# Patient Record
Sex: FEMALE | Race: WHITE | Employment: OTHER | URBAN - NONMETROPOLITAN AREA
[De-identification: names, ages, dates, MRNs, and addresses within clinical notes are randomized per-mention and may not be internally consistent; named-entity substitution may affect disease eponyms.]

---

## 2017-01-09 ENCOUNTER — ANTI-COAG VISIT (OUTPATIENT)
Dept: OTHER | Age: 72
End: 2017-01-09

## 2017-01-09 VITALS
BODY MASS INDEX: 30.43 KG/M2 | HEART RATE: 80 BPM | DIASTOLIC BLOOD PRESSURE: 66 MMHG | SYSTOLIC BLOOD PRESSURE: 121 MMHG | WEIGHT: 171.8 LBS

## 2017-01-09 DIAGNOSIS — I48.19 PERSISTENT ATRIAL FIBRILLATION (HCC): ICD-10-CM

## 2017-01-09 LAB — POC INR: 2.3 (ref 0.8–1.2)

## 2017-01-09 PROCEDURE — 85610 PROTHROMBIN TIME: CPT | Performed by: PHARMACIST

## 2017-01-09 PROCEDURE — 99999 PR OFFICE/OUTPT VISIT,PROCEDURE ONLY: CPT | Performed by: PHARMACIST

## 2017-01-09 ASSESSMENT — ENCOUNTER SYMPTOMS
BLOOD IN STOOL: 0
DIARRHEA: 1
SHORTNESS OF BREATH: 1
CONSTIPATION: 1

## 2017-01-23 ENCOUNTER — ANTI-COAG VISIT (OUTPATIENT)
Dept: OTHER | Age: 72
End: 2017-01-23

## 2017-01-23 VITALS
BODY MASS INDEX: 30.61 KG/M2 | DIASTOLIC BLOOD PRESSURE: 90 MMHG | WEIGHT: 172.8 LBS | HEART RATE: 72 BPM | SYSTOLIC BLOOD PRESSURE: 140 MMHG

## 2017-01-23 DIAGNOSIS — I48.19 PERSISTENT ATRIAL FIBRILLATION (HCC): ICD-10-CM

## 2017-01-23 LAB — POC INR: 1.9 (ref 0.8–1.2)

## 2017-01-23 PROCEDURE — G8484 FLU IMMUNIZE NO ADMIN: HCPCS | Performed by: NURSE PRACTITIONER

## 2017-01-23 PROCEDURE — G8400 PT W/DXA NO RESULTS DOC: HCPCS | Performed by: NURSE PRACTITIONER

## 2017-01-23 PROCEDURE — G8427 DOCREV CUR MEDS BY ELIG CLIN: HCPCS | Performed by: NURSE PRACTITIONER

## 2017-01-23 PROCEDURE — 99212 OFFICE O/P EST SF 10 MIN: CPT | Performed by: NURSE PRACTITIONER

## 2017-01-23 PROCEDURE — 85610 PROTHROMBIN TIME: CPT | Performed by: NURSE PRACTITIONER

## 2017-01-23 PROCEDURE — 1123F ACP DISCUSS/DSCN MKR DOCD: CPT | Performed by: NURSE PRACTITIONER

## 2017-01-23 PROCEDURE — 4004F PT TOBACCO SCREEN RCVD TLK: CPT | Performed by: NURSE PRACTITIONER

## 2017-01-23 PROCEDURE — 1090F PRES/ABSN URINE INCON ASSESS: CPT | Performed by: NURSE PRACTITIONER

## 2017-01-23 PROCEDURE — 3014F SCREEN MAMMO DOC REV: CPT | Performed by: NURSE PRACTITIONER

## 2017-01-23 PROCEDURE — G8419 CALC BMI OUT NRM PARAM NOF/U: HCPCS | Performed by: NURSE PRACTITIONER

## 2017-01-23 PROCEDURE — 3017F COLORECTAL CA SCREEN DOC REV: CPT | Performed by: NURSE PRACTITIONER

## 2017-01-23 PROCEDURE — 4040F PNEUMOC VAC/ADMIN/RCVD: CPT | Performed by: NURSE PRACTITIONER

## 2017-01-23 PROCEDURE — G8598 ASA/ANTIPLAT THER USED: HCPCS | Performed by: NURSE PRACTITIONER

## 2017-01-23 ASSESSMENT — ENCOUNTER SYMPTOMS
SHORTNESS OF BREATH: 1
CONSTIPATION: 0
DIARRHEA: 0
BLOOD IN STOOL: 0

## 2017-01-26 ENCOUNTER — TELEPHONE (OUTPATIENT)
Dept: OTHER | Age: 72
End: 2017-01-26

## 2017-02-14 ENCOUNTER — ANTI-COAG VISIT (OUTPATIENT)
Dept: OTHER | Age: 72
End: 2017-02-14

## 2017-02-14 VITALS
SYSTOLIC BLOOD PRESSURE: 142 MMHG | WEIGHT: 172.4 LBS | HEART RATE: 74 BPM | BODY MASS INDEX: 30.54 KG/M2 | DIASTOLIC BLOOD PRESSURE: 67 MMHG

## 2017-02-14 DIAGNOSIS — I48.19 PERSISTENT ATRIAL FIBRILLATION (HCC): ICD-10-CM

## 2017-02-14 LAB — POC INR: 2.7 (ref 0.8–1.2)

## 2017-02-14 PROCEDURE — G8598 ASA/ANTIPLAT THER USED: HCPCS

## 2017-02-14 PROCEDURE — 99999 PR OFFICE/OUTPT VISIT,PROCEDURE ONLY: CPT

## 2017-02-14 PROCEDURE — 4040F PNEUMOC VAC/ADMIN/RCVD: CPT

## 2017-02-14 PROCEDURE — 85610 PROTHROMBIN TIME: CPT

## 2017-02-14 PROCEDURE — G8419 CALC BMI OUT NRM PARAM NOF/U: HCPCS

## 2017-02-14 PROCEDURE — G8427 DOCREV CUR MEDS BY ELIG CLIN: HCPCS

## 2017-02-14 PROCEDURE — 3017F COLORECTAL CA SCREEN DOC REV: CPT

## 2017-02-14 ASSESSMENT — ENCOUNTER SYMPTOMS
CONSTIPATION: 0
BLOOD IN STOOL: 0
DIARRHEA: 0
SHORTNESS OF BREATH: 0

## 2017-03-01 ENCOUNTER — TELEPHONE (OUTPATIENT)
Dept: OTHER | Age: 72
End: 2017-03-01

## 2017-03-07 ENCOUNTER — ANTI-COAG VISIT (OUTPATIENT)
Dept: OTHER | Age: 72
End: 2017-03-07

## 2017-03-07 VITALS
BODY MASS INDEX: 30.29 KG/M2 | DIASTOLIC BLOOD PRESSURE: 64 MMHG | WEIGHT: 171 LBS | SYSTOLIC BLOOD PRESSURE: 128 MMHG | HEART RATE: 68 BPM

## 2017-03-07 DIAGNOSIS — I48.19 PERSISTENT ATRIAL FIBRILLATION (HCC): ICD-10-CM

## 2017-03-07 LAB — POC INR: 1.6 (ref 0.8–1.2)

## 2017-03-07 PROCEDURE — 85610 PROTHROMBIN TIME: CPT | Performed by: PHARMACIST

## 2017-03-07 PROCEDURE — 99999 PR OFFICE/OUTPT VISIT,PROCEDURE ONLY: CPT | Performed by: PHARMACIST

## 2017-03-07 PROCEDURE — G8419 CALC BMI OUT NRM PARAM NOF/U: HCPCS | Performed by: PHARMACIST

## 2017-03-07 PROCEDURE — G8427 DOCREV CUR MEDS BY ELIG CLIN: HCPCS | Performed by: PHARMACIST

## 2017-03-07 PROCEDURE — 4040F PNEUMOC VAC/ADMIN/RCVD: CPT | Performed by: PHARMACIST

## 2017-03-07 PROCEDURE — 3017F COLORECTAL CA SCREEN DOC REV: CPT | Performed by: PHARMACIST

## 2017-03-07 PROCEDURE — G8598 ASA/ANTIPLAT THER USED: HCPCS | Performed by: PHARMACIST

## 2017-03-07 RX ORDER — FEXOFENADINE HCL 180 MG/1
180 TABLET ORAL DAILY
COMMUNITY
End: 2017-06-20

## 2017-03-07 RX ORDER — AMOXICILLIN AND CLAVULANATE POTASSIUM 500; 125 MG/1; MG/1
1 TABLET, FILM COATED ORAL 2 TIMES DAILY
COMMUNITY
End: 2017-03-21

## 2017-03-07 ASSESSMENT — ENCOUNTER SYMPTOMS
SHORTNESS OF BREATH: 0
DIARRHEA: 0
CONSTIPATION: 0
BLOOD IN STOOL: 0

## 2017-03-21 ENCOUNTER — ANTI-COAG VISIT (OUTPATIENT)
Dept: OTHER | Age: 72
End: 2017-03-21

## 2017-03-21 VITALS
HEART RATE: 84 BPM | BODY MASS INDEX: 29.94 KG/M2 | DIASTOLIC BLOOD PRESSURE: 77 MMHG | SYSTOLIC BLOOD PRESSURE: 136 MMHG | WEIGHT: 169 LBS

## 2017-03-21 DIAGNOSIS — I48.19 PERSISTENT ATRIAL FIBRILLATION (HCC): ICD-10-CM

## 2017-03-21 LAB — POC INR: 2.3 (ref 0.8–1.2)

## 2017-03-21 RX ORDER — ONDANSETRON HYDROCHLORIDE 8 MG/1
8 TABLET, FILM COATED ORAL PRN
Refills: 2 | COMMUNITY
Start: 2017-03-13 | End: 2019-05-22

## 2017-03-21 RX ORDER — HYDROCODONE POLISTIREX AND CHLORPHENIRAMINE POLISTIREX 10; 8 MG/5ML; MG/5ML
5 SUSPENSION, EXTENDED RELEASE ORAL PRN
Refills: 0 | COMMUNITY
Start: 2017-02-24 | End: 2019-05-22 | Stop reason: ALTCHOICE

## 2017-03-21 ASSESSMENT — ENCOUNTER SYMPTOMS
BLOOD IN STOOL: 0
SHORTNESS OF BREATH: 0
DIARRHEA: 0
CONSTIPATION: 0

## 2017-04-12 ENCOUNTER — ANTI-COAG VISIT (OUTPATIENT)
Dept: OTHER | Age: 72
End: 2017-04-12

## 2017-04-12 VITALS
WEIGHT: 173 LBS | BODY MASS INDEX: 30.65 KG/M2 | HEART RATE: 73 BPM | DIASTOLIC BLOOD PRESSURE: 74 MMHG | SYSTOLIC BLOOD PRESSURE: 138 MMHG

## 2017-04-12 DIAGNOSIS — I48.19 PERSISTENT ATRIAL FIBRILLATION (HCC): ICD-10-CM

## 2017-04-12 LAB — POC INR: 2.6 (ref 0.8–1.2)

## 2017-04-12 RX ORDER — CLINDAMYCIN HYDROCHLORIDE 300 MG/1
300 CAPSULE ORAL 2 TIMES DAILY
COMMUNITY
End: 2017-06-08 | Stop reason: ALTCHOICE

## 2017-04-12 ASSESSMENT — ENCOUNTER SYMPTOMS
CONSTIPATION: 0
DIARRHEA: 0
SHORTNESS OF BREATH: 0
BLOOD IN STOOL: 0

## 2017-04-24 ENCOUNTER — OFFICE VISIT (OUTPATIENT)
Dept: UROLOGY | Age: 72
End: 2017-04-24

## 2017-04-24 ENCOUNTER — TELEPHONE (OUTPATIENT)
Dept: OTHER | Age: 72
End: 2017-04-24

## 2017-04-24 VITALS
BODY MASS INDEX: 30.83 KG/M2 | WEIGHT: 174 LBS | DIASTOLIC BLOOD PRESSURE: 86 MMHG | SYSTOLIC BLOOD PRESSURE: 128 MMHG | HEIGHT: 63 IN

## 2017-04-24 DIAGNOSIS — R35.0 URINARY FREQUENCY: Primary | ICD-10-CM

## 2017-04-24 LAB
BILIRUBIN URINE: NEGATIVE
BLOOD URINE, POC: NEGATIVE
CHARACTER, URINE: CLEAR
COLOR, URINE: YELLOW
GLUCOSE URINE: NEGATIVE MG/DL
KETONES, URINE: NEGATIVE
LEUKOCYTE CLUMPS, URINE: NORMAL
NITRITE, URINE: NEGATIVE
PH, URINE: 6
POST VOID RESIDUAL (PVR): 58 ML
PROTEIN, URINE: NEGATIVE MG/DL
SPECIFIC GRAVITY, URINE: 1.02 (ref 1–1.03)
UROBILINOGEN, URINE: 0.2 EU/DL

## 2017-04-24 PROCEDURE — 81003 URINALYSIS AUTO W/O SCOPE: CPT | Performed by: UROLOGY

## 2017-04-24 PROCEDURE — 4040F PNEUMOC VAC/ADMIN/RCVD: CPT | Performed by: UROLOGY

## 2017-04-24 PROCEDURE — 99204 OFFICE O/P NEW MOD 45 MIN: CPT | Performed by: UROLOGY

## 2017-04-24 PROCEDURE — G8427 DOCREV CUR MEDS BY ELIG CLIN: HCPCS | Performed by: UROLOGY

## 2017-04-24 PROCEDURE — G8400 PT W/DXA NO RESULTS DOC: HCPCS | Performed by: UROLOGY

## 2017-04-24 PROCEDURE — 1123F ACP DISCUSS/DSCN MKR DOCD: CPT | Performed by: UROLOGY

## 2017-04-24 PROCEDURE — 1090F PRES/ABSN URINE INCON ASSESS: CPT | Performed by: UROLOGY

## 2017-04-24 PROCEDURE — G8598 ASA/ANTIPLAT THER USED: HCPCS | Performed by: UROLOGY

## 2017-04-24 PROCEDURE — 51798 US URINE CAPACITY MEASURE: CPT | Performed by: UROLOGY

## 2017-04-24 PROCEDURE — 3017F COLORECTAL CA SCREEN DOC REV: CPT | Performed by: UROLOGY

## 2017-04-24 PROCEDURE — 3014F SCREEN MAMMO DOC REV: CPT | Performed by: UROLOGY

## 2017-04-24 PROCEDURE — 4004F PT TOBACCO SCREEN RCVD TLK: CPT | Performed by: UROLOGY

## 2017-04-24 PROCEDURE — G8417 CALC BMI ABV UP PARAM F/U: HCPCS | Performed by: UROLOGY

## 2017-04-24 RX ORDER — TIZANIDINE 2 MG/1
2 TABLET ORAL EVERY 6 HOURS PRN
COMMUNITY
End: 2017-06-08

## 2017-04-24 RX ORDER — METRONIDAZOLE 7.5 MG/G
GEL VAGINAL
COMMUNITY
Start: 2017-03-25 | End: 2017-06-08

## 2017-04-24 RX ORDER — SULFAMETHOXAZOLE AND TRIMETHOPRIM 800; 160 MG/1; MG/1
TABLET ORAL
COMMUNITY
Start: 2017-04-20 | End: 2017-05-04

## 2017-04-24 ASSESSMENT — ENCOUNTER SYMPTOMS
SHORTNESS OF BREATH: 1
CHEST TIGHTNESS: 0
ABDOMINAL PAIN: 0
EYE REDNESS: 0
FACIAL SWELLING: 0
CONSTIPATION: 0
COLOR CHANGE: 0
BACK PAIN: 1
EYE PAIN: 0

## 2017-04-27 ENCOUNTER — ANTI-COAG VISIT (OUTPATIENT)
Dept: OTHER | Age: 72
End: 2017-04-27

## 2017-04-27 VITALS
WEIGHT: 169.4 LBS | SYSTOLIC BLOOD PRESSURE: 134 MMHG | HEART RATE: 73 BPM | BODY MASS INDEX: 30.01 KG/M2 | DIASTOLIC BLOOD PRESSURE: 65 MMHG

## 2017-04-27 DIAGNOSIS — I48.19 PERSISTENT ATRIAL FIBRILLATION (HCC): ICD-10-CM

## 2017-04-27 LAB — POC INR: 1.8 (ref 0.8–1.2)

## 2017-04-27 ASSESSMENT — ENCOUNTER SYMPTOMS
SHORTNESS OF BREATH: 0
DIARRHEA: 0
BLOOD IN STOOL: 0
CONSTIPATION: 0

## 2017-05-04 ENCOUNTER — ANTI-COAG VISIT (OUTPATIENT)
Dept: OTHER | Age: 72
End: 2017-05-04

## 2017-05-04 VITALS
HEART RATE: 76 BPM | DIASTOLIC BLOOD PRESSURE: 68 MMHG | BODY MASS INDEX: 30.65 KG/M2 | SYSTOLIC BLOOD PRESSURE: 124 MMHG | WEIGHT: 173 LBS

## 2017-05-04 DIAGNOSIS — I48.19 PERSISTENT ATRIAL FIBRILLATION (HCC): ICD-10-CM

## 2017-05-04 LAB — POC INR: 2.7 (ref 0.8–1.2)

## 2017-05-04 ASSESSMENT — ENCOUNTER SYMPTOMS
CONSTIPATION: 0
DIARRHEA: 0
SHORTNESS OF BREATH: 1
BLOOD IN STOOL: 0

## 2017-05-18 ENCOUNTER — ANTI-COAG VISIT (OUTPATIENT)
Dept: OTHER | Age: 72
End: 2017-05-18

## 2017-05-18 VITALS
HEART RATE: 72 BPM | DIASTOLIC BLOOD PRESSURE: 65 MMHG | SYSTOLIC BLOOD PRESSURE: 120 MMHG | WEIGHT: 172.6 LBS | BODY MASS INDEX: 30.57 KG/M2

## 2017-05-18 DIAGNOSIS — I48.19 PERSISTENT ATRIAL FIBRILLATION (HCC): ICD-10-CM

## 2017-05-18 LAB — POC INR: 2.1 (ref 0.8–1.2)

## 2017-05-18 ASSESSMENT — ENCOUNTER SYMPTOMS
DIARRHEA: 0
SHORTNESS OF BREATH: 0
CONSTIPATION: 0
BLOOD IN STOOL: 0

## 2017-06-08 ENCOUNTER — ANTI-COAG VISIT (OUTPATIENT)
Dept: OTHER | Age: 72
End: 2017-06-08

## 2017-06-08 VITALS
DIASTOLIC BLOOD PRESSURE: 67 MMHG | WEIGHT: 170.8 LBS | SYSTOLIC BLOOD PRESSURE: 142 MMHG | BODY MASS INDEX: 30.26 KG/M2 | HEART RATE: 76 BPM

## 2017-06-08 DIAGNOSIS — I48.19 PERSISTENT ATRIAL FIBRILLATION (HCC): ICD-10-CM

## 2017-06-08 LAB — POC INR: 2.2 (ref 0.8–1.2)

## 2017-06-08 ASSESSMENT — ENCOUNTER SYMPTOMS
CONSTIPATION: 0
DIARRHEA: 0
SHORTNESS OF BREATH: 0
BLOOD IN STOOL: 1

## 2017-06-19 ENCOUNTER — TELEPHONE (OUTPATIENT)
Dept: OTHER | Age: 72
End: 2017-06-19

## 2017-06-20 ENCOUNTER — OFFICE VISIT (OUTPATIENT)
Dept: UROLOGY | Age: 72
End: 2017-06-20

## 2017-06-20 VITALS
SYSTOLIC BLOOD PRESSURE: 142 MMHG | BODY MASS INDEX: 31.17 KG/M2 | DIASTOLIC BLOOD PRESSURE: 78 MMHG | WEIGHT: 169.4 LBS | HEIGHT: 62 IN

## 2017-06-20 DIAGNOSIS — R35.0 URINARY FREQUENCY: Primary | ICD-10-CM

## 2017-06-20 LAB
BILIRUBIN URINE: NEGATIVE
BLOOD URINE, POC: NEGATIVE
CHARACTER, URINE: CLEAR
COLOR, URINE: YELLOW
GLUCOSE URINE: NEGATIVE MG/DL
KETONES, URINE: NEGATIVE
LEUKOCYTE CLUMPS, URINE: NEGATIVE
NITRITE, URINE: NEGATIVE
PH, URINE: 6.5
POST VOID RESIDUAL (PVR): 65 ML
PROTEIN, URINE: NEGATIVE MG/DL
SPECIFIC GRAVITY, URINE: 1.02 (ref 1–1.03)
UROBILINOGEN, URINE: 0.2 EU/DL

## 2017-06-20 PROCEDURE — 4040F PNEUMOC VAC/ADMIN/RCVD: CPT | Performed by: NURSE PRACTITIONER

## 2017-06-20 PROCEDURE — 51798 US URINE CAPACITY MEASURE: CPT | Performed by: NURSE PRACTITIONER

## 2017-06-20 PROCEDURE — 4004F PT TOBACCO SCREEN RCVD TLK: CPT | Performed by: NURSE PRACTITIONER

## 2017-06-20 PROCEDURE — 81003 URINALYSIS AUTO W/O SCOPE: CPT | Performed by: NURSE PRACTITIONER

## 2017-06-20 PROCEDURE — G8598 ASA/ANTIPLAT THER USED: HCPCS | Performed by: NURSE PRACTITIONER

## 2017-06-20 PROCEDURE — G8427 DOCREV CUR MEDS BY ELIG CLIN: HCPCS | Performed by: NURSE PRACTITIONER

## 2017-06-20 PROCEDURE — 99213 OFFICE O/P EST LOW 20 MIN: CPT | Performed by: NURSE PRACTITIONER

## 2017-06-20 PROCEDURE — 3017F COLORECTAL CA SCREEN DOC REV: CPT | Performed by: NURSE PRACTITIONER

## 2017-06-20 PROCEDURE — G8417 CALC BMI ABV UP PARAM F/U: HCPCS | Performed by: NURSE PRACTITIONER

## 2017-06-20 PROCEDURE — 1090F PRES/ABSN URINE INCON ASSESS: CPT | Performed by: NURSE PRACTITIONER

## 2017-06-20 PROCEDURE — G8400 PT W/DXA NO RESULTS DOC: HCPCS | Performed by: NURSE PRACTITIONER

## 2017-06-20 PROCEDURE — 1123F ACP DISCUSS/DSCN MKR DOCD: CPT | Performed by: NURSE PRACTITIONER

## 2017-06-20 PROCEDURE — 3014F SCREEN MAMMO DOC REV: CPT | Performed by: NURSE PRACTITIONER

## 2017-06-20 ASSESSMENT — ENCOUNTER SYMPTOMS
ABDOMINAL PAIN: 0
VOMITING: 0
NAUSEA: 0

## 2017-06-29 ENCOUNTER — ANTI-COAG VISIT (OUTPATIENT)
Dept: OTHER | Age: 72
End: 2017-06-29

## 2017-06-29 VITALS
HEART RATE: 85 BPM | DIASTOLIC BLOOD PRESSURE: 69 MMHG | WEIGHT: 168.8 LBS | BODY MASS INDEX: 30.87 KG/M2 | SYSTOLIC BLOOD PRESSURE: 143 MMHG

## 2017-06-29 DIAGNOSIS — I48.19 PERSISTENT ATRIAL FIBRILLATION (HCC): ICD-10-CM

## 2017-06-29 LAB — POC INR: 2.1 (ref 0.8–1.2)

## 2017-06-29 RX ORDER — LEVOCETIRIZINE DIHYDROCHLORIDE 5 MG/1
5 TABLET, FILM COATED ORAL NIGHTLY
COMMUNITY
End: 2020-01-01

## 2017-06-29 ASSESSMENT — ENCOUNTER SYMPTOMS
CONSTIPATION: 0
DIARRHEA: 0
BLOOD IN STOOL: 0
SHORTNESS OF BREATH: 1

## 2017-07-27 ENCOUNTER — HOSPITAL ENCOUNTER (OUTPATIENT)
Dept: PHARMACY | Age: 72
Setting detail: THERAPIES SERIES
Discharge: HOME | End: 2017-07-27
Payer: MEDICARE

## 2017-07-27 VITALS
HEART RATE: 82 BPM | WEIGHT: 169.8 LBS | DIASTOLIC BLOOD PRESSURE: 66 MMHG | BODY MASS INDEX: 31.06 KG/M2 | SYSTOLIC BLOOD PRESSURE: 124 MMHG

## 2017-07-27 DIAGNOSIS — I48.19 PERSISTENT ATRIAL FIBRILLATION (HCC): ICD-10-CM

## 2017-07-27 LAB — POC INR: 2.4 (ref 0.8–1.2)

## 2017-07-27 PROCEDURE — 36416 COLLJ CAPILLARY BLOOD SPEC: CPT

## 2017-07-27 PROCEDURE — 99211 OFF/OP EST MAY X REQ PHY/QHP: CPT

## 2017-07-27 PROCEDURE — 85610 PROTHROMBIN TIME: CPT

## 2017-07-27 ASSESSMENT — ENCOUNTER SYMPTOMS
DIARRHEA: 0
BLOOD IN STOOL: 0
CHEST TIGHTNESS: 1
CONSTIPATION: 0

## 2017-08-07 ENCOUNTER — HOSPITAL ENCOUNTER (OUTPATIENT)
Age: 72
Discharge: HOME | End: 2017-08-07
Payer: MEDICARE

## 2017-08-07 ENCOUNTER — TELEPHONE (OUTPATIENT)
Dept: UROLOGY | Age: 72
End: 2017-08-07

## 2017-08-07 DIAGNOSIS — R30.0 DYSURIA: Primary | ICD-10-CM

## 2017-08-07 DIAGNOSIS — R30.0 DYSURIA: ICD-10-CM

## 2017-08-07 LAB
BACTERIA: ABNORMAL /HPF
BILIRUBIN URINE: NEGATIVE
BLOOD, URINE: NEGATIVE
CASTS 2: ABNORMAL /LPF
CASTS UA: ABNORMAL /LPF
CHARACTER, URINE: ABNORMAL
COLOR: YELLOW
CRYSTALS, UA: ABNORMAL
EPITHELIAL CELLS, UA: ABNORMAL /HPF
GLUCOSE URINE: NEGATIVE MG/DL
KETONES, URINE: NEGATIVE
LEUKOCYTE ESTERASE, URINE: ABNORMAL
MISCELLANEOUS 2: ABNORMAL
NITRITE, URINE: NEGATIVE
PH UA: 6
PROTEIN UA: NEGATIVE
RBC URINE: ABNORMAL /HPF
RENAL EPITHELIAL, UA: ABNORMAL
SPECIFIC GRAVITY, URINE: 1.02 (ref 1–1.03)
UROBILINOGEN, URINE: 0.2 EU/DL
WBC UA: ABNORMAL /HPF
YEAST: ABNORMAL

## 2017-08-07 PROCEDURE — 87086 URINE CULTURE/COLONY COUNT: CPT

## 2017-08-07 PROCEDURE — 81001 URINALYSIS AUTO W/SCOPE: CPT

## 2017-08-07 PROCEDURE — 87077 CULTURE AEROBIC IDENTIFY: CPT

## 2017-08-07 PROCEDURE — 87186 SC STD MICRODIL/AGAR DIL: CPT

## 2017-08-08 ENCOUNTER — TELEPHONE (OUTPATIENT)
Dept: UROLOGY | Age: 72
End: 2017-08-08

## 2017-08-08 LAB
ORGANISM: ABNORMAL
URINE CULTURE REFLEX: ABNORMAL

## 2017-08-08 RX ORDER — AMOXICILLIN 500 MG/1
500 CAPSULE ORAL 3 TIMES DAILY
Qty: 15 CAPSULE | Refills: 0 | Status: SHIPPED | OUTPATIENT
Start: 2017-08-08 | End: 2017-08-13

## 2017-08-08 RX ORDER — DOXYCYCLINE HYCLATE 100 MG/1
100 CAPSULE ORAL 2 TIMES DAILY
Qty: 10 CAPSULE | Refills: 0 | Status: SHIPPED | OUTPATIENT
Start: 2017-08-08 | End: 2017-08-13

## 2017-08-11 ENCOUNTER — TELEPHONE (OUTPATIENT)
Dept: UROLOGY | Age: 72
End: 2017-08-11

## 2017-08-11 DIAGNOSIS — B37.9 YEAST INFECTION: Primary | ICD-10-CM

## 2017-08-24 ENCOUNTER — HOSPITAL ENCOUNTER (OUTPATIENT)
Dept: PHARMACY | Age: 72
Setting detail: THERAPIES SERIES
Discharge: HOME | End: 2017-08-24
Payer: MEDICARE

## 2017-08-24 VITALS
SYSTOLIC BLOOD PRESSURE: 124 MMHG | HEART RATE: 81 BPM | BODY MASS INDEX: 31.68 KG/M2 | DIASTOLIC BLOOD PRESSURE: 69 MMHG | WEIGHT: 173.2 LBS

## 2017-08-24 DIAGNOSIS — I48.19 PERSISTENT ATRIAL FIBRILLATION (HCC): ICD-10-CM

## 2017-08-24 LAB
INTERNATIONAL NORMALIZATION RATIO, POC: 2.4
POC INR: 2.4 (ref 0.8–1.2)

## 2017-08-24 PROCEDURE — 85610 PROTHROMBIN TIME: CPT

## 2017-08-24 PROCEDURE — 36416 COLLJ CAPILLARY BLOOD SPEC: CPT

## 2017-08-24 PROCEDURE — 99211 OFF/OP EST MAY X REQ PHY/QHP: CPT

## 2017-08-24 ASSESSMENT — ENCOUNTER SYMPTOMS
CONSTIPATION: 0
BLOOD IN STOOL: 0
SHORTNESS OF BREATH: 0
DIARRHEA: 0

## 2017-09-20 ENCOUNTER — HOSPITAL ENCOUNTER (OUTPATIENT)
Dept: PHARMACY | Age: 72
Setting detail: THERAPIES SERIES
Discharge: HOME | End: 2017-09-20
Payer: MEDICARE

## 2017-09-20 VITALS
DIASTOLIC BLOOD PRESSURE: 61 MMHG | HEART RATE: 75 BPM | BODY MASS INDEX: 31.75 KG/M2 | WEIGHT: 173.6 LBS | SYSTOLIC BLOOD PRESSURE: 125 MMHG

## 2017-09-20 DIAGNOSIS — I48.19 PERSISTENT ATRIAL FIBRILLATION (HCC): ICD-10-CM

## 2017-09-20 LAB — POC INR: 1.4 (ref 0.8–1.2)

## 2017-09-20 PROCEDURE — 36416 COLLJ CAPILLARY BLOOD SPEC: CPT

## 2017-09-20 PROCEDURE — 85610 PROTHROMBIN TIME: CPT

## 2017-09-20 PROCEDURE — 99211 OFF/OP EST MAY X REQ PHY/QHP: CPT

## 2017-09-27 ENCOUNTER — HOSPITAL ENCOUNTER (OUTPATIENT)
Dept: PHARMACY | Age: 72
Setting detail: THERAPIES SERIES
Discharge: HOME | End: 2017-09-27
Payer: MEDICARE

## 2017-09-27 VITALS
SYSTOLIC BLOOD PRESSURE: 149 MMHG | DIASTOLIC BLOOD PRESSURE: 80 MMHG | BODY MASS INDEX: 31.46 KG/M2 | HEART RATE: 75 BPM | WEIGHT: 172 LBS

## 2017-09-27 DIAGNOSIS — I48.19 PERSISTENT ATRIAL FIBRILLATION (HCC): ICD-10-CM

## 2017-09-27 LAB — POC INR: 2 (ref 0.8–1.2)

## 2017-09-27 PROCEDURE — 85610 PROTHROMBIN TIME: CPT

## 2017-09-27 PROCEDURE — 99211 OFF/OP EST MAY X REQ PHY/QHP: CPT

## 2017-09-27 PROCEDURE — 36416 COLLJ CAPILLARY BLOOD SPEC: CPT

## 2017-09-27 ASSESSMENT — ENCOUNTER SYMPTOMS
CONSTIPATION: 0
BLOOD IN STOOL: 0
DIARRHEA: 0

## 2017-10-11 ENCOUNTER — HOSPITAL ENCOUNTER (OUTPATIENT)
Dept: PHARMACY | Age: 72
Setting detail: THERAPIES SERIES
Discharge: HOME | End: 2017-10-11
Payer: MEDICARE

## 2017-10-11 VITALS
SYSTOLIC BLOOD PRESSURE: 120 MMHG | HEART RATE: 79 BPM | WEIGHT: 172.6 LBS | DIASTOLIC BLOOD PRESSURE: 72 MMHG | BODY MASS INDEX: 31.57 KG/M2

## 2017-10-11 DIAGNOSIS — I48.19 PERSISTENT ATRIAL FIBRILLATION (HCC): ICD-10-CM

## 2017-10-11 LAB
INR BLD: 2.1
POC INR: 2.1 (ref 0.8–1.2)

## 2017-10-11 PROCEDURE — 99211 OFF/OP EST MAY X REQ PHY/QHP: CPT

## 2017-10-11 PROCEDURE — 36416 COLLJ CAPILLARY BLOOD SPEC: CPT

## 2017-10-11 PROCEDURE — 85610 PROTHROMBIN TIME: CPT

## 2017-10-11 NOTE — PROGRESS NOTES
10/11/17   1543   INR  2.10*                             Plan:    Patient requests names of DOACs from previous encounter. Will include on AVS. Continue Coumadin 1mg MF, 1.5mg STWTHS. Recheck INR 3 weeks. Patient reminded to call the Anticoagulation Clinic with any signs or symptoms of bleeding or with any medication changes. Patient given instructions utilizing the teach back method. Assessment and plan review with [facility]. MARGOTH      Discharged ambulatory in no apparent distress.     Carlos RAMIREZ [facility]  10/11/2017  4:06 PM

## 2017-11-05 DIAGNOSIS — I48.19 PERSISTENT ATRIAL FIBRILLATION (HCC): ICD-10-CM

## 2017-11-06 ENCOUNTER — HOSPITAL ENCOUNTER (OUTPATIENT)
Dept: PHARMACY | Age: 72
Setting detail: THERAPIES SERIES
Discharge: HOME | End: 2017-11-06
Payer: MEDICARE

## 2017-11-06 VITALS
WEIGHT: 175.4 LBS | HEART RATE: 71 BPM | SYSTOLIC BLOOD PRESSURE: 127 MMHG | BODY MASS INDEX: 32.08 KG/M2 | DIASTOLIC BLOOD PRESSURE: 65 MMHG

## 2017-11-06 DIAGNOSIS — I48.19 PERSISTENT ATRIAL FIBRILLATION (HCC): ICD-10-CM

## 2017-11-06 LAB
HCT VFR BLD CALC: 49.1 % (ref 37–47)
HEMOGLOBIN: 16.6 GM/DL (ref 12–16)
POC INR: 1.7 (ref 0.8–1.2)

## 2017-11-06 PROCEDURE — 85610 PROTHROMBIN TIME: CPT

## 2017-11-06 PROCEDURE — 99211 OFF/OP EST MAY X REQ PHY/QHP: CPT

## 2017-11-06 PROCEDURE — 99212 OFFICE O/P EST SF 10 MIN: CPT

## 2017-11-06 PROCEDURE — 36416 COLLJ CAPILLARY BLOOD SPEC: CPT

## 2017-11-06 RX ORDER — WARFARIN SODIUM 1 MG/1
TABLET ORAL
Qty: 140 TABLET | Refills: 3 | Status: SHIPPED | OUTPATIENT
Start: 2017-11-06 | End: 2017-11-06 | Stop reason: SDUPTHER

## 2017-11-06 RX ORDER — WARFARIN SODIUM 1 MG/1
TABLET ORAL
Qty: 140 TABLET | Refills: 3 | Status: SHIPPED | OUTPATIENT
Start: 2017-11-06 | End: 2018-11-29 | Stop reason: SDUPTHER

## 2017-11-06 RX ORDER — WARFARIN SODIUM 1 MG/1
TABLET ORAL
Qty: 140 TABLET | OUTPATIENT
Start: 2017-11-06

## 2017-11-06 NOTE — PROGRESS NOTES
[facility]  Anticoagulation Clinic  [phone] (phone)                    [phone] (fax)    Visit Date: 11/6/2017     Subjective:       Patient ID: Sandie Hussein, 67 y.o., female     Patient presents for 3 week INR check. Patient in for anticoagulation management due to persistent atrial fibrillation with a goal INR of 2.0-3.0. INR ordered and reviewed today. Patient reports the following:    Medication changes: None  Tablet strength per patient: 1mg pink tab  Patient reported dosing regimen over last 1 week: 1mg MF, and 1.5mg on the other days  Missed doses in the last 1-2 weeks: missed dose on Wednesday (1.5mg)  Extra doses in the last 1-2 weeks: denies   Any problems with bleeding/bruising? Yes, pt has bruises on both arms since last appt, but is starting to fade  Any recent falls? No  Any signs or symptoms of DVT/PE or stroke? Yes, SOB, but normal for pt  Alcohol use: had a beer on Sunday  Tobacco use: 6-7 cigarettes/day, which is normal for pt  Diet changes as follows: No changes   Green leafy intake: no   Grapefruit juice: no  Upcoming surgeries or procedures: 11/08/2017 has cat scan  Appointment preference: PM    Review of Systems   Constitutional: Negative for activity change, appetite change and fatigue. HENT: Negative for nosebleeds. Respiratory: Positive for shortness of breath. Cardiovascular: Negative for chest pain and leg swelling. Gastrointestinal: Negative for blood in stool, constipation and diarrhea. Genitourinary: Negative for hematuria. Neurological: Negative for weakness, light-headedness and headaches. Hematological: Does get bruises. Does not bleed easily.      Objective:   Physical Exam   Vitals:    11/06/17 1543   BP: 127/65   Pulse: 71       Physical Exam    Assessment:     Lab Results   Component Value Date    INR 1.70 (H) 11/06/2017    INR 2.10 (H) 10/11/2017    INR 2.10 10/11/2017     INR therapeutic   Recent Labs 11/06/17   1538   INR  1.70*                             Plan:   Instructed patient to increase today's dose to 2mg then continue regimen of Coumadin 1mg MF, 1.5mg STWTHS. Recheck INR 3 weeks. Patient reminded to call the Anticoagulation Clinic with any signs or symptoms of bleeding or with any medication changes. Patient given instructions utilizing the teach back method. Assessment and plan review with Kong Lowe PharmD. Discharged ambulatory in no apparent distress.     Linette Hughes PharmD  PGY-1 Resident  11/6/2017 4:10 PM

## 2017-11-08 ENCOUNTER — HOSPITAL ENCOUNTER (OUTPATIENT)
Dept: CT IMAGING | Age: 72
Discharge: HOME | End: 2017-11-08
Payer: MEDICARE

## 2017-11-08 DIAGNOSIS — R10.32 LEFT LOWER QUADRANT PAIN: ICD-10-CM

## 2017-11-08 PROCEDURE — 6360000004 HC RX CONTRAST MEDICATION: Performed by: INTERNAL MEDICINE

## 2017-11-08 PROCEDURE — 74177 CT ABD & PELVIS W/CONTRAST: CPT

## 2017-11-08 RX ADMIN — IOPAMIDOL 85 ML: 755 INJECTION, SOLUTION INTRAVENOUS at 13:32

## 2017-11-27 ENCOUNTER — HOSPITAL ENCOUNTER (OUTPATIENT)
Dept: PHARMACY | Age: 72
Setting detail: THERAPIES SERIES
Discharge: HOME | End: 2017-11-27
Payer: MEDICARE

## 2017-11-27 VITALS
HEART RATE: 75 BPM | WEIGHT: 179.2 LBS | DIASTOLIC BLOOD PRESSURE: 65 MMHG | BODY MASS INDEX: 32.78 KG/M2 | SYSTOLIC BLOOD PRESSURE: 123 MMHG

## 2017-11-27 DIAGNOSIS — I48.19 PERSISTENT ATRIAL FIBRILLATION (HCC): ICD-10-CM

## 2017-11-27 LAB — POC INR: 2.1 (ref 0.8–1.2)

## 2017-11-27 PROCEDURE — 85610 PROTHROMBIN TIME: CPT | Performed by: PHARMACIST

## 2017-11-27 PROCEDURE — 36416 COLLJ CAPILLARY BLOOD SPEC: CPT | Performed by: PHARMACIST

## 2017-11-27 PROCEDURE — 99211 OFF/OP EST MAY X REQ PHY/QHP: CPT | Performed by: PHARMACIST

## 2017-12-08 ENCOUNTER — HOSPITAL ENCOUNTER (OUTPATIENT)
Dept: CT IMAGING | Age: 72
Discharge: HOME | End: 2017-12-08
Payer: MEDICARE

## 2017-12-08 DIAGNOSIS — R91.1 LUNG NODULE: ICD-10-CM

## 2017-12-08 PROCEDURE — 71250 CT THORAX DX C-: CPT

## 2017-12-20 ENCOUNTER — TELEPHONE (OUTPATIENT)
Dept: PHARMACY | Age: 72
End: 2017-12-20

## 2017-12-26 ENCOUNTER — HOSPITAL ENCOUNTER (OUTPATIENT)
Dept: PHARMACY | Age: 72
Setting detail: THERAPIES SERIES
Discharge: HOME | End: 2017-12-26
Payer: MEDICARE

## 2017-12-26 DIAGNOSIS — I48.19 PERSISTENT ATRIAL FIBRILLATION (HCC): ICD-10-CM

## 2017-12-26 LAB — POC INR: 3 (ref 0.8–1.2)

## 2017-12-26 PROCEDURE — 99211 OFF/OP EST MAY X REQ PHY/QHP: CPT

## 2017-12-26 PROCEDURE — 85610 PROTHROMBIN TIME: CPT

## 2017-12-26 PROCEDURE — 36416 COLLJ CAPILLARY BLOOD SPEC: CPT

## 2017-12-26 RX ORDER — AMOXICILLIN AND CLAVULANATE POTASSIUM 875; 125 MG/1; MG/1
1 TABLET, FILM COATED ORAL 2 TIMES DAILY
COMMUNITY
End: 2018-01-05

## 2017-12-26 RX ORDER — PREDNISONE 20 MG/1
20 TABLET ORAL DAILY
COMMUNITY
End: 2018-01-05

## 2017-12-26 NOTE — PROGRESS NOTES
[facility]  Anticoagulation Clinic  [phone] (phone)   [phone] (fax)    Visit Date: 11/6/2017     Subjective:       Patient ID: Amanda Anderson, 67 y.o., female     Patient in for Warfarin management due to persistent atrial fibrillation, per Dr. Manuela Hackett referral (4 week visit). Correctly states dose/tablet strength. Called last week about being put on Tamiflu - has finished. Brought in bottles of Prednisone and Augmentin - started evening of 12/23 for bronchitis. Prednisone ordered as BID, but gets too shakey, so only taking 1 daily; advised to update doctor. Has been having diarrhea. Already takes Probiotic. Encouraged to eat more yogurt. Has chest pain when out in the cold. SOB worse. Has had more swelling. States Dr. Villarreal Learn told her she could take an extra Lasix if needed, which she has. Less active. Objective:   Alert and oriented. Skin warm/dry. Respirations unlabored. Noted harsh cough      Assessment:     Lab Results   Component Value Date    INR 3.00 (H) 12/26/2017    INR 2.10 (H) 11/27/2017    INR 1.70 (H) 11/06/2017     INR therapeutic  - goal 2-3. Recent Labs      12/26/17   1323   INR  3.00*       Plan:   POCT INR ordered/performed/reviewed. 0.5 mg today, T, then 1 mg for 2 days, then 1.5 mg one day, then 1 mg the 30th and 31st, then 1.5 mg one day, then continue PO Coumadin 1 mg MF, 1.5 mg TWThSS. Recheck INR next week. (Report given - orders entered by Carrol Pickens, PharmD.)  Patient reminded to call the Anticoagulation Clinic with any signs or symptoms of bleeding or with any medication changes. Patient given instructions utilizing the teach back method. Discharged ambulatory in no apparent distress, with oxygen.

## 2017-12-28 ENCOUNTER — TELEPHONE (OUTPATIENT)
Dept: PHARMACY | Age: 72
End: 2017-12-28

## 2017-12-28 NOTE — TELEPHONE ENCOUNTER
Pt called to notify office she is taking 20 mg of Prednisone (not 10 mg) as previously reported. Advised pt to keep warfarin dose as previously recommended and keep 1/5/18 INR apptmt.

## 2018-01-05 ENCOUNTER — HOSPITAL ENCOUNTER (OUTPATIENT)
Dept: PHARMACY | Age: 73
Setting detail: THERAPIES SERIES
Discharge: HOME | End: 2018-01-05
Payer: MEDICARE

## 2018-01-05 DIAGNOSIS — I48.19 PERSISTENT ATRIAL FIBRILLATION (HCC): ICD-10-CM

## 2018-01-05 LAB — POC INR: 2.2 (ref 0.8–1.2)

## 2018-01-05 PROCEDURE — 36416 COLLJ CAPILLARY BLOOD SPEC: CPT

## 2018-01-05 PROCEDURE — 99211 OFF/OP EST MAY X REQ PHY/QHP: CPT

## 2018-01-05 PROCEDURE — 85610 PROTHROMBIN TIME: CPT

## 2018-01-19 ENCOUNTER — HOSPITAL ENCOUNTER (OUTPATIENT)
Dept: PHARMACY | Age: 73
Setting detail: THERAPIES SERIES
Discharge: HOME | End: 2018-01-19
Payer: MEDICARE

## 2018-01-19 DIAGNOSIS — I48.19 PERSISTENT ATRIAL FIBRILLATION (HCC): ICD-10-CM

## 2018-01-19 LAB — POC INR: 2.5 (ref 0.8–1.2)

## 2018-01-19 PROCEDURE — 99211 OFF/OP EST MAY X REQ PHY/QHP: CPT | Performed by: PHARMACIST

## 2018-01-19 PROCEDURE — 36416 COLLJ CAPILLARY BLOOD SPEC: CPT | Performed by: PHARMACIST

## 2018-01-19 PROCEDURE — 85610 PROTHROMBIN TIME: CPT | Performed by: PHARMACIST

## 2018-01-19 RX ORDER — METOPROLOL SUCCINATE 50 MG/1
25 TABLET, EXTENDED RELEASE ORAL DAILY
COMMUNITY
Start: 2017-11-20 | End: 2019-05-01

## 2018-01-19 NOTE — PROGRESS NOTES
The [facility]  Anticoagulation Clinic  [phone] (phone)  [phone] (fax)    Ms. Nia Cerda is a 67 y.o.  female with history of Afib who presents today for anticoagulation monitoring and adjustment. Patient verifies current dosing regimen and tablet strength. No missed or extra doses. Patient denies s/s swelling/SOB/chest pain/. Bruising more often now; some blood on tissue when blows nose- pt states due to her sinuses. Pt having some SOB/chest pain at baseline and also due to illness (pt states possibly pneumonia). No blood in urine or stool. Been eating very little, crackers and cheese. Been sick since 12-18-18. No changes in medication/OTC agents/Herbals. No change in alcohol use or tobacco use. No change in activity level. Patient denies dizziness/lightheadedness/falls. Bad headaches the last couple days, pt states due to sinuses  Pt has been having vomiting and diarrhea since 12-18-18. Pt states she believes she has pneumonia. No Procedures scheduled in the future at this time. Lab Results   Component Value Date    INR 2.50 (H) 01/19/2018    INR 2.20 (H) 01/05/2018    INR 3.00 (H) 12/26/2017           Plan:    Continue Coumadin 1mg MF, 1.5mg TWTHSS. Recheck INR 4 weeks. Patient reminded to call the Anticoagulation Clinic with any signs or symptoms of bleeding or with any medication changes. Patient given instructions utilizing the teach back method. Discharged ambulatory in no apparent distress. After visit summary printed and reviewed with patient.       Medications reviewed and updated on home medication list Yes    Influenza vaccine:     [] given    [] declined   [] received previously   [] plans to receive at a later time   [] refused    [x] documented in EPIC

## 2018-01-23 ENCOUNTER — TELEPHONE (OUTPATIENT)
Dept: PHARMACY | Age: 73
End: 2018-01-23

## 2018-01-29 ENCOUNTER — TELEPHONE (OUTPATIENT)
Dept: PHARMACY | Age: 73
End: 2018-01-29

## 2018-01-29 NOTE — TELEPHONE ENCOUNTER
Patient to resume regular dose of Coumadin 1mg MF, 1.5mg TWTHSS. Keep appt on 2/1 as scheduled. She states she may have missed a dose.

## 2018-02-01 ENCOUNTER — HOSPITAL ENCOUNTER (OUTPATIENT)
Dept: PHARMACY | Age: 73
Setting detail: THERAPIES SERIES
Discharge: HOME | End: 2018-02-01
Payer: MEDICARE

## 2018-02-01 DIAGNOSIS — I48.19 PERSISTENT ATRIAL FIBRILLATION (HCC): ICD-10-CM

## 2018-02-01 LAB — POC INR: 1.6 (ref 0.8–1.2)

## 2018-02-01 PROCEDURE — 85610 PROTHROMBIN TIME: CPT

## 2018-02-01 PROCEDURE — 99211 OFF/OP EST MAY X REQ PHY/QHP: CPT

## 2018-02-01 PROCEDURE — 36416 COLLJ CAPILLARY BLOOD SPEC: CPT

## 2018-02-01 NOTE — PROGRESS NOTES
The [address]  Anticoagulation Clinic  [phone] (phone)  [phone] (fax)  Ms. Mayelin Vick is a 75 y.o.  female with history of Afib who presents today for anticoagulation monitoring and adjustment. Patient verifies current dosing regimen and tablet strength. No missed or extra doses. Realized Wednesday morning that missed Tuesday night's dose. Patient denies s/s bleeding/bruising/swelling/SOB/chest pain Has many bruises on arms  No blood in urine or stool. No dietary changes. No changes in medication/OTC agents/Herbals. Completed Levofloxacin yesterday 1/31/18. States is due for Repatha injection today, but not currently taking because waiting to hear back from insurance. No change in alcohol use or tobacco use. No change in activity level. Patient denies headaches/dizziness/lightheadedness/falls. No vomiting/diarrhea or acute illness. No Procedures scheduled in the future at this time. Having teeth cleaned for peridontal  Disease 2.6. 18. Says dentist knows on Coumadin    Assessment:   Lab Results   Component Value Date    INR 1.60 (H) 02/01/2018    INR 2.50 (H) 01/19/2018    INR 2.20 (H) 01/05/2018     INR subtherapeutic   Recent Labs      02/01/18   1440   INR  1.60*   Patient was taking decreased dose due to Levaquin & prednisone. (Prednisone stopped early due to GI side effects- patient called and informed the clinic.)  Patient missed Tuesday evening's dose. Plan:  Take 2 mg today then continue Coumadin 1 mg MonFri and 1.5 mg SuTuWeThSa. Recheck INR 2 weeks. Patient reminded to call the Anticoagulation Clinic with any signs or symptoms of bleeding or with any medication changes. Patient given instructions utilizing the teach back method. Discharged ambulatory in no apparent distress. After visit summary printed and reviewed with patient.       Medications reviewed and updated on home medication list Yes    Influenza vaccine:     [] given    [] declined   [x] received previously   [] plans to receive at a later time   [] refused    [x] documented in [facility]

## 2018-02-15 ENCOUNTER — HOSPITAL ENCOUNTER (OUTPATIENT)
Dept: PHARMACY | Age: 73
Setting detail: THERAPIES SERIES
Discharge: HOME | End: 2018-02-15
Payer: MEDICARE

## 2018-02-15 DIAGNOSIS — I48.19 PERSISTENT ATRIAL FIBRILLATION (HCC): ICD-10-CM

## 2018-02-15 LAB — POC INR: 2.7 (ref 0.8–1.2)

## 2018-02-15 PROCEDURE — 36416 COLLJ CAPILLARY BLOOD SPEC: CPT

## 2018-02-15 PROCEDURE — 85610 PROTHROMBIN TIME: CPT

## 2018-02-15 PROCEDURE — 99211 OFF/OP EST MAY X REQ PHY/QHP: CPT

## 2018-03-08 ENCOUNTER — HOSPITAL ENCOUNTER (OUTPATIENT)
Dept: PHARMACY | Age: 73
Setting detail: THERAPIES SERIES
Discharge: HOME | End: 2018-03-08
Payer: MEDICARE

## 2018-03-08 DIAGNOSIS — I48.19 PERSISTENT ATRIAL FIBRILLATION (HCC): ICD-10-CM

## 2018-03-08 LAB — POC INR: 2.8 (ref 0.8–1.2)

## 2018-03-08 PROCEDURE — 99211 OFF/OP EST MAY X REQ PHY/QHP: CPT

## 2018-03-08 PROCEDURE — 85610 PROTHROMBIN TIME: CPT

## 2018-03-08 PROCEDURE — 36416 COLLJ CAPILLARY BLOOD SPEC: CPT

## 2018-04-06 ENCOUNTER — HOSPITAL ENCOUNTER (OUTPATIENT)
Dept: PHARMACY | Age: 73
Setting detail: THERAPIES SERIES
Discharge: HOME | End: 2018-04-06
Payer: MEDICARE

## 2018-04-06 DIAGNOSIS — I48.19 PERSISTENT ATRIAL FIBRILLATION (HCC): ICD-10-CM

## 2018-04-06 LAB — POC INR: 2.4 (ref 0.8–1.2)

## 2018-04-06 PROCEDURE — 99211 OFF/OP EST MAY X REQ PHY/QHP: CPT

## 2018-04-06 PROCEDURE — 85610 PROTHROMBIN TIME: CPT

## 2018-04-06 PROCEDURE — 36416 COLLJ CAPILLARY BLOOD SPEC: CPT

## 2018-04-12 ENCOUNTER — TELEPHONE (OUTPATIENT)
Dept: PHARMACY | Age: 73
End: 2018-04-12

## 2018-05-03 ENCOUNTER — HOSPITAL ENCOUNTER (OUTPATIENT)
Dept: PHARMACY | Age: 73
Setting detail: THERAPIES SERIES
Discharge: HOME | End: 2018-05-03
Payer: MEDICARE

## 2018-05-03 DIAGNOSIS — I48.19 PERSISTENT ATRIAL FIBRILLATION (HCC): ICD-10-CM

## 2018-05-03 LAB — POC INR: 2 (ref 0.8–1.2)

## 2018-05-03 PROCEDURE — 85610 PROTHROMBIN TIME: CPT

## 2018-05-03 PROCEDURE — 99211 OFF/OP EST MAY X REQ PHY/QHP: CPT

## 2018-05-03 PROCEDURE — 36416 COLLJ CAPILLARY BLOOD SPEC: CPT

## 2018-05-17 ENCOUNTER — HOSPITAL ENCOUNTER (OUTPATIENT)
Dept: PHARMACY | Age: 73
Setting detail: THERAPIES SERIES
Discharge: HOME | End: 2018-05-17
Payer: MEDICARE

## 2018-05-17 DIAGNOSIS — I48.19 PERSISTENT ATRIAL FIBRILLATION (HCC): ICD-10-CM

## 2018-05-17 LAB — POC INR: 2.2 (ref 0.8–1.2)

## 2018-05-17 PROCEDURE — 85610 PROTHROMBIN TIME: CPT

## 2018-05-17 PROCEDURE — 99211 OFF/OP EST MAY X REQ PHY/QHP: CPT

## 2018-05-17 PROCEDURE — 36416 COLLJ CAPILLARY BLOOD SPEC: CPT

## 2018-05-24 ENCOUNTER — HOSPITAL ENCOUNTER (OUTPATIENT)
Age: 73
Discharge: HOME | End: 2018-05-24
Payer: MEDICARE

## 2018-05-24 DIAGNOSIS — N34.3 DYSURIA-FREQUENCY SYNDROME: ICD-10-CM

## 2018-05-24 DIAGNOSIS — R30.0 BURNING WITH URINATION: Primary | ICD-10-CM

## 2018-05-24 DIAGNOSIS — R30.0 BURNING WITH URINATION: ICD-10-CM

## 2018-05-24 LAB
BACTERIA: ABNORMAL /HPF
BILIRUBIN URINE: NEGATIVE
BLOOD, URINE: NEGATIVE
CASTS 2: ABNORMAL /LPF
CASTS UA: ABNORMAL /LPF
CHARACTER, URINE: ABNORMAL
COLOR: YELLOW
CRYSTALS, UA: ABNORMAL
EPITHELIAL CELLS, UA: ABNORMAL /HPF
GLUCOSE URINE: NEGATIVE MG/DL
KETONES, URINE: NEGATIVE
LEUKOCYTE ESTERASE, URINE: NEGATIVE
MISCELLANEOUS 2: ABNORMAL
NITRITE, URINE: NEGATIVE
PH UA: 6
PROTEIN UA: NEGATIVE
RBC URINE: ABNORMAL /HPF
RENAL EPITHELIAL, UA: ABNORMAL
SPECIFIC GRAVITY, URINE: 1.03 (ref 1–1.03)
UROBILINOGEN, URINE: 0.2 EU/DL
WBC UA: ABNORMAL /HPF
YEAST: ABNORMAL

## 2018-05-24 PROCEDURE — 81001 URINALYSIS AUTO W/SCOPE: CPT

## 2018-05-25 ENCOUNTER — TELEPHONE (OUTPATIENT)
Dept: UROLOGY | Age: 73
End: 2018-05-25

## 2018-06-05 DIAGNOSIS — I48.19 PERSISTENT ATRIAL FIBRILLATION (HCC): Primary | ICD-10-CM

## 2018-06-07 ENCOUNTER — HOSPITAL ENCOUNTER (OUTPATIENT)
Dept: PHARMACY | Age: 73
Setting detail: THERAPIES SERIES
Discharge: HOME | End: 2018-06-07
Payer: MEDICARE

## 2018-06-07 DIAGNOSIS — I48.19 PERSISTENT ATRIAL FIBRILLATION (HCC): ICD-10-CM

## 2018-06-07 LAB — POC INR: 3.7 (ref 0.8–1.2)

## 2018-06-07 PROCEDURE — 36416 COLLJ CAPILLARY BLOOD SPEC: CPT

## 2018-06-07 PROCEDURE — 99211 OFF/OP EST MAY X REQ PHY/QHP: CPT

## 2018-06-07 PROCEDURE — 85610 PROTHROMBIN TIME: CPT

## 2018-06-07 RX ORDER — EPINEPHRINE 0.3 MG/.3ML
INJECTION SUBCUTANEOUS
Refills: 1 | COMMUNITY
Start: 2018-03-19

## 2018-06-07 RX ORDER — TIZANIDINE 4 MG/1
TABLET ORAL
Refills: 1 | Status: ON HOLD | COMMUNITY
Start: 2018-05-18 | End: 2019-10-22

## 2018-06-11 ENCOUNTER — OFFICE VISIT (OUTPATIENT)
Dept: SURGERY | Age: 73
End: 2018-06-11
Payer: MEDICARE

## 2018-06-11 VITALS
TEMPERATURE: 98.4 F | HEART RATE: 88 BPM | BODY MASS INDEX: 31.65 KG/M2 | SYSTOLIC BLOOD PRESSURE: 134 MMHG | HEIGHT: 62 IN | DIASTOLIC BLOOD PRESSURE: 74 MMHG | RESPIRATION RATE: 20 BRPM | OXYGEN SATURATION: 93 % | WEIGHT: 172 LBS

## 2018-06-11 DIAGNOSIS — Z79.01 ANTICOAGULATED ON COUMADIN: ICD-10-CM

## 2018-06-11 DIAGNOSIS — K64.3 GRADE IV HEMORRHOIDS: Primary | ICD-10-CM

## 2018-06-11 DIAGNOSIS — I48.19 PERSISTENT ATRIAL FIBRILLATION (HCC): ICD-10-CM

## 2018-06-11 DIAGNOSIS — I10 HYPERTENSION, UNSPECIFIED TYPE: ICD-10-CM

## 2018-06-11 DIAGNOSIS — K62.89 ANAL OR RECTAL PAIN: ICD-10-CM

## 2018-06-11 PROCEDURE — 4040F PNEUMOC VAC/ADMIN/RCVD: CPT | Performed by: SURGERY

## 2018-06-11 PROCEDURE — G8417 CALC BMI ABV UP PARAM F/U: HCPCS | Performed by: SURGERY

## 2018-06-11 PROCEDURE — 99204 OFFICE O/P NEW MOD 45 MIN: CPT | Performed by: SURGERY

## 2018-06-11 PROCEDURE — G8598 ASA/ANTIPLAT THER USED: HCPCS | Performed by: SURGERY

## 2018-06-11 PROCEDURE — 3017F COLORECTAL CA SCREEN DOC REV: CPT | Performed by: SURGERY

## 2018-06-11 PROCEDURE — 4004F PT TOBACCO SCREEN RCVD TLK: CPT | Performed by: SURGERY

## 2018-06-11 PROCEDURE — G8400 PT W/DXA NO RESULTS DOC: HCPCS | Performed by: SURGERY

## 2018-06-11 PROCEDURE — G8427 DOCREV CUR MEDS BY ELIG CLIN: HCPCS | Performed by: SURGERY

## 2018-06-11 PROCEDURE — 1123F ACP DISCUSS/DSCN MKR DOCD: CPT | Performed by: SURGERY

## 2018-06-11 PROCEDURE — 1090F PRES/ABSN URINE INCON ASSESS: CPT | Performed by: SURGERY

## 2018-06-14 ASSESSMENT — ENCOUNTER SYMPTOMS
EYE ITCHING: 1
COLOR CHANGE: 1
WHEEZING: 0
APNEA: 0
SHORTNESS OF BREATH: 1
DIARRHEA: 1
NAUSEA: 1
RHINORRHEA: 1
ANAL BLEEDING: 1
SINUS PAIN: 1
VOMITING: 1
BACK PAIN: 1
RECTAL PAIN: 1
CHEST TIGHTNESS: 1
CONSTIPATION: 1
ABDOMINAL DISTENTION: 0

## 2018-06-20 ENCOUNTER — HOSPITAL ENCOUNTER (OUTPATIENT)
Dept: PHARMACY | Age: 73
Setting detail: THERAPIES SERIES
Discharge: HOME | End: 2018-06-20
Payer: MEDICARE

## 2018-06-20 DIAGNOSIS — I48.19 PERSISTENT ATRIAL FIBRILLATION (HCC): ICD-10-CM

## 2018-06-20 LAB — POC INR: 2.5 (ref 0.8–1.2)

## 2018-06-20 PROCEDURE — 99211 OFF/OP EST MAY X REQ PHY/QHP: CPT

## 2018-06-20 PROCEDURE — 36416 COLLJ CAPILLARY BLOOD SPEC: CPT

## 2018-06-20 PROCEDURE — 85610 PROTHROMBIN TIME: CPT

## 2018-06-20 RX ORDER — SPIRONOLACTONE 25 MG/1
12.5 TABLET ORAL DAILY
COMMUNITY
End: 2018-07-07

## 2018-06-24 ENCOUNTER — HOSPITAL ENCOUNTER (EMERGENCY)
Age: 73
Discharge: HOME | End: 2018-06-25
Attending: EMERGENCY MEDICINE
Payer: MEDICARE

## 2018-06-24 ENCOUNTER — APPOINTMENT (OUTPATIENT)
Dept: GENERAL RADIOLOGY | Age: 73
End: 2018-06-24
Payer: MEDICARE

## 2018-06-24 VITALS
TEMPERATURE: 99 F | HEIGHT: 62 IN | BODY MASS INDEX: 31.28 KG/M2 | OXYGEN SATURATION: 97 % | WEIGHT: 170 LBS | HEART RATE: 77 BPM | DIASTOLIC BLOOD PRESSURE: 57 MMHG | SYSTOLIC BLOOD PRESSURE: 137 MMHG | RESPIRATION RATE: 16 BRPM

## 2018-06-24 DIAGNOSIS — E86.0 DEHYDRATION: Primary | ICD-10-CM

## 2018-06-24 LAB
ALBUMIN SERPL-MCNC: 3.6 G/DL (ref 3.5–5.1)
ALP BLD-CCNC: 59 U/L (ref 38–126)
ALT SERPL-CCNC: 17 U/L (ref 11–66)
AMPHETAMINE+METHAMPHETAMINE URINE SCREEN: NEGATIVE
ANION GAP SERPL CALCULATED.3IONS-SCNC: 15 MEQ/L (ref 8–16)
ANISOCYTOSIS: ABNORMAL
APTT: 33.7 SECONDS (ref 22–38)
AST SERPL-CCNC: 15 U/L (ref 5–40)
BACTERIA: ABNORMAL /HPF
BARBITURATE QUANTITATIVE URINE: NEGATIVE
BASOPHILS # BLD: 0.2 %
BASOPHILS ABSOLUTE: 0 THOU/MM3 (ref 0–0.1)
BENZODIAZEPINE QUANTITATIVE URINE: NEGATIVE
BILIRUB SERPL-MCNC: 0.2 MG/DL (ref 0.3–1.2)
BILIRUBIN DIRECT: < 0.2 MG/DL (ref 0–0.3)
BILIRUBIN URINE: NEGATIVE
BLOOD, URINE: NEGATIVE
BUN BLDV-MCNC: 23 MG/DL (ref 7–22)
CALCIUM SERPL-MCNC: 9.4 MG/DL (ref 8.5–10.5)
CANNABINOID QUANTITATIVE URINE: NEGATIVE
CASTS 2: ABNORMAL /LPF
CASTS UA: ABNORMAL /LPF
CHARACTER, URINE: ABNORMAL
CHLORIDE BLD-SCNC: 100 MEQ/L (ref 98–111)
CO2: 25 MEQ/L (ref 23–33)
COCAINE METABOLITE QUANTITATIVE URINE: NEGATIVE
COLOR: YELLOW
CREAT SERPL-MCNC: 0.9 MG/DL (ref 0.4–1.2)
CRYSTALS, UA: ABNORMAL
DIGOXIN LEVEL: 0.6 NG/ML (ref 0.5–2)
EKG ATRIAL RATE: 92 BPM
EKG P AXIS: 90 DEGREES
EKG Q-T INTERVAL: 386 MS
EKG QRS DURATION: 154 MS
EKG QTC CALCULATION (BAZETT): 477 MS
EKG R AXIS: -87 DEGREES
EKG T AXIS: 111 DEGREES
EKG VENTRICULAR RATE: 92 BPM
EOSINOPHIL # BLD: 0.5 %
EOSINOPHILS ABSOLUTE: 0 THOU/MM3 (ref 0–0.4)
EPITHELIAL CELLS, UA: ABNORMAL /HPF
ETHYL ALCOHOL, SERUM: < 0.01 %
GFR SERPL CREATININE-BSD FRML MDRD: 61 ML/MIN/1.73M2
GLUCOSE BLD-MCNC: 129 MG/DL (ref 70–108)
GLUCOSE URINE: NEGATIVE MG/DL
HCT VFR BLD CALC: 40.2 % (ref 37–47)
HEMOGLOBIN: 13.6 GM/DL (ref 12–16)
INR BLD: 1.73 (ref 0.85–1.13)
KETONES, URINE: NEGATIVE
LEUKOCYTE ESTERASE, URINE: NEGATIVE
LIPASE: 15.4 U/L (ref 5.6–51.3)
LYMPHOCYTES # BLD: 21.1 %
LYMPHOCYTES ABSOLUTE: 2 THOU/MM3 (ref 1–4.8)
MAGNESIUM: 1.8 MG/DL (ref 1.6–2.4)
MCH RBC QN AUTO: 31.8 PG (ref 27–31)
MCHC RBC AUTO-ENTMCNC: 33.8 GM/DL (ref 33–37)
MCV RBC AUTO: 94 FL (ref 81–99)
MISCELLANEOUS 2: ABNORMAL
MONOCYTES # BLD: 10.6 %
MONOCYTES ABSOLUTE: 1 THOU/MM3 (ref 0.4–1.3)
NITRITE, URINE: NEGATIVE
NUCLEATED RED BLOOD CELLS: 0 /100 WBC
OPIATES, URINE: POSITIVE
OSMOLALITY CALCULATION: 284.8 MOSMOL/KG (ref 275–300)
OXYCODONE: NEGATIVE
PDW BLD-RTO: 14.6 % (ref 11.5–14.5)
PH UA: 5.5
PHENCYCLIDINE QUANTITATIVE URINE: NEGATIVE
PLATELET # BLD: 188 THOU/MM3 (ref 130–400)
PMV BLD AUTO: 9.4 FL (ref 7.4–10.4)
POTASSIUM SERPL-SCNC: 4.1 MEQ/L (ref 3.5–5.2)
PRO-BNP: 546 PG/ML (ref 0–900)
PROTEIN UA: NEGATIVE
RBC # BLD: 4.28 MILL/MM3 (ref 4.2–5.4)
RBC URINE: ABNORMAL /HPF
RENAL EPITHELIAL, UA: ABNORMAL
SEG NEUTROPHILS: 67.6 %
SEGMENTED NEUTROPHILS ABSOLUTE COUNT: 6.6 THOU/MM3 (ref 1.8–7.7)
SODIUM BLD-SCNC: 140 MEQ/L (ref 135–145)
SPECIFIC GRAVITY, URINE: 1.02 (ref 1–1.03)
TOTAL PROTEIN: 6.6 G/DL (ref 6.1–8)
TROPONIN T: < 0.01 NG/ML
UROBILINOGEN, URINE: 0.2 EU/DL
WBC # BLD: 9.7 THOU/MM3 (ref 4.8–10.8)
WBC UA: ABNORMAL /HPF
YEAST: ABNORMAL

## 2018-06-24 PROCEDURE — 85610 PROTHROMBIN TIME: CPT

## 2018-06-24 PROCEDURE — 80307 DRUG TEST PRSMV CHEM ANLYZR: CPT

## 2018-06-24 PROCEDURE — 96360 HYDRATION IV INFUSION INIT: CPT

## 2018-06-24 PROCEDURE — 2580000003 HC RX 258: Performed by: EMERGENCY MEDICINE

## 2018-06-24 PROCEDURE — 84484 ASSAY OF TROPONIN QUANT: CPT

## 2018-06-24 PROCEDURE — 82248 BILIRUBIN DIRECT: CPT

## 2018-06-24 PROCEDURE — 93010 ELECTROCARDIOGRAM REPORT: CPT | Performed by: INTERNAL MEDICINE

## 2018-06-24 PROCEDURE — 81001 URINALYSIS AUTO W/SCOPE: CPT

## 2018-06-24 PROCEDURE — 85025 COMPLETE CBC W/AUTO DIFF WBC: CPT

## 2018-06-24 PROCEDURE — 93005 ELECTROCARDIOGRAM TRACING: CPT | Performed by: EMERGENCY MEDICINE

## 2018-06-24 PROCEDURE — 36415 COLL VENOUS BLD VENIPUNCTURE: CPT

## 2018-06-24 PROCEDURE — 83690 ASSAY OF LIPASE: CPT

## 2018-06-24 PROCEDURE — 83735 ASSAY OF MAGNESIUM: CPT

## 2018-06-24 PROCEDURE — 80053 COMPREHEN METABOLIC PANEL: CPT

## 2018-06-24 PROCEDURE — 83880 ASSAY OF NATRIURETIC PEPTIDE: CPT

## 2018-06-24 PROCEDURE — G0480 DRUG TEST DEF 1-7 CLASSES: HCPCS

## 2018-06-24 PROCEDURE — 85730 THROMBOPLASTIN TIME PARTIAL: CPT

## 2018-06-24 PROCEDURE — 71046 X-RAY EXAM CHEST 2 VIEWS: CPT

## 2018-06-24 PROCEDURE — 99285 EMERGENCY DEPT VISIT HI MDM: CPT

## 2018-06-24 PROCEDURE — 80162 ASSAY OF DIGOXIN TOTAL: CPT

## 2018-06-24 RX ORDER — 0.9 % SODIUM CHLORIDE 0.9 %
1000 INTRAVENOUS SOLUTION INTRAVENOUS ONCE
Status: COMPLETED | OUTPATIENT
Start: 2018-06-24 | End: 2018-06-24

## 2018-06-24 RX ADMIN — SODIUM CHLORIDE 1000 ML: 9 INJECTION, SOLUTION INTRAVENOUS at 22:07

## 2018-06-24 ASSESSMENT — ENCOUNTER SYMPTOMS
BACK PAIN: 0
RHINORRHEA: 0
EYE ITCHING: 0
VOMITING: 0
SORE THROAT: 0
BLOOD IN STOOL: 0
NAUSEA: 0
CHOKING: 0
DIARRHEA: 0
SHORTNESS OF BREATH: 1
PHOTOPHOBIA: 0
EYE PAIN: 0
EYE REDNESS: 0
ABDOMINAL PAIN: 0
WHEEZING: 0
CONSTIPATION: 0
SINUS PRESSURE: 0
ABDOMINAL DISTENTION: 0
COUGH: 1
TROUBLE SWALLOWING: 0
VOICE CHANGE: 0
EYE DISCHARGE: 0
CHEST TIGHTNESS: 0

## 2018-06-24 ASSESSMENT — PAIN SCALES - GENERAL: PAINLEVEL_OUTOF10: 0

## 2018-06-25 ENCOUNTER — TELEPHONE (OUTPATIENT)
Dept: PHARMACY | Age: 73
End: 2018-06-25

## 2018-06-25 ASSESSMENT — ENCOUNTER SYMPTOMS
EYE DISCHARGE: 0
COUGH: 1
BACK PAIN: 0
TROUBLE SWALLOWING: 0
CONSTIPATION: 0
ABDOMINAL PAIN: 0
VOICE CHANGE: 0
ABDOMINAL DISTENTION: 0
SORE THROAT: 0
BLOOD IN STOOL: 0
SINUS PRESSURE: 0
EYE ITCHING: 0
SHORTNESS OF BREATH: 1
DIARRHEA: 0
EYE REDNESS: 0
NAUSEA: 0
CHOKING: 0
PHOTOPHOBIA: 0
CHEST TIGHTNESS: 0
RHINORRHEA: 0
VOMITING: 0
WHEEZING: 0
EYE PAIN: 0

## 2018-06-27 ENCOUNTER — TELEPHONE (OUTPATIENT)
Dept: PHARMACY | Age: 73
End: 2018-06-27

## 2018-06-29 ENCOUNTER — TELEPHONE (OUTPATIENT)
Dept: PHARMACY | Age: 73
End: 2018-06-29

## 2018-07-06 LAB
HCT VFR BLD CALC: 38.6 % (ref 35–44)
HEMOGLOBIN: 13 GM/DL (ref 12–15)

## 2018-07-07 ENCOUNTER — HOSPITAL ENCOUNTER (EMERGENCY)
Age: 73
Discharge: HOME | End: 2018-07-08
Attending: EMERGENCY MEDICINE
Payer: MEDICARE

## 2018-07-07 ENCOUNTER — APPOINTMENT (OUTPATIENT)
Dept: CT IMAGING | Age: 73
End: 2018-07-07
Payer: MEDICARE

## 2018-07-07 DIAGNOSIS — M79.18 MYOFASCIAL PAIN: ICD-10-CM

## 2018-07-07 DIAGNOSIS — J44.1 COPD EXACERBATION (HCC): Primary | ICD-10-CM

## 2018-07-07 DIAGNOSIS — I10 PRIMARY HYPERTENSION: ICD-10-CM

## 2018-07-07 DIAGNOSIS — S39.012A BACK STRAIN, INITIAL ENCOUNTER: ICD-10-CM

## 2018-07-07 DIAGNOSIS — Z72.0 TOBACCO ABUSE: ICD-10-CM

## 2018-07-07 LAB
ALBUMIN SERPL-MCNC: 3.7 G/DL (ref 3.5–5.1)
ALP BLD-CCNC: 54 U/L (ref 38–126)
ALT SERPL-CCNC: 20 U/L (ref 11–66)
ANION GAP SERPL CALCULATED.3IONS-SCNC: 12 MEQ/L (ref 8–16)
APTT: 33.5 SECONDS (ref 22–38)
AST SERPL-CCNC: 21 U/L (ref 5–40)
BASOPHILS # BLD: 0.2 %
BASOPHILS ABSOLUTE: 0 THOU/MM3 (ref 0–0.1)
BILIRUB SERPL-MCNC: 0.2 MG/DL (ref 0.3–1.2)
BUN BLDV-MCNC: 18 MG/DL (ref 7–22)
CALCIUM SERPL-MCNC: 9.5 MG/DL (ref 8.5–10.5)
CHLORIDE BLD-SCNC: 105 MEQ/L (ref 98–111)
CO2: 26 MEQ/L (ref 23–33)
CREAT SERPL-MCNC: 0.7 MG/DL (ref 0.4–1.2)
EKG ATRIAL RATE: 468 BPM
EKG Q-T INTERVAL: 408 MS
EKG QRS DURATION: 156 MS
EKG QTC CALCULATION (BAZETT): 449 MS
EKG R AXIS: -91 DEGREES
EKG T AXIS: 97 DEGREES
EKG VENTRICULAR RATE: 73 BPM
EOSINOPHIL # BLD: 0.6 %
EOSINOPHILS ABSOLUTE: 0 THOU/MM3 (ref 0–0.4)
ERYTHROCYTE [DISTWIDTH] IN BLOOD BY AUTOMATED COUNT: 14 % (ref 11.5–14.5)
ERYTHROCYTE [DISTWIDTH] IN BLOOD BY AUTOMATED COUNT: 50 FL (ref 35–45)
GFR SERPL CREATININE-BSD FRML MDRD: 82 ML/MIN/1.73M2
GLUCOSE BLD-MCNC: 90 MG/DL (ref 70–108)
HCT VFR BLD CALC: 37.5 % (ref 37–47)
HEMOGLOBIN: 12.2 GM/DL (ref 12–16)
IMMATURE GRANS (ABS): 0.03 THOU/MM3 (ref 0–0.07)
IMMATURE GRANULOCYTES: 0.4 %
INR BLD: 1.78 (ref 0.85–1.13)
LIPASE: 27.2 U/L (ref 5.6–51.3)
LYMPHOCYTES # BLD: 23.6 %
LYMPHOCYTES ABSOLUTE: 1.9 THOU/MM3 (ref 1–4.8)
MCH RBC QN AUTO: 31.9 PG (ref 26–33)
MCHC RBC AUTO-ENTMCNC: 32.5 GM/DL (ref 32.2–35.5)
MCV RBC AUTO: 97.9 FL (ref 81–99)
MONOCYTES # BLD: 10.4 %
MONOCYTES ABSOLUTE: 0.9 THOU/MM3 (ref 0.4–1.3)
NUCLEATED RED BLOOD CELLS: 0 /100 WBC
OSMOLALITY CALCULATION: 286.4 MOSMOL/KG (ref 275–300)
PLATELET # BLD: 170 THOU/MM3 (ref 130–400)
PMV BLD AUTO: 11 FL (ref 9.4–12.4)
POTASSIUM SERPL-SCNC: 4.7 MEQ/L (ref 3.5–5.2)
PRO-BNP: 872.1 PG/ML (ref 0–900)
RBC # BLD: 3.83 MILL/MM3 (ref 4.2–5.4)
SEG NEUTROPHILS: 64.8 %
SEGMENTED NEUTROPHILS ABSOLUTE COUNT: 5.3 THOU/MM3 (ref 1.8–7.7)
SODIUM BLD-SCNC: 143 MEQ/L (ref 135–145)
TOTAL PROTEIN: 6.1 G/DL (ref 6.1–8)
TROPONIN T: < 0.01 NG/ML
WBC # BLD: 8.2 THOU/MM3 (ref 4.8–10.8)

## 2018-07-07 PROCEDURE — 83880 ASSAY OF NATRIURETIC PEPTIDE: CPT

## 2018-07-07 PROCEDURE — 84484 ASSAY OF TROPONIN QUANT: CPT

## 2018-07-07 PROCEDURE — 71275 CT ANGIOGRAPHY CHEST: CPT

## 2018-07-07 PROCEDURE — 84145 PROCALCITONIN (PCT): CPT

## 2018-07-07 PROCEDURE — 6360000004 HC RX CONTRAST MEDICATION: Performed by: EMERGENCY MEDICINE

## 2018-07-07 PROCEDURE — 99285 EMERGENCY DEPT VISIT HI MDM: CPT

## 2018-07-07 PROCEDURE — 36415 COLL VENOUS BLD VENIPUNCTURE: CPT

## 2018-07-07 PROCEDURE — 93005 ELECTROCARDIOGRAM TRACING: CPT | Performed by: EMERGENCY MEDICINE

## 2018-07-07 PROCEDURE — 83690 ASSAY OF LIPASE: CPT

## 2018-07-07 PROCEDURE — 74174 CTA ABD&PLVS W/CONTRAST: CPT

## 2018-07-07 PROCEDURE — 82550 ASSAY OF CK (CPK): CPT

## 2018-07-07 PROCEDURE — 80053 COMPREHEN METABOLIC PANEL: CPT

## 2018-07-07 PROCEDURE — 85730 THROMBOPLASTIN TIME PARTIAL: CPT

## 2018-07-07 PROCEDURE — 85025 COMPLETE CBC W/AUTO DIFF WBC: CPT

## 2018-07-07 PROCEDURE — 85610 PROTHROMBIN TIME: CPT

## 2018-07-07 RX ADMIN — IOPAMIDOL 80 ML: 755 INJECTION, SOLUTION INTRAVENOUS at 23:44

## 2018-07-07 ASSESSMENT — PAIN DESCRIPTION - ORIENTATION: ORIENTATION: OTHER (COMMENT)

## 2018-07-07 ASSESSMENT — PAIN DESCRIPTION - DESCRIPTORS: DESCRIPTORS: ACHING;CONSTANT;SHARP

## 2018-07-07 ASSESSMENT — PAIN SCALES - GENERAL: PAINLEVEL_OUTOF10: 8

## 2018-07-07 ASSESSMENT — PAIN DESCRIPTION - PAIN TYPE: TYPE: ACUTE PAIN;CHRONIC PAIN

## 2018-07-07 ASSESSMENT — PAIN DESCRIPTION - LOCATION: LOCATION: BACK

## 2018-07-07 ASSESSMENT — PAIN DESCRIPTION - FREQUENCY: FREQUENCY: CONTINUOUS

## 2018-07-08 VITALS
TEMPERATURE: 98.1 F | DIASTOLIC BLOOD PRESSURE: 47 MMHG | HEART RATE: 70 BPM | BODY MASS INDEX: 30.91 KG/M2 | RESPIRATION RATE: 17 BRPM | HEIGHT: 62 IN | OXYGEN SATURATION: 95 % | SYSTOLIC BLOOD PRESSURE: 147 MMHG | WEIGHT: 168 LBS

## 2018-07-08 LAB
PROCALCITONIN: 0.08 NG/ML (ref 0.01–0.09)
TOTAL CK: 40 U/L (ref 30–135)

## 2018-07-08 PROCEDURE — 96374 THER/PROPH/DIAG INJ IV PUSH: CPT

## 2018-07-08 PROCEDURE — 2700000000 HC OXYGEN THERAPY PER DAY

## 2018-07-08 PROCEDURE — 6360000002 HC RX W HCPCS

## 2018-07-08 PROCEDURE — 6360000002 HC RX W HCPCS: Performed by: EMERGENCY MEDICINE

## 2018-07-08 PROCEDURE — 6370000000 HC RX 637 (ALT 250 FOR IP): Performed by: EMERGENCY MEDICINE

## 2018-07-08 PROCEDURE — 96375 TX/PRO/DX INJ NEW DRUG ADDON: CPT

## 2018-07-08 PROCEDURE — 93010 ELECTROCARDIOGRAM REPORT: CPT | Performed by: INTERNAL MEDICINE

## 2018-07-08 PROCEDURE — 94640 AIRWAY INHALATION TREATMENT: CPT

## 2018-07-08 RX ORDER — DIPHENHYDRAMINE HYDROCHLORIDE 50 MG/ML
25 INJECTION INTRAMUSCULAR; INTRAVENOUS ONCE
Status: COMPLETED | OUTPATIENT
Start: 2018-07-08 | End: 2018-07-08

## 2018-07-08 RX ORDER — IPRATROPIUM BROMIDE AND ALBUTEROL SULFATE 2.5; .5 MG/3ML; MG/3ML
1 SOLUTION RESPIRATORY (INHALATION) ONCE
Status: COMPLETED | OUTPATIENT
Start: 2018-07-08 | End: 2018-07-08

## 2018-07-08 RX ORDER — DIPHENHYDRAMINE HYDROCHLORIDE 50 MG/ML
INJECTION INTRAMUSCULAR; INTRAVENOUS
Status: COMPLETED
Start: 2018-07-08 | End: 2018-07-08

## 2018-07-08 RX ORDER — LIDOCAINE 50 MG/G
1 PATCH TOPICAL DAILY
Qty: 30 PATCH | Refills: 0 | Status: ON HOLD | OUTPATIENT
Start: 2018-07-08 | End: 2019-11-21 | Stop reason: HOSPADM

## 2018-07-08 RX ORDER — METHYLPREDNISOLONE SODIUM SUCCINATE 125 MG/2ML
125 INJECTION, POWDER, LYOPHILIZED, FOR SOLUTION INTRAMUSCULAR; INTRAVENOUS ONCE
Status: COMPLETED | OUTPATIENT
Start: 2018-07-08 | End: 2018-07-08

## 2018-07-08 RX ORDER — PREDNISONE 20 MG/1
40 TABLET ORAL DAILY
Qty: 10 TABLET | Refills: 0 | Status: SHIPPED | OUTPATIENT
Start: 2018-07-08 | End: 2018-07-13

## 2018-07-08 RX ADMIN — DIPHENHYDRAMINE HYDROCHLORIDE 25 MG: 50 INJECTION INTRAMUSCULAR; INTRAVENOUS at 01:42

## 2018-07-08 RX ADMIN — DIPHENHYDRAMINE HYDROCHLORIDE 25 MG: 50 INJECTION, SOLUTION INTRAMUSCULAR; INTRAVENOUS at 01:42

## 2018-07-08 RX ADMIN — IPRATROPIUM BROMIDE AND ALBUTEROL SULFATE 1 AMPULE: .5; 3 SOLUTION RESPIRATORY (INHALATION) at 00:22

## 2018-07-08 RX ADMIN — METHYLPREDNISOLONE SODIUM SUCCINATE 125 MG: 125 INJECTION, POWDER, FOR SOLUTION INTRAMUSCULAR; INTRAVENOUS at 00:10

## 2018-07-08 ASSESSMENT — ENCOUNTER SYMPTOMS
EYE DISCHARGE: 0
COUGH: 1
ABDOMINAL DISTENTION: 0
SHORTNESS OF BREATH: 1
DIARRHEA: 0
ABDOMINAL PAIN: 0
EYE ITCHING: 0
WHEEZING: 0
RHINORRHEA: 0
VOMITING: 0
BACK PAIN: 1
NAUSEA: 0

## 2018-07-08 ASSESSMENT — PAIN SCALES - GENERAL: PAINLEVEL_OUTOF10: 8

## 2018-07-08 NOTE — ED NOTES
Dr. Meme Little in to evaluate patient. Family at bedside.       Samantha Parnell, BERT  07/08/18 0138

## 2018-07-08 NOTE — ED PROVIDER NOTES
(gastroesophageal reflux disease); History of blood transfusion; Hx of blood clots; Hyperlipidemia; Hypertension; LONG TERM ANTICOAGULENT USE; MI, old; Prolonged emergence from general anesthesia; Systolic CHF, acute on chronic ([facility] [address] and Thyroid disease. SURGICAL HISTORY      has a past surgical history that includes Hysterectomy; sinus surgery; Tonsillectomy; ovarian cyst removal; Colonoscopy (10/16/2015); Cardiac defibrillator placement (2008); Upper gastrointestinal endoscopy (2013); Cardiac catheterization (1994); Endoscopy, colon, diagnostic (01/04/2017); and pacemaker placement. CURRENT MEDICATIONS       Discharge Medication List as of 7/8/2018  1:29 AM      CONTINUE these medications which have NOT CHANGED    Details   tiZANidine (ZANAFLEX) 4 MG tablet TK 1 T PO HS PRF PAIN, R-1Historical Med      EPINEPHrine (EPIPEN) 0.3 MG/0.3ML SOAJ injection INJECT AS DIRECTED FOR ANAPHYLAXIS, R-1Historical Med      !! UNABLE TO FIND Apply topically as needed Essential oils - lemon and/or cranberry. Historical Med      sacubitril-valsartan (ENTRESTO) 49-51 MG per tablet Take 1 tablet by mouth 2 times dailyHistorical Med      Cholecalciferol (VITAMIN D3) 5000 units TABS Take 1 tablet by mouth dailyHistorical Med      !! UNABLE TO FIND Take by mouth daily 10% Potassium chloride solution 7.5 ml per Dr. Lavonne Neil Med      metoprolol succinate (TOPROL XL) 50 MG extended release tablet Take 25 mg by mouth daily Historical Med      warfarin (COUMADIN) 1 MG tablet Take as directed by the Coumadin Clinic, 140 tablets for 90 days, Disp-140 tablet, R-3Normal      ipratropium (ATROVENT) 0.02 % nebulizer solution Take 0.5 mg by nebulization 4 times daily Indications: Difficulty BreathingHistorical Med      Estrogens, Conjugated (PREMARIN VA) Place vaginally See Admin Instructions Historical Med      levocetirizine (XYZAL) 5 MG tablet Take 5 mg by mouth nightly Historical Med      Evolocumab (REPATHA SURECLICK SC) Inject Disease      oxyCODONE-acetaminophen (PERCOCET) 5-325 MG per tablet Take 1 tablet by mouth daily as needed for Pain.      triamcinolone (KENALOG) 0.1 % cream Apply topically 2 times daily as needed Anti-fungal, Topical, Historical Med      clotrimazole-betamethasone (LOTRISONE) cream Apply topically 2 times daily as needed Indications: Yeast Infection (inactive) , Topical, Historical Med      polyethylene glycol (MIRALAX) powder Take 17 g by mouth as needed. Indications: Constipation      Alcaftadine (LASTACAFT) 0.25 % SOLN Apply 1 drop to eye daily as needed. Indications: Eye Allergy      aspirin 81 MG EC tablet Take 81 mg by mouth daily. With food  Indications: Anticoagulant Therapy      azelastine (ASTELIN) 137 MCG/SPRAY nasal spray 1 spray by Nasal route 2 times daily as needed Indications: Allergic Rhinitis Use in each nostril as directed      digoxin (LANOXIN) 0.125 MG tablet Take 125 mcg by mouth nightly. Indications: Increased Heart Rate      furosemide (LASIX) 40 MG tablet Take 40 mg by mouth daily. Indications: Treatment with Diuretic Therapy      folic acid (FOLVITE) 1 MG tablet Take 1 mg by mouth daily. Indications: Folic Acid Supplementation      OXYGEN 2 L by Nasal route continuous Indications: Chronic Obstructive Lung Disease       traZODone (DESYREL) 50 MG tablet Take 50 mg by mouth nightly Historical Med      acetaminophen (TYLENOL) 500 MG tablet Take 1,000 mg by mouth every 6 hours as needed. Don't take more than 3,000 mg each day, including what's in Percocet. Indications: Pain      ALPRAZolam (XANAX) 0.25 MG tablet Take 0.25 mg by mouth as needed Indications: Feeling Anxious Historical Med      levalbuterol (XOPENEX HFA) 45 MCG/ACT inhaler Inhale 1-2 puffs into the lungs every 8 hours as needed. Indications: Wheezing      nitroGLYCERIN (NITROSTAT) 0.4 MG SL tablet Place 0.4 mg under the tongue every 5 minutes as needed. If third one does not relieve pain, call 9-1-1.   Indications: Chest abdominal aorta. 3. Mild bilateral perinephric stranding correlation with urinalysis recommended. **This report has been created using voice recognition software. It may contain minor errors which are inherent in voice recognition technology. **      Final report electronically signed by Dr. Nancy Blum on 7/8/2018 12:49 AM      CTA ABDOMEN PELVIS W WO CONTRAST   Final Result   CT angiography of the chest negative for active pathology. 2. Atherosclerotic changes of the aorta. 3. Cardiac enlargement. CT angiography abdomen and pelvis: There is scattered atherosclerosis without aneurysm or dissection. Solitary renal arteries are noted with ostial stenosis with detail of which is limited due to the technique of exam acquisition. The inferior and    superior mesenteric arteries are patent. The aortic bifurcation is patent. Scattered atherosclerosis of the iliac arteries are noted. The liver, gallbladder pancreas and spleen are negative for active pathology. There is no obstructive uropathy. Minimal perinephric stranding may reflect age-related changes however correlation can be made with urinalysis if nephritis is suspected. The    small and large bowel segments are nondistended. There are degenerative changes of the skeleton. IMPRESSION:   1. Negative exam for aortic aneurysm or dissection. 2. Scattered atherosclerotic changes of the abdominal aorta. 3. Mild bilateral perinephric stranding correlation with urinalysis recommended. **This report has been created using voice recognition software. It may contain minor errors which are inherent in voice recognition technology. **      Final report electronically signed by Dr. Nancy Blum on 7/8/2018 12:49 AM        [] Visualized and interpreted by me   [] Radiologist's Wet Read Report Reviewed   [] Discussed with Radiologist.    Nathan Plata:   Results for orders placed or performed during the hospital encounter - 16.0 meq/L   Osmolality   Result Value Ref Range    Osmolality Calc 286.4 275.0 - 300 mOsmol/kg   EKG SOB   Result Value Ref Range    Ventricular Rate 73 BPM    Atrial Rate 468 BPM    QRS Duration 156 ms    Q-T Interval 408 ms    QTc Calculation (Bazett) 449 ms    R Axis -91 degrees    T Axis 97 degrees       EMERGENCY DEPARTMENT COURSE:   Vitals:    Vitals:    07/07/18 2309 07/08/18 0007 07/08/18 0022 07/08/18 0112   BP:  (!) 147/47     Pulse:  75  70   Resp:  18 23 17   Temp: 98.1 °F (36.7 °C)      TempSrc: Oral      SpO2:  98% 97% 95%   Weight: 168 lb (76.2 kg)      Height: 5' 2\" (1.575 m)          11:03 PM    Patient is seen and evaluated in a timely fashion. Medical Decision Making    Her ED workups are reassuring. Normal troponin. Normal BNP. Normal Procalcitonin. CTA chest, abdomen and pelvis show no acute findings. Of note there is no clinical evidence of aortic dissection. She is medicated with DuoNeb, Solu-Medrol, and Benadryl in ED. Her back pain is musculoskeletal.    She is suggested quit smoking. Discharged with Lidoderm and oral prednisone. Followed by primary care physician in 3-5 days. CRITICAL CARE:   None    CONSULTS:  None    PROCEDURES:  None    FINAL IMPRESSION      1. COPD exacerbation (HCC)    2. Back strain, initial encounter    3. Primary hypertension    4. Myofascial pain    5.  Tobacco abuse          DISPOSITION/PLAN   Home    PATIENT REFERRED TO:  Kristin Conde MD  [address]  [phone]    In 1 week        DISCHARGE MEDICATIONS:  Discharge Medication List as of 7/8/2018  1:29 AM      START taking these medications    Details   predniSONE (DELTASONE) 20 MG tablet Take 2 tablets by mouth daily for 5 days, Disp-10 tablet, R-0Print      lidocaine (LIDODERM) 5 % Place 1 patch onto the skin daily 12 hours on, 12 hours off., Disp-30 patch, R-0Print             (Please note that portions of this note were completed with a voice

## 2018-07-08 NOTE — ED TRIAGE NOTES
Pt to ED with c/o \"all over\" back pain with increased sob. Pt reports she was here 6/24 with a sinus infection and had a chest xray that was negative. States she saw her family pcp Tuesday and was put on amoxicillin, but stopped taking it because she thought it was making her worse. Pt reports she can normally take off her oxygen at home to cook, etc but cant any longer.  Dr. Sharpe Divers at bedside

## 2018-07-09 ENCOUNTER — TELEPHONE (OUTPATIENT)
Dept: PHARMACY | Age: 73
End: 2018-07-09

## 2018-07-13 ENCOUNTER — HOSPITAL ENCOUNTER (OUTPATIENT)
Dept: PHARMACY | Age: 73
Setting detail: THERAPIES SERIES
Discharge: HOME | End: 2018-07-13
Payer: MEDICARE

## 2018-07-13 DIAGNOSIS — I48.19 PERSISTENT ATRIAL FIBRILLATION (HCC): ICD-10-CM

## 2018-07-13 LAB — POC INR: 3.7 (ref 0.8–1.2)

## 2018-07-13 PROCEDURE — 99211 OFF/OP EST MAY X REQ PHY/QHP: CPT

## 2018-07-13 PROCEDURE — 36416 COLLJ CAPILLARY BLOOD SPEC: CPT

## 2018-07-13 PROCEDURE — 85610 PROTHROMBIN TIME: CPT

## 2018-07-13 NOTE — PROGRESS NOTES
PharmD.)  Patient reminded to call the Anticoagulation Clinic with signs or symptoms of bleeding or with any medication changes. Patient given instructions utilizing the teach back method. Discharged ambulatory in no apparent distress, with oxygen concentrator. After visit summary printed and reviewed with patient.       Medications reviewed and updated on home medication list.    Influenza vaccine:     [] given    [x] declined   [x] received previously   [] plans to receive at a later time   [] refused    [x] documented in EPIC

## 2018-07-24 ENCOUNTER — HOSPITAL ENCOUNTER (OUTPATIENT)
Dept: PHARMACY | Age: 73
Setting detail: THERAPIES SERIES
Discharge: HOME | End: 2018-07-24
Payer: MEDICARE

## 2018-07-24 DIAGNOSIS — I48.19 PERSISTENT ATRIAL FIBRILLATION (HCC): ICD-10-CM

## 2018-07-24 LAB — POC INR: 2.3 (ref 0.8–1.2)

## 2018-07-24 PROCEDURE — 85610 PROTHROMBIN TIME: CPT

## 2018-07-24 PROCEDURE — 99211 OFF/OP EST MAY X REQ PHY/QHP: CPT

## 2018-07-24 PROCEDURE — 36416 COLLJ CAPILLARY BLOOD SPEC: CPT

## 2018-07-30 ENCOUNTER — OFFICE VISIT (OUTPATIENT)
Dept: SURGERY | Age: 73
End: 2018-07-30
Payer: MEDICARE

## 2018-07-30 VITALS
HEIGHT: 62 IN | TEMPERATURE: 97.1 F | SYSTOLIC BLOOD PRESSURE: 130 MMHG | BODY MASS INDEX: 31.65 KG/M2 | OXYGEN SATURATION: 96 % | RESPIRATION RATE: 20 BRPM | HEART RATE: 84 BPM | DIASTOLIC BLOOD PRESSURE: 74 MMHG | WEIGHT: 172 LBS

## 2018-07-30 DIAGNOSIS — I10 HYPERTENSION, UNSPECIFIED TYPE: ICD-10-CM

## 2018-07-30 DIAGNOSIS — K64.8 OTHER HEMORRHOIDS: ICD-10-CM

## 2018-07-30 DIAGNOSIS — Z79.01 ANTICOAGULATED ON COUMADIN: ICD-10-CM

## 2018-07-30 DIAGNOSIS — K62.89 ANAL OR RECTAL PAIN: Primary | ICD-10-CM

## 2018-07-30 PROCEDURE — 1123F ACP DISCUSS/DSCN MKR DOCD: CPT | Performed by: SURGERY

## 2018-07-30 PROCEDURE — G8400 PT W/DXA NO RESULTS DOC: HCPCS | Performed by: SURGERY

## 2018-07-30 PROCEDURE — 1101F PT FALLS ASSESS-DOCD LE1/YR: CPT | Performed by: SURGERY

## 2018-07-30 PROCEDURE — 4040F PNEUMOC VAC/ADMIN/RCVD: CPT | Performed by: SURGERY

## 2018-07-30 PROCEDURE — 3017F COLORECTAL CA SCREEN DOC REV: CPT | Performed by: SURGERY

## 2018-07-30 PROCEDURE — 4004F PT TOBACCO SCREEN RCVD TLK: CPT | Performed by: SURGERY

## 2018-07-30 PROCEDURE — 99213 OFFICE O/P EST LOW 20 MIN: CPT | Performed by: SURGERY

## 2018-07-30 PROCEDURE — G8417 CALC BMI ABV UP PARAM F/U: HCPCS | Performed by: SURGERY

## 2018-07-30 PROCEDURE — G8598 ASA/ANTIPLAT THER USED: HCPCS | Performed by: SURGERY

## 2018-07-30 PROCEDURE — G8428 CUR MEDS NOT DOCUMENT: HCPCS | Performed by: SURGERY

## 2018-07-30 PROCEDURE — 1090F PRES/ABSN URINE INCON ASSESS: CPT | Performed by: SURGERY

## 2018-07-30 ASSESSMENT — ENCOUNTER SYMPTOMS
APNEA: 0
CHEST TIGHTNESS: 1
EYE ITCHING: 1
RHINORRHEA: 1
ABDOMINAL DISTENTION: 0
CONSTIPATION: 0
NAUSEA: 0
SHORTNESS OF BREATH: 1
SINUS PAIN: 1
ANAL BLEEDING: 0
SINUS PRESSURE: 1
RECTAL PAIN: 0
COLOR CHANGE: 0
BACK PAIN: 1
COUGH: 1
DIARRHEA: 0
TROUBLE SWALLOWING: 0
WHEEZING: 0
VOMITING: 0

## 2018-07-30 NOTE — LETTER
[facility]  [address]  [address] [facility]  Phone: [phone]  Fax: [phone]    Mckayla Bowen MD        July 30, 2018    Jessica Mckoy, [address] [address]    Patient: Maldonado Duff   MR Number: [id number]   YOB: 1945   Date of Visit: 7/30/2018     Dear Jessica Mckoy,    I recently saw your patient, Maldonado Duff for a follow-up visit. Below are the relevant portions of my assessment and plan of care. 1. Anal or rectal pain    2. Anticoagulated on Coumadin    3. Hypertension, unspecified type    4. Other hemorrhoids           1. Patient Improved with conservative therapy. 2.  Continue topical proctozone   3. Patient deemed a low cardiac risk by Dr. Lily Franco for perioperative cardiac complications. 4.  Patient declines any surgical intervention. She was instructed to call if symptoms worsen or she wishes to consider surgical treatment. If you have questions, please do not hesitate to call me.      Sincerely,          Mckayla Bowen MD

## 2018-07-30 NOTE — PROGRESS NOTES
Gayla Boone MD   General Surgery  Follow up Patient Evaluation in Office  Pt Name: Matheus Ramirez  Date of Birth 1945   Today's Date: 7/30/2018  Medical Record Number: [id number]  Referring Provider: No ref. provider found  Primary Care Provider: Cynthia Coronado MD  Chief Complaint:  Chief Complaint   Patient presents with    1 Month Follow-Up     Grade IV hemorrhoids       ASSESSMENT      1. Anal or rectal pain    2. Anticoagulated on Coumadin    3. Hypertension, unspecified type    4. Other hemorrhoids         PLANS      1. Patient Improved with conservative therapy. 2.  Continue topical proctozone   3. Patient deemed a low cardiac risk by Dr. Nancy Rowe for perioperative cardiac complications. 4.  Patient declines any surgical intervention. She was instructed to call if symptoms worsen or she wishes to consider surgical treatment. SUBJECTIVE   HPI:  Ophelia Martinez is a 75y.o. year old female who is presenting today in the office for evaluation of hemorrhoids. Ophelia Martinez has a history of multiple medical issues. She is followed by Dr. Marissa Brown she has a history of lymphocytic colitis, previous hemorrhoid banding. She presents with anal rectal pain, hemorrhoids intermittent mild bleeding. She has coronary artery disease and an AICD. She is on chronic Coumadin anticoagulation. She states she is currently undergoing pulmonary rehab. She really does not want to consider surgical intervention. She is at high risk for complications. On examination she has some external hemorrhoids. She declined internal examination today. I can't see an obvious fissure but she does have some symptoms of possible fissure. She had a prescription for some suppositories which have helped her in the past especially Canasa suppositories but she cannot afford them on her current insurance. She was given a prescription for another topical medication which she has yet to try.   She is understandably reluctant for any children: 3    Years of education: N/A     Occupational History    Not on file. Social History Main Topics    Smoking status: Current Every Day Smoker     Packs/day: 0.50     Years: 25.00     Types: Cigarettes    Smokeless tobacco: Never Used    Alcohol use Yes      Comment: RARELY    Drug use: No    Sexual activity: Not on file     Other Topics Concern    Not on file     Social History Narrative    No narrative on file           Review of Systems  Review of Systems   Constitutional: Positive for fatigue. Negative for activity change, chills and fever. HENT: Positive for congestion, ear discharge, hearing loss, postnasal drip, rhinorrhea, sinus pain, sinus pressure and sneezing. Negative for trouble swallowing. Eyes: Positive for itching. Respiratory: Positive for cough, chest tightness and shortness of breath. Negative for apnea and wheezing. Cardiovascular: Positive for chest pain, palpitations and leg swelling. Gastrointestinal: Negative for abdominal distention, anal bleeding, constipation, diarrhea, nausea, rectal pain and vomiting. Endocrine: Positive for cold intolerance and heat intolerance. Negative for polydipsia and polyuria. Genitourinary: Positive for urgency. Negative for frequency and vaginal pain. Musculoskeletal: Positive for arthralgias, back pain, gait problem and myalgias. Negative for joint swelling. Skin: Positive for rash. Negative for color change and wound. Allergic/Immunologic: Positive for environmental allergies. Neurological: Negative for dizziness, syncope, light-headedness and headaches. Hematological: Negative for adenopathy. Bruises/bleeds easily. Psychiatric/Behavioral: Negative for agitation and confusion. OBJECTIVE     /74 (Site: Right Arm, Position: Sitting, Cuff Size: Medium Adult)   Pulse 84   Temp 97.1 °F (36.2 °C) (Tympanic)   Resp 20   Ht 5' 2\" (1.575 m)   Wt 172 lb (78 kg)   SpO2 96%   Breastfeeding?  No   BMI

## 2018-08-09 ENCOUNTER — HOSPITAL ENCOUNTER (OUTPATIENT)
Dept: PHARMACY | Age: 73
Setting detail: THERAPIES SERIES
Discharge: HOME | End: 2018-08-09
Payer: MEDICARE

## 2018-08-09 DIAGNOSIS — I48.19 PERSISTENT ATRIAL FIBRILLATION (HCC): ICD-10-CM

## 2018-08-09 LAB — POC INR: 1.6 (ref 0.8–1.2)

## 2018-08-09 PROCEDURE — 36416 COLLJ CAPILLARY BLOOD SPEC: CPT

## 2018-08-09 PROCEDURE — 85610 PROTHROMBIN TIME: CPT

## 2018-08-09 PROCEDURE — 99211 OFF/OP EST MAY X REQ PHY/QHP: CPT

## 2018-08-09 RX ORDER — SPIRONOLACTONE 25 MG/1
25 TABLET ORAL WEEKLY
COMMUNITY
End: 2018-12-13 | Stop reason: ALTCHOICE

## 2018-08-22 ENCOUNTER — HOSPITAL ENCOUNTER (OUTPATIENT)
Dept: PHARMACY | Age: 73
Setting detail: THERAPIES SERIES
Discharge: HOME | End: 2018-08-22
Payer: MEDICARE

## 2018-08-22 DIAGNOSIS — I48.19 PERSISTENT ATRIAL FIBRILLATION (HCC): ICD-10-CM

## 2018-08-22 LAB — POC INR: 1.4 (ref 0.8–1.2)

## 2018-08-22 PROCEDURE — 99211 OFF/OP EST MAY X REQ PHY/QHP: CPT

## 2018-08-22 PROCEDURE — 36416 COLLJ CAPILLARY BLOOD SPEC: CPT

## 2018-08-22 PROCEDURE — 85610 PROTHROMBIN TIME: CPT

## 2018-08-22 NOTE — PROGRESS NOTES
Medication Management [facility]  Anticoagulation Clinic  [phone] (phone)  [phone] (fax)      Ms. Joi Bello is a 75 y.o.  female with history of Afib who presents today for anticoagulation monitoring and adjustment. Patient verifies current dosing regimen and tablet strength. States she may have taken 1 mg instead 3 mg on 08/09  Patient denies s/s bleeding/bruising/swelling/SOB/chest pain  No blood in urine or stool. No dietary changes. changes in medication/OTC agents/Herbals. Started getting weekly iron transfusions beginning 08/01  No change in alcohol use or tobacco use. No change in activity level. Patient denies headaches/dizziness/lightheadedness/falls. vomiting/diarrhea or acute illness. States she has thrown up at least two times after taking her evening medications (warfarin) in the past two weeks. No Procedures scheduled in the future at this time. Patient will have a colonoscopy in October, Lauri Closs. Already scheduled, but doesn't know exact date. Assessment:   Lab Results   Component Value Date    INR 1.40 (H) 08/22/2018    INR 1.60 (H) 08/09/2018    INR 2.30 (H) 07/24/2018     INR subtherapeutic   Recent Labs      08/22/18   1519   INR  1.40*         Plan:  2 mg x1, then increase Coumadin 1.5 mg daily. Recheck INR 2 weeks. Patient reminded to call the Anticoagulation Clinic with signs or symptoms of bleeding or with any medication changes. Patient given instructions utilizing the teach back method. Discharged ambulatory in no apparent distress. After visit summary printed and reviewed with patient.       Medications reviewed and updated on home medication list Yes    Influenza vaccine:     [] given    [] declined   [x] received previously   [] plans to receive at a later time   [] refused    [x] documented in EPIC

## 2018-09-04 ENCOUNTER — HOSPITAL ENCOUNTER (OUTPATIENT)
Dept: PHARMACY | Age: 73
Setting detail: THERAPIES SERIES
Discharge: HOME | End: 2018-09-04
Payer: MEDICARE

## 2018-09-04 DIAGNOSIS — I48.19 PERSISTENT ATRIAL FIBRILLATION (HCC): ICD-10-CM

## 2018-09-04 LAB — POC INR: 2.3 (ref 0.8–1.2)

## 2018-09-04 PROCEDURE — 85610 PROTHROMBIN TIME: CPT

## 2018-09-04 PROCEDURE — 99211 OFF/OP EST MAY X REQ PHY/QHP: CPT

## 2018-09-04 PROCEDURE — 36416 COLLJ CAPILLARY BLOOD SPEC: CPT

## 2018-09-18 ENCOUNTER — APPOINTMENT (OUTPATIENT)
Dept: PHARMACY | Age: 73
End: 2018-09-18
Payer: MEDICARE

## 2018-09-18 ENCOUNTER — HOSPITAL ENCOUNTER (OUTPATIENT)
Dept: PHARMACY | Age: 73
Setting detail: THERAPIES SERIES
Discharge: HOME | End: 2018-09-18
Payer: MEDICARE

## 2018-09-18 DIAGNOSIS — I48.19 PERSISTENT ATRIAL FIBRILLATION (HCC): ICD-10-CM

## 2018-09-18 LAB — POC INR: 3.4 (ref 0.8–1.2)

## 2018-09-18 PROCEDURE — 99211 OFF/OP EST MAY X REQ PHY/QHP: CPT

## 2018-09-18 PROCEDURE — 36416 COLLJ CAPILLARY BLOOD SPEC: CPT

## 2018-09-18 PROCEDURE — 85610 PROTHROMBIN TIME: CPT

## 2018-09-18 NOTE — PROGRESS NOTES
The [address] [facility]  Anticoagulation Clinic  [phone] (phone)  [phone] (fax)    Ms. Inocente Graves is a 75 y.o.  female with history of persistent A.fib who presents today for anticoagulation monitoring and adjustment. Patient verifies current dosing regimen and tablet strength. No missed or extra doses. Patient denies s/s bleeding/bruising/swelling/SOB/chest pain  No blood in urine or stool. No dietary changes. No changes in medication/OTC agents/Herbals. Patient had a steroid shot on 9/13 and iron transfusion on 9/17. No change in tobacco use. Patient had two beers last night; patient does not normally drink. No change in activity level. Patient denies headaches/dizziness/lightheadedness/falls. No vomiting/diarrhea or acute illness. Patient will have colonoscopy in Oct.     Assessment:   Lab Results   Component Value Date    INR 3.40 (H) 09/18/2018    INR 2.30 (H) 09/04/2018    INR 1.40 (H) 08/22/2018     INR supratherapeutic   Recent Labs      09/18/18   1516   INR  3.40*         Plan:  Take Coumadin 0.5 mg PO daily on 9/18 and then Continue Coumadin 1.5 mg PO daily. Recheck INR 2 weeks. Patient reminded to call the Anticoagulation Clinic with signs or symptoms of bleeding or with any medication changes. Patient given instructions utilizing the teach back method. Assessment and plan reviewed with Shiraz MITCHELL who agrees with plan. Discharged ambulatory in no apparent distress. After visit summary printed and reviewed with patient.       Medications reviewed and updated on home medication list Yes    Influenza vaccine:     [] given    [x] declined   [x] received previously   [] plans to receive at a later time   [] refused    [x] documented in MICHAEL Oliveira [facility], PharmD  PGY2 Ambulatory Care Resident

## 2018-10-02 ENCOUNTER — TELEPHONE (OUTPATIENT)
Dept: PHARMACY | Age: 73
End: 2018-10-02

## 2018-10-02 ENCOUNTER — HOSPITAL ENCOUNTER (OUTPATIENT)
Dept: PHARMACY | Age: 73
Setting detail: THERAPIES SERIES
Discharge: HOME | End: 2018-10-02
Payer: MEDICARE

## 2018-10-02 DIAGNOSIS — I48.19 PERSISTENT ATRIAL FIBRILLATION (HCC): ICD-10-CM

## 2018-10-02 LAB — POC INR: 2.6 (ref 0.8–1.2)

## 2018-10-02 PROCEDURE — G0463 HOSPITAL OUTPT CLINIC VISIT: HCPCS | Performed by: PHARMACIST

## 2018-10-02 PROCEDURE — 90686 IIV4 VACC NO PRSV 0.5 ML IM: CPT | Performed by: INTERNAL MEDICINE

## 2018-10-02 PROCEDURE — 36416 COLLJ CAPILLARY BLOOD SPEC: CPT | Performed by: PHARMACIST

## 2018-10-02 PROCEDURE — 85610 PROTHROMBIN TIME: CPT | Performed by: PHARMACIST

## 2018-10-02 PROCEDURE — 6360000002 HC RX W HCPCS: Performed by: INTERNAL MEDICINE

## 2018-10-02 PROCEDURE — G0008 ADMIN INFLUENZA VIRUS VAC: HCPCS | Performed by: INTERNAL MEDICINE

## 2018-10-02 RX ADMIN — INFLUENZA A VIRUS A/MICHIGAN/45/2015 X-275 (H1N1) ANTIGEN (FORMALDEHYDE INACTIVATED), INFLUENZA A VIRUS A/SINGAPORE/INFIMH-16-0019/2016 IVR-186 (H3N2) ANTIGEN (FORMALDEHYDE INACTIVATED), INFLUENZA B VIRUS B/PHUKET/3073/2013 ANTIGEN (FORMALDEHYDE INACTIVATED), AND INFLUENZA B VIRUS B/MARYLAND/15/2016 BX-69A ANTIGEN (FORMALDEHYDE INACTIVATED) 0.5 ML: 15; 15; 15; 15 INJECTION, SUSPENSION INTRAMUSCULAR at 15:34

## 2018-10-02 NOTE — PROGRESS NOTES
Medication Management [facility]  Anticoagulation Clinic  [phone] (phone)  [phone] (fax)      Ms. Juan Luis Velazquez is a 75 y.o.  female with history of Afib who presents today for anticoagulation monitoring and adjustment. Patient verifies current dosing regimen and tablet strength. No missed or extra doses. Patient denies s/s bleeding/bruising/swelling/SOB/chest pain - has bruising on arms, normal for her  No blood in urine or stool. No dietary changes. No changes in medication/OTC agents/Herbals. No change in alcohol use or tobacco use. No change in activity level. Patient denies headaches/dizziness/lightheadedness/falls. No vomiting/diarrhea or acute illness. - pt vomits occasionally, this is normal for her, happens when \"things don't set right\". Also \"battles between constipation and diarrhea\". No Procedures scheduled in the future at this time. - colonscopy 10/25/18 with Dr. Baldo Britt, says paperwork doesn't mention holding Coumadin, will contact MD to obtain information regarding Coumadin    Assessment:   Lab Results   Component Value Date    INR 2.60 (H) 10/02/2018    INR 3.40 (H) 09/18/2018    INR 2.30 (H) 09/04/2018     INR subtherapeutic   Recent Labs      10/02/18   1518   INR  2.60*         Plan:  Continue Coumadin 1.5mg daily. Recheck INR in 2 weeks. Pt received flu shot today in the right deltoid, tolerated procedure well. Patient reminded to call the Anticoagulation Clinic with any signs or symptoms of bleeding or with any medication changes. Patient given instructions utilizing the teach back method. Discharged ambulatory in no apparent distress. After visit summary printed and reviewed with patient.       Medications reviewed and updated on home medication list Yes    Influenza vaccine:     [x] given    [] declined   [] received previously   [] plans to receive at a later time   [] refused    [x] documented in EPIC

## 2018-10-05 ENCOUNTER — TELEPHONE (OUTPATIENT)
Dept: PHARMACY | Age: 73
End: 2018-10-05

## 2018-10-08 ENCOUNTER — TELEPHONE (OUTPATIENT)
Dept: PHARMACY | Age: 73
End: 2018-10-08

## 2018-10-08 NOTE — TELEPHONE ENCOUNTER
Genia Cruz called and stated that she forgot to take her Coumadin dosing on Friday. Please call Genia Cruz for about her dosing [phone].

## 2018-10-16 ENCOUNTER — HOSPITAL ENCOUNTER (OUTPATIENT)
Dept: PHARMACY | Age: 73
Setting detail: THERAPIES SERIES
Discharge: HOME | End: 2018-10-16
Payer: MEDICARE

## 2018-10-16 DIAGNOSIS — I48.19 PERSISTENT ATRIAL FIBRILLATION (HCC): ICD-10-CM

## 2018-10-16 LAB — POC INR: 2.3 (ref 0.8–1.2)

## 2018-10-16 PROCEDURE — 85610 PROTHROMBIN TIME: CPT

## 2018-10-16 PROCEDURE — 99211 OFF/OP EST MAY X REQ PHY/QHP: CPT

## 2018-10-16 PROCEDURE — 36416 COLLJ CAPILLARY BLOOD SPEC: CPT

## 2018-10-16 NOTE — PROGRESS NOTES
The [facility]  Anticoagulation Clinic  [phone] (phone)  [phone] (fax)    Ms. Jack Ewing is a 75 y.o.  female with history of A.fib who presents today for anticoagulation monitoring and adjustment. Patient verifies current dosing regimen and tablet strength. No missed or extra doses. Patient denies s/s bleeding/bruising/swelling/SOB/chest pain  No blood in urine or stool. No dietary changes. No changes in medication/OTC agents/Herbals. No change in alcohol use or tobacco use. No change in activity level. Patient denies headaches/dizziness/lightheadedness/falls. No vomiting/diarrhea or acute illness. Patient will have colonoscopy on 10/25 and will be bridged with lovenox. Assessment:   Lab Results   Component Value Date    INR 2.30 (H) 10/16/2018    INR 2.60 (H) 10/02/2018    INR 3.40 (H) 09/18/2018     INR therapeutic   Recent Labs      10/16/18   1332   INR  2.30*         Plan:  Take coumadin 1.5 mg daily. Hold coumadin 10/22-10/24, take Coumadin 3 mg daily 10/25-10/26, then Continue 1.5 mg daily. Recheck INR 2 weeks. Patient reminded to call the Anticoagulation Clinic with signs or symptoms of bleeding or with any medication changes. Patient given instructions utilizing the teach back method. Discharged ambulatory in no apparent distress. After visit summary printed and reviewed with patient.       Medications reviewed and updated on home medication list Yes    Influenza vaccine:     [] given    [x] declined   [] received previously   [x] plans to receive at a later time   [] refused    [] documented in Vinny Saunders, PharmD  PGY2 Ambulatory Care Resident

## 2018-10-18 ENCOUNTER — TELEPHONE (OUTPATIENT)
Dept: PHARMACY | Age: 73
End: 2018-10-18

## 2018-10-18 NOTE — TELEPHONE ENCOUNTER
Informed Sharona Lovenox script sent to pharmacy. She states that she received her instructions at her last pharmacist appointment on how and when to use it. Instructed to call clinic back with any questions.

## 2018-10-18 NOTE — TELEPHONE ENCOUNTER
Vani Harris called requesting a script for Lovenox be sent to [address] [employer]. Please call [phone] when sent to pharmacy.

## 2018-10-25 ENCOUNTER — HOSPITAL ENCOUNTER (OUTPATIENT)
Age: 73
Setting detail: OUTPATIENT SURGERY
Discharge: HOME | End: 2018-10-25
Attending: INTERNAL MEDICINE | Admitting: INTERNAL MEDICINE
Payer: MEDICARE

## 2018-10-25 ENCOUNTER — ANESTHESIA (OUTPATIENT)
Dept: ENDOSCOPY | Age: 73
End: 2018-10-25
Payer: MEDICARE

## 2018-10-25 ENCOUNTER — ANESTHESIA EVENT (OUTPATIENT)
Dept: ENDOSCOPY | Age: 73
End: 2018-10-25
Payer: MEDICARE

## 2018-10-25 VITALS
WEIGHT: 166.4 LBS | TEMPERATURE: 97.7 F | BODY MASS INDEX: 30.62 KG/M2 | DIASTOLIC BLOOD PRESSURE: 53 MMHG | HEIGHT: 62 IN | HEART RATE: 69 BPM | RESPIRATION RATE: 16 BRPM | SYSTOLIC BLOOD PRESSURE: 96 MMHG | OXYGEN SATURATION: 98 %

## 2018-10-25 VITALS
SYSTOLIC BLOOD PRESSURE: 74 MMHG | OXYGEN SATURATION: 98 % | DIASTOLIC BLOOD PRESSURE: 54 MMHG | RESPIRATION RATE: 11 BRPM

## 2018-10-25 PROCEDURE — 3609010600 HC COLONOSCOPY POLYPECTOMY SNARE/COLD BIOPSY: Performed by: INTERNAL MEDICINE

## 2018-10-25 PROCEDURE — 6360000002 HC RX W HCPCS: Performed by: NURSE ANESTHETIST, CERTIFIED REGISTERED

## 2018-10-25 PROCEDURE — C1773 RET DEV, INSERTABLE: HCPCS | Performed by: INTERNAL MEDICINE

## 2018-10-25 PROCEDURE — 2709999900 HC NON-CHARGEABLE SUPPLY: Performed by: INTERNAL MEDICINE

## 2018-10-25 PROCEDURE — 2500000003 HC RX 250 WO HCPCS: Performed by: NURSE ANESTHETIST, CERTIFIED REGISTERED

## 2018-10-25 PROCEDURE — 7100000000 HC PACU RECOVERY - FIRST 15 MIN: Performed by: INTERNAL MEDICINE

## 2018-10-25 PROCEDURE — 88305 TISSUE EXAM BY PATHOLOGIST: CPT

## 2018-10-25 PROCEDURE — 2580000003 HC RX 258: Performed by: INTERNAL MEDICINE

## 2018-10-25 PROCEDURE — 3700000000 HC ANESTHESIA ATTENDED CARE: Performed by: INTERNAL MEDICINE

## 2018-10-25 PROCEDURE — 3700000001 HC ADD 15 MINUTES (ANESTHESIA): Performed by: INTERNAL MEDICINE

## 2018-10-25 PROCEDURE — 7100000001 HC PACU RECOVERY - ADDTL 15 MIN: Performed by: INTERNAL MEDICINE

## 2018-10-25 RX ORDER — SODIUM CHLORIDE 450 MG/100ML
INJECTION, SOLUTION INTRAVENOUS CONTINUOUS
Status: DISCONTINUED | OUTPATIENT
Start: 2018-10-25 | End: 2018-10-25 | Stop reason: HOSPADM

## 2018-10-25 RX ORDER — LIDOCAINE HYDROCHLORIDE 20 MG/ML
INJECTION, SOLUTION INFILTRATION; PERINEURAL PRN
Status: DISCONTINUED | OUTPATIENT
Start: 2018-10-25 | End: 2018-10-25 | Stop reason: SDUPTHER

## 2018-10-25 RX ADMIN — PROPOFOL 50 MG: 10 INJECTION, EMULSION INTRAVENOUS at 09:52

## 2018-10-25 RX ADMIN — PHENYLEPHRINE HYDROCHLORIDE 100 MCG: 10 INJECTION INTRAVENOUS at 10:08

## 2018-10-25 RX ADMIN — PROPOFOL 30 MG: 10 INJECTION, EMULSION INTRAVENOUS at 10:05

## 2018-10-25 RX ADMIN — LIDOCAINE HYDROCHLORIDE 100 MG: 20 INJECTION, SOLUTION INFILTRATION; PERINEURAL at 09:48

## 2018-10-25 RX ADMIN — PROPOFOL 40 MG: 10 INJECTION, EMULSION INTRAVENOUS at 09:56

## 2018-10-25 RX ADMIN — PROPOFOL 30 MG: 10 INJECTION, EMULSION INTRAVENOUS at 10:10

## 2018-10-25 RX ADMIN — SODIUM CHLORIDE: 4.5 INJECTION, SOLUTION INTRAVENOUS at 09:45

## 2018-10-25 RX ADMIN — PHENYLEPHRINE HYDROCHLORIDE 100 MCG: 10 INJECTION INTRAVENOUS at 10:20

## 2018-10-25 RX ADMIN — PHENYLEPHRINE HYDROCHLORIDE 100 MCG: 10 INJECTION INTRAVENOUS at 09:52

## 2018-10-25 RX ADMIN — SODIUM CHLORIDE: 4.5 INJECTION, SOLUTION INTRAVENOUS at 09:13

## 2018-10-25 RX ADMIN — PHENYLEPHRINE HYDROCHLORIDE 100 MCG: 10 INJECTION INTRAVENOUS at 10:00

## 2018-10-25 RX ADMIN — PROPOFOL 30 MG: 10 INJECTION, EMULSION INTRAVENOUS at 10:00

## 2018-10-25 RX ADMIN — PROPOFOL 20 MG: 10 INJECTION, EMULSION INTRAVENOUS at 10:12

## 2018-10-25 ASSESSMENT — LIFESTYLE VARIABLES: SMOKING_STATUS: 1

## 2018-10-25 ASSESSMENT — PAIN SCALES - GENERAL
PAINLEVEL_OUTOF10: 0
PAINLEVEL_OUTOF10: 0

## 2018-10-25 NOTE — ANESTHESIA POSTPROCEDURE EVALUATION
Department of Anesthesiology  Postprocedure Note    Patient: Leatha Farfan  MRN: [id number]  YOB: 1945  Date of evaluation: 10/25/2018  Time:  11:05 AM     Procedure Summary     Date:  10/25/18 Room / Location:  [facility] ENDO 11 / [facility] Endoscopy    Anesthesia Start:  1050 Anesthesia Stop:  6916    Procedure:  COLONOSCOPY POLYPECTOMY SNARE/COLD BIOPSY (N/A Anus) Diagnosis:  (hx colon polyps)    Surgeon:  Teressa Barthel, MD Responsible Provider:  Jose C Rao DO    Anesthesia Type:  MAC ASA Status:  3          Anesthesia Type: No value filed. Prem Phase I: Prem Score: 8    Prem Phase II:      Last vitals: Reviewed and per EMR flowsheets.        Anesthesia Post Evaluation    Patient location during evaluation: bedside  Patient participation: complete - patient participated  Level of consciousness: awake and alert and responsive to verbal stimuli  Pain score: 0  Airway patency: patent  Nausea & Vomiting: no vomiting and no nausea  Complications: no  Cardiovascular status: hemodynamically stable  Respiratory status: acceptable and room air  Hydration status: stable

## 2018-10-25 NOTE — PROCEDURES
[address] [place] [address]                                PROCEDURE NOTE    PATIENT NAME: Keely Pfeiffer                    :         1945  MED REC NO:   [id number]                           ROOM:  ACCOUNT NO:   [de-identified]                           ADMIT DATE:  10/25/2018  PROVIDER:     Maxim Larsen M.D.    Fernie Gong:  10/25/2018    PROCEDURE PERFORMED:  Colonoscopy. HISTORY:  The patient is a 76-year-old white female who presents today  for colonoscopy because of a history of colon polyps. The patient last had a colonoscopy on 10/15/2015. Biopsies at that  time did show lymphocytic colitis and also two neoplastic polyps. The patient was seen on 2018 with complaints of painful  hemorrhoids. She was noted to have grade 4 hemorrhoids. She also has  a history of atrial fibrillation and implanted defibrillator, COPD,  and continues to smoke. Because of the large size of her hemorrhoids,  I recommended surgical evaluation. The patient has seen Dr. Daniel Staley. According to the patient, Dr. Daniel Staley has considered the patient to be a  high-risk surgical candidate, but Dr. Daniel Staley is willing to do surgery if  the patient wishes. The patient has also been seen previously for reflux, esophageal  stricture, family history of colon cancer, and anal fissure. As  noted, she is on home oxygen and continues to smoke. Prior to the  exam today, the patient's biggest complaint was that of her painful  hemorrhoids. Informed consent was obtained for the colonoscopy. The  nature of the exam, indications, risks, benefits were reviewed with  her. This included emphasis on the risks of sedation, perforation,  bleeding as well as a risk of overlooking clinically significant  lesion. Time for questions to discuss was provided, and questions  were answered to satisfaction.     SEDATION:  Please see the sedation record of the anesthesiology  department. DESCRIPTION OF PROCEDURE:  The patient was placed in the left lateral  decubitus position and the Olympus CF-H190AL adjustable video  colonoscope was introduced. The instrument was advanced to the cecum  with mild difficulty. Quality of the preparation overall was good. Scope finder device was used to assist with the passage of the  instrument. The examination of the colon was remarkable for a total  of four polyps. Three of these were removed by cold snare technique. All three of these polyps were then recovered by suction into the  biopsy channel of the colonoscope so that it can be recovered in  suction trap. Polyp #1 was an 8-mm polyp seen in the ascending colon  on a mucosal fold. This flat lesion was capped with a mini snare and  removed in three pieces using cold snare technique. Photograph  following up first the polypectomy shows complete removal of the polyp  tissue. The polyp #2 was a 6-mm round polyp in the transverse colon,  also removed by cold snare. Polyp #3 was a 5-mm polyp in the  descending colon. This was also removed by cold snare technique. Polyp #4 was a 3-mm flat polyp in the sigmoid. This was removed by  cold biopsy forceps. There were no other neoplastic vascular  inflammatory lesion seen. Retroflexion view of the rectum on  withdrawal was within normal limits. Cecal withdrawal time was  measured at 17 minutes and 52 seconds. ASSESSMENT  1. Colonoscopy of the cecum. 2.  Four polyps identified and removed as described above, three are  removed by cold snare and one by cold biopsy. COMMENT AND RECOMMENDATIONS:  If three or more of these polyps are  neoplastic, I would plan a colonoscopy in three years. Otherwise, I  would plan a colonoscopy in five years. The patient will need to hold  her Coumadin in advance of her next exam.  She may resume her Coumadin  tonight.     I have again recommended to the patient that she discuss

## 2018-10-25 NOTE — ANESTHESIA PRE PROCEDURE
Provider, MD   Pramoxine HCl (PROCTOFOAM RE) Place rectally    Historical Provider, MD   PROCTOZONE-HC 2.5 % rectal cream Place rectally See Admin Instructions  6/8/18   Historical Provider, MD   tiZANidine (ZANAFLEX) 4 MG tablet TK 1 T PO HS PRF PAIN 5/18/18   Historical Provider, MD   EPINEPHrine (EPIPEN) 0.3 MG/0.3ML SOAJ injection INJECT AS DIRECTED FOR ANAPHYLAXIS 3/19/18   Historical Provider, MD   UNABLE TO FIND Apply topically as needed Essential oils - lemon and/or cranberry. Historical Provider, MD   Cholecalciferol (VITAMIN D3) 5000 units TABS Take 1 tablet by mouth daily    Historical Provider, MD   UNABLE TO FIND Take by mouth daily 10% Potassium chloride solution 7.5 ml per Dr. José Luis Her Provider, MD   warfarin (COUMADIN) 1 MG tablet Take as directed by the Coumadin Clinic, 140 tablets for 90 days 11/6/17 11/11/18  Xiomara Chen MD   Estrogens, Conjugated (PREMARIN VA) Place vaginally See Admin Instructions     Historical Provider, MD   Evolocumab (REPATHA SURECLICK SC) Inject 385 mg into the skin every 14 days Indications: Blood Cholesterol Abnormal     Historical Provider, MD   hydrocodone-chlorpheniramine (TUSSIONEX) 10-8 MG/5ML SUER Take 5 mLs by mouth as needed .  2/24/17   Historical Provider, MD   ondansetron (ZOFRAN) 8 MG tablet Take 8 mg by mouth as needed 3/13/17   Historical Provider, MD   B Complex-C (RA B-COMPLEX/VITAMIN C CR PO) Take by mouth daily Indications: Treatment to Prevent Vitamin Deficiency     Historical Provider, MD   NITROGLYCERIN RE Place rectally as needed Indications: Hemorrhoids    Historical Provider, MD   nystatin (MYCOSTATIN) 951898 UNIT/ML suspension Take by mouth Indications: Infection caused by Fungus Swish and spit 7/21/15   Historical Provider, MD   Probiotic Product (SUPER PROBIOTIC) CAPS   Take 1 tablet by mouth daily Indications: Digestive Complaint  3/2/15   Historical Provider, MD   ofloxacin (FLOXIN) 0.3 % otic solution   Place 2 drops into both ears daily Indications: Infection Long-term therapy. 12/22/14   Historical Provider, MD   Carboxymethylcellul-Glycerin (REFRESH OPTIVE OP) Apply  to eye as needed. 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 Indications: Constipation    Historical Provider, MD   Alcaftadine (LASTACAFT) 0.25 % SOLN Apply 1 drop to eye daily as needed. Indications: Eye Allergy    Historical Provider, MD   OXYGEN 2 L by Nasal route continuous Indications: Chronic Obstructive Lung Disease     Historical Provider, MD   traZODone (DESYREL) 50 MG tablet Take 50 mg by mouth nightly     Historical Provider, MD   nitroGLYCERIN (NITROSTAT) 0.4 MG SL tablet Place 0.4 mg under the tongue every 5 minutes as needed. If third one does not relieve pain, call 9-1-1. Indications: Chest Pain    Historical Provider, MD       Current medications:    Current Facility-Administered Medications   Medication Dose Route Frequency Provider Last Rate Last Dose    0.45 % sodium chloride infusion   Intravenous Continuous Tia Medina MD 75 mL/hr at 10/25/18 0971         Allergies:     Allergies   Allergen Reactions    Benadryl [Diphenhydramine Hcl] Itching and Swelling    Ciprofloxacin Nausea And Vomiting    Captopril Other (See Comments)    Codeine Itching    Lipitor Other (See Comments)     Muscle pain      Macrobid [Nitrofurantoin Monohydrate Macrocrystals] Other (See Comments)     Chest pain, headache    Neomycin-Bacitracin Zn-Polymyx Swelling    Pravastatin      Muscle weakness and nate horses    Zetia [Ezetimibe]      Muscle weakness and nate horses    Adhesive Tape Rash    Cephalexin Nausea And Vomiting    Morphine Hives, Swelling and Rash    Other Nausea And Vomiting    Propoxyphene Nausea And Vomiting    Sulfa

## 2018-10-25 NOTE — PROGRESS NOTES
Pt arrived to recovery room, spouse at bedside.   Pt is passing flatus, pt does arouse to verbal stimuli

## 2018-10-26 ENCOUNTER — TELEPHONE (OUTPATIENT)
Dept: INTERNAL MEDICINE CLINIC | Age: 73
End: 2018-10-26

## 2018-10-26 NOTE — TELEPHONE ENCOUNTER
[facility] called for refill of Coumadin. I gave them the coumadin clinic phone number and advised them to call their office.

## 2018-10-28 ENCOUNTER — HOSPITAL ENCOUNTER (EMERGENCY)
Age: 73
Discharge: HOME | End: 2018-10-28
Attending: FAMILY MEDICINE
Payer: MEDICARE

## 2018-10-28 VITALS
RESPIRATION RATE: 23 BRPM | DIASTOLIC BLOOD PRESSURE: 60 MMHG | HEART RATE: 70 BPM | BODY MASS INDEX: 30.55 KG/M2 | WEIGHT: 166 LBS | HEIGHT: 62 IN | SYSTOLIC BLOOD PRESSURE: 134 MMHG | OXYGEN SATURATION: 98 % | TEMPERATURE: 98.1 F

## 2018-10-28 DIAGNOSIS — T78.2XXA ANAPHYLAXIS, INITIAL ENCOUNTER: Primary | ICD-10-CM

## 2018-10-28 DIAGNOSIS — I10 ESSENTIAL HYPERTENSION: ICD-10-CM

## 2018-10-28 PROCEDURE — 96372 THER/PROPH/DIAG INJ SC/IM: CPT

## 2018-10-28 PROCEDURE — 6360000002 HC RX W HCPCS: Performed by: FAMILY MEDICINE

## 2018-10-28 PROCEDURE — 99283 EMERGENCY DEPT VISIT LOW MDM: CPT

## 2018-10-28 PROCEDURE — 96374 THER/PROPH/DIAG INJ IV PUSH: CPT

## 2018-10-28 RX ORDER — EPINEPHRINE 1 MG/ML(1)
0.3 AMPUL (ML) INJECTION ONCE
Status: COMPLETED | OUTPATIENT
Start: 2018-10-28 | End: 2018-10-28

## 2018-10-28 RX ORDER — METHYLPREDNISOLONE SODIUM SUCCINATE 125 MG/2ML
125 INJECTION, POWDER, LYOPHILIZED, FOR SOLUTION INTRAMUSCULAR; INTRAVENOUS ONCE
Status: COMPLETED | OUTPATIENT
Start: 2018-10-28 | End: 2018-10-28

## 2018-10-28 RX ADMIN — METHYLPREDNISOLONE SODIUM SUCCINATE 125 MG: 125 INJECTION, POWDER, FOR SOLUTION INTRAMUSCULAR; INTRAVENOUS at 11:39

## 2018-10-28 RX ADMIN — EPINEPHRINE 0.3 MG: 1 INJECTION INTRAMUSCULAR; INTRAVENOUS; SUBCUTANEOUS at 11:38

## 2018-10-28 ASSESSMENT — ENCOUNTER SYMPTOMS
NAUSEA: 0
VOMITING: 0
COUGH: 0
EYE PAIN: 0
WHEEZING: 0
TROUBLE SWALLOWING: 1
DIARRHEA: 0
EYE DISCHARGE: 0
RHINORRHEA: 0
SHORTNESS OF BREATH: 0
BACK PAIN: 0
FACIAL SWELLING: 1
ABDOMINAL PAIN: 0
SORE THROAT: 0

## 2018-10-30 ENCOUNTER — HOSPITAL ENCOUNTER (OUTPATIENT)
Dept: PHARMACY | Age: 73
Setting detail: THERAPIES SERIES
Discharge: HOME | End: 2018-10-30
Payer: MEDICARE

## 2018-10-30 DIAGNOSIS — I48.19 PERSISTENT ATRIAL FIBRILLATION (HCC): ICD-10-CM

## 2018-10-30 LAB — POC INR: 1.9 (ref 0.8–1.2)

## 2018-10-30 PROCEDURE — 36416 COLLJ CAPILLARY BLOOD SPEC: CPT

## 2018-10-30 PROCEDURE — 99211 OFF/OP EST MAY X REQ PHY/QHP: CPT

## 2018-10-30 PROCEDURE — 85610 PROTHROMBIN TIME: CPT

## 2018-11-16 ENCOUNTER — OFFICE VISIT (OUTPATIENT)
Dept: UROLOGY | Age: 73
End: 2018-11-16
Payer: MEDICARE

## 2018-11-16 ENCOUNTER — HOSPITAL ENCOUNTER (OUTPATIENT)
Dept: PHARMACY | Age: 73
Setting detail: THERAPIES SERIES
Discharge: HOME | End: 2018-11-16
Payer: MEDICARE

## 2018-11-16 VITALS
SYSTOLIC BLOOD PRESSURE: 132 MMHG | WEIGHT: 162 LBS | HEIGHT: 62 IN | DIASTOLIC BLOOD PRESSURE: 82 MMHG | BODY MASS INDEX: 29.81 KG/M2

## 2018-11-16 DIAGNOSIS — R35.0 URINARY FREQUENCY: Primary | ICD-10-CM

## 2018-11-16 DIAGNOSIS — I48.19 PERSISTENT ATRIAL FIBRILLATION (HCC): ICD-10-CM

## 2018-11-16 LAB
BILIRUBIN URINE: NEGATIVE
BLOOD URINE, POC: NEGATIVE
CHARACTER, URINE: CLEAR
COLOR, URINE: YELLOW
GLUCOSE URINE: NEGATIVE MG/DL
KETONES, URINE: NEGATIVE
LEUKOCYTE CLUMPS, URINE: NEGATIVE
NITRITE, URINE: NEGATIVE
PH, URINE: 6
POC INR: 1.8 (ref 0.8–1.2)
POST VOID RESIDUAL (PVR): 83 ML
PROTEIN, URINE: NEGATIVE MG/DL
SPECIFIC GRAVITY, URINE: 1.01 (ref 1–1.03)
UROBILINOGEN, URINE: 0.2 EU/DL

## 2018-11-16 PROCEDURE — 81003 URINALYSIS AUTO W/O SCOPE: CPT | Performed by: NURSE PRACTITIONER

## 2018-11-16 PROCEDURE — G8427 DOCREV CUR MEDS BY ELIG CLIN: HCPCS | Performed by: NURSE PRACTITIONER

## 2018-11-16 PROCEDURE — G8482 FLU IMMUNIZE ORDER/ADMIN: HCPCS | Performed by: NURSE PRACTITIONER

## 2018-11-16 PROCEDURE — 99211 OFF/OP EST MAY X REQ PHY/QHP: CPT

## 2018-11-16 PROCEDURE — 4004F PT TOBACCO SCREEN RCVD TLK: CPT | Performed by: NURSE PRACTITIONER

## 2018-11-16 PROCEDURE — 3017F COLORECTAL CA SCREEN DOC REV: CPT | Performed by: NURSE PRACTITIONER

## 2018-11-16 PROCEDURE — 1101F PT FALLS ASSESS-DOCD LE1/YR: CPT | Performed by: NURSE PRACTITIONER

## 2018-11-16 PROCEDURE — 1123F ACP DISCUSS/DSCN MKR DOCD: CPT | Performed by: NURSE PRACTITIONER

## 2018-11-16 PROCEDURE — 51798 US URINE CAPACITY MEASURE: CPT | Performed by: NURSE PRACTITIONER

## 2018-11-16 PROCEDURE — G8598 ASA/ANTIPLAT THER USED: HCPCS | Performed by: NURSE PRACTITIONER

## 2018-11-16 PROCEDURE — G8400 PT W/DXA NO RESULTS DOC: HCPCS | Performed by: NURSE PRACTITIONER

## 2018-11-16 PROCEDURE — G8417 CALC BMI ABV UP PARAM F/U: HCPCS | Performed by: NURSE PRACTITIONER

## 2018-11-16 PROCEDURE — 85610 PROTHROMBIN TIME: CPT

## 2018-11-16 PROCEDURE — 4040F PNEUMOC VAC/ADMIN/RCVD: CPT | Performed by: NURSE PRACTITIONER

## 2018-11-16 PROCEDURE — 1090F PRES/ABSN URINE INCON ASSESS: CPT | Performed by: NURSE PRACTITIONER

## 2018-11-16 PROCEDURE — 36416 COLLJ CAPILLARY BLOOD SPEC: CPT

## 2018-11-16 PROCEDURE — 99213 OFFICE O/P EST LOW 20 MIN: CPT | Performed by: NURSE PRACTITIONER

## 2018-11-16 NOTE — PATIENT INSTRUCTIONS
trying to quit smoking. · Consider signing up for a smoking cessation program, such as the American Lung Association's Freedom from Smoking program.  · Get text messaging support. Go to the website at www.smokefree. gov to sign up for the [facility] program.  · Set a quit date. Pick your date carefully so that it is not right in the middle of a big deadline or stressful time. Once you quit, do not even take a puff. Get rid of all ashtrays and lighters after your last cigarette. Clean your house and your clothes so that they do not smell of smoke. · Learn how to be a nonsmoker. Think about ways you can avoid those things that make you reach for a cigarette. ? Avoid situations that put you at greatest risk for smoking. For some people, it is hard to have a drink with friends without smoking. For others, they might skip a coffee break with coworkers who smoke. ? Change your daily routine. Take a different route to work or eat a meal in a different place. · Cut down on stress. Calm yourself or release tension by doing an activity you enjoy, such as reading a book, taking a hot bath, or gardening. · Talk to your doctor or pharmacist about nicotine replacement therapy, which replaces the nicotine in your body. You still get nicotine but you do not use tobacco. Nicotine replacement products help you slowly reduce the amount of nicotine you need. These products come in several forms, many of them available over-the-counter:  ? Nicotine patches  ? Nicotine gum and lozenges  ? Nicotine inhaler  · Ask your doctor about bupropion (Wellbutrin) or varenicline (Chantix), which are prescription medicines. They do not contain nicotine. They help you by reducing withdrawal symptoms, such as stress and anxiety. · Some people find hypnosis, acupuncture, and massage helpful for ending the smoking habit. · Eat a healthy diet and get regular exercise.  Having healthy habits will help your body move past its craving for

## 2018-11-29 ENCOUNTER — HOSPITAL ENCOUNTER (OUTPATIENT)
Dept: PHARMACY | Age: 73
Setting detail: THERAPIES SERIES
Discharge: HOME | End: 2018-11-29
Payer: MEDICARE

## 2018-11-29 DIAGNOSIS — I48.19 PERSISTENT ATRIAL FIBRILLATION (HCC): ICD-10-CM

## 2018-11-29 LAB — POC INR: 2.5 (ref 0.8–1.2)

## 2018-11-29 PROCEDURE — 99211 OFF/OP EST MAY X REQ PHY/QHP: CPT

## 2018-11-29 PROCEDURE — 36416 COLLJ CAPILLARY BLOOD SPEC: CPT

## 2018-11-29 PROCEDURE — 85610 PROTHROMBIN TIME: CPT

## 2018-11-29 RX ORDER — WARFARIN SODIUM 1 MG/1
TABLET ORAL
Qty: 145 TABLET | Refills: 3 | Status: SHIPPED | OUTPATIENT
Start: 2018-11-29 | End: 2019-12-26 | Stop reason: SDUPTHER

## 2018-11-29 NOTE — PROGRESS NOTES
Medication Management [facility]  Anticoagulation Clinic  [phone] (phone)  [phone] (fax)      Ms. Elina Randhawa is a 75 y.o.  female with history of Afib who presents today for anticoagulation monitoring and adjustment. Patient verifies current dosing regimen and tablet strength. No missed or extra doses. Patient denies s/s bleeding/bruising/swelling/chest pain. Baseline SOB that is unchanged. No blood in urine or stool. No dietary changes. No changes in medication/OTC agents/Herbals. Patient reports having a Joint Juice Cranberry Drink. She states she plans to switch to Blueberry to avoid the cranberry interaction with Coumadin. No change in alcohol use or tobacco use. Unchanged 1/2 PPD smoker. No change in activity level. Patient denies headaches/dizziness/lightheadedness/falls. Patient reports sinus headaches from weather that are not unusual for her. No vomiting/diarrhea or acute illness. No Procedures scheduled in the future at this time. Assessment:   Lab Results   Component Value Date    INR 2.50 (H) 11/29/2018    INR 1.80 (H) 11/16/2018    INR 1.90 (H) 10/30/2018     INR therapeutic   Recent Labs      11/29/18   1525   INR  2.50*     This is the patient's first therapeutic INR since dose change at last visit. Plan:  Continue Coumadin 2 mg MF and 1.5 mg [id number]. Recheck INR in 2 weeks. Sent new prescription of Coumadin 1 mg tablets to mail order pharmacy. Patient reminded to call the Anticoagulation Clinic with any signs or symptoms of bleeding or with any medication changes. Patient given instructions utilizing the teach back method. Discharged ambulatory in no apparent distress. After visit summary printed and reviewed with patient.       Medications reviewed and updated on home medication list Yes    Influenza vaccine:     [] given    [x] declined   [x] received previously   [] plans to receive at a later time   [] refused    [x] documented

## 2018-12-13 ENCOUNTER — HOSPITAL ENCOUNTER (OUTPATIENT)
Dept: PHARMACY | Age: 73
Setting detail: THERAPIES SERIES
Discharge: HOME | End: 2018-12-13
Payer: MEDICARE

## 2018-12-13 DIAGNOSIS — I48.19 PERSISTENT ATRIAL FIBRILLATION (HCC): ICD-10-CM

## 2018-12-13 LAB — POC INR: 2.4 (ref 0.8–1.2)

## 2018-12-13 PROCEDURE — 36416 COLLJ CAPILLARY BLOOD SPEC: CPT | Performed by: PHARMACIST

## 2018-12-13 PROCEDURE — 99211 OFF/OP EST MAY X REQ PHY/QHP: CPT | Performed by: PHARMACIST

## 2018-12-13 PROCEDURE — 85610 PROTHROMBIN TIME: CPT | Performed by: PHARMACIST

## 2018-12-13 RX ORDER — ALBUTEROL SULFATE 90 UG/1
2 AEROSOL, METERED RESPIRATORY (INHALATION) EVERY 6 HOURS PRN
Status: ON HOLD | COMMUNITY
End: 2020-01-01 | Stop reason: ALTCHOICE

## 2018-12-27 ENCOUNTER — HOSPITAL ENCOUNTER (OUTPATIENT)
Age: 73
Discharge: HOME | End: 2018-12-27
Payer: MEDICARE

## 2018-12-27 ENCOUNTER — TELEPHONE (OUTPATIENT)
Dept: UROLOGY | Age: 73
End: 2018-12-27

## 2018-12-27 DIAGNOSIS — R30.0 DYSURIA: ICD-10-CM

## 2018-12-27 DIAGNOSIS — R10.9 FLANK PAIN: ICD-10-CM

## 2018-12-27 DIAGNOSIS — R30.0 DYSURIA: Primary | ICD-10-CM

## 2018-12-27 LAB
BACTERIA: ABNORMAL /HPF
BILIRUBIN URINE: NEGATIVE
BLOOD, URINE: NEGATIVE
CASTS 2: ABNORMAL /LPF
CASTS UA: ABNORMAL /LPF
CHARACTER, URINE: ABNORMAL
COLOR: YELLOW
CRYSTALS, UA: ABNORMAL
EPITHELIAL CELLS, UA: ABNORMAL /HPF
GLUCOSE URINE: NEGATIVE MG/DL
KETONES, URINE: NEGATIVE
LEUKOCYTE ESTERASE, URINE: ABNORMAL
MISCELLANEOUS 2: ABNORMAL
MUCUS: ABNORMAL
NITRITE, URINE: NEGATIVE
PH UA: 6
PROTEIN UA: ABNORMAL
RBC URINE: ABNORMAL /HPF
RENAL EPITHELIAL, UA: ABNORMAL
SPECIFIC GRAVITY, URINE: 1.01 (ref 1–1.03)
UROBILINOGEN, URINE: 0.2 EU/DL
WBC UA: ABNORMAL /HPF
YEAST: ABNORMAL

## 2018-12-27 PROCEDURE — 81001 URINALYSIS AUTO W/SCOPE: CPT

## 2018-12-27 RX ORDER — DOXYCYCLINE HYCLATE 100 MG/1
100 CAPSULE ORAL 2 TIMES DAILY
Qty: 20 CAPSULE | Refills: 0 | Status: SHIPPED | OUTPATIENT
Start: 2018-12-27 | End: 2019-01-06

## 2018-12-27 NOTE — TELEPHONE ENCOUNTER
I attempted to call the patient regarding the message below. I left a message to return the call back to the office.

## 2018-12-27 NOTE — TELEPHONE ENCOUNTER
Doxycycline sent. She has multiple allergies so it was my best choice. If symptoms don't improve by tomorrow go to ED.

## 2018-12-28 NOTE — TELEPHONE ENCOUNTER
I called the main pharmacy at [facility]. They did not believe it should cause any issues with Coumadin. It looks like she will have her INR rechecked within the next week. Advise her to take the Doxy as prescribed. Augmentin does not provide as good as coverage.

## 2018-12-28 NOTE — TELEPHONE ENCOUNTER
The patient called and advised her of the message from Kalli Medrano CNP. Doxycycline sent. She has multiple allergies so it was my best choice.   If symptoms don't improve by tomorrow go to ED. The patient stated she is on Coumadin and the pharmacy told her it wasn't compatible. She stated she did not know if the pharmacy was talking about how it would effect her INR. I advised her to notify the Coumadin Clinic of the new medication being ordered. She voiced understanding.

## 2018-12-31 ENCOUNTER — TELEPHONE (OUTPATIENT)
Dept: UROLOGY | Age: 73
End: 2018-12-31

## 2019-01-02 ENCOUNTER — TELEPHONE (OUTPATIENT)
Dept: PHARMACY | Age: 74
End: 2019-01-02

## 2019-01-07 ENCOUNTER — HOSPITAL ENCOUNTER (OUTPATIENT)
Dept: PHARMACY | Age: 74
Setting detail: THERAPIES SERIES
Discharge: HOME | End: 2019-01-07
Payer: MEDICARE

## 2019-01-07 DIAGNOSIS — I48.19 PERSISTENT ATRIAL FIBRILLATION (HCC): ICD-10-CM

## 2019-01-07 LAB — POC INR: 2.7 (ref 0.8–1.2)

## 2019-01-07 PROCEDURE — 99211 OFF/OP EST MAY X REQ PHY/QHP: CPT

## 2019-01-07 PROCEDURE — 85610 PROTHROMBIN TIME: CPT

## 2019-01-07 PROCEDURE — 36416 COLLJ CAPILLARY BLOOD SPEC: CPT

## 2019-01-07 RX ORDER — METRONIDAZOLE 7.5 MG/G
GEL TOPICAL DAILY
COMMUNITY
End: 2019-01-28 | Stop reason: ALTCHOICE

## 2019-01-07 RX ORDER — LOSARTAN POTASSIUM 50 MG/1
50 TABLET ORAL DAILY
COMMUNITY
End: 2019-01-28 | Stop reason: ALTCHOICE

## 2019-01-07 RX ORDER — HYDROXYZINE HYDROCHLORIDE 25 MG/1
25 TABLET, FILM COATED ORAL 3 TIMES DAILY PRN
COMMUNITY

## 2019-01-07 RX ORDER — BUSPIRONE HYDROCHLORIDE 10 MG/1
5 TABLET ORAL 2 TIMES DAILY
COMMUNITY
End: 2019-07-12

## 2019-01-10 ENCOUNTER — TELEPHONE (OUTPATIENT)
Dept: UROLOGY | Age: 74
End: 2019-01-10

## 2019-01-28 ENCOUNTER — HOSPITAL ENCOUNTER (OUTPATIENT)
Dept: PHARMACY | Age: 74
Setting detail: THERAPIES SERIES
Discharge: HOME | End: 2019-01-28
Payer: MEDICARE

## 2019-01-28 DIAGNOSIS — I48.19 PERSISTENT ATRIAL FIBRILLATION (HCC): ICD-10-CM

## 2019-01-28 LAB — POC INR: 2.9 (ref 0.8–1.2)

## 2019-01-28 PROCEDURE — 36416 COLLJ CAPILLARY BLOOD SPEC: CPT

## 2019-01-28 PROCEDURE — 85610 PROTHROMBIN TIME: CPT

## 2019-01-28 PROCEDURE — 99211 OFF/OP EST MAY X REQ PHY/QHP: CPT

## 2019-02-05 LAB
BASOPHILS ABSOLUTE: NORMAL /ΜL
BASOPHILS RELATIVE PERCENT: NORMAL %
EOSINOPHILS ABSOLUTE: NORMAL /ΜL
EOSINOPHILS RELATIVE PERCENT: NORMAL %
FERRITIN: 190.2 NG/ML (ref 9–150)
HCT VFR BLD CALC: 42.7 % (ref 36–46)
HEMOGLOBIN: 13.5 G/DL (ref 12–16)
INR BLD: 2.1
LYMPHOCYTES ABSOLUTE: NORMAL /ΜL
LYMPHOCYTES RELATIVE PERCENT: NORMAL %
MCH RBC QN AUTO: NORMAL PG
MCHC RBC AUTO-ENTMCNC: NORMAL G/DL
MCV RBC AUTO: NORMAL FL
MONOCYTES ABSOLUTE: NORMAL /ΜL
MONOCYTES RELATIVE PERCENT: NORMAL %
NEUTROPHILS ABSOLUTE: NORMAL /ΜL
NEUTROPHILS RELATIVE PERCENT: NORMAL %
PLATELET # BLD: 174 K/ΜL
PMV BLD AUTO: NORMAL FL
PROTIME: 22.3 SECONDS
RBC # BLD: NORMAL 10^6/ΜL
WBC # BLD: 7.1 10^3/ML

## 2019-02-19 ENCOUNTER — TELEPHONE (OUTPATIENT)
Dept: PHARMACY | Age: 74
End: 2019-02-19

## 2019-02-22 ENCOUNTER — HOSPITAL ENCOUNTER (OUTPATIENT)
Dept: CT IMAGING | Age: 74
Discharge: HOME | End: 2019-02-22
Payer: MEDICARE

## 2019-02-22 DIAGNOSIS — Z12.9 SCREENING FOR CANCER: ICD-10-CM

## 2019-02-22 PROCEDURE — G0297 LDCT FOR LUNG CA SCREEN: HCPCS

## 2019-02-25 ENCOUNTER — HOSPITAL ENCOUNTER (OUTPATIENT)
Dept: PHARMACY | Age: 74
Setting detail: THERAPIES SERIES
Discharge: HOME | End: 2019-02-25
Payer: MEDICARE

## 2019-02-25 DIAGNOSIS — I48.19 PERSISTENT ATRIAL FIBRILLATION (HCC): ICD-10-CM

## 2019-02-25 LAB — POC INR: 2.8 (ref 0.8–1.2)

## 2019-02-25 PROCEDURE — 36416 COLLJ CAPILLARY BLOOD SPEC: CPT

## 2019-02-25 PROCEDURE — 85610 PROTHROMBIN TIME: CPT

## 2019-02-25 PROCEDURE — 99211 OFF/OP EST MAY X REQ PHY/QHP: CPT

## 2019-03-25 ENCOUNTER — APPOINTMENT (OUTPATIENT)
Dept: PHARMACY | Age: 74
End: 2019-03-25
Payer: MEDICARE

## 2019-04-03 ENCOUNTER — APPOINTMENT (OUTPATIENT)
Dept: PHARMACY | Age: 74
End: 2019-04-03
Payer: MEDICARE

## 2019-04-03 ENCOUNTER — HOSPITAL ENCOUNTER (OUTPATIENT)
Dept: PHARMACY | Age: 74
Setting detail: THERAPIES SERIES
Discharge: HOME | End: 2019-04-03
Payer: MEDICARE

## 2019-04-03 DIAGNOSIS — I48.19 PERSISTENT ATRIAL FIBRILLATION (HCC): ICD-10-CM

## 2019-04-03 LAB — POC INR: 2.6 (ref 0.8–1.2)

## 2019-04-03 PROCEDURE — 85610 PROTHROMBIN TIME: CPT

## 2019-04-03 PROCEDURE — 99211 OFF/OP EST MAY X REQ PHY/QHP: CPT

## 2019-04-03 PROCEDURE — 36416 COLLJ CAPILLARY BLOOD SPEC: CPT

## 2019-04-03 NOTE — PROGRESS NOTES
Medication Management [facility]  Anticoagulation Clinic  [phone] (phone)  [phone] (fax)      Ms. Isela Sanchez is a 75 y.o.  female with history of persistent Afib who presents today for anticoagulation monitoring and adjustment. Patient verifies current dosing regimen and tablet strength. No missed or extra doses. Patient denies s/s bleeding/bruising/swelling/chest pain. Usual SOB. Wearing O2 via nasal cannula. No blood in urine or stool. Eating more iceberg lettuce. No changes in medication/OTC agents/Herbals. No change in alcohol use or tobacco use. No change in activity level. Patient denies dizziness/lightheadedness/falls. Has sinus headaches. No vomiting/diarrhea or acute illness. Having back injection on 4-3-19 by . Assessment:   Lab Results   Component Value Date    INR 2.60 (H) 04/03/2019    INR 2.80 (H) 02/25/2019    INR 2.1 02/05/2019    PROTIME 22.3 02/05/2019     INR therapeutic   Recent Labs     04/03/19  1059   INR 2.60*     Plan: POCT INR ordered and result reviewed. Continue Coumadin 2 mg po MF, 1.5 mg po TWTHSS. Recheck INR in 4 weeks. Patient reminded to call the Anticoagulation Clinic with any signs or symptoms of bleeding or with any medication changes. Patient given instructions utilizing the teach back method. Discharged ambulatory in no apparent distress. INR result faxed to Dr. Mayda Marin. After visit summary printed and reviewed with patient.       Medications reviewed and updated on home medication list Yes    Influenza vaccine:     [] given    [] declined   [x] received previously   [] plans to receive at a later time   [] refused    [x] documented in EPIC

## 2019-04-23 ENCOUNTER — TELEPHONE (OUTPATIENT)
Dept: PHARMACY | Age: 74
End: 2019-04-23

## 2019-05-01 ENCOUNTER — HOSPITAL ENCOUNTER (OUTPATIENT)
Dept: PHARMACY | Age: 74
Setting detail: THERAPIES SERIES
Discharge: HOME | End: 2019-05-01
Payer: MEDICARE

## 2019-05-01 DIAGNOSIS — I48.19 PERSISTENT ATRIAL FIBRILLATION (HCC): ICD-10-CM

## 2019-05-01 LAB — POC INR: 2.4 (ref 0.8–1.2)

## 2019-05-01 PROCEDURE — 36416 COLLJ CAPILLARY BLOOD SPEC: CPT

## 2019-05-01 PROCEDURE — 99211 OFF/OP EST MAY X REQ PHY/QHP: CPT

## 2019-05-01 PROCEDURE — 85610 PROTHROMBIN TIME: CPT

## 2019-05-01 RX ORDER — METOPROLOL SUCCINATE 25 MG/1
25 TABLET, EXTENDED RELEASE ORAL DAILY
Status: ON HOLD | COMMUNITY
Start: 2019-04-19 | End: 2020-01-09 | Stop reason: HOSPADM

## 2019-05-01 NOTE — PROGRESS NOTES
Medication Management [facility]  Anticoagulation Clinic  [phone] (phone)  [phone] (fax)      Ms. Isela Sanchez is a 75 y.o.  female with history of persistent Afib who presents today for anticoagulation monitoring and adjustment. Patient verifies current dosing regimen and tablet strength. No missed or extra doses. Patient denies s/s bleeding/bruising/chest pain. Usual SOB. Has chest pain sometimes that is relieved with breathing treatment. Usual lower leg swelling and Charley horse cramps at night. No blood in urine or stool. No dietary changes. No changes in medication/OTC agents/Herbals. No change in alcohol use or tobacco use. No change in activity level. Patient denies headaches/dizziness/lightheadedness/falls. No acute illness. Usual vomiting and diarrhea off and on. Having back injection tomorrow by [facility] Silvestre. Does not need to be off coumadin. Wearing O2 via nasal cannula. Assessment:   Lab Results   Component Value Date    INR 2.40 (H) 05/01/2019    INR 2.60 (H) 04/03/2019    INR 2.80 (H) 02/25/2019    PROTIME 22.3 02/05/2019     INR therapeutic   Recent Labs     05/01/19  1045   INR 2.40*     Plan: POCT INR ordered and result reviewed. Continue Coumadin 2 mg po MF, 1.5 mg po TWTHSS. Recheck INR in 4 weeks. Patient reminded to call the Anticoagulation Clinic with any signs or symptoms of bleeding or with any medication changes. Patient given instructions utilizing the teach back method. INR result faxed to [facility] Silvestre. Discharged ambulatory in no apparent distress. After visit summary printed and reviewed with patient.       Medications reviewed and updated on home medication list Yes    Influenza vaccine:     [] given    [] declined   [x] received previously   [] plans to receive at a later time   [] refused    [x] documented in EPIC

## 2019-05-03 ENCOUNTER — APPOINTMENT (OUTPATIENT)
Dept: PHARMACY | Age: 74
End: 2019-05-03
Payer: MEDICARE

## 2019-05-13 ENCOUNTER — TELEPHONE (OUTPATIENT)
Dept: PHARMACY | Age: 74
End: 2019-05-13

## 2019-05-15 ENCOUNTER — TELEPHONE (OUTPATIENT)
Dept: PHARMACY | Age: 74
End: 2019-05-15

## 2019-05-15 NOTE — TELEPHONE ENCOUNTER
Pt called to let us know that her MD has now put her on prednisone 20mg, along with the amox/clav 875mg and oxyfloxin ear drops.

## 2019-05-16 ENCOUNTER — TELEPHONE (OUTPATIENT)
Dept: PHARMACY | Age: 74
End: 2019-05-16

## 2019-05-16 NOTE — TELEPHONE ENCOUNTER
Called and spoke with patient regarding new start of prednisone. Instructed patient to take 1 mg MF and 1.5 mg all other days and rescheduled her appointment for 5/22/19. Patient voiced understanding and repeated instructions back to me correctly. Patient also verified that the appointment time worked well for her.     Stanislaw Vines PharmD, [facility]  5/16/2019  11:12 AM

## 2019-05-22 ENCOUNTER — HOSPITAL ENCOUNTER (OUTPATIENT)
Dept: PHARMACY | Age: 74
Setting detail: THERAPIES SERIES
Discharge: HOME | End: 2019-05-22
Payer: MEDICARE

## 2019-05-22 DIAGNOSIS — I48.19 PERSISTENT ATRIAL FIBRILLATION (HCC): ICD-10-CM

## 2019-05-22 LAB — POC INR: 2.7 (ref 0.8–1.2)

## 2019-05-22 PROCEDURE — 85610 PROTHROMBIN TIME: CPT | Performed by: PHARMACIST

## 2019-05-22 PROCEDURE — 36416 COLLJ CAPILLARY BLOOD SPEC: CPT | Performed by: PHARMACIST

## 2019-05-22 PROCEDURE — 99211 OFF/OP EST MAY X REQ PHY/QHP: CPT | Performed by: PHARMACIST

## 2019-05-22 RX ORDER — AZITHROMYCIN 250 MG/1
250 TABLET, FILM COATED ORAL DAILY
COMMUNITY
Start: 2019-05-20 | End: 2019-05-24

## 2019-05-22 RX ORDER — ACETYLCYSTEINE 600 MG
600 CAPSULE ORAL 2 TIMES DAILY
Status: ON HOLD | COMMUNITY
Start: 2019-05-20 | End: 2019-11-28 | Stop reason: HOSPADM

## 2019-05-22 RX ORDER — PREDNISONE 20 MG/1
20 TABLET ORAL DAILY
COMMUNITY
Start: 2019-05-20 | End: 2019-05-24

## 2019-05-22 NOTE — PROGRESS NOTES
Medication Management [facility]  Anticoagulation Clinic  [phone] (phone)  [phone] (fax)      Ms. Lizbet Robins is a 75 y.o.  female with history of Afib who presents today for anticoagulation monitoring and adjustment. Patient verifies current dosing regimen and tablet strength. No missed or extra doses. Patient denies s/s bleeding/bruising/swelling/chest pain  +SOB with URI - on azith and prednisone per urgent care  No blood in urine or stool. No dietary changes. No changes in medication/OTC agents/Herbals. No change in alcohol use or tobacco use. +1/2 ppd. No change in activity level. Patient denies headaches/lightheadedness/falls.  +dizziness today  No vomiting/diarrhea or acute illness. + Procedures scheduled in the future at this time. Having back injection with Dr Calixot Dose 6/13. Needs INR done 6/12. Will be going for iron infusions in near future. Assessment:   Lab Results   Component Value Date    INR 2.70 (H) 05/22/2019    INR 2.40 (H) 05/01/2019    INR 2.60 (H) 04/03/2019    PROTIME 22.3 02/05/2019     INR therapeutic   Recent Labs     05/22/19  1410   INR 2.70*         Plan:  Continue Coumadin 1mg mg; 1.5mg TThSS while on prednisone. Recheck INR in 1 weeks. Patient reminded to call the Anticoagulation Clinic with any signs or symptoms of bleeding or with any medication changes. Patient given instructions utilizing the teach back method. Discharged ambulatory in no apparent distress. After visit summary printed and reviewed with patient.       Medications reviewed and updated on home medication list Yes    Influenza vaccine:     [] given    [x] declined   [x] received previously   [] plans to receive at a later time   [] refused    [x] documented in EPIC

## 2019-05-29 ENCOUNTER — APPOINTMENT (OUTPATIENT)
Dept: PHARMACY | Age: 74
End: 2019-05-29
Payer: MEDICARE

## 2019-05-30 ENCOUNTER — HOSPITAL ENCOUNTER (OUTPATIENT)
Dept: PHARMACY | Age: 74
Setting detail: THERAPIES SERIES
Discharge: HOME | End: 2019-05-30
Payer: MEDICARE

## 2019-05-30 DIAGNOSIS — I48.19 PERSISTENT ATRIAL FIBRILLATION (HCC): ICD-10-CM

## 2019-05-30 LAB — POC INR: 1.5 (ref 0.8–1.2)

## 2019-05-30 PROCEDURE — 99211 OFF/OP EST MAY X REQ PHY/QHP: CPT

## 2019-05-30 PROCEDURE — 36416 COLLJ CAPILLARY BLOOD SPEC: CPT

## 2019-05-30 PROCEDURE — 85610 PROTHROMBIN TIME: CPT

## 2019-06-05 ENCOUNTER — HOSPITAL ENCOUNTER (OUTPATIENT)
Dept: PHARMACY | Age: 74
Setting detail: THERAPIES SERIES
Discharge: HOME | End: 2019-06-05
Payer: MEDICARE

## 2019-06-05 DIAGNOSIS — I48.19 PERSISTENT ATRIAL FIBRILLATION (HCC): ICD-10-CM

## 2019-06-05 LAB — POC INR: 2.9 (ref 0.8–1.2)

## 2019-06-05 PROCEDURE — 36416 COLLJ CAPILLARY BLOOD SPEC: CPT

## 2019-06-05 PROCEDURE — 85610 PROTHROMBIN TIME: CPT

## 2019-06-05 PROCEDURE — 99211 OFF/OP EST MAY X REQ PHY/QHP: CPT

## 2019-06-05 NOTE — PROGRESS NOTES
Medication Management [facility]  Anticoagulation Clinic  [phone] (phone)  [phone] (fax)      Ms. Tanya Chavira is a 75 y.o.  female with history of persistent atrial fib. , per Dr. Kimberly Lee referral, who presents today for Warfarin monitoring and adjustment (6 day visit). Patient verifies current dosing regimen and tablet strength. No missed or extra doses, except as ordered last visit. Patient denies bleeding. Hadn't worn compression hose for several days, until today, so having a little more swelling of legs. Has usual SOB/easy bruising (numerous bruises on hands/arms). Has usual fleeting chest pain with overexertion. No blood in urine or stool. No dietary changes. Changes in medication/OTC agents/herbals: had stopped Prednisone a few days before last visit, but did resume 5/30. Today is last dose. No change in alcohol use or tobacco use. Change in activity level: decreased last week, but increasing this week. Patient denies dizziness/lightheadedness/falls. Takes usual Xyzal and Tylenol for usual sinus headaches. No vomiting/diarrhea or acute illness. Procedures scheduled in the future at this time: back injection 6/13 per Dr. Sarah Bal. States doesn't have to be off Coumadin, just INR day before; wasn't told a certain number the INR had to be (has had same injections in past). Assessment:   Lab Results   Component Value Date    INR 2.90 (H) 06/05/2019    INR 1.50 (H) 05/30/2019    INR 2.70 (H) 05/22/2019    PROTIME 22.3 02/05/2019     INR therapeutic - goal 2-3. Recent Labs     06/05/19  1436   INR 2.90*     Plan:  POCT INR ordered/performed/result reviewed. 1 mg today, W, then continue PO Coumadin 2 mg MF, 1.5 mg TWThSS. Recheck INR 6/12. (Report given - orders entered by Viviana Jarquin, PharmD.)  Patient reminded to call the Anticoagulation Clinic with any signs or symptoms of bleeding or with any medication changes.   Patient given instructions utilizing

## 2019-06-12 ENCOUNTER — HOSPITAL ENCOUNTER (OUTPATIENT)
Dept: PHARMACY | Age: 74
Setting detail: THERAPIES SERIES
Discharge: HOME | End: 2019-06-12
Payer: MEDICARE

## 2019-06-12 DIAGNOSIS — I48.19 PERSISTENT ATRIAL FIBRILLATION (HCC): ICD-10-CM

## 2019-06-12 LAB — POC INR: 2.6 (ref 0.8–1.2)

## 2019-06-12 PROCEDURE — 99211 OFF/OP EST MAY X REQ PHY/QHP: CPT

## 2019-06-12 PROCEDURE — 36416 COLLJ CAPILLARY BLOOD SPEC: CPT

## 2019-06-12 PROCEDURE — 85610 PROTHROMBIN TIME: CPT

## 2019-06-12 RX ORDER — PROMETHAZINE HYDROCHLORIDE AND CODEINE PHOSPHATE 6.25; 1 MG/5ML; MG/5ML
SOLUTION ORAL SEE ADMIN INSTRUCTIONS
Refills: 0 | Status: ON HOLD | COMMUNITY
Start: 2019-05-23 | End: 2020-01-01

## 2019-06-12 RX ORDER — LIDOCAINE 50 MG/G
OINTMENT TOPICAL
COMMUNITY
Start: 2019-06-05

## 2019-06-12 NOTE — PROGRESS NOTES
Medication Management [facility]  Anticoagulation Clinic  [phone] (phone)  [phone] (fax)      Ms. Amando Mora is a 75 y.o.  female with history of persistent atrial fib. , per Dr. Yuval Parra referral, who presents today for Warfarin monitoring and adjustment (1 week visit). Patient verifies current dosing regimen and tablet strength. No missed or extra doses. Patient denies bleeding/chest pain. Feels weak from low iron. Has usual SOB/easy bruising (has numerous bruises on hands/arms - bumps into wall). Has more  swelling of legs - not wearing compression hose (but will). Cough better this week. No blood in urine or stool. No dietary changes. No changes in medication/OTC agents/herbals. Has had 2 of 8 doses of IV iron in Dr. Daniela Saavedra office. Took last dose of Prednisone 6/5. No change in alcohol use or tobacco use. No change in activity level. Patient denies headaches/dizziness/lightheadedness/falls. No vomiting/diarrhea or acute illness. Procedures scheduled in the future at this time: back injection tomorrow with Dr. Louise Clay - just needed INR day before. Has used Percocet. Assessment:     Lab Results   Component Value Date    INR 2.60 (H) 06/12/2019    INR 2.90 (H) 06/05/2019    INR 1.50 (H) 05/30/2019    PROTIME 22.3 02/05/2019     INR therapeutic - goal 2-3. Recent Labs     06/12/19  1138   INR 2.60*       Plan:  POCT INR ordered/performed/result reviewed. Continue PO Coumadin 2 mg MF, 1.5 mg TWThSS. Recheck INR in 3 weeks. (Report given - orders entered by Ellen Rush [facility] PharmD.)  Patient reminded to call the Anticoagulation Clinic with any signs or symptoms of bleeding or with any medication changes. Patient given instructions utilizing the teach back method. Discharged via transport chair in no apparent distress, with portable oxygen concentrator and teen-aged granddaughter. INR given to A to fax to Dr. Louise Clay.       After visit summary printed and reviewed with patient.       Medications reviewed and updated on home medication list.    Influenza vaccine:     [] given    [] declined   [] received previously   [] plans to receive at a later time   [] refused    [] documented in EPIC

## 2019-06-26 ENCOUNTER — NURSE ONLY (OUTPATIENT)
Dept: LAB | Age: 74
End: 2019-06-26

## 2019-06-26 ENCOUNTER — TELEPHONE (OUTPATIENT)
Dept: UROLOGY | Age: 74
End: 2019-06-26

## 2019-06-26 DIAGNOSIS — R30.0 DYSURIA: Primary | ICD-10-CM

## 2019-06-26 DIAGNOSIS — R30.0 DYSURIA: ICD-10-CM

## 2019-06-26 LAB
BACTERIA: ABNORMAL /HPF
BILIRUBIN URINE: NEGATIVE
BLOOD, URINE: NEGATIVE
CASTS 2: ABNORMAL /LPF
CASTS UA: ABNORMAL /LPF
CHARACTER, URINE: ABNORMAL
COLOR: YELLOW
CRYSTALS, UA: ABNORMAL
EPITHELIAL CELLS, UA: ABNORMAL /HPF
GLUCOSE URINE: NEGATIVE MG/DL
KETONES, URINE: NEGATIVE
LEUKOCYTE ESTERASE, URINE: ABNORMAL
MISCELLANEOUS 2: ABNORMAL
NITRITE, URINE: POSITIVE
PH UA: 6.5 (ref 5–9)
PROTEIN UA: NEGATIVE
RBC URINE: ABNORMAL /HPF
RENAL EPITHELIAL, UA: ABNORMAL
SPECIFIC GRAVITY, URINE: 1.01 (ref 1–1.03)
UROBILINOGEN, URINE: 0.2 EU/DL (ref 0–1)
WBC UA: ABNORMAL /HPF
YEAST: ABNORMAL

## 2019-06-27 ENCOUNTER — TELEPHONE (OUTPATIENT)
Dept: UROLOGY | Age: 74
End: 2019-06-27

## 2019-06-27 RX ORDER — CEFDINIR 300 MG/1
300 CAPSULE ORAL 2 TIMES DAILY
Qty: 14 CAPSULE | Refills: 0 | Status: SHIPPED | OUTPATIENT
Start: 2019-06-27 | End: 2019-07-04

## 2019-06-28 LAB
ORGANISM: ABNORMAL
URINE CULTURE REFLEX: ABNORMAL

## 2019-06-28 NOTE — TELEPHONE ENCOUNTER
I attempted to call the patient regarding the message. I left a voicemail to return the call to the office.

## 2019-07-01 ENCOUNTER — HOSPITAL ENCOUNTER (OUTPATIENT)
Dept: PHARMACY | Age: 74
Setting detail: THERAPIES SERIES
Discharge: HOME | End: 2019-07-01
Payer: MEDICARE

## 2019-07-01 DIAGNOSIS — I48.19 PERSISTENT ATRIAL FIBRILLATION (HCC): ICD-10-CM

## 2019-07-01 LAB — POC INR: 2.5 (ref 0.8–1.2)

## 2019-07-01 PROCEDURE — 85610 PROTHROMBIN TIME: CPT

## 2019-07-01 PROCEDURE — 99211 OFF/OP EST MAY X REQ PHY/QHP: CPT

## 2019-07-01 PROCEDURE — 36416 COLLJ CAPILLARY BLOOD SPEC: CPT

## 2019-07-03 ENCOUNTER — APPOINTMENT (OUTPATIENT)
Dept: PHARMACY | Age: 74
End: 2019-07-03
Payer: MEDICARE

## 2019-07-04 ENCOUNTER — HOSPITAL ENCOUNTER (EMERGENCY)
Age: 74
Discharge: HOME | End: 2019-07-04
Attending: EMERGENCY MEDICINE
Payer: MEDICARE

## 2019-07-04 ENCOUNTER — APPOINTMENT (OUTPATIENT)
Dept: CT IMAGING | Age: 74
End: 2019-07-04
Payer: MEDICARE

## 2019-07-04 VITALS
SYSTOLIC BLOOD PRESSURE: 130 MMHG | TEMPERATURE: 98.4 F | RESPIRATION RATE: 20 BRPM | BODY MASS INDEX: 29.81 KG/M2 | HEART RATE: 75 BPM | OXYGEN SATURATION: 95 % | HEIGHT: 62 IN | DIASTOLIC BLOOD PRESSURE: 70 MMHG | WEIGHT: 162 LBS

## 2019-07-04 DIAGNOSIS — N39.0 URINARY TRACT INFECTION WITHOUT HEMATURIA, SITE UNSPECIFIED: Primary | ICD-10-CM

## 2019-07-04 LAB
ALBUMIN SERPL-MCNC: 3.5 G/DL (ref 3.5–5.1)
ALP BLD-CCNC: 63 U/L (ref 38–126)
ALT SERPL-CCNC: 15 U/L (ref 11–66)
ANION GAP SERPL CALCULATED.3IONS-SCNC: 12 MEQ/L (ref 8–16)
APTT: 33.9 SECONDS (ref 22–38)
AST SERPL-CCNC: 16 U/L (ref 5–40)
BACTERIA: ABNORMAL /HPF
BASOPHILS # BLD: 0.2 %
BASOPHILS ABSOLUTE: 0 THOU/MM3 (ref 0–0.1)
BILIRUB SERPL-MCNC: < 0.2 MG/DL (ref 0.3–1.2)
BILIRUBIN URINE: NEGATIVE
BLOOD, URINE: NEGATIVE
BUN BLDV-MCNC: 30 MG/DL (ref 7–22)
CALCIUM SERPL-MCNC: 9.2 MG/DL (ref 8.5–10.5)
CASTS 2: ABNORMAL /LPF
CASTS UA: ABNORMAL /LPF
CHARACTER, URINE: ABNORMAL
CHLORIDE BLD-SCNC: 107 MEQ/L (ref 98–111)
CO2: 24 MEQ/L (ref 23–33)
COLOR: YELLOW
CREAT SERPL-MCNC: 0.9 MG/DL (ref 0.4–1.2)
CRYSTALS, UA: ABNORMAL
EOSINOPHIL # BLD: 0.6 %
EOSINOPHILS ABSOLUTE: 0.1 THOU/MM3 (ref 0–0.4)
EPITHELIAL CELLS, UA: ABNORMAL /HPF
ERYTHROCYTE [DISTWIDTH] IN BLOOD BY AUTOMATED COUNT: 14.7 % (ref 11.5–14.5)
ERYTHROCYTE [DISTWIDTH] IN BLOOD BY AUTOMATED COUNT: 52.1 FL (ref 35–45)
GFR SERPL CREATININE-BSD FRML MDRD: 61 ML/MIN/1.73M2
GLUCOSE BLD-MCNC: 143 MG/DL (ref 70–108)
GLUCOSE URINE: NEGATIVE MG/DL
HCT VFR BLD CALC: 36.1 % (ref 37–47)
HEMOGLOBIN: 11.3 GM/DL (ref 12–16)
IMMATURE GRANS (ABS): 0.03 THOU/MM3 (ref 0–0.07)
IMMATURE GRANULOCYTES: 0.3 %
INR BLD: 1.74 (ref 0.85–1.13)
KETONES, URINE: NEGATIVE
LACTIC ACID: 2.1 MMOL/L (ref 0.5–2.2)
LEUKOCYTE ESTERASE, URINE: ABNORMAL
LYMPHOCYTES # BLD: 16.6 %
LYMPHOCYTES ABSOLUTE: 1.5 THOU/MM3 (ref 1–4.8)
MCH RBC QN AUTO: 30.4 PG (ref 26–33)
MCHC RBC AUTO-ENTMCNC: 31.3 GM/DL (ref 32.2–35.5)
MCV RBC AUTO: 97 FL (ref 81–99)
MISCELLANEOUS 2: ABNORMAL
MONOCYTES # BLD: 8.5 %
MONOCYTES ABSOLUTE: 0.8 THOU/MM3 (ref 0.4–1.3)
NITRITE, URINE: NEGATIVE
NUCLEATED RED BLOOD CELLS: 0 /100 WBC
OSMOLALITY CALCULATION: 293.6 MOSMOL/KG (ref 275–300)
PH UA: 5.5 (ref 5–9)
PLATELET # BLD: 164 THOU/MM3 (ref 130–400)
PMV BLD AUTO: 10.8 FL (ref 9.4–12.4)
POTASSIUM REFLEX MAGNESIUM: 4.6 MEQ/L (ref 3.5–5.2)
PROTEIN UA: NEGATIVE
RBC # BLD: 3.72 MILL/MM3 (ref 4.2–5.4)
RBC URINE: ABNORMAL /HPF
RENAL EPITHELIAL, UA: ABNORMAL
SEG NEUTROPHILS: 73.8 %
SEGMENTED NEUTROPHILS ABSOLUTE COUNT: 6.9 THOU/MM3 (ref 1.8–7.7)
SODIUM BLD-SCNC: 143 MEQ/L (ref 135–145)
SPECIFIC GRAVITY, URINE: 1.03 (ref 1–1.03)
TOTAL PROTEIN: 6 G/DL (ref 6.1–8)
UROBILINOGEN, URINE: 0.2 EU/DL (ref 0–1)
WBC # BLD: 9.3 THOU/MM3 (ref 4.8–10.8)
WBC UA: ABNORMAL /HPF
YEAST: ABNORMAL

## 2019-07-04 PROCEDURE — 87086 URINE CULTURE/COLONY COUNT: CPT

## 2019-07-04 PROCEDURE — 80053 COMPREHEN METABOLIC PANEL: CPT

## 2019-07-04 PROCEDURE — 87186 SC STD MICRODIL/AGAR DIL: CPT

## 2019-07-04 PROCEDURE — 87077 CULTURE AEROBIC IDENTIFY: CPT

## 2019-07-04 PROCEDURE — 81001 URINALYSIS AUTO W/SCOPE: CPT

## 2019-07-04 PROCEDURE — 36415 COLL VENOUS BLD VENIPUNCTURE: CPT

## 2019-07-04 PROCEDURE — 2580000003 HC RX 258: Performed by: EMERGENCY MEDICINE

## 2019-07-04 PROCEDURE — 85610 PROTHROMBIN TIME: CPT

## 2019-07-04 PROCEDURE — 85025 COMPLETE CBC W/AUTO DIFF WBC: CPT

## 2019-07-04 PROCEDURE — 74176 CT ABD & PELVIS W/O CONTRAST: CPT

## 2019-07-04 PROCEDURE — 96375 TX/PRO/DX INJ NEW DRUG ADDON: CPT

## 2019-07-04 PROCEDURE — 6360000002 HC RX W HCPCS: Performed by: EMERGENCY MEDICINE

## 2019-07-04 PROCEDURE — 6370000000 HC RX 637 (ALT 250 FOR IP): Performed by: EMERGENCY MEDICINE

## 2019-07-04 PROCEDURE — 96374 THER/PROPH/DIAG INJ IV PUSH: CPT

## 2019-07-04 PROCEDURE — 83605 ASSAY OF LACTIC ACID: CPT

## 2019-07-04 PROCEDURE — 85730 THROMBOPLASTIN TIME PARTIAL: CPT

## 2019-07-04 PROCEDURE — 99284 EMERGENCY DEPT VISIT MOD MDM: CPT

## 2019-07-04 RX ORDER — FENTANYL CITRATE 50 UG/ML
25 INJECTION, SOLUTION INTRAMUSCULAR; INTRAVENOUS ONCE
Status: COMPLETED | OUTPATIENT
Start: 2019-07-04 | End: 2019-07-04

## 2019-07-04 RX ORDER — ONDANSETRON 2 MG/ML
4 INJECTION INTRAMUSCULAR; INTRAVENOUS ONCE
Status: COMPLETED | OUTPATIENT
Start: 2019-07-04 | End: 2019-07-04

## 2019-07-04 RX ORDER — 0.9 % SODIUM CHLORIDE 0.9 %
1000 INTRAVENOUS SOLUTION INTRAVENOUS ONCE
Status: COMPLETED | OUTPATIENT
Start: 2019-07-04 | End: 2019-07-04

## 2019-07-04 RX ORDER — TRIMETHOPRIM 100 MG/1
100 TABLET ORAL ONCE
Status: COMPLETED | OUTPATIENT
Start: 2019-07-04 | End: 2019-07-04

## 2019-07-04 RX ADMIN — ONDANSETRON 4 MG: 2 INJECTION INTRAMUSCULAR; INTRAVENOUS at 19:57

## 2019-07-04 RX ADMIN — FENTANYL CITRATE 25 MCG: 50 INJECTION INTRAMUSCULAR; INTRAVENOUS at 19:57

## 2019-07-04 RX ADMIN — TRIMETHOPRIM 100 MG: 100 TABLET ORAL at 21:06

## 2019-07-04 RX ADMIN — SODIUM CHLORIDE 1000 ML: 9 INJECTION, SOLUTION INTRAVENOUS at 20:01

## 2019-07-04 ASSESSMENT — ENCOUNTER SYMPTOMS
BLOOD IN STOOL: 0
WHEEZING: 0
VOMITING: 0
BACK PAIN: 0
SHORTNESS OF BREATH: 0
SORE THROAT: 0
CONSTIPATION: 0
ABDOMINAL PAIN: 0
COUGH: 0
DIARRHEA: 0
RHINORRHEA: 0
NAUSEA: 1

## 2019-07-04 ASSESSMENT — PAIN SCALES - GENERAL
PAINLEVEL_OUTOF10: 5
PAINLEVEL_OUTOF10: 6

## 2019-07-04 ASSESSMENT — PAIN DESCRIPTION - PAIN TYPE: TYPE: ACUTE PAIN

## 2019-07-04 NOTE — ED PROVIDER NOTES
Breathing    IRON SUCROSE (VENOFER IV)    Infuse intravenously Indications: Iron Deficiency At Dr. Rashard You office. LEVALBUTEROL (XOPENEX) 0.63 MG/3ML NEBULIZATION    Take 1 ampule by nebulization every 8 hours as needed for Wheezing. LEVOCETIRIZINE (XYZAL) 5 MG TABLET    Take 5 mg by mouth nightly     LIDOCAINE (LIDODERM) 5 %    Place 1 patch onto the skin daily 12 hours on, 12 hours off. LIDOCAINE (XYLOCAINE) 5 % OINTMENT    Apply topically Before venipuncture in Dr. Rashard You office. METOPROLOL SUCCINATE (TOPROL XL) 25 MG EXTENDED RELEASE TABLET    Take 25 mg by mouth daily     NITROGLYCERIN (NITROSTAT) 0.4 MG SL TABLET    Place 0.4 mg under the tongue every 5 minutes as needed. If third one does not relieve pain, call 9-1-1. Indications: Chest Pain    NITROGLYCERIN RE    Place rectally as needed Indications: Hemorrhoids    OFLOXACIN (FLOXIN) 0.3 % OTIC SOLUTION      Place 2 drops into both ears daily Indications: Infection Long-term therapy. OMEPRAZOLE (PRILOSEC) 20 MG CAPSULE    Take 20 mg by mouth 2 times daily. Indications: Gastroesophageal Reflux Disease    OXYCODONE-ACETAMINOPHEN (PERCOCET) 5-325 MG PER TABLET    Take 1 tablet by mouth daily as needed for Pain. OXYGEN    2 L by Nasal route continuous Indications: Chronic Obstructive Lung Disease     POLYETHYLENE GLYCOL (MIRALAX) POWDER    Take 17 g by mouth as needed.  Indications: Constipation    PRAMOXINE HCL (PROCTOFOAM RE)    Place rectally    PROBIOTIC PRODUCT (SUPER PROBIOTIC) CAPS      Take 1 tablet by mouth daily Indications: Digestive Complaint     PROCTOZONE-HC 2.5 % RECTAL CREAM    Place rectally See Admin Instructions     PROMETHAZINE-CODEINE (PHENERGAN WITH CODEINE) 6.25-10 MG/5ML SOLN SOLUTION    TK 5 ML PO Q 6 H PRN    SACUBITRIL-VALSARTAN (ENTRESTO) 49-51 MG PER TABLET    Take 1 tablet by mouth 2 times daily    TIZANIDINE (ZANAFLEX) 4 MG TABLET    TK 1 T PO HS PRF PAIN    TRAZODONE (DESYREL) 50 MG TABLET    Take 50 mg by

## 2019-07-05 ENCOUNTER — TELEPHONE (OUTPATIENT)
Dept: UROLOGY | Age: 74
End: 2019-07-05

## 2019-07-07 LAB
ORGANISM: ABNORMAL
ORGANISM: ABNORMAL
URINE CULTURE REFLEX: ABNORMAL

## 2019-07-08 NOTE — TELEPHONE ENCOUNTER
The patient states she still has urgency, frequency, burning, and pain in the left kidney. The pain is constant and goes around to her stomach, rated 4-5 on scale 0-10. She denies chills or fever. She also has nausea and feels like she could vomit. She still has 9 tablets of the trimethoprim left. Please advise. Thank you. She voiced understanding to double voiced and to take the D-Mannose.

## 2019-07-10 ENCOUNTER — OFFICE VISIT (OUTPATIENT)
Dept: UROLOGY | Age: 74
End: 2019-07-10
Payer: MEDICARE

## 2019-07-10 ENCOUNTER — TELEPHONE (OUTPATIENT)
Dept: PHARMACY | Age: 74
End: 2019-07-10

## 2019-07-10 VITALS
DIASTOLIC BLOOD PRESSURE: 70 MMHG | SYSTOLIC BLOOD PRESSURE: 124 MMHG | HEIGHT: 62 IN | WEIGHT: 160 LBS | BODY MASS INDEX: 29.44 KG/M2

## 2019-07-10 DIAGNOSIS — N39.0 RECURRENT UTI: Primary | ICD-10-CM

## 2019-07-10 DIAGNOSIS — N30.00 ACUTE CYSTITIS WITHOUT HEMATURIA: ICD-10-CM

## 2019-07-10 LAB
BILIRUBIN URINE: NEGATIVE
BLOOD URINE, POC: NEGATIVE
CHARACTER, URINE: CLEAR
COLOR, URINE: YELLOW
GLUCOSE URINE: NEGATIVE MG/DL
KETONES, URINE: NEGATIVE
LEUKOCYTE CLUMPS, URINE: ABNORMAL
NITRITE, URINE: NEGATIVE
PH, URINE: 7 (ref 5–9)
POST VOID RESIDUAL (PVR): 17 ML
PROTEIN, URINE: ABNORMAL MG/DL
SPECIFIC GRAVITY, URINE: 1.02 (ref 1–1.03)
UROBILINOGEN, URINE: 0.2 EU/DL (ref 0–1)

## 2019-07-10 PROCEDURE — 4004F PT TOBACCO SCREEN RCVD TLK: CPT | Performed by: NURSE PRACTITIONER

## 2019-07-10 PROCEDURE — G8417 CALC BMI ABV UP PARAM F/U: HCPCS | Performed by: NURSE PRACTITIONER

## 2019-07-10 PROCEDURE — 99214 OFFICE O/P EST MOD 30 MIN: CPT | Performed by: NURSE PRACTITIONER

## 2019-07-10 PROCEDURE — G8427 DOCREV CUR MEDS BY ELIG CLIN: HCPCS | Performed by: NURSE PRACTITIONER

## 2019-07-10 PROCEDURE — G8400 PT W/DXA NO RESULTS DOC: HCPCS | Performed by: NURSE PRACTITIONER

## 2019-07-10 PROCEDURE — 4040F PNEUMOC VAC/ADMIN/RCVD: CPT | Performed by: NURSE PRACTITIONER

## 2019-07-10 PROCEDURE — 1090F PRES/ABSN URINE INCON ASSESS: CPT | Performed by: NURSE PRACTITIONER

## 2019-07-10 PROCEDURE — 51798 US URINE CAPACITY MEASURE: CPT | Performed by: NURSE PRACTITIONER

## 2019-07-10 PROCEDURE — 1123F ACP DISCUSS/DSCN MKR DOCD: CPT | Performed by: NURSE PRACTITIONER

## 2019-07-10 PROCEDURE — 3017F COLORECTAL CA SCREEN DOC REV: CPT | Performed by: NURSE PRACTITIONER

## 2019-07-10 PROCEDURE — G8598 ASA/ANTIPLAT THER USED: HCPCS | Performed by: NURSE PRACTITIONER

## 2019-07-10 RX ORDER — TRIMETHOPRIM 100 MG/1
100 TABLET ORAL 2 TIMES DAILY
COMMUNITY
Start: 2019-07-05 | End: 2019-07-12

## 2019-07-10 RX ORDER — TRAMADOL HYDROCHLORIDE 50 MG/1
TABLET ORAL DAILY
Status: ON HOLD | COMMUNITY
Start: 2019-07-02 | End: 2019-10-24 | Stop reason: HOSPADM

## 2019-07-10 NOTE — TELEPHONE ENCOUNTER
Chiara Moss called to let us know that she came into the ED on July 4th for a bad UTI. The ED physician started her on trimethoprim 100mg and told her it shouldn't interfere with her coumadin. After going to her urologist ,  They said it would make her INR levels low.

## 2019-07-10 NOTE — PATIENT INSTRUCTIONS
You may receive a survey regarding the care you received during your visit. Your input is valuable to us. We encourage you to complete and return your survey. We hope you will choose us in the future for your healthcare needs. Start Premarin cream.    Continue D-Mannose.

## 2019-07-11 ENCOUNTER — TELEPHONE (OUTPATIENT)
Dept: PHARMACY | Age: 74
End: 2019-07-11

## 2019-07-11 NOTE — TELEPHONE ENCOUNTER
Called patient to schedule appt for Friday or Monday to get INR checked after ER visit.    No answer  Left a message for her to call me back to reschedule appt

## 2019-07-12 ENCOUNTER — HOSPITAL ENCOUNTER (OUTPATIENT)
Dept: PHARMACY | Age: 74
Setting detail: THERAPIES SERIES
Discharge: HOME | End: 2019-07-12
Payer: MEDICARE

## 2019-07-12 DIAGNOSIS — I48.19 PERSISTENT ATRIAL FIBRILLATION (HCC): ICD-10-CM

## 2019-07-12 LAB — POC INR: 1.7 (ref 0.8–1.2)

## 2019-07-12 PROCEDURE — 85610 PROTHROMBIN TIME: CPT | Performed by: PHARMACIST

## 2019-07-12 PROCEDURE — 36416 COLLJ CAPILLARY BLOOD SPEC: CPT | Performed by: PHARMACIST

## 2019-07-12 PROCEDURE — 99211 OFF/OP EST MAY X REQ PHY/QHP: CPT | Performed by: PHARMACIST

## 2019-07-12 NOTE — PROGRESS NOTES
Medication Management [facility]  Anticoagulation Clinic  [phone] (phone)  [phone] (fax)      Ms. Rei Sykes is a 75 y.o.  female with history of Afib who presents today for anticoagulation monitoring and adjustment. Patient verifies current dosing regimen and tablet strength. No missed or extra doses. Patient denies s/s bleeding. Patient has a few bruises on legs which she doesn't usually have. She also has swelling, SOB, and chest pain which she says is normal for her. No blood in urine or stool. No dietary changes. No changes in OTC agents/Herbals. Patient is currently taking trimethoprim for a UTI. Today is her last day. No change in alcohol use or tobacco use. No change in activity level. Patient denies headaches/lightheadedness/falls. Patient reports dizziness a couple of times per day. She said it started when she got the UTI on July 4th. No vomiting or acute illness. Patient states she has had more diarrhea lately than normal.    No Procedures scheduled in the future at this time. Assessment:   Lab Results   Component Value Date    INR 1.70 (H) 07/12/2019    INR 1.74 (H) 07/04/2019    INR 2.50 (H) 07/01/2019    PROTIME 22.3 02/05/2019     INR subtherapeutic   Recent Labs     07/12/19  1028   INR 1.70*         Plan:  Coumadin 3mg today, then continue Coumadin 2mg MF and 1.5mg TWThSaS. Recheck INR in 3 weeks. Patient reminded to call the Anticoagulation Clinic with any signs or symptoms of bleeding or with any medication changes. Patient given instructions utilizing the teach back method. Discharged ambulatory in no apparent distress. After visit summary printed and reviewed with patient.       Medications reviewed and updated on home medication list Yes    Influenza vaccine:     [] given    [] declined   [] received previously   [] plans to receive at a later time   [] refused    [x] documented in EPIC

## 2019-07-24 ENCOUNTER — TELEPHONE (OUTPATIENT)
Dept: PHARMACY | Age: 74
End: 2019-07-24

## 2019-07-24 NOTE — TELEPHONE ENCOUNTER
Spoke with Adia Larsen. She states that Dr. Shalini Vanegas office never told her to hold her Coumadin and Dr. Elsi Trevizo said she does not need to stop her Coumadin. She is very concerned about this. I offered to call Dr. Shalini Vanegas office and see if she needs to hold her Coumadin at all for this procedure (in the past she has been bridged with Lovenox when holding Coumadin). Dr. Shalini Vanegas office is closed now, but I left a message for them to call us back and let us know if Adia Larsen needs to hold her Coumadin and if so how many days before her procedure.      Mateus Gallego, PharmD, BCPS  7/24/2019 4:07 PM

## 2019-07-29 ENCOUNTER — APPOINTMENT (OUTPATIENT)
Dept: PHARMACY | Age: 74
End: 2019-07-29
Payer: MEDICARE

## 2019-07-29 ENCOUNTER — TELEPHONE (OUTPATIENT)
Dept: PHARMACY | Age: 74
End: 2019-07-29

## 2019-08-01 ENCOUNTER — APPOINTMENT (OUTPATIENT)
Dept: PHARMACY | Age: 74
End: 2019-08-01
Payer: MEDICARE

## 2019-08-07 ENCOUNTER — HOSPITAL ENCOUNTER (OUTPATIENT)
Dept: PHARMACY | Age: 74
Setting detail: THERAPIES SERIES
Discharge: HOME | End: 2019-08-07
Payer: MEDICARE

## 2019-08-07 ENCOUNTER — TELEPHONE (OUTPATIENT)
Dept: PHARMACY | Age: 74
End: 2019-08-07

## 2019-08-07 DIAGNOSIS — I48.19 PERSISTENT ATRIAL FIBRILLATION (HCC): ICD-10-CM

## 2019-08-07 LAB — POC INR: 3.3 (ref 0.8–1.2)

## 2019-08-07 PROCEDURE — 36416 COLLJ CAPILLARY BLOOD SPEC: CPT

## 2019-08-07 PROCEDURE — 85610 PROTHROMBIN TIME: CPT

## 2019-08-07 PROCEDURE — 99211 OFF/OP EST MAY X REQ PHY/QHP: CPT

## 2019-08-07 RX ORDER — LEVALBUTEROL TARTRATE 45 UG/1
AEROSOL, METERED ORAL
COMMUNITY
Start: 2019-08-02 | End: 2019-09-04

## 2019-08-07 NOTE — PROGRESS NOTES
Medication Management [facility]  Anticoagulation Clinic  [phone] (phone)  [phone] (fax)      Ms. Tristen Padilla is a 75 y.o.  female with history of persistent Afib who presents today for anticoagulation monitoring and adjustment. Patient verifies current dosing regimen and tablet strength. No extra doses. Missed coumadin on 7-24-19. Patient denies s/s bleeding/bruising. Has \"a little chest pain when I get SOB. Has usual leg swelling. Better if wears compression hose. Does not have them on today. States has been eating too much sodium. No blood in urine or stool. No dietary changes. No changes in medication/OTC agents/Herbals. Has a bottle of Echinacea and wants to know if it's ok to take with coumadin. Got it from a lady at [email or web address]. Reviewed with Regina, Pharm. D. Order received to avoid. Patient informed. No change in alcohol use or tobacco use. No change in activity level. Patient denies dizziness/falls. Usual headaches and lightheadedness. No vomiting or acute illness. Usual diarrhea off and on. Scheduled for nerve ablation in back tomorrow. Assessment:   Lab Results   Component Value Date    INR 3.30 (H) 08/07/2019    INR 1.70 (H) 07/12/2019    INR 1.74 (H) 07/04/2019    PROTIME 22.3 02/05/2019     INR supratherapeutic   Recent Labs     08/07/19  1035   INR 3.30*     Plan: POCT INR ordered and result reviewed with Aaliyah PharmSintia D. Order received and verified to hold coumadin today, then continue Coumadin 2 mg po MF, 1.5 mg po TWTHSS. Recheck INR in 2 weeks. Patient reminded to call the Anticoagulation Clinic with any signs or symptoms of bleeding or with any medication changes. Patient given instructions utilizing the teach back method. Discharged ambulatory in no apparent distress. INR result faxed to [place]. After visit summary printed and reviewed with patient.       Medications reviewed and updated on home medication list Yes    Influenza vaccine:     [] given    [] declined   [x] received previously   [] plans to receive at a later time   [] refused    [x] documented in EPIC

## 2019-08-21 ENCOUNTER — HOSPITAL ENCOUNTER (OUTPATIENT)
Dept: PHARMACY | Age: 74
Setting detail: THERAPIES SERIES
Discharge: HOME | End: 2019-08-21
Payer: MEDICARE

## 2019-08-21 ENCOUNTER — APPOINTMENT (OUTPATIENT)
Dept: PHARMACY | Age: 74
End: 2019-08-21
Payer: MEDICARE

## 2019-08-21 DIAGNOSIS — I48.19 PERSISTENT ATRIAL FIBRILLATION (HCC): ICD-10-CM

## 2019-08-21 LAB — POC INR: 1.3 (ref 0.8–1.2)

## 2019-08-21 PROCEDURE — 99211 OFF/OP EST MAY X REQ PHY/QHP: CPT

## 2019-08-21 PROCEDURE — 85610 PROTHROMBIN TIME: CPT

## 2019-08-21 PROCEDURE — 36416 COLLJ CAPILLARY BLOOD SPEC: CPT

## 2019-09-04 ENCOUNTER — HOSPITAL ENCOUNTER (OUTPATIENT)
Dept: PHARMACY | Age: 74
Setting detail: THERAPIES SERIES
Discharge: HOME | End: 2019-09-04
Payer: MEDICARE

## 2019-09-04 DIAGNOSIS — I48.19 PERSISTENT ATRIAL FIBRILLATION (HCC): ICD-10-CM

## 2019-09-04 LAB — POC INR: 2.4 (ref 0.8–1.2)

## 2019-09-04 PROCEDURE — 36416 COLLJ CAPILLARY BLOOD SPEC: CPT

## 2019-09-04 PROCEDURE — 85610 PROTHROMBIN TIME: CPT

## 2019-09-04 PROCEDURE — 99211 OFF/OP EST MAY X REQ PHY/QHP: CPT

## 2019-09-04 NOTE — PROGRESS NOTES
method. Discharged ambulatory in no apparent distress, with oxygen concentrator. After visit summary printed and reviewed with patient.       Medications reviewed and updated on home medication list.    Influenza vaccine:     [] given    [] declined   [] received previously   [] plans to receive at a later time   [] refused    [] documented in EPIC

## 2019-09-17 ENCOUNTER — TELEPHONE (OUTPATIENT)
Dept: UROLOGY | Age: 74
End: 2019-09-17

## 2019-09-17 NOTE — TELEPHONE ENCOUNTER
She just had a CT on 7/4/19 which showed two small stones in the left kidney, 4 mm and 2 mm. I can see her.

## 2019-09-18 ENCOUNTER — OFFICE VISIT (OUTPATIENT)
Dept: UROLOGY | Age: 74
End: 2019-09-18
Payer: MEDICARE

## 2019-09-18 ENCOUNTER — HOSPITAL ENCOUNTER (OUTPATIENT)
Dept: PHARMACY | Age: 74
Setting detail: THERAPIES SERIES
Discharge: HOME | End: 2019-09-18
Payer: MEDICARE

## 2019-09-18 ENCOUNTER — TELEPHONE (OUTPATIENT)
Dept: UROLOGY | Age: 74
End: 2019-09-18

## 2019-09-18 VITALS
BODY MASS INDEX: 30.55 KG/M2 | SYSTOLIC BLOOD PRESSURE: 164 MMHG | DIASTOLIC BLOOD PRESSURE: 82 MMHG | WEIGHT: 166 LBS | HEIGHT: 62 IN

## 2019-09-18 DIAGNOSIS — I48.19 PERSISTENT ATRIAL FIBRILLATION (HCC): ICD-10-CM

## 2019-09-18 DIAGNOSIS — R30.0 DYSURIA: Primary | ICD-10-CM

## 2019-09-18 DIAGNOSIS — R10.9 FLANK PAIN: ICD-10-CM

## 2019-09-18 DIAGNOSIS — R11.0 NAUSEA: ICD-10-CM

## 2019-09-18 LAB
BILIRUBIN URINE: NEGATIVE
BLOOD URINE, POC: NEGATIVE
CHARACTER, URINE: CLEAR
COLOR, URINE: YELLOW
GLUCOSE URINE: NEGATIVE MG/DL
KETONES, URINE: NEGATIVE
LEUKOCYTE CLUMPS, URINE: ABNORMAL
NITRITE, URINE: NEGATIVE
PH, URINE: 5.5 (ref 5–9)
POC INR: 1.1 (ref 0.8–1.2)
PROTEIN, URINE: 30 MG/DL
SPECIFIC GRAVITY, URINE: 1.02 (ref 1–1.03)
UROBILINOGEN, URINE: 0.2 EU/DL (ref 0–1)

## 2019-09-18 PROCEDURE — 4040F PNEUMOC VAC/ADMIN/RCVD: CPT | Performed by: NURSE PRACTITIONER

## 2019-09-18 PROCEDURE — 81003 URINALYSIS AUTO W/O SCOPE: CPT | Performed by: NURSE PRACTITIONER

## 2019-09-18 PROCEDURE — 3017F COLORECTAL CA SCREEN DOC REV: CPT | Performed by: NURSE PRACTITIONER

## 2019-09-18 PROCEDURE — G8598 ASA/ANTIPLAT THER USED: HCPCS | Performed by: NURSE PRACTITIONER

## 2019-09-18 PROCEDURE — G8400 PT W/DXA NO RESULTS DOC: HCPCS | Performed by: NURSE PRACTITIONER

## 2019-09-18 PROCEDURE — 1090F PRES/ABSN URINE INCON ASSESS: CPT | Performed by: NURSE PRACTITIONER

## 2019-09-18 PROCEDURE — G8417 CALC BMI ABV UP PARAM F/U: HCPCS | Performed by: NURSE PRACTITIONER

## 2019-09-18 PROCEDURE — 36416 COLLJ CAPILLARY BLOOD SPEC: CPT

## 2019-09-18 PROCEDURE — G8427 DOCREV CUR MEDS BY ELIG CLIN: HCPCS | Performed by: NURSE PRACTITIONER

## 2019-09-18 PROCEDURE — 99214 OFFICE O/P EST MOD 30 MIN: CPT | Performed by: NURSE PRACTITIONER

## 2019-09-18 PROCEDURE — 4004F PT TOBACCO SCREEN RCVD TLK: CPT | Performed by: NURSE PRACTITIONER

## 2019-09-18 PROCEDURE — 99211 OFF/OP EST MAY X REQ PHY/QHP: CPT

## 2019-09-18 PROCEDURE — 85610 PROTHROMBIN TIME: CPT

## 2019-09-18 PROCEDURE — 1123F ACP DISCUSS/DSCN MKR DOCD: CPT | Performed by: NURSE PRACTITIONER

## 2019-09-18 NOTE — TELEPHONE ENCOUNTER
Patient called yesterday about having back pain and flank pain, she called back today to move her appt to today from next week. She is having bad back and flank pain and very nauseous. She has an appt at coumadin clinic at [phone]. [facility] next opening is 1230. She stated that was fine. This is okay since you had openings right Reba? ?

## 2019-09-18 NOTE — PROGRESS NOTES
Subjective:      Patient ID: John Geller is a 75 y.o. female. LOLITA Cesar is here for follow-up of recurrent UTI, urinary frequency, and urinary retention. She reports a one week history of left flank pain which radiates to the LLQ. She has chronic back pain but reports this is \"different. \" This pain is sharp and more severe. It radiates to the LLQ and she also reports dysuria. She denies any fever or chills. She was recently seen at [facility] ED on 7/4/19. She was diagnosed with UTI. Urine culture grew Enterobacter cloacae, 10-50,000. Urine culture on 6/26/19 grew Klebsiella. CT at that time showed two left renal stones. She was started on D-Mannose daily for UTI prevention and she continues to take this once daily. She reports that she has been doing well. She denies any hematuria or dysuria. Review of Systems   Constitutional: Negative for chills, fatigue and fever. Gastrointestinal: Negative for abdominal pain, nausea and vomiting. Genitourinary: Positive for frequency and urgency. Negative for dysuria, flank pain and hematuria. Objective:   Physical Exam   Constitutional: She is oriented to person, place, and time. She appears well-developed and well-nourished. HENT:   Head: Normocephalic and atraumatic. Right Ear: External ear normal.   Left Ear: External ear normal.   Nose: Nose normal.   Eyes: Conjunctivae are normal.   Pulmonary/Chest: Effort normal.   Abdominal: Soft. Musculoskeletal: Normal range of motion. Neurological: She is alert and oriented to person, place, and time. Skin: Skin is warm and dry. Psychiatric: She has a normal mood and affect. Her behavior is normal. Judgment and thought content normal.     Impression   1. There are 2 small left-sided nonobstructive intrarenal stones are demonstrated.  No evidence of hydronephrosis, hydroureter or ureterolithiasis is seen.               **This report has been created using voice recognition software.  It may

## 2019-09-18 NOTE — PROGRESS NOTES
Medication Management [facility]  Anticoagulation Clinic  [phone] (phone)  [phone] (fax)    Ms. John Geller is a 75 y.o.  female with history of Afib who presents today for anticoagulation monitoring and adjustment. Patient verifies current dosing regimen and tablet strength. No missed or extra doses. Patient denies s/s bleeding/bruising - usual swelling/SOB/chest pain   No blood in urine or stool. No dietary changes. Started Rebecca Quant. No change in alcohol use or tobacco use. A little more active. Patient denies headaches/dizziness/lightheadedness/falls. No vomiting/diarrhea. Patient reports back pain and nausea -- thinks she has a kidney infection. Seeing urology at 12:30 d/t these symptoms. Informed patient to call clinic if she receives any new medications at this visit. Patient to have nerve ablation tomorrow with Dr. Wanda Cardoza. Informed patient to call the clinic if anything changes with tomorrow's procedures due to today's symptoms. Assessment:   Lab Results   Component Value Date    INR 1.10 09/18/2019    INR 2.40 (H) 09/04/2019    INR 1.30 (H) 08/21/2019    PROTIME 22.3 02/05/2019     INR subtherapeutic   Recent Labs     09/18/19  0943   INR 1.10     Patient presents subtherapeutic today after holding x5 days for a procedure tomorrow. Plan:  Hold Coumadin today and restart Coumadin tomorrow at 3 mg x2d (9/19-9/20) and Coumadin 2 mg on 9/21 and then continue Coumadin 2 mg MF and 2.5 mg TWThSSu. Recheck INR in 1 week. Patient reminded to call the Anticoagulation Clinic with any signs or symptoms of bleeding or with any medication changes. Patient given instructions utilizing the teach back method. Discharged ambulatory in no apparent distress. After visit summary printed and reviewed with patient.       Medications reviewed and updated on home medication list Yes    Influenza vaccine:     [] given    [] declined   [] received previously   []

## 2019-09-19 ENCOUNTER — HOSPITAL ENCOUNTER (EMERGENCY)
Age: 74
Discharge: HOME | End: 2019-09-19
Payer: MEDICARE

## 2019-09-19 VITALS
HEART RATE: 70 BPM | HEIGHT: 62 IN | RESPIRATION RATE: 16 BRPM | TEMPERATURE: 98 F | SYSTOLIC BLOOD PRESSURE: 163 MMHG | BODY MASS INDEX: 29.44 KG/M2 | OXYGEN SATURATION: 98 % | WEIGHT: 160 LBS | DIASTOLIC BLOOD PRESSURE: 49 MMHG

## 2019-09-19 DIAGNOSIS — Z95.0 NORMALLY FUNCTIONING CARDIAC PACEMAKER PRESENT: Primary | ICD-10-CM

## 2019-09-19 LAB
EKG ATRIAL RATE: 86 BPM
EKG Q-T INTERVAL: 418 MS
EKG QRS DURATION: 158 MS
EKG QTC CALCULATION (BAZETT): 463 MS
EKG R AXIS: -87 DEGREES
EKG T AXIS: 101 DEGREES
EKG VENTRICULAR RATE: 74 BPM
ORGANISM: ABNORMAL
URINE CULTURE, ROUTINE: ABNORMAL

## 2019-09-19 PROCEDURE — 99284 EMERGENCY DEPT VISIT MOD MDM: CPT

## 2019-09-19 PROCEDURE — 93005 ELECTROCARDIOGRAM TRACING: CPT | Performed by: STUDENT IN AN ORGANIZED HEALTH CARE EDUCATION/TRAINING PROGRAM

## 2019-09-19 RX ORDER — OXYCODONE HYDROCHLORIDE AND ACETAMINOPHEN 5; 325 MG/1; MG/1
1 TABLET ORAL EVERY 6 HOURS PRN
Qty: 12 TABLET | Refills: 0 | Status: SHIPPED | OUTPATIENT
Start: 2019-09-19 | End: 2019-09-22

## 2019-09-21 ASSESSMENT — ENCOUNTER SYMPTOMS: RESPIRATORY NEGATIVE: 1

## 2019-09-21 NOTE — ED PROVIDER NOTES
medications found. Please discuss with provider. ALLERGIES     is allergic to benadryl [diphenhydramine hcl]; ciprofloxacin; captopril; codeine; iv dye [iodides]; lipitor; macrobid [nitrofurantoin monohydrate macrocrystals]; neomycin-bacitracin zn-polymyx; pravastatin; zetia [ezetimibe]; adhesive tape; cephalexin; doxycycline; morphine; other; propoxyphene; and sulfa antibiotics. FAMILY HISTORY     is adopted. family history includes Cancer in her sister; Heart Disease in her father. She was adopted. SOCIAL HISTORY      reports that she has been smoking cigarettes. She has a 12.50 pack-year smoking history. She has never used smokeless tobacco. She reports that she drinks about 1.0 standard drinks of alcohol per week. She reports that she does not use drugs. PHYSICAL EXAM     INITIAL VITALS:  height is 5' 2\" (1.575 m) and weight is 160 lb (72.6 kg). Her oral temperature is 98 °F (36.7 °C). Her blood pressure is 163/49 (abnormal) and her pulse is 70. Her respiration is 16 and oxygen saturation is 98%. Physical Exam   Constitutional: She appears well-developed and well-nourished. No distress. Cardiovascular: Normal rate and regular rhythm. Pulmonary/Chest: Effort normal and breath sounds normal. No respiratory distress. She exhibits no tenderness. Skin: She is not diaphoretic. Nursing note and vitals reviewed.         DIFFERENTIAL DIAGNOSIS:   Pacemaker concern, abnormal EKG, electrical conduction issue    DIAGNOSTIC RESULTS     EKG: All EKG's are interpreted by the Emergency Department Physician who either signs or Co-signs this chart in the absence of a cardiologist.    EKG 12 Lead (Final result)    Component (Lab Inquiry)   Collection Time Result Time Ventricular Rate Atrial Rate QRS Duration Q-T Interval QTc Calculation (Bazett)   09/19/19 16:14:45 09/19/19 16:14:45 74 86 158 418 463       Collection Time Result Time R Axis T Axis   09/19/19 16:14:45 09/19/19 16:14:45 -87 101       Final result                Narrative:    Ventricular-paced rhythm  Abnormal ECG  When compared with ECG of 07-JUL-2018 22:57,  No significant change was found  Confirmed by Hansel Woodard ([id number]) on 9/19/2019 10:35:59 PM                      RADIOLOGY: non-plain film images(s) such as CT, Ultrasound and MRI are readby theradiologist.    No orders to display         LABS:   Labs Reviewed - No data to display    EMERGENCYDEPARTMENTCOURSE:   Vitals:    Vitals:    09/19/19 1620   BP: (!) 163/49   Pulse: 70   Resp: 16   Temp: 98 °F (36.7 °C)   TempSrc: Oral   SpO2: 98%   Weight: 160 lb (72.6 kg)   Height: 5' 2\" (1.575 m)     Patient was seen history physical exam was performed. See disposition below    MDM:  Dr. Alize Kamara office staff called with the results of the pacemaker findings. [employer] had been in contact with this office. They found 3 episodes of NSVT patient was asymptomatic at the time. Tamara from Dr. Alize Kamara office stated that there is no reason for concern at this time and the patient did not need to come to the emergency department for this. Patient is informed of reassuring findings and she is amenable with plan of discharge. Anticipatory guidance given and patient is comfortable with plan of care. They will follow up with PCP for follow up or further evaluation if symptoms continue. They will return to the ED with worsening of symptoms and we discussed reasons for returning. CRITICAL CARE:   None    CONSULTS:  Dr. Alize Kamara office, Tamara NOEL  [employer], Cain Arteaga    PROCEDURES:  None    FINAL IMPRESSION      1.  Normally functioning cardiac pacemaker present          DISPOSITION/PLAN   discharge    PATIENT REFERREDTO:  Maria Eugenia Lashae, [address] [facility]  [address]  [address]       Expect a call from office tomorrow for follow up call and visit      DISCHARGE MEDICATIONS:  Discharge Medication List as of 9/19/2019  5:07 PM          (Please note that portions of this note

## 2019-09-25 ENCOUNTER — TELEPHONE (OUTPATIENT)
Dept: UROLOGY | Age: 74
End: 2019-09-25

## 2019-09-26 ENCOUNTER — HOSPITAL ENCOUNTER (OUTPATIENT)
Dept: PHARMACY | Age: 74
Setting detail: THERAPIES SERIES
Discharge: HOME | End: 2019-09-26
Payer: MEDICARE

## 2019-09-26 ENCOUNTER — HOSPITAL ENCOUNTER (OUTPATIENT)
Dept: CT IMAGING | Age: 74
Discharge: HOME | End: 2019-09-26
Payer: MEDICARE

## 2019-09-26 DIAGNOSIS — R30.0 DYSURIA: ICD-10-CM

## 2019-09-26 DIAGNOSIS — R11.0 NAUSEA: ICD-10-CM

## 2019-09-26 DIAGNOSIS — R10.9 FLANK PAIN: ICD-10-CM

## 2019-09-26 DIAGNOSIS — I48.19 PERSISTENT ATRIAL FIBRILLATION (HCC): ICD-10-CM

## 2019-09-26 LAB — POC INR: 2.3 (ref 0.8–1.2)

## 2019-09-26 PROCEDURE — 85610 PROTHROMBIN TIME: CPT | Performed by: PHARMACIST

## 2019-09-26 PROCEDURE — 74176 CT ABD & PELVIS W/O CONTRAST: CPT

## 2019-09-26 PROCEDURE — 36416 COLLJ CAPILLARY BLOOD SPEC: CPT | Performed by: PHARMACIST

## 2019-09-26 PROCEDURE — 99211 OFF/OP EST MAY X REQ PHY/QHP: CPT | Performed by: PHARMACIST

## 2019-09-27 ENCOUNTER — TELEPHONE (OUTPATIENT)
Dept: UROLOGY | Age: 74
End: 2019-09-27

## 2019-09-27 NOTE — TELEPHONE ENCOUNTER
Please let Lisa Medley know that CT showed two stones in the left kidney but none in the ureter to explain the significant pain. One stone is in the renal pelvis and may be moving around. It measures 3 mm in size so this should pass spontaneously. If pain continues I would suggest she see her PCP as it is not Urology related.  Thanks

## 2019-09-27 NOTE — TELEPHONE ENCOUNTER
I called the patient and advised her of the Ct scan results. She voiced understanding to see her PCP if the pain continues. I routed the results to Dr Violette Marie office. Does she need to keep the scheduled appointment 10/11/2019 since the pain is urology related. Please advise. Thank you.

## 2019-10-15 ENCOUNTER — APPOINTMENT (OUTPATIENT)
Dept: PHARMACY | Age: 74
End: 2019-10-15
Payer: MEDICARE

## 2019-10-16 ENCOUNTER — HOSPITAL ENCOUNTER (OUTPATIENT)
Dept: PHARMACY | Age: 74
Setting detail: THERAPIES SERIES
Discharge: HOME | End: 2019-10-16
Payer: MEDICARE

## 2019-10-16 DIAGNOSIS — I48.19 PERSISTENT ATRIAL FIBRILLATION (HCC): ICD-10-CM

## 2019-10-16 LAB — POC INR: 3.2 (ref 0.8–1.2)

## 2019-10-16 PROCEDURE — 85610 PROTHROMBIN TIME: CPT

## 2019-10-16 PROCEDURE — 36416 COLLJ CAPILLARY BLOOD SPEC: CPT

## 2019-10-16 PROCEDURE — 99211 OFF/OP EST MAY X REQ PHY/QHP: CPT

## 2019-10-22 ENCOUNTER — APPOINTMENT (OUTPATIENT)
Dept: GENERAL RADIOLOGY | Age: 74
DRG: 469 | End: 2019-10-22
Payer: MEDICARE

## 2019-10-22 ENCOUNTER — APPOINTMENT (OUTPATIENT)
Dept: CT IMAGING | Age: 74
DRG: 469 | End: 2019-10-22
Payer: MEDICARE

## 2019-10-22 ENCOUNTER — HOSPITAL ENCOUNTER (INPATIENT)
Age: 74
LOS: 4 days | Discharge: NURSING, REHAB OR LONG-TERM CARE | DRG: 469 | End: 2019-10-26
Attending: FAMILY MEDICINE | Admitting: SURGERY
Payer: MEDICARE

## 2019-10-22 ENCOUNTER — TELEPHONE (OUTPATIENT)
Dept: PHARMACY | Age: 74
End: 2019-10-22

## 2019-10-22 DIAGNOSIS — S72.002A LEFT DISPLACED FEMORAL NECK FRACTURE (HCC): ICD-10-CM
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
## 2019-10-22 DIAGNOSIS — S72.002A HIP FRACTURE, LEFT, CLOSED, INITIAL ENCOUNTER (HCC): ICD-10-CM

## 2019-10-22 PROBLEM — S09.90XA CLOSED HEAD INJURY: Status: ACTIVE | Noted: 2019-10-22

## 2019-10-22 LAB
ABO: NORMAL
ANION GAP SERPL CALCULATED.3IONS-SCNC: 13 MEQ/L (ref 8–16)
ANTIBODY SCREEN: NORMAL
APTT: 33.9 SECONDS (ref 22–38)
BASOPHILS # BLD: 0.2 %
BASOPHILS ABSOLUTE: 0 THOU/MM3 (ref 0–0.1)
BUN BLDV-MCNC: 23 MG/DL (ref 7–22)
CALCIUM SERPL-MCNC: 8.9 MG/DL (ref 8.5–10.5)
CHLORIDE BLD-SCNC: 104 MEQ/L (ref 98–111)
CO2: 23 MEQ/L (ref 23–33)
CREAT SERPL-MCNC: 0.7 MG/DL (ref 0.4–1.2)
EKG ATRIAL RATE: 82 BPM
EKG P-R INTERVAL: 288 MS
EKG Q-T INTERVAL: 406 MS
EKG QRS DURATION: 148 MS
EKG QTC CALCULATION (BAZETT): 474 MS
EKG R AXIS: -91 DEGREES
EKG T AXIS: 95 DEGREES
EKG VENTRICULAR RATE: 82 BPM
EOSINOPHIL # BLD: 0.7 %
EOSINOPHILS ABSOLUTE: 0.1 THOU/MM3 (ref 0–0.4)
ERYTHROCYTE [DISTWIDTH] IN BLOOD BY AUTOMATED COUNT: 14.2 % (ref 11.5–14.5)
ERYTHROCYTE [DISTWIDTH] IN BLOOD BY AUTOMATED COUNT: 52.4 FL (ref 35–45)
GFR SERPL CREATININE-BSD FRML MDRD: 81 ML/MIN/1.73M2
GLUCOSE BLD-MCNC: 111 MG/DL (ref 70–108)
HCT VFR BLD CALC: 44.9 % (ref 37–47)
HEMOGLOBIN: 13.8 GM/DL (ref 12–16)
IMMATURE GRANS (ABS): 0.1 THOU/MM3 (ref 0–0.07)
IMMATURE GRANULOCYTES: 1 %
INR BLD: 2.63 (ref 0.85–1.13)
LYMPHOCYTES # BLD: 15.8 %
LYMPHOCYTES ABSOLUTE: 1.6 THOU/MM3 (ref 1–4.8)
MCH RBC QN AUTO: 30.5 PG (ref 26–33)
MCHC RBC AUTO-ENTMCNC: 30.7 GM/DL (ref 32.2–35.5)
MCV RBC AUTO: 99.3 FL (ref 81–99)
MONOCYTES # BLD: 9.1 %
MONOCYTES ABSOLUTE: 0.9 THOU/MM3 (ref 0.4–1.3)
NUCLEATED RED BLOOD CELLS: 0 /100 WBC
OSMOLALITY CALCULATION: 283.8 MOSMOL/KG (ref 275–300)
PLATELET # BLD: 188 THOU/MM3 (ref 130–400)
PMV BLD AUTO: 10.4 FL (ref 9.4–12.4)
POTASSIUM SERPL-SCNC: 4.6 MEQ/L (ref 3.5–5.2)
RBC # BLD: 4.52 MILL/MM3 (ref 4.2–5.4)
RH FACTOR: NORMAL
SEG NEUTROPHILS: 73.2 %
SEGMENTED NEUTROPHILS ABSOLUTE COUNT: 7.5 THOU/MM3 (ref 1.8–7.7)
SODIUM BLD-SCNC: 140 MEQ/L (ref 135–145)
TROPONIN T: < 0.01 NG/ML
WBC # BLD: 10.2 THOU/MM3 (ref 4.8–10.8)

## 2019-10-22 PROCEDURE — 76376 3D RENDER W/INTRP POSTPROCES: CPT

## 2019-10-22 PROCEDURE — 73590 X-RAY EXAM OF LOWER LEG: CPT

## 2019-10-22 PROCEDURE — 6360000002 HC RX W HCPCS: Performed by: FAMILY MEDICINE

## 2019-10-22 PROCEDURE — 36415 COLL VENOUS BLD VENIPUNCTURE: CPT

## 2019-10-22 PROCEDURE — 93005 ELECTROCARDIOGRAM TRACING: CPT | Performed by: FAMILY MEDICINE

## 2019-10-22 PROCEDURE — 72125 CT NECK SPINE W/O DYE: CPT

## 2019-10-22 PROCEDURE — 86850 RBC ANTIBODY SCREEN: CPT

## 2019-10-22 PROCEDURE — 2580000003 HC RX 258: Performed by: FAMILY MEDICINE

## 2019-10-22 PROCEDURE — 6370000000 HC RX 637 (ALT 250 FOR IP): Performed by: SURGERY

## 2019-10-22 PROCEDURE — APPSS180 APP SPLIT SHARED TIME > 60 MINUTES: Performed by: PHYSICIAN ASSISTANT

## 2019-10-22 PROCEDURE — 2500000003 HC RX 250 WO HCPCS: Performed by: PHYSICIAN ASSISTANT

## 2019-10-22 PROCEDURE — 6360000002 HC RX W HCPCS: Performed by: PHYSICIAN ASSISTANT

## 2019-10-22 PROCEDURE — 6360000002 HC RX W HCPCS: Performed by: SURGERY

## 2019-10-22 PROCEDURE — 1200000003 HC TELEMETRY R&B

## 2019-10-22 PROCEDURE — 85730 THROMBOPLASTIN TIME PARTIAL: CPT

## 2019-10-22 PROCEDURE — 74176 CT ABD & PELVIS W/O CONTRAST: CPT

## 2019-10-22 PROCEDURE — 85610 PROTHROMBIN TIME: CPT

## 2019-10-22 PROCEDURE — 73552 X-RAY EXAM OF FEMUR 2/>: CPT

## 2019-10-22 PROCEDURE — 72170 X-RAY EXAM OF PELVIS: CPT

## 2019-10-22 PROCEDURE — 71250 CT THORAX DX C-: CPT

## 2019-10-22 PROCEDURE — 96374 THER/PROPH/DIAG INJ IV PUSH: CPT

## 2019-10-22 PROCEDURE — 99285 EMERGENCY DEPT VISIT HI MDM: CPT

## 2019-10-22 PROCEDURE — 86901 BLOOD TYPING SEROLOGIC RH(D): CPT

## 2019-10-22 PROCEDURE — 70450 CT HEAD/BRAIN W/O DYE: CPT

## 2019-10-22 PROCEDURE — 84484 ASSAY OF TROPONIN QUANT: CPT

## 2019-10-22 PROCEDURE — 71045 X-RAY EXAM CHEST 1 VIEW: CPT

## 2019-10-22 PROCEDURE — 86900 BLOOD TYPING SEROLOGIC ABO: CPT

## 2019-10-22 PROCEDURE — 99223 1ST HOSP IP/OBS HIGH 75: CPT | Performed by: SURGERY

## 2019-10-22 PROCEDURE — 85025 COMPLETE CBC W/AUTO DIFF WBC: CPT

## 2019-10-22 PROCEDURE — 96376 TX/PRO/DX INJ SAME DRUG ADON: CPT

## 2019-10-22 PROCEDURE — 6820000002 HC L2 INJURY CALL ACTIVATION: Performed by: SURGERY

## 2019-10-22 PROCEDURE — 80048 BASIC METABOLIC PNL TOTAL CA: CPT

## 2019-10-22 RX ORDER — DICYCLOMINE HYDROCHLORIDE 10 MG/1
10 CAPSULE ORAL
Status: DISCONTINUED | OUTPATIENT
Start: 2019-10-23 | End: 2019-10-26 | Stop reason: HOSPADM

## 2019-10-22 RX ORDER — SODIUM CHLORIDE 9 MG/ML
INJECTION, SOLUTION INTRAVENOUS CONTINUOUS
Status: DISCONTINUED | OUTPATIENT
Start: 2019-10-22 | End: 2019-10-26 | Stop reason: HOSPADM

## 2019-10-22 RX ORDER — FUROSEMIDE 40 MG/1
40 TABLET ORAL DAILY
Status: DISCONTINUED | OUTPATIENT
Start: 2019-10-23 | End: 2019-10-26 | Stop reason: HOSPADM

## 2019-10-22 RX ORDER — SODIUM CHLORIDE 0.9 % (FLUSH) 0.9 %
10 SYRINGE (ML) INJECTION PRN
Status: DISCONTINUED | OUTPATIENT
Start: 2019-10-22 | End: 2019-10-26 | Stop reason: HOSPADM

## 2019-10-22 RX ORDER — PANTOPRAZOLE SODIUM 40 MG/1
40 TABLET, DELAYED RELEASE ORAL
Status: DISCONTINUED | OUTPATIENT
Start: 2019-10-23 | End: 2019-10-26 | Stop reason: HOSPADM

## 2019-10-22 RX ORDER — ALPRAZOLAM 0.25 MG/1
0.25 TABLET ORAL 3 TIMES DAILY PRN
COMMUNITY
End: 2019-11-26

## 2019-10-22 RX ORDER — OXYCODONE HYDROCHLORIDE AND ACETAMINOPHEN 5; 325 MG/1; MG/1
2 TABLET ORAL EVERY 4 HOURS PRN
Status: DISCONTINUED | OUTPATIENT
Start: 2019-10-22 | End: 2019-10-24

## 2019-10-22 RX ORDER — BUDESONIDE 0.5 MG/2ML
500 INHALANT ORAL 2 TIMES DAILY
Status: DISCONTINUED | OUTPATIENT
Start: 2019-10-23 | End: 2019-10-26 | Stop reason: HOSPADM

## 2019-10-22 RX ORDER — ASCORBIC ACID 500 MG
500 TABLET ORAL DAILY PRN
COMMUNITY
End: 2020-01-01

## 2019-10-22 RX ORDER — FENTANYL CITRATE 50 UG/ML
50 INJECTION, SOLUTION INTRAMUSCULAR; INTRAVENOUS ONCE
Status: COMPLETED | OUTPATIENT
Start: 2019-10-22 | End: 2019-10-22

## 2019-10-22 RX ORDER — DOCUSATE SODIUM 100 MG/1
100 CAPSULE, LIQUID FILLED ORAL 2 TIMES DAILY
Status: DISCONTINUED | OUTPATIENT
Start: 2019-10-22 | End: 2019-10-26 | Stop reason: HOSPADM

## 2019-10-22 RX ORDER — BIOTIN 10 MG
2 TABLET ORAL DAILY
COMMUNITY

## 2019-10-22 RX ORDER — ACETAMINOPHEN 325 MG/1
650 TABLET ORAL PRN
COMMUNITY

## 2019-10-22 RX ORDER — METOPROLOL SUCCINATE 25 MG/1
25 TABLET, EXTENDED RELEASE ORAL NIGHTLY
Status: DISCONTINUED | OUTPATIENT
Start: 2019-10-23 | End: 2019-10-26 | Stop reason: HOSPADM

## 2019-10-22 RX ORDER — DIGOXIN 125 MCG
125 TABLET ORAL NIGHTLY
Status: DISCONTINUED | OUTPATIENT
Start: 2019-10-23 | End: 2019-10-26 | Stop reason: HOSPADM

## 2019-10-22 RX ORDER — POLYETHYLENE GLYCOL 3350 17 G/17G
17 POWDER, FOR SOLUTION ORAL PRN
Status: DISCONTINUED | OUTPATIENT
Start: 2019-10-22 | End: 2019-10-26 | Stop reason: HOSPADM

## 2019-10-22 RX ORDER — ACETAMINOPHEN 325 MG/1
650 TABLET ORAL EVERY 4 HOURS PRN
Status: DISCONTINUED | OUTPATIENT
Start: 2019-10-22 | End: 2019-10-26 | Stop reason: HOSPADM

## 2019-10-22 RX ORDER — ACETAMINOPHEN 500 MG
1000 TABLET ORAL EVERY 6 HOURS PRN
Status: DISCONTINUED | OUTPATIENT
Start: 2019-10-22 | End: 2019-10-22

## 2019-10-22 RX ORDER — TRAZODONE HYDROCHLORIDE 50 MG/1
50 TABLET ORAL NIGHTLY PRN
Status: DISCONTINUED | OUTPATIENT
Start: 2019-10-23 | End: 2019-10-26 | Stop reason: HOSPADM

## 2019-10-22 RX ORDER — SODIUM CHLORIDE 9 MG/ML
INJECTION, SOLUTION INTRAVENOUS CONTINUOUS
Status: DISCONTINUED | OUTPATIENT
Start: 2019-10-22 | End: 2019-10-23

## 2019-10-22 RX ORDER — FORMOTEROL FUMARATE 20 UG/2ML
20 SOLUTION RESPIRATORY (INHALATION) 2 TIMES DAILY
Status: DISCONTINUED | OUTPATIENT
Start: 2019-10-23 | End: 2019-10-26 | Stop reason: HOSPADM

## 2019-10-22 RX ORDER — LOSARTAN POTASSIUM 50 MG/1
50 TABLET ORAL DAILY
Status: ON HOLD | COMMUNITY
End: 2019-11-28 | Stop reason: HOSPADM

## 2019-10-22 RX ORDER — SODIUM CHLORIDE 0.9 % (FLUSH) 0.9 %
10 SYRINGE (ML) INJECTION EVERY 12 HOURS SCHEDULED
Status: DISCONTINUED | OUTPATIENT
Start: 2019-10-22 | End: 2019-10-26 | Stop reason: HOSPADM

## 2019-10-22 RX ORDER — CETIRIZINE HYDROCHLORIDE 10 MG/1
5 TABLET ORAL DAILY
Status: DISCONTINUED | OUTPATIENT
Start: 2019-10-23 | End: 2019-10-26 | Stop reason: HOSPADM

## 2019-10-22 RX ORDER — ONDANSETRON 2 MG/ML
4 INJECTION INTRAMUSCULAR; INTRAVENOUS EVERY 6 HOURS PRN
Status: DISCONTINUED | OUTPATIENT
Start: 2019-10-22 | End: 2019-10-26 | Stop reason: HOSPADM

## 2019-10-22 RX ORDER — OXYCODONE HYDROCHLORIDE AND ACETAMINOPHEN 5; 325 MG/1; MG/1
1 TABLET ORAL EVERY 4 HOURS PRN
Status: DISCONTINUED | OUTPATIENT
Start: 2019-10-22 | End: 2019-10-24

## 2019-10-22 RX ORDER — MENTHOL AND METHYL SALICYLATE 10; 30 G/100G; G/100G
STICK TOPICAL DAILY
COMMUNITY

## 2019-10-22 RX ADMIN — SODIUM CHLORIDE 100 ML/HR: 9 INJECTION, SOLUTION INTRAVENOUS at 19:05

## 2019-10-22 RX ADMIN — FAMOTIDINE 20 MG: 10 INJECTION, SOLUTION INTRAVENOUS at 22:13

## 2019-10-22 RX ADMIN — HYDROMORPHONE HYDROCHLORIDE 0.5 MG: 1 INJECTION, SOLUTION INTRAMUSCULAR; INTRAVENOUS; SUBCUTANEOUS at 21:35

## 2019-10-22 RX ADMIN — FENTANYL CITRATE 50 MCG: 50 INJECTION INTRAMUSCULAR; INTRAVENOUS at 19:26

## 2019-10-22 RX ADMIN — OXYCODONE HYDROCHLORIDE AND ACETAMINOPHEN 2 TABLET: 5; 325 TABLET ORAL at 22:13

## 2019-10-22 RX ADMIN — FENTANYL CITRATE 50 MCG: 50 INJECTION, SOLUTION INTRAMUSCULAR; INTRAVENOUS at 20:21

## 2019-10-22 RX ADMIN — FENTANYL CITRATE 50 MCG: 50 INJECTION, SOLUTION INTRAMUSCULAR; INTRAVENOUS at 21:01

## 2019-10-22 RX ADMIN — ONDANSETRON 4 MG: 2 INJECTION INTRAMUSCULAR; INTRAVENOUS at 22:16

## 2019-10-22 ASSESSMENT — ENCOUNTER SYMPTOMS
EYE PAIN: 0
PHOTOPHOBIA: 0
WHEEZING: 0
SHORTNESS OF BREATH: 0
VOMITING: 0
FACIAL SWELLING: 0
ROS SKIN COMMENTS: BRUISES EASILY
NAUSEA: 0
STRIDOR: 0
BACK PAIN: 1
ABDOMINAL PAIN: 0

## 2019-10-22 ASSESSMENT — PAIN SCALES - GENERAL
PAINLEVEL_OUTOF10: 10
PAINLEVEL_OUTOF10: 9
PAINLEVEL_OUTOF10: 10

## 2019-10-22 ASSESSMENT — PAIN DESCRIPTION - PAIN TYPE: TYPE: ACUTE PAIN

## 2019-10-22 ASSESSMENT — PAIN DESCRIPTION - ORIENTATION: ORIENTATION: LEFT

## 2019-10-22 ASSESSMENT — PAIN DESCRIPTION - LOCATION: LOCATION: HIP;LEG

## 2019-10-23 ENCOUNTER — APPOINTMENT (OUTPATIENT)
Dept: GENERAL RADIOLOGY | Age: 74
DRG: 469 | End: 2019-10-23
Payer: MEDICARE

## 2019-10-23 ENCOUNTER — APPOINTMENT (OUTPATIENT)
Dept: INTERVENTIONAL RADIOLOGY/VASCULAR | Age: 74
DRG: 469 | End: 2019-10-23
Payer: MEDICARE

## 2019-10-23 LAB
ALBUMIN SERPL-MCNC: 3.1 G/DL (ref 3.5–5.1)
ALLEN TEST: POSITIVE
ALP BLD-CCNC: 51 U/L (ref 38–126)
ALT SERPL-CCNC: 19 U/L (ref 11–66)
ANION GAP SERPL CALCULATED.3IONS-SCNC: 12 MEQ/L (ref 8–16)
AST SERPL-CCNC: 16 U/L (ref 5–40)
BASE EXCESS (CALCULATED): 2.1 MMOL/L (ref -2.5–2.5)
BILIRUB SERPL-MCNC: 0.4 MG/DL (ref 0.3–1.2)
BUN BLDV-MCNC: 23 MG/DL (ref 7–22)
CALCIUM SERPL-MCNC: 8.3 MG/DL (ref 8.5–10.5)
CHLORIDE BLD-SCNC: 107 MEQ/L (ref 98–111)
CO2: 22 MEQ/L (ref 23–33)
COLLECTED BY:: ABNORMAL
CREAT SERPL-MCNC: 0.6 MG/DL (ref 0.4–1.2)
DEVICE: ABNORMAL
ERYTHROCYTE [DISTWIDTH] IN BLOOD BY AUTOMATED COUNT: 14.4 % (ref 11.5–14.5)
ERYTHROCYTE [DISTWIDTH] IN BLOOD BY AUTOMATED COUNT: 53.1 FL (ref 35–45)
GFR SERPL CREATININE-BSD FRML MDRD: > 90 ML/MIN/1.73M2
GLUCOSE BLD-MCNC: 108 MG/DL (ref 70–108)
GLUCOSE BLD-MCNC: 138 MG/DL (ref 70–108)
HCO3: 28 MMOL/L (ref 23–28)
HCT VFR BLD CALC: 40.5 % (ref 37–47)
HEMOGLOBIN: 12.4 GM/DL (ref 12–16)
MCH RBC QN AUTO: 30.8 PG (ref 26–33)
MCHC RBC AUTO-ENTMCNC: 30.6 GM/DL (ref 32.2–35.5)
MCV RBC AUTO: 100.5 FL (ref 81–99)
O2 SATURATION: 91 %
PCO2: 49 MMHG (ref 35–45)
PH BLOOD GAS: 7.37 (ref 7.35–7.45)
PLATELET # BLD: 144 THOU/MM3 (ref 130–400)
PMV BLD AUTO: 10.3 FL (ref 9.4–12.4)
PO2: 65 MMHG (ref 71–104)
POTASSIUM REFLEX MAGNESIUM: 4.5 MEQ/L (ref 3.5–5.2)
PRO-BNP: 1439 PG/ML (ref 0–1800)
RBC # BLD: 4.03 MILL/MM3 (ref 4.2–5.4)
SODIUM BLD-SCNC: 141 MEQ/L (ref 135–145)
SOURCE, BLOOD GAS: ABNORMAL
TOTAL PROTEIN: 5.7 G/DL (ref 6.1–8)
WBC # BLD: 10.8 THOU/MM3 (ref 4.8–10.8)

## 2019-10-23 PROCEDURE — 36600 WITHDRAWAL OF ARTERIAL BLOOD: CPT

## 2019-10-23 PROCEDURE — 82803 BLOOD GASES ANY COMBINATION: CPT

## 2019-10-23 PROCEDURE — 94669 MECHANICAL CHEST WALL OSCILL: CPT

## 2019-10-23 PROCEDURE — 85027 COMPLETE CBC AUTOMATED: CPT

## 2019-10-23 PROCEDURE — 93970 EXTREMITY STUDY: CPT

## 2019-10-23 PROCEDURE — 1200000003 HC TELEMETRY R&B

## 2019-10-23 PROCEDURE — 6360000002 HC RX W HCPCS: Performed by: INTERNAL MEDICINE

## 2019-10-23 PROCEDURE — 6360000002 HC RX W HCPCS: Performed by: PHYSICIAN ASSISTANT

## 2019-10-23 PROCEDURE — 2700000000 HC OXYGEN THERAPY PER DAY

## 2019-10-23 PROCEDURE — 92523 SPEECH SOUND LANG COMPREHEN: CPT

## 2019-10-23 PROCEDURE — 83880 ASSAY OF NATRIURETIC PEPTIDE: CPT

## 2019-10-23 PROCEDURE — 6370000000 HC RX 637 (ALT 250 FOR IP): Performed by: PHYSICIAN ASSISTANT

## 2019-10-23 PROCEDURE — 80053 COMPREHEN METABOLIC PANEL: CPT

## 2019-10-23 PROCEDURE — 94761 N-INVAS EAR/PLS OXIMETRY MLT: CPT

## 2019-10-23 PROCEDURE — 2580000003 HC RX 258: Performed by: PHYSICIAN ASSISTANT

## 2019-10-23 PROCEDURE — 6370000000 HC RX 637 (ALT 250 FOR IP): Performed by: NURSE PRACTITIONER

## 2019-10-23 PROCEDURE — APPSS60 APP SPLIT SHARED TIME 46-60 MINUTES: Performed by: NURSE PRACTITIONER

## 2019-10-23 PROCEDURE — 82948 REAGENT STRIP/BLOOD GLUCOSE: CPT

## 2019-10-23 PROCEDURE — 6370000000 HC RX 637 (ALT 250 FOR IP): Performed by: SURGERY

## 2019-10-23 PROCEDURE — 99222 1ST HOSP IP/OBS MODERATE 55: CPT | Performed by: INTERNAL MEDICINE

## 2019-10-23 PROCEDURE — 94640 AIRWAY INHALATION TREATMENT: CPT

## 2019-10-23 PROCEDURE — 71045 X-RAY EXAM CHEST 1 VIEW: CPT

## 2019-10-23 PROCEDURE — 36415 COLL VENOUS BLD VENIPUNCTURE: CPT

## 2019-10-23 PROCEDURE — 6360000002 HC RX W HCPCS: Performed by: SURGERY

## 2019-10-23 PROCEDURE — 99223 1ST HOSP IP/OBS HIGH 75: CPT | Performed by: INTERNAL MEDICINE

## 2019-10-23 RX ORDER — PHYTONADIONE 5 MG/1
10 TABLET ORAL ONCE
Status: COMPLETED | OUTPATIENT
Start: 2019-10-23 | End: 2019-10-23

## 2019-10-23 RX ORDER — LEVALBUTEROL INHALATION SOLUTION 1.25 MG/3ML
1.25 SOLUTION RESPIRATORY (INHALATION) 4 TIMES DAILY
Status: DISCONTINUED | OUTPATIENT
Start: 2019-10-23 | End: 2019-10-26 | Stop reason: HOSPADM

## 2019-10-23 RX ORDER — CYCLOBENZAPRINE HCL 10 MG
10 TABLET ORAL 3 TIMES DAILY PRN
Status: DISCONTINUED | OUTPATIENT
Start: 2019-10-23 | End: 2019-10-26 | Stop reason: HOSPADM

## 2019-10-23 RX ORDER — ACETYLCYSTEINE 200 MG/ML
600 SOLUTION ORAL; RESPIRATORY (INHALATION) 2 TIMES DAILY
Status: DISCONTINUED | OUTPATIENT
Start: 2019-10-23 | End: 2019-10-26 | Stop reason: HOSPADM

## 2019-10-23 RX ORDER — LEVALBUTEROL INHALATION SOLUTION 1.25 MG/3ML
1.25 SOLUTION RESPIRATORY (INHALATION) EVERY 6 HOURS PRN
Status: DISCONTINUED | OUTPATIENT
Start: 2019-10-23 | End: 2019-10-23

## 2019-10-23 RX ADMIN — HYDROMORPHONE HYDROCHLORIDE 0.5 MG: 1 INJECTION, SOLUTION INTRAMUSCULAR; INTRAVENOUS; SUBCUTANEOUS at 12:11

## 2019-10-23 RX ADMIN — SODIUM CHLORIDE: 9 INJECTION, SOLUTION INTRAVENOUS at 03:46

## 2019-10-23 RX ADMIN — IPRATROPIUM BROMIDE 0.5 MG: 0.5 SOLUTION RESPIRATORY (INHALATION) at 07:58

## 2019-10-23 RX ADMIN — FORMOTEROL FUMARATE DIHYDRATE 20 MCG: 20 SOLUTION RESPIRATORY (INHALATION) at 21:02

## 2019-10-23 RX ADMIN — CYCLOBENZAPRINE 10 MG: 10 TABLET, FILM COATED ORAL at 12:11

## 2019-10-23 RX ADMIN — DICYCLOMINE HYDROCHLORIDE 10 MG: 10 CAPSULE ORAL at 12:34

## 2019-10-23 RX ADMIN — HYDROMORPHONE HYDROCHLORIDE 0.5 MG: 1 INJECTION, SOLUTION INTRAMUSCULAR; INTRAVENOUS; SUBCUTANEOUS at 08:23

## 2019-10-23 RX ADMIN — METOPROLOL SUCCINATE 25 MG: 25 TABLET, FILM COATED, EXTENDED RELEASE ORAL at 00:00

## 2019-10-23 RX ADMIN — SACUBITRIL AND VALSARTAN 1 TABLET: 49; 51 TABLET, FILM COATED ORAL at 00:00

## 2019-10-23 RX ADMIN — HYDROMORPHONE HYDROCHLORIDE 0.5 MG: 1 INJECTION, SOLUTION INTRAMUSCULAR; INTRAVENOUS; SUBCUTANEOUS at 01:04

## 2019-10-23 RX ADMIN — BUDESONIDE 500 MCG: 0.5 INHALANT RESPIRATORY (INHALATION) at 21:05

## 2019-10-23 RX ADMIN — DICYCLOMINE HYDROCHLORIDE 10 MG: 10 CAPSULE ORAL at 21:14

## 2019-10-23 RX ADMIN — DOCUSATE SODIUM 100 MG: 100 CAPSULE, LIQUID FILLED ORAL at 12:11

## 2019-10-23 RX ADMIN — DIGOXIN 125 MCG: 125 TABLET ORAL at 00:00

## 2019-10-23 RX ADMIN — CYCLOBENZAPRINE 10 MG: 10 TABLET, FILM COATED ORAL at 01:04

## 2019-10-23 RX ADMIN — OXYCODONE HYDROCHLORIDE AND ACETAMINOPHEN 1 TABLET: 5; 325 TABLET ORAL at 21:14

## 2019-10-23 RX ADMIN — OXYCODONE HYDROCHLORIDE AND ACETAMINOPHEN 2 TABLET: 5; 325 TABLET ORAL at 12:33

## 2019-10-23 RX ADMIN — FORMOTEROL FUMARATE DIHYDRATE 20 MCG: 20 SOLUTION RESPIRATORY (INHALATION) at 07:58

## 2019-10-23 RX ADMIN — BUDESONIDE 500 MCG: 0.5 INHALANT RESPIRATORY (INHALATION) at 07:58

## 2019-10-23 RX ADMIN — IPRATROPIUM BROMIDE 0.5 MG: 0.5 SOLUTION RESPIRATORY (INHALATION) at 12:16

## 2019-10-23 RX ADMIN — DICYCLOMINE HYDROCHLORIDE 10 MG: 10 CAPSULE ORAL at 00:00

## 2019-10-23 RX ADMIN — DIGOXIN 125 MCG: 125 TABLET ORAL at 21:14

## 2019-10-23 RX ADMIN — IPRATROPIUM BROMIDE 0.5 MG: 0.5 SOLUTION RESPIRATORY (INHALATION) at 20:19

## 2019-10-23 RX ADMIN — LEVALBUTEROL HYDROCHLORIDE 1.25 MG: 1.25 SOLUTION RESPIRATORY (INHALATION) at 02:18

## 2019-10-23 RX ADMIN — DOCUSATE SODIUM 100 MG: 100 CAPSULE, LIQUID FILLED ORAL at 21:14

## 2019-10-23 RX ADMIN — PHYTONADIONE 10 MG: 5 TABLET ORAL at 14:57

## 2019-10-23 RX ADMIN — FUROSEMIDE 40 MG: 40 TABLET ORAL at 12:33

## 2019-10-23 RX ADMIN — LEVALBUTEROL HYDROCHLORIDE 1.25 MG: 1.25 SOLUTION RESPIRATORY (INHALATION) at 20:19

## 2019-10-23 RX ADMIN — CYCLOBENZAPRINE 10 MG: 10 TABLET, FILM COATED ORAL at 21:37

## 2019-10-23 RX ADMIN — METOPROLOL SUCCINATE 25 MG: 25 TABLET, FILM COATED, EXTENDED RELEASE ORAL at 21:14

## 2019-10-23 ASSESSMENT — PAIN SCALES - GENERAL
PAINLEVEL_OUTOF10: 9
PAINLEVEL_OUTOF10: 7
PAINLEVEL_OUTOF10: 7
PAINLEVEL_OUTOF10: 8
PAINLEVEL_OUTOF10: 9
PAINLEVEL_OUTOF10: 7
PAINLEVEL_OUTOF10: 9

## 2019-10-24 ENCOUNTER — ANESTHESIA EVENT (OUTPATIENT)
Dept: OPERATING ROOM | Age: 74
DRG: 469 | End: 2019-10-24
Payer: MEDICARE

## 2019-10-24 ENCOUNTER — ANESTHESIA (OUTPATIENT)
Dept: OPERATING ROOM | Age: 74
DRG: 469 | End: 2019-10-24
Payer: MEDICARE

## 2019-10-24 VITALS
RESPIRATION RATE: 10 BRPM | SYSTOLIC BLOOD PRESSURE: 115 MMHG | OXYGEN SATURATION: 97 % | DIASTOLIC BLOOD PRESSURE: 59 MMHG

## 2019-10-24 LAB
ANION GAP SERPL CALCULATED.3IONS-SCNC: 10 MEQ/L (ref 8–16)
BUN BLDV-MCNC: 21 MG/DL (ref 7–22)
CALCIUM SERPL-MCNC: 8.4 MG/DL (ref 8.5–10.5)
CHLORIDE BLD-SCNC: 104 MEQ/L (ref 98–111)
CO2: 25 MEQ/L (ref 23–33)
CREAT SERPL-MCNC: 0.7 MG/DL (ref 0.4–1.2)
EKG ATRIAL RATE: 86 BPM
EKG Q-T INTERVAL: 430 MS
EKG QRS DURATION: 168 MS
EKG QTC CALCULATION (BAZETT): 470 MS
EKG R AXIS: -135 DEGREES
EKG T AXIS: 95 DEGREES
EKG VENTRICULAR RATE: 72 BPM
ERYTHROCYTE [DISTWIDTH] IN BLOOD BY AUTOMATED COUNT: 14.3 % (ref 11.5–14.5)
ERYTHROCYTE [DISTWIDTH] IN BLOOD BY AUTOMATED COUNT: 52.7 FL (ref 35–45)
GFR SERPL CREATININE-BSD FRML MDRD: 81 ML/MIN/1.73M2
GLUCOSE BLD-MCNC: 121 MG/DL (ref 70–108)
GLUCOSE BLD-MCNC: 175 MG/DL (ref 70–108)
GLUCOSE BLD-MCNC: 248 MG/DL (ref 70–108)
HCT VFR BLD CALC: 37.5 % (ref 37–47)
HEMOGLOBIN: 11.7 GM/DL (ref 12–16)
INR BLD: 1.25 (ref 0.85–1.13)
INR BLD: 1.42 (ref 0.85–1.13)
LV EF: 38 %
LVEF MODALITY: NORMAL
MCH RBC QN AUTO: 31.2 PG (ref 26–33)
MCHC RBC AUTO-ENTMCNC: 31.2 GM/DL (ref 32.2–35.5)
MCV RBC AUTO: 100 FL (ref 81–99)
MRSA SCREEN RT-PCR: NEGATIVE
PLATELET # BLD: 117 THOU/MM3 (ref 130–400)
PMV BLD AUTO: 10.7 FL (ref 9.4–12.4)
POTASSIUM SERPL-SCNC: 4.4 MEQ/L (ref 3.5–5.2)
RBC # BLD: 3.75 MILL/MM3 (ref 4.2–5.4)
SODIUM BLD-SCNC: 139 MEQ/L (ref 135–145)
TROPONIN T: < 0.01 NG/ML
VANCOMYCIN RESISTANT ENTEROCOCCUS: NEGATIVE
WBC # BLD: 9 THOU/MM3 (ref 4.8–10.8)

## 2019-10-24 PROCEDURE — 6370000000 HC RX 637 (ALT 250 FOR IP): Performed by: NURSE PRACTITIONER

## 2019-10-24 PROCEDURE — 99232 SBSQ HOSP IP/OBS MODERATE 35: CPT | Performed by: SURGERY

## 2019-10-24 PROCEDURE — 6360000002 HC RX W HCPCS: Performed by: NURSE PRACTITIONER

## 2019-10-24 PROCEDURE — 3600000005 HC SURGERY LEVEL 5 BASE: Performed by: ORTHOPAEDIC SURGERY

## 2019-10-24 PROCEDURE — APPSS45 APP SPLIT SHARED TIME 31-45 MINUTES: Performed by: PHYSICIAN ASSISTANT

## 2019-10-24 PROCEDURE — 2709999900 HC NON-CHARGEABLE SUPPLY

## 2019-10-24 PROCEDURE — 94640 AIRWAY INHALATION TREATMENT: CPT

## 2019-10-24 PROCEDURE — C1713 ANCHOR/SCREW BN/BN,TIS/BN: HCPCS | Performed by: ORTHOPAEDIC SURGERY

## 2019-10-24 PROCEDURE — 2700000000 HC OXYGEN THERAPY PER DAY

## 2019-10-24 PROCEDURE — 87081 CULTURE SCREEN ONLY: CPT

## 2019-10-24 PROCEDURE — 7100000001 HC PACU RECOVERY - ADDTL 15 MIN: Performed by: ORTHOPAEDIC SURGERY

## 2019-10-24 PROCEDURE — 0SRS0J9 REPLACEMENT OF LEFT HIP JOINT, FEMORAL SURFACE WITH SYNTHETIC SUBSTITUTE, CEMENTED, OPEN APPROACH: ICD-10-PCS | Performed by: ORTHOPAEDIC SURGERY

## 2019-10-24 PROCEDURE — 94669 MECHANICAL CHEST WALL OSCILL: CPT

## 2019-10-24 PROCEDURE — 85027 COMPLETE CBC AUTOMATED: CPT

## 2019-10-24 PROCEDURE — 6360000002 HC RX W HCPCS: Performed by: NURSE ANESTHETIST, CERTIFIED REGISTERED

## 2019-10-24 PROCEDURE — 7100000000 HC PACU RECOVERY - FIRST 15 MIN: Performed by: ORTHOPAEDIC SURGERY

## 2019-10-24 PROCEDURE — 2500000003 HC RX 250 WO HCPCS: Performed by: NURSE ANESTHETIST, CERTIFIED REGISTERED

## 2019-10-24 PROCEDURE — 6360000002 HC RX W HCPCS: Performed by: SURGERY

## 2019-10-24 PROCEDURE — 36415 COLL VENOUS BLD VENIPUNCTURE: CPT

## 2019-10-24 PROCEDURE — 94761 N-INVAS EAR/PLS OXIMETRY MLT: CPT

## 2019-10-24 PROCEDURE — 1200000003 HC TELEMETRY R&B

## 2019-10-24 PROCEDURE — 85610 PROTHROMBIN TIME: CPT

## 2019-10-24 PROCEDURE — 2500000003 HC RX 250 WO HCPCS: Performed by: ORTHOPAEDIC SURGERY

## 2019-10-24 PROCEDURE — 93005 ELECTROCARDIOGRAM TRACING: CPT | Performed by: SURGERY

## 2019-10-24 PROCEDURE — 87641 MR-STAPH DNA AMP PROBE: CPT

## 2019-10-24 PROCEDURE — 93306 TTE W/DOPPLER COMPLETE: CPT

## 2019-10-24 PROCEDURE — C1776 JOINT DEVICE (IMPLANTABLE): HCPCS | Performed by: ORTHOPAEDIC SURGERY

## 2019-10-24 PROCEDURE — 6360000002 HC RX W HCPCS: Performed by: PHYSICIAN ASSISTANT

## 2019-10-24 PROCEDURE — 87086 URINE CULTURE/COLONY COUNT: CPT

## 2019-10-24 PROCEDURE — 2580000003 HC RX 258: Performed by: NURSE PRACTITIONER

## 2019-10-24 PROCEDURE — 2709999900 HC NON-CHARGEABLE SUPPLY: Performed by: ORTHOPAEDIC SURGERY

## 2019-10-24 PROCEDURE — 6360000002 HC RX W HCPCS: Performed by: INTERNAL MEDICINE

## 2019-10-24 PROCEDURE — 82948 REAGENT STRIP/BLOOD GLUCOSE: CPT

## 2019-10-24 PROCEDURE — 80048 BASIC METABOLIC PNL TOTAL CA: CPT

## 2019-10-24 PROCEDURE — 6360000002 HC RX W HCPCS: Performed by: ORTHOPAEDIC SURGERY

## 2019-10-24 PROCEDURE — 6370000000 HC RX 637 (ALT 250 FOR IP): Performed by: ORTHOPAEDIC SURGERY

## 2019-10-24 PROCEDURE — 2720000010 HC SURG SUPPLY STERILE: Performed by: ORTHOPAEDIC SURGERY

## 2019-10-24 PROCEDURE — 3700000001 HC ADD 15 MINUTES (ANESTHESIA): Performed by: ORTHOPAEDIC SURGERY

## 2019-10-24 PROCEDURE — 2580000003 HC RX 258: Performed by: ORTHOPAEDIC SURGERY

## 2019-10-24 PROCEDURE — 6370000000 HC RX 637 (ALT 250 FOR IP): Performed by: PHYSICIAN ASSISTANT

## 2019-10-24 PROCEDURE — 3600000015 HC SURGERY LEVEL 5 ADDTL 15MIN: Performed by: ORTHOPAEDIC SURGERY

## 2019-10-24 PROCEDURE — 87500 VANOMYCIN DNA AMP PROBE: CPT

## 2019-10-24 PROCEDURE — 84484 ASSAY OF TROPONIN QUANT: CPT

## 2019-10-24 PROCEDURE — 3700000000 HC ANESTHESIA ATTENDED CARE: Performed by: ORTHOPAEDIC SURGERY

## 2019-10-24 DEVICE — CONQUEST FX FEMORAL COMPONENT SIZE 11
Type: IMPLANTABLE DEVICE | Site: HIP | Status: FUNCTIONAL
Brand: CONQUEST FX

## 2019-10-24 DEVICE — TANDEM UNIPOLAR 12/14 TAPER SLEEVE                                    + 0
Type: IMPLANTABLE DEVICE | Site: HIP | Status: FUNCTIONAL
Brand: TANDEM

## 2019-10-24 DEVICE — TANDEM UNIPOLAR HEAD 45MM
Type: IMPLANTABLE DEVICE | Site: HIP | Status: FUNCTIONAL
Brand: TANDEM

## 2019-10-24 DEVICE — CEMENT BNE RADIOPAQUE SIMPLEX P SPEEDDET: Type: IMPLANTABLE DEVICE | Site: HIP | Status: FUNCTIONAL

## 2019-10-24 RX ORDER — OXYCODONE HYDROCHLORIDE AND ACETAMINOPHEN 5; 325 MG/1; MG/1
1 TABLET ORAL
Qty: 40 TABLET | Refills: 0 | Status: ON HOLD | OUTPATIENT
Start: 2019-10-24 | End: 2019-11-21 | Stop reason: HOSPADM

## 2019-10-24 RX ORDER — SUCCINYLCHOLINE/SOD CL,ISO/PF 200MG/10ML
SYRINGE (ML) INTRAVENOUS PRN
Status: DISCONTINUED | OUTPATIENT
Start: 2019-10-24 | End: 2019-10-24 | Stop reason: SDUPTHER

## 2019-10-24 RX ORDER — VANCOMYCIN HYDROCHLORIDE 1 G/20ML
INJECTION, POWDER, LYOPHILIZED, FOR SOLUTION INTRAVENOUS PRN
Status: DISCONTINUED | OUTPATIENT
Start: 2019-10-24 | End: 2019-10-24 | Stop reason: HOSPADM

## 2019-10-24 RX ORDER — LIDOCAINE HCL/PF 100 MG/5ML
SYRINGE (ML) INJECTION PRN
Status: DISCONTINUED | OUTPATIENT
Start: 2019-10-24 | End: 2019-10-24 | Stop reason: SDUPTHER

## 2019-10-24 RX ORDER — PROPOFOL 10 MG/ML
INJECTION, EMULSION INTRAVENOUS PRN
Status: DISCONTINUED | OUTPATIENT
Start: 2019-10-24 | End: 2019-10-24 | Stop reason: SDUPTHER

## 2019-10-24 RX ORDER — ROCURONIUM BROMIDE 10 MG/ML
INJECTION, SOLUTION INTRAVENOUS PRN
Status: DISCONTINUED | OUTPATIENT
Start: 2019-10-24 | End: 2019-10-24 | Stop reason: SDUPTHER

## 2019-10-24 RX ORDER — DEXAMETHASONE SODIUM PHOSPHATE 4 MG/ML
INJECTION, SOLUTION INTRA-ARTICULAR; INTRALESIONAL; INTRAMUSCULAR; INTRAVENOUS; SOFT TISSUE PRN
Status: DISCONTINUED | OUTPATIENT
Start: 2019-10-24 | End: 2019-10-24 | Stop reason: SDUPTHER

## 2019-10-24 RX ORDER — WARFARIN SODIUM 3 MG/1
3 TABLET ORAL
Status: COMPLETED | OUTPATIENT
Start: 2019-10-24 | End: 2019-10-24

## 2019-10-24 RX ORDER — NEOSTIGMINE METHYLSULFATE 5 MG/5 ML
SYRINGE (ML) INTRAVENOUS PRN
Status: DISCONTINUED | OUTPATIENT
Start: 2019-10-24 | End: 2019-10-24 | Stop reason: SDUPTHER

## 2019-10-24 RX ORDER — HYDROCODONE BITARTRATE AND ACETAMINOPHEN 5; 325 MG/1; MG/1
2 TABLET ORAL EVERY 4 HOURS PRN
Status: CANCELLED | OUTPATIENT
Start: 2019-10-24

## 2019-10-24 RX ORDER — ONDANSETRON 2 MG/ML
INJECTION INTRAMUSCULAR; INTRAVENOUS PRN
Status: DISCONTINUED | OUTPATIENT
Start: 2019-10-24 | End: 2019-10-24 | Stop reason: SDUPTHER

## 2019-10-24 RX ORDER — TRANEXAMIC ACID 100 MG/ML
INJECTION, SOLUTION INTRAVENOUS PRN
Status: DISCONTINUED | OUTPATIENT
Start: 2019-10-24 | End: 2019-10-24 | Stop reason: HOSPADM

## 2019-10-24 RX ORDER — FENTANYL CITRATE 50 UG/ML
INJECTION, SOLUTION INTRAMUSCULAR; INTRAVENOUS PRN
Status: DISCONTINUED | OUTPATIENT
Start: 2019-10-24 | End: 2019-10-24 | Stop reason: SDUPTHER

## 2019-10-24 RX ORDER — OXYCODONE HYDROCHLORIDE AND ACETAMINOPHEN 5; 325 MG/1; MG/1
1 TABLET ORAL EVERY 4 HOURS PRN
Status: DISCONTINUED | OUTPATIENT
Start: 2019-10-24 | End: 2019-10-26 | Stop reason: HOSPADM

## 2019-10-24 RX ORDER — HYDROCODONE BITARTRATE AND ACETAMINOPHEN 5; 325 MG/1; MG/1
1 TABLET ORAL EVERY 4 HOURS PRN
Status: CANCELLED | OUTPATIENT
Start: 2019-10-24

## 2019-10-24 RX ORDER — OXYCODONE HYDROCHLORIDE AND ACETAMINOPHEN 5; 325 MG/1; MG/1
2 TABLET ORAL EVERY 4 HOURS PRN
Status: DISCONTINUED | OUTPATIENT
Start: 2019-10-24 | End: 2019-10-26 | Stop reason: HOSPADM

## 2019-10-24 RX ORDER — GLYCOPYRROLATE 1 MG/5 ML
SYRINGE (ML) INTRAVENOUS PRN
Status: DISCONTINUED | OUTPATIENT
Start: 2019-10-24 | End: 2019-10-24 | Stop reason: SDUPTHER

## 2019-10-24 RX ADMIN — PHENYLEPHRINE HYDROCHLORIDE 100 MCG: 10 INJECTION INTRAVENOUS at 10:41

## 2019-10-24 RX ADMIN — IPRATROPIUM BROMIDE 0.5 MG: 0.5 SOLUTION RESPIRATORY (INHALATION) at 22:27

## 2019-10-24 RX ADMIN — DIGOXIN 125 MCG: 125 TABLET ORAL at 22:31

## 2019-10-24 RX ADMIN — POLYETHYLENE GLYCOL 3350 17 G: 17 POWDER, FOR SOLUTION ORAL at 19:56

## 2019-10-24 RX ADMIN — Medication 3 MG: at 11:05

## 2019-10-24 RX ADMIN — BUDESONIDE 500 MCG: 0.5 INHALANT RESPIRATORY (INHALATION) at 07:40

## 2019-10-24 RX ADMIN — FORMOTEROL FUMARATE DIHYDRATE 20 MCG: 20 SOLUTION RESPIRATORY (INHALATION) at 07:40

## 2019-10-24 RX ADMIN — FORMOTEROL FUMARATE DIHYDRATE 20 MCG: 20 SOLUTION RESPIRATORY (INHALATION) at 18:19

## 2019-10-24 RX ADMIN — FENTANYL CITRATE 50 MCG: 50 INJECTION INTRAMUSCULAR; INTRAVENOUS at 10:51

## 2019-10-24 RX ADMIN — DOCUSATE SODIUM 100 MG: 100 CAPSULE, LIQUID FILLED ORAL at 19:55

## 2019-10-24 RX ADMIN — WARFARIN SODIUM 3 MG: 3 TABLET ORAL at 17:09

## 2019-10-24 RX ADMIN — PHENYLEPHRINE HYDROCHLORIDE 100 MCG: 10 INJECTION INTRAVENOUS at 10:29

## 2019-10-24 RX ADMIN — ONDANSETRON HYDROCHLORIDE 4 MG: 4 INJECTION, SOLUTION INTRAMUSCULAR; INTRAVENOUS at 10:46

## 2019-10-24 RX ADMIN — ACETAMINOPHEN 650 MG: 325 TABLET ORAL at 01:43

## 2019-10-24 RX ADMIN — DICYCLOMINE HYDROCHLORIDE 10 MG: 10 CAPSULE ORAL at 22:31

## 2019-10-24 RX ADMIN — DEXTROSE MONOHYDRATE 2 G: 50 INJECTION, SOLUTION INTRAVENOUS at 17:09

## 2019-10-24 RX ADMIN — FENTANYL CITRATE 100 MCG: 50 INJECTION INTRAMUSCULAR; INTRAVENOUS at 10:27

## 2019-10-24 RX ADMIN — Medication 0.4 MG: at 11:05

## 2019-10-24 RX ADMIN — ROCURONIUM BROMIDE 20 MG: 10 INJECTION INTRAVENOUS at 10:43

## 2019-10-24 RX ADMIN — Medication 100 MG: at 10:29

## 2019-10-24 RX ADMIN — LEVALBUTEROL HYDROCHLORIDE 1.25 MG: 1.25 SOLUTION RESPIRATORY (INHALATION) at 22:27

## 2019-10-24 RX ADMIN — PROPOFOL 90 MG: 10 INJECTION, EMULSION INTRAVENOUS at 10:29

## 2019-10-24 RX ADMIN — OXYCODONE HYDROCHLORIDE AND ACETAMINOPHEN 1 TABLET: 5; 325 TABLET ORAL at 19:56

## 2019-10-24 RX ADMIN — PHENYLEPHRINE HYDROCHLORIDE 100 MCG: 10 INJECTION INTRAVENOUS at 10:45

## 2019-10-24 RX ADMIN — METOPROLOL SUCCINATE 25 MG: 25 TABLET, FILM COATED, EXTENDED RELEASE ORAL at 22:31

## 2019-10-24 RX ADMIN — LEVALBUTEROL HYDROCHLORIDE 1.25 MG: 1.25 SOLUTION RESPIRATORY (INHALATION) at 18:07

## 2019-10-24 RX ADMIN — CYCLOBENZAPRINE 10 MG: 10 TABLET, FILM COATED ORAL at 22:30

## 2019-10-24 RX ADMIN — HYDROMORPHONE HYDROCHLORIDE 0.12 MG: 1 INJECTION, SOLUTION INTRAMUSCULAR; INTRAVENOUS; SUBCUTANEOUS at 02:22

## 2019-10-24 RX ADMIN — IPRATROPIUM BROMIDE 0.5 MG: 0.5 SOLUTION RESPIRATORY (INHALATION) at 07:40

## 2019-10-24 RX ADMIN — DICYCLOMINE HYDROCHLORIDE 10 MG: 10 CAPSULE ORAL at 17:08

## 2019-10-24 RX ADMIN — DEXTROSE MONOHYDRATE 2 G: 50 INJECTION, SOLUTION INTRAVENOUS at 22:30

## 2019-10-24 RX ADMIN — DEXAMETHASONE SODIUM PHOSPHATE 8 MG: 4 INJECTION, SOLUTION INTRAMUSCULAR; INTRAVENOUS at 10:46

## 2019-10-24 RX ADMIN — IPRATROPIUM BROMIDE 0.5 MG: 0.5 SOLUTION RESPIRATORY (INHALATION) at 18:12

## 2019-10-24 RX ADMIN — BUDESONIDE 500 MCG: 0.5 INHALANT RESPIRATORY (INHALATION) at 18:20

## 2019-10-24 RX ADMIN — PHENYLEPHRINE HYDROCHLORIDE 100 MCG: 10 INJECTION INTRAVENOUS at 10:59

## 2019-10-24 RX ADMIN — PHENYLEPHRINE HYDROCHLORIDE 100 MCG: 10 INJECTION INTRAVENOUS at 10:51

## 2019-10-24 RX ADMIN — LEVALBUTEROL HYDROCHLORIDE 1.25 MG: 1.25 SOLUTION RESPIRATORY (INHALATION) at 07:40

## 2019-10-24 ASSESSMENT — PULMONARY FUNCTION TESTS
PIF_VALUE: 23
PIF_VALUE: 22
PIF_VALUE: 2
PIF_VALUE: 0
PIF_VALUE: 16
PIF_VALUE: 2
PIF_VALUE: 24
PIF_VALUE: 21
PIF_VALUE: 11
PIF_VALUE: 23
PIF_VALUE: 23
PIF_VALUE: 10
PIF_VALUE: 17
PIF_VALUE: 23
PIF_VALUE: 21
PIF_VALUE: 20
PIF_VALUE: 0
PIF_VALUE: 23
PIF_VALUE: 17
PIF_VALUE: 23
PIF_VALUE: 10
PIF_VALUE: 23
PIF_VALUE: 22
PIF_VALUE: 2
PIF_VALUE: 20
PIF_VALUE: 3
PIF_VALUE: 15
PIF_VALUE: 21
PIF_VALUE: 22
PIF_VALUE: 21
PIF_VALUE: 14
PIF_VALUE: 22
PIF_VALUE: 21
PIF_VALUE: 21
PIF_VALUE: 24
PIF_VALUE: 2
PIF_VALUE: 24
PIF_VALUE: 23
PIF_VALUE: 23
PIF_VALUE: 25
PIF_VALUE: 23
PIF_VALUE: 3
PIF_VALUE: 14
PIF_VALUE: 22
PIF_VALUE: 24
PIF_VALUE: 24

## 2019-10-24 ASSESSMENT — PAIN DESCRIPTION - LOCATION: LOCATION: HIP

## 2019-10-24 ASSESSMENT — PAIN SCALES - GENERAL
PAINLEVEL_OUTOF10: 8
PAINLEVEL_OUTOF10: 8
PAINLEVEL_OUTOF10: 0
PAINLEVEL_OUTOF10: 8
PAINLEVEL_OUTOF10: 3

## 2019-10-24 ASSESSMENT — PAIN DESCRIPTION - FREQUENCY: FREQUENCY: CONTINUOUS

## 2019-10-24 ASSESSMENT — PAIN DESCRIPTION - ORIENTATION: ORIENTATION: LEFT

## 2019-10-24 ASSESSMENT — PAIN DESCRIPTION - DESCRIPTORS: DESCRIPTORS: SHARP;CONSTANT;THROBBING

## 2019-10-24 ASSESSMENT — PAIN DESCRIPTION - ONSET: ONSET: ON-GOING

## 2019-10-24 ASSESSMENT — PAIN DESCRIPTION - PROGRESSION: CLINICAL_PROGRESSION: NOT CHANGED

## 2019-10-24 ASSESSMENT — PAIN DESCRIPTION - PAIN TYPE: TYPE: ACUTE PAIN

## 2019-10-24 ASSESSMENT — PAIN - FUNCTIONAL ASSESSMENT: PAIN_FUNCTIONAL_ASSESSMENT: PREVENTS OR INTERFERES SOME ACTIVE ACTIVITIES AND ADLS

## 2019-10-25 LAB
ANION GAP SERPL CALCULATED.3IONS-SCNC: 13 MEQ/L (ref 8–16)
BASOPHILS # BLD: 0.1 %
BASOPHILS ABSOLUTE: 0 THOU/MM3 (ref 0–0.1)
BUN BLDV-MCNC: 32 MG/DL (ref 7–22)
CALCIUM SERPL-MCNC: 8.7 MG/DL (ref 8.5–10.5)
CHLORIDE BLD-SCNC: 106 MEQ/L (ref 98–111)
CO2: 18 MEQ/L (ref 23–33)
CREAT SERPL-MCNC: 0.9 MG/DL (ref 0.4–1.2)
EOSINOPHIL # BLD: 0 %
EOSINOPHILS ABSOLUTE: 0 THOU/MM3 (ref 0–0.4)
ERYTHROCYTE [DISTWIDTH] IN BLOOD BY AUTOMATED COUNT: 14.2 % (ref 11.5–14.5)
ERYTHROCYTE [DISTWIDTH] IN BLOOD BY AUTOMATED COUNT: 52.7 FL (ref 35–45)
GFR SERPL CREATININE-BSD FRML MDRD: 61 ML/MIN/1.73M2
GLUCOSE BLD-MCNC: 132 MG/DL (ref 70–108)
GLUCOSE BLD-MCNC: 133 MG/DL (ref 70–108)
GLUCOSE BLD-MCNC: 159 MG/DL (ref 70–108)
GLUCOSE BLD-MCNC: 189 MG/DL (ref 70–108)
HCT VFR BLD CALC: 35.5 % (ref 37–47)
HEMOGLOBIN: 10.9 GM/DL (ref 12–16)
IMMATURE GRANS (ABS): 0.09 THOU/MM3 (ref 0–0.07)
IMMATURE GRANULOCYTES: 0.8 %
INR BLD: 1.01 (ref 0.85–1.13)
LYMPHOCYTES # BLD: 4.4 %
LYMPHOCYTES ABSOLUTE: 0.5 THOU/MM3 (ref 1–4.8)
MCH RBC QN AUTO: 31.1 PG (ref 26–33)
MCHC RBC AUTO-ENTMCNC: 30.7 GM/DL (ref 32.2–35.5)
MCV RBC AUTO: 101.4 FL (ref 81–99)
MONOCYTES # BLD: 8.5 %
MONOCYTES ABSOLUTE: 1 THOU/MM3 (ref 0.4–1.3)
NUCLEATED RED BLOOD CELLS: 0 /100 WBC
ORGANISM: ABNORMAL
PLATELET # BLD: 112 THOU/MM3 (ref 130–400)
PMV BLD AUTO: 10.7 FL (ref 9.4–12.4)
POTASSIUM SERPL-SCNC: 5.3 MEQ/L (ref 3.5–5.2)
RBC # BLD: 3.5 MILL/MM3 (ref 4.2–5.4)
SEG NEUTROPHILS: 86.2 %
SEGMENTED NEUTROPHILS ABSOLUTE COUNT: 10.3 THOU/MM3 (ref 1.8–7.7)
SODIUM BLD-SCNC: 137 MEQ/L (ref 135–145)
URINE CULTURE, ROUTINE: ABNORMAL
WBC # BLD: 12 THOU/MM3 (ref 4.8–10.8)

## 2019-10-25 PROCEDURE — 2580000003 HC RX 258: Performed by: NURSE PRACTITIONER

## 2019-10-25 PROCEDURE — 36415 COLL VENOUS BLD VENIPUNCTURE: CPT

## 2019-10-25 PROCEDURE — 82948 REAGENT STRIP/BLOOD GLUCOSE: CPT

## 2019-10-25 PROCEDURE — APPSS60 APP SPLIT SHARED TIME 46-60 MINUTES: Performed by: PHYSICIAN ASSISTANT

## 2019-10-25 PROCEDURE — 6370000000 HC RX 637 (ALT 250 FOR IP): Performed by: NURSE PRACTITIONER

## 2019-10-25 PROCEDURE — 80048 BASIC METABOLIC PNL TOTAL CA: CPT

## 2019-10-25 PROCEDURE — 94761 N-INVAS EAR/PLS OXIMETRY MLT: CPT

## 2019-10-25 PROCEDURE — 97166 OT EVAL MOD COMPLEX 45 MIN: CPT

## 2019-10-25 PROCEDURE — 6360000002 HC RX W HCPCS: Performed by: NURSE PRACTITIONER

## 2019-10-25 PROCEDURE — 2700000000 HC OXYGEN THERAPY PER DAY

## 2019-10-25 PROCEDURE — 97530 THERAPEUTIC ACTIVITIES: CPT

## 2019-10-25 PROCEDURE — 99232 SBSQ HOSP IP/OBS MODERATE 35: CPT | Performed by: NURSE PRACTITIONER

## 2019-10-25 PROCEDURE — 97535 SELF CARE MNGMENT TRAINING: CPT

## 2019-10-25 PROCEDURE — 1200000003 HC TELEMETRY R&B

## 2019-10-25 PROCEDURE — 6370000000 HC RX 637 (ALT 250 FOR IP): Performed by: PHYSICIAN ASSISTANT

## 2019-10-25 PROCEDURE — 85610 PROTHROMBIN TIME: CPT

## 2019-10-25 PROCEDURE — 6370000000 HC RX 637 (ALT 250 FOR IP): Performed by: ORTHOPAEDIC SURGERY

## 2019-10-25 PROCEDURE — 94640 AIRWAY INHALATION TREATMENT: CPT

## 2019-10-25 PROCEDURE — 97162 PT EVAL MOD COMPLEX 30 MIN: CPT

## 2019-10-25 PROCEDURE — 85025 COMPLETE CBC W/AUTO DIFF WBC: CPT

## 2019-10-25 PROCEDURE — 99231 SBSQ HOSP IP/OBS SF/LOW 25: CPT | Performed by: SURGERY

## 2019-10-25 RX ORDER — SENNA PLUS 8.6 MG/1
2 TABLET ORAL NIGHTLY
Status: DISCONTINUED | OUTPATIENT
Start: 2019-10-25 | End: 2019-10-26 | Stop reason: HOSPADM

## 2019-10-25 RX ORDER — ALBUTEROL SULFATE 90 UG/1
2 AEROSOL, METERED RESPIRATORY (INHALATION) EVERY 6 HOURS PRN
Status: DISCONTINUED | OUTPATIENT
Start: 2019-10-25 | End: 2019-10-26 | Stop reason: HOSPADM

## 2019-10-25 RX ORDER — WARFARIN SODIUM 4 MG/1
4 TABLET ORAL ONCE
Status: COMPLETED | OUTPATIENT
Start: 2019-10-25 | End: 2019-10-25

## 2019-10-25 RX ADMIN — IPRATROPIUM BROMIDE 0.5 MG: 0.5 SOLUTION RESPIRATORY (INHALATION) at 21:20

## 2019-10-25 RX ADMIN — FUROSEMIDE 40 MG: 40 TABLET ORAL at 09:14

## 2019-10-25 RX ADMIN — Medication 2 PUFF: at 12:43

## 2019-10-25 RX ADMIN — LEVALBUTEROL HYDROCHLORIDE 1.25 MG: 1.25 SOLUTION RESPIRATORY (INHALATION) at 07:10

## 2019-10-25 RX ADMIN — PANTOPRAZOLE SODIUM 40 MG: 40 TABLET, DELAYED RELEASE ORAL at 04:47

## 2019-10-25 RX ADMIN — DOCUSATE SODIUM 100 MG: 100 CAPSULE, LIQUID FILLED ORAL at 22:26

## 2019-10-25 RX ADMIN — Medication 10 ML: at 20:24

## 2019-10-25 RX ADMIN — LEVALBUTEROL HYDROCHLORIDE 1.25 MG: 1.25 SOLUTION RESPIRATORY (INHALATION) at 11:32

## 2019-10-25 RX ADMIN — LEVALBUTEROL HYDROCHLORIDE 1.25 MG: 1.25 SOLUTION RESPIRATORY (INHALATION) at 15:18

## 2019-10-25 RX ADMIN — WARFARIN SODIUM 4 MG: 4 TABLET ORAL at 22:46

## 2019-10-25 RX ADMIN — DICYCLOMINE HYDROCHLORIDE 10 MG: 10 CAPSULE ORAL at 20:23

## 2019-10-25 RX ADMIN — FORMOTEROL FUMARATE DIHYDRATE 20 MCG: 20 SOLUTION RESPIRATORY (INHALATION) at 07:10

## 2019-10-25 RX ADMIN — BUDESONIDE 500 MCG: 0.5 INHALANT RESPIRATORY (INHALATION) at 21:20

## 2019-10-25 RX ADMIN — DOCUSATE SODIUM 100 MG: 100 CAPSULE, LIQUID FILLED ORAL at 09:14

## 2019-10-25 RX ADMIN — OXYCODONE HYDROCHLORIDE AND ACETAMINOPHEN 2 TABLET: 5; 325 TABLET ORAL at 13:00

## 2019-10-25 RX ADMIN — LEVALBUTEROL HYDROCHLORIDE 1.25 MG: 1.25 SOLUTION RESPIRATORY (INHALATION) at 21:20

## 2019-10-25 RX ADMIN — OXYCODONE HYDROCHLORIDE AND ACETAMINOPHEN 2 TABLET: 5; 325 TABLET ORAL at 00:26

## 2019-10-25 RX ADMIN — SODIUM CHLORIDE: 9 INJECTION, SOLUTION INTRAVENOUS at 00:28

## 2019-10-25 RX ADMIN — CETIRIZINE HYDROCHLORIDE 5 MG: 10 TABLET, FILM COATED ORAL at 09:14

## 2019-10-25 RX ADMIN — IPRATROPIUM BROMIDE 0.5 MG: 0.5 SOLUTION RESPIRATORY (INHALATION) at 15:18

## 2019-10-25 RX ADMIN — TRAZODONE HYDROCHLORIDE 50 MG: 50 TABLET ORAL at 22:25

## 2019-10-25 RX ADMIN — MAGNESIUM HYDROXIDE 15 ML: 400 SUSPENSION ORAL at 22:25

## 2019-10-25 RX ADMIN — IPRATROPIUM BROMIDE 0.5 MG: 0.5 SOLUTION RESPIRATORY (INHALATION) at 11:32

## 2019-10-25 RX ADMIN — IPRATROPIUM BROMIDE 0.5 MG: 0.5 SOLUTION RESPIRATORY (INHALATION) at 07:10

## 2019-10-25 RX ADMIN — BUDESONIDE 500 MCG: 0.5 INHALANT RESPIRATORY (INHALATION) at 07:10

## 2019-10-25 RX ADMIN — SENNOSIDES 17.2 MG: 8.6 TABLET, FILM COATED ORAL at 22:25

## 2019-10-25 RX ADMIN — DIGOXIN 125 MCG: 125 TABLET ORAL at 20:23

## 2019-10-25 RX ADMIN — POLYETHYLENE GLYCOL 3350 17 G: 17 POWDER, FOR SOLUTION ORAL at 04:46

## 2019-10-25 RX ADMIN — FORMOTEROL FUMARATE DIHYDRATE 20 MCG: 20 SOLUTION RESPIRATORY (INHALATION) at 21:20

## 2019-10-25 RX ADMIN — OXYCODONE HYDROCHLORIDE AND ACETAMINOPHEN 2 TABLET: 5; 325 TABLET ORAL at 04:46

## 2019-10-25 RX ADMIN — METOPROLOL SUCCINATE 25 MG: 25 TABLET, FILM COATED, EXTENDED RELEASE ORAL at 20:23

## 2019-10-25 ASSESSMENT — PAIN DESCRIPTION - PAIN TYPE
TYPE: SURGICAL PAIN
TYPE: ACUTE PAIN
TYPE: ACUTE PAIN

## 2019-10-25 ASSESSMENT — PAIN SCALES - GENERAL
PAINLEVEL_OUTOF10: 0
PAINLEVEL_OUTOF10: 7
PAINLEVEL_OUTOF10: 7
PAINLEVEL_OUTOF10: 0
PAINLEVEL_OUTOF10: 8
PAINLEVEL_OUTOF10: 7
PAINLEVEL_OUTOF10: 8

## 2019-10-25 ASSESSMENT — PAIN DESCRIPTION - LOCATION
LOCATION: HIP

## 2019-10-25 ASSESSMENT — PAIN DESCRIPTION - ORIENTATION
ORIENTATION: LEFT

## 2019-10-26 ENCOUNTER — HOSPITAL ENCOUNTER (INPATIENT)
Age: 74
LOS: 26 days | Discharge: HOME | DRG: 560 | End: 2019-11-21
Attending: FAMILY MEDICINE | Admitting: FAMILY MEDICINE
Payer: MEDICARE

## 2019-10-26 VITALS
HEART RATE: 72 BPM | DIASTOLIC BLOOD PRESSURE: 73 MMHG | WEIGHT: 166 LBS | BODY MASS INDEX: 30.55 KG/M2 | HEIGHT: 62 IN | OXYGEN SATURATION: 94 % | SYSTOLIC BLOOD PRESSURE: 118 MMHG | TEMPERATURE: 98 F | RESPIRATION RATE: 22 BRPM

## 2019-10-26 DIAGNOSIS — T14.8XXA HEMATOMA: ICD-10-CM

## 2019-10-26 DIAGNOSIS — S72.002A LEFT DISPLACED FEMORAL NECK FRACTURE (HCC): Primary | ICD-10-CM

## 2019-10-26 DIAGNOSIS — L02.91 ABSCESS: ICD-10-CM

## 2019-10-26 DIAGNOSIS — Z87.81 S/P LEFT HIP FRACTURE: ICD-10-CM

## 2019-10-26 DIAGNOSIS — S09.90XS CLOSED HEAD INJURY, SEQUELA: ICD-10-CM
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
## 2019-10-26 LAB
ANION GAP SERPL CALCULATED.3IONS-SCNC: 15 MEQ/L (ref 8–16)
BASOPHILS # BLD: 0.1 %
BASOPHILS ABSOLUTE: 0 THOU/MM3 (ref 0–0.1)
BUN BLDV-MCNC: 35 MG/DL (ref 7–22)
CALCIUM SERPL-MCNC: 9.5 MG/DL (ref 8.5–10.5)
CHLORIDE BLD-SCNC: 103 MEQ/L (ref 98–111)
CO2: 21 MEQ/L (ref 23–33)
CREAT SERPL-MCNC: 0.9 MG/DL (ref 0.4–1.2)
EOSINOPHIL # BLD: 1 %
EOSINOPHILS ABSOLUTE: 0.1 THOU/MM3 (ref 0–0.4)
ERYTHROCYTE [DISTWIDTH] IN BLOOD BY AUTOMATED COUNT: 14.3 % (ref 11.5–14.5)
ERYTHROCYTE [DISTWIDTH] IN BLOOD BY AUTOMATED COUNT: 53.6 FL (ref 35–45)
GFR SERPL CREATININE-BSD FRML MDRD: 61 ML/MIN/1.73M2
GLUCOSE BLD-MCNC: 117 MG/DL (ref 70–108)
GLUCOSE BLD-MCNC: 131 MG/DL (ref 70–108)
GLUCOSE BLD-MCNC: 133 MG/DL (ref 70–108)
HCT VFR BLD CALC: 34.1 % (ref 37–47)
HEMOGLOBIN: 10.5 GM/DL (ref 12–16)
IMMATURE GRANS (ABS): 0.07 THOU/MM3 (ref 0–0.07)
IMMATURE GRANULOCYTES: 0.7 %
INR BLD: 0.87 (ref 0.85–1.13)
LYMPHOCYTES # BLD: 8.1 %
LYMPHOCYTES ABSOLUTE: 0.8 THOU/MM3 (ref 1–4.8)
MCH RBC QN AUTO: 31.6 PG (ref 26–33)
MCHC RBC AUTO-ENTMCNC: 30.8 GM/DL (ref 32.2–35.5)
MCV RBC AUTO: 102.7 FL (ref 81–99)
MONOCYTES # BLD: 9.5 %
MONOCYTES ABSOLUTE: 1 THOU/MM3 (ref 0.4–1.3)
MRSA SCREEN: NORMAL
NUCLEATED RED BLOOD CELLS: 0 /100 WBC
PLATELET # BLD: 117 THOU/MM3 (ref 130–400)
PMV BLD AUTO: 11.1 FL (ref 9.4–12.4)
POTASSIUM SERPL-SCNC: 5.2 MEQ/L (ref 3.5–5.2)
RBC # BLD: 3.32 MILL/MM3 (ref 4.2–5.4)
SEG NEUTROPHILS: 80.6 %
SEGMENTED NEUTROPHILS ABSOLUTE COUNT: 8.1 THOU/MM3 (ref 1.8–7.7)
SODIUM BLD-SCNC: 139 MEQ/L (ref 135–145)
WBC # BLD: 10 THOU/MM3 (ref 4.8–10.8)

## 2019-10-26 PROCEDURE — 97530 THERAPEUTIC ACTIVITIES: CPT

## 2019-10-26 PROCEDURE — 80048 BASIC METABOLIC PNL TOTAL CA: CPT

## 2019-10-26 PROCEDURE — APPSS45 APP SPLIT SHARED TIME 31-45 MINUTES: Performed by: PHYSICIAN ASSISTANT

## 2019-10-26 PROCEDURE — 2700000000 HC OXYGEN THERAPY PER DAY

## 2019-10-26 PROCEDURE — 82948 REAGENT STRIP/BLOOD GLUCOSE: CPT

## 2019-10-26 PROCEDURE — 85025 COMPLETE CBC W/AUTO DIFF WBC: CPT

## 2019-10-26 PROCEDURE — 97535 SELF CARE MNGMENT TRAINING: CPT

## 2019-10-26 PROCEDURE — 99231 SBSQ HOSP IP/OBS SF/LOW 25: CPT | Performed by: SURGERY

## 2019-10-26 PROCEDURE — 6370000000 HC RX 637 (ALT 250 FOR IP): Performed by: NURSE PRACTITIONER

## 2019-10-26 PROCEDURE — 94640 AIRWAY INHALATION TREATMENT: CPT

## 2019-10-26 PROCEDURE — 85610 PROTHROMBIN TIME: CPT

## 2019-10-26 PROCEDURE — 6360000002 HC RX W HCPCS: Performed by: NURSE PRACTITIONER

## 2019-10-26 PROCEDURE — 97110 THERAPEUTIC EXERCISES: CPT

## 2019-10-26 PROCEDURE — 94760 N-INVAS EAR/PLS OXIMETRY 1: CPT

## 2019-10-26 PROCEDURE — 6370000000 HC RX 637 (ALT 250 FOR IP): Performed by: SURGERY

## 2019-10-26 PROCEDURE — 36415 COLL VENOUS BLD VENIPUNCTURE: CPT

## 2019-10-26 PROCEDURE — 6370000000 HC RX 637 (ALT 250 FOR IP): Performed by: PHYSICIAN ASSISTANT

## 2019-10-26 PROCEDURE — APPNB30 APP NON BILLABLE TIME 0-30 MINS: Performed by: PHYSICIAN ASSISTANT

## 2019-10-26 PROCEDURE — 6360000002 HC RX W HCPCS: Performed by: SURGERY

## 2019-10-26 PROCEDURE — 0220000000 HC SKILLED NURSING FACILITY

## 2019-10-26 PROCEDURE — 1290000000 HC SEMI PRIVATE OTHER R&B

## 2019-10-26 PROCEDURE — 94761 N-INVAS EAR/PLS OXIMETRY MLT: CPT

## 2019-10-26 PROCEDURE — 97116 GAIT TRAINING THERAPY: CPT

## 2019-10-26 RX ORDER — WARFARIN SODIUM 4 MG/1
4 TABLET ORAL ONCE
Status: DISCONTINUED | OUTPATIENT
Start: 2019-10-26 | End: 2019-10-26 | Stop reason: HOSPADM

## 2019-10-26 RX ORDER — OXYCODONE HYDROCHLORIDE AND ACETAMINOPHEN 5; 325 MG/1; MG/1
2 TABLET ORAL EVERY 4 HOURS PRN
Status: DISCONTINUED | OUTPATIENT
Start: 2019-10-26 | End: 2019-10-26

## 2019-10-26 RX ORDER — SENNA PLUS 8.6 MG/1
2 TABLET ORAL NIGHTLY
Status: CANCELLED | OUTPATIENT
Start: 2019-10-26

## 2019-10-26 RX ORDER — DICYCLOMINE HYDROCHLORIDE 10 MG/1
10 CAPSULE ORAL
Status: DISCONTINUED | OUTPATIENT
Start: 2019-10-27 | End: 2019-11-21 | Stop reason: HOSPADM

## 2019-10-26 RX ORDER — TRAMADOL HYDROCHLORIDE 50 MG/1
25 TABLET ORAL EVERY 6 HOURS PRN
Status: DISCONTINUED | OUTPATIENT
Start: 2019-10-26 | End: 2019-10-26 | Stop reason: SDUPTHER

## 2019-10-26 RX ORDER — ALBUTEROL SULFATE 90 UG/1
2 AEROSOL, METERED RESPIRATORY (INHALATION) EVERY 6 HOURS PRN
Status: CANCELLED | OUTPATIENT
Start: 2019-10-26

## 2019-10-26 RX ORDER — PANTOPRAZOLE SODIUM 40 MG/1
40 TABLET, DELAYED RELEASE ORAL
Status: CANCELLED | OUTPATIENT
Start: 2019-10-27

## 2019-10-26 RX ORDER — BUDESONIDE 0.5 MG/2ML
500 INHALANT ORAL 2 TIMES DAILY
Status: DISCONTINUED | OUTPATIENT
Start: 2019-10-26 | End: 2019-11-21 | Stop reason: HOSPADM

## 2019-10-26 RX ORDER — TRAZODONE HYDROCHLORIDE 50 MG/1
50 TABLET ORAL NIGHTLY PRN
Status: CANCELLED | OUTPATIENT
Start: 2019-10-26

## 2019-10-26 RX ORDER — CETIRIZINE HYDROCHLORIDE 10 MG/1
5 TABLET ORAL DAILY
Status: DISCONTINUED | OUTPATIENT
Start: 2019-10-27 | End: 2019-11-21 | Stop reason: HOSPADM

## 2019-10-26 RX ORDER — FORMOTEROL FUMARATE 20 UG/2ML
20 SOLUTION RESPIRATORY (INHALATION) 2 TIMES DAILY
Status: CANCELLED | OUTPATIENT
Start: 2019-10-26

## 2019-10-26 RX ORDER — CYCLOBENZAPRINE HCL 10 MG
10 TABLET ORAL 3 TIMES DAILY PRN
Status: CANCELLED | OUTPATIENT
Start: 2019-10-26

## 2019-10-26 RX ORDER — ACETYLCYSTEINE 200 MG/ML
600 SOLUTION ORAL; RESPIRATORY (INHALATION) 2 TIMES DAILY
Status: CANCELLED | OUTPATIENT
Start: 2019-10-26

## 2019-10-26 RX ORDER — POLYETHYLENE GLYCOL 3350 17 G/17G
17 POWDER, FOR SOLUTION ORAL PRN
Status: CANCELLED | OUTPATIENT
Start: 2019-10-26

## 2019-10-26 RX ORDER — POLYETHYLENE GLYCOL 3350 17 G/17G
17 POWDER, FOR SOLUTION ORAL PRN
Status: DISCONTINUED | OUTPATIENT
Start: 2019-10-26 | End: 2019-11-21 | Stop reason: HOSPADM

## 2019-10-26 RX ORDER — FUROSEMIDE 40 MG/1
40 TABLET ORAL DAILY
Status: DISCONTINUED | OUTPATIENT
Start: 2019-10-27 | End: 2019-11-21 | Stop reason: HOSPADM

## 2019-10-26 RX ORDER — SENNA PLUS 8.6 MG/1
2 TABLET ORAL NIGHTLY
Status: DISCONTINUED | OUTPATIENT
Start: 2019-10-26 | End: 2019-10-26 | Stop reason: SDUPTHER

## 2019-10-26 RX ORDER — FUROSEMIDE 40 MG/1
40 TABLET ORAL DAILY
Status: CANCELLED | OUTPATIENT
Start: 2019-10-27

## 2019-10-26 RX ORDER — WARFARIN SODIUM 4 MG/1
4 TABLET ORAL ONCE
Status: COMPLETED | OUTPATIENT
Start: 2019-10-26 | End: 2019-10-26

## 2019-10-26 RX ORDER — DOCUSATE SODIUM 100 MG/1
100 CAPSULE, LIQUID FILLED ORAL 2 TIMES DAILY
Status: DISCONTINUED | OUTPATIENT
Start: 2019-10-26 | End: 2019-11-21 | Stop reason: HOSPADM

## 2019-10-26 RX ORDER — OXYCODONE HYDROCHLORIDE AND ACETAMINOPHEN 5; 325 MG/1; MG/1
1 TABLET ORAL EVERY 4 HOURS PRN
Status: CANCELLED | OUTPATIENT
Start: 2019-10-26

## 2019-10-26 RX ORDER — DIGOXIN 125 MCG
125 TABLET ORAL NIGHTLY
Status: DISCONTINUED | OUTPATIENT
Start: 2019-10-26 | End: 2019-11-21 | Stop reason: HOSPADM

## 2019-10-26 RX ORDER — TRAMADOL HYDROCHLORIDE 50 MG/1
50 TABLET ORAL EVERY 6 HOURS PRN
Status: DISCONTINUED | OUTPATIENT
Start: 2019-10-26 | End: 2019-10-26 | Stop reason: SDUPTHER

## 2019-10-26 RX ORDER — TRAMADOL HYDROCHLORIDE 50 MG/1
50 TABLET ORAL EVERY 6 HOURS PRN
Status: DISCONTINUED | OUTPATIENT
Start: 2019-10-26 | End: 2019-10-26

## 2019-10-26 RX ORDER — OXYCODONE HYDROCHLORIDE AND ACETAMINOPHEN 5; 325 MG/1; MG/1
2 TABLET ORAL EVERY 4 HOURS PRN
Status: CANCELLED | OUTPATIENT
Start: 2019-10-26

## 2019-10-26 RX ORDER — OXYCODONE HYDROCHLORIDE AND ACETAMINOPHEN 5; 325 MG/1; MG/1
1 TABLET ORAL EVERY 4 HOURS PRN
Status: DISCONTINUED | OUTPATIENT
Start: 2019-10-26 | End: 2019-10-26

## 2019-10-26 RX ORDER — ACETAMINOPHEN 325 MG/1
650 TABLET ORAL EVERY 4 HOURS PRN
Status: CANCELLED | OUTPATIENT
Start: 2019-10-26

## 2019-10-26 RX ORDER — FORMOTEROL FUMARATE 20 UG/2ML
20 SOLUTION RESPIRATORY (INHALATION) 2 TIMES DAILY
Status: DISCONTINUED | OUTPATIENT
Start: 2019-10-26 | End: 2019-11-21 | Stop reason: HOSPADM

## 2019-10-26 RX ORDER — METOPROLOL SUCCINATE 25 MG/1
25 TABLET, EXTENDED RELEASE ORAL NIGHTLY
Status: CANCELLED | OUTPATIENT
Start: 2019-10-26

## 2019-10-26 RX ORDER — BUDESONIDE 0.5 MG/2ML
500 INHALANT ORAL 2 TIMES DAILY
Status: CANCELLED | OUTPATIENT
Start: 2019-10-26

## 2019-10-26 RX ORDER — DICYCLOMINE HYDROCHLORIDE 10 MG/1
10 CAPSULE ORAL
Status: CANCELLED | OUTPATIENT
Start: 2019-10-26

## 2019-10-26 RX ORDER — ACETYLCYSTEINE 200 MG/ML
600 SOLUTION ORAL; RESPIRATORY (INHALATION) 2 TIMES DAILY
Status: DISCONTINUED | OUTPATIENT
Start: 2019-10-26 | End: 2019-11-21 | Stop reason: HOSPADM

## 2019-10-26 RX ORDER — ALBUTEROL SULFATE 90 UG/1
2 AEROSOL, METERED RESPIRATORY (INHALATION) EVERY 6 HOURS PRN
Status: DISCONTINUED | OUTPATIENT
Start: 2019-10-26 | End: 2019-10-27

## 2019-10-26 RX ORDER — SENNA PLUS 8.6 MG/1
2 TABLET ORAL NIGHTLY
Status: DISCONTINUED | OUTPATIENT
Start: 2019-10-26 | End: 2019-11-21 | Stop reason: HOSPADM

## 2019-10-26 RX ORDER — WARFARIN SODIUM 4 MG/1
4 TABLET ORAL ONCE
Status: CANCELLED | OUTPATIENT
Start: 2019-10-26

## 2019-10-26 RX ORDER — TRAMADOL HYDROCHLORIDE 50 MG/1
100 TABLET ORAL EVERY 6 HOURS PRN
Status: DISCONTINUED | OUTPATIENT
Start: 2019-10-26 | End: 2019-11-02

## 2019-10-26 RX ORDER — ONDANSETRON 2 MG/ML
4 INJECTION INTRAMUSCULAR; INTRAVENOUS EVERY 6 HOURS PRN
Status: CANCELLED | OUTPATIENT
Start: 2019-10-26

## 2019-10-26 RX ORDER — TRAMADOL HYDROCHLORIDE 50 MG/1
100 TABLET ORAL EVERY 6 HOURS PRN
Status: DISCONTINUED | OUTPATIENT
Start: 2019-10-26 | End: 2019-10-26 | Stop reason: SDUPTHER

## 2019-10-26 RX ORDER — ONDANSETRON 2 MG/ML
4 INJECTION INTRAMUSCULAR; INTRAVENOUS EVERY 6 HOURS PRN
Status: DISCONTINUED | OUTPATIENT
Start: 2019-10-26 | End: 2019-10-29

## 2019-10-26 RX ORDER — TRAMADOL HYDROCHLORIDE 50 MG/1
50 TABLET ORAL EVERY 6 HOURS PRN
Status: DISCONTINUED | OUTPATIENT
Start: 2019-10-26 | End: 2019-11-02

## 2019-10-26 RX ORDER — ACETAMINOPHEN 325 MG/1
650 TABLET ORAL EVERY 4 HOURS PRN
Status: DISCONTINUED | OUTPATIENT
Start: 2019-10-26 | End: 2019-11-21 | Stop reason: HOSPADM

## 2019-10-26 RX ORDER — DOCUSATE SODIUM 100 MG/1
100 CAPSULE, LIQUID FILLED ORAL 2 TIMES DAILY
Status: CANCELLED | OUTPATIENT
Start: 2019-10-26

## 2019-10-26 RX ORDER — DIGOXIN 125 MCG
125 TABLET ORAL NIGHTLY
Status: CANCELLED | OUTPATIENT
Start: 2019-10-26

## 2019-10-26 RX ORDER — METOPROLOL SUCCINATE 25 MG/1
25 TABLET, EXTENDED RELEASE ORAL NIGHTLY
Status: DISCONTINUED | OUTPATIENT
Start: 2019-10-26 | End: 2019-11-21 | Stop reason: HOSPADM

## 2019-10-26 RX ORDER — PANTOPRAZOLE SODIUM 40 MG/1
40 TABLET, DELAYED RELEASE ORAL
Status: DISCONTINUED | OUTPATIENT
Start: 2019-10-27 | End: 2019-10-31

## 2019-10-26 RX ORDER — TRAZODONE HYDROCHLORIDE 50 MG/1
50 TABLET ORAL NIGHTLY PRN
Status: DISCONTINUED | OUTPATIENT
Start: 2019-10-26 | End: 2019-11-21 | Stop reason: HOSPADM

## 2019-10-26 RX ORDER — ALBUTEROL SULFATE 2.5 MG/3ML
2.5 SOLUTION RESPIRATORY (INHALATION) 4 TIMES DAILY
Status: CANCELLED | OUTPATIENT
Start: 2019-10-26

## 2019-10-26 RX ORDER — ALBUTEROL SULFATE 2.5 MG/3ML
2.5 SOLUTION RESPIRATORY (INHALATION) 4 TIMES DAILY
Status: DISCONTINUED | OUTPATIENT
Start: 2019-10-27 | End: 2019-10-27

## 2019-10-26 RX ORDER — CETIRIZINE HYDROCHLORIDE 10 MG/1
5 TABLET ORAL DAILY
Status: CANCELLED | OUTPATIENT
Start: 2019-10-27

## 2019-10-26 RX ORDER — CYCLOBENZAPRINE HCL 10 MG
10 TABLET ORAL 3 TIMES DAILY PRN
Status: DISCONTINUED | OUTPATIENT
Start: 2019-10-26 | End: 2019-10-29

## 2019-10-26 RX ADMIN — NALOXEGOL OXALATE 12.5 MG: 12.5 TABLET, FILM COATED ORAL at 09:16

## 2019-10-26 RX ADMIN — DOCUSATE SODIUM 100 MG: 100 CAPSULE, LIQUID FILLED ORAL at 21:45

## 2019-10-26 RX ADMIN — TRAZODONE HYDROCHLORIDE 50 MG: 50 TABLET ORAL at 21:43

## 2019-10-26 RX ADMIN — PANTOPRAZOLE SODIUM 40 MG: 40 TABLET, DELAYED RELEASE ORAL at 06:08

## 2019-10-26 RX ADMIN — DICYCLOMINE HYDROCHLORIDE 10 MG: 10 CAPSULE ORAL at 12:03

## 2019-10-26 RX ADMIN — IPRATROPIUM BROMIDE 0.5 MG: 0.5 SOLUTION RESPIRATORY (INHALATION) at 12:48

## 2019-10-26 RX ADMIN — OXYCODONE HYDROCHLORIDE AND ACETAMINOPHEN 1 TABLET: 5; 325 TABLET ORAL at 09:14

## 2019-10-26 RX ADMIN — CYCLOBENZAPRINE 10 MG: 10 TABLET, FILM COATED ORAL at 21:44

## 2019-10-26 RX ADMIN — DOCUSATE SODIUM 100 MG: 100 CAPSULE, LIQUID FILLED ORAL at 09:14

## 2019-10-26 RX ADMIN — POLYETHYLENE GLYCOL 3350 17 G: 17 POWDER, FOR SOLUTION ORAL at 16:21

## 2019-10-26 RX ADMIN — BUDESONIDE 500 MCG: 0.5 INHALANT RESPIRATORY (INHALATION) at 09:34

## 2019-10-26 RX ADMIN — LEVALBUTEROL HYDROCHLORIDE 1.25 MG: 1.25 SOLUTION RESPIRATORY (INHALATION) at 12:48

## 2019-10-26 RX ADMIN — FORMOTEROL FUMARATE DIHYDRATE 20 MCG: 20 SOLUTION RESPIRATORY (INHALATION) at 20:03

## 2019-10-26 RX ADMIN — CETIRIZINE HYDROCHLORIDE 5 MG: 10 TABLET, FILM COATED ORAL at 09:16

## 2019-10-26 RX ADMIN — IPRATROPIUM BROMIDE 0.5 MG: 0.5 SOLUTION RESPIRATORY (INHALATION) at 20:03

## 2019-10-26 RX ADMIN — METOPROLOL SUCCINATE 25 MG: 25 TABLET, FILM COATED, EXTENDED RELEASE ORAL at 21:45

## 2019-10-26 RX ADMIN — IPRATROPIUM BROMIDE 0.5 MG: 0.5 SOLUTION RESPIRATORY (INHALATION) at 09:43

## 2019-10-26 RX ADMIN — BUDESONIDE 500 MCG: 0.5 INHALANT RESPIRATORY (INHALATION) at 20:03

## 2019-10-26 RX ADMIN — OXYCODONE HYDROCHLORIDE AND ACETAMINOPHEN 1 TABLET: 5; 325 TABLET ORAL at 04:54

## 2019-10-26 RX ADMIN — SENNOSIDES 17.2 MG: 8.6 TABLET, FILM COATED ORAL at 21:44

## 2019-10-26 RX ADMIN — FUROSEMIDE 40 MG: 40 TABLET ORAL at 09:20

## 2019-10-26 RX ADMIN — IPRATROPIUM BROMIDE 0.5 MG: 0.5 SOLUTION RESPIRATORY (INHALATION) at 16:26

## 2019-10-26 RX ADMIN — DICYCLOMINE HYDROCHLORIDE 10 MG: 10 CAPSULE ORAL at 06:07

## 2019-10-26 RX ADMIN — LEVALBUTEROL HYDROCHLORIDE 1.25 MG: 1.25 SOLUTION RESPIRATORY (INHALATION) at 16:26

## 2019-10-26 RX ADMIN — ACETYLCYSTEINE 600 MG: 200 SOLUTION ORAL; RESPIRATORY (INHALATION) at 21:43

## 2019-10-26 RX ADMIN — LEVALBUTEROL HYDROCHLORIDE 1.25 MG: 1.25 SOLUTION RESPIRATORY (INHALATION) at 09:46

## 2019-10-26 RX ADMIN — FORMOTEROL FUMARATE DIHYDRATE 20 MCG: 20 SOLUTION RESPIRATORY (INHALATION) at 09:33

## 2019-10-26 RX ADMIN — WARFARIN SODIUM 4 MG: 4 TABLET ORAL at 21:49

## 2019-10-26 RX ADMIN — DIGOXIN 125 MCG: 125 TABLET ORAL at 21:44

## 2019-10-26 RX ADMIN — OXYCODONE HYDROCHLORIDE AND ACETAMINOPHEN 1 TABLET: 5; 325 TABLET ORAL at 16:21

## 2019-10-26 ASSESSMENT — PAIN DESCRIPTION - INTENSITY
RATING_3: 3
RATING_3: 3
RATING_2: 3
RATING_2: 3
RATING_3: 3
RATING_2: 3

## 2019-10-26 ASSESSMENT — PAIN DESCRIPTION - DESCRIPTORS
DESCRIPTORS: CONSTANT;SHARP
DESCRIPTORS_2: DISCOMFORT
DESCRIPTORS_3: DISCOMFORT
DESCRIPTORS_3: DISCOMFORT
DESCRIPTORS_2: DISCOMFORT
DESCRIPTORS: CONSTANT;DISCOMFORT;SHARP
DESCRIPTORS_3: DISCOMFORT

## 2019-10-26 ASSESSMENT — PAIN DESCRIPTION - ONSET
ONSET_2: ON-GOING
ONSET: ON-GOING
ONSET_3: ON-GOING
ONSET_3: ON-GOING
ONSET_2: ON-GOING
ONSET_3: ON-GOING
ONSET: ON-GOING

## 2019-10-26 ASSESSMENT — PAIN SCALES - GENERAL
PAINLEVEL_OUTOF10: 6
PAINLEVEL_OUTOF10: 0
PAINLEVEL_OUTOF10: 10
PAINLEVEL_OUTOF10: 0
PAINLEVEL_OUTOF10: 8
PAINLEVEL_OUTOF10: 0
PAINLEVEL_OUTOF10: 4

## 2019-10-26 ASSESSMENT — PAIN DESCRIPTION - FREQUENCY
FREQUENCY: CONTINUOUS
FREQUENCY: CONTINUOUS

## 2019-10-26 ASSESSMENT — PAIN DESCRIPTION - ORIENTATION
ORIENTATION: LEFT
ORIENTATION_2: LOWER;MID
ORIENTATION_3: MID
ORIENTATION: LEFT
ORIENTATION_3: MID
ORIENTATION_2: LOWER;MID

## 2019-10-26 ASSESSMENT — PAIN - FUNCTIONAL ASSESSMENT
PAIN_FUNCTIONAL_ASSESSMENT: PREVENTS OR INTERFERES SOME ACTIVE ACTIVITIES AND ADLS
PAIN_FUNCTIONAL_ASSESSMENT: PREVENTS OR INTERFERES SOME ACTIVE ACTIVITIES AND ADLS

## 2019-10-26 ASSESSMENT — PAIN DESCRIPTION - LOCATION
LOCATION_3: ABDOMEN
LOCATION_3: ABDOMEN
LOCATION: HIP
LOCATION_2: LEG
LOCATION_3: ABDOMEN
LOCATION_2: LEG
LOCATION: HIP

## 2019-10-26 ASSESSMENT — PAIN DESCRIPTION - PAIN TYPE
TYPE: SURGICAL PAIN
TYPE_3: ACUTE PAIN
TYPE_2: ACUTE PAIN
TYPE_2: ACUTE PAIN
TYPE: SURGICAL PAIN
TYPE_3: ACUTE PAIN

## 2019-10-26 ASSESSMENT — PAIN DESCRIPTION - DURATION
DURATION_2: CONTINUOUS
DURATION_3: CONTINUOUS
DURATION_3: CONTINUOUS
DURATION_2: CONTINUOUS

## 2019-10-26 ASSESSMENT — PAIN DESCRIPTION - PROGRESSION
CLINICAL_PROGRESSION: GRADUALLY WORSENING
CLINICAL_PROGRESSION: OTHER (COMMENT)

## 2019-10-27 PROBLEM — Z87.81 S/P LEFT HIP FRACTURE: Status: ACTIVE | Noted: 2019-10-27

## 2019-10-27 PROBLEM — E66.09 CLASS 1 OBESITY DUE TO EXCESS CALORIES WITH SERIOUS COMORBIDITY AND BODY MASS INDEX (BMI) OF 32.0 TO 32.9 IN ADULT: Status: ACTIVE | Noted: 2019-10-27

## 2019-10-27 PROBLEM — G47.00 INSOMNIA: Status: ACTIVE | Noted: 2019-10-27

## 2019-10-27 PROBLEM — D62 ACUTE BLOOD LOSS AS CAUSE OF POSTOPERATIVE ANEMIA: Status: ACTIVE | Noted: 2019-10-27

## 2019-10-27 PROBLEM — J98.11 ATELECTASIS OF LEFT LUNG: Status: ACTIVE | Noted: 2019-10-27

## 2019-10-27 PROBLEM — Z86.718 H/O DEEP VENOUS THROMBOSIS: Status: ACTIVE | Noted: 2019-10-27

## 2019-10-27 PROBLEM — N18.2 CKD (CHRONIC KIDNEY DISEASE) STAGE 2, GFR 60-89 ML/MIN: Status: ACTIVE | Noted: 2019-10-27

## 2019-10-27 PROBLEM — I50.22 CHRONIC SYSTOLIC CHF (CONGESTIVE HEART FAILURE) (HCC): Status: ACTIVE | Noted: 2019-10-27

## 2019-10-27 PROBLEM — K58.9 IBS (IRRITABLE BOWEL SYNDROME): Status: ACTIVE | Noted: 2019-10-27

## 2019-10-27 LAB — INR BLD: 1.91 (ref 0.85–1.13)

## 2019-10-27 PROCEDURE — 97166 OT EVAL MOD COMPLEX 45 MIN: CPT

## 2019-10-27 PROCEDURE — 97530 THERAPEUTIC ACTIVITIES: CPT

## 2019-10-27 PROCEDURE — 97535 SELF CARE MNGMENT TRAINING: CPT

## 2019-10-27 PROCEDURE — 1290000000 HC SEMI PRIVATE OTHER R&B

## 2019-10-27 PROCEDURE — 6370000000 HC RX 637 (ALT 250 FOR IP): Performed by: FAMILY MEDICINE

## 2019-10-27 PROCEDURE — 94761 N-INVAS EAR/PLS OXIMETRY MLT: CPT

## 2019-10-27 PROCEDURE — 85610 PROTHROMBIN TIME: CPT

## 2019-10-27 PROCEDURE — 94640 AIRWAY INHALATION TREATMENT: CPT

## 2019-10-27 PROCEDURE — 6370000000 HC RX 637 (ALT 250 FOR IP): Performed by: SURGERY

## 2019-10-27 PROCEDURE — 97110 THERAPEUTIC EXERCISES: CPT

## 2019-10-27 PROCEDURE — 6360000002 HC RX W HCPCS: Performed by: FAMILY MEDICINE

## 2019-10-27 PROCEDURE — 6360000002 HC RX W HCPCS: Performed by: SURGERY

## 2019-10-27 PROCEDURE — 97162 PT EVAL MOD COMPLEX 30 MIN: CPT

## 2019-10-27 PROCEDURE — 2500000003 HC RX 250 WO HCPCS: Performed by: FAMILY MEDICINE

## 2019-10-27 PROCEDURE — 36415 COLL VENOUS BLD VENIPUNCTURE: CPT

## 2019-10-27 RX ORDER — LEVALBUTEROL INHALATION SOLUTION 1.25 MG/3ML
1.25 SOLUTION RESPIRATORY (INHALATION)
Status: DISCONTINUED | OUTPATIENT
Start: 2019-10-28 | End: 2019-11-21 | Stop reason: HOSPADM

## 2019-10-27 RX ORDER — LEVALBUTEROL INHALATION SOLUTION 1.25 MG/3ML
1.25 SOLUTION RESPIRATORY (INHALATION)
Status: COMPLETED | OUTPATIENT
Start: 2019-10-27 | End: 2019-10-27

## 2019-10-27 RX ORDER — LEVALBUTEROL TARTRATE 45 UG/1
2 AEROSOL, METERED ORAL EVERY 4 HOURS PRN
Status: DISCONTINUED | OUTPATIENT
Start: 2019-10-27 | End: 2019-10-27

## 2019-10-27 RX ADMIN — ACETYLCYSTEINE 600 MG: 200 SOLUTION ORAL; RESPIRATORY (INHALATION) at 10:37

## 2019-10-27 RX ADMIN — CETIRIZINE HYDROCHLORIDE 5 MG: 10 TABLET, FILM COATED ORAL at 10:37

## 2019-10-27 RX ADMIN — DIGOXIN 125 MCG: 125 TABLET ORAL at 23:46

## 2019-10-27 RX ADMIN — METOPROLOL SUCCINATE 25 MG: 25 TABLET, FILM COATED, EXTENDED RELEASE ORAL at 23:46

## 2019-10-27 RX ADMIN — DICYCLOMINE HYDROCHLORIDE 10 MG: 10 CAPSULE ORAL at 16:59

## 2019-10-27 RX ADMIN — IPRATROPIUM BROMIDE 0.5 MG: 0.5 SOLUTION RESPIRATORY (INHALATION) at 16:23

## 2019-10-27 RX ADMIN — DOCUSATE SODIUM 100 MG: 100 CAPSULE, LIQUID FILLED ORAL at 10:36

## 2019-10-27 RX ADMIN — DICYCLOMINE HYDROCHLORIDE 10 MG: 10 CAPSULE ORAL at 23:45

## 2019-10-27 RX ADMIN — ACETYLCYSTEINE 600 MG: 200 SOLUTION ORAL; RESPIRATORY (INHALATION) at 23:45

## 2019-10-27 RX ADMIN — TRAMADOL HYDROCHLORIDE 50 MG: 50 TABLET ORAL at 13:48

## 2019-10-27 RX ADMIN — ACETAMINOPHEN 650 MG: 325 TABLET ORAL at 16:59

## 2019-10-27 RX ADMIN — BUDESONIDE 500 MCG: 0.5 INHALANT RESPIRATORY (INHALATION) at 05:07

## 2019-10-27 RX ADMIN — PANTOPRAZOLE SODIUM 40 MG: 40 TABLET, DELAYED RELEASE ORAL at 05:46

## 2019-10-27 RX ADMIN — SENNOSIDES 17.2 MG: 8.6 TABLET, FILM COATED ORAL at 23:46

## 2019-10-27 RX ADMIN — ALBUTEROL SULFATE 2.5 MG: 2.5 SOLUTION RESPIRATORY (INHALATION) at 16:23

## 2019-10-27 RX ADMIN — DICYCLOMINE HYDROCHLORIDE 10 MG: 10 CAPSULE ORAL at 10:37

## 2019-10-27 RX ADMIN — ALBUTEROL SULFATE 2.5 MG: 2.5 SOLUTION RESPIRATORY (INHALATION) at 13:12

## 2019-10-27 RX ADMIN — ACETAMINOPHEN 650 MG: 325 TABLET ORAL at 10:36

## 2019-10-27 RX ADMIN — ALBUTEROL SULFATE 2.5 MG: 2.5 SOLUTION RESPIRATORY (INHALATION) at 08:32

## 2019-10-27 RX ADMIN — BUDESONIDE 500 MCG: 0.5 INHALANT RESPIRATORY (INHALATION) at 16:23

## 2019-10-27 RX ADMIN — POLYETHYLENE GLYCOL 3350 17 G: 17 POWDER, FOR SOLUTION ORAL at 10:36

## 2019-10-27 RX ADMIN — ACETAMINOPHEN 650 MG: 325 TABLET ORAL at 23:46

## 2019-10-27 RX ADMIN — FORMOTEROL FUMARATE DIHYDRATE 20 MCG: 20 SOLUTION RESPIRATORY (INHALATION) at 16:23

## 2019-10-27 RX ADMIN — IPRATROPIUM BROMIDE 0.5 MG: 0.5 SOLUTION RESPIRATORY (INHALATION) at 13:17

## 2019-10-27 RX ADMIN — ACETAMINOPHEN 650 MG: 325 TABLET ORAL at 00:30

## 2019-10-27 RX ADMIN — IPRATROPIUM BROMIDE 0.5 MG: 0.5 SOLUTION RESPIRATORY (INHALATION) at 08:33

## 2019-10-27 RX ADMIN — IPRATROPIUM BROMIDE 0.5 MG: 0.5 SOLUTION RESPIRATORY (INHALATION) at 05:06

## 2019-10-27 RX ADMIN — ALBUTEROL SULFATE 2.5 MG: 2.5 SOLUTION RESPIRATORY (INHALATION) at 05:05

## 2019-10-27 RX ADMIN — MAGNESIUM HYDROXIDE 15 ML: 400 SUSPENSION ORAL at 01:50

## 2019-10-27 RX ADMIN — TRAMADOL HYDROCHLORIDE 50 MG: 50 TABLET ORAL at 05:47

## 2019-10-27 RX ADMIN — DICYCLOMINE HYDROCHLORIDE 10 MG: 10 CAPSULE ORAL at 05:46

## 2019-10-27 RX ADMIN — NALOXEGOL OXALATE 12.5 MG: 12.5 TABLET, FILM COATED ORAL at 05:46

## 2019-10-27 RX ADMIN — LEVALBUTEROL HYDROCHLORIDE 1.25 MG: 1.25 SOLUTION RESPIRATORY (INHALATION) at 22:29

## 2019-10-27 RX ADMIN — DOCUSATE SODIUM 100 MG: 100 CAPSULE, LIQUID FILLED ORAL at 23:46

## 2019-10-27 RX ADMIN — FUROSEMIDE 40 MG: 40 TABLET ORAL at 10:36

## 2019-10-27 RX ADMIN — FORMOTEROL FUMARATE DIHYDRATE 20 MCG: 20 SOLUTION RESPIRATORY (INHALATION) at 05:06

## 2019-10-27 ASSESSMENT — PAIN DESCRIPTION - FREQUENCY
FREQUENCY: CONTINUOUS
FREQUENCY: INTERMITTENT
FREQUENCY: CONTINUOUS

## 2019-10-27 ASSESSMENT — PAIN DESCRIPTION - LOCATION
LOCATION: HIP
LOCATION: FOOT
LOCATION: TOE (COMMENT WHICH ONE);HIP

## 2019-10-27 ASSESSMENT — PAIN SCALES - GENERAL
PAINLEVEL_OUTOF10: 6
PAINLEVEL_OUTOF10: 3
PAINLEVEL_OUTOF10: 10
PAINLEVEL_OUTOF10: 4
PAINLEVEL_OUTOF10: 10

## 2019-10-27 ASSESSMENT — PAIN DESCRIPTION - DESCRIPTORS: DESCRIPTORS: ACHING

## 2019-10-27 ASSESSMENT — PAIN DESCRIPTION - PROGRESSION: CLINICAL_PROGRESSION: GRADUALLY IMPROVING

## 2019-10-27 ASSESSMENT — PAIN DESCRIPTION - ORIENTATION
ORIENTATION: RIGHT;LEFT
ORIENTATION: LEFT

## 2019-10-27 ASSESSMENT — PAIN - FUNCTIONAL ASSESSMENT: PAIN_FUNCTIONAL_ASSESSMENT: ACTIVITIES ARE NOT PREVENTED

## 2019-10-27 ASSESSMENT — PAIN DESCRIPTION - PAIN TYPE: TYPE: SURGICAL PAIN

## 2019-10-27 ASSESSMENT — PAIN DESCRIPTION - ONSET: ONSET: ON-GOING

## 2019-10-28 LAB
BILIRUBIN URINE: NEGATIVE
BLOOD, URINE: NEGATIVE
CHARACTER, URINE: CLEAR
COLOR: YELLOW
GLUCOSE URINE: NEGATIVE MG/DL
INR BLD: 2.33 (ref 0.85–1.13)
KETONES, URINE: NEGATIVE
LEUKOCYTE ESTERASE, URINE: NEGATIVE
NITRITE, URINE: NEGATIVE
PH UA: 5.5 (ref 5–9)
PROTEIN UA: NEGATIVE
SPECIFIC GRAVITY, URINE: 1.01 (ref 1–1.03)
UROBILINOGEN, URINE: 0.2 EU/DL (ref 0–1)

## 2019-10-28 PROCEDURE — 81003 URINALYSIS AUTO W/O SCOPE: CPT

## 2019-10-28 PROCEDURE — 97530 THERAPEUTIC ACTIVITIES: CPT

## 2019-10-28 PROCEDURE — 6370000000 HC RX 637 (ALT 250 FOR IP)

## 2019-10-28 PROCEDURE — 94640 AIRWAY INHALATION TREATMENT: CPT

## 2019-10-28 PROCEDURE — 2500000003 HC RX 250 WO HCPCS: Performed by: FAMILY MEDICINE

## 2019-10-28 PROCEDURE — 85610 PROTHROMBIN TIME: CPT

## 2019-10-28 PROCEDURE — 97116 GAIT TRAINING THERAPY: CPT

## 2019-10-28 PROCEDURE — 1290000000 HC SEMI PRIVATE OTHER R&B

## 2019-10-28 PROCEDURE — 6360000002 HC RX W HCPCS: Performed by: SURGERY

## 2019-10-28 PROCEDURE — 97110 THERAPEUTIC EXERCISES: CPT

## 2019-10-28 PROCEDURE — 94760 N-INVAS EAR/PLS OXIMETRY 1: CPT

## 2019-10-28 PROCEDURE — 6360000002 HC RX W HCPCS: Performed by: FAMILY MEDICINE

## 2019-10-28 PROCEDURE — 6370000000 HC RX 637 (ALT 250 FOR IP): Performed by: INTERNAL MEDICINE

## 2019-10-28 PROCEDURE — 36415 COLL VENOUS BLD VENIPUNCTURE: CPT

## 2019-10-28 PROCEDURE — 6370000000 HC RX 637 (ALT 250 FOR IP): Performed by: SURGERY

## 2019-10-28 PROCEDURE — 97535 SELF CARE MNGMENT TRAINING: CPT

## 2019-10-28 PROCEDURE — 2700000000 HC OXYGEN THERAPY PER DAY

## 2019-10-28 RX ORDER — UBIDECARENONE 100 MG
200 CAPSULE ORAL DAILY
Status: DISCONTINUED | OUTPATIENT
Start: 2019-10-28 | End: 2019-10-28

## 2019-10-28 RX ORDER — UBIDECARENONE 200 MG
200 CAPSULE ORAL DAILY
Status: DISCONTINUED | OUTPATIENT
Start: 2019-10-29 | End: 2019-11-21 | Stop reason: HOSPADM

## 2019-10-28 RX ORDER — WARFARIN SODIUM 1 MG/1
1 TABLET ORAL
Status: COMPLETED | OUTPATIENT
Start: 2019-10-28 | End: 2019-10-28

## 2019-10-28 RX ADMIN — CYCLOBENZAPRINE 10 MG: 10 TABLET, FILM COATED ORAL at 21:53

## 2019-10-28 RX ADMIN — ACETAMINOPHEN 650 MG: 325 TABLET ORAL at 04:52

## 2019-10-28 RX ADMIN — DICYCLOMINE HYDROCHLORIDE 10 MG: 10 CAPSULE ORAL at 04:59

## 2019-10-28 RX ADMIN — LEVALBUTEROL HYDROCHLORIDE 1.25 MG: 1.25 SOLUTION RESPIRATORY (INHALATION) at 06:05

## 2019-10-28 RX ADMIN — DOCUSATE SODIUM 100 MG: 100 CAPSULE, LIQUID FILLED ORAL at 08:45

## 2019-10-28 RX ADMIN — Medication 5 UNITS: at 09:20

## 2019-10-28 RX ADMIN — WARFARIN SODIUM 1 MG: 1 TABLET ORAL at 17:32

## 2019-10-28 RX ADMIN — FORMOTEROL FUMARATE DIHYDRATE 20 MCG: 20 SOLUTION RESPIRATORY (INHALATION) at 06:06

## 2019-10-28 RX ADMIN — IPRATROPIUM BROMIDE 0.5 MG: 0.5 SOLUTION RESPIRATORY (INHALATION) at 21:46

## 2019-10-28 RX ADMIN — ACETAMINOPHEN 650 MG: 325 TABLET ORAL at 14:36

## 2019-10-28 RX ADMIN — SACUBITRIL AND VALSARTAN 1 TABLET: 49; 51 TABLET, FILM COATED ORAL at 21:54

## 2019-10-28 RX ADMIN — SENNOSIDES 17.2 MG: 8.6 TABLET, FILM COATED ORAL at 21:54

## 2019-10-28 RX ADMIN — ACETAMINOPHEN 650 MG: 325 TABLET ORAL at 09:21

## 2019-10-28 RX ADMIN — IPRATROPIUM BROMIDE 0.5 MG: 0.5 SOLUTION RESPIRATORY (INHALATION) at 06:05

## 2019-10-28 RX ADMIN — METOPROLOL SUCCINATE 25 MG: 25 TABLET, FILM COATED, EXTENDED RELEASE ORAL at 21:55

## 2019-10-28 RX ADMIN — IPRATROPIUM BROMIDE 0.5 MG: 0.5 SOLUTION RESPIRATORY (INHALATION) at 09:30

## 2019-10-28 RX ADMIN — PANTOPRAZOLE SODIUM 40 MG: 40 TABLET, DELAYED RELEASE ORAL at 04:59

## 2019-10-28 RX ADMIN — ACETYLCYSTEINE 600 MG: 200 SOLUTION ORAL; RESPIRATORY (INHALATION) at 09:20

## 2019-10-28 RX ADMIN — DICYCLOMINE HYDROCHLORIDE 10 MG: 10 CAPSULE ORAL at 17:32

## 2019-10-28 RX ADMIN — DICYCLOMINE HYDROCHLORIDE 10 MG: 10 CAPSULE ORAL at 21:55

## 2019-10-28 RX ADMIN — DIGOXIN 125 MCG: 125 TABLET ORAL at 21:55

## 2019-10-28 RX ADMIN — IPRATROPIUM BROMIDE 0.5 MG: 0.5 SOLUTION RESPIRATORY (INHALATION) at 14:14

## 2019-10-28 RX ADMIN — LEVALBUTEROL HYDROCHLORIDE 1.25 MG: 1.25 SOLUTION RESPIRATORY (INHALATION) at 09:30

## 2019-10-28 RX ADMIN — NALOXEGOL OXALATE 12.5 MG: 12.5 TABLET, FILM COATED ORAL at 04:59

## 2019-10-28 RX ADMIN — CETIRIZINE HYDROCHLORIDE 5 MG: 10 TABLET, FILM COATED ORAL at 08:45

## 2019-10-28 RX ADMIN — DOCUSATE SODIUM 100 MG: 100 CAPSULE, LIQUID FILLED ORAL at 21:55

## 2019-10-28 RX ADMIN — BUDESONIDE 500 MCG: 0.5 INHALANT RESPIRATORY (INHALATION) at 06:06

## 2019-10-28 RX ADMIN — DICYCLOMINE HYDROCHLORIDE 10 MG: 10 CAPSULE ORAL at 12:17

## 2019-10-28 RX ADMIN — LEVALBUTEROL HYDROCHLORIDE 1.25 MG: 1.25 SOLUTION RESPIRATORY (INHALATION) at 21:47

## 2019-10-28 RX ADMIN — FUROSEMIDE 40 MG: 40 TABLET ORAL at 08:45

## 2019-10-28 RX ADMIN — LEVALBUTEROL HYDROCHLORIDE 1.25 MG: 1.25 SOLUTION RESPIRATORY (INHALATION) at 14:14

## 2019-10-28 RX ADMIN — ACETAMINOPHEN 650 MG: 325 TABLET ORAL at 21:54

## 2019-10-28 ASSESSMENT — PAIN SCALES - GENERAL
PAINLEVEL_OUTOF10: 2
PAINLEVEL_OUTOF10: 5
PAINLEVEL_OUTOF10: 2
PAINLEVEL_OUTOF10: 8

## 2019-10-29 LAB — INR BLD: 1.91 (ref 0.85–1.13)

## 2019-10-29 PROCEDURE — 6360000002 HC RX W HCPCS: Performed by: SURGERY

## 2019-10-29 PROCEDURE — 97530 THERAPEUTIC ACTIVITIES: CPT

## 2019-10-29 PROCEDURE — 2700000000 HC OXYGEN THERAPY PER DAY

## 2019-10-29 PROCEDURE — 6360000002 HC RX W HCPCS: Performed by: FAMILY MEDICINE

## 2019-10-29 PROCEDURE — 97110 THERAPEUTIC EXERCISES: CPT

## 2019-10-29 PROCEDURE — 6370000000 HC RX 637 (ALT 250 FOR IP): Performed by: FAMILY MEDICINE

## 2019-10-29 PROCEDURE — 6370000000 HC RX 637 (ALT 250 FOR IP): Performed by: INTERNAL MEDICINE

## 2019-10-29 PROCEDURE — 94760 N-INVAS EAR/PLS OXIMETRY 1: CPT

## 2019-10-29 PROCEDURE — 94640 AIRWAY INHALATION TREATMENT: CPT

## 2019-10-29 PROCEDURE — 85610 PROTHROMBIN TIME: CPT

## 2019-10-29 PROCEDURE — 1290000000 HC SEMI PRIVATE OTHER R&B

## 2019-10-29 PROCEDURE — 36415 COLL VENOUS BLD VENIPUNCTURE: CPT

## 2019-10-29 PROCEDURE — 6370000000 HC RX 637 (ALT 250 FOR IP): Performed by: SURGERY

## 2019-10-29 PROCEDURE — 51798 US URINE CAPACITY MEASURE: CPT

## 2019-10-29 PROCEDURE — 6370000000 HC RX 637 (ALT 250 FOR IP)

## 2019-10-29 PROCEDURE — 97116 GAIT TRAINING THERAPY: CPT

## 2019-10-29 RX ORDER — WARFARIN SODIUM 2 MG/1
2 TABLET ORAL
Status: COMPLETED | OUTPATIENT
Start: 2019-10-29 | End: 2019-10-29

## 2019-10-29 RX ORDER — ONDANSETRON 4 MG/1
4 TABLET, ORALLY DISINTEGRATING ORAL EVERY 8 HOURS PRN
Status: DISCONTINUED | OUTPATIENT
Start: 2019-10-29 | End: 2019-11-21 | Stop reason: HOSPADM

## 2019-10-29 RX ADMIN — SACUBITRIL AND VALSARTAN 1 TABLET: 49; 51 TABLET, FILM COATED ORAL at 09:18

## 2019-10-29 RX ADMIN — IPRATROPIUM BROMIDE 0.5 MG: 0.5 SOLUTION RESPIRATORY (INHALATION) at 14:47

## 2019-10-29 RX ADMIN — ACETAMINOPHEN 650 MG: 325 TABLET ORAL at 22:20

## 2019-10-29 RX ADMIN — NALOXEGOL OXALATE 12.5 MG: 12.5 TABLET, FILM COATED ORAL at 05:43

## 2019-10-29 RX ADMIN — BUDESONIDE 500 MCG: 0.5 INHALANT RESPIRATORY (INHALATION) at 07:30

## 2019-10-29 RX ADMIN — ACETAMINOPHEN 650 MG: 325 TABLET ORAL at 17:29

## 2019-10-29 RX ADMIN — FORMOTEROL FUMARATE DIHYDRATE 20 MCG: 20 SOLUTION RESPIRATORY (INHALATION) at 17:54

## 2019-10-29 RX ADMIN — DICYCLOMINE HYDROCHLORIDE 10 MG: 10 CAPSULE ORAL at 22:25

## 2019-10-29 RX ADMIN — IPRATROPIUM BROMIDE 0.5 MG: 0.5 SOLUTION RESPIRATORY (INHALATION) at 10:58

## 2019-10-29 RX ADMIN — IPRATROPIUM BROMIDE 0.5 MG: 0.5 SOLUTION RESPIRATORY (INHALATION) at 17:54

## 2019-10-29 RX ADMIN — LEVALBUTEROL HYDROCHLORIDE 1.25 MG: 1.25 SOLUTION RESPIRATORY (INHALATION) at 10:58

## 2019-10-29 RX ADMIN — TRAMADOL HYDROCHLORIDE 50 MG: 50 TABLET ORAL at 01:10

## 2019-10-29 RX ADMIN — TRAZODONE HYDROCHLORIDE 50 MG: 50 TABLET ORAL at 22:20

## 2019-10-29 RX ADMIN — LEVALBUTEROL HYDROCHLORIDE 1.25 MG: 1.25 SOLUTION RESPIRATORY (INHALATION) at 07:30

## 2019-10-29 RX ADMIN — METOPROLOL SUCCINATE 25 MG: 25 TABLET, FILM COATED, EXTENDED RELEASE ORAL at 22:18

## 2019-10-29 RX ADMIN — CYCLOBENZAPRINE 10 MG: 10 TABLET, FILM COATED ORAL at 05:50

## 2019-10-29 RX ADMIN — ONDANSETRON 4 MG: 4 TABLET, ORALLY DISINTEGRATING ORAL at 17:40

## 2019-10-29 RX ADMIN — CETIRIZINE HYDROCHLORIDE 5 MG: 10 TABLET, FILM COATED ORAL at 09:17

## 2019-10-29 RX ADMIN — BUDESONIDE 500 MCG: 0.5 INHALANT RESPIRATORY (INHALATION) at 17:54

## 2019-10-29 RX ADMIN — ACETAMINOPHEN 650 MG: 325 TABLET ORAL at 05:49

## 2019-10-29 RX ADMIN — FUROSEMIDE 40 MG: 40 TABLET ORAL at 09:17

## 2019-10-29 RX ADMIN — LEVALBUTEROL HYDROCHLORIDE 1.25 MG: 1.25 SOLUTION RESPIRATORY (INHALATION) at 14:47

## 2019-10-29 RX ADMIN — TRAMADOL HYDROCHLORIDE 100 MG: 50 TABLET ORAL at 17:29

## 2019-10-29 RX ADMIN — PANTOPRAZOLE SODIUM 40 MG: 40 TABLET, DELAYED RELEASE ORAL at 05:43

## 2019-10-29 RX ADMIN — FORMOTEROL FUMARATE DIHYDRATE 20 MCG: 20 SOLUTION RESPIRATORY (INHALATION) at 07:30

## 2019-10-29 RX ADMIN — DOCUSATE SODIUM 100 MG: 100 CAPSULE, LIQUID FILLED ORAL at 09:18

## 2019-10-29 RX ADMIN — ACETYLCYSTEINE 600 MG: 200 SOLUTION ORAL; RESPIRATORY (INHALATION) at 09:16

## 2019-10-29 RX ADMIN — DICYCLOMINE HYDROCHLORIDE 10 MG: 10 CAPSULE ORAL at 17:29

## 2019-10-29 RX ADMIN — ACETAMINOPHEN 650 MG: 325 TABLET ORAL at 11:18

## 2019-10-29 RX ADMIN — WARFARIN SODIUM 2 MG: 2 TABLET ORAL at 17:29

## 2019-10-29 RX ADMIN — ACETYLCYSTEINE 600 MG: 200 SOLUTION ORAL; RESPIRATORY (INHALATION) at 01:11

## 2019-10-29 RX ADMIN — Medication 200 MG: at 09:36

## 2019-10-29 RX ADMIN — ACETYLCYSTEINE 600 MG: 200 SOLUTION ORAL; RESPIRATORY (INHALATION) at 23:30

## 2019-10-29 RX ADMIN — DICYCLOMINE HYDROCHLORIDE 10 MG: 10 CAPSULE ORAL at 11:19

## 2019-10-29 RX ADMIN — TRAMADOL HYDROCHLORIDE 100 MG: 50 TABLET ORAL at 22:21

## 2019-10-29 RX ADMIN — DICYCLOMINE HYDROCHLORIDE 10 MG: 10 CAPSULE ORAL at 05:43

## 2019-10-29 RX ADMIN — IPRATROPIUM BROMIDE 0.5 MG: 0.5 SOLUTION RESPIRATORY (INHALATION) at 07:30

## 2019-10-29 RX ADMIN — DIGOXIN 125 MCG: 125 TABLET ORAL at 22:20

## 2019-10-29 RX ADMIN — SACUBITRIL AND VALSARTAN 1 TABLET: 49; 51 TABLET, FILM COATED ORAL at 22:18

## 2019-10-29 RX ADMIN — LEVALBUTEROL HYDROCHLORIDE 1.25 MG: 1.25 SOLUTION RESPIRATORY (INHALATION) at 17:54

## 2019-10-29 ASSESSMENT — PAIN SCALES - GENERAL
PAINLEVEL_OUTOF10: 7
PAINLEVEL_OUTOF10: 8
PAINLEVEL_OUTOF10: 10
PAINLEVEL_OUTOF10: 8
PAINLEVEL_OUTOF10: 8
PAINLEVEL_OUTOF10: 6
PAINLEVEL_OUTOF10: 8
PAINLEVEL_OUTOF10: 6

## 2019-10-29 ASSESSMENT — PAIN DESCRIPTION - LOCATION
LOCATION: HIP

## 2019-10-29 ASSESSMENT — PAIN DESCRIPTION - ORIENTATION
ORIENTATION: LEFT

## 2019-10-29 ASSESSMENT — PAIN DESCRIPTION - FREQUENCY
FREQUENCY: CONTINUOUS

## 2019-10-29 ASSESSMENT — PAIN DESCRIPTION - PAIN TYPE
TYPE: CHRONIC PAIN

## 2019-10-29 ASSESSMENT — PAIN DESCRIPTION - DESCRIPTORS
DESCRIPTORS: SHARP

## 2019-10-30 ENCOUNTER — APPOINTMENT (OUTPATIENT)
Dept: INTERVENTIONAL RADIOLOGY/VASCULAR | Age: 74
DRG: 560 | End: 2019-10-30
Attending: FAMILY MEDICINE
Payer: MEDICARE

## 2019-10-30 ENCOUNTER — APPOINTMENT (OUTPATIENT)
Dept: GENERAL RADIOLOGY | Age: 74
DRG: 560 | End: 2019-10-30
Attending: FAMILY MEDICINE
Payer: MEDICARE

## 2019-10-30 ENCOUNTER — APPOINTMENT (OUTPATIENT)
Dept: PHARMACY | Age: 74
End: 2019-10-30
Payer: MEDICARE

## 2019-10-30 LAB — INR BLD: 1.85 (ref 0.85–1.13)

## 2019-10-30 PROCEDURE — 97110 THERAPEUTIC EXERCISES: CPT

## 2019-10-30 PROCEDURE — 73590 X-RAY EXAM OF LOWER LEG: CPT

## 2019-10-30 PROCEDURE — 6370000000 HC RX 637 (ALT 250 FOR IP)

## 2019-10-30 PROCEDURE — 6360000002 HC RX W HCPCS: Performed by: SURGERY

## 2019-10-30 PROCEDURE — 51798 US URINE CAPACITY MEASURE: CPT

## 2019-10-30 PROCEDURE — 73552 X-RAY EXAM OF FEMUR 2/>: CPT

## 2019-10-30 PROCEDURE — 97530 THERAPEUTIC ACTIVITIES: CPT

## 2019-10-30 PROCEDURE — 6370000000 HC RX 637 (ALT 250 FOR IP): Performed by: FAMILY MEDICINE

## 2019-10-30 PROCEDURE — 6370000000 HC RX 637 (ALT 250 FOR IP): Performed by: SURGERY

## 2019-10-30 PROCEDURE — 94761 N-INVAS EAR/PLS OXIMETRY MLT: CPT

## 2019-10-30 PROCEDURE — 6360000002 HC RX W HCPCS: Performed by: FAMILY MEDICINE

## 2019-10-30 PROCEDURE — 6370000000 HC RX 637 (ALT 250 FOR IP): Performed by: INTERNAL MEDICINE

## 2019-10-30 PROCEDURE — 97116 GAIT TRAINING THERAPY: CPT

## 2019-10-30 PROCEDURE — 36415 COLL VENOUS BLD VENIPUNCTURE: CPT

## 2019-10-30 PROCEDURE — 2700000000 HC OXYGEN THERAPY PER DAY

## 2019-10-30 PROCEDURE — 94640 AIRWAY INHALATION TREATMENT: CPT

## 2019-10-30 PROCEDURE — 85610 PROTHROMBIN TIME: CPT

## 2019-10-30 PROCEDURE — 97535 SELF CARE MNGMENT TRAINING: CPT

## 2019-10-30 PROCEDURE — 1290000000 HC SEMI PRIVATE OTHER R&B

## 2019-10-30 PROCEDURE — 93971 EXTREMITY STUDY: CPT

## 2019-10-30 PROCEDURE — 72170 X-RAY EXAM OF PELVIS: CPT

## 2019-10-30 PROCEDURE — 92523 SPEECH SOUND LANG COMPREHEN: CPT

## 2019-10-30 RX ORDER — GABAPENTIN 100 MG/1
100 CAPSULE ORAL 3 TIMES DAILY
Status: DISCONTINUED | OUTPATIENT
Start: 2019-10-30 | End: 2019-11-02

## 2019-10-30 RX ORDER — WARFARIN SODIUM 2.5 MG/1
2.5 TABLET ORAL
Status: COMPLETED | OUTPATIENT
Start: 2019-10-30 | End: 2019-10-30

## 2019-10-30 RX ADMIN — LEVALBUTEROL HYDROCHLORIDE 1.25 MG: 1.25 SOLUTION RESPIRATORY (INHALATION) at 16:10

## 2019-10-30 RX ADMIN — FUROSEMIDE 40 MG: 40 TABLET ORAL at 08:37

## 2019-10-30 RX ADMIN — IPRATROPIUM BROMIDE 0.5 MG: 0.5 SOLUTION RESPIRATORY (INHALATION) at 09:40

## 2019-10-30 RX ADMIN — BUDESONIDE 500 MCG: 0.5 INHALANT RESPIRATORY (INHALATION) at 16:10

## 2019-10-30 RX ADMIN — SACUBITRIL AND VALSARTAN 1 TABLET: 49; 51 TABLET, FILM COATED ORAL at 21:46

## 2019-10-30 RX ADMIN — BUDESONIDE 500 MCG: 0.5 INHALANT RESPIRATORY (INHALATION) at 05:52

## 2019-10-30 RX ADMIN — DICYCLOMINE HYDROCHLORIDE 10 MG: 10 CAPSULE ORAL at 11:19

## 2019-10-30 RX ADMIN — ONDANSETRON 4 MG: 4 TABLET, ORALLY DISINTEGRATING ORAL at 11:26

## 2019-10-30 RX ADMIN — Medication 200 MG: at 08:42

## 2019-10-30 RX ADMIN — DICYCLOMINE HYDROCHLORIDE 10 MG: 10 CAPSULE ORAL at 06:43

## 2019-10-30 RX ADMIN — LEVALBUTEROL HYDROCHLORIDE 1.25 MG: 1.25 SOLUTION RESPIRATORY (INHALATION) at 09:40

## 2019-10-30 RX ADMIN — ACETAMINOPHEN 650 MG: 325 TABLET ORAL at 11:19

## 2019-10-30 RX ADMIN — IPRATROPIUM BROMIDE 0.5 MG: 0.5 SOLUTION RESPIRATORY (INHALATION) at 16:10

## 2019-10-30 RX ADMIN — IPRATROPIUM BROMIDE 0.5 MG: 0.5 SOLUTION RESPIRATORY (INHALATION) at 05:52

## 2019-10-30 RX ADMIN — FORMOTEROL FUMARATE DIHYDRATE 20 MCG: 20 SOLUTION RESPIRATORY (INHALATION) at 16:10

## 2019-10-30 RX ADMIN — NALOXEGOL OXALATE 12.5 MG: 12.5 TABLET, FILM COATED ORAL at 06:43

## 2019-10-30 RX ADMIN — TRAMADOL HYDROCHLORIDE 50 MG: 50 TABLET ORAL at 10:16

## 2019-10-30 RX ADMIN — SACUBITRIL AND VALSARTAN 1 TABLET: 49; 51 TABLET, FILM COATED ORAL at 08:37

## 2019-10-30 RX ADMIN — ACETAMINOPHEN 650 MG: 325 TABLET ORAL at 06:44

## 2019-10-30 RX ADMIN — WARFARIN SODIUM 2.5 MG: 2.5 TABLET ORAL at 18:20

## 2019-10-30 RX ADMIN — LEVALBUTEROL HYDROCHLORIDE 1.25 MG: 1.25 SOLUTION RESPIRATORY (INHALATION) at 05:52

## 2019-10-30 RX ADMIN — METOPROLOL SUCCINATE 25 MG: 25 TABLET, FILM COATED, EXTENDED RELEASE ORAL at 21:46

## 2019-10-30 RX ADMIN — GABAPENTIN 100 MG: 100 CAPSULE ORAL at 21:46

## 2019-10-30 RX ADMIN — ACETYLCYSTEINE 600 MG: 200 SOLUTION ORAL; RESPIRATORY (INHALATION) at 22:37

## 2019-10-30 RX ADMIN — ACETYLCYSTEINE 600 MG: 200 SOLUTION ORAL; RESPIRATORY (INHALATION) at 08:43

## 2019-10-30 RX ADMIN — FORMOTEROL FUMARATE DIHYDRATE 20 MCG: 20 SOLUTION RESPIRATORY (INHALATION) at 05:52

## 2019-10-30 RX ADMIN — DICYCLOMINE HYDROCHLORIDE 10 MG: 10 CAPSULE ORAL at 17:07

## 2019-10-30 RX ADMIN — ACETAMINOPHEN 650 MG: 325 TABLET ORAL at 17:09

## 2019-10-30 RX ADMIN — PANTOPRAZOLE SODIUM 40 MG: 40 TABLET, DELAYED RELEASE ORAL at 06:44

## 2019-10-30 RX ADMIN — CETIRIZINE HYDROCHLORIDE 5 MG: 10 TABLET, FILM COATED ORAL at 08:37

## 2019-10-30 RX ADMIN — DIGOXIN 125 MCG: 125 TABLET ORAL at 21:46

## 2019-10-30 RX ADMIN — DICYCLOMINE HYDROCHLORIDE 10 MG: 10 CAPSULE ORAL at 21:46

## 2019-10-30 RX ADMIN — ACETAMINOPHEN 650 MG: 325 TABLET ORAL at 21:46

## 2019-10-30 RX ADMIN — TRAZODONE HYDROCHLORIDE 50 MG: 50 TABLET ORAL at 21:46

## 2019-10-30 ASSESSMENT — PAIN SCALES - GENERAL
PAINLEVEL_OUTOF10: 8
PAINLEVEL_OUTOF10: 6
PAINLEVEL_OUTOF10: 7
PAINLEVEL_OUTOF10: 8
PAINLEVEL_OUTOF10: 8
PAINLEVEL_OUTOF10: 7

## 2019-10-31 ENCOUNTER — APPOINTMENT (OUTPATIENT)
Dept: GENERAL RADIOLOGY | Age: 74
DRG: 560 | End: 2019-10-31
Attending: FAMILY MEDICINE
Payer: MEDICARE

## 2019-10-31 LAB
ALLEN TEST: POSITIVE
BASE EXCESS (CALCULATED): 6.2 MMOL/L (ref -2.5–2.5)
COLLECTED BY:: ABNORMAL
DEVICE: ABNORMAL
GLUCOSE, WHOLE BLOOD: 187 MG/DL (ref 70–108)
HCO3: 31 MMOL/L (ref 23–28)
INR BLD: 2.36 (ref 0.85–1.13)
O2 SATURATION: 100 %
PCO2: 46 MMHG (ref 35–45)
PH BLOOD GAS: 7.44 (ref 7.35–7.45)
PO2: 341 MMHG (ref 71–104)
POC LACTIC ACID: 1.6 MMOL/L (ref 0.5–1.9)
POTASSIUM, WHOLE BLOOD: 4.2 MEQ/L (ref 3.5–4.9)
SOURCE, BLOOD GAS: ABNORMAL
TROPONIN T: < 0.01 NG/ML

## 2019-10-31 PROCEDURE — 97110 THERAPEUTIC EXERCISES: CPT

## 2019-10-31 PROCEDURE — 36415 COLL VENOUS BLD VENIPUNCTURE: CPT

## 2019-10-31 PROCEDURE — 6360000002 HC RX W HCPCS: Performed by: SURGERY

## 2019-10-31 PROCEDURE — 2700000000 HC OXYGEN THERAPY PER DAY

## 2019-10-31 PROCEDURE — 94760 N-INVAS EAR/PLS OXIMETRY 1: CPT

## 2019-10-31 PROCEDURE — 85610 PROTHROMBIN TIME: CPT

## 2019-10-31 PROCEDURE — 82947 ASSAY GLUCOSE BLOOD QUANT: CPT

## 2019-10-31 PROCEDURE — 6370000000 HC RX 637 (ALT 250 FOR IP)

## 2019-10-31 PROCEDURE — 36600 WITHDRAWAL OF ARTERIAL BLOOD: CPT

## 2019-10-31 PROCEDURE — 84484 ASSAY OF TROPONIN QUANT: CPT

## 2019-10-31 PROCEDURE — 97127 HC SP THER IVNTJ W/FOCUS COG FUNCJ: CPT

## 2019-10-31 PROCEDURE — 6370000000 HC RX 637 (ALT 250 FOR IP): Performed by: INTERNAL MEDICINE

## 2019-10-31 PROCEDURE — 6370000000 HC RX 637 (ALT 250 FOR IP): Performed by: SURGERY

## 2019-10-31 PROCEDURE — 51798 US URINE CAPACITY MEASURE: CPT

## 2019-10-31 PROCEDURE — 6370000000 HC RX 637 (ALT 250 FOR IP): Performed by: NURSE PRACTITIONER

## 2019-10-31 PROCEDURE — 97530 THERAPEUTIC ACTIVITIES: CPT

## 2019-10-31 PROCEDURE — 82803 BLOOD GASES ANY COMBINATION: CPT

## 2019-10-31 PROCEDURE — 83605 ASSAY OF LACTIC ACID: CPT

## 2019-10-31 PROCEDURE — 97535 SELF CARE MNGMENT TRAINING: CPT

## 2019-10-31 PROCEDURE — 1290000000 HC SEMI PRIVATE OTHER R&B

## 2019-10-31 PROCEDURE — 94640 AIRWAY INHALATION TREATMENT: CPT

## 2019-10-31 PROCEDURE — 71045 X-RAY EXAM CHEST 1 VIEW: CPT

## 2019-10-31 PROCEDURE — 97116 GAIT TRAINING THERAPY: CPT

## 2019-10-31 PROCEDURE — 6370000000 HC RX 637 (ALT 250 FOR IP): Performed by: FAMILY MEDICINE

## 2019-10-31 PROCEDURE — 84132 ASSAY OF SERUM POTASSIUM: CPT

## 2019-10-31 PROCEDURE — 6360000002 HC RX W HCPCS: Performed by: FAMILY MEDICINE

## 2019-10-31 PROCEDURE — 99291 CRITICAL CARE FIRST HOUR: CPT | Performed by: NURSE PRACTITIONER

## 2019-10-31 RX ORDER — PANTOPRAZOLE SODIUM 40 MG/1
40 TABLET, DELAYED RELEASE ORAL
Status: DISCONTINUED | OUTPATIENT
Start: 2019-11-01 | End: 2019-11-14

## 2019-10-31 RX ADMIN — LIDOCAINE HYDROCHLORIDE: 20 SOLUTION ORAL; TOPICAL at 21:14

## 2019-10-31 RX ADMIN — IPRATROPIUM BROMIDE 0.5 MG: 0.5 SOLUTION RESPIRATORY (INHALATION) at 09:43

## 2019-10-31 RX ADMIN — LEVALBUTEROL HYDROCHLORIDE 1.25 MG: 1.25 SOLUTION RESPIRATORY (INHALATION) at 13:48

## 2019-10-31 RX ADMIN — TRAMADOL HYDROCHLORIDE 100 MG: 50 TABLET ORAL at 08:55

## 2019-10-31 RX ADMIN — ACETAMINOPHEN 650 MG: 325 TABLET ORAL at 06:23

## 2019-10-31 RX ADMIN — LEVALBUTEROL HYDROCHLORIDE 1.25 MG: 1.25 SOLUTION RESPIRATORY (INHALATION) at 09:43

## 2019-10-31 RX ADMIN — NALOXEGOL OXALATE 12.5 MG: 12.5 TABLET, FILM COATED ORAL at 06:23

## 2019-10-31 RX ADMIN — METOPROLOL SUCCINATE 25 MG: 25 TABLET, FILM COATED, EXTENDED RELEASE ORAL at 23:18

## 2019-10-31 RX ADMIN — FUROSEMIDE 40 MG: 40 TABLET ORAL at 08:57

## 2019-10-31 RX ADMIN — FORMOTEROL FUMARATE DIHYDRATE 20 MCG: 20 SOLUTION RESPIRATORY (INHALATION) at 17:43

## 2019-10-31 RX ADMIN — BUDESONIDE 500 MCG: 0.5 INHALANT RESPIRATORY (INHALATION) at 17:43

## 2019-10-31 RX ADMIN — DOCUSATE SODIUM 100 MG: 100 CAPSULE, LIQUID FILLED ORAL at 23:17

## 2019-10-31 RX ADMIN — PANTOPRAZOLE SODIUM 40 MG: 40 TABLET, DELAYED RELEASE ORAL at 06:23

## 2019-10-31 RX ADMIN — DICYCLOMINE HYDROCHLORIDE 10 MG: 10 CAPSULE ORAL at 23:18

## 2019-10-31 RX ADMIN — LEVALBUTEROL HYDROCHLORIDE 1.25 MG: 1.25 SOLUTION RESPIRATORY (INHALATION) at 05:01

## 2019-10-31 RX ADMIN — SACUBITRIL AND VALSARTAN 1 TABLET: 49; 51 TABLET, FILM COATED ORAL at 08:56

## 2019-10-31 RX ADMIN — BUDESONIDE 500 MCG: 0.5 INHALANT RESPIRATORY (INHALATION) at 05:01

## 2019-10-31 RX ADMIN — IPRATROPIUM BROMIDE 0.5 MG: 0.5 SOLUTION RESPIRATORY (INHALATION) at 17:41

## 2019-10-31 RX ADMIN — LEVALBUTEROL HYDROCHLORIDE 1.25 MG: 1.25 SOLUTION RESPIRATORY (INHALATION) at 17:41

## 2019-10-31 RX ADMIN — GABAPENTIN 100 MG: 100 CAPSULE ORAL at 12:37

## 2019-10-31 RX ADMIN — ACETAMINOPHEN 650 MG: 325 TABLET ORAL at 16:17

## 2019-10-31 RX ADMIN — CETIRIZINE HYDROCHLORIDE 5 MG: 10 TABLET, FILM COATED ORAL at 08:56

## 2019-10-31 RX ADMIN — Medication 200 MG: at 09:07

## 2019-10-31 RX ADMIN — SACUBITRIL AND VALSARTAN 1 TABLET: 49; 51 TABLET, FILM COATED ORAL at 23:18

## 2019-10-31 RX ADMIN — ACETAMINOPHEN 650 MG: 325 TABLET ORAL at 10:45

## 2019-10-31 RX ADMIN — DIGOXIN 125 MCG: 125 TABLET ORAL at 23:18

## 2019-10-31 RX ADMIN — ACETYLCYSTEINE 600 MG: 200 SOLUTION ORAL; RESPIRATORY (INHALATION) at 23:20

## 2019-10-31 RX ADMIN — DICYCLOMINE HYDROCHLORIDE 10 MG: 10 CAPSULE ORAL at 06:23

## 2019-10-31 RX ADMIN — ACETYLCYSTEINE 600 MG: 200 SOLUTION ORAL; RESPIRATORY (INHALATION) at 08:59

## 2019-10-31 RX ADMIN — FORMOTEROL FUMARATE DIHYDRATE 20 MCG: 20 SOLUTION RESPIRATORY (INHALATION) at 05:01

## 2019-10-31 RX ADMIN — SENNOSIDES 17.2 MG: 8.6 TABLET, FILM COATED ORAL at 23:18

## 2019-10-31 RX ADMIN — Medication 1.5 MG: at 16:01

## 2019-10-31 RX ADMIN — POLYETHYLENE GLYCOL 3350 17 G: 17 POWDER, FOR SOLUTION ORAL at 08:59

## 2019-10-31 RX ADMIN — DICYCLOMINE HYDROCHLORIDE 10 MG: 10 CAPSULE ORAL at 16:01

## 2019-10-31 RX ADMIN — GABAPENTIN 100 MG: 100 CAPSULE ORAL at 08:57

## 2019-10-31 RX ADMIN — IPRATROPIUM BROMIDE 0.5 MG: 0.5 SOLUTION RESPIRATORY (INHALATION) at 05:01

## 2019-10-31 RX ADMIN — GABAPENTIN 100 MG: 100 CAPSULE ORAL at 23:18

## 2019-10-31 RX ADMIN — DICYCLOMINE HYDROCHLORIDE 10 MG: 10 CAPSULE ORAL at 12:37

## 2019-10-31 RX ADMIN — DOCUSATE SODIUM 100 MG: 100 CAPSULE, LIQUID FILLED ORAL at 08:58

## 2019-10-31 RX ADMIN — IPRATROPIUM BROMIDE 0.5 MG: 0.5 SOLUTION RESPIRATORY (INHALATION) at 13:49

## 2019-10-31 ASSESSMENT — PAIN DESCRIPTION - DESCRIPTORS: DESCRIPTORS: SHARP;JABBING

## 2019-10-31 ASSESSMENT — PAIN - FUNCTIONAL ASSESSMENT: PAIN_FUNCTIONAL_ASSESSMENT: ACTIVITIES ARE NOT PREVENTED

## 2019-10-31 ASSESSMENT — PAIN DESCRIPTION - ORIENTATION
ORIENTATION: LEFT

## 2019-10-31 ASSESSMENT — PAIN SCALES - GENERAL
PAINLEVEL_OUTOF10: 10
PAINLEVEL_OUTOF10: 8
PAINLEVEL_OUTOF10: 3
PAINLEVEL_OUTOF10: 0
PAINLEVEL_OUTOF10: 3
PAINLEVEL_OUTOF10: 1
PAINLEVEL_OUTOF10: 1
PAINLEVEL_OUTOF10: 3
PAINLEVEL_OUTOF10: 8
PAINLEVEL_OUTOF10: 3
PAINLEVEL_OUTOF10: 3

## 2019-10-31 ASSESSMENT — PAIN DESCRIPTION - ONSET: ONSET: ON-GOING

## 2019-10-31 ASSESSMENT — PAIN DESCRIPTION - FREQUENCY: FREQUENCY: CONTINUOUS

## 2019-10-31 ASSESSMENT — PAIN DESCRIPTION - LOCATION
LOCATION: LEG
LOCATION: HIP
LOCATION: LEG

## 2019-10-31 ASSESSMENT — PAIN DESCRIPTION - PAIN TYPE: TYPE: CHRONIC PAIN

## 2019-10-31 ASSESSMENT — PAIN DESCRIPTION - PROGRESSION: CLINICAL_PROGRESSION: GRADUALLY IMPROVING

## 2019-11-01 PROBLEM — R07.9 CHEST PAIN: Status: ACTIVE | Noted: 2019-11-01

## 2019-11-01 LAB — INR BLD: 3.39 (ref 0.85–1.13)

## 2019-11-01 PROCEDURE — 97110 THERAPEUTIC EXERCISES: CPT

## 2019-11-01 PROCEDURE — 97535 SELF CARE MNGMENT TRAINING: CPT

## 2019-11-01 PROCEDURE — 6370000000 HC RX 637 (ALT 250 FOR IP): Performed by: SURGERY

## 2019-11-01 PROCEDURE — 94640 AIRWAY INHALATION TREATMENT: CPT

## 2019-11-01 PROCEDURE — 36415 COLL VENOUS BLD VENIPUNCTURE: CPT

## 2019-11-01 PROCEDURE — 6360000002 HC RX W HCPCS: Performed by: SURGERY

## 2019-11-01 PROCEDURE — 6370000000 HC RX 637 (ALT 250 FOR IP): Performed by: INTERNAL MEDICINE

## 2019-11-01 PROCEDURE — 94761 N-INVAS EAR/PLS OXIMETRY MLT: CPT

## 2019-11-01 PROCEDURE — 97530 THERAPEUTIC ACTIVITIES: CPT

## 2019-11-01 PROCEDURE — 85610 PROTHROMBIN TIME: CPT

## 2019-11-01 PROCEDURE — 6370000000 HC RX 637 (ALT 250 FOR IP): Performed by: FAMILY MEDICINE

## 2019-11-01 PROCEDURE — 6360000002 HC RX W HCPCS: Performed by: FAMILY MEDICINE

## 2019-11-01 PROCEDURE — 97116 GAIT TRAINING THERAPY: CPT

## 2019-11-01 PROCEDURE — 1290000000 HC SEMI PRIVATE OTHER R&B

## 2019-11-01 PROCEDURE — 2700000000 HC OXYGEN THERAPY PER DAY

## 2019-11-01 RX ADMIN — METOPROLOL SUCCINATE 25 MG: 25 TABLET, FILM COATED, EXTENDED RELEASE ORAL at 22:45

## 2019-11-01 RX ADMIN — DOCUSATE SODIUM 100 MG: 100 CAPSULE, LIQUID FILLED ORAL at 08:45

## 2019-11-01 RX ADMIN — DOCUSATE SODIUM 100 MG: 100 CAPSULE, LIQUID FILLED ORAL at 22:46

## 2019-11-01 RX ADMIN — NALOXEGOL OXALATE 12.5 MG: 12.5 TABLET, FILM COATED ORAL at 06:58

## 2019-11-01 RX ADMIN — GABAPENTIN 100 MG: 100 CAPSULE ORAL at 15:12

## 2019-11-01 RX ADMIN — ACETYLCYSTEINE 600 MG: 200 SOLUTION ORAL; RESPIRATORY (INHALATION) at 22:47

## 2019-11-01 RX ADMIN — BUDESONIDE 500 MCG: 0.5 INHALANT RESPIRATORY (INHALATION) at 17:53

## 2019-11-01 RX ADMIN — LEVALBUTEROL HYDROCHLORIDE 1.25 MG: 1.25 SOLUTION RESPIRATORY (INHALATION) at 17:53

## 2019-11-01 RX ADMIN — DICYCLOMINE HYDROCHLORIDE 10 MG: 10 CAPSULE ORAL at 12:08

## 2019-11-01 RX ADMIN — ACETYLCYSTEINE 600 MG: 200 SOLUTION ORAL; RESPIRATORY (INHALATION) at 08:46

## 2019-11-01 RX ADMIN — CETIRIZINE HYDROCHLORIDE 5 MG: 10 TABLET, FILM COATED ORAL at 08:45

## 2019-11-01 RX ADMIN — LEVALBUTEROL HYDROCHLORIDE 1.25 MG: 1.25 SOLUTION RESPIRATORY (INHALATION) at 06:04

## 2019-11-01 RX ADMIN — FORMOTEROL FUMARATE DIHYDRATE 20 MCG: 20 SOLUTION RESPIRATORY (INHALATION) at 06:04

## 2019-11-01 RX ADMIN — DICYCLOMINE HYDROCHLORIDE 10 MG: 10 CAPSULE ORAL at 17:43

## 2019-11-01 RX ADMIN — IPRATROPIUM BROMIDE 0.5 MG: 0.5 SOLUTION RESPIRATORY (INHALATION) at 17:53

## 2019-11-01 RX ADMIN — SACUBITRIL AND VALSARTAN 1 TABLET: 49; 51 TABLET, FILM COATED ORAL at 22:45

## 2019-11-01 RX ADMIN — PANTOPRAZOLE SODIUM 40 MG: 40 TABLET, DELAYED RELEASE ORAL at 06:57

## 2019-11-01 RX ADMIN — FUROSEMIDE 40 MG: 40 TABLET ORAL at 08:44

## 2019-11-01 RX ADMIN — DICYCLOMINE HYDROCHLORIDE 10 MG: 10 CAPSULE ORAL at 06:58

## 2019-11-01 RX ADMIN — LEVALBUTEROL HYDROCHLORIDE 1.25 MG: 1.25 SOLUTION RESPIRATORY (INHALATION) at 09:38

## 2019-11-01 RX ADMIN — DICYCLOMINE HYDROCHLORIDE 10 MG: 10 CAPSULE ORAL at 22:44

## 2019-11-01 RX ADMIN — PANTOPRAZOLE SODIUM 40 MG: 40 TABLET, DELAYED RELEASE ORAL at 17:43

## 2019-11-01 RX ADMIN — IPRATROPIUM BROMIDE 0.5 MG: 0.5 SOLUTION RESPIRATORY (INHALATION) at 06:04

## 2019-11-01 RX ADMIN — ACETAMINOPHEN 650 MG: 325 TABLET ORAL at 22:46

## 2019-11-01 RX ADMIN — IPRATROPIUM BROMIDE 0.5 MG: 0.5 SOLUTION RESPIRATORY (INHALATION) at 09:38

## 2019-11-01 RX ADMIN — GABAPENTIN 100 MG: 100 CAPSULE ORAL at 08:45

## 2019-11-01 RX ADMIN — ACETAMINOPHEN 650 MG: 325 TABLET ORAL at 06:57

## 2019-11-01 RX ADMIN — TRAMADOL HYDROCHLORIDE 100 MG: 50 TABLET ORAL at 09:54

## 2019-11-01 RX ADMIN — GABAPENTIN 100 MG: 100 CAPSULE ORAL at 22:45

## 2019-11-01 RX ADMIN — IPRATROPIUM BROMIDE 0.5 MG: 0.5 SOLUTION RESPIRATORY (INHALATION) at 13:51

## 2019-11-01 RX ADMIN — FORMOTEROL FUMARATE DIHYDRATE 20 MCG: 20 SOLUTION RESPIRATORY (INHALATION) at 17:53

## 2019-11-01 RX ADMIN — LEVALBUTEROL HYDROCHLORIDE 1.25 MG: 1.25 SOLUTION RESPIRATORY (INHALATION) at 13:51

## 2019-11-01 RX ADMIN — SENNOSIDES 17.2 MG: 8.6 TABLET, FILM COATED ORAL at 22:45

## 2019-11-01 RX ADMIN — SACUBITRIL AND VALSARTAN 1 TABLET: 49; 51 TABLET, FILM COATED ORAL at 08:45

## 2019-11-01 RX ADMIN — BUDESONIDE 500 MCG: 0.5 INHALANT RESPIRATORY (INHALATION) at 06:04

## 2019-11-01 RX ADMIN — DIGOXIN 125 MCG: 125 TABLET ORAL at 22:45

## 2019-11-01 ASSESSMENT — PAIN SCALES - GENERAL
PAINLEVEL_OUTOF10: 7
PAINLEVEL_OUTOF10: 3
PAINLEVEL_OUTOF10: 4
PAINLEVEL_OUTOF10: 8

## 2019-11-02 ENCOUNTER — APPOINTMENT (OUTPATIENT)
Dept: ULTRASOUND IMAGING | Age: 74
DRG: 560 | End: 2019-11-02
Attending: FAMILY MEDICINE
Payer: MEDICARE

## 2019-11-02 LAB — INR BLD: 2.32 (ref 0.85–1.13)

## 2019-11-02 PROCEDURE — 85610 PROTHROMBIN TIME: CPT

## 2019-11-02 PROCEDURE — 6370000000 HC RX 637 (ALT 250 FOR IP): Performed by: FAMILY MEDICINE

## 2019-11-02 PROCEDURE — 1290000000 HC SEMI PRIVATE OTHER R&B

## 2019-11-02 PROCEDURE — 6370000000 HC RX 637 (ALT 250 FOR IP): Performed by: PHARMACIST

## 2019-11-02 PROCEDURE — 6360000002 HC RX W HCPCS: Performed by: FAMILY MEDICINE

## 2019-11-02 PROCEDURE — 6370000000 HC RX 637 (ALT 250 FOR IP): Performed by: INTERNAL MEDICINE

## 2019-11-02 PROCEDURE — 0220000000 HC SKILLED NURSING FACILITY

## 2019-11-02 PROCEDURE — 94640 AIRWAY INHALATION TREATMENT: CPT

## 2019-11-02 PROCEDURE — 94760 N-INVAS EAR/PLS OXIMETRY 1: CPT

## 2019-11-02 PROCEDURE — 6360000002 HC RX W HCPCS: Performed by: SURGERY

## 2019-11-02 PROCEDURE — 94761 N-INVAS EAR/PLS OXIMETRY MLT: CPT

## 2019-11-02 PROCEDURE — 2700000000 HC OXYGEN THERAPY PER DAY

## 2019-11-02 PROCEDURE — 36415 COLL VENOUS BLD VENIPUNCTURE: CPT

## 2019-11-02 PROCEDURE — 76882 US LMTD JT/FCL EVL NVASC XTR: CPT

## 2019-11-02 PROCEDURE — 6370000000 HC RX 637 (ALT 250 FOR IP): Performed by: SURGERY

## 2019-11-02 RX ORDER — OXYCODONE HYDROCHLORIDE AND ACETAMINOPHEN 5; 325 MG/1; MG/1
1 TABLET ORAL EVERY 4 HOURS PRN
Status: DISCONTINUED | OUTPATIENT
Start: 2019-11-02 | End: 2019-11-21 | Stop reason: HOSPADM

## 2019-11-02 RX ORDER — OXYCODONE HYDROCHLORIDE AND ACETAMINOPHEN 5; 325 MG/1; MG/1
0.5 TABLET ORAL EVERY 4 HOURS PRN
Status: DISCONTINUED | OUTPATIENT
Start: 2019-11-02 | End: 2019-11-21 | Stop reason: HOSPADM

## 2019-11-02 RX ADMIN — Medication 200 MG: at 12:30

## 2019-11-02 RX ADMIN — LEVALBUTEROL HYDROCHLORIDE 1.25 MG: 1.25 SOLUTION RESPIRATORY (INHALATION) at 17:49

## 2019-11-02 RX ADMIN — LEVALBUTEROL HYDROCHLORIDE 1.25 MG: 1.25 SOLUTION RESPIRATORY (INHALATION) at 13:31

## 2019-11-02 RX ADMIN — LEVALBUTEROL HYDROCHLORIDE 1.25 MG: 1.25 SOLUTION RESPIRATORY (INHALATION) at 05:44

## 2019-11-02 RX ADMIN — CETIRIZINE HYDROCHLORIDE 5 MG: 10 TABLET, FILM COATED ORAL at 09:54

## 2019-11-02 RX ADMIN — SACUBITRIL AND VALSARTAN 1 TABLET: 49; 51 TABLET, FILM COATED ORAL at 09:54

## 2019-11-02 RX ADMIN — ACETAMINOPHEN 650 MG: 325 TABLET ORAL at 06:14

## 2019-11-02 RX ADMIN — POLYETHYLENE GLYCOL 3350 17 G: 17 POWDER, FOR SOLUTION ORAL at 09:55

## 2019-11-02 RX ADMIN — MAGNESIUM HYDROXIDE 15 ML: 400 SUSPENSION ORAL at 06:14

## 2019-11-02 RX ADMIN — DICYCLOMINE HYDROCHLORIDE 10 MG: 10 CAPSULE ORAL at 09:55

## 2019-11-02 RX ADMIN — TRAZODONE HYDROCHLORIDE 50 MG: 50 TABLET ORAL at 20:40

## 2019-11-02 RX ADMIN — IPRATROPIUM BROMIDE 0.5 MG: 0.5 SOLUTION RESPIRATORY (INHALATION) at 13:28

## 2019-11-02 RX ADMIN — IPRATROPIUM BROMIDE 0.5 MG: 0.5 SOLUTION RESPIRATORY (INHALATION) at 08:45

## 2019-11-02 RX ADMIN — SACUBITRIL AND VALSARTAN 1 TABLET: 49; 51 TABLET, FILM COATED ORAL at 20:31

## 2019-11-02 RX ADMIN — BUDESONIDE 500 MCG: 0.5 INHALANT RESPIRATORY (INHALATION) at 17:49

## 2019-11-02 RX ADMIN — FORMOTEROL FUMARATE DIHYDRATE 20 MCG: 20 SOLUTION RESPIRATORY (INHALATION) at 17:49

## 2019-11-02 RX ADMIN — ACETYLCYSTEINE 600 MG: 200 SOLUTION ORAL; RESPIRATORY (INHALATION) at 20:31

## 2019-11-02 RX ADMIN — DIGOXIN 125 MCG: 125 TABLET ORAL at 20:31

## 2019-11-02 RX ADMIN — BUDESONIDE 500 MCG: 0.5 INHALANT RESPIRATORY (INHALATION) at 05:44

## 2019-11-02 RX ADMIN — SENNOSIDES 17.2 MG: 8.6 TABLET, FILM COATED ORAL at 20:31

## 2019-11-02 RX ADMIN — DOCUSATE SODIUM 100 MG: 100 CAPSULE, LIQUID FILLED ORAL at 20:31

## 2019-11-02 RX ADMIN — IPRATROPIUM BROMIDE 0.5 MG: 0.5 SOLUTION RESPIRATORY (INHALATION) at 17:49

## 2019-11-02 RX ADMIN — DICYCLOMINE HYDROCHLORIDE 10 MG: 10 CAPSULE ORAL at 06:14

## 2019-11-02 RX ADMIN — PANTOPRAZOLE SODIUM 40 MG: 40 TABLET, DELAYED RELEASE ORAL at 18:06

## 2019-11-02 RX ADMIN — DOCUSATE SODIUM 100 MG: 100 CAPSULE, LIQUID FILLED ORAL at 09:54

## 2019-11-02 RX ADMIN — FUROSEMIDE 40 MG: 40 TABLET ORAL at 09:54

## 2019-11-02 RX ADMIN — NALOXEGOL OXALATE 12.5 MG: 12.5 TABLET, FILM COATED ORAL at 06:14

## 2019-11-02 RX ADMIN — OXYCODONE HYDROCHLORIDE AND ACETAMINOPHEN 1 TABLET: 5; 325 TABLET ORAL at 23:50

## 2019-11-02 RX ADMIN — GABAPENTIN 100 MG: 100 CAPSULE ORAL at 09:55

## 2019-11-02 RX ADMIN — IPRATROPIUM BROMIDE 0.5 MG: 0.5 SOLUTION RESPIRATORY (INHALATION) at 05:44

## 2019-11-02 RX ADMIN — PANTOPRAZOLE SODIUM 40 MG: 40 TABLET, DELAYED RELEASE ORAL at 09:54

## 2019-11-02 RX ADMIN — LEVALBUTEROL HYDROCHLORIDE 1.25 MG: 1.25 SOLUTION RESPIRATORY (INHALATION) at 08:51

## 2019-11-02 RX ADMIN — FORMOTEROL FUMARATE DIHYDRATE 20 MCG: 20 SOLUTION RESPIRATORY (INHALATION) at 05:44

## 2019-11-02 RX ADMIN — METOPROLOL SUCCINATE 25 MG: 25 TABLET, FILM COATED, EXTENDED RELEASE ORAL at 20:31

## 2019-11-02 RX ADMIN — DICYCLOMINE HYDROCHLORIDE 10 MG: 10 CAPSULE ORAL at 20:31

## 2019-11-02 RX ADMIN — Medication 1.5 MG: at 18:06

## 2019-11-02 RX ADMIN — ACETAMINOPHEN 650 MG: 325 TABLET ORAL at 14:59

## 2019-11-02 RX ADMIN — DICYCLOMINE HYDROCHLORIDE 10 MG: 10 CAPSULE ORAL at 18:06

## 2019-11-02 RX ADMIN — ACETYLCYSTEINE 600 MG: 200 SOLUTION ORAL; RESPIRATORY (INHALATION) at 09:55

## 2019-11-02 RX ADMIN — OXYCODONE HYDROCHLORIDE AND ACETAMINOPHEN 1 TABLET: 5; 325 TABLET ORAL at 18:06

## 2019-11-02 ASSESSMENT — PAIN SCALES - GENERAL
PAINLEVEL_OUTOF10: 5
PAINLEVEL_OUTOF10: 9
PAINLEVEL_OUTOF10: 8
PAINLEVEL_OUTOF10: 8
PAINLEVEL_OUTOF10: 0
PAINLEVEL_OUTOF10: 8

## 2019-11-02 ASSESSMENT — PAIN DESCRIPTION - DESCRIPTORS: DESCRIPTORS: BURNING;SHARP

## 2019-11-02 ASSESSMENT — PAIN DESCRIPTION - PAIN TYPE: TYPE: ACUTE PAIN;SURGICAL PAIN

## 2019-11-02 ASSESSMENT — PAIN DESCRIPTION - ONSET: ONSET: ON-GOING

## 2019-11-02 ASSESSMENT — PAIN - FUNCTIONAL ASSESSMENT: PAIN_FUNCTIONAL_ASSESSMENT: PREVENTS OR INTERFERES SOME ACTIVE ACTIVITIES AND ADLS

## 2019-11-02 ASSESSMENT — PAIN DESCRIPTION - LOCATION: LOCATION: HIP;LEG

## 2019-11-02 ASSESSMENT — PAIN DESCRIPTION - FREQUENCY: FREQUENCY: CONTINUOUS

## 2019-11-02 ASSESSMENT — PAIN DESCRIPTION - PROGRESSION: CLINICAL_PROGRESSION: GRADUALLY WORSENING

## 2019-11-02 ASSESSMENT — PAIN DESCRIPTION - ORIENTATION: ORIENTATION: LEFT

## 2019-11-03 ENCOUNTER — APPOINTMENT (OUTPATIENT)
Dept: INTERVENTIONAL RADIOLOGY/VASCULAR | Age: 74
DRG: 560 | End: 2019-11-03
Attending: FAMILY MEDICINE
Payer: MEDICARE

## 2019-11-03 LAB
ANION GAP SERPL CALCULATED.3IONS-SCNC: 9 MEQ/L (ref 8–16)
BASOPHILS # BLD: 0.3 %
BASOPHILS ABSOLUTE: 0 THOU/MM3 (ref 0–0.1)
BUN BLDV-MCNC: 21 MG/DL (ref 7–22)
CALCIUM SERPL-MCNC: 8.7 MG/DL (ref 8.5–10.5)
CHLORIDE BLD-SCNC: 103 MEQ/L (ref 98–111)
CO2: 32 MEQ/L (ref 23–33)
CREAT SERPL-MCNC: 0.6 MG/DL (ref 0.4–1.2)
EOSINOPHIL # BLD: 1.2 %
EOSINOPHILS ABSOLUTE: 0.1 THOU/MM3 (ref 0–0.4)
ERYTHROCYTE [DISTWIDTH] IN BLOOD BY AUTOMATED COUNT: 15.4 % (ref 11.5–14.5)
ERYTHROCYTE [DISTWIDTH] IN BLOOD BY AUTOMATED COUNT: 55.8 FL (ref 35–45)
GFR SERPL CREATININE-BSD FRML MDRD: > 90 ML/MIN/1.73M2
GLUCOSE BLD-MCNC: 110 MG/DL (ref 70–108)
HCT VFR BLD CALC: 34.2 % (ref 37–47)
HEMOGLOBIN: 10.4 GM/DL (ref 12–16)
IMMATURE GRANS (ABS): 0.06 THOU/MM3 (ref 0–0.07)
IMMATURE GRANULOCYTES: 0.8 %
INR BLD: 1.5 (ref 0.85–1.13)
LYMPHOCYTES # BLD: 18.1 %
LYMPHOCYTES ABSOLUTE: 1.3 THOU/MM3 (ref 1–4.8)
MCH RBC QN AUTO: 31 PG (ref 26–33)
MCHC RBC AUTO-ENTMCNC: 30.4 GM/DL (ref 32.2–35.5)
MCV RBC AUTO: 102.1 FL (ref 81–99)
MONOCYTES # BLD: 9.7 %
MONOCYTES ABSOLUTE: 0.7 THOU/MM3 (ref 0.4–1.3)
NUCLEATED RED BLOOD CELLS: 0 /100 WBC
PLATELET # BLD: 187 THOU/MM3 (ref 130–400)
PMV BLD AUTO: 10.4 FL (ref 9.4–12.4)
POTASSIUM SERPL-SCNC: 4.8 MEQ/L (ref 3.5–5.2)
RBC # BLD: 3.35 MILL/MM3 (ref 4.2–5.4)
SEG NEUTROPHILS: 69.9 %
SEGMENTED NEUTROPHILS ABSOLUTE COUNT: 5 THOU/MM3 (ref 1.8–7.7)
SODIUM BLD-SCNC: 144 MEQ/L (ref 135–145)
WBC # BLD: 7.2 THOU/MM3 (ref 4.8–10.8)

## 2019-11-03 PROCEDURE — 80048 BASIC METABOLIC PNL TOTAL CA: CPT

## 2019-11-03 PROCEDURE — 97116 GAIT TRAINING THERAPY: CPT

## 2019-11-03 PROCEDURE — 93971 EXTREMITY STUDY: CPT

## 2019-11-03 PROCEDURE — 6370000000 HC RX 637 (ALT 250 FOR IP): Performed by: INTERNAL MEDICINE

## 2019-11-03 PROCEDURE — 85025 COMPLETE CBC W/AUTO DIFF WBC: CPT

## 2019-11-03 PROCEDURE — 94760 N-INVAS EAR/PLS OXIMETRY 1: CPT

## 2019-11-03 PROCEDURE — 97535 SELF CARE MNGMENT TRAINING: CPT

## 2019-11-03 PROCEDURE — 6360000002 HC RX W HCPCS: Performed by: FAMILY MEDICINE

## 2019-11-03 PROCEDURE — 85610 PROTHROMBIN TIME: CPT

## 2019-11-03 PROCEDURE — 2700000000 HC OXYGEN THERAPY PER DAY

## 2019-11-03 PROCEDURE — 6360000002 HC RX W HCPCS: Performed by: SURGERY

## 2019-11-03 PROCEDURE — 6370000000 HC RX 637 (ALT 250 FOR IP): Performed by: SURGERY

## 2019-11-03 PROCEDURE — 94640 AIRWAY INHALATION TREATMENT: CPT

## 2019-11-03 PROCEDURE — 36415 COLL VENOUS BLD VENIPUNCTURE: CPT

## 2019-11-03 PROCEDURE — 6370000000 HC RX 637 (ALT 250 FOR IP): Performed by: FAMILY MEDICINE

## 2019-11-03 PROCEDURE — 97110 THERAPEUTIC EXERCISES: CPT

## 2019-11-03 PROCEDURE — 1290000000 HC SEMI PRIVATE OTHER R&B

## 2019-11-03 PROCEDURE — 6370000000 HC RX 637 (ALT 250 FOR IP): Performed by: PHARMACIST

## 2019-11-03 RX ORDER — WARFARIN SODIUM 2.5 MG/1
2.5 TABLET ORAL ONCE
Status: COMPLETED | OUTPATIENT
Start: 2019-11-03 | End: 2019-11-03

## 2019-11-03 RX ADMIN — WARFARIN SODIUM 2.5 MG: 2.5 TABLET ORAL at 16:55

## 2019-11-03 RX ADMIN — DOCUSATE SODIUM 100 MG: 100 CAPSULE, LIQUID FILLED ORAL at 09:01

## 2019-11-03 RX ADMIN — DICYCLOMINE HYDROCHLORIDE 10 MG: 10 CAPSULE ORAL at 06:07

## 2019-11-03 RX ADMIN — IPRATROPIUM BROMIDE 0.5 MG: 0.5 SOLUTION RESPIRATORY (INHALATION) at 14:01

## 2019-11-03 RX ADMIN — SACUBITRIL AND VALSARTAN 1 TABLET: 49; 51 TABLET, FILM COATED ORAL at 09:01

## 2019-11-03 RX ADMIN — FORMOTEROL FUMARATE DIHYDRATE 20 MCG: 20 SOLUTION RESPIRATORY (INHALATION) at 18:39

## 2019-11-03 RX ADMIN — OXYCODONE HYDROCHLORIDE AND ACETAMINOPHEN 1 TABLET: 5; 325 TABLET ORAL at 22:45

## 2019-11-03 RX ADMIN — DIGOXIN 125 MCG: 125 TABLET ORAL at 20:36

## 2019-11-03 RX ADMIN — DICYCLOMINE HYDROCHLORIDE 10 MG: 10 CAPSULE ORAL at 16:54

## 2019-11-03 RX ADMIN — DOCUSATE SODIUM 100 MG: 100 CAPSULE, LIQUID FILLED ORAL at 20:36

## 2019-11-03 RX ADMIN — OXYCODONE HYDROCHLORIDE AND ACETAMINOPHEN 1 TABLET: 5; 325 TABLET ORAL at 04:26

## 2019-11-03 RX ADMIN — LEVALBUTEROL HYDROCHLORIDE 1.25 MG: 1.25 SOLUTION RESPIRATORY (INHALATION) at 14:01

## 2019-11-03 RX ADMIN — DICYCLOMINE HYDROCHLORIDE 10 MG: 10 CAPSULE ORAL at 20:36

## 2019-11-03 RX ADMIN — BUDESONIDE 500 MCG: 0.5 INHALANT RESPIRATORY (INHALATION) at 05:04

## 2019-11-03 RX ADMIN — SENNOSIDES 17.2 MG: 8.6 TABLET, FILM COATED ORAL at 20:36

## 2019-11-03 RX ADMIN — IPRATROPIUM BROMIDE 0.5 MG: 0.5 SOLUTION RESPIRATORY (INHALATION) at 05:04

## 2019-11-03 RX ADMIN — OXYCODONE HYDROCHLORIDE AND ACETAMINOPHEN 1 TABLET: 5; 325 TABLET ORAL at 14:26

## 2019-11-03 RX ADMIN — LEVALBUTEROL HYDROCHLORIDE 1.25 MG: 1.25 SOLUTION RESPIRATORY (INHALATION) at 05:04

## 2019-11-03 RX ADMIN — ACETYLCYSTEINE 600 MG: 200 SOLUTION ORAL; RESPIRATORY (INHALATION) at 20:36

## 2019-11-03 RX ADMIN — OXYCODONE HYDROCHLORIDE AND ACETAMINOPHEN 1 TABLET: 5; 325 TABLET ORAL at 18:36

## 2019-11-03 RX ADMIN — ACETYLCYSTEINE 600 MG: 200 SOLUTION ORAL; RESPIRATORY (INHALATION) at 09:09

## 2019-11-03 RX ADMIN — CETIRIZINE HYDROCHLORIDE 5 MG: 10 TABLET, FILM COATED ORAL at 09:01

## 2019-11-03 RX ADMIN — IPRATROPIUM BROMIDE 0.5 MG: 0.5 SOLUTION RESPIRATORY (INHALATION) at 18:39

## 2019-11-03 RX ADMIN — TRAZODONE HYDROCHLORIDE 50 MG: 50 TABLET ORAL at 22:44

## 2019-11-03 RX ADMIN — OXYCODONE HYDROCHLORIDE AND ACETAMINOPHEN 1 TABLET: 5; 325 TABLET ORAL at 09:09

## 2019-11-03 RX ADMIN — NALOXEGOL OXALATE 12.5 MG: 12.5 TABLET, FILM COATED ORAL at 06:07

## 2019-11-03 RX ADMIN — IPRATROPIUM BROMIDE 0.5 MG: 0.5 SOLUTION RESPIRATORY (INHALATION) at 10:04

## 2019-11-03 RX ADMIN — PANTOPRAZOLE SODIUM 40 MG: 40 TABLET, DELAYED RELEASE ORAL at 06:12

## 2019-11-03 RX ADMIN — METOPROLOL SUCCINATE 25 MG: 25 TABLET, FILM COATED, EXTENDED RELEASE ORAL at 20:36

## 2019-11-03 RX ADMIN — FUROSEMIDE 40 MG: 40 TABLET ORAL at 09:01

## 2019-11-03 RX ADMIN — BUDESONIDE 500 MCG: 0.5 INHALANT RESPIRATORY (INHALATION) at 18:39

## 2019-11-03 RX ADMIN — DICYCLOMINE HYDROCHLORIDE 10 MG: 10 CAPSULE ORAL at 12:10

## 2019-11-03 RX ADMIN — FORMOTEROL FUMARATE DIHYDRATE 20 MCG: 20 SOLUTION RESPIRATORY (INHALATION) at 05:04

## 2019-11-03 RX ADMIN — SACUBITRIL AND VALSARTAN 1 TABLET: 49; 51 TABLET, FILM COATED ORAL at 20:36

## 2019-11-03 RX ADMIN — Medication 200 MG: at 09:03

## 2019-11-03 RX ADMIN — PANTOPRAZOLE SODIUM 40 MG: 40 TABLET, DELAYED RELEASE ORAL at 16:54

## 2019-11-03 RX ADMIN — LEVALBUTEROL HYDROCHLORIDE 1.25 MG: 1.25 SOLUTION RESPIRATORY (INHALATION) at 18:39

## 2019-11-03 RX ADMIN — LEVALBUTEROL HYDROCHLORIDE 1.25 MG: 1.25 SOLUTION RESPIRATORY (INHALATION) at 10:04

## 2019-11-03 ASSESSMENT — PAIN DESCRIPTION - ORIENTATION
ORIENTATION: LEFT;LOWER

## 2019-11-03 ASSESSMENT — PAIN DESCRIPTION - PAIN TYPE
TYPE: ACUTE PAIN

## 2019-11-03 ASSESSMENT — PAIN DESCRIPTION - ONSET
ONSET: ON-GOING
ONSET: PROGRESSIVE

## 2019-11-03 ASSESSMENT — PAIN SCALES - GENERAL
PAINLEVEL_OUTOF10: 8
PAINLEVEL_OUTOF10: 9
PAINLEVEL_OUTOF10: 8
PAINLEVEL_OUTOF10: 9
PAINLEVEL_OUTOF10: 9
PAINLEVEL_OUTOF10: 10
PAINLEVEL_OUTOF10: 9
PAINLEVEL_OUTOF10: 0

## 2019-11-03 ASSESSMENT — PAIN DESCRIPTION - PROGRESSION
CLINICAL_PROGRESSION: GRADUALLY WORSENING

## 2019-11-03 ASSESSMENT — PAIN DESCRIPTION - DESCRIPTORS
DESCRIPTORS: BURNING;SHARP;SHOOTING
DESCRIPTORS: BURNING;SHARP

## 2019-11-03 ASSESSMENT — PAIN DESCRIPTION - LOCATION
LOCATION: LEG

## 2019-11-03 ASSESSMENT — PAIN - FUNCTIONAL ASSESSMENT
PAIN_FUNCTIONAL_ASSESSMENT: PREVENTS OR INTERFERES SOME ACTIVE ACTIVITIES AND ADLS

## 2019-11-03 ASSESSMENT — PAIN DESCRIPTION - FREQUENCY
FREQUENCY: INTERMITTENT
FREQUENCY: CONTINUOUS

## 2019-11-04 LAB — INR BLD: 1.61 (ref 0.85–1.13)

## 2019-11-04 PROCEDURE — 97127 HC SP THER IVNTJ W/FOCUS COG FUNCJ: CPT

## 2019-11-04 PROCEDURE — 6370000000 HC RX 637 (ALT 250 FOR IP): Performed by: SURGERY

## 2019-11-04 PROCEDURE — 97530 THERAPEUTIC ACTIVITIES: CPT

## 2019-11-04 PROCEDURE — 97116 GAIT TRAINING THERAPY: CPT

## 2019-11-04 PROCEDURE — 6360000002 HC RX W HCPCS: Performed by: SURGERY

## 2019-11-04 PROCEDURE — 2500000003 HC RX 250 WO HCPCS: Performed by: FAMILY MEDICINE

## 2019-11-04 PROCEDURE — 6370000000 HC RX 637 (ALT 250 FOR IP): Performed by: INTERNAL MEDICINE

## 2019-11-04 PROCEDURE — 6370000000 HC RX 637 (ALT 250 FOR IP): Performed by: FAMILY MEDICINE

## 2019-11-04 PROCEDURE — 36415 COLL VENOUS BLD VENIPUNCTURE: CPT

## 2019-11-04 PROCEDURE — 94761 N-INVAS EAR/PLS OXIMETRY MLT: CPT

## 2019-11-04 PROCEDURE — 1290000000 HC SEMI PRIVATE OTHER R&B

## 2019-11-04 PROCEDURE — 6360000002 HC RX W HCPCS: Performed by: FAMILY MEDICINE

## 2019-11-04 PROCEDURE — 97110 THERAPEUTIC EXERCISES: CPT

## 2019-11-04 PROCEDURE — 2700000000 HC OXYGEN THERAPY PER DAY

## 2019-11-04 PROCEDURE — 94640 AIRWAY INHALATION TREATMENT: CPT

## 2019-11-04 PROCEDURE — 97535 SELF CARE MNGMENT TRAINING: CPT

## 2019-11-04 PROCEDURE — 85610 PROTHROMBIN TIME: CPT

## 2019-11-04 RX ADMIN — LEVALBUTEROL HYDROCHLORIDE 1.25 MG: 1.25 SOLUTION RESPIRATORY (INHALATION) at 14:25

## 2019-11-04 RX ADMIN — LEVALBUTEROL HYDROCHLORIDE 1.25 MG: 1.25 SOLUTION RESPIRATORY (INHALATION) at 05:31

## 2019-11-04 RX ADMIN — PANTOPRAZOLE SODIUM 40 MG: 40 TABLET, DELAYED RELEASE ORAL at 18:14

## 2019-11-04 RX ADMIN — IPRATROPIUM BROMIDE 0.5 MG: 0.5 SOLUTION RESPIRATORY (INHALATION) at 17:40

## 2019-11-04 RX ADMIN — ACETYLCYSTEINE 600 MG: 200 SOLUTION ORAL; RESPIRATORY (INHALATION) at 23:48

## 2019-11-04 RX ADMIN — PANTOPRAZOLE SODIUM 40 MG: 40 TABLET, DELAYED RELEASE ORAL at 05:25

## 2019-11-04 RX ADMIN — OXYCODONE HYDROCHLORIDE AND ACETAMINOPHEN 1 TABLET: 5; 325 TABLET ORAL at 15:06

## 2019-11-04 RX ADMIN — ACETYLCYSTEINE 600 MG: 200 SOLUTION ORAL; RESPIRATORY (INHALATION) at 09:34

## 2019-11-04 RX ADMIN — OXYCODONE HYDROCHLORIDE AND ACETAMINOPHEN 1 TABLET: 5; 325 TABLET ORAL at 05:25

## 2019-11-04 RX ADMIN — Medication 200 MG: at 09:45

## 2019-11-04 RX ADMIN — METOPROLOL SUCCINATE 25 MG: 25 TABLET, FILM COATED, EXTENDED RELEASE ORAL at 23:41

## 2019-11-04 RX ADMIN — DICYCLOMINE HYDROCHLORIDE 10 MG: 10 CAPSULE ORAL at 23:41

## 2019-11-04 RX ADMIN — DIGOXIN 125 MCG: 125 TABLET ORAL at 23:41

## 2019-11-04 RX ADMIN — TRAZODONE HYDROCHLORIDE 50 MG: 50 TABLET ORAL at 23:41

## 2019-11-04 RX ADMIN — SACUBITRIL AND VALSARTAN 1 TABLET: 49; 51 TABLET, FILM COATED ORAL at 23:41

## 2019-11-04 RX ADMIN — Medication 5 UNITS: at 09:00

## 2019-11-04 RX ADMIN — DICYCLOMINE HYDROCHLORIDE 10 MG: 10 CAPSULE ORAL at 05:25

## 2019-11-04 RX ADMIN — IPRATROPIUM BROMIDE 0.5 MG: 0.5 SOLUTION RESPIRATORY (INHALATION) at 09:51

## 2019-11-04 RX ADMIN — LEVALBUTEROL HYDROCHLORIDE 1.25 MG: 1.25 SOLUTION RESPIRATORY (INHALATION) at 09:51

## 2019-11-04 RX ADMIN — DOCUSATE SODIUM 100 MG: 100 CAPSULE, LIQUID FILLED ORAL at 23:42

## 2019-11-04 RX ADMIN — OXYCODONE HYDROCHLORIDE AND ACETAMINOPHEN 1 TABLET: 5; 325 TABLET ORAL at 10:49

## 2019-11-04 RX ADMIN — ONDANSETRON 4 MG: 4 TABLET, ORALLY DISINTEGRATING ORAL at 20:14

## 2019-11-04 RX ADMIN — FORMOTEROL FUMARATE DIHYDRATE 20 MCG: 20 SOLUTION RESPIRATORY (INHALATION) at 05:32

## 2019-11-04 RX ADMIN — IPRATROPIUM BROMIDE 0.5 MG: 0.5 SOLUTION RESPIRATORY (INHALATION) at 05:32

## 2019-11-04 RX ADMIN — FUROSEMIDE 40 MG: 40 TABLET ORAL at 09:35

## 2019-11-04 RX ADMIN — BUDESONIDE 500 MCG: 0.5 INHALANT RESPIRATORY (INHALATION) at 05:32

## 2019-11-04 RX ADMIN — BUDESONIDE 500 MCG: 0.5 INHALANT RESPIRATORY (INHALATION) at 17:43

## 2019-11-04 RX ADMIN — Medication 1.5 MG: at 18:14

## 2019-11-04 RX ADMIN — FORMOTEROL FUMARATE DIHYDRATE 20 MCG: 20 SOLUTION RESPIRATORY (INHALATION) at 17:43

## 2019-11-04 RX ADMIN — DOCUSATE SODIUM 100 MG: 100 CAPSULE, LIQUID FILLED ORAL at 09:35

## 2019-11-04 RX ADMIN — CETIRIZINE HYDROCHLORIDE 5 MG: 10 TABLET, FILM COATED ORAL at 09:35

## 2019-11-04 RX ADMIN — DICYCLOMINE HYDROCHLORIDE 10 MG: 10 CAPSULE ORAL at 10:49

## 2019-11-04 RX ADMIN — OXYCODONE HYDROCHLORIDE AND ACETAMINOPHEN 1 TABLET: 5; 325 TABLET ORAL at 23:42

## 2019-11-04 RX ADMIN — DICYCLOMINE HYDROCHLORIDE 10 MG: 10 CAPSULE ORAL at 18:14

## 2019-11-04 RX ADMIN — SACUBITRIL AND VALSARTAN 1 TABLET: 49; 51 TABLET, FILM COATED ORAL at 09:35

## 2019-11-04 RX ADMIN — NALOXEGOL OXALATE 12.5 MG: 12.5 TABLET, FILM COATED ORAL at 05:25

## 2019-11-04 RX ADMIN — LEVALBUTEROL HYDROCHLORIDE 1.25 MG: 1.25 SOLUTION RESPIRATORY (INHALATION) at 17:40

## 2019-11-04 RX ADMIN — OXYCODONE HYDROCHLORIDE AND ACETAMINOPHEN 1 TABLET: 5; 325 TABLET ORAL at 18:53

## 2019-11-04 RX ADMIN — IPRATROPIUM BROMIDE 0.5 MG: 0.5 SOLUTION RESPIRATORY (INHALATION) at 14:25

## 2019-11-04 RX ADMIN — SENNOSIDES 17.2 MG: 8.6 TABLET, FILM COATED ORAL at 23:42

## 2019-11-04 ASSESSMENT — PAIN DESCRIPTION - PAIN TYPE
TYPE: ACUTE PAIN
TYPE: DEEP SOMATIC PAIN
TYPE: ACUTE PAIN

## 2019-11-04 ASSESSMENT — PAIN DESCRIPTION - ORIENTATION
ORIENTATION: LEFT
ORIENTATION: LEFT
ORIENTATION: LEFT;LOWER
ORIENTATION: LEFT;LOWER
ORIENTATION: LEFT

## 2019-11-04 ASSESSMENT — PAIN - FUNCTIONAL ASSESSMENT
PAIN_FUNCTIONAL_ASSESSMENT: PREVENTS OR INTERFERES SOME ACTIVE ACTIVITIES AND ADLS
PAIN_FUNCTIONAL_ASSESSMENT: PREVENTS OR INTERFERES SOME ACTIVE ACTIVITIES AND ADLS
PAIN_FUNCTIONAL_ASSESSMENT: ACTIVITIES ARE NOT PREVENTED

## 2019-11-04 ASSESSMENT — PAIN DESCRIPTION - PROGRESSION
CLINICAL_PROGRESSION: GRADUALLY WORSENING
CLINICAL_PROGRESSION: NOT CHANGED
CLINICAL_PROGRESSION: GRADUALLY WORSENING

## 2019-11-04 ASSESSMENT — PAIN SCALES - GENERAL
PAINLEVEL_OUTOF10: 9
PAINLEVEL_OUTOF10: 8
PAINLEVEL_OUTOF10: 9
PAINLEVEL_OUTOF10: 9
PAINLEVEL_OUTOF10: 8
PAINLEVEL_OUTOF10: 10
PAINLEVEL_OUTOF10: 8
PAINLEVEL_OUTOF10: 9
PAINLEVEL_OUTOF10: 10

## 2019-11-04 ASSESSMENT — PAIN DESCRIPTION - LOCATION
LOCATION: LEG

## 2019-11-04 ASSESSMENT — PAIN DESCRIPTION - ONSET
ONSET: ON-GOING
ONSET: ON-GOING
ONSET: AWAKENED FROM SLEEP

## 2019-11-04 ASSESSMENT — PAIN DESCRIPTION - FREQUENCY
FREQUENCY: CONTINUOUS

## 2019-11-04 ASSESSMENT — PAIN DESCRIPTION - DESCRIPTORS
DESCRIPTORS: BURNING;STABBING
DESCRIPTORS: BURNING;SHARP
DESCRIPTORS: BURNING;SHARP

## 2019-11-04 ASSESSMENT — PAIN DESCRIPTION - DIRECTION: RADIATING_TOWARDS: NOT RADIATING

## 2019-11-05 LAB — INR BLD: 1.59 (ref 0.85–1.13)

## 2019-11-05 PROCEDURE — 97530 THERAPEUTIC ACTIVITIES: CPT

## 2019-11-05 PROCEDURE — 36415 COLL VENOUS BLD VENIPUNCTURE: CPT

## 2019-11-05 PROCEDURE — 94640 AIRWAY INHALATION TREATMENT: CPT

## 2019-11-05 PROCEDURE — 6360000002 HC RX W HCPCS: Performed by: FAMILY MEDICINE

## 2019-11-05 PROCEDURE — 97110 THERAPEUTIC EXERCISES: CPT

## 2019-11-05 PROCEDURE — 2700000000 HC OXYGEN THERAPY PER DAY

## 2019-11-05 PROCEDURE — 6370000000 HC RX 637 (ALT 250 FOR IP): Performed by: SURGERY

## 2019-11-05 PROCEDURE — 94761 N-INVAS EAR/PLS OXIMETRY MLT: CPT

## 2019-11-05 PROCEDURE — 97127 HC SP THER IVNTJ W/FOCUS COG FUNCJ: CPT

## 2019-11-05 PROCEDURE — 85610 PROTHROMBIN TIME: CPT

## 2019-11-05 PROCEDURE — 6360000002 HC RX W HCPCS: Performed by: SURGERY

## 2019-11-05 PROCEDURE — 6370000000 HC RX 637 (ALT 250 FOR IP): Performed by: INTERNAL MEDICINE

## 2019-11-05 PROCEDURE — 97535 SELF CARE MNGMENT TRAINING: CPT

## 2019-11-05 PROCEDURE — 1290000000 HC SEMI PRIVATE OTHER R&B

## 2019-11-05 PROCEDURE — 97116 GAIT TRAINING THERAPY: CPT

## 2019-11-05 PROCEDURE — 6370000000 HC RX 637 (ALT 250 FOR IP): Performed by: FAMILY MEDICINE

## 2019-11-05 RX ORDER — MAGNESIUM HYDROXIDE/ALUMINUM HYDROXICE/SIMETHICONE 120; 1200; 1200 MG/30ML; MG/30ML; MG/30ML
30 SUSPENSION ORAL 4 TIMES DAILY
Status: DISCONTINUED | OUTPATIENT
Start: 2019-11-05 | End: 2019-11-21 | Stop reason: HOSPADM

## 2019-11-05 RX ORDER — OXYCODONE HYDROCHLORIDE AND ACETAMINOPHEN 5; 325 MG/1; MG/1
2 TABLET ORAL ONCE
Status: DISCONTINUED | OUTPATIENT
Start: 2019-11-05 | End: 2019-11-05

## 2019-11-05 RX ORDER — WARFARIN SODIUM 2.5 MG/1
2.5 TABLET ORAL
Status: COMPLETED | OUTPATIENT
Start: 2019-11-05 | End: 2019-11-05

## 2019-11-05 RX ORDER — OXYCODONE HYDROCHLORIDE AND ACETAMINOPHEN 5; 325 MG/1; MG/1
2 TABLET ORAL ONCE
Status: COMPLETED | OUTPATIENT
Start: 2019-11-06 | End: 2019-11-06

## 2019-11-05 RX ADMIN — SACUBITRIL AND VALSARTAN 1 TABLET: 49; 51 TABLET, FILM COATED ORAL at 08:56

## 2019-11-05 RX ADMIN — LEVALBUTEROL HYDROCHLORIDE 1.25 MG: 1.25 SOLUTION RESPIRATORY (INHALATION) at 05:51

## 2019-11-05 RX ADMIN — ALUMINUM HYDROXIDE, MAGNESIUM HYDROXIDE, AND SIMETHICONE 30 ML: 200; 200; 20 SUSPENSION ORAL at 18:27

## 2019-11-05 RX ADMIN — SENNOSIDES 17.2 MG: 8.6 TABLET, FILM COATED ORAL at 22:27

## 2019-11-05 RX ADMIN — Medication 200 MG: at 08:56

## 2019-11-05 RX ADMIN — LEVALBUTEROL HYDROCHLORIDE 1.25 MG: 1.25 SOLUTION RESPIRATORY (INHALATION) at 10:39

## 2019-11-05 RX ADMIN — DICYCLOMINE HYDROCHLORIDE 10 MG: 10 CAPSULE ORAL at 12:14

## 2019-11-05 RX ADMIN — DICYCLOMINE HYDROCHLORIDE 10 MG: 10 CAPSULE ORAL at 22:27

## 2019-11-05 RX ADMIN — IPRATROPIUM BROMIDE 0.5 MG: 0.5 SOLUTION RESPIRATORY (INHALATION) at 10:39

## 2019-11-05 RX ADMIN — WARFARIN SODIUM 2.5 MG: 2.5 TABLET ORAL at 18:27

## 2019-11-05 RX ADMIN — FUROSEMIDE 40 MG: 40 TABLET ORAL at 08:56

## 2019-11-05 RX ADMIN — DIGOXIN 125 MCG: 125 TABLET ORAL at 22:27

## 2019-11-05 RX ADMIN — BUDESONIDE 500 MCG: 0.5 INHALANT RESPIRATORY (INHALATION) at 16:18

## 2019-11-05 RX ADMIN — DICYCLOMINE HYDROCHLORIDE 10 MG: 10 CAPSULE ORAL at 18:26

## 2019-11-05 RX ADMIN — NALOXEGOL OXALATE 12.5 MG: 12.5 TABLET, FILM COATED ORAL at 06:22

## 2019-11-05 RX ADMIN — IPRATROPIUM BROMIDE 0.5 MG: 0.5 SOLUTION RESPIRATORY (INHALATION) at 05:51

## 2019-11-05 RX ADMIN — PANTOPRAZOLE SODIUM 40 MG: 40 TABLET, DELAYED RELEASE ORAL at 06:22

## 2019-11-05 RX ADMIN — FORMOTEROL FUMARATE DIHYDRATE 20 MCG: 20 SOLUTION RESPIRATORY (INHALATION) at 05:51

## 2019-11-05 RX ADMIN — ACETAMINOPHEN 650 MG: 325 TABLET ORAL at 06:22

## 2019-11-05 RX ADMIN — FORMOTEROL FUMARATE DIHYDRATE 20 MCG: 20 SOLUTION RESPIRATORY (INHALATION) at 16:13

## 2019-11-05 RX ADMIN — METOPROLOL SUCCINATE 25 MG: 25 TABLET, FILM COATED, EXTENDED RELEASE ORAL at 22:27

## 2019-11-05 RX ADMIN — IPRATROPIUM BROMIDE 0.5 MG: 0.5 SOLUTION RESPIRATORY (INHALATION) at 15:58

## 2019-11-05 RX ADMIN — PANTOPRAZOLE SODIUM 40 MG: 40 TABLET, DELAYED RELEASE ORAL at 15:39

## 2019-11-05 RX ADMIN — DOCUSATE SODIUM 100 MG: 100 CAPSULE, LIQUID FILLED ORAL at 22:28

## 2019-11-05 RX ADMIN — LEVALBUTEROL HYDROCHLORIDE 1.25 MG: 1.25 SOLUTION RESPIRATORY (INHALATION) at 16:11

## 2019-11-05 RX ADMIN — CETIRIZINE HYDROCHLORIDE 5 MG: 10 TABLET, FILM COATED ORAL at 08:57

## 2019-11-05 RX ADMIN — OXYCODONE HYDROCHLORIDE AND ACETAMINOPHEN 1 TABLET: 5; 325 TABLET ORAL at 22:28

## 2019-11-05 RX ADMIN — OXYCODONE HYDROCHLORIDE AND ACETAMINOPHEN 1 TABLET: 5; 325 TABLET ORAL at 15:39

## 2019-11-05 RX ADMIN — TRAZODONE HYDROCHLORIDE 50 MG: 50 TABLET ORAL at 22:28

## 2019-11-05 RX ADMIN — ACETYLCYSTEINE 600 MG: 200 SOLUTION ORAL; RESPIRATORY (INHALATION) at 22:28

## 2019-11-05 RX ADMIN — SACUBITRIL AND VALSARTAN 1 TABLET: 49; 51 TABLET, FILM COATED ORAL at 22:27

## 2019-11-05 RX ADMIN — DOCUSATE SODIUM 100 MG: 100 CAPSULE, LIQUID FILLED ORAL at 08:56

## 2019-11-05 RX ADMIN — DICYCLOMINE HYDROCHLORIDE 10 MG: 10 CAPSULE ORAL at 06:22

## 2019-11-05 RX ADMIN — ALUMINUM HYDROXIDE, MAGNESIUM HYDROXIDE, AND SIMETHICONE 30 ML: 200; 200; 20 SUSPENSION ORAL at 14:20

## 2019-11-05 RX ADMIN — OXYCODONE HYDROCHLORIDE AND ACETAMINOPHEN 1 TABLET: 5; 325 TABLET ORAL at 09:47

## 2019-11-05 RX ADMIN — BUDESONIDE 500 MCG: 0.5 INHALANT RESPIRATORY (INHALATION) at 05:51

## 2019-11-05 ASSESSMENT — PAIN DESCRIPTION - ORIENTATION
ORIENTATION: LEFT

## 2019-11-05 ASSESSMENT — PAIN DESCRIPTION - FREQUENCY
FREQUENCY: INTERMITTENT

## 2019-11-05 ASSESSMENT — PAIN DESCRIPTION - ONSET
ONSET: ON-GOING
ONSET: ON-GOING
ONSET: AWAKENED FROM SLEEP
ONSET: ON-GOING

## 2019-11-05 ASSESSMENT — PAIN SCALES - GENERAL
PAINLEVEL_OUTOF10: 7
PAINLEVEL_OUTOF10: 8
PAINLEVEL_OUTOF10: 9
PAINLEVEL_OUTOF10: 8
PAINLEVEL_OUTOF10: 8
PAINLEVEL_OUTOF10: 10
PAINLEVEL_OUTOF10: 9

## 2019-11-05 ASSESSMENT — PAIN DESCRIPTION - LOCATION
LOCATION: LEG

## 2019-11-05 ASSESSMENT — PAIN DESCRIPTION - DESCRIPTORS
DESCRIPTORS: JABBING;STABBING
DESCRIPTORS: SHARP

## 2019-11-05 ASSESSMENT — PAIN DESCRIPTION - PAIN TYPE
TYPE: ACUTE PAIN
TYPE: DEEP SOMATIC PAIN

## 2019-11-05 ASSESSMENT — PAIN DESCRIPTION - PROGRESSION
CLINICAL_PROGRESSION: GRADUALLY WORSENING

## 2019-11-05 ASSESSMENT — PAIN DESCRIPTION - DIRECTION: RADIATING_TOWARDS: NOT RADIATING

## 2019-11-06 ENCOUNTER — APPOINTMENT (OUTPATIENT)
Dept: ULTRASOUND IMAGING | Age: 74
DRG: 560 | End: 2019-11-06
Attending: FAMILY MEDICINE
Payer: MEDICARE

## 2019-11-06 LAB — INR BLD: 1.77 (ref 0.85–1.13)

## 2019-11-06 PROCEDURE — C1894 INTRO/SHEATH, NON-LASER: HCPCS

## 2019-11-06 PROCEDURE — 6360000002 HC RX W HCPCS: Performed by: FAMILY MEDICINE

## 2019-11-06 PROCEDURE — 6370000000 HC RX 637 (ALT 250 FOR IP): Performed by: INTERNAL MEDICINE

## 2019-11-06 PROCEDURE — 6360000002 HC RX W HCPCS: Performed by: SURGERY

## 2019-11-06 PROCEDURE — 97110 THERAPEUTIC EXERCISES: CPT

## 2019-11-06 PROCEDURE — 97116 GAIT TRAINING THERAPY: CPT

## 2019-11-06 PROCEDURE — 85610 PROTHROMBIN TIME: CPT

## 2019-11-06 PROCEDURE — 94761 N-INVAS EAR/PLS OXIMETRY MLT: CPT

## 2019-11-06 PROCEDURE — 0Y9J3ZZ DRAINAGE OF LEFT LOWER LEG, PERCUTANEOUS APPROACH: ICD-10-PCS | Performed by: RADIOLOGY

## 2019-11-06 PROCEDURE — 2700000000 HC OXYGEN THERAPY PER DAY

## 2019-11-06 PROCEDURE — 94640 AIRWAY INHALATION TREATMENT: CPT

## 2019-11-06 PROCEDURE — 87205 SMEAR GRAM STAIN: CPT

## 2019-11-06 PROCEDURE — 2500000003 HC RX 250 WO HCPCS

## 2019-11-06 PROCEDURE — 87075 CULTR BACTERIA EXCEPT BLOOD: CPT

## 2019-11-06 PROCEDURE — 36415 COLL VENOUS BLD VENIPUNCTURE: CPT

## 2019-11-06 PROCEDURE — 87070 CULTURE OTHR SPECIMN AEROBIC: CPT

## 2019-11-06 PROCEDURE — 97530 THERAPEUTIC ACTIVITIES: CPT

## 2019-11-06 PROCEDURE — 2709999900 HC NON-CHARGEABLE SUPPLY

## 2019-11-06 PROCEDURE — 6370000000 HC RX 637 (ALT 250 FOR IP): Performed by: SURGERY

## 2019-11-06 PROCEDURE — 6370000000 HC RX 637 (ALT 250 FOR IP): Performed by: FAMILY MEDICINE

## 2019-11-06 PROCEDURE — 1290000000 HC SEMI PRIVATE OTHER R&B

## 2019-11-06 PROCEDURE — 76942 ECHO GUIDE FOR BIOPSY: CPT

## 2019-11-06 RX ORDER — WARFARIN SODIUM 2 MG/1
2 TABLET ORAL
Status: COMPLETED | OUTPATIENT
Start: 2019-11-06 | End: 2019-11-06

## 2019-11-06 RX ADMIN — DIGOXIN 125 MCG: 125 TABLET ORAL at 22:38

## 2019-11-06 RX ADMIN — DICYCLOMINE HYDROCHLORIDE 10 MG: 10 CAPSULE ORAL at 16:59

## 2019-11-06 RX ADMIN — TRAZODONE HYDROCHLORIDE 50 MG: 50 TABLET ORAL at 22:40

## 2019-11-06 RX ADMIN — SACUBITRIL AND VALSARTAN 1 TABLET: 49; 51 TABLET, FILM COATED ORAL at 22:39

## 2019-11-06 RX ADMIN — DOCUSATE SODIUM 100 MG: 100 CAPSULE, LIQUID FILLED ORAL at 10:09

## 2019-11-06 RX ADMIN — IPRATROPIUM BROMIDE 0.5 MG: 0.5 SOLUTION RESPIRATORY (INHALATION) at 06:42

## 2019-11-06 RX ADMIN — OXYCODONE HYDROCHLORIDE AND ACETAMINOPHEN 1 TABLET: 5; 325 TABLET ORAL at 22:40

## 2019-11-06 RX ADMIN — OXYCODONE HYDROCHLORIDE AND ACETAMINOPHEN 2 TABLET: 5; 325 TABLET ORAL at 08:29

## 2019-11-06 RX ADMIN — METOPROLOL SUCCINATE 25 MG: 25 TABLET, FILM COATED, EXTENDED RELEASE ORAL at 22:38

## 2019-11-06 RX ADMIN — FORMOTEROL FUMARATE DIHYDRATE 20 MCG: 20 SOLUTION RESPIRATORY (INHALATION) at 06:42

## 2019-11-06 RX ADMIN — NALOXEGOL OXALATE 12.5 MG: 12.5 TABLET, FILM COATED ORAL at 06:24

## 2019-11-06 RX ADMIN — IPRATROPIUM BROMIDE 0.5 MG: 0.5 SOLUTION RESPIRATORY (INHALATION) at 14:17

## 2019-11-06 RX ADMIN — IPRATROPIUM BROMIDE 0.5 MG: 0.5 SOLUTION RESPIRATORY (INHALATION) at 10:27

## 2019-11-06 RX ADMIN — IPRATROPIUM BROMIDE 0.5 MG: 0.5 SOLUTION RESPIRATORY (INHALATION) at 18:10

## 2019-11-06 RX ADMIN — SACUBITRIL AND VALSARTAN 1 TABLET: 49; 51 TABLET, FILM COATED ORAL at 10:08

## 2019-11-06 RX ADMIN — LEVALBUTEROL HYDROCHLORIDE 1.25 MG: 1.25 SOLUTION RESPIRATORY (INHALATION) at 06:42

## 2019-11-06 RX ADMIN — CETIRIZINE HYDROCHLORIDE 5 MG: 10 TABLET, FILM COATED ORAL at 10:08

## 2019-11-06 RX ADMIN — LEVALBUTEROL HYDROCHLORIDE 1.25 MG: 1.25 SOLUTION RESPIRATORY (INHALATION) at 10:27

## 2019-11-06 RX ADMIN — BUDESONIDE 500 MCG: 0.5 INHALANT RESPIRATORY (INHALATION) at 18:10

## 2019-11-06 RX ADMIN — FORMOTEROL FUMARATE DIHYDRATE 20 MCG: 20 SOLUTION RESPIRATORY (INHALATION) at 18:10

## 2019-11-06 RX ADMIN — WARFARIN SODIUM 2 MG: 2 TABLET ORAL at 17:03

## 2019-11-06 RX ADMIN — FUROSEMIDE 40 MG: 40 TABLET ORAL at 10:08

## 2019-11-06 RX ADMIN — ALUMINUM HYDROXIDE, MAGNESIUM HYDROXIDE, AND SIMETHICONE 30 ML: 200; 200; 20 SUSPENSION ORAL at 10:09

## 2019-11-06 RX ADMIN — OXYCODONE HYDROCHLORIDE AND ACETAMINOPHEN 1 TABLET: 5; 325 TABLET ORAL at 16:59

## 2019-11-06 RX ADMIN — ALUMINUM HYDROXIDE, MAGNESIUM HYDROXIDE, AND SIMETHICONE 30 ML: 200; 200; 20 SUSPENSION ORAL at 22:49

## 2019-11-06 RX ADMIN — PANTOPRAZOLE SODIUM 40 MG: 40 TABLET, DELAYED RELEASE ORAL at 16:59

## 2019-11-06 RX ADMIN — ACETYLCYSTEINE 600 MG: 200 SOLUTION ORAL; RESPIRATORY (INHALATION) at 22:41

## 2019-11-06 RX ADMIN — ACETYLCYSTEINE 600 MG: 200 SOLUTION ORAL; RESPIRATORY (INHALATION) at 10:09

## 2019-11-06 RX ADMIN — BUDESONIDE 500 MCG: 0.5 INHALANT RESPIRATORY (INHALATION) at 06:42

## 2019-11-06 RX ADMIN — LEVALBUTEROL HYDROCHLORIDE 1.25 MG: 1.25 SOLUTION RESPIRATORY (INHALATION) at 14:17

## 2019-11-06 RX ADMIN — DICYCLOMINE HYDROCHLORIDE 10 MG: 10 CAPSULE ORAL at 10:14

## 2019-11-06 RX ADMIN — PANTOPRAZOLE SODIUM 40 MG: 40 TABLET, DELAYED RELEASE ORAL at 06:24

## 2019-11-06 RX ADMIN — Medication 200 MG: at 10:13

## 2019-11-06 RX ADMIN — DICYCLOMINE HYDROCHLORIDE 10 MG: 10 CAPSULE ORAL at 22:38

## 2019-11-06 RX ADMIN — DOCUSATE SODIUM 100 MG: 100 CAPSULE, LIQUID FILLED ORAL at 22:39

## 2019-11-06 RX ADMIN — DICYCLOMINE HYDROCHLORIDE 10 MG: 10 CAPSULE ORAL at 06:24

## 2019-11-06 RX ADMIN — ALUMINUM HYDROXIDE, MAGNESIUM HYDROXIDE, AND SIMETHICONE 30 ML: 200; 200; 20 SUSPENSION ORAL at 16:59

## 2019-11-06 RX ADMIN — LEVALBUTEROL HYDROCHLORIDE 1.25 MG: 1.25 SOLUTION RESPIRATORY (INHALATION) at 18:10

## 2019-11-06 ASSESSMENT — PAIN DESCRIPTION - LOCATION
LOCATION: LEG
LOCATION: LEG

## 2019-11-06 ASSESSMENT — PAIN DESCRIPTION - FREQUENCY: FREQUENCY: INTERMITTENT

## 2019-11-06 ASSESSMENT — PAIN DESCRIPTION - ORIENTATION
ORIENTATION: LEFT
ORIENTATION: LOWER;LEFT

## 2019-11-06 ASSESSMENT — PAIN - FUNCTIONAL ASSESSMENT: PAIN_FUNCTIONAL_ASSESSMENT: PREVENTS OR INTERFERES SOME ACTIVE ACTIVITIES AND ADLS

## 2019-11-06 ASSESSMENT — PAIN DESCRIPTION - DESCRIPTORS: DESCRIPTORS: STABBING;JABBING

## 2019-11-06 ASSESSMENT — PAIN SCALES - GENERAL
PAINLEVEL_OUTOF10: 6
PAINLEVEL_OUTOF10: 5
PAINLEVEL_OUTOF10: 7
PAINLEVEL_OUTOF10: 8

## 2019-11-06 ASSESSMENT — PAIN DESCRIPTION - PROGRESSION: CLINICAL_PROGRESSION: NOT CHANGED

## 2019-11-06 ASSESSMENT — PAIN DESCRIPTION - DIRECTION: RADIATING_TOWARDS: NOT RADIATING

## 2019-11-06 ASSESSMENT — PAIN DESCRIPTION - ONSET: ONSET: AWAKENED FROM SLEEP

## 2019-11-06 ASSESSMENT — PAIN DESCRIPTION - PAIN TYPE: TYPE: DEEP SOMATIC PAIN

## 2019-11-07 LAB — INR BLD: 2.23 (ref 0.85–1.13)

## 2019-11-07 PROCEDURE — 2700000000 HC OXYGEN THERAPY PER DAY

## 2019-11-07 PROCEDURE — 94640 AIRWAY INHALATION TREATMENT: CPT

## 2019-11-07 PROCEDURE — 1290000000 HC SEMI PRIVATE OTHER R&B

## 2019-11-07 PROCEDURE — 94761 N-INVAS EAR/PLS OXIMETRY MLT: CPT

## 2019-11-07 PROCEDURE — 97535 SELF CARE MNGMENT TRAINING: CPT

## 2019-11-07 PROCEDURE — 6360000002 HC RX W HCPCS: Performed by: SURGERY

## 2019-11-07 PROCEDURE — 6370000000 HC RX 637 (ALT 250 FOR IP): Performed by: FAMILY MEDICINE

## 2019-11-07 PROCEDURE — 6370000000 HC RX 637 (ALT 250 FOR IP): Performed by: SURGERY

## 2019-11-07 PROCEDURE — 97116 GAIT TRAINING THERAPY: CPT

## 2019-11-07 PROCEDURE — 85610 PROTHROMBIN TIME: CPT

## 2019-11-07 PROCEDURE — 97530 THERAPEUTIC ACTIVITIES: CPT

## 2019-11-07 PROCEDURE — 97110 THERAPEUTIC EXERCISES: CPT

## 2019-11-07 PROCEDURE — 6360000002 HC RX W HCPCS: Performed by: FAMILY MEDICINE

## 2019-11-07 PROCEDURE — 36415 COLL VENOUS BLD VENIPUNCTURE: CPT

## 2019-11-07 PROCEDURE — 6370000000 HC RX 637 (ALT 250 FOR IP): Performed by: INTERNAL MEDICINE

## 2019-11-07 PROCEDURE — 97127 HC SP THER IVNTJ W/FOCUS COG FUNCJ: CPT

## 2019-11-07 RX ADMIN — LEVALBUTEROL HYDROCHLORIDE 1.25 MG: 1.25 SOLUTION RESPIRATORY (INHALATION) at 13:10

## 2019-11-07 RX ADMIN — OXYCODONE HYDROCHLORIDE AND ACETAMINOPHEN 0.5 TABLET: 5; 325 TABLET ORAL at 18:32

## 2019-11-07 RX ADMIN — ALUMINUM HYDROXIDE, MAGNESIUM HYDROXIDE, AND SIMETHICONE 30 ML: 200; 200; 20 SUSPENSION ORAL at 15:12

## 2019-11-07 RX ADMIN — ACETYLCYSTEINE 600 MG: 200 SOLUTION ORAL; RESPIRATORY (INHALATION) at 23:52

## 2019-11-07 RX ADMIN — IPRATROPIUM BROMIDE 0.5 MG: 0.5 SOLUTION RESPIRATORY (INHALATION) at 16:37

## 2019-11-07 RX ADMIN — DICYCLOMINE HYDROCHLORIDE 10 MG: 10 CAPSULE ORAL at 18:28

## 2019-11-07 RX ADMIN — DICYCLOMINE HYDROCHLORIDE 10 MG: 10 CAPSULE ORAL at 23:52

## 2019-11-07 RX ADMIN — FUROSEMIDE 40 MG: 40 TABLET ORAL at 08:57

## 2019-11-07 RX ADMIN — LEVALBUTEROL HYDROCHLORIDE 1.25 MG: 1.25 SOLUTION RESPIRATORY (INHALATION) at 05:24

## 2019-11-07 RX ADMIN — ALUMINUM HYDROXIDE, MAGNESIUM HYDROXIDE, AND SIMETHICONE 30 ML: 200; 200; 20 SUSPENSION ORAL at 18:28

## 2019-11-07 RX ADMIN — DIGOXIN 125 MCG: 125 TABLET ORAL at 22:07

## 2019-11-07 RX ADMIN — SACUBITRIL AND VALSARTAN 1 TABLET: 49; 51 TABLET, FILM COATED ORAL at 22:07

## 2019-11-07 RX ADMIN — LEVALBUTEROL HYDROCHLORIDE 1.25 MG: 1.25 SOLUTION RESPIRATORY (INHALATION) at 16:37

## 2019-11-07 RX ADMIN — FORMOTEROL FUMARATE DIHYDRATE 20 MCG: 20 SOLUTION RESPIRATORY (INHALATION) at 05:24

## 2019-11-07 RX ADMIN — DOCUSATE SODIUM 100 MG: 100 CAPSULE, LIQUID FILLED ORAL at 22:06

## 2019-11-07 RX ADMIN — SENNOSIDES 17.2 MG: 8.6 TABLET, FILM COATED ORAL at 23:52

## 2019-11-07 RX ADMIN — OXYCODONE HYDROCHLORIDE AND ACETAMINOPHEN 0.5 TABLET: 5; 325 TABLET ORAL at 03:27

## 2019-11-07 RX ADMIN — DICYCLOMINE HYDROCHLORIDE 10 MG: 10 CAPSULE ORAL at 06:33

## 2019-11-07 RX ADMIN — BUDESONIDE 500 MCG: 0.5 INHALANT RESPIRATORY (INHALATION) at 17:00

## 2019-11-07 RX ADMIN — OXYCODONE HYDROCHLORIDE AND ACETAMINOPHEN 0.5 TABLET: 5; 325 TABLET ORAL at 15:13

## 2019-11-07 RX ADMIN — OXYCODONE HYDROCHLORIDE AND ACETAMINOPHEN 0.5 TABLET: 5; 325 TABLET ORAL at 08:57

## 2019-11-07 RX ADMIN — SACUBITRIL AND VALSARTAN 1 TABLET: 49; 51 TABLET, FILM COATED ORAL at 08:57

## 2019-11-07 RX ADMIN — DICYCLOMINE HYDROCHLORIDE 10 MG: 10 CAPSULE ORAL at 15:12

## 2019-11-07 RX ADMIN — FORMOTEROL FUMARATE DIHYDRATE 20 MCG: 20 SOLUTION RESPIRATORY (INHALATION) at 16:54

## 2019-11-07 RX ADMIN — ACETAMINOPHEN 650 MG: 325 TABLET ORAL at 22:05

## 2019-11-07 RX ADMIN — ALUMINUM HYDROXIDE, MAGNESIUM HYDROXIDE, AND SIMETHICONE 30 ML: 200; 200; 20 SUSPENSION ORAL at 08:56

## 2019-11-07 RX ADMIN — Medication 200 MG: at 23:53

## 2019-11-07 RX ADMIN — METOPROLOL SUCCINATE 25 MG: 25 TABLET, FILM COATED, EXTENDED RELEASE ORAL at 22:07

## 2019-11-07 RX ADMIN — BUDESONIDE 500 MCG: 0.5 INHALANT RESPIRATORY (INHALATION) at 05:24

## 2019-11-07 RX ADMIN — NALOXEGOL OXALATE 12.5 MG: 12.5 TABLET, FILM COATED ORAL at 06:33

## 2019-11-07 RX ADMIN — IPRATROPIUM BROMIDE 0.5 MG: 0.5 SOLUTION RESPIRATORY (INHALATION) at 05:24

## 2019-11-07 RX ADMIN — ACETYLCYSTEINE 600 MG: 200 SOLUTION ORAL; RESPIRATORY (INHALATION) at 15:12

## 2019-11-07 RX ADMIN — CETIRIZINE HYDROCHLORIDE 5 MG: 10 TABLET, FILM COATED ORAL at 08:57

## 2019-11-07 RX ADMIN — PANTOPRAZOLE SODIUM 40 MG: 40 TABLET, DELAYED RELEASE ORAL at 06:33

## 2019-11-07 RX ADMIN — PANTOPRAZOLE SODIUM 40 MG: 40 TABLET, DELAYED RELEASE ORAL at 18:28

## 2019-11-07 RX ADMIN — IPRATROPIUM BROMIDE 0.5 MG: 0.5 SOLUTION RESPIRATORY (INHALATION) at 13:10

## 2019-11-07 RX ADMIN — ALUMINUM HYDROXIDE, MAGNESIUM HYDROXIDE, AND SIMETHICONE 30 ML: 200; 200; 20 SUSPENSION ORAL at 22:07

## 2019-11-07 ASSESSMENT — PAIN SCALES - GENERAL
PAINLEVEL_OUTOF10: 9
PAINLEVEL_OUTOF10: 8
PAINLEVEL_OUTOF10: 9
PAINLEVEL_OUTOF10: 9
PAINLEVEL_OUTOF10: 6

## 2019-11-08 LAB — INR BLD: 1.8 (ref 0.85–1.13)

## 2019-11-08 PROCEDURE — 97110 THERAPEUTIC EXERCISES: CPT

## 2019-11-08 PROCEDURE — 97530 THERAPEUTIC ACTIVITIES: CPT

## 2019-11-08 PROCEDURE — 6360000002 HC RX W HCPCS: Performed by: SURGERY

## 2019-11-08 PROCEDURE — 6370000000 HC RX 637 (ALT 250 FOR IP): Performed by: INTERNAL MEDICINE

## 2019-11-08 PROCEDURE — 94761 N-INVAS EAR/PLS OXIMETRY MLT: CPT

## 2019-11-08 PROCEDURE — 6360000002 HC RX W HCPCS: Performed by: FAMILY MEDICINE

## 2019-11-08 PROCEDURE — 6370000000 HC RX 637 (ALT 250 FOR IP): Performed by: FAMILY MEDICINE

## 2019-11-08 PROCEDURE — 85610 PROTHROMBIN TIME: CPT

## 2019-11-08 PROCEDURE — 2700000000 HC OXYGEN THERAPY PER DAY

## 2019-11-08 PROCEDURE — 1290000000 HC SEMI PRIVATE OTHER R&B

## 2019-11-08 PROCEDURE — 97127 HC SP THER IVNTJ W/FOCUS COG FUNCJ: CPT

## 2019-11-08 PROCEDURE — 97535 SELF CARE MNGMENT TRAINING: CPT

## 2019-11-08 PROCEDURE — 36415 COLL VENOUS BLD VENIPUNCTURE: CPT

## 2019-11-08 PROCEDURE — 6370000000 HC RX 637 (ALT 250 FOR IP)

## 2019-11-08 PROCEDURE — 6370000000 HC RX 637 (ALT 250 FOR IP): Performed by: SURGERY

## 2019-11-08 PROCEDURE — 94640 AIRWAY INHALATION TREATMENT: CPT

## 2019-11-08 RX ORDER — WARFARIN SODIUM 3 MG/1
3 TABLET ORAL
Status: COMPLETED | OUTPATIENT
Start: 2019-11-08 | End: 2019-11-08

## 2019-11-08 RX ADMIN — LEVALBUTEROL HYDROCHLORIDE 1.25 MG: 1.25 SOLUTION RESPIRATORY (INHALATION) at 05:50

## 2019-11-08 RX ADMIN — OXYCODONE HYDROCHLORIDE AND ACETAMINOPHEN 0.5 TABLET: 5; 325 TABLET ORAL at 12:32

## 2019-11-08 RX ADMIN — OXYCODONE HYDROCHLORIDE AND ACETAMINOPHEN 1 TABLET: 5; 325 TABLET ORAL at 20:59

## 2019-11-08 RX ADMIN — ACETYLCYSTEINE 600 MG: 200 SOLUTION ORAL; RESPIRATORY (INHALATION) at 20:58

## 2019-11-08 RX ADMIN — DOCUSATE SODIUM 100 MG: 100 CAPSULE, LIQUID FILLED ORAL at 20:59

## 2019-11-08 RX ADMIN — CETIRIZINE HYDROCHLORIDE 5 MG: 10 TABLET, FILM COATED ORAL at 08:54

## 2019-11-08 RX ADMIN — ALUMINUM HYDROXIDE, MAGNESIUM HYDROXIDE, AND SIMETHICONE 30 ML: 200; 200; 20 SUSPENSION ORAL at 08:54

## 2019-11-08 RX ADMIN — DOCUSATE SODIUM 100 MG: 100 CAPSULE, LIQUID FILLED ORAL at 08:54

## 2019-11-08 RX ADMIN — METOPROLOL SUCCINATE 25 MG: 25 TABLET, FILM COATED, EXTENDED RELEASE ORAL at 20:59

## 2019-11-08 RX ADMIN — ACETYLCYSTEINE 600 MG: 200 SOLUTION ORAL; RESPIRATORY (INHALATION) at 08:54

## 2019-11-08 RX ADMIN — Medication 200 MG: at 08:55

## 2019-11-08 RX ADMIN — ALUMINUM HYDROXIDE, MAGNESIUM HYDROXIDE, AND SIMETHICONE 30 ML: 200; 200; 20 SUSPENSION ORAL at 21:00

## 2019-11-08 RX ADMIN — DICYCLOMINE HYDROCHLORIDE 10 MG: 10 CAPSULE ORAL at 17:28

## 2019-11-08 RX ADMIN — DICYCLOMINE HYDROCHLORIDE 10 MG: 10 CAPSULE ORAL at 20:59

## 2019-11-08 RX ADMIN — DICYCLOMINE HYDROCHLORIDE 10 MG: 10 CAPSULE ORAL at 12:32

## 2019-11-08 RX ADMIN — OXYCODONE HYDROCHLORIDE AND ACETAMINOPHEN 1 TABLET: 5; 325 TABLET ORAL at 17:28

## 2019-11-08 RX ADMIN — PANTOPRAZOLE SODIUM 40 MG: 40 TABLET, DELAYED RELEASE ORAL at 17:28

## 2019-11-08 RX ADMIN — SACUBITRIL AND VALSARTAN 1 TABLET: 49; 51 TABLET, FILM COATED ORAL at 08:54

## 2019-11-08 RX ADMIN — DIGOXIN 125 MCG: 125 TABLET ORAL at 20:59

## 2019-11-08 RX ADMIN — ACETAMINOPHEN 650 MG: 325 TABLET ORAL at 06:42

## 2019-11-08 RX ADMIN — SENNOSIDES 17.2 MG: 8.6 TABLET, FILM COATED ORAL at 20:59

## 2019-11-08 RX ADMIN — ALUMINUM HYDROXIDE, MAGNESIUM HYDROXIDE, AND SIMETHICONE 30 ML: 200; 200; 20 SUSPENSION ORAL at 12:32

## 2019-11-08 RX ADMIN — LEVALBUTEROL HYDROCHLORIDE 1.25 MG: 1.25 SOLUTION RESPIRATORY (INHALATION) at 10:43

## 2019-11-08 RX ADMIN — IPRATROPIUM BROMIDE 0.5 MG: 0.5 SOLUTION RESPIRATORY (INHALATION) at 16:50

## 2019-11-08 RX ADMIN — BUDESONIDE 500 MCG: 0.5 INHALANT RESPIRATORY (INHALATION) at 05:50

## 2019-11-08 RX ADMIN — DICYCLOMINE HYDROCHLORIDE 10 MG: 10 CAPSULE ORAL at 05:35

## 2019-11-08 RX ADMIN — WARFARIN SODIUM 3 MG: 3 TABLET ORAL at 17:28

## 2019-11-08 RX ADMIN — SACUBITRIL AND VALSARTAN 1 TABLET: 49; 51 TABLET, FILM COATED ORAL at 20:58

## 2019-11-08 RX ADMIN — LEVALBUTEROL HYDROCHLORIDE 1.25 MG: 1.25 SOLUTION RESPIRATORY (INHALATION) at 16:50

## 2019-11-08 RX ADMIN — FORMOTEROL FUMARATE DIHYDRATE 20 MCG: 20 SOLUTION RESPIRATORY (INHALATION) at 05:50

## 2019-11-08 RX ADMIN — IPRATROPIUM BROMIDE 0.5 MG: 0.5 SOLUTION RESPIRATORY (INHALATION) at 10:43

## 2019-11-08 RX ADMIN — OXYCODONE HYDROCHLORIDE AND ACETAMINOPHEN 0.5 TABLET: 5; 325 TABLET ORAL at 00:00

## 2019-11-08 RX ADMIN — FORMOTEROL FUMARATE DIHYDRATE 20 MCG: 20 SOLUTION RESPIRATORY (INHALATION) at 16:50

## 2019-11-08 RX ADMIN — TRAZODONE HYDROCHLORIDE 50 MG: 50 TABLET ORAL at 20:59

## 2019-11-08 RX ADMIN — PANTOPRAZOLE SODIUM 40 MG: 40 TABLET, DELAYED RELEASE ORAL at 05:35

## 2019-11-08 RX ADMIN — IPRATROPIUM BROMIDE 0.5 MG: 0.5 SOLUTION RESPIRATORY (INHALATION) at 05:50

## 2019-11-08 RX ADMIN — FUROSEMIDE 40 MG: 40 TABLET ORAL at 08:54

## 2019-11-08 RX ADMIN — NALOXEGOL OXALATE 12.5 MG: 12.5 TABLET, FILM COATED ORAL at 05:35

## 2019-11-08 RX ADMIN — BUDESONIDE 500 MCG: 0.5 INHALANT RESPIRATORY (INHALATION) at 16:50

## 2019-11-08 ASSESSMENT — PAIN SCALES - GENERAL
PAINLEVEL_OUTOF10: 8
PAINLEVEL_OUTOF10: 5
PAINLEVEL_OUTOF10: 6
PAINLEVEL_OUTOF10: 3
PAINLEVEL_OUTOF10: 10

## 2019-11-08 ASSESSMENT — PAIN DESCRIPTION - LOCATION: LOCATION: LEG

## 2019-11-08 ASSESSMENT — PAIN - FUNCTIONAL ASSESSMENT: PAIN_FUNCTIONAL_ASSESSMENT: PREVENTS OR INTERFERES SOME ACTIVE ACTIVITIES AND ADLS

## 2019-11-08 ASSESSMENT — PAIN DESCRIPTION - PAIN TYPE: TYPE: DEEP SOMATIC PAIN

## 2019-11-08 ASSESSMENT — PAIN DESCRIPTION - ORIENTATION: ORIENTATION: LOWER;LEFT

## 2019-11-08 ASSESSMENT — PAIN DESCRIPTION - INTENSITY: RATING_2: 8

## 2019-11-08 ASSESSMENT — PAIN DESCRIPTION - FREQUENCY: FREQUENCY: INTERMITTENT

## 2019-11-08 ASSESSMENT — PAIN DESCRIPTION - PROGRESSION: CLINICAL_PROGRESSION: NOT CHANGED

## 2019-11-08 ASSESSMENT — PAIN DESCRIPTION - ONSET: ONSET: ON-GOING

## 2019-11-08 ASSESSMENT — PAIN DESCRIPTION - DESCRIPTORS: DESCRIPTORS: STABBING

## 2019-11-09 LAB — INR BLD: 1.63 (ref 0.85–1.13)

## 2019-11-09 PROCEDURE — 85610 PROTHROMBIN TIME: CPT

## 2019-11-09 PROCEDURE — 6370000000 HC RX 637 (ALT 250 FOR IP): Performed by: INTERNAL MEDICINE

## 2019-11-09 PROCEDURE — 6360000002 HC RX W HCPCS: Performed by: SURGERY

## 2019-11-09 PROCEDURE — 6370000000 HC RX 637 (ALT 250 FOR IP): Performed by: SURGERY

## 2019-11-09 PROCEDURE — 94640 AIRWAY INHALATION TREATMENT: CPT

## 2019-11-09 PROCEDURE — 6370000000 HC RX 637 (ALT 250 FOR IP)

## 2019-11-09 PROCEDURE — 2700000000 HC OXYGEN THERAPY PER DAY

## 2019-11-09 PROCEDURE — 36415 COLL VENOUS BLD VENIPUNCTURE: CPT

## 2019-11-09 PROCEDURE — 6360000002 HC RX W HCPCS: Performed by: FAMILY MEDICINE

## 2019-11-09 PROCEDURE — 94760 N-INVAS EAR/PLS OXIMETRY 1: CPT

## 2019-11-09 PROCEDURE — 6370000000 HC RX 637 (ALT 250 FOR IP): Performed by: FAMILY MEDICINE

## 2019-11-09 PROCEDURE — 1290000000 HC SEMI PRIVATE OTHER R&B

## 2019-11-09 RX ORDER — WARFARIN SODIUM 3 MG/1
3 TABLET ORAL ONCE
Status: COMPLETED | OUTPATIENT
Start: 2019-11-09 | End: 2019-11-09

## 2019-11-09 RX ADMIN — ALUMINUM HYDROXIDE, MAGNESIUM HYDROXIDE, AND SIMETHICONE 30 ML: 200; 200; 20 SUSPENSION ORAL at 16:54

## 2019-11-09 RX ADMIN — PANTOPRAZOLE SODIUM 40 MG: 40 TABLET, DELAYED RELEASE ORAL at 16:04

## 2019-11-09 RX ADMIN — ACETAMINOPHEN 650 MG: 325 TABLET ORAL at 09:59

## 2019-11-09 RX ADMIN — LEVALBUTEROL HYDROCHLORIDE 1.25 MG: 1.25 SOLUTION RESPIRATORY (INHALATION) at 17:38

## 2019-11-09 RX ADMIN — LEVALBUTEROL HYDROCHLORIDE 1.25 MG: 1.25 SOLUTION RESPIRATORY (INHALATION) at 09:00

## 2019-11-09 RX ADMIN — SACUBITRIL AND VALSARTAN 1 TABLET: 49; 51 TABLET, FILM COATED ORAL at 09:57

## 2019-11-09 RX ADMIN — ALUMINUM HYDROXIDE, MAGNESIUM HYDROXIDE, AND SIMETHICONE 30 ML: 200; 200; 20 SUSPENSION ORAL at 21:21

## 2019-11-09 RX ADMIN — LEVALBUTEROL HYDROCHLORIDE 1.25 MG: 1.25 SOLUTION RESPIRATORY (INHALATION) at 14:35

## 2019-11-09 RX ADMIN — DIGOXIN 125 MCG: 125 TABLET ORAL at 20:50

## 2019-11-09 RX ADMIN — OXYCODONE HYDROCHLORIDE AND ACETAMINOPHEN 1 TABLET: 5; 325 TABLET ORAL at 20:50

## 2019-11-09 RX ADMIN — IPRATROPIUM BROMIDE 0.5 MG: 0.5 SOLUTION RESPIRATORY (INHALATION) at 09:00

## 2019-11-09 RX ADMIN — FORMOTEROL FUMARATE DIHYDRATE 20 MCG: 20 SOLUTION RESPIRATORY (INHALATION) at 05:09

## 2019-11-09 RX ADMIN — DICYCLOMINE HYDROCHLORIDE 10 MG: 10 CAPSULE ORAL at 11:43

## 2019-11-09 RX ADMIN — DICYCLOMINE HYDROCHLORIDE 10 MG: 10 CAPSULE ORAL at 16:04

## 2019-11-09 RX ADMIN — PANTOPRAZOLE SODIUM 40 MG: 40 TABLET, DELAYED RELEASE ORAL at 05:49

## 2019-11-09 RX ADMIN — DICYCLOMINE HYDROCHLORIDE 10 MG: 10 CAPSULE ORAL at 20:50

## 2019-11-09 RX ADMIN — FORMOTEROL FUMARATE DIHYDRATE 20 MCG: 20 SOLUTION RESPIRATORY (INHALATION) at 17:38

## 2019-11-09 RX ADMIN — BUDESONIDE 500 MCG: 0.5 INHALANT RESPIRATORY (INHALATION) at 17:38

## 2019-11-09 RX ADMIN — ACETAMINOPHEN 650 MG: 325 TABLET ORAL at 05:49

## 2019-11-09 RX ADMIN — IPRATROPIUM BROMIDE 0.5 MG: 0.5 SOLUTION RESPIRATORY (INHALATION) at 17:38

## 2019-11-09 RX ADMIN — FUROSEMIDE 40 MG: 40 TABLET ORAL at 09:57

## 2019-11-09 RX ADMIN — NALOXEGOL OXALATE 12.5 MG: 12.5 TABLET, FILM COATED ORAL at 05:48

## 2019-11-09 RX ADMIN — LEVALBUTEROL HYDROCHLORIDE 1.25 MG: 1.25 SOLUTION RESPIRATORY (INHALATION) at 05:09

## 2019-11-09 RX ADMIN — ALUMINUM HYDROXIDE, MAGNESIUM HYDROXIDE, AND SIMETHICONE 30 ML: 200; 200; 20 SUSPENSION ORAL at 13:44

## 2019-11-09 RX ADMIN — SACUBITRIL AND VALSARTAN 1 TABLET: 49; 51 TABLET, FILM COATED ORAL at 20:50

## 2019-11-09 RX ADMIN — BUDESONIDE 500 MCG: 0.5 INHALANT RESPIRATORY (INHALATION) at 05:09

## 2019-11-09 RX ADMIN — WARFARIN SODIUM 3 MG: 3 TABLET ORAL at 18:13

## 2019-11-09 RX ADMIN — OXYCODONE HYDROCHLORIDE AND ACETAMINOPHEN 1 TABLET: 5; 325 TABLET ORAL at 14:48

## 2019-11-09 RX ADMIN — IPRATROPIUM BROMIDE 0.5 MG: 0.5 SOLUTION RESPIRATORY (INHALATION) at 14:35

## 2019-11-09 RX ADMIN — TRAZODONE HYDROCHLORIDE 50 MG: 50 TABLET ORAL at 20:51

## 2019-11-09 RX ADMIN — DOCUSATE SODIUM 100 MG: 100 CAPSULE, LIQUID FILLED ORAL at 10:02

## 2019-11-09 RX ADMIN — DICYCLOMINE HYDROCHLORIDE 10 MG: 10 CAPSULE ORAL at 05:49

## 2019-11-09 RX ADMIN — ACETYLCYSTEINE 600 MG: 200 SOLUTION ORAL; RESPIRATORY (INHALATION) at 21:28

## 2019-11-09 RX ADMIN — OXYCODONE HYDROCHLORIDE AND ACETAMINOPHEN 1 TABLET: 5; 325 TABLET ORAL at 08:39

## 2019-11-09 RX ADMIN — SENNOSIDES 17.2 MG: 8.6 TABLET, FILM COATED ORAL at 20:50

## 2019-11-09 RX ADMIN — ACETYLCYSTEINE 600 MG: 200 SOLUTION ORAL; RESPIRATORY (INHALATION) at 10:00

## 2019-11-09 RX ADMIN — Medication 200 MG: at 10:03

## 2019-11-09 RX ADMIN — CETIRIZINE HYDROCHLORIDE 5 MG: 10 TABLET, FILM COATED ORAL at 09:57

## 2019-11-09 RX ADMIN — IPRATROPIUM BROMIDE 0.5 MG: 0.5 SOLUTION RESPIRATORY (INHALATION) at 05:09

## 2019-11-09 RX ADMIN — ALUMINUM HYDROXIDE, MAGNESIUM HYDROXIDE, AND SIMETHICONE 30 ML: 200; 200; 20 SUSPENSION ORAL at 09:58

## 2019-11-09 RX ADMIN — METOPROLOL SUCCINATE 25 MG: 25 TABLET, FILM COATED, EXTENDED RELEASE ORAL at 20:50

## 2019-11-09 RX ADMIN — DOCUSATE SODIUM 100 MG: 100 CAPSULE, LIQUID FILLED ORAL at 20:50

## 2019-11-09 ASSESSMENT — PAIN SCALES - GENERAL
PAINLEVEL_OUTOF10: 8
PAINLEVEL_OUTOF10: 8
PAINLEVEL_OUTOF10: 7
PAINLEVEL_OUTOF10: 9
PAINLEVEL_OUTOF10: 7
PAINLEVEL_OUTOF10: 6
PAINLEVEL_OUTOF10: 7

## 2019-11-10 LAB
ANION GAP SERPL CALCULATED.3IONS-SCNC: 12 MEQ/L (ref 8–16)
BASOPHILS # BLD: 0.3 %
BASOPHILS ABSOLUTE: 0 THOU/MM3 (ref 0–0.1)
BUN BLDV-MCNC: 15 MG/DL (ref 7–22)
CALCIUM SERPL-MCNC: 8.9 MG/DL (ref 8.5–10.5)
CHLORIDE BLD-SCNC: 102 MEQ/L (ref 98–111)
CO2: 27 MEQ/L (ref 23–33)
CREAT SERPL-MCNC: 0.6 MG/DL (ref 0.4–1.2)
EOSINOPHIL # BLD: 2.1 %
EOSINOPHILS ABSOLUTE: 0.1 THOU/MM3 (ref 0–0.4)
ERYTHROCYTE [DISTWIDTH] IN BLOOD BY AUTOMATED COUNT: 15.7 % (ref 11.5–14.5)
ERYTHROCYTE [DISTWIDTH] IN BLOOD BY AUTOMATED COUNT: 57.5 FL (ref 35–45)
GFR SERPL CREATININE-BSD FRML MDRD: > 90 ML/MIN/1.73M2
GLUCOSE BLD-MCNC: 140 MG/DL (ref 70–108)
HCT VFR BLD CALC: 35.1 % (ref 37–47)
HEMOGLOBIN: 10.7 GM/DL (ref 12–16)
IMMATURE GRANS (ABS): 0.02 THOU/MM3 (ref 0–0.07)
IMMATURE GRANULOCYTES: 0.3 %
INR BLD: 1.91 (ref 0.85–1.13)
LYMPHOCYTES # BLD: 19.5 %
LYMPHOCYTES ABSOLUTE: 1.2 THOU/MM3 (ref 1–4.8)
MCH RBC QN AUTO: 30.6 PG (ref 26–33)
MCHC RBC AUTO-ENTMCNC: 30.5 GM/DL (ref 32.2–35.5)
MCV RBC AUTO: 100.3 FL (ref 81–99)
MONOCYTES # BLD: 11.1 %
MONOCYTES ABSOLUTE: 0.7 THOU/MM3 (ref 0.4–1.3)
NUCLEATED RED BLOOD CELLS: 0 /100 WBC
PLATELET # BLD: 175 THOU/MM3 (ref 130–400)
PMV BLD AUTO: 10 FL (ref 9.4–12.4)
POTASSIUM SERPL-SCNC: 4.2 MEQ/L (ref 3.5–5.2)
RBC # BLD: 3.5 MILL/MM3 (ref 4.2–5.4)
SEG NEUTROPHILS: 66.7 %
SEGMENTED NEUTROPHILS ABSOLUTE COUNT: 4.1 THOU/MM3 (ref 1.8–7.7)
SODIUM BLD-SCNC: 141 MEQ/L (ref 135–145)
WBC # BLD: 6.1 THOU/MM3 (ref 4.8–10.8)

## 2019-11-10 PROCEDURE — 6370000000 HC RX 637 (ALT 250 FOR IP): Performed by: FAMILY MEDICINE

## 2019-11-10 PROCEDURE — 80048 BASIC METABOLIC PNL TOTAL CA: CPT

## 2019-11-10 PROCEDURE — 2700000000 HC OXYGEN THERAPY PER DAY

## 2019-11-10 PROCEDURE — 85025 COMPLETE CBC W/AUTO DIFF WBC: CPT

## 2019-11-10 PROCEDURE — 94760 N-INVAS EAR/PLS OXIMETRY 1: CPT

## 2019-11-10 PROCEDURE — 6370000000 HC RX 637 (ALT 250 FOR IP)

## 2019-11-10 PROCEDURE — 36415 COLL VENOUS BLD VENIPUNCTURE: CPT

## 2019-11-10 PROCEDURE — 94640 AIRWAY INHALATION TREATMENT: CPT

## 2019-11-10 PROCEDURE — 85610 PROTHROMBIN TIME: CPT

## 2019-11-10 PROCEDURE — 97535 SELF CARE MNGMENT TRAINING: CPT

## 2019-11-10 PROCEDURE — 6370000000 HC RX 637 (ALT 250 FOR IP): Performed by: SURGERY

## 2019-11-10 PROCEDURE — 6370000000 HC RX 637 (ALT 250 FOR IP): Performed by: INTERNAL MEDICINE

## 2019-11-10 PROCEDURE — 6360000002 HC RX W HCPCS: Performed by: SURGERY

## 2019-11-10 PROCEDURE — 97116 GAIT TRAINING THERAPY: CPT

## 2019-11-10 PROCEDURE — 6360000002 HC RX W HCPCS: Performed by: FAMILY MEDICINE

## 2019-11-10 PROCEDURE — 97110 THERAPEUTIC EXERCISES: CPT

## 2019-11-10 PROCEDURE — 1290000000 HC SEMI PRIVATE OTHER R&B

## 2019-11-10 RX ORDER — WARFARIN SODIUM 2 MG/1
2 TABLET ORAL ONCE
Status: COMPLETED | OUTPATIENT
Start: 2019-11-10 | End: 2019-11-10

## 2019-11-10 RX ADMIN — TRAZODONE HYDROCHLORIDE 50 MG: 50 TABLET ORAL at 21:42

## 2019-11-10 RX ADMIN — LEVALBUTEROL HYDROCHLORIDE 1.25 MG: 1.25 SOLUTION RESPIRATORY (INHALATION) at 16:26

## 2019-11-10 RX ADMIN — DICYCLOMINE HYDROCHLORIDE 10 MG: 10 CAPSULE ORAL at 12:00

## 2019-11-10 RX ADMIN — SACUBITRIL AND VALSARTAN 1 TABLET: 49; 51 TABLET, FILM COATED ORAL at 21:41

## 2019-11-10 RX ADMIN — DICYCLOMINE HYDROCHLORIDE 10 MG: 10 CAPSULE ORAL at 21:41

## 2019-11-10 RX ADMIN — DICYCLOMINE HYDROCHLORIDE 10 MG: 10 CAPSULE ORAL at 17:35

## 2019-11-10 RX ADMIN — ACETYLCYSTEINE 600 MG: 200 SOLUTION ORAL; RESPIRATORY (INHALATION) at 21:43

## 2019-11-10 RX ADMIN — LEVALBUTEROL HYDROCHLORIDE 1.25 MG: 1.25 SOLUTION RESPIRATORY (INHALATION) at 12:49

## 2019-11-10 RX ADMIN — ACETYLCYSTEINE 600 MG: 200 SOLUTION ORAL; RESPIRATORY (INHALATION) at 08:34

## 2019-11-10 RX ADMIN — OXYCODONE HYDROCHLORIDE AND ACETAMINOPHEN 1 TABLET: 5; 325 TABLET ORAL at 17:35

## 2019-11-10 RX ADMIN — ALUMINUM HYDROXIDE, MAGNESIUM HYDROXIDE, AND SIMETHICONE 30 ML: 200; 200; 20 SUSPENSION ORAL at 17:52

## 2019-11-10 RX ADMIN — NALOXEGOL OXALATE 12.5 MG: 12.5 TABLET, FILM COATED ORAL at 05:58

## 2019-11-10 RX ADMIN — PANTOPRAZOLE SODIUM 40 MG: 40 TABLET, DELAYED RELEASE ORAL at 05:58

## 2019-11-10 RX ADMIN — PANTOPRAZOLE SODIUM 40 MG: 40 TABLET, DELAYED RELEASE ORAL at 16:00

## 2019-11-10 RX ADMIN — DOCUSATE SODIUM 100 MG: 100 CAPSULE, LIQUID FILLED ORAL at 21:42

## 2019-11-10 RX ADMIN — ALUMINUM HYDROXIDE, MAGNESIUM HYDROXIDE, AND SIMETHICONE 30 ML: 200; 200; 20 SUSPENSION ORAL at 08:34

## 2019-11-10 RX ADMIN — ALUMINUM HYDROXIDE, MAGNESIUM HYDROXIDE, AND SIMETHICONE 30 ML: 200; 200; 20 SUSPENSION ORAL at 21:42

## 2019-11-10 RX ADMIN — MAGNESIUM HYDROXIDE 15 ML: 400 SUSPENSION ORAL at 17:34

## 2019-11-10 RX ADMIN — LEVALBUTEROL HYDROCHLORIDE 1.25 MG: 1.25 SOLUTION RESPIRATORY (INHALATION) at 05:24

## 2019-11-10 RX ADMIN — FORMOTEROL FUMARATE DIHYDRATE 20 MCG: 20 SOLUTION RESPIRATORY (INHALATION) at 16:26

## 2019-11-10 RX ADMIN — SACUBITRIL AND VALSARTAN 1 TABLET: 49; 51 TABLET, FILM COATED ORAL at 08:34

## 2019-11-10 RX ADMIN — FUROSEMIDE 40 MG: 40 TABLET ORAL at 08:34

## 2019-11-10 RX ADMIN — DIGOXIN 125 MCG: 125 TABLET ORAL at 21:41

## 2019-11-10 RX ADMIN — BUDESONIDE 500 MCG: 0.5 INHALANT RESPIRATORY (INHALATION) at 05:24

## 2019-11-10 RX ADMIN — DICYCLOMINE HYDROCHLORIDE 10 MG: 10 CAPSULE ORAL at 05:58

## 2019-11-10 RX ADMIN — IPRATROPIUM BROMIDE 0.5 MG: 0.5 SOLUTION RESPIRATORY (INHALATION) at 05:24

## 2019-11-10 RX ADMIN — Medication 200 MG: at 08:48

## 2019-11-10 RX ADMIN — SENNOSIDES 17.2 MG: 8.6 TABLET, FILM COATED ORAL at 21:41

## 2019-11-10 RX ADMIN — WARFARIN SODIUM 2 MG: 2 TABLET ORAL at 17:35

## 2019-11-10 RX ADMIN — OXYCODONE HYDROCHLORIDE AND ACETAMINOPHEN 1 TABLET: 5; 325 TABLET ORAL at 02:34

## 2019-11-10 RX ADMIN — IPRATROPIUM BROMIDE 0.5 MG: 0.5 SOLUTION RESPIRATORY (INHALATION) at 12:49

## 2019-11-10 RX ADMIN — OXYCODONE HYDROCHLORIDE AND ACETAMINOPHEN 1 TABLET: 5; 325 TABLET ORAL at 08:44

## 2019-11-10 RX ADMIN — BUDESONIDE 500 MCG: 0.5 INHALANT RESPIRATORY (INHALATION) at 16:26

## 2019-11-10 RX ADMIN — OXYCODONE HYDROCHLORIDE AND ACETAMINOPHEN 1 TABLET: 5; 325 TABLET ORAL at 21:42

## 2019-11-10 RX ADMIN — METOPROLOL SUCCINATE 25 MG: 25 TABLET, FILM COATED, EXTENDED RELEASE ORAL at 21:41

## 2019-11-10 RX ADMIN — IPRATROPIUM BROMIDE 0.5 MG: 0.5 SOLUTION RESPIRATORY (INHALATION) at 16:26

## 2019-11-10 RX ADMIN — DOCUSATE SODIUM 100 MG: 100 CAPSULE, LIQUID FILLED ORAL at 08:34

## 2019-11-10 RX ADMIN — FORMOTEROL FUMARATE DIHYDRATE 20 MCG: 20 SOLUTION RESPIRATORY (INHALATION) at 05:24

## 2019-11-10 RX ADMIN — CETIRIZINE HYDROCHLORIDE 5 MG: 10 TABLET, FILM COATED ORAL at 08:34

## 2019-11-10 ASSESSMENT — PAIN SCALES - GENERAL
PAINLEVEL_OUTOF10: 7
PAINLEVEL_OUTOF10: 9
PAINLEVEL_OUTOF10: 7
PAINLEVEL_OUTOF10: 9
PAINLEVEL_OUTOF10: 8
PAINLEVEL_OUTOF10: 9
PAINLEVEL_OUTOF10: 8

## 2019-11-11 ENCOUNTER — APPOINTMENT (OUTPATIENT)
Dept: ULTRASOUND IMAGING | Age: 74
DRG: 560 | End: 2019-11-11
Attending: FAMILY MEDICINE
Payer: MEDICARE

## 2019-11-11 LAB
AEROBIC CULTURE: NORMAL
ANAEROBIC CULTURE: NORMAL
GRAM STAIN RESULT: NORMAL
INR BLD: 2.62 (ref 0.85–1.13)

## 2019-11-11 PROCEDURE — 6360000002 HC RX W HCPCS: Performed by: SURGERY

## 2019-11-11 PROCEDURE — 1290000000 HC SEMI PRIVATE OTHER R&B

## 2019-11-11 PROCEDURE — 97127 HC SP THER IVNTJ W/FOCUS COG FUNCJ: CPT

## 2019-11-11 PROCEDURE — 94640 AIRWAY INHALATION TREATMENT: CPT

## 2019-11-11 PROCEDURE — 97116 GAIT TRAINING THERAPY: CPT

## 2019-11-11 PROCEDURE — 2700000000 HC OXYGEN THERAPY PER DAY

## 2019-11-11 PROCEDURE — 10140 I&D HMTMA SEROMA/FLUID COLLJ: CPT

## 2019-11-11 PROCEDURE — 6370000000 HC RX 637 (ALT 250 FOR IP): Performed by: FAMILY MEDICINE

## 2019-11-11 PROCEDURE — 87070 CULTURE OTHR SPECIMN AEROBIC: CPT

## 2019-11-11 PROCEDURE — 87075 CULTR BACTERIA EXCEPT BLOOD: CPT

## 2019-11-11 PROCEDURE — 36415 COLL VENOUS BLD VENIPUNCTURE: CPT

## 2019-11-11 PROCEDURE — 97535 SELF CARE MNGMENT TRAINING: CPT

## 2019-11-11 PROCEDURE — 97530 THERAPEUTIC ACTIVITIES: CPT

## 2019-11-11 PROCEDURE — 85610 PROTHROMBIN TIME: CPT

## 2019-11-11 PROCEDURE — 97110 THERAPEUTIC EXERCISES: CPT

## 2019-11-11 PROCEDURE — 6360000002 HC RX W HCPCS: Performed by: FAMILY MEDICINE

## 2019-11-11 PROCEDURE — 6370000000 HC RX 637 (ALT 250 FOR IP)

## 2019-11-11 PROCEDURE — 6370000000 HC RX 637 (ALT 250 FOR IP): Performed by: INTERNAL MEDICINE

## 2019-11-11 PROCEDURE — 6370000000 HC RX 637 (ALT 250 FOR IP): Performed by: SURGERY

## 2019-11-11 PROCEDURE — 94760 N-INVAS EAR/PLS OXIMETRY 1: CPT

## 2019-11-11 RX ADMIN — IPRATROPIUM BROMIDE 0.5 MG: 0.5 SOLUTION RESPIRATORY (INHALATION) at 06:29

## 2019-11-11 RX ADMIN — ALUMINUM HYDROXIDE, MAGNESIUM HYDROXIDE, AND SIMETHICONE 30 ML: 200; 200; 20 SUSPENSION ORAL at 13:38

## 2019-11-11 RX ADMIN — ALUMINUM HYDROXIDE, MAGNESIUM HYDROXIDE, AND SIMETHICONE 30 ML: 200; 200; 20 SUSPENSION ORAL at 21:20

## 2019-11-11 RX ADMIN — LEVALBUTEROL HYDROCHLORIDE 1.25 MG: 1.25 SOLUTION RESPIRATORY (INHALATION) at 09:33

## 2019-11-11 RX ADMIN — DOCUSATE SODIUM 100 MG: 100 CAPSULE, LIQUID FILLED ORAL at 09:30

## 2019-11-11 RX ADMIN — DICYCLOMINE HYDROCHLORIDE 10 MG: 10 CAPSULE ORAL at 11:08

## 2019-11-11 RX ADMIN — ACETYLCYSTEINE 600 MG: 200 SOLUTION ORAL; RESPIRATORY (INHALATION) at 21:16

## 2019-11-11 RX ADMIN — DOCUSATE SODIUM 100 MG: 100 CAPSULE, LIQUID FILLED ORAL at 21:16

## 2019-11-11 RX ADMIN — LEVALBUTEROL HYDROCHLORIDE 1.25 MG: 1.25 SOLUTION RESPIRATORY (INHALATION) at 06:29

## 2019-11-11 RX ADMIN — ACETAMINOPHEN 650 MG: 325 TABLET ORAL at 16:17

## 2019-11-11 RX ADMIN — PANTOPRAZOLE SODIUM 40 MG: 40 TABLET, DELAYED RELEASE ORAL at 16:17

## 2019-11-11 RX ADMIN — IPRATROPIUM BROMIDE 0.5 MG: 0.5 SOLUTION RESPIRATORY (INHALATION) at 13:43

## 2019-11-11 RX ADMIN — FUROSEMIDE 40 MG: 40 TABLET ORAL at 09:30

## 2019-11-11 RX ADMIN — ACETYLCYSTEINE 600 MG: 200 SOLUTION ORAL; RESPIRATORY (INHALATION) at 09:30

## 2019-11-11 RX ADMIN — BUDESONIDE 500 MCG: 0.5 INHALANT RESPIRATORY (INHALATION) at 06:29

## 2019-11-11 RX ADMIN — SENNOSIDES 17.2 MG: 8.6 TABLET, FILM COATED ORAL at 21:16

## 2019-11-11 RX ADMIN — METOPROLOL SUCCINATE 25 MG: 25 TABLET, FILM COATED, EXTENDED RELEASE ORAL at 21:16

## 2019-11-11 RX ADMIN — PANTOPRAZOLE SODIUM 40 MG: 40 TABLET, DELAYED RELEASE ORAL at 05:47

## 2019-11-11 RX ADMIN — DICYCLOMINE HYDROCHLORIDE 10 MG: 10 CAPSULE ORAL at 21:16

## 2019-11-11 RX ADMIN — BUDESONIDE 500 MCG: 0.5 INHALANT RESPIRATORY (INHALATION) at 17:40

## 2019-11-11 RX ADMIN — LEVALBUTEROL HYDROCHLORIDE 1.25 MG: 1.25 SOLUTION RESPIRATORY (INHALATION) at 13:51

## 2019-11-11 RX ADMIN — LEVALBUTEROL HYDROCHLORIDE 1.25 MG: 1.25 SOLUTION RESPIRATORY (INHALATION) at 17:40

## 2019-11-11 RX ADMIN — OXYCODONE HYDROCHLORIDE AND ACETAMINOPHEN 1 TABLET: 5; 325 TABLET ORAL at 05:47

## 2019-11-11 RX ADMIN — OXYCODONE HYDROCHLORIDE AND ACETAMINOPHEN 1 TABLET: 5; 325 TABLET ORAL at 11:29

## 2019-11-11 RX ADMIN — IPRATROPIUM BROMIDE 0.5 MG: 0.5 SOLUTION RESPIRATORY (INHALATION) at 09:33

## 2019-11-11 RX ADMIN — DICYCLOMINE HYDROCHLORIDE 10 MG: 10 CAPSULE ORAL at 05:47

## 2019-11-11 RX ADMIN — ALUMINUM HYDROXIDE, MAGNESIUM HYDROXIDE, AND SIMETHICONE 30 ML: 200; 200; 20 SUSPENSION ORAL at 16:17

## 2019-11-11 RX ADMIN — IPRATROPIUM BROMIDE 0.5 MG: 0.5 SOLUTION RESPIRATORY (INHALATION) at 17:40

## 2019-11-11 RX ADMIN — FORMOTEROL FUMARATE DIHYDRATE 20 MCG: 20 SOLUTION RESPIRATORY (INHALATION) at 17:40

## 2019-11-11 RX ADMIN — DICYCLOMINE HYDROCHLORIDE 10 MG: 10 CAPSULE ORAL at 16:27

## 2019-11-11 RX ADMIN — TRAZODONE HYDROCHLORIDE 50 MG: 50 TABLET ORAL at 21:16

## 2019-11-11 RX ADMIN — NALOXEGOL OXALATE 12.5 MG: 12.5 TABLET, FILM COATED ORAL at 05:47

## 2019-11-11 RX ADMIN — ACETAMINOPHEN 650 MG: 325 TABLET ORAL at 21:16

## 2019-11-11 RX ADMIN — DIGOXIN 125 MCG: 125 TABLET ORAL at 21:16

## 2019-11-11 RX ADMIN — SACUBITRIL AND VALSARTAN 1 TABLET: 49; 51 TABLET, FILM COATED ORAL at 09:30

## 2019-11-11 RX ADMIN — ACETAMINOPHEN 650 MG: 325 TABLET ORAL at 09:30

## 2019-11-11 RX ADMIN — FORMOTEROL FUMARATE DIHYDRATE 20 MCG: 20 SOLUTION RESPIRATORY (INHALATION) at 06:29

## 2019-11-11 RX ADMIN — Medication 0.5 MG: at 16:17

## 2019-11-11 RX ADMIN — SACUBITRIL AND VALSARTAN 1 TABLET: 49; 51 TABLET, FILM COATED ORAL at 21:16

## 2019-11-11 RX ADMIN — CETIRIZINE HYDROCHLORIDE 5 MG: 10 TABLET, FILM COATED ORAL at 09:30

## 2019-11-11 ASSESSMENT — PAIN DESCRIPTION - DESCRIPTORS: DESCRIPTORS: ACHING

## 2019-11-11 ASSESSMENT — PAIN SCALES - GENERAL
PAINLEVEL_OUTOF10: 7
PAINLEVEL_OUTOF10: 6
PAINLEVEL_OUTOF10: 5
PAINLEVEL_OUTOF10: 5
PAINLEVEL_OUTOF10: 4
PAINLEVEL_OUTOF10: 4
PAINLEVEL_OUTOF10: 7
PAINLEVEL_OUTOF10: 3
PAINLEVEL_OUTOF10: 7

## 2019-11-11 ASSESSMENT — PAIN DESCRIPTION - PAIN TYPE: TYPE: ACUTE PAIN

## 2019-11-11 ASSESSMENT — PAIN DESCRIPTION - LOCATION: LOCATION: LEG;HIP

## 2019-11-11 ASSESSMENT — PAIN DESCRIPTION - ORIENTATION: ORIENTATION: LEFT

## 2019-11-11 ASSESSMENT — PAIN DESCRIPTION - ONSET: ONSET: ON-GOING

## 2019-11-11 ASSESSMENT — PAIN - FUNCTIONAL ASSESSMENT: PAIN_FUNCTIONAL_ASSESSMENT: ACTIVITIES ARE NOT PREVENTED

## 2019-11-11 ASSESSMENT — PAIN DESCRIPTION - PROGRESSION: CLINICAL_PROGRESSION: NOT CHANGED

## 2019-11-11 ASSESSMENT — PAIN DESCRIPTION - FREQUENCY: FREQUENCY: CONTINUOUS

## 2019-11-11 ASSESSMENT — PAIN DESCRIPTION - DIRECTION: RADIATING_TOWARDS: CALF

## 2019-11-12 LAB — INR BLD: 2.45 (ref 0.85–1.13)

## 2019-11-12 PROCEDURE — 97127 HC SP THER IVNTJ W/FOCUS COG FUNCJ: CPT

## 2019-11-12 PROCEDURE — 97116 GAIT TRAINING THERAPY: CPT

## 2019-11-12 PROCEDURE — 2700000000 HC OXYGEN THERAPY PER DAY

## 2019-11-12 PROCEDURE — 6370000000 HC RX 637 (ALT 250 FOR IP): Performed by: FAMILY MEDICINE

## 2019-11-12 PROCEDURE — 97530 THERAPEUTIC ACTIVITIES: CPT

## 2019-11-12 PROCEDURE — 6360000002 HC RX W HCPCS: Performed by: SURGERY

## 2019-11-12 PROCEDURE — 85610 PROTHROMBIN TIME: CPT

## 2019-11-12 PROCEDURE — 94640 AIRWAY INHALATION TREATMENT: CPT

## 2019-11-12 PROCEDURE — 36415 COLL VENOUS BLD VENIPUNCTURE: CPT

## 2019-11-12 PROCEDURE — 6370000000 HC RX 637 (ALT 250 FOR IP): Performed by: SURGERY

## 2019-11-12 PROCEDURE — 6360000002 HC RX W HCPCS: Performed by: FAMILY MEDICINE

## 2019-11-12 PROCEDURE — 97535 SELF CARE MNGMENT TRAINING: CPT

## 2019-11-12 PROCEDURE — 87205 SMEAR GRAM STAIN: CPT

## 2019-11-12 PROCEDURE — 1290000000 HC SEMI PRIVATE OTHER R&B

## 2019-11-12 PROCEDURE — 6370000000 HC RX 637 (ALT 250 FOR IP): Performed by: INTERNAL MEDICINE

## 2019-11-12 PROCEDURE — 94760 N-INVAS EAR/PLS OXIMETRY 1: CPT

## 2019-11-12 PROCEDURE — 97110 THERAPEUTIC EXERCISES: CPT

## 2019-11-12 RX ADMIN — FORMOTEROL FUMARATE DIHYDRATE 20 MCG: 20 SOLUTION RESPIRATORY (INHALATION) at 05:02

## 2019-11-12 RX ADMIN — SENNOSIDES 17.2 MG: 8.6 TABLET, FILM COATED ORAL at 23:06

## 2019-11-12 RX ADMIN — TRAZODONE HYDROCHLORIDE 50 MG: 50 TABLET ORAL at 23:06

## 2019-11-12 RX ADMIN — Medication 200 MG: at 08:29

## 2019-11-12 RX ADMIN — SACUBITRIL AND VALSARTAN 1 TABLET: 49; 51 TABLET, FILM COATED ORAL at 23:05

## 2019-11-12 RX ADMIN — FORMOTEROL FUMARATE DIHYDRATE 20 MCG: 20 SOLUTION RESPIRATORY (INHALATION) at 16:24

## 2019-11-12 RX ADMIN — CETIRIZINE HYDROCHLORIDE 5 MG: 10 TABLET, FILM COATED ORAL at 08:12

## 2019-11-12 RX ADMIN — DICYCLOMINE HYDROCHLORIDE 10 MG: 10 CAPSULE ORAL at 17:12

## 2019-11-12 RX ADMIN — BUDESONIDE 500 MCG: 0.5 INHALANT RESPIRATORY (INHALATION) at 16:24

## 2019-11-12 RX ADMIN — PANTOPRAZOLE SODIUM 40 MG: 40 TABLET, DELAYED RELEASE ORAL at 05:02

## 2019-11-12 RX ADMIN — DOCUSATE SODIUM 100 MG: 100 CAPSULE, LIQUID FILLED ORAL at 08:12

## 2019-11-12 RX ADMIN — IPRATROPIUM BROMIDE 0.5 MG: 0.5 SOLUTION RESPIRATORY (INHALATION) at 16:25

## 2019-11-12 RX ADMIN — DICYCLOMINE HYDROCHLORIDE 10 MG: 10 CAPSULE ORAL at 23:05

## 2019-11-12 RX ADMIN — IPRATROPIUM BROMIDE 0.5 MG: 0.5 SOLUTION RESPIRATORY (INHALATION) at 05:02

## 2019-11-12 RX ADMIN — LEVALBUTEROL HYDROCHLORIDE 1.25 MG: 1.25 SOLUTION RESPIRATORY (INHALATION) at 16:25

## 2019-11-12 RX ADMIN — ALUMINUM HYDROXIDE, MAGNESIUM HYDROXIDE, AND SIMETHICONE 30 ML: 200; 200; 20 SUSPENSION ORAL at 08:13

## 2019-11-12 RX ADMIN — PANTOPRAZOLE SODIUM 40 MG: 40 TABLET, DELAYED RELEASE ORAL at 17:17

## 2019-11-12 RX ADMIN — LEVALBUTEROL HYDROCHLORIDE 1.25 MG: 1.25 SOLUTION RESPIRATORY (INHALATION) at 05:02

## 2019-11-12 RX ADMIN — DOCUSATE SODIUM 100 MG: 100 CAPSULE, LIQUID FILLED ORAL at 23:06

## 2019-11-12 RX ADMIN — DICYCLOMINE HYDROCHLORIDE 10 MG: 10 CAPSULE ORAL at 11:27

## 2019-11-12 RX ADMIN — NALOXEGOL OXALATE 12.5 MG: 12.5 TABLET, FILM COATED ORAL at 05:02

## 2019-11-12 RX ADMIN — ACETYLCYSTEINE 600 MG: 200 SOLUTION ORAL; RESPIRATORY (INHALATION) at 08:17

## 2019-11-12 RX ADMIN — Medication 1.5 MG: at 17:13

## 2019-11-12 RX ADMIN — OXYCODONE HYDROCHLORIDE AND ACETAMINOPHEN 1 TABLET: 5; 325 TABLET ORAL at 11:30

## 2019-11-12 RX ADMIN — OXYCODONE HYDROCHLORIDE AND ACETAMINOPHEN 1 TABLET: 5; 325 TABLET ORAL at 04:57

## 2019-11-12 RX ADMIN — IPRATROPIUM BROMIDE 0.5 MG: 0.5 SOLUTION RESPIRATORY (INHALATION) at 09:01

## 2019-11-12 RX ADMIN — DIGOXIN 125 MCG: 125 TABLET ORAL at 23:05

## 2019-11-12 RX ADMIN — DICYCLOMINE HYDROCHLORIDE 10 MG: 10 CAPSULE ORAL at 05:02

## 2019-11-12 RX ADMIN — OXYCODONE HYDROCHLORIDE AND ACETAMINOPHEN 1 TABLET: 5; 325 TABLET ORAL at 23:40

## 2019-11-12 RX ADMIN — BUDESONIDE 500 MCG: 0.5 INHALANT RESPIRATORY (INHALATION) at 05:02

## 2019-11-12 RX ADMIN — OXYCODONE HYDROCHLORIDE AND ACETAMINOPHEN 1 TABLET: 5; 325 TABLET ORAL at 17:13

## 2019-11-12 RX ADMIN — LEVALBUTEROL HYDROCHLORIDE 1.25 MG: 1.25 SOLUTION RESPIRATORY (INHALATION) at 09:01

## 2019-11-12 RX ADMIN — ALUMINUM HYDROXIDE, MAGNESIUM HYDROXIDE, AND SIMETHICONE 30 ML: 200; 200; 20 SUSPENSION ORAL at 17:12

## 2019-11-12 RX ADMIN — FUROSEMIDE 40 MG: 40 TABLET ORAL at 08:12

## 2019-11-12 RX ADMIN — SACUBITRIL AND VALSARTAN 1 TABLET: 49; 51 TABLET, FILM COATED ORAL at 08:12

## 2019-11-12 RX ADMIN — METOPROLOL SUCCINATE 25 MG: 25 TABLET, FILM COATED, EXTENDED RELEASE ORAL at 23:06

## 2019-11-12 ASSESSMENT — PAIN SCALES - GENERAL
PAINLEVEL_OUTOF10: 8
PAINLEVEL_OUTOF10: 9
PAINLEVEL_OUTOF10: 7
PAINLEVEL_OUTOF10: 5
PAINLEVEL_OUTOF10: 8
PAINLEVEL_OUTOF10: 7

## 2019-11-13 LAB — INR BLD: 2.1 (ref 0.85–1.13)

## 2019-11-13 PROCEDURE — 6360000002 HC RX W HCPCS: Performed by: FAMILY MEDICINE

## 2019-11-13 PROCEDURE — 6370000000 HC RX 637 (ALT 250 FOR IP): Performed by: FAMILY MEDICINE

## 2019-11-13 PROCEDURE — 2700000000 HC OXYGEN THERAPY PER DAY

## 2019-11-13 PROCEDURE — 97530 THERAPEUTIC ACTIVITIES: CPT

## 2019-11-13 PROCEDURE — 97110 THERAPEUTIC EXERCISES: CPT

## 2019-11-13 PROCEDURE — 97116 GAIT TRAINING THERAPY: CPT

## 2019-11-13 PROCEDURE — 94640 AIRWAY INHALATION TREATMENT: CPT

## 2019-11-13 PROCEDURE — 94761 N-INVAS EAR/PLS OXIMETRY MLT: CPT

## 2019-11-13 PROCEDURE — 97535 SELF CARE MNGMENT TRAINING: CPT

## 2019-11-13 PROCEDURE — 85610 PROTHROMBIN TIME: CPT

## 2019-11-13 PROCEDURE — 6360000002 HC RX W HCPCS: Performed by: SURGERY

## 2019-11-13 PROCEDURE — 97127 HC SP THER IVNTJ W/FOCUS COG FUNCJ: CPT

## 2019-11-13 PROCEDURE — 36415 COLL VENOUS BLD VENIPUNCTURE: CPT

## 2019-11-13 PROCEDURE — 1290000000 HC SEMI PRIVATE OTHER R&B

## 2019-11-13 PROCEDURE — 6370000000 HC RX 637 (ALT 250 FOR IP): Performed by: SURGERY

## 2019-11-13 PROCEDURE — 6370000000 HC RX 637 (ALT 250 FOR IP): Performed by: INTERNAL MEDICINE

## 2019-11-13 RX ORDER — WARFARIN SODIUM 2 MG/1
2 TABLET ORAL
Status: COMPLETED | OUTPATIENT
Start: 2019-11-13 | End: 2019-11-13

## 2019-11-13 RX ADMIN — METOPROLOL SUCCINATE 25 MG: 25 TABLET, FILM COATED, EXTENDED RELEASE ORAL at 19:59

## 2019-11-13 RX ADMIN — LEVALBUTEROL HYDROCHLORIDE 1.25 MG: 1.25 SOLUTION RESPIRATORY (INHALATION) at 09:39

## 2019-11-13 RX ADMIN — DICYCLOMINE HYDROCHLORIDE 10 MG: 10 CAPSULE ORAL at 11:56

## 2019-11-13 RX ADMIN — ALUMINUM HYDROXIDE, MAGNESIUM HYDROXIDE, AND SIMETHICONE 30 ML: 200; 200; 20 SUSPENSION ORAL at 23:45

## 2019-11-13 RX ADMIN — DOCUSATE SODIUM 100 MG: 100 CAPSULE, LIQUID FILLED ORAL at 19:59

## 2019-11-13 RX ADMIN — IPRATROPIUM BROMIDE 0.5 MG: 0.5 SOLUTION RESPIRATORY (INHALATION) at 16:37

## 2019-11-13 RX ADMIN — CETIRIZINE HYDROCHLORIDE 5 MG: 10 TABLET, FILM COATED ORAL at 09:20

## 2019-11-13 RX ADMIN — SENNOSIDES 17.2 MG: 8.6 TABLET, FILM COATED ORAL at 19:59

## 2019-11-13 RX ADMIN — BUDESONIDE 500 MCG: 0.5 INHALANT RESPIRATORY (INHALATION) at 04:53

## 2019-11-13 RX ADMIN — DOCUSATE SODIUM 100 MG: 100 CAPSULE, LIQUID FILLED ORAL at 09:20

## 2019-11-13 RX ADMIN — Medication 200 MG: at 09:20

## 2019-11-13 RX ADMIN — POLYETHYLENE GLYCOL 3350 17 G: 17 POWDER, FOR SOLUTION ORAL at 20:00

## 2019-11-13 RX ADMIN — OXYCODONE HYDROCHLORIDE AND ACETAMINOPHEN 0.5 TABLET: 5; 325 TABLET ORAL at 06:19

## 2019-11-13 RX ADMIN — FUROSEMIDE 40 MG: 40 TABLET ORAL at 09:20

## 2019-11-13 RX ADMIN — WARFARIN SODIUM 2 MG: 2 TABLET ORAL at 17:36

## 2019-11-13 RX ADMIN — OXYCODONE HYDROCHLORIDE AND ACETAMINOPHEN 1 TABLET: 5; 325 TABLET ORAL at 12:00

## 2019-11-13 RX ADMIN — SACUBITRIL AND VALSARTAN 1 TABLET: 49; 51 TABLET, FILM COATED ORAL at 09:20

## 2019-11-13 RX ADMIN — LEVALBUTEROL HYDROCHLORIDE 1.25 MG: 1.25 SOLUTION RESPIRATORY (INHALATION) at 12:30

## 2019-11-13 RX ADMIN — FORMOTEROL FUMARATE DIHYDRATE 20 MCG: 20 SOLUTION RESPIRATORY (INHALATION) at 04:53

## 2019-11-13 RX ADMIN — FORMOTEROL FUMARATE DIHYDRATE 20 MCG: 20 SOLUTION RESPIRATORY (INHALATION) at 16:37

## 2019-11-13 RX ADMIN — DICYCLOMINE HYDROCHLORIDE 10 MG: 10 CAPSULE ORAL at 06:18

## 2019-11-13 RX ADMIN — PANTOPRAZOLE SODIUM 40 MG: 40 TABLET, DELAYED RELEASE ORAL at 06:18

## 2019-11-13 RX ADMIN — TRAZODONE HYDROCHLORIDE 50 MG: 50 TABLET ORAL at 20:01

## 2019-11-13 RX ADMIN — POLYETHYLENE GLYCOL 3350 17 G: 17 POWDER, FOR SOLUTION ORAL at 14:23

## 2019-11-13 RX ADMIN — DIGOXIN 125 MCG: 125 TABLET ORAL at 19:59

## 2019-11-13 RX ADMIN — NALOXEGOL OXALATE 12.5 MG: 12.5 TABLET, FILM COATED ORAL at 06:18

## 2019-11-13 RX ADMIN — DICYCLOMINE HYDROCHLORIDE 10 MG: 10 CAPSULE ORAL at 17:36

## 2019-11-13 RX ADMIN — SACUBITRIL AND VALSARTAN 1 TABLET: 49; 51 TABLET, FILM COATED ORAL at 19:59

## 2019-11-13 RX ADMIN — LEVALBUTEROL HYDROCHLORIDE 1.25 MG: 1.25 SOLUTION RESPIRATORY (INHALATION) at 04:53

## 2019-11-13 RX ADMIN — LEVALBUTEROL HYDROCHLORIDE 1.25 MG: 1.25 SOLUTION RESPIRATORY (INHALATION) at 16:37

## 2019-11-13 RX ADMIN — IPRATROPIUM BROMIDE 0.5 MG: 0.5 SOLUTION RESPIRATORY (INHALATION) at 04:53

## 2019-11-13 RX ADMIN — IPRATROPIUM BROMIDE 0.5 MG: 0.5 SOLUTION RESPIRATORY (INHALATION) at 12:30

## 2019-11-13 RX ADMIN — ALUMINUM HYDROXIDE, MAGNESIUM HYDROXIDE, AND SIMETHICONE 30 ML: 200; 200; 20 SUSPENSION ORAL at 09:20

## 2019-11-13 RX ADMIN — OXYCODONE HYDROCHLORIDE AND ACETAMINOPHEN 1 TABLET: 5; 325 TABLET ORAL at 17:40

## 2019-11-13 RX ADMIN — ALUMINUM HYDROXIDE, MAGNESIUM HYDROXIDE, AND SIMETHICONE 30 ML: 200; 200; 20 SUSPENSION ORAL at 17:38

## 2019-11-13 RX ADMIN — ACETYLCYSTEINE 600 MG: 200 SOLUTION ORAL; RESPIRATORY (INHALATION) at 20:01

## 2019-11-13 RX ADMIN — IPRATROPIUM BROMIDE 0.5 MG: 0.5 SOLUTION RESPIRATORY (INHALATION) at 09:39

## 2019-11-13 RX ADMIN — DICYCLOMINE HYDROCHLORIDE 10 MG: 10 CAPSULE ORAL at 19:59

## 2019-11-13 RX ADMIN — PANTOPRAZOLE SODIUM 40 MG: 40 TABLET, DELAYED RELEASE ORAL at 17:36

## 2019-11-13 RX ADMIN — BUDESONIDE 500 MCG: 0.5 INHALANT RESPIRATORY (INHALATION) at 16:37

## 2019-11-13 ASSESSMENT — PAIN SCALES - GENERAL
PAINLEVEL_OUTOF10: 4
PAINLEVEL_OUTOF10: 6
PAINLEVEL_OUTOF10: 5
PAINLEVEL_OUTOF10: 10
PAINLEVEL_OUTOF10: 7
PAINLEVEL_OUTOF10: 5
PAINLEVEL_OUTOF10: 4

## 2019-11-13 ASSESSMENT — PAIN DESCRIPTION - LOCATION: LOCATION: LEG

## 2019-11-13 ASSESSMENT — PAIN DESCRIPTION - PAIN TYPE: TYPE: ACUTE PAIN

## 2019-11-13 ASSESSMENT — PAIN DESCRIPTION - ORIENTATION: ORIENTATION: LEFT;LOWER

## 2019-11-14 LAB — INR BLD: 1.91 (ref 0.85–1.13)

## 2019-11-14 PROCEDURE — 85610 PROTHROMBIN TIME: CPT

## 2019-11-14 PROCEDURE — 1290000000 HC SEMI PRIVATE OTHER R&B

## 2019-11-14 PROCEDURE — 6370000000 HC RX 637 (ALT 250 FOR IP): Performed by: INTERNAL MEDICINE

## 2019-11-14 PROCEDURE — 97535 SELF CARE MNGMENT TRAINING: CPT

## 2019-11-14 PROCEDURE — 97110 THERAPEUTIC EXERCISES: CPT

## 2019-11-14 PROCEDURE — 6370000000 HC RX 637 (ALT 250 FOR IP): Performed by: FAMILY MEDICINE

## 2019-11-14 PROCEDURE — 2700000000 HC OXYGEN THERAPY PER DAY

## 2019-11-14 PROCEDURE — 6360000002 HC RX W HCPCS: Performed by: SURGERY

## 2019-11-14 PROCEDURE — 94761 N-INVAS EAR/PLS OXIMETRY MLT: CPT

## 2019-11-14 PROCEDURE — 36415 COLL VENOUS BLD VENIPUNCTURE: CPT

## 2019-11-14 PROCEDURE — 2709999900 HC NON-CHARGEABLE SUPPLY

## 2019-11-14 PROCEDURE — 97530 THERAPEUTIC ACTIVITIES: CPT

## 2019-11-14 PROCEDURE — 6370000000 HC RX 637 (ALT 250 FOR IP)

## 2019-11-14 PROCEDURE — 6360000002 HC RX W HCPCS: Performed by: FAMILY MEDICINE

## 2019-11-14 PROCEDURE — 97127 HC SP THER IVNTJ W/FOCUS COG FUNCJ: CPT

## 2019-11-14 PROCEDURE — 94640 AIRWAY INHALATION TREATMENT: CPT

## 2019-11-14 PROCEDURE — 6370000000 HC RX 637 (ALT 250 FOR IP): Performed by: SURGERY

## 2019-11-14 PROCEDURE — 97116 GAIT TRAINING THERAPY: CPT

## 2019-11-14 RX ORDER — WARFARIN SODIUM 2 MG/1
2 TABLET ORAL
Status: COMPLETED | OUTPATIENT
Start: 2019-11-14 | End: 2019-11-14

## 2019-11-14 RX ORDER — FAMOTIDINE 20 MG/1
20 TABLET, FILM COATED ORAL 2 TIMES DAILY
Status: DISCONTINUED | OUTPATIENT
Start: 2019-11-14 | End: 2019-11-21 | Stop reason: HOSPADM

## 2019-11-14 RX ADMIN — OXYCODONE HYDROCHLORIDE AND ACETAMINOPHEN 1 TABLET: 5; 325 TABLET ORAL at 00:13

## 2019-11-14 RX ADMIN — OXYCODONE HYDROCHLORIDE AND ACETAMINOPHEN 1 TABLET: 5; 325 TABLET ORAL at 23:27

## 2019-11-14 RX ADMIN — ACETYLCYSTEINE 600 MG: 200 SOLUTION ORAL; RESPIRATORY (INHALATION) at 08:54

## 2019-11-14 RX ADMIN — BUDESONIDE 500 MCG: 0.5 INHALANT RESPIRATORY (INHALATION) at 04:51

## 2019-11-14 RX ADMIN — ONDANSETRON 4 MG: 4 TABLET, ORALLY DISINTEGRATING ORAL at 21:30

## 2019-11-14 RX ADMIN — DOCUSATE SODIUM 100 MG: 100 CAPSULE, LIQUID FILLED ORAL at 08:54

## 2019-11-14 RX ADMIN — SENNOSIDES 17.2 MG: 8.6 TABLET, FILM COATED ORAL at 23:01

## 2019-11-14 RX ADMIN — PANTOPRAZOLE SODIUM 40 MG: 40 TABLET, DELAYED RELEASE ORAL at 17:11

## 2019-11-14 RX ADMIN — ACETAMINOPHEN 650 MG: 325 TABLET ORAL at 05:25

## 2019-11-14 RX ADMIN — MAGNESIUM HYDROXIDE 15 ML: 400 SUSPENSION ORAL at 23:01

## 2019-11-14 RX ADMIN — FUROSEMIDE 40 MG: 40 TABLET ORAL at 08:54

## 2019-11-14 RX ADMIN — WARFARIN SODIUM 2 MG: 2 TABLET ORAL at 18:10

## 2019-11-14 RX ADMIN — PANTOPRAZOLE SODIUM 40 MG: 40 TABLET, DELAYED RELEASE ORAL at 05:25

## 2019-11-14 RX ADMIN — ACETYLCYSTEINE 600 MG: 200 SOLUTION ORAL; RESPIRATORY (INHALATION) at 23:01

## 2019-11-14 RX ADMIN — LEVALBUTEROL HYDROCHLORIDE 1.25 MG: 1.25 SOLUTION RESPIRATORY (INHALATION) at 09:22

## 2019-11-14 RX ADMIN — ACETAMINOPHEN 650 MG: 325 TABLET ORAL at 10:01

## 2019-11-14 RX ADMIN — DIGOXIN 125 MCG: 125 TABLET ORAL at 21:40

## 2019-11-14 RX ADMIN — FORMOTEROL FUMARATE DIHYDRATE 20 MCG: 20 SOLUTION RESPIRATORY (INHALATION) at 17:52

## 2019-11-14 RX ADMIN — DICYCLOMINE HYDROCHLORIDE 10 MG: 10 CAPSULE ORAL at 17:11

## 2019-11-14 RX ADMIN — IPRATROPIUM BROMIDE 0.5 MG: 0.5 SOLUTION RESPIRATORY (INHALATION) at 17:52

## 2019-11-14 RX ADMIN — BUDESONIDE 500 MCG: 0.5 INHALANT RESPIRATORY (INHALATION) at 17:52

## 2019-11-14 RX ADMIN — ACETAMINOPHEN 650 MG: 325 TABLET ORAL at 21:30

## 2019-11-14 RX ADMIN — IPRATROPIUM BROMIDE 0.5 MG: 0.5 SOLUTION RESPIRATORY (INHALATION) at 14:02

## 2019-11-14 RX ADMIN — DICYCLOMINE HYDROCHLORIDE 10 MG: 10 CAPSULE ORAL at 21:40

## 2019-11-14 RX ADMIN — DICYCLOMINE HYDROCHLORIDE 10 MG: 10 CAPSULE ORAL at 12:10

## 2019-11-14 RX ADMIN — FAMOTIDINE 20 MG: 20 TABLET, FILM COATED ORAL at 23:01

## 2019-11-14 RX ADMIN — LEVALBUTEROL HYDROCHLORIDE 1.25 MG: 1.25 SOLUTION RESPIRATORY (INHALATION) at 04:51

## 2019-11-14 RX ADMIN — LEVALBUTEROL HYDROCHLORIDE 1.25 MG: 1.25 SOLUTION RESPIRATORY (INHALATION) at 17:50

## 2019-11-14 RX ADMIN — IPRATROPIUM BROMIDE 0.5 MG: 0.5 SOLUTION RESPIRATORY (INHALATION) at 09:22

## 2019-11-14 RX ADMIN — METOPROLOL SUCCINATE 25 MG: 25 TABLET, FILM COATED, EXTENDED RELEASE ORAL at 21:39

## 2019-11-14 RX ADMIN — Medication 200 MG: at 08:57

## 2019-11-14 RX ADMIN — DOCUSATE SODIUM 100 MG: 100 CAPSULE, LIQUID FILLED ORAL at 23:01

## 2019-11-14 RX ADMIN — SACUBITRIL AND VALSARTAN 1 TABLET: 49; 51 TABLET, FILM COATED ORAL at 21:39

## 2019-11-14 RX ADMIN — FORMOTEROL FUMARATE DIHYDRATE 20 MCG: 20 SOLUTION RESPIRATORY (INHALATION) at 04:51

## 2019-11-14 RX ADMIN — ALUMINUM HYDROXIDE, MAGNESIUM HYDROXIDE, AND SIMETHICONE 30 ML: 200; 200; 20 SUSPENSION ORAL at 13:27

## 2019-11-14 RX ADMIN — SACUBITRIL AND VALSARTAN 1 TABLET: 49; 51 TABLET, FILM COATED ORAL at 08:54

## 2019-11-14 RX ADMIN — ALUMINUM HYDROXIDE, MAGNESIUM HYDROXIDE, AND SIMETHICONE 30 ML: 200; 200; 20 SUSPENSION ORAL at 23:27

## 2019-11-14 RX ADMIN — DICYCLOMINE HYDROCHLORIDE 10 MG: 10 CAPSULE ORAL at 05:25

## 2019-11-14 RX ADMIN — CETIRIZINE HYDROCHLORIDE 5 MG: 10 TABLET, FILM COATED ORAL at 08:54

## 2019-11-14 RX ADMIN — ALUMINUM HYDROXIDE, MAGNESIUM HYDROXIDE, AND SIMETHICONE 30 ML: 200; 200; 20 SUSPENSION ORAL at 17:11

## 2019-11-14 RX ADMIN — LEVALBUTEROL HYDROCHLORIDE 1.25 MG: 1.25 SOLUTION RESPIRATORY (INHALATION) at 14:02

## 2019-11-14 RX ADMIN — IPRATROPIUM BROMIDE 0.5 MG: 0.5 SOLUTION RESPIRATORY (INHALATION) at 04:51

## 2019-11-14 RX ADMIN — ALUMINUM HYDROXIDE, MAGNESIUM HYDROXIDE, AND SIMETHICONE 30 ML: 200; 200; 20 SUSPENSION ORAL at 08:55

## 2019-11-14 RX ADMIN — NALOXEGOL OXALATE 12.5 MG: 12.5 TABLET, FILM COATED ORAL at 05:25

## 2019-11-14 ASSESSMENT — PAIN DESCRIPTION - FREQUENCY
FREQUENCY: INTERMITTENT

## 2019-11-14 ASSESSMENT — PAIN DESCRIPTION - DESCRIPTORS
DESCRIPTORS_2: ACHING
DESCRIPTORS_2: ACHING
DESCRIPTORS: ACHING
DESCRIPTORS_2: ACHING
DESCRIPTORS: ACHING
DESCRIPTORS: ACHING
DESCRIPTORS_2: ACHING

## 2019-11-14 ASSESSMENT — PAIN DESCRIPTION - PAIN TYPE
TYPE: ACUTE PAIN
TYPE_2: ACUTE PAIN
TYPE: ACUTE PAIN
TYPE_2: ACUTE PAIN
TYPE: ACUTE PAIN
TYPE: ACUTE PAIN
TYPE_2: ACUTE PAIN
TYPE: ACUTE PAIN
TYPE: ACUTE PAIN
TYPE_2: ACUTE PAIN
TYPE_2: ACUTE PAIN

## 2019-11-14 ASSESSMENT — PAIN DESCRIPTION - ONSET
ONSET_2: ON-GOING
ONSET: ON-GOING
ONSET_2: ON-GOING
ONSET: ON-GOING
ONSET_2: ON-GOING
ONSET_2: ON-GOING
ONSET: ON-GOING
ONSET: ON-GOING
ONSET_2: ON-GOING
ONSET_2: ON-GOING
ONSET: ON-GOING
ONSET_2: ON-GOING
ONSET: ON-GOING

## 2019-11-14 ASSESSMENT — PAIN DESCRIPTION - LOCATION
LOCATION_2: LEG
LOCATION: HIP
LOCATION_2: LEG
LOCATION: HIP
LOCATION_2: LEG
LOCATION: HIP
LOCATION: HIP
LOCATION_2: LEG
LOCATION_2: LEG

## 2019-11-14 ASSESSMENT — PAIN DESCRIPTION - PROGRESSION
CLINICAL_PROGRESSION_2: NOT CHANGED
CLINICAL_PROGRESSION: NOT CHANGED
CLINICAL_PROGRESSION_2: NOT CHANGED
CLINICAL_PROGRESSION: NOT CHANGED
CLINICAL_PROGRESSION_2: NOT CHANGED
CLINICAL_PROGRESSION: NOT CHANGED
CLINICAL_PROGRESSION_2: NOT CHANGED
CLINICAL_PROGRESSION_2: NOT CHANGED

## 2019-11-14 ASSESSMENT — PAIN DESCRIPTION - DURATION
DURATION_2: INTERMITTENT

## 2019-11-14 ASSESSMENT — PAIN SCALES - GENERAL
PAINLEVEL_OUTOF10: 6
PAINLEVEL_OUTOF10: 5
PAINLEVEL_OUTOF10: 6
PAINLEVEL_OUTOF10: 7
PAINLEVEL_OUTOF10: 5
PAINLEVEL_OUTOF10: 6
PAINLEVEL_OUTOF10: 7
PAINLEVEL_OUTOF10: 5
PAINLEVEL_OUTOF10: 3
PAINLEVEL_OUTOF10: 0
PAINLEVEL_OUTOF10: 6

## 2019-11-14 ASSESSMENT — PAIN DESCRIPTION - INTENSITY
RATING_2: 7
RATING_2: 6
RATING_2: 6
RATING_2: 5
RATING_2: 7
RATING_2: 7
RATING_2: 6

## 2019-11-14 ASSESSMENT — PAIN DESCRIPTION - ORIENTATION
ORIENTATION_2: LEFT
ORIENTATION: LEFT
ORIENTATION_2: LEFT
ORIENTATION_2: LEFT;LOWER
ORIENTATION_2: LEFT;LOWER
ORIENTATION_2: LEFT
ORIENTATION: LEFT
ORIENTATION_2: LEFT
ORIENTATION: LEFT
ORIENTATION_2: LEFT
ORIENTATION: LEFT

## 2019-11-15 LAB — INR BLD: 2.2 (ref 0.85–1.13)

## 2019-11-15 PROCEDURE — 6360000002 HC RX W HCPCS: Performed by: FAMILY MEDICINE

## 2019-11-15 PROCEDURE — 6360000002 HC RX W HCPCS: Performed by: SURGERY

## 2019-11-15 PROCEDURE — 97116 GAIT TRAINING THERAPY: CPT

## 2019-11-15 PROCEDURE — 36415 COLL VENOUS BLD VENIPUNCTURE: CPT

## 2019-11-15 PROCEDURE — 97530 THERAPEUTIC ACTIVITIES: CPT

## 2019-11-15 PROCEDURE — 6370000000 HC RX 637 (ALT 250 FOR IP): Performed by: INTERNAL MEDICINE

## 2019-11-15 PROCEDURE — 97110 THERAPEUTIC EXERCISES: CPT

## 2019-11-15 PROCEDURE — 85610 PROTHROMBIN TIME: CPT

## 2019-11-15 PROCEDURE — 1290000000 HC SEMI PRIVATE OTHER R&B

## 2019-11-15 PROCEDURE — 97535 SELF CARE MNGMENT TRAINING: CPT

## 2019-11-15 PROCEDURE — 94760 N-INVAS EAR/PLS OXIMETRY 1: CPT

## 2019-11-15 PROCEDURE — 2700000000 HC OXYGEN THERAPY PER DAY

## 2019-11-15 PROCEDURE — 97127 HC SP THER IVNTJ W/FOCUS COG FUNCJ: CPT

## 2019-11-15 PROCEDURE — 6370000000 HC RX 637 (ALT 250 FOR IP): Performed by: SURGERY

## 2019-11-15 PROCEDURE — 94761 N-INVAS EAR/PLS OXIMETRY MLT: CPT

## 2019-11-15 PROCEDURE — 94640 AIRWAY INHALATION TREATMENT: CPT

## 2019-11-15 PROCEDURE — 6370000000 HC RX 637 (ALT 250 FOR IP): Performed by: FAMILY MEDICINE

## 2019-11-15 RX ADMIN — METOPROLOL SUCCINATE 25 MG: 25 TABLET, FILM COATED, EXTENDED RELEASE ORAL at 23:04

## 2019-11-15 RX ADMIN — IPRATROPIUM BROMIDE 0.5 MG: 0.5 SOLUTION RESPIRATORY (INHALATION) at 16:50

## 2019-11-15 RX ADMIN — ALUMINUM HYDROXIDE, MAGNESIUM HYDROXIDE, AND SIMETHICONE 30 ML: 200; 200; 20 SUSPENSION ORAL at 23:03

## 2019-11-15 RX ADMIN — LEVALBUTEROL HYDROCHLORIDE 1.25 MG: 1.25 SOLUTION RESPIRATORY (INHALATION) at 16:49

## 2019-11-15 RX ADMIN — OXYCODONE HYDROCHLORIDE AND ACETAMINOPHEN 1 TABLET: 5; 325 TABLET ORAL at 09:46

## 2019-11-15 RX ADMIN — CETIRIZINE HYDROCHLORIDE 5 MG: 10 TABLET, FILM COATED ORAL at 09:46

## 2019-11-15 RX ADMIN — BUDESONIDE 500 MCG: 0.5 INHALANT RESPIRATORY (INHALATION) at 16:39

## 2019-11-15 RX ADMIN — FAMOTIDINE 20 MG: 20 TABLET, FILM COATED ORAL at 09:46

## 2019-11-15 RX ADMIN — DOCUSATE SODIUM 100 MG: 100 CAPSULE, LIQUID FILLED ORAL at 09:44

## 2019-11-15 RX ADMIN — DOCUSATE SODIUM 100 MG: 100 CAPSULE, LIQUID FILLED ORAL at 23:04

## 2019-11-15 RX ADMIN — FUROSEMIDE 40 MG: 40 TABLET ORAL at 09:46

## 2019-11-15 RX ADMIN — Medication 1.5 MG: at 18:11

## 2019-11-15 RX ADMIN — FORMOTEROL FUMARATE DIHYDRATE 20 MCG: 20 SOLUTION RESPIRATORY (INHALATION) at 05:52

## 2019-11-15 RX ADMIN — SENNOSIDES 17.2 MG: 8.6 TABLET, FILM COATED ORAL at 23:03

## 2019-11-15 RX ADMIN — ALUMINUM HYDROXIDE, MAGNESIUM HYDROXIDE, AND SIMETHICONE 30 ML: 200; 200; 20 SUSPENSION ORAL at 14:08

## 2019-11-15 RX ADMIN — BUDESONIDE 500 MCG: 0.5 INHALANT RESPIRATORY (INHALATION) at 05:53

## 2019-11-15 RX ADMIN — NALOXEGOL OXALATE 12.5 MG: 12.5 TABLET, FILM COATED ORAL at 06:44

## 2019-11-15 RX ADMIN — IPRATROPIUM BROMIDE 0.5 MG: 0.5 SOLUTION RESPIRATORY (INHALATION) at 13:13

## 2019-11-15 RX ADMIN — ALUMINUM HYDROXIDE, MAGNESIUM HYDROXIDE, AND SIMETHICONE 30 ML: 200; 200; 20 SUSPENSION ORAL at 10:36

## 2019-11-15 RX ADMIN — SACUBITRIL AND VALSARTAN 1 TABLET: 49; 51 TABLET, FILM COATED ORAL at 09:46

## 2019-11-15 RX ADMIN — FORMOTEROL FUMARATE DIHYDRATE 20 MCG: 20 SOLUTION RESPIRATORY (INHALATION) at 16:35

## 2019-11-15 RX ADMIN — ACETYLCYSTEINE 600 MG: 200 SOLUTION ORAL; RESPIRATORY (INHALATION) at 12:43

## 2019-11-15 RX ADMIN — ACETYLCYSTEINE 600 MG: 200 SOLUTION ORAL; RESPIRATORY (INHALATION) at 23:04

## 2019-11-15 RX ADMIN — DICYCLOMINE HYDROCHLORIDE 10 MG: 10 CAPSULE ORAL at 06:44

## 2019-11-15 RX ADMIN — LEVALBUTEROL HYDROCHLORIDE 1.25 MG: 1.25 SOLUTION RESPIRATORY (INHALATION) at 13:13

## 2019-11-15 RX ADMIN — IPRATROPIUM BROMIDE 0.5 MG: 0.5 SOLUTION RESPIRATORY (INHALATION) at 09:05

## 2019-11-15 RX ADMIN — Medication 200 MG: at 14:08

## 2019-11-15 RX ADMIN — ALUMINUM HYDROXIDE, MAGNESIUM HYDROXIDE, AND SIMETHICONE 30 ML: 200; 200; 20 SUSPENSION ORAL at 18:12

## 2019-11-15 RX ADMIN — DICYCLOMINE HYDROCHLORIDE 10 MG: 10 CAPSULE ORAL at 23:04

## 2019-11-15 RX ADMIN — DICYCLOMINE HYDROCHLORIDE 10 MG: 10 CAPSULE ORAL at 18:12

## 2019-11-15 RX ADMIN — DIGOXIN 125 MCG: 125 TABLET ORAL at 23:04

## 2019-11-15 RX ADMIN — CETIRIZINE HYDROCHLORIDE 5 MG: 10 TABLET, FILM COATED ORAL at 09:44

## 2019-11-15 RX ADMIN — LEVALBUTEROL HYDROCHLORIDE 1.25 MG: 1.25 SOLUTION RESPIRATORY (INHALATION) at 05:52

## 2019-11-15 RX ADMIN — DICYCLOMINE HYDROCHLORIDE 10 MG: 10 CAPSULE ORAL at 09:48

## 2019-11-15 RX ADMIN — SACUBITRIL AND VALSARTAN 1 TABLET: 49; 51 TABLET, FILM COATED ORAL at 23:04

## 2019-11-15 RX ADMIN — ACETAMINOPHEN 650 MG: 325 TABLET ORAL at 23:03

## 2019-11-15 RX ADMIN — LEVALBUTEROL HYDROCHLORIDE 1.25 MG: 1.25 SOLUTION RESPIRATORY (INHALATION) at 09:05

## 2019-11-15 RX ADMIN — FAMOTIDINE 20 MG: 20 TABLET, FILM COATED ORAL at 23:03

## 2019-11-15 RX ADMIN — OXYCODONE HYDROCHLORIDE AND ACETAMINOPHEN 1 TABLET: 5; 325 TABLET ORAL at 15:59

## 2019-11-15 RX ADMIN — IPRATROPIUM BROMIDE 0.5 MG: 0.5 SOLUTION RESPIRATORY (INHALATION) at 05:52

## 2019-11-15 ASSESSMENT — PAIN SCALES - GENERAL
PAINLEVEL_OUTOF10: 7
PAINLEVEL_OUTOF10: 9
PAINLEVEL_OUTOF10: 10
PAINLEVEL_OUTOF10: 7
PAINLEVEL_OUTOF10: 6

## 2019-11-15 ASSESSMENT — PAIN DESCRIPTION - PAIN TYPE: TYPE: ACUTE PAIN

## 2019-11-15 ASSESSMENT — PAIN DESCRIPTION - PROGRESSION: CLINICAL_PROGRESSION: NOT CHANGED

## 2019-11-15 ASSESSMENT — PAIN DESCRIPTION - FREQUENCY: FREQUENCY: CONTINUOUS

## 2019-11-15 ASSESSMENT — PAIN DESCRIPTION - DESCRIPTORS: DESCRIPTORS: ACHING

## 2019-11-15 ASSESSMENT — PAIN DESCRIPTION - LOCATION: LOCATION: LEG

## 2019-11-15 ASSESSMENT — PAIN DESCRIPTION - ORIENTATION: ORIENTATION: LEFT;LOWER

## 2019-11-15 ASSESSMENT — PAIN DESCRIPTION - ONSET: ONSET: ON-GOING

## 2019-11-16 LAB — INR BLD: 2.45 (ref 0.85–1.13)

## 2019-11-16 PROCEDURE — 6360000002 HC RX W HCPCS: Performed by: SURGERY

## 2019-11-16 PROCEDURE — 6370000000 HC RX 637 (ALT 250 FOR IP): Performed by: SURGERY

## 2019-11-16 PROCEDURE — 94760 N-INVAS EAR/PLS OXIMETRY 1: CPT

## 2019-11-16 PROCEDURE — 85610 PROTHROMBIN TIME: CPT

## 2019-11-16 PROCEDURE — 6370000000 HC RX 637 (ALT 250 FOR IP): Performed by: INTERNAL MEDICINE

## 2019-11-16 PROCEDURE — 2700000000 HC OXYGEN THERAPY PER DAY

## 2019-11-16 PROCEDURE — 36415 COLL VENOUS BLD VENIPUNCTURE: CPT

## 2019-11-16 PROCEDURE — 6370000000 HC RX 637 (ALT 250 FOR IP): Performed by: PHARMACIST

## 2019-11-16 PROCEDURE — 94640 AIRWAY INHALATION TREATMENT: CPT

## 2019-11-16 PROCEDURE — 1290000000 HC SEMI PRIVATE OTHER R&B

## 2019-11-16 PROCEDURE — 6370000000 HC RX 637 (ALT 250 FOR IP): Performed by: FAMILY MEDICINE

## 2019-11-16 PROCEDURE — 6360000002 HC RX W HCPCS: Performed by: FAMILY MEDICINE

## 2019-11-16 RX ADMIN — TRAZODONE HYDROCHLORIDE 50 MG: 50 TABLET ORAL at 20:54

## 2019-11-16 RX ADMIN — Medication 200 MG: at 10:29

## 2019-11-16 RX ADMIN — LEVALBUTEROL HYDROCHLORIDE 1.25 MG: 1.25 SOLUTION RESPIRATORY (INHALATION) at 17:05

## 2019-11-16 RX ADMIN — DICYCLOMINE HYDROCHLORIDE 10 MG: 10 CAPSULE ORAL at 20:54

## 2019-11-16 RX ADMIN — FAMOTIDINE 20 MG: 20 TABLET, FILM COATED ORAL at 10:21

## 2019-11-16 RX ADMIN — FORMOTEROL FUMARATE DIHYDRATE 20 MCG: 20 SOLUTION RESPIRATORY (INHALATION) at 06:22

## 2019-11-16 RX ADMIN — ACETYLCYSTEINE 600 MG: 200 SOLUTION ORAL; RESPIRATORY (INHALATION) at 10:06

## 2019-11-16 RX ADMIN — ACETAMINOPHEN 650 MG: 325 TABLET ORAL at 18:01

## 2019-11-16 RX ADMIN — DICYCLOMINE HYDROCHLORIDE 10 MG: 10 CAPSULE ORAL at 06:09

## 2019-11-16 RX ADMIN — ALUMINUM HYDROXIDE, MAGNESIUM HYDROXIDE, AND SIMETHICONE 30 ML: 200; 200; 20 SUSPENSION ORAL at 14:01

## 2019-11-16 RX ADMIN — NALOXEGOL OXALATE 12.5 MG: 12.5 TABLET, FILM COATED ORAL at 06:09

## 2019-11-16 RX ADMIN — LEVALBUTEROL HYDROCHLORIDE 1.25 MG: 1.25 SOLUTION RESPIRATORY (INHALATION) at 09:52

## 2019-11-16 RX ADMIN — OXYCODONE HYDROCHLORIDE AND ACETAMINOPHEN 1 TABLET: 5; 325 TABLET ORAL at 10:40

## 2019-11-16 RX ADMIN — LEVALBUTEROL HYDROCHLORIDE 1.25 MG: 1.25 SOLUTION RESPIRATORY (INHALATION) at 06:22

## 2019-11-16 RX ADMIN — ALUMINUM HYDROXIDE, MAGNESIUM HYDROXIDE, AND SIMETHICONE 30 ML: 200; 200; 20 SUSPENSION ORAL at 10:13

## 2019-11-16 RX ADMIN — SACUBITRIL AND VALSARTAN 1 TABLET: 49; 51 TABLET, FILM COATED ORAL at 20:53

## 2019-11-16 RX ADMIN — BUDESONIDE 500 MCG: 0.5 INHALANT RESPIRATORY (INHALATION) at 06:22

## 2019-11-16 RX ADMIN — IPRATROPIUM BROMIDE 0.5 MG: 0.5 SOLUTION RESPIRATORY (INHALATION) at 13:38

## 2019-11-16 RX ADMIN — ACETAMINOPHEN 650 MG: 325 TABLET ORAL at 06:09

## 2019-11-16 RX ADMIN — METOPROLOL SUCCINATE 25 MG: 25 TABLET, FILM COATED, EXTENDED RELEASE ORAL at 20:53

## 2019-11-16 RX ADMIN — DICYCLOMINE HYDROCHLORIDE 10 MG: 10 CAPSULE ORAL at 12:21

## 2019-11-16 RX ADMIN — SENNOSIDES 17.2 MG: 8.6 TABLET, FILM COATED ORAL at 20:53

## 2019-11-16 RX ADMIN — DICYCLOMINE HYDROCHLORIDE 10 MG: 10 CAPSULE ORAL at 18:02

## 2019-11-16 RX ADMIN — Medication 1.5 MG: at 18:02

## 2019-11-16 RX ADMIN — DOCUSATE SODIUM 100 MG: 100 CAPSULE, LIQUID FILLED ORAL at 20:54

## 2019-11-16 RX ADMIN — FORMOTEROL FUMARATE DIHYDRATE 20 MCG: 20 SOLUTION RESPIRATORY (INHALATION) at 17:05

## 2019-11-16 RX ADMIN — LEVALBUTEROL HYDROCHLORIDE 1.25 MG: 1.25 SOLUTION RESPIRATORY (INHALATION) at 13:38

## 2019-11-16 RX ADMIN — IPRATROPIUM BROMIDE 0.5 MG: 0.5 SOLUTION RESPIRATORY (INHALATION) at 09:52

## 2019-11-16 RX ADMIN — SACUBITRIL AND VALSARTAN 1 TABLET: 49; 51 TABLET, FILM COATED ORAL at 10:22

## 2019-11-16 RX ADMIN — BUDESONIDE 500 MCG: 0.5 INHALANT RESPIRATORY (INHALATION) at 17:05

## 2019-11-16 RX ADMIN — IPRATROPIUM BROMIDE 0.5 MG: 0.5 SOLUTION RESPIRATORY (INHALATION) at 06:22

## 2019-11-16 RX ADMIN — ONDANSETRON 4 MG: 4 TABLET, ORALLY DISINTEGRATING ORAL at 20:54

## 2019-11-16 RX ADMIN — IPRATROPIUM BROMIDE 0.5 MG: 0.5 SOLUTION RESPIRATORY (INHALATION) at 17:05

## 2019-11-16 RX ADMIN — OXYCODONE HYDROCHLORIDE AND ACETAMINOPHEN 1 TABLET: 5; 325 TABLET ORAL at 20:54

## 2019-11-16 RX ADMIN — DIGOXIN 125 MCG: 125 TABLET ORAL at 20:54

## 2019-11-16 RX ADMIN — FAMOTIDINE 20 MG: 20 TABLET, FILM COATED ORAL at 20:54

## 2019-11-16 RX ADMIN — FUROSEMIDE 40 MG: 40 TABLET ORAL at 10:19

## 2019-11-16 RX ADMIN — CETIRIZINE HYDROCHLORIDE 5 MG: 10 TABLET, FILM COATED ORAL at 10:16

## 2019-11-16 ASSESSMENT — PAIN DESCRIPTION - DURATION
DURATION_2: INTERMITTENT

## 2019-11-16 ASSESSMENT — PAIN DESCRIPTION - INTENSITY
RATING_2: 5
RATING_2: 6
RATING_2: 5
RATING_2: 6
RATING_2: 5
RATING_2: 5
RATING_2: 0

## 2019-11-16 ASSESSMENT — PAIN DESCRIPTION - PROGRESSION
CLINICAL_PROGRESSION: NOT CHANGED
CLINICAL_PROGRESSION_2: NOT CHANGED
CLINICAL_PROGRESSION: NOT CHANGED
CLINICAL_PROGRESSION_2: NOT CHANGED
CLINICAL_PROGRESSION: NOT CHANGED
CLINICAL_PROGRESSION: NOT CHANGED
CLINICAL_PROGRESSION_2: NOT CHANGED
CLINICAL_PROGRESSION: NOT CHANGED
CLINICAL_PROGRESSION_2: NOT CHANGED
CLINICAL_PROGRESSION: NOT CHANGED

## 2019-11-16 ASSESSMENT — PAIN DESCRIPTION - DESCRIPTORS
DESCRIPTORS: BURNING
DESCRIPTORS: ACHING

## 2019-11-16 ASSESSMENT — PAIN DESCRIPTION - ONSET
ONSET: ON-GOING
ONSET_2: ON-GOING
ONSET: ON-GOING
ONSET: ON-GOING
ONSET_2: ON-GOING
ONSET: ON-GOING
ONSET_2: ON-GOING
ONSET_2: ON-GOING

## 2019-11-16 ASSESSMENT — PAIN DESCRIPTION - PAIN TYPE
TYPE: ACUTE PAIN
TYPE_2: ACUTE PAIN
TYPE: ACUTE PAIN
TYPE_2: ACUTE PAIN
TYPE: ACUTE PAIN
TYPE_2: ACUTE PAIN
TYPE: ACUTE PAIN
TYPE: ACUTE PAIN
TYPE_2: ACUTE PAIN
TYPE: ACUTE PAIN
TYPE: ACUTE PAIN

## 2019-11-16 ASSESSMENT — PAIN SCALES - GENERAL
PAINLEVEL_OUTOF10: 5
PAINLEVEL_OUTOF10: 5
PAINLEVEL_OUTOF10: 8
PAINLEVEL_OUTOF10: 7
PAINLEVEL_OUTOF10: 5
PAINLEVEL_OUTOF10: 6
PAINLEVEL_OUTOF10: 5
PAINLEVEL_OUTOF10: 8
PAINLEVEL_OUTOF10: 8
PAINLEVEL_OUTOF10: 5
PAINLEVEL_OUTOF10: 7
PAINLEVEL_OUTOF10: 0
PAINLEVEL_OUTOF10: 8
PAINLEVEL_OUTOF10: 0

## 2019-11-16 ASSESSMENT — PAIN DESCRIPTION - LOCATION
LOCATION_2: LEG
LOCATION: HIP
LOCATION_2: LEG
LOCATION: FOOT
LOCATION: LEG;HIP
LOCATION_2: LEG
LOCATION: HIP
LOCATION: HIP
LOCATION_2: LEG
LOCATION: HIP

## 2019-11-16 ASSESSMENT — PAIN DESCRIPTION - ORIENTATION
ORIENTATION_2: LEFT
ORIENTATION_2: LEFT
ORIENTATION: LEFT
ORIENTATION_2: LEFT
ORIENTATION_2: LEFT
ORIENTATION: LEFT
ORIENTATION_2: LEFT
ORIENTATION: LEFT
ORIENTATION_2: LEFT

## 2019-11-16 ASSESSMENT — PAIN - FUNCTIONAL ASSESSMENT
PAIN_FUNCTIONAL_ASSESSMENT: PREVENTS OR INTERFERES SOME ACTIVE ACTIVITIES AND ADLS

## 2019-11-16 ASSESSMENT — PAIN DESCRIPTION - FREQUENCY
FREQUENCY: INTERMITTENT
FREQUENCY: CONTINUOUS
FREQUENCY: INTERMITTENT
FREQUENCY: INTERMITTENT

## 2019-11-16 ASSESSMENT — PAIN DESCRIPTION - DIRECTION
RADIATING_TOWARDS: CALF
RADIATING_TOWARDS: CALF

## 2019-11-17 LAB
AEROBIC CULTURE: NORMAL
ANAEROBIC CULTURE: NORMAL
GRAM STAIN RESULT: NORMAL
HCT VFR BLD CALC: 34.5 % (ref 37–47)
HEMOGLOBIN: 10.7 GM/DL (ref 12–16)
INR BLD: 2.37 (ref 0.85–1.13)

## 2019-11-17 PROCEDURE — 6370000000 HC RX 637 (ALT 250 FOR IP): Performed by: SURGERY

## 2019-11-17 PROCEDURE — 6370000000 HC RX 637 (ALT 250 FOR IP): Performed by: INTERNAL MEDICINE

## 2019-11-17 PROCEDURE — 97116 GAIT TRAINING THERAPY: CPT

## 2019-11-17 PROCEDURE — 6360000002 HC RX W HCPCS: Performed by: FAMILY MEDICINE

## 2019-11-17 PROCEDURE — 97110 THERAPEUTIC EXERCISES: CPT

## 2019-11-17 PROCEDURE — 6360000002 HC RX W HCPCS: Performed by: SURGERY

## 2019-11-17 PROCEDURE — 94640 AIRWAY INHALATION TREATMENT: CPT

## 2019-11-17 PROCEDURE — 6370000000 HC RX 637 (ALT 250 FOR IP): Performed by: FAMILY MEDICINE

## 2019-11-17 PROCEDURE — 1290000000 HC SEMI PRIVATE OTHER R&B

## 2019-11-17 PROCEDURE — 94761 N-INVAS EAR/PLS OXIMETRY MLT: CPT

## 2019-11-17 PROCEDURE — 36415 COLL VENOUS BLD VENIPUNCTURE: CPT

## 2019-11-17 PROCEDURE — 85610 PROTHROMBIN TIME: CPT

## 2019-11-17 PROCEDURE — 85014 HEMATOCRIT: CPT

## 2019-11-17 PROCEDURE — 97535 SELF CARE MNGMENT TRAINING: CPT

## 2019-11-17 PROCEDURE — 85018 HEMOGLOBIN: CPT

## 2019-11-17 RX ADMIN — FUROSEMIDE 40 MG: 40 TABLET ORAL at 08:18

## 2019-11-17 RX ADMIN — ALUMINUM HYDROXIDE, MAGNESIUM HYDROXIDE, AND SIMETHICONE 30 ML: 200; 200; 20 SUSPENSION ORAL at 16:30

## 2019-11-17 RX ADMIN — IPRATROPIUM BROMIDE 0.5 MG: 0.5 SOLUTION RESPIRATORY (INHALATION) at 05:14

## 2019-11-17 RX ADMIN — DIGOXIN 125 MCG: 125 TABLET ORAL at 22:14

## 2019-11-17 RX ADMIN — DICYCLOMINE HYDROCHLORIDE 10 MG: 10 CAPSULE ORAL at 16:26

## 2019-11-17 RX ADMIN — IPRATROPIUM BROMIDE 0.5 MG: 0.5 SOLUTION RESPIRATORY (INHALATION) at 17:48

## 2019-11-17 RX ADMIN — OXYCODONE HYDROCHLORIDE AND ACETAMINOPHEN 1 TABLET: 5; 325 TABLET ORAL at 12:34

## 2019-11-17 RX ADMIN — BUDESONIDE 500 MCG: 0.5 INHALANT RESPIRATORY (INHALATION) at 05:14

## 2019-11-17 RX ADMIN — DOCUSATE SODIUM 100 MG: 100 CAPSULE, LIQUID FILLED ORAL at 08:18

## 2019-11-17 RX ADMIN — FORMOTEROL FUMARATE DIHYDRATE 20 MCG: 20 SOLUTION RESPIRATORY (INHALATION) at 05:14

## 2019-11-17 RX ADMIN — ACETYLCYSTEINE 600 MG: 200 SOLUTION ORAL; RESPIRATORY (INHALATION) at 08:41

## 2019-11-17 RX ADMIN — DOCUSATE SODIUM 100 MG: 100 CAPSULE, LIQUID FILLED ORAL at 22:14

## 2019-11-17 RX ADMIN — ALUMINUM HYDROXIDE, MAGNESIUM HYDROXIDE, AND SIMETHICONE 30 ML: 200; 200; 20 SUSPENSION ORAL at 08:18

## 2019-11-17 RX ADMIN — CETIRIZINE HYDROCHLORIDE 5 MG: 10 TABLET, FILM COATED ORAL at 08:18

## 2019-11-17 RX ADMIN — DICYCLOMINE HYDROCHLORIDE 10 MG: 10 CAPSULE ORAL at 12:28

## 2019-11-17 RX ADMIN — IPRATROPIUM BROMIDE 0.5 MG: 0.5 SOLUTION RESPIRATORY (INHALATION) at 14:08

## 2019-11-17 RX ADMIN — METOPROLOL SUCCINATE 25 MG: 25 TABLET, FILM COATED, EXTENDED RELEASE ORAL at 22:14

## 2019-11-17 RX ADMIN — BUDESONIDE 500 MCG: 0.5 INHALANT RESPIRATORY (INHALATION) at 17:48

## 2019-11-17 RX ADMIN — Medication 200 MG: at 08:15

## 2019-11-17 RX ADMIN — SACUBITRIL AND VALSARTAN 1 TABLET: 49; 51 TABLET, FILM COATED ORAL at 22:13

## 2019-11-17 RX ADMIN — LEVALBUTEROL HYDROCHLORIDE 1.25 MG: 1.25 SOLUTION RESPIRATORY (INHALATION) at 10:07

## 2019-11-17 RX ADMIN — FORMOTEROL FUMARATE DIHYDRATE 20 MCG: 20 SOLUTION RESPIRATORY (INHALATION) at 17:48

## 2019-11-17 RX ADMIN — LEVALBUTEROL HYDROCHLORIDE 1.25 MG: 1.25 SOLUTION RESPIRATORY (INHALATION) at 14:08

## 2019-11-17 RX ADMIN — Medication 1.5 MG: at 16:26

## 2019-11-17 RX ADMIN — LEVALBUTEROL HYDROCHLORIDE 1.25 MG: 1.25 SOLUTION RESPIRATORY (INHALATION) at 17:48

## 2019-11-17 RX ADMIN — OXYCODONE HYDROCHLORIDE AND ACETAMINOPHEN 1 TABLET: 5; 325 TABLET ORAL at 08:45

## 2019-11-17 RX ADMIN — FAMOTIDINE 20 MG: 20 TABLET, FILM COATED ORAL at 08:20

## 2019-11-17 RX ADMIN — ACETYLCYSTEINE 600 MG: 200 SOLUTION ORAL; RESPIRATORY (INHALATION) at 22:14

## 2019-11-17 RX ADMIN — DICYCLOMINE HYDROCHLORIDE 10 MG: 10 CAPSULE ORAL at 06:04

## 2019-11-17 RX ADMIN — LEVALBUTEROL HYDROCHLORIDE 1.25 MG: 1.25 SOLUTION RESPIRATORY (INHALATION) at 05:14

## 2019-11-17 RX ADMIN — ALUMINUM HYDROXIDE, MAGNESIUM HYDROXIDE, AND SIMETHICONE 30 ML: 200; 200; 20 SUSPENSION ORAL at 22:13

## 2019-11-17 RX ADMIN — IPRATROPIUM BROMIDE 0.5 MG: 0.5 SOLUTION RESPIRATORY (INHALATION) at 10:07

## 2019-11-17 RX ADMIN — SACUBITRIL AND VALSARTAN 1 TABLET: 49; 51 TABLET, FILM COATED ORAL at 08:18

## 2019-11-17 RX ADMIN — OXYCODONE HYDROCHLORIDE AND ACETAMINOPHEN 1 TABLET: 5; 325 TABLET ORAL at 01:09

## 2019-11-17 RX ADMIN — SENNOSIDES 17.2 MG: 8.6 TABLET, FILM COATED ORAL at 22:15

## 2019-11-17 RX ADMIN — FAMOTIDINE 20 MG: 20 TABLET, FILM COATED ORAL at 22:14

## 2019-11-17 RX ADMIN — DICYCLOMINE HYDROCHLORIDE 10 MG: 10 CAPSULE ORAL at 22:14

## 2019-11-17 RX ADMIN — OXYCODONE HYDROCHLORIDE AND ACETAMINOPHEN 1 TABLET: 5; 325 TABLET ORAL at 17:17

## 2019-11-17 RX ADMIN — NALOXEGOL OXALATE 12.5 MG: 12.5 TABLET, FILM COATED ORAL at 06:04

## 2019-11-17 ASSESSMENT — PAIN SCALES - GENERAL
PAINLEVEL_OUTOF10: 7
PAINLEVEL_OUTOF10: 4
PAINLEVEL_OUTOF10: 6
PAINLEVEL_OUTOF10: 8
PAINLEVEL_OUTOF10: 0
PAINLEVEL_OUTOF10: 8
PAINLEVEL_OUTOF10: 10

## 2019-11-18 LAB — INR BLD: 2.9 (ref 0.85–1.13)

## 2019-11-18 PROCEDURE — 97110 THERAPEUTIC EXERCISES: CPT

## 2019-11-18 PROCEDURE — 94640 AIRWAY INHALATION TREATMENT: CPT

## 2019-11-18 PROCEDURE — 97530 THERAPEUTIC ACTIVITIES: CPT

## 2019-11-18 PROCEDURE — 94761 N-INVAS EAR/PLS OXIMETRY MLT: CPT

## 2019-11-18 PROCEDURE — 6370000000 HC RX 637 (ALT 250 FOR IP): Performed by: FAMILY MEDICINE

## 2019-11-18 PROCEDURE — 6360000002 HC RX W HCPCS: Performed by: SURGERY

## 2019-11-18 PROCEDURE — 6370000000 HC RX 637 (ALT 250 FOR IP): Performed by: SURGERY

## 2019-11-18 PROCEDURE — 97535 SELF CARE MNGMENT TRAINING: CPT

## 2019-11-18 PROCEDURE — 97127 HC SP THER IVNTJ W/FOCUS COG FUNCJ: CPT

## 2019-11-18 PROCEDURE — 6370000000 HC RX 637 (ALT 250 FOR IP): Performed by: INTERNAL MEDICINE

## 2019-11-18 PROCEDURE — 97116 GAIT TRAINING THERAPY: CPT

## 2019-11-18 PROCEDURE — 1290000000 HC SEMI PRIVATE OTHER R&B

## 2019-11-18 PROCEDURE — 36415 COLL VENOUS BLD VENIPUNCTURE: CPT

## 2019-11-18 PROCEDURE — 85610 PROTHROMBIN TIME: CPT

## 2019-11-18 PROCEDURE — 2700000000 HC OXYGEN THERAPY PER DAY

## 2019-11-18 PROCEDURE — 6360000002 HC RX W HCPCS: Performed by: FAMILY MEDICINE

## 2019-11-18 RX ADMIN — Medication 1.5 MG: at 18:14

## 2019-11-18 RX ADMIN — LEVALBUTEROL HYDROCHLORIDE 1.25 MG: 1.25 SOLUTION RESPIRATORY (INHALATION) at 13:23

## 2019-11-18 RX ADMIN — BUDESONIDE 500 MCG: 0.5 INHALANT RESPIRATORY (INHALATION) at 18:29

## 2019-11-18 RX ADMIN — ONDANSETRON 4 MG: 4 TABLET, ORALLY DISINTEGRATING ORAL at 18:40

## 2019-11-18 RX ADMIN — ACETAMINOPHEN 650 MG: 325 TABLET ORAL at 22:38

## 2019-11-18 RX ADMIN — FORMOTEROL FUMARATE DIHYDRATE 20 MCG: 20 SOLUTION RESPIRATORY (INHALATION) at 05:11

## 2019-11-18 RX ADMIN — IPRATROPIUM BROMIDE 0.5 MG: 0.5 SOLUTION RESPIRATORY (INHALATION) at 18:23

## 2019-11-18 RX ADMIN — DICYCLOMINE HYDROCHLORIDE 10 MG: 10 CAPSULE ORAL at 17:15

## 2019-11-18 RX ADMIN — ACETYLCYSTEINE 600 MG: 200 SOLUTION ORAL; RESPIRATORY (INHALATION) at 08:38

## 2019-11-18 RX ADMIN — FUROSEMIDE 40 MG: 40 TABLET ORAL at 08:38

## 2019-11-18 RX ADMIN — NALOXEGOL OXALATE 12.5 MG: 12.5 TABLET, FILM COATED ORAL at 06:08

## 2019-11-18 RX ADMIN — ALUMINUM HYDROXIDE, MAGNESIUM HYDROXIDE, AND SIMETHICONE 30 ML: 200; 200; 20 SUSPENSION ORAL at 17:14

## 2019-11-18 RX ADMIN — DOCUSATE SODIUM 100 MG: 100 CAPSULE, LIQUID FILLED ORAL at 08:38

## 2019-11-18 RX ADMIN — CETIRIZINE HYDROCHLORIDE 5 MG: 10 TABLET, FILM COATED ORAL at 08:38

## 2019-11-18 RX ADMIN — ALUMINUM HYDROXIDE, MAGNESIUM HYDROXIDE, AND SIMETHICONE 30 ML: 200; 200; 20 SUSPENSION ORAL at 08:49

## 2019-11-18 RX ADMIN — IPRATROPIUM BROMIDE 0.5 MG: 0.5 SOLUTION RESPIRATORY (INHALATION) at 13:23

## 2019-11-18 RX ADMIN — LEVALBUTEROL HYDROCHLORIDE 1.25 MG: 1.25 SOLUTION RESPIRATORY (INHALATION) at 18:15

## 2019-11-18 RX ADMIN — SACUBITRIL AND VALSARTAN 1 TABLET: 49; 51 TABLET, FILM COATED ORAL at 22:33

## 2019-11-18 RX ADMIN — Medication 200 MG: at 08:49

## 2019-11-18 RX ADMIN — FORMOTEROL FUMARATE DIHYDRATE 20 MCG: 20 SOLUTION RESPIRATORY (INHALATION) at 18:25

## 2019-11-18 RX ADMIN — DIGOXIN 125 MCG: 125 TABLET ORAL at 22:33

## 2019-11-18 RX ADMIN — IPRATROPIUM BROMIDE 0.5 MG: 0.5 SOLUTION RESPIRATORY (INHALATION) at 09:50

## 2019-11-18 RX ADMIN — DICYCLOMINE HYDROCHLORIDE 10 MG: 10 CAPSULE ORAL at 11:31

## 2019-11-18 RX ADMIN — METOPROLOL SUCCINATE 25 MG: 25 TABLET, FILM COATED, EXTENDED RELEASE ORAL at 22:33

## 2019-11-18 RX ADMIN — DICYCLOMINE HYDROCHLORIDE 10 MG: 10 CAPSULE ORAL at 22:33

## 2019-11-18 RX ADMIN — FAMOTIDINE 20 MG: 20 TABLET, FILM COATED ORAL at 08:38

## 2019-11-18 RX ADMIN — FAMOTIDINE 20 MG: 20 TABLET, FILM COATED ORAL at 22:38

## 2019-11-18 RX ADMIN — ACETAMINOPHEN 650 MG: 325 TABLET ORAL at 13:08

## 2019-11-18 RX ADMIN — SENNOSIDES 17.2 MG: 8.6 TABLET, FILM COATED ORAL at 22:32

## 2019-11-18 RX ADMIN — DOCUSATE SODIUM 100 MG: 100 CAPSULE, LIQUID FILLED ORAL at 22:44

## 2019-11-18 RX ADMIN — ACETAMINOPHEN 650 MG: 325 TABLET ORAL at 17:14

## 2019-11-18 RX ADMIN — IPRATROPIUM BROMIDE 0.5 MG: 0.5 SOLUTION RESPIRATORY (INHALATION) at 05:11

## 2019-11-18 RX ADMIN — LEVALBUTEROL HYDROCHLORIDE 1.25 MG: 1.25 SOLUTION RESPIRATORY (INHALATION) at 09:50

## 2019-11-18 RX ADMIN — ACETAMINOPHEN 650 MG: 325 TABLET ORAL at 08:33

## 2019-11-18 RX ADMIN — BUDESONIDE 500 MCG: 0.5 INHALANT RESPIRATORY (INHALATION) at 05:11

## 2019-11-18 RX ADMIN — SACUBITRIL AND VALSARTAN 1 TABLET: 49; 51 TABLET, FILM COATED ORAL at 08:38

## 2019-11-18 RX ADMIN — DICYCLOMINE HYDROCHLORIDE 10 MG: 10 CAPSULE ORAL at 06:08

## 2019-11-18 RX ADMIN — LEVALBUTEROL HYDROCHLORIDE 1.25 MG: 1.25 SOLUTION RESPIRATORY (INHALATION) at 05:11

## 2019-11-18 RX ADMIN — ALUMINUM HYDROXIDE, MAGNESIUM HYDROXIDE, AND SIMETHICONE 30 ML: 200; 200; 20 SUSPENSION ORAL at 13:08

## 2019-11-18 RX ADMIN — TRAZODONE HYDROCHLORIDE 50 MG: 50 TABLET ORAL at 22:37

## 2019-11-18 ASSESSMENT — PAIN DESCRIPTION - PAIN TYPE: TYPE: ACUTE PAIN

## 2019-11-18 ASSESSMENT — PAIN SCALES - GENERAL
PAINLEVEL_OUTOF10: 8
PAINLEVEL_OUTOF10: 8
PAINLEVEL_OUTOF10: 7

## 2019-11-18 ASSESSMENT — PAIN DESCRIPTION - FREQUENCY: FREQUENCY: CONTINUOUS

## 2019-11-18 ASSESSMENT — PAIN DESCRIPTION - DESCRIPTORS: DESCRIPTORS: ACHING

## 2019-11-18 ASSESSMENT — PAIN DESCRIPTION - ORIENTATION: ORIENTATION: LEFT;LOWER

## 2019-11-18 ASSESSMENT — PAIN DESCRIPTION - LOCATION: LOCATION: LEG

## 2019-11-19 LAB — INR BLD: 2.83 (ref 0.85–1.13)

## 2019-11-19 PROCEDURE — 97110 THERAPEUTIC EXERCISES: CPT

## 2019-11-19 PROCEDURE — 97530 THERAPEUTIC ACTIVITIES: CPT

## 2019-11-19 PROCEDURE — 6360000002 HC RX W HCPCS: Performed by: SURGERY

## 2019-11-19 PROCEDURE — 1290000000 HC SEMI PRIVATE OTHER R&B

## 2019-11-19 PROCEDURE — 94640 AIRWAY INHALATION TREATMENT: CPT

## 2019-11-19 PROCEDURE — 97116 GAIT TRAINING THERAPY: CPT

## 2019-11-19 PROCEDURE — 6370000000 HC RX 637 (ALT 250 FOR IP): Performed by: INTERNAL MEDICINE

## 2019-11-19 PROCEDURE — 85610 PROTHROMBIN TIME: CPT

## 2019-11-19 PROCEDURE — 97127 HC SP THER IVNTJ W/FOCUS COG FUNCJ: CPT

## 2019-11-19 PROCEDURE — 6370000000 HC RX 637 (ALT 250 FOR IP): Performed by: FAMILY MEDICINE

## 2019-11-19 PROCEDURE — 97535 SELF CARE MNGMENT TRAINING: CPT

## 2019-11-19 PROCEDURE — 36415 COLL VENOUS BLD VENIPUNCTURE: CPT

## 2019-11-19 PROCEDURE — 6360000002 HC RX W HCPCS: Performed by: FAMILY MEDICINE

## 2019-11-19 PROCEDURE — 94760 N-INVAS EAR/PLS OXIMETRY 1: CPT

## 2019-11-19 PROCEDURE — 2700000000 HC OXYGEN THERAPY PER DAY

## 2019-11-19 PROCEDURE — 6370000000 HC RX 637 (ALT 250 FOR IP): Performed by: SURGERY

## 2019-11-19 RX ADMIN — LEVALBUTEROL HYDROCHLORIDE 1.25 MG: 1.25 SOLUTION RESPIRATORY (INHALATION) at 05:41

## 2019-11-19 RX ADMIN — FAMOTIDINE 20 MG: 20 TABLET, FILM COATED ORAL at 08:34

## 2019-11-19 RX ADMIN — IPRATROPIUM BROMIDE 0.5 MG: 0.5 SOLUTION RESPIRATORY (INHALATION) at 10:02

## 2019-11-19 RX ADMIN — SACUBITRIL AND VALSARTAN 1 TABLET: 49; 51 TABLET, FILM COATED ORAL at 20:13

## 2019-11-19 RX ADMIN — ACETYLCYSTEINE 600 MG: 200 SOLUTION ORAL; RESPIRATORY (INHALATION) at 08:34

## 2019-11-19 RX ADMIN — ALUMINUM HYDROXIDE, MAGNESIUM HYDROXIDE, AND SIMETHICONE 30 ML: 200; 200; 20 SUSPENSION ORAL at 08:34

## 2019-11-19 RX ADMIN — SENNOSIDES 17.2 MG: 8.6 TABLET, FILM COATED ORAL at 20:13

## 2019-11-19 RX ADMIN — ACETAMINOPHEN 650 MG: 325 TABLET ORAL at 06:21

## 2019-11-19 RX ADMIN — LEVALBUTEROL HYDROCHLORIDE 1.25 MG: 1.25 SOLUTION RESPIRATORY (INHALATION) at 10:02

## 2019-11-19 RX ADMIN — Medication 200 MG: at 08:34

## 2019-11-19 RX ADMIN — BUDESONIDE 500 MCG: 0.5 INHALANT RESPIRATORY (INHALATION) at 16:53

## 2019-11-19 RX ADMIN — Medication 1.5 MG: at 18:04

## 2019-11-19 RX ADMIN — ALUMINUM HYDROXIDE, MAGNESIUM HYDROXIDE, AND SIMETHICONE 30 ML: 200; 200; 20 SUSPENSION ORAL at 18:04

## 2019-11-19 RX ADMIN — ALUMINUM HYDROXIDE, MAGNESIUM HYDROXIDE, AND SIMETHICONE 30 ML: 200; 200; 20 SUSPENSION ORAL at 20:13

## 2019-11-19 RX ADMIN — DICYCLOMINE HYDROCHLORIDE 10 MG: 10 CAPSULE ORAL at 06:20

## 2019-11-19 RX ADMIN — DOCUSATE SODIUM 100 MG: 100 CAPSULE, LIQUID FILLED ORAL at 20:13

## 2019-11-19 RX ADMIN — LEVALBUTEROL HYDROCHLORIDE 1.25 MG: 1.25 SOLUTION RESPIRATORY (INHALATION) at 13:02

## 2019-11-19 RX ADMIN — LEVALBUTEROL HYDROCHLORIDE 1.25 MG: 1.25 SOLUTION RESPIRATORY (INHALATION) at 16:26

## 2019-11-19 RX ADMIN — ACETAMINOPHEN 650 MG: 325 TABLET ORAL at 12:10

## 2019-11-19 RX ADMIN — DIGOXIN 125 MCG: 125 TABLET ORAL at 20:13

## 2019-11-19 RX ADMIN — FAMOTIDINE 20 MG: 20 TABLET, FILM COATED ORAL at 20:13

## 2019-11-19 RX ADMIN — IPRATROPIUM BROMIDE 0.5 MG: 0.5 SOLUTION RESPIRATORY (INHALATION) at 13:02

## 2019-11-19 RX ADMIN — FUROSEMIDE 40 MG: 40 TABLET ORAL at 08:34

## 2019-11-19 RX ADMIN — NALOXEGOL OXALATE 12.5 MG: 12.5 TABLET, FILM COATED ORAL at 06:20

## 2019-11-19 RX ADMIN — IPRATROPIUM BROMIDE 0.5 MG: 0.5 SOLUTION RESPIRATORY (INHALATION) at 16:26

## 2019-11-19 RX ADMIN — BUDESONIDE 500 MCG: 0.5 INHALANT RESPIRATORY (INHALATION) at 05:41

## 2019-11-19 RX ADMIN — DICYCLOMINE HYDROCHLORIDE 10 MG: 10 CAPSULE ORAL at 20:13

## 2019-11-19 RX ADMIN — FORMOTEROL FUMARATE DIHYDRATE 20 MCG: 20 SOLUTION RESPIRATORY (INHALATION) at 05:41

## 2019-11-19 RX ADMIN — IPRATROPIUM BROMIDE 0.5 MG: 0.5 SOLUTION RESPIRATORY (INHALATION) at 05:41

## 2019-11-19 RX ADMIN — ALUMINUM HYDROXIDE, MAGNESIUM HYDROXIDE, AND SIMETHICONE 30 ML: 200; 200; 20 SUSPENSION ORAL at 12:10

## 2019-11-19 RX ADMIN — TRAZODONE HYDROCHLORIDE 50 MG: 50 TABLET ORAL at 20:13

## 2019-11-19 RX ADMIN — CETIRIZINE HYDROCHLORIDE 5 MG: 10 TABLET, FILM COATED ORAL at 08:34

## 2019-11-19 RX ADMIN — FORMOTEROL FUMARATE DIHYDRATE 20 MCG: 20 SOLUTION RESPIRATORY (INHALATION) at 16:46

## 2019-11-19 RX ADMIN — ONDANSETRON 4 MG: 4 TABLET, ORALLY DISINTEGRATING ORAL at 20:13

## 2019-11-19 RX ADMIN — METOPROLOL SUCCINATE 25 MG: 25 TABLET, FILM COATED, EXTENDED RELEASE ORAL at 20:13

## 2019-11-19 RX ADMIN — SACUBITRIL AND VALSARTAN 1 TABLET: 49; 51 TABLET, FILM COATED ORAL at 08:34

## 2019-11-19 RX ADMIN — DICYCLOMINE HYDROCHLORIDE 10 MG: 10 CAPSULE ORAL at 12:10

## 2019-11-19 RX ADMIN — DICYCLOMINE HYDROCHLORIDE 10 MG: 10 CAPSULE ORAL at 16:34

## 2019-11-19 RX ADMIN — DOCUSATE SODIUM 100 MG: 100 CAPSULE, LIQUID FILLED ORAL at 08:34

## 2019-11-19 RX ADMIN — ACETYLCYSTEINE 600 MG: 200 SOLUTION ORAL; RESPIRATORY (INHALATION) at 20:13

## 2019-11-19 RX ADMIN — ACETAMINOPHEN 650 MG: 325 TABLET ORAL at 20:13

## 2019-11-19 RX ADMIN — ACETAMINOPHEN 650 MG: 325 TABLET ORAL at 16:34

## 2019-11-19 ASSESSMENT — PAIN SCALES - GENERAL
PAINLEVEL_OUTOF10: 8
PAINLEVEL_OUTOF10: 5
PAINLEVEL_OUTOF10: 10
PAINLEVEL_OUTOF10: 6
PAINLEVEL_OUTOF10: 8
PAINLEVEL_OUTOF10: 7

## 2019-11-19 ASSESSMENT — PAIN DESCRIPTION - LOCATION
LOCATION: LEG
LOCATION: LEG

## 2019-11-19 ASSESSMENT — PAIN DESCRIPTION - ONSET: ONSET: GRADUAL

## 2019-11-19 ASSESSMENT — PAIN DESCRIPTION - PROGRESSION: CLINICAL_PROGRESSION: NOT CHANGED

## 2019-11-19 ASSESSMENT — PAIN DESCRIPTION - PAIN TYPE
TYPE: ACUTE PAIN
TYPE: ACUTE PAIN

## 2019-11-19 ASSESSMENT — PAIN - FUNCTIONAL ASSESSMENT: PAIN_FUNCTIONAL_ASSESSMENT: PREVENTS OR INTERFERES SOME ACTIVE ACTIVITIES AND ADLS

## 2019-11-19 ASSESSMENT — PAIN DESCRIPTION - FREQUENCY: FREQUENCY: INTERMITTENT

## 2019-11-19 ASSESSMENT — PAIN DESCRIPTION - ORIENTATION: ORIENTATION: LEFT;LOWER

## 2019-11-20 LAB — INR BLD: 2.64 (ref 0.85–1.13)

## 2019-11-20 PROCEDURE — 6360000002 HC RX W HCPCS: Performed by: SURGERY

## 2019-11-20 PROCEDURE — 1290000000 HC SEMI PRIVATE OTHER R&B

## 2019-11-20 PROCEDURE — 6370000000 HC RX 637 (ALT 250 FOR IP): Performed by: FAMILY MEDICINE

## 2019-11-20 PROCEDURE — 97127 HC SP THER IVNTJ W/FOCUS COG FUNCJ: CPT

## 2019-11-20 PROCEDURE — 97530 THERAPEUTIC ACTIVITIES: CPT

## 2019-11-20 PROCEDURE — 94640 AIRWAY INHALATION TREATMENT: CPT

## 2019-11-20 PROCEDURE — 6370000000 HC RX 637 (ALT 250 FOR IP): Performed by: INTERNAL MEDICINE

## 2019-11-20 PROCEDURE — 97116 GAIT TRAINING THERAPY: CPT

## 2019-11-20 PROCEDURE — 94761 N-INVAS EAR/PLS OXIMETRY MLT: CPT

## 2019-11-20 PROCEDURE — 85610 PROTHROMBIN TIME: CPT

## 2019-11-20 PROCEDURE — 6360000002 HC RX W HCPCS: Performed by: FAMILY MEDICINE

## 2019-11-20 PROCEDURE — 36415 COLL VENOUS BLD VENIPUNCTURE: CPT

## 2019-11-20 PROCEDURE — 2700000000 HC OXYGEN THERAPY PER DAY

## 2019-11-20 PROCEDURE — 6370000000 HC RX 637 (ALT 250 FOR IP): Performed by: SURGERY

## 2019-11-20 PROCEDURE — 97535 SELF CARE MNGMENT TRAINING: CPT

## 2019-11-20 RX ORDER — WARFARIN SODIUM 2 MG/1
2 TABLET ORAL
Status: COMPLETED | OUTPATIENT
Start: 2019-11-20 | End: 2019-11-20

## 2019-11-20 RX ADMIN — Medication 200 MG: at 09:04

## 2019-11-20 RX ADMIN — BUDESONIDE 500 MCG: 0.5 INHALANT RESPIRATORY (INHALATION) at 05:27

## 2019-11-20 RX ADMIN — ACETAMINOPHEN 650 MG: 325 TABLET ORAL at 06:28

## 2019-11-20 RX ADMIN — FAMOTIDINE 20 MG: 20 TABLET, FILM COATED ORAL at 08:45

## 2019-11-20 RX ADMIN — BUDESONIDE 500 MCG: 0.5 INHALANT RESPIRATORY (INHALATION) at 16:42

## 2019-11-20 RX ADMIN — ACETYLCYSTEINE 600 MG: 200 SOLUTION ORAL; RESPIRATORY (INHALATION) at 08:43

## 2019-11-20 RX ADMIN — ALUMINUM HYDROXIDE, MAGNESIUM HYDROXIDE, AND SIMETHICONE 30 ML: 200; 200; 20 SUSPENSION ORAL at 18:00

## 2019-11-20 RX ADMIN — CETIRIZINE HYDROCHLORIDE 5 MG: 10 TABLET, FILM COATED ORAL at 08:44

## 2019-11-20 RX ADMIN — FORMOTEROL FUMARATE DIHYDRATE 20 MCG: 20 SOLUTION RESPIRATORY (INHALATION) at 16:42

## 2019-11-20 RX ADMIN — NALOXEGOL OXALATE 12.5 MG: 12.5 TABLET, FILM COATED ORAL at 06:03

## 2019-11-20 RX ADMIN — DIGOXIN 125 MCG: 125 TABLET ORAL at 21:58

## 2019-11-20 RX ADMIN — IPRATROPIUM BROMIDE 0.5 MG: 0.5 SOLUTION RESPIRATORY (INHALATION) at 09:22

## 2019-11-20 RX ADMIN — ALUMINUM HYDROXIDE, MAGNESIUM HYDROXIDE, AND SIMETHICONE 30 ML: 200; 200; 20 SUSPENSION ORAL at 08:43

## 2019-11-20 RX ADMIN — LEVALBUTEROL HYDROCHLORIDE 1.25 MG: 1.25 SOLUTION RESPIRATORY (INHALATION) at 16:41

## 2019-11-20 RX ADMIN — DICYCLOMINE HYDROCHLORIDE 10 MG: 10 CAPSULE ORAL at 18:00

## 2019-11-20 RX ADMIN — FORMOTEROL FUMARATE DIHYDRATE 20 MCG: 20 SOLUTION RESPIRATORY (INHALATION) at 05:27

## 2019-11-20 RX ADMIN — SACUBITRIL AND VALSARTAN 1 TABLET: 49; 51 TABLET, FILM COATED ORAL at 08:44

## 2019-11-20 RX ADMIN — OXYCODONE HYDROCHLORIDE AND ACETAMINOPHEN 1 TABLET: 5; 325 TABLET ORAL at 18:18

## 2019-11-20 RX ADMIN — IPRATROPIUM BROMIDE 0.5 MG: 0.5 SOLUTION RESPIRATORY (INHALATION) at 16:41

## 2019-11-20 RX ADMIN — DICYCLOMINE HYDROCHLORIDE 10 MG: 10 CAPSULE ORAL at 10:46

## 2019-11-20 RX ADMIN — SACUBITRIL AND VALSARTAN 1 TABLET: 49; 51 TABLET, FILM COATED ORAL at 21:58

## 2019-11-20 RX ADMIN — IPRATROPIUM BROMIDE 0.5 MG: 0.5 SOLUTION RESPIRATORY (INHALATION) at 05:27

## 2019-11-20 RX ADMIN — DICYCLOMINE HYDROCHLORIDE 10 MG: 10 CAPSULE ORAL at 06:03

## 2019-11-20 RX ADMIN — FAMOTIDINE 20 MG: 20 TABLET, FILM COATED ORAL at 21:58

## 2019-11-20 RX ADMIN — DICYCLOMINE HYDROCHLORIDE 10 MG: 10 CAPSULE ORAL at 21:58

## 2019-11-20 RX ADMIN — ALUMINUM HYDROXIDE, MAGNESIUM HYDROXIDE, AND SIMETHICONE 30 ML: 200; 200; 20 SUSPENSION ORAL at 13:12

## 2019-11-20 RX ADMIN — ACETAMINOPHEN 650 MG: 325 TABLET ORAL at 10:48

## 2019-11-20 RX ADMIN — WARFARIN SODIUM 2 MG: 2 TABLET ORAL at 18:20

## 2019-11-20 RX ADMIN — TRAZODONE HYDROCHLORIDE 50 MG: 50 TABLET ORAL at 21:59

## 2019-11-20 RX ADMIN — LEVALBUTEROL HYDROCHLORIDE 1.25 MG: 1.25 SOLUTION RESPIRATORY (INHALATION) at 05:27

## 2019-11-20 RX ADMIN — METOPROLOL SUCCINATE 25 MG: 25 TABLET, FILM COATED, EXTENDED RELEASE ORAL at 21:59

## 2019-11-20 RX ADMIN — LEVALBUTEROL HYDROCHLORIDE 1.25 MG: 1.25 SOLUTION RESPIRATORY (INHALATION) at 09:22

## 2019-11-20 RX ADMIN — FUROSEMIDE 40 MG: 40 TABLET ORAL at 08:45

## 2019-11-20 RX ADMIN — ACETYLCYSTEINE 600 MG: 200 SOLUTION ORAL; RESPIRATORY (INHALATION) at 22:08

## 2019-11-20 ASSESSMENT — PAIN SCALES - GENERAL
PAINLEVEL_OUTOF10: 8
PAINLEVEL_OUTOF10: 5
PAINLEVEL_OUTOF10: 7
PAINLEVEL_OUTOF10: 9
PAINLEVEL_OUTOF10: 8

## 2019-11-21 VITALS
HEIGHT: 62 IN | RESPIRATION RATE: 17 BRPM | DIASTOLIC BLOOD PRESSURE: 60 MMHG | BODY MASS INDEX: 28.8 KG/M2 | SYSTOLIC BLOOD PRESSURE: 115 MMHG | WEIGHT: 156.5 LBS | OXYGEN SATURATION: 94 % | HEART RATE: 76 BPM | TEMPERATURE: 97.8 F

## 2019-11-21 PROBLEM — Z86.718 H/O DEEP VENOUS THROMBOSIS: Status: RESOLVED | Noted: 2019-10-27 | Resolved: 2019-11-21
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
## 2019-11-21 PROBLEM — E66.09 CLASS 1 OBESITY DUE TO EXCESS CALORIES WITH SERIOUS COMORBIDITY AND BODY MASS INDEX (BMI) OF 32.0 TO 32.9 IN ADULT: Status: RESOLVED | Noted: 2019-10-27 | Resolved: 2019-11-21

## 2019-11-21 LAB — INR BLD: 2.42 (ref 0.85–1.13)

## 2019-11-21 PROCEDURE — 6360000002 HC RX W HCPCS: Performed by: SURGERY

## 2019-11-21 PROCEDURE — 36415 COLL VENOUS BLD VENIPUNCTURE: CPT

## 2019-11-21 PROCEDURE — 6360000002 HC RX W HCPCS: Performed by: FAMILY MEDICINE

## 2019-11-21 PROCEDURE — 85610 PROTHROMBIN TIME: CPT

## 2019-11-21 PROCEDURE — 2700000000 HC OXYGEN THERAPY PER DAY

## 2019-11-21 PROCEDURE — 94640 AIRWAY INHALATION TREATMENT: CPT

## 2019-11-21 PROCEDURE — 6370000000 HC RX 637 (ALT 250 FOR IP): Performed by: FAMILY MEDICINE

## 2019-11-21 PROCEDURE — 6370000000 HC RX 637 (ALT 250 FOR IP): Performed by: INTERNAL MEDICINE

## 2019-11-21 PROCEDURE — 6370000000 HC RX 637 (ALT 250 FOR IP): Performed by: SURGERY

## 2019-11-21 PROCEDURE — 94761 N-INVAS EAR/PLS OXIMETRY MLT: CPT

## 2019-11-21 RX ADMIN — LEVALBUTEROL HYDROCHLORIDE 1.25 MG: 1.25 SOLUTION RESPIRATORY (INHALATION) at 09:14

## 2019-11-21 RX ADMIN — Medication 200 MG: at 09:02

## 2019-11-21 RX ADMIN — CETIRIZINE HYDROCHLORIDE 5 MG: 10 TABLET, FILM COATED ORAL at 09:03

## 2019-11-21 RX ADMIN — ACETAMINOPHEN 650 MG: 325 TABLET ORAL at 06:54

## 2019-11-21 RX ADMIN — DOCUSATE SODIUM 100 MG: 100 CAPSULE, LIQUID FILLED ORAL at 09:02

## 2019-11-21 RX ADMIN — BUDESONIDE 500 MCG: 0.5 INHALANT RESPIRATORY (INHALATION) at 05:53

## 2019-11-21 RX ADMIN — NALOXEGOL OXALATE 12.5 MG: 12.5 TABLET, FILM COATED ORAL at 06:53

## 2019-11-21 RX ADMIN — FAMOTIDINE 20 MG: 20 TABLET, FILM COATED ORAL at 09:02

## 2019-11-21 RX ADMIN — ACETAMINOPHEN 650 MG: 325 TABLET ORAL at 11:19

## 2019-11-21 RX ADMIN — DICYCLOMINE HYDROCHLORIDE 10 MG: 10 CAPSULE ORAL at 11:19

## 2019-11-21 RX ADMIN — FUROSEMIDE 40 MG: 40 TABLET ORAL at 09:02

## 2019-11-21 RX ADMIN — FORMOTEROL FUMARATE DIHYDRATE 20 MCG: 20 SOLUTION RESPIRATORY (INHALATION) at 05:53

## 2019-11-21 RX ADMIN — ALUMINUM HYDROXIDE, MAGNESIUM HYDROXIDE, AND SIMETHICONE 30 ML: 200; 200; 20 SUSPENSION ORAL at 09:02

## 2019-11-21 RX ADMIN — DICYCLOMINE HYDROCHLORIDE 10 MG: 10 CAPSULE ORAL at 06:53

## 2019-11-21 RX ADMIN — SACUBITRIL AND VALSARTAN 1 TABLET: 49; 51 TABLET, FILM COATED ORAL at 09:02

## 2019-11-21 RX ADMIN — LEVALBUTEROL HYDROCHLORIDE 1.25 MG: 1.25 SOLUTION RESPIRATORY (INHALATION) at 05:53

## 2019-11-21 RX ADMIN — IPRATROPIUM BROMIDE 0.5 MG: 0.5 SOLUTION RESPIRATORY (INHALATION) at 09:14

## 2019-11-21 RX ADMIN — IPRATROPIUM BROMIDE 0.5 MG: 0.5 SOLUTION RESPIRATORY (INHALATION) at 05:53

## 2019-11-21 RX ADMIN — ACETYLCYSTEINE 600 MG: 200 SOLUTION ORAL; RESPIRATORY (INHALATION) at 09:00

## 2019-11-21 ASSESSMENT — PAIN SCALES - GENERAL
PAINLEVEL_OUTOF10: 7
PAINLEVEL_OUTOF10: 8
PAINLEVEL_OUTOF10: 6

## 2019-11-22 ENCOUNTER — TELEPHONE (OUTPATIENT)
Dept: PHARMACY | Age: 74
End: 2019-11-22

## 2019-11-26 ENCOUNTER — APPOINTMENT (OUTPATIENT)
Dept: PHARMACY | Age: 74
End: 2019-11-26
Payer: MEDICARE

## 2019-11-26 ENCOUNTER — HOSPITAL ENCOUNTER (INPATIENT)
Age: 74
LOS: 1 days | Discharge: HOME | DRG: 378 | End: 2019-11-28
Attending: INTERNAL MEDICINE | Admitting: INTERNAL MEDICINE
Payer: MEDICARE

## 2019-11-26 ENCOUNTER — HOSPITAL ENCOUNTER (OUTPATIENT)
Dept: PHARMACY | Age: 74
Setting detail: THERAPIES SERIES
Discharge: HOME | End: 2019-11-26
Payer: MEDICARE

## 2019-11-26 DIAGNOSIS — I48.19 PERSISTENT ATRIAL FIBRILLATION (HCC): ICD-10-CM

## 2019-11-26 DIAGNOSIS — K62.5 RECTAL BLEEDING: Primary | ICD-10-CM

## 2019-11-26 LAB
ABO: NORMAL
ANION GAP SERPL CALCULATED.3IONS-SCNC: 15 MEQ/L (ref 8–16)
ANTIBODY SCREEN: NORMAL
APTT: 41.5 SECONDS (ref 22–38)
BACTERIA: ABNORMAL /HPF
BASOPHILS # BLD: 0.3 %
BASOPHILS ABSOLUTE: 0 THOU/MM3 (ref 0–0.1)
BILIRUBIN URINE: ABNORMAL
BLOOD, URINE: NEGATIVE
BUN BLDV-MCNC: 25 MG/DL (ref 7–22)
CALCIUM SERPL-MCNC: 9.5 MG/DL (ref 8.5–10.5)
CASTS 2: ABNORMAL /LPF
CASTS UA: ABNORMAL /LPF
CHARACTER, URINE: ABNORMAL
CHLAMYDIA TRACHOMATIS BY RT-PCR: NOT DETECTED
CHLORIDE BLD-SCNC: 104 MEQ/L (ref 98–111)
CO2: 23 MEQ/L (ref 23–33)
COLOR: ABNORMAL
CREAT SERPL-MCNC: 1.1 MG/DL (ref 0.4–1.2)
CRYSTALS, UA: ABNORMAL
CT/NG SOURCE: NORMAL
DIGOXIN LEVEL: 1.1 NG/ML (ref 0.5–2)
EKG ATRIAL RATE: 75 BPM
EKG Q-T INTERVAL: 412 MS
EKG QRS DURATION: 162 MS
EKG QTC CALCULATION (BAZETT): 453 MS
EKG R AXIS: -96 DEGREES
EKG T AXIS: 84 DEGREES
EKG VENTRICULAR RATE: 73 BPM
EOSINOPHIL # BLD: 0.7 %
EOSINOPHILS ABSOLUTE: 0.1 THOU/MM3 (ref 0–0.4)
EPITHELIAL CELLS, UA: ABNORMAL /HPF
ERYTHROCYTE [DISTWIDTH] IN BLOOD BY AUTOMATED COUNT: 14.9 % (ref 11.5–14.5)
ERYTHROCYTE [DISTWIDTH] IN BLOOD BY AUTOMATED COUNT: 55 FL (ref 35–45)
GFR SERPL CREATININE-BSD FRML MDRD: 48 ML/MIN/1.73M2
GLUCOSE BLD-MCNC: 150 MG/DL (ref 70–108)
GLUCOSE URINE: NEGATIVE MG/DL
HCT VFR BLD CALC: 33.9 % (ref 37–47)
HEMOGLOBIN: 10.5 GM/DL (ref 12–16)
ICTOTEST: NEGATIVE
IMMATURE GRANS (ABS): 0.07 THOU/MM3 (ref 0–0.07)
IMMATURE GRANULOCYTES: 0.6 %
INR BLD: 3.91 (ref 0.85–1.13)
KETONES, URINE: ABNORMAL
KOH PREP: NORMAL
LACTIC ACID: 1.4 MMOL/L (ref 0.5–2.2)
LEUKOCYTE ESTERASE, URINE: ABNORMAL
LYMPHOCYTES # BLD: 14.7 %
LYMPHOCYTES ABSOLUTE: 1.7 THOU/MM3 (ref 1–4.8)
MCH RBC QN AUTO: 31.2 PG (ref 26–33)
MCHC RBC AUTO-ENTMCNC: 31 GM/DL (ref 32.2–35.5)
MCV RBC AUTO: 100.6 FL (ref 81–99)
MISCELLANEOUS 2: ABNORMAL
MONOCYTES # BLD: 7.8 %
MONOCYTES ABSOLUTE: 0.9 THOU/MM3 (ref 0.4–1.3)
NEISSERIA GONORRHOEAE BY RT-PCR: NOT DETECTED
NITRITE, URINE: NEGATIVE
NUCLEATED RED BLOOD CELLS: 0 /100 WBC
OSMOLALITY CALCULATION: 290.4 MOSMOL/KG (ref 275–300)
PH UA: 5 (ref 5–9)
PLATELET # BLD: 263 THOU/MM3 (ref 130–400)
PMV BLD AUTO: 10.2 FL (ref 9.4–12.4)
POC INR: 4.3 (ref 0.8–1.2)
POTASSIUM REFLEX MAGNESIUM: 4.9 MEQ/L (ref 3.5–5.2)
PRO-BNP: 1503 PG/ML (ref 0–1800)
PROTEIN UA: 30
RBC # BLD: 3.37 MILL/MM3 (ref 4.2–5.4)
RBC URINE: ABNORMAL /HPF
RENAL EPITHELIAL, UA: ABNORMAL
RH FACTOR: NORMAL
SEG NEUTROPHILS: 75.9 %
SEGMENTED NEUTROPHILS ABSOLUTE COUNT: 9 THOU/MM3 (ref 1.8–7.7)
SODIUM BLD-SCNC: 142 MEQ/L (ref 135–145)
SPECIFIC GRAVITY, URINE: 1.03 (ref 1–1.03)
TRICHOMONAS PREP: NORMAL
TROPONIN T: 0.03 NG/ML
UROBILINOGEN, URINE: 1 EU/DL (ref 0–1)
WBC # BLD: 11.9 THOU/MM3 (ref 4.8–10.8)
WBC UA: ABNORMAL /HPF
YEAST: ABNORMAL

## 2019-11-26 PROCEDURE — 83880 ASSAY OF NATRIURETIC PEPTIDE: CPT

## 2019-11-26 PROCEDURE — 6370000000 HC RX 637 (ALT 250 FOR IP): Performed by: INTERNAL MEDICINE

## 2019-11-26 PROCEDURE — 83605 ASSAY OF LACTIC ACID: CPT

## 2019-11-26 PROCEDURE — 99211 OFF/OP EST MAY X REQ PHY/QHP: CPT | Performed by: PHARMACIST

## 2019-11-26 PROCEDURE — 86901 BLOOD TYPING SEROLOGIC RH(D): CPT

## 2019-11-26 PROCEDURE — 99285 EMERGENCY DEPT VISIT HI MDM: CPT

## 2019-11-26 PROCEDURE — 6360000002 HC RX W HCPCS: Performed by: INTERNAL MEDICINE

## 2019-11-26 PROCEDURE — C9113 INJ PANTOPRAZOLE SODIUM, VIA: HCPCS | Performed by: INTERNAL MEDICINE

## 2019-11-26 PROCEDURE — 86900 BLOOD TYPING SEROLOGIC ABO: CPT

## 2019-11-26 PROCEDURE — 85025 COMPLETE CBC W/AUTO DIFF WBC: CPT

## 2019-11-26 PROCEDURE — 87491 CHLMYD TRACH DNA AMP PROBE: CPT

## 2019-11-26 PROCEDURE — 84484 ASSAY OF TROPONIN QUANT: CPT

## 2019-11-26 PROCEDURE — 85610 PROTHROMBIN TIME: CPT | Performed by: PHARMACIST

## 2019-11-26 PROCEDURE — G0378 HOSPITAL OBSERVATION PER HR: HCPCS

## 2019-11-26 PROCEDURE — 87205 SMEAR GRAM STAIN: CPT

## 2019-11-26 PROCEDURE — 87040 BLOOD CULTURE FOR BACTERIA: CPT

## 2019-11-26 PROCEDURE — 80162 ASSAY OF DIGOXIN TOTAL: CPT

## 2019-11-26 PROCEDURE — 36430 TRANSFUSION BLD/BLD COMPNT: CPT

## 2019-11-26 PROCEDURE — 2709999900 HC NON-CHARGEABLE SUPPLY

## 2019-11-26 PROCEDURE — 36416 COLLJ CAPILLARY BLOOD SPEC: CPT | Performed by: PHARMACIST

## 2019-11-26 PROCEDURE — 2580000003 HC RX 258: Performed by: NURSE PRACTITIONER

## 2019-11-26 PROCEDURE — 80048 BASIC METABOLIC PNL TOTAL CA: CPT

## 2019-11-26 PROCEDURE — 36415 COLL VENOUS BLD VENIPUNCTURE: CPT

## 2019-11-26 PROCEDURE — 87070 CULTURE OTHR SPECIMN AEROBIC: CPT

## 2019-11-26 PROCEDURE — 93005 ELECTROCARDIOGRAM TRACING: CPT | Performed by: NURSE PRACTITIONER

## 2019-11-26 PROCEDURE — 86850 RBC ANTIBODY SCREEN: CPT

## 2019-11-26 PROCEDURE — 81001 URINALYSIS AUTO W/SCOPE: CPT

## 2019-11-26 PROCEDURE — 94640 AIRWAY INHALATION TREATMENT: CPT

## 2019-11-26 PROCEDURE — 2580000003 HC RX 258: Performed by: INTERNAL MEDICINE

## 2019-11-26 PROCEDURE — 85730 THROMBOPLASTIN TIME PARTIAL: CPT

## 2019-11-26 PROCEDURE — 96374 THER/PROPH/DIAG INJ IV PUSH: CPT

## 2019-11-26 PROCEDURE — 87220 TISSUE EXAM FOR FUNGI: CPT

## 2019-11-26 PROCEDURE — 87210 SMEAR WET MOUNT SALINE/INK: CPT

## 2019-11-26 PROCEDURE — 87591 N.GONORRHOEAE DNA AMP PROB: CPT

## 2019-11-26 PROCEDURE — 85610 PROTHROMBIN TIME: CPT

## 2019-11-26 RX ORDER — AZELASTINE 1 MG/ML
1 SPRAY, METERED NASAL 2 TIMES DAILY PRN
Status: DISCONTINUED | OUTPATIENT
Start: 2019-11-26 | End: 2019-11-28 | Stop reason: HOSPADM

## 2019-11-26 RX ORDER — DIGOXIN 125 MCG
125 TABLET ORAL NIGHTLY
Status: DISCONTINUED | OUTPATIENT
Start: 2019-11-26 | End: 2019-11-28 | Stop reason: HOSPADM

## 2019-11-26 RX ORDER — FORMOTEROL FUMARATE 20 UG/2ML
20 SOLUTION RESPIRATORY (INHALATION) 2 TIMES DAILY
Status: DISCONTINUED | OUTPATIENT
Start: 2019-11-26 | End: 2019-11-28 | Stop reason: HOSPADM

## 2019-11-26 RX ORDER — FOLIC ACID 1 MG/1
1 TABLET ORAL DAILY
Status: DISCONTINUED | OUTPATIENT
Start: 2019-11-27 | End: 2019-11-28 | Stop reason: HOSPADM

## 2019-11-26 RX ORDER — LEVALBUTEROL INHALATION SOLUTION 1.25 MG/3ML
0.63 SOLUTION RESPIRATORY (INHALATION) EVERY 8 HOURS PRN
Status: DISCONTINUED | OUTPATIENT
Start: 2019-11-26 | End: 2019-11-28 | Stop reason: HOSPADM

## 2019-11-26 RX ORDER — LACTOBACILLUS RHAMNOSUS GG 10B CELL
1 CAPSULE ORAL DAILY
Status: DISCONTINUED | OUTPATIENT
Start: 2019-11-27 | End: 2019-11-28 | Stop reason: HOSPADM

## 2019-11-26 RX ORDER — HYDROXYZINE HYDROCHLORIDE 25 MG/1
25 TABLET, FILM COATED ORAL 3 TIMES DAILY
Status: DISCONTINUED | OUTPATIENT
Start: 2019-11-26 | End: 2019-11-28 | Stop reason: HOSPADM

## 2019-11-26 RX ORDER — DICYCLOMINE HYDROCHLORIDE 10 MG/1
10 CAPSULE ORAL
Status: DISCONTINUED | OUTPATIENT
Start: 2019-11-26 | End: 2019-11-28 | Stop reason: HOSPADM

## 2019-11-26 RX ORDER — SODIUM CHLORIDE 0.9 % (FLUSH) 0.9 %
10 SYRINGE (ML) INJECTION EVERY 12 HOURS SCHEDULED
Status: DISCONTINUED | OUTPATIENT
Start: 2019-11-26 | End: 2019-11-28 | Stop reason: HOSPADM

## 2019-11-26 RX ORDER — ALBUTEROL SULFATE 90 UG/1
2 AEROSOL, METERED RESPIRATORY (INHALATION) EVERY 6 HOURS PRN
Status: DISCONTINUED | OUTPATIENT
Start: 2019-11-26 | End: 2019-11-28 | Stop reason: HOSPADM

## 2019-11-26 RX ORDER — PANTOPRAZOLE SODIUM 40 MG/10ML
40 INJECTION, POWDER, LYOPHILIZED, FOR SOLUTION INTRAVENOUS 2 TIMES DAILY
Status: DISCONTINUED | OUTPATIENT
Start: 2019-11-26 | End: 2019-11-28 | Stop reason: HOSPADM

## 2019-11-26 RX ORDER — NITROGLYCERIN 0.4 MG/1
0.4 TABLET SUBLINGUAL EVERY 5 MIN PRN
Status: DISCONTINUED | OUTPATIENT
Start: 2019-11-26 | End: 2019-11-28 | Stop reason: HOSPADM

## 2019-11-26 RX ORDER — METOPROLOL SUCCINATE 25 MG/1
25 TABLET, EXTENDED RELEASE ORAL DAILY
Status: DISCONTINUED | OUTPATIENT
Start: 2019-11-27 | End: 2019-11-28 | Stop reason: HOSPADM

## 2019-11-26 RX ORDER — 0.9 % SODIUM CHLORIDE 0.9 %
1000 INTRAVENOUS SOLUTION INTRAVENOUS ONCE
Status: COMPLETED | OUTPATIENT
Start: 2019-11-26 | End: 2019-11-26

## 2019-11-26 RX ORDER — CETIRIZINE HYDROCHLORIDE 10 MG/1
10 TABLET ORAL DAILY
Status: DISCONTINUED | OUTPATIENT
Start: 2019-11-27 | End: 2019-11-28 | Stop reason: HOSPADM

## 2019-11-26 RX ORDER — ONDANSETRON 2 MG/ML
4 INJECTION INTRAMUSCULAR; INTRAVENOUS EVERY 6 HOURS PRN
Status: DISCONTINUED | OUTPATIENT
Start: 2019-11-26 | End: 2019-11-28 | Stop reason: HOSPADM

## 2019-11-26 RX ORDER — SODIUM CHLORIDE 9 MG/ML
INJECTION, SOLUTION INTRAVENOUS CONTINUOUS
Status: DISCONTINUED | OUTPATIENT
Start: 2019-11-26 | End: 2019-11-28 | Stop reason: HOSPADM

## 2019-11-26 RX ORDER — SODIUM CHLORIDE 0.9 % (FLUSH) 0.9 %
10 SYRINGE (ML) INJECTION PRN
Status: DISCONTINUED | OUTPATIENT
Start: 2019-11-26 | End: 2019-11-28 | Stop reason: HOSPADM

## 2019-11-26 RX ORDER — LEVALBUTEROL INHALATION SOLUTION 0.63 MG/3ML
1 SOLUTION RESPIRATORY (INHALATION) EVERY 8 HOURS PRN
Status: DISCONTINUED | OUTPATIENT
Start: 2019-11-26 | End: 2019-11-26 | Stop reason: RX

## 2019-11-26 RX ORDER — FUROSEMIDE 40 MG/1
40 TABLET ORAL DAILY
Status: DISCONTINUED | OUTPATIENT
Start: 2019-11-27 | End: 2019-11-28 | Stop reason: HOSPADM

## 2019-11-26 RX ORDER — BUDESONIDE 0.5 MG/2ML
500 INHALANT ORAL 2 TIMES DAILY
Status: DISCONTINUED | OUTPATIENT
Start: 2019-11-26 | End: 2019-11-28 | Stop reason: HOSPADM

## 2019-11-26 RX ORDER — METRONIDAZOLE 500 MG/1
500 TABLET ORAL EVERY 12 HOURS SCHEDULED
Status: DISCONTINUED | OUTPATIENT
Start: 2019-11-26 | End: 2019-11-28 | Stop reason: HOSPADM

## 2019-11-26 RX ORDER — TRAZODONE HYDROCHLORIDE 50 MG/1
50 TABLET ORAL NIGHTLY
Status: DISCONTINUED | OUTPATIENT
Start: 2019-11-26 | End: 2019-11-28 | Stop reason: HOSPADM

## 2019-11-26 RX ORDER — HYDROCODONE POLISTIREX AND CHLORPHENIRAMINE POLISTIREX 10; 8 MG/5ML; MG/5ML
5 SUSPENSION, EXTENDED RELEASE ORAL EVERY 12 HOURS PRN
Status: DISCONTINUED | OUTPATIENT
Start: 2019-11-26 | End: 2019-11-28 | Stop reason: HOSPADM

## 2019-11-26 RX ORDER — OXYCODONE HYDROCHLORIDE AND ACETAMINOPHEN 5; 325 MG/1; MG/1
0.5 TABLET ORAL EVERY 4 HOURS PRN
Status: ON HOLD | COMMUNITY
End: 2020-01-01

## 2019-11-26 RX ORDER — PYRIDOXINE HCL (VITAMIN B6) 50 MG
1 TABLET ORAL DAILY
Status: DISCONTINUED | OUTPATIENT
Start: 2019-11-27 | End: 2019-11-26 | Stop reason: RX

## 2019-11-26 RX ORDER — ACETAMINOPHEN 325 MG/1
650 TABLET ORAL 3 TIMES DAILY
Status: DISCONTINUED | OUTPATIENT
Start: 2019-11-26 | End: 2019-11-28 | Stop reason: HOSPADM

## 2019-11-26 RX ORDER — POLYVINYL ALCOHOL 14 MG/ML
1 SOLUTION/ DROPS OPHTHALMIC PRN
Status: DISCONTINUED | OUTPATIENT
Start: 2019-11-26 | End: 2019-11-28 | Stop reason: HOSPADM

## 2019-11-26 RX ADMIN — SODIUM CHLORIDE, PRESERVATIVE FREE 10 ML: 5 INJECTION INTRAVENOUS at 23:39

## 2019-11-26 RX ADMIN — LEVALBUTEROL HYDROCHLORIDE 0.63 MG: 1.25 SOLUTION RESPIRATORY (INHALATION) at 23:24

## 2019-11-26 RX ADMIN — DICYCLOMINE HYDROCHLORIDE 10 MG: 10 CAPSULE ORAL at 23:38

## 2019-11-26 RX ADMIN — TRAZODONE HYDROCHLORIDE 50 MG: 50 TABLET ORAL at 23:43

## 2019-11-26 RX ADMIN — SODIUM CHLORIDE 1000 ML: 9 INJECTION, SOLUTION INTRAVENOUS at 16:14

## 2019-11-26 RX ADMIN — SACUBITRIL AND VALSARTAN 1 TABLET: 49; 51 TABLET, FILM COATED ORAL at 23:42

## 2019-11-26 RX ADMIN — DIGOXIN 125 MCG: 125 TABLET ORAL at 23:41

## 2019-11-26 RX ADMIN — PANTOPRAZOLE SODIUM 40 MG: 40 INJECTION, POWDER, FOR SOLUTION INTRAVENOUS at 23:37

## 2019-11-26 RX ADMIN — METRONIDAZOLE 500 MG: 500 TABLET ORAL at 23:39

## 2019-11-26 RX ADMIN — SODIUM CHLORIDE: 9 INJECTION, SOLUTION INTRAVENOUS at 23:39

## 2019-11-26 RX ADMIN — ACETAMINOPHEN 650 MG: 325 TABLET ORAL at 23:42

## 2019-11-26 ASSESSMENT — ENCOUNTER SYMPTOMS
VOMITING: 0
BLOOD IN STOOL: 1
SHORTNESS OF BREATH: 0
CONSTIPATION: 0
RHINORRHEA: 0
ABDOMINAL PAIN: 1
EYE PAIN: 0
SORE THROAT: 0
NAUSEA: 1
BACK PAIN: 0
DIARRHEA: 0
COUGH: 1
WHEEZING: 0
EYE DISCHARGE: 0

## 2019-11-26 ASSESSMENT — PAIN DESCRIPTION - FREQUENCY: FREQUENCY: CONTINUOUS

## 2019-11-26 ASSESSMENT — PAIN DESCRIPTION - PROGRESSION: CLINICAL_PROGRESSION: NOT CHANGED

## 2019-11-26 ASSESSMENT — PAIN SCALES - GENERAL
PAINLEVEL_OUTOF10: 5
PAINLEVEL_OUTOF10: 5

## 2019-11-26 ASSESSMENT — PAIN DESCRIPTION - DESCRIPTORS: DESCRIPTORS: ACHING

## 2019-11-26 ASSESSMENT — PAIN DESCRIPTION - ONSET: ONSET: ON-GOING

## 2019-11-26 ASSESSMENT — PAIN DESCRIPTION - PAIN TYPE: TYPE: ACUTE PAIN

## 2019-11-26 ASSESSMENT — PAIN DESCRIPTION - ORIENTATION: ORIENTATION: LEFT

## 2019-11-26 ASSESSMENT — PAIN DESCRIPTION - LOCATION: LOCATION: ANKLE

## 2019-11-26 ASSESSMENT — PAIN - FUNCTIONAL ASSESSMENT: PAIN_FUNCTIONAL_ASSESSMENT: ACTIVITIES ARE NOT PREVENTED

## 2019-11-27 ENCOUNTER — HOSPITAL ENCOUNTER (OUTPATIENT)
Dept: PHYSICAL THERAPY | Age: 74
Setting detail: THERAPIES SERIES
Discharge: HOME | End: 2019-11-27
Payer: MEDICARE

## 2019-11-27 PROBLEM — D64.9 ANEMIA: Status: ACTIVE | Noted: 2019-11-27

## 2019-11-27 PROBLEM — T46.0X1A DIGITALIS TOXICITY: Status: ACTIVE | Noted: 2019-11-27

## 2019-11-27 PROBLEM — N76.0 VAGINITIS: Status: ACTIVE | Noted: 2019-11-27

## 2019-11-27 PROBLEM — R77.8 ELEVATED TROPONIN: Status: ACTIVE | Noted: 2019-11-27

## 2019-11-27 PROBLEM — R79.1 SUPRATHERAPEUTIC INR: Status: ACTIVE | Noted: 2019-11-27

## 2019-11-27 LAB
ABSOLUTE RETIC #: 119 THOU/MM3 (ref 20–115)
ANION GAP SERPL CALCULATED.3IONS-SCNC: 13 MEQ/L (ref 8–16)
BASOPHILS # BLD: 0.3 %
BASOPHILS ABSOLUTE: 0 THOU/MM3 (ref 0–0.1)
BUN BLDV-MCNC: 22 MG/DL (ref 7–22)
CALCIUM SERPL-MCNC: 8.5 MG/DL (ref 8.5–10.5)
CHLORIDE BLD-SCNC: 106 MEQ/L (ref 98–111)
CO2: 22 MEQ/L (ref 23–33)
CREAT SERPL-MCNC: 0.9 MG/DL (ref 0.4–1.2)
DIGOXIN LEVEL: 3 NG/ML (ref 0.5–2)
EOSINOPHIL # BLD: 1.2 %
EOSINOPHILS ABSOLUTE: 0.1 THOU/MM3 (ref 0–0.4)
ERYTHROCYTE [DISTWIDTH] IN BLOOD BY AUTOMATED COUNT: 14.6 % (ref 11.5–14.5)
ERYTHROCYTE [DISTWIDTH] IN BLOOD BY AUTOMATED COUNT: 54.9 FL (ref 35–45)
FERRITIN: 170 NG/ML (ref 10–291)
FOLATE: > 20 NG/ML (ref 4.8–24.2)
GFR SERPL CREATININE-BSD FRML MDRD: 61 ML/MIN/1.73M2
GLUCOSE BLD-MCNC: 91 MG/DL (ref 70–108)
HCT VFR BLD CALC: 28.8 % (ref 37–47)
HCT VFR BLD CALC: 30.7 % (ref 37–47)
HCT VFR BLD CALC: 31.5 % (ref 37–47)
HCT VFR BLD CALC: 33.2 % (ref 37–47)
HEMOGLOBIN: 10 GM/DL (ref 12–16)
HEMOGLOBIN: 8.6 GM/DL (ref 12–16)
HEMOGLOBIN: 9.3 GM/DL (ref 12–16)
HEMOGLOBIN: 9.4 GM/DL (ref 12–16)
IMMATURE GRANS (ABS): 0.03 THOU/MM3 (ref 0–0.07)
IMMATURE GRANULOCYTES: 0.5 %
IMMATURE RETIC FRACT: 22.4 % (ref 3–15.9)
INR BLD: 2.44 (ref 0.85–1.13)
INR BLD: 3.67 (ref 0.85–1.13)
IRON: 48 UG/DL (ref 50–170)
LYMPHOCYTES # BLD: 26.3 %
LYMPHOCYTES ABSOLUTE: 1.5 THOU/MM3 (ref 1–4.8)
MCH RBC QN AUTO: 30.7 PG (ref 26–33)
MCHC RBC AUTO-ENTMCNC: 29.9 GM/DL (ref 32.2–35.5)
MCV RBC AUTO: 102.9 FL (ref 81–99)
MONOCYTES # BLD: 9 %
MONOCYTES ABSOLUTE: 0.5 THOU/MM3 (ref 0.4–1.3)
NUCLEATED RED BLOOD CELLS: 0 /100 WBC
PLATELET # BLD: 167 THOU/MM3 (ref 130–400)
PMV BLD AUTO: 10.3 FL (ref 9.4–12.4)
POTASSIUM REFLEX MAGNESIUM: 4.3 MEQ/L (ref 3.5–5.2)
RBC # BLD: 2.8 MILL/MM3 (ref 4.2–5.4)
RETIC HEMOGLOBIN: 35.1 PG (ref 28.2–35.7)
RETICULOCYTE ABSOLUTE COUNT: 3.6 % (ref 0.5–2)
SEG NEUTROPHILS: 62.7 %
SEGMENTED NEUTROPHILS ABSOLUTE COUNT: 3.6 THOU/MM3 (ref 1.8–7.7)
SODIUM BLD-SCNC: 141 MEQ/L (ref 135–145)
TOTAL IRON BINDING CAPACITY: 234 UG/DL (ref 171–450)
TROPONIN T: 0.01 NG/ML
TROPONIN T: 0.02 NG/ML
TROPONIN T: < 0.01 NG/ML
VITAMIN B-12: 535 PG/ML (ref 211–911)
WBC # BLD: 5.8 THOU/MM3 (ref 4.8–10.8)

## 2019-11-27 PROCEDURE — 2709999900 HC NON-CHARGEABLE SUPPLY

## 2019-11-27 PROCEDURE — 85025 COMPLETE CBC W/AUTO DIFF WBC: CPT

## 2019-11-27 PROCEDURE — 96376 TX/PRO/DX INJ SAME DRUG ADON: CPT

## 2019-11-27 PROCEDURE — 94640 AIRWAY INHALATION TREATMENT: CPT

## 2019-11-27 PROCEDURE — 85046 RETICYTE/HGB CONCENTRATE: CPT

## 2019-11-27 PROCEDURE — P9017 PLASMA 1 DONOR FRZ W/IN 8 HR: HCPCS

## 2019-11-27 PROCEDURE — 83540 ASSAY OF IRON: CPT

## 2019-11-27 PROCEDURE — 94760 N-INVAS EAR/PLS OXIMETRY 1: CPT

## 2019-11-27 PROCEDURE — 6360000002 HC RX W HCPCS: Performed by: INTERNAL MEDICINE

## 2019-11-27 PROCEDURE — 85018 HEMOGLOBIN: CPT

## 2019-11-27 PROCEDURE — 82607 VITAMIN B-12: CPT

## 2019-11-27 PROCEDURE — 6370000000 HC RX 637 (ALT 250 FOR IP): Performed by: INTERNAL MEDICINE

## 2019-11-27 PROCEDURE — 84484 ASSAY OF TROPONIN QUANT: CPT

## 2019-11-27 PROCEDURE — 6370000000 HC RX 637 (ALT 250 FOR IP): Performed by: NURSE PRACTITIONER

## 2019-11-27 PROCEDURE — 2700000000 HC OXYGEN THERAPY PER DAY

## 2019-11-27 PROCEDURE — 2580000003 HC RX 258: Performed by: INTERNAL MEDICINE

## 2019-11-27 PROCEDURE — 82728 ASSAY OF FERRITIN: CPT

## 2019-11-27 PROCEDURE — 80162 ASSAY OF DIGOXIN TOTAL: CPT

## 2019-11-27 PROCEDURE — 80048 BASIC METABOLIC PNL TOTAL CA: CPT

## 2019-11-27 PROCEDURE — 82746 ASSAY OF FOLIC ACID SERUM: CPT

## 2019-11-27 PROCEDURE — 36415 COLL VENOUS BLD VENIPUNCTURE: CPT

## 2019-11-27 PROCEDURE — 83550 IRON BINDING TEST: CPT

## 2019-11-27 PROCEDURE — 85610 PROTHROMBIN TIME: CPT

## 2019-11-27 PROCEDURE — 1200000003 HC TELEMETRY R&B

## 2019-11-27 PROCEDURE — 85014 HEMATOCRIT: CPT

## 2019-11-27 PROCEDURE — C9113 INJ PANTOPRAZOLE SODIUM, VIA: HCPCS | Performed by: INTERNAL MEDICINE

## 2019-11-27 RX ORDER — 0.9 % SODIUM CHLORIDE 0.9 %
250 INTRAVENOUS SOLUTION INTRAVENOUS ONCE
Status: DISCONTINUED | OUTPATIENT
Start: 2019-11-27 | End: 2019-11-28 | Stop reason: HOSPADM

## 2019-11-27 RX ORDER — HYDROCORTISONE ACETATE 25 MG/1
25 SUPPOSITORY RECTAL 2 TIMES DAILY
Status: DISCONTINUED | OUTPATIENT
Start: 2019-11-27 | End: 2019-11-28 | Stop reason: HOSPADM

## 2019-11-27 RX ORDER — 0.9 % SODIUM CHLORIDE 0.9 %
250 INTRAVENOUS SOLUTION INTRAVENOUS ONCE
Status: COMPLETED | OUTPATIENT
Start: 2019-11-27 | End: 2019-11-27

## 2019-11-27 RX ADMIN — TRAZODONE HYDROCHLORIDE 50 MG: 50 TABLET ORAL at 22:35

## 2019-11-27 RX ADMIN — BUDESONIDE 500 MCG: 0.5 INHALANT RESPIRATORY (INHALATION) at 21:52

## 2019-11-27 RX ADMIN — IPRATROPIUM BROMIDE 0.5 MG: 0.5 SOLUTION RESPIRATORY (INHALATION) at 07:26

## 2019-11-27 RX ADMIN — BUDESONIDE 500 MCG: 0.5 INHALANT RESPIRATORY (INHALATION) at 07:46

## 2019-11-27 RX ADMIN — ACETAMINOPHEN 650 MG: 325 TABLET ORAL at 22:18

## 2019-11-27 RX ADMIN — METRONIDAZOLE 500 MG: 500 TABLET ORAL at 09:35

## 2019-11-27 RX ADMIN — DICYCLOMINE HYDROCHLORIDE 10 MG: 10 CAPSULE ORAL at 09:36

## 2019-11-27 RX ADMIN — SACUBITRIL AND VALSARTAN 1 TABLET: 49; 51 TABLET, FILM COATED ORAL at 09:35

## 2019-11-27 RX ADMIN — PANTOPRAZOLE SODIUM 40 MG: 40 INJECTION, POWDER, FOR SOLUTION INTRAVENOUS at 06:25

## 2019-11-27 RX ADMIN — HYDROCORTISONE ACETATE 25 MG: 25 SUPPOSITORY RECTAL at 22:24

## 2019-11-27 RX ADMIN — SACUBITRIL AND VALSARTAN 1 TABLET: 49; 51 TABLET, FILM COATED ORAL at 22:34

## 2019-11-27 RX ADMIN — METRONIDAZOLE 500 MG: 500 TABLET ORAL at 22:25

## 2019-11-27 RX ADMIN — DICYCLOMINE HYDROCHLORIDE 10 MG: 10 CAPSULE ORAL at 16:43

## 2019-11-27 RX ADMIN — FORMOTEROL FUMARATE DIHYDRATE 20 MCG: 20 SOLUTION RESPIRATORY (INHALATION) at 21:52

## 2019-11-27 RX ADMIN — FUROSEMIDE 40 MG: 40 TABLET ORAL at 09:35

## 2019-11-27 RX ADMIN — DICYCLOMINE HYDROCHLORIDE 10 MG: 10 CAPSULE ORAL at 22:19

## 2019-11-27 RX ADMIN — FOLIC ACID 1 MG: 1 TABLET ORAL at 09:35

## 2019-11-27 RX ADMIN — PANTOPRAZOLE SODIUM 40 MG: 40 INJECTION, POWDER, FOR SOLUTION INTRAVENOUS at 16:44

## 2019-11-27 RX ADMIN — SODIUM CHLORIDE 250 ML: 9 INJECTION, SOLUTION INTRAVENOUS at 14:39

## 2019-11-27 RX ADMIN — FORMOTEROL FUMARATE DIHYDRATE 20 MCG: 20 SOLUTION RESPIRATORY (INHALATION) at 07:37

## 2019-11-27 RX ADMIN — HYDROXYZINE HYDROCHLORIDE 25 MG: 25 TABLET, FILM COATED ORAL at 22:22

## 2019-11-27 RX ADMIN — ACETAMINOPHEN 650 MG: 325 TABLET ORAL at 09:35

## 2019-11-27 RX ADMIN — HYDROCORTISONE ACETATE 25 MG: 25 SUPPOSITORY RECTAL at 16:44

## 2019-11-27 RX ADMIN — DICYCLOMINE HYDROCHLORIDE 10 MG: 10 CAPSULE ORAL at 06:25

## 2019-11-27 RX ADMIN — SODIUM CHLORIDE, PRESERVATIVE FREE 10 ML: 5 INJECTION INTRAVENOUS at 09:34

## 2019-11-27 ASSESSMENT — PAIN DESCRIPTION - PROGRESSION
CLINICAL_PROGRESSION: NOT CHANGED
CLINICAL_PROGRESSION: NOT CHANGED

## 2019-11-27 ASSESSMENT — PAIN DESCRIPTION - ORIENTATION: ORIENTATION: LEFT

## 2019-11-27 ASSESSMENT — PAIN SCALES - GENERAL
PAINLEVEL_OUTOF10: 4
PAINLEVEL_OUTOF10: 4
PAINLEVEL_OUTOF10: 0

## 2019-11-27 ASSESSMENT — PAIN DESCRIPTION - FREQUENCY: FREQUENCY: CONTINUOUS

## 2019-11-27 ASSESSMENT — PAIN DESCRIPTION - ONSET: ONSET: ON-GOING

## 2019-11-27 ASSESSMENT — PAIN DESCRIPTION - DESCRIPTORS: DESCRIPTORS: ACHING

## 2019-11-27 ASSESSMENT — PAIN DESCRIPTION - PAIN TYPE: TYPE: ACUTE PAIN

## 2019-11-27 ASSESSMENT — PAIN DESCRIPTION - LOCATION: LOCATION: ANKLE

## 2019-11-27 ASSESSMENT — PAIN - FUNCTIONAL ASSESSMENT: PAIN_FUNCTIONAL_ASSESSMENT: ACTIVITIES ARE NOT PREVENTED

## 2019-11-27 ASSESSMENT — PAIN DESCRIPTION - DIRECTION: RADIATING_TOWARDS: CALF

## 2019-11-28 VITALS
RESPIRATION RATE: 18 BRPM | TEMPERATURE: 97.5 F | HEIGHT: 62 IN | SYSTOLIC BLOOD PRESSURE: 107 MMHG | DIASTOLIC BLOOD PRESSURE: 51 MMHG | HEART RATE: 71 BPM | WEIGHT: 152.8 LBS | BODY MASS INDEX: 28.12 KG/M2 | OXYGEN SATURATION: 93 %

## 2019-11-28 LAB
BASOPHILS # BLD: 0.3 %
BASOPHILS ABSOLUTE: 0 THOU/MM3 (ref 0–0.1)
DIGOXIN LEVEL: 1.9 NG/ML (ref 0.5–2)
EOSINOPHIL # BLD: 1 %
EOSINOPHILS ABSOLUTE: 0.1 THOU/MM3 (ref 0–0.4)
ERYTHROCYTE [DISTWIDTH] IN BLOOD BY AUTOMATED COUNT: 14.6 % (ref 11.5–14.5)
ERYTHROCYTE [DISTWIDTH] IN BLOOD BY AUTOMATED COUNT: 54.2 FL (ref 35–45)
HCT VFR BLD CALC: 31.1 % (ref 37–47)
HEMOGLOBIN: 9.6 GM/DL (ref 12–16)
IMMATURE GRANS (ABS): 0.03 THOU/MM3 (ref 0–0.07)
IMMATURE GRANULOCYTES: 0.5 %
INR BLD: 2.29 (ref 0.85–1.13)
LYMPHOCYTES # BLD: 14.6 %
LYMPHOCYTES ABSOLUTE: 0.9 THOU/MM3 (ref 1–4.8)
MCH RBC QN AUTO: 31.7 PG (ref 26–33)
MCHC RBC AUTO-ENTMCNC: 30.9 GM/DL (ref 32.2–35.5)
MCV RBC AUTO: 102.6 FL (ref 81–99)
MONOCYTES # BLD: 7 %
MONOCYTES ABSOLUTE: 0.4 THOU/MM3 (ref 0.4–1.3)
NUCLEATED RED BLOOD CELLS: 0 /100 WBC
PLATELET # BLD: 197 THOU/MM3 (ref 130–400)
PMV BLD AUTO: 10.1 FL (ref 9.4–12.4)
RBC # BLD: 3.03 MILL/MM3 (ref 4.2–5.4)
SEG NEUTROPHILS: 76.6 %
SEGMENTED NEUTROPHILS ABSOLUTE COUNT: 4.5 THOU/MM3 (ref 1.8–7.7)
WBC # BLD: 5.9 THOU/MM3 (ref 4.8–10.8)

## 2019-11-28 PROCEDURE — 6360000002 HC RX W HCPCS: Performed by: INTERNAL MEDICINE

## 2019-11-28 PROCEDURE — 80162 ASSAY OF DIGOXIN TOTAL: CPT

## 2019-11-28 PROCEDURE — 85610 PROTHROMBIN TIME: CPT

## 2019-11-28 PROCEDURE — 94640 AIRWAY INHALATION TREATMENT: CPT

## 2019-11-28 PROCEDURE — 2700000000 HC OXYGEN THERAPY PER DAY

## 2019-11-28 PROCEDURE — 6370000000 HC RX 637 (ALT 250 FOR IP): Performed by: NURSE PRACTITIONER

## 2019-11-28 PROCEDURE — 6370000000 HC RX 637 (ALT 250 FOR IP): Performed by: INTERNAL MEDICINE

## 2019-11-28 PROCEDURE — 36415 COLL VENOUS BLD VENIPUNCTURE: CPT

## 2019-11-28 PROCEDURE — 85025 COMPLETE CBC W/AUTO DIFF WBC: CPT

## 2019-11-28 PROCEDURE — C9113 INJ PANTOPRAZOLE SODIUM, VIA: HCPCS | Performed by: INTERNAL MEDICINE

## 2019-11-28 PROCEDURE — 94760 N-INVAS EAR/PLS OXIMETRY 1: CPT

## 2019-11-28 RX ORDER — DIGOXIN 125 MCG
125 TABLET ORAL EVERY OTHER DAY
Qty: 30 TABLET | Refills: 3 | Status: SHIPPED | OUTPATIENT
Start: 2019-11-28

## 2019-11-28 RX ADMIN — METRONIDAZOLE 500 MG: 500 TABLET ORAL at 11:14

## 2019-11-28 RX ADMIN — BUDESONIDE 500 MCG: 0.5 INHALANT RESPIRATORY (INHALATION) at 07:34

## 2019-11-28 RX ADMIN — PANTOPRAZOLE SODIUM 40 MG: 40 INJECTION, POWDER, FOR SOLUTION INTRAVENOUS at 06:41

## 2019-11-28 RX ADMIN — DICYCLOMINE HYDROCHLORIDE 10 MG: 10 CAPSULE ORAL at 11:21

## 2019-11-28 RX ADMIN — ACETAMINOPHEN 650 MG: 325 TABLET ORAL at 11:22

## 2019-11-28 RX ADMIN — FOLIC ACID 1 MG: 1 TABLET ORAL at 11:21

## 2019-11-28 RX ADMIN — SACUBITRIL AND VALSARTAN 1 TABLET: 49; 51 TABLET, FILM COATED ORAL at 11:14

## 2019-11-28 RX ADMIN — FUROSEMIDE 40 MG: 40 TABLET ORAL at 11:14

## 2019-11-28 RX ADMIN — FORMOTEROL FUMARATE DIHYDRATE 20 MCG: 20 SOLUTION RESPIRATORY (INHALATION) at 07:29

## 2019-11-28 RX ADMIN — Medication 1 CAPSULE: at 11:14

## 2019-11-28 RX ADMIN — HYDROCORTISONE ACETATE 25 MG: 25 SUPPOSITORY RECTAL at 11:14

## 2019-11-28 ASSESSMENT — PAIN DESCRIPTION - PROGRESSION: CLINICAL_PROGRESSION: NOT CHANGED

## 2019-11-28 ASSESSMENT — PAIN SCALES - GENERAL: PAINLEVEL_OUTOF10: 0

## 2019-11-29 LAB
GENITAL CULTURE, ROUTINE: NORMAL
GRAM STAIN RESULT: NORMAL

## 2019-12-02 ENCOUNTER — HOSPITAL ENCOUNTER (OUTPATIENT)
Dept: PHARMACY | Age: 74
Setting detail: THERAPIES SERIES
Discharge: HOME | End: 2019-12-02
Payer: MEDICARE

## 2019-12-02 ENCOUNTER — HOSPITAL ENCOUNTER (OUTPATIENT)
Age: 74
Discharge: HOME | End: 2019-12-02
Payer: MEDICARE

## 2019-12-02 DIAGNOSIS — I48.19 PERSISTENT ATRIAL FIBRILLATION (HCC): ICD-10-CM

## 2019-12-02 DIAGNOSIS — K62.5 RECTAL BLEEDING: ICD-10-CM

## 2019-12-02 LAB
ALBUMIN SERPL-MCNC: 3.8 G/DL (ref 3.5–5.1)
ALP BLD-CCNC: 59 U/L (ref 38–126)
ALT SERPL-CCNC: 8 U/L (ref 11–66)
ANION GAP SERPL CALCULATED.3IONS-SCNC: 15 MEQ/L (ref 8–16)
AST SERPL-CCNC: 13 U/L (ref 5–40)
BILIRUB SERPL-MCNC: 0.5 MG/DL (ref 0.3–1.2)
BILIRUBIN DIRECT: < 0.2 MG/DL (ref 0–0.3)
BLOOD CULTURE, ROUTINE: NORMAL
BLOOD CULTURE, ROUTINE: NORMAL
BUN BLDV-MCNC: 29 MG/DL (ref 7–22)
CALCIUM SERPL-MCNC: 9.8 MG/DL (ref 8.5–10.5)
CHLORIDE BLD-SCNC: 105 MEQ/L (ref 98–111)
CHOLESTEROL, TOTAL: 122 MG/DL (ref 100–199)
CO2: 25 MEQ/L (ref 23–33)
CREAT SERPL-MCNC: 0.9 MG/DL (ref 0.4–1.2)
ERYTHROCYTE [DISTWIDTH] IN BLOOD BY AUTOMATED COUNT: 15 % (ref 11.5–14.5)
ERYTHROCYTE [DISTWIDTH] IN BLOOD BY AUTOMATED COUNT: 55.4 FL (ref 35–45)
GFR SERPL CREATININE-BSD FRML MDRD: 61 ML/MIN/1.73M2
GLUCOSE BLD-MCNC: 105 MG/DL (ref 70–108)
HCT VFR BLD CALC: 34.1 % (ref 37–47)
HDLC SERPL-MCNC: 45 MG/DL
HEMOGLOBIN: 10.3 GM/DL (ref 12–16)
LDL CHOLESTEROL CALCULATED: 30 MG/DL
MCH RBC QN AUTO: 30.7 PG (ref 26–33)
MCHC RBC AUTO-ENTMCNC: 30.2 GM/DL (ref 32.2–35.5)
MCV RBC AUTO: 101.8 FL (ref 81–99)
PLATELET # BLD: 226 THOU/MM3 (ref 130–400)
PMV BLD AUTO: 10.6 FL (ref 9.4–12.4)
POC INR: 1.4 (ref 0.8–1.2)
POTASSIUM SERPL-SCNC: 4.9 MEQ/L (ref 3.5–5.2)
RBC # BLD: 3.35 MILL/MM3 (ref 4.2–5.4)
SODIUM BLD-SCNC: 145 MEQ/L (ref 135–145)
T4 FREE: 1.41 NG/DL (ref 0.93–1.76)
TOTAL PROTEIN: 7 G/DL (ref 6.1–8)
TRIGL SERPL-MCNC: 236 MG/DL (ref 0–199)
TSH SERPL DL<=0.05 MIU/L-ACNC: 0.74 UIU/ML (ref 0.4–4.2)
WBC # BLD: 11.1 THOU/MM3 (ref 4.8–10.8)

## 2019-12-02 PROCEDURE — 85027 COMPLETE CBC AUTOMATED: CPT

## 2019-12-02 PROCEDURE — 99211 OFF/OP EST MAY X REQ PHY/QHP: CPT

## 2019-12-02 PROCEDURE — 36416 COLLJ CAPILLARY BLOOD SPEC: CPT

## 2019-12-02 PROCEDURE — 80061 LIPID PANEL: CPT

## 2019-12-02 PROCEDURE — 84439 ASSAY OF FREE THYROXINE: CPT

## 2019-12-02 PROCEDURE — 82248 BILIRUBIN DIRECT: CPT

## 2019-12-02 PROCEDURE — 80053 COMPREHEN METABOLIC PANEL: CPT

## 2019-12-02 PROCEDURE — 84443 ASSAY THYROID STIM HORMONE: CPT

## 2019-12-02 PROCEDURE — 85610 PROTHROMBIN TIME: CPT

## 2019-12-02 PROCEDURE — 36415 COLL VENOUS BLD VENIPUNCTURE: CPT

## 2019-12-06 ENCOUNTER — TELEPHONE (OUTPATIENT)
Dept: PHARMACY | Age: 74
End: 2019-12-06

## 2019-12-09 ENCOUNTER — TELEPHONE (OUTPATIENT)
Dept: PHARMACY | Age: 74
End: 2019-12-09

## 2019-12-09 ENCOUNTER — HOSPITAL ENCOUNTER (OUTPATIENT)
Dept: PHARMACY | Age: 74
Setting detail: THERAPIES SERIES
Discharge: HOME | End: 2019-12-09
Payer: MEDICARE

## 2019-12-09 DIAGNOSIS — I48.19 PERSISTENT ATRIAL FIBRILLATION (HCC): ICD-10-CM

## 2019-12-09 LAB — POC INR: 2.6 (ref 0.8–1.2)

## 2019-12-09 PROCEDURE — 85610 PROTHROMBIN TIME: CPT

## 2019-12-09 PROCEDURE — 99211 OFF/OP EST MAY X REQ PHY/QHP: CPT

## 2019-12-09 PROCEDURE — 36416 COLLJ CAPILLARY BLOOD SPEC: CPT

## 2019-12-09 RX ORDER — AMOXICILLIN AND CLAVULANATE POTASSIUM 875; 125 MG/1; MG/1
1 TABLET, FILM COATED ORAL 2 TIMES DAILY
COMMUNITY
End: 2019-12-19 | Stop reason: ALTCHOICE

## 2019-12-13 ENCOUNTER — TELEPHONE (OUTPATIENT)
Dept: PHARMACY | Age: 74
End: 2019-12-13

## 2019-12-19 ENCOUNTER — HOSPITAL ENCOUNTER (OUTPATIENT)
Dept: PHARMACY | Age: 74
Setting detail: THERAPIES SERIES
Discharge: HOME | End: 2019-12-19
Payer: MEDICARE

## 2019-12-19 DIAGNOSIS — I48.19 PERSISTENT ATRIAL FIBRILLATION (HCC): ICD-10-CM

## 2019-12-19 LAB — POC INR: 3.8 (ref 0.8–1.2)

## 2019-12-19 PROCEDURE — 36416 COLLJ CAPILLARY BLOOD SPEC: CPT

## 2019-12-19 PROCEDURE — 99211 OFF/OP EST MAY X REQ PHY/QHP: CPT

## 2019-12-19 PROCEDURE — 85610 PROTHROMBIN TIME: CPT

## 2019-12-23 ENCOUNTER — TELEPHONE (OUTPATIENT)
Dept: PHARMACY | Age: 74
End: 2019-12-23

## 2019-12-26 ENCOUNTER — HOSPITAL ENCOUNTER (OUTPATIENT)
Dept: PHARMACY | Age: 74
Setting detail: THERAPIES SERIES
Discharge: HOME | End: 2019-12-26
Payer: MEDICARE

## 2019-12-26 DIAGNOSIS — I48.19 PERSISTENT ATRIAL FIBRILLATION (HCC): ICD-10-CM

## 2019-12-26 LAB — POC INR: 3.2 (ref 0.8–1.2)

## 2019-12-26 PROCEDURE — 36416 COLLJ CAPILLARY BLOOD SPEC: CPT

## 2019-12-26 PROCEDURE — 85610 PROTHROMBIN TIME: CPT

## 2019-12-26 PROCEDURE — 99212 OFFICE O/P EST SF 10 MIN: CPT

## 2019-12-26 RX ORDER — WARFARIN SODIUM 1 MG/1
TABLET ORAL
Qty: 145 TABLET | Refills: 3 | Status: SHIPPED | OUTPATIENT
Start: 2019-12-26 | End: 2020-01-01

## 2019-12-27 PROBLEM — R77.8 ELEVATED TROPONIN: Status: RESOLVED | Noted: 2019-11-27 | Resolved: 2019-12-27

## 2020-01-01 ENCOUNTER — TELEPHONE (OUTPATIENT)
Dept: PHARMACY | Age: 75
End: 2020-01-01

## 2020-01-01 ENCOUNTER — HOSPITAL ENCOUNTER (OUTPATIENT)
Dept: PHARMACY | Age: 75
Setting detail: THERAPIES SERIES
Discharge: HOME | End: 2020-08-05
Payer: MEDICARE

## 2020-01-01 ENCOUNTER — HOSPITAL ENCOUNTER (OUTPATIENT)
Dept: PHARMACY | Age: 75
Setting detail: THERAPIES SERIES
Discharge: HOME | End: 2020-07-29
Payer: MEDICARE

## 2020-01-01 ENCOUNTER — HOSPITAL ENCOUNTER (OUTPATIENT)
Dept: PHARMACY | Age: 75
Setting detail: THERAPIES SERIES
Discharge: HOME | End: 2020-12-07
Payer: MEDICARE

## 2020-01-01 ENCOUNTER — HOSPITAL ENCOUNTER (OUTPATIENT)
Dept: PHARMACY | Age: 75
Setting detail: THERAPIES SERIES
Discharge: HOME | End: 2020-02-13
Payer: MEDICARE

## 2020-01-01 ENCOUNTER — HOSPITAL ENCOUNTER (OUTPATIENT)
Dept: PHARMACY | Age: 75
Setting detail: THERAPIES SERIES
Discharge: HOME | End: 2020-09-28
Payer: MEDICARE

## 2020-01-01 ENCOUNTER — APPOINTMENT (OUTPATIENT)
Dept: GENERAL RADIOLOGY | Age: 75
DRG: 683 | End: 2020-01-01
Payer: MEDICARE

## 2020-01-01 ENCOUNTER — HOSPITAL ENCOUNTER (OUTPATIENT)
Dept: PHARMACY | Age: 75
Setting detail: THERAPIES SERIES
Discharge: HOME | End: 2020-12-28
Payer: MEDICARE

## 2020-01-01 ENCOUNTER — HOSPITAL ENCOUNTER (INPATIENT)
Age: 75
LOS: 4 days | Discharge: HOME HEALTH | DRG: 683 | End: 2020-12-23
Attending: FAMILY MEDICINE | Admitting: INTERNAL MEDICINE
Payer: MEDICARE

## 2020-01-01 ENCOUNTER — HOSPITAL ENCOUNTER (OUTPATIENT)
Dept: PHARMACY | Age: 75
Setting detail: THERAPIES SERIES
Discharge: HOME | End: 2020-03-24
Payer: MEDICARE

## 2020-01-01 ENCOUNTER — APPOINTMENT (OUTPATIENT)
Dept: PHARMACY | Age: 75
End: 2020-01-01
Payer: MEDICARE

## 2020-01-01 ENCOUNTER — APPOINTMENT (OUTPATIENT)
Dept: GENERAL RADIOLOGY | Age: 75
DRG: 871 | End: 2020-01-01
Payer: MEDICARE

## 2020-01-01 ENCOUNTER — APPOINTMENT (OUTPATIENT)
Dept: GENERAL RADIOLOGY | Age: 75
DRG: 602 | End: 2020-01-01
Attending: PHYSICAL MEDICINE & REHABILITATION
Payer: MEDICARE

## 2020-01-01 ENCOUNTER — NURSE ONLY (OUTPATIENT)
Dept: LAB | Age: 75
End: 2020-01-01

## 2020-01-01 ENCOUNTER — HOSPITAL ENCOUNTER (OUTPATIENT)
Dept: PHARMACY | Age: 75
Setting detail: THERAPIES SERIES
Discharge: HOME | End: 2020-06-02
Payer: MEDICARE

## 2020-01-01 ENCOUNTER — HOSPITAL ENCOUNTER (OUTPATIENT)
Dept: PHARMACY | Age: 75
Setting detail: THERAPIES SERIES
Discharge: HOME | End: 2020-07-22
Payer: MEDICARE

## 2020-01-01 ENCOUNTER — CARE COORDINATION (OUTPATIENT)
Dept: CASE MANAGEMENT | Age: 75
End: 2020-01-01

## 2020-01-01 ENCOUNTER — APPOINTMENT (OUTPATIENT)
Dept: INTERVENTIONAL RADIOLOGY/VASCULAR | Age: 75
DRG: 683 | End: 2020-01-01
Payer: MEDICARE

## 2020-01-01 ENCOUNTER — HOSPITAL ENCOUNTER (INPATIENT)
Age: 75
LOS: 18 days | Discharge: HOME | DRG: 871 | End: 2020-12-01
Attending: FAMILY MEDICINE | Admitting: INTERNAL MEDICINE
Payer: MEDICARE

## 2020-01-01 ENCOUNTER — APPOINTMENT (OUTPATIENT)
Dept: GENERAL RADIOLOGY | Age: 75
DRG: 602 | End: 2020-01-01
Attending: FAMILY MEDICINE
Payer: MEDICARE

## 2020-01-01 ENCOUNTER — HOSPITAL ENCOUNTER (INPATIENT)
Age: 75
LOS: 9 days | Discharge: NURSING, REHAB OR LONG-TERM CARE | DRG: 871 | End: 2020-11-03
Attending: EMERGENCY MEDICINE | Admitting: INTERNAL MEDICINE
Payer: MEDICARE

## 2020-01-01 ENCOUNTER — HOSPITAL ENCOUNTER (OUTPATIENT)
Dept: PHARMACY | Age: 75
Setting detail: THERAPIES SERIES
Discharge: HOME | End: 2020-03-02
Payer: MEDICARE

## 2020-01-01 ENCOUNTER — APPOINTMENT (OUTPATIENT)
Dept: GENERAL RADIOLOGY | Age: 75
DRG: 871 | End: 2020-01-01
Attending: FAMILY MEDICINE
Payer: MEDICARE

## 2020-01-01 ENCOUNTER — HOSPITAL ENCOUNTER (OUTPATIENT)
Dept: PHARMACY | Age: 75
Setting detail: THERAPIES SERIES
Discharge: HOME | End: 2020-10-12
Payer: MEDICARE

## 2020-01-01 ENCOUNTER — HOSPITAL ENCOUNTER (OUTPATIENT)
Dept: PHARMACY | Age: 75
Setting detail: THERAPIES SERIES
Discharge: HOME | End: 2020-07-15
Payer: MEDICARE

## 2020-01-01 ENCOUNTER — HOSPITAL ENCOUNTER (OUTPATIENT)
Dept: INPATIENT UNIT | Age: 75
Discharge: HOME | End: 2020-08-27
Attending: INTERNAL MEDICINE | Admitting: INTERNAL MEDICINE
Payer: MEDICARE

## 2020-01-01 ENCOUNTER — HOSPITAL ENCOUNTER (OUTPATIENT)
Dept: PHARMACY | Age: 75
Setting detail: THERAPIES SERIES
Discharge: HOME | End: 2020-06-16
Payer: MEDICARE

## 2020-01-01 ENCOUNTER — HOSPITAL ENCOUNTER (OUTPATIENT)
Dept: PHARMACY | Age: 75
Setting detail: THERAPIES SERIES
Discharge: HOME | End: 2020-05-12
Payer: MEDICARE

## 2020-01-01 ENCOUNTER — APPOINTMENT (OUTPATIENT)
Dept: ULTRASOUND IMAGING | Age: 75
DRG: 602 | End: 2020-01-01
Attending: PHYSICAL MEDICINE & REHABILITATION
Payer: MEDICARE

## 2020-01-01 ENCOUNTER — HOSPITAL ENCOUNTER (OUTPATIENT)
Dept: PHARMACY | Age: 75
Setting detail: THERAPIES SERIES
Discharge: HOME | End: 2020-06-30
Payer: MEDICARE

## 2020-01-01 ENCOUNTER — APPOINTMENT (OUTPATIENT)
Dept: CT IMAGING | Age: 75
DRG: 683 | End: 2020-01-01
Payer: MEDICARE

## 2020-01-01 ENCOUNTER — HOSPITAL ENCOUNTER (OUTPATIENT)
Dept: PHARMACY | Age: 75
Setting detail: THERAPIES SERIES
Discharge: HOME | End: 2020-04-14
Payer: MEDICARE

## 2020-01-01 ENCOUNTER — APPOINTMENT (OUTPATIENT)
Dept: CT IMAGING | Age: 75
DRG: 871 | End: 2020-01-01
Payer: MEDICARE

## 2020-01-01 ENCOUNTER — HOSPITAL ENCOUNTER (OUTPATIENT)
Dept: PHARMACY | Age: 75
Setting detail: THERAPIES SERIES
Discharge: HOME | End: 2020-01-20
Payer: MEDICARE

## 2020-01-01 ENCOUNTER — HOSPITAL ENCOUNTER (OUTPATIENT)
Dept: PHARMACY | Age: 75
Setting detail: THERAPIES SERIES
Discharge: HOME | End: 2020-09-03
Payer: MEDICARE

## 2020-01-01 ENCOUNTER — HOSPITAL ENCOUNTER (OUTPATIENT)
Dept: PHARMACY | Age: 75
Setting detail: THERAPIES SERIES
Discharge: HOME | End: 2020-08-19
Payer: MEDICARE

## 2020-01-01 ENCOUNTER — HOSPITAL ENCOUNTER (OUTPATIENT)
Dept: PHARMACY | Age: 75
Setting detail: THERAPIES SERIES
Discharge: HOME | End: 2020-12-14
Payer: MEDICARE

## 2020-01-01 ENCOUNTER — HOSPITAL ENCOUNTER (OUTPATIENT)
Dept: PHARMACY | Age: 75
Setting detail: THERAPIES SERIES
Discharge: HOME | End: 2020-01-30
Payer: MEDICARE

## 2020-01-01 ENCOUNTER — APPOINTMENT (OUTPATIENT)
Dept: INTERVENTIONAL RADIOLOGY/VASCULAR | Age: 75
DRG: 871 | End: 2020-01-01
Payer: MEDICARE

## 2020-01-01 ENCOUNTER — HOSPITAL ENCOUNTER (OUTPATIENT)
Dept: PHARMACY | Age: 75
Setting detail: THERAPIES SERIES
Discharge: HOME | End: 2020-09-15
Payer: MEDICARE

## 2020-01-01 ENCOUNTER — HOSPITAL ENCOUNTER (INPATIENT)
Age: 75
LOS: 10 days | Discharge: STILL ADMITTED OR ADMINISTRATIVE | DRG: 602 | End: 2020-11-13
Attending: PHYSICAL MEDICINE & REHABILITATION | Admitting: PHYSICAL MEDICINE & REHABILITATION
Payer: MEDICARE

## 2020-01-01 ENCOUNTER — HOSPITAL ENCOUNTER (OUTPATIENT)
Dept: PHARMACY | Age: 75
Setting detail: THERAPIES SERIES
Discharge: HOME | End: 2020-03-11
Payer: MEDICARE

## 2020-01-01 ENCOUNTER — HOSPITAL ENCOUNTER (INPATIENT)
Age: 75
LOS: 4 days | Discharge: HOME | DRG: 871 | End: 2020-02-26
Attending: EMERGENCY MEDICINE | Admitting: FAMILY MEDICINE
Payer: MEDICARE

## 2020-01-01 ENCOUNTER — APPOINTMENT (OUTPATIENT)
Dept: ULTRASOUND IMAGING | Age: 75
DRG: 602 | End: 2020-01-01
Attending: FAMILY MEDICINE
Payer: MEDICARE

## 2020-01-01 ENCOUNTER — HOSPITAL ENCOUNTER (OUTPATIENT)
Age: 75
Discharge: HOME | End: 2020-09-28
Payer: MEDICARE

## 2020-01-01 VITALS
WEIGHT: 142 LBS | HEIGHT: 61 IN | SYSTOLIC BLOOD PRESSURE: 147 MMHG | BODY MASS INDEX: 26.81 KG/M2 | TEMPERATURE: 98.6 F | RESPIRATION RATE: 18 BRPM | HEART RATE: 74 BPM | OXYGEN SATURATION: 95 % | DIASTOLIC BLOOD PRESSURE: 63 MMHG

## 2020-01-01 VITALS — TEMPERATURE: 98.2 F

## 2020-01-01 VITALS
HEIGHT: 62 IN | SYSTOLIC BLOOD PRESSURE: 112 MMHG | BODY MASS INDEX: 30.79 KG/M2 | DIASTOLIC BLOOD PRESSURE: 56 MMHG | TEMPERATURE: 97.5 F | WEIGHT: 167.3 LBS | OXYGEN SATURATION: 94 % | RESPIRATION RATE: 18 BRPM | HEART RATE: 76 BPM

## 2020-01-01 VITALS
DIASTOLIC BLOOD PRESSURE: 76 MMHG | RESPIRATION RATE: 22 BRPM | BODY MASS INDEX: 25.76 KG/M2 | HEART RATE: 70 BPM | HEIGHT: 62 IN | SYSTOLIC BLOOD PRESSURE: 147 MMHG | TEMPERATURE: 98.3 F | OXYGEN SATURATION: 98 % | WEIGHT: 140 LBS

## 2020-01-01 VITALS
HEIGHT: 62 IN | OXYGEN SATURATION: 95 % | WEIGHT: 144.8 LBS | DIASTOLIC BLOOD PRESSURE: 61 MMHG | SYSTOLIC BLOOD PRESSURE: 137 MMHG | HEART RATE: 71 BPM | TEMPERATURE: 97.6 F | RESPIRATION RATE: 18 BRPM | BODY MASS INDEX: 26.65 KG/M2

## 2020-01-01 VITALS
TEMPERATURE: 97.1 F | BODY MASS INDEX: 25.51 KG/M2 | DIASTOLIC BLOOD PRESSURE: 53 MMHG | OXYGEN SATURATION: 90 % | WEIGHT: 138.6 LBS | HEIGHT: 62 IN | SYSTOLIC BLOOD PRESSURE: 121 MMHG | RESPIRATION RATE: 20 BRPM | HEART RATE: 74 BPM

## 2020-01-01 VITALS
BODY MASS INDEX: 29.26 KG/M2 | WEIGHT: 159 LBS | SYSTOLIC BLOOD PRESSURE: 124 MMHG | TEMPERATURE: 97.5 F | RESPIRATION RATE: 16 BRPM | DIASTOLIC BLOOD PRESSURE: 55 MMHG | HEIGHT: 62 IN | OXYGEN SATURATION: 94 % | HEART RATE: 71 BPM

## 2020-01-01 VITALS — TEMPERATURE: 98 F

## 2020-01-01 VITALS — TEMPERATURE: 97.2 F

## 2020-01-01 VITALS — TEMPERATURE: 97.5 F

## 2020-01-01 VITALS — TEMPERATURE: 97.9 F

## 2020-01-01 VITALS — TEMPERATURE: 97.6 F

## 2020-01-01 VITALS — TEMPERATURE: 97.4 F

## 2020-01-01 VITALS — TEMPERATURE: 97.1 F

## 2020-01-01 LAB
ABO: NORMAL
ABO: NORMAL
ALBUMIN SERPL-MCNC: 2.7 G/DL (ref 3.5–5.1)
ALBUMIN SERPL-MCNC: 3 G/DL (ref 3.5–5.1)
ALBUMIN SERPL-MCNC: 3 G/DL (ref 3.5–5.1)
ALBUMIN SERPL-MCNC: 3.2 G/DL (ref 3.5–5.1)
ALBUMIN SERPL-MCNC: 3.2 G/DL (ref 3.5–5.1)
ALBUMIN SERPL-MCNC: 3.3 G/DL (ref 3.5–5.1)
ALLEN TEST: POSITIVE
ALP BLD-CCNC: 55 U/L (ref 38–126)
ALP BLD-CCNC: 60 U/L (ref 38–126)
ALP BLD-CCNC: 63 U/L (ref 38–126)
ALP BLD-CCNC: 65 U/L (ref 38–126)
ALP BLD-CCNC: 66 U/L (ref 38–126)
ALP BLD-CCNC: 67 U/L (ref 38–126)
ALT SERPL-CCNC: 13 U/L (ref 11–66)
ALT SERPL-CCNC: 17 U/L (ref 11–66)
ALT SERPL-CCNC: 18 U/L (ref 11–66)
ALT SERPL-CCNC: 7 U/L (ref 11–66)
ALT SERPL-CCNC: 7 U/L (ref 11–66)
ALT SERPL-CCNC: 8 U/L (ref 11–66)
AMMONIA: 32 UMOL/L (ref 11–60)
AMYLASE FLUID: 38 U/L
ANION GAP SERPL CALCULATED.3IONS-SCNC: 10 MEQ/L (ref 8–16)
ANION GAP SERPL CALCULATED.3IONS-SCNC: 11 MEQ/L (ref 8–16)
ANION GAP SERPL CALCULATED.3IONS-SCNC: 12 MEQ/L (ref 8–16)
ANION GAP SERPL CALCULATED.3IONS-SCNC: 13 MEQ/L (ref 8–16)
ANION GAP SERPL CALCULATED.3IONS-SCNC: 14 MEQ/L (ref 8–16)
ANION GAP SERPL CALCULATED.3IONS-SCNC: 16 MEQ/L (ref 8–16)
ANION GAP SERPL CALCULATED.3IONS-SCNC: 6 MEQ/L (ref 8–16)
ANION GAP SERPL CALCULATED.3IONS-SCNC: 7 MEQ/L (ref 8–16)
ANION GAP SERPL CALCULATED.3IONS-SCNC: 7 MEQ/L (ref 8–16)
ANION GAP SERPL CALCULATED.3IONS-SCNC: 8 MEQ/L (ref 8–16)
ANION GAP SERPL CALCULATED.3IONS-SCNC: 9 MEQ/L (ref 8–16)
ANISOCYTOSIS: PRESENT
ANISOCYTOSIS: PRESENT
ANTIBODY SCREEN: NORMAL
ANTIBODY SCREEN: NORMAL
APTT: 105.6 SECONDS (ref 22–38)
APTT: 108.6 SECONDS (ref 22–38)
APTT: 21.3 SECONDS (ref 22–38)
APTT: 29.8 SECONDS (ref 22–38)
APTT: 30.2 SECONDS (ref 22–38)
APTT: 53.5 SECONDS (ref 22–38)
APTT: 56.3 SECONDS (ref 22–38)
APTT: 65.5 SECONDS (ref 22–38)
APTT: 67.4 SECONDS (ref 22–38)
APTT: 69.4 SECONDS (ref 22–38)
APTT: 73.9 SECONDS (ref 22–38)
APTT: 74.3 SECONDS (ref 22–38)
APTT: 76.5 SECONDS (ref 22–38)
APTT: 79.3 SECONDS (ref 22–38)
APTT: 84.6 SECONDS (ref 22–38)
APTT: 85.3 SECONDS (ref 22–38)
APTT: 90.2 SECONDS (ref 22–38)
APTT: 91.6 SECONDS (ref 22–38)
APTT: 95.8 SECONDS (ref 22–38)
APTT: > 200 SECONDS (ref 22–38)
AST SERPL-CCNC: 10 U/L (ref 5–40)
AST SERPL-CCNC: 12 U/L (ref 5–40)
AST SERPL-CCNC: 12 U/L (ref 5–40)
AST SERPL-CCNC: 13 U/L (ref 5–40)
AST SERPL-CCNC: 35 U/L (ref 5–40)
AST SERPL-CCNC: 9 U/L (ref 5–40)
BACTERIA: ABNORMAL
BACTERIA: ABNORMAL /HPF
BACTERIA: ABNORMAL /HPF
BASE EXCESS (CALCULATED): 3.2 MMOL/L (ref -2.5–2.5)
BASOPHILIA: ABNORMAL
BASOPHILS # BLD: 0 %
BASOPHILS # BLD: 0.1 %
BASOPHILS # BLD: 0.2 %
BASOPHILS # BLD: 0.3 %
BASOPHILS # BLD: 0.3 %
BASOPHILS # BLD: 0.4 %
BASOPHILS # BLD: 0.4 %
BASOPHILS ABSOLUTE: 0 THOU/MM3 (ref 0–0.1)
BILIRUB SERPL-MCNC: 0.2 MG/DL (ref 0.3–1.2)
BILIRUB SERPL-MCNC: 0.3 MG/DL (ref 0.3–1.2)
BILIRUB SERPL-MCNC: 0.3 MG/DL (ref 0.3–1.2)
BILIRUB SERPL-MCNC: 0.4 MG/DL (ref 0.3–1.2)
BILIRUB SERPL-MCNC: 0.4 MG/DL (ref 0.3–1.2)
BILIRUB SERPL-MCNC: 0.6 MG/DL (ref 0.3–1.2)
BILIRUBIN DIRECT: < 0.2 MG/DL (ref 0–0.3)
BILIRUBIN URINE: NEGATIVE
BLOOD CULTURE, ROUTINE: NORMAL
BLOOD, URINE: ABNORMAL
BLOOD, URINE: ABNORMAL
BLOOD, URINE: NEGATIVE
BODY FLUID RBC: 4000 /CUMM
BUN BLDV-MCNC: 12 MG/DL (ref 7–22)
BUN BLDV-MCNC: 14 MG/DL (ref 7–22)
BUN BLDV-MCNC: 16 MG/DL (ref 7–22)
BUN BLDV-MCNC: 18 MG/DL (ref 7–22)
BUN BLDV-MCNC: 19 MG/DL (ref 7–22)
BUN BLDV-MCNC: 19 MG/DL (ref 7–22)
BUN BLDV-MCNC: 20 MG/DL (ref 7–22)
BUN BLDV-MCNC: 21 MG/DL (ref 7–22)
BUN BLDV-MCNC: 22 MG/DL (ref 7–22)
BUN BLDV-MCNC: 23 MG/DL (ref 7–22)
BUN BLDV-MCNC: 25 MG/DL (ref 7–22)
BUN BLDV-MCNC: 25 MG/DL (ref 7–22)
BUN BLDV-MCNC: 26 MG/DL (ref 7–22)
BUN BLDV-MCNC: 27 MG/DL (ref 7–22)
BUN BLDV-MCNC: 28 MG/DL (ref 7–22)
BUN BLDV-MCNC: 29 MG/DL (ref 7–22)
BUN BLDV-MCNC: 29 MG/DL (ref 7–22)
BUN BLDV-MCNC: 32 MG/DL (ref 7–22)
BUN BLDV-MCNC: 35 MG/DL (ref 7–22)
BUN BLDV-MCNC: 36 MG/DL (ref 7–22)
BUN BLDV-MCNC: 37 MG/DL (ref 7–22)
BUN BLDV-MCNC: 38 MG/DL (ref 7–22)
BUN BLDV-MCNC: 38 MG/DL (ref 7–22)
BUN BLDV-MCNC: 39 MG/DL (ref 7–22)
BUN BLDV-MCNC: 40 MG/DL (ref 7–22)
BUN BLDV-MCNC: 58 MG/DL (ref 7–22)
BUN BLDV-MCNC: 59 MG/DL (ref 7–22)
C-REACTIVE PROTEIN: 21.27 MG/DL (ref 0–1)
C-REACTIVE PROTEIN: 31.61 MG/DL (ref 0–1)
CALCIUM SERPL-MCNC: 10 MG/DL (ref 8.5–10.5)
CALCIUM SERPL-MCNC: 7.5 MG/DL (ref 8.5–10.5)
CALCIUM SERPL-MCNC: 8 MG/DL (ref 8.5–10.5)
CALCIUM SERPL-MCNC: 8.3 MG/DL (ref 8.5–10.5)
CALCIUM SERPL-MCNC: 8.3 MG/DL (ref 8.5–10.5)
CALCIUM SERPL-MCNC: 8.5 MG/DL (ref 8.5–10.5)
CALCIUM SERPL-MCNC: 8.6 MG/DL (ref 8.5–10.5)
CALCIUM SERPL-MCNC: 8.6 MG/DL (ref 8.5–10.5)
CALCIUM SERPL-MCNC: 8.7 MG/DL (ref 8.5–10.5)
CALCIUM SERPL-MCNC: 8.8 MG/DL (ref 8.5–10.5)
CALCIUM SERPL-MCNC: 8.9 MG/DL (ref 8.5–10.5)
CALCIUM SERPL-MCNC: 9 MG/DL (ref 8.5–10.5)
CALCIUM SERPL-MCNC: 9.1 MG/DL (ref 8.5–10.5)
CALCIUM SERPL-MCNC: 9.2 MG/DL (ref 8.5–10.5)
CALCIUM SERPL-MCNC: 9.3 MG/DL (ref 8.5–10.5)
CALCIUM SERPL-MCNC: 9.4 MG/DL (ref 8.5–10.5)
CALCIUM SERPL-MCNC: 9.4 MG/DL (ref 8.5–10.5)
CALCIUM SERPL-MCNC: 9.5 MG/DL (ref 8.5–10.5)
CASTS 2: ABNORMAL /LPF
CASTS 2: ABNORMAL /LPF
CASTS UA: ABNORMAL /LPF
CASTS UA: ABNORMAL /LPF
CASTS: ABNORMAL /LPF
CASTS: ABNORMAL /LPF
CHARACTER, BODY FLUID: NORMAL
CHARACTER, URINE: ABNORMAL
CHARACTER, URINE: ABNORMAL
CHARACTER, URINE: CLEAR
CHLORIDE BLD-SCNC: 100 MEQ/L (ref 98–111)
CHLORIDE BLD-SCNC: 101 MEQ/L (ref 98–111)
CHLORIDE BLD-SCNC: 101 MEQ/L (ref 98–111)
CHLORIDE BLD-SCNC: 102 MEQ/L (ref 98–111)
CHLORIDE BLD-SCNC: 103 MEQ/L (ref 98–111)
CHLORIDE BLD-SCNC: 103 MEQ/L (ref 98–111)
CHLORIDE BLD-SCNC: 104 MEQ/L (ref 98–111)
CHLORIDE BLD-SCNC: 105 MEQ/L (ref 98–111)
CHLORIDE BLD-SCNC: 106 MEQ/L (ref 98–111)
CHLORIDE BLD-SCNC: 106 MEQ/L (ref 98–111)
CHLORIDE BLD-SCNC: 110 MEQ/L (ref 98–111)
CHLORIDE BLD-SCNC: 111 MEQ/L (ref 98–111)
CHLORIDE BLD-SCNC: 111 MEQ/L (ref 98–111)
CHLORIDE BLD-SCNC: 112 MEQ/L (ref 98–111)
CHLORIDE BLD-SCNC: 93 MEQ/L (ref 98–111)
CHLORIDE BLD-SCNC: 94 MEQ/L (ref 98–111)
CHLORIDE BLD-SCNC: 95 MEQ/L (ref 98–111)
CHLORIDE BLD-SCNC: 96 MEQ/L (ref 98–111)
CHLORIDE BLD-SCNC: 96 MEQ/L (ref 98–111)
CHLORIDE BLD-SCNC: 97 MEQ/L (ref 98–111)
CHLORIDE BLD-SCNC: 98 MEQ/L (ref 98–111)
CHLORIDE BLD-SCNC: 99 MEQ/L (ref 98–111)
CO2: 20 MEQ/L (ref 23–33)
CO2: 21 MEQ/L (ref 23–33)
CO2: 22 MEQ/L (ref 23–33)
CO2: 23 MEQ/L (ref 23–33)
CO2: 24 MEQ/L (ref 23–33)
CO2: 25 MEQ/L (ref 23–33)
CO2: 26 MEQ/L (ref 23–33)
CO2: 26 MEQ/L (ref 23–33)
CO2: 27 MEQ/L (ref 23–33)
CO2: 27 MEQ/L (ref 23–33)
CO2: 28 MEQ/L (ref 23–33)
CO2: 29 MEQ/L (ref 23–33)
CO2: 30 MEQ/L (ref 23–33)
CO2: 31 MEQ/L (ref 23–33)
CO2: 31 MEQ/L (ref 23–33)
CO2: 32 MEQ/L (ref 23–33)
CO2: 32 MEQ/L (ref 23–33)
CO2: 33 MEQ/L (ref 23–33)
CO2: 33 MEQ/L (ref 23–33)
CO2: 34 MEQ/L (ref 23–33)
CO2: 35 MEQ/L (ref 23–33)
CO2: 35 MEQ/L (ref 23–33)
CO2: 36 MEQ/L (ref 23–33)
CO2: 36 MEQ/L (ref 23–33)
CO2: 37 MEQ/L (ref 23–33)
CO2: 38 MEQ/L (ref 23–33)
COLLECTED BY:: ABNORMAL
COLOR: YELLOW
COMPLEMENT TOTAL (CH50): 57.5 U/ML (ref 38.7–89.9)
CORTISOL COLLECTION INFO: NORMAL
CORTISOL: 10.51 UG/DL
CREAT SERPL-MCNC: 0.7 MG/DL (ref 0.4–1.2)
CREAT SERPL-MCNC: 0.8 MG/DL (ref 0.4–1.2)
CREAT SERPL-MCNC: 0.9 MG/DL (ref 0.4–1.2)
CREAT SERPL-MCNC: 1 MG/DL (ref 0.4–1.2)
CREAT SERPL-MCNC: 1.1 MG/DL (ref 0.4–1.2)
CREAT SERPL-MCNC: 1.2 MG/DL (ref 0.4–1.2)
CREAT SERPL-MCNC: 1.2 MG/DL (ref 0.4–1.2)
CREAT SERPL-MCNC: 1.3 MG/DL (ref 0.4–1.2)
CREAT SERPL-MCNC: 2.4 MG/DL (ref 0.4–1.2)
CREAT SERPL-MCNC: 3.5 MG/DL (ref 0.4–1.2)
CRYSTALS, UA: ABNORMAL
CRYSTALS, UA: ABNORMAL
CRYSTALS: ABNORMAL
DEVICE: ABNORMAL
DIGOXIN LEVEL: 1.7 NG/ML (ref 0.5–2)
EKG ATRIAL RATE: 241 BPM
EKG ATRIAL RATE: 250 BPM
EKG ATRIAL RATE: 288 BPM
EKG ATRIAL RATE: 357 BPM
EKG ATRIAL RATE: 70 BPM
EKG ATRIAL RATE: 75 BPM
EKG ATRIAL RATE: 89 BPM
EKG Q-T INTERVAL: 386 MS
EKG Q-T INTERVAL: 390 MS
EKG Q-T INTERVAL: 408 MS
EKG Q-T INTERVAL: 430 MS
EKG Q-T INTERVAL: 430 MS
EKG Q-T INTERVAL: 434 MS
EKG Q-T INTERVAL: 448 MS
EKG QRS DURATION: 150 MS
EKG QRS DURATION: 152 MS
EKG QRS DURATION: 156 MS
EKG QRS DURATION: 156 MS
EKG QRS DURATION: 158 MS
EKG QRS DURATION: 162 MS
EKG QRS DURATION: 168 MS
EKG QTC CALCULATION (BAZETT): 427 MS
EKG QTC CALCULATION (BAZETT): 464 MS
EKG QTC CALCULATION (BAZETT): 464 MS
EKG QTC CALCULATION (BAZETT): 470 MS
EKG QTC CALCULATION (BAZETT): 471 MS
EKG QTC CALCULATION (BAZETT): 482 MS
EKG QTC CALCULATION (BAZETT): 486 MS
EKG R AXIS: -106 DEGREES
EKG R AXIS: -163 DEGREES
EKG R AXIS: -44 DEGREES
EKG R AXIS: -68 DEGREES
EKG R AXIS: -87 DEGREES
EKG R AXIS: -91 DEGREES
EKG R AXIS: -92 DEGREES
EKG T AXIS: 102 DEGREES
EKG T AXIS: 109 DEGREES
EKG T AXIS: 111 DEGREES
EKG T AXIS: 113 DEGREES
EKG T AXIS: 121 DEGREES
EKG T AXIS: 146 DEGREES
EKG T AXIS: 79 DEGREES
EKG VENTRICULAR RATE: 70 BPM
EKG VENTRICULAR RATE: 70 BPM
EKG VENTRICULAR RATE: 71 BPM
EKG VENTRICULAR RATE: 71 BPM
EKG VENTRICULAR RATE: 72 BPM
EKG VENTRICULAR RATE: 80 BPM
EKG VENTRICULAR RATE: 94 BPM
EOSINOPHIL # BLD: 0 %
EOSINOPHIL # BLD: 0.1 %
EOSINOPHIL # BLD: 0.2 %
EOSINOPHIL # BLD: 0.3 %
EOSINOPHIL # BLD: 0.4 %
EOSINOPHIL # BLD: 0.9 %
EOSINOPHIL # BLD: 1 %
EOSINOPHIL # BLD: 1.2 %
EOSINOPHIL # BLD: 2.4 %
EOSINOPHIL # BLD: 2.5 %
EOSINOPHIL # BLD: 2.9 %
EOSINOPHILS ABSOLUTE: 0 THOU/MM3 (ref 0–0.4)
EOSINOPHILS ABSOLUTE: 0.1 THOU/MM3 (ref 0–0.4)
EOSINOPHILS ABSOLUTE: 0.2 THOU/MM3 (ref 0–0.4)
EPITHELIAL CELLS, UA: ABNORMAL /HPF
ERYTHROCYTE [DISTWIDTH] IN BLOOD BY AUTOMATED COUNT: 13.8 % (ref 11.5–14.5)
ERYTHROCYTE [DISTWIDTH] IN BLOOD BY AUTOMATED COUNT: 13.9 % (ref 11.5–14.5)
ERYTHROCYTE [DISTWIDTH] IN BLOOD BY AUTOMATED COUNT: 14.5 % (ref 11.5–14.5)
ERYTHROCYTE [DISTWIDTH] IN BLOOD BY AUTOMATED COUNT: 14.6 % (ref 11.5–14.5)
ERYTHROCYTE [DISTWIDTH] IN BLOOD BY AUTOMATED COUNT: 14.7 % (ref 11.5–14.5)
ERYTHROCYTE [DISTWIDTH] IN BLOOD BY AUTOMATED COUNT: 14.7 % (ref 11.5–14.5)
ERYTHROCYTE [DISTWIDTH] IN BLOOD BY AUTOMATED COUNT: 14.8 % (ref 11.5–14.5)
ERYTHROCYTE [DISTWIDTH] IN BLOOD BY AUTOMATED COUNT: 14.9 % (ref 11.5–14.5)
ERYTHROCYTE [DISTWIDTH] IN BLOOD BY AUTOMATED COUNT: 15 % (ref 11.5–14.5)
ERYTHROCYTE [DISTWIDTH] IN BLOOD BY AUTOMATED COUNT: 15.1 % (ref 11.5–14.5)
ERYTHROCYTE [DISTWIDTH] IN BLOOD BY AUTOMATED COUNT: 15.2 % (ref 11.5–14.5)
ERYTHROCYTE [DISTWIDTH] IN BLOOD BY AUTOMATED COUNT: 15.4 % (ref 11.5–14.5)
ERYTHROCYTE [DISTWIDTH] IN BLOOD BY AUTOMATED COUNT: 15.5 % (ref 11.5–14.5)
ERYTHROCYTE [DISTWIDTH] IN BLOOD BY AUTOMATED COUNT: 15.5 % (ref 11.5–14.5)
ERYTHROCYTE [DISTWIDTH] IN BLOOD BY AUTOMATED COUNT: 15.9 % (ref 11.5–14.5)
ERYTHROCYTE [DISTWIDTH] IN BLOOD BY AUTOMATED COUNT: 16.1 % (ref 11.5–14.5)
ERYTHROCYTE [DISTWIDTH] IN BLOOD BY AUTOMATED COUNT: 16.7 % (ref 11.5–14.5)
ERYTHROCYTE [DISTWIDTH] IN BLOOD BY AUTOMATED COUNT: 16.7 % (ref 11.5–14.5)
ERYTHROCYTE [DISTWIDTH] IN BLOOD BY AUTOMATED COUNT: 16.8 % (ref 11.5–14.5)
ERYTHROCYTE [DISTWIDTH] IN BLOOD BY AUTOMATED COUNT: 16.9 % (ref 11.5–14.5)
ERYTHROCYTE [DISTWIDTH] IN BLOOD BY AUTOMATED COUNT: 17.1 % (ref 11.5–14.5)
ERYTHROCYTE [DISTWIDTH] IN BLOOD BY AUTOMATED COUNT: 17.2 % (ref 11.5–14.5)
ERYTHROCYTE [DISTWIDTH] IN BLOOD BY AUTOMATED COUNT: 17.2 % (ref 11.5–14.5)
ERYTHROCYTE [DISTWIDTH] IN BLOOD BY AUTOMATED COUNT: 17.6 % (ref 11.5–14.5)
ERYTHROCYTE [DISTWIDTH] IN BLOOD BY AUTOMATED COUNT: 17.7 % (ref 11.5–14.5)
ERYTHROCYTE [DISTWIDTH] IN BLOOD BY AUTOMATED COUNT: 17.7 % (ref 11.5–14.5)
ERYTHROCYTE [DISTWIDTH] IN BLOOD BY AUTOMATED COUNT: 17.8 % (ref 11.5–14.5)
ERYTHROCYTE [DISTWIDTH] IN BLOOD BY AUTOMATED COUNT: 18.1 % (ref 11.5–14.5)
ERYTHROCYTE [DISTWIDTH] IN BLOOD BY AUTOMATED COUNT: 18.3 % (ref 11.5–14.5)
ERYTHROCYTE [DISTWIDTH] IN BLOOD BY AUTOMATED COUNT: 18.5 % (ref 11.5–14.5)
ERYTHROCYTE [DISTWIDTH] IN BLOOD BY AUTOMATED COUNT: 18.6 % (ref 11.5–14.5)
ERYTHROCYTE [DISTWIDTH] IN BLOOD BY AUTOMATED COUNT: 18.6 % (ref 11.5–14.5)
ERYTHROCYTE [DISTWIDTH] IN BLOOD BY AUTOMATED COUNT: 19 % (ref 11.5–14.5)
ERYTHROCYTE [DISTWIDTH] IN BLOOD BY AUTOMATED COUNT: 49.3 FL (ref 35–45)
ERYTHROCYTE [DISTWIDTH] IN BLOOD BY AUTOMATED COUNT: 50.2 FL (ref 35–45)
ERYTHROCYTE [DISTWIDTH] IN BLOOD BY AUTOMATED COUNT: 53 FL (ref 35–45)
ERYTHROCYTE [DISTWIDTH] IN BLOOD BY AUTOMATED COUNT: 53.1 FL (ref 35–45)
ERYTHROCYTE [DISTWIDTH] IN BLOOD BY AUTOMATED COUNT: 53.2 FL (ref 35–45)
ERYTHROCYTE [DISTWIDTH] IN BLOOD BY AUTOMATED COUNT: 53.7 FL (ref 35–45)
ERYTHROCYTE [DISTWIDTH] IN BLOOD BY AUTOMATED COUNT: 53.8 FL (ref 35–45)
ERYTHROCYTE [DISTWIDTH] IN BLOOD BY AUTOMATED COUNT: 54 FL (ref 35–45)
ERYTHROCYTE [DISTWIDTH] IN BLOOD BY AUTOMATED COUNT: 54.1 FL (ref 35–45)
ERYTHROCYTE [DISTWIDTH] IN BLOOD BY AUTOMATED COUNT: 54.3 FL (ref 35–45)
ERYTHROCYTE [DISTWIDTH] IN BLOOD BY AUTOMATED COUNT: 54.4 FL (ref 35–45)
ERYTHROCYTE [DISTWIDTH] IN BLOOD BY AUTOMATED COUNT: 54.7 FL (ref 35–45)
ERYTHROCYTE [DISTWIDTH] IN BLOOD BY AUTOMATED COUNT: 54.9 FL (ref 35–45)
ERYTHROCYTE [DISTWIDTH] IN BLOOD BY AUTOMATED COUNT: 55 FL (ref 35–45)
ERYTHROCYTE [DISTWIDTH] IN BLOOD BY AUTOMATED COUNT: 55.1 FL (ref 35–45)
ERYTHROCYTE [DISTWIDTH] IN BLOOD BY AUTOMATED COUNT: 55.4 FL (ref 35–45)
ERYTHROCYTE [DISTWIDTH] IN BLOOD BY AUTOMATED COUNT: 55.4 FL (ref 35–45)
ERYTHROCYTE [DISTWIDTH] IN BLOOD BY AUTOMATED COUNT: 57.3 FL (ref 35–45)
ERYTHROCYTE [DISTWIDTH] IN BLOOD BY AUTOMATED COUNT: 57.9 FL (ref 35–45)
ERYTHROCYTE [DISTWIDTH] IN BLOOD BY AUTOMATED COUNT: 61.4 FL (ref 35–45)
ERYTHROCYTE [DISTWIDTH] IN BLOOD BY AUTOMATED COUNT: 61.7 FL (ref 35–45)
ERYTHROCYTE [DISTWIDTH] IN BLOOD BY AUTOMATED COUNT: 63.1 FL (ref 35–45)
ERYTHROCYTE [DISTWIDTH] IN BLOOD BY AUTOMATED COUNT: 63.5 FL (ref 35–45)
ERYTHROCYTE [DISTWIDTH] IN BLOOD BY AUTOMATED COUNT: 63.7 FL (ref 35–45)
ERYTHROCYTE [DISTWIDTH] IN BLOOD BY AUTOMATED COUNT: 64.4 FL (ref 35–45)
ERYTHROCYTE [DISTWIDTH] IN BLOOD BY AUTOMATED COUNT: 64.8 FL (ref 35–45)
ERYTHROCYTE [DISTWIDTH] IN BLOOD BY AUTOMATED COUNT: 65.3 FL (ref 35–45)
ERYTHROCYTE [DISTWIDTH] IN BLOOD BY AUTOMATED COUNT: 67.4 FL (ref 35–45)
ERYTHROCYTE [DISTWIDTH] IN BLOOD BY AUTOMATED COUNT: 67.8 FL (ref 35–45)
ERYTHROCYTE [DISTWIDTH] IN BLOOD BY AUTOMATED COUNT: 67.9 FL (ref 35–45)
ERYTHROCYTE [DISTWIDTH] IN BLOOD BY AUTOMATED COUNT: 68.9 FL (ref 35–45)
ERYTHROCYTE [DISTWIDTH] IN BLOOD BY AUTOMATED COUNT: 69 FL (ref 35–45)
ERYTHROCYTE [DISTWIDTH] IN BLOOD BY AUTOMATED COUNT: 70.3 FL (ref 35–45)
ERYTHROCYTE [DISTWIDTH] IN BLOOD BY AUTOMATED COUNT: 70.4 FL (ref 35–45)
ERYTHROCYTE [DISTWIDTH] IN BLOOD BY AUTOMATED COUNT: 72.3 FL (ref 35–45)
ERYTHROCYTE [DISTWIDTH] IN BLOOD BY AUTOMATED COUNT: 72.6 FL (ref 35–45)
FERRITIN: 121 NG/ML (ref 10–291)
FERRITIN: 305 NG/ML (ref 10–291)
FIBRIN SPLIT PRODUCTS: ABNORMAL MCG/ML
FIBRINOGEN: 348 MG/100ML (ref 155–475)
FILM ARRAY ADENOVIRUS: NOT DETECTED
FILM ARRAY BORDETELLA PERTUSSIS: NOT DETECTED
FILM ARRAY CHLAMYDOPHILIA PNEUMONIAE: NOT DETECTED
FILM ARRAY CORONAVIRUS 229E: NOT DETECTED
FILM ARRAY CORONAVIRUS HKU1: NOT DETECTED
FILM ARRAY CORONAVIRUS NL63: NOT DETECTED
FILM ARRAY CORONAVIRUS OC43: NOT DETECTED
FILM ARRAY INFLUENZA A VIRUS 09H1: DETECTED
FILM ARRAY INFLUENZA A VIRUS H1: ABNORMAL
FILM ARRAY INFLUENZA A VIRUS H3: ABNORMAL
FILM ARRAY INFLUENZA A VIRUS: DETECTED
FILM ARRAY INFLUENZA B: NOT DETECTED
FILM ARRAY METAPNEUMOVIRUS: NOT DETECTED
FILM ARRAY MYCOPLASMA PNEUMONIAE: NOT DETECTED
FILM ARRAY PARAINFLUENZA VIRUS 1: NOT DETECTED
FILM ARRAY PARAINFLUENZA VIRUS 2: NOT DETECTED
FILM ARRAY PARAINFLUENZA VIRUS 3: NOT DETECTED
FILM ARRAY PARAINFLUENZA VIRUS 4: NOT DETECTED
FILM ARRAY RESPIRATORY SYNCITIAL VIRUS: NOT DETECTED
FILM ARRAY RHINOVIRUS/ENTEROVIRUS: NOT DETECTED
FOLATE: 14 NG/ML (ref 4.8–24.2)
GFR SERPL CREATININE-BSD FRML MDRD: 13 ML/MIN/1.73M2
GFR SERPL CREATININE-BSD FRML MDRD: 20 ML/MIN/1.73M2
GFR SERPL CREATININE-BSD FRML MDRD: 40 ML/MIN/1.73M2
GFR SERPL CREATININE-BSD FRML MDRD: 44 ML/MIN/1.73M2
GFR SERPL CREATININE-BSD FRML MDRD: 44 ML/MIN/1.73M2
GFR SERPL CREATININE-BSD FRML MDRD: 48 ML/MIN/1.73M2
GFR SERPL CREATININE-BSD FRML MDRD: 54 ML/MIN/1.73M2
GFR SERPL CREATININE-BSD FRML MDRD: 61 ML/MIN/1.73M2
GFR SERPL CREATININE-BSD FRML MDRD: 70 ML/MIN/1.73M2
GFR SERPL CREATININE-BSD FRML MDRD: 81 ML/MIN/1.73M2
GLUCOSE BLD-MCNC: 100 MG/DL (ref 70–108)
GLUCOSE BLD-MCNC: 100 MG/DL (ref 70–108)
GLUCOSE BLD-MCNC: 101 MG/DL (ref 70–108)
GLUCOSE BLD-MCNC: 103 MG/DL (ref 70–108)
GLUCOSE BLD-MCNC: 103 MG/DL (ref 70–108)
GLUCOSE BLD-MCNC: 104 MG/DL (ref 70–108)
GLUCOSE BLD-MCNC: 104 MG/DL (ref 70–108)
GLUCOSE BLD-MCNC: 107 MG/DL (ref 70–108)
GLUCOSE BLD-MCNC: 108 MG/DL (ref 70–108)
GLUCOSE BLD-MCNC: 115 MG/DL (ref 70–108)
GLUCOSE BLD-MCNC: 115 MG/DL (ref 70–108)
GLUCOSE BLD-MCNC: 116 MG/DL (ref 70–108)
GLUCOSE BLD-MCNC: 118 MG/DL (ref 70–108)
GLUCOSE BLD-MCNC: 127 MG/DL (ref 70–108)
GLUCOSE BLD-MCNC: 128 MG/DL (ref 70–108)
GLUCOSE BLD-MCNC: 128 MG/DL (ref 70–108)
GLUCOSE BLD-MCNC: 129 MG/DL (ref 70–108)
GLUCOSE BLD-MCNC: 130 MG/DL (ref 70–108)
GLUCOSE BLD-MCNC: 136 MG/DL (ref 70–108)
GLUCOSE BLD-MCNC: 138 MG/DL (ref 70–108)
GLUCOSE BLD-MCNC: 138 MG/DL (ref 70–108)
GLUCOSE BLD-MCNC: 142 MG/DL (ref 70–108)
GLUCOSE BLD-MCNC: 149 MG/DL (ref 70–108)
GLUCOSE BLD-MCNC: 150 MG/DL (ref 70–108)
GLUCOSE BLD-MCNC: 154 MG/DL (ref 70–108)
GLUCOSE BLD-MCNC: 195 MG/DL (ref 70–108)
GLUCOSE BLD-MCNC: 207 MG/DL (ref 70–108)
GLUCOSE BLD-MCNC: 207 MG/DL (ref 70–108)
GLUCOSE BLD-MCNC: 236 MG/DL (ref 70–108)
GLUCOSE BLD-MCNC: 79 MG/DL (ref 70–108)
GLUCOSE BLD-MCNC: 82 MG/DL (ref 70–108)
GLUCOSE BLD-MCNC: 88 MG/DL (ref 70–108)
GLUCOSE BLD-MCNC: 89 MG/DL (ref 70–108)
GLUCOSE BLD-MCNC: 89 MG/DL (ref 70–108)
GLUCOSE BLD-MCNC: 90 MG/DL (ref 70–108)
GLUCOSE BLD-MCNC: 91 MG/DL (ref 70–108)
GLUCOSE BLD-MCNC: 93 MG/DL (ref 70–108)
GLUCOSE BLD-MCNC: 96 MG/DL (ref 70–108)
GLUCOSE BLD-MCNC: 98 MG/DL (ref 70–108)
GLUCOSE BLD-MCNC: 99 MG/DL (ref 70–108)
GLUCOSE BLD-MCNC: 99 MG/DL (ref 70–108)
GLUCOSE URINE: NEGATIVE MG/DL
GLUCOSE URINE: NEGATIVE MG/DL
GLUCOSE, FLUID: 113 MG/DL
GLUCOSE, URINE: NEGATIVE MG/DL
HCO3: 28 MMOL/L (ref 23–28)
HCT VFR BLD CALC: 23.6 % (ref 37–47)
HCT VFR BLD CALC: 24.3 % (ref 37–47)
HCT VFR BLD CALC: 24.8 % (ref 37–47)
HCT VFR BLD CALC: 25 % (ref 37–47)
HCT VFR BLD CALC: 25.2 % (ref 37–47)
HCT VFR BLD CALC: 25.8 % (ref 37–47)
HCT VFR BLD CALC: 26.1 % (ref 37–47)
HCT VFR BLD CALC: 26.1 % (ref 37–47)
HCT VFR BLD CALC: 27 % (ref 37–47)
HCT VFR BLD CALC: 27 % (ref 37–47)
HCT VFR BLD CALC: 27.4 % (ref 37–47)
HCT VFR BLD CALC: 27.4 % (ref 37–47)
HCT VFR BLD CALC: 27.5 % (ref 37–47)
HCT VFR BLD CALC: 27.8 % (ref 37–47)
HCT VFR BLD CALC: 27.9 % (ref 37–47)
HCT VFR BLD CALC: 28.5 % (ref 37–47)
HCT VFR BLD CALC: 28.6 % (ref 37–47)
HCT VFR BLD CALC: 28.7 % (ref 37–47)
HCT VFR BLD CALC: 28.9 % (ref 37–47)
HCT VFR BLD CALC: 29 % (ref 37–47)
HCT VFR BLD CALC: 29.4 % (ref 37–47)
HCT VFR BLD CALC: 29.7 % (ref 37–47)
HCT VFR BLD CALC: 30 % (ref 37–47)
HCT VFR BLD CALC: 30.4 % (ref 37–47)
HCT VFR BLD CALC: 30.4 % (ref 37–47)
HCT VFR BLD CALC: 30.5 % (ref 37–47)
HCT VFR BLD CALC: 30.9 % (ref 37–47)
HCT VFR BLD CALC: 31.1 % (ref 37–47)
HCT VFR BLD CALC: 31.2 % (ref 37–47)
HCT VFR BLD CALC: 31.4 % (ref 37–47)
HCT VFR BLD CALC: 31.9 % (ref 37–47)
HCT VFR BLD CALC: 32.8 % (ref 37–47)
HCT VFR BLD CALC: 33.8 % (ref 37–47)
HCT VFR BLD CALC: 34.3 % (ref 37–47)
HCT VFR BLD CALC: 34.3 % (ref 37–47)
HCT VFR BLD CALC: 34.6 % (ref 37–47)
HCT VFR BLD CALC: 34.9 % (ref 37–47)
HCT VFR BLD CALC: 36.3 % (ref 37–47)
HCT VFR BLD CALC: 39 % (ref 37–47)
HCT VFR BLD CALC: 42 % (ref 37–47)
HCT VFR BLD CALC: 45.3 % (ref 37–47)
HEMOGLOBIN: 10 GM/DL (ref 12–16)
HEMOGLOBIN: 10.2 GM/DL (ref 12–16)
HEMOGLOBIN: 10.4 GM/DL (ref 12–16)
HEMOGLOBIN: 10.5 GM/DL (ref 12–16)
HEMOGLOBIN: 10.9 GM/DL (ref 12–16)
HEMOGLOBIN: 11 GM/DL (ref 12–16)
HEMOGLOBIN: 12.3 GM/DL (ref 12–16)
HEMOGLOBIN: 13.1 GM/DL (ref 12–16)
HEMOGLOBIN: 14.4 GM/DL (ref 12–16)
HEMOGLOBIN: 7 GM/DL (ref 12–16)
HEMOGLOBIN: 7 GM/DL (ref 12–16)
HEMOGLOBIN: 7.2 GM/DL (ref 12–16)
HEMOGLOBIN: 7.4 GM/DL (ref 12–16)
HEMOGLOBIN: 7.4 GM/DL (ref 12–16)
HEMOGLOBIN: 7.6 GM/DL (ref 12–16)
HEMOGLOBIN: 7.7 GM/DL (ref 12–16)
HEMOGLOBIN: 7.8 GM/DL (ref 12–16)
HEMOGLOBIN: 7.8 GM/DL (ref 12–16)
HEMOGLOBIN: 7.9 GM/DL (ref 12–16)
HEMOGLOBIN: 8 GM/DL (ref 12–16)
HEMOGLOBIN: 8 GM/DL (ref 12–16)
HEMOGLOBIN: 8.1 GM/DL (ref 12–16)
HEMOGLOBIN: 8.2 GM/DL (ref 12–16)
HEMOGLOBIN: 8.3 GM/DL (ref 12–16)
HEMOGLOBIN: 8.4 GM/DL (ref 12–16)
HEMOGLOBIN: 8.4 GM/DL (ref 12–16)
HEMOGLOBIN: 8.5 GM/DL (ref 12–16)
HEMOGLOBIN: 8.6 GM/DL (ref 12–16)
HEMOGLOBIN: 8.6 GM/DL (ref 12–16)
HEMOGLOBIN: 8.8 GM/DL (ref 12–16)
HEMOGLOBIN: 8.9 GM/DL (ref 12–16)
HEMOGLOBIN: 9.1 GM/DL (ref 12–16)
HEMOGLOBIN: 9.2 GM/DL (ref 12–16)
HEMOGLOBIN: 9.3 GM/DL (ref 12–16)
HEMOGLOBIN: 9.3 GM/DL (ref 12–16)
HEMOGLOBIN: 9.4 GM/DL (ref 12–16)
HEMOGLOBIN: 9.4 GM/DL (ref 12–16)
HEMOGLOBIN: 9.5 GM/DL (ref 12–16)
HEMOGLOBIN: 9.5 GM/DL (ref 12–16)
HEMOGLOBIN: 9.6 GM/DL (ref 12–16)
HEMOGLOBIN: 9.7 GM/DL (ref 12–16)
HYPOCHROMIA: PRESENT
IFIO2: 6
IGA: 131 MG/DL (ref 70–400)
IGG SUBCLASSES: NORMAL
IGG: 967 MG/DL (ref 700–1600)
IGM: 95 MG/DL (ref 40–230)
IMMATURE GRANS (ABS): 0.01 THOU/MM3 (ref 0–0.07)
IMMATURE GRANS (ABS): 0.01 THOU/MM3 (ref 0–0.07)
IMMATURE GRANS (ABS): 0.02 THOU/MM3 (ref 0–0.07)
IMMATURE GRANS (ABS): 0.02 THOU/MM3 (ref 0–0.07)
IMMATURE GRANS (ABS): 0.03 THOU/MM3 (ref 0–0.07)
IMMATURE GRANS (ABS): 0.05 THOU/MM3 (ref 0–0.07)
IMMATURE GRANS (ABS): 0.06 THOU/MM3 (ref 0–0.07)
IMMATURE GRANS (ABS): 0.08 THOU/MM3 (ref 0–0.07)
IMMATURE GRANS (ABS): 0.09 THOU/MM3 (ref 0–0.07)
IMMATURE GRANS (ABS): 0.4 THOU/MM3 (ref 0–0.07)
IMMATURE GRANULOCYTES: 0.2 %
IMMATURE GRANULOCYTES: 0.2 %
IMMATURE GRANULOCYTES: 0.3 %
IMMATURE GRANULOCYTES: 0.3 %
IMMATURE GRANULOCYTES: 0.4 %
IMMATURE GRANULOCYTES: 0.5 %
IMMATURE GRANULOCYTES: 0.8 %
IMMATURE GRANULOCYTES: 0.9 %
IMMATURE GRANULOCYTES: 1.6 %
IMMATURE GRANULOCYTES: 3.8 %
INR BLD: 1.09 (ref 0.85–1.13)
INR BLD: 1.11 (ref 0.85–1.13)
INR BLD: 1.18 (ref 0.85–1.13)
INR BLD: 1.32 (ref 0.85–1.13)
INR BLD: 1.37 (ref 0.85–1.13)
INR BLD: 1.43 (ref 0.85–1.13)
INR BLD: 1.48 (ref 0.85–1.13)
INR BLD: 1.49 (ref 0.85–1.13)
INR BLD: 1.5 (ref 0.85–1.13)
INR BLD: 1.53 (ref 0.85–1.13)
INR BLD: 1.59 (ref 0.85–1.13)
INR BLD: 1.62 (ref 0.85–1.13)
INR BLD: 1.68 (ref 0.85–1.13)
INR BLD: 1.7 (ref 0.85–1.13)
INR BLD: 1.72 (ref 0.85–1.13)
INR BLD: 1.74 (ref 0.85–1.13)
INR BLD: 1.77 (ref 0.85–1.13)
INR BLD: 1.8 (ref 0.85–1.13)
INR BLD: 1.89 (ref 0.85–1.13)
INR BLD: 1.9 (ref 0.85–1.13)
INR BLD: 1.99 (ref 0.85–1.13)
INR BLD: 2 (ref 0.85–1.13)
INR BLD: 2.04 (ref 0.85–1.13)
INR BLD: 2.05 (ref 0.85–1.13)
INR BLD: 2.05 (ref 0.85–1.13)
INR BLD: 2.11 (ref 0.85–1.13)
INR BLD: 2.12 (ref 0.85–1.13)
INR BLD: 2.22 (ref 0.85–1.13)
INR BLD: 2.26 (ref 0.85–1.13)
INR BLD: 2.28 (ref 0.85–1.13)
INR BLD: 2.3 (ref 0.85–1.13)
INR BLD: 2.31 (ref 0.85–1.13)
INR BLD: 2.32 (ref 0.85–1.13)
INR BLD: 2.36 (ref 0.85–1.13)
INR BLD: 2.37 (ref 0.85–1.13)
INR BLD: 2.4 (ref 0.85–1.13)
INR BLD: 2.41 (ref 0.85–1.13)
INR BLD: 2.46 (ref 0.85–1.13)
INR BLD: 2.54 (ref 0.85–1.13)
INR BLD: 2.55 (ref 0.85–1.13)
INR BLD: 2.61 (ref 0.85–1.13)
INR BLD: 2.66 (ref 0.85–1.13)
INR BLD: 2.72 (ref 0.85–1.13)
INR BLD: 2.72 (ref 0.85–1.13)
INR BLD: 2.74 (ref 0.85–1.13)
INR BLD: 2.77 (ref 0.85–1.13)
INR BLD: 2.78 (ref 0.85–1.13)
INR BLD: 2.82 (ref 0.85–1.13)
INR BLD: 2.83 (ref 0.85–1.13)
INR BLD: 2.88 (ref 0.85–1.13)
INR BLD: 2.89 (ref 0.85–1.13)
INR BLD: 2.96 (ref 0.85–1.13)
INR BLD: 3.08 (ref 0.85–1.13)
INR BLD: 3.09 (ref 0.85–1.13)
INR BLD: 3.13
INR BLD: 3.54 (ref 0.85–1.13)
IRON: 173 UG/DL (ref 50–170)
KETONES, URINE: NEGATIVE
LACTIC ACID, SEPSIS: 1.6 MMOL/L (ref 0.5–1.9)
LACTIC ACID: 0.7 MMOL/L (ref 0.5–2.2)
LACTIC ACID: 1.2 MMOL/L (ref 0.5–2.2)
LACTIC ACID: 1.3 MMOL/L (ref 0.5–2.2)
LACTIC ACID: 2.2 MMOL/L (ref 0.5–2.2)
LD, FLUID: 84 U/L
LD: 377 U/L (ref 100–190)
LEUKOCYTE EST, POC: ABNORMAL
LEUKOCYTE ESTERASE, URINE: ABNORMAL
LEUKOCYTE ESTERASE, URINE: ABNORMAL
LYMPHOCYTES # BLD: 11.2 %
LYMPHOCYTES # BLD: 14.9 %
LYMPHOCYTES # BLD: 16.3 %
LYMPHOCYTES # BLD: 17.5 %
LYMPHOCYTES # BLD: 17.9 %
LYMPHOCYTES # BLD: 19.7 %
LYMPHOCYTES # BLD: 21 %
LYMPHOCYTES # BLD: 22.8 %
LYMPHOCYTES # BLD: 5 %
LYMPHOCYTES # BLD: 5.9 %
LYMPHOCYTES # BLD: 7.8 %
LYMPHOCYTES # BLD: 9.3 %
LYMPHOCYTES # BLD: 9.5 %
LYMPHOCYTES ABSOLUTE: 0.3 THOU/MM3 (ref 1–4.8)
LYMPHOCYTES ABSOLUTE: 0.6 THOU/MM3 (ref 1–4.8)
LYMPHOCYTES ABSOLUTE: 0.7 THOU/MM3 (ref 1–4.8)
LYMPHOCYTES ABSOLUTE: 0.7 THOU/MM3 (ref 1–4.8)
LYMPHOCYTES ABSOLUTE: 0.8 THOU/MM3 (ref 1–4.8)
LYMPHOCYTES ABSOLUTE: 0.8 THOU/MM3 (ref 1–4.8)
LYMPHOCYTES ABSOLUTE: 1 THOU/MM3 (ref 1–4.8)
LYMPHOCYTES ABSOLUTE: 1.1 THOU/MM3 (ref 1–4.8)
LYMPHOCYTES ABSOLUTE: 1.2 THOU/MM3 (ref 1–4.8)
LYMPHOCYTES ABSOLUTE: 1.6 THOU/MM3 (ref 1–4.8)
MAGNESIUM: 1.7 MG/DL (ref 1.6–2.4)
MAGNESIUM: 1.8 MG/DL (ref 1.6–2.4)
MAGNESIUM: 1.9 MG/DL (ref 1.6–2.4)
MAGNESIUM: 2 MG/DL (ref 1.6–2.4)
MAGNESIUM: 2 MG/DL (ref 1.6–2.4)
MAGNESIUM: 2.1 MG/DL (ref 1.6–2.4)
MAGNESIUM: 2.2 MG/DL (ref 1.6–2.4)
MAGNESIUM: 2.3 MG/DL (ref 1.6–2.4)
MAGNESIUM: 2.3 MG/DL (ref 1.6–2.4)
MAGNESIUM: 2.4 MG/DL (ref 1.6–2.4)
MCH RBC QN AUTO: 28.8 PG (ref 26–33)
MCH RBC QN AUTO: 29.1 PG (ref 26–33)
MCH RBC QN AUTO: 29.4 PG (ref 26–33)
MCH RBC QN AUTO: 29.8 PG (ref 26–33)
MCH RBC QN AUTO: 29.9 PG (ref 26–33)
MCH RBC QN AUTO: 30.1 PG (ref 26–33)
MCH RBC QN AUTO: 30.1 PG (ref 26–33)
MCH RBC QN AUTO: 30.2 PG (ref 26–33)
MCH RBC QN AUTO: 30.3 PG (ref 26–33)
MCH RBC QN AUTO: 30.5 PG (ref 26–33)
MCH RBC QN AUTO: 30.5 PG (ref 26–33)
MCH RBC QN AUTO: 30.6 PG (ref 26–33)
MCH RBC QN AUTO: 30.7 PG (ref 26–33)
MCH RBC QN AUTO: 30.8 PG (ref 26–33)
MCH RBC QN AUTO: 30.8 PG (ref 26–33)
MCH RBC QN AUTO: 30.9 PG (ref 26–33)
MCH RBC QN AUTO: 31 PG (ref 26–33)
MCH RBC QN AUTO: 31 PG (ref 26–33)
MCH RBC QN AUTO: 31.1 PG (ref 26–33)
MCH RBC QN AUTO: 31.1 PG (ref 26–33)
MCH RBC QN AUTO: 31.2 PG (ref 26–33)
MCH RBC QN AUTO: 31.3 PG (ref 26–33)
MCH RBC QN AUTO: 31.4 PG (ref 26–33)
MCH RBC QN AUTO: 31.5 PG (ref 26–33)
MCH RBC QN AUTO: 31.6 PG (ref 26–33)
MCHC RBC AUTO-ENTMCNC: 28.7 GM/DL (ref 32.2–35.5)
MCHC RBC AUTO-ENTMCNC: 28.8 GM/DL (ref 32.2–35.5)
MCHC RBC AUTO-ENTMCNC: 28.8 GM/DL (ref 32.2–35.5)
MCHC RBC AUTO-ENTMCNC: 28.9 GM/DL (ref 32.2–35.5)
MCHC RBC AUTO-ENTMCNC: 28.9 GM/DL (ref 32.2–35.5)
MCHC RBC AUTO-ENTMCNC: 29.2 GM/DL (ref 32.2–35.5)
MCHC RBC AUTO-ENTMCNC: 29.2 GM/DL (ref 32.2–35.5)
MCHC RBC AUTO-ENTMCNC: 29.3 GM/DL (ref 32.2–35.5)
MCHC RBC AUTO-ENTMCNC: 29.4 GM/DL (ref 32.2–35.5)
MCHC RBC AUTO-ENTMCNC: 29.5 GM/DL (ref 32.2–35.5)
MCHC RBC AUTO-ENTMCNC: 29.6 GM/DL (ref 32.2–35.5)
MCHC RBC AUTO-ENTMCNC: 29.6 GM/DL (ref 32.2–35.5)
MCHC RBC AUTO-ENTMCNC: 29.7 GM/DL (ref 32.2–35.5)
MCHC RBC AUTO-ENTMCNC: 29.8 GM/DL (ref 32.2–35.5)
MCHC RBC AUTO-ENTMCNC: 29.9 GM/DL (ref 32.2–35.5)
MCHC RBC AUTO-ENTMCNC: 30 GM/DL (ref 32.2–35.5)
MCHC RBC AUTO-ENTMCNC: 30.2 GM/DL (ref 32.2–35.5)
MCHC RBC AUTO-ENTMCNC: 30.2 GM/DL (ref 32.2–35.5)
MCHC RBC AUTO-ENTMCNC: 30.3 GM/DL (ref 32.2–35.5)
MCHC RBC AUTO-ENTMCNC: 30.3 GM/DL (ref 32.2–35.5)
MCHC RBC AUTO-ENTMCNC: 30.4 GM/DL (ref 32.2–35.5)
MCHC RBC AUTO-ENTMCNC: 30.5 GM/DL (ref 32.2–35.5)
MCHC RBC AUTO-ENTMCNC: 30.6 GM/DL (ref 32.2–35.5)
MCHC RBC AUTO-ENTMCNC: 30.8 GM/DL (ref 32.2–35.5)
MCHC RBC AUTO-ENTMCNC: 30.9 GM/DL (ref 32.2–35.5)
MCHC RBC AUTO-ENTMCNC: 31 GM/DL (ref 32.2–35.5)
MCHC RBC AUTO-ENTMCNC: 31.2 GM/DL (ref 32.2–35.5)
MCHC RBC AUTO-ENTMCNC: 31.2 GM/DL (ref 32.2–35.5)
MCHC RBC AUTO-ENTMCNC: 31.3 GM/DL (ref 32.2–35.5)
MCHC RBC AUTO-ENTMCNC: 31.4 GM/DL (ref 32.2–35.5)
MCHC RBC AUTO-ENTMCNC: 31.5 GM/DL (ref 32.2–35.5)
MCHC RBC AUTO-ENTMCNC: 31.8 GM/DL (ref 32.2–35.5)
MCV RBC AUTO: 100 FL (ref 81–99)
MCV RBC AUTO: 100 FL (ref 81–99)
MCV RBC AUTO: 100.3 FL (ref 81–99)
MCV RBC AUTO: 100.3 FL (ref 81–99)
MCV RBC AUTO: 100.6 FL (ref 81–99)
MCV RBC AUTO: 101 FL (ref 81–99)
MCV RBC AUTO: 101 FL (ref 81–99)
MCV RBC AUTO: 101.6 FL (ref 81–99)
MCV RBC AUTO: 101.8 FL (ref 81–99)
MCV RBC AUTO: 102 FL (ref 81–99)
MCV RBC AUTO: 102.2 FL (ref 81–99)
MCV RBC AUTO: 102.6 FL (ref 81–99)
MCV RBC AUTO: 103 FL (ref 81–99)
MCV RBC AUTO: 103.2 FL (ref 81–99)
MCV RBC AUTO: 104.1 FL (ref 81–99)
MCV RBC AUTO: 104.5 FL (ref 81–99)
MCV RBC AUTO: 104.6 FL (ref 81–99)
MCV RBC AUTO: 105.2 FL (ref 81–99)
MCV RBC AUTO: 105.8 FL (ref 81–99)
MCV RBC AUTO: 105.8 FL (ref 81–99)
MCV RBC AUTO: 106.6 FL (ref 81–99)
MCV RBC AUTO: 107 FL (ref 81–99)
MCV RBC AUTO: 107 FL (ref 81–99)
MCV RBC AUTO: 107.6 FL (ref 81–99)
MCV RBC AUTO: 108.2 FL (ref 81–99)
MCV RBC AUTO: 95.5 FL (ref 81–99)
MCV RBC AUTO: 96 FL (ref 81–99)
MCV RBC AUTO: 96.9 FL (ref 81–99)
MCV RBC AUTO: 97.4 FL (ref 81–99)
MCV RBC AUTO: 97.7 FL (ref 81–99)
MCV RBC AUTO: 98.1 FL (ref 81–99)
MCV RBC AUTO: 99.4 FL (ref 81–99)
MISC. #1 REFERENCE GROUP TEST: NORMAL
MISCELLANEOUS 2: ABNORMAL
MISCELLANEOUS 2: ABNORMAL
MISCELLANEOUS LAB TEST RESULT: ABNORMAL
MONOCYTES # BLD: 1 %
MONOCYTES # BLD: 10.4 %
MONOCYTES # BLD: 11.1 %
MONOCYTES # BLD: 11.7 %
MONOCYTES # BLD: 15.7 %
MONOCYTES # BLD: 5.7 %
MONOCYTES # BLD: 6.4 %
MONOCYTES # BLD: 7.6 %
MONOCYTES # BLD: 7.7 %
MONOCYTES # BLD: 8.3 %
MONOCYTES # BLD: 8.4 %
MONOCYTES # BLD: 9 %
MONOCYTES # BLD: 9.2 %
MONOCYTES ABSOLUTE: 0 THOU/MM3 (ref 0.4–1.3)
MONOCYTES ABSOLUTE: 0.4 THOU/MM3 (ref 0.4–1.3)
MONOCYTES ABSOLUTE: 0.5 THOU/MM3 (ref 0.4–1.3)
MONOCYTES ABSOLUTE: 0.5 THOU/MM3 (ref 0.4–1.3)
MONOCYTES ABSOLUTE: 0.6 THOU/MM3 (ref 0.4–1.3)
MONOCYTES ABSOLUTE: 0.6 THOU/MM3 (ref 0.4–1.3)
MONOCYTES ABSOLUTE: 0.7 THOU/MM3 (ref 0.4–1.3)
MONOCYTES ABSOLUTE: 0.7 THOU/MM3 (ref 0.4–1.3)
MONOCYTES ABSOLUTE: 1 THOU/MM3 (ref 0.4–1.3)
MONOCYTES ABSOLUTE: 1 THOU/MM3 (ref 0.4–1.3)
MONOCYTES ABSOLUTE: 1.2 THOU/MM3 (ref 0.4–1.3)
MONONUCLEAR CELLS BODY FLUID: 80.8 %
MRSA SCREEN RT-PCR: NEGATIVE
MRSA SCREEN: NORMAL
NITRITE, URINE: NEGATIVE
NUCLEATED RED BLOOD CELLS: 0 /100 WBC
NUCLEATED RED BLOOD CELLS: 1 /100 WBC
NUCLEATED RED BLOOD CELLS: 2 /100 WBC
O2 SATURATION: 83 %
ORGANISM: ABNORMAL
OSMOLALITY CALCULATION: 274.4 MOSMOL/KG (ref 275–300)
OSMOLALITY CALCULATION: 282.9 MOSMOL/KG (ref 275–300)
OSMOLALITY CALCULATION: 289.8 MOSMOL/KG (ref 275–300)
PATHOLOGIST REVIEW: ABNORMAL
PATHOLOGIST REVIEW: NORMAL
PCO2: 45 MMHG (ref 35–45)
PH BLOOD GAS: 7.41 (ref 7.35–7.45)
PH FLUID: 7.71
PH UA: 5 (ref 5–9)
PLATELET # BLD: 113 THOU/MM3 (ref 130–400)
PLATELET # BLD: 115 THOU/MM3 (ref 130–400)
PLATELET # BLD: 117 THOU/MM3 (ref 130–400)
PLATELET # BLD: 123 THOU/MM3 (ref 130–400)
PLATELET # BLD: 127 THOU/MM3 (ref 130–400)
PLATELET # BLD: 132 THOU/MM3 (ref 130–400)
PLATELET # BLD: 135 THOU/MM3 (ref 130–400)
PLATELET # BLD: 135 THOU/MM3 (ref 130–400)
PLATELET # BLD: 137 THOU/MM3 (ref 130–400)
PLATELET # BLD: 139 THOU/MM3 (ref 130–400)
PLATELET # BLD: 149 THOU/MM3 (ref 130–400)
PLATELET # BLD: 149 THOU/MM3 (ref 130–400)
PLATELET # BLD: 151 THOU/MM3 (ref 130–400)
PLATELET # BLD: 153 THOU/MM3 (ref 130–400)
PLATELET # BLD: 155 THOU/MM3 (ref 130–400)
PLATELET # BLD: 156 THOU/MM3 (ref 130–400)
PLATELET # BLD: 158 THOU/MM3 (ref 130–400)
PLATELET # BLD: 160 THOU/MM3 (ref 130–400)
PLATELET # BLD: 160 THOU/MM3 (ref 130–400)
PLATELET # BLD: 163 THOU/MM3 (ref 130–400)
PLATELET # BLD: 173 THOU/MM3 (ref 130–400)
PLATELET # BLD: 180 THOU/MM3 (ref 130–400)
PLATELET # BLD: 183 THOU/MM3 (ref 130–400)
PLATELET # BLD: 190 THOU/MM3 (ref 130–400)
PLATELET # BLD: 192 THOU/MM3 (ref 130–400)
PLATELET # BLD: 203 THOU/MM3 (ref 130–400)
PLATELET # BLD: 212 THOU/MM3 (ref 130–400)
PLATELET # BLD: 217 THOU/MM3 (ref 130–400)
PLATELET # BLD: 219 THOU/MM3 (ref 130–400)
PLATELET # BLD: 232 THOU/MM3 (ref 130–400)
PLATELET # BLD: 234 THOU/MM3 (ref 130–400)
PLATELET # BLD: 262 THOU/MM3 (ref 130–400)
PLATELET # BLD: 85 THOU/MM3 (ref 130–400)
PLATELET # BLD: 96 THOU/MM3 (ref 130–400)
PMV BLD AUTO: 10.2 FL (ref 9.4–12.4)
PMV BLD AUTO: 10.2 FL (ref 9.4–12.4)
PMV BLD AUTO: 10.3 FL (ref 9.4–12.4)
PMV BLD AUTO: 10.4 FL (ref 9.4–12.4)
PMV BLD AUTO: 10.5 FL (ref 9.4–12.4)
PMV BLD AUTO: 10.6 FL (ref 9.4–12.4)
PMV BLD AUTO: 10.7 FL (ref 9.4–12.4)
PMV BLD AUTO: 10.8 FL (ref 9.4–12.4)
PMV BLD AUTO: 10.9 FL (ref 9.4–12.4)
PMV BLD AUTO: 11 FL (ref 9.4–12.4)
PMV BLD AUTO: 11.1 FL (ref 9.4–12.4)
PMV BLD AUTO: 11.2 FL (ref 9.4–12.4)
PMV BLD AUTO: 11.2 FL (ref 9.4–12.4)
PMV BLD AUTO: 11.3 FL (ref 9.4–12.4)
PMV BLD AUTO: 11.4 FL (ref 9.4–12.4)
PMV BLD AUTO: 11.7 FL (ref 9.4–12.4)
PMV BLD AUTO: 9.9 FL (ref 9.4–12.4)
PO2: 47 MMHG (ref 71–104)
POC INR: 1.3 (ref 0.8–1.2)
POC INR: 1.7 (ref 0.8–1.2)
POC INR: 1.8 (ref 0.8–1.2)
POC INR: 1.9 (ref 0.8–1.2)
POC INR: 2 (ref 0.8–1.2)
POC INR: 2 (ref 0.8–1.2)
POC INR: 2.1 (ref 0.8–1.2)
POC INR: 2.1 (ref 0.8–1.2)
POC INR: 2.2 (ref 0.8–1.2)
POC INR: 2.3 (ref 0.8–1.2)
POC INR: 2.3 (ref 0.8–1.2)
POC INR: 2.4 (ref 0.8–1.2)
POC INR: 3.1 (ref 0.8–1.2)
POC INR: 4 (ref 0.8–1.2)
POIKILOCYTES: ABNORMAL
POLYMORPHONUCLEAR CELLS BODY FLUID: 19.2 %
POTASSIUM REFLEX MAGNESIUM: 4.4 MEQ/L (ref 3.5–5.2)
POTASSIUM REFLEX MAGNESIUM: 4.6 MEQ/L (ref 3.5–5.2)
POTASSIUM REFLEX MAGNESIUM: 4.8 MEQ/L (ref 3.5–5.2)
POTASSIUM REFLEX MAGNESIUM: 5 MEQ/L (ref 3.5–5.2)
POTASSIUM REFLEX MAGNESIUM: 5.7 MEQ/L (ref 3.5–5.2)
POTASSIUM SERPL-SCNC: 3.4 MEQ/L (ref 3.5–5.2)
POTASSIUM SERPL-SCNC: 3.5 MEQ/L (ref 3.5–5.2)
POTASSIUM SERPL-SCNC: 3.6 MEQ/L (ref 3.5–5.2)
POTASSIUM SERPL-SCNC: 3.6 MEQ/L (ref 3.5–5.2)
POTASSIUM SERPL-SCNC: 3.7 MEQ/L (ref 3.5–5.2)
POTASSIUM SERPL-SCNC: 3.8 MEQ/L (ref 3.5–5.2)
POTASSIUM SERPL-SCNC: 3.9 MEQ/L (ref 3.5–5.2)
POTASSIUM SERPL-SCNC: 4 MEQ/L (ref 3.5–5.2)
POTASSIUM SERPL-SCNC: 4.1 MEQ/L (ref 3.5–5.2)
POTASSIUM SERPL-SCNC: 4.2 MEQ/L (ref 3.5–5.2)
POTASSIUM SERPL-SCNC: 4.2 MEQ/L (ref 3.5–5.2)
POTASSIUM SERPL-SCNC: 4.3 MEQ/L (ref 3.5–5.2)
POTASSIUM SERPL-SCNC: 4.5 MEQ/L (ref 3.5–5.2)
POTASSIUM SERPL-SCNC: 4.5 MEQ/L (ref 3.5–5.2)
POTASSIUM SERPL-SCNC: 4.7 MEQ/L (ref 3.5–5.2)
POTASSIUM SERPL-SCNC: 4.8 MEQ/L (ref 3.5–5.2)
POTASSIUM SERPL-SCNC: 4.8 MEQ/L (ref 3.5–5.2)
POTASSIUM SERPL-SCNC: 4.9 MEQ/L (ref 3.5–5.2)
PRO-BNP: 4159 PG/ML (ref 0–1800)
PRO-BNP: 5432 PG/ML (ref 0–1800)
PRO-BNP: 5608 PG/ML (ref 0–1800)
PRO-BNP: 5862 PG/ML (ref 0–1800)
PRO-BNP: 5895 PG/ML (ref 0–1800)
PRO-BNP: 6830 PG/ML (ref 0–1800)
PRO-BNP: 9460 PG/ML (ref 0–1800)
PRO-BNP: 9817 PG/ML (ref 0–1800)
PRO-BNP: ABNORMAL PG/ML (ref 0–1800)
PROCALCITONIN: 0.1 NG/ML (ref 0.01–0.09)
PROCALCITONIN: 0.1 NG/ML (ref 0.01–0.09)
PROCALCITONIN: 0.24 NG/ML (ref 0.01–0.09)
PROCALCITONIN: 0.29 NG/ML (ref 0.01–0.09)
PROCALCITONIN: 2.66 NG/ML (ref 0.01–0.09)
PROTEIN FLUID: 1 GM/DL
PROTEIN UA: NEGATIVE
PROTEIN UA: NEGATIVE
PROTEIN UA: NEGATIVE MG/DL
RBC # BLD: 2.23 MILL/MM3 (ref 4.2–5.4)
RBC # BLD: 2.28 MILL/MM3 (ref 4.2–5.4)
RBC # BLD: 2.31 MILL/MM3 (ref 4.2–5.4)
RBC # BLD: 2.41 MILL/MM3 (ref 4.2–5.4)
RBC # BLD: 2.44 MILL/MM3 (ref 4.2–5.4)
RBC # BLD: 2.48 MILL/MM3 (ref 4.2–5.4)
RBC # BLD: 2.51 MILL/MM3 (ref 4.2–5.4)
RBC # BLD: 2.57 MILL/MM3 (ref 4.2–5.4)
RBC # BLD: 2.59 MILL/MM3 (ref 4.2–5.4)
RBC # BLD: 2.62 MILL/MM3 (ref 4.2–5.4)
RBC # BLD: 2.68 MILL/MM3 (ref 4.2–5.4)
RBC # BLD: 2.72 MILL/MM3 (ref 4.2–5.4)
RBC # BLD: 2.77 MILL/MM3 (ref 4.2–5.4)
RBC # BLD: 2.82 MILL/MM3 (ref 4.2–5.4)
RBC # BLD: 2.84 MILL/MM3 (ref 4.2–5.4)
RBC # BLD: 2.88 MILL/MM3 (ref 4.2–5.4)
RBC # BLD: 2.91 MILL/MM3 (ref 4.2–5.4)
RBC # BLD: 3.01 MILL/MM3 (ref 4.2–5.4)
RBC # BLD: 3.02 MILL/MM3 (ref 4.2–5.4)
RBC # BLD: 3.04 MILL/MM3 (ref 4.2–5.4)
RBC # BLD: 3.04 MILL/MM3 (ref 4.2–5.4)
RBC # BLD: 3.05 MILL/MM3 (ref 4.2–5.4)
RBC # BLD: 3.06 MILL/MM3 (ref 4.2–5.4)
RBC # BLD: 3.06 MILL/MM3 (ref 4.2–5.4)
RBC # BLD: 3.08 MILL/MM3 (ref 4.2–5.4)
RBC # BLD: 3.09 MILL/MM3 (ref 4.2–5.4)
RBC # BLD: 3.11 MILL/MM3 (ref 4.2–5.4)
RBC # BLD: 3.19 MILL/MM3 (ref 4.2–5.4)
RBC # BLD: 3.36 MILL/MM3 (ref 4.2–5.4)
RBC # BLD: 3.48 MILL/MM3 (ref 4.2–5.4)
RBC # BLD: 3.54 MILL/MM3 (ref 4.2–5.4)
RBC # BLD: 3.54 MILL/MM3 (ref 4.2–5.4)
RBC # BLD: 3.78 MILL/MM3 (ref 4.2–5.4)
RBC # BLD: 3.99 MILL/MM3 (ref 4.2–5.4)
RBC # BLD: 4.2 MILL/MM3 (ref 4.2–5.4)
RBC # BLD: 4.62 MILL/MM3 (ref 4.2–5.4)
RBC URINE: ABNORMAL /HPF
RENAL EPITHELIAL, UA: ABNORMAL
REVIEWED BY: NORMAL
RH FACTOR: NORMAL
RH FACTOR: NORMAL
SARS-COV-2 ANTIBODY, TOTAL: NEGATIVE
SARS-COV-2, NAAT: NOT DETECTED
SCAN OF BLOOD SMEAR: NORMAL
SEG NEUTROPHILS: 65.6 %
SEG NEUTROPHILS: 66.6 %
SEG NEUTROPHILS: 66.6 %
SEG NEUTROPHILS: 68.6 %
SEG NEUTROPHILS: 71 %
SEG NEUTROPHILS: 71.2 %
SEG NEUTROPHILS: 73 %
SEG NEUTROPHILS: 74.4 %
SEG NEUTROPHILS: 80.2 %
SEG NEUTROPHILS: 84 %
SEG NEUTROPHILS: 84.5 %
SEG NEUTROPHILS: 87.8 %
SEG NEUTROPHILS: 89.6 %
SEGMENTED NEUTROPHILS ABSOLUTE COUNT: 14.3 THOU/MM3 (ref 1.8–7.7)
SEGMENTED NEUTROPHILS ABSOLUTE COUNT: 3 THOU/MM3 (ref 1.8–7.7)
SEGMENTED NEUTROPHILS ABSOLUTE COUNT: 3.4 THOU/MM3 (ref 1.8–7.7)
SEGMENTED NEUTROPHILS ABSOLUTE COUNT: 3.5 THOU/MM3 (ref 1.8–7.7)
SEGMENTED NEUTROPHILS ABSOLUTE COUNT: 3.9 THOU/MM3 (ref 1.8–7.7)
SEGMENTED NEUTROPHILS ABSOLUTE COUNT: 4 THOU/MM3 (ref 1.8–7.7)
SEGMENTED NEUTROPHILS ABSOLUTE COUNT: 4.6 THOU/MM3 (ref 1.8–7.7)
SEGMENTED NEUTROPHILS ABSOLUTE COUNT: 4.7 THOU/MM3 (ref 1.8–7.7)
SEGMENTED NEUTROPHILS ABSOLUTE COUNT: 4.7 THOU/MM3 (ref 1.8–7.7)
SEGMENTED NEUTROPHILS ABSOLUTE COUNT: 4.8 THOU/MM3 (ref 1.8–7.7)
SEGMENTED NEUTROPHILS ABSOLUTE COUNT: 6.5 THOU/MM3 (ref 1.8–7.7)
SEGMENTED NEUTROPHILS ABSOLUTE COUNT: 7.7 THOU/MM3 (ref 1.8–7.7)
SEGMENTED NEUTROPHILS ABSOLUTE COUNT: 9.6 THOU/MM3 (ref 1.8–7.7)
SMEAR REVIEW: NORMAL
SODIUM BLD-SCNC: 132 MEQ/L (ref 135–145)
SODIUM BLD-SCNC: 135 MEQ/L (ref 135–145)
SODIUM BLD-SCNC: 136 MEQ/L (ref 135–145)
SODIUM BLD-SCNC: 137 MEQ/L (ref 135–145)
SODIUM BLD-SCNC: 138 MEQ/L (ref 135–145)
SODIUM BLD-SCNC: 139 MEQ/L (ref 135–145)
SODIUM BLD-SCNC: 140 MEQ/L (ref 135–145)
SODIUM BLD-SCNC: 141 MEQ/L (ref 135–145)
SODIUM BLD-SCNC: 142 MEQ/L (ref 135–145)
SODIUM BLD-SCNC: 143 MEQ/L (ref 135–145)
SODIUM BLD-SCNC: 143 MEQ/L (ref 135–145)
SODIUM BLD-SCNC: 144 MEQ/L (ref 135–145)
SODIUM BLD-SCNC: 146 MEQ/L (ref 135–145)
SODIUM BLD-SCNC: 146 MEQ/L (ref 135–145)
SOURCE, BLOOD GAS: ABNORMAL
SPECIFIC GRAVITY UA: 1.01 (ref 1–1.03)
SPECIFIC GRAVITY, URINE: 1.01 (ref 1–1.03)
SPECIFIC GRAVITY, URINE: 1.01 (ref 1–1.03)
SPECIMEN: NORMAL
STOMATOCYTES: ABNORMAL
TOTAL CK: 34 U/L (ref 30–135)
TOTAL IRON BINDING CAPACITY: 239 UG/DL (ref 171–450)
TOTAL NUCLEATED CELLS BODY FLUID: 409 /CUMM (ref 0–500)
TOTAL PROTEIN: 5.1 G/DL (ref 6.1–8)
TOTAL PROTEIN: 5.3 G/DL (ref 6.1–8)
TOTAL PROTEIN: 5.7 G/DL (ref 6.1–8)
TOTAL PROTEIN: 6.2 G/DL (ref 6.1–8)
TOTAL PROTEIN: 6.3 G/DL (ref 6.1–8)
TOTAL VOLUME RECEIVED BODY FLUID: 73 ML
TROPONIN T: 0.02 NG/ML
TROPONIN T: 0.07 NG/ML
TROPONIN T: 0.09 NG/ML
TROPONIN T: < 0.01 NG/ML
TROPONIN T: < 0.01 NG/ML
URIC ACID: 6.4 MG/DL (ref 2.4–5.7)
URINE CULTURE REFLEX: ABNORMAL
URINE CULTURE, ROUTINE: ABNORMAL
UROBILINOGEN, URINE: 0.2 EU/DL (ref 0–1)
VANCOMYCIN RESISTANT ENTEROCOCCUS: NEGATIVE
VANCOMYCIN RESISTANT ENTEROCOCCUS: POSITIVE
VITAMIN B-12: 451 PG/ML (ref 211–911)
VITAMIN D 25-HYDROXY: 44 NG/ML (ref 30–100)
WBC # BLD: 10.4 THOU/MM3 (ref 4.8–10.8)
WBC # BLD: 10.4 THOU/MM3 (ref 4.8–10.8)
WBC # BLD: 11.4 THOU/MM3 (ref 4.8–10.8)
WBC # BLD: 11.6 THOU/MM3 (ref 4.8–10.8)
WBC # BLD: 15.1 THOU/MM3 (ref 4.8–10.8)
WBC # BLD: 16.3 THOU/MM3 (ref 4.8–10.8)
WBC # BLD: 3.9 THOU/MM3 (ref 4.8–10.8)
WBC # BLD: 4.5 THOU/MM3 (ref 4.8–10.8)
WBC # BLD: 4.6 THOU/MM3 (ref 4.8–10.8)
WBC # BLD: 4.7 THOU/MM3 (ref 4.8–10.8)
WBC # BLD: 4.7 THOU/MM3 (ref 4.8–10.8)
WBC # BLD: 5.1 THOU/MM3 (ref 4.8–10.8)
WBC # BLD: 5.2 THOU/MM3 (ref 4.8–10.8)
WBC # BLD: 5.3 THOU/MM3 (ref 4.8–10.8)
WBC # BLD: 5.4 THOU/MM3 (ref 4.8–10.8)
WBC # BLD: 5.6 THOU/MM3 (ref 4.8–10.8)
WBC # BLD: 5.7 THOU/MM3 (ref 4.8–10.8)
WBC # BLD: 5.8 THOU/MM3 (ref 4.8–10.8)
WBC # BLD: 5.8 THOU/MM3 (ref 4.8–10.8)
WBC # BLD: 5.9 THOU/MM3 (ref 4.8–10.8)
WBC # BLD: 6.2 THOU/MM3 (ref 4.8–10.8)
WBC # BLD: 6.2 THOU/MM3 (ref 4.8–10.8)
WBC # BLD: 6.3 THOU/MM3 (ref 4.8–10.8)
WBC # BLD: 6.5 THOU/MM3 (ref 4.8–10.8)
WBC # BLD: 6.6 THOU/MM3 (ref 4.8–10.8)
WBC # BLD: 6.9 THOU/MM3 (ref 4.8–10.8)
WBC # BLD: 7 THOU/MM3 (ref 4.8–10.8)
WBC # BLD: 7.1 THOU/MM3 (ref 4.8–10.8)
WBC # BLD: 7.1 THOU/MM3 (ref 4.8–10.8)
WBC # BLD: 7.2 THOU/MM3 (ref 4.8–10.8)
WBC # BLD: 7.4 THOU/MM3 (ref 4.8–10.8)
WBC # BLD: 7.7 THOU/MM3 (ref 4.8–10.8)
WBC # BLD: 7.7 THOU/MM3 (ref 4.8–10.8)
WBC # BLD: 7.8 THOU/MM3 (ref 4.8–10.8)
WBC UA: ABNORMAL /HPF
YEAST: ABNORMAL

## 2020-01-01 PROCEDURE — 71045 X-RAY EXAM CHEST 1 VIEW: CPT

## 2020-01-01 PROCEDURE — 36415 COLL VENOUS BLD VENIPUNCTURE: CPT

## 2020-01-01 PROCEDURE — 97110 THERAPEUTIC EXERCISES: CPT

## 2020-01-01 PROCEDURE — 6370000000 HC RX 637 (ALT 250 FOR IP): Performed by: PHYSICIAN ASSISTANT

## 2020-01-01 PROCEDURE — 6360000002 HC RX W HCPCS: Performed by: INTERNAL MEDICINE

## 2020-01-01 PROCEDURE — 86140 C-REACTIVE PROTEIN: CPT

## 2020-01-01 PROCEDURE — 94640 AIRWAY INHALATION TREATMENT: CPT

## 2020-01-01 PROCEDURE — 80048 BASIC METABOLIC PNL TOTAL CA: CPT

## 2020-01-01 PROCEDURE — 6370000000 HC RX 637 (ALT 250 FOR IP): Performed by: PHYSICAL MEDICINE & REHABILITATION

## 2020-01-01 PROCEDURE — 2700000000 HC OXYGEN THERAPY PER DAY

## 2020-01-01 PROCEDURE — 80053 COMPREHEN METABOLIC PANEL: CPT

## 2020-01-01 PROCEDURE — 82948 REAGENT STRIP/BLOOD GLUCOSE: CPT

## 2020-01-01 PROCEDURE — 85027 COMPLETE CBC AUTOMATED: CPT

## 2020-01-01 PROCEDURE — 2709999900 HC NON-CHARGEABLE SUPPLY

## 2020-01-01 PROCEDURE — 1180000000 HC REHAB R&B

## 2020-01-01 PROCEDURE — 87147 CULTURE TYPE IMMUNOLOGIC: CPT

## 2020-01-01 PROCEDURE — 6360000002 HC RX W HCPCS: Performed by: PHYSICAL MEDICINE & REHABILITATION

## 2020-01-01 PROCEDURE — 36556 INSERT NON-TUNNEL CV CATH: CPT

## 2020-01-01 PROCEDURE — 83605 ASSAY OF LACTIC ACID: CPT

## 2020-01-01 PROCEDURE — 97535 SELF CARE MNGMENT TRAINING: CPT

## 2020-01-01 PROCEDURE — 85610 PROTHROMBIN TIME: CPT

## 2020-01-01 PROCEDURE — 85730 THROMBOPLASTIN TIME PARTIAL: CPT

## 2020-01-01 PROCEDURE — 87081 CULTURE SCREEN ONLY: CPT

## 2020-01-01 PROCEDURE — 36416 COLLJ CAPILLARY BLOOD SPEC: CPT

## 2020-01-01 PROCEDURE — 97166 OT EVAL MOD COMPLEX 45 MIN: CPT

## 2020-01-01 PROCEDURE — 71046 X-RAY EXAM CHEST 2 VIEWS: CPT

## 2020-01-01 PROCEDURE — 94761 N-INVAS EAR/PLS OXIMETRY MLT: CPT

## 2020-01-01 PROCEDURE — 6370000000 HC RX 637 (ALT 250 FOR IP): Performed by: PHARMACIST

## 2020-01-01 PROCEDURE — 2580000003 HC RX 258: Performed by: STUDENT IN AN ORGANIZED HEALTH CARE EDUCATION/TRAINING PROGRAM

## 2020-01-01 PROCEDURE — 2060000000 HC ICU INTERMEDIATE R&B

## 2020-01-01 PROCEDURE — 6370000000 HC RX 637 (ALT 250 FOR IP): Performed by: INTERNAL MEDICINE

## 2020-01-01 PROCEDURE — 99222 1ST HOSP IP/OBS MODERATE 55: CPT | Performed by: PHYSICIAN ASSISTANT

## 2020-01-01 PROCEDURE — 85018 HEMOGLOBIN: CPT

## 2020-01-01 PROCEDURE — 6370000000 HC RX 637 (ALT 250 FOR IP)

## 2020-01-01 PROCEDURE — 97116 GAIT TRAINING THERAPY: CPT

## 2020-01-01 PROCEDURE — 6360000002 HC RX W HCPCS: Performed by: NURSE PRACTITIONER

## 2020-01-01 PROCEDURE — 82945 GLUCOSE OTHER FLUID: CPT

## 2020-01-01 PROCEDURE — 81001 URINALYSIS AUTO W/SCOPE: CPT

## 2020-01-01 PROCEDURE — 2500000003 HC RX 250 WO HCPCS: Performed by: NURSE PRACTITIONER

## 2020-01-01 PROCEDURE — 99233 SBSQ HOSP IP/OBS HIGH 50: CPT | Performed by: INTERNAL MEDICINE

## 2020-01-01 PROCEDURE — 6370000000 HC RX 637 (ALT 250 FOR IP): Performed by: NURSE PRACTITIONER

## 2020-01-01 PROCEDURE — 2580000003 HC RX 258: Performed by: PHYSICIAN ASSISTANT

## 2020-01-01 PROCEDURE — 99211 OFF/OP EST MAY X REQ PHY/QHP: CPT

## 2020-01-01 PROCEDURE — 51798 US URINE CAPACITY MEASURE: CPT

## 2020-01-01 PROCEDURE — 82607 VITAMIN B-12: CPT

## 2020-01-01 PROCEDURE — 2000000000 HC ICU R&B

## 2020-01-01 PROCEDURE — 87040 BLOOD CULTURE FOR BACTERIA: CPT

## 2020-01-01 PROCEDURE — 2580000003 HC RX 258: Performed by: NURSE PRACTITIONER

## 2020-01-01 PROCEDURE — 83735 ASSAY OF MAGNESIUM: CPT

## 2020-01-01 PROCEDURE — 6360000002 HC RX W HCPCS: Performed by: STUDENT IN AN ORGANIZED HEALTH CARE EDUCATION/TRAINING PROGRAM

## 2020-01-01 PROCEDURE — 6360000002 HC RX W HCPCS: Performed by: PHYSICIAN ASSISTANT

## 2020-01-01 PROCEDURE — 6370000000 HC RX 637 (ALT 250 FOR IP): Performed by: STUDENT IN AN ORGANIZED HEALTH CARE EDUCATION/TRAINING PROGRAM

## 2020-01-01 PROCEDURE — 96365 THER/PROPH/DIAG IV INF INIT: CPT

## 2020-01-01 PROCEDURE — 87500 VANOMYCIN DNA AMP PROBE: CPT

## 2020-01-01 PROCEDURE — 99223 1ST HOSP IP/OBS HIGH 75: CPT | Performed by: NUCLEAR MEDICINE

## 2020-01-01 PROCEDURE — 94760 N-INVAS EAR/PLS OXIMETRY 1: CPT

## 2020-01-01 PROCEDURE — 99223 1ST HOSP IP/OBS HIGH 75: CPT | Performed by: FAMILY MEDICINE

## 2020-01-01 PROCEDURE — 86162 COMPLEMENT TOTAL (CH50): CPT

## 2020-01-01 PROCEDURE — 83615 LACTATE (LD) (LDH) ENZYME: CPT

## 2020-01-01 PROCEDURE — 99232 SBSQ HOSP IP/OBS MODERATE 35: CPT | Performed by: INTERNAL MEDICINE

## 2020-01-01 PROCEDURE — 2500000003 HC RX 250 WO HCPCS: Performed by: EMERGENCY MEDICINE

## 2020-01-01 PROCEDURE — 2500000003 HC RX 250 WO HCPCS: Performed by: INTERNAL MEDICINE

## 2020-01-01 PROCEDURE — 97530 THERAPEUTIC ACTIVITIES: CPT

## 2020-01-01 PROCEDURE — 6360000002 HC RX W HCPCS: Performed by: FAMILY MEDICINE

## 2020-01-01 PROCEDURE — 80162 ASSAY OF DIGOXIN TOTAL: CPT

## 2020-01-01 PROCEDURE — 99233 SBSQ HOSP IP/OBS HIGH 50: CPT | Performed by: FAMILY MEDICINE

## 2020-01-01 PROCEDURE — 96361 HYDRATE IV INFUSION ADD-ON: CPT

## 2020-01-01 PROCEDURE — 99233 SBSQ HOSP IP/OBS HIGH 50: CPT | Performed by: HOSPITALIST

## 2020-01-01 PROCEDURE — 94667 MNPJ CHEST WALL 1ST: CPT

## 2020-01-01 PROCEDURE — 84145 PROCALCITONIN (PCT): CPT

## 2020-01-01 PROCEDURE — 87641 MR-STAPH DNA AMP PROBE: CPT

## 2020-01-01 PROCEDURE — 93971 EXTREMITY STUDY: CPT

## 2020-01-01 PROCEDURE — 86901 BLOOD TYPING SEROLOGIC RH(D): CPT

## 2020-01-01 PROCEDURE — 93005 ELECTROCARDIOGRAM TRACING: CPT | Performed by: INTERNAL MEDICINE

## 2020-01-01 PROCEDURE — 84484 ASSAY OF TROPONIN QUANT: CPT

## 2020-01-01 PROCEDURE — 85385 FIBRINOGEN ANTIGEN: CPT

## 2020-01-01 PROCEDURE — 94660 CPAP INITIATION&MGMT: CPT

## 2020-01-01 PROCEDURE — 73610 X-RAY EXAM OF ANKLE: CPT

## 2020-01-01 PROCEDURE — 86900 BLOOD TYPING SEROLOGIC ABO: CPT

## 2020-01-01 PROCEDURE — 85025 COMPLETE CBC W/AUTO DIFF WBC: CPT

## 2020-01-01 PROCEDURE — 1200000003 HC TELEMETRY R&B

## 2020-01-01 PROCEDURE — 84155 ASSAY OF PROTEIN SERUM: CPT

## 2020-01-01 PROCEDURE — APPSS30 APP SPLIT SHARED TIME 16-30 MINUTES: Performed by: NURSE PRACTITIONER

## 2020-01-01 PROCEDURE — 96360 HYDRATION IV INFUSION INIT: CPT

## 2020-01-01 PROCEDURE — 97162 PT EVAL MOD COMPLEX 30 MIN: CPT

## 2020-01-01 PROCEDURE — 85610 PROTHROMBIN TIME: CPT | Performed by: PHARMACIST

## 2020-01-01 PROCEDURE — 96368 THER/DIAG CONCURRENT INF: CPT

## 2020-01-01 PROCEDURE — 83880 ASSAY OF NATRIURETIC PEPTIDE: CPT

## 2020-01-01 PROCEDURE — 93005 ELECTROCARDIOGRAM TRACING: CPT | Performed by: EMERGENCY MEDICINE

## 2020-01-01 PROCEDURE — 99233 SBSQ HOSP IP/OBS HIGH 50: CPT | Performed by: PHYSICIAN ASSISTANT

## 2020-01-01 PROCEDURE — 36592 COLLECT BLOOD FROM PICC: CPT

## 2020-01-01 PROCEDURE — 93010 ELECTROCARDIOGRAM REPORT: CPT | Performed by: INTERNAL MEDICINE

## 2020-01-01 PROCEDURE — 99291 CRITICAL CARE FIRST HOUR: CPT | Performed by: NURSE PRACTITIONER

## 2020-01-01 PROCEDURE — C9113 INJ PANTOPRAZOLE SODIUM, VIA: HCPCS | Performed by: INTERNAL MEDICINE

## 2020-01-01 PROCEDURE — 82803 BLOOD GASES ANY COMBINATION: CPT

## 2020-01-01 PROCEDURE — 82746 ASSAY OF FOLIC ACID SERUM: CPT

## 2020-01-01 PROCEDURE — 94669 MECHANICAL CHEST WALL OSCILL: CPT

## 2020-01-01 PROCEDURE — 2580000003 HC RX 258: Performed by: INTERNAL MEDICINE

## 2020-01-01 PROCEDURE — 82306 VITAMIN D 25 HYDROXY: CPT

## 2020-01-01 PROCEDURE — 85014 HEMATOCRIT: CPT

## 2020-01-01 PROCEDURE — APPNB180 APP NON BILLABLE TIME > 60 MINS: Performed by: NURSE PRACTITIONER

## 2020-01-01 PROCEDURE — 93010 ELECTROCARDIOGRAM REPORT: CPT | Performed by: NUCLEAR MEDICINE

## 2020-01-01 PROCEDURE — 93005 ELECTROCARDIOGRAM TRACING: CPT | Performed by: FAMILY MEDICINE

## 2020-01-01 PROCEDURE — 99211 OFF/OP EST MAY X REQ PHY/QHP: CPT | Performed by: PHARMACIST

## 2020-01-01 PROCEDURE — 84157 ASSAY OF PROTEIN OTHER: CPT

## 2020-01-01 PROCEDURE — 97163 PT EVAL HIGH COMPLEX 45 MIN: CPT

## 2020-01-01 PROCEDURE — 99222 1ST HOSP IP/OBS MODERATE 55: CPT | Performed by: INTERNAL MEDICINE

## 2020-01-01 PROCEDURE — 82150 ASSAY OF AMYLASE: CPT

## 2020-01-01 PROCEDURE — 82784 ASSAY IGA/IGD/IGG/IGM EACH: CPT

## 2020-01-01 PROCEDURE — 02HV33Z INSERTION OF INFUSION DEVICE INTO SUPERIOR VENA CAVA, PERCUTANEOUS APPROACH: ICD-10-PCS | Performed by: HOSPITALIST

## 2020-01-01 PROCEDURE — 99284 EMERGENCY DEPT VISIT MOD MDM: CPT

## 2020-01-01 PROCEDURE — U0002 COVID-19 LAB TEST NON-CDC: HCPCS

## 2020-01-01 PROCEDURE — 89050 BODY FLUID CELL COUNT: CPT

## 2020-01-01 PROCEDURE — 94668 MNPJ CHEST WALL SBSQ: CPT

## 2020-01-01 PROCEDURE — 82787 IGG 1 2 3 OR 4 EACH: CPT

## 2020-01-01 PROCEDURE — 83540 ASSAY OF IRON: CPT

## 2020-01-01 PROCEDURE — 36600 WITHDRAWAL OF ARTERIAL BLOOD: CPT

## 2020-01-01 PROCEDURE — C1751 CATH, INF, PER/CENT/MIDLINE: HCPCS

## 2020-01-01 PROCEDURE — 97161 PT EVAL LOW COMPLEX 20 MIN: CPT

## 2020-01-01 PROCEDURE — 76604 US EXAM CHEST: CPT

## 2020-01-01 PROCEDURE — 32555 ASPIRATE PLEURA W/ IMAGING: CPT

## 2020-01-01 PROCEDURE — 99239 HOSP IP/OBS DSCHRG MGMT >30: CPT | Performed by: PHYSICIAN ASSISTANT

## 2020-01-01 PROCEDURE — 87086 URINE CULTURE/COLONY COUNT: CPT

## 2020-01-01 PROCEDURE — 71250 CT THORAX DX C-: CPT

## 2020-01-01 PROCEDURE — 99223 1ST HOSP IP/OBS HIGH 75: CPT | Performed by: INTERNAL MEDICINE

## 2020-01-01 PROCEDURE — 84550 ASSAY OF BLOOD/URIC ACID: CPT

## 2020-01-01 PROCEDURE — P9041 ALBUMIN (HUMAN),5%, 50ML: HCPCS | Performed by: PHYSICIAN ASSISTANT

## 2020-01-01 PROCEDURE — 96375 TX/PRO/DX INJ NEW DRUG ADDON: CPT

## 2020-01-01 PROCEDURE — 86923 COMPATIBILITY TEST ELECTRIC: CPT

## 2020-01-01 PROCEDURE — 80076 HEPATIC FUNCTION PANEL: CPT

## 2020-01-01 PROCEDURE — 6360000002 HC RX W HCPCS

## 2020-01-01 PROCEDURE — 36000 PLACE NEEDLE IN VEIN: CPT | Performed by: NURSE PRACTITIONER

## 2020-01-01 PROCEDURE — 99291 CRITICAL CARE FIRST HOUR: CPT | Performed by: INTERNAL MEDICINE

## 2020-01-01 PROCEDURE — 85362 FIBRIN DEGRADATION PRODUCTS: CPT

## 2020-01-01 PROCEDURE — 2580000003 HC RX 258: Performed by: FAMILY MEDICINE

## 2020-01-01 PROCEDURE — 83550 IRON BINDING TEST: CPT

## 2020-01-01 PROCEDURE — 99231 SBSQ HOSP IP/OBS SF/LOW 25: CPT | Performed by: FAMILY MEDICINE

## 2020-01-01 PROCEDURE — 87106 FUNGI IDENTIFICATION YEAST: CPT

## 2020-01-01 PROCEDURE — 86850 RBC ANTIBODY SCREEN: CPT

## 2020-01-01 PROCEDURE — 86317 IMMUNOASSAY INFECTIOUS AGENT: CPT

## 2020-01-01 PROCEDURE — 82728 ASSAY OF FERRITIN: CPT

## 2020-01-01 PROCEDURE — 82140 ASSAY OF AMMONIA: CPT

## 2020-01-01 PROCEDURE — 51701 INSERT BLADDER CATHETER: CPT

## 2020-01-01 PROCEDURE — 6370000000 HC RX 637 (ALT 250 FOR IP): Performed by: FAMILY MEDICINE

## 2020-01-01 PROCEDURE — 36416 COLLJ CAPILLARY BLOOD SPEC: CPT | Performed by: PHARMACIST

## 2020-01-01 PROCEDURE — 73700 CT LOWER EXTREMITY W/O DYE: CPT

## 2020-01-01 PROCEDURE — 99285 EMERGENCY DEPT VISIT HI MDM: CPT

## 2020-01-01 PROCEDURE — 2580000003 HC RX 258: Performed by: EMERGENCY MEDICINE

## 2020-01-01 PROCEDURE — 93922 UPR/L XTREMITY ART 2 LEVELS: CPT

## 2020-01-01 PROCEDURE — 88112 CYTOPATH CELL ENHANCE TECH: CPT

## 2020-01-01 PROCEDURE — 33264 RMVL & RPLCMT DFB GEN MLT LD: CPT | Performed by: INTERNAL MEDICINE

## 2020-01-01 PROCEDURE — P9017 PLASMA 1 DONOR FRZ W/IN 8 HR: HCPCS

## 2020-01-01 PROCEDURE — 82550 ASSAY OF CK (CPK): CPT

## 2020-01-01 PROCEDURE — 36430 TRANSFUSION BLD/BLD COMPNT: CPT

## 2020-01-01 PROCEDURE — 82533 TOTAL CORTISOL: CPT

## 2020-01-01 PROCEDURE — C1882 AICD, OTHER THAN SING/DUAL: HCPCS

## 2020-01-01 PROCEDURE — 83986 ASSAY PH BODY FLUID NOS: CPT

## 2020-01-01 PROCEDURE — 88305 TISSUE EXAM BY PATHOLOGIST: CPT

## 2020-01-01 PROCEDURE — 99239 HOSP IP/OBS DSCHRG MGMT >30: CPT | Performed by: INTERNAL MEDICINE

## 2020-01-01 PROCEDURE — 0W993ZZ DRAINAGE OF RIGHT PLEURAL CAVITY, PERCUTANEOUS APPROACH: ICD-10-PCS | Performed by: RADIOLOGY

## 2020-01-01 PROCEDURE — 70450 CT HEAD/BRAIN W/O DYE: CPT

## 2020-01-01 PROCEDURE — 96366 THER/PROPH/DIAG IV INF ADDON: CPT

## 2020-01-01 PROCEDURE — 2500000003 HC RX 250 WO HCPCS

## 2020-01-01 PROCEDURE — 6360000002 HC RX W HCPCS: Performed by: EMERGENCY MEDICINE

## 2020-01-01 PROCEDURE — 73630 X-RAY EXAM OF FOOT: CPT

## 2020-01-01 PROCEDURE — 93307 TTE W/O DOPPLER COMPLETE: CPT

## 2020-01-01 PROCEDURE — 86769 SARS-COV-2 COVID-19 ANTIBODY: CPT

## 2020-01-01 PROCEDURE — 0098U HC RESPIRPTHGN MULT REV TRANS & AMP PRB TECH 14 TRGT: CPT

## 2020-01-01 PROCEDURE — 99232 SBSQ HOSP IP/OBS MODERATE 35: CPT | Performed by: PHYSICIAN ASSISTANT

## 2020-01-01 PROCEDURE — 93005 ELECTROCARDIOGRAM TRACING: CPT | Performed by: PHYSICIAN ASSISTANT

## 2020-01-01 RX ORDER — POLYETHYLENE GLYCOL 3350 17 G/17G
17 POWDER, FOR SOLUTION ORAL DAILY PRN
Status: DISCONTINUED | OUTPATIENT
Start: 2020-01-01 | End: 2020-01-01 | Stop reason: HOSPADM

## 2020-01-01 RX ORDER — WARFARIN SODIUM 1 MG/1
1 TABLET ORAL
Status: COMPLETED | OUTPATIENT
Start: 2020-01-01 | End: 2020-01-01

## 2020-01-01 RX ORDER — WARFARIN SODIUM 2 MG/1
2 TABLET ORAL
Status: COMPLETED | OUTPATIENT
Start: 2020-01-01 | End: 2020-01-01

## 2020-01-01 RX ORDER — WARFARIN SODIUM 1 MG/1
1 TABLET ORAL ONCE
Status: COMPLETED | OUTPATIENT
Start: 2020-01-01 | End: 2020-01-01

## 2020-01-01 RX ORDER — 0.9 % SODIUM CHLORIDE 0.9 %
1000 INTRAVENOUS SOLUTION INTRAVENOUS ONCE
Status: COMPLETED | OUTPATIENT
Start: 2020-01-01 | End: 2020-01-01

## 2020-01-01 RX ORDER — SODIUM CHLORIDE 0.9 % (FLUSH) 0.9 %
10 SYRINGE (ML) INJECTION PRN
Status: DISCONTINUED | OUTPATIENT
Start: 2020-01-01 | End: 2020-01-01 | Stop reason: SDUPTHER

## 2020-01-01 RX ORDER — HYDROCODONE BITARTRATE AND ACETAMINOPHEN 5; 325 MG/1; MG/1
1 TABLET ORAL EVERY 4 HOURS PRN
Status: DISCONTINUED | OUTPATIENT
Start: 2020-01-01 | End: 2020-01-01 | Stop reason: HOSPADM

## 2020-01-01 RX ORDER — BUMETANIDE 0.25 MG/ML
0.5 INJECTION, SOLUTION INTRAMUSCULAR; INTRAVENOUS EVERY 12 HOURS
Status: DISCONTINUED | OUTPATIENT
Start: 2020-01-01 | End: 2020-01-01 | Stop reason: HOSPADM

## 2020-01-01 RX ORDER — POTASSIUM CHLORIDE 20 MEQ/1
20 TABLET, EXTENDED RELEASE ORAL 2 TIMES DAILY WITH MEALS
Status: DISCONTINUED | OUTPATIENT
Start: 2020-01-01 | End: 2020-01-01

## 2020-01-01 RX ORDER — TRAZODONE HYDROCHLORIDE 50 MG/1
50 TABLET ORAL NIGHTLY
Status: CANCELLED | OUTPATIENT
Start: 2020-01-01

## 2020-01-01 RX ORDER — LINEZOLID 2 MG/ML
600 INJECTION, SOLUTION INTRAVENOUS EVERY 12 HOURS
Status: DISCONTINUED | OUTPATIENT
Start: 2020-01-01 | End: 2020-01-01

## 2020-01-01 RX ORDER — WARFARIN SODIUM 2 MG/1
2 TABLET ORAL ONCE
Status: COMPLETED | OUTPATIENT
Start: 2020-01-01 | End: 2020-01-01

## 2020-01-01 RX ORDER — BUMETANIDE 1 MG/1
2 TABLET ORAL 2 TIMES DAILY
Status: DISCONTINUED | OUTPATIENT
Start: 2020-01-01 | End: 2020-01-01

## 2020-01-01 RX ORDER — LIDOCAINE 50 MG/G
OINTMENT TOPICAL PRN
Status: DISCONTINUED | OUTPATIENT
Start: 2020-01-01 | End: 2020-01-01 | Stop reason: HOSPADM

## 2020-01-01 RX ORDER — SODIUM CHLORIDE 0.9 % (FLUSH) 0.9 %
10 SYRINGE (ML) INJECTION EVERY 12 HOURS SCHEDULED
Status: DISCONTINUED | OUTPATIENT
Start: 2020-01-01 | End: 2020-01-01 | Stop reason: HOSPADM

## 2020-01-01 RX ORDER — BUMETANIDE 0.25 MG/ML
INJECTION, SOLUTION INTRAMUSCULAR; INTRAVENOUS
Status: DISPENSED
Start: 2020-01-01 | End: 2020-01-01

## 2020-01-01 RX ORDER — CYCLOBENZAPRINE HCL 10 MG
10 TABLET ORAL 3 TIMES DAILY PRN
Status: CANCELLED | OUTPATIENT
Start: 2020-01-01

## 2020-01-01 RX ORDER — GABAPENTIN 100 MG/1
100 CAPSULE ORAL 3 TIMES DAILY
Qty: 90 CAPSULE | Refills: 0 | Status: SHIPPED | OUTPATIENT
Start: 2020-01-01 | End: 2020-01-01

## 2020-01-01 RX ORDER — BUDESONIDE 0.5 MG/2ML
500 INHALANT ORAL 2 TIMES DAILY
Status: CANCELLED | OUTPATIENT
Start: 2020-01-01

## 2020-01-01 RX ORDER — METOPROLOL SUCCINATE 25 MG/1
25 TABLET, EXTENDED RELEASE ORAL DAILY
Status: CANCELLED | OUTPATIENT
Start: 2020-01-01

## 2020-01-01 RX ORDER — FENTANYL CITRATE 50 UG/ML
INJECTION, SOLUTION INTRAMUSCULAR; INTRAVENOUS
Status: DISCONTINUED
Start: 2020-01-01 | End: 2020-01-01

## 2020-01-01 RX ORDER — BISACODYL 10 MG
10 SUPPOSITORY, RECTAL RECTAL ONCE
Status: COMPLETED | OUTPATIENT
Start: 2020-01-01 | End: 2020-01-01

## 2020-01-01 RX ORDER — BUMETANIDE 0.25 MG/ML
2 INJECTION, SOLUTION INTRAMUSCULAR; INTRAVENOUS ONCE
Status: COMPLETED | OUTPATIENT
Start: 2020-01-01 | End: 2020-01-01

## 2020-01-01 RX ORDER — ALBUTEROL SULFATE 2.5 MG/3ML
2.5 SOLUTION RESPIRATORY (INHALATION) EVERY 4 HOURS PRN
Status: CANCELLED | OUTPATIENT
Start: 2020-01-01

## 2020-01-01 RX ORDER — BENZONATATE 100 MG/1
100 CAPSULE ORAL 3 TIMES DAILY PRN
Qty: 21 CAPSULE | Refills: 0 | Status: SHIPPED | OUTPATIENT
Start: 2020-01-01 | End: 2020-01-01

## 2020-01-01 RX ORDER — LEVALBUTEROL INHALATION SOLUTION 0.63 MG/3ML
1 SOLUTION RESPIRATORY (INHALATION) EVERY 4 HOURS PRN
Status: DISCONTINUED | OUTPATIENT
Start: 2020-01-01 | End: 2020-01-01

## 2020-01-01 RX ORDER — WARFARIN SODIUM 1 MG/1
1 TABLET ORAL DAILY
Status: DISCONTINUED | OUTPATIENT
Start: 2020-01-01 | End: 2020-01-01 | Stop reason: SDUPTHER

## 2020-01-01 RX ORDER — HEPARIN SODIUM 1000 [USP'U]/ML
40 INJECTION, SOLUTION INTRAVENOUS; SUBCUTANEOUS PRN
Status: DISCONTINUED | OUTPATIENT
Start: 2020-01-01 | End: 2020-01-01 | Stop reason: DRUGHIGH

## 2020-01-01 RX ORDER — DOBUTAMINE HYDROCHLORIDE 200 MG/100ML
5 INJECTION INTRAVENOUS CONTINUOUS
Status: DISCONTINUED | OUTPATIENT
Start: 2020-01-01 | End: 2020-01-01

## 2020-01-01 RX ORDER — ASPIRIN 81 MG/1
81 TABLET ORAL DAILY
Status: DISCONTINUED | OUTPATIENT
Start: 2020-01-01 | End: 2020-01-01 | Stop reason: HOSPADM

## 2020-01-01 RX ORDER — PANTOPRAZOLE SODIUM 40 MG/1
40 TABLET, DELAYED RELEASE ORAL
Status: CANCELLED | OUTPATIENT
Start: 2020-01-01

## 2020-01-01 RX ORDER — HEPARIN SODIUM 1000 [USP'U]/ML
80 INJECTION, SOLUTION INTRAVENOUS; SUBCUTANEOUS PRN
Status: DISCONTINUED | OUTPATIENT
Start: 2020-01-01 | End: 2020-01-01

## 2020-01-01 RX ORDER — SENNA PLUS 8.6 MG/1
1 TABLET ORAL NIGHTLY
Status: DISCONTINUED | OUTPATIENT
Start: 2020-01-01 | End: 2020-01-01

## 2020-01-01 RX ORDER — LEVOCETIRIZINE DIHYDROCHLORIDE 5 MG/1
5 TABLET, FILM COATED ORAL NIGHTLY
Status: DISCONTINUED | OUTPATIENT
Start: 2020-01-01 | End: 2020-01-01 | Stop reason: HOSPADM

## 2020-01-01 RX ORDER — FUROSEMIDE 40 MG/1
40 TABLET ORAL 2 TIMES DAILY
Status: DISCONTINUED | OUTPATIENT
Start: 2020-01-01 | End: 2020-01-01

## 2020-01-01 RX ORDER — DIGOXIN 125 MCG
125 TABLET ORAL EVERY OTHER DAY
Status: CANCELLED | OUTPATIENT
Start: 2020-01-01

## 2020-01-01 RX ORDER — HYDROCORTISONE ACETATE 25 MG/1
25 SUPPOSITORY RECTAL 2 TIMES DAILY
Status: DISCONTINUED | OUTPATIENT
Start: 2020-01-01 | End: 2020-01-01 | Stop reason: HOSPADM

## 2020-01-01 RX ORDER — FUROSEMIDE 10 MG/ML
40 INJECTION INTRAMUSCULAR; INTRAVENOUS 2 TIMES DAILY
Status: DISCONTINUED | OUTPATIENT
Start: 2020-01-01 | End: 2020-01-01

## 2020-01-01 RX ORDER — SODIUM CHLORIDE 0.9 % (FLUSH) 0.9 %
10 SYRINGE (ML) INJECTION PRN
Status: DISCONTINUED | OUTPATIENT
Start: 2020-01-01 | End: 2020-01-01 | Stop reason: HOSPADM

## 2020-01-01 RX ORDER — HYDROCODONE BITARTRATE AND ACETAMINOPHEN 5; 325 MG/1; MG/1
2 TABLET ORAL EVERY 4 HOURS PRN
Status: CANCELLED | OUTPATIENT
Start: 2020-01-01

## 2020-01-01 RX ORDER — HYDROXYZINE HYDROCHLORIDE 25 MG/1
25 TABLET, FILM COATED ORAL 3 TIMES DAILY PRN
Status: DISCONTINUED | OUTPATIENT
Start: 2020-01-01 | End: 2020-01-01 | Stop reason: HOSPADM

## 2020-01-01 RX ORDER — DIGOXIN 125 MCG
125 TABLET ORAL EVERY OTHER DAY
Status: DISCONTINUED | OUTPATIENT
Start: 2020-01-01 | End: 2020-01-01 | Stop reason: HOSPADM

## 2020-01-01 RX ORDER — BUDESONIDE 0.5 MG/2ML
500 INHALANT ORAL 2 TIMES DAILY
Status: DISCONTINUED | OUTPATIENT
Start: 2020-01-01 | End: 2020-01-01 | Stop reason: HOSPADM

## 2020-01-01 RX ORDER — HYDROCORTISONE ACETATE 25 MG/1
25 SUPPOSITORY RECTAL 2 TIMES DAILY
Status: CANCELLED | OUTPATIENT
Start: 2020-01-01

## 2020-01-01 RX ORDER — SENNA PLUS 8.6 MG/1
1 TABLET ORAL 2 TIMES DAILY
Qty: 60 TABLET | Refills: 0 | Status: SHIPPED | OUTPATIENT
Start: 2020-01-01 | End: 2020-01-01

## 2020-01-01 RX ORDER — TRAZODONE HYDROCHLORIDE 50 MG/1
50 TABLET ORAL NIGHTLY
Status: DISCONTINUED | OUTPATIENT
Start: 2020-01-01 | End: 2020-01-01 | Stop reason: HOSPADM

## 2020-01-01 RX ORDER — LEVALBUTEROL INHALATION SOLUTION 0.63 MG/3ML
1 SOLUTION RESPIRATORY (INHALATION) EVERY 8 HOURS PRN
Status: DISCONTINUED | OUTPATIENT
Start: 2020-01-01 | End: 2020-01-01 | Stop reason: HOSPADM

## 2020-01-01 RX ORDER — BUMETANIDE 2 MG/1
2 TABLET ORAL 2 TIMES DAILY
Qty: 60 TABLET | Refills: 0 | Status: ON HOLD | OUTPATIENT
Start: 2020-01-01 | End: 2020-01-01 | Stop reason: HOSPADM

## 2020-01-01 RX ORDER — METOPROLOL SUCCINATE 25 MG/1
25 TABLET, EXTENDED RELEASE ORAL DAILY
Status: DISCONTINUED | OUTPATIENT
Start: 2020-01-01 | End: 2020-01-01 | Stop reason: HOSPADM

## 2020-01-01 RX ORDER — PANTOPRAZOLE SODIUM 40 MG/1
40 TABLET, DELAYED RELEASE ORAL
Status: DISCONTINUED | OUTPATIENT
Start: 2020-01-01 | End: 2020-01-01

## 2020-01-01 RX ORDER — POTASSIUM CHLORIDE 20 MEQ/1
20 TABLET, EXTENDED RELEASE ORAL 2 TIMES DAILY WITH MEALS
Status: DISCONTINUED | OUTPATIENT
Start: 2020-01-01 | End: 2020-01-01 | Stop reason: HOSPADM

## 2020-01-01 RX ORDER — HEPARIN SODIUM 1000 [USP'U]/ML
80 INJECTION, SOLUTION INTRAVENOUS; SUBCUTANEOUS PRN
Status: DISCONTINUED | OUTPATIENT
Start: 2020-01-01 | End: 2020-01-01 | Stop reason: DRUGHIGH

## 2020-01-01 RX ORDER — ONDANSETRON 4 MG/1
4 TABLET, ORALLY DISINTEGRATING ORAL EVERY 8 HOURS PRN
Qty: 15 TABLET | Refills: 0 | Status: SHIPPED | OUTPATIENT
Start: 2020-01-01 | End: 2020-01-01

## 2020-01-01 RX ORDER — HYDROCORTISONE ACETATE 25 MG/1
25 SUPPOSITORY RECTAL 2 TIMES DAILY PRN
Qty: 28 SUPPOSITORY | Refills: 1 | Status: SHIPPED | OUTPATIENT
Start: 2020-01-01

## 2020-01-01 RX ORDER — VIT E ACET/GLY/DIMETH/WATER
LOTION (ML) TOPICAL
Qty: 12 OZ | Refills: 1 | Status: SHIPPED | OUTPATIENT
Start: 2020-01-01

## 2020-01-01 RX ORDER — ACETAMINOPHEN 650 MG/1
650 SUPPOSITORY RECTAL EVERY 6 HOURS PRN
Status: DISCONTINUED | OUTPATIENT
Start: 2020-01-01 | End: 2020-01-01 | Stop reason: SDUPTHER

## 2020-01-01 RX ORDER — ACETAMINOPHEN 325 MG/1
650 TABLET ORAL EVERY 6 HOURS PRN
Status: DISCONTINUED | OUTPATIENT
Start: 2020-01-01 | End: 2020-01-01 | Stop reason: HOSPADM

## 2020-01-01 RX ORDER — LIDOCAINE HYDROCHLORIDE 10 MG/ML
5 INJECTION, SOLUTION EPIDURAL; INFILTRATION; INTRACAUDAL; PERINEURAL ONCE
Status: DISCONTINUED | OUTPATIENT
Start: 2020-01-01 | End: 2020-01-01 | Stop reason: HOSPADM

## 2020-01-01 RX ORDER — ONDANSETRON 2 MG/ML
4 INJECTION INTRAMUSCULAR; INTRAVENOUS EVERY 6 HOURS PRN
Status: DISCONTINUED | OUTPATIENT
Start: 2020-01-01 | End: 2020-01-01 | Stop reason: HOSPADM

## 2020-01-01 RX ORDER — LEVALBUTEROL INHALATION SOLUTION 1.25 MG/3ML
0.63 SOLUTION RESPIRATORY (INHALATION) EVERY 8 HOURS PRN
Status: CANCELLED | OUTPATIENT
Start: 2020-01-01

## 2020-01-01 RX ORDER — BUMETANIDE 1 MG/1
2 TABLET ORAL 2 TIMES DAILY
Status: DISCONTINUED | OUTPATIENT
Start: 2020-01-01 | End: 2020-01-01 | Stop reason: HOSPADM

## 2020-01-01 RX ORDER — ACETAMINOPHEN 650 MG/1
650 SUPPOSITORY RECTAL EVERY 6 HOURS PRN
Status: DISCONTINUED | OUTPATIENT
Start: 2020-01-01 | End: 2020-01-01 | Stop reason: HOSPADM

## 2020-01-01 RX ORDER — SENNA PLUS 8.6 MG/1
1 TABLET ORAL NIGHTLY
Status: DISCONTINUED | OUTPATIENT
Start: 2020-01-01 | End: 2020-01-01 | Stop reason: HOSPADM

## 2020-01-01 RX ORDER — 0.9 % SODIUM CHLORIDE 0.9 %
500 INTRAVENOUS SOLUTION INTRAVENOUS ONCE
Status: COMPLETED | OUTPATIENT
Start: 2020-01-01 | End: 2020-01-01

## 2020-01-01 RX ORDER — HEPARIN SODIUM 10000 [USP'U]/100ML
18 INJECTION, SOLUTION INTRAVENOUS CONTINUOUS
Status: DISCONTINUED | OUTPATIENT
Start: 2020-01-01 | End: 2020-01-01

## 2020-01-01 RX ORDER — POTASSIUM CHLORIDE 7.45 MG/ML
10 INJECTION INTRAVENOUS ONCE
Status: COMPLETED | OUTPATIENT
Start: 2020-01-01 | End: 2020-01-01

## 2020-01-01 RX ORDER — DOCUSATE SODIUM 100 MG/1
100 CAPSULE, LIQUID FILLED ORAL DAILY
Status: CANCELLED | OUTPATIENT
Start: 2020-01-01

## 2020-01-01 RX ORDER — WARFARIN SODIUM 3 MG/1
3 TABLET ORAL
Status: COMPLETED | OUTPATIENT
Start: 2020-01-01 | End: 2020-01-01

## 2020-01-01 RX ORDER — CETIRIZINE HYDROCHLORIDE 10 MG/1
10 TABLET ORAL DAILY
Status: DISCONTINUED | OUTPATIENT
Start: 2020-01-01 | End: 2020-01-01 | Stop reason: DRUGHIGH

## 2020-01-01 RX ORDER — HEPARIN SODIUM 10000 [USP'U]/100ML
18 INJECTION, SOLUTION INTRAVENOUS CONTINUOUS
Status: CANCELLED | OUTPATIENT
Start: 2020-01-01

## 2020-01-01 RX ORDER — ONDANSETRON HYDROCHLORIDE 8 MG/1
TABLET, FILM COATED ORAL DAILY PRN
COMMUNITY
Start: 2020-01-01

## 2020-01-01 RX ORDER — FUROSEMIDE 10 MG/ML
40 INJECTION INTRAMUSCULAR; INTRAVENOUS 2 TIMES DAILY
Status: CANCELLED | OUTPATIENT
Start: 2020-01-01

## 2020-01-01 RX ORDER — SACUBITRIL AND VALSARTAN 24; 26 MG/1; MG/1
2 TABLET, FILM COATED ORAL 2 TIMES DAILY
COMMUNITY
End: 2020-01-01 | Stop reason: DRUGHIGH

## 2020-01-01 RX ORDER — PANTOPRAZOLE SODIUM 40 MG/1
40 TABLET, DELAYED RELEASE ORAL
Status: DISCONTINUED | OUTPATIENT
Start: 2020-01-01 | End: 2020-01-01 | Stop reason: HOSPADM

## 2020-01-01 RX ORDER — BISACODYL 10 MG
10 SUPPOSITORY, RECTAL RECTAL DAILY PRN
Status: CANCELLED | OUTPATIENT
Start: 2020-01-01

## 2020-01-01 RX ORDER — ACETAMINOPHEN 325 MG/1
650 TABLET ORAL EVERY 4 HOURS PRN
Status: CANCELLED | OUTPATIENT
Start: 2020-01-01

## 2020-01-01 RX ORDER — CYCLOBENZAPRINE HCL 10 MG
10 TABLET ORAL 3 TIMES DAILY PRN
Status: DISCONTINUED | OUTPATIENT
Start: 2020-01-01 | End: 2020-01-01 | Stop reason: HOSPADM

## 2020-01-01 RX ORDER — HYDROCODONE BITARTRATE AND ACETAMINOPHEN 5; 325 MG/1; MG/1
1 TABLET ORAL EVERY 4 HOURS PRN
Status: CANCELLED | OUTPATIENT
Start: 2020-01-01

## 2020-01-01 RX ORDER — AMOXICILLIN AND CLAVULANATE POTASSIUM 875; 125 MG/1; MG/1
1 TABLET, FILM COATED ORAL 2 TIMES DAILY
Status: ON HOLD | COMMUNITY
End: 2020-01-01 | Stop reason: SDUPTHER

## 2020-01-01 RX ORDER — BUMETANIDE 0.25 MG/ML
1 INJECTION, SOLUTION INTRAMUSCULAR; INTRAVENOUS 2 TIMES DAILY
Status: DISCONTINUED | OUTPATIENT
Start: 2020-01-01 | End: 2020-01-01

## 2020-01-01 RX ORDER — DOCUSATE SODIUM 100 MG/1
100 CAPSULE, LIQUID FILLED ORAL 2 TIMES DAILY
Status: DISCONTINUED | OUTPATIENT
Start: 2020-01-01 | End: 2020-01-01 | Stop reason: HOSPADM

## 2020-01-01 RX ORDER — DOCUSATE SODIUM 100 MG/1
100 CAPSULE, LIQUID FILLED ORAL DAILY
Status: DISCONTINUED | OUTPATIENT
Start: 2020-01-01 | End: 2020-01-01 | Stop reason: HOSPADM

## 2020-01-01 RX ORDER — SODIUM CHLORIDE 0.9 % (FLUSH) 0.9 %
10 SYRINGE (ML) INJECTION EVERY 12 HOURS SCHEDULED
Status: DISCONTINUED | OUTPATIENT
Start: 2020-01-01 | End: 2020-01-01 | Stop reason: SDUPTHER

## 2020-01-01 RX ORDER — LEVALBUTEROL INHALATION SOLUTION 1.25 MG/3ML
1.25 SOLUTION RESPIRATORY (INHALATION) EVERY 8 HOURS PRN
Status: DISCONTINUED | OUTPATIENT
Start: 2020-01-01 | End: 2020-01-01 | Stop reason: HOSPADM

## 2020-01-01 RX ORDER — NITROGLYCERIN 20 MG/100ML
INJECTION INTRAVENOUS
Status: COMPLETED
Start: 2020-01-01 | End: 2020-01-01

## 2020-01-01 RX ORDER — IPRATROPIUM BROMIDE AND ALBUTEROL SULFATE 2.5; .5 MG/3ML; MG/3ML
1 SOLUTION RESPIRATORY (INHALATION) EVERY 4 HOURS
Status: DISCONTINUED | OUTPATIENT
Start: 2020-01-01 | End: 2020-01-01 | Stop reason: SDUPTHER

## 2020-01-01 RX ORDER — LINEZOLID 600 MG/1
600 TABLET, FILM COATED ORAL EVERY 12 HOURS SCHEDULED
Status: DISCONTINUED | OUTPATIENT
Start: 2020-01-01 | End: 2020-01-01 | Stop reason: HOSPADM

## 2020-01-01 RX ORDER — AMOXICILLIN AND CLAVULANATE POTASSIUM 875; 125 MG/1; MG/1
1 TABLET, FILM COATED ORAL 2 TIMES DAILY
COMMUNITY
Start: 2020-01-01 | End: 2020-01-01

## 2020-01-01 RX ORDER — 0.9 % SODIUM CHLORIDE 0.9 %
20 INTRAVENOUS SOLUTION INTRAVENOUS ONCE
Status: DISCONTINUED | OUTPATIENT
Start: 2020-01-01 | End: 2020-01-01 | Stop reason: HOSPADM

## 2020-01-01 RX ORDER — SENNA PLUS 8.6 MG/1
1 TABLET ORAL NIGHTLY
Status: CANCELLED | OUTPATIENT
Start: 2020-01-01

## 2020-01-01 RX ORDER — LEVALBUTEROL INHALATION SOLUTION 1.25 MG/3ML
0.63 SOLUTION RESPIRATORY (INHALATION) EVERY 8 HOURS PRN
Status: DISCONTINUED | OUTPATIENT
Start: 2020-01-01 | End: 2020-01-01 | Stop reason: HOSPADM

## 2020-01-01 RX ORDER — LIDOCAINE HYDROCHLORIDE 10 MG/ML
5 INJECTION, SOLUTION EPIDURAL; INFILTRATION; INTRACAUDAL; PERINEURAL ONCE
Status: DISCONTINUED | OUTPATIENT
Start: 2020-01-01 | End: 2020-01-01

## 2020-01-01 RX ORDER — HEPARIN SODIUM 5000 [USP'U]/ML
5000 INJECTION, SOLUTION INTRAVENOUS; SUBCUTANEOUS EVERY 8 HOURS SCHEDULED
Status: DISCONTINUED | OUTPATIENT
Start: 2020-01-01 | End: 2020-01-01 | Stop reason: ALTCHOICE

## 2020-01-01 RX ORDER — HEPARIN SODIUM 1000 [USP'U]/ML
80 INJECTION, SOLUTION INTRAVENOUS; SUBCUTANEOUS ONCE
Status: DISCONTINUED | OUTPATIENT
Start: 2020-01-01 | End: 2020-01-01

## 2020-01-01 RX ORDER — ALBUTEROL SULFATE 2.5 MG/3ML
2.5 SOLUTION RESPIRATORY (INHALATION)
Status: DISCONTINUED | OUTPATIENT
Start: 2020-01-01 | End: 2020-01-01 | Stop reason: HOSPADM

## 2020-01-01 RX ORDER — POLYETHYLENE GLYCOL 3350 17 G/17G
17 POWDER, FOR SOLUTION ORAL DAILY PRN
Status: CANCELLED | OUTPATIENT
Start: 2020-01-01

## 2020-01-01 RX ORDER — PROMETHAZINE HYDROCHLORIDE 25 MG/1
12.5 TABLET ORAL EVERY 6 HOURS PRN
Status: DISCONTINUED | OUTPATIENT
Start: 2020-01-01 | End: 2020-01-01 | Stop reason: HOSPADM

## 2020-01-01 RX ORDER — ALBUTEROL SULFATE 2.5 MG/3ML
2.5 SOLUTION RESPIRATORY (INHALATION) EVERY 4 HOURS PRN
Status: DISCONTINUED | OUTPATIENT
Start: 2020-01-01 | End: 2020-01-01

## 2020-01-01 RX ORDER — WARFARIN SODIUM 2.5 MG/1
2.5 TABLET ORAL DAILY
Status: DISCONTINUED | OUTPATIENT
Start: 2020-01-01 | End: 2020-01-01 | Stop reason: SDUPTHER

## 2020-01-01 RX ORDER — ARFORMOTEROL TARTRATE 15 UG/2ML
15 SOLUTION RESPIRATORY (INHALATION) 2 TIMES DAILY
Status: DISCONTINUED | OUTPATIENT
Start: 2020-01-01 | End: 2020-01-01 | Stop reason: HOSPADM

## 2020-01-01 RX ORDER — OMEPRAZOLE 20 MG/1
20 CAPSULE, DELAYED RELEASE ORAL 2 TIMES DAILY
Status: DISCONTINUED | OUTPATIENT
Start: 2020-01-01 | End: 2020-01-01

## 2020-01-01 RX ORDER — OSELTAMIVIR PHOSPHATE 30 MG/1
30 CAPSULE ORAL 2 TIMES DAILY
Status: COMPLETED | OUTPATIENT
Start: 2020-01-01 | End: 2020-01-01

## 2020-01-01 RX ORDER — FENTANYL CITRATE 50 UG/ML
25 INJECTION, SOLUTION INTRAMUSCULAR; INTRAVENOUS ONCE
Status: COMPLETED | OUTPATIENT
Start: 2020-01-01 | End: 2020-01-01

## 2020-01-01 RX ORDER — HEPARIN SODIUM 10000 [USP'U]/100ML
18 INJECTION, SOLUTION INTRAVENOUS CONTINUOUS
Status: DISCONTINUED | OUTPATIENT
Start: 2020-01-01 | End: 2020-01-01 | Stop reason: HOSPADM

## 2020-01-01 RX ORDER — BISACODYL 10 MG
10 SUPPOSITORY, RECTAL RECTAL 3 TIMES DAILY PRN
Status: DISCONTINUED | OUTPATIENT
Start: 2020-01-01 | End: 2020-01-01

## 2020-01-01 RX ORDER — WARFARIN SODIUM 3 MG/1
3 TABLET ORAL
Status: DISCONTINUED | OUTPATIENT
Start: 2020-01-01 | End: 2020-01-01

## 2020-01-01 RX ORDER — METHYLPREDNISOLONE SODIUM SUCCINATE 125 MG/2ML
60 INJECTION, POWDER, LYOPHILIZED, FOR SOLUTION INTRAMUSCULAR; INTRAVENOUS EVERY 6 HOURS
Status: COMPLETED | OUTPATIENT
Start: 2020-01-01 | End: 2020-01-01

## 2020-01-01 RX ORDER — HEPARIN SODIUM 1000 [USP'U]/ML
40 INJECTION, SOLUTION INTRAVENOUS; SUBCUTANEOUS PRN
Status: DISCONTINUED | OUTPATIENT
Start: 2020-01-01 | End: 2020-01-01 | Stop reason: ALTCHOICE

## 2020-01-01 RX ORDER — FENTANYL CITRATE 50 UG/ML
50 INJECTION, SOLUTION INTRAMUSCULAR; INTRAVENOUS ONCE
Status: COMPLETED | OUTPATIENT
Start: 2020-01-01 | End: 2020-01-01

## 2020-01-01 RX ORDER — PROMETHAZINE HYDROCHLORIDE 25 MG/1
12.5 TABLET ORAL EVERY 6 HOURS PRN
Status: DISCONTINUED | OUTPATIENT
Start: 2020-01-01 | End: 2020-01-01 | Stop reason: SDUPTHER

## 2020-01-01 RX ORDER — ACETAMINOPHEN 325 MG/1
650 TABLET ORAL EVERY 4 HOURS PRN
Status: DISCONTINUED | OUTPATIENT
Start: 2020-01-01 | End: 2020-01-01 | Stop reason: HOSPADM

## 2020-01-01 RX ORDER — GABAPENTIN 100 MG/1
100 CAPSULE ORAL 3 TIMES DAILY
Status: DISCONTINUED | OUTPATIENT
Start: 2020-01-01 | End: 2020-01-01 | Stop reason: HOSPADM

## 2020-01-01 RX ORDER — ALBUTEROL SULFATE 2.5 MG/3ML
2.5 SOLUTION RESPIRATORY (INHALATION) EVERY 4 HOURS
Status: COMPLETED | OUTPATIENT
Start: 2020-01-01 | End: 2020-01-01

## 2020-01-01 RX ORDER — SACUBITRIL AND VALSARTAN 49; 51 MG/1; MG/1
1 TABLET, FILM COATED ORAL 2 TIMES DAILY
Status: ON HOLD | COMMUNITY
End: 2020-01-01 | Stop reason: HOSPADM

## 2020-01-01 RX ORDER — ACETAMINOPHEN 325 MG/1
650 TABLET ORAL EVERY 6 HOURS PRN
Status: DISCONTINUED | OUTPATIENT
Start: 2020-01-01 | End: 2020-01-01 | Stop reason: SDUPTHER

## 2020-01-01 RX ORDER — SODIUM CHLORIDE 9 MG/ML
INJECTION, SOLUTION INTRAVENOUS CONTINUOUS
Status: DISCONTINUED | OUTPATIENT
Start: 2020-01-01 | End: 2020-01-01

## 2020-01-01 RX ORDER — LOPERAMIDE HYDROCHLORIDE 2 MG/1
2 CAPSULE ORAL 4 TIMES DAILY PRN
COMMUNITY

## 2020-01-01 RX ORDER — BISACODYL 10 MG
10 SUPPOSITORY, RECTAL RECTAL DAILY PRN
Status: DISCONTINUED | OUTPATIENT
Start: 2020-01-01 | End: 2020-01-01 | Stop reason: HOSPADM

## 2020-01-01 RX ORDER — ONDANSETRON 2 MG/ML
4 INJECTION INTRAMUSCULAR; INTRAVENOUS EVERY 6 HOURS PRN
Status: DISCONTINUED | OUTPATIENT
Start: 2020-01-01 | End: 2020-01-01 | Stop reason: SDUPTHER

## 2020-01-01 RX ORDER — LINEZOLID 600 MG/1
600 TABLET, FILM COATED ORAL EVERY 12 HOURS SCHEDULED
Status: DISPENSED | OUTPATIENT
Start: 2020-01-01 | End: 2020-01-01

## 2020-01-01 RX ORDER — FUROSEMIDE 40 MG/1
40 TABLET ORAL DAILY
Status: DISCONTINUED | OUTPATIENT
Start: 2020-01-01 | End: 2020-01-01

## 2020-01-01 RX ORDER — IPRATROPIUM BROMIDE AND ALBUTEROL SULFATE 2.5; .5 MG/3ML; MG/3ML
1 SOLUTION RESPIRATORY (INHALATION) ONCE
Status: COMPLETED | OUTPATIENT
Start: 2020-01-01 | End: 2020-01-01

## 2020-01-01 RX ORDER — BUMETANIDE 0.25 MG/ML
0.5 INJECTION, SOLUTION INTRAMUSCULAR; INTRAVENOUS ONCE
Status: COMPLETED | OUTPATIENT
Start: 2020-01-01 | End: 2020-01-01

## 2020-01-01 RX ORDER — POTASSIUM CHLORIDE 20 MEQ/1
20 TABLET, EXTENDED RELEASE ORAL
Status: DISCONTINUED | OUTPATIENT
Start: 2020-01-01 | End: 2020-01-01

## 2020-01-01 RX ORDER — MIDODRINE HYDROCHLORIDE 10 MG/1
10 TABLET ORAL 3 TIMES DAILY PRN
Status: DISCONTINUED | OUTPATIENT
Start: 2020-01-01 | End: 2020-01-01 | Stop reason: HOSPADM

## 2020-01-01 RX ORDER — BUMETANIDE 1 MG/1
1 TABLET ORAL 2 TIMES DAILY
Status: DISCONTINUED | OUTPATIENT
Start: 2020-01-01 | End: 2020-01-01 | Stop reason: HOSPADM

## 2020-01-01 RX ORDER — LACTOBACILLUS RHAMNOSUS GG 10B CELL
1 CAPSULE ORAL 2 TIMES DAILY WITH MEALS
Status: COMPLETED | OUTPATIENT
Start: 2020-01-01 | End: 2020-01-01

## 2020-01-01 RX ORDER — TRAMADOL HYDROCHLORIDE 50 MG/1
50 TABLET ORAL EVERY 6 HOURS PRN
COMMUNITY

## 2020-01-01 RX ORDER — GABAPENTIN 100 MG/1
100 CAPSULE ORAL 3 TIMES DAILY
Status: CANCELLED | OUTPATIENT
Start: 2020-01-01

## 2020-01-01 RX ORDER — LINEZOLID 600 MG/1
600 TABLET, FILM COATED ORAL EVERY 12 HOURS SCHEDULED
Status: CANCELLED | OUTPATIENT
Start: 2020-01-01 | End: 2020-01-01

## 2020-01-01 RX ORDER — BUMETANIDE 1 MG/1
1 TABLET ORAL 2 TIMES DAILY
Qty: 30 TABLET | Refills: 3 | Status: SHIPPED | OUTPATIENT
Start: 2020-01-01

## 2020-01-01 RX ORDER — ALBUMIN (HUMAN) 12.5 G/50ML
25 SOLUTION INTRAVENOUS ONCE
Status: DISCONTINUED | OUTPATIENT
Start: 2020-01-01 | End: 2020-01-01

## 2020-01-01 RX ORDER — 0.9 % SODIUM CHLORIDE 0.9 %
30 INTRAVENOUS SOLUTION INTRAVENOUS ONCE
Status: COMPLETED | OUTPATIENT
Start: 2020-01-01 | End: 2020-01-01

## 2020-01-01 RX ORDER — 0.9 % SODIUM CHLORIDE 0.9 %
500 INTRAVENOUS SOLUTION INTRAVENOUS ONCE
Status: DISCONTINUED | OUTPATIENT
Start: 2020-01-01 | End: 2020-01-01 | Stop reason: HOSPADM

## 2020-01-01 RX ORDER — HEPARIN SODIUM 10000 [USP'U]/100ML
19 INJECTION, SOLUTION INTRAVENOUS CONTINUOUS
Status: DISCONTINUED | OUTPATIENT
Start: 2020-01-01 | End: 2020-01-01

## 2020-01-01 RX ORDER — ARFORMOTEROL TARTRATE 15 UG/2ML
15 SOLUTION RESPIRATORY (INHALATION) 2 TIMES DAILY
Status: CANCELLED | OUTPATIENT
Start: 2020-01-01

## 2020-01-01 RX ORDER — PREDNISONE 20 MG/1
40 TABLET ORAL DAILY
Status: CANCELLED | OUTPATIENT
Start: 2020-01-01 | End: 2020-01-01

## 2020-01-01 RX ORDER — DOCUSATE SODIUM 100 MG/1
100 CAPSULE, LIQUID FILLED ORAL DAILY
Status: DISCONTINUED | OUTPATIENT
Start: 2020-01-01 | End: 2020-01-01

## 2020-01-01 RX ORDER — RIBOFLAVIN (VITAMIN B2) 100 MG
100 TABLET ORAL DAILY
COMMUNITY

## 2020-01-01 RX ORDER — LEVALBUTEROL INHALATION SOLUTION 0.63 MG/3ML
1 SOLUTION RESPIRATORY (INHALATION) EVERY 8 HOURS PRN
Status: CANCELLED | OUTPATIENT
Start: 2020-01-01

## 2020-01-01 RX ORDER — POTASSIUM CHLORIDE 20 MEQ/1
20 TABLET, EXTENDED RELEASE ORAL DAILY
Qty: 30 TABLET | Refills: 0 | Status: SHIPPED | OUTPATIENT
Start: 2020-01-01

## 2020-01-01 RX ORDER — FUROSEMIDE 40 MG/1
80 TABLET ORAL DAILY
Status: DISCONTINUED | OUTPATIENT
Start: 2020-01-01 | End: 2020-01-01

## 2020-01-01 RX ORDER — SENNOSIDES 8.8 MG/5ML
5 LIQUID ORAL 2 TIMES DAILY PRN
Status: DISCONTINUED | OUTPATIENT
Start: 2020-01-01 | End: 2020-01-01 | Stop reason: HOSPADM

## 2020-01-01 RX ORDER — CARVEDILOL 6.25 MG/1
6.25 TABLET ORAL 2 TIMES DAILY
COMMUNITY
End: 2020-01-01

## 2020-01-01 RX ORDER — CLINDAMYCIN PHOSPHATE 600 MG/50ML
600 INJECTION INTRAVENOUS ONCE
Status: COMPLETED | OUTPATIENT
Start: 2020-01-01 | End: 2020-01-01

## 2020-01-01 RX ORDER — 0.9 % SODIUM CHLORIDE 0.9 %
20 INTRAVENOUS SOLUTION INTRAVENOUS ONCE
Status: COMPLETED | OUTPATIENT
Start: 2020-01-01 | End: 2020-01-01

## 2020-01-01 RX ORDER — NYSTATIN 100000 U/G
CREAM TOPICAL PRN
COMMUNITY
Start: 2020-01-01

## 2020-01-01 RX ORDER — METOPROLOL SUCCINATE 25 MG/1
25 TABLET, EXTENDED RELEASE ORAL DAILY
Status: ON HOLD | COMMUNITY
End: 2021-01-01 | Stop reason: ALTCHOICE

## 2020-01-01 RX ORDER — CETIRIZINE HYDROCHLORIDE 5 MG/1
5 TABLET ORAL DAILY
Status: DISCONTINUED | OUTPATIENT
Start: 2020-01-01 | End: 2020-01-01 | Stop reason: HOSPADM

## 2020-01-01 RX ORDER — HYDROCODONE BITARTRATE AND ACETAMINOPHEN 5; 325 MG/1; MG/1
2 TABLET ORAL EVERY 4 HOURS PRN
Status: DISCONTINUED | OUTPATIENT
Start: 2020-01-01 | End: 2020-01-01 | Stop reason: HOSPADM

## 2020-01-01 RX ORDER — NITROGLYCERIN 20 MG/100ML
5 INJECTION INTRAVENOUS CONTINUOUS
Status: DISCONTINUED | OUTPATIENT
Start: 2020-01-01 | End: 2020-01-01

## 2020-01-01 RX ORDER — FAMOTIDINE 20 MG/1
20 TABLET, FILM COATED ORAL DAILY
Status: DISCONTINUED | OUTPATIENT
Start: 2020-01-01 | End: 2020-01-01 | Stop reason: HOSPADM

## 2020-01-01 RX ORDER — POTASSIUM CHLORIDE 7.45 MG/ML
10 INJECTION INTRAVENOUS ONCE
Status: DISCONTINUED | OUTPATIENT
Start: 2020-01-01 | End: 2020-01-01

## 2020-01-01 RX ORDER — HEPARIN SODIUM 1000 [USP'U]/ML
40 INJECTION, SOLUTION INTRAVENOUS; SUBCUTANEOUS PRN
Status: DISCONTINUED | OUTPATIENT
Start: 2020-01-01 | End: 2020-01-01

## 2020-01-01 RX ORDER — PANTOPRAZOLE SODIUM 40 MG/1
40 TABLET, DELAYED RELEASE ORAL 2 TIMES DAILY
Status: ON HOLD | COMMUNITY
End: 2020-01-01

## 2020-01-01 RX ORDER — AMOXICILLIN AND CLAVULANATE POTASSIUM 875; 125 MG/1; MG/1
1 TABLET, FILM COATED ORAL 2 TIMES DAILY
Qty: 14 TABLET | Refills: 0 | Status: SHIPPED | OUTPATIENT
Start: 2020-01-01 | End: 2021-01-01 | Stop reason: ALTCHOICE

## 2020-01-01 RX ORDER — ALBUMIN, HUMAN INJ 5% 5 %
25 SOLUTION INTRAVENOUS ONCE
Status: COMPLETED | OUTPATIENT
Start: 2020-01-01 | End: 2020-01-01

## 2020-01-01 RX ORDER — NITROGLYCERIN 0.4 MG/1
0.4 TABLET SUBLINGUAL EVERY 5 MIN PRN
Status: DISCONTINUED | OUTPATIENT
Start: 2020-01-01 | End: 2020-01-01 | Stop reason: HOSPADM

## 2020-01-01 RX ORDER — SODIUM CHLORIDE 9 MG/ML
INJECTION, SOLUTION INTRAVENOUS CONTINUOUS
Status: DISCONTINUED | OUTPATIENT
Start: 2020-01-01 | End: 2020-01-01 | Stop reason: HOSPADM

## 2020-01-01 RX ORDER — HEPARIN SODIUM 1000 [USP'U]/ML
80 INJECTION, SOLUTION INTRAVENOUS; SUBCUTANEOUS PRN
Status: DISCONTINUED | OUTPATIENT
Start: 2020-01-01 | End: 2020-01-01 | Stop reason: ALTCHOICE

## 2020-01-01 RX ORDER — PANTOPRAZOLE SODIUM 40 MG/1
40 TABLET, DELAYED RELEASE ORAL 2 TIMES DAILY
Status: DISCONTINUED | OUTPATIENT
Start: 2020-01-01 | End: 2020-01-01 | Stop reason: HOSPADM

## 2020-01-01 RX ORDER — WARFARIN SODIUM 2.5 MG/1
2.5 TABLET ORAL
Status: COMPLETED | OUTPATIENT
Start: 2020-01-01 | End: 2020-01-01

## 2020-01-01 RX ORDER — SODIUM CHLORIDE 0.9 % (FLUSH) 0.9 %
10 SYRINGE (ML) INJECTION PRN
Status: DISCONTINUED | OUTPATIENT
Start: 2020-01-01 | End: 2020-01-01

## 2020-01-01 RX ORDER — HEPARIN SODIUM (PORCINE) LOCK FLUSH IV SOLN 100 UNIT/ML 100 UNIT/ML
500 SOLUTION INTRAVENOUS ONCE
Status: DISCONTINUED | OUTPATIENT
Start: 2020-01-01 | End: 2020-01-01 | Stop reason: HOSPADM

## 2020-01-01 RX ORDER — 0.9 % SODIUM CHLORIDE 0.9 %
500 INTRAVENOUS SOLUTION INTRAVENOUS ONCE
Status: DISCONTINUED | OUTPATIENT
Start: 2020-01-01 | End: 2020-01-01

## 2020-01-01 RX ORDER — HEPARIN SODIUM 1000 [USP'U]/ML
80 INJECTION, SOLUTION INTRAVENOUS; SUBCUTANEOUS ONCE
Status: COMPLETED | OUTPATIENT
Start: 2020-01-01 | End: 2020-01-01

## 2020-01-01 RX ORDER — MIDODRINE HYDROCHLORIDE 10 MG/1
10 TABLET ORAL ONCE
Status: COMPLETED | OUTPATIENT
Start: 2020-01-01 | End: 2020-01-01

## 2020-01-01 RX ORDER — ALBUTEROL SULFATE 2.5 MG/3ML
2.5 SOLUTION RESPIRATORY (INHALATION)
Status: DISCONTINUED | OUTPATIENT
Start: 2020-01-01 | End: 2020-01-01

## 2020-01-01 RX ORDER — FORMOTEROL FUMARATE 20 UG/2ML
20 SOLUTION RESPIRATORY (INHALATION) 2 TIMES DAILY
Status: DISCONTINUED | OUTPATIENT
Start: 2020-01-01 | End: 2020-01-01 | Stop reason: HOSPADM

## 2020-01-01 RX ORDER — POTASSIUM CHLORIDE 7.45 MG/ML
10 INJECTION INTRAVENOUS PRN
Status: DISCONTINUED | OUTPATIENT
Start: 2020-01-01 | End: 2020-01-01 | Stop reason: HOSPADM

## 2020-01-01 RX ORDER — PSEUDOEPHEDRINE HCL 30 MG
100 TABLET ORAL 2 TIMES DAILY
Qty: 30 CAPSULE | Refills: 0 | Status: SHIPPED | OUTPATIENT
Start: 2020-01-01

## 2020-01-01 RX ORDER — ALBUTEROL SULFATE 90 UG/1
2 AEROSOL, METERED RESPIRATORY (INHALATION) EVERY 6 HOURS PRN
Status: DISCONTINUED | OUTPATIENT
Start: 2020-01-01 | End: 2020-01-01 | Stop reason: HOSPADM

## 2020-01-01 RX ORDER — FUROSEMIDE 10 MG/ML
20 INJECTION INTRAMUSCULAR; INTRAVENOUS ONCE
Status: DISCONTINUED | OUTPATIENT
Start: 2020-01-01 | End: 2020-01-01

## 2020-01-01 RX ORDER — FUROSEMIDE 10 MG/ML
INJECTION INTRAMUSCULAR; INTRAVENOUS
Status: COMPLETED
Start: 2020-01-01 | End: 2020-01-01

## 2020-01-01 RX ORDER — LEVOCETIRIZINE DIHYDROCHLORIDE 5 MG/1
5 TABLET, FILM COATED ORAL NIGHTLY
COMMUNITY

## 2020-01-01 RX ORDER — PREDNISONE 20 MG/1
40 TABLET ORAL DAILY
Status: DISPENSED | OUTPATIENT
Start: 2020-01-01 | End: 2020-01-01

## 2020-01-01 RX ORDER — DICYCLOMINE HYDROCHLORIDE 10 MG/1
10 CAPSULE ORAL
Status: DISCONTINUED | OUTPATIENT
Start: 2020-01-01 | End: 2020-01-01 | Stop reason: HOSPADM

## 2020-01-01 RX ORDER — LINACLOTIDE 290 UG/1
290 CAPSULE, GELATIN COATED ORAL DAILY
COMMUNITY
Start: 2020-01-01

## 2020-01-01 RX ORDER — FUROSEMIDE 10 MG/ML
40 INJECTION INTRAMUSCULAR; INTRAVENOUS 2 TIMES DAILY
Status: DISCONTINUED | OUTPATIENT
Start: 2020-01-01 | End: 2020-01-01 | Stop reason: HOSPADM

## 2020-01-01 RX ORDER — FUROSEMIDE 40 MG/1
40 TABLET ORAL DAILY
Status: CANCELLED | OUTPATIENT
Start: 2020-01-01

## 2020-01-01 RX ORDER — POTASSIUM CHLORIDE 20 MEQ/1
40 TABLET, EXTENDED RELEASE ORAL PRN
Status: DISCONTINUED | OUTPATIENT
Start: 2020-01-01 | End: 2020-01-01 | Stop reason: HOSPADM

## 2020-01-01 RX ORDER — 0.9 % SODIUM CHLORIDE 0.9 %
20 INTRAVENOUS SOLUTION INTRAVENOUS ONCE
Status: DISCONTINUED | OUTPATIENT
Start: 2020-01-01 | End: 2020-01-01

## 2020-01-01 RX ORDER — FUROSEMIDE 10 MG/ML
40 INJECTION INTRAMUSCULAR; INTRAVENOUS ONCE
Status: DISCONTINUED | OUTPATIENT
Start: 2020-01-01 | End: 2020-01-01

## 2020-01-01 RX ORDER — ASPIRIN 81 MG/1
81 TABLET ORAL DAILY
Status: CANCELLED | OUTPATIENT
Start: 2020-01-01

## 2020-01-01 RX ORDER — SENNA PLUS 8.6 MG/1
1 TABLET ORAL 2 TIMES DAILY
Status: DISCONTINUED | OUTPATIENT
Start: 2020-01-01 | End: 2020-01-01 | Stop reason: HOSPADM

## 2020-01-01 RX ORDER — PANTOPRAZOLE SODIUM 40 MG/1
40 TABLET, DELAYED RELEASE ORAL 2 TIMES DAILY
COMMUNITY

## 2020-01-01 RX ORDER — LIDOCAINE 4 G/G
1 PATCH TOPICAL DAILY
Status: DISCONTINUED | OUTPATIENT
Start: 2020-01-01 | End: 2020-01-01 | Stop reason: HOSPADM

## 2020-01-01 RX ORDER — FORMOTEROL FUMARATE 20 UG/2ML
15 SOLUTION RESPIRATORY (INHALATION) 2 TIMES DAILY
Status: DISCONTINUED | OUTPATIENT
Start: 2020-01-01 | End: 2020-01-01

## 2020-01-01 RX ORDER — FUROSEMIDE 40 MG/1
40 TABLET ORAL DAILY
Status: DISCONTINUED | OUTPATIENT
Start: 2020-01-01 | End: 2020-01-01 | Stop reason: HOSPADM

## 2020-01-01 RX ORDER — CYCLOBENZAPRINE HCL 10 MG
10 TABLET ORAL 3 TIMES DAILY PRN
Qty: 21 TABLET | Refills: 0 | Status: SHIPPED | OUTPATIENT
Start: 2020-01-01 | End: 2020-01-01

## 2020-01-01 RX ORDER — POLYETHYLENE GLYCOL 3350 17 G/17G
17 POWDER, FOR SOLUTION ORAL DAILY PRN
Status: DISCONTINUED | OUTPATIENT
Start: 2020-01-01 | End: 2020-01-01 | Stop reason: SDUPTHER

## 2020-01-01 RX ORDER — LEVALBUTEROL INHALATION SOLUTION 0.63 MG/3ML
1 SOLUTION RESPIRATORY (INHALATION) EVERY 8 HOURS PRN
Status: DISCONTINUED | OUTPATIENT
Start: 2020-01-01 | End: 2020-01-01

## 2020-01-01 RX ORDER — HYDROCORTISONE ACETATE 25 MG/1
25 SUPPOSITORY RECTAL 2 TIMES DAILY PRN
Status: DISCONTINUED | OUTPATIENT
Start: 2020-01-01 | End: 2020-01-01 | Stop reason: HOSPADM

## 2020-01-01 RX ORDER — 0.9 % SODIUM CHLORIDE 0.9 %
1000 INTRAVENOUS SOLUTION INTRAVENOUS ONCE
Status: DISCONTINUED | OUTPATIENT
Start: 2020-01-01 | End: 2020-01-01

## 2020-01-01 RX ORDER — LINEZOLID 2 MG/ML
600 INJECTION, SOLUTION INTRAVENOUS EVERY 12 HOURS
Status: DISCONTINUED | OUTPATIENT
Start: 2020-01-01 | End: 2020-01-01 | Stop reason: ALTCHOICE

## 2020-01-01 RX ORDER — PSEUDOEPHEDRINE HCL 30 MG
100 TABLET ORAL 2 TIMES DAILY
Qty: 60 CAPSULE | Refills: 2 | Status: SHIPPED | OUTPATIENT
Start: 2020-01-01 | End: 2020-01-01

## 2020-01-01 RX ORDER — SODIUM CHLORIDE 0.9 % (FLUSH) 0.9 %
10 SYRINGE (ML) INJECTION EVERY 12 HOURS SCHEDULED
Status: DISCONTINUED | OUTPATIENT
Start: 2020-01-01 | End: 2020-01-01

## 2020-01-01 RX ORDER — FUROSEMIDE 40 MG/1
40 TABLET ORAL ONCE
Status: DISCONTINUED | OUTPATIENT
Start: 2020-01-01 | End: 2020-01-01

## 2020-01-01 RX ADMIN — PIPERACILLIN AND TAZOBACTAM 3.38 G: 3; .375 INJECTION, POWDER, LYOPHILIZED, FOR SOLUTION INTRAVENOUS at 12:55

## 2020-01-01 RX ADMIN — Medication 10 ML: at 10:27

## 2020-01-01 RX ADMIN — LIDOCAINE: 50 OINTMENT TOPICAL at 09:55

## 2020-01-01 RX ADMIN — TRAZODONE HYDROCHLORIDE 50 MG: 50 TABLET ORAL at 20:45

## 2020-01-01 RX ADMIN — ALBUTEROL SULFATE 2.5 MG: 2.5 SOLUTION RESPIRATORY (INHALATION) at 16:23

## 2020-01-01 RX ADMIN — HYDROCORTISONE ACETATE 25 MG: 25 SUPPOSITORY RECTAL at 08:29

## 2020-01-01 RX ADMIN — ACETAMINOPHEN 650 MG: 325 TABLET ORAL at 10:28

## 2020-01-01 RX ADMIN — HYDROCODONE BITARTRATE AND ACETAMINOPHEN 1 TABLET: 5; 325 TABLET ORAL at 13:52

## 2020-01-01 RX ADMIN — METOPROLOL SUCCINATE 25 MG: 25 TABLET, FILM COATED, EXTENDED RELEASE ORAL at 08:46

## 2020-01-01 RX ADMIN — BUDESONIDE 500 MCG: 0.5 INHALANT RESPIRATORY (INHALATION) at 19:44

## 2020-01-01 RX ADMIN — PANTOPRAZOLE SODIUM 40 MG: 40 TABLET, DELAYED RELEASE ORAL at 17:29

## 2020-01-01 RX ADMIN — PANTOPRAZOLE SODIUM 40 MG: 40 TABLET, DELAYED RELEASE ORAL at 16:12

## 2020-01-01 RX ADMIN — ALBUTEROL SULFATE 2.5 MG: 2.5 SOLUTION RESPIRATORY (INHALATION) at 16:08

## 2020-01-01 RX ADMIN — GABAPENTIN 100 MG: 100 CAPSULE ORAL at 14:18

## 2020-01-01 RX ADMIN — ACETAMINOPHEN 650 MG: 325 TABLET ORAL at 12:03

## 2020-01-01 RX ADMIN — ALBUTEROL SULFATE 2.5 MG: 2.5 SOLUTION RESPIRATORY (INHALATION) at 14:13

## 2020-01-01 RX ADMIN — SACUBITRIL AND VALSARTAN 1 TABLET: 24; 26 TABLET, FILM COATED ORAL at 09:30

## 2020-01-01 RX ADMIN — BUDESONIDE 500 MCG: 0.5 INHALANT RESPIRATORY (INHALATION) at 21:44

## 2020-01-01 RX ADMIN — Medication 1.5 MG: at 17:55

## 2020-01-01 RX ADMIN — PANTOPRAZOLE SODIUM 40 MG: 40 TABLET, DELAYED RELEASE ORAL at 08:38

## 2020-01-01 RX ADMIN — ALBUTEROL SULFATE 2 PUFF: 90 AEROSOL, METERED RESPIRATORY (INHALATION) at 08:40

## 2020-01-01 RX ADMIN — BUMETANIDE 0.5 MG: 0.25 INJECTION INTRAMUSCULAR; INTRAVENOUS at 16:49

## 2020-01-01 RX ADMIN — TIOTROPIUM BROMIDE INHALATION SPRAY 2 PUFF: 3.12 SPRAY, METERED RESPIRATORY (INHALATION) at 09:27

## 2020-01-01 RX ADMIN — BUDESONIDE 500 MCG: 0.5 INHALANT RESPIRATORY (INHALATION) at 20:58

## 2020-01-01 RX ADMIN — PANTOPRAZOLE SODIUM 40 MG: 40 TABLET, DELAYED RELEASE ORAL at 06:15

## 2020-01-01 RX ADMIN — TRAZODONE HYDROCHLORIDE 50 MG: 50 TABLET ORAL at 20:53

## 2020-01-01 RX ADMIN — BUDESONIDE 500 MCG: 0.5 INHALANT RESPIRATORY (INHALATION) at 08:24

## 2020-01-01 RX ADMIN — ALBUTEROL SULFATE 2.5 MG: 2.5 SOLUTION RESPIRATORY (INHALATION) at 07:28

## 2020-01-01 RX ADMIN — LEVALBUTEROL HYDROCHLORIDE 0.63 MG: 0.63 SOLUTION RESPIRATORY (INHALATION) at 21:19

## 2020-01-01 RX ADMIN — PANTOPRAZOLE SODIUM 40 MG: 40 TABLET, DELAYED RELEASE ORAL at 18:00

## 2020-01-01 RX ADMIN — DOCUSATE SODIUM 100 MG: 100 CAPSULE, LIQUID FILLED ORAL at 09:43

## 2020-01-01 RX ADMIN — SACUBITRIL AND VALSARTAN 1 TABLET: 24; 26 TABLET, FILM COATED ORAL at 09:52

## 2020-01-01 RX ADMIN — PIPERACILLIN AND TAZOBACTAM 3.38 G: 3; .375 INJECTION, POWDER, LYOPHILIZED, FOR SOLUTION INTRAVENOUS at 23:30

## 2020-01-01 RX ADMIN — STANDARDIZED SENNA CONCENTRATE 8.6 MG: 8.6 TABLET ORAL at 08:29

## 2020-01-01 RX ADMIN — DICYCLOMINE HYDROCHLORIDE 10 MG: 10 CAPSULE ORAL at 10:26

## 2020-01-01 RX ADMIN — PIPERACILLIN AND TAZOBACTAM 3.38 G: 3; .375 INJECTION, POWDER, LYOPHILIZED, FOR SOLUTION INTRAVENOUS at 13:11

## 2020-01-01 RX ADMIN — ARFORMOTEROL TARTRATE 15 MCG: 15 SOLUTION RESPIRATORY (INHALATION) at 22:30

## 2020-01-01 RX ADMIN — METOPROLOL SUCCINATE 25 MG: 25 TABLET, FILM COATED, EXTENDED RELEASE ORAL at 09:43

## 2020-01-01 RX ADMIN — WARFARIN SODIUM 1 MG: 1 TABLET ORAL at 16:36

## 2020-01-01 RX ADMIN — LINEZOLID 600 MG: 600 INJECTION, SOLUTION INTRAVENOUS at 01:00

## 2020-01-01 RX ADMIN — HEPARIN SODIUM 19 UNITS/KG/HR: 10000 INJECTION, SOLUTION INTRAVENOUS at 12:39

## 2020-01-01 RX ADMIN — Medication 10 ML: at 04:19

## 2020-01-01 RX ADMIN — PANTOPRAZOLE SODIUM 40 MG: 40 TABLET, DELAYED RELEASE ORAL at 05:34

## 2020-01-01 RX ADMIN — ARFORMOTEROL TARTRATE 15 MCG: 15 SOLUTION RESPIRATORY (INHALATION) at 21:02

## 2020-01-01 RX ADMIN — HEPARIN SODIUM 18 UNITS/KG/HR: 10000 INJECTION, SOLUTION INTRAVENOUS at 17:56

## 2020-01-01 RX ADMIN — ARFORMOTEROL TARTRATE 15 MCG: 15 SOLUTION RESPIRATORY (INHALATION) at 09:50

## 2020-01-01 RX ADMIN — BUDESONIDE 500 MCG: 0.5 INHALANT RESPIRATORY (INHALATION) at 21:32

## 2020-01-01 RX ADMIN — ARFORMOTEROL TARTRATE 15 MCG: 15 SOLUTION RESPIRATORY (INHALATION) at 09:08

## 2020-01-01 RX ADMIN — FENTANYL CITRATE 50 MCG: 50 INJECTION, SOLUTION INTRAMUSCULAR; INTRAVENOUS at 11:58

## 2020-01-01 RX ADMIN — SODIUM CHLORIDE, PRESERVATIVE FREE 10 ML: 5 INJECTION INTRAVENOUS at 21:17

## 2020-01-01 RX ADMIN — HYDROCODONE BITARTRATE AND ACETAMINOPHEN 1 TABLET: 5; 325 TABLET ORAL at 22:23

## 2020-01-01 RX ADMIN — STANDARDIZED SENNA CONCENTRATE 8.6 MG: 8.6 TABLET ORAL at 20:31

## 2020-01-01 RX ADMIN — METOPROLOL SUCCINATE 25 MG: 25 TABLET, FILM COATED, EXTENDED RELEASE ORAL at 11:35

## 2020-01-01 RX ADMIN — PIPERACILLIN AND TAZOBACTAM 3.38 G: 3; .375 INJECTION, POWDER, LYOPHILIZED, FOR SOLUTION INTRAVENOUS at 20:19

## 2020-01-01 RX ADMIN — NYSTATIN 500000 UNITS: 100000 SUSPENSION ORAL at 17:06

## 2020-01-01 RX ADMIN — SACUBITRIL AND VALSARTAN 1 TABLET: 24; 26 TABLET, FILM COATED ORAL at 08:54

## 2020-01-01 RX ADMIN — SACUBITRIL AND VALSARTAN 1 TABLET: 49; 51 TABLET, FILM COATED ORAL at 08:29

## 2020-01-01 RX ADMIN — PANTOPRAZOLE SODIUM 40 MG: 40 TABLET, DELAYED RELEASE ORAL at 05:27

## 2020-01-01 RX ADMIN — TRAZODONE HYDROCHLORIDE 50 MG: 50 TABLET ORAL at 21:55

## 2020-01-01 RX ADMIN — GABAPENTIN 100 MG: 100 CAPSULE ORAL at 22:43

## 2020-01-01 RX ADMIN — BUDESONIDE 500 MCG: 0.5 INHALANT RESPIRATORY (INHALATION) at 08:18

## 2020-01-01 RX ADMIN — TIOTROPIUM BROMIDE INHALATION SPRAY 2 PUFF: 3.12 SPRAY, METERED RESPIRATORY (INHALATION) at 06:23

## 2020-01-01 RX ADMIN — OSELTAMIVIR PHOSPHATE 30 MG: 30 CAPSULE ORAL at 20:26

## 2020-01-01 RX ADMIN — NYSTATIN 500000 UNITS: 500000 SUSPENSION ORAL at 09:44

## 2020-01-01 RX ADMIN — CEFAZOLIN 2 G: 330 INJECTION, POWDER, FOR SOLUTION INTRAMUSCULAR; INTRAVENOUS at 13:52

## 2020-01-01 RX ADMIN — Medication 10 ML: at 09:17

## 2020-01-01 RX ADMIN — Medication 10 ML: at 22:12

## 2020-01-01 RX ADMIN — HYDROCODONE BITARTRATE AND ACETAMINOPHEN 1 TABLET: 5; 325 TABLET ORAL at 06:22

## 2020-01-01 RX ADMIN — SENNOSIDES 8.6 MG: 8.6 TABLET, FILM COATED ORAL at 20:07

## 2020-01-01 RX ADMIN — ARFORMOTEROL TARTRATE 15 MCG: 15 SOLUTION RESPIRATORY (INHALATION) at 08:59

## 2020-01-01 RX ADMIN — ACETAMINOPHEN 650 MG: 325 TABLET ORAL at 00:36

## 2020-01-01 RX ADMIN — ALBUTEROL SULFATE 2.5 MG: 2.5 SOLUTION RESPIRATORY (INHALATION) at 21:19

## 2020-01-01 RX ADMIN — GABAPENTIN 100 MG: 100 CAPSULE ORAL at 14:05

## 2020-01-01 RX ADMIN — ALBUMIN (HUMAN) 25 G: 12.5 INJECTION, SOLUTION INTRAVENOUS at 23:04

## 2020-01-01 RX ADMIN — PANTOPRAZOLE SODIUM 40 MG: 40 TABLET, DELAYED RELEASE ORAL at 17:33

## 2020-01-01 RX ADMIN — DOBUTAMINE HYDROCHLORIDE 5 MCG/KG/MIN: 200 INJECTION INTRAVENOUS at 03:37

## 2020-01-01 RX ADMIN — ARFORMOTEROL TARTRATE 15 MCG: 15 SOLUTION RESPIRATORY (INHALATION) at 10:13

## 2020-01-01 RX ADMIN — ASPIRIN 81 MG: 81 TABLET ORAL at 09:49

## 2020-01-01 RX ADMIN — PANTOPRAZOLE SODIUM 40 MG: 40 TABLET, DELAYED RELEASE ORAL at 16:33

## 2020-01-01 RX ADMIN — DOCUSATE SODIUM 100 MG: 100 CAPSULE, LIQUID FILLED ORAL at 08:45

## 2020-01-01 RX ADMIN — HYDROCODONE BITARTRATE AND ACETAMINOPHEN 1 TABLET: 5; 325 TABLET ORAL at 05:39

## 2020-01-01 RX ADMIN — NYSTATIN 500000 UNITS: 500000 SUSPENSION ORAL at 20:42

## 2020-01-01 RX ADMIN — SACUBITRIL AND VALSARTAN 1 TABLET: 49; 51 TABLET, FILM COATED ORAL at 08:28

## 2020-01-01 RX ADMIN — Medication 1.5 MG: at 17:40

## 2020-01-01 RX ADMIN — ALBUTEROL SULFATE 2.5 MG: 2.5 SOLUTION RESPIRATORY (INHALATION) at 20:01

## 2020-01-01 RX ADMIN — HYDROCORTISONE ACETATE 25 MG: 25 SUPPOSITORY RECTAL at 09:52

## 2020-01-01 RX ADMIN — HYDROCORTISONE ACETATE 25 MG: 25 SUPPOSITORY RECTAL at 09:39

## 2020-01-01 RX ADMIN — LINEZOLID 600 MG: 600 INJECTION, SOLUTION INTRAVENOUS at 16:54

## 2020-01-01 RX ADMIN — WARFARIN SODIUM 2.5 MG: 2.5 TABLET ORAL at 17:08

## 2020-01-01 RX ADMIN — ALBUTEROL SULFATE 2.5 MG: 2.5 SOLUTION RESPIRATORY (INHALATION) at 20:26

## 2020-01-01 RX ADMIN — PANTOPRAZOLE SODIUM 40 MG: 40 TABLET, DELAYED RELEASE ORAL at 18:31

## 2020-01-01 RX ADMIN — LINEZOLID 600 MG: 600 TABLET, FILM COATED ORAL at 07:45

## 2020-01-01 RX ADMIN — MIDODRINE HYDROCHLORIDE 10 MG: 10 TABLET ORAL at 12:42

## 2020-01-01 RX ADMIN — ASPIRIN 81 MG: 81 TABLET, COATED ORAL at 08:46

## 2020-01-01 RX ADMIN — FUROSEMIDE 40 MG: 10 INJECTION, SOLUTION INTRAMUSCULAR; INTRAVENOUS at 08:29

## 2020-01-01 RX ADMIN — HEPARIN SODIUM 18 UNITS/KG/HR: 10000 INJECTION, SOLUTION INTRAVENOUS at 03:41

## 2020-01-01 RX ADMIN — Medication 10 ML: at 08:30

## 2020-01-01 RX ADMIN — PIPERACILLIN AND TAZOBACTAM 3.38 G: 3; .375 INJECTION, POWDER, LYOPHILIZED, FOR SOLUTION INTRAVENOUS at 05:37

## 2020-01-01 RX ADMIN — WARFARIN SODIUM 2 MG: 2 TABLET ORAL at 16:14

## 2020-01-01 RX ADMIN — Medication 0.5 MG: at 20:29

## 2020-01-01 RX ADMIN — Medication 1 CAPSULE: at 17:05

## 2020-01-01 RX ADMIN — NYSTATIN 500000 UNITS: 500000 SUSPENSION ORAL at 07:46

## 2020-01-01 RX ADMIN — SACUBITRIL AND VALSARTAN 1 TABLET: 24; 26 TABLET, FILM COATED ORAL at 09:48

## 2020-01-01 RX ADMIN — ARFORMOTEROL TARTRATE 15 MCG: 15 SOLUTION RESPIRATORY (INHALATION) at 07:22

## 2020-01-01 RX ADMIN — GABAPENTIN 100 MG: 100 CAPSULE ORAL at 08:59

## 2020-01-01 RX ADMIN — ARFORMOTEROL TARTRATE 15 MCG: 15 SOLUTION RESPIRATORY (INHALATION) at 07:30

## 2020-01-01 RX ADMIN — DICYCLOMINE HYDROCHLORIDE 10 MG: 10 CAPSULE ORAL at 18:34

## 2020-01-01 RX ADMIN — TIOTROPIUM BROMIDE INHALATION SPRAY 2 PUFF: 3.12 SPRAY, METERED RESPIRATORY (INHALATION) at 09:23

## 2020-01-01 RX ADMIN — HEPARIN SODIUM 18 UNITS/KG/HR: 10000 INJECTION, SOLUTION INTRAVENOUS at 11:50

## 2020-01-01 RX ADMIN — ARFORMOTEROL TARTRATE 15 MCG: 15 SOLUTION RESPIRATORY (INHALATION) at 09:51

## 2020-01-01 RX ADMIN — HEPARIN SODIUM 21 UNITS/KG/HR: 10000 INJECTION, SOLUTION INTRAVENOUS at 22:57

## 2020-01-01 RX ADMIN — METHYLPREDNISOLONE SODIUM SUCCINATE 60 MG: 125 INJECTION, POWDER, FOR SOLUTION INTRAMUSCULAR; INTRAVENOUS at 06:11

## 2020-01-01 RX ADMIN — ALBUTEROL SULFATE 2.5 MG: 2.5 SOLUTION RESPIRATORY (INHALATION) at 09:40

## 2020-01-01 RX ADMIN — FAMOTIDINE 20 MG: 20 TABLET, FILM COATED ORAL at 08:13

## 2020-01-01 RX ADMIN — METOPROLOL SUCCINATE 25 MG: 25 TABLET, FILM COATED, EXTENDED RELEASE ORAL at 08:29

## 2020-01-01 RX ADMIN — ALBUTEROL SULFATE 2.5 MG: 2.5 SOLUTION RESPIRATORY (INHALATION) at 08:22

## 2020-01-01 RX ADMIN — WARFARIN SODIUM 1 MG: 1 TABLET ORAL at 17:23

## 2020-01-01 RX ADMIN — PIPERACILLIN AND TAZOBACTAM 3.38 G: 3; .375 INJECTION, POWDER, LYOPHILIZED, FOR SOLUTION INTRAVENOUS at 20:00

## 2020-01-01 RX ADMIN — HYDROCORTISONE ACETATE 25 MG: 25 SUPPOSITORY RECTAL at 17:23

## 2020-01-01 RX ADMIN — Medication 10 ML: at 10:55

## 2020-01-01 RX ADMIN — CEFTRIAXONE SODIUM 1 G: 1 INJECTION, POWDER, FOR SOLUTION INTRAMUSCULAR; INTRAVENOUS at 13:52

## 2020-01-01 RX ADMIN — GABAPENTIN 100 MG: 100 CAPSULE ORAL at 21:21

## 2020-01-01 RX ADMIN — FAMOTIDINE 20 MG: 10 INJECTION INTRAVENOUS at 20:51

## 2020-01-01 RX ADMIN — ARFORMOTEROL TARTRATE 15 MCG: 15 SOLUTION RESPIRATORY (INHALATION) at 17:20

## 2020-01-01 RX ADMIN — SACUBITRIL AND VALSARTAN 1 TABLET: 24; 26 TABLET, FILM COATED ORAL at 08:46

## 2020-01-01 RX ADMIN — FUROSEMIDE 40 MG: 40 TABLET ORAL at 08:55

## 2020-01-01 RX ADMIN — LEVALBUTEROL HYDROCHLORIDE 0.63 MG: 0.63 SOLUTION RESPIRATORY (INHALATION) at 05:59

## 2020-01-01 RX ADMIN — HYDROCORTISONE ACETATE 25 MG: 25 SUPPOSITORY RECTAL at 20:05

## 2020-01-01 RX ADMIN — DOCUSATE SODIUM 100 MG: 100 CAPSULE, LIQUID FILLED ORAL at 08:20

## 2020-01-01 RX ADMIN — HYDROCODONE BITARTRATE AND ACETAMINOPHEN 1 TABLET: 5; 325 TABLET ORAL at 05:58

## 2020-01-01 RX ADMIN — HYDROCORTISONE ACETATE 25 MG: 25 SUPPOSITORY RECTAL at 17:24

## 2020-01-01 RX ADMIN — DOCUSATE SODIUM 100 MG: 100 CAPSULE, LIQUID FILLED ORAL at 10:58

## 2020-01-01 RX ADMIN — FUROSEMIDE 40 MG: 40 TABLET ORAL at 09:27

## 2020-01-01 RX ADMIN — GABAPENTIN 100 MG: 100 CAPSULE ORAL at 08:14

## 2020-01-01 RX ADMIN — TIOTROPIUM BROMIDE INHALATION SPRAY 2 PUFF: 3.12 SPRAY, METERED RESPIRATORY (INHALATION) at 07:32

## 2020-01-01 RX ADMIN — ALBUTEROL SULFATE 2.5 MG: 2.5 SOLUTION RESPIRATORY (INHALATION) at 21:27

## 2020-01-01 RX ADMIN — FUROSEMIDE 40 MG: 10 INJECTION, SOLUTION INTRAMUSCULAR; INTRAVENOUS at 20:29

## 2020-01-01 RX ADMIN — FAMOTIDINE 20 MG: 10 INJECTION INTRAVENOUS at 08:50

## 2020-01-01 RX ADMIN — GABAPENTIN 100 MG: 100 CAPSULE ORAL at 20:00

## 2020-01-01 RX ADMIN — METOPROLOL SUCCINATE 25 MG: 25 TABLET, FILM COATED, EXTENDED RELEASE ORAL at 10:12

## 2020-01-01 RX ADMIN — TRAZODONE HYDROCHLORIDE 50 MG: 50 TABLET ORAL at 20:04

## 2020-01-01 RX ADMIN — BUMETANIDE 2 MG: 1 TABLET ORAL at 11:25

## 2020-01-01 RX ADMIN — PIPERACILLIN AND TAZOBACTAM 3.38 G: 3; .375 INJECTION, POWDER, LYOPHILIZED, FOR SOLUTION INTRAVENOUS at 05:46

## 2020-01-01 RX ADMIN — Medication 10 ML: at 17:51

## 2020-01-01 RX ADMIN — GABAPENTIN 100 MG: 100 CAPSULE ORAL at 08:57

## 2020-01-01 RX ADMIN — LEVOCETIRIZINE DIHYDROCHLORIDE 5 MG: 5 TABLET, FILM COATED ORAL at 20:13

## 2020-01-01 RX ADMIN — METOPROLOL SUCCINATE 25 MG: 25 TABLET, FILM COATED, EXTENDED RELEASE ORAL at 10:58

## 2020-01-01 RX ADMIN — Medication 10 ML: at 21:21

## 2020-01-01 RX ADMIN — BUMETANIDE 2 MG: 1 TABLET ORAL at 10:06

## 2020-01-01 RX ADMIN — GABAPENTIN 100 MG: 100 CAPSULE ORAL at 11:36

## 2020-01-01 RX ADMIN — GABAPENTIN 100 MG: 100 CAPSULE ORAL at 14:22

## 2020-01-01 RX ADMIN — ASPIRIN 81 MG: 81 TABLET, COATED ORAL at 07:46

## 2020-01-01 RX ADMIN — FUROSEMIDE 40 MG: 40 TABLET ORAL at 09:41

## 2020-01-01 RX ADMIN — TRAZODONE HYDROCHLORIDE 50 MG: 50 TABLET ORAL at 20:31

## 2020-01-01 RX ADMIN — BUDESONIDE 500 MCG: 0.5 INHALANT RESPIRATORY (INHALATION) at 09:35

## 2020-01-01 RX ADMIN — PANTOPRAZOLE SODIUM 40 MG: 40 TABLET, DELAYED RELEASE ORAL at 17:16

## 2020-01-01 RX ADMIN — PANTOPRAZOLE SODIUM 40 MG: 40 TABLET, DELAYED RELEASE ORAL at 18:34

## 2020-01-01 RX ADMIN — SODIUM CHLORIDE 1000 ML: 9 INJECTION, SOLUTION INTRAVENOUS at 11:31

## 2020-01-01 RX ADMIN — BUDESONIDE 500 MCG: 0.5 INHALANT RESPIRATORY (INHALATION) at 21:19

## 2020-01-01 RX ADMIN — PROMETHAZINE HYDROCHLORIDE 12.5 MG: 25 TABLET ORAL at 20:07

## 2020-01-01 RX ADMIN — METHYLPREDNISOLONE SODIUM SUCCINATE 60 MG: 125 INJECTION, POWDER, FOR SOLUTION INTRAMUSCULAR; INTRAVENOUS at 12:43

## 2020-01-01 RX ADMIN — ARFORMOTEROL TARTRATE 15 MCG: 15 SOLUTION RESPIRATORY (INHALATION) at 07:25

## 2020-01-01 RX ADMIN — HYDROCODONE BITARTRATE AND ACETAMINOPHEN 2 TABLET: 5; 325 TABLET ORAL at 22:42

## 2020-01-01 RX ADMIN — BUDESONIDE 500 MCG: 0.5 INHALANT RESPIRATORY (INHALATION) at 18:34

## 2020-01-01 RX ADMIN — ARFORMOTEROL TARTRATE 15 MCG: 15 SOLUTION RESPIRATORY (INHALATION) at 17:10

## 2020-01-01 RX ADMIN — BUMETANIDE 2 MG: 1 TABLET ORAL at 09:39

## 2020-01-01 RX ADMIN — LEVALBUTEROL HYDROCHLORIDE 0.63 MG: 0.63 SOLUTION RESPIRATORY (INHALATION) at 22:01

## 2020-01-01 RX ADMIN — ARFORMOTEROL TARTRATE 15 MCG: 15 SOLUTION RESPIRATORY (INHALATION) at 21:01

## 2020-01-01 RX ADMIN — BUDESONIDE 500 MCG: 0.5 INHALANT RESPIRATORY (INHALATION) at 09:15

## 2020-01-01 RX ADMIN — HYDROCODONE BITARTRATE AND ACETAMINOPHEN 2 TABLET: 5; 325 TABLET ORAL at 09:48

## 2020-01-01 RX ADMIN — LINEZOLID 600 MG: 600 TABLET, FILM COATED ORAL at 21:50

## 2020-01-01 RX ADMIN — BUDESONIDE 500 MCG: 0.5 INHALANT RESPIRATORY (INHALATION) at 09:37

## 2020-01-01 RX ADMIN — TRAZODONE HYDROCHLORIDE 50 MG: 50 TABLET ORAL at 21:21

## 2020-01-01 RX ADMIN — GABAPENTIN 100 MG: 100 CAPSULE ORAL at 10:59

## 2020-01-01 RX ADMIN — GABAPENTIN 100 MG: 100 CAPSULE ORAL at 16:25

## 2020-01-01 RX ADMIN — HYDROCORTISONE ACETATE 25 MG: 25 SUPPOSITORY RECTAL at 21:03

## 2020-01-01 RX ADMIN — SACUBITRIL AND VALSARTAN 1 TABLET: 24; 26 TABLET, FILM COATED ORAL at 12:53

## 2020-01-01 RX ADMIN — Medication 1.5 MG: at 18:00

## 2020-01-01 RX ADMIN — ARFORMOTEROL TARTRATE 15 MCG: 15 SOLUTION RESPIRATORY (INHALATION) at 06:08

## 2020-01-01 RX ADMIN — HYALURONIDASE (HUMAN RECOMBINANT) 150 UNITS: 150 INJECTION, SOLUTION SUBCUTANEOUS at 03:25

## 2020-01-01 RX ADMIN — GABAPENTIN 100 MG: 100 CAPSULE ORAL at 21:02

## 2020-01-01 RX ADMIN — BUDESONIDE 500 MCG: 0.5 INHALANT RESPIRATORY (INHALATION) at 21:28

## 2020-01-01 RX ADMIN — WARFARIN SODIUM 1 MG: 1 TABLET ORAL at 18:25

## 2020-01-01 RX ADMIN — LINEZOLID 600 MG: 600 INJECTION, SOLUTION INTRAVENOUS at 13:13

## 2020-01-01 RX ADMIN — DOCUSATE SODIUM 100 MG: 100 CAPSULE, LIQUID FILLED ORAL at 08:14

## 2020-01-01 RX ADMIN — ARFORMOTEROL TARTRATE 15 MCG: 15 SOLUTION RESPIRATORY (INHALATION) at 18:33

## 2020-01-01 RX ADMIN — GABAPENTIN 100 MG: 100 CAPSULE ORAL at 13:52

## 2020-01-01 RX ADMIN — HYDROCODONE BITARTRATE AND ACETAMINOPHEN 2 TABLET: 5; 325 TABLET ORAL at 21:01

## 2020-01-01 RX ADMIN — LEVALBUTEROL HYDROCHLORIDE 1.25 MG: 1.25 SOLUTION RESPIRATORY (INHALATION) at 22:35

## 2020-01-01 RX ADMIN — SENNOSIDES 8.6 MG: 8.6 TABLET, FILM COATED ORAL at 22:22

## 2020-01-01 RX ADMIN — LEVALBUTEROL HYDROCHLORIDE 0.63 MG: 1.25 SOLUTION RESPIRATORY (INHALATION) at 03:37

## 2020-01-01 RX ADMIN — HYDROCORTISONE ACETATE 25 MG: 25 SUPPOSITORY RECTAL at 21:48

## 2020-01-01 RX ADMIN — BUDESONIDE 500 MCG: 0.5 INHALANT RESPIRATORY (INHALATION) at 08:20

## 2020-01-01 RX ADMIN — SACUBITRIL AND VALSARTAN 1 TABLET: 49; 51 TABLET, FILM COATED ORAL at 20:07

## 2020-01-01 RX ADMIN — GABAPENTIN 100 MG: 100 CAPSULE ORAL at 16:35

## 2020-01-01 RX ADMIN — FUROSEMIDE 40 MG: 40 TABLET ORAL at 16:40

## 2020-01-01 RX ADMIN — ASPIRIN 81 MG: 81 TABLET, COATED ORAL at 08:38

## 2020-01-01 RX ADMIN — GABAPENTIN 100 MG: 100 CAPSULE ORAL at 20:53

## 2020-01-01 RX ADMIN — DIGOXIN 125 MCG: 125 TABLET ORAL at 08:28

## 2020-01-01 RX ADMIN — PANTOPRAZOLE SODIUM 40 MG: 40 TABLET, DELAYED RELEASE ORAL at 15:05

## 2020-01-01 RX ADMIN — GABAPENTIN 100 MG: 100 CAPSULE ORAL at 08:29

## 2020-01-01 RX ADMIN — CYCLOBENZAPRINE 10 MG: 10 TABLET, FILM COATED ORAL at 09:16

## 2020-01-01 RX ADMIN — BUDESONIDE 500 MCG: 0.5 INHALANT RESPIRATORY (INHALATION) at 20:18

## 2020-01-01 RX ADMIN — ARFORMOTEROL TARTRATE 15 MCG: 15 SOLUTION RESPIRATORY (INHALATION) at 21:44

## 2020-01-01 RX ADMIN — ACETAMINOPHEN 650 MG: 325 TABLET ORAL at 13:46

## 2020-01-01 RX ADMIN — PIPERACILLIN AND TAZOBACTAM 3.38 G: 3; .375 INJECTION, POWDER, LYOPHILIZED, FOR SOLUTION INTRAVENOUS at 21:16

## 2020-01-01 RX ADMIN — BUDESONIDE 500 MCG: 0.5 INHALANT RESPIRATORY (INHALATION) at 09:05

## 2020-01-01 RX ADMIN — HYDROCORTISONE ACETATE 25 MG: 25 SUPPOSITORY RECTAL at 08:46

## 2020-01-01 RX ADMIN — HYDROCODONE BITARTRATE AND ACETAMINOPHEN 1 TABLET: 5; 325 TABLET ORAL at 13:22

## 2020-01-01 RX ADMIN — PIPERACILLIN AND TAZOBACTAM 3.38 G: 3; .375 INJECTION, POWDER, LYOPHILIZED, FOR SOLUTION INTRAVENOUS at 13:13

## 2020-01-01 RX ADMIN — ARFORMOTEROL TARTRATE 15 MCG: 15 SOLUTION RESPIRATORY (INHALATION) at 08:18

## 2020-01-01 RX ADMIN — FUROSEMIDE 40 MG: 10 INJECTION, SOLUTION INTRAMUSCULAR; INTRAVENOUS at 20:42

## 2020-01-01 RX ADMIN — DOCUSATE SODIUM 100 MG: 100 CAPSULE, LIQUID FILLED ORAL at 11:19

## 2020-01-01 RX ADMIN — PANTOPRAZOLE SODIUM 40 MG: 40 TABLET, DELAYED RELEASE ORAL at 16:34

## 2020-01-01 RX ADMIN — HYDROCODONE BITARTRATE AND ACETAMINOPHEN 1 TABLET: 5; 325 TABLET ORAL at 00:20

## 2020-01-01 RX ADMIN — ARFORMOTEROL TARTRATE 15 MCG: 15 SOLUTION RESPIRATORY (INHALATION) at 09:05

## 2020-01-01 RX ADMIN — GABAPENTIN 100 MG: 100 CAPSULE ORAL at 15:17

## 2020-01-01 RX ADMIN — SACUBITRIL AND VALSARTAN 1 TABLET: 24; 26 TABLET, FILM COATED ORAL at 21:47

## 2020-01-01 RX ADMIN — ALBUTEROL SULFATE 2.5 MG: 2.5 SOLUTION RESPIRATORY (INHALATION) at 09:22

## 2020-01-01 RX ADMIN — Medication 1 CAPSULE: at 20:43

## 2020-01-01 RX ADMIN — GABAPENTIN 100 MG: 100 CAPSULE ORAL at 14:40

## 2020-01-01 RX ADMIN — TRAZODONE HYDROCHLORIDE 50 MG: 50 TABLET ORAL at 22:33

## 2020-01-01 RX ADMIN — PIPERACILLIN AND TAZOBACTAM 3.38 G: 3; .375 INJECTION, POWDER, LYOPHILIZED, FOR SOLUTION INTRAVENOUS at 04:29

## 2020-01-01 RX ADMIN — METOPROLOL SUCCINATE 25 MG: 25 TABLET, FILM COATED, EXTENDED RELEASE ORAL at 08:20

## 2020-01-01 RX ADMIN — PREDNISONE 40 MG: 20 TABLET ORAL at 09:27

## 2020-01-01 RX ADMIN — BUDESONIDE 500 MCG: 0.5 INHALANT RESPIRATORY (INHALATION) at 08:43

## 2020-01-01 RX ADMIN — WARFARIN SODIUM 1 MG: 1 TABLET ORAL at 17:06

## 2020-01-01 RX ADMIN — TRAZODONE HYDROCHLORIDE 50 MG: 50 TABLET ORAL at 20:20

## 2020-01-01 RX ADMIN — SACUBITRIL AND VALSARTAN 1 TABLET: 24; 26 TABLET, FILM COATED ORAL at 10:12

## 2020-01-01 RX ADMIN — LINEZOLID 600 MG: 600 INJECTION, SOLUTION INTRAVENOUS at 00:48

## 2020-01-01 RX ADMIN — GABAPENTIN 100 MG: 100 CAPSULE ORAL at 20:43

## 2020-01-01 RX ADMIN — PANTOPRAZOLE SODIUM 40 MG: 40 TABLET, DELAYED RELEASE ORAL at 04:47

## 2020-01-01 RX ADMIN — LINEZOLID 600 MG: 600 INJECTION, SOLUTION INTRAVENOUS at 13:51

## 2020-01-01 RX ADMIN — CETIRIZINE HYDROCHLORIDE 5 MG: 5 TABLET ORAL at 09:17

## 2020-01-01 RX ADMIN — GABAPENTIN 100 MG: 100 CAPSULE ORAL at 12:52

## 2020-01-01 RX ADMIN — ARFORMOTEROL TARTRATE 15 MCG: 15 SOLUTION RESPIRATORY (INHALATION) at 20:38

## 2020-01-01 RX ADMIN — FUROSEMIDE 40 MG: 40 TABLET ORAL at 16:14

## 2020-01-01 RX ADMIN — ACETAMINOPHEN 650 MG: 325 TABLET ORAL at 16:31

## 2020-01-01 RX ADMIN — ALBUTEROL SULFATE 2.5 MG: 2.5 SOLUTION RESPIRATORY (INHALATION) at 17:20

## 2020-01-01 RX ADMIN — PANTOPRAZOLE SODIUM 40 MG: 40 TABLET, DELAYED RELEASE ORAL at 06:23

## 2020-01-01 RX ADMIN — TIOTROPIUM BROMIDE INHALATION SPRAY 2 PUFF: 3.12 SPRAY, METERED RESPIRATORY (INHALATION) at 09:50

## 2020-01-01 RX ADMIN — ASPIRIN 81 MG: 81 TABLET, COATED ORAL at 09:16

## 2020-01-01 RX ADMIN — NYSTATIN 500000 UNITS: 500000 SUSPENSION ORAL at 08:46

## 2020-01-01 RX ADMIN — DICYCLOMINE HYDROCHLORIDE 10 MG: 10 CAPSULE ORAL at 05:12

## 2020-01-01 RX ADMIN — GABAPENTIN 100 MG: 100 CAPSULE ORAL at 09:32

## 2020-01-01 RX ADMIN — TIOTROPIUM BROMIDE INHALATION SPRAY 2 PUFF: 3.12 SPRAY, METERED RESPIRATORY (INHALATION) at 08:40

## 2020-01-01 RX ADMIN — Medication 1.5 MG: at 18:37

## 2020-01-01 RX ADMIN — ARFORMOTEROL TARTRATE 15 MCG: 15 SOLUTION RESPIRATORY (INHALATION) at 20:32

## 2020-01-01 RX ADMIN — ACETAMINOPHEN 650 MG: 325 TABLET ORAL at 08:02

## 2020-01-01 RX ADMIN — ASPIRIN 81 MG: 81 TABLET, COATED ORAL at 09:49

## 2020-01-01 RX ADMIN — TRAZODONE HYDROCHLORIDE 50 MG: 50 TABLET ORAL at 20:10

## 2020-01-01 RX ADMIN — TRAZODONE HYDROCHLORIDE 50 MG: 50 TABLET ORAL at 20:16

## 2020-01-01 RX ADMIN — DICYCLOMINE HYDROCHLORIDE 10 MG: 10 CAPSULE ORAL at 16:05

## 2020-01-01 RX ADMIN — PIPERACILLIN AND TAZOBACTAM 3.38 G: 3; .375 INJECTION, POWDER, LYOPHILIZED, FOR SOLUTION INTRAVENOUS at 04:14

## 2020-01-01 RX ADMIN — DICYCLOMINE HYDROCHLORIDE 10 MG: 10 CAPSULE ORAL at 12:25

## 2020-01-01 RX ADMIN — SACUBITRIL AND VALSARTAN 1 TABLET: 24; 26 TABLET, FILM COATED ORAL at 08:04

## 2020-01-01 RX ADMIN — TIOTROPIUM BROMIDE INHALATION SPRAY 2 PUFF: 3.12 SPRAY, METERED RESPIRATORY (INHALATION) at 10:54

## 2020-01-01 RX ADMIN — SODIUM CHLORIDE: 9 INJECTION, SOLUTION INTRAVENOUS at 17:50

## 2020-01-01 RX ADMIN — TIOTROPIUM BROMIDE INHALATION SPRAY 2 PUFF: 3.12 SPRAY, METERED RESPIRATORY (INHALATION) at 08:10

## 2020-01-01 RX ADMIN — ARFORMOTEROL TARTRATE 15 MCG: 15 SOLUTION RESPIRATORY (INHALATION) at 19:39

## 2020-01-01 RX ADMIN — Medication 10 ML: at 22:01

## 2020-01-01 RX ADMIN — SODIUM CHLORIDE: 9 INJECTION, SOLUTION INTRAVENOUS at 06:49

## 2020-01-01 RX ADMIN — CEFTRIAXONE SODIUM 1 G: 1 INJECTION, POWDER, FOR SOLUTION INTRAMUSCULAR; INTRAVENOUS at 16:49

## 2020-01-01 RX ADMIN — METHYLPREDNISOLONE SODIUM SUCCINATE 60 MG: 125 INJECTION, POWDER, FOR SOLUTION INTRAMUSCULAR; INTRAVENOUS at 07:34

## 2020-01-01 RX ADMIN — ASPIRIN 81 MG: 81 TABLET, COATED ORAL at 09:43

## 2020-01-01 RX ADMIN — HYDROCORTISONE ACETATE 25 MG: 25 SUPPOSITORY RECTAL at 09:17

## 2020-01-01 RX ADMIN — ALBUTEROL SULFATE 2.5 MG: 2.5 SOLUTION RESPIRATORY (INHALATION) at 09:23

## 2020-01-01 RX ADMIN — CEFTRIAXONE SODIUM 1 G: 1 INJECTION, POWDER, FOR SOLUTION INTRAMUSCULAR; INTRAVENOUS at 16:03

## 2020-01-01 RX ADMIN — GABAPENTIN 100 MG: 100 CAPSULE ORAL at 20:51

## 2020-01-01 RX ADMIN — BUDESONIDE 500 MCG: 0.5 INHALANT RESPIRATORY (INHALATION) at 09:55

## 2020-01-01 RX ADMIN — BUMETANIDE 2 MG: 1 TABLET ORAL at 20:12

## 2020-01-01 RX ADMIN — DICYCLOMINE HYDROCHLORIDE 10 MG: 10 CAPSULE ORAL at 21:57

## 2020-01-01 RX ADMIN — HYDROCODONE BITARTRATE AND ACETAMINOPHEN 2 TABLET: 5; 325 TABLET ORAL at 00:19

## 2020-01-01 RX ADMIN — GABAPENTIN 100 MG: 100 CAPSULE ORAL at 15:33

## 2020-01-01 RX ADMIN — BUDESONIDE 500 MCG: 0.5 INHALANT RESPIRATORY (INHALATION) at 21:21

## 2020-01-01 RX ADMIN — CYCLOBENZAPRINE 10 MG: 10 TABLET, FILM COATED ORAL at 22:44

## 2020-01-01 RX ADMIN — FORMOTEROL FUMARATE DIHYDRATE 20 MCG: 20 SOLUTION RESPIRATORY (INHALATION) at 18:35

## 2020-01-01 RX ADMIN — TIOTROPIUM BROMIDE INHALATION SPRAY 2 PUFF: 3.12 SPRAY, METERED RESPIRATORY (INHALATION) at 08:00

## 2020-01-01 RX ADMIN — LIDOCAINE: 50 OINTMENT TOPICAL at 08:30

## 2020-01-01 RX ADMIN — PANTOPRAZOLE SODIUM 40 MG: 40 TABLET, DELAYED RELEASE ORAL at 16:43

## 2020-01-01 RX ADMIN — PANTOPRAZOLE SODIUM 40 MG: 40 TABLET, DELAYED RELEASE ORAL at 05:09

## 2020-01-01 RX ADMIN — PANTOPRAZOLE SODIUM 40 MG: 40 TABLET, DELAYED RELEASE ORAL at 09:16

## 2020-01-01 RX ADMIN — PANTOPRAZOLE SODIUM 40 MG: 40 TABLET, DELAYED RELEASE ORAL at 06:43

## 2020-01-01 RX ADMIN — HYDROCODONE BITARTRATE AND ACETAMINOPHEN 1 TABLET: 5; 325 TABLET ORAL at 10:25

## 2020-01-01 RX ADMIN — PANTOPRAZOLE SODIUM 40 MG: 40 TABLET, DELAYED RELEASE ORAL at 06:12

## 2020-01-01 RX ADMIN — ASPIRIN 81 MG: 81 TABLET ORAL at 08:02

## 2020-01-01 RX ADMIN — BUDESONIDE 500 MCG: 0.5 INHALANT RESPIRATORY (INHALATION) at 20:32

## 2020-01-01 RX ADMIN — LEVOCETIRIZINE DIHYDROCHLORIDE 5 MG: 5 TABLET, FILM COATED ORAL at 22:27

## 2020-01-01 RX ADMIN — DICYCLOMINE HYDROCHLORIDE 10 MG: 10 CAPSULE ORAL at 20:15

## 2020-01-01 RX ADMIN — NYSTATIN 500000 UNITS: 100000 SUSPENSION ORAL at 13:00

## 2020-01-01 RX ADMIN — ARFORMOTEROL TARTRATE 15 MCG: 15 SOLUTION RESPIRATORY (INHALATION) at 20:01

## 2020-01-01 RX ADMIN — TRAZODONE HYDROCHLORIDE 50 MG: 50 TABLET ORAL at 21:18

## 2020-01-01 RX ADMIN — DIGOXIN 125 MCG: 125 TABLET ORAL at 09:16

## 2020-01-01 RX ADMIN — Medication 1 CAPSULE: at 08:27

## 2020-01-01 RX ADMIN — GABAPENTIN 100 MG: 100 CAPSULE ORAL at 16:53

## 2020-01-01 RX ADMIN — GABAPENTIN 100 MG: 100 CAPSULE ORAL at 14:07

## 2020-01-01 RX ADMIN — ALBUTEROL SULFATE 2.5 MG: 2.5 SOLUTION RESPIRATORY (INHALATION) at 07:37

## 2020-01-01 RX ADMIN — ARFORMOTEROL TARTRATE 15 MCG: 15 SOLUTION RESPIRATORY (INHALATION) at 20:52

## 2020-01-01 RX ADMIN — STANDARDIZED SENNA CONCENTRATE 8.6 MG: 8.6 TABLET ORAL at 20:43

## 2020-01-01 RX ADMIN — FUROSEMIDE 40 MG: 10 INJECTION, SOLUTION INTRAMUSCULAR; INTRAVENOUS at 16:33

## 2020-01-01 RX ADMIN — POTASSIUM CHLORIDE 20 MEQ: 1500 TABLET, EXTENDED RELEASE ORAL at 09:39

## 2020-01-01 RX ADMIN — GABAPENTIN 100 MG: 100 CAPSULE ORAL at 15:00

## 2020-01-01 RX ADMIN — BUDESONIDE 500 MCG: 0.5 INHALANT RESPIRATORY (INHALATION) at 18:09

## 2020-01-01 RX ADMIN — SODIUM CHLORIDE 20 ML: 9 INJECTION, SOLUTION INTRAVENOUS at 13:33

## 2020-01-01 RX ADMIN — HYDROCODONE BITARTRATE AND ACETAMINOPHEN 1 TABLET: 5; 325 TABLET ORAL at 16:49

## 2020-01-01 RX ADMIN — SENNOSIDES 8.6 MG: 8.6 TABLET, FILM COATED ORAL at 20:55

## 2020-01-01 RX ADMIN — LINEZOLID 600 MG: 600 INJECTION, SOLUTION INTRAVENOUS at 04:36

## 2020-01-01 RX ADMIN — Medication 1 CAPSULE: at 17:31

## 2020-01-01 RX ADMIN — LEVOCETIRIZINE DIHYDROCHLORIDE 5 MG: 5 TABLET, FILM COATED ORAL at 21:03

## 2020-01-01 RX ADMIN — ARFORMOTEROL TARTRATE 15 MCG: 15 SOLUTION RESPIRATORY (INHALATION) at 22:17

## 2020-01-01 RX ADMIN — LEVOCETIRIZINE DIHYDROCHLORIDE 5 MG: 5 TABLET, FILM COATED ORAL at 21:01

## 2020-01-01 RX ADMIN — HYDROCODONE BITARTRATE AND ACETAMINOPHEN 2 TABLET: 5; 325 TABLET ORAL at 05:09

## 2020-01-01 RX ADMIN — DIGOXIN 125 MCG: 125 TABLET ORAL at 08:06

## 2020-01-01 RX ADMIN — DICYCLOMINE HYDROCHLORIDE 10 MG: 10 CAPSULE ORAL at 12:24

## 2020-01-01 RX ADMIN — GABAPENTIN 100 MG: 100 CAPSULE ORAL at 14:39

## 2020-01-01 RX ADMIN — NYSTATIN 500000 UNITS: 500000 SUSPENSION ORAL at 21:18

## 2020-01-01 RX ADMIN — BUMETANIDE 1 MG/HR: 0.25 INJECTION INTRAMUSCULAR; INTRAVENOUS at 21:19

## 2020-01-01 RX ADMIN — ARFORMOTEROL TARTRATE 15 MCG: 15 SOLUTION RESPIRATORY (INHALATION) at 18:17

## 2020-01-01 RX ADMIN — ARFORMOTEROL TARTRATE 15 MCG: 15 SOLUTION RESPIRATORY (INHALATION) at 17:45

## 2020-01-01 RX ADMIN — GABAPENTIN 100 MG: 100 CAPSULE ORAL at 20:35

## 2020-01-01 RX ADMIN — Medication 3.3 MG: at 05:57

## 2020-01-01 RX ADMIN — LIDOCAINE: 50 OINTMENT TOPICAL at 22:42

## 2020-01-01 RX ADMIN — TRAZODONE HYDROCHLORIDE 50 MG: 50 TABLET ORAL at 19:57

## 2020-01-01 RX ADMIN — ASPIRIN 81 MG: 81 TABLET ORAL at 09:30

## 2020-01-01 RX ADMIN — HYDROCODONE BITARTRATE AND ACETAMINOPHEN 2 TABLET: 5; 325 TABLET ORAL at 12:20

## 2020-01-01 RX ADMIN — BUDESONIDE 500 MCG: 0.5 INHALANT RESPIRATORY (INHALATION) at 09:09

## 2020-01-01 RX ADMIN — HYDROCODONE BITARTRATE AND ACETAMINOPHEN 2 TABLET: 5; 325 TABLET ORAL at 17:59

## 2020-01-01 RX ADMIN — POTASSIUM CHLORIDE 10 MEQ: 7.46 INJECTION, SOLUTION INTRAVENOUS at 14:04

## 2020-01-01 RX ADMIN — POTASSIUM CHLORIDE 20 MEQ: 1500 TABLET, EXTENDED RELEASE ORAL at 17:48

## 2020-01-01 RX ADMIN — HYDROCODONE BITARTRATE AND ACETAMINOPHEN 1 TABLET: 5; 325 TABLET ORAL at 20:33

## 2020-01-01 RX ADMIN — PIPERACILLIN AND TAZOBACTAM 3.38 G: 3; .375 INJECTION, POWDER, LYOPHILIZED, FOR SOLUTION INTRAVENOUS at 13:29

## 2020-01-01 RX ADMIN — METHYLPREDNISOLONE SODIUM SUCCINATE 60 MG: 125 INJECTION, POWDER, FOR SOLUTION INTRAMUSCULAR; INTRAVENOUS at 00:21

## 2020-01-01 RX ADMIN — ARFORMOTEROL TARTRATE 15 MCG: 15 SOLUTION RESPIRATORY (INHALATION) at 17:25

## 2020-01-01 RX ADMIN — Medication 1.5 MG: at 18:31

## 2020-01-01 RX ADMIN — GABAPENTIN 100 MG: 100 CAPSULE ORAL at 13:46

## 2020-01-01 RX ADMIN — GABAPENTIN 100 MG: 100 CAPSULE ORAL at 20:45

## 2020-01-01 RX ADMIN — HYDROCODONE BITARTRATE AND ACETAMINOPHEN 2 TABLET: 5; 325 TABLET ORAL at 20:09

## 2020-01-01 RX ADMIN — TRAZODONE HYDROCHLORIDE 50 MG: 50 TABLET ORAL at 21:47

## 2020-01-01 RX ADMIN — GABAPENTIN 100 MG: 100 CAPSULE ORAL at 16:37

## 2020-01-01 RX ADMIN — DICYCLOMINE HYDROCHLORIDE 10 MG: 10 CAPSULE ORAL at 07:29

## 2020-01-01 RX ADMIN — GABAPENTIN 100 MG: 100 CAPSULE ORAL at 09:13

## 2020-01-01 RX ADMIN — METOPROLOL SUCCINATE 25 MG: 25 TABLET, FILM COATED, EXTENDED RELEASE ORAL at 08:27

## 2020-01-01 RX ADMIN — HYDROCORTISONE ACETATE 25 MG: 25 SUPPOSITORY RECTAL at 08:59

## 2020-01-01 RX ADMIN — METOPROLOL SUCCINATE 25 MG: 25 TABLET, FILM COATED, EXTENDED RELEASE ORAL at 09:00

## 2020-01-01 RX ADMIN — POTASSIUM CHLORIDE 40 MEQ: 1500 TABLET, EXTENDED RELEASE ORAL at 09:56

## 2020-01-01 RX ADMIN — PANTOPRAZOLE SODIUM 40 MG: 40 TABLET, DELAYED RELEASE ORAL at 05:47

## 2020-01-01 RX ADMIN — GABAPENTIN 100 MG: 100 CAPSULE ORAL at 09:39

## 2020-01-01 RX ADMIN — ACETAMINOPHEN 650 MG: 325 TABLET ORAL at 06:26

## 2020-01-01 RX ADMIN — PIPERACILLIN AND TAZOBACTAM 3.38 G: 3; .375 INJECTION, POWDER, LYOPHILIZED, FOR SOLUTION INTRAVENOUS at 05:18

## 2020-01-01 RX ADMIN — ACETAMINOPHEN 650 MG: 325 TABLET ORAL at 23:00

## 2020-01-01 RX ADMIN — SACUBITRIL AND VALSARTAN 1 TABLET: 24; 26 TABLET, FILM COATED ORAL at 20:43

## 2020-01-01 RX ADMIN — GABAPENTIN 100 MG: 100 CAPSULE ORAL at 14:33

## 2020-01-01 RX ADMIN — FAMOTIDINE 20 MG: 20 TABLET, FILM COATED ORAL at 10:05

## 2020-01-01 RX ADMIN — PANTOPRAZOLE SODIUM 40 MG: 40 TABLET, DELAYED RELEASE ORAL at 05:12

## 2020-01-01 RX ADMIN — LEVALBUTEROL HYDROCHLORIDE 0.63 MG: 0.63 SOLUTION RESPIRATORY (INHALATION) at 07:31

## 2020-01-01 RX ADMIN — HYDROCODONE BITARTRATE AND ACETAMINOPHEN 1 TABLET: 5; 325 TABLET ORAL at 14:55

## 2020-01-01 RX ADMIN — ASPIRIN 81 MG: 81 TABLET, COATED ORAL at 08:55

## 2020-01-01 RX ADMIN — ALBUTEROL SULFATE 2.5 MG: 2.5 SOLUTION RESPIRATORY (INHALATION) at 09:50

## 2020-01-01 RX ADMIN — PREDNISONE 40 MG: 20 TABLET ORAL at 07:46

## 2020-01-01 RX ADMIN — ASPIRIN 81 MG: 81 TABLET, COATED ORAL at 10:11

## 2020-01-01 RX ADMIN — BUDESONIDE 500 MCG: 0.5 INHALANT RESPIRATORY (INHALATION) at 09:08

## 2020-01-01 RX ADMIN — ASPIRIN 81 MG: 81 TABLET, COATED ORAL at 10:36

## 2020-01-01 RX ADMIN — SACUBITRIL AND VALSARTAN 1 TABLET: 24; 26 TABLET, FILM COATED ORAL at 21:18

## 2020-01-01 RX ADMIN — PANTOPRAZOLE SODIUM 40 MG: 40 TABLET, DELAYED RELEASE ORAL at 12:25

## 2020-01-01 RX ADMIN — BUMETANIDE 0.5 MG: 0.25 INJECTION INTRAMUSCULAR; INTRAVENOUS at 04:27

## 2020-01-01 RX ADMIN — STANDARDIZED SENNA CONCENTRATE 8.6 MG: 8.6 TABLET ORAL at 21:02

## 2020-01-01 RX ADMIN — HYDROCORTISONE ACETATE 25 MG: 25 SUPPOSITORY RECTAL at 08:20

## 2020-01-01 RX ADMIN — ACETAMINOPHEN 650 MG: 325 TABLET ORAL at 06:03

## 2020-01-01 RX ADMIN — HYDROCORTISONE ACETATE 25 MG: 25 SUPPOSITORY RECTAL at 16:25

## 2020-01-01 RX ADMIN — BUDESONIDE 500 MCG: 0.5 INHALANT RESPIRATORY (INHALATION) at 09:03

## 2020-01-01 RX ADMIN — HYDROCODONE BITARTRATE AND ACETAMINOPHEN 2 TABLET: 5; 325 TABLET ORAL at 12:52

## 2020-01-01 RX ADMIN — GABAPENTIN 100 MG: 100 CAPSULE ORAL at 21:47

## 2020-01-01 RX ADMIN — METOPROLOL SUCCINATE 25 MG: 25 TABLET, FILM COATED, EXTENDED RELEASE ORAL at 09:27

## 2020-01-01 RX ADMIN — LINEZOLID 600 MG: 600 TABLET, FILM COATED ORAL at 09:49

## 2020-01-01 RX ADMIN — FUROSEMIDE 40 MG: 10 INJECTION, SOLUTION INTRAMUSCULAR; INTRAVENOUS at 08:03

## 2020-01-01 RX ADMIN — HYDROCODONE BITARTRATE AND ACETAMINOPHEN 2 TABLET: 5; 325 TABLET ORAL at 10:05

## 2020-01-01 RX ADMIN — FUROSEMIDE 40 MG: 40 TABLET ORAL at 08:27

## 2020-01-01 RX ADMIN — ASPIRIN 81 MG: 81 TABLET ORAL at 12:26

## 2020-01-01 RX ADMIN — PANTOPRAZOLE SODIUM 40 MG: 40 TABLET, DELAYED RELEASE ORAL at 08:30

## 2020-01-01 RX ADMIN — ALBUTEROL SULFATE 2.5 MG: 2.5 SOLUTION RESPIRATORY (INHALATION) at 21:09

## 2020-01-01 RX ADMIN — DIGOXIN 125 MCG: 125 TABLET ORAL at 08:55

## 2020-01-01 RX ADMIN — GABAPENTIN 100 MG: 100 CAPSULE ORAL at 13:26

## 2020-01-01 RX ADMIN — Medication 10 ML: at 09:40

## 2020-01-01 RX ADMIN — BUMETANIDE 0.5 MG: 0.25 INJECTION INTRAMUSCULAR; INTRAVENOUS at 17:17

## 2020-01-01 RX ADMIN — ACETAMINOPHEN 650 MG: 325 TABLET ORAL at 20:20

## 2020-01-01 RX ADMIN — BUMETANIDE 1 MG/HR: 0.25 INJECTION INTRAMUSCULAR; INTRAVENOUS at 13:50

## 2020-01-01 RX ADMIN — HYDROCODONE BITARTRATE AND ACETAMINOPHEN 1 TABLET: 5; 325 TABLET ORAL at 05:45

## 2020-01-01 RX ADMIN — ARFORMOTEROL TARTRATE 15 MCG: 15 SOLUTION RESPIRATORY (INHALATION) at 09:49

## 2020-01-01 RX ADMIN — SODIUM CHLORIDE 8 MG/HR: 9 INJECTION, SOLUTION INTRAVENOUS at 05:28

## 2020-01-01 RX ADMIN — ACETAMINOPHEN 650 MG: 325 TABLET ORAL at 14:34

## 2020-01-01 RX ADMIN — ARFORMOTEROL TARTRATE 15 MCG: 15 SOLUTION RESPIRATORY (INHALATION) at 18:09

## 2020-01-01 RX ADMIN — BUDESONIDE 500 MCG: 0.5 INHALANT RESPIRATORY (INHALATION) at 06:39

## 2020-01-01 RX ADMIN — ALBUTEROL SULFATE 2.5 MG: 2.5 SOLUTION RESPIRATORY (INHALATION) at 16:55

## 2020-01-01 RX ADMIN — DOCUSATE SODIUM 100 MG: 100 CAPSULE, LIQUID FILLED ORAL at 09:16

## 2020-01-01 RX ADMIN — SACUBITRIL AND VALSARTAN 1 TABLET: 49; 51 TABLET, FILM COATED ORAL at 08:45

## 2020-01-01 RX ADMIN — DOCUSATE SODIUM 100 MG: 100 CAPSULE, LIQUID FILLED ORAL at 09:17

## 2020-01-01 RX ADMIN — HYDROCODONE BITARTRATE AND ACETAMINOPHEN 2 TABLET: 5; 325 TABLET ORAL at 19:04

## 2020-01-01 RX ADMIN — GABAPENTIN 100 MG: 100 CAPSULE ORAL at 09:49

## 2020-01-01 RX ADMIN — DICYCLOMINE HYDROCHLORIDE 10 MG: 10 CAPSULE ORAL at 17:25

## 2020-01-01 RX ADMIN — PANTOPRAZOLE SODIUM 40 MG: 40 TABLET, DELAYED RELEASE ORAL at 05:43

## 2020-01-01 RX ADMIN — ARFORMOTEROL TARTRATE 15 MCG: 15 SOLUTION RESPIRATORY (INHALATION) at 08:15

## 2020-01-01 RX ADMIN — PANTOPRAZOLE SODIUM 40 MG: 40 TABLET, DELAYED RELEASE ORAL at 15:09

## 2020-01-01 RX ADMIN — FAMOTIDINE 20 MG: 20 TABLET, FILM COATED ORAL at 11:35

## 2020-01-01 RX ADMIN — GABAPENTIN 100 MG: 100 CAPSULE ORAL at 22:39

## 2020-01-01 RX ADMIN — BUDESONIDE 500 MCG: 0.5 INHALANT RESPIRATORY (INHALATION) at 20:53

## 2020-01-01 RX ADMIN — BUDESONIDE 500 MCG: 0.5 INHALANT RESPIRATORY (INHALATION) at 06:09

## 2020-01-01 RX ADMIN — SODIUM CHLORIDE, PRESERVATIVE FREE 10 ML: 5 INJECTION INTRAVENOUS at 20:55

## 2020-01-01 RX ADMIN — SACUBITRIL AND VALSARTAN 1 TABLET: 24; 26 TABLET, FILM COATED ORAL at 20:05

## 2020-01-01 RX ADMIN — HYDROCODONE BITARTRATE AND ACETAMINOPHEN 1 TABLET: 5; 325 TABLET ORAL at 06:01

## 2020-01-01 RX ADMIN — ALBUTEROL SULFATE 2.5 MG: 2.5 SOLUTION RESPIRATORY (INHALATION) at 08:07

## 2020-01-01 RX ADMIN — TIOTROPIUM BROMIDE INHALATION SPRAY 2 PUFF: 3.12 SPRAY, METERED RESPIRATORY (INHALATION) at 08:21

## 2020-01-01 RX ADMIN — ARFORMOTEROL TARTRATE 15 MCG: 15 SOLUTION RESPIRATORY (INHALATION) at 17:57

## 2020-01-01 RX ADMIN — GABAPENTIN 100 MG: 100 CAPSULE ORAL at 15:42

## 2020-01-01 RX ADMIN — PANTOPRAZOLE SODIUM 40 MG: 40 TABLET, DELAYED RELEASE ORAL at 04:56

## 2020-01-01 RX ADMIN — BUDESONIDE 500 MCG: 0.5 INHALANT RESPIRATORY (INHALATION) at 07:31

## 2020-01-01 RX ADMIN — METOPROLOL SUCCINATE 25 MG: 25 TABLET, FILM COATED, EXTENDED RELEASE ORAL at 09:17

## 2020-01-01 RX ADMIN — FAMOTIDINE 20 MG: 10 INJECTION INTRAVENOUS at 08:29

## 2020-01-01 RX ADMIN — ASPIRIN 81 MG: 81 TABLET, COATED ORAL at 10:58

## 2020-01-01 RX ADMIN — HEPARIN SODIUM 21 UNITS/KG/HR: 10000 INJECTION, SOLUTION INTRAVENOUS at 07:27

## 2020-01-01 RX ADMIN — CETIRIZINE HYDROCHLORIDE 5 MG: 5 TABLET ORAL at 09:49

## 2020-01-01 RX ADMIN — Medication 1.5 MG: at 18:26

## 2020-01-01 RX ADMIN — BUDESONIDE 500 MCG: 0.5 INHALANT RESPIRATORY (INHALATION) at 07:30

## 2020-01-01 RX ADMIN — HYDROCORTISONE ACETATE 25 MG: 25 SUPPOSITORY RECTAL at 09:43

## 2020-01-01 RX ADMIN — BUDESONIDE 500 MCG: 0.5 INHALANT RESPIRATORY (INHALATION) at 08:00

## 2020-01-01 RX ADMIN — ARFORMOTEROL TARTRATE 15 MCG: 15 SOLUTION RESPIRATORY (INHALATION) at 10:29

## 2020-01-01 RX ADMIN — CYCLOBENZAPRINE 10 MG: 10 TABLET, FILM COATED ORAL at 14:55

## 2020-01-01 RX ADMIN — Medication 3.3 MG: at 20:53

## 2020-01-01 RX ADMIN — Medication 10 ML: at 11:42

## 2020-01-01 RX ADMIN — BUDESONIDE 500 MCG: 0.5 INHALANT RESPIRATORY (INHALATION) at 17:17

## 2020-01-01 RX ADMIN — ALBUTEROL SULFATE 2.5 MG: 2.5 SOLUTION RESPIRATORY (INHALATION) at 21:44

## 2020-01-01 RX ADMIN — ARFORMOTEROL TARTRATE 15 MCG: 15 SOLUTION RESPIRATORY (INHALATION) at 21:32

## 2020-01-01 RX ADMIN — SACUBITRIL AND VALSARTAN 1 TABLET: 24; 26 TABLET, FILM COATED ORAL at 08:02

## 2020-01-01 RX ADMIN — PANTOPRAZOLE SODIUM 40 MG: 40 TABLET, DELAYED RELEASE ORAL at 16:08

## 2020-01-01 RX ADMIN — HYDROCODONE BITARTRATE AND ACETAMINOPHEN 2 TABLET: 5; 325 TABLET ORAL at 04:19

## 2020-01-01 RX ADMIN — GABAPENTIN 100 MG: 100 CAPSULE ORAL at 13:16

## 2020-01-01 RX ADMIN — PIPERACILLIN AND TAZOBACTAM 3.38 G: 3; .375 INJECTION, POWDER, LYOPHILIZED, FOR SOLUTION INTRAVENOUS at 22:21

## 2020-01-01 RX ADMIN — LINEZOLID 600 MG: 600 INJECTION, SOLUTION INTRAVENOUS at 04:23

## 2020-01-01 RX ADMIN — ALBUTEROL SULFATE 2.5 MG: 2.5 SOLUTION RESPIRATORY (INHALATION) at 17:46

## 2020-01-01 RX ADMIN — HYDROCODONE BITARTRATE AND ACETAMINOPHEN 2 TABLET: 5; 325 TABLET ORAL at 20:57

## 2020-01-01 RX ADMIN — PIPERACILLIN SODIUM AND TAZOBACTAM SODIUM 4.5 G: 4; .5 INJECTION, POWDER, LYOPHILIZED, FOR SOLUTION INTRAVENOUS at 14:22

## 2020-01-01 RX ADMIN — HYDROCODONE BITARTRATE AND ACETAMINOPHEN 1 TABLET: 5; 325 TABLET ORAL at 22:29

## 2020-01-01 RX ADMIN — METOPROLOL SUCCINATE 25 MG: 25 TABLET, FILM COATED, EXTENDED RELEASE ORAL at 08:59

## 2020-01-01 RX ADMIN — BUDESONIDE 500 MCG: 0.5 INHALANT RESPIRATORY (INHALATION) at 20:26

## 2020-01-01 RX ADMIN — DOCUSATE SODIUM 100 MG: 100 CAPSULE, LIQUID FILLED ORAL at 08:46

## 2020-01-01 RX ADMIN — Medication 0.5 MG: at 17:48

## 2020-01-01 RX ADMIN — BUDESONIDE 500 MCG: 0.5 INHALANT RESPIRATORY (INHALATION) at 09:01

## 2020-01-01 RX ADMIN — DOCUSATE SODIUM 100 MG: 100 CAPSULE, LIQUID FILLED ORAL at 10:05

## 2020-01-01 RX ADMIN — METOPROLOL SUCCINATE 25 MG: 25 TABLET, FILM COATED, EXTENDED RELEASE ORAL at 10:05

## 2020-01-01 RX ADMIN — STANDARDIZED SENNA CONCENTRATE 8.6 MG: 8.6 TABLET ORAL at 21:21

## 2020-01-01 RX ADMIN — OSELTAMIVIR PHOSPHATE 30 MG: 30 CAPSULE ORAL at 09:30

## 2020-01-01 RX ADMIN — DIGOXIN 125 MCG: 125 TABLET ORAL at 08:15

## 2020-01-01 RX ADMIN — LINEZOLID 600 MG: 600 INJECTION, SOLUTION INTRAVENOUS at 00:26

## 2020-01-01 RX ADMIN — ARFORMOTEROL TARTRATE 15 MCG: 15 SOLUTION RESPIRATORY (INHALATION) at 08:00

## 2020-01-01 RX ADMIN — HYDROCODONE BITARTRATE AND ACETAMINOPHEN 2 TABLET: 5; 325 TABLET ORAL at 20:32

## 2020-01-01 RX ADMIN — DIGOXIN 125 MCG: 125 TABLET ORAL at 09:40

## 2020-01-01 RX ADMIN — Medication 1.5 MG: at 17:30

## 2020-01-01 RX ADMIN — SACUBITRIL AND VALSARTAN 1 TABLET: 24; 26 TABLET, FILM COATED ORAL at 19:57

## 2020-01-01 RX ADMIN — TIOTROPIUM BROMIDE INHALATION SPRAY 2 PUFF: 3.12 SPRAY, METERED RESPIRATORY (INHALATION) at 07:31

## 2020-01-01 RX ADMIN — WARFARIN SODIUM 2 MG: 2 TABLET ORAL at 16:13

## 2020-01-01 RX ADMIN — ARFORMOTEROL TARTRATE 15 MCG: 15 SOLUTION RESPIRATORY (INHALATION) at 21:16

## 2020-01-01 RX ADMIN — PANTOPRAZOLE SODIUM 40 MG: 40 TABLET, DELAYED RELEASE ORAL at 17:08

## 2020-01-01 RX ADMIN — HYDROCODONE BITARTRATE AND ACETAMINOPHEN 1 TABLET: 5; 325 TABLET ORAL at 21:21

## 2020-01-01 RX ADMIN — HYDROCODONE BITARTRATE AND ACETAMINOPHEN 2 TABLET: 5; 325 TABLET ORAL at 10:43

## 2020-01-01 RX ADMIN — SACUBITRIL AND VALSARTAN 1 TABLET: 24; 26 TABLET, FILM COATED ORAL at 08:27

## 2020-01-01 RX ADMIN — FUROSEMIDE 40 MG: 40 TABLET ORAL at 10:01

## 2020-01-01 RX ADMIN — DOCUSATE SODIUM 100 MG: 100 CAPSULE, LIQUID FILLED ORAL at 08:01

## 2020-01-01 RX ADMIN — HYDROCODONE BITARTRATE AND ACETAMINOPHEN 2 TABLET: 5; 325 TABLET ORAL at 14:04

## 2020-01-01 RX ADMIN — Medication 0.5 MG: at 17:53

## 2020-01-01 RX ADMIN — HYDROCORTISONE ACETATE 25 MG: 25 SUPPOSITORY RECTAL at 08:57

## 2020-01-01 RX ADMIN — FUROSEMIDE 40 MG: 10 INJECTION, SOLUTION INTRAMUSCULAR; INTRAVENOUS at 09:43

## 2020-01-01 RX ADMIN — FAMOTIDINE 20 MG: 20 TABLET, FILM COATED ORAL at 08:59

## 2020-01-01 RX ADMIN — GABAPENTIN 100 MG: 100 CAPSULE ORAL at 22:12

## 2020-01-01 RX ADMIN — TRAZODONE HYDROCHLORIDE 50 MG: 50 TABLET ORAL at 20:46

## 2020-01-01 RX ADMIN — TRAZODONE HYDROCHLORIDE 50 MG: 50 TABLET ORAL at 21:02

## 2020-01-01 RX ADMIN — NYSTATIN 500000 UNITS: 100000 SUSPENSION ORAL at 21:47

## 2020-01-01 RX ADMIN — BUDESONIDE 500 MCG: 0.5 INHALANT RESPIRATORY (INHALATION) at 18:31

## 2020-01-01 RX ADMIN — PREDNISONE 40 MG: 20 TABLET ORAL at 08:46

## 2020-01-01 RX ADMIN — DICYCLOMINE HYDROCHLORIDE 10 MG: 10 CAPSULE ORAL at 06:36

## 2020-01-01 RX ADMIN — ALBUTEROL SULFATE 2.5 MG: 2.5 SOLUTION RESPIRATORY (INHALATION) at 08:20

## 2020-01-01 RX ADMIN — DICYCLOMINE HYDROCHLORIDE 10 MG: 10 CAPSULE ORAL at 17:50

## 2020-01-01 RX ADMIN — GABAPENTIN 100 MG: 100 CAPSULE ORAL at 08:10

## 2020-01-01 RX ADMIN — WARFARIN SODIUM 2 MG: 2 TABLET ORAL at 17:08

## 2020-01-01 RX ADMIN — SACUBITRIL AND VALSARTAN 1 TABLET: 24; 26 TABLET, FILM COATED ORAL at 21:49

## 2020-01-01 RX ADMIN — BUDESONIDE 500 MCG: 0.5 INHALANT RESPIRATORY (INHALATION) at 18:22

## 2020-01-01 RX ADMIN — HEPARIN SODIUM 21 UNITS/KG/HR: 10000 INJECTION, SOLUTION INTRAVENOUS at 16:12

## 2020-01-01 RX ADMIN — PANTOPRAZOLE SODIUM 40 MG: 40 TABLET, DELAYED RELEASE ORAL at 06:19

## 2020-01-01 RX ADMIN — HYDROCODONE BITARTRATE AND ACETAMINOPHEN 2 TABLET: 5; 325 TABLET ORAL at 08:19

## 2020-01-01 RX ADMIN — Medication 3.3 MG: at 08:15

## 2020-01-01 RX ADMIN — PIPERACILLIN AND TAZOBACTAM 3.38 G: 3; .375 INJECTION, POWDER, LYOPHILIZED, FOR SOLUTION INTRAVENOUS at 04:19

## 2020-01-01 RX ADMIN — Medication 10 ML: at 20:33

## 2020-01-01 RX ADMIN — BUDESONIDE 500 MCG: 0.5 INHALANT RESPIRATORY (INHALATION) at 08:09

## 2020-01-01 RX ADMIN — TIOTROPIUM BROMIDE INHALATION SPRAY 2 PUFF: 3.12 SPRAY, METERED RESPIRATORY (INHALATION) at 07:37

## 2020-01-01 RX ADMIN — LEVALBUTEROL HYDROCHLORIDE 0.63 MG: 1.25 SOLUTION RESPIRATORY (INHALATION) at 20:19

## 2020-01-01 RX ADMIN — ASPIRIN 81 MG: 81 TABLET, COATED ORAL at 08:01

## 2020-01-01 RX ADMIN — ARFORMOTEROL TARTRATE 15 MCG: 15 SOLUTION RESPIRATORY (INHALATION) at 08:09

## 2020-01-01 RX ADMIN — HYDROMORPHONE HYDROCHLORIDE 0.5 MG: 1 INJECTION, SOLUTION INTRAMUSCULAR; INTRAVENOUS; SUBCUTANEOUS at 17:49

## 2020-01-01 RX ADMIN — Medication 1 CAPSULE: at 09:26

## 2020-01-01 RX ADMIN — LEVOCETIRIZINE DIHYDROCHLORIDE 5 MG: 5 TABLET, FILM COATED ORAL at 20:16

## 2020-01-01 RX ADMIN — TIOTROPIUM BROMIDE INHALATION SPRAY 2 PUFF: 3.12 SPRAY, METERED RESPIRATORY (INHALATION) at 15:45

## 2020-01-01 RX ADMIN — FORMOTEROL FUMARATE DIHYDRATE 20 MCG: 20 SOLUTION RESPIRATORY (INHALATION) at 05:21

## 2020-01-01 RX ADMIN — ALBUTEROL SULFATE 2.5 MG: 2.5 SOLUTION RESPIRATORY (INHALATION) at 04:20

## 2020-01-01 RX ADMIN — HEPARIN SODIUM 18 UNITS/KG/HR: 10000 INJECTION, SOLUTION INTRAVENOUS at 05:51

## 2020-01-01 RX ADMIN — FORMOTEROL FUMARATE DIHYDRATE 20 MCG: 20 SOLUTION RESPIRATORY (INHALATION) at 18:23

## 2020-01-01 RX ADMIN — CLINDAMYCIN PHOSPHATE 600 MG: 600 INJECTION, SOLUTION INTRAVENOUS at 12:00

## 2020-01-01 RX ADMIN — Medication 1 CAPSULE: at 10:13

## 2020-01-01 RX ADMIN — PANTOPRAZOLE SODIUM 40 MG: 40 TABLET, DELAYED RELEASE ORAL at 06:20

## 2020-01-01 RX ADMIN — GABAPENTIN 100 MG: 100 CAPSULE ORAL at 10:37

## 2020-01-01 RX ADMIN — LEVOCETIRIZINE DIHYDROCHLORIDE 5 MG: 5 TABLET, FILM COATED ORAL at 20:32

## 2020-01-01 RX ADMIN — Medication 10 ML: at 09:49

## 2020-01-01 RX ADMIN — CYCLOBENZAPRINE 10 MG: 10 TABLET, FILM COATED ORAL at 23:47

## 2020-01-01 RX ADMIN — FUROSEMIDE 40 MG: 40 TABLET ORAL at 12:30

## 2020-01-01 RX ADMIN — ASPIRIN 81 MG: 81 TABLET ORAL at 08:46

## 2020-01-01 RX ADMIN — BUDESONIDE 500 MCG: 0.5 INHALANT RESPIRATORY (INHALATION) at 17:20

## 2020-01-01 RX ADMIN — METHYLPREDNISOLONE SODIUM SUCCINATE 60 MG: 125 INJECTION, POWDER, FOR SOLUTION INTRAMUSCULAR; INTRAVENOUS at 20:07

## 2020-01-01 RX ADMIN — ASPIRIN 81 MG: 81 TABLET ORAL at 09:13

## 2020-01-01 RX ADMIN — TRAZODONE HYDROCHLORIDE 50 MG: 50 TABLET ORAL at 20:15

## 2020-01-01 RX ADMIN — ALBUTEROL SULFATE 2.5 MG: 2.5 SOLUTION RESPIRATORY (INHALATION) at 12:53

## 2020-01-01 RX ADMIN — TIOTROPIUM BROMIDE INHALATION SPRAY 2 PUFF: 3.12 SPRAY, METERED RESPIRATORY (INHALATION) at 04:47

## 2020-01-01 RX ADMIN — HYDROCODONE BITARTRATE AND ACETAMINOPHEN 2 TABLET: 5; 325 TABLET ORAL at 19:36

## 2020-01-01 RX ADMIN — TIOTROPIUM BROMIDE INHALATION SPRAY 2 PUFF: 3.12 SPRAY, METERED RESPIRATORY (INHALATION) at 10:08

## 2020-01-01 RX ADMIN — HYDROCODONE BITARTRATE AND ACETAMINOPHEN 2 TABLET: 5; 325 TABLET ORAL at 07:59

## 2020-01-01 RX ADMIN — GABAPENTIN 100 MG: 100 CAPSULE ORAL at 08:55

## 2020-01-01 RX ADMIN — SODIUM CHLORIDE 1000 ML: 9 INJECTION, SOLUTION INTRAVENOUS at 18:49

## 2020-01-01 RX ADMIN — FUROSEMIDE 40 MG: 40 TABLET ORAL at 07:46

## 2020-01-01 RX ADMIN — SACUBITRIL AND VALSARTAN 1 TABLET: 24; 26 TABLET, FILM COATED ORAL at 09:27

## 2020-01-01 RX ADMIN — STANDARDIZED SENNA CONCENTRATE 8.6 MG: 8.6 TABLET ORAL at 14:22

## 2020-01-01 RX ADMIN — NYSTATIN 500000 UNITS: 100000 SUSPENSION ORAL at 12:52

## 2020-01-01 RX ADMIN — ALBUTEROL SULFATE 2.5 MG: 2.5 SOLUTION RESPIRATORY (INHALATION) at 12:32

## 2020-01-01 RX ADMIN — DOCUSATE SODIUM 100 MG: 100 CAPSULE, LIQUID FILLED ORAL at 12:26

## 2020-01-01 RX ADMIN — TIOTROPIUM BROMIDE INHALATION SPRAY 2 PUFF: 3.12 SPRAY, METERED RESPIRATORY (INHALATION) at 09:04

## 2020-01-01 RX ADMIN — ALBUTEROL SULFATE 2.5 MG: 2.5 SOLUTION RESPIRATORY (INHALATION) at 17:24

## 2020-01-01 RX ADMIN — HYDROCORTISONE ACETATE 25 MG: 25 SUPPOSITORY RECTAL at 16:33

## 2020-01-01 RX ADMIN — SACUBITRIL AND VALSARTAN 1 TABLET: 24; 26 TABLET, FILM COATED ORAL at 20:41

## 2020-01-01 RX ADMIN — ALBUTEROL SULFATE 2.5 MG: 2.5 SOLUTION RESPIRATORY (INHALATION) at 16:37

## 2020-01-01 RX ADMIN — GABAPENTIN 100 MG: 100 CAPSULE ORAL at 11:19

## 2020-01-01 RX ADMIN — FAMOTIDINE 20 MG: 20 TABLET, FILM COATED ORAL at 08:29

## 2020-01-01 RX ADMIN — ACETAMINOPHEN 650 MG: 325 TABLET ORAL at 16:12

## 2020-01-01 RX ADMIN — HYDROCODONE BITARTRATE AND ACETAMINOPHEN 1 TABLET: 5; 325 TABLET ORAL at 18:34

## 2020-01-01 RX ADMIN — NITROGLYCERIN 20 MCG/MIN: 20 INJECTION INTRAVENOUS at 20:40

## 2020-01-01 RX ADMIN — DOCUSATE SODIUM 100 MG: 100 CAPSULE, LIQUID FILLED ORAL at 09:27

## 2020-01-01 RX ADMIN — CYCLOBENZAPRINE 10 MG: 10 TABLET, FILM COATED ORAL at 00:52

## 2020-01-01 RX ADMIN — DIGOXIN 125 MCG: 125 TABLET ORAL at 12:26

## 2020-01-01 RX ADMIN — Medication 1.5 MG: at 17:00

## 2020-01-01 RX ADMIN — BUDESONIDE 500 MCG: 0.5 INHALANT RESPIRATORY (INHALATION) at 21:05

## 2020-01-01 RX ADMIN — FUROSEMIDE 40 MG: 10 INJECTION, SOLUTION INTRAMUSCULAR; INTRAVENOUS at 08:56

## 2020-01-01 RX ADMIN — ASPIRIN 81 MG: 81 TABLET, COATED ORAL at 08:27

## 2020-01-01 RX ADMIN — STANDARDIZED SENNA CONCENTRATE 8.6 MG: 8.6 TABLET ORAL at 22:13

## 2020-01-01 RX ADMIN — GABAPENTIN 100 MG: 100 CAPSULE ORAL at 17:20

## 2020-01-01 RX ADMIN — PANTOPRAZOLE SODIUM 40 MG: 40 TABLET, DELAYED RELEASE ORAL at 05:26

## 2020-01-01 RX ADMIN — BUDESONIDE 500 MCG: 0.5 INHALANT RESPIRATORY (INHALATION) at 04:47

## 2020-01-01 RX ADMIN — ARFORMOTEROL TARTRATE 15 MCG: 15 SOLUTION RESPIRATORY (INHALATION) at 06:23

## 2020-01-01 RX ADMIN — ARFORMOTEROL TARTRATE 15 MCG: 15 SOLUTION RESPIRATORY (INHALATION) at 21:19

## 2020-01-01 RX ADMIN — PANTOPRAZOLE SODIUM 40 MG: 40 TABLET, DELAYED RELEASE ORAL at 05:45

## 2020-01-01 RX ADMIN — Medication 1.5 MG: at 17:45

## 2020-01-01 RX ADMIN — ALBUTEROL SULFATE 2.5 MG: 2.5 SOLUTION RESPIRATORY (INHALATION) at 21:05

## 2020-01-01 RX ADMIN — SACUBITRIL AND VALSARTAN 1 TABLET: 49; 51 TABLET, FILM COATED ORAL at 20:55

## 2020-01-01 RX ADMIN — ARFORMOTEROL TARTRATE 15 MCG: 15 SOLUTION RESPIRATORY (INHALATION) at 08:38

## 2020-01-01 RX ADMIN — DIGOXIN 125 MCG: 125 TABLET ORAL at 09:06

## 2020-01-01 RX ADMIN — BUDESONIDE 500 MCG: 0.5 INHALANT RESPIRATORY (INHALATION) at 06:23

## 2020-01-01 RX ADMIN — BUDESONIDE 500 MCG: 0.5 INHALANT RESPIRATORY (INHALATION) at 08:14

## 2020-01-01 RX ADMIN — BUDESONIDE 500 MCG: 0.5 INHALANT RESPIRATORY (INHALATION) at 22:28

## 2020-01-01 RX ADMIN — HYDROCODONE BITARTRATE AND ACETAMINOPHEN 1 TABLET: 5; 325 TABLET ORAL at 22:05

## 2020-01-01 RX ADMIN — FUROSEMIDE 40 MG: 10 INJECTION, SOLUTION INTRAMUSCULAR; INTRAVENOUS at 09:54

## 2020-01-01 RX ADMIN — FUROSEMIDE 40 MG: 40 TABLET ORAL at 10:12

## 2020-01-01 RX ADMIN — ARFORMOTEROL TARTRATE 15 MCG: 15 SOLUTION RESPIRATORY (INHALATION) at 19:46

## 2020-01-01 RX ADMIN — SODIUM CHLORIDE 500 ML: 9 INJECTION, SOLUTION INTRAVENOUS at 00:30

## 2020-01-01 RX ADMIN — ARFORMOTEROL TARTRATE 15 MCG: 15 SOLUTION RESPIRATORY (INHALATION) at 20:21

## 2020-01-01 RX ADMIN — BUDESONIDE 500 MCG: 0.5 INHALANT RESPIRATORY (INHALATION) at 07:22

## 2020-01-01 RX ADMIN — HYDROCODONE BITARTRATE AND ACETAMINOPHEN 1 TABLET: 5; 325 TABLET ORAL at 17:21

## 2020-01-01 RX ADMIN — DOCUSATE SODIUM 100 MG: 100 CAPSULE, LIQUID FILLED ORAL at 22:11

## 2020-01-01 RX ADMIN — STANDARDIZED SENNA CONCENTRATE 8.6 MG: 8.6 TABLET ORAL at 20:53

## 2020-01-01 RX ADMIN — HYDROCODONE BITARTRATE AND ACETAMINOPHEN 1 TABLET: 5; 325 TABLET ORAL at 12:35

## 2020-01-01 RX ADMIN — ARFORMOTEROL TARTRATE 15 MCG: 15 SOLUTION RESPIRATORY (INHALATION) at 04:47

## 2020-01-01 RX ADMIN — ARFORMOTEROL TARTRATE 15 MCG: 15 SOLUTION RESPIRATORY (INHALATION) at 18:31

## 2020-01-01 RX ADMIN — NYSTATIN 500000 UNITS: 500000 SUSPENSION ORAL at 17:04

## 2020-01-01 RX ADMIN — HYDROCODONE BITARTRATE AND ACETAMINOPHEN 1 TABLET: 5; 325 TABLET ORAL at 09:42

## 2020-01-01 RX ADMIN — FUROSEMIDE 40 MG: 10 INJECTION, SOLUTION INTRAMUSCULAR; INTRAVENOUS at 08:46

## 2020-01-01 RX ADMIN — FORMOTEROL FUMARATE DIHYDRATE 20 MCG: 20 SOLUTION RESPIRATORY (INHALATION) at 08:59

## 2020-01-01 RX ADMIN — TIOTROPIUM BROMIDE INHALATION SPRAY 2 PUFF: 3.12 SPRAY, METERED RESPIRATORY (INHALATION) at 09:15

## 2020-01-01 RX ADMIN — VANCOMYCIN HYDROCHLORIDE 1500 MG: 1 INJECTION, POWDER, LYOPHILIZED, FOR SOLUTION INTRAVENOUS at 14:59

## 2020-01-01 RX ADMIN — POLYETHYLENE GLYCOL 3350 17 G: 17 POWDER, FOR SOLUTION ORAL at 17:24

## 2020-01-01 RX ADMIN — HYDROCODONE BITARTRATE AND ACETAMINOPHEN 1 TABLET: 5; 325 TABLET ORAL at 03:48

## 2020-01-01 RX ADMIN — TRAZODONE HYDROCHLORIDE 50 MG: 50 TABLET ORAL at 20:00

## 2020-01-01 RX ADMIN — CYCLOBENZAPRINE 10 MG: 10 TABLET, FILM COATED ORAL at 13:05

## 2020-01-01 RX ADMIN — FUROSEMIDE 40 MG: 10 INJECTION, SOLUTION INTRAMUSCULAR; INTRAVENOUS at 15:05

## 2020-01-01 RX ADMIN — ACETAMINOPHEN 650 MG: 325 TABLET ORAL at 05:29

## 2020-01-01 RX ADMIN — HYDROCODONE BITARTRATE AND ACETAMINOPHEN 1 TABLET: 5; 325 TABLET ORAL at 17:40

## 2020-01-01 RX ADMIN — GABAPENTIN 100 MG: 100 CAPSULE ORAL at 09:17

## 2020-01-01 RX ADMIN — ALBUTEROL SULFATE 2.5 MG: 2.5 SOLUTION RESPIRATORY (INHALATION) at 09:07

## 2020-01-01 RX ADMIN — Medication 10 ML: at 10:11

## 2020-01-01 RX ADMIN — TIOTROPIUM BROMIDE INHALATION SPRAY 2 PUFF: 3.12 SPRAY, METERED RESPIRATORY (INHALATION) at 08:15

## 2020-01-01 RX ADMIN — FORMOTEROL FUMARATE DIHYDRATE 20 MCG: 20 SOLUTION RESPIRATORY (INHALATION) at 17:39

## 2020-01-01 RX ADMIN — POLYETHYLENE GLYCOL 3350 17 G: 17 POWDER, FOR SOLUTION ORAL at 10:15

## 2020-01-01 RX ADMIN — DICYCLOMINE HYDROCHLORIDE 10 MG: 10 CAPSULE ORAL at 10:36

## 2020-01-01 RX ADMIN — STANDARDIZED SENNA CONCENTRATE 8.6 MG: 8.6 TABLET ORAL at 21:47

## 2020-01-01 RX ADMIN — STANDARDIZED SENNA CONCENTRATE 8.6 MG: 8.6 TABLET ORAL at 20:05

## 2020-01-01 RX ADMIN — HYDROCODONE BITARTRATE AND ACETAMINOPHEN 1 TABLET: 5; 325 TABLET ORAL at 17:10

## 2020-01-01 RX ADMIN — FAMOTIDINE 20 MG: 10 INJECTION INTRAVENOUS at 08:58

## 2020-01-01 RX ADMIN — DOCUSATE SODIUM 100 MG: 100 CAPSULE, LIQUID FILLED ORAL at 08:59

## 2020-01-01 RX ADMIN — HYDROCODONE BITARTRATE AND ACETAMINOPHEN 1 TABLET: 5; 325 TABLET ORAL at 18:10

## 2020-01-01 RX ADMIN — BUDESONIDE 500 MCG: 0.5 INHALANT RESPIRATORY (INHALATION) at 20:37

## 2020-01-01 RX ADMIN — BUDESONIDE 500 MCG: 0.5 INHALANT RESPIRATORY (INHALATION) at 05:21

## 2020-01-01 RX ADMIN — POTASSIUM CHLORIDE 20 MEQ: 1500 TABLET, EXTENDED RELEASE ORAL at 10:05

## 2020-01-01 RX ADMIN — HYDROCODONE BITARTRATE AND ACETAMINOPHEN 2 TABLET: 5; 325 TABLET ORAL at 14:07

## 2020-01-01 RX ADMIN — ARFORMOTEROL TARTRATE 15 MCG: 15 SOLUTION RESPIRATORY (INHALATION) at 09:29

## 2020-01-01 RX ADMIN — BUMETANIDE 1 MG/HR: 0.25 INJECTION INTRAMUSCULAR; INTRAVENOUS at 03:39

## 2020-01-01 RX ADMIN — PANTOPRAZOLE SODIUM 40 MG: 40 TABLET, DELAYED RELEASE ORAL at 05:14

## 2020-01-01 RX ADMIN — NYSTATIN 500000 UNITS: 500000 SUSPENSION ORAL at 13:01

## 2020-01-01 RX ADMIN — HYDROCORTISONE ACETATE 25 MG: 25 SUPPOSITORY RECTAL at 08:27

## 2020-01-01 RX ADMIN — PANTOPRAZOLE SODIUM 40 MG: 40 TABLET, DELAYED RELEASE ORAL at 05:42

## 2020-01-01 RX ADMIN — LINEZOLID 600 MG: 600 INJECTION, SOLUTION INTRAVENOUS at 10:55

## 2020-01-01 RX ADMIN — HYDROCODONE BITARTRATE AND ACETAMINOPHEN 1 TABLET: 5; 325 TABLET ORAL at 20:16

## 2020-01-01 RX ADMIN — Medication 10 ML: at 09:48

## 2020-01-01 RX ADMIN — TRAZODONE HYDROCHLORIDE 50 MG: 50 TABLET ORAL at 20:33

## 2020-01-01 RX ADMIN — PANTOPRAZOLE SODIUM 40 MG: 40 TABLET, DELAYED RELEASE ORAL at 16:53

## 2020-01-01 RX ADMIN — GABAPENTIN 100 MG: 100 CAPSULE ORAL at 08:06

## 2020-01-01 RX ADMIN — TRAZODONE HYDROCHLORIDE 50 MG: 50 TABLET ORAL at 20:12

## 2020-01-01 RX ADMIN — PANTOPRAZOLE SODIUM 40 MG: 40 TABLET, DELAYED RELEASE ORAL at 17:06

## 2020-01-01 RX ADMIN — POTASSIUM CHLORIDE 20 MEQ: 1500 TABLET, EXTENDED RELEASE ORAL at 18:53

## 2020-01-01 RX ADMIN — Medication 1 CAPSULE: at 18:26

## 2020-01-01 RX ADMIN — GABAPENTIN 100 MG: 100 CAPSULE ORAL at 08:38

## 2020-01-01 RX ADMIN — PANTOPRAZOLE SODIUM 40 MG: 40 TABLET, DELAYED RELEASE ORAL at 06:10

## 2020-01-01 RX ADMIN — WARFARIN SODIUM 2 MG: 2 TABLET ORAL at 17:40

## 2020-01-01 RX ADMIN — TRAZODONE HYDROCHLORIDE 50 MG: 50 TABLET ORAL at 21:16

## 2020-01-01 RX ADMIN — DICYCLOMINE HYDROCHLORIDE 10 MG: 10 CAPSULE ORAL at 09:49

## 2020-01-01 RX ADMIN — GABAPENTIN 100 MG: 100 CAPSULE ORAL at 15:57

## 2020-01-01 RX ADMIN — DICYCLOMINE HYDROCHLORIDE 10 MG: 10 CAPSULE ORAL at 05:36

## 2020-01-01 RX ADMIN — BUMETANIDE 0.5 MG: 0.25 INJECTION INTRAMUSCULAR; INTRAVENOUS at 16:03

## 2020-01-01 RX ADMIN — Medication 10 ML: at 13:13

## 2020-01-01 RX ADMIN — Medication 1.5 MG: at 21:57

## 2020-01-01 RX ADMIN — TIOTROPIUM BROMIDE INHALATION SPRAY 2 PUFF: 3.12 SPRAY, METERED RESPIRATORY (INHALATION) at 08:58

## 2020-01-01 RX ADMIN — Medication 1 CAPSULE: at 07:46

## 2020-01-01 RX ADMIN — GABAPENTIN 100 MG: 100 CAPSULE ORAL at 13:09

## 2020-01-01 RX ADMIN — FUROSEMIDE 40 MG: 40 TABLET ORAL at 08:59

## 2020-01-01 RX ADMIN — SACUBITRIL AND VALSARTAN 1 TABLET: 24; 26 TABLET, FILM COATED ORAL at 09:40

## 2020-01-01 RX ADMIN — DICYCLOMINE HYDROCHLORIDE 10 MG: 10 CAPSULE ORAL at 05:58

## 2020-01-01 RX ADMIN — GABAPENTIN 100 MG: 100 CAPSULE ORAL at 07:46

## 2020-01-01 RX ADMIN — HYDROCODONE BITARTRATE AND ACETAMINOPHEN 1 TABLET: 5; 325 TABLET ORAL at 20:21

## 2020-01-01 RX ADMIN — ARFORMOTEROL TARTRATE 15 MCG: 15 SOLUTION RESPIRATORY (INHALATION) at 09:23

## 2020-01-01 RX ADMIN — HEPARIN SODIUM 5470 UNITS: 1000 INJECTION INTRAVENOUS; SUBCUTANEOUS at 11:50

## 2020-01-01 RX ADMIN — FUROSEMIDE 40 MG: 40 TABLET ORAL at 16:25

## 2020-01-01 RX ADMIN — ALBUTEROL SULFATE 2.5 MG: 2.5 SOLUTION RESPIRATORY (INHALATION) at 13:40

## 2020-01-01 RX ADMIN — FUROSEMIDE 40 MG: 40 TABLET ORAL at 08:06

## 2020-01-01 RX ADMIN — BUDESONIDE 500 MCG: 0.5 INHALANT RESPIRATORY (INHALATION) at 10:13

## 2020-01-01 RX ADMIN — DICYCLOMINE HYDROCHLORIDE 10 MG: 10 CAPSULE ORAL at 06:30

## 2020-01-01 RX ADMIN — ARFORMOTEROL TARTRATE 15 MCG: 15 SOLUTION RESPIRATORY (INHALATION) at 18:14

## 2020-01-01 RX ADMIN — LINEZOLID 600 MG: 600 TABLET, FILM COATED ORAL at 12:53

## 2020-01-01 RX ADMIN — FUROSEMIDE 40 MG: 40 TABLET ORAL at 09:32

## 2020-01-01 RX ADMIN — BUDESONIDE 500 MCG: 0.5 INHALANT RESPIRATORY (INHALATION) at 19:46

## 2020-01-01 RX ADMIN — ALBUTEROL SULFATE 2.5 MG: 2.5 SOLUTION RESPIRATORY (INHALATION) at 16:20

## 2020-01-01 RX ADMIN — TRAZODONE HYDROCHLORIDE 50 MG: 50 TABLET ORAL at 21:58

## 2020-01-01 RX ADMIN — TIOTROPIUM BROMIDE INHALATION SPRAY 2 PUFF: 3.12 SPRAY, METERED RESPIRATORY (INHALATION) at 09:30

## 2020-01-01 RX ADMIN — WARFARIN SODIUM 2 MG: 2 TABLET ORAL at 17:29

## 2020-01-01 RX ADMIN — SACUBITRIL AND VALSARTAN 1 TABLET: 49; 51 TABLET, FILM COATED ORAL at 12:26

## 2020-01-01 RX ADMIN — SODIUM CHLORIDE: 9 INJECTION, SOLUTION INTRAVENOUS at 09:45

## 2020-01-01 RX ADMIN — TIOTROPIUM BROMIDE INHALATION SPRAY 2 PUFF: 3.12 SPRAY, METERED RESPIRATORY (INHALATION) at 08:46

## 2020-01-01 RX ADMIN — ACETAMINOPHEN 650 MG: 325 TABLET ORAL at 21:05

## 2020-01-01 RX ADMIN — DICYCLOMINE HYDROCHLORIDE 10 MG: 10 CAPSULE ORAL at 11:48

## 2020-01-01 RX ADMIN — METHYLPREDNISOLONE SODIUM SUCCINATE 60 MG: 125 INJECTION, POWDER, FOR SOLUTION INTRAMUSCULAR; INTRAVENOUS at 23:28

## 2020-01-01 RX ADMIN — BUDESONIDE 500 MCG: 0.5 INHALANT RESPIRATORY (INHALATION) at 20:25

## 2020-01-01 RX ADMIN — ACETAMINOPHEN 650 MG: 325 TABLET ORAL at 21:20

## 2020-01-01 RX ADMIN — ALBUTEROL SULFATE 2.5 MG: 2.5 SOLUTION RESPIRATORY (INHALATION) at 13:09

## 2020-01-01 RX ADMIN — ARFORMOTEROL TARTRATE 15 MCG: 15 SOLUTION RESPIRATORY (INHALATION) at 08:48

## 2020-01-01 RX ADMIN — HYDROCORTISONE ACETATE 25 MG: 25 SUPPOSITORY RECTAL at 11:00

## 2020-01-01 RX ADMIN — TIOTROPIUM BROMIDE INHALATION SPRAY 2 PUFF: 3.12 SPRAY, METERED RESPIRATORY (INHALATION) at 09:14

## 2020-01-01 RX ADMIN — ONDANSETRON 4 MG: 2 INJECTION INTRAMUSCULAR; INTRAVENOUS at 14:40

## 2020-01-01 RX ADMIN — FUROSEMIDE 40 MG: 40 TABLET ORAL at 16:34

## 2020-01-01 RX ADMIN — FUROSEMIDE 40 MG: 10 INJECTION, SOLUTION INTRAMUSCULAR; INTRAVENOUS at 11:43

## 2020-01-01 RX ADMIN — CYCLOBENZAPRINE 10 MG: 10 TABLET, FILM COATED ORAL at 21:05

## 2020-01-01 RX ADMIN — ALBUTEROL SULFATE 2.5 MG: 2.5 SOLUTION RESPIRATORY (INHALATION) at 20:45

## 2020-01-01 RX ADMIN — HYDROCODONE BITARTRATE AND ACETAMINOPHEN 2 TABLET: 5; 325 TABLET ORAL at 08:13

## 2020-01-01 RX ADMIN — HYDROCODONE BITARTRATE AND ACETAMINOPHEN 1 TABLET: 5; 325 TABLET ORAL at 22:32

## 2020-01-01 RX ADMIN — HYDROCODONE BITARTRATE AND ACETAMINOPHEN 1 TABLET: 5; 325 TABLET ORAL at 04:01

## 2020-01-01 RX ADMIN — FORMOTEROL FUMARATE DIHYDRATE 15 MCG: 20 SOLUTION RESPIRATORY (INHALATION) at 20:43

## 2020-01-01 RX ADMIN — LINEZOLID 600 MG: 600 TABLET, FILM COATED ORAL at 21:48

## 2020-01-01 RX ADMIN — DIGOXIN 125 MCG: 125 TABLET ORAL at 08:29

## 2020-01-01 RX ADMIN — Medication 10 ML: at 17:38

## 2020-01-01 RX ADMIN — PIPERACILLIN AND TAZOBACTAM 3.38 G: 3; .375 INJECTION, POWDER, LYOPHILIZED, FOR SOLUTION INTRAVENOUS at 13:42

## 2020-01-01 RX ADMIN — GABAPENTIN 100 MG: 100 CAPSULE ORAL at 20:33

## 2020-01-01 RX ADMIN — HYDROCORTISONE ACETATE 25 MG: 25 SUPPOSITORY RECTAL at 17:53

## 2020-01-01 RX ADMIN — HYDROCODONE BITARTRATE AND ACETAMINOPHEN 2 TABLET: 5; 325 TABLET ORAL at 18:11

## 2020-01-01 RX ADMIN — NYSTATIN 500000 UNITS: 500000 SUSPENSION ORAL at 21:49

## 2020-01-01 RX ADMIN — METHYLPREDNISOLONE SODIUM SUCCINATE 60 MG: 125 INJECTION, POWDER, FOR SOLUTION INTRAMUSCULAR; INTRAVENOUS at 12:52

## 2020-01-01 RX ADMIN — BUDESONIDE 500 MCG: 0.5 INHALANT RESPIRATORY (INHALATION) at 17:13

## 2020-01-01 RX ADMIN — DOCUSATE SODIUM 100 MG: 100 CAPSULE, LIQUID FILLED ORAL at 08:29

## 2020-01-01 RX ADMIN — ARFORMOTEROL TARTRATE 15 MCG: 15 SOLUTION RESPIRATORY (INHALATION) at 09:32

## 2020-01-01 RX ADMIN — DOCUSATE SODIUM 100 MG: 100 CAPSULE, LIQUID FILLED ORAL at 09:52

## 2020-01-01 RX ADMIN — SODIUM CHLORIDE: 9 INJECTION, SOLUTION INTRAVENOUS at 15:54

## 2020-01-01 RX ADMIN — LINEZOLID 600 MG: 600 TABLET, FILM COATED ORAL at 08:46

## 2020-01-01 RX ADMIN — ALBUTEROL SULFATE 2.5 MG: 2.5 SOLUTION RESPIRATORY (INHALATION) at 15:20

## 2020-01-01 RX ADMIN — TRAZODONE HYDROCHLORIDE 50 MG: 50 TABLET ORAL at 20:35

## 2020-01-01 RX ADMIN — PIPERACILLIN AND TAZOBACTAM 3.38 G: 3; .375 INJECTION, POWDER, LYOPHILIZED, FOR SOLUTION INTRAVENOUS at 20:36

## 2020-01-01 RX ADMIN — ALBUTEROL SULFATE 2.5 MG: 2.5 SOLUTION RESPIRATORY (INHALATION) at 12:25

## 2020-01-01 RX ADMIN — GABAPENTIN 100 MG: 100 CAPSULE ORAL at 09:43

## 2020-01-01 RX ADMIN — BUDESONIDE 500 MCG: 0.5 INHALANT RESPIRATORY (INHALATION) at 17:58

## 2020-01-01 RX ADMIN — TRAZODONE HYDROCHLORIDE 50 MG: 50 TABLET ORAL at 21:20

## 2020-01-01 RX ADMIN — BUDESONIDE 500 MCG: 0.5 INHALANT RESPIRATORY (INHALATION) at 09:50

## 2020-01-01 RX ADMIN — ACETAMINOPHEN 650 MG: 325 TABLET ORAL at 19:57

## 2020-01-01 RX ADMIN — GABAPENTIN 100 MG: 100 CAPSULE ORAL at 08:28

## 2020-01-01 RX ADMIN — HYDROCORTISONE ACETATE 25 MG: 25 SUPPOSITORY RECTAL at 19:57

## 2020-01-01 RX ADMIN — Medication 1 CAPSULE: at 09:43

## 2020-01-01 RX ADMIN — ACETAMINOPHEN 650 MG: 325 TABLET ORAL at 12:41

## 2020-01-01 RX ADMIN — BUDESONIDE 500 MCG: 0.5 INHALANT RESPIRATORY (INHALATION) at 09:23

## 2020-01-01 RX ADMIN — BUDESONIDE 500 MCG: 0.5 INHALANT RESPIRATORY (INHALATION) at 20:01

## 2020-01-01 RX ADMIN — ALBUTEROL SULFATE 2.5 MG: 2.5 SOLUTION RESPIRATORY (INHALATION) at 00:51

## 2020-01-01 RX ADMIN — ALBUTEROL SULFATE 2.5 MG: 2.5 SOLUTION RESPIRATORY (INHALATION) at 14:02

## 2020-01-01 RX ADMIN — PANTOPRAZOLE SODIUM 40 MG: 40 TABLET, DELAYED RELEASE ORAL at 21:16

## 2020-01-01 RX ADMIN — BUDESONIDE 500 MCG: 0.5 INHALANT RESPIRATORY (INHALATION) at 07:37

## 2020-01-01 RX ADMIN — BUMETANIDE 1 MG/HR: 0.25 INJECTION INTRAMUSCULAR; INTRAVENOUS at 13:34

## 2020-01-01 RX ADMIN — DOCUSATE SODIUM 100 MG: 100 CAPSULE, LIQUID FILLED ORAL at 08:50

## 2020-01-01 RX ADMIN — HYDROCORTISONE ACETATE 25 MG: 25 SUPPOSITORY RECTAL at 17:31

## 2020-01-01 RX ADMIN — GABAPENTIN 100 MG: 100 CAPSULE ORAL at 21:50

## 2020-01-01 RX ADMIN — BUDESONIDE 500 MCG: 0.5 INHALANT RESPIRATORY (INHALATION) at 19:39

## 2020-01-01 RX ADMIN — DICYCLOMINE HYDROCHLORIDE 10 MG: 10 CAPSULE ORAL at 17:08

## 2020-01-01 RX ADMIN — Medication 1 CAPSULE: at 08:46

## 2020-01-01 RX ADMIN — GABAPENTIN 100 MG: 100 CAPSULE ORAL at 21:57

## 2020-01-01 RX ADMIN — LINEZOLID 600 MG: 600 INJECTION, SOLUTION INTRAVENOUS at 01:30

## 2020-01-01 RX ADMIN — METOPROLOL SUCCINATE 25 MG: 25 TABLET, FILM COATED, EXTENDED RELEASE ORAL at 08:06

## 2020-01-01 RX ADMIN — HYDROCORTISONE ACETATE 25 MG: 25 SUPPOSITORY RECTAL at 17:55

## 2020-01-01 RX ADMIN — GABAPENTIN 100 MG: 100 CAPSULE ORAL at 09:40

## 2020-01-01 RX ADMIN — PANTOPRAZOLE SODIUM 40 MG: 40 TABLET, DELAYED RELEASE ORAL at 15:34

## 2020-01-01 RX ADMIN — LINEZOLID 600 MG: 600 INJECTION, SOLUTION INTRAVENOUS at 02:01

## 2020-01-01 RX ADMIN — STANDARDIZED SENNA CONCENTRATE 8.6 MG: 8.6 TABLET ORAL at 22:45

## 2020-01-01 RX ADMIN — GABAPENTIN 100 MG: 100 CAPSULE ORAL at 20:15

## 2020-01-01 RX ADMIN — METHYLPREDNISOLONE SODIUM SUCCINATE 60 MG: 125 INJECTION, POWDER, FOR SOLUTION INTRAMUSCULAR; INTRAVENOUS at 13:00

## 2020-01-01 RX ADMIN — HYDROCODONE BITARTRATE AND ACETAMINOPHEN 1 TABLET: 5; 325 TABLET ORAL at 18:20

## 2020-01-01 RX ADMIN — Medication 1.5 MG: at 18:07

## 2020-01-01 RX ADMIN — ACETAMINOPHEN 650 MG: 325 TABLET ORAL at 08:45

## 2020-01-01 RX ADMIN — PANTOPRAZOLE SODIUM 40 MG: 40 TABLET, DELAYED RELEASE ORAL at 07:25

## 2020-01-01 RX ADMIN — LINEZOLID 600 MG: 600 INJECTION, SOLUTION INTRAVENOUS at 13:11

## 2020-01-01 RX ADMIN — SODIUM CHLORIDE 8 MG/HR: 9 INJECTION, SOLUTION INTRAVENOUS at 20:26

## 2020-01-01 RX ADMIN — TRAZODONE HYDROCHLORIDE 50 MG: 50 TABLET ORAL at 20:51

## 2020-01-01 RX ADMIN — FUROSEMIDE 40 MG: 10 INJECTION, SOLUTION INTRAMUSCULAR; INTRAVENOUS at 17:08

## 2020-01-01 RX ADMIN — BUDESONIDE 500 MCG: 0.5 INHALANT RESPIRATORY (INHALATION) at 21:16

## 2020-01-01 RX ADMIN — BUMETANIDE 2 MG: 1 TABLET ORAL at 22:11

## 2020-01-01 RX ADMIN — DOCUSATE SODIUM 100 MG: 100 CAPSULE, LIQUID FILLED ORAL at 08:27

## 2020-01-01 RX ADMIN — METOPROLOL SUCCINATE 25 MG: 25 TABLET, FILM COATED, EXTENDED RELEASE ORAL at 08:28

## 2020-01-01 RX ADMIN — PANTOPRAZOLE SODIUM 40 MG: 40 TABLET, DELAYED RELEASE ORAL at 15:44

## 2020-01-01 RX ADMIN — ALBUTEROL SULFATE 2.5 MG: 2.5 SOLUTION RESPIRATORY (INHALATION) at 05:12

## 2020-01-01 RX ADMIN — DOCUSATE SODIUM 100 MG: 100 CAPSULE, LIQUID FILLED ORAL at 09:39

## 2020-01-01 RX ADMIN — FUROSEMIDE 40 MG: 10 INJECTION, SOLUTION INTRAMUSCULAR; INTRAVENOUS at 17:30

## 2020-01-01 RX ADMIN — PANTOPRAZOLE SODIUM 40 MG: 40 TABLET, DELAYED RELEASE ORAL at 08:46

## 2020-01-01 RX ADMIN — GABAPENTIN 100 MG: 100 CAPSULE ORAL at 15:03

## 2020-01-01 RX ADMIN — BUMETANIDE 0.5 MG: 0.25 INJECTION INTRAMUSCULAR; INTRAVENOUS at 05:28

## 2020-01-01 RX ADMIN — PANTOPRAZOLE SODIUM 40 MG: 40 TABLET, DELAYED RELEASE ORAL at 15:29

## 2020-01-01 RX ADMIN — ALBUTEROL SULFATE 2.5 MG: 2.5 SOLUTION RESPIRATORY (INHALATION) at 00:13

## 2020-01-01 RX ADMIN — ARFORMOTEROL TARTRATE 15 MCG: 15 SOLUTION RESPIRATORY (INHALATION) at 07:31

## 2020-01-01 RX ADMIN — HYDROCODONE BITARTRATE AND ACETAMINOPHEN 2 TABLET: 5; 325 TABLET ORAL at 16:19

## 2020-01-01 RX ADMIN — WARFARIN SODIUM 2 MG: 2 TABLET ORAL at 22:26

## 2020-01-01 RX ADMIN — PANTOPRAZOLE SODIUM 40 MG: 40 TABLET, DELAYED RELEASE ORAL at 20:15

## 2020-01-01 RX ADMIN — DIGOXIN 125 MCG: 125 TABLET ORAL at 09:13

## 2020-01-01 RX ADMIN — ARFORMOTEROL TARTRATE 15 MCG: 15 SOLUTION RESPIRATORY (INHALATION) at 18:12

## 2020-01-01 RX ADMIN — ASPIRIN 81 MG: 81 TABLET ORAL at 09:32

## 2020-01-01 RX ADMIN — BUMETANIDE 0.5 MG: 0.25 INJECTION INTRAMUSCULAR; INTRAVENOUS at 05:13

## 2020-01-01 RX ADMIN — PANTOPRAZOLE SODIUM 40 MG: 40 TABLET, DELAYED RELEASE ORAL at 18:08

## 2020-01-01 RX ADMIN — OSELTAMIVIR PHOSPHATE 30 MG: 30 CAPSULE ORAL at 08:58

## 2020-01-01 RX ADMIN — LEVOCETIRIZINE DIHYDROCHLORIDE 5 MG: 5 TABLET, FILM COATED ORAL at 20:34

## 2020-01-01 RX ADMIN — FUROSEMIDE 40 MG: 40 TABLET ORAL at 09:43

## 2020-01-01 RX ADMIN — ALBUTEROL SULFATE 2.5 MG: 2.5 SOLUTION RESPIRATORY (INHALATION) at 17:09

## 2020-01-01 RX ADMIN — ARFORMOTEROL TARTRATE 15 MCG: 15 SOLUTION RESPIRATORY (INHALATION) at 08:40

## 2020-01-01 RX ADMIN — CYCLOBENZAPRINE 10 MG: 10 TABLET, FILM COATED ORAL at 16:14

## 2020-01-01 RX ADMIN — PANTOPRAZOLE SODIUM 40 MG: 40 TABLET, DELAYED RELEASE ORAL at 16:01

## 2020-01-01 RX ADMIN — GABAPENTIN 100 MG: 100 CAPSULE ORAL at 13:01

## 2020-01-01 RX ADMIN — ALBUTEROL SULFATE 2.5 MG: 2.5 SOLUTION RESPIRATORY (INHALATION) at 06:23

## 2020-01-01 RX ADMIN — ACETAMINOPHEN 650 MG: 325 TABLET ORAL at 23:46

## 2020-01-01 RX ADMIN — SACUBITRIL AND VALSARTAN 1 TABLET: 24; 26 TABLET, FILM COATED ORAL at 09:43

## 2020-01-01 RX ADMIN — FAMOTIDINE 20 MG: 10 INJECTION INTRAVENOUS at 17:29

## 2020-01-01 RX ADMIN — Medication 10 ML: at 10:38

## 2020-01-01 RX ADMIN — TIOTROPIUM BROMIDE INHALATION SPRAY 2 PUFF: 3.12 SPRAY, METERED RESPIRATORY (INHALATION) at 08:09

## 2020-01-01 RX ADMIN — PANTOPRAZOLE SODIUM 40 MG: 40 TABLET, DELAYED RELEASE ORAL at 17:09

## 2020-01-01 RX ADMIN — BUDESONIDE 500 MCG: 0.5 INHALANT RESPIRATORY (INHALATION) at 20:49

## 2020-01-01 RX ADMIN — GABAPENTIN 100 MG: 100 CAPSULE ORAL at 16:13

## 2020-01-01 RX ADMIN — DOCUSATE SODIUM 100 MG: 100 CAPSULE, LIQUID FILLED ORAL at 08:10

## 2020-01-01 RX ADMIN — GABAPENTIN 100 MG: 100 CAPSULE ORAL at 08:27

## 2020-01-01 RX ADMIN — PIPERACILLIN AND TAZOBACTAM 3.38 G: 3; .375 INJECTION, POWDER, LYOPHILIZED, FOR SOLUTION INTRAVENOUS at 12:00

## 2020-01-01 RX ADMIN — HYDROCODONE BITARTRATE AND ACETAMINOPHEN 1 TABLET: 5; 325 TABLET ORAL at 16:34

## 2020-01-01 RX ADMIN — FUROSEMIDE 40 MG: 10 INJECTION, SOLUTION INTRAMUSCULAR; INTRAVENOUS at 17:29

## 2020-01-01 RX ADMIN — PROMETHAZINE HYDROCHLORIDE 12.5 MG: 25 TABLET ORAL at 10:31

## 2020-01-01 RX ADMIN — DOCUSATE SODIUM 100 MG: 100 CAPSULE, LIQUID FILLED ORAL at 07:59

## 2020-01-01 RX ADMIN — ALBUTEROL SULFATE 2.5 MG: 2.5 SOLUTION RESPIRATORY (INHALATION) at 15:45

## 2020-01-01 RX ADMIN — OSELTAMIVIR PHOSPHATE 30 MG: 30 CAPSULE ORAL at 21:20

## 2020-01-01 RX ADMIN — HYDROCORTISONE ACETATE 25 MG: 25 SUPPOSITORY RECTAL at 09:09

## 2020-01-01 RX ADMIN — TRAZODONE HYDROCHLORIDE 50 MG: 50 TABLET ORAL at 22:26

## 2020-01-01 RX ADMIN — FORMOTEROL FUMARATE DIHYDRATE 15 MCG: 20 SOLUTION RESPIRATORY (INHALATION) at 08:55

## 2020-01-01 RX ADMIN — SODIUM CHLORIDE, PRESERVATIVE FREE 10 ML: 5 INJECTION INTRAVENOUS at 13:51

## 2020-01-01 RX ADMIN — LIDOCAINE: 50 OINTMENT TOPICAL at 05:55

## 2020-01-01 RX ADMIN — ALBUTEROL SULFATE 2.5 MG: 2.5 SOLUTION RESPIRATORY (INHALATION) at 09:04

## 2020-01-01 RX ADMIN — ALBUTEROL SULFATE 2.5 MG: 2.5 SOLUTION RESPIRATORY (INHALATION) at 12:52

## 2020-01-01 RX ADMIN — STANDARDIZED SENNA CONCENTRATE 8.6 MG: 8.6 TABLET ORAL at 20:33

## 2020-01-01 RX ADMIN — BUMETANIDE 1 MG/HR: 0.25 INJECTION INTRAMUSCULAR; INTRAVENOUS at 07:04

## 2020-01-01 RX ADMIN — TRAZODONE HYDROCHLORIDE 50 MG: 50 TABLET ORAL at 20:41

## 2020-01-01 RX ADMIN — NYSTATIN 500000 UNITS: 500000 SUSPENSION ORAL at 20:43

## 2020-01-01 RX ADMIN — METOPROLOL SUCCINATE 25 MG: 25 TABLET, FILM COATED, EXTENDED RELEASE ORAL at 19:57

## 2020-01-01 RX ADMIN — GABAPENTIN 100 MG: 100 CAPSULE ORAL at 13:00

## 2020-01-01 RX ADMIN — TRAZODONE HYDROCHLORIDE 50 MG: 50 TABLET ORAL at 22:39

## 2020-01-01 RX ADMIN — BUDESONIDE 500 MCG: 0.5 INHALANT RESPIRATORY (INHALATION) at 21:02

## 2020-01-01 RX ADMIN — BUMETANIDE 0.5 MG: 0.25 INJECTION INTRAMUSCULAR; INTRAVENOUS at 16:44

## 2020-01-01 RX ADMIN — NYSTATIN 500000 UNITS: 500000 SUSPENSION ORAL at 17:05

## 2020-01-01 RX ADMIN — BUDESONIDE 500 MCG: 0.5 INHALANT RESPIRATORY (INHALATION) at 21:01

## 2020-01-01 RX ADMIN — BUDESONIDE 500 MCG: 0.5 INHALANT RESPIRATORY (INHALATION) at 18:17

## 2020-01-01 RX ADMIN — LEVALBUTEROL HYDROCHLORIDE 0.63 MG: 0.63 SOLUTION RESPIRATORY (INHALATION) at 04:47

## 2020-01-01 RX ADMIN — SACUBITRIL AND VALSARTAN 1 TABLET: 49; 51 TABLET, FILM COATED ORAL at 07:58

## 2020-01-01 RX ADMIN — ACETAMINOPHEN 650 MG: 325 TABLET ORAL at 04:47

## 2020-01-01 RX ADMIN — PANTOPRAZOLE SODIUM 40 MG: 40 TABLET, DELAYED RELEASE ORAL at 17:31

## 2020-01-01 RX ADMIN — DOCUSATE SODIUM 100 MG: 100 CAPSULE, LIQUID FILLED ORAL at 10:13

## 2020-01-01 RX ADMIN — ARFORMOTEROL TARTRATE 15 MCG: 15 SOLUTION RESPIRATORY (INHALATION) at 21:37

## 2020-01-01 RX ADMIN — CEFTRIAXONE SODIUM 1 G: 1 INJECTION, POWDER, FOR SOLUTION INTRAMUSCULAR; INTRAVENOUS at 17:05

## 2020-01-01 RX ADMIN — DIGOXIN 125 MCG: 125 TABLET ORAL at 08:58

## 2020-01-01 RX ADMIN — SACUBITRIL AND VALSARTAN 1 TABLET: 49; 51 TABLET, FILM COATED ORAL at 19:55

## 2020-01-01 RX ADMIN — HYDROCORTISONE ACETATE 25 MG: 25 SUPPOSITORY RECTAL at 18:53

## 2020-01-01 RX ADMIN — NYSTATIN 500000 UNITS: 500000 SUSPENSION ORAL at 17:20

## 2020-01-01 RX ADMIN — Medication 10 ML: at 20:20

## 2020-01-01 RX ADMIN — HYDROCODONE BITARTRATE AND ACETAMINOPHEN 2 TABLET: 5; 325 TABLET ORAL at 06:21

## 2020-01-01 RX ADMIN — POLYETHYLENE GLYCOL 3350 17 G: 17 POWDER, FOR SOLUTION ORAL at 10:31

## 2020-01-01 RX ADMIN — HYDROCODONE BITARTRATE AND ACETAMINOPHEN 2 TABLET: 5; 325 TABLET ORAL at 00:33

## 2020-01-01 RX ADMIN — LEVOCETIRIZINE DIHYDROCHLORIDE 5 MG: 5 TABLET, FILM COATED ORAL at 22:26

## 2020-01-01 RX ADMIN — ARFORMOTEROL TARTRATE 15 MCG: 15 SOLUTION RESPIRATORY (INHALATION) at 07:29

## 2020-01-01 RX ADMIN — POTASSIUM CHLORIDE 20 MEQ: 1500 TABLET, EXTENDED RELEASE ORAL at 08:29

## 2020-01-01 RX ADMIN — HYDROCORTISONE ACETATE 25 MG: 25 SUPPOSITORY RECTAL at 17:48

## 2020-01-01 RX ADMIN — GABAPENTIN 100 MG: 100 CAPSULE ORAL at 10:12

## 2020-01-01 RX ADMIN — FAMOTIDINE 20 MG: 20 TABLET, FILM COATED ORAL at 09:16

## 2020-01-01 RX ADMIN — PANTOPRAZOLE SODIUM 40 MG: 40 TABLET, DELAYED RELEASE ORAL at 05:58

## 2020-01-01 RX ADMIN — FUROSEMIDE 40 MG: 10 INJECTION, SOLUTION INTRAMUSCULAR; INTRAVENOUS at 15:41

## 2020-01-01 RX ADMIN — TRAZODONE HYDROCHLORIDE 50 MG: 50 TABLET ORAL at 22:11

## 2020-01-01 RX ADMIN — DICYCLOMINE HYDROCHLORIDE 10 MG: 10 CAPSULE ORAL at 16:50

## 2020-01-01 RX ADMIN — GABAPENTIN 100 MG: 100 CAPSULE ORAL at 19:55

## 2020-01-01 RX ADMIN — ASPIRIN 81 MG: 81 TABLET ORAL at 09:38

## 2020-01-01 RX ADMIN — LEVALBUTEROL HYDROCHLORIDE 0.63 MG: 0.63 SOLUTION RESPIRATORY (INHALATION) at 20:02

## 2020-01-01 RX ADMIN — SODIUM CHLORIDE 1000 ML: 9 INJECTION, SOLUTION INTRAVENOUS at 13:09

## 2020-01-01 RX ADMIN — DIGOXIN 125 MCG: 125 TABLET ORAL at 08:20

## 2020-01-01 RX ADMIN — DOCUSATE SODIUM 100 MG: 100 CAPSULE, LIQUID FILLED ORAL at 08:30

## 2020-01-01 RX ADMIN — LINEZOLID 600 MG: 600 TABLET, FILM COATED ORAL at 20:43

## 2020-01-01 RX ADMIN — BUDESONIDE 500 MCG: 0.5 INHALANT RESPIRATORY (INHALATION) at 17:38

## 2020-01-01 RX ADMIN — HYDROCORTISONE ACETATE 25 MG: 25 SUPPOSITORY RECTAL at 09:16

## 2020-01-01 RX ADMIN — HYDROCODONE BITARTRATE AND ACETAMINOPHEN 2 TABLET: 5; 325 TABLET ORAL at 14:49

## 2020-01-01 RX ADMIN — TRAZODONE HYDROCHLORIDE 50 MG: 50 TABLET ORAL at 21:46

## 2020-01-01 RX ADMIN — CEFTRIAXONE SODIUM 1 G: 1 INJECTION, POWDER, FOR SOLUTION INTRAMUSCULAR; INTRAVENOUS at 16:43

## 2020-01-01 RX ADMIN — CETIRIZINE HYDROCHLORIDE 5 MG: 5 TABLET ORAL at 10:36

## 2020-01-01 RX ADMIN — HYDROCODONE BITARTRATE AND ACETAMINOPHEN 2 TABLET: 5; 325 TABLET ORAL at 22:55

## 2020-01-01 RX ADMIN — BUDESONIDE 500 MCG: 0.5 INHALANT RESPIRATORY (INHALATION) at 08:40

## 2020-01-01 RX ADMIN — GABAPENTIN 100 MG: 100 CAPSULE ORAL at 20:05

## 2020-01-01 RX ADMIN — DOCUSATE SODIUM 100 MG: 100 CAPSULE, LIQUID FILLED ORAL at 07:46

## 2020-01-01 RX ADMIN — WARFARIN SODIUM 1 MG: 1 TABLET ORAL at 16:25

## 2020-01-01 RX ADMIN — DOCUSATE SODIUM 100 MG: 100 CAPSULE, LIQUID FILLED ORAL at 09:32

## 2020-01-01 RX ADMIN — ALBUTEROL SULFATE 2.5 MG: 2.5 SOLUTION RESPIRATORY (INHALATION) at 13:32

## 2020-01-01 RX ADMIN — TIOTROPIUM BROMIDE INHALATION SPRAY 2 PUFF: 3.12 SPRAY, METERED RESPIRATORY (INHALATION) at 10:13

## 2020-01-01 RX ADMIN — VANCOMYCIN HYDROCHLORIDE 1000 MG: 1 INJECTION, POWDER, LYOPHILIZED, FOR SOLUTION INTRAVENOUS at 05:18

## 2020-01-01 RX ADMIN — HYDROCORTISONE ACETATE 25 MG: 25 SUPPOSITORY RECTAL at 20:33

## 2020-01-01 RX ADMIN — HYDROCODONE BITARTRATE AND ACETAMINOPHEN 1 TABLET: 5; 325 TABLET ORAL at 09:39

## 2020-01-01 RX ADMIN — SACUBITRIL AND VALSARTAN 1 TABLET: 24; 26 TABLET, FILM COATED ORAL at 09:32

## 2020-01-01 RX ADMIN — HYDROCODONE BITARTRATE AND ACETAMINOPHEN 2 TABLET: 5; 325 TABLET ORAL at 08:47

## 2020-01-01 RX ADMIN — BUDESONIDE 500 MCG: 0.5 INHALANT RESPIRATORY (INHALATION) at 10:08

## 2020-01-01 RX ADMIN — TRAZODONE HYDROCHLORIDE 50 MG: 50 TABLET ORAL at 22:19

## 2020-01-01 RX ADMIN — DIGOXIN 125 MCG: 125 TABLET ORAL at 09:52

## 2020-01-01 RX ADMIN — DICYCLOMINE HYDROCHLORIDE 10 MG: 10 CAPSULE ORAL at 18:08

## 2020-01-01 RX ADMIN — HYDROCORTISONE ACETATE 25 MG: 25 SUPPOSITORY RECTAL at 09:26

## 2020-01-01 RX ADMIN — BUDESONIDE 500 MCG: 0.5 INHALANT RESPIRATORY (INHALATION) at 21:11

## 2020-01-01 RX ADMIN — LINEZOLID 600 MG: 600 TABLET, FILM COATED ORAL at 09:31

## 2020-01-01 RX ADMIN — TRAZODONE HYDROCHLORIDE 50 MG: 50 TABLET ORAL at 22:44

## 2020-01-01 RX ADMIN — NYSTATIN 500000 UNITS: 500000 SUSPENSION ORAL at 13:09

## 2020-01-01 RX ADMIN — PROMETHAZINE HYDROCHLORIDE 12.5 MG: 25 TABLET ORAL at 09:52

## 2020-01-01 RX ADMIN — ARFORMOTEROL TARTRATE 15 MCG: 15 SOLUTION RESPIRATORY (INHALATION) at 06:38

## 2020-01-01 RX ADMIN — GABAPENTIN 100 MG: 100 CAPSULE ORAL at 14:20

## 2020-01-01 RX ADMIN — DOCUSATE SODIUM 100 MG: 100 CAPSULE, LIQUID FILLED ORAL at 22:19

## 2020-01-01 RX ADMIN — GABAPENTIN 100 MG: 100 CAPSULE ORAL at 20:41

## 2020-01-01 RX ADMIN — SODIUM CHLORIDE, PRESERVATIVE FREE 10 ML: 5 INJECTION INTRAVENOUS at 09:00

## 2020-01-01 RX ADMIN — PANTOPRAZOLE SODIUM 40 MG: 40 TABLET, DELAYED RELEASE ORAL at 07:02

## 2020-01-01 RX ADMIN — FORMOTEROL FUMARATE DIHYDRATE 20 MCG: 20 SOLUTION RESPIRATORY (INHALATION) at 07:46

## 2020-01-01 RX ADMIN — PIPERACILLIN AND TAZOBACTAM 3.38 G: 3; .375 INJECTION, POWDER, LYOPHILIZED, FOR SOLUTION INTRAVENOUS at 04:30

## 2020-01-01 RX ADMIN — GABAPENTIN 100 MG: 100 CAPSULE ORAL at 09:52

## 2020-01-01 RX ADMIN — ALBUTEROL SULFATE 2.5 MG: 2.5 SOLUTION RESPIRATORY (INHALATION) at 12:00

## 2020-01-01 RX ADMIN — CEFTRIAXONE SODIUM 1 G: 1 INJECTION, POWDER, FOR SOLUTION INTRAMUSCULAR; INTRAVENOUS at 15:53

## 2020-01-01 RX ADMIN — LINEZOLID 600 MG: 600 INJECTION, SOLUTION INTRAVENOUS at 12:51

## 2020-01-01 RX ADMIN — DOBUTAMINE HYDROCHLORIDE 5 MCG/KG/MIN: 200 INJECTION INTRAVENOUS at 12:30

## 2020-01-01 RX ADMIN — STANDARDIZED SENNA CONCENTRATE 8.6 MG: 8.6 TABLET ORAL at 20:09

## 2020-01-01 RX ADMIN — GABAPENTIN 100 MG: 100 CAPSULE ORAL at 09:16

## 2020-01-01 RX ADMIN — ARFORMOTEROL TARTRATE 15 MCG: 15 SOLUTION RESPIRATORY (INHALATION) at 08:14

## 2020-01-01 RX ADMIN — HEPARIN SODIUM 5550 UNITS: 1000 INJECTION INTRAVENOUS; SUBCUTANEOUS at 17:54

## 2020-01-01 RX ADMIN — FAMOTIDINE 20 MG: 20 TABLET, FILM COATED ORAL at 08:10

## 2020-01-01 RX ADMIN — DOCUSATE SODIUM 100 MG: 100 CAPSULE, LIQUID FILLED ORAL at 12:20

## 2020-01-01 RX ADMIN — PIPERACILLIN AND TAZOBACTAM 3.38 G: 3; .375 INJECTION, POWDER, LYOPHILIZED, FOR SOLUTION INTRAVENOUS at 04:38

## 2020-01-01 RX ADMIN — ALBUTEROL SULFATE 2.5 MG: 2.5 SOLUTION RESPIRATORY (INHALATION) at 16:44

## 2020-01-01 RX ADMIN — GABAPENTIN 100 MG: 100 CAPSULE ORAL at 13:13

## 2020-01-01 RX ADMIN — TRAZODONE HYDROCHLORIDE 50 MG: 50 TABLET ORAL at 20:57

## 2020-01-01 RX ADMIN — PANTOPRAZOLE SODIUM 40 MG: 40 TABLET, DELAYED RELEASE ORAL at 16:25

## 2020-01-01 RX ADMIN — ALBUTEROL SULFATE 2.5 MG: 2.5 SOLUTION RESPIRATORY (INHALATION) at 17:23

## 2020-01-01 RX ADMIN — BUMETANIDE 2 MG: 0.25 INJECTION INTRAMUSCULAR; INTRAVENOUS at 09:00

## 2020-01-01 RX ADMIN — HYDROCORTISONE ACETATE 25 MG: 25 SUPPOSITORY RECTAL at 23:32

## 2020-01-01 RX ADMIN — HYDROCORTISONE ACETATE 25 MG: 25 SUPPOSITORY RECTAL at 08:51

## 2020-01-01 RX ADMIN — LINEZOLID 600 MG: 600 INJECTION, SOLUTION INTRAVENOUS at 18:15

## 2020-01-01 RX ADMIN — ASPIRIN 81 MG: 81 TABLET ORAL at 09:06

## 2020-01-01 RX ADMIN — DOBUTAMINE HYDROCHLORIDE 5 MCG/KG/MIN: 200 INJECTION INTRAVENOUS at 21:21

## 2020-01-01 RX ADMIN — DIGOXIN 125 MCG: 125 TABLET ORAL at 08:38

## 2020-01-01 RX ADMIN — ALBUTEROL SULFATE 2.5 MG: 2.5 SOLUTION RESPIRATORY (INHALATION) at 21:16

## 2020-01-01 RX ADMIN — Medication 10 ML: at 00:22

## 2020-01-01 RX ADMIN — HYDROCODONE BITARTRATE AND ACETAMINOPHEN 1 TABLET: 5; 325 TABLET ORAL at 20:51

## 2020-01-01 RX ADMIN — BUDESONIDE 500 MCG: 0.5 INHALANT RESPIRATORY (INHALATION) at 18:18

## 2020-01-01 RX ADMIN — GABAPENTIN 100 MG: 100 CAPSULE ORAL at 21:18

## 2020-01-01 RX ADMIN — Medication 10 ML: at 08:20

## 2020-01-01 RX ADMIN — METOPROLOL SUCCINATE 25 MG: 25 TABLET, FILM COATED, EXTENDED RELEASE ORAL at 09:13

## 2020-01-01 RX ADMIN — TIOTROPIUM BROMIDE INHALATION SPRAY 2 PUFF: 3.12 SPRAY, METERED RESPIRATORY (INHALATION) at 08:48

## 2020-01-01 RX ADMIN — NYSTATIN 500000 UNITS: 500000 SUSPENSION ORAL at 17:31

## 2020-01-01 RX ADMIN — ALBUTEROL SULFATE 2.5 MG: 2.5 SOLUTION RESPIRATORY (INHALATION) at 20:25

## 2020-01-01 RX ADMIN — WARFARIN SODIUM 1 MG: 1 TABLET ORAL at 17:25

## 2020-01-01 RX ADMIN — DOCUSATE SODIUM 100 MG: 100 CAPSULE, LIQUID FILLED ORAL at 08:58

## 2020-01-01 RX ADMIN — TRAZODONE HYDROCHLORIDE 50 MG: 50 TABLET ORAL at 20:26

## 2020-01-01 RX ADMIN — DIGOXIN 125 MCG: 125 TABLET ORAL at 10:59

## 2020-01-01 RX ADMIN — BUDESONIDE 500 MCG: 0.5 INHALANT RESPIRATORY (INHALATION) at 08:48

## 2020-01-01 RX ADMIN — METOPROLOL SUCCINATE 25 MG: 25 TABLET, FILM COATED, EXTENDED RELEASE ORAL at 09:32

## 2020-01-01 RX ADMIN — GABAPENTIN 100 MG: 100 CAPSULE ORAL at 08:51

## 2020-01-01 RX ADMIN — TIOTROPIUM BROMIDE INHALATION SPRAY 2 PUFF: 3.12 SPRAY, METERED RESPIRATORY (INHALATION) at 10:29

## 2020-01-01 RX ADMIN — BISACODYL 10 MG: 10 SUPPOSITORY RECTAL at 08:58

## 2020-01-01 RX ADMIN — HEPARIN SODIUM 21 UNITS/KG/HR: 10000 INJECTION, SOLUTION INTRAVENOUS at 05:46

## 2020-01-01 RX ADMIN — TIOTROPIUM BROMIDE INHALATION SPRAY 2 PUFF: 3.12 SPRAY, METERED RESPIRATORY (INHALATION) at 08:18

## 2020-01-01 RX ADMIN — DICYCLOMINE HYDROCHLORIDE 10 MG: 10 CAPSULE ORAL at 21:26

## 2020-01-01 RX ADMIN — LINEZOLID 600 MG: 600 INJECTION, SOLUTION INTRAVENOUS at 13:20

## 2020-01-01 RX ADMIN — ACETAMINOPHEN 650 MG: 325 TABLET ORAL at 03:17

## 2020-01-01 RX ADMIN — HYDROCORTISONE ACETATE 25 MG: 25 SUPPOSITORY RECTAL at 10:05

## 2020-01-01 RX ADMIN — LEVALBUTEROL HYDROCHLORIDE 0.63 MG: 1.25 SOLUTION RESPIRATORY (INHALATION) at 14:50

## 2020-01-01 RX ADMIN — ACETAMINOPHEN 650 MG: 325 TABLET ORAL at 20:26

## 2020-01-01 RX ADMIN — GABAPENTIN 100 MG: 100 CAPSULE ORAL at 20:38

## 2020-01-01 RX ADMIN — FUROSEMIDE 40 MG: 10 INJECTION, SOLUTION INTRAMUSCULAR; INTRAVENOUS at 16:53

## 2020-01-01 RX ADMIN — LEVALBUTEROL HYDROCHLORIDE 0.63 MG: 0.63 SOLUTION RESPIRATORY (INHALATION) at 08:14

## 2020-01-01 RX ADMIN — OSELTAMIVIR PHOSPHATE 30 MG: 30 CAPSULE ORAL at 20:53

## 2020-01-01 RX ADMIN — TRAZODONE HYDROCHLORIDE 50 MG: 50 TABLET ORAL at 22:05

## 2020-01-01 RX ADMIN — ASPIRIN 81 MG: 81 TABLET, COATED ORAL at 09:27

## 2020-01-01 RX ADMIN — LEVALBUTEROL HYDROCHLORIDE 0.63 MG: 0.63 SOLUTION RESPIRATORY (INHALATION) at 17:00

## 2020-01-01 RX ADMIN — GABAPENTIN 100 MG: 100 CAPSULE ORAL at 20:55

## 2020-01-01 RX ADMIN — GABAPENTIN 100 MG: 100 CAPSULE ORAL at 21:46

## 2020-01-01 RX ADMIN — GABAPENTIN 100 MG: 100 CAPSULE ORAL at 08:01

## 2020-01-01 RX ADMIN — FAMOTIDINE 20 MG: 10 INJECTION INTRAVENOUS at 21:21

## 2020-01-01 RX ADMIN — POTASSIUM CHLORIDE 20 MEQ: 1500 TABLET, EXTENDED RELEASE ORAL at 08:14

## 2020-01-01 RX ADMIN — GABAPENTIN 100 MG: 100 CAPSULE ORAL at 08:20

## 2020-01-01 RX ADMIN — STANDARDIZED SENNA CONCENTRATE 8.6 MG: 8.6 TABLET ORAL at 21:18

## 2020-01-01 RX ADMIN — GABAPENTIN 100 MG: 100 CAPSULE ORAL at 08:45

## 2020-01-01 RX ADMIN — TRAZODONE HYDROCHLORIDE 50 MG: 50 TABLET ORAL at 21:38

## 2020-01-01 RX ADMIN — GABAPENTIN 100 MG: 100 CAPSULE ORAL at 20:12

## 2020-01-01 RX ADMIN — DICYCLOMINE HYDROCHLORIDE 10 MG: 10 CAPSULE ORAL at 05:27

## 2020-01-01 RX ADMIN — BUDESONIDE 500 MCG: 0.5 INHALANT RESPIRATORY (INHALATION) at 20:48

## 2020-01-01 RX ADMIN — HYDROCORTISONE ACETATE 25 MG: 25 SUPPOSITORY RECTAL at 08:14

## 2020-01-01 RX ADMIN — ARFORMOTEROL TARTRATE 15 MCG: 15 SOLUTION RESPIRATORY (INHALATION) at 08:20

## 2020-01-01 RX ADMIN — ACETAMINOPHEN 650 MG: 325 TABLET ORAL at 12:25

## 2020-01-01 RX ADMIN — FUROSEMIDE 40 MG: 40 TABLET ORAL at 08:28

## 2020-01-01 RX ADMIN — HYDROCORTISONE ACETATE 25 MG: 25 SUPPOSITORY RECTAL at 08:30

## 2020-01-01 RX ADMIN — Medication 10 ML: at 20:27

## 2020-01-01 RX ADMIN — FUROSEMIDE 40 MG: 10 INJECTION, SOLUTION INTRAMUSCULAR; INTRAVENOUS at 09:17

## 2020-01-01 RX ADMIN — STANDARDIZED SENNA CONCENTRATE 8.6 MG: 8.6 TABLET ORAL at 22:39

## 2020-01-01 RX ADMIN — PANTOPRAZOLE SODIUM 40 MG: 40 TABLET, DELAYED RELEASE ORAL at 15:20

## 2020-01-01 RX ADMIN — HYDROCODONE BITARTRATE AND ACETAMINOPHEN 2 TABLET: 5; 325 TABLET ORAL at 21:24

## 2020-01-01 RX ADMIN — HYDROCORTISONE ACETATE 25 MG: 25 SUPPOSITORY RECTAL at 09:41

## 2020-01-01 RX ADMIN — NYSTATIN 500000 UNITS: 500000 SUSPENSION ORAL at 14:49

## 2020-01-01 RX ADMIN — FENTANYL CITRATE 25 MCG: 50 INJECTION, SOLUTION INTRAMUSCULAR; INTRAVENOUS at 20:41

## 2020-01-01 RX ADMIN — FAMOTIDINE 20 MG: 10 INJECTION INTRAVENOUS at 09:43

## 2020-01-01 RX ADMIN — DOCUSATE SODIUM 100 MG: 100 CAPSULE, LIQUID FILLED ORAL at 15:09

## 2020-01-01 RX ADMIN — ALBUTEROL SULFATE 2.5 MG: 2.5 SOLUTION RESPIRATORY (INHALATION) at 13:45

## 2020-01-01 RX ADMIN — HYDROCODONE BITARTRATE AND ACETAMINOPHEN 1 TABLET: 5; 325 TABLET ORAL at 22:43

## 2020-01-01 RX ADMIN — HYDROCORTISONE ACETATE 25 MG: 25 SUPPOSITORY RECTAL at 08:06

## 2020-01-01 RX ADMIN — ONDANSETRON 4 MG: 2 INJECTION INTRAMUSCULAR; INTRAVENOUS at 14:11

## 2020-01-01 RX ADMIN — Medication 10 ML: at 20:15

## 2020-01-01 RX ADMIN — Medication 10 MCG/MIN: at 13:26

## 2020-01-01 RX ADMIN — TIOTROPIUM BROMIDE INHALATION SPRAY 2 PUFF: 3.12 SPRAY, METERED RESPIRATORY (INHALATION) at 07:30

## 2020-01-01 RX ADMIN — BUDESONIDE 500 MCG: 0.5 INHALANT RESPIRATORY (INHALATION) at 08:29

## 2020-01-01 RX ADMIN — GABAPENTIN 100 MG: 100 CAPSULE ORAL at 20:31

## 2020-01-01 RX ADMIN — SACUBITRIL AND VALSARTAN 1 TABLET: 49; 51 TABLET, FILM COATED ORAL at 21:16

## 2020-01-01 RX ADMIN — STANDARDIZED SENNA CONCENTRATE 8.6 MG: 8.6 TABLET ORAL at 21:49

## 2020-01-01 RX ADMIN — LEVALBUTEROL HYDROCHLORIDE 0.63 MG: 1.25 SOLUTION RESPIRATORY (INHALATION) at 16:19

## 2020-01-01 RX ADMIN — PANTOPRAZOLE SODIUM 40 MG: 40 TABLET, DELAYED RELEASE ORAL at 16:14

## 2020-01-01 RX ADMIN — BUDESONIDE 500 MCG: 0.5 INHALANT RESPIRATORY (INHALATION) at 18:28

## 2020-01-01 RX ADMIN — SODIUM CHLORIDE, PRESERVATIVE FREE 10 ML: 5 INJECTION INTRAVENOUS at 09:13

## 2020-01-01 RX ADMIN — LINEZOLID 600 MG: 600 INJECTION, SOLUTION INTRAVENOUS at 13:30

## 2020-01-01 RX ADMIN — DICYCLOMINE HYDROCHLORIDE 10 MG: 10 CAPSULE ORAL at 20:20

## 2020-01-01 RX ADMIN — ASPIRIN 81 MG: 81 TABLET, COATED ORAL at 08:10

## 2020-01-01 RX ADMIN — FENTANYL CITRATE 50 MCG: 50 INJECTION, SOLUTION INTRAMUSCULAR; INTRAVENOUS at 15:43

## 2020-01-01 RX ADMIN — PANTOPRAZOLE SODIUM 40 MG: 40 TABLET, DELAYED RELEASE ORAL at 17:04

## 2020-01-01 RX ADMIN — SACUBITRIL AND VALSARTAN 1 TABLET: 24; 26 TABLET, FILM COATED ORAL at 22:42

## 2020-01-01 RX ADMIN — ARFORMOTEROL TARTRATE 15 MCG: 15 SOLUTION RESPIRATORY (INHALATION) at 20:49

## 2020-01-01 RX ADMIN — BISACODYL 10 MG: 10 SUPPOSITORY RECTAL at 09:10

## 2020-01-01 RX ADMIN — ACETAMINOPHEN 650 MG: 325 TABLET ORAL at 04:57

## 2020-01-01 RX ADMIN — DOCUSATE SODIUM 100 MG: 100 CAPSULE, LIQUID FILLED ORAL at 08:55

## 2020-01-01 RX ADMIN — PANTOPRAZOLE SODIUM 40 MG: 40 TABLET, DELAYED RELEASE ORAL at 15:54

## 2020-01-01 RX ADMIN — POLYETHYLENE GLYCOL 3350 17 G: 17 POWDER, FOR SOLUTION ORAL at 14:06

## 2020-01-01 RX ADMIN — ALBUTEROL SULFATE 2.5 MG: 2.5 SOLUTION RESPIRATORY (INHALATION) at 09:16

## 2020-01-01 RX ADMIN — GABAPENTIN 100 MG: 100 CAPSULE ORAL at 13:50

## 2020-01-01 RX ADMIN — ASPIRIN 81 MG: 81 TABLET ORAL at 08:30

## 2020-01-01 RX ADMIN — Medication 1 CAPSULE: at 17:20

## 2020-01-01 RX ADMIN — ALBUTEROL SULFATE 2.5 MG: 2.5 SOLUTION RESPIRATORY (INHALATION) at 19:46

## 2020-01-01 RX ADMIN — CYCLOBENZAPRINE 10 MG: 10 TABLET, FILM COATED ORAL at 22:39

## 2020-01-01 RX ADMIN — TRAZODONE HYDROCHLORIDE 50 MG: 50 TABLET ORAL at 20:38

## 2020-01-01 RX ADMIN — BUDESONIDE 500 MCG: 0.5 INHALANT RESPIRATORY (INHALATION) at 08:55

## 2020-01-01 RX ADMIN — ALBUTEROL SULFATE 2.5 MG: 2.5 SOLUTION RESPIRATORY (INHALATION) at 12:22

## 2020-01-01 RX ADMIN — Medication 10 ML: at 16:44

## 2020-01-01 RX ADMIN — STANDARDIZED SENNA CONCENTRATE 8.6 MG: 8.6 TABLET ORAL at 21:46

## 2020-01-01 RX ADMIN — POLYETHYLENE GLYCOL 3350 17 G: 17 POWDER, FOR SOLUTION ORAL at 10:59

## 2020-01-01 RX ADMIN — FAMOTIDINE 20 MG: 10 INJECTION INTRAVENOUS at 20:43

## 2020-01-01 RX ADMIN — ALBUTEROL SULFATE 2.5 MG: 2.5 SOLUTION RESPIRATORY (INHALATION) at 08:09

## 2020-01-01 RX ADMIN — ONDANSETRON 4 MG: 2 INJECTION INTRAMUSCULAR; INTRAVENOUS at 16:31

## 2020-01-01 RX ADMIN — BUMETANIDE 0.5 MG: 0.25 INJECTION INTRAMUSCULAR; INTRAVENOUS at 04:33

## 2020-01-01 RX ADMIN — ALBUTEROL SULFATE 2 PUFF: 90 AEROSOL, METERED RESPIRATORY (INHALATION) at 22:54

## 2020-01-01 RX ADMIN — BUDESONIDE 500 MCG: 0.5 INHALANT RESPIRATORY (INHALATION) at 18:12

## 2020-01-01 RX ADMIN — HYDROCODONE BITARTRATE AND ACETAMINOPHEN 2 TABLET: 5; 325 TABLET ORAL at 09:16

## 2020-01-01 RX ADMIN — ARFORMOTEROL TARTRATE 15 MCG: 15 SOLUTION RESPIRATORY (INHALATION) at 21:28

## 2020-01-01 RX ADMIN — IPRATROPIUM BROMIDE AND ALBUTEROL SULFATE 1 AMPULE: .5; 3 SOLUTION RESPIRATORY (INHALATION) at 09:54

## 2020-01-01 RX ADMIN — Medication 10 ML: at 21:03

## 2020-01-01 RX ADMIN — SACUBITRIL AND VALSARTAN 1 TABLET: 24; 26 TABLET, FILM COATED ORAL at 09:38

## 2020-01-01 RX ADMIN — ARFORMOTEROL TARTRATE 15 MCG: 15 SOLUTION RESPIRATORY (INHALATION) at 21:05

## 2020-01-01 RX ADMIN — BUMETANIDE 1 MG: 1 TABLET ORAL at 09:49

## 2020-01-01 RX ADMIN — DICYCLOMINE HYDROCHLORIDE 10 MG: 10 CAPSULE ORAL at 20:26

## 2020-01-01 RX ADMIN — ALBUTEROL SULFATE 2.5 MG: 2.5 SOLUTION RESPIRATORY (INHALATION) at 07:25

## 2020-01-01 RX ADMIN — BUDESONIDE 500 MCG: 0.5 INHALANT RESPIRATORY (INHALATION) at 10:30

## 2020-01-01 RX ADMIN — FUROSEMIDE 40 MG: 40 TABLET ORAL at 08:46

## 2020-01-01 RX ADMIN — BISACODYL 10 MG: 10 SUPPOSITORY RECTAL at 10:48

## 2020-01-01 RX ADMIN — ASPIRIN 81 MG: 81 TABLET ORAL at 07:59

## 2020-01-01 RX ADMIN — LEVOCETIRIZINE DIHYDROCHLORIDE 5 MG: 5 TABLET, FILM COATED ORAL at 20:35

## 2020-01-01 RX ADMIN — PANTOPRAZOLE SODIUM 40 MG: 40 TABLET, DELAYED RELEASE ORAL at 06:01

## 2020-01-01 RX ADMIN — PANTOPRAZOLE SODIUM 40 MG: 40 TABLET, DELAYED RELEASE ORAL at 05:38

## 2020-01-01 RX ADMIN — BUDESONIDE 500 MCG: 0.5 INHALANT RESPIRATORY (INHALATION) at 18:35

## 2020-01-01 RX ADMIN — STANDARDIZED SENNA CONCENTRATE 8.6 MG: 8.6 TABLET ORAL at 19:56

## 2020-01-01 RX ADMIN — METOPROLOL SUCCINATE 25 MG: 25 TABLET, FILM COATED, EXTENDED RELEASE ORAL at 08:45

## 2020-01-01 RX ADMIN — Medication 1 CAPSULE: at 17:16

## 2020-01-01 RX ADMIN — ALBUTEROL SULFATE 2.5 MG: 2.5 SOLUTION RESPIRATORY (INHALATION) at 14:03

## 2020-01-01 RX ADMIN — HYDROCODONE BITARTRATE AND ACETAMINOPHEN 1 TABLET: 5; 325 TABLET ORAL at 00:51

## 2020-01-01 RX ADMIN — TIOTROPIUM BROMIDE INHALATION SPRAY 2 PUFF: 3.12 SPRAY, METERED RESPIRATORY (INHALATION) at 07:25

## 2020-01-01 RX ADMIN — STANDARDIZED SENNA CONCENTRATE 8.6 MG: 8.6 TABLET ORAL at 20:16

## 2020-01-01 RX ADMIN — Medication 10 ML: at 20:45

## 2020-01-01 RX ADMIN — ALBUTEROL SULFATE 2.5 MG: 2.5 SOLUTION RESPIRATORY (INHALATION) at 21:28

## 2020-01-01 RX ADMIN — ACETAMINOPHEN 650 MG: 325 TABLET ORAL at 08:55

## 2020-01-01 RX ADMIN — HYDROCODONE BITARTRATE AND ACETAMINOPHEN 2 TABLET: 5; 325 TABLET ORAL at 17:08

## 2020-01-01 RX ADMIN — WARFARIN SODIUM 1 MG: 1 TABLET ORAL at 17:04

## 2020-01-01 RX ADMIN — METOPROLOL SUCCINATE 25 MG: 25 TABLET, FILM COATED, EXTENDED RELEASE ORAL at 08:57

## 2020-01-01 RX ADMIN — POTASSIUM CHLORIDE 20 MEQ: 1500 TABLET, EXTENDED RELEASE ORAL at 17:24

## 2020-01-01 RX ADMIN — FUROSEMIDE 40 MG: 10 INJECTION, SOLUTION INTRAMUSCULAR; INTRAVENOUS at 09:39

## 2020-01-01 RX ADMIN — OSELTAMIVIR PHOSPHATE 30 MG: 30 CAPSULE ORAL at 20:20

## 2020-01-01 RX ADMIN — HYDROCODONE BITARTRATE AND ACETAMINOPHEN 2 TABLET: 5; 325 TABLET ORAL at 06:14

## 2020-01-01 RX ADMIN — SODIUM CHLORIDE 1503 ML: 9 INJECTION, SOLUTION INTRAVENOUS at 01:09

## 2020-01-01 RX ADMIN — GABAPENTIN 100 MG: 100 CAPSULE ORAL at 09:27

## 2020-01-01 RX ADMIN — PANTOPRAZOLE SODIUM 40 MG: 40 TABLET, DELAYED RELEASE ORAL at 16:35

## 2020-01-01 RX ADMIN — BUDESONIDE 500 MCG: 0.5 INHALANT RESPIRATORY (INHALATION) at 17:45

## 2020-01-01 RX ADMIN — TRAZODONE HYDROCHLORIDE 50 MG: 50 TABLET ORAL at 20:43

## 2020-01-01 RX ADMIN — STANDARDIZED SENNA CONCENTRATE 8.6 MG: 8.6 TABLET ORAL at 20:12

## 2020-01-01 RX ADMIN — Medication 10 ML: at 13:12

## 2020-01-01 RX ADMIN — HYDROCODONE BITARTRATE AND ACETAMINOPHEN 2 TABLET: 5; 325 TABLET ORAL at 11:38

## 2020-01-01 RX ADMIN — PIPERACILLIN AND TAZOBACTAM 3.38 G: 3; .375 INJECTION, POWDER, LYOPHILIZED, FOR SOLUTION INTRAVENOUS at 20:26

## 2020-01-01 RX ADMIN — DICYCLOMINE HYDROCHLORIDE 10 MG: 10 CAPSULE ORAL at 20:45

## 2020-01-01 RX ADMIN — BUDESONIDE 500 MCG: 0.5 INHALANT RESPIRATORY (INHALATION) at 21:46

## 2020-01-01 RX ADMIN — TIOTROPIUM BROMIDE INHALATION SPRAY 2 PUFF: 3.12 SPRAY, METERED RESPIRATORY (INHALATION) at 07:22

## 2020-01-01 RX ADMIN — SACUBITRIL AND VALSARTAN 1 TABLET: 24; 26 TABLET, FILM COATED ORAL at 21:17

## 2020-01-01 RX ADMIN — ACETAMINOPHEN 650 MG: 325 TABLET ORAL at 14:18

## 2020-01-01 RX ADMIN — DIGOXIN 125 MCG: 125 TABLET ORAL at 08:47

## 2020-01-01 RX ADMIN — CETIRIZINE HYDROCHLORIDE 5 MG: 5 TABLET ORAL at 08:38

## 2020-01-01 RX ADMIN — SODIUM CHLORIDE, PRESERVATIVE FREE 10 ML: 5 INJECTION INTRAVENOUS at 20:09

## 2020-01-01 RX ADMIN — LEVOCETIRIZINE DIHYDROCHLORIDE 5 MG: 5 TABLET, FILM COATED ORAL at 22:12

## 2020-01-01 RX ADMIN — TIOTROPIUM BROMIDE INHALATION SPRAY 2 PUFF: 3.12 SPRAY, METERED RESPIRATORY (INHALATION) at 09:47

## 2020-01-01 RX ADMIN — ARFORMOTEROL TARTRATE 15 MCG: 15 SOLUTION RESPIRATORY (INHALATION) at 20:18

## 2020-01-01 RX ADMIN — ARFORMOTEROL TARTRATE 15 MCG: 15 SOLUTION RESPIRATORY (INHALATION) at 09:16

## 2020-01-01 RX ADMIN — GABAPENTIN 100 MG: 100 CAPSULE ORAL at 13:15

## 2020-01-01 RX ADMIN — PANTOPRAZOLE SODIUM 40 MG: 40 TABLET, DELAYED RELEASE ORAL at 16:00

## 2020-01-01 RX ADMIN — GABAPENTIN 100 MG: 100 CAPSULE ORAL at 15:43

## 2020-01-01 RX ADMIN — HYDROCORTISONE ACETATE 25 MG: 25 SUPPOSITORY RECTAL at 10:13

## 2020-01-01 RX ADMIN — ARFORMOTEROL TARTRATE 15 MCG: 15 SOLUTION RESPIRATORY (INHALATION) at 07:37

## 2020-01-01 RX ADMIN — ASPIRIN 81 MG: 81 TABLET ORAL at 10:09

## 2020-01-01 RX ADMIN — SACUBITRIL AND VALSARTAN 1 TABLET: 24; 26 TABLET, FILM COATED ORAL at 21:46

## 2020-01-01 RX ADMIN — LINEZOLID 600 MG: 600 TABLET, FILM COATED ORAL at 20:45

## 2020-01-01 RX ADMIN — BUMETANIDE 2 MG: 1 TABLET ORAL at 22:18

## 2020-01-01 RX ADMIN — DICYCLOMINE HYDROCHLORIDE 10 MG: 10 CAPSULE ORAL at 06:21

## 2020-01-01 RX ADMIN — DIGOXIN 125 MCG: 125 TABLET ORAL at 09:27

## 2020-01-01 RX ADMIN — GABAPENTIN 100 MG: 100 CAPSULE ORAL at 21:17

## 2020-01-01 RX ADMIN — Medication 10 ML: at 20:10

## 2020-01-01 RX ADMIN — METOPROLOL SUCCINATE 25 MG: 25 TABLET, FILM COATED, EXTENDED RELEASE ORAL at 08:13

## 2020-01-01 RX ADMIN — BUDESONIDE 500 MCG: 0.5 INHALANT RESPIRATORY (INHALATION) at 17:25

## 2020-01-01 RX ADMIN — Medication 0.5 MG: at 17:03

## 2020-01-01 RX ADMIN — BUDESONIDE 500 MCG: 0.5 INHALANT RESPIRATORY (INHALATION) at 07:25

## 2020-01-01 RX ADMIN — Medication 10 ML: at 08:03

## 2020-01-01 RX ADMIN — ARFORMOTEROL TARTRATE 15 MCG: 15 SOLUTION RESPIRATORY (INHALATION) at 08:55

## 2020-01-01 RX ADMIN — ARFORMOTEROL TARTRATE 15 MCG: 15 SOLUTION RESPIRATORY (INHALATION) at 17:12

## 2020-01-01 RX ADMIN — ALBUTEROL SULFATE 2.5 MG: 2.5 SOLUTION RESPIRATORY (INHALATION) at 12:42

## 2020-01-01 RX ADMIN — ACETAMINOPHEN 650 MG: 325 TABLET ORAL at 15:41

## 2020-01-01 RX ADMIN — ALBUTEROL SULFATE 2.5 MG: 2.5 SOLUTION RESPIRATORY (INHALATION) at 17:12

## 2020-01-01 RX ADMIN — HYDROCODONE BITARTRATE AND ACETAMINOPHEN 1 TABLET: 5; 325 TABLET ORAL at 11:03

## 2020-01-01 RX ADMIN — PANTOPRAZOLE SODIUM 40 MG: 40 TABLET, DELAYED RELEASE ORAL at 09:49

## 2020-01-01 RX ADMIN — PANTOPRAZOLE SODIUM 40 MG: 40 TABLET, DELAYED RELEASE ORAL at 05:00

## 2020-01-01 RX ADMIN — GABAPENTIN 100 MG: 100 CAPSULE ORAL at 22:18

## 2020-01-01 RX ADMIN — NYSTATIN 500000 UNITS: 500000 SUSPENSION ORAL at 17:16

## 2020-01-01 RX ADMIN — SODIUM CHLORIDE, PRESERVATIVE FREE 10 ML: 5 INJECTION INTRAVENOUS at 19:54

## 2020-01-01 RX ADMIN — ARFORMOTEROL TARTRATE 15 MCG: 15 SOLUTION RESPIRATORY (INHALATION) at 18:29

## 2020-01-01 RX ADMIN — SODIUM CHLORIDE, PRESERVATIVE FREE 10 ML: 5 INJECTION INTRAVENOUS at 08:39

## 2020-01-01 RX ADMIN — ARFORMOTEROL TARTRATE 15 MCG: 15 SOLUTION RESPIRATORY (INHALATION) at 21:12

## 2020-01-01 RX ADMIN — DOCUSATE SODIUM 100 MG: 100 CAPSULE, LIQUID FILLED ORAL at 08:06

## 2020-01-01 RX ADMIN — PANTOPRAZOLE SODIUM 40 MG: 40 TABLET, DELAYED RELEASE ORAL at 06:02

## 2020-01-01 RX ADMIN — ALBUTEROL SULFATE 2.5 MG: 2.5 SOLUTION RESPIRATORY (INHALATION) at 10:29

## 2020-01-01 RX ADMIN — Medication 10 ML: at 20:53

## 2020-01-01 RX ADMIN — ARFORMOTEROL TARTRATE 15 MCG: 15 SOLUTION RESPIRATORY (INHALATION) at 20:25

## 2020-01-01 RX ADMIN — PIPERACILLIN AND TAZOBACTAM 3.38 G: 3; .375 INJECTION, POWDER, LYOPHILIZED, FOR SOLUTION INTRAVENOUS at 12:07

## 2020-01-01 RX ADMIN — WARFARIN SODIUM 2 MG: 2 TABLET ORAL at 17:09

## 2020-01-01 RX ADMIN — PANTOPRAZOLE SODIUM 40 MG: 40 TABLET, DELAYED RELEASE ORAL at 10:36

## 2020-01-01 RX ADMIN — LIDOCAINE: 50 OINTMENT TOPICAL at 14:40

## 2020-01-01 RX ADMIN — ALBUTEROL SULFATE 2.5 MG: 2.5 SOLUTION RESPIRATORY (INHALATION) at 21:03

## 2020-01-01 RX ADMIN — BUDESONIDE 500 MCG: 0.5 INHALANT RESPIRATORY (INHALATION) at 22:18

## 2020-01-01 RX ADMIN — Medication 1.5 MG: at 18:53

## 2020-01-01 RX ADMIN — LEVOCETIRIZINE DIHYDROCHLORIDE 5 MG: 5 TABLET, FILM COATED ORAL at 20:10

## 2020-01-01 RX ADMIN — GABAPENTIN 100 MG: 100 CAPSULE ORAL at 08:46

## 2020-01-01 RX ADMIN — ARFORMOTEROL TARTRATE 15 MCG: 15 SOLUTION RESPIRATORY (INHALATION) at 10:08

## 2020-01-01 RX ADMIN — GABAPENTIN 100 MG: 100 CAPSULE ORAL at 13:05

## 2020-01-01 RX ADMIN — GABAPENTIN 100 MG: 100 CAPSULE ORAL at 20:09

## 2020-01-01 RX ADMIN — STANDARDIZED SENNA CONCENTRATE 8.6 MG: 8.6 TABLET ORAL at 22:26

## 2020-01-01 RX ADMIN — DOCUSATE SODIUM 100 MG: 100 CAPSULE, LIQUID FILLED ORAL at 11:35

## 2020-01-01 RX ADMIN — DOBUTAMINE HYDROCHLORIDE 5 MCG/KG/MIN: 200 INJECTION INTRAVENOUS at 07:56

## 2020-01-01 RX ADMIN — FUROSEMIDE 40 MG: 40 TABLET ORAL at 09:13

## 2020-01-01 RX ADMIN — CYCLOBENZAPRINE 10 MG: 10 TABLET, FILM COATED ORAL at 18:26

## 2020-01-01 RX ADMIN — FUROSEMIDE 40 MG: 10 INJECTION, SOLUTION INTRAMUSCULAR; INTRAVENOUS at 08:50

## 2020-01-01 RX ADMIN — ASPIRIN 81 MG: 81 TABLET ORAL at 10:54

## 2020-01-01 RX ADMIN — METOPROLOL SUCCINATE 25 MG: 25 TABLET, FILM COATED, EXTENDED RELEASE ORAL at 09:40

## 2020-01-01 RX ADMIN — STANDARDIZED SENNA CONCENTRATE 8.6 MG: 8.6 TABLET ORAL at 20:51

## 2020-01-01 RX ADMIN — STANDARDIZED SENNA CONCENTRATE 8.6 MG: 8.6 TABLET ORAL at 21:17

## 2020-01-01 RX ADMIN — OSELTAMIVIR PHOSPHATE 30 MG: 30 CAPSULE ORAL at 10:11

## 2020-01-01 RX ADMIN — LEVALBUTEROL HYDROCHLORIDE 0.63 MG: 0.63 SOLUTION RESPIRATORY (INHALATION) at 06:08

## 2020-01-01 RX ADMIN — FENTANYL CITRATE 25 MCG: 50 INJECTION, SOLUTION INTRAMUSCULAR; INTRAVENOUS at 21:01

## 2020-01-01 RX ADMIN — ALBUTEROL SULFATE 2.5 MG: 2.5 SOLUTION RESPIRATORY (INHALATION) at 10:13

## 2020-01-01 RX ADMIN — HYDROCODONE BITARTRATE AND ACETAMINOPHEN 2 TABLET: 5; 325 TABLET ORAL at 15:20

## 2020-01-01 RX ADMIN — GABAPENTIN 100 MG: 100 CAPSULE ORAL at 14:49

## 2020-01-01 RX ADMIN — DICYCLOMINE HYDROCHLORIDE 10 MG: 10 CAPSULE ORAL at 20:53

## 2020-01-01 RX ADMIN — PROMETHAZINE HYDROCHLORIDE 12.5 MG: 25 TABLET ORAL at 20:35

## 2020-01-01 RX ADMIN — BUDESONIDE 500 MCG: 0.5 INHALANT RESPIRATORY (INHALATION) at 07:29

## 2020-01-01 RX ADMIN — GABAPENTIN 100 MG: 100 CAPSULE ORAL at 09:06

## 2020-01-01 RX ADMIN — BUMETANIDE 1 MG: 1 TABLET ORAL at 20:45

## 2020-01-01 RX ADMIN — GABAPENTIN 100 MG: 100 CAPSULE ORAL at 20:10

## 2020-01-01 RX ADMIN — STANDARDIZED SENNA CONCENTRATE 8.6 MG: 8.6 TABLET ORAL at 20:35

## 2020-01-01 RX ADMIN — PIPERACILLIN AND TAZOBACTAM 3.38 G: 3; .375 INJECTION, POWDER, LYOPHILIZED, FOR SOLUTION INTRAVENOUS at 06:11

## 2020-01-01 RX ADMIN — SACUBITRIL AND VALSARTAN 1 TABLET: 49; 51 TABLET, FILM COATED ORAL at 20:00

## 2020-01-01 RX ADMIN — GABAPENTIN 100 MG: 100 CAPSULE ORAL at 10:05

## 2020-01-01 RX ADMIN — STANDARDIZED SENNA CONCENTRATE 8.6 MG: 8.6 TABLET ORAL at 20:41

## 2020-01-01 RX ADMIN — CEFTRIAXONE SODIUM 1 G: 1 INJECTION, POWDER, FOR SOLUTION INTRAMUSCULAR; INTRAVENOUS at 17:18

## 2020-01-01 RX ADMIN — TRAZODONE HYDROCHLORIDE 50 MG: 50 TABLET ORAL at 21:03

## 2020-01-01 RX ADMIN — DIGOXIN 125 MCG: 125 TABLET ORAL at 08:46

## 2020-01-01 RX ADMIN — FAMOTIDINE 20 MG: 20 TABLET, FILM COATED ORAL at 12:20

## 2020-01-01 RX ADMIN — PIPERACILLIN AND TAZOBACTAM 3.38 G: 3; .375 INJECTION, POWDER, LYOPHILIZED, FOR SOLUTION INTRAVENOUS at 12:14

## 2020-01-01 RX ADMIN — BUMETANIDE 2 MG: 1 TABLET ORAL at 08:29

## 2020-01-01 RX ADMIN — ARFORMOTEROL TARTRATE 15 MCG: 15 SOLUTION RESPIRATORY (INHALATION) at 21:21

## 2020-01-01 RX ADMIN — PANTOPRAZOLE SODIUM 40 MG: 40 TABLET, DELAYED RELEASE ORAL at 05:57

## 2020-01-01 RX ADMIN — GABAPENTIN 100 MG: 100 CAPSULE ORAL at 19:56

## 2020-01-01 RX ADMIN — PREDNISONE 40 MG: 20 TABLET ORAL at 08:27

## 2020-01-01 RX ADMIN — TIOTROPIUM BROMIDE INHALATION SPRAY 2 PUFF: 3.12 SPRAY, METERED RESPIRATORY (INHALATION) at 06:12

## 2020-01-01 RX ADMIN — METOPROLOL SUCCINATE 25 MG: 25 TABLET, FILM COATED, EXTENDED RELEASE ORAL at 09:49

## 2020-01-01 RX ADMIN — HYDROCORTISONE ACETATE 25 MG: 25 SUPPOSITORY RECTAL at 08:10

## 2020-01-01 RX ADMIN — DICYCLOMINE HYDROCHLORIDE 10 MG: 10 CAPSULE ORAL at 10:10

## 2020-01-01 RX ADMIN — NYSTATIN 500000 UNITS: 500000 SUSPENSION ORAL at 09:26

## 2020-01-01 RX ADMIN — ALBUTEROL SULFATE 2.5 MG: 2.5 SOLUTION RESPIRATORY (INHALATION) at 11:30

## 2020-01-01 RX ADMIN — SACUBITRIL AND VALSARTAN 1 TABLET: 24; 26 TABLET, FILM COATED ORAL at 10:58

## 2020-01-01 RX ADMIN — METOPROLOL SUCCINATE 25 MG: 25 TABLET, FILM COATED, EXTENDED RELEASE ORAL at 09:16

## 2020-01-01 RX ADMIN — PANTOPRAZOLE SODIUM 40 MG: 40 TABLET, DELAYED RELEASE ORAL at 15:00

## 2020-01-01 RX ADMIN — PANTOPRAZOLE SODIUM 40 MG: 40 TABLET, DELAYED RELEASE ORAL at 05:28

## 2020-01-01 RX ADMIN — Medication 10 ML: at 09:31

## 2020-01-01 RX ADMIN — BUMETANIDE 0.5 MG: 0.25 INJECTION INTRAMUSCULAR; INTRAVENOUS at 05:53

## 2020-01-01 RX ADMIN — PANTOPRAZOLE SODIUM 40 MG: 40 TABLET, DELAYED RELEASE ORAL at 06:36

## 2020-01-01 RX ADMIN — HYDROCODONE BITARTRATE AND ACETAMINOPHEN 1 TABLET: 5; 325 TABLET ORAL at 07:54

## 2020-01-01 RX ADMIN — FAMOTIDINE 20 MG: 20 TABLET, FILM COATED ORAL at 09:39

## 2020-01-01 RX ADMIN — WARFARIN SODIUM 1 MG: 1 TABLET ORAL at 17:16

## 2020-01-01 RX ADMIN — BUDESONIDE 500 MCG: 0.5 INHALANT RESPIRATORY (INHALATION) at 07:46

## 2020-01-01 RX ADMIN — GABAPENTIN 100 MG: 100 CAPSULE ORAL at 15:05

## 2020-01-01 RX ADMIN — DIGOXIN 125 MCG: 125 TABLET ORAL at 09:43

## 2020-01-01 RX ADMIN — FUROSEMIDE 40 MG: 10 INJECTION, SOLUTION INTRAMUSCULAR; INTRAVENOUS at 15:54

## 2020-01-01 RX ADMIN — HYDROCODONE BITARTRATE AND ACETAMINOPHEN 1 TABLET: 5; 325 TABLET ORAL at 08:36

## 2020-01-01 RX ADMIN — ARFORMOTEROL TARTRATE 15 MCG: 15 SOLUTION RESPIRATORY (INHALATION) at 20:53

## 2020-01-01 RX ADMIN — HYDROCORTISONE ACETATE 25 MG: 25 SUPPOSITORY RECTAL at 21:21

## 2020-01-01 RX ADMIN — HYDROCODONE BITARTRATE AND ACETAMINOPHEN 2 TABLET: 5; 325 TABLET ORAL at 04:37

## 2020-01-01 RX ADMIN — PANTOPRAZOLE SODIUM 40 MG: 40 TABLET, DELAYED RELEASE ORAL at 16:40

## 2020-01-01 RX ADMIN — ALBUTEROL SULFATE 2.5 MG: 2.5 SOLUTION RESPIRATORY (INHALATION) at 21:01

## 2020-01-01 RX ADMIN — WARFARIN SODIUM 3 MG: 3 TABLET ORAL at 18:12

## 2020-01-01 RX ADMIN — CYCLOBENZAPRINE 10 MG: 10 TABLET, FILM COATED ORAL at 08:01

## 2020-01-01 RX ADMIN — BUDESONIDE 500 MCG: 0.5 INHALANT RESPIRATORY (INHALATION) at 20:02

## 2020-01-01 RX ADMIN — SODIUM CHLORIDE 8 MG/HR: 9 INJECTION, SOLUTION INTRAVENOUS at 12:27

## 2020-01-01 RX ADMIN — GABAPENTIN 100 MG: 100 CAPSULE ORAL at 22:26

## 2020-01-01 RX ADMIN — SACUBITRIL AND VALSARTAN 1 TABLET: 24; 26 TABLET, FILM COATED ORAL at 07:46

## 2020-01-01 RX ADMIN — Medication 1.5 MG: at 17:20

## 2020-01-01 RX ADMIN — PANTOPRAZOLE SODIUM 40 MG: 40 TABLET, DELAYED RELEASE ORAL at 06:21

## 2020-01-01 RX ADMIN — PIPERACILLIN AND TAZOBACTAM 3.38 G: 3; .375 INJECTION, POWDER, LYOPHILIZED, FOR SOLUTION INTRAVENOUS at 04:48

## 2020-01-01 RX ADMIN — PIPERACILLIN AND TAZOBACTAM 3.38 G: 3; .375 INJECTION, POWDER, LYOPHILIZED, FOR SOLUTION INTRAVENOUS at 20:30

## 2020-01-01 RX ADMIN — ARFORMOTEROL TARTRATE 15 MCG: 15 SOLUTION RESPIRATORY (INHALATION) at 19:44

## 2020-01-01 RX ADMIN — STANDARDIZED SENNA CONCENTRATE 8.6 MG: 8.6 TABLET ORAL at 22:18

## 2020-01-01 RX ADMIN — STANDARDIZED SENNA CONCENTRATE 8.6 MG: 8.6 TABLET ORAL at 09:39

## 2020-01-01 RX ADMIN — NYSTATIN 500000 UNITS: 100000 SUSPENSION ORAL at 09:32

## 2020-01-01 RX ADMIN — NYSTATIN 500000 UNITS: 500000 SUSPENSION ORAL at 08:27

## 2020-01-01 RX ADMIN — TIOTROPIUM BROMIDE INHALATION SPRAY 2 PUFF: 3.12 SPRAY, METERED RESPIRATORY (INHALATION) at 08:14

## 2020-01-01 RX ADMIN — PANTOPRAZOLE SODIUM 40 MG: 40 TABLET, DELAYED RELEASE ORAL at 05:13

## 2020-01-01 RX ADMIN — METOPROLOL SUCCINATE 25 MG: 25 TABLET, FILM COATED, EXTENDED RELEASE ORAL at 07:46

## 2020-01-01 RX ADMIN — OSELTAMIVIR PHOSPHATE 30 MG: 30 CAPSULE ORAL at 09:48

## 2020-01-01 RX ADMIN — HYDROCODONE BITARTRATE AND ACETAMINOPHEN 1 TABLET: 5; 325 TABLET ORAL at 08:57

## 2020-01-01 RX ADMIN — HYDROCORTISONE ACETATE 25 MG: 25 SUPPOSITORY RECTAL at 20:43

## 2020-01-01 RX ADMIN — FAMOTIDINE 20 MG: 20 TABLET, FILM COATED ORAL at 08:19

## 2020-01-01 ASSESSMENT — ENCOUNTER SYMPTOMS
BLOOD IN STOOL: 0
DIARRHEA: 0
VOMITING: 0
ALLERGIC/IMMUNOLOGIC NEGATIVE: 1
ANAL BLEEDING: 0
TROUBLE SWALLOWING: 0
CONSTIPATION: 0
TROUBLE SWALLOWING: 0
EYES NEGATIVE: 1
COUGH: 0
CONSTIPATION: 0
EYES NEGATIVE: 1
DIARRHEA: 0
RHINORRHEA: 0
BACK PAIN: 0
CONSTIPATION: 0
ANAL BLEEDING: 0
VOICE CHANGE: 0
TROUBLE SWALLOWING: 0
DIARRHEA: 0
VOICE CHANGE: 0
DIARRHEA: 0
EYE REDNESS: 0
COUGH: 0
COUGH: 0
VOICE CHANGE: 0
SORE THROAT: 0
BACK PAIN: 0
SHORTNESS OF BREATH: 0
BLOOD IN STOOL: 0
CONSTIPATION: 0
SHORTNESS OF BREATH: 1
ANAL BLEEDING: 0
VOICE CHANGE: 0
SORE THROAT: 0
ABDOMINAL PAIN: 0
RESPIRATORY NEGATIVE: 1
GASTROINTESTINAL NEGATIVE: 1
ALLERGIC/IMMUNOLOGIC NEGATIVE: 1
ABDOMINAL DISTENTION: 0
DIARRHEA: 0
BLOOD IN STOOL: 0
BACK PAIN: 0
COUGH: 1
BACK PAIN: 0
SHORTNESS OF BREATH: 1
ANAL BLEEDING: 0
NAUSEA: 0
PHOTOPHOBIA: 0
BLOOD IN STOOL: 0
SHORTNESS OF BREATH: 1
COUGH: 0
DIARRHEA: 0
ANAL BLEEDING: 1
SHORTNESS OF BREATH: 1
STRIDOR: 0
TROUBLE SWALLOWING: 0
EYE DISCHARGE: 0
TROUBLE SWALLOWING: 0
SHORTNESS OF BREATH: 1
NAUSEA: 0
TROUBLE SWALLOWING: 0
VOICE CHANGE: 0
ALLERGIC/IMMUNOLOGIC NEGATIVE: 1
BLOOD IN STOOL: 0
BLOOD IN STOOL: 0
CONSTIPATION: 0
ALLERGIC/IMMUNOLOGIC NEGATIVE: 1
DIARRHEA: 0
NAUSEA: 0
VOICE CHANGE: 0
EYE ITCHING: 0
DIARRHEA: 0
CONSTIPATION: 0
COUGH: 0
BACK PAIN: 0
COLOR CHANGE: 1
CONSTIPATION: 0
VOMITING: 0
BLOOD IN STOOL: 0
VOICE CHANGE: 0
ALLERGIC/IMMUNOLOGIC NEGATIVE: 1
EYE PAIN: 0
ALLERGIC/IMMUNOLOGIC NEGATIVE: 1
COUGH: 0
CHEST TIGHTNESS: 0
ALLERGIC/IMMUNOLOGIC NEGATIVE: 1
NAUSEA: 0
CHEST TIGHTNESS: 0
CONSTIPATION: 0
CONSTIPATION: 0
SHORTNESS OF BREATH: 1
WHEEZING: 1
ALLERGIC/IMMUNOLOGIC NEGATIVE: 1
WHEEZING: 0
BACK PAIN: 0
TROUBLE SWALLOWING: 0
ABDOMINAL PAIN: 0
ABDOMINAL DISTENTION: 0
ANAL BLEEDING: 0
COUGH: 0
BACK PAIN: 0
DIARRHEA: 0
ANAL BLEEDING: 0
COUGH: 0

## 2020-01-01 ASSESSMENT — PAIN DESCRIPTION - PAIN TYPE
TYPE: ACUTE PAIN
TYPE: CHRONIC PAIN
TYPE: ACUTE PAIN
TYPE: ACUTE PAIN
TYPE: CHRONIC PAIN
TYPE: ACUTE PAIN
TYPE: CHRONIC PAIN
TYPE: ACUTE PAIN
TYPE: CHRONIC PAIN
TYPE: ACUTE PAIN
TYPE: CHRONIC PAIN
TYPE: ACUTE PAIN
TYPE: CHRONIC PAIN
TYPE: ACUTE PAIN
TYPE: CHRONIC PAIN
TYPE: ACUTE PAIN
TYPE: CHRONIC PAIN
TYPE: ACUTE PAIN

## 2020-01-01 ASSESSMENT — PAIN SCALES - GENERAL
PAINLEVEL_OUTOF10: 6
PAINLEVEL_OUTOF10: 3
PAINLEVEL_OUTOF10: 10
PAINLEVEL_OUTOF10: 4
PAINLEVEL_OUTOF10: 0
PAINLEVEL_OUTOF10: 4
PAINLEVEL_OUTOF10: 0
PAINLEVEL_OUTOF10: 8
PAINLEVEL_OUTOF10: 7
PAINLEVEL_OUTOF10: 7
PAINLEVEL_OUTOF10: 4
PAINLEVEL_OUTOF10: 0
PAINLEVEL_OUTOF10: 2
PAINLEVEL_OUTOF10: 0
PAINLEVEL_OUTOF10: 9
PAINLEVEL_OUTOF10: 7
PAINLEVEL_OUTOF10: 8
PAINLEVEL_OUTOF10: 0
PAINLEVEL_OUTOF10: 5
PAINLEVEL_OUTOF10: 0
PAINLEVEL_OUTOF10: 6
PAINLEVEL_OUTOF10: 3
PAINLEVEL_OUTOF10: 3
PAINLEVEL_OUTOF10: 0
PAINLEVEL_OUTOF10: 6
PAINLEVEL_OUTOF10: 6
PAINLEVEL_OUTOF10: 0
PAINLEVEL_OUTOF10: 0
PAINLEVEL_OUTOF10: 3
PAINLEVEL_OUTOF10: 9
PAINLEVEL_OUTOF10: 6
PAINLEVEL_OUTOF10: 7
PAINLEVEL_OUTOF10: 4
PAINLEVEL_OUTOF10: 9
PAINLEVEL_OUTOF10: 4
PAINLEVEL_OUTOF10: 8
PAINLEVEL_OUTOF10: 4
PAINLEVEL_OUTOF10: 5
PAINLEVEL_OUTOF10: 6
PAINLEVEL_OUTOF10: 4
PAINLEVEL_OUTOF10: 3
PAINLEVEL_OUTOF10: 7
PAINLEVEL_OUTOF10: 0
PAINLEVEL_OUTOF10: 8
PAINLEVEL_OUTOF10: 4
PAINLEVEL_OUTOF10: 3
PAINLEVEL_OUTOF10: 0
PAINLEVEL_OUTOF10: 5
PAINLEVEL_OUTOF10: 4
PAINLEVEL_OUTOF10: 5
PAINLEVEL_OUTOF10: 10
PAINLEVEL_OUTOF10: 9
PAINLEVEL_OUTOF10: 0
PAINLEVEL_OUTOF10: 5
PAINLEVEL_OUTOF10: 7
PAINLEVEL_OUTOF10: 0
PAINLEVEL_OUTOF10: 6
PAINLEVEL_OUTOF10: 2
PAINLEVEL_OUTOF10: 6
PAINLEVEL_OUTOF10: 0
PAINLEVEL_OUTOF10: 8
PAINLEVEL_OUTOF10: 3
PAINLEVEL_OUTOF10: 5
PAINLEVEL_OUTOF10: 0
PAINLEVEL_OUTOF10: 5
PAINLEVEL_OUTOF10: 4
PAINLEVEL_OUTOF10: 5
PAINLEVEL_OUTOF10: 0
PAINLEVEL_OUTOF10: 0
PAINLEVEL_OUTOF10: 8
PAINLEVEL_OUTOF10: 3
PAINLEVEL_OUTOF10: 5
PAINLEVEL_OUTOF10: 10
PAINLEVEL_OUTOF10: 6
PAINLEVEL_OUTOF10: 8
PAINLEVEL_OUTOF10: 5
PAINLEVEL_OUTOF10: 0
PAINLEVEL_OUTOF10: 3
PAINLEVEL_OUTOF10: 4
PAINLEVEL_OUTOF10: 0
PAINLEVEL_OUTOF10: 5
PAINLEVEL_OUTOF10: 4
PAINLEVEL_OUTOF10: 7
PAINLEVEL_OUTOF10: 0
PAINLEVEL_OUTOF10: 6
PAINLEVEL_OUTOF10: 2
PAINLEVEL_OUTOF10: 0
PAINLEVEL_OUTOF10: 4
PAINLEVEL_OUTOF10: 8
PAINLEVEL_OUTOF10: 5
PAINLEVEL_OUTOF10: 8
PAINLEVEL_OUTOF10: 6
PAINLEVEL_OUTOF10: 6
PAINLEVEL_OUTOF10: 7
PAINLEVEL_OUTOF10: 5
PAINLEVEL_OUTOF10: 5
PAINLEVEL_OUTOF10: 2
PAINLEVEL_OUTOF10: 0
PAINLEVEL_OUTOF10: 0
PAINLEVEL_OUTOF10: 3
PAINLEVEL_OUTOF10: 4
PAINLEVEL_OUTOF10: 5
PAINLEVEL_OUTOF10: 6
PAINLEVEL_OUTOF10: 9
PAINLEVEL_OUTOF10: 2
PAINLEVEL_OUTOF10: 4
PAINLEVEL_OUTOF10: 3
PAINLEVEL_OUTOF10: 8
PAINLEVEL_OUTOF10: 6
PAINLEVEL_OUTOF10: 4
PAINLEVEL_OUTOF10: 0
PAINLEVEL_OUTOF10: 7
PAINLEVEL_OUTOF10: 10
PAINLEVEL_OUTOF10: 0
PAINLEVEL_OUTOF10: 5
PAINLEVEL_OUTOF10: 4
PAINLEVEL_OUTOF10: 5
PAINLEVEL_OUTOF10: 9
PAINLEVEL_OUTOF10: 9
PAINLEVEL_OUTOF10: 7
PAINLEVEL_OUTOF10: 0
PAINLEVEL_OUTOF10: 6
PAINLEVEL_OUTOF10: 0
PAINLEVEL_OUTOF10: 6
PAINLEVEL_OUTOF10: 0
PAINLEVEL_OUTOF10: 8
PAINLEVEL_OUTOF10: 0
PAINLEVEL_OUTOF10: 8
PAINLEVEL_OUTOF10: 4
PAINLEVEL_OUTOF10: 0
PAINLEVEL_OUTOF10: 4
PAINLEVEL_OUTOF10: 2
PAINLEVEL_OUTOF10: 3
PAINLEVEL_OUTOF10: 6
PAINLEVEL_OUTOF10: 9
PAINLEVEL_OUTOF10: 0
PAINLEVEL_OUTOF10: 2
PAINLEVEL_OUTOF10: 10
PAINLEVEL_OUTOF10: 0
PAINLEVEL_OUTOF10: 7
PAINLEVEL_OUTOF10: 0
PAINLEVEL_OUTOF10: 7
PAINLEVEL_OUTOF10: 4
PAINLEVEL_OUTOF10: 6
PAINLEVEL_OUTOF10: 6
PAINLEVEL_OUTOF10: 3
PAINLEVEL_OUTOF10: 4
PAINLEVEL_OUTOF10: 5
PAINLEVEL_OUTOF10: 0
PAINLEVEL_OUTOF10: 0
PAINLEVEL_OUTOF10: 5
PAINLEVEL_OUTOF10: 6
PAINLEVEL_OUTOF10: 7
PAINLEVEL_OUTOF10: 0
PAINLEVEL_OUTOF10: 7
PAINLEVEL_OUTOF10: 8
PAINLEVEL_OUTOF10: 2
PAINLEVEL_OUTOF10: 4
PAINLEVEL_OUTOF10: 0
PAINLEVEL_OUTOF10: 5
PAINLEVEL_OUTOF10: 2
PAINLEVEL_OUTOF10: 5
PAINLEVEL_OUTOF10: 4
PAINLEVEL_OUTOF10: 8
PAINLEVEL_OUTOF10: 0
PAINLEVEL_OUTOF10: 7
PAINLEVEL_OUTOF10: 8
PAINLEVEL_OUTOF10: 0
PAINLEVEL_OUTOF10: 3
PAINLEVEL_OUTOF10: 5
PAINLEVEL_OUTOF10: 5
PAINLEVEL_OUTOF10: 0
PAINLEVEL_OUTOF10: 3
PAINLEVEL_OUTOF10: 5
PAINLEVEL_OUTOF10: 10
PAINLEVEL_OUTOF10: 0
PAINLEVEL_OUTOF10: 7
PAINLEVEL_OUTOF10: 0
PAINLEVEL_OUTOF10: 5
PAINLEVEL_OUTOF10: 8
PAINLEVEL_OUTOF10: 0
PAINLEVEL_OUTOF10: 6
PAINLEVEL_OUTOF10: 3
PAINLEVEL_OUTOF10: 7
PAINLEVEL_OUTOF10: 8
PAINLEVEL_OUTOF10: 3
PAINLEVEL_OUTOF10: 7
PAINLEVEL_OUTOF10: 7
PAINLEVEL_OUTOF10: 0
PAINLEVEL_OUTOF10: 5
PAINLEVEL_OUTOF10: 8
PAINLEVEL_OUTOF10: 0
PAINLEVEL_OUTOF10: 7
PAINLEVEL_OUTOF10: 4
PAINLEVEL_OUTOF10: 4
PAINLEVEL_OUTOF10: 0
PAINLEVEL_OUTOF10: 8
PAINLEVEL_OUTOF10: 7
PAINLEVEL_OUTOF10: 7
PAINLEVEL_OUTOF10: 8
PAINLEVEL_OUTOF10: 3
PAINLEVEL_OUTOF10: 2
PAINLEVEL_OUTOF10: 0
PAINLEVEL_OUTOF10: 0
PAINLEVEL_OUTOF10: 8
PAINLEVEL_OUTOF10: 3
PAINLEVEL_OUTOF10: 5
PAINLEVEL_OUTOF10: 5
PAINLEVEL_OUTOF10: 7
PAINLEVEL_OUTOF10: 0
PAINLEVEL_OUTOF10: 6
PAINLEVEL_OUTOF10: 8
PAINLEVEL_OUTOF10: 10
PAINLEVEL_OUTOF10: 4
PAINLEVEL_OUTOF10: 0
PAINLEVEL_OUTOF10: 4
PAINLEVEL_OUTOF10: 5

## 2020-01-01 ASSESSMENT — PAIN DESCRIPTION - DESCRIPTORS
DESCRIPTORS: ACHING;SHARP;SHOOTING
DESCRIPTORS: ACHING
DESCRIPTORS: SHOOTING
DESCRIPTORS: ACHING;CONSTANT
DESCRIPTORS: ACHING;DISCOMFORT
DESCRIPTORS: ACHING
DESCRIPTORS: CONSTANT
DESCRIPTORS: CONSTANT
DESCRIPTORS: SHOOTING;SHARP
DESCRIPTORS: ACHING
DESCRIPTORS: ACHING
DESCRIPTORS: SORE
DESCRIPTORS: CONSTANT
DESCRIPTORS: CONSTANT
DESCRIPTORS: ACHING
DESCRIPTORS: DISCOMFORT
DESCRIPTORS: ACHING;CONSTANT
DESCRIPTORS: ACHING;CONSTANT
DESCRIPTORS: SORE
DESCRIPTORS: ACHING
DESCRIPTORS: SORE
DESCRIPTORS: ACHING;CONSTANT;BURNING
DESCRIPTORS: SORE;ACHING
DESCRIPTORS: ACHING;CONSTANT
DESCRIPTORS: CONSTANT;SORE
DESCRIPTORS: SHOOTING;SHARP
DESCRIPTORS: CONSTANT;ACHING
DESCRIPTORS: ACHING;DISCOMFORT
DESCRIPTORS: ACHING;CONSTANT
DESCRIPTORS: ACHING
DESCRIPTORS: CONSTANT;SORE
DESCRIPTORS: CONSTANT
DESCRIPTORS: CONSTANT;ACHING
DESCRIPTORS: SORE
DESCRIPTORS: ACHING;CONSTANT
DESCRIPTORS: SORE
DESCRIPTORS: SORE
DESCRIPTORS: ACHING
DESCRIPTORS: SHOOTING;SHARP
DESCRIPTORS: ACHING;CONSTANT
DESCRIPTORS: SORE
DESCRIPTORS: ACHING
DESCRIPTORS: SHOOTING;SHARP
DESCRIPTORS: SORE
DESCRIPTORS: ACHING;CONSTANT
DESCRIPTORS: ACHING;BURNING
DESCRIPTORS: ACHING
DESCRIPTORS: ACHING
DESCRIPTORS: ACHING;CONSTANT
DESCRIPTORS: SHARP;SPASM

## 2020-01-01 ASSESSMENT — PAIN DESCRIPTION - ONSET
ONSET: ON-GOING
ONSET: GRADUAL
ONSET: ON-GOING

## 2020-01-01 ASSESSMENT — PAIN DESCRIPTION - LOCATION
LOCATION: LEG
LOCATION: FOOT
LOCATION: FOOT
LOCATION: LEG
LOCATION: FOOT
LOCATION: LEG
LOCATION: ANKLE;LEG
LOCATION: HEAD
LOCATION: ANKLE
LOCATION: FOOT
LOCATION: GENERALIZED
LOCATION: FOOT
LOCATION: GENERALIZED;LEG
LOCATION: ANKLE;LEG
LOCATION: FOOT
LOCATION: GENERALIZED;LEG
LOCATION: FOOT
LOCATION: FOOT
LOCATION: BACK;FOOT
LOCATION: LEG
LOCATION: LEG
LOCATION: FOOT
LOCATION: ANKLE
LOCATION: KNEE
LOCATION: LEG
LOCATION: FOOT
LOCATION: LEG
LOCATION: BACK
LOCATION: GENERALIZED;FOOT
LOCATION: LEG
LOCATION: LEG
LOCATION: FOOT
LOCATION: LEG
LOCATION: FOOT
LOCATION: LEG
LOCATION: LEG;GENERALIZED
LOCATION: FOOT
LOCATION: LEG
LOCATION: FOOT
LOCATION: LEG
LOCATION: FOOT
LOCATION: ARM
LOCATION: LEG
LOCATION: ARM
LOCATION: LEG
LOCATION: LEG
LOCATION: FOOT
LOCATION: GENERALIZED;FOOT
LOCATION: LEG
LOCATION: FOOT
LOCATION: FOOT;LEG
LOCATION: LEG;GENERALIZED
LOCATION: LEG
LOCATION: LEG
LOCATION: FOOT
LOCATION: FOOT
LOCATION: GENERALIZED;FOOT
LOCATION: LEG
LOCATION: LEG
LOCATION: FOOT
LOCATION: LEG
LOCATION: FOOT
LOCATION: FOOT

## 2020-01-01 ASSESSMENT — PAIN DESCRIPTION - PROGRESSION
CLINICAL_PROGRESSION: NOT CHANGED
CLINICAL_PROGRESSION: GRADUALLY WORSENING
CLINICAL_PROGRESSION: NOT CHANGED
CLINICAL_PROGRESSION: GRADUALLY WORSENING
CLINICAL_PROGRESSION: GRADUALLY WORSENING
CLINICAL_PROGRESSION: GRADUALLY IMPROVING
CLINICAL_PROGRESSION: NOT CHANGED
CLINICAL_PROGRESSION: GRADUALLY WORSENING
CLINICAL_PROGRESSION: NOT CHANGED
CLINICAL_PROGRESSION: GRADUALLY WORSENING
CLINICAL_PROGRESSION: NOT CHANGED
CLINICAL_PROGRESSION: GRADUALLY WORSENING
CLINICAL_PROGRESSION: NOT CHANGED
CLINICAL_PROGRESSION: GRADUALLY WORSENING
CLINICAL_PROGRESSION: NOT CHANGED
CLINICAL_PROGRESSION: GRADUALLY IMPROVING
CLINICAL_PROGRESSION: NOT CHANGED
CLINICAL_PROGRESSION: GRADUALLY WORSENING
CLINICAL_PROGRESSION: GRADUALLY WORSENING
CLINICAL_PROGRESSION: NOT CHANGED
CLINICAL_PROGRESSION: GRADUALLY WORSENING
CLINICAL_PROGRESSION: NOT CHANGED
CLINICAL_PROGRESSION: GRADUALLY WORSENING
CLINICAL_PROGRESSION: GRADUALLY IMPROVING
CLINICAL_PROGRESSION: NOT CHANGED
CLINICAL_PROGRESSION: GRADUALLY WORSENING
CLINICAL_PROGRESSION: NOT CHANGED
CLINICAL_PROGRESSION: GRADUALLY WORSENING
CLINICAL_PROGRESSION: NOT CHANGED
CLINICAL_PROGRESSION: GRADUALLY WORSENING
CLINICAL_PROGRESSION: NOT CHANGED
CLINICAL_PROGRESSION: RAPIDLY IMPROVING
CLINICAL_PROGRESSION: GRADUALLY WORSENING
CLINICAL_PROGRESSION: NOT CHANGED
CLINICAL_PROGRESSION: GRADUALLY WORSENING
CLINICAL_PROGRESSION: NOT CHANGED
CLINICAL_PROGRESSION: GRADUALLY WORSENING
CLINICAL_PROGRESSION: NOT CHANGED
CLINICAL_PROGRESSION: NOT CHANGED

## 2020-01-01 ASSESSMENT — PAIN DESCRIPTION - DIRECTION
RADIATING_TOWARDS: DOWN LEG
RADIATING_TOWARDS: ALL OVER
RADIATING_TOWARDS: DOWN LEG

## 2020-01-01 ASSESSMENT — PAIN DESCRIPTION - FREQUENCY
FREQUENCY: CONTINUOUS
FREQUENCY: INTERMITTENT
FREQUENCY: CONTINUOUS
FREQUENCY: INTERMITTENT
FREQUENCY: CONTINUOUS
FREQUENCY: INTERMITTENT
FREQUENCY: CONTINUOUS
FREQUENCY: INTERMITTENT
FREQUENCY: CONTINUOUS
FREQUENCY: CONTINUOUS
FREQUENCY: INTERMITTENT
FREQUENCY: CONTINUOUS
FREQUENCY: INTERMITTENT
FREQUENCY: CONTINUOUS
FREQUENCY: INTERMITTENT

## 2020-01-01 ASSESSMENT — PAIN DESCRIPTION - ORIENTATION
ORIENTATION: RIGHT;LEFT
ORIENTATION: LEFT
ORIENTATION: RIGHT;LEFT
ORIENTATION: RIGHT;LEFT
ORIENTATION: LEFT
ORIENTATION: LOWER
ORIENTATION: LEFT;LOWER
ORIENTATION: RIGHT;LEFT
ORIENTATION: RIGHT;LEFT
ORIENTATION: LEFT
ORIENTATION: LEFT;LOWER
ORIENTATION: LEFT
ORIENTATION: RIGHT;LEFT
ORIENTATION: LEFT
ORIENTATION: LEFT;LOWER
ORIENTATION: RIGHT
ORIENTATION: LEFT
ORIENTATION: LEFT
ORIENTATION: RIGHT
ORIENTATION: LEFT
ORIENTATION: RIGHT
ORIENTATION: LEFT
ORIENTATION: RIGHT;LEFT
ORIENTATION: LOWER
ORIENTATION: LEFT;LOWER
ORIENTATION: LEFT
ORIENTATION: LEFT;RIGHT
ORIENTATION: LEFT
ORIENTATION: LEFT
ORIENTATION: LEFT;DISTAL;LOWER
ORIENTATION: RIGHT;LEFT
ORIENTATION: LEFT
ORIENTATION: RIGHT;LEFT
ORIENTATION: LEFT
ORIENTATION: LOWER
ORIENTATION: LEFT
ORIENTATION: LOWER
ORIENTATION: LEFT

## 2020-01-01 ASSESSMENT — PAIN - FUNCTIONAL ASSESSMENT

## 2020-01-01 ASSESSMENT — PULMONARY FUNCTION TESTS: PEFR_L/MIN: 18

## 2020-01-02 ENCOUNTER — HOSPITAL ENCOUNTER (INPATIENT)
Age: 75
LOS: 7 days | Discharge: HOME HEALTH | DRG: 393 | End: 2020-01-09
Attending: EMERGENCY MEDICINE | Admitting: INTERNAL MEDICINE
Payer: MEDICARE

## 2020-01-02 ENCOUNTER — APPOINTMENT (OUTPATIENT)
Dept: GENERAL RADIOLOGY | Age: 75
DRG: 393 | End: 2020-01-02
Payer: MEDICARE

## 2020-01-02 PROBLEM — K92.2 GI BLEED: Status: ACTIVE | Noted: 2020-01-02

## 2020-01-02 LAB
ABO: NORMAL
ANION GAP SERPL CALCULATED.3IONS-SCNC: 15 MEQ/L (ref 8–16)
ANTIBODY SCREEN: NORMAL
APTT: 46.9 SECONDS (ref 22–38)
BACTERIA: ABNORMAL /HPF
BASOPHILS # BLD: 0.2 %
BASOPHILS ABSOLUTE: 0 THOU/MM3 (ref 0–0.1)
BILIRUBIN URINE: NEGATIVE
BLOOD, URINE: ABNORMAL
BUN BLDV-MCNC: 31 MG/DL (ref 7–22)
CALCIUM SERPL-MCNC: 9.3 MG/DL (ref 8.5–10.5)
CASTS 2: ABNORMAL /LPF
CASTS UA: ABNORMAL /LPF
CHARACTER, URINE: ABNORMAL
CHLORIDE BLD-SCNC: 106 MEQ/L (ref 98–111)
CO2: 24 MEQ/L (ref 23–33)
COLOR: YELLOW
CREAT SERPL-MCNC: 0.8 MG/DL (ref 0.4–1.2)
CRYSTALS, UA: ABNORMAL
EOSINOPHIL # BLD: 0.6 %
EOSINOPHILS ABSOLUTE: 0.1 THOU/MM3 (ref 0–0.4)
EPITHELIAL CELLS, UA: ABNORMAL /HPF
ERYTHROCYTE [DISTWIDTH] IN BLOOD BY AUTOMATED COUNT: 13.8 % (ref 11.5–14.5)
ERYTHROCYTE [DISTWIDTH] IN BLOOD BY AUTOMATED COUNT: 51 FL (ref 35–45)
GFR SERPL CREATININE-BSD FRML MDRD: 70 ML/MIN/1.73M2
GLUCOSE BLD-MCNC: 114 MG/DL (ref 70–108)
GLUCOSE URINE: NEGATIVE MG/DL
HCT VFR BLD CALC: 24.3 % (ref 37–47)
HCT VFR BLD CALC: 25.5 % (ref 37–47)
HEMOCCULT STL QL: POSITIVE
HEMOGLOBIN: 7.1 GM/DL (ref 12–16)
HEMOGLOBIN: 7.5 GM/DL (ref 12–16)
IMMATURE GRANS (ABS): 0.02 THOU/MM3 (ref 0–0.07)
IMMATURE GRANULOCYTES: 0.2 %
INR BLD: 5.66 (ref 0.85–1.13)
KETONES, URINE: NEGATIVE
LEUKOCYTE ESTERASE, URINE: ABNORMAL
LYMPHOCYTES # BLD: 16.4 %
LYMPHOCYTES ABSOLUTE: 1.4 THOU/MM3 (ref 1–4.8)
MCH RBC QN AUTO: 30 PG (ref 26–33)
MCHC RBC AUTO-ENTMCNC: 29.4 GM/DL (ref 32.2–35.5)
MCV RBC AUTO: 102 FL (ref 81–99)
MISCELLANEOUS 2: ABNORMAL
MONOCYTES # BLD: 6.7 %
MONOCYTES ABSOLUTE: 0.6 THOU/MM3 (ref 0.4–1.3)
NITRITE, URINE: NEGATIVE
NUCLEATED RED BLOOD CELLS: 0 /100 WBC
OSMOLALITY CALCULATION: 296.1 MOSMOL/KG (ref 275–300)
PH UA: 6.5 (ref 5–9)
PLATELET # BLD: 199 THOU/MM3 (ref 130–400)
PMV BLD AUTO: 10.7 FL (ref 9.4–12.4)
POTASSIUM REFLEX MAGNESIUM: 4.7 MEQ/L (ref 3.5–5.2)
PROTEIN UA: NEGATIVE
RBC # BLD: 2.5 MILL/MM3 (ref 4.2–5.4)
RBC URINE: ABNORMAL /HPF
RENAL EPITHELIAL, UA: ABNORMAL
RH FACTOR: NORMAL
SEG NEUTROPHILS: 75.9 %
SEGMENTED NEUTROPHILS ABSOLUTE COUNT: 6.5 THOU/MM3 (ref 1.8–7.7)
SODIUM BLD-SCNC: 145 MEQ/L (ref 135–145)
SPECIFIC GRAVITY, URINE: 1.02 (ref 1–1.03)
TROPONIN T: 0.02 NG/ML
UROBILINOGEN, URINE: 0.2 EU/DL (ref 0–1)
WBC # BLD: 8.5 THOU/MM3 (ref 4.8–10.8)
WBC UA: > 200 /HPF
YEAST: ABNORMAL

## 2020-01-02 PROCEDURE — 87086 URINE CULTURE/COLONY COUNT: CPT

## 2020-01-02 PROCEDURE — 86923 COMPATIBILITY TEST ELECTRIC: CPT

## 2020-01-02 PROCEDURE — 86850 RBC ANTIBODY SCREEN: CPT

## 2020-01-02 PROCEDURE — 99223 1ST HOSP IP/OBS HIGH 75: CPT | Performed by: INTERNAL MEDICINE

## 2020-01-02 PROCEDURE — 81001 URINALYSIS AUTO W/SCOPE: CPT

## 2020-01-02 PROCEDURE — 6370000000 HC RX 637 (ALT 250 FOR IP): Performed by: EMERGENCY MEDICINE

## 2020-01-02 PROCEDURE — 80048 BASIC METABOLIC PNL TOTAL CA: CPT

## 2020-01-02 PROCEDURE — 82272 OCCULT BLD FECES 1-3 TESTS: CPT

## 2020-01-02 PROCEDURE — 1200000003 HC TELEMETRY R&B

## 2020-01-02 PROCEDURE — 84484 ASSAY OF TROPONIN QUANT: CPT

## 2020-01-02 PROCEDURE — 87186 SC STD MICRODIL/AGAR DIL: CPT

## 2020-01-02 PROCEDURE — 85730 THROMBOPLASTIN TIME PARTIAL: CPT

## 2020-01-02 PROCEDURE — 85025 COMPLETE CBC W/AUTO DIFF WBC: CPT

## 2020-01-02 PROCEDURE — 74022 RADEX COMPL AQT ABD SERIES: CPT

## 2020-01-02 PROCEDURE — 86901 BLOOD TYPING SEROLOGIC RH(D): CPT

## 2020-01-02 PROCEDURE — P9016 RBC LEUKOCYTES REDUCED: HCPCS

## 2020-01-02 PROCEDURE — 36415 COLL VENOUS BLD VENIPUNCTURE: CPT

## 2020-01-02 PROCEDURE — 94760 N-INVAS EAR/PLS OXIMETRY 1: CPT

## 2020-01-02 PROCEDURE — 2580000003 HC RX 258: Performed by: INTERNAL MEDICINE

## 2020-01-02 PROCEDURE — 2700000000 HC OXYGEN THERAPY PER DAY

## 2020-01-02 PROCEDURE — 93005 ELECTROCARDIOGRAM TRACING: CPT | Performed by: INTERNAL MEDICINE

## 2020-01-02 PROCEDURE — 85014 HEMATOCRIT: CPT

## 2020-01-02 PROCEDURE — 85018 HEMOGLOBIN: CPT

## 2020-01-02 PROCEDURE — 86900 BLOOD TYPING SEROLOGIC ABO: CPT

## 2020-01-02 PROCEDURE — 93005 ELECTROCARDIOGRAM TRACING: CPT | Performed by: EMERGENCY MEDICINE

## 2020-01-02 PROCEDURE — 85610 PROTHROMBIN TIME: CPT

## 2020-01-02 PROCEDURE — 2580000003 HC RX 258: Performed by: EMERGENCY MEDICINE

## 2020-01-02 PROCEDURE — 87077 CULTURE AEROBIC IDENTIFY: CPT

## 2020-01-02 PROCEDURE — 94640 AIRWAY INHALATION TREATMENT: CPT

## 2020-01-02 PROCEDURE — 99285 EMERGENCY DEPT VISIT HI MDM: CPT

## 2020-01-02 PROCEDURE — 6360000002 HC RX W HCPCS: Performed by: INTERNAL MEDICINE

## 2020-01-02 RX ORDER — FUROSEMIDE 40 MG/1
40 TABLET ORAL DAILY
Status: DISCONTINUED | OUTPATIENT
Start: 2020-01-03 | End: 2020-01-05

## 2020-01-02 RX ORDER — SODIUM CHLORIDE 9 MG/ML
1000 INJECTION, SOLUTION INTRAVENOUS CONTINUOUS
Status: DISCONTINUED | OUTPATIENT
Start: 2020-01-02 | End: 2020-01-05

## 2020-01-02 RX ORDER — ALBUTEROL SULFATE 2.5 MG/3ML
1.25 SOLUTION RESPIRATORY (INHALATION) 4 TIMES DAILY PRN
Status: DISCONTINUED | OUTPATIENT
Start: 2020-01-02 | End: 2020-01-09 | Stop reason: HOSPADM

## 2020-01-02 RX ORDER — ACETAMINOPHEN 325 MG/1
650 TABLET ORAL 3 TIMES DAILY
Status: DISCONTINUED | OUTPATIENT
Start: 2020-01-02 | End: 2020-01-03

## 2020-01-02 RX ORDER — TRIAMCINOLONE ACETONIDE 1 MG/G
CREAM TOPICAL 2 TIMES DAILY PRN
Status: DISCONTINUED | OUTPATIENT
Start: 2020-01-02 | End: 2020-01-09 | Stop reason: HOSPADM

## 2020-01-02 RX ORDER — SODIUM CHLORIDE 0.9 % (FLUSH) 0.9 %
10 SYRINGE (ML) INJECTION EVERY 12 HOURS SCHEDULED
Status: DISCONTINUED | OUTPATIENT
Start: 2020-01-02 | End: 2020-01-09 | Stop reason: HOSPADM

## 2020-01-02 RX ORDER — METOPROLOL SUCCINATE 25 MG/1
25 TABLET, EXTENDED RELEASE ORAL DAILY
Status: DISCONTINUED | OUTPATIENT
Start: 2020-01-03 | End: 2020-01-09 | Stop reason: HOSPADM

## 2020-01-02 RX ORDER — FOLIC ACID 1 MG/1
1 TABLET ORAL DAILY
Status: DISCONTINUED | OUTPATIENT
Start: 2020-01-03 | End: 2020-01-09 | Stop reason: HOSPADM

## 2020-01-02 RX ORDER — DICYCLOMINE HYDROCHLORIDE 10 MG/1
10 CAPSULE ORAL
Status: DISCONTINUED | OUTPATIENT
Start: 2020-01-03 | End: 2020-01-09 | Stop reason: HOSPADM

## 2020-01-02 RX ORDER — FORMOTEROL FUMARATE 20 UG/2ML
20 SOLUTION RESPIRATORY (INHALATION) 2 TIMES DAILY
Status: DISCONTINUED | OUTPATIENT
Start: 2020-01-02 | End: 2020-01-09 | Stop reason: HOSPADM

## 2020-01-02 RX ORDER — 0.9 % SODIUM CHLORIDE 0.9 %
250 INTRAVENOUS SOLUTION INTRAVENOUS ONCE
Status: COMPLETED | OUTPATIENT
Start: 2020-01-02 | End: 2020-01-03

## 2020-01-02 RX ORDER — HYDROXYZINE HYDROCHLORIDE 25 MG/1
25 TABLET, FILM COATED ORAL 3 TIMES DAILY
Status: DISCONTINUED | OUTPATIENT
Start: 2020-01-03 | End: 2020-01-08

## 2020-01-02 RX ORDER — PHYTONADIONE 5 MG/1
5 TABLET ORAL ONCE
Status: COMPLETED | OUTPATIENT
Start: 2020-01-02 | End: 2020-01-02

## 2020-01-02 RX ORDER — HYDROCODONE POLISTIREX AND CHLORPHENIRAMINE POLISTIREX 10; 8 MG/5ML; MG/5ML
5 SUSPENSION, EXTENDED RELEASE ORAL EVERY 12 HOURS PRN
Status: DISCONTINUED | OUTPATIENT
Start: 2020-01-02 | End: 2020-01-09 | Stop reason: HOSPADM

## 2020-01-02 RX ORDER — LACTOBACILLUS RHAMNOSUS GG 10B CELL
1 CAPSULE ORAL DAILY
Status: DISCONTINUED | OUTPATIENT
Start: 2020-01-03 | End: 2020-01-09 | Stop reason: HOSPADM

## 2020-01-02 RX ORDER — ASCORBIC ACID 500 MG
500 TABLET ORAL DAILY
Status: DISCONTINUED | OUTPATIENT
Start: 2020-01-03 | End: 2020-01-09 | Stop reason: HOSPADM

## 2020-01-02 RX ORDER — PANTOPRAZOLE SODIUM 40 MG/10ML
40 INJECTION, POWDER, LYOPHILIZED, FOR SOLUTION INTRAVENOUS 2 TIMES DAILY
Status: DISCONTINUED | OUTPATIENT
Start: 2020-01-02 | End: 2020-01-03

## 2020-01-02 RX ORDER — OFLOXACIN 3 MG/ML
2 SOLUTION/ DROPS OPHTHALMIC DAILY PRN
Status: DISCONTINUED | OUTPATIENT
Start: 2020-01-02 | End: 2020-01-09 | Stop reason: HOSPADM

## 2020-01-02 RX ORDER — TRAZODONE HYDROCHLORIDE 50 MG/1
50 TABLET ORAL NIGHTLY
Status: DISCONTINUED | OUTPATIENT
Start: 2020-01-03 | End: 2020-01-09 | Stop reason: HOSPADM

## 2020-01-02 RX ORDER — EPINEPHRINE 0.3 MG/.3ML
0.3 INJECTION SUBCUTANEOUS DAILY PRN
Status: DISCONTINUED | OUTPATIENT
Start: 2020-01-02 | End: 2020-01-03 | Stop reason: RX

## 2020-01-02 RX ORDER — ALBUTEROL SULFATE 90 UG/1
2 AEROSOL, METERED RESPIRATORY (INHALATION) EVERY 6 HOURS PRN
Status: DISCONTINUED | OUTPATIENT
Start: 2020-01-02 | End: 2020-01-09 | Stop reason: HOSPADM

## 2020-01-02 RX ORDER — CETIRIZINE HYDROCHLORIDE 10 MG/1
5 TABLET ORAL DAILY
Status: DISCONTINUED | OUTPATIENT
Start: 2020-01-03 | End: 2020-01-09 | Stop reason: HOSPADM

## 2020-01-02 RX ORDER — ONDANSETRON 2 MG/ML
4 INJECTION INTRAMUSCULAR; INTRAVENOUS EVERY 6 HOURS PRN
Status: DISCONTINUED | OUTPATIENT
Start: 2020-01-02 | End: 2020-01-09 | Stop reason: HOSPADM

## 2020-01-02 RX ORDER — MENTHOL AND METHYL SALICYLATE 10; 30 G/100G; G/100G
1 STICK TOPICAL DAILY
Status: DISCONTINUED | OUTPATIENT
Start: 2020-01-03 | End: 2020-01-03 | Stop reason: RX

## 2020-01-02 RX ORDER — PEDIATRIC MULTIVITAMIN NO.17
2 TABLET,CHEWABLE ORAL DAILY
Status: DISCONTINUED | OUTPATIENT
Start: 2020-01-03 | End: 2020-01-09 | Stop reason: HOSPADM

## 2020-01-02 RX ORDER — ACETAMINOPHEN 325 MG/1
650 TABLET ORAL EVERY 4 HOURS PRN
Status: DISCONTINUED | OUTPATIENT
Start: 2020-01-02 | End: 2020-01-09 | Stop reason: HOSPADM

## 2020-01-02 RX ORDER — DIGOXIN 125 MCG
125 TABLET ORAL EVERY OTHER DAY
Status: DISCONTINUED | OUTPATIENT
Start: 2020-01-03 | End: 2020-01-09 | Stop reason: HOSPADM

## 2020-01-02 RX ORDER — PYRIDOXINE HCL (VITAMIN B6) 50 MG
1 TABLET ORAL DAILY
Status: DISCONTINUED | OUTPATIENT
Start: 2020-01-03 | End: 2020-01-03 | Stop reason: CLARIF

## 2020-01-02 RX ORDER — CLOTRIMAZOLE AND BETAMETHASONE DIPROPIONATE 10; .64 MG/G; MG/G
CREAM TOPICAL 2 TIMES DAILY PRN
Status: DISCONTINUED | OUTPATIENT
Start: 2020-01-02 | End: 2020-01-09 | Stop reason: HOSPADM

## 2020-01-02 RX ORDER — BUDESONIDE 0.5 MG/2ML
500 INHALANT ORAL 2 TIMES DAILY
Status: DISCONTINUED | OUTPATIENT
Start: 2020-01-02 | End: 2020-01-09 | Stop reason: HOSPADM

## 2020-01-02 RX ORDER — SODIUM CHLORIDE 0.9 % (FLUSH) 0.9 %
10 SYRINGE (ML) INJECTION PRN
Status: DISCONTINUED | OUTPATIENT
Start: 2020-01-02 | End: 2020-01-04 | Stop reason: SDUPTHER

## 2020-01-02 RX ORDER — AZELASTINE 1 MG/ML
1 SPRAY, METERED NASAL 2 TIMES DAILY
Status: DISCONTINUED | OUTPATIENT
Start: 2020-01-02 | End: 2020-01-03

## 2020-01-02 RX ORDER — OXYCODONE HYDROCHLORIDE AND ACETAMINOPHEN 5; 325 MG/1; MG/1
0.5 TABLET ORAL EVERY 4 HOURS PRN
Status: DISCONTINUED | OUTPATIENT
Start: 2020-01-02 | End: 2020-01-09 | Stop reason: HOSPADM

## 2020-01-02 RX ORDER — POLYETHYLENE GLYCOL 3350 17 G/17G
17 POWDER, FOR SOLUTION ORAL DAILY PRN
Status: DISCONTINUED | OUTPATIENT
Start: 2020-01-02 | End: 2020-01-09 | Stop reason: HOSPADM

## 2020-01-02 RX ORDER — NITROGLYCERIN 0.4 MG/1
0.4 TABLET SUBLINGUAL EVERY 5 MIN PRN
Status: DISCONTINUED | OUTPATIENT
Start: 2020-01-02 | End: 2020-01-09 | Stop reason: HOSPADM

## 2020-01-02 RX ADMIN — BUDESONIDE 500 MCG: 0.5 INHALANT RESPIRATORY (INHALATION) at 22:28

## 2020-01-02 RX ADMIN — SODIUM CHLORIDE 250 ML: 9 INJECTION, SOLUTION INTRAVENOUS at 22:15

## 2020-01-02 RX ADMIN — FORMOTEROL FUMARATE DIHYDRATE 20 MCG: 20 SOLUTION RESPIRATORY (INHALATION) at 22:19

## 2020-01-02 RX ADMIN — SODIUM CHLORIDE 1000 ML: 9 INJECTION, SOLUTION INTRAVENOUS at 18:20

## 2020-01-02 RX ADMIN — IPRATROPIUM BROMIDE 0.5 MG: 0.5 SOLUTION RESPIRATORY (INHALATION) at 22:19

## 2020-01-02 RX ADMIN — PHYTONADIONE 5 MG: 5 TABLET ORAL at 19:50

## 2020-01-02 ASSESSMENT — ENCOUNTER SYMPTOMS
BLOOD IN STOOL: 1
SHORTNESS OF BREATH: 0
BACK PAIN: 1
NAUSEA: 0
ABDOMINAL PAIN: 0
VOMITING: 0

## 2020-01-02 NOTE — ED NOTES
ED nurse-to-nurse bedside report    Chief Complaint   Patient presents with    Dizziness      LOC: AO x4  Vital signs   Vitals:    01/02/20 1655   BP: (!) 108/50   Pulse: 70   Resp: 16   Temp: 98.2 °F (36.8 °C)   TempSrc: Oral   SpO2: 97%      Pain:  none  Pain Interventions: n/a  Pain Goal: none  Oxygen: yes, 2L NC    Current needs required Pt is dizzy, pt always wears 2L NC  Telemetry: Yes  LDAs:  Pt needs IV with blood work  Continuous Infusions: None  Mobility: Pt is dizzy, needs wheelchair  Aranda Fall Risk Score: No flowsheet data found.   Fall Interventions: call light, lights in room turned on, pt told to hit call light for assistance  Report given to: Presley Michelle RN  01/02/20 1771

## 2020-01-02 NOTE — ED PROVIDER NOTES
[facility] COMPLAINT       Chief Complaint   Patient presents with    Dizziness       Nurses Notes reviewed and I agree except as noted in the HPI. HISTORY OF PRESENT ILLNESS    Peg Livingston is a 75 y.o. female who presents from Dr. Xiomara Prince office for low HGB of 8.6 Patient's last HGB was 10.6 on 12/23/2019. Patient received IV iron in the office today. Patient reports melena and blood in stool for 1 week. She reports dizziness and low back pain. She denies abdominal pain, chest pain, shortness of breath, or syncope. Patient states she has had a blood transfusion in the past. Patient takes Coumadin and 81mg ASA. She states her last INR level was elevated. The patient has a past medical history of anemia, anxiety, asthma, A-sib, CAD, COPD, GERD, HLD, HTN, CHF, MI, and pacemaker defibrillator. No further complaints at the time of the initial encounter. REVIEW OF SYSTEMS     Review of Systems   Constitutional: Negative for fever. HENT: Negative for congestion, rhinorrhea and sore throat. Eyes: Negative for photophobia. Respiratory: Negative for cough, shortness of breath and wheezing. Cardiovascular: Negative for chest pain and leg swelling. Gastrointestinal: Positive for blood in stool. Negative for abdominal pain, constipation, diarrhea, nausea and vomiting. Reports melena. Endocrine: Negative for polyuria. Genitourinary: Negative for dysuria. Musculoskeletal: Positive for back pain (low). Negative for arthralgias. Skin: Negative for rash. Neurological: Positive for dizziness, weakness and light-headedness. Negative for tremors, seizures, syncope, speech difficulty and headaches. Hematological: Negative for adenopathy. All other systems reviewed and are negative.        PAST MEDICAL HISTORY    has a past medical history of Allergic rhinitis, Anemia, Anxiety, Arthritis, Asthma, Atrial fibrillation ([address] CAD (coronary artery disease), COPD (chronic obstructive pulmonary disease) (HCC), GERD (gastroesophageal reflux disease), History of blood transfusion, Hx of blood clots, Hyperlipidemia, Hypertension, LONG TERM ANTICOAGULENT USE, MI, old, Prolonged emergence from general anesthesia, and Systolic CHF, acute on chronic ([facility] [address] SURGICAL HISTORY      has a past surgical history that includes Hysterectomy; sinus surgery; Tonsillectomy; ovarian cyst removal; Colonoscopy (10/16/2015); Cardiac defibrillator placement (); Upper gastrointestinal endoscopy (); Cardiac catheterization (); Endoscopy, colon, diagnostic (2017); pacemaker placement; Colonoscopy (N/A, 10/25/2018); Total hip arthroplasty (Left, 10/24/2019); and joint replacement. CURRENT MEDICATIONS       Current Discharge Medication List      CONTINUE these medications which have NOT CHANGED    Details   warfarin (COUMADIN) 1 MG tablet Take as directed by the [facility], 145 tablets for 90 days  Qty: 145 tablet, Refills: 3    Associated Diagnoses: Persistent atrial fibrillation      digoxin (LANOXIN) 125 MCG tablet Take 1 tablet by mouth every other day Indications: Increased Heart Rate Until Office Visit with Dr. Beyer Bethel: 30 tablet, Refills: 3      Multiple Vitamins-Minerals (MULTIVITAMIN ADULT) CHEW Take 2 tablets by mouth daily      vitamin C (ASCORBIC ACID) 500 MG tablet Take 500 mg by mouth daily as needed      Alum Hydroxide-Mag Carbonate (GAVISCON PO) Take by mouth See Admin Instructions Indications: Acid Indigestion      !! Revefenacin (YUPELRI IN) Inhale 1 vial into the lungs daily      Iron Sucrose (VENOFER IV) Infuse intravenously Indications: Iron Deficiency At Dr. Padmaja Richards office.       metoprolol succinate (TOPROL XL) 25 MG extended release tablet Take 25 mg by mouth daily       hydrOXYzine (ATARAX) 25 MG tablet Take 25 mg by mouth 3 times daily Indications: Feeling Anxious       albuterol sulfate  (90 Base) MCG/ACT hours as needed for Pain. !! Revefenacin 175 MCG/3ML SOLN Inhale 175 mcg into the lungs daily  Qty: 3 mL, Refills: 0      Hydrocod Polst-Chlorphen Polst (HYDROCODONE-CHLORPHENIRAMINE ER 10-8 MG/5 ML ORAL SOLN CMPD) Take 5 mLs by mouth every 12 hours as needed (for cold symptoms). hydrocortisone 2.5 % cream Apply topically 3 times daily Apply topically 2 times daily. acetaminophen (TYLENOL) 325 MG tablet Take 650 mg by mouth 3 times daily      Menthol-Methyl Salicylate (ICY HOT) 57-85 % STCK Apply topically daily      conjugated estrogens (PREMARIN) 0.625 MG/GM vaginal cream Apply pea sized amount to urethra 3 nights weekly. Qty: 1 Tube, Refills: 3      lidocaine (XYLOCAINE) 5 % ointment Apply topically Before venipuncture in Dr. [facility] office. promethazine-codeine (PHENERGAN WITH CODEINE) 6.25-10 MG/5ML SOLN solution TK 5 ML PO Q 6 H PRN  Refills: 0      Pramoxine HCl (PROCTOFOAM RE) Place rectally daily as needed       EPINEPHrine (EPIPEN) 0.3 MG/0.3ML SOAJ injection INJECT AS DIRECTED FOR ANAPHYLAXIS  Refills: 1      D-MANNOSE PO Take 1 tablet by mouth 3 times daily Indications: Urinary Tract Infection Symptom       NITROGLYCERIN RE Place 0.3 mg rectally as needed (chest pain,) Indications: Hemorrhoids       dicyclomine (BENTYL) 10 MG capsule Take 10 mg by mouth 4 times daily (before meals and nightly) Indications: Pain in the Abdominal Region      Coenzyme Q10 (COQ-10) 200 MG CAPS Take by mouth daily       Carboxymethylcellul-Glycerin (REFRESH OPTIVE OP) Apply  to eye as needed. Indications: Dry Eyes caused by Deficiency of Tears      levalbuterol (XOPENEX) 0.63 MG/3ML nebulization Take 1 ampule by nebulization every 8 hours as needed for Wheezing.       triamcinolone (KENALOG) 0.1 % cream Apply topically 2 times daily as needed Anti-fungal      clotrimazole-betamethasone (LOTRISONE) cream Apply topically 2 times daily as needed Indications: Yeast Infection (inactive)       polyethylene sclera]  ENT: [External ear canal clear without evidence of cerumen impaction or foreign body, TM's clear without erythema or bulging. Nares patent without drainage, septum appears midline. Moist mucus membranes, oropharynx clear without exudate, erythema, or mass. Uvula midline]  NECK: [Nontender and supple. No meningismus, no appreciated lymphadenopathy. Intact full range of motion. C-spine midline without vertebral tenderness. Trachea midline.]  CHEST: [Inspection normal, no lesions, equal rise. No crepitus or tenderness upon palpation.]  CARDIOVASCULAR: [Regular rate, rhythm, normal S1 and S2. No appreciated murmurs, rubs, or gallops. No pulse deficits appreciated. Intact distal perfusion. JVD not appreciated.]  PULMONARY: [Respiratory distress absent. Respiratory effort normal. Breath sounds clear to auscultation without rhonchi, rales, or wheezing. No accessory muscle use. No stridor]  ABDOMEN: [Inspection normal, without surgical scars. Soft, non-tender, non-distended, with normoactive bowel sounds. No palpable masses, rebound, or guarding]  GY: [External dry blood. No stool in vault. External hemorrhoid noted that was non-thrombosed.]  BACK: [Intact ROM. No midline vertebral tenderness, step off, or crepitus. No CVA tenderness.]  MUSCULOSKELETAL: [Extremities nontender to palpation. No gross deformity or evidence of external trauma. Intact range of motion. Sensation intact. No clubbing, cyanosis, or edema.]  SKIN: [Warm, dry. No jaundice, rash, urticaria, or petechiae]  NEUROLOGIC: [Alert and oriented x 3, GCS 15, normal mentation for age. Moves all four extremities. No gross sensory deficit.  Cerebellar function grossly normal.]  PSYCHIATRIC: [Normal mood and affect, thought process is clear and linear]     DIFFERENTIAL DIAGNOSIS:   Gi bleeding due to hemorrhoids, mass, AVM, ulcer, or other cause    DIAGNOSTIC RESULTS         RADIOLOGY: non-plain film images(s) such as CT,Ultrasound and MRI are read by the radiologist.    XR Acute Abd Series Chest 1 VW   Final Result   Non-obstructive bowel gas pattern. No pneumoperitoneum. No acute cardiopulmonary disease. Mild cardiomegaly. **This report has been created using voice recognition software. It may contain minor errors which are inherent in voice recognition technology. **      Final report electronically signed by Dr. Lynette Reza on 1/2/2020 7:25 PM        [x] Visualized and interpreted by me   [x] Radiologist's Wet Read Report Reviewed   [] Discussed withRadiologist.    LABS:   Labs Reviewed   CBC WITH AUTO DIFFERENTIAL - Abnormal; Notable for the following components:       Result Value    RBC 2.50 (*)     Hemoglobin 7.5 (*)     Hematocrit 25.5 (*)     .0 (*)     MCHC 29.4 (*)     RDW-SD 51.0 (*)     All other components within normal limits   BASIC METABOLIC PANEL W/ REFLEX TO MG FOR LOW K - Abnormal; Notable for the following components:    Glucose 114 (*)     BUN 31 (*)     All other components within normal limits   PROTIME-INR - Abnormal; Notable for the following components:    INR 5.66 (*)     All other components within normal limits   APTT - Abnormal; Notable for the following components:    aPTT 46.9 (*)     All other components within normal limits   TROPONIN - Abnormal; Notable for the following components:    Troponin T 0.024 (*)     All other components within normal limits   GLOMERULAR FILTRATION RATE, ESTIMATED - Abnormal; Notable for the following components:    Est, Glom Filt Rate 70 (*)     All other components within normal limits   HEMOGLOBIN AND HEMATOCRIT, BLOOD - Abnormal; Notable for the following components:    Hemoglobin 7.1 (*)     Hematocrit 24.3 (*)     All other components within normal limits   URINE WITH REFLEXED MICRO - Abnormal; Notable for the following components:    Blood, Urine LARGE (*)     Leukocyte Esterase, Urine LARGE (*)     Character, Urine TURBID (*)     All other components within normal limits

## 2020-01-02 NOTE — ED NOTES
Patient presenting over from Dr Kasi Larry office for suspected GI Bleed. Patient noticed blood in her stool and her hemoglobin last week was 10.6 compared to 8.3 today. Patient received IV iron in the office today. Patient is being brought over by her  in private vehicle.      Preet Gregorio, RN  01/02/20 809 [facility], RN  01/02/20 2118

## 2020-01-03 LAB
ALBUMIN SERPL-MCNC: 2.9 G/DL (ref 3.5–5.1)
ALP BLD-CCNC: 46 U/L (ref 38–126)
ALT SERPL-CCNC: 7 U/L (ref 11–66)
ANION GAP SERPL CALCULATED.3IONS-SCNC: 10 MEQ/L (ref 8–16)
AST SERPL-CCNC: 12 U/L (ref 5–40)
BILIRUB SERPL-MCNC: 0.2 MG/DL (ref 0.3–1.2)
BILIRUBIN DIRECT: < 0.2 MG/DL (ref 0–0.3)
BUN BLDV-MCNC: 26 MG/DL (ref 7–22)
CALCIUM SERPL-MCNC: 8.5 MG/DL (ref 8.5–10.5)
CHLORIDE BLD-SCNC: 108 MEQ/L (ref 98–111)
CO2: 24 MEQ/L (ref 23–33)
CREAT SERPL-MCNC: 0.7 MG/DL (ref 0.4–1.2)
EKG ATRIAL RATE: 144 BPM
EKG ATRIAL RATE: 68 BPM
EKG ATRIAL RATE: 74 BPM
EKG Q-T INTERVAL: 412 MS
EKG Q-T INTERVAL: 416 MS
EKG Q-T INTERVAL: 428 MS
EKG QRS DURATION: 154 MS
EKG QRS DURATION: 158 MS
EKG QRS DURATION: 158 MS
EKG QTC CALCULATION (BAZETT): 447 MS
EKG QTC CALCULATION (BAZETT): 452 MS
EKG QTC CALCULATION (BAZETT): 465 MS
EKG R AXIS: -73 DEGREES
EKG R AXIS: -91 DEGREES
EKG R AXIS: -92 DEGREES
EKG T AXIS: 108 DEGREES
EKG T AXIS: 89 DEGREES
EKG T AXIS: 95 DEGREES
EKG VENTRICULAR RATE: 71 BPM
ERYTHROCYTE [DISTWIDTH] IN BLOOD BY AUTOMATED COUNT: 14.2 % (ref 11.5–14.5)
ERYTHROCYTE [DISTWIDTH] IN BLOOD BY AUTOMATED COUNT: 50.4 FL (ref 35–45)
FERRITIN: 159 NG/ML (ref 10–291)
FOLATE: > 20 NG/ML (ref 4.8–24.2)
GFR SERPL CREATININE-BSD FRML MDRD: 81 ML/MIN/1.73M2
GLUCOSE BLD-MCNC: 100 MG/DL (ref 70–108)
HCT VFR BLD CALC: 24.7 % (ref 37–47)
HCT VFR BLD CALC: 26.2 % (ref 37–47)
HCT VFR BLD CALC: 27.6 % (ref 37–47)
HEMOGLOBIN: 7.5 GM/DL (ref 12–16)
HEMOGLOBIN: 8 GM/DL (ref 12–16)
HEMOGLOBIN: 8.4 GM/DL (ref 12–16)
INR BLD: 3.49 (ref 0.85–1.13)
IRON: 151 UG/DL (ref 50–170)
MCH RBC QN AUTO: 29.9 PG (ref 26–33)
MCHC RBC AUTO-ENTMCNC: 30.5 GM/DL (ref 32.2–35.5)
MCV RBC AUTO: 97.8 FL (ref 81–99)
OSMOLALITY CALCULATION: 288 MOSMOL/KG (ref 275–300)
PLATELET # BLD: 175 THOU/MM3 (ref 130–400)
PMV BLD AUTO: 10.3 FL (ref 9.4–12.4)
POTASSIUM SERPL-SCNC: 4.5 MEQ/L (ref 3.5–5.2)
RBC # BLD: 2.68 MILL/MM3 (ref 4.2–5.4)
SODIUM BLD-SCNC: 142 MEQ/L (ref 135–145)
TOTAL IRON BINDING CAPACITY: 272 UG/DL (ref 171–450)
TOTAL PROTEIN: 5.3 G/DL (ref 6.1–8)
TROPONIN T: 0.02 NG/ML
VITAMIN B-12: 586 PG/ML (ref 211–911)
WBC # BLD: 6 THOU/MM3 (ref 4.8–10.8)

## 2020-01-03 PROCEDURE — P9016 RBC LEUKOCYTES REDUCED: HCPCS

## 2020-01-03 PROCEDURE — 6360000002 HC RX W HCPCS: Performed by: INTERNAL MEDICINE

## 2020-01-03 PROCEDURE — 93010 ELECTROCARDIOGRAM REPORT: CPT | Performed by: INTERNAL MEDICINE

## 2020-01-03 PROCEDURE — 94760 N-INVAS EAR/PLS OXIMETRY 1: CPT

## 2020-01-03 PROCEDURE — 85610 PROTHROMBIN TIME: CPT

## 2020-01-03 PROCEDURE — 82248 BILIRUBIN DIRECT: CPT

## 2020-01-03 PROCEDURE — 2580000003 HC RX 258: Performed by: EMERGENCY MEDICINE

## 2020-01-03 PROCEDURE — 6360000002 HC RX W HCPCS: Performed by: NURSE PRACTITIONER

## 2020-01-03 PROCEDURE — 85027 COMPLETE CBC AUTOMATED: CPT

## 2020-01-03 PROCEDURE — 85014 HEMATOCRIT: CPT

## 2020-01-03 PROCEDURE — 84484 ASSAY OF TROPONIN QUANT: CPT

## 2020-01-03 PROCEDURE — 85018 HEMOGLOBIN: CPT

## 2020-01-03 PROCEDURE — 80053 COMPREHEN METABOLIC PANEL: CPT

## 2020-01-03 PROCEDURE — 2700000000 HC OXYGEN THERAPY PER DAY

## 2020-01-03 PROCEDURE — 36415 COLL VENOUS BLD VENIPUNCTURE: CPT

## 2020-01-03 PROCEDURE — 1200000003 HC TELEMETRY R&B

## 2020-01-03 PROCEDURE — 93005 ELECTROCARDIOGRAM TRACING: CPT | Performed by: FAMILY MEDICINE

## 2020-01-03 PROCEDURE — C9113 INJ PANTOPRAZOLE SODIUM, VIA: HCPCS | Performed by: INTERNAL MEDICINE

## 2020-01-03 PROCEDURE — 36430 TRANSFUSION BLD/BLD COMPNT: CPT

## 2020-01-03 PROCEDURE — 82607 VITAMIN B-12: CPT

## 2020-01-03 PROCEDURE — 83540 ASSAY OF IRON: CPT

## 2020-01-03 PROCEDURE — 94761 N-INVAS EAR/PLS OXIMETRY MLT: CPT

## 2020-01-03 PROCEDURE — 83550 IRON BINDING TEST: CPT

## 2020-01-03 PROCEDURE — 2580000003 HC RX 258: Performed by: NURSE PRACTITIONER

## 2020-01-03 PROCEDURE — 99232 SBSQ HOSP IP/OBS MODERATE 35: CPT | Performed by: FAMILY MEDICINE

## 2020-01-03 PROCEDURE — 6370000000 HC RX 637 (ALT 250 FOR IP): Performed by: INTERNAL MEDICINE

## 2020-01-03 PROCEDURE — C9113 INJ PANTOPRAZOLE SODIUM, VIA: HCPCS | Performed by: NURSE PRACTITIONER

## 2020-01-03 PROCEDURE — 82746 ASSAY OF FOLIC ACID SERUM: CPT

## 2020-01-03 PROCEDURE — 82728 ASSAY OF FERRITIN: CPT

## 2020-01-03 PROCEDURE — 94640 AIRWAY INHALATION TREATMENT: CPT

## 2020-01-03 PROCEDURE — 2580000003 HC RX 258: Performed by: INTERNAL MEDICINE

## 2020-01-03 PROCEDURE — 6370000000 HC RX 637 (ALT 250 FOR IP): Performed by: NURSE PRACTITIONER

## 2020-01-03 RX ORDER — HYDROCORTISONE ACETATE 25 MG/1
25 SUPPOSITORY RECTAL 2 TIMES DAILY
Status: DISCONTINUED | OUTPATIENT
Start: 2020-01-03 | End: 2020-01-09 | Stop reason: HOSPADM

## 2020-01-03 RX ORDER — PHYTONADIONE 5 MG/1
5 TABLET ORAL ONCE
Status: COMPLETED | OUTPATIENT
Start: 2020-01-03 | End: 2020-01-04

## 2020-01-03 RX ORDER — FOLIC ACID/VIT B COMPLEX AND C 5 MG
1 TABLET ORAL DAILY
Status: DISCONTINUED | OUTPATIENT
Start: 2020-01-03 | End: 2020-01-09 | Stop reason: HOSPADM

## 2020-01-03 RX ORDER — VITAMIN B COMPLEX
5000 TABLET ORAL
Status: DISCONTINUED | OUTPATIENT
Start: 2020-01-03 | End: 2020-01-09 | Stop reason: HOSPADM

## 2020-01-03 RX ORDER — POLYETHYLENE GLYCOL 3350 17 G/17G
17 POWDER, FOR SOLUTION ORAL DAILY
Status: DISCONTINUED | OUTPATIENT
Start: 2020-01-03 | End: 2020-01-09 | Stop reason: HOSPADM

## 2020-01-03 RX ORDER — ACETAMINOPHEN 325 MG/1
650 TABLET ORAL EVERY 6 HOURS PRN
Status: DISCONTINUED | OUTPATIENT
Start: 2020-01-03 | End: 2020-01-03 | Stop reason: SDUPTHER

## 2020-01-03 RX ORDER — AZELASTINE 1 MG/ML
1 SPRAY, METERED NASAL 2 TIMES DAILY PRN
Status: DISCONTINUED | OUTPATIENT
Start: 2020-01-03 | End: 2020-01-09 | Stop reason: HOSPADM

## 2020-01-03 RX ORDER — POLYVINYL ALCOHOL 14 MG/ML
1 SOLUTION/ DROPS OPHTHALMIC PRN
Status: DISCONTINUED | OUTPATIENT
Start: 2020-01-03 | End: 2020-01-09 | Stop reason: HOSPADM

## 2020-01-03 RX ADMIN — DICYCLOMINE HYDROCHLORIDE 10 MG: 10 CAPSULE ORAL at 15:41

## 2020-01-03 RX ADMIN — PANTOPRAZOLE SODIUM 40 MG: 40 INJECTION, POWDER, FOR SOLUTION INTRAVENOUS at 09:01

## 2020-01-03 RX ADMIN — DIGOXIN 125 MCG: 125 TABLET ORAL at 09:00

## 2020-01-03 RX ADMIN — OXYCODONE HYDROCHLORIDE AND ACETAMINOPHEN 0.5 TABLET: 5; 325 TABLET ORAL at 22:09

## 2020-01-03 RX ADMIN — IPRATROPIUM BROMIDE 0.5 MG: 0.5 SOLUTION RESPIRATORY (INHALATION) at 14:30

## 2020-01-03 RX ADMIN — OXYCODONE HYDROCHLORIDE AND ACETAMINOPHEN 0.5 TABLET: 5; 325 TABLET ORAL at 09:12

## 2020-01-03 RX ADMIN — OXYCODONE HYDROCHLORIDE AND ACETAMINOPHEN 0.5 TABLET: 5; 325 TABLET ORAL at 15:38

## 2020-01-03 RX ADMIN — BUDESONIDE 500 MCG: 0.5 INHALANT RESPIRATORY (INHALATION) at 10:35

## 2020-01-03 RX ADMIN — CEFTRIAXONE SODIUM 1 G: 1 INJECTION, POWDER, FOR SOLUTION INTRAMUSCULAR; INTRAVENOUS at 03:37

## 2020-01-03 RX ADMIN — Medication 2 TABLET: at 09:01

## 2020-01-03 RX ADMIN — BUDESONIDE 500 MCG: 0.5 INHALANT RESPIRATORY (INHALATION) at 21:20

## 2020-01-03 RX ADMIN — HYDROXYZINE HYDROCHLORIDE 25 MG: 25 TABLET ORAL at 15:41

## 2020-01-03 RX ADMIN — SODIUM CHLORIDE 8 MG/HR: 9 INJECTION, SOLUTION INTRAVENOUS at 15:54

## 2020-01-03 RX ADMIN — FUROSEMIDE 40 MG: 40 TABLET ORAL at 09:00

## 2020-01-03 RX ADMIN — Medication 10 ML: at 09:02

## 2020-01-03 RX ADMIN — VITAMIN D, TAB 1000IU (100/BT) 5000 UNITS: 25 TAB at 08:59

## 2020-01-03 RX ADMIN — CETIRIZINE HYDROCHLORIDE 5 MG: 10 TABLET, FILM COATED ORAL at 08:58

## 2020-01-03 RX ADMIN — FORMOTEROL FUMARATE DIHYDRATE 20 MCG: 20 SOLUTION RESPIRATORY (INHALATION) at 10:35

## 2020-01-03 RX ADMIN — IPRATROPIUM BROMIDE 0.5 MG: 0.5 SOLUTION RESPIRATORY (INHALATION) at 21:07

## 2020-01-03 RX ADMIN — FORMOTEROL FUMARATE DIHYDRATE 20 MCG: 20 SOLUTION RESPIRATORY (INHALATION) at 21:14

## 2020-01-03 RX ADMIN — IPRATROPIUM BROMIDE 0.5 MG: 0.5 SOLUTION RESPIRATORY (INHALATION) at 17:29

## 2020-01-03 RX ADMIN — Medication 1 CAPSULE: at 08:58

## 2020-01-03 RX ADMIN — TRAZODONE HYDROCHLORIDE 50 MG: 50 TABLET ORAL at 22:11

## 2020-01-03 RX ADMIN — HYDROCORTISONE ACETATE 25 MG: 25 SUPPOSITORY RECTAL at 15:56

## 2020-01-03 RX ADMIN — SACUBITRIL AND VALSARTAN 1 TABLET: 49; 51 TABLET, FILM COATED ORAL at 22:10

## 2020-01-03 RX ADMIN — Medication 1 TABLET: at 08:58

## 2020-01-03 RX ADMIN — HYDROXYZINE HYDROCHLORIDE 25 MG: 25 TABLET ORAL at 08:58

## 2020-01-03 RX ADMIN — DICYCLOMINE HYDROCHLORIDE 10 MG: 10 CAPSULE ORAL at 05:34

## 2020-01-03 RX ADMIN — HYDROXYZINE HYDROCHLORIDE 25 MG: 25 TABLET ORAL at 22:09

## 2020-01-03 RX ADMIN — FOLIC ACID 1 MG: 1 TABLET ORAL at 09:00

## 2020-01-03 RX ADMIN — IPRATROPIUM BROMIDE 0.5 MG: 0.5 SOLUTION RESPIRATORY (INHALATION) at 10:35

## 2020-01-03 RX ADMIN — SODIUM CHLORIDE 1000 ML: 9 INJECTION, SOLUTION INTRAVENOUS at 03:38

## 2020-01-03 RX ADMIN — SACUBITRIL AND VALSARTAN 1 TABLET: 49; 51 TABLET, FILM COATED ORAL at 08:58

## 2020-01-03 RX ADMIN — Medication 500 MG: at 08:58

## 2020-01-03 ASSESSMENT — PAIN SCALES - GENERAL
PAINLEVEL_OUTOF10: 8
PAINLEVEL_OUTOF10: 5

## 2020-01-03 ASSESSMENT — ENCOUNTER SYMPTOMS
DIARRHEA: 0
WHEEZING: 0
COUGH: 0
PHOTOPHOBIA: 0
CONSTIPATION: 0
SORE THROAT: 0
RHINORRHEA: 0

## 2020-01-03 NOTE — ED NOTES
ED to inpatient nurses report    Chief Complaint   Patient presents with    Dizziness      Present to ED from home  LOC: alert and orientated to name, place, date  Vital signs   Vitals:    01/02/20 1655 01/02/20 1813 01/02/20 1933   BP: (!) 108/50 (!) 104/46 (!) 102/46   Pulse: 70 71 70   Resp: 16 18 18   Temp: 98.2 °F (36.8 °C)     TempSrc: Oral     SpO2: 97% 100% 100%   Weight:  151 lb (68.5 kg)    Height:  5' 2\" (1.575 m)       Oxygen Baseline 2 L    Current needs required 2 L  LDAs:   Peripheral IV 01/02/20 Right Antecubital (Active)   Site Assessment Clean;Dry; Intact 1/2/2020  7:31 PM   Line Status Infusing 1/2/2020  7:31 PM   Dressing Status Clean;Dry; Intact 1/2/2020  7:31 PM     Mobility: Independent  Pending ED orders: complete  Present condition: Respirations even and unlabored.      Electronically signed by Haydee Jay RN on 1/2/2020 at 7:53 PM       Haydee Jay RN  01/02/20 7503

## 2020-01-03 NOTE — PLAN OF CARE
Problem: Impaired respiratory status  Goal: Clear lung sounds  Outcome: Ongoing     Pt did not meet goal. Diminished breath sounds were noted. Strong productive cough. Continue pt on treatments.

## 2020-01-03 NOTE — H&P
(VITAMIN D3) 5000 units TABS Take 1 tablet by mouth     Historical Provider, MD   ipratropium (ATROVENT) 0.02 % nebulizer solution Take 0.5 mg by nebulization 4 times daily Indications: Difficulty Breathing    Historical Provider, MD   levocetirizine (XYZAL) 5 MG tablet Take 5 mg by mouth nightly     Historical Provider, MD   Evolocumab (REPATHA SURECLICK SC) Inject 371 mg into the skin every 14 days Indications: Blood Cholesterol Abnormal     Historical Provider, MD   D-MANNOSE PO Take 1 tablet by mouth 3 times daily Indications: Urinary Tract Infection Symptom     Historical Provider, MD   B Complex-C (RA B-COMPLEX/VITAMIN C CR PO) Take by mouth daily Indications: Treatment to Prevent Vitamin Deficiency     Historical Provider, MD   NITROGLYCERIN RE Place 0.3 mg rectally as needed (chest pain,) Indications: Hemorrhoids     Historical Provider, MD   dicyclomine (BENTYL) 10 MG capsule Take 10 mg by mouth 4 times daily (before meals and nightly) Indications: Pain in the Abdominal Region    Historical Provider, MD   Coenzyme Q10 (COQ-10) 200 MG CAPS Take by mouth daily     Historical Provider, MD   Probiotic Product (SUPER PROBIOTIC) CAPS   Take 1 tablet by mouth daily Indications: Digestive Complaint  3/2/15   Historical Provider, MD   budesonide (PULMICORT) 0.5 MG/2ML nebulizer suspension   Take 1 ampule by nebulization 2 times daily Indications: Shortness of Breath (Inactive)     Historical Provider, MD   Arformoterol Tartrate (BROVANA) 15 MCG/2ML NEBU   Take 1 ampule by nebulization 2 times daily Indications: Shortness of Breath (Inactive)     Historical Provider, MD   ofloxacin (FLOXIN) 0.3 % otic solution Place 2 drops into both ears daily as needed Indications: Infection Long-term therapy. 12/22/14   Historical Provider, MD   Carboxymethylcellul-Glycerin (REFRESH OPTIVE OP) Apply  to eye as needed.  Indications: Dry Eyes caused by Deficiency of Tears    Historical Provider, MD   levalbuterol Caldwell Alstrom) 0.63 MG/3ML nebulization Take 1 ampule by nebulization every 8 hours as needed for Wheezing. Historical Provider, MD   omeprazole (PRILOSEC) 20 MG capsule Take 20 mg by mouth 2 times daily. Indications: Gastroesophageal Reflux Disease    Historical Provider, MD   triamcinolone (KENALOG) 0.1 % cream Apply topically 2 times daily as needed Anti-fungal    Historical Provider, MD   clotrimazole-betamethasone (LOTRISONE) cream Apply topically 2 times daily as needed Indications: Yeast Infection (inactive)     Historical Provider, MD   polyethylene glycol (MIRALAX) powder Take 17 g by mouth as needed. Indications: Constipation    Historical Provider, MD   Alcaftadine (LASTACAFT) 0.25 % SOLN Apply 1 drop to eye daily Indications: Eye Allergy Both eyes    Historical Provider, MD   aspirin 81 MG EC tablet Take 81 mg by mouth daily. With food  Indications: Anticoagulant Therapy    Historical Provider, MD   azelastine (ASTELIN) 137 MCG/SPRAY nasal spray 1 spray by Nasal route 2 times daily as needed Indications: Allergic Rhinitis Use in each nostril as directed    Historical Provider, MD   furosemide (LASIX) 40 MG tablet Take 40 mg by mouth daily. Indications: Treatment with Diuretic Therapy    Historical Provider, MD   folic acid (FOLVITE) 1 MG tablet Take 1 mg by mouth daily. Indications: Folic Acid Supplementation    Historical Provider, MD   OXYGEN 2 L by Nasal route continuous Indications: Chronic Obstructive Lung Disease     Historical Provider, MD   traZODone (DESYREL) 50 MG tablet Take 50 mg by mouth nightly Indications: Irritable Bowel Syndrome     Historical Provider, MD   nitroGLYCERIN (NITROSTAT) 0.4 MG SL tablet Place 0.4 mg under the tongue every 5 minutes as needed. If third one does not relieve pain, call 9-1-1. Indications: Chest Pain    Historical Provider, MD       Allergies:  Benadryl [diphenhydramine hcl]; Ciprofloxacin; Atorvastatin; Captopril; Codeine; Iv dye [iodides];  Lipitor; Macrobid [nitrofurantoin monohydrate macrocrystals]; Neomycin-bacitracin zn-polymyx; Pravastatin; Zetia [ezetimibe]; Adhesive tape; Cephalexin; Doxycycline; Morphine; Other; Propoxyphene; and Sulfa antibiotics    Social History:      The patient currently lives at home    TOBACCO:   reports that she has been smoking cigarettes. She has a 12.50 pack-year smoking history. She has never used smokeless tobacco.  ETOH:   reports current alcohol use of about 1.0 standard drinks of alcohol per week. Family History:      Reviewed in detail and negative for DM and CVA. Positive as follows: Adopted: Yes   Problem Relation Age of Onset    Heart Disease Father          AGE 35    Cancer Sister         breast       Diet:  DIET CLEAR LIQUID;    REVIEW OF SYSTEMS:   Pertinent positives as noted in the HPI. All other systems reviewed and negative. PHYSICAL EXAM:    BP 91/66   Pulse 70   Temp 98.2 °F (36.8 °C) (Oral)   Resp 18   Ht 5' 2\" (1.575 m)   Wt 151 lb (68.5 kg)   SpO2 100%   BMI 27.62 kg/m²     General appearance:  Noted apparent distress, appears stated age and cooperative. HEENT:  Normal cephalic, atraumatic without obvious deformity. Pupils equal, round, and reactive to light. Extra ocular muscles intact. Conjunctivae/corneas clear. Neck: Supple, with full range of motion. No jugular venous distention. Trachea midline. Respiratory:  Normal respiratory effort. Clear to auscultation, bilaterally without Rales/Wheezes/Rhonchi. Cardiovascular:  Regular rate and rhythm with normal S1/S2 without murmurs, rubs or gallops. Abdomen: Soft, non-tender, non-distended with normal bowel sounds. Musculoskeletal:  No clubbing, cyanosis or edema bilaterally. Full range of motion without deformity. Skin: Skin color, texture, turgor normal.  No rashes or lesions. Neurologic:  Neurovascularly intact without any focal sensory/motor deficits.  Cranial nerves: II-XII intact, grossly non-focal.  Psychiatric:  Alert and

## 2020-01-03 NOTE — PROGRESS NOTES
with GI in outpatient. Patient reports a week after discharge, she developed another rectal bleeding and noted to have supratherapeutic INR, and per patient, her Coumadin was held for 1 day prior to Coumadin clinic and INR was followed-up. Patient received 1 dose of vitamin K oral and 1 unit PRBC on this admission. GI was consulted. Subjective:     Patient seen and examined. Patient reports her dizziness improved. Still having she said that she is still having rectal bleeding. Last rectal bleeding was few minutes ago, accompanied by black stools. She denies chest pain, shortness of breath, palpitations. Although, she states that last night she developed right-sided chest pain accompanied by burping, improved now. She also reports dysuria, intermittent for the past few months. She also reports urinary frequency for the past few months. She reports that she has recurrent UTI for the past 1 year, had 5-6 UTI this past year per patient.       Medications:  Reviewed    Infusion Medications    pantoprozole (PROTONIX) infusion      sodium chloride 1,000 mL (01/03/20 0338)     Scheduled Medications    folbee plus  1 tablet Oral Daily    Vitamin D  5,000 Units Oral Once per day on Mon Wed Fri    sodium chloride flush  10 mL Intravenous 2 times per day    formoterol  20 mcg Nebulization BID    budesonide  500 mcg Nebulization BID    dicyclomine  10 mg Oral 4x Daily AC & HS    digoxin  125 mcg Oral Every Other Day    folic acid  1 mg Oral Daily    furosemide  40 mg Oral Daily    hydrocortisone   Topical TID    hydrOXYzine  25 mg Oral TID    ipratropium  0.5 mg Nebulization 4x Daily    cetirizine  5 mg Oral Daily    metoprolol succinate  25 mg Oral Daily    multivitamin  2 tablet Oral Daily    lactobacillus  1 capsule Oral Daily    vitamin C  500 mg Oral Daily    traZODone  50 mg Oral Nightly    sacubitril-valsartan  1 tablet Oral BID    cefTRIAXone (ROCEPHIN) IV  1 g Intravenous Q24H on arrival, slightly trending down. Patient also had elevated troponin in the past  -EKG showed ventricular paced  -Given CAD risk factors, will consult cardiology for further recommendation    Enteric gram (-) bacilli UTI/recurrent UTI    -Patient reports recurrent UTI. She states that she was recently treated for UTI in outpatient. -UA showed large leukocyte esterase, negative nitrite, pyuria, WBC more than 200, positive large blood  -Urine culture showed enteric gram-negative bacilli, >100K  -Patient was started on Rocephin on admission.   Continue Rocephin, pending final report of urine culture  -Urology referral in OP   -pt also reports that she sees OBGYN in OP       Azotemia, likely due to dehydration, improving    -encourage PO intake  -BMP in am     History of atrial fibrillation, currently paced rhythm    -Continue Toprol-XL  -Coumadin on hold due to coagulopathy  -Continue telemetry     Chronic respiratory failure for COPD, stable    -cont home O2     COPD, compensated    -Continue Pulmicort, and as needed albuterol inhaler    Chronic HFrEF, EF 35-40%, s/p pacemaker, compensated    -echo  In 10/2019: EF 35-40%  -Continue Lasix, digoxin, Toprol-XL, and Entresto  -Daily weights, I/Os, fluid and salt restrictions      Chronic B/L LE edema, stable     -Continue Lasix  -Low-salt diet    Essential hypertension    -BP low normal  -Placed holding parameters to antihypertensive meds  -Vital signs per protocol    Hyperlipidemia    -Continue statin    Hypoalbuminemia    -dietician consult     Anticipated Discharge in : pending         Diet: DIET CLEAR LIQUID;    DVT prophylaxis: [] Lovenox                                 [x] SCDs                                 [] SQ Heparin                                 [] Encourage ambulation           [] Already on Anticoagulation     Disposition:    [] Home       [] TCU       [] Rehab       [] Psych       [] SNF       [] [place]       [x] Other-Plan as above

## 2020-01-03 NOTE — CARE COORDINATION
1/3/20, 7:46 AM  DISCHARGE PLANNING EVALUATION:    Eligio Kern       Admitted from: ED 1/2/2020/ [facility] day: 1   Location: -07/007-A Reason for admit: GI bleed [K92.2] Status: IP  Admit order signed?: yes  PMH:  has a past medical history of Allergic rhinitis, Anemia, Anxiety, Arthritis, Asthma, Atrial fibrillation ([facility] [address] CAD (coronary artery disease), COPD (chronic obstructive pulmonary disease) ([facility] [address] GERD (gastroesophageal reflux disease), History of blood transfusion, Hx of blood clots, Hyperlipidemia, Hypertension, LONG TERM ANTICOAGULENT USE, MI, old, Prolonged emergence from general anesthesia, and Systolic CHF, acute on chronic ([facility] [address] Procedure: na    Pertinent abnormal Imaging:na    Medications:  Scheduled Meds:   folbee plus  1 tablet Oral Daily    Vitamin D  5,000 Units Oral Once per day on Mon Wed Fri    sodium chloride flush  10 mL Intravenous 2 times per day    pantoprazole  40 mg Intravenous BID    formoterol  20 mcg Nebulization BID    budesonide  500 mcg Nebulization BID    dicyclomine  10 mg Oral 4x Daily AC & HS    digoxin  125 mcg Oral Every Other Day    folic acid  1 mg Oral Daily    furosemide  40 mg Oral Daily    hydrocortisone   Topical TID    hydrOXYzine  25 mg Oral TID    ipratropium  0.5 mg Nebulization 4x Daily    cetirizine  5 mg Oral Daily    metoprolol succinate  25 mg Oral Daily    multivitamin  2 tablet Oral Daily    lactobacillus  1 capsule Oral Daily    vitamin C  500 mg Oral Daily    traZODone  50 mg Oral Nightly    sacubitril-valsartan  1 tablet Oral BID    cefTRIAXone (ROCEPHIN) IV  1 g Intravenous Q24H     Continuous Infusions:   sodium chloride 1,000 mL (01/03/20 0338)      Pertinent Info/Orders/Treatment Plan:  IVF, Rocephin IV, Hgb 7.1, Hct 24.3, transfuse one unit PRBC, GI consulted, H and H every 6 hours, troponin 0.023, daily INR 3.49, hold anticoags, pain and nausea control, monitor all stools, I/O, telemetry.       Diet: DIET CLEAR

## 2020-01-03 NOTE — CONSULTS
Consult History & Physical      Patient:  João Houston  YOB: 1945  MRN: [id number]     Acct: [de-identified]    Chief Complaint:    Chief Complaint   Patient presents with    Dizziness       Date of Service: Pt seen/examined in consultation on 1/3/2020    History Of Present Illness:      75 y.o. female who we are asked to see/evaluate by Kali Munguia MD for medical management of blood in the stool. Initial Hgb was noted to be 7.5, she received 1 unit PRBC and most recent Hgb 8.0. Abdomen x-ray demonstrated non-obstructive bowel gas pattern and mild cardiomegaly. She saw Dr. Alberto Youngblood in the office yesterday and was noted to have a low Hgb of 8.6. INR noted to be 5.66. She received IV iron in the office and was instructed to go to the ED. She has had blood intermittently in her stool since before Thanksgiving. She states she was seen at the Coumadin clinic and her INR was noted to be elevated and that is when her rectal bleeding was worse. She says the blood is bright red, but the clots are black. Her last colonoscopy was 10/2018, polyps were noted as well as hemorrhoids. It was recommended she see general surgery for a hemorrhoidectomy, but she never did. She has used Anusol suppositories at home. She denies abdominal pain, but complains of severe lower back pain. She alternates between diarrhea and constipation at home. She is on Miralax at home. Her last EGD was 01/2017 and showed mild gastritis and her esophagus was dilated.      Past Medical History:    Past Medical History:   Diagnosis Date    Allergic rhinitis     Anemia     Anxiety     Arthritis     Asthma     Atrial fibrillation ([address]     CAD (coronary artery disease)     COPD (chronic obstructive pulmonary disease) (HCC)     GERD (gastroesophageal reflux disease)     History of blood transfusion     X8    Hx of blood clots     left arm    Hyperlipidemia     Hypertension     LONG TERM ANTICOAGULENT USE 7/6/2012    MI, old     Prolonged emergence from general anesthesia     Systolic CHF, acute on chronic (HCC) 10/3/2013       Surgical History:  Past Surgical History:   Procedure Laterality Date    CARDIAC CATHETERIZATION      CARDIAC DEFIBRILLATOR PLACEMENT  2008    OUS [place]    COLONOSCOPY  10/16/2015    Dr. Gladys Pfeiffer COLONOSCOPY N/A 10/25/2018    COLONOSCOPY POLYPECTOMY SNARE/COLD BIOPSY performed by Flora Dempsey MD at [facility]    ENDOSCOPY, COLON, DIAGNOSTIC  2017    Dr. Wilhelm Meckel      left hip    OVARIAN CYST REMOVAL      PACEMAKER PLACEMENT      SINUS SURGERY      several    TONSILLECTOMY      TOTAL HIP ARTHROPLASTY Left 10/24/2019    DANIEL LEFT HIP ARTHROPLASTY performed by Ashley Valdes MD at [address]  2013    X2       Family History:  Family History   Adopted: Yes   Problem Relation Age of Onset    Heart Disease Father          AGE 35    Cancer Sister         breast       Past GI History:  Esophageal stricture with dilation, hemorrhoids, colonoscopy, EGD, GERD, constipation & diarrhea  Dr. Rhiannon Sandra patient    Allergies:  Benadryl [diphenhydramine hcl]; Ciprofloxacin; Atorvastatin; Captopril; Codeine; Iv dye [iodides]; Lipitor; Macrobid [nitrofurantoin monohydrate macrocrystals]; Neomycin-bacitracin zn-polymyx; Pravastatin; Zetia [ezetimibe]; Adhesive tape; Cephalexin; Doxycycline; Morphine; Other; Propoxyphene; and Sulfa antibiotics    Social History:   TOBACCO:   reports that she has been smoking cigarettes. She has a 12.50 pack-year smoking history. She has never used smokeless tobacco.  ETOH:   reports current alcohol use of about 1.0 standard drinks of alcohol per week. Review Of Systems  GENERAL: No fever, chills or weight loss. EYES:  No  blurred vision, double vision   CARDIOVASCULAR: No chest pain or palpitations. RESPIRATORY:  No dyspnea or cough.     GI:  See HPI  MUSCULOSKELETAL: No new painful or swollen joints or myalgias. :   No dysuria or hematuria. SKIN:  No rashes or jaundice. NEUROLOGIC:  No headaches or seizures, numbness or tingling of arms, or legs. PSYCH:  No anxiety or depression. ENDOCRINE:  No polyuria or polydipsia. BLOOD:  +anemia +blood transfusion    PHYSICAL EXAM:  /62   Pulse 72   Temp 97.8 °F (36.6 °C) (Oral)   Resp 18   Ht 5' 2\" (1.575 m)   Wt 151 lb (68.5 kg)   SpO2 98%   BMI 27.62 kg/m²     General appearance: No apparent distress, appears stated age and cooperative. HEENT: Normal cephalic, atraumatic without obvious deformity. Pupils equal, round, and reactive to light. Neck: Supple, with full range of motion. No jugular venous distention. Trachea midline. Respiratory:  Normal respiratory effort. Clear to auscultation, bilaterally without Rales/Wheezes/Rhonchi. Cardiovascular: Regular rate and rhythm without murmurs, rubs or gallops. Abdomen: Soft, non-tender, non-distended with active bowel sounds. Musculoskeletal: No clubbing, cyanosis or edema bilaterally. Skin: Pink, warm, dry. No rashes or lesions. Psychiatric: Alert and oriented, thought content appropriate, normal insight    Labs:   Recent Labs     01/03/20  0549   WBC 6.0   HGB 8.0*   HCT 26.2*        Recent Labs     01/03/20  0549      K 4.5      CO2 24   BUN 26*   CREATININE 0.7   CALCIUM 8.5     Recent Labs     01/03/20  0549   AST 12   ALT 7*   BILIDIR <0.2   BILITOT 0.2*   ALKPHOS 46     Recent Labs     01/02/20  1733   INR 5.66*       Radiology:   Abd x-ray 01/02/20      Impression   Non-obstructive bowel gas pattern. No pneumoperitoneum. No acute cardiopulmonary disease. Mild cardiomegaly. Code Status: Full Code    ASSESSMENT:  1. Hematochezia  2. Acute on chronic anemia  3. Supratherapeutic INR  4. CHF  5. CAD  6. Afib  7. COPD  8. HTN  9. HLD  10. UTI  11. GERD    PLAN:    1. Monitor H & H, transfuse prn  2. Protonix gtt  3.  Clear liquid diet  4. Coumadin reversal per hospitalist  5. Anusol suppository BID  6. Miralax daily  7. Timing of any endoscopic procedures will depend on clinical course & INR, patient was on Coumadin  8. Cardiology on board  9. Supportive care per primary team  Will follow       Case reviewed and impression/plan reviewed in collaboration with Dr. Murphy Fatima  Electronically signed by PETR Farr CNP on 1/3/2020 at 12:47 PM    [facility]  Thank you for the consultation.

## 2020-01-04 LAB
ANION GAP SERPL CALCULATED.3IONS-SCNC: 10 MEQ/L (ref 8–16)
BASOPHILS # BLD: 0.3 %
BASOPHILS ABSOLUTE: 0 THOU/MM3 (ref 0–0.1)
BUN BLDV-MCNC: 17 MG/DL (ref 7–22)
CALCIUM SERPL-MCNC: 8.3 MG/DL (ref 8.5–10.5)
CHLORIDE BLD-SCNC: 109 MEQ/L (ref 98–111)
CO2: 23 MEQ/L (ref 23–33)
CREAT SERPL-MCNC: 0.6 MG/DL (ref 0.4–1.2)
EOSINOPHIL # BLD: 2.1 %
EOSINOPHILS ABSOLUTE: 0.1 THOU/MM3 (ref 0–0.4)
ERYTHROCYTE [DISTWIDTH] IN BLOOD BY AUTOMATED COUNT: 14.5 % (ref 11.5–14.5)
ERYTHROCYTE [DISTWIDTH] IN BLOOD BY AUTOMATED COUNT: 53.5 FL (ref 35–45)
GFR SERPL CREATININE-BSD FRML MDRD: > 90 ML/MIN/1.73M2
GLUCOSE BLD-MCNC: 88 MG/DL (ref 70–108)
HCT VFR BLD CALC: 24.9 % (ref 37–47)
HCT VFR BLD CALC: 25.8 % (ref 37–47)
HCT VFR BLD CALC: 26.6 % (ref 37–47)
HCT VFR BLD CALC: 27.6 % (ref 37–47)
HEMOGLOBIN: 7.4 GM/DL (ref 12–16)
HEMOGLOBIN: 7.7 GM/DL (ref 12–16)
HEMOGLOBIN: 8 GM/DL (ref 12–16)
HEMOGLOBIN: 8.4 GM/DL (ref 12–16)
HYPOCHROMIA: PRESENT
IMMATURE GRANS (ABS): 0.02 THOU/MM3 (ref 0–0.07)
IMMATURE GRANULOCYTES: 0.3 %
INR BLD: 2.68 (ref 0.85–1.13)
LYMPHOCYTES # BLD: 18.9 %
LYMPHOCYTES ABSOLUTE: 1.2 THOU/MM3 (ref 1–4.8)
MCH RBC QN AUTO: 30.4 PG (ref 26–33)
MCHC RBC AUTO-ENTMCNC: 29.8 GM/DL (ref 32.2–35.5)
MCV RBC AUTO: 102 FL (ref 81–99)
MONOCYTES # BLD: 9.5 %
MONOCYTES ABSOLUTE: 0.6 THOU/MM3 (ref 0.4–1.3)
NUCLEATED RED BLOOD CELLS: 0 /100 WBC
ORGANISM: ABNORMAL
PLATELET # BLD: 154 THOU/MM3 (ref 130–400)
PMV BLD AUTO: 10.6 FL (ref 9.4–12.4)
POTASSIUM SERPL-SCNC: 3.8 MEQ/L (ref 3.5–5.2)
RBC # BLD: 2.53 MILL/MM3 (ref 4.2–5.4)
REASON FOR REJECTION: NORMAL
REJECTED TEST: NORMAL
SEG NEUTROPHILS: 68.9 %
SEGMENTED NEUTROPHILS ABSOLUTE COUNT: 4.3 THOU/MM3 (ref 1.8–7.7)
SODIUM BLD-SCNC: 142 MEQ/L (ref 135–145)
URINE CULTURE REFLEX: ABNORMAL
WBC # BLD: 6.3 THOU/MM3 (ref 4.8–10.8)

## 2020-01-04 PROCEDURE — 36415 COLL VENOUS BLD VENIPUNCTURE: CPT

## 2020-01-04 PROCEDURE — 2580000003 HC RX 258: Performed by: NURSE PRACTITIONER

## 2020-01-04 PROCEDURE — 6370000000 HC RX 637 (ALT 250 FOR IP): Performed by: INTERNAL MEDICINE

## 2020-01-04 PROCEDURE — 6360000002 HC RX W HCPCS: Performed by: INTERNAL MEDICINE

## 2020-01-04 PROCEDURE — 99232 SBSQ HOSP IP/OBS MODERATE 35: CPT | Performed by: FAMILY MEDICINE

## 2020-01-04 PROCEDURE — 85610 PROTHROMBIN TIME: CPT

## 2020-01-04 PROCEDURE — C9113 INJ PANTOPRAZOLE SODIUM, VIA: HCPCS | Performed by: NURSE PRACTITIONER

## 2020-01-04 PROCEDURE — 1200000003 HC TELEMETRY R&B

## 2020-01-04 PROCEDURE — 94640 AIRWAY INHALATION TREATMENT: CPT

## 2020-01-04 PROCEDURE — 85018 HEMOGLOBIN: CPT

## 2020-01-04 PROCEDURE — 2700000000 HC OXYGEN THERAPY PER DAY

## 2020-01-04 PROCEDURE — 6360000002 HC RX W HCPCS: Performed by: NURSE PRACTITIONER

## 2020-01-04 PROCEDURE — 6370000000 HC RX 637 (ALT 250 FOR IP): Performed by: NURSE PRACTITIONER

## 2020-01-04 PROCEDURE — 97161 PT EVAL LOW COMPLEX 20 MIN: CPT

## 2020-01-04 PROCEDURE — 6370000000 HC RX 637 (ALT 250 FOR IP): Performed by: FAMILY MEDICINE

## 2020-01-04 PROCEDURE — 85014 HEMATOCRIT: CPT

## 2020-01-04 PROCEDURE — 97110 THERAPEUTIC EXERCISES: CPT

## 2020-01-04 PROCEDURE — 85025 COMPLETE CBC W/AUTO DIFF WBC: CPT

## 2020-01-04 PROCEDURE — 80048 BASIC METABOLIC PNL TOTAL CA: CPT

## 2020-01-04 PROCEDURE — 94761 N-INVAS EAR/PLS OXIMETRY MLT: CPT

## 2020-01-04 PROCEDURE — 2580000003 HC RX 258: Performed by: INTERNAL MEDICINE

## 2020-01-04 RX ORDER — PHYTONADIONE 5 MG/1
5 TABLET ORAL ONCE
Status: COMPLETED | OUTPATIENT
Start: 2020-01-04 | End: 2020-01-04

## 2020-01-04 RX ORDER — SODIUM CHLORIDE 0.9 % (FLUSH) 0.9 %
10 SYRINGE (ML) INJECTION EVERY 12 HOURS SCHEDULED
Status: DISCONTINUED | OUTPATIENT
Start: 2020-01-04 | End: 2020-01-04 | Stop reason: SDUPTHER

## 2020-01-04 RX ORDER — SODIUM CHLORIDE 0.9 % (FLUSH) 0.9 %
10 SYRINGE (ML) INJECTION PRN
Status: DISCONTINUED | OUTPATIENT
Start: 2020-01-04 | End: 2020-01-09 | Stop reason: HOSPADM

## 2020-01-04 RX ADMIN — CEFTRIAXONE SODIUM 1 G: 1 INJECTION, POWDER, FOR SOLUTION INTRAMUSCULAR; INTRAVENOUS at 01:57

## 2020-01-04 RX ADMIN — DICYCLOMINE HYDROCHLORIDE 10 MG: 10 CAPSULE ORAL at 16:59

## 2020-01-04 RX ADMIN — HYDROCORTISONE ACETATE 25 MG: 25 SUPPOSITORY RECTAL at 10:06

## 2020-01-04 RX ADMIN — Medication 1 CAPSULE: at 10:05

## 2020-01-04 RX ADMIN — FOLIC ACID 1 MG: 1 TABLET ORAL at 10:06

## 2020-01-04 RX ADMIN — IPRATROPIUM BROMIDE 0.5 MG: 0.5 SOLUTION RESPIRATORY (INHALATION) at 13:11

## 2020-01-04 RX ADMIN — PHYTONADIONE 5 MG: 5 TABLET ORAL at 16:58

## 2020-01-04 RX ADMIN — SACUBITRIL AND VALSARTAN 1 TABLET: 49; 51 TABLET, FILM COATED ORAL at 10:05

## 2020-01-04 RX ADMIN — OXYCODONE HYDROCHLORIDE AND ACETAMINOPHEN 0.5 TABLET: 5; 325 TABLET ORAL at 21:18

## 2020-01-04 RX ADMIN — FORMOTEROL FUMARATE DIHYDRATE 20 MCG: 20 SOLUTION RESPIRATORY (INHALATION) at 17:50

## 2020-01-04 RX ADMIN — IPRATROPIUM BROMIDE 0.5 MG: 0.5 SOLUTION RESPIRATORY (INHALATION) at 21:17

## 2020-01-04 RX ADMIN — OXYCODONE HYDROCHLORIDE AND ACETAMINOPHEN 0.5 TABLET: 5; 325 TABLET ORAL at 10:11

## 2020-01-04 RX ADMIN — Medication 2 TABLET: at 10:05

## 2020-01-04 RX ADMIN — BUDESONIDE 500 MCG: 0.5 INHALANT RESPIRATORY (INHALATION) at 09:13

## 2020-01-04 RX ADMIN — SODIUM CHLORIDE 8 MG/HR: 9 INJECTION, SOLUTION INTRAVENOUS at 11:46

## 2020-01-04 RX ADMIN — IPRATROPIUM BROMIDE 0.5 MG: 0.5 SOLUTION RESPIRATORY (INHALATION) at 09:12

## 2020-01-04 RX ADMIN — DICYCLOMINE HYDROCHLORIDE 10 MG: 10 CAPSULE ORAL at 21:19

## 2020-01-04 RX ADMIN — DICYCLOMINE HYDROCHLORIDE 10 MG: 10 CAPSULE ORAL at 13:44

## 2020-01-04 RX ADMIN — Medication 1 TABLET: at 10:06

## 2020-01-04 RX ADMIN — CETIRIZINE HYDROCHLORIDE 5 MG: 10 TABLET, FILM COATED ORAL at 21:21

## 2020-01-04 RX ADMIN — SODIUM CHLORIDE 8 MG/HR: 9 INJECTION, SOLUTION INTRAVENOUS at 00:59

## 2020-01-04 RX ADMIN — BUDESONIDE 500 MCG: 0.5 INHALANT RESPIRATORY (INHALATION) at 17:49

## 2020-01-04 RX ADMIN — Medication 500 MG: at 10:06

## 2020-01-04 RX ADMIN — SODIUM CHLORIDE 8 MG/HR: 9 INJECTION, SOLUTION INTRAVENOUS at 21:01

## 2020-01-04 RX ADMIN — HYDROXYZINE HYDROCHLORIDE 25 MG: 25 TABLET ORAL at 21:19

## 2020-01-04 RX ADMIN — TRAZODONE HYDROCHLORIDE 50 MG: 50 TABLET ORAL at 21:19

## 2020-01-04 RX ADMIN — FORMOTEROL FUMARATE DIHYDRATE 20 MCG: 20 SOLUTION RESPIRATORY (INHALATION) at 09:12

## 2020-01-04 RX ADMIN — IPRATROPIUM BROMIDE 0.5 MG: 0.5 SOLUTION RESPIRATORY (INHALATION) at 17:42

## 2020-01-04 RX ADMIN — PHYTONADIONE 5 MG: 5 TABLET ORAL at 01:00

## 2020-01-04 ASSESSMENT — PAIN SCALES - GENERAL
PAINLEVEL_OUTOF10: 2
PAINLEVEL_OUTOF10: 9
PAINLEVEL_OUTOF10: 2
PAINLEVEL_OUTOF10: 6
PAINLEVEL_OUTOF10: 9
PAINLEVEL_OUTOF10: 2

## 2020-01-04 ASSESSMENT — PAIN DESCRIPTION - DESCRIPTORS: DESCRIPTORS: ACHING

## 2020-01-04 ASSESSMENT — PAIN DESCRIPTION - PAIN TYPE
TYPE: ACUTE PAIN

## 2020-01-04 ASSESSMENT — PAIN DESCRIPTION - ORIENTATION
ORIENTATION: LEFT;RIGHT
ORIENTATION: LOWER

## 2020-01-04 ASSESSMENT — PAIN DESCRIPTION - ONSET: ONSET: ON-GOING

## 2020-01-04 ASSESSMENT — PAIN DESCRIPTION - FREQUENCY: FREQUENCY: INTERMITTENT

## 2020-01-04 ASSESSMENT — PAIN DESCRIPTION - LOCATION
LOCATION: FOOT
LOCATION: BACK
LOCATION: BACK

## 2020-01-04 NOTE — CONSULTS
Nutrition Assessment    Type and Reason for Visit: Initial, Consult(Low Albumin)    Nutrition Recommendations:   *Advance diet as tolerated. *Continue Multivitamin w/minerals daily. Recommend discontinuing Folbee Plus daily. *Pt declines all ONS during LOS. *Recommend getting a recent admit weight. Nutrition Assessment: Pt. nutritionally compromised AEB pt report of decreased oral intake since admit. At risk for further nutrition compromise r/t underlying medical condition (admit w/ GI bleed, hx CHF, COPD & HTN). Nutrition recommendations/interventions as per above. Malnutrition Assessment:  · Malnutrition Status: At risk for malnutrition    Nutrition Risk Level: Moderate    Nutrient Needs:  · Estimated Daily Total Kcal: 2199-1956 kcal/day (20-25 kcal/kg - 68.5 kg on 1/2- stated weight)  · Estimated Daily Protein (g): 65-75 g/day (1.3-1.5 g/kg - IBW 49.9 kg)    Nutrition Diagnosis:   · Problem: Inadequate oral intake  · Etiology: related to Insufficient energy/nutrient consumption     Signs and symptoms:  as evidenced by Diet history of poor intake    Objective Information:  · Nutrition-Focused Physical Findings: pt seen; reports decreased intake since admission; pt reports she isn't a big eater consuming toast for breakfast and a meal for supper; pt denies N/V/D/C or chewing/swallowing difficulties with food. Last BM x3 on 1/3. Rx includes: Folic Acid, Folbee Plus, Multivitamin, Lasix, Culturelle, Vitamin C & Vitamin D  · Wound Type: None  · Current Nutrition Therapies:  · Oral Diet Orders: Full Liquid, 2gm Sodium   · Oral Diet intake: (1000 mL per I/O over the past 24 hours.  pt's diet just advanced today)  · Oral Nutrition Supplement (ONS) Orders: None(Pt declines all ONS during LOS)  · ONS intake: (Initiated today)  · Anthropometric Measures:  · Ht: 5' 2\" (157.5 cm)   · Current Body Wt: 151 lb (68.5 kg)(1/2; stated weight; +3 pitting BLE)  · Admission Body Wt: 151 lb (68.5 kg)(1/2; stated weight; +3 pitting BLE)  · Usual Body Wt: 160 lb (72.6 kg)(per pt report. Per EMR: 156 lb 8 ozon 10/26/19; )  · % Weight Change: awaiting recent admit weight yet; pt reports 3- lbs of fluid weight loss over the past few months  · Ideal Body Wt: 110 lb (49.9 kg)   · BMI Classification: BMI 25.0 - 29.9 Overweight(27.7)    Nutrition Interventions:   Continue current diet, Vitamin Supplement(Consider a Multivitamin w/minerals daily.  Pt declines all ONS during LOS)  Continued Inpatient Monitoring, Education Initiated(Encouraged po intake of meals and protein sources with meals at best effort)    Nutrition Evaluation:   · Evaluation: Goals set   · Goals: Pt will consume 75% or more of meals during LOS    · Monitoring: Nutrition Progression, Meal Intake, Diet Tolerance, Weight, Pertinent Labs, Monitor Bowel Function    Electronically signed by Mortimer Scot, RD, LD on 1/4/20 at 1:11 PM    Contact Number: [phone]

## 2020-01-04 NOTE — PLAN OF CARE
Problem: Nutrition  Goal: Optimal nutrition therapy  Outcome: Ongoing   Nutrition Problem: Inadequate oral intake  Intervention: Food and/or Nutrient Delivery: Continue current diet, Start ONS, Vitamin Supplement  Nutritional Goals: Pt will consume 75% or more of meals during LOS

## 2020-01-04 NOTE — PLAN OF CARE
Problem: Impaired respiratory status  Goal: Clear lung sounds  Outcome: Ongoing     Continue tx as ordered until lung aeration is met. Diminished breath sounds were noted for this patient.

## 2020-01-04 NOTE — PROGRESS NOTES
with GI in outpatient. Patient reports a week after discharge, she developed another rectal bleeding and noted to have supratherapeutic INR, and per patient, her Coumadin was held for 1 day prior to Coumadin clinic and INR was followed-up.     Patient received 1 dose of vitamin K oral and 1 unit PRBC on this admission. Patient received another dose of vitamin K on 1/3/2020. GI was consulted. Subjective:     Patient seen and examined. Patient reports that her last rectal bleeding was around 4 AM today. She denies black stool. She reports that her dysuria is improving. She denies abdominal pain, flank pain, nausea, vomiting, epistaxis, chest pain, shortness of breath, palpitations, dizziness.       Medications:  Reviewed    Infusion Medications    pantoprozole (PROTONIX) infusion 8 mg/hr (01/04/20 1146)    sodium chloride Stopped (01/03/20 1138)     Scheduled Medications    phytonadione  5 mg Oral Once    folbee plus  1 tablet Oral Daily    Vitamin D  5,000 Units Oral Once per day on Mon Wed Fri    polyethylene glycol  17 g Oral Daily    hydrocortisone  25 mg Rectal BID    sodium chloride flush  10 mL Intravenous 2 times per day    formoterol  20 mcg Nebulization BID    budesonide  500 mcg Nebulization BID    dicyclomine  10 mg Oral 4x Daily AC & HS    digoxin  125 mcg Oral Every Other Day    folic acid  1 mg Oral Daily    furosemide  40 mg Oral Daily    hydrocortisone   Topical TID    hydrOXYzine  25 mg Oral TID    ipratropium  0.5 mg Nebulization 4x Daily    cetirizine  5 mg Oral Daily    metoprolol succinate  25 mg Oral Daily    multivitamin  2 tablet Oral Daily    lactobacillus  1 capsule Oral Daily    vitamin C  500 mg Oral Daily    traZODone  50 mg Oral Nightly    sacubitril-valsartan  1 tablet Oral BID    cefTRIAXone (ROCEPHIN) IV  1 g Intravenous Q24H     PRN Meds: sodium chloride flush, polyvinyl alcohol, azelastine, magnesium hydroxide, ondansetron, acetaminophen, results for input(s): Jakob Pina in the last 72 hours. Urinalysis:      Lab Results   Component Value Date    NITRU NEGATIVE 01/02/2020    WBCUA > 200 01/02/2020    BACTERIA MANY 01/02/2020    RBCUA 0-2 01/02/2020    BLOODU LARGE 01/02/2020    GLUCOSEU NEGATIVE 01/02/2020       Radiology:  XR Acute Abd Series Chest 1 VW   Final Result   Non-obstructive bowel gas pattern. No pneumoperitoneum. No acute cardiopulmonary disease. Mild cardiomegaly. **This report has been created using voice recognition software. It may contain minor errors which are inherent in voice recognition technology. **      Final report electronically signed by Dr. Shaista Rubin on 1/2/2020 7:25 PM            Assessment/Plan:      GI bleed, etiology unclear yet, possibly lower GI bleed secondary to internal hemorrhoids vs UGIB vs OAC-related      -Patient presented with rectal bleeding,patient was just recently discharge on 11/28/2019 due to hematochezia, which was determined to be from internal hemorrhoids. She was discharged home and advised to follow-up with GI in outpatient.  -On arrival, INR was 5.66, patient received vitamin K on arrival.  another dose of Vit K 5 mg PO x 1 given on 1/3/20. INR down to 2.68 today. pls give another dose of vit K 5 mg PO today. May consider FFP if INR still elevated tomorrow.   -Coumadin on hold. Continue to hold Coumadin.  -Hemoglobin on arrival was 7.5, baseline hemoglobin around 10. Patient received 1 unit PRBC on admission. Hemoglobin improved to 8.0 today. -FOBT positive  -Continue to monitor H&H every 6 hours  -Continue Protonix drip per GI  -GI on board. GI plan for EGD tomorrow. Appreciate GI input.     Acute on chronic symptomatic anemia, secondary to GI bleed, improving     -Acute component due to rectal bleeding  -Patient has history of chronic anemia, receiving IV iron infusion and follows hematologist in outpatient.   -MCV normal, iron panel now, folate and B12 all

## 2020-01-04 NOTE — FLOWSHEET NOTE
01/03/20 2020   Encounter Summary   Services provided to: Patient and family together   Referral/Consult From: [facility] Children;Family members   Place of [address] Completed   Continue Visiting Yes  (1/3/16888 continue support)   Complexity of Encounter Moderate   Length of Encounter 15 minutes   Spiritual Assessment Completed Yes   Routine   Type Initial   Assessment Anxious; Hopeful   Intervention Active listening;Nurtured hope;Prayer;Discussed illness/injury and it's impact   Outcome Expressed gratitude;Engaged in conversation   Spiritual/Hinduism   Type Spiritual support     Pt is a 75year old female who is in her room with her daughter. Patient immediately shared with me that she is admitted for GI issues. Patient also told me she has gone through many medical issues in the past but God brought her through every crisis. She told me she believe that God will get her through this one as well. Patient is  and together they have several grown children. Patient is a member of [employer].  offered prayer and emotional support.  will follow up with continue support.

## 2020-01-04 NOTE — PROGRESS NOTES
rails  Entrance Stairs - Number of Steps: 3   Entrance Stairs - Rails: Left  Home Equipment: Rolling walker          Receives Help From: Family  ADL Assistance: Independent  Homemaking Assistance: Needs assistance  Ambulation Assistance: Independent  Transfer Assistance: Independent    Active : No          OBJECTIVE:  Range of Motion:  Right Lower Extremity: WFL  Left Lower Extremity: WFL    Strength:  Right Lower Extremity: Impaired - Grossly 3+/5  Left Lower Extremity: Impaired - Grossly 3+/5    Balance:  Static Sitting Balance:  Supervision  Static Standing Balance: Contact Guard Assistance    Bed Mobility:  Supine to Sit: Stand By Assistance    Transfers:  Sit to Stand: Contact Guard Assistance  Stand to [place] [id number], to/from chair with arms  BSC t/f with CGA x 1. Pt able to clean self with CGA x 1 for balance. : Contact Guard Assistance    Ambulation:  Contact Guard Assistance  Distance: 15' x 1  Surface: Level Tile  Device:Rolling Walker  Gait Deviations: Forward Flexed Posture, Slow Cassie, Decreased Step Length Bilaterally and Decreased Gait Speed    Exercise:  Patient was guided in 1 set(s) 10 reps of exercise to both lower extremities. Ankle pumps, Glut sets, Heelslides, Long arc quads, Hip abduction/adduction and Seated hip flexion. Exercises were completed for increased independence with functional mobility. Functional Outcome Measures: Completed  AM-PAC Inpatient Mobility without Stair Climbing Raw Score : 17  AM-PAC Inpatient without Stair Climbing T-Scale Score : 48.47    ASSESSMENT:  Activity Tolerance:  Patient tolerance of  treatment: good. Pt mildly impulsive at start of session but more cooperative upon waking. Pt to go for colonoscopy later today. Pt with hip surgery in October of this past year with Dr. Yue Kilgore. Treatment Initiated: Treatment and education initiated within context of evaluation.   Evaluation time included review of current medical

## 2020-01-04 NOTE — PROGRESS NOTES
01/04/2020    K 3.8 01/04/2020    K 4.7 01/02/2020     01/04/2020    CO2 23 01/04/2020    BUN 17 01/04/2020    LABALBU 2.9 01/03/2020    CREATININE 0.6 01/04/2020    CALCIUM 8.3 01/04/2020    LABGLOM >90 01/04/2020    GLUCOSE 88 01/04/2020    GLUCOSE 115 07/06/2018     Hepatic Function Panel:    Lab Results   Component Value Date    ALKPHOS 46 01/03/2020    ALT 7 01/03/2020    AST 12 01/03/2020    PROT 5.3 01/03/2020    BILITOT 0.2 01/03/2020    BILIDIR <0.2 01/03/2020    LABALBU 2.9 01/03/2020     Calcium:    Lab Results   Component Value Date    CALCIUM 8.3 01/04/2020     PT/INR:    Lab Results   Component Value Date    PROTIME 22.3 02/05/2019    INR 3.49 01/03/2020     PTT:    Lab Results   Component Value Date    APTT 46.9 01/02/2020   [APTT     Significant Diagnostic Studies:     Current Meds:  Scheduled Meds:   folbee plus  1 tablet Oral Daily    Vitamin D  5,000 Units Oral Once per day on Mon Wed Fri    polyethylene glycol  17 g Oral Daily    hydrocortisone  25 mg Rectal BID    sodium chloride flush  10 mL Intravenous 2 times per day    formoterol  20 mcg Nebulization BID    budesonide  500 mcg Nebulization BID    dicyclomine  10 mg Oral 4x Daily AC & HS    digoxin  125 mcg Oral Every Other Day    folic acid  1 mg Oral Daily    furosemide  40 mg Oral Daily    hydrocortisone   Topical TID    hydrOXYzine  25 mg Oral TID    ipratropium  0.5 mg Nebulization 4x Daily    cetirizine  5 mg Oral Daily    metoprolol succinate  25 mg Oral Daily    multivitamin  2 tablet Oral Daily    lactobacillus  1 capsule Oral Daily    vitamin C  500 mg Oral Daily    traZODone  50 mg Oral Nightly    sacubitril-valsartan  1 tablet Oral BID    cefTRIAXone (ROCEPHIN) IV  1 g Intravenous Q24H     Continuous Infusions:   pantoprozole (PROTONIX) infusion 8 mg/hr (01/04/20 0059)    sodium chloride Stopped (01/03/20 1138)     PRN Meds:.polyvinyl alcohol, azelastine, sodium chloride flush, magnesium

## 2020-01-05 ENCOUNTER — APPOINTMENT (OUTPATIENT)
Dept: GENERAL RADIOLOGY | Age: 75
DRG: 393 | End: 2020-01-05
Payer: MEDICARE

## 2020-01-05 LAB
BASE EXCESS (CALCULATED): 0.8 MMOL/L (ref -2.5–2.5)
CALCIUM IONIZED: 0.97 MMOL/L (ref 1.12–1.32)
COLLECTED BY:: ABNORMAL
HCO3: 25 MMOL/L (ref 23–28)
HCT VFR BLD CALC: 23.3 % (ref 37–47)
HCT VFR BLD CALC: 28.5 % (ref 37–47)
HEMOGLOBIN: 7 GM/DL (ref 12–16)
HEMOGLOBIN: 8.8 GM/DL (ref 12–16)
IFIO2: 3
INR BLD: 2.26 (ref 0.85–1.13)
O2 SATURATION: 91 %
PCO2: 39 MMHG (ref 35–45)
PH BLOOD GAS: 7.42 (ref 7.35–7.45)
PO2: 60 MMHG (ref 71–104)
PTH INTACT: 41.4 PG/ML (ref 15–65)
SOURCE, BLOOD GAS: ABNORMAL
VITAMIN D 25-HYDROXY: 36 NG/ML (ref 30–100)

## 2020-01-05 PROCEDURE — 6360000002 HC RX W HCPCS: Performed by: FAMILY MEDICINE

## 2020-01-05 PROCEDURE — 2580000003 HC RX 258: Performed by: INTERNAL MEDICINE

## 2020-01-05 PROCEDURE — 36430 TRANSFUSION BLD/BLD COMPNT: CPT

## 2020-01-05 PROCEDURE — 6360000002 HC RX W HCPCS: Performed by: INTERNAL MEDICINE

## 2020-01-05 PROCEDURE — 2700000000 HC OXYGEN THERAPY PER DAY

## 2020-01-05 PROCEDURE — 99152 MOD SED SAME PHYS/QHP 5/>YRS: CPT | Performed by: INTERNAL MEDICINE

## 2020-01-05 PROCEDURE — 85014 HEMATOCRIT: CPT

## 2020-01-05 PROCEDURE — 6370000000 HC RX 637 (ALT 250 FOR IP): Performed by: NURSE PRACTITIONER

## 2020-01-05 PROCEDURE — 6370000000 HC RX 637 (ALT 250 FOR IP): Performed by: FAMILY MEDICINE

## 2020-01-05 PROCEDURE — 2709999900 HC NON-CHARGEABLE SUPPLY: Performed by: INTERNAL MEDICINE

## 2020-01-05 PROCEDURE — 82803 BLOOD GASES ANY COMBINATION: CPT

## 2020-01-05 PROCEDURE — 99223 1ST HOSP IP/OBS HIGH 75: CPT | Performed by: INTERNAL MEDICINE

## 2020-01-05 PROCEDURE — P9016 RBC LEUKOCYTES REDUCED: HCPCS

## 2020-01-05 PROCEDURE — 71045 X-RAY EXAM CHEST 1 VIEW: CPT

## 2020-01-05 PROCEDURE — 6370000000 HC RX 637 (ALT 250 FOR IP): Performed by: INTERNAL MEDICINE

## 2020-01-05 PROCEDURE — 2580000003 HC RX 258: Performed by: FAMILY MEDICINE

## 2020-01-05 PROCEDURE — 82330 ASSAY OF CALCIUM: CPT

## 2020-01-05 PROCEDURE — 82306 VITAMIN D 25 HYDROXY: CPT

## 2020-01-05 PROCEDURE — 85610 PROTHROMBIN TIME: CPT

## 2020-01-05 PROCEDURE — 1200000003 HC TELEMETRY R&B

## 2020-01-05 PROCEDURE — 85018 HEMOGLOBIN: CPT

## 2020-01-05 PROCEDURE — 83970 ASSAY OF PARATHORMONE: CPT

## 2020-01-05 PROCEDURE — 0DJ08ZZ INSPECTION OF UPPER INTESTINAL TRACT, VIA NATURAL OR ARTIFICIAL OPENING ENDOSCOPIC: ICD-10-PCS | Performed by: INTERNAL MEDICINE

## 2020-01-05 PROCEDURE — 99232 SBSQ HOSP IP/OBS MODERATE 35: CPT | Performed by: FAMILY MEDICINE

## 2020-01-05 PROCEDURE — 36415 COLL VENOUS BLD VENIPUNCTURE: CPT

## 2020-01-05 PROCEDURE — 94640 AIRWAY INHALATION TREATMENT: CPT

## 2020-01-05 PROCEDURE — 3609017100 HC EGD: Performed by: INTERNAL MEDICINE

## 2020-01-05 PROCEDURE — 36600 WITHDRAWAL OF ARTERIAL BLOOD: CPT

## 2020-01-05 RX ORDER — FUROSEMIDE 10 MG/ML
20 INJECTION INTRAMUSCULAR; INTRAVENOUS ONCE
Status: COMPLETED | OUTPATIENT
Start: 2020-01-05 | End: 2020-01-05

## 2020-01-05 RX ORDER — PANTOPRAZOLE SODIUM 40 MG/1
40 TABLET, DELAYED RELEASE ORAL
Status: DISCONTINUED | OUTPATIENT
Start: 2020-01-06 | End: 2020-01-09 | Stop reason: HOSPADM

## 2020-01-05 RX ORDER — FUROSEMIDE 20 MG/1
20 TABLET ORAL DAILY
Status: DISCONTINUED | OUTPATIENT
Start: 2020-01-06 | End: 2020-01-05

## 2020-01-05 RX ORDER — MIDAZOLAM HYDROCHLORIDE 1 MG/ML
INJECTION INTRAMUSCULAR; INTRAVENOUS PRN
Status: DISCONTINUED | OUTPATIENT
Start: 2020-01-05 | End: 2020-01-05 | Stop reason: ALTCHOICE

## 2020-01-05 RX ORDER — FENTANYL CITRATE 50 UG/ML
INJECTION, SOLUTION INTRAMUSCULAR; INTRAVENOUS PRN
Status: DISCONTINUED | OUTPATIENT
Start: 2020-01-05 | End: 2020-01-05 | Stop reason: ALTCHOICE

## 2020-01-05 RX ORDER — 0.9 % SODIUM CHLORIDE 0.9 %
250 INTRAVENOUS SOLUTION INTRAVENOUS ONCE
Status: COMPLETED | OUTPATIENT
Start: 2020-01-05 | End: 2020-01-05

## 2020-01-05 RX ORDER — AMOXICILLIN AND CLAVULANATE POTASSIUM 875; 125 MG/1; MG/1
1 TABLET, FILM COATED ORAL EVERY 12 HOURS SCHEDULED
Status: DISCONTINUED | OUTPATIENT
Start: 2020-01-06 | End: 2020-01-09 | Stop reason: HOSPADM

## 2020-01-05 RX ORDER — FUROSEMIDE 40 MG/1
40 TABLET ORAL DAILY
Status: DISCONTINUED | OUTPATIENT
Start: 2020-01-05 | End: 2020-01-09 | Stop reason: HOSPADM

## 2020-01-05 RX ADMIN — FOLIC ACID 1 MG: 1 TABLET ORAL at 10:30

## 2020-01-05 RX ADMIN — CALCIUM GLUCONATE 1 G: 98 INJECTION, SOLUTION INTRAVENOUS at 14:09

## 2020-01-05 RX ADMIN — FUROSEMIDE 20 MG: 40 INJECTION, SOLUTION INTRAMUSCULAR; INTRAVENOUS at 14:09

## 2020-01-05 RX ADMIN — Medication 1.5 MG: at 18:16

## 2020-01-05 RX ADMIN — CEFTRIAXONE SODIUM 1 G: 1 INJECTION, POWDER, FOR SOLUTION INTRAMUSCULAR; INTRAVENOUS at 08:02

## 2020-01-05 RX ADMIN — ACETAMINOPHEN 650 MG: 325 TABLET ORAL at 13:06

## 2020-01-05 RX ADMIN — BUDESONIDE 500 MCG: 0.5 INHALANT RESPIRATORY (INHALATION) at 16:46

## 2020-01-05 RX ADMIN — DICYCLOMINE HYDROCHLORIDE 10 MG: 10 CAPSULE ORAL at 20:24

## 2020-01-05 RX ADMIN — IPRATROPIUM BROMIDE 0.5 MG: 0.5 SOLUTION RESPIRATORY (INHALATION) at 13:21

## 2020-01-05 RX ADMIN — IPRATROPIUM BROMIDE 0.5 MG: 0.5 SOLUTION RESPIRATORY (INHALATION) at 16:46

## 2020-01-05 RX ADMIN — DIGOXIN 125 MCG: 125 TABLET ORAL at 10:30

## 2020-01-05 RX ADMIN — MICONAZOLE NITRATE 1 APPLICATOR: 20 CREAM VAGINAL at 20:03

## 2020-01-05 RX ADMIN — DICYCLOMINE HYDROCHLORIDE 10 MG: 10 CAPSULE ORAL at 18:16

## 2020-01-05 RX ADMIN — IPRATROPIUM BROMIDE 0.5 MG: 0.5 SOLUTION RESPIRATORY (INHALATION) at 10:08

## 2020-01-05 RX ADMIN — Medication 10 ML: at 20:21

## 2020-01-05 RX ADMIN — IPRATROPIUM BROMIDE 0.5 MG: 0.5 SOLUTION RESPIRATORY (INHALATION) at 05:26

## 2020-01-05 RX ADMIN — FORMOTEROL FUMARATE DIHYDRATE 20 MCG: 20 SOLUTION RESPIRATORY (INHALATION) at 05:27

## 2020-01-05 RX ADMIN — SODIUM CHLORIDE 250 ML: 9 INJECTION, SOLUTION INTRAVENOUS at 01:34

## 2020-01-05 RX ADMIN — Medication 2 TABLET: at 10:30

## 2020-01-05 RX ADMIN — Medication 1 TABLET: at 10:30

## 2020-01-05 RX ADMIN — Medication 1 CAPSULE: at 10:30

## 2020-01-05 RX ADMIN — Medication 500 MG: at 10:30

## 2020-01-05 RX ADMIN — DICYCLOMINE HYDROCHLORIDE 10 MG: 10 CAPSULE ORAL at 13:06

## 2020-01-05 RX ADMIN — FORMOTEROL FUMARATE DIHYDRATE 20 MCG: 20 SOLUTION RESPIRATORY (INHALATION) at 16:47

## 2020-01-05 RX ADMIN — HYDROCORTISONE ACETATE 25 MG: 25 SUPPOSITORY RECTAL at 20:23

## 2020-01-05 RX ADMIN — CETIRIZINE HYDROCHLORIDE 5 MG: 10 TABLET, FILM COATED ORAL at 20:23

## 2020-01-05 RX ADMIN — BUDESONIDE 500 MCG: 0.5 INHALANT RESPIRATORY (INHALATION) at 05:27

## 2020-01-05 ASSESSMENT — PAIN SCALES - GENERAL
PAINLEVEL_OUTOF10: 0
PAINLEVEL_OUTOF10: 7
PAINLEVEL_OUTOF10: 0
PAINLEVEL_OUTOF10: 3
PAINLEVEL_OUTOF10: 7

## 2020-01-05 ASSESSMENT — PAIN DESCRIPTION - ORIENTATION: ORIENTATION: LEFT;RIGHT

## 2020-01-05 ASSESSMENT — PAIN - FUNCTIONAL ASSESSMENT
PAIN_FUNCTIONAL_ASSESSMENT: 0-10
PAIN_FUNCTIONAL_ASSESSMENT: ACTIVITIES ARE NOT PREVENTED

## 2020-01-05 ASSESSMENT — PAIN DESCRIPTION - LOCATION: LOCATION: HEAD

## 2020-01-05 ASSESSMENT — PAIN DESCRIPTION - DESCRIPTORS: DESCRIPTORS: HEADACHE

## 2020-01-05 ASSESSMENT — PAIN DESCRIPTION - ONSET: ONSET: ON-GOING

## 2020-01-05 ASSESSMENT — PAIN DESCRIPTION - FREQUENCY: FREQUENCY: CONTINUOUS

## 2020-01-05 ASSESSMENT — PAIN DESCRIPTION - PROGRESSION: CLINICAL_PROGRESSION: GRADUALLY WORSENING

## 2020-01-05 ASSESSMENT — PAIN DESCRIPTION - PAIN TYPE: TYPE: ACUTE PAIN

## 2020-01-05 NOTE — PROGRESS NOTES
4807: Received to PACU post-EGD. Patient drowsy but arouses to verbal stimuli. Denies pain, nausea. Family at bedside. 0654: Dr Kristina Vann at bedside to speak with patient and family regarding procedure findings and plan of care. 8403: Patient arouses more quickly. Continues to deny pain, nausea. Report called to unit.

## 2020-01-05 NOTE — CONSULTS
 Morphine Hives, Swelling and Rash    Other Nausea And Vomiting    Propoxyphene Nausea And Vomiting    Sulfa Antibiotics Nausea And Vomiting        Family History   Adopted: Yes   Problem Relation Age of Onset    Heart Disease Father          AGE 35    Cancer Sister         breast        Social History     Socioeconomic History    Marital status:      Spouse name: Esequiel Robins Number of children: 3    Years of education: Not on file    Highest education level: Not on file   Occupational History    Not on file   Social Needs    Financial resource strain: Not on file    Food insecurity:     Worry: Not on file     Inability: Not on file    Transportation needs:     Medical: Not on file     Non-medical: Not on file   Tobacco Use    Smoking status: Current Every Day Smoker     Packs/day: 0.50     Years: 25.00     Pack years: 12.50     Types: Cigarettes    Smokeless tobacco: Never Used   Substance and Sexual Activity    Alcohol use:  Yes     Alcohol/week: 1.0 standard drinks     Types: 1 Cans of beer per week     Comment: ocassionaly    Drug use: No    Sexual activity: Never   Lifestyle    Physical activity:     Days per week: Not on file     Minutes per session: Not on file    Stress: Not on file   Relationships    Social connections:     Talks on phone: Not on file     Gets together: Not on file     Attends Rastafari service: Not on file     Active member of club or organization: Not on file     Attends meetings of clubs or organizations: Not on file     Relationship status: Not on file    Intimate partner violence:     Fear of current or ex partner: Not on file     Emotionally abused: Not on file     Physically abused: Not on file     Forced sexual activity: Not on file   Other Topics Concern    Not on file   Social History Narrative    Not on file       Current Facility-Administered Medications   Medication Dose Route Frequency Provider Last Rate Last Dose    [START ON 2020] pantoprazole (PROTONIX) tablet 40 mg  40 mg Oral QAM AC Lillian Riley, APRN - CNP        [START ON 1/6/2020] furosemide (LASIX) tablet 20 mg  20 mg Oral Daily Dennis Francisco MD        calcium replacement protocol   Other RX Placeholder Gayla Irene MD        [START ON 1/6/2020] amoxicillin-clavulanate (AUGMENTIN) 875-125 MG per tablet 1 tablet  1 tablet Oral 2 times per day Gayla Irene MD        calcium gluconate 1 g in dextrose 5 % 100 mL IVPB  1 g Intravenous Once Gayla Irene MD        warfarin (COUMADIN) daily dosing (placeholder)   Other RX Placeholder Gayla Irene MD        warfarin (COUMADIN) split-tablet 1.5 mg  1.5 mg Oral Once Gayla Irene MD        sodium chloride flush 0.9 % injection 10 mL  10 mL Intravenous PRN Lillian Riley, APRN - CNP        folbee plus tablet 1 tablet  1 tablet Oral Daily Rajinder Magdaleno, DO   1 tablet at 01/05/20 1030    polyvinyl alcohol (LIQUIFILM TEARS) 1.4 % ophthalmic solution 1 drop  1 drop Ophthalmic PRN Rajinder Magdaleno, DO        azelastine (ASTELIN) 0.1 % nasal spray 1 spray  1 spray Each Nostril BID PRN Tere Disla PA-C        Vitamin D (CHOLECALCIFEROL) tablet 5,000 Units  5,000 Units Oral Once per day on Mon Wed Fri Rajinder Magdaleno, DO   5,000 Units at 01/03/20 2853    polyethylene glycol (GLYCOLAX) packet 17 g  17 g Oral Daily PETR Daugherty - CNP        hydrocortisone (ANUSOL-HC) suppository 25 mg  25 mg Rectal BID PETR Daugherty - CNP   25 mg at 01/04/20 1006    sodium chloride flush 0.9 % injection 10 mL  10 mL Intravenous 2 times per day Rajinder Magdaleno, DO   10 mL at 01/03/20 0902    magnesium hydroxide (MILK OF MAGNESIA) 400 MG/5ML suspension 30 mL  30 mL Oral Daily PRN Rajinder Magdaleno, DO        on[facility]etron [facility]) injection 4 mg  4 mg Intravenous Q6H PRN Rajinder Magdaleno, DO        acetaminophen (TYLENOL) tablet 650 mg  650 mg Oral Q4H PRN Rajinder Magdaleno, DO   650 mg at 01/05/20 1306    albuterol sulfate  (90 Base) MCG/ACT inhaler 2 puff  2 puff Inhalation Q6H PRN Kaushal Skowhegan, DO        formoterol (PERFOROMIST) nebulizer solution 20 mcg  20 mcg Nebulization BID Kaushal Skowhegan, DO   20 mcg at 01/05/20 7410    budesonide (PULMICORT) nebulizer suspension 500 mcg  500 mcg Nebulization BID Kaushal Gia, DO   500 mcg at 01/05/20 6969    clotrimazole-betamethasone (LOTRISONE) cream   Topical BID PRN Kaushal Gia, DO        dicyclomine (BENTYL) capsule 10 mg  10 mg Oral 4x Daily AC & HS Kaushal Skowhegan, DO   10 mg at 01/05/20 1306    digoxin (LANOXIN) tablet 125 mcg  125 mcg Oral Every Other Day Kaushal Gia, DO   125 mcg at 19/91/57 8674    folic acid (FOLVITE) tablet 1 mg  1 mg Oral Daily Kaushal Gia, DO   1 mg at 01/05/20 1030    hydrocodone-chlorpheniramine (TUSSIONEX) 10-8 MG/5ML oral suspension 5 mL  5 mL Oral Q12H PRN Kaushal Skowhegan, DO        hydrocortisone 2.5 % cream   Topical TID Kaushal Skowhegan, DO        hydrOXYzine (ATARAX) tablet 25 mg  25 mg Oral TID Kaushal Gia, DO   25 mg at 01/04/20 2119    ipratropium (ATROVENT) 0.02 % nebulizer solution 0.5 mg  0.5 mg Nebulization 4x Daily Kaushal Gia, DO   0.5 mg at 01/05/20 1321    albuterol (PROVENTIL) nebulizer solution 1.25 mg  1.25 mg Nebulization 4x Daily PRN Kaushal Skowhegan, DO        cetirizine (ZYRTEC) tablet 5 mg  5 mg Oral Daily Kaushal Gia, DO   5 mg at 01/04/20 2121    metoprolol succinate (TOPROL XL) extended release tablet 25 mg  25 mg Oral Daily Dennis Steinberg MD        miconazole (MICOTIN) 2 % vaginal cream 1 applicator  1 applicator Vaginal Nightly PRN Kaushal Skowhegan, DO        multivitamin (ANIMAL SHAPES) with C & FA chewable tablet 2 tablet  2 tablet Oral Daily Kaushal Skowhegan, DO   2 tablet at 01/05/20 1030    nitroGLYCERIN (NITROSTAT) SL tablet 0.4 mg  0.4 mg Sublingual Q5 Min PRN Kaushal Nielsen DO        ofloxacin (OCUFLOX) 0.3 % solution 2 drop  2 drop Both Ears Daily PRN Juju Lindseyr, DO        oxyCODONE-acetaminophen (PERCOCET) 5-325 MG per tablet 0.5 tablet  0.5 tablet Oral Q4H PRN Juju Rosaliar, DO   0.5 tablet at 01/04/20 2118    polyethylene glycol (GLYCOLAX) packet 17 g  17 g Oral Daily PRN Juju Rosaliar, DO        pramoxine 1 % foam   Rectal Daily PRN Juju Rosaliar, DO        lactobacillus (CULTURELLE) capsule 1 capsule  1 capsule Oral Daily Juju Rosaliar, DO   1 capsule at 01/05/20 1030    vitamin C (ASCORBIC ACID) tablet 500 mg  500 mg Oral Daily Juju Littler, DO   500 mg at 01/05/20 1030    traZODone (DESYREL) tablet 50 mg  50 mg Oral Nightly Juju Littler, DO   50 mg at 01/04/20 2119    sacubitril-valsartan (ENTRESTO) 49-51 MG per tablet 1 tablet  1 tablet Oral BID Dennis Bernal MD   1 tablet at 01/04/20 1005    triamcinolone (KENALOG) 0.1 % cream   Topical BID PRN Juju Lindseyr, DO           Review of Systems -     General ROS: negative  Psychological ROS: negative  Hematological and Lymphatic ROS: No history of blood clots or bleeding disorder. Respiratory ROS: no cough,  or wheezing, the rest see HPI  Cardiovascular ROS: See HPI  Gastrointestinal ROS: negative  Genito-Urinary ROS: no dysuria, trouble voiding, or hematuria  Musculoskeletal ROS: negative  Neurological ROS: no TIA or stroke symptoms  Dermatological ROS: negative      Blood pressure (!) 129/49, pulse 71, temperature 97.7 °F (36.5 °C), temperature source Oral, resp. rate 16, height 5' 2\" (1.575 m), weight 151 lb (68.5 kg), SpO2 96 %, not currently breastfeeding.         Physical Examination:    General appearance - alert, well appearing, and in no distress  HEENT- Pink conjunctiva  , Non-icteri sclera,PERRLA  Mental status - alert, oriented to person, place, and time  Neck - supple, no significant adenopathy, no JVD, or carotid bruits  Chest - clear to auscultation, no wheezes, rales or rhonchi, symmetric air entry  Heart - normal rate, regular rhythm, normal S1, S2, no murmurs, rubs, clicks or gallops  Abdomen - soft, nontender, nondistended, no masses or organomegaly  SOFIYA- no CVA or flank tenderness, no suprapubic tenderness  Neurological - alert, oriented, normal speech, no focal findings or movement disorder noted  Musculoskeletal/limbs - no joint tenderness, deformity or swelling   - peripheral pulses normal, no pedal edema, no clubbing or cyanosis  Skin - normal coloration and turgor, no rashes, no suspicious skin lesions noted  Psych- appropriate mood and affect    Lab  No results for input(s): CKTOTAL, CKMB, CKMBINDEX, TROPONINI in the last 72 hours.   CBC:   Lab Results   Component Value Date    WBC 6.3 01/04/2020    RBC 2.53 01/04/2020    HGB 8.8 01/05/2020    HCT 28.5 01/05/2020    .0 01/04/2020    MCH 30.4 01/04/2020    MCHC 29.8 01/04/2020    RDW 13.9 07/06/2018     01/04/2020    MPV 10.6 01/04/2020     BMP:    Lab Results   Component Value Date     01/04/2020    K 3.8 01/04/2020    K 4.7 01/02/2020     01/04/2020    CO2 23 01/04/2020    BUN 17 01/04/2020    LABALBU 2.9 01/03/2020    CREATININE 0.6 01/04/2020    CALCIUM 8.3 01/04/2020    LABGLOM >90 01/04/2020    GLUCOSE 88 01/04/2020    GLUCOSE 115 07/06/2018     Hepatic Function Panel:    Lab Results   Component Value Date    ALKPHOS 46 01/03/2020    ALT 7 01/03/2020    AST 12 01/03/2020    PROT 5.3 01/03/2020    BILITOT 0.2 01/03/2020    BILIDIR <0.2 01/03/2020    LABALBU 2.9 01/03/2020     Magnesium:    Lab Results   Component Value Date    MG 1.8 06/24/2018     Warfarin PT/INR:  No components found for: PTPATWAR, PTINRWAR  HgBA1c:    Lab Results   Component Value Date    LABA1C 5.9 11/24/2015     FLP:    Lab Results   Component Value Date    TRIG 236 12/02/2019    HDL 45 12/02/2019    LDLCALC 30 12/02/2019    LDLDIRECT 54 07/06/2018     TSH:    Lab Results   Component Value Date    TSH 0.739 12/02/2019     EKG vent paced  Rhythm    Troponin elevated to 0.024      Assessment  GI

## 2020-01-05 NOTE — PROGRESS NOTES
ENDOSCOPY POSTPROCEDURE REPORT     PT NAME: Teddy Mathews  MEDICAL RECORD NUMBER: [id number]  YOB: 1945    PROCEDURE PERFORMED BY : Fabrizio Cruz    PROCEDURE TYPE:   EGD __x____   COLONOSCOPY _______   ERCP ________  MEDICATIONS USED :  VERSED __3___   FENTANYL__50___   PROPOFOL ______  Patient tolerated procedure well. No apparent complications. Estimated blood loss:  Nil  Specimens removed:  Yes_____  No__x____  Disposition of Specimens:  To Lab      BRIEF  FINDINGS:  1. Small HH  2. Trivial prepyloric patchy erythema  3. O/w normal      PRELIMINARY PLAN:  1. Resume meds and diet as tolerated  2. Would anticipate any further GI eval as outpatient      For complete findings and plan, see dictated report.      Fabrizio Cruz MD  1/5/2020  6:41 AM

## 2020-01-05 NOTE — PROCEDURES
EKG was handed to Nata Morse.
back, duodenal bulb normal.  Brought back, across pylorus and  antrum. There was some trivial prepyloric patchy erythema, otherwise  normal.  The scope was retroflexed. Cardia, fundus, EG junction  examined. Small hiatal hernia, otherwise normal.  The scope was  straightened. The gastric body visualized in forward view, this was  normal.  Subsequently air was withdrawn and the scope was removed. The  patient tolerated the procedure well. No apparent complications. PHOTOGRAPHS:  Photograph #1 shows long view of the antrum; #2 is  prepyloric area, very mild patchy erythema; #3 is duodenal bulb; #4 is  descending duodenum; #5 is antrum, now more open; #6, #7 in  retroflexion; #8 is angularis; #9 is gastric body; #10 is distal  esophagus, EG junction, and hiatal hernia. IMPRESSION:  1. Very mild prepyloric patchy erythema. 2.  Small hiatal hernia. 3.  Otherwise normal exam.    ASSESSMENT:  As described above, upper endoscopy was quite unremarkable. No significant abnormalities. No blood, bleeding or lesions. The  patient has a history of chronic anemia. She has been followed by   _____ for a number of years. She has received parenteral iron infusions  and has been evaluated previously by Dr. Cristel Ely. She was admitted. Her  INR was elevated, on Coumadin. Now, her INR is back to 2.2. Again no  significant abnormalities on today's exam.  No cause for upper GI  bleeding on today's exam.    PLAN:  1. Resume usual medications and diet as tolerated. 2.  Further evaluation regarding anemia could likely be undertaken on an  outpatient basis pending clinical course.         Ancelmo Santiago M.D.    D: 01/05/2020 6:48:02       T: 01/05/2020 9:40:42     [email or web address]  Job#: [id number]     Doc#: [id number]    CC:  Sabrina Black M.D.

## 2020-01-06 ENCOUNTER — APPOINTMENT (OUTPATIENT)
Dept: GENERAL RADIOLOGY | Age: 75
DRG: 393 | End: 2020-01-06
Payer: MEDICARE

## 2020-01-06 LAB
ANION GAP SERPL CALCULATED.3IONS-SCNC: 12 MEQ/L (ref 8–16)
BUN BLDV-MCNC: 12 MG/DL (ref 7–22)
CALCIUM IONIZED: 1.17 MMOL/L (ref 1.12–1.32)
CALCIUM SERPL-MCNC: 8.7 MG/DL (ref 8.5–10.5)
CHLORIDE BLD-SCNC: 106 MEQ/L (ref 98–111)
CO2: 24 MEQ/L (ref 23–33)
CREAT SERPL-MCNC: 0.6 MG/DL (ref 0.4–1.2)
GFR SERPL CREATININE-BSD FRML MDRD: > 90 ML/MIN/1.73M2
GLUCOSE BLD-MCNC: 111 MG/DL (ref 70–108)
HCT VFR BLD CALC: 28.1 % (ref 37–47)
HEMOGLOBIN: 8.7 GM/DL (ref 12–16)
INR BLD: 1.37 (ref 0.85–1.13)
POTASSIUM SERPL-SCNC: 3.6 MEQ/L (ref 3.5–5.2)
PRO-BNP: 6928 PG/ML (ref 0–1800)
SODIUM BLD-SCNC: 142 MEQ/L (ref 135–145)

## 2020-01-06 PROCEDURE — 36415 COLL VENOUS BLD VENIPUNCTURE: CPT

## 2020-01-06 PROCEDURE — 97530 THERAPEUTIC ACTIVITIES: CPT

## 2020-01-06 PROCEDURE — 82330 ASSAY OF CALCIUM: CPT

## 2020-01-06 PROCEDURE — 85014 HEMATOCRIT: CPT

## 2020-01-06 PROCEDURE — 97110 THERAPEUTIC EXERCISES: CPT

## 2020-01-06 PROCEDURE — 97116 GAIT TRAINING THERAPY: CPT

## 2020-01-06 PROCEDURE — 97166 OT EVAL MOD COMPLEX 45 MIN: CPT

## 2020-01-06 PROCEDURE — 85018 HEMOGLOBIN: CPT

## 2020-01-06 PROCEDURE — 6370000000 HC RX 637 (ALT 250 FOR IP): Performed by: NURSE PRACTITIONER

## 2020-01-06 PROCEDURE — 6370000000 HC RX 637 (ALT 250 FOR IP): Performed by: PHARMACIST

## 2020-01-06 PROCEDURE — 6360000002 HC RX W HCPCS: Performed by: INTERNAL MEDICINE

## 2020-01-06 PROCEDURE — 6370000000 HC RX 637 (ALT 250 FOR IP): Performed by: INTERNAL MEDICINE

## 2020-01-06 PROCEDURE — 6370000000 HC RX 637 (ALT 250 FOR IP): Performed by: FAMILY MEDICINE

## 2020-01-06 PROCEDURE — 85610 PROTHROMBIN TIME: CPT

## 2020-01-06 PROCEDURE — 2580000003 HC RX 258: Performed by: INTERNAL MEDICINE

## 2020-01-06 PROCEDURE — 99233 SBSQ HOSP IP/OBS HIGH 50: CPT | Performed by: FAMILY MEDICINE

## 2020-01-06 PROCEDURE — 94760 N-INVAS EAR/PLS OXIMETRY 1: CPT

## 2020-01-06 PROCEDURE — 1200000003 HC TELEMETRY R&B

## 2020-01-06 PROCEDURE — 2700000000 HC OXYGEN THERAPY PER DAY

## 2020-01-06 PROCEDURE — 80048 BASIC METABOLIC PNL TOTAL CA: CPT

## 2020-01-06 PROCEDURE — 94640 AIRWAY INHALATION TREATMENT: CPT

## 2020-01-06 PROCEDURE — 83880 ASSAY OF NATRIURETIC PEPTIDE: CPT

## 2020-01-06 PROCEDURE — 71045 X-RAY EXAM CHEST 1 VIEW: CPT

## 2020-01-06 RX ORDER — WARFARIN SODIUM 2.5 MG/1
2.5 TABLET ORAL
Status: COMPLETED | OUTPATIENT
Start: 2020-01-06 | End: 2020-01-06

## 2020-01-06 RX ORDER — MIDODRINE HYDROCHLORIDE 5 MG/1
5 TABLET ORAL
Status: DISCONTINUED | OUTPATIENT
Start: 2020-01-06 | End: 2020-01-09 | Stop reason: HOSPADM

## 2020-01-06 RX ADMIN — AMOXICILLIN AND CLAVULANATE POTASSIUM 1 TABLET: 875; 125 TABLET, FILM COATED ORAL at 11:23

## 2020-01-06 RX ADMIN — Medication 10 ML: at 21:13

## 2020-01-06 RX ADMIN — DICYCLOMINE HYDROCHLORIDE 10 MG: 10 CAPSULE ORAL at 11:23

## 2020-01-06 RX ADMIN — HYDROXYZINE HYDROCHLORIDE 25 MG: 25 TABLET ORAL at 21:08

## 2020-01-06 RX ADMIN — TRIAMCINOLONE ACETONIDE: 1 CREAM TOPICAL at 19:14

## 2020-01-06 RX ADMIN — DICYCLOMINE HYDROCHLORIDE 10 MG: 10 CAPSULE ORAL at 21:08

## 2020-01-06 RX ADMIN — AMOXICILLIN AND CLAVULANATE POTASSIUM 1 TABLET: 875; 125 TABLET, FILM COATED ORAL at 21:07

## 2020-01-06 RX ADMIN — HYDROCORTISONE ACETATE 25 MG: 25 SUPPOSITORY RECTAL at 11:23

## 2020-01-06 RX ADMIN — CETIRIZINE HYDROCHLORIDE 5 MG: 10 TABLET, FILM COATED ORAL at 21:08

## 2020-01-06 RX ADMIN — Medication 1 CAPSULE: at 11:23

## 2020-01-06 RX ADMIN — BUDESONIDE 500 MCG: 0.5 INHALANT RESPIRATORY (INHALATION) at 04:10

## 2020-01-06 RX ADMIN — Medication 500 MG: at 11:22

## 2020-01-06 RX ADMIN — FORMOTEROL FUMARATE DIHYDRATE 20 MCG: 20 SOLUTION RESPIRATORY (INHALATION) at 17:18

## 2020-01-06 RX ADMIN — FORMOTEROL FUMARATE DIHYDRATE 20 MCG: 20 SOLUTION RESPIRATORY (INHALATION) at 04:10

## 2020-01-06 RX ADMIN — DICYCLOMINE HYDROCHLORIDE 10 MG: 10 CAPSULE ORAL at 16:43

## 2020-01-06 RX ADMIN — IPRATROPIUM BROMIDE 0.5 MG: 0.5 SOLUTION RESPIRATORY (INHALATION) at 04:10

## 2020-01-06 RX ADMIN — IPRATROPIUM BROMIDE 0.5 MG: 0.5 SOLUTION RESPIRATORY (INHALATION) at 12:42

## 2020-01-06 RX ADMIN — VITAMIN D, TAB 1000IU (100/BT) 5000 UNITS: 25 TAB at 11:22

## 2020-01-06 RX ADMIN — WARFARIN SODIUM 2.5 MG: 2.5 TABLET ORAL at 19:05

## 2020-01-06 RX ADMIN — Medication 2 TABLET: at 11:22

## 2020-01-06 RX ADMIN — IPRATROPIUM BROMIDE 0.5 MG: 0.5 SOLUTION RESPIRATORY (INHALATION) at 09:44

## 2020-01-06 RX ADMIN — MICONAZOLE NITRATE 1 APPLICATOR: 20 CREAM VAGINAL at 19:03

## 2020-01-06 RX ADMIN — MIDODRINE HYDROCHLORIDE 5 MG: 5 TABLET ORAL at 11:01

## 2020-01-06 RX ADMIN — MIDODRINE HYDROCHLORIDE 5 MG: 5 TABLET ORAL at 16:43

## 2020-01-06 RX ADMIN — OXYCODONE HYDROCHLORIDE AND ACETAMINOPHEN 0.5 TABLET: 5; 325 TABLET ORAL at 11:34

## 2020-01-06 RX ADMIN — OXYCODONE HYDROCHLORIDE AND ACETAMINOPHEN 0.5 TABLET: 5; 325 TABLET ORAL at 21:07

## 2020-01-06 RX ADMIN — IPRATROPIUM BROMIDE 0.5 MG: 0.5 SOLUTION RESPIRATORY (INHALATION) at 17:18

## 2020-01-06 RX ADMIN — FOLIC ACID 1 MG: 1 TABLET ORAL at 11:23

## 2020-01-06 RX ADMIN — Medication 10 ML: at 11:22

## 2020-01-06 RX ADMIN — DICYCLOMINE HYDROCHLORIDE 10 MG: 10 CAPSULE ORAL at 06:37

## 2020-01-06 RX ADMIN — Medication 1 TABLET: at 11:22

## 2020-01-06 RX ADMIN — PANTOPRAZOLE SODIUM 40 MG: 40 TABLET, DELAYED RELEASE ORAL at 06:37

## 2020-01-06 RX ADMIN — BUDESONIDE 500 MCG: 0.5 INHALANT RESPIRATORY (INHALATION) at 17:18

## 2020-01-06 RX ADMIN — POLYETHYLENE GLYCOL 3350 17 G: 17 POWDER, FOR SOLUTION ORAL at 11:09

## 2020-01-06 ASSESSMENT — PAIN DESCRIPTION - FREQUENCY
FREQUENCY: INTERMITTENT
FREQUENCY: INTERMITTENT

## 2020-01-06 ASSESSMENT — PAIN DESCRIPTION - ORIENTATION
ORIENTATION: RIGHT
ORIENTATION: RIGHT

## 2020-01-06 ASSESSMENT — PAIN - FUNCTIONAL ASSESSMENT
PAIN_FUNCTIONAL_ASSESSMENT: PREVENTS OR INTERFERES SOME ACTIVE ACTIVITIES AND ADLS
PAIN_FUNCTIONAL_ASSESSMENT: ACTIVITIES ARE NOT PREVENTED

## 2020-01-06 ASSESSMENT — PAIN SCALES - GENERAL
PAINLEVEL_OUTOF10: 4
PAINLEVEL_OUTOF10: 5
PAINLEVEL_OUTOF10: 0
PAINLEVEL_OUTOF10: 6
PAINLEVEL_OUTOF10: 5
PAINLEVEL_OUTOF10: 3
PAINLEVEL_OUTOF10: 3

## 2020-01-06 ASSESSMENT — PAIN DESCRIPTION - DIRECTION: RADIATING_TOWARDS: HEAD

## 2020-01-06 ASSESSMENT — PAIN DESCRIPTION - ONSET
ONSET: ON-GOING
ONSET: ON-GOING

## 2020-01-06 ASSESSMENT — PAIN DESCRIPTION - PAIN TYPE
TYPE: ACUTE PAIN

## 2020-01-06 ASSESSMENT — PAIN DESCRIPTION - LOCATION
LOCATION: HEAD;NECK
LOCATION: NECK
LOCATION: HEAD;NECK

## 2020-01-06 ASSESSMENT — PAIN DESCRIPTION - DESCRIPTORS
DESCRIPTORS: SORE
DESCRIPTORS: ACHING

## 2020-01-06 ASSESSMENT — PAIN DESCRIPTION - PROGRESSION
CLINICAL_PROGRESSION: NOT CHANGED
CLINICAL_PROGRESSION: NOT CHANGED

## 2020-01-06 NOTE — FLOWSHEET NOTE
01/05/20 2128   Encounter Summary   Services provided to: Patient   Continue Visiting Yes  (1/5/2020 N/R contnue support)     Patient is none responsive/asleep at the time of this visit.   will follow up for continue support as needed

## 2020-01-06 NOTE — PROGRESS NOTES
Clinical Pharmacy Note    Warfarin consult follow-up    Recent Labs     01/06/20  0656   INR 1.37*     Recent Labs     01/04/20  0737  01/05/20  0017 01/05/20  1137 01/06/20  0656   HGB 7.7*   < > 7.0* 8.8* 8.7*   HCT 25.8*   < > 23.3* 28.5* 28.1*     --   --   --   --     < > = values in this interval not displayed. Significant Drug-Drug Interactions:  New warfarin drug-drug interactions: none  Discontinued drug-drug interactions: none  Current warfarin drug-drug interactions: trazodone  Patient received 5 mg oral vitamin K on 1/2, 1/3, 1/4     Date INR Warfarin Dose   1/5/2020 2.26 1.5 mg    1/6/20 1.37 2.5 mg                                                 Notes:                     Daily PT/INR until stable within therapeutic range.

## 2020-01-06 NOTE — PROGRESS NOTES
seeing physician for IV iron infusion today.  Patient denies chest pain or shortness of breath.  Denies nausea, vomiting, diarrhea or constipation.  Denies abdominal pain.  Denies urinary complaints.  Patient has history of GERD, blood clots and history of transfusion in the past.  Patient is currently on warfarin for atrial fibrillation. Patient hemodynamically stable on emergency department.  Patient isolated episode of hypotension with a systolic pressure of 91.  Patient afebrile temperature 98.2 °F.  Labs and imaging were obtained.  CBC with a hemoglobin 7.5.  BMP with a BUN 31 and serum creatinine 0.8.  Leukos 114.  Troponin 0.024.  X-ray of the abdomen nonobstructive bowel gas pattern, no pneumoperitoneum, nonacute with mild cardiomegaly.  Patient admitted this time for GI bleed. \"     Of note, patient was just recently discharge on 11/28/2019 due to hematochezia, which was determined to be from internal hemorrhoids.  Per DC summary note, she was started on medications for symptomatic relief and discharged home to follow-up with GI in outpatient.  Patient reports a week after discharge, she developed another rectal bleeding and noted to have supratherapeutic INR, and per patient, her Coumadin was held for 1 day prior to Coumadin clinic and INR was followed-up.     Patient received 1 dose of vitamin K oral and 1 unit PRBC on this admission.    Patient received another dose of vitamin K on 1/3/2020. GI was consulted. Pt received another unit of PRBC on 1/4/20 night due to Hgb of 7. S/p EGD 1/5/2020, which showed \" small HH, trivial prepyloric patchy erythema, O/w normal\". 1/6 CXR showed persistent pulmonary vascular congestion with slightly worsened opacity within the right lower chest and stable complete opacification of the left lower chest. There are suspected bilateral pleural effusions. In the right clinical setting these findings can be associated with slightly worsened congestive heart failure. 340.48 ml       Diet:  DIET GENERAL; No Added Salt (3-4 GM)    Exam:  BP (!) 116/58   Pulse 74   Temp 97.6 °F (36.4 °C) (Oral)   Resp 20   Ht 5' 2\" (1.575 m)   Wt 151 lb 11.2 oz (68.8 kg)   SpO2 93%   BMI 27.75 kg/m²     General appearance: No apparent distress, appears stated age and cooperative. HEENT: Pupils equal, round, and reactive to light. Conjunctivae/corneas clear. Neck: Supple, with full range of motion. No jugular venous distention. Trachea midline. Respiratory:  Normal respiratory effort. Clear to auscultation, bilaterally without Rales/Wheezes/Rhonchi. Cardiovascular: Regular rate and rhythm, + systolic murmur  Abdomen: Soft, non-tender, non-distended with normal bowel sounds. Musculoskeletal: passive and active ROM x 4 extremities. Bipedal pitting edema  Skin: Skin color, texture, turgor normal.  No rashes or lesions. Neurologic:  Neurovascularly intact without any focal sensory/motor deficits. Cranial nerves: II-XII intact, grossly non-focal.  Psychiatric: Alert and oriented, thought content appropriate, normal insight  Capillary Refill: Brisk,< 3 seconds   Peripheral Pulses: +2 palpable, equal bilaterally       Labs:   Recent Labs     01/04/20  0737  01/05/20  0017 01/05/20  1137 01/06/20  0656   WBC 6.3  --   --   --   --    HGB 7.7*   < > 7.0* 8.8* 8.7*   HCT 25.8*   < > 23.3* 28.5* 28.1*     --   --   --   --     < > = values in this interval not displayed. Recent Labs     01/04/20  0737 01/06/20  0656    142   K 3.8 3.6    106   CO2 23 24   BUN 17 12   CREATININE 0.6 0.6   CALCIUM 8.3* 8.7     No results for input(s): AST, ALT, BILIDIR, BILITOT, ALKPHOS in the last 72 hours. Recent Labs     01/04/20  1033 01/05/20  0017 01/06/20  0656   INR 2.68* 2.26* 1.37*     No results for input(s): CKTOTAL, TROPONINI in the last 72 hours.     Urinalysis:      Lab Results   Component Value Date    NITRU NEGATIVE 01/02/2020    WBCUA > 200 01/02/2020    BACTERIA MANY 01/02/2020    RBCUA 0-2 01/02/2020    BLOODU LARGE 01/02/2020    GLUCOSEU NEGATIVE 01/02/2020       Radiology:  XR CHEST PORTABLE   Final Result   1. There is persistent pulmonary vascular congestion with slightly worsened opacity within the right lower chest and stable complete opacification of the left lower chest. There are suspected bilateral pleural effusions. In the right clinical setting    these findings can be associated with slightly worsened congestive heart failure. Follow-up chest radiographs are recommended to confirm complete resolution. **This report has been created using voice recognition software. It may contain minor errors which are inherent in voice recognition technology. **      Final report electronically signed by Dr. Smith Montero on 1/6/2020 7:05 AM      XR CHEST PORTABLE   Final Result   Worsening bilaterally which could relate to CHF. **This report has been created using voice recognition software. It may contain minor errors which are inherent in voice recognition technology. **      Final report electronically signed by Dr. Virgen Fermin on 1/5/2020 2:04 PM      XR Acute Abd Series Chest 1 VW   Final Result   Non-obstructive bowel gas pattern. No pneumoperitoneum. No acute cardiopulmonary disease. Mild cardiomegaly. **This report has been created using voice recognition software. It may contain minor errors which are inherent in voice recognition technology. **      Final report electronically signed by Dr. Tee Porras on 1/2/2020 7:25 PM          Diet: DIET GENERAL; No Added Salt (3-4 GM)    DVT prophylaxis: [] Lovenox                                 [] SCDs                                 [] SQ Heparin                                 [] Encourage ambulation           [] Already on Anticoagulation     Disposition:    [] Home       [] TCU       [] Rehab       [] Psych       [] SNF       [] [place]       [] Other-    Code Status: Full

## 2020-01-06 NOTE — PLAN OF CARE
Problem: Impaired respiratory status  Goal: Clear lung sounds  1/6/2020 0945 by Jazmin Silva RCP  Outcome: Ongoing  Note:   Cont aerosol to improve aeration  1/6/2020 0419 by Altagracia Melissa RCP  Outcome: Ongoing

## 2020-01-06 NOTE — PROGRESS NOTES
[address] [id number]  INPATIENT PHYSICAL THERAPY  DAILY NOTE  [facility] ORTHOPEDICS 7K - 7K-07/007-A  Time In: 0900  Time Out: 0923  Timed Code Treatment Minutes: 23 Minutes  Minutes: 23          Date: 2020  Patient Name: Shamar Woods,  Gender:  female        MRN: [id number]  : 1945  (75 y.o.)     Referring Practitioner: Dr. Jayla Bergman  Diagnosis: GI Bleed   Additional Pertinent Hx: 75 y.o. female who presents from Dr. Hank Cano office for low HGB of 8.6 Patient's last HGB was 10.6 on 2019. Patient received IV iron in the office today. Patient reports melena and blood in stool for 1 week. She reports dizziness and low back pain. She denies abdominal pain, chest pain, shortness of breath, or syncope. Patient states she has had a blood transfusion in the past. Patient takes Coumadin and 81mg ASA. She states her last INR level was elevated. The patient has a past medical history of anemia, anxiety, asthma, A-sib, CAD, COPD, GERD, HLD, HTN, CHF, MI, and pacemaker defibrillator. No further complaints at the time of the initial encounter. Prior Level of Function:  Lives With: Significant other  Type of Home: House  Home Layout: One level  Home Access: Stairs to enter with rails  Entrance Stairs - Number of Steps: 3   Entrance Stairs - Rails: Left  Home Equipment: Rolling walker        Receives Help From: Family  ADL Assistance: Independent  Homemaking Assistance: Needs assistance  Ambulation Assistance: Independent  Transfer Assistance: Independent  Active : No    Restrictions/Precautions:  Restrictions/Precautions: General Precautions, Fall Risk  Position Activity Restriction  Other position/activity restrictions: Pt refused chair alarm, mildly impulsive at times     SUBJECTIVE: Pt is pleasant and cooperative. Pt up in chair reporting chair VERY uncomfortable and desire to return to bed, RN OKd session. Pt very SOB after session, nurse informed and planning to call respiratory therapy.  Pt Training, Stair training    Patient Education  Patient Education: Precautions/Restrictions    Goals:  Patient goals : To return home   Short term goals  Time Frame for Short term goals: At time of discharge   Short term goal 1: Mod I with bed mobility so pt can get in and out of bed  Short term goal 2: Mod I with t/fs so pt can get up to go to the bathroom  Short term goal 3: Mod I to amb with RW 75'x 1 for household amb   Short term goal 4: Pt to negotiate 3 steps with 1 HR to enter home   Long term goals  Time Frame for Long term goals : No LTGs secondary to ELOS     Following session, patient left in safe position with all fall risk precautions in place.

## 2020-01-06 NOTE — PROGRESS NOTES
Assistance: Independent  Homemaking Assistance: (spouse completes. )  Ambulation Assistance: Independent  Transfer Assistance: Independent    Active : No  Occupation: Retired     Cognition/Orientation:   Overall Cognitive Status: Exceptions  Cognition Comment: Slow processing, decreased insight, and decreased safety awareness. ADL's:  Feeding: Setup  LE Dressing: Dependent/Total(to don Socks. )       Functional Mobility:  Bed mobility  Supine to Sit: Minimal assistance(HOB elevated and bedrail use. )  Scooting: Contact guard assistance(To scoot hips to EOB. )    Functional Mobility  Functional - Mobility Device: Rolling Walker  Activity: Other  Assist Level: Contact guard assistance  Functional Mobility Comments: Pt ambulated around bed to recliner, slow pace, No LOB, increased SOB noted on 3 liters O2. Apparatus Needs: O2     Balance:  Balance  Sitting Balance: Supervision  Standing Balance: Contact guard assistance  Standing Balance  Time: less than 45 seconds  Activity: prep to ambulate     Transfers:  Sit to stand: Contact guard assistance(From EOB with min vcs for hand placement. )  Stand to sit: Contact guard assistance(Onto recliner. )       Upper Extremity Assessment:   LUE AROM : WFL  RUE AROM : WFL    LUE Strength  LUE Strength Comment: Deconditioning Noted-Not formally tested. RUE Strength  RUE Strength Comment: Deconditioning Noted-Not formally tested. Sensation  Overall Sensation Status: WFL  RUE Tone: Normotonic  LUE Tone: Normotonic       Activity Tolerance: Patient limited by fatigue       Assessment:  Assessment: This 75year old female is presents with GI bleed. Pt requires skilled OT intervention to increase indep and safety with all self cares, transfers, and mobility to decrease fall risk and caregiver burden.    Performance deficits / Impairments: Decreased functional mobility , Decreased ADL status, Decreased strength, Decreased endurance, Decreased safe awareness, Decreased cognition, Decreased balance  Prognosis: Good  REQUIRES OT FOLLOW UP: Yes  Safety Devices in place: Yes    Treatment Initiated: Treatment and education initiated within context of evaluation. Evaluation time included review of current medical information, gathering information related to past medical, social and functional history, completion of standardized testing, formal and informal observation of tasks, assessment of data and development of plan of care and goals. Treatment time included skilled education and facilitation of tasks to increase safety and independence with ADL's for improved functional independence and quality of life. Discharge Recommendations:  Continue to assess pending progress, 24 hour supervision or assist, Home with Home health OT    Patient Education:  OT Education: OT Role, Plan of Care, Transfer Training    Equipment Recommendations:  Equipment Needed: No    Plan:  Times per week: 5x  Current Treatment Recommendations: Strengthening, Endurance Training, Patient/Caregiver Education & Training, Equipment Evaluation, Education, & procurement, Self-Care / ADL, Functional Mobility Training, Safety Education & Training, Balance Training    Goals:  Patient goals : Go Home   Short term goals  Time Frame for Short term goals: 2 weeks   Short term goal 1: Pt will complete BUE strengthening exercises with min vcs for technique to increase indep and safety with all self cares and transfers. Short term goal 2: Pt will complete standing tolerance x 4 minutes with 2 UE release and SBA to increase indep and safety with grooming tasks. Short term goal 3: Pt will complete functional mobility to/from BR and HH distances with SBA and min vcs for safety to increase indep and safety with all self cares. Short term goal 4: Pt will complete LB self cares with LHAE PRN and min A to increase indep and safety with BADL routine.    Long term goals  Time Frame for Long term goals : No LTGs d/t short

## 2020-01-06 NOTE — PLAN OF CARE
Problem: Falls - Risk of:  Goal: Will remain free from falls  Description  Will remain free from falls  Outcome: Ongoing  Note:   Pt up with one assist with walker. Patient compliant with using call light to call for assistance with ambulation. Problem: Falls - Risk of:  Goal: Absence of physical injury  Description  Absence of physical injury  Outcome: Ongoing  Note:   Pt free of physical injury. Problem: Pain:  Goal: Pain level will decrease  Description  Pain level will decrease  Outcome: Ongoing  Note:   Patient rates her pain as a 7 out of 10. Pain goal 4. Patient has tylenol and percocet as needed for pain. Problem: Pain:  Goal: Control of acute pain  Description  Control of acute pain  Outcome: Ongoing  Note:   Patient voiced tylenol helped reduce her headache today. Problem: Pain:  Goal: Control of chronic pain  Description  Control of chronic pain  Outcome: Ongoing  Note:   Patient voices she has chronic back pain. Problem: Musculor/Skeletal Functional Status  Goal: Highest potential functional level  Outcome: Ongoing  Note:   Pt up with one assist with walker. Patient working with PT and OT. Problem: Musculor/Skeletal Functional Status  Goal: Absence of falls  Outcome: Ongoing  Note:   Patient able to use call light to call for assistance with ambulation. Problem: Nutrition  Goal: Optimal nutrition therapy  Outcome: Ongoing  Note:   Patient tolerated her regular supper order. Problem: Bleeding:  Goal: Will show no signs and symptoms of excessive bleeding  Description  Will show no signs and symptoms of excessive bleeding  Outcome: Ongoing  Note:   Patient had a small bm that was dark brown today. Care plan reviewed with patient. Patient  verbalize understanding of the plan of care and contribute to goal setting.

## 2020-01-06 NOTE — CARE COORDINATION
1/6/20, 12:38 PM    [facility] [address] [facility]  Location: -07/007-A Reason for admit: GI bleed [K92.2]   Procedure: na  01/05/2020  CXR      Moderate retrocardiac opacity is worsened which could relate to pulmonary edema or pneumonia. There is mild right infrahilar parenchymal opacity as well which is worsened.             01/06/2020  CXR    1. There is persistent pulmonary vascular congestion with slightly worsened opacity within the right lower chest and stable complete opacification of the left lower chest. There are suspected bilateral pleural effusions. In the right clinical setting    these findings can be associated with slightly worsened congestive heart failure. Follow-up chest radiographs are recommended to confirm complete resolution.           Treatment Plan of Care: ABGs on 01/05, PRBC given on 01/05 hgb 7.0, follow hgb /hct every 6 hours, today hgb 8.7, hct 28.1 stable, IS, aerosol therapy, strict I/O, pharmacy dosing Coumadin, daily INR  1.37, Augmentin IV, Lasix held with low b/p 85/45, daily weights, supp. Oxygen at 3L/min, follow all stools. Barriers to Discharge: not medically stable/CHF  PCP: Anita Cruz MD  Readmission Risk Score: 46%      Patient Goals/Plan/Treatment Preferences: from home with spouse; had declines in-home services or HH.

## 2020-01-07 ENCOUNTER — APPOINTMENT (OUTPATIENT)
Dept: PHARMACY | Age: 75
End: 2020-01-07
Payer: MEDICARE

## 2020-01-07 LAB
ANION GAP SERPL CALCULATED.3IONS-SCNC: 13 MEQ/L (ref 8–16)
BUN BLDV-MCNC: 13 MG/DL (ref 7–22)
CALCIUM SERPL-MCNC: 8.8 MG/DL (ref 8.5–10.5)
CHLORIDE BLD-SCNC: 105 MEQ/L (ref 98–111)
CO2: 23 MEQ/L (ref 23–33)
CREAT SERPL-MCNC: 0.6 MG/DL (ref 0.4–1.2)
EKG ATRIAL RATE: 277 BPM
EKG Q-T INTERVAL: 440 MS
EKG QRS DURATION: 162 MS
EKG QTC CALCULATION (BAZETT): 478 MS
EKG R AXIS: 120 DEGREES
EKG T AXIS: 90 DEGREES
EKG VENTRICULAR RATE: 71 BPM
ERYTHROCYTE [DISTWIDTH] IN BLOOD BY AUTOMATED COUNT: 15.4 % (ref 11.5–14.5)
ERYTHROCYTE [DISTWIDTH] IN BLOOD BY AUTOMATED COUNT: 54.9 FL (ref 35–45)
GFR SERPL CREATININE-BSD FRML MDRD: > 90 ML/MIN/1.73M2
GLUCOSE BLD-MCNC: 142 MG/DL (ref 70–108)
HCT VFR BLD CALC: 29.6 % (ref 37–47)
HEMOGLOBIN: 8.8 GM/DL (ref 12–16)
INR BLD: 1.58 (ref 0.85–1.13)
MCH RBC QN AUTO: 29.8 PG (ref 26–33)
MCHC RBC AUTO-ENTMCNC: 29.7 GM/DL (ref 32.2–35.5)
MCV RBC AUTO: 100.3 FL (ref 81–99)
PLATELET # BLD: 156 THOU/MM3 (ref 130–400)
PMV BLD AUTO: 10.5 FL (ref 9.4–12.4)
POTASSIUM SERPL-SCNC: 4 MEQ/L (ref 3.5–5.2)
PRO-BNP: 6254 PG/ML (ref 0–1800)
RBC # BLD: 2.95 MILL/MM3 (ref 4.2–5.4)
SODIUM BLD-SCNC: 141 MEQ/L (ref 135–145)
WBC # BLD: 8.2 THOU/MM3 (ref 4.8–10.8)

## 2020-01-07 PROCEDURE — 6370000000 HC RX 637 (ALT 250 FOR IP): Performed by: FAMILY MEDICINE

## 2020-01-07 PROCEDURE — 94640 AIRWAY INHALATION TREATMENT: CPT

## 2020-01-07 PROCEDURE — 6360000002 HC RX W HCPCS: Performed by: INTERNAL MEDICINE

## 2020-01-07 PROCEDURE — 97110 THERAPEUTIC EXERCISES: CPT

## 2020-01-07 PROCEDURE — 93005 ELECTROCARDIOGRAM TRACING: CPT | Performed by: FAMILY MEDICINE

## 2020-01-07 PROCEDURE — 6370000000 HC RX 637 (ALT 250 FOR IP): Performed by: NURSE PRACTITIONER

## 2020-01-07 PROCEDURE — 1200000003 HC TELEMETRY R&B

## 2020-01-07 PROCEDURE — 97530 THERAPEUTIC ACTIVITIES: CPT

## 2020-01-07 PROCEDURE — 85610 PROTHROMBIN TIME: CPT

## 2020-01-07 PROCEDURE — 85027 COMPLETE CBC AUTOMATED: CPT

## 2020-01-07 PROCEDURE — 6370000000 HC RX 637 (ALT 250 FOR IP): Performed by: INTERNAL MEDICINE

## 2020-01-07 PROCEDURE — 99232 SBSQ HOSP IP/OBS MODERATE 35: CPT | Performed by: FAMILY MEDICINE

## 2020-01-07 PROCEDURE — 97116 GAIT TRAINING THERAPY: CPT

## 2020-01-07 PROCEDURE — 36415 COLL VENOUS BLD VENIPUNCTURE: CPT

## 2020-01-07 PROCEDURE — 93010 ELECTROCARDIOGRAM REPORT: CPT | Performed by: INTERNAL MEDICINE

## 2020-01-07 PROCEDURE — 80048 BASIC METABOLIC PNL TOTAL CA: CPT

## 2020-01-07 PROCEDURE — 2580000003 HC RX 258: Performed by: INTERNAL MEDICINE

## 2020-01-07 PROCEDURE — 83880 ASSAY OF NATRIURETIC PEPTIDE: CPT

## 2020-01-07 PROCEDURE — 97535 SELF CARE MNGMENT TRAINING: CPT

## 2020-01-07 PROCEDURE — 94761 N-INVAS EAR/PLS OXIMETRY MLT: CPT

## 2020-01-07 RX ORDER — WARFARIN SODIUM 2.5 MG/1
2.5 TABLET ORAL
Status: COMPLETED | OUTPATIENT
Start: 2020-01-07 | End: 2020-01-07

## 2020-01-07 RX ADMIN — HYDROCORTISONE: 25 CREAM TOPICAL at 13:45

## 2020-01-07 RX ADMIN — DIGOXIN 125 MCG: 125 TABLET ORAL at 08:57

## 2020-01-07 RX ADMIN — DICYCLOMINE HYDROCHLORIDE 10 MG: 10 CAPSULE ORAL at 05:48

## 2020-01-07 RX ADMIN — DICYCLOMINE HYDROCHLORIDE 10 MG: 10 CAPSULE ORAL at 20:22

## 2020-01-07 RX ADMIN — IPRATROPIUM BROMIDE 0.5 MG: 0.5 SOLUTION RESPIRATORY (INHALATION) at 16:59

## 2020-01-07 RX ADMIN — Medication 10 ML: at 20:22

## 2020-01-07 RX ADMIN — MIDODRINE HYDROCHLORIDE 5 MG: 5 TABLET ORAL at 17:40

## 2020-01-07 RX ADMIN — Medication 1 TABLET: at 08:57

## 2020-01-07 RX ADMIN — CLOTRIMAZOLE AND BETAMETHASONE DIPROPIONATE: 10; .5 CREAM TOPICAL at 06:34

## 2020-01-07 RX ADMIN — FORMOTEROL FUMARATE DIHYDRATE 20 MCG: 20 SOLUTION RESPIRATORY (INHALATION) at 17:07

## 2020-01-07 RX ADMIN — MIDODRINE HYDROCHLORIDE 5 MG: 5 TABLET ORAL at 09:00

## 2020-01-07 RX ADMIN — IPRATROPIUM BROMIDE 0.5 MG: 0.5 SOLUTION RESPIRATORY (INHALATION) at 09:37

## 2020-01-07 RX ADMIN — DICYCLOMINE HYDROCHLORIDE 10 MG: 10 CAPSULE ORAL at 11:52

## 2020-01-07 RX ADMIN — MICONAZOLE NITRATE 1 APPLICATOR: 20 CREAM VAGINAL at 20:24

## 2020-01-07 RX ADMIN — Medication 500 MG: at 09:00

## 2020-01-07 RX ADMIN — FOLIC ACID 1 MG: 1 TABLET ORAL at 08:57

## 2020-01-07 RX ADMIN — Medication 2 TABLET: at 09:00

## 2020-01-07 RX ADMIN — CLOTRIMAZOLE AND BETAMETHASONE DIPROPIONATE: 10; .5 CREAM TOPICAL at 13:45

## 2020-01-07 RX ADMIN — AMOXICILLIN AND CLAVULANATE POTASSIUM 1 TABLET: 875; 125 TABLET, FILM COATED ORAL at 08:57

## 2020-01-07 RX ADMIN — OXYCODONE HYDROCHLORIDE AND ACETAMINOPHEN 0.5 TABLET: 5; 325 TABLET ORAL at 21:57

## 2020-01-07 RX ADMIN — AMOXICILLIN AND CLAVULANATE POTASSIUM 1 TABLET: 875; 125 TABLET, FILM COATED ORAL at 20:22

## 2020-01-07 RX ADMIN — HYDROCORTISONE: 25 CREAM TOPICAL at 20:23

## 2020-01-07 RX ADMIN — POLYETHYLENE GLYCOL 3350 17 G: 17 POWDER, FOR SOLUTION ORAL at 09:05

## 2020-01-07 RX ADMIN — DICYCLOMINE HYDROCHLORIDE 10 MG: 10 CAPSULE ORAL at 17:44

## 2020-01-07 RX ADMIN — HYDROCORTISONE ACETATE 25 MG: 25 SUPPOSITORY RECTAL at 20:22

## 2020-01-07 RX ADMIN — Medication 1 CAPSULE: at 08:57

## 2020-01-07 RX ADMIN — BUDESONIDE 500 MCG: 0.5 INHALANT RESPIRATORY (INHALATION) at 17:12

## 2020-01-07 RX ADMIN — HYDROXYZINE HYDROCHLORIDE 25 MG: 25 TABLET ORAL at 20:22

## 2020-01-07 RX ADMIN — WARFARIN SODIUM 2.5 MG: 2.5 TABLET ORAL at 17:40

## 2020-01-07 RX ADMIN — ALBUTEROL SULFATE 1.25 MG: 2.5 SOLUTION RESPIRATORY (INHALATION) at 00:21

## 2020-01-07 RX ADMIN — HYDROCORTISONE ACETATE 25 MG: 25 SUPPOSITORY RECTAL at 11:59

## 2020-01-07 RX ADMIN — CLOTRIMAZOLE AND BETAMETHASONE DIPROPIONATE: 10; .5 CREAM TOPICAL at 20:22

## 2020-01-07 RX ADMIN — FORMOTEROL FUMARATE DIHYDRATE 20 MCG: 20 SOLUTION RESPIRATORY (INHALATION) at 06:01

## 2020-01-07 RX ADMIN — Medication 10 ML: at 09:01

## 2020-01-07 RX ADMIN — ALBUTEROL SULFATE 1.25 MG: 2.5 SOLUTION RESPIRATORY (INHALATION) at 21:16

## 2020-01-07 RX ADMIN — PANTOPRAZOLE SODIUM 40 MG: 40 TABLET, DELAYED RELEASE ORAL at 05:48

## 2020-01-07 RX ADMIN — TRAZODONE HYDROCHLORIDE 50 MG: 50 TABLET ORAL at 20:27

## 2020-01-07 RX ADMIN — FUROSEMIDE 40 MG: 40 TABLET ORAL at 11:52

## 2020-01-07 RX ADMIN — HYDROCORTISONE: 25 CREAM TOPICAL at 11:56

## 2020-01-07 RX ADMIN — IPRATROPIUM BROMIDE 0.5 MG: 0.5 SOLUTION RESPIRATORY (INHALATION) at 06:01

## 2020-01-07 RX ADMIN — IPRATROPIUM BROMIDE 0.5 MG: 0.5 SOLUTION RESPIRATORY (INHALATION) at 12:45

## 2020-01-07 RX ADMIN — BUDESONIDE 500 MCG: 0.5 INHALANT RESPIRATORY (INHALATION) at 06:01

## 2020-01-07 RX ADMIN — MIDODRINE HYDROCHLORIDE 5 MG: 5 TABLET ORAL at 11:52

## 2020-01-07 RX ADMIN — CETIRIZINE HYDROCHLORIDE 5 MG: 10 TABLET, FILM COATED ORAL at 20:22

## 2020-01-07 RX ADMIN — TRIAMCINOLONE ACETONIDE: 1 CREAM TOPICAL at 20:22

## 2020-01-07 RX ADMIN — HYDROXYZINE HYDROCHLORIDE 25 MG: 25 TABLET ORAL at 13:45

## 2020-01-07 RX ADMIN — HYDROXYZINE HYDROCHLORIDE 25 MG: 25 TABLET ORAL at 08:57

## 2020-01-07 RX ADMIN — OXYCODONE HYDROCHLORIDE AND ACETAMINOPHEN 0.5 TABLET: 5; 325 TABLET ORAL at 09:05

## 2020-01-07 ASSESSMENT — PAIN DESCRIPTION - PROGRESSION
CLINICAL_PROGRESSION: GRADUALLY WORSENING
CLINICAL_PROGRESSION: RAPIDLY WORSENING

## 2020-01-07 ASSESSMENT — PAIN SCALES - GENERAL
PAINLEVEL_OUTOF10: 8
PAINLEVEL_OUTOF10: 0
PAINLEVEL_OUTOF10: 9
PAINLEVEL_OUTOF10: 0
PAINLEVEL_OUTOF10: 7
PAINLEVEL_OUTOF10: 1

## 2020-01-07 ASSESSMENT — PAIN DESCRIPTION - LOCATION
LOCATION: FOOT
LOCATION: HEAD

## 2020-01-07 ASSESSMENT — PAIN DESCRIPTION - PAIN TYPE
TYPE: CHRONIC PAIN
TYPE: CHRONIC PAIN

## 2020-01-07 ASSESSMENT — PAIN DESCRIPTION - DIRECTION: RADIATING_TOWARDS: DOWN NECK

## 2020-01-07 ASSESSMENT — PAIN DESCRIPTION - ORIENTATION
ORIENTATION: RIGHT
ORIENTATION: POSTERIOR

## 2020-01-07 ASSESSMENT — PAIN DESCRIPTION - DESCRIPTORS
DESCRIPTORS: HEADACHE
DESCRIPTORS: SORE

## 2020-01-07 ASSESSMENT — PAIN DESCRIPTION - FREQUENCY
FREQUENCY: INTERMITTENT
FREQUENCY: CONTINUOUS

## 2020-01-07 ASSESSMENT — PAIN - FUNCTIONAL ASSESSMENT
PAIN_FUNCTIONAL_ASSESSMENT: ACTIVITIES ARE NOT PREVENTED
PAIN_FUNCTIONAL_ASSESSMENT: ACTIVITIES ARE NOT PREVENTED

## 2020-01-07 ASSESSMENT — PAIN DESCRIPTION - ONSET
ONSET: GRADUAL
ONSET: SUDDEN

## 2020-01-07 NOTE — PLAN OF CARE
Problem: Falls - Risk of:  Goal: Will remain free from falls  Description  Will remain free from falls  Note:   Ambulated in halls, free from fall. Problem: Pain:  Goal: Pain level will decrease  Description  Pain level will decrease  Note:   Meeting pain goal with oral pain meds. Problem: Musculor/Skeletal Functional Status  Goal: Highest potential functional level  Note:   PT/OT     Problem: Nutrition  Goal: Optimal nutrition therapy  Note:   Tolerating diet, no nausea. Problem: Bleeding:  Goal: Will show no signs and symptoms of excessive bleeding  Description  Will show no signs and symptoms of excessive bleeding  Note:   No active bleeding noted, hgb stable today      Problem: Skin Integrity:  Goal: Skin integrity will stabilize  Description  Skin integrity will stabilize  Note:   Buttocks red, blanches. Patient turns in bed.       Problem: Discharge Planning:  Goal: Discharged to appropriate level of care  Description  Discharged to appropriate level of care  Note:   Plans home with family

## 2020-01-07 NOTE — PLAN OF CARE
Called and spoke with patient. Told patient that Neeta did document all his concerns (hiatel hernia, allergy, and urinary issues)    Patient is compliant and has no questions or concerns.   Problem: Falls - Risk of:  Goal: Will remain free from falls  Description  Will remain free from falls  1/7/2020 1043 by Cheri Jaramillo RN  Outcome: Ongoing  Note:   No falls noted this shift, patient uses call light appropriately and waits for staff prior to transferring self. Patient able to voice needs appropriately. Gait is somewhat unsteady with walker / gait belt and staff assistance. Patient is limited / extensive assistance with most ADLs. 1/6/2020 2329 by Hector Soto RN  Note:   Ambulated in halls, free from fall. Goal: Absence of physical injury  Description  Absence of physical injury  Outcome: Ongoing  Note:   No new physical injury noted this shift so far. Problem: Pain:  Goal: Pain level will decrease  Description  Pain level will decrease  1/7/2020 1043 by Cheri Jaramillo RN  Outcome: Ongoing  Note:   Patient verbalizes pain is able to be controlled with prn pain medications, pain goal of 3 / 10 is achieved. Receiving PO PRN Percocet. Rest / massage / repositioning / ice / heat are also effective for pain control. 1/6/2020 2329 by Hector Soto RN  Note:   Meeting pain goal with oral pain meds. Problem: Musculor/Skeletal Functional Status  Goal: Highest potential functional level  1/7/2020 1043 by Cheri Jaramillo RN  Outcome: Ongoing  Note:   No falls noted this shift, patient uses call light appropriately and waits for staff prior to transferring self. Patient able to voice needs appropriately. Gait is somewhat unsteady with walker / gait belt and staff assistance. Patient is limited / extensive assistance with most ADLs. 1/6/2020 2329 by Hector Soto RN  Note:   PT/OT  Goal: Absence of falls  Outcome: Ongoing  Note:   No falls noted this shift, patient uses call light appropriately and waits for staff prior to transferring self. Patient able to voice needs appropriately. Gait is somewhat unsteady with walker / gait belt and staff assistance.   Patient is limited / extensive assistance with most ADLs. Problem: Nutrition  Goal: Optimal nutrition therapy  1/7/2020 1043 by Rey Ravi RN  Outcome: Ongoing  Note:   Patient verbalizes appropriate appetite, denies n/v. Able to consume food and drink with no issues noted. Patient is on a NSA (3-4GM) diet. 1/6/2020 2329 by Karyle Nevins, RN  Note:   Tolerating diet, no nausea. Problem: Bleeding:  Goal: Will show no signs and symptoms of excessive bleeding  Description  Will show no signs and symptoms of excessive bleeding  1/7/2020 1043 by Rey Ravi RN  Outcome: Ongoing  Note:   Patient is hypotensive, no tachycardia noted. States is dizzy when standing at times. 1/6/2020 2329 by Karyle Nevins, RN  Note:   No active bleeding noted, hgb stable today      Problem: Skin Integrity:  Goal: Skin integrity will stabilize  Description  Skin integrity will stabilize  1/7/2020 1043 by Rey Ravi RN  Outcome: Ongoing  Note:   Patient's skin is appropriate for ethnicity, warm, and dry, with no new skin issues noted this shift. Mucous membranes are pink, moist, and intact. 1/6/2020 2329 by Karyle Nevins, RN  Note:   Buttocks red, blanches. Patient turns in bed. Problem: Discharge Planning:  Goal: Discharged to appropriate level of care  Description  Discharged to appropriate level of care  1/7/2020 1043 by Rey Ravi RN  Outcome: Ongoing  Note:   Patient's discharge planning continues, patient plans of being discharged to home. Denies needs. 1/6/2020 2329 by Karyle Nevins, RN  Note:   Plans home with family     Problem: Respiratory  Goal: No pulmonary complications  Outcome: Ongoing  Note:   Patient is breathing regular and unlabored - dyspnea with exertion, lung sounds are diminished throughout, denies SOB, SpO2 remains above 90% on 3L O2 NC. Care plan reviewed with patient. Patient does verbalize understanding of the plan of care and contribute to goal setting.

## 2020-01-07 NOTE — PROGRESS NOTES
Hospitalist Progress Note    Patient:  Claudine Rocha      Unit/Bed:7K-07/007-A    YOB: 1945    MRN: [id number]       Acct: [de-identified]     PCP: Timothy Gordon MD    Date of Admission: 1/2/2020    Assessment/Plan:    Anticipated Discharge in :    [facility] Problems    Diagnosis Date Noted    Coagulopathy [facility]) [D68.9]     History of atrial fibrillation [Z86.79]     Bilateral leg edema [R60.0]     Azotemia [R79.89]     Hypoalbuminemia [E88.09]     Urinary tract infection with hematuria [N39.0, R31.9]     GI bleed [K92.2] 01/02/2020    Acute on chronic anemia [D64.9] 11/27/2019    Elevated troponin [R79.89] 11/27/2019    Chronic systolic CHF (congestive heart failure) ([facility] [address] [I50.22] 10/27/2019    IBS (irritable bowel syndrome) [K58.9] 10/27/2019    CKD (chronic kidney disease) stage 2, GFR 60-89 ml/min [N18.2] 10/27/2019    Arteriosclerosis of coronary artery [I25.10] 06/22/2016    Anticoagulated on Coumadin [Z79.01] 03/31/2016    Persistent atrial fibrillation [I48.19] 04/23/2013    Chronic obstructive pulmonary disease ([facility] [address] [J44.9] 07/06/2012    Hypertension [I10] 07/06/2012    Hyperlipidemia [E78.5] 07/06/2012     GI bleed, possibly lower GI bleed secondary to internal hemorrhoids vs UGIB; OAC-related      -Patient presented with rectal bleeding,patient was just recently discharge on 11/28/2019 due to hematochezia, which was determined to be from internal hemorrhoids.  She was discharged home and advised to follow-up with GI in outpatient.  -On arrival, INR was 5.66, patient received vitamin K on arrival.  another dose of Vit K 5 mg PO x 1 given on 1/3/20 and 1/4/20.  -Coumadin resumed 1/5, ok per GI  -Patient received 1 unit PRBC on admission. Pepe Chen improved after PRBC transfusion,then dropped to 7.0 on 1/4/20 night, she received another unit of PRBC 1/4, Hgb improved   -FOBT positive  -Protonix drip switched to PO  -GI on board.  s/p EGD Very mild prepyloric patchy erythema. Small hiatal hernia.     Acute on chronic symptomatic anemia, secondary to GI bleed, improving     -Acute component due to rectal bleeding  -Patient has history of chronic anemia, receiving IV iron infusion and follows hematologist in outpatient. -MCV normal, iron panel now, folate and B12 all nl.   -Hemoglobin on arrival was 7.5, baseline hemoglobin around 10.  Patient received total 2 units PRBC so far.  Hemoglobin improved to 8.4 today.   -H/H in am     Hypotension, improving     -Lasix has not been given in the past 2-3 days, now short of breath with pulm congestion  -holding parameters to anti-HTN meds in placed   -Hold entresto, started midodrine on 1/6  - Cardio following  -VS per protocol      Supratherapeutic INR, improved     -On arrival, INR was 5.66, patient received 3 doses of vitamin K so far.    - Coumadin resumed 1/5,pharmacy to dose  - INR 1.58  today  -Repeat INR in a.m.  -f/u w coumadin clinic on DC      Elevated troponin, etiology unclear, possibly related to anemia, rule out ACS     -Troponin 0 0.024 on arrival, slightly trending down.  Patient also had elevated troponin in the past  -EKG showed ventricular paced  -Given CAD risk factors, recent abnormal Cardiolite stress test, Cardio consulted  -may need further ischemic work up    Acute on Chronic respiratory failure due to Pulmonary edema     - currently on 3lpm (baseline of 2lpm), dyspneic on ambulation  - CXR shows persistent pulmonary vascular congestion with slightly worsened opacity within the right lower chest and stable complete opacification of the left lower chest.  -pt has chronic cough, check sputum cx   - started on midodrine for hypotension to facilitate diuresis with lasix    Acute on Chronic HFrEF, EF 35-40%, s/p pacemaker     -echo in 10/2019: EF 35-40%  -Continue Lasix, digoxin, Toprol-Xl  - Hold entresto due to hypotension  -Daily weights, I/Os, fluid and salt restrictions    Chronic B/L LE edema, improving    -continue lasix  -Low-salt diet     Klebsiella pneumoniae UTI/recurrent UTI     -Patient reports recurrent UTI.  She states that she was recently treated for UTI in outpatient. -UA showed large leukocyte esterase, negative nitrite, pyuria, WBC more than 200, positive large blood  -Urine culture showed Klebsiella pneumoniae, >100K  -Patient was started on Rocephin on admission. So far, had 3 doses of rocephin, switched to Augmentin 1/6  -Urology referral in OP   -pt also reports that she sees OBGYN in OP      Azotemia, likely due to dehydration, improved     -encourage PO intake  -BMP in am      History of atrial fibrillation, currently paced rhythm     -CHADsVASC score 5   -Continue Toprol-XL  -Coumadin on hold due to coagulopathy, now improved. Discussed benefits versus risks of oral anticoagulation. GivenCHADsVASC score, discussed with patient risks of having stroke and with coumadin, risk of bleeding, patient verbalized understanding of the risks. Coumadin resumed 1/5, okay with GI.   -Continue telemetry     Essential hypertension     -BP low normal  -Placed holding parameters to antihypertensive meds  -Vital signs per protocol     COPD, compensated     -Continue Pulmicort, and as needed albuterol inhaler     Hyperlipidemia     -Continue statin     Hypoalbuminemia     -dietician consulted     Hypocalcemia, mild     -ical low, vit D in process and PTH nl  -calcium replacement protocol          Chief Complaint:  dizziness    Hospital Course:   Please see H/P for details. In summary, this is a 66 y. o. female, with past medical history of atrial fibrillation, on Coumadin, COPD, DVT, CAD, hyperlipidemia, essential hypertension, chronic systolic heart failure, EF 35 to 40% per echo 10/2019, status post pacemaker in 2008, severe pulmonary hypertension, chronic anemia, who presented to [facility] on 1/2/2020 due to dizziness.  Per H&P note,\" Patient reports seeing bright red blood in her stool.  Patient was sent in from Dr. Jamal Rodriguez suspected concerns for GI bleed. Hemoglobin was reported to be 8.3 today, down from 10.6 last week.  Patient was seeing physician for IV iron infusion today.  Patient denies chest pain or shortness of breath.  Denies nausea, vomiting, diarrhea or constipation.  Denies abdominal pain.  Denies urinary complaints.  Patient has history of GERD, blood clots and history of transfusion in the past.  Patient is currently on warfarin for atrial fibrillation. Patient hemodynamically stable on emergency department.  Patient isolated episode of hypotension with a systolic pressure of 91.  Patient afebrile temperature 98.2 °F.  Labs and imaging were obtained.  CBC with a hemoglobin 7.5.  BMP with a BUN 31 and serum creatinine 0.8.  Leukos 114.  Troponin 0.024.  X-ray of the abdomen nonobstructive bowel gas pattern, no pneumoperitoneum, nonacute with mild cardiomegaly.  Patient admitted this time for GI bleed. \"     Of note, patient was just recently discharge on 11/28/2019 due to hematochezia, which was determined to be from internal hemorrhoids.  Per DC summary note, she was started on medications for symptomatic relief and discharged home to follow-up with GI in outpatient.  Patient reports a week after discharge, she developed another rectal bleeding and noted to have supratherapeutic INR, and per patient, her Coumadin was held for 1 day prior to Coumadin clinic and INR was followed-up.     Patient received 1 dose of vitamin K oral and 1 unit PRBC on this admission.    Patient received another dose of vitamin K on 1/3/2020. GI was consulted. Pt received another unit of PRBC on 1/4/20 night due to Hgb of 7. S/p EGD 1/5/2020, which showed \" small HH, trivial prepyloric patchy erythema, O/w normal\".      1/6 CXR showed persistent pulmonary vascular congestion with slightly worsened opacity within the right lower chest and stable complete opacification of the left lower chest. There are suspected bilateral pleural effusions. In the right clinical setting these findings can be associated with slightly worsened congestive heart failure. Started on Midodrine    Subjective:   Patient seen and examined. She is still complaining of shortness of breath, aggravated by activity. She was noted to be dyspneic with physical therapy. Furosemide not given in the past 3 days due to hypotension, SBP 80-90's. BP now improving with midodrine.        Medications:  Reviewed    Infusion Medications   Scheduled Medications    warfarin  2.5 mg Oral Once    midodrine  5 mg Oral TID WC    pantoprazole  40 mg Oral QAM AC    calcium replacement protocol   Other RX Placeholder    amoxicillin-clavulanate  1 tablet Oral 2 times per day    warfarin (COUMADIN) daily dosing (placeholder)   Other RX Placeholder    furosemide  40 mg Oral Daily    folbee plus  1 tablet Oral Daily    Vitamin D  5,000 Units Oral Once per day on Mon Wed Fri    polyethylene glycol  17 g Oral Daily    hydrocortisone  25 mg Rectal BID    sodium chloride flush  10 mL Intravenous 2 times per day    formoterol  20 mcg Nebulization BID    budesonide  500 mcg Nebulization BID    dicyclomine  10 mg Oral 4x Daily AC & HS    digoxin  125 mcg Oral Every Other Day    folic acid  1 mg Oral Daily    hydrocortisone   Topical TID    hydrOXYzine  25 mg Oral TID    ipratropium  0.5 mg Nebulization 4x Daily    cetirizine  5 mg Oral Daily    [Held by provider] metoprolol succinate  25 mg Oral Daily    multivitamin  2 tablet Oral Daily    lactobacillus  1 capsule Oral Daily    vitamin C  500 mg Oral Daily    traZODone  50 mg Oral Nightly    [Held by provider] sacubitril-valsartan  1 tablet Oral BID     PRN Meds: sodium chloride flush, polyvinyl alcohol, azelastine, magnesium hydroxide, ondansetron, acetaminophen, albuterol sulfate HFA, clotrimazole-betamethasone, hydrocodone-chlorpheniramine, albuterol, miconazole, nitroGLYCERIN, ofloxacin, oxyCODONE-acetaminophen, polyethylene glycol, pramoxine, triamcinolone      Intake/Output Summary (Last 24 hours) at 1/7/2020 1727  Last data filed at 1/7/2020 1452  Gross per 24 hour   Intake 660 ml   Output 550 ml   Net 110 ml       Diet:  DIET GENERAL; No Added Salt (3-4 GM)    Exam:  BP (!) 120/50   Pulse 72   Temp 98.1 °F (36.7 °C) (Oral)   Resp 19   Ht 5' 2\" (1.575 m)   Wt 155 lb 9.6 oz (70.6 kg)   SpO2 93%   BMI 28.46 kg/m²     General appearance: No apparent distress, appears stated age and cooperative. HEENT: Pupils equal, round, and reactive to light. Conjunctivae/corneas clear. Neck: Supple, with full range of motion. No jugular venous distention. Trachea midline. Respiratory:  Normal respiratory effort. Clear to auscultation, bilaterally without Rales/Wheezes/Rhonchi. Cardiovascular: Regular rate and rhythm, + systolic murmur  Abdomen: Soft, non-tender, non-distended with normal bowel sounds. Musculoskeletal: passive and active ROM x 4 extremities. Bipedal pitting edema  Skin: Skin color, texture, turgor normal.  No rashes or lesions. Neurologic:  Neurovascularly intact without any focal sensory/motor deficits. Cranial nerves: II-XII intact, grossly non-focal.  Psychiatric: Alert and oriented, thought content appropriate, normal insight  Capillary Refill: Brisk,< 3 seconds   Peripheral Pulses: +2 palpable, equal bilaterally       Labs:   Recent Labs     01/05/20  1137 01/06/20  0656 01/07/20  0700   WBC  --   --  8.2   HGB 8.8* 8.7* 8.8*   HCT 28.5* 28.1* 29.6*   PLT  --   --  156     Recent Labs     01/06/20  0656 01/07/20  0700    141   K 3.6 4.0    105   CO2 24 23   BUN 12 13   CREATININE 0.6 0.6   CALCIUM 8.7 8.8     No results for input(s): AST, ALT, BILIDIR, BILITOT, ALKPHOS in the last 72 hours.   Recent Labs     01/05/20  0017 01/06/20  0656 01/07/20  0700   INR 2.26* 1.37* 1.58*     No results for input(s): Kevin Lerner in the last 72 [] Home       [] TCU       [] Rehab       [] Psych       [] SNF       [] [place]       [] Other-    Code Status: Full Code    PT/OT Eval Status:         Electronically signed by Aida Boatneg MD on 1/7/2020 at 5:27 PM

## 2020-01-07 NOTE — CARE COORDINATION
1/7/20, 11:03 AM    DISCHARGE ONGOING EVALUATION:     Lorna Man day: 5  Location: [facility]07/007-A Reason for admit: GI bleed [K92.2]   Treatment Plan of Care: Hospitalist following. Cardio consult. Protonix transitioned to PO. Amoxicillin. Nebs. Inhaler. Elevated BNP. 92% on 3L NC, base line is 2L. Started on midodrine 1/6. PT/OT. Barriers to Discharge: Await medical clearance. Continues to be short of breath.

## 2020-01-07 NOTE — CONSULTS
[address] [id number]                                  CONSULTATION    PATIENT NAME: Kenton Fontenot                    :        1945  MED REC NO:   [id number]                           ROOM:       0007  ACCOUNT NO:   [de-identified]                           ADMIT DATE: 2020  PROVIDER:     Yahaira Rockwell. BANDAR [place]:  2020    REASON FOR EVALUATION:  Dizziness, hypotension. HISTORY OF PRESENT ILLNESS:  This is a patient who is a 79-year-old lady  well known to me from prior encounter. She does have a history of  coronary artery disease and history of anterior wall myocardial  infarction and severe cardiomyopathy, history of an AICD. The patient  had a history of chronic atrial fibrillation. She has been on the  Coumadin. Apparently, the patient has been taking extensive amount of  Percocet and the Tylenol and her INR was elevated up to 5.5 and she was  having dark, tarry stool. She came to the emergency room and she was  anemic after she was admitted from  _____ office. The patient seen  by the GI service and an EGD was performed, showed no active bleeding. Her hemoglobin has been stable. The patient is known to have a history  of chronic anemia. She had seen Dr. Lesli Marmolejo and she has been receiving  iron supplements in the past.  She denied chest pain. She denied PND  and orthopnea. Her BNP was elevated and probably related to her left  ventricular systolic congestive heart failure and her anemia. The patient has chronic elevation of her troponin. She did have a  stress Cardiolite test not before long, which was abnormal, but the  patient opt to continue with the medical treatment. She denied PND and  orthopnea. She has no significant change in her weight. REVIEW OF SYSTEMS:  This patient has no history of CVA. The patient had  a history of dyslipidemia.   She had a history of chronic atrial  fibrillation, history of gastroesophageal reflux, history of arthritis. The patient had a recent hip surgery and the patient has no change in  her weight. She denied PND and orthopnea. The patient has recent left  hip surgery. The patient denied fevers, chills or rigors. The patient  has history of chronic anemia. SOCIAL HISTORY:  Unfortunate, the patient is still smoking. She does  utilize alcohol too. ALLERGIES:  She is allergic to BENADRYL. CODE STATUS:  She is a full code. PHYSICAL EXAMINATION:  VITAL SIGNS:  Showed her blood pressure is 100/59. She was in a pacer  rhythm, atrial fibrillation. NEUROLOGIC:  She has no focal neurological deficits. LUNGS:  She has scattered respiratory rhonchi. No chest wall  tenderness. Few basal crackles. ABDOMEN:  Soft. GENITAL AND RECTAL:  Deferred. EXTREMITIES:  Lower limbs, there is no edema. DIAGNOSTIC DATA:  Her chest x-ray showed pulmonary edema. The patient  had an echocardiogram back in 09/2019, which showed systolic dysfunction  of the left ventricular ejection fraction is about 35% to 40%. She has  a history of pulmonary hypertension. IMPRESSION:  1. The patient with acute GI blood loss, probably exacerbated by the  marked elevation of her INR secondary to Coumadin and secondary to  utilization of the Tylenol. 2.  History of coronary artery disease, anterior wall MI with a systolic  dysfunction. The left ventricular ejection fraction about 35% to 40%. 3.  Chronic atrial fibrillation. 4.  Chronic anticoagulation with Coumadin secondary to atrial  fibrillation. 5.  COPD. 6.  Tobacco use. 7.  Recent hip surgery. 8.  Abnormal nuclear stress test.  9.  Peripheral vascular disease. 10.  Tobacco use. 11.  Anemia due to the blood loss. 12.  Low albumin probably nutrition. RECOMMENDATIONS:  1. At this point, I recommend for the patient to continue with her  diuretics, to avoid exacerbation of pulmonary edema.   2. Recommend to decrease the Entresto to 24/26 mg rather than the  current dose. 3.  Agree with ProAmatine at this point, the iron supplement and close  monitoring of her H and H. The patient could be considered for a  Watchman device down the line. The patient might need further cardiac  workup as she had a stress test recently, which was abnormal.  We might  consider a heart cath once the patient is stable, not at this admission. We thank you very much for allowing us to share in the management of  this patient. We will follow up with you. Kye Vargas M.D.    D: 01/07/2020 12:12:15       T: 01/07/2020 13:30:11     [email or web address]  Job#: [id number]     Doc#: [id number]    CC:  Mary Beth Friedman M.D.

## 2020-01-07 NOTE — PROGRESS NOTES
[address] [facility]  Occupational Therapy  Daily Note  Time:   Time In: 1420  Time Out: 1443  Timed Code Treatment Minutes: 23 Minutes  Minutes: 23          Date: 2020  Patient Name: Yaneli Day,   Gender: female      Room: -007-A  MRN: [id number]  : 1945  (75 y.o.)  Referring Practitioner: Ainsley Oneill MD  Diagnosis: GI Bleed   Additional Pertinent Hx: 75 y.o. female who presents from Dr. Morse Cast office for low HGB of 8.6 Patient's last HGB was 10.6 on 2019. Patient received IV iron in the office today. Patient reports melena and blood in stool for 1 week. She reports dizziness and low back pain. She denies abdominal pain, chest pain, shortness of breath, or syncope. Patient states she has had a blood transfusion in the past. Patient takes Coumadin and 81mg ASA. She states her last INR level was elevated. The patient has a past medical history of anemia, anxiety, asthma, A-sib, CAD, COPD, GERD, HLD, HTN, CHF, MI, and pacemaker defibrillator. No further complaints at the time of the initial encounter. Restrictions/Precautions:  Restrictions/Precautions: General Precautions, Fall Risk  Position Activity Restriction  Other position/activity restrictions: Mildly impulsive at times     SUBJECTIVE: Pt seated in bedside chair upon arrival, agreeable to OT session. RN okayed therapy session. PAIN: No c/o. COGNITION: Slow Processing, Decreased Problem Solving and Decreased Safety Awareness    ADL:   Lower Extremity Dressing: Stand By Assistance. Pt able to kick off shoes seated EOB. Toileting: Contact Guard Assistance. CGA for clothing management and hygeine. Toilet Transfer: Contact Guard Assistance. STS. BALANCE:  Sitting Balance:  Stand By Assistance  Standing Balance: Contact Guard Assistance  x1 minute in prep for mobility, within ADLs.     BED MOBILITY:  Sit to Supine: Stand By Assistance   Scooting: Stand By Assistance EOB    TRANSFERS:  Sit to Stand:  Stand By Assistance. Stand to Sit: Stand By Assistance. FUNCTIONAL MOBILITY:  Assistive Device: Rolling Walker  Assist Level:  Contact Guard Assistance. Distance: To and from bathroom  Completed functional mobility to/from BR and within pt room at fair pace, no LOB noted. Pt requires min cues for walker safety to maneuver around obstacles- seated rest break after trial of mobility, mod fatigue noted. ASSESSMENT:     Activity Tolerance:  Patient tolerance of  treatment: good. Discharge Recommendations: Continue to assess pending progress, 24 hour supervision or assist, Home with Home health OT  Equipment Recommendations: Equipment Needed: No  Plan: Times per week: 5x  Current Treatment Recommendations: Strengthening, Endurance Training, Patient/Caregiver Education & Training, Equipment Evaluation, Education, & procurement, Self-Care / ADL, Functional Mobility Training, Safety Education & Training, Balance Training    Patient Education  Patient Education: ADL's, Importance of Increasing Activity, Assistive Device Safety and Safety with transfers and mobility. Goals  Short term goals  Time Frame for Short term goals: 2 weeks   Short term goal 1: Pt will complete BUE strengthening exercises with min vcs for technique to increase indep and safety with all self cares and transfers. Short term goal 2: Pt will complete standing tolerance x 4 minutes with 2 UE release and SBA to increase indep and safety with grooming tasks. Short term goal 3: Pt will complete functional mobility to/from BR and HH distances with SBA and min vcs for safety to increase indep and safety with all self cares. Short term goal 4: Pt will complete LB self cares with LHAE PRN and min A to increase indep and safety with BADL routine. Long term goals  Time Frame for Long term goals : No LTGs d/t short estimated length of stay.      Following session, patient left in safe position with all fall risk precautions in place.

## 2020-01-07 NOTE — PROGRESS NOTES
[address] [address]  INPATIENT PHYSICAL THERAPY  DAILY NOTE  [facility] ORTHOPEDICS 7K - 7K-07/007-A    Time In: 0945  Time Out: 1023  Timed Code Treatment Minutes: 45 Minutes  Minutes: 38          Date: 2020  Patient Name: Yaneli Day,  Gender:  female        MRN: [id number]  : 1945  (75 y.o.)     Referring Practitioner: Dr. Hattie Rasmussen  Diagnosis: GI Bleed   Additional Pertinent Hx: 75 y.o. female who presents from Dr. Morse Cast office for low HGB of 8.6 Patient's last HGB was 10.6 on 2019. Patient received IV iron in the office today. Patient reports melena and blood in stool for 1 week. She reports dizziness and low back pain. She denies abdominal pain, chest pain, shortness of breath, or syncope. Patient states she has had a blood transfusion in the past. Patient takes Coumadin and 81mg ASA. She states her last INR level was elevated. The patient has a past medical history of anemia, anxiety, asthma, A-sib, CAD, COPD, GERD, HLD, HTN, CHF, MI, and pacemaker defibrillator. No further complaints at the time of the initial encounter. Prior Level of Function:  Lives With: Spouse  Type of Home: House  Home Layout: One level  Home Access: Stairs to enter with rails  Entrance Stairs - Number of Steps: 3   Entrance Stairs - Rails: Left  Home Equipment: Rolling walker, Wheelchair-electric   Bathroom Shower/Tub: Walk-in shower  Bathroom Toilet: Standard  Bathroom Equipment: Commode, Shower chair  Bathroom Accessibility: Accessible    Receives Help From: Family  ADL Assistance: Independent  Homemaking Assistance: (spouse completes. )  Ambulation Assistance: Independent  Transfer Assistance: Independent  Active : No    Restrictions/Precautions:  Restrictions/Precautions: General Precautions, Fall Risk  Position Activity Restriction  Other position/activity restrictions: Mildly impulsive at times     SUBJECTIVE: RN approved session. Pt in bed upon arrival and agrees to therapy.  Needing min encouragement to amb. Pt had just gotten breathing tx prior to session, pt reporting she had a rough morning, diffiulty breathing. Monitored SpO2 throughout session, ranged from 84-95%, majority of session around 88-92%  BP seated prior to mobility 135/50, after mobility 130/65    PAIN: no co pain    OBJECTIVE:  Bed Mobility:  Rolling to Right: Minimal Assistance   Supine to Sit: Contact Guard Assistance  Sit to Supine: Stand By Assistance   Scooting: Stand By Assistance    Transfers:  Sit to Stand: Contact Guard Assistance  Stand to [employer] Assistance    Ambulation:  Stand By Assistance, Contact Guard Assistance  Distance: 40ft in room on 3L O2  Surface: Level Tile  Device:Rolling Walker  Gait Deviations: Forward Flexed Posture, Slow Cassie, Decreased Step Length Bilaterally, Decreased Gait Speed, Decreased Heel Strike Bilaterally and Unsteady Gait    Balance:  Static Sitting Balance:  Supervision  Pt EOB ~10 mins for vitals, rest breaks before and after gait. Pt needing cues for breathing techniques    Exercise:  Patient was guided in 1 set(s) 10 reps of exercise to both lower extremities. Ankle pumps, Glut sets, Quad sets, Heelslides, Hip abduction/adduction and Straight leg raises. Exercises were completed for increased independence with functional mobility. Functional Outcome Measures: Completed  AM-PAC Inpatient Mobility without Stair Climbing Raw Score : 15  AM-PAC Inpatient without Stair Climbing T-Scale Score : 43.03    ASSESSMENT:  Assessment: Patient progressing toward established goals. Activity Tolerance:  Patient tolerance of  treatment: good. Pt tolerated session well. Pt demos decreased strength, endurance, balance and tolerance to mobility.      Equipment Recommendations:Equipment Needed: No  Discharge Recommendations:  Continue to assess pending progress    Plan: Times per week: 5x GM   Times per day: Daily  Specific instructions for Next Treatment: ther ex, functional mobility, strengthening and endurance training   Current Treatment Recommendations: Balance Training, Strengthening, Functional Mobility Training, Transfer Training, Gait Training, Home Exercise Program, Endurance Training, Stair training    Patient Education  Patient Education: Plan of Care, [employer] Mobility, Transfers, Gait, Verbal Exercise Instruction    Goals:  Patient goals : To return home   Short term goals  Time Frame for Short term goals: At time of discharge   Short term goal 1: Mod I with bed mobility so pt can get in and out of bed  Short term goal 2: Mod I with t/fs so pt can get up to go to the bathroom  Short term goal 3: Mod I to amb with RW 75'x 1 for household amb   Short term goal 4: Pt to negotiate 3 steps with 1 HR to enter home   Long term goals  Time Frame for Long term goals : No LTGs secondary to ELOS     Following session, patient left in safe position with all fall risk precautions in place.

## 2020-01-08 ENCOUNTER — APPOINTMENT (OUTPATIENT)
Dept: GENERAL RADIOLOGY | Age: 75
DRG: 393 | End: 2020-01-08
Payer: MEDICARE

## 2020-01-08 LAB
ERYTHROCYTE [DISTWIDTH] IN BLOOD BY AUTOMATED COUNT: 14.9 % (ref 11.5–14.5)
ERYTHROCYTE [DISTWIDTH] IN BLOOD BY AUTOMATED COUNT: 50.8 FL (ref 35–45)
HCT VFR BLD CALC: 29.8 % (ref 37–47)
HEMOGLOBIN: 9.4 GM/DL (ref 12–16)
INR BLD: 2.38 (ref 0.85–1.13)
MCH RBC QN AUTO: 30.2 PG (ref 26–33)
MCHC RBC AUTO-ENTMCNC: 31.5 GM/DL (ref 32.2–35.5)
MCV RBC AUTO: 95.8 FL (ref 81–99)
PLATELET # BLD: 184 THOU/MM3 (ref 130–400)
PMV BLD AUTO: 10.8 FL (ref 9.4–12.4)
RBC # BLD: 3.11 MILL/MM3 (ref 4.2–5.4)
TROPONIN T: < 0.01 NG/ML
WBC # BLD: 5.8 THOU/MM3 (ref 4.8–10.8)

## 2020-01-08 PROCEDURE — 94761 N-INVAS EAR/PLS OXIMETRY MLT: CPT

## 2020-01-08 PROCEDURE — 6370000000 HC RX 637 (ALT 250 FOR IP): Performed by: FAMILY MEDICINE

## 2020-01-08 PROCEDURE — 6370000000 HC RX 637 (ALT 250 FOR IP): Performed by: NURSE PRACTITIONER

## 2020-01-08 PROCEDURE — 97116 GAIT TRAINING THERAPY: CPT

## 2020-01-08 PROCEDURE — 94640 AIRWAY INHALATION TREATMENT: CPT

## 2020-01-08 PROCEDURE — 6370000000 HC RX 637 (ALT 250 FOR IP)

## 2020-01-08 PROCEDURE — 85027 COMPLETE CBC AUTOMATED: CPT

## 2020-01-08 PROCEDURE — 97110 THERAPEUTIC EXERCISES: CPT

## 2020-01-08 PROCEDURE — 85610 PROTHROMBIN TIME: CPT

## 2020-01-08 PROCEDURE — 1200000003 HC TELEMETRY R&B

## 2020-01-08 PROCEDURE — 36415 COLL VENOUS BLD VENIPUNCTURE: CPT

## 2020-01-08 PROCEDURE — 6360000002 HC RX W HCPCS: Performed by: INTERNAL MEDICINE

## 2020-01-08 PROCEDURE — 6370000000 HC RX 637 (ALT 250 FOR IP): Performed by: INTERNAL MEDICINE

## 2020-01-08 PROCEDURE — 97530 THERAPEUTIC ACTIVITIES: CPT

## 2020-01-08 PROCEDURE — 84484 ASSAY OF TROPONIN QUANT: CPT

## 2020-01-08 PROCEDURE — 2700000000 HC OXYGEN THERAPY PER DAY

## 2020-01-08 PROCEDURE — 99232 SBSQ HOSP IP/OBS MODERATE 35: CPT | Performed by: PHYSICIAN ASSISTANT

## 2020-01-08 PROCEDURE — 71045 X-RAY EXAM CHEST 1 VIEW: CPT

## 2020-01-08 PROCEDURE — 6370000000 HC RX 637 (ALT 250 FOR IP): Performed by: PHYSICIAN ASSISTANT

## 2020-01-08 PROCEDURE — 2580000003 HC RX 258: Performed by: INTERNAL MEDICINE

## 2020-01-08 RX ORDER — HYDROXYZINE HYDROCHLORIDE 25 MG/1
25 TABLET, FILM COATED ORAL 3 TIMES DAILY PRN
Status: DISCONTINUED | OUTPATIENT
Start: 2020-01-08 | End: 2020-01-09 | Stop reason: HOSPADM

## 2020-01-08 RX ADMIN — IPRATROPIUM BROMIDE 0.5 MG: 0.5 SOLUTION RESPIRATORY (INHALATION) at 09:08

## 2020-01-08 RX ADMIN — DICYCLOMINE HYDROCHLORIDE 10 MG: 10 CAPSULE ORAL at 20:45

## 2020-01-08 RX ADMIN — HYDROCORTISONE: 25 CREAM TOPICAL at 20:47

## 2020-01-08 RX ADMIN — PANTOPRAZOLE SODIUM 40 MG: 40 TABLET, DELAYED RELEASE ORAL at 05:01

## 2020-01-08 RX ADMIN — OXYCODONE HYDROCHLORIDE AND ACETAMINOPHEN 0.5 TABLET: 5; 325 TABLET ORAL at 20:52

## 2020-01-08 RX ADMIN — DICYCLOMINE HYDROCHLORIDE 10 MG: 10 CAPSULE ORAL at 10:26

## 2020-01-08 RX ADMIN — FORMOTEROL FUMARATE DIHYDRATE 20 MCG: 20 SOLUTION RESPIRATORY (INHALATION) at 16:53

## 2020-01-08 RX ADMIN — Medication 1 CAPSULE: at 08:30

## 2020-01-08 RX ADMIN — FOLIC ACID 1 MG: 1 TABLET ORAL at 08:29

## 2020-01-08 RX ADMIN — HYDROCORTISONE: 25 CREAM TOPICAL at 16:01

## 2020-01-08 RX ADMIN — HYDROCORTISONE: 25 CREAM TOPICAL at 10:26

## 2020-01-08 RX ADMIN — Medication 0.5 MG: at 16:01

## 2020-01-08 RX ADMIN — BUDESONIDE 500 MCG: 0.5 INHALANT RESPIRATORY (INHALATION) at 16:54

## 2020-01-08 RX ADMIN — POLYETHYLENE GLYCOL 3350 17 G: 17 POWDER, FOR SOLUTION ORAL at 10:25

## 2020-01-08 RX ADMIN — TRAZODONE HYDROCHLORIDE 50 MG: 50 TABLET ORAL at 20:45

## 2020-01-08 RX ADMIN — AMOXICILLIN AND CLAVULANATE POTASSIUM 1 TABLET: 875; 125 TABLET, FILM COATED ORAL at 20:45

## 2020-01-08 RX ADMIN — VITAMIN D, TAB 1000IU (100/BT) 5000 UNITS: 25 TAB at 08:29

## 2020-01-08 RX ADMIN — MIDODRINE HYDROCHLORIDE 5 MG: 5 TABLET ORAL at 16:00

## 2020-01-08 RX ADMIN — FORMOTEROL FUMARATE DIHYDRATE 20 MCG: 20 SOLUTION RESPIRATORY (INHALATION) at 04:50

## 2020-01-08 RX ADMIN — Medication 10 ML: at 10:25

## 2020-01-08 RX ADMIN — DICYCLOMINE HYDROCHLORIDE 10 MG: 10 CAPSULE ORAL at 05:01

## 2020-01-08 RX ADMIN — SACUBITRIL AND VALSARTAN 1 TABLET: 24; 26 TABLET, FILM COATED ORAL at 20:45

## 2020-01-08 RX ADMIN — DICYCLOMINE HYDROCHLORIDE 10 MG: 10 CAPSULE ORAL at 16:00

## 2020-01-08 RX ADMIN — SACUBITRIL AND VALSARTAN 1 TABLET: 24; 26 TABLET, FILM COATED ORAL at 10:25

## 2020-01-08 RX ADMIN — CETIRIZINE HYDROCHLORIDE 5 MG: 10 TABLET, FILM COATED ORAL at 20:45

## 2020-01-08 RX ADMIN — Medication 1 TABLET: at 08:29

## 2020-01-08 RX ADMIN — FUROSEMIDE 40 MG: 40 TABLET ORAL at 08:30

## 2020-01-08 RX ADMIN — IPRATROPIUM BROMIDE 0.5 MG: 0.5 SOLUTION RESPIRATORY (INHALATION) at 12:32

## 2020-01-08 RX ADMIN — IPRATROPIUM BROMIDE 0.5 MG: 0.5 SOLUTION RESPIRATORY (INHALATION) at 16:51

## 2020-01-08 RX ADMIN — Medication 500 MG: at 08:29

## 2020-01-08 RX ADMIN — BUDESONIDE 500 MCG: 0.5 INHALANT RESPIRATORY (INHALATION) at 04:50

## 2020-01-08 RX ADMIN — ALBUTEROL SULFATE 1.25 MG: 2.5 SOLUTION RESPIRATORY (INHALATION) at 22:17

## 2020-01-08 RX ADMIN — HYDROCORTISONE ACETATE 25 MG: 25 SUPPOSITORY RECTAL at 10:25

## 2020-01-08 RX ADMIN — IPRATROPIUM BROMIDE 0.5 MG: 0.5 SOLUTION RESPIRATORY (INHALATION) at 04:50

## 2020-01-08 RX ADMIN — AMOXICILLIN AND CLAVULANATE POTASSIUM 1 TABLET: 875; 125 TABLET, FILM COATED ORAL at 08:29

## 2020-01-08 ASSESSMENT — PAIN SCALES - GENERAL
PAINLEVEL_OUTOF10: 0
PAINLEVEL_OUTOF10: 8
PAINLEVEL_OUTOF10: 0

## 2020-01-08 NOTE — PROGRESS NOTES
Date    WBC 5.8 01/08/2020    RBC 3.11 01/08/2020    HGB 9.4 01/08/2020    HCT 29.8 01/08/2020    MCV 95.8 01/08/2020    MCH 30.2 01/08/2020    MCHC 31.5 01/08/2020    RDW 13.9 07/06/2018     01/08/2020    MPV 10.8 01/08/2020     BMP  Lab Results   Component Value Date     01/07/2020    K 4.0 01/07/2020    K 4.7 01/02/2020     01/07/2020    CO2 23 01/07/2020    BUN 13 01/07/2020    CREATININE 0.6 01/07/2020    CALCIUM 8.8 01/07/2020      COAG PROFILE:  Lab Results   Component Value Date    APTT 46.9 01/02/2020    INR 2.38 01/08/2020       Assessment:     Principal Problem:    GI bleed  Active Problems:    Chronic obstructive pulmonary disease (HCC)    Hyperlipidemia    Hypertension    Persistent atrial fibrillation    Anticoagulated on Coumadin    Arteriosclerosis of coronary artery    CKD (chronic kidney disease) stage 2, GFR 60-89 ml/min    Chronic systolic CHF (congestive heart failure) ([facility])    IBS (irritable bowel syndrome)    Acute on chronic anemia    Elevated troponin    Coagulopathy (HCC)    History of atrial fibrillation    Bilateral leg edema    Azotemia    Hypoalbuminemia    Urinary tract infection with hematuria  Resolved Problems:    * No resolved hospital problems.  *      Plan:   H/o cad and prior mi and systloic dysfunction of left ventricle    continue supportive rx    will need diuretics    chronic atrial fib    naemia due to blood loss    copd    dm    dyslipidaemia    stable cardiac wise    patient had chronic low bp    will see at the office

## 2020-01-08 NOTE — PLAN OF CARE
Problem: Impaired respiratory status  Goal: Clear lung sounds  Outcome: Ongoing   Continue aerosols as ordered to improve breath sounds

## 2020-01-08 NOTE — PROGRESS NOTES
[address] [facility]  Occupational Therapy  Daily Note  Time:   Time In: 8972  Time Out: 1105  Timed Code Treatment Minutes: 24 Minutes  Minutes: 24          Date: 2020  Patient Name: Kerri Montague,   Gender: female      Room: [facility]007-A  MRN: [id number]  : 1945  (75 y.o.)  Referring Practitioner: Tahmina Dunaway MD  Diagnosis: GI Bleed   Additional Pertinent Hx: 75 y.o. female who presents from Dr. Eileen Merritt office for low HGB of 8.6 Patient's last HGB was 10.6 on 2019. Patient received IV iron in the office today. Patient reports melena and blood in stool for 1 week. She reports dizziness and low back pain. She denies abdominal pain, chest pain, shortness of breath, or syncope. Patient states she has had a blood transfusion in the past. Patient takes Coumadin and 81mg ASA. She states her last INR level was elevated. The patient has a past medical history of anemia, anxiety, asthma, A-sib, CAD, COPD, GERD, HLD, HTN, CHF, MI, and pacemaker defibrillator. No further complaints at the time of the initial encounter. Restrictions/Precautions:  Restrictions/Precautions: General Precautions, Fall Risk  Position Activity Restriction  Other position/activity restrictions: Mildly impulsive at times     SUBJECTIVE: Pt in BR with nursing upon arrival, agreeable to OT session. RN okayed therapy session and requests functional mobility in mane in order to assess O2 tolerance in prep for possible discharge today. PAIN: no c/o. COGNITION: Slow Processing    ADL:   Toileting: Stand By Assistance. For hygeine and clothing management. BALANCE:  Sitting Balance:  Modified Independent  Standing Balance: Stand By Assistance, Contact Guard Assistance  x1 minute static stand for rest break x3 episodes. TRANSFERS:  Sit to Stand:  Stand By Assistance. Stand to Sit: Stand By Assistance.      FUNCTIONAL MOBILITY:  Assistive Device: Rolling Walker and 2L O2 (increased to 3L goal 3: Pt will complete functional mobility to/from BR and HH distances with SBA and min vcs for safety to increase indep and safety with all self cares. Short term goal 4: Pt will complete LB self cares with LHAE PRN and min A to increase indep and safety with BADL routine. Long term goals  Time Frame for Long term goals : No LTGs d/t short estimated length of stay. Following session, patient left in safe position with all fall risk precautions in place.

## 2020-01-08 NOTE — PROGRESS NOTES
Hospitalist Progress Note      Patient:  Cayden Howard    Unit/Bed:7K-07/007-A  YOB: 1945  MRN: [id number]   Acct: [de-identified]   PCP: Curtis Freeman MD  Date of Admission: 1/2/2020    Assessment/Plan:    GI bleed, possibly lower GI bleed secondary to internal hemorrhoids vs UGIB; OAC-related   S/P EGD. Unremarkable. GI signed off. Restarted Coumadin. PO Protonix. Pharmacy to dose Coumadin. Acute on chronic symptomatic anemia, secondary to GI bleed, improving   s/p 2 units of pRBC. Hgb is stable at 9.4. Patient has history of chronic anemia, receiving IV iron infusion and follows hematologist in outpatient. Pt originally came to [facility] from [place] [address] office.      Hypotension, improving  Dr. Keira Arias following and saw patient on 1/7. Decreased dose of Entresto. Ordered to give Lasix. Supratherapeutic INR, improved  Pharmacy to dose Coumadin.      Elevated troponin, etiology unclear, possibly related to anemia, rule out ACS   Troponin 0 0.024 on arrival, slightly trending down.  Patient also had elevated troponin in the past. Pt had an abnormal stress test. Dr. Keira Arias thinks that the patient might need a C sometime in the future. EKG showed ventricular paced.      Acute on Chronic respiratory failure due to Pulmonary edema  Currently on 3L (baseline of 2L), IVERSON. Pt states that she was very SOB on her walking. Pt was given her Lasix this AM.      Acute on Chronic HFrEF, EF 35-40%, s/p pacemaker  Last Echo: EF 35-40%. Continue Lasix, digoxin, Toprol-Xl. Continue low dose Entresto.   -Daily weights, I/Os, fluid and salt restrictions     Klebsiella pneumoniae UTI/recurrent UTI   Patient reports recurrent UTI.  She states that she was recently treated for UTI in outpatient. UA showed large leukocyte esterase, negative nitrite, pyuria, WBC more than 200, positive large blood. Urine culture showed Klebsiella pneumoniae, >100K.  Ceftriaxone stopped admits to SOB and IVERSON.  Pt denies CP, Abd pain and muscle aches     Medications:  Reviewed    Infusion Medications   Scheduled Medications    sacubitril-valsartan  1 tablet Oral BID    warfarin  0.5 mg Oral Once    midodrine  5 mg Oral TID WC    pantoprazole  40 mg Oral QAM AC    calcium replacement protocol   Other RX Placeholder    amoxicillin-clavulanate  1 tablet Oral 2 times per day    warfarin (COUMADIN) daily dosing (placeholder)   Other RX Placeholder    furosemide  40 mg Oral Daily    folbee plus  1 tablet Oral Daily    Vitamin D  5,000 Units Oral Once per day on Mon Wed Fri    polyethylene glycol  17 g Oral Daily    hydrocortisone  25 mg Rectal BID    sodium chloride flush  10 mL Intravenous 2 times per day    formoterol  20 mcg Nebulization BID    budesonide  500 mcg Nebulization BID    dicyclomine  10 mg Oral 4x Daily AC & HS    digoxin  125 mcg Oral Every Other Day    folic acid  1 mg Oral Daily    hydrocortisone   Topical TID    ipratropium  0.5 mg Nebulization 4x Daily    cetirizine  5 mg Oral Daily    [Held by provider] metoprolol succinate  25 mg Oral Daily    multivitamin  2 tablet Oral Daily    lactobacillus  1 capsule Oral Daily    vitamin C  500 mg Oral Daily    traZODone  50 mg Oral Nightly     PRN Meds: hydrOXYzine, sodium chloride flush, polyvinyl alcohol, azelastine, magnesium hydroxide, ondansetron, acetaminophen, albuterol sulfate HFA, clotrimazole-betamethasone, hydrocodone-chlorpheniramine, albuterol, miconazole, nitroGLYCERIN, ofloxacin, oxyCODONE-acetaminophen, polyethylene glycol, pramoxine, triamcinolone      Intake/Output Summary (Last 24 hours) at 1/8/2020 1515  Last data filed at 1/8/2020 1435  Gross per 24 hour   Intake 1000 ml   Output 300 ml   Net 700 ml       Diet:  DIET GENERAL; No Added Salt (3-4 GM)    Exam:  BP (!) 142/55   Pulse 71   Temp 97.9 °F (36.6 °C) (Oral)   Resp 18   Ht 5' 2\" (1.575 m)   Wt 155 lb 9.6 oz (70.6 kg)   SpO2 94%   BMI

## 2020-01-09 VITALS
BODY MASS INDEX: 28.63 KG/M2 | HEART RATE: 80 BPM | WEIGHT: 155.6 LBS | RESPIRATION RATE: 16 BRPM | OXYGEN SATURATION: 96 % | DIASTOLIC BLOOD PRESSURE: 66 MMHG | SYSTOLIC BLOOD PRESSURE: 112 MMHG | HEIGHT: 62 IN | TEMPERATURE: 97.2 F

## 2020-01-09 LAB
ANION GAP SERPL CALCULATED.3IONS-SCNC: 13 MEQ/L (ref 8–16)
BUN BLDV-MCNC: 13 MG/DL (ref 7–22)
CALCIUM SERPL-MCNC: 8.6 MG/DL (ref 8.5–10.5)
CHLORIDE BLD-SCNC: 107 MEQ/L (ref 98–111)
CO2: 23 MEQ/L (ref 23–33)
CREAT SERPL-MCNC: 0.7 MG/DL (ref 0.4–1.2)
ERYTHROCYTE [DISTWIDTH] IN BLOOD BY AUTOMATED COUNT: 14.8 % (ref 11.5–14.5)
ERYTHROCYTE [DISTWIDTH] IN BLOOD BY AUTOMATED COUNT: 52.8 FL (ref 35–45)
GFR SERPL CREATININE-BSD FRML MDRD: 81 ML/MIN/1.73M2
GLUCOSE BLD-MCNC: 112 MG/DL (ref 70–108)
HCT VFR BLD CALC: 29.4 % (ref 37–47)
HEMOGLOBIN: 9 GM/DL (ref 12–16)
INR BLD: 2.71 (ref 0.85–1.13)
MCH RBC QN AUTO: 29.9 PG (ref 26–33)
MCHC RBC AUTO-ENTMCNC: 30.6 GM/DL (ref 32.2–35.5)
MCV RBC AUTO: 97.7 FL (ref 81–99)
PLATELET # BLD: 166 THOU/MM3 (ref 130–400)
PMV BLD AUTO: 10.8 FL (ref 9.4–12.4)
POTASSIUM SERPL-SCNC: 4 MEQ/L (ref 3.5–5.2)
PRO-BNP: 3300 PG/ML (ref 0–1800)
RBC # BLD: 3.01 MILL/MM3 (ref 4.2–5.4)
SODIUM BLD-SCNC: 143 MEQ/L (ref 135–145)
WBC # BLD: 5.1 THOU/MM3 (ref 4.8–10.8)

## 2020-01-09 PROCEDURE — 6370000000 HC RX 637 (ALT 250 FOR IP): Performed by: INTERNAL MEDICINE

## 2020-01-09 PROCEDURE — 94761 N-INVAS EAR/PLS OXIMETRY MLT: CPT

## 2020-01-09 PROCEDURE — 36415 COLL VENOUS BLD VENIPUNCTURE: CPT

## 2020-01-09 PROCEDURE — 2700000000 HC OXYGEN THERAPY PER DAY

## 2020-01-09 PROCEDURE — 83880 ASSAY OF NATRIURETIC PEPTIDE: CPT

## 2020-01-09 PROCEDURE — 97110 THERAPEUTIC EXERCISES: CPT

## 2020-01-09 PROCEDURE — 99239 HOSP IP/OBS DSCHRG MGMT >30: CPT | Performed by: PHYSICIAN ASSISTANT

## 2020-01-09 PROCEDURE — 6370000000 HC RX 637 (ALT 250 FOR IP): Performed by: PHYSICIAN ASSISTANT

## 2020-01-09 PROCEDURE — 97116 GAIT TRAINING THERAPY: CPT

## 2020-01-09 PROCEDURE — 6360000002 HC RX W HCPCS: Performed by: INTERNAL MEDICINE

## 2020-01-09 PROCEDURE — 94640 AIRWAY INHALATION TREATMENT: CPT

## 2020-01-09 PROCEDURE — 6370000000 HC RX 637 (ALT 250 FOR IP): Performed by: NURSE PRACTITIONER

## 2020-01-09 PROCEDURE — 80048 BASIC METABOLIC PNL TOTAL CA: CPT

## 2020-01-09 PROCEDURE — 85027 COMPLETE CBC AUTOMATED: CPT

## 2020-01-09 PROCEDURE — 85610 PROTHROMBIN TIME: CPT

## 2020-01-09 PROCEDURE — 97535 SELF CARE MNGMENT TRAINING: CPT

## 2020-01-09 PROCEDURE — 6370000000 HC RX 637 (ALT 250 FOR IP): Performed by: FAMILY MEDICINE

## 2020-01-09 RX ORDER — METOPROLOL SUCCINATE 25 MG/1
12.5 TABLET, EXTENDED RELEASE ORAL DAILY
Qty: 30 TABLET | Refills: 3 | Status: ON HOLD | OUTPATIENT
Start: 2020-01-09 | End: 2020-01-01 | Stop reason: HOSPADM

## 2020-01-09 RX ORDER — AMOXICILLIN AND CLAVULANATE POTASSIUM 875; 125 MG/1; MG/1
1 TABLET, FILM COATED ORAL EVERY 12 HOURS SCHEDULED
Qty: 20 TABLET | Refills: 0 | Status: SHIPPED | OUTPATIENT
Start: 2020-01-09 | End: 2020-01-01

## 2020-01-09 RX ADMIN — FOLIC ACID 1 MG: 1 TABLET ORAL at 10:08

## 2020-01-09 RX ADMIN — IPRATROPIUM BROMIDE 0.5 MG: 0.5 SOLUTION RESPIRATORY (INHALATION) at 12:26

## 2020-01-09 RX ADMIN — IPRATROPIUM BROMIDE 0.5 MG: 0.5 SOLUTION RESPIRATORY (INHALATION) at 05:40

## 2020-01-09 RX ADMIN — DICYCLOMINE HYDROCHLORIDE 10 MG: 10 CAPSULE ORAL at 10:08

## 2020-01-09 RX ADMIN — Medication 1 CAPSULE: at 10:08

## 2020-01-09 RX ADMIN — DICYCLOMINE HYDROCHLORIDE 10 MG: 10 CAPSULE ORAL at 06:06

## 2020-01-09 RX ADMIN — FORMOTEROL FUMARATE DIHYDRATE 20 MCG: 20 SOLUTION RESPIRATORY (INHALATION) at 05:40

## 2020-01-09 RX ADMIN — AMOXICILLIN AND CLAVULANATE POTASSIUM 1 TABLET: 875; 125 TABLET, FILM COATED ORAL at 10:08

## 2020-01-09 RX ADMIN — Medication 1 TABLET: at 10:10

## 2020-01-09 RX ADMIN — BUDESONIDE 500 MCG: 0.5 INHALANT RESPIRATORY (INHALATION) at 05:40

## 2020-01-09 RX ADMIN — Medication 500 MG: at 10:10

## 2020-01-09 RX ADMIN — MIDODRINE HYDROCHLORIDE 5 MG: 5 TABLET ORAL at 10:08

## 2020-01-09 RX ADMIN — SACUBITRIL AND VALSARTAN 1 TABLET: 24; 26 TABLET, FILM COATED ORAL at 10:08

## 2020-01-09 RX ADMIN — FUROSEMIDE 40 MG: 40 TABLET ORAL at 10:08

## 2020-01-09 RX ADMIN — POLYETHYLENE GLYCOL 3350 17 G: 17 POWDER, FOR SOLUTION ORAL at 10:30

## 2020-01-09 RX ADMIN — PANTOPRAZOLE SODIUM 40 MG: 40 TABLET, DELAYED RELEASE ORAL at 06:06

## 2020-01-09 RX ADMIN — IPRATROPIUM BROMIDE 0.5 MG: 0.5 SOLUTION RESPIRATORY (INHALATION) at 09:05

## 2020-01-09 RX ADMIN — DIGOXIN 125 MCG: 125 TABLET ORAL at 10:08

## 2020-01-09 NOTE — PROGRESS NOTES
Discharge teaching and instructions for diagnosis/procedure of anemia, gi bleed, elevated INR, and COPD completed with patient using teachback method. AVS reviewed. Prescriptions available to patient at their local pharmacy. Recommendations for coumadin dosing thru Sunday given to patient and instructed to get INR drawn Monday. Patient voiced understanding regarding prescriptions, follow up appointments, and care of self at home. Discharged in a wheelchair to home with support per family and home khushboo.

## 2020-01-09 NOTE — PLAN OF CARE
Problem: Impaired respiratory status  Goal: Clear lung sounds  Outcome: Ongoing  Note:   Patient receiving nebulizer treatments to improve aeration.

## 2020-01-09 NOTE — PROGRESS NOTES
Patient education provided regarding patient's medications. Patient frequently asking about the Midodrine and her dose change regarding Entresto. Patient re-educated throughout the day repetitively. Patient verbalizes she understands; however, asks the same question later in the day. Patient asks \"How am I supposed to get my medications right at home? \" - Carloz Drop begins suggesting home health as a way to help with medication management. Patient verbalizes her concerns regarding home health are as follows:     1. Her house is \"not the cleanest\"  2. She has 2 large dogs that are \"not very nice\"  3. She doesn't want people coming into her house making her dogs bark  4. She wants to be able to leave her house when she wants    Will report to follow nurse to have  aware that patient is considering home health if she can get more information regarding the benefits and limitations of home health. Writer encouraged patent to take the opportunity for further follow-up and education from health care professionals as she has an extensive medical history. [place] [facility] ([facility])  Chief Complaint: Rectal bleeding      ICU Admit: 2/13/2018      Summary: Mr. Bliss is a 46 year old male with ETOH cirrhosis who presents with complaints of bright red blood per rectum X 1 day. He reports a regular bowel movement with bright red blood in the toilet bowl. He went to lie down in his bed and had pool of bright red blood, so he decided to come to ER for evaluation. In the ED his Hb was at baseline, got basic workup done and was about to get discharged home when he had one large episode of bright red blood per rectum so was admitted to SICU for further monitoring      24h: Continues to have rectal bleeding - though it is decreasing. H/H stable.     Impression:   -- Rectal bleeding from internal hemorrhoids  -- ETOH cirrhosis complicated by Portal HTN, Ascites, EV  -- Acute kidney injury - improving  -- Hyperbilirubinemia      Assessment/Plan:   Neuro: Alert and oriented. Non-focal exam. HE grade 1. No asterixis or clonus. Denies pain. Ammonia level 29.  -- Folate, thiamine  -- Lactulose, rifaxamin, zinc    Respiratory: Saturating well on RA.      CV: Normotensive. NSR.  -- Midodrine 5mg TID    Renal: Acute kidney injury, suspect prerenal. Cr down trending to 1.22 today. UO recorded as 200ml + 1 void yesterday. Per nursing has voided this morning - 360ml recorded. Mildly hyponatremic to 133 today.   -- I/O    GI: Abdomen soft. Continues to have rectal bleeding, though notes that it is decreasing. No nausea/vomiting. ETOH cirrhosis complicated by portal HTN, ascites, EV. MELD Na 33. Hyperbilirubinemia with T bili 36.8. Recent admission with ETOH hepatitis - did not receive prednisolone secondary to GI bleed. Not an OLT candidate at this time due to psychosocial reasons. EGD done 2/14 showed medium varices without evidence of recent bleeding; flex sigmoidoscopy done 2/14 showed active bleeding from large internal hemorrhoids.    -- Consult GI: recommends anusol  suppository and banding if bleeding continues; consider future EGD for variceal banding  -- Consult Hepatology: urine ETOH ordered  -- resumed PTA bumex 1mg and spironolactone 100mg daily; propanolol 10mg BID    Endo: Essentially euglycemic, intrinsically controlled. No hx of DM.     Heme: Acute blood loss anemia. S/p 2 units PRBC, FFP x 2, and Plt x 1 3/14. H&H this AM 8.1/24.4, Plt 71. Repeat H&H 7.8/23.0. Stable.  -- Follow TEG and correct if bleeding (2/14 TEG angle 52.6 (L), TEG G 4.0 (L), TEG MA 44.4 (L))    ID: Afebrile. No leukocytosis. No concern for infection at this time.   -- Ciprofloxacin 500mg daily for SBP prophylaxis     Disposition: Stable to transfer. Spoke with Dr. Stack about transfer to floors. Okay with plan.       PERTINENT DIAGNOSTICS/PROCEDURES:  2/14/18 flex sigmoidoscopy: actively bleeding large internal hemorrhoids  2/14/18 EGD: 2-3 columns of medium varices w/ no high risk stigmata of recent bleeding, not banded given elevated INR; diffuse portal hypertensive gastropathy with normal appearing duodenal mucosa      BEST PRACTICES:  - VTE prophylaxis: SCDs  - SUP: PPI  - LDA: PIV   - Nutrition: Renal, Low sodium  - Therapy/mobilization: As tolerated  - Goals of care note documented: 2/14/2018  ================================================================  Subjective: Patient reports blood around stool with last bowel movement around 3am. Per nursing, blood is around stool, not mixed into it. Denies headache, lightheadedness, dizziness, chest pain, dyspnea, abdominal pain, nausea/vomiting.    I/O last 3 completed shifts:  In: 4307 [P.O.:1320; I.V.:1688; Blood:799; IV Piggyback:500]  Out: 200 [Urine:200]  No intake/output data recorded.    Vital Last Value 24 Hour Range   Temperature 97.8 °F (36.6 °C) (02/15/18 0400) Temp  Min: 97.2 °F (36.2 °C)  Max: 98 °F (36.7 °C)   Pulse 69 (02/15/18 0500) Pulse  Min: 59  Max: 86   Respiratory 17 (02/14/18 1900) Resp  Min: 10  Max: 23    Non-Invasive  Blood Pressure 117/57 (02/15/18 0500) BP  Min: 88/46  Max: 139/65   Pulse Oximetry 100 % (02/15/18 0500) SpO2  Min: 96 %  Max: 100 %   Arterial   Blood Pressure   No Data Recorded     Physical Exam:   General: Jaundiced male. Lying in bed. NAD.    Neuro: Awake, alert and oriented x3. PORTER. Strength 5/5 and symmetric throughout extremities. PERRL. No asterixis or clonus.   Head: Normocephalic, without obvious abnormality, atraumatic   ENT: Trachea midline. Mucous membranes moist.   Lungs: Respirations non-labored. Breath sounds clear to auscultation bilaterally. No adventitious sounds.    Heart: Regular rate and rhythm, S1 S2 normal, systolic murmur appreciated  Abdomen: Soft, non-tender, bowel sounds present, abdominal obesity  Extremities: Warm. No cyanosis; 1+ edema right LE and trace left LE. Pulses: 2+ and symmetric bilateral radial and dorsalis pedis    Skin: Warm and dry. Jaundice. No rash or lesion.    Pertinent Reviewed: Allergies, Medications, Labs, Imaging and Physician and Nursing Notes    Lana Gallegos, PGY1  Family Medicine    [facility]CS Attestation       Mr. Bliss is critically ill as documented above. I evaluated the patient with Dr. Gallegos and reviewed imaging and laboratory data. Together, we formulated the diagnostic and therapeutic strategy documented above.  Critical care services I provided [id number] ([facility] hospital care, level III).  Time required for my critical care services which I have documented is not included in charges for critical care services rendered by other clinicians.    Ron Donohue MD FACS  Transplant, Hepatobiliary,  and Critical Care Surgery

## 2020-01-09 NOTE — PROGRESS NOTES
[address] [facility]  Occupational Therapy  Daily Note  Time:   Time In: 4763  Time Out: 1013  Timed Code Treatment Minutes: 29 Minutes  Minutes: 29          Date: 2020  Patient Name: Cathy Huang,   Gender: female      Room: [facility]007-A  MRN: [id number]  : 1945  (75 y.o.)  Referring Practitioner: Ligia Hanna MD  Diagnosis: GI Bleed   Additional Pertinent Hx: 75 y.o. female who presents from Dr. Kristina Chang office for low HGB of 8.6 Patient's last HGB was 10.6 on 2019. Patient received IV iron in the office today. Patient reports melena and blood in stool for 1 week. She reports dizziness and low back pain. She denies abdominal pain, chest pain, shortness of breath, or syncope. Patient states she has had a blood transfusion in the past. Patient takes Coumadin and 81mg ASA. She states her last INR level was elevated. The patient has a past medical history of anemia, anxiety, asthma, A-sib, CAD, COPD, GERD, HLD, HTN, CHF, MI, and pacemaker defibrillator. No further complaints at the time of the initial encounter. Restrictions/Precautions:  Restrictions/Precautions: General Precautions, Fall Risk  Position Activity Restriction  Other position/activity restrictions: Mildly impulsive at times. per Pt 2L O2 at rest, 4L with activity    SUBJECTIVE: Nurse Nikolai Sanchez ok'd session,  Sitting at EOB upon arrival, pleasant and cooperative   present at end of session  PAIN: 0/10:     COGNITION: Decreased Insight, Impaired Memory and Decreased Problem Solving    ADL:   No ADL's completed this session. Stacy Garza BALANCE:  Sitting Balance:  Modified Independent  at EOB greater than 20 minutes      TRANSFERS:  Sit to Stand:  Contact Guard Assistance. EOB  Stand to Sit: Contact Guard Assistance. bedside chair    FUNCTIONAL MOBILITY:  Assistive Device: Rolling Walker  Assist Level:  Contact Guard Assistance.    Distance: EOB walking around bed to bedside chair  No LOB     ADDITIONAL ACTIVITIES:  Pt completed BUE strengthening exercises x1 reps x1 set this date with a resistive band in all joints and all planes in order to increase strength and improve activity tolerance required for functional toilet & shower transfers and ADL routine. Pt tolerated well. ASSESSMENT:     Activity Tolerance:  Patient tolerance of  treatment: good. Discharge Recommendations: Continue to assess pending progress, 24 hour supervision or assist, Home with Home health OT  Equipment Recommendations: Equipment Needed: No  Plan: Times per week: 5x  Current Treatment Recommendations: Strengthening, Endurance Training, Patient/Caregiver Education & Training, Equipment Evaluation, Education, & procurement, Self-Care / ADL, Functional Mobility Training, Safety Education & Training, Balance Training    Patient Education  Patient Education: Home Exercise Program    Goals  Short term goals  Time Frame for Short term goals: 2 weeks   Short term goal 1: Pt will complete BUE strengthening exercises with min vcs for technique to increase indep and safety with all self cares and transfers. Short term goal 2: Pt will complete standing tolerance x 4 minutes with 2 UE release and SBA to increase indep and safety with grooming tasks. Short term goal 3: Pt will complete functional mobility to/from BR and HH distances with SBA and min vcs for safety to increase indep and safety with all self cares. Short term goal 4: Pt will complete LB self cares with LHAE PRN and min A to increase indep and safety with BADL routine. Long term goals  Time Frame for Long term goals : No LTGs d/t short estimated length of stay. Following session, patient left in safe position with all fall risk precautions in place.

## 2020-01-09 NOTE — DISCHARGE SUMMARY
large blood. Urine culture showed Klebsiella pneumoniae, >100K. Ceftriaxone stopped and switched to Augmentin. Urology referral in OP. Augmentin prescribed OP.       History of atrial fibrillation, currently paced rhythm   CHADsVASC score 5. Continue Toprol-XL  Coumadin on hold due to coagulopathy, now improved.  Discussed benefits versus risks of oral anticoagulation.  GivenCHADsVASC score, discussed with patient risks of having stroke and with coumadin, risk of bleeding, patient verbalized understanding of the risks. Coumadin resumed 1/5, okay with GI.      Essential hypertension  Continue home medications with parameters.      COPD, compensated   Continue Pulmicort, and as needed albuterol inhaler     Hyperlipidemia  Statin    Initial H and P and Hospital course:-  In summary, this is a 66 y. o. female, with past medical history of atrial fibrillation, on Coumadin, COPD, DVT, CAD, hyperlipidemia, essential hypertension, chronic systolic heart failure, EF 35 to 40% per echo 10/2019, status post pacemaker in 2008, severe pulmonary hypertension, chronic anemia, who presented to [facility] on 1/2/2020 due to dizziness.  Per H&P note,\" Patient reports seeing bright red blood in her stool.  Patient was sent in from Dr. Caty Gonzales suspected concerns for GI bleed. Hemoglobin was reported to be 8.3 today, down from 10.6 last week.  Patient was seeing physician for IV iron infusion today.  Patient denies chest pain or shortness of breath.  Denies nausea, vomiting, diarrhea or constipation.  Denies abdominal pain.  Denies urinary complaints.  Patient has history of GERD, blood clots and history of transfusion in the past.  Patient is currently on warfarin for atrial fibrillation.   Patient hemodynamically stable on emergency department.  Patient isolated episode of hypotension with a systolic pressure of 91.  Patient afebrile temperature 98.2 °F.  Labs and imaging were obtained. Via José Griffith with josette hemoglobin 7.5.  BMP with a BUN 31 and serum creatinine 0.8.  Leukos 114.  Troponin 0.024.  X-ray of the abdomen nonobstructive bowel gas pattern, no pneumoperitoneum, nonacute with mild cardiomegaly.  Patient admitted this time for GI bleed. \"     Of note, patient was just recently discharge on 11/28/2019 due to hematochezia, which was determined to be from internal hemorrhoids.  Per [place] summary note, she was started on medications for symptomatic relief and discharged home to follow-up with GI in outpatient.  Patient reports a week after discharge, she developed another rectal bleeding and noted to have supratherapeutic INR, and per patient, her Coumadin was held for 1 day prior to Coumadin clinic and INR was followed-up.     Patient received 1 dose of vitamin K oral and 1 unit PRBC on this admission.    Patient received another dose of vitamin K on 1/3/2020. Sophie Boyer was consulted. Pt received another unit of PRBC on 1/4/20 night due to Hgb of 7. S/p EGD 1/5/2020, which showed \" small HH, trivial prepyloric patchy erythema, O/w normal\".       1/6 CXR showed persistent pulmonary vascular congestion with slightly worsened opacity within the right lower chest and stable complete opacification of the left lower chest. There are suspected bilateral pleural effusions. In the right clinical setting these findings can be associated with slightly worsened congestive heart failure. Started on Midodrine     1/7 Patient seen and examined. She is still complaining of shortness of breath, aggravated by activity. She was noted to be dyspneic with physical therapy. Furosemide not given in the past 3 days due to hypotension, SBP 80-90's. BP now improving with midodrine.     1/8- Pt is up in the chair. Pt states that she went on a walk with PT and she was very SOB. Pt states that she is ready to go home but understands that we need to observe her with the BP. Pt states that she slept well. 1/9 - Pt was agreeable to [facility] at [place].  Orders XYLOCAINE     MIRALAX powder  Generic drug:  polyethylene glycol     MULTIVITAMIN ADULT Chew     nitroGLYCERIN 0.4 MG SL tablet  Commonly known as:  NITROSTAT     NITROGLYCERIN RE     ofloxacin 0.3 % otic solution  Commonly known as:  FLOXIN     omeprazole 20 MG delayed release capsule  Commonly known as:  PRILOSEC     oxyCODONE-acetaminophen 5-325 MG per tablet  Commonly known as:  PERCOCET     OXYGEN     PROCTOFOAM RE     promethazine-codeine 6.25-10 MG/5ML Soln solution  Commonly known as:  PHENERGAN with CODEINE     RA B-COMPLEX/VITAMIN C CR PO     REFRESH OPTIVE OP     REPATHA SURECLICK SC     SUPER PROBIOTIC Caps     traZODone 50 MG tablet  Commonly known as:  DESYREL     triamcinolone 0.1 % cream  Commonly known as:  KENALOG     VENOFER IV     vitamin C 500 MG tablet  Commonly known as:  ASCORBIC ACID     Vitamin D3 125 MCG (5000 UT) Tabs     warfarin 1 MG tablet  Commonly known as:  COUMADIN  Take as directed. If you are unsure how to take this medication, talk to your nurse or doctor. Original instructions: Take as directed by the Coumadin Clinic, 145 tablets for 90 days  Notes to patient:  Recommendations for discharge:   Date Warfarin Dose  1/9/2020 0.5 mg  1/10/2020 0.5 mg  1/11/2020 1.5 mg  1/12/2020 1.5 mg  1/13/2020 INR      Provider dosing warfarin: [facility]     Recheck INR:  Monday, 1/13/2020 at 9:20 AM      XOPENEX 0.63 MG/3ML nebulization  Generic drug:  levalbuterol     * Revefenacin 175 MCG/3ML Soln  Inhale 175 mcg into the lungs daily     * YUPELRI IN         * This list has 2 medication(s) that are the same as other medications prescribed for you. Read the directions carefully, and ask your doctor or other care provider to review them with you.             STOP taking these medications    ENTRESTO 49-51 MG per tablet  Generic drug:  sacubitril-valsartan  Replaced by:  sacubitril-valsartan 24-26 MG per tablet           Where to Get Your Medications      These medications were 01/08/20 10:09 AM   Result Value Ref Range    INR 2.38 (H) 0.85 - 1.13   CBC    Collection Time: 01/08/20 10:09 AM   Result Value Ref Range    WBC 5.8 4.8 - 10.8 thou/mm3    RBC 3.11 (L) 4.20 - 5.40 mill/mm3    Hemoglobin 9.4 (L) 12.0 - 16.0 gm/dl    Hematocrit 29.8 (L) 37.0 - 47.0 %    MCV 95.8 81.0 - 99.0 fL    MCH 30.2 26.0 - 33.0 pg    MCHC 31.5 (L) 32.2 - 35.5 gm/dl    RDW-CV 14.9 (H) 11.5 - 14.5 %    RDW-SD 50.8 (H) 35.0 - 45.0 fL    Platelets 105 353 - 109 thou/mm3    MPV 10.8 9.4 - 12.4 fL   Troponin    Collection Time: 01/08/20 10:09 AM   Result Value Ref Range    Troponin T < 0.010 ng/ml   Protime-INR    Collection Time: 01/09/20  6:35 AM   Result Value Ref Range    INR 2.71 (H) 0.85 - 1.13   Brain Natriuretic Peptide    Collection Time: 01/09/20  6:35 AM   Result Value Ref Range    Pro-BNP 3300.0 (H) 0.0 - 1800.0 pg/mL   CBC    Collection Time: 01/09/20  6:35 AM   Result Value Ref Range    WBC 5.1 4.8 - 10.8 thou/mm3    RBC 3.01 (L) 4.20 - 5.40 mill/mm3    Hemoglobin 9.0 (L) 12.0 - 16.0 gm/dl    Hematocrit 29.4 (L) 37.0 - 47.0 %    MCV 97.7 81.0 - 99.0 fL    MCH 29.9 26.0 - 33.0 pg    MCHC 30.6 (L) 32.2 - 35.5 gm/dl    RDW-CV 14.8 (H) 11.5 - 14.5 %    RDW-SD 52.8 (H) 35.0 - 45.0 fL    Platelets 055 446 - 897 thou/mm3    MPV 10.8 9.4 - 12.4 fL   Basic Metabolic Panel    Collection Time: 01/09/20  6:35 AM   Result Value Ref Range    Sodium 143 135 - 145 meq/L    Potassium 4.0 3.5 - 5.2 meq/L    Chloride 107 98 - 111 meq/L    CO2 23 23 - 33 meq/L    Glucose 112 (H) 70 - 108 mg/dL    BUN 13 7 - 22 mg/dL    CREATININE 0.7 0.4 - 1.2 mg/dL    Calcium 8.6 8.5 - 10.5 mg/dL   Anion Gap    Collection Time: 01/09/20  6:35 AM   Result Value Ref Range    Anion Gap 13.0 8.0 - 16.0 meq/L   Glomerular Filtration Rate, Estimated    Collection Time: 01/09/20  6:35 AM   Result Value Ref Range    Est, Glom Filt Rate 81 (A) ml/min/1.73m2        Microbiology:      Lab Results   Component Value Date    ORG Klebsiella pneumoniae 01/02/2020      Urinalysis:      Lab Results   Component Value Date    NITRU NEGATIVE 01/02/2020    WBCUA > 200 01/02/2020    BACTERIA MANY 01/02/2020    RBCUA 0-2 01/02/2020    BLOODU LARGE 01/02/2020    GLUCOSEU NEGATIVE 01/02/2020       Radiology:-  Xr Acute Abd Series Chest 1 Vw    Result Date: 1/2/2020  PROCEDURE: XR ACUTE ABD SERIES CHEST 1 VW CLINICAL INFORMATION: gi bleeding . COMPARISON: No prior study. TECHNIQUE: A PA upright view of the chest was obtained. AP upright and supine views of the abdomen were obtained. FINDINGS: Abdomen: Bowel gas pattern: Nonobstructive bowel gas pattern, with gas within nondistended small and large bowel. Free intraperitoneal air: None Miscellaneous: There are left upper quadrant calcifications consistent with splenic calcified granulomas. Chest: Lungs: No infiltrate or mass. Lungs are hyperinflated consistent with COPD. Pleura: No pleural effusion. No pneumothorax Heart: There is stable mild cardiomegaly. Shelia and mediastinum: Unremarkable Regional skeleton: Patient is status post left total hip arthroplasty. There is severe L4-5 degenerative disc disease. Lines/tubes/devices: There is a stable left subclavian dual lead AICD device, leads in the regions of the coronary sinus and right ventricle. Non-obstructive bowel gas pattern. No pneumoperitoneum. No acute cardiopulmonary disease. Mild cardiomegaly. **This report has been created using voice recognition software. It may contain minor errors which are inherent in voice recognition technology. ** Final report electronically signed by Dr. Mando Turner on 1/2/2020 7:25 PM       Follow-up scheduled after discharge :-     in the next few days with Addy Drake MD  in the next few days with Dr. Harjeet Grimaldo     Consultations during this hospital stay:-  [] NONE [x] Cardiology  [] Nephrology  [] Hemo onco   [x] GI   [] ID  [] Endocrine  [] Pulm    [] Neuro    [] Psych   [] Urology  [] ENT   [] G SURGERY   []Ortho    []CV surg

## 2020-01-09 NOTE — PROGRESS NOTES
[facility]  INPATIENT PHYSICAL THERAPY  DAILY NOTE  [facility] 7K - 7K-07/007-A    Time In: 0804  Time Out: 08  Timed Code Treatment Minutes: 27 Minutes  Minutes: 27          Date: 2020  Patient Name: Terry Sandra,  Gender:  female        MRN: [id number]  : 1945  (75 y.o.)     Referring Practitioner: Dr. Valeriy Gale  Diagnosis: GI Bleed   Additional Pertinent Hx: 75 y.o. female who presents from Dr. Mendel Keisha office for low HGB of 8.6 Patient's last HGB was 10.6 on 2019. Patient received IV iron in the office today. Patient reports melena and blood in stool for 1 week. She reports dizziness and low back pain. She denies abdominal pain, chest pain, shortness of breath, or syncope. Patient states she has had a blood transfusion in the past. Patient takes Coumadin and 81mg ASA. She states her last INR level was elevated. The patient has a past medical history of anemia, anxiety, asthma, A-sib, CAD, COPD, GERD, HLD, HTN, CHF, MI, and pacemaker defibrillator. No further complaints at the time of the initial encounter. Prior Level of Function:  Lives With: Spouse  Type of Home: House  Home Layout: One level  Home Access: Stairs to enter with rails  Entrance Stairs - Number of Steps: 3   Entrance Stairs - Rails: Left  Home Equipment: Rolling walker, Wheelchair-electric   Bathroom Shower/Tub: Walk-in shower  Bathroom Toilet: Standard  Bathroom Equipment: Commode, Shower chair  Bathroom Accessibility: Accessible    Receives Help From: Family  ADL Assistance: Independent  Homemaking Assistance: (spouse completes. )  Ambulation Assistance: Independent  Transfer Assistance: Independent  Active : No    Restrictions/Precautions:  Restrictions/Precautions: General Precautions, Fall Risk  Position Activity Restriction  Other position/activity restrictions: Mildly impulsive at times     SUBJECTIVE: RN approved session. Pt in bed upon arrival and agrees to therapy.  Pt requesting to mobility, strengthening and endurance training   Current Treatment Recommendations: Balance Training, Strengthening, Functional Mobility Training, Transfer Training, Gait Training, Home Exercise Program, Endurance Training, Stair training    Patient Education  Patient Education: Plan of Care, [employer] Mobility, Transfers, Gait, Verbal Exercise Instruction, breathing tchnqiues    Goals:  Patient goals : To return home   Short term goals  Time Frame for Short term goals: At time of discharge   Short term goal 1: Mod I with bed mobility so pt can get in and out of bed  Short term goal 2: Mod I with t/fs so pt can get up to go to the bathroom  Short term goal 3: Mod I to amb with RW 75'x 1 for household amb   Short term goal 4: Pt to negotiate 3 steps with 1 HR to enter home   Long term goals  Time Frame for Long term goals : No LTGs secondary to ELOS     Following session, patient left in safe position with all fall risk precautions in place.

## 2020-01-09 NOTE — PLAN OF CARE
Problem: Impaired respiratory status  Goal: Clear lung sounds  1/9/2020 0912 by Mateo Cruz RCP  Outcome: Ongoing   Improve breath sounds, increase aeration and decrease WOB.

## 2020-01-13 ENCOUNTER — HOSPITAL ENCOUNTER (OUTPATIENT)
Dept: PHARMACY | Age: 75
Setting detail: THERAPIES SERIES
Discharge: HOME | End: 2020-01-13
Payer: MEDICARE

## 2020-01-13 LAB — POC INR: 1.9 (ref 0.8–1.2)

## 2020-01-13 PROCEDURE — 85610 PROTHROMBIN TIME: CPT

## 2020-01-13 PROCEDURE — 36416 COLLJ CAPILLARY BLOOD SPEC: CPT

## 2020-01-13 PROCEDURE — 99211 OFF/OP EST MAY X REQ PHY/QHP: CPT

## 2020-01-13 NOTE — PROGRESS NOTES
Medication Management [facility]  Anticoagulation Clinic  [phone] (phone)  [phone] (fax)      Ms. Tahir Spencer is a 75 y.o.  female with history of Atrial fibrillation who presents today for anticoagulation monitoring and adjustment. Patient verifies current dosing regimen and tablet strength. Patient was off Coumadin 1/2/20-/1/4/20 for supratherapeutic INR. Patient then was resumed on Coumadin 1/5/20 and track board is now updated. Patient denies s/s bleeding/bruising/SOB/chest pain. Patient has baseline SOB and feet swelling. No blood in urine or stool. No dietary changes. No changes in OTC agents/Herbals. Patient discharged on a 10 day course of Augmentin 875 mg BID and Entresto decreased to 24-26 mg BID. No change in alcohol use or tobacco use. Patient has been more active since discharge, but layed in bed while in hospital.  Patient denies headaches/dizziness/lightheadedness/falls. No vomiting/diarrhea or acute illness. No Procedures scheduled in the future at this time. Assessment:   Lab Results   Component Value Date    INR 1.90 (H) 01/13/2020    INR 2.71 (H) 01/09/2020    INR 2.38 (H) 01/08/2020    PROTIME 22.3 02/05/2019     INR subtherapeutic   Recent Labs     01/13/20  0933   INR 1.90*     Patient is slightly subtherapeutic today. However, patient has been supratherapeutic the last three INRs leading up to hospitalization while on current regimen. Plan:  Coumadin 1.5 mg x 1 dose today 1/13/20 then decrease Coumadin from 1.5 mg daily to 1 mg MWF and 1.5 mg TuThSaSu (14.3% decrease). Recheck INR in 1 weeks. Patient reminded to call the Anticoagulation Clinic with signs or symptoms of bleeding or with any medication changes. Patient given instructions utilizing the teach back method. Discharged via wheelchair in no apparent distress. After visit summary printed and reviewed with patient.       Medications reviewed and updated on home medication list Yes    Influenza vaccine:     [] given    [x] declined   [x] received previously   [] plans to receive at a later time   [] refused    [x] documented in Izzy [facility], PharmD, BCPS  1/13/2020  10:29 AM

## 2020-01-20 NOTE — PROGRESS NOTES
Medication Management [facility]  Anticoagulation Clinic  [phone] (phone)  [phone] (fax)      Ms. Caitie Moulton is a 75 y.o.  female with history of persistent atrial fib. , per Dr. Theresa Kumar referral, who presents today for Warfarin  monitoring and adjustment (1 week visit after decreasing dose by 14.3%). Patient verifies tablet strength. Followed printed instructions for dose. No missed or extra doses, except as ordered last visit. Patient denies bruising/swelling. Had chest pain coming in from car (very cold today); gone now. Has usual SOB. Wearing compression socks now. No blood in urine. Night of 1/13, saw bright red and dark red blood in stool, along with clots. No dietary changes. No changes in medication/OTC agents/herbals. States is still taking full tablet of Metoprolol as prescribed by Dr. Mateus Hernández; apparently was decreased to 1/2 tablet in hospital earlier this month. She is waiting to hear back from Dr. Bassam Tapia office. No change in alcohol use or tobacco use. No change in activity level. Patient denies headaches/dizziness/lightheadedness/falls. Has usual \"wobbliness. \"  No vomiting/diarrhea or acute illness. No procedures scheduled in the future at this time. Has IV iron tomorrow at Dr. Dale Faustin office. She will let them know of blood in stool last week. Assessment:   Lab Results   Component Value Date    INR 2.00 (H) 01/20/2020    INR 1.90 (H) 01/13/2020    INR 2.71 (H) 01/09/2020    PROTIME 22.3 02/05/2019     INR therapeutic - goal 2-3. Recent Labs     01/20/20  1507   INR 2.00*       Plan:  POCT INR ordered/performed/result reviewed. Continue PO Coumadin 1 mg MWF, 1.5 mg TThSS. Recheck INR in 1.5 weeks. (Report given - orders entered by TOÑO Green, PharmD.)  Patient reminded to call the Anticoagulation Clinic with any signs or symptoms of bleeding or with any medication changes.   Patient given instructions utilizing the teach back

## 2020-01-30 PROBLEM — R79.1 SUPRATHERAPEUTIC INR: Status: RESOLVED | Noted: 2019-11-27 | Resolved: 2020-01-01

## 2020-01-30 NOTE — PROGRESS NOTES
Medication Management [facility]  Anticoagulation Clinic  [phone] (phone)  [phone] (fax)      Ms. Lonnie Heck is a 67 y.o.  female with history of persistent atrial fib. , per Dr. Ronnell Banuelos referral, who presents today for Warfarin monitoring and adjustment (10 day visit). Patient verifies tablet strength. Followed printed instructions for dose to put in pill box. No missed or extra doses. Patient denies bleeding/bruising. States her pacemaker was adjusted to help the SOB. Will get chest pain if rushes - didn't use Nitro. Having minor swelling of legs - wears compression socks. No blood in urine or stool. No dietary changes. Still doesn't have much appetite - fills up quickly. Changes in medication/OTC agents/herbals: Metoprolol was increased by Dr. Ludy Lorenzo. No change in alcohol use or tobacco use. Change in activity level: increased (doing PT and exercises at home). Patient denies dizziness/lightheadedness/falls. Takes Tylenol for the occasional sinus headache toward evening. Also takes Tylenol for hip pain. No vomiting/diarrhea or acute illness. No procedures scheduled in the future at this time. Assessment:   Lab Results   Component Value Date    INR 1.70 (H) 01/30/2020    INR 2.00 (H) 01/20/2020    INR 1.90 (H) 01/13/2020    PROTIME 22.3 02/05/2019     INR subtherapeutic - goal 2-3. Recent Labs     01/30/20  1514   INR 1.70*       Plan:  POCT INR ordered/performed/result reviewed. 2 mg today, Th, then continue PO Coumadin 1 mg MWF, 1.5 mg TThSS. Recheck INR in 2 weeks. (Report given - orders entered by VICTOR MANUEL Stock, PharmD.) Patient reminded to call the Anticoagulation Clinic with any signs or symptoms of bleeding or with any medication changes. Patient given instructions utilizing the teach back method. Discharged via transport chair in no apparent distress, with oxygen concentrator and .         After visit summary printed and reviewed with patient.       Medications reviewed and updated on home medication list.    Influenza vaccine:     [] given    [x] declined   [x] received previously   [] plans to receive at a later time   [] refused    [x] documented in EPIC

## 2020-02-03 PROBLEM — R77.8 ELEVATED TROPONIN: Status: RESOLVED | Noted: 2019-11-27 | Resolved: 2020-01-01

## 2020-02-06 NOTE — TELEPHONE ENCOUNTER
Leopoldo Mayo called and states that she was started on Augmentin 875-125 mg for 10 days yesterday. States that her furosemide was increased from 40 mg to 80 mg daily. She states he weight is almost back down to normal today- although her legs are still swollen. Informed Leopoldo Mayo to keep her appointment for next week. No warfarin dose change at this time.

## 2020-02-22 PROBLEM — R65.21 SEPTIC SHOCK (HCC): Status: ACTIVE | Noted: 2020-01-01

## 2020-02-22 PROBLEM — A41.9 SEPTIC SHOCK (HCC): Status: ACTIVE | Noted: 2020-01-01

## 2020-02-22 PROBLEM — J10.1 INFLUENZA A: Status: ACTIVE | Noted: 2020-01-01

## 2020-02-22 NOTE — PROGRESS NOTES
Clinical Pharmacy Note    Christian Dandy is a 67 y.o. female for whom pharmacy has been asked to manage warfarin therapy. Reason for Admission: influenza A    Referring physician: Angeline June CNP for Dr Juan Antunez  Warfarin dose PTA: 1 mg MoWeFr and 1.5 mg SuTuThSa  Indication: afib  Goal INR: 2-3  [id number]: 6  Warfarin followed by: Brisa Jensen    Past Medical History:   Diagnosis Date    Allergic rhinitis     Anemia     Anxiety     Arthritis     Asthma     Atrial fibrillation ([facility]     CAD (coronary artery disease)     COPD (chronic obstructive pulmonary disease) [facility]     Frequent UTI     GERD (gastroesophageal reflux disease)     History of blood transfusion     X8    Hx of blood clots     left arm    Hyperlipidemia     Hypertension     Influenza A 02/22/2020    Kidney stone     LONG TERM ANTICOAGULENT USE 7/6/2012    MI, old 1994    Prolonged emergence from general anesthesia     Systolic CHF, acute on chronic ([facility] 10/3/2013                Recent Labs     02/22/20  0432   INR 1.18*     Recent Labs     02/22/20  0002   HGB 11.0*   HCT 36.3*   *       Current warfarin drug-drug interactions: aspirin      Date INR Warfarin Dose   2/22/2020 1.18 3 mg                                   Daily PT/INR until stable within therapeutic range. Thank you for the consult.      Mariusz Bautista, PharmD, BCPS  2/22/2020  9:53 AM

## 2020-02-22 NOTE — PROGRESS NOTES
Pharmacy Renal Adjustment Per P&T Protocol    Cristina Hernandez is a 67 y.o. female. Pharmacy has renally adjusted Tamiflu per P&T Protocol. Recent Labs     02/22/20  0015   BUN 39*       Recent Labs     02/22/20  0015   CREATININE 1.1       Estimated Creatinine Clearance: 39 mL/min (based on SCr of 1.1 mg/dL). Height:   Ht Readings from Last 1 Encounters:   02/21/20 5' 2\" (1.575 m)     Weight:  Wt Readings from Last 1 Encounters:   02/22/20 145 lb 4.5 oz (65.9 kg)       Plan: Adjust the following medications based on renal function:           Tamiflu to be 30mg BID x 4 days    Emperatriz Martin RP  2/22/2020  4:30 AM

## 2020-02-22 NOTE — H&P
Final report electronically signed by Dr. Mando Turner on 2/22/2020 12:56 AM      XR CHEST PORTABLE   Final Result      Questionable lateral left basilar infiltrate or atelectasis versus prominent epicardial fat pad. Remainder of the lungs are clear. Mild cardiomegaly. **This report has been created using voice recognition software. It may contain minor errors which are inherent in voice recognition technology. **      Final report electronically signed by Dr. Mando Turner on 2/21/2020 11:50 PM        Xr Chest Portable    Result Date: 2/22/2020  PROCEDURE: XR CHEST PORTABLE CLINICAL INFORMATION: CVC placement. COMPARISON: Chest x-ray dated 9/21/2020 TECHNIQUE: AP Portable chest xray FINDINGS: Lines/tubes/devices: Interval placement of right subclavian central venous catheter, tip in the mid SVC region. No change in the satisfactory position of left subclavian dual-lead AICD device. Lungs/pleura:  No change in the lateral left basilar opacity which may represent pneumonia, atelectasis, or an epicardial fat pad. Interstitial markings remain prominent, unchanged dating back to 10/31/2019. This may represent pulmonary fibrosis versus recurrent mild interstitial pulmonary edema or atypical pneumonia. No pleural effusion. No pneumothorax. Heart: There is stable mild cardiomegaly. Mediastinum/petros: No obvious mass or adenopathy. Skeleton: There is mild bilateral AC joint DJD. Interval placement of a right subclavian central line, tip in the mid SVC, no pneumothorax. Remaining findings unchanged, see above. **This report has been created using voice recognition software. It may contain minor errors which are inherent in voice recognition technology. ** Final report electronically signed by Dr. Mando Turner on 2/22/2020 12:56 AM    Xr Chest Portable    Result Date: 2/21/2020  PROCEDURE: XR CHEST PORTABLE CLINICAL INFORMATION: weakness.  COMPARISON: Chest x-ray dated 1/8/2020 TECHNIQUE: AP Portable chest xray

## 2020-02-22 NOTE — ED NOTES
Pt Hafsa at bedside to discuss CVC and to perform CVC line placement. Pt respirations regular and pt alert at this time.       Renetta Schulte RN  02/22/20 [id number]

## 2020-02-22 NOTE — ED PROVIDER NOTES
Acid Indigestion    ARFORMOTEROL TARTRATE (BROVANA) 15 MCG/2ML NEBU      Take 1 ampule by nebulization 2 times daily Indications: Shortness of Breath (Inactive)     ASPIRIN 81 MG EC TABLET    Take 81 mg by mouth daily. With food  Indications: Anticoagulant Therapy    AZELASTINE (ASTELIN) 137 MCG/SPRAY NASAL SPRAY    1 spray by Nasal route 2 times daily as needed Indications: Allergic Rhinitis Use in each nostril as directed    B COMPLEX-C (RA B-COMPLEX/VITAMIN C CR PO)    Take by mouth daily Indications: Treatment to Prevent Vitamin Deficiency     BUDESONIDE (PULMICORT) 0.5 MG/2ML NEBULIZER SUSPENSION      Take 1 ampule by nebulization 2 times daily Indications: Shortness of Breath (Inactive)     CARBOXYMETHYLCELLUL-GLYCERIN (REFRESH OPTIVE OP)    Apply  to eye as needed. Indications: Dry Eyes caused by Deficiency of Tears    CHOLECALCIFEROL (VITAMIN D3) 5000 UNITS TABS    Take 1 tablet by mouth     CLOTRIMAZOLE-BETAMETHASONE (LOTRISONE) CREAM    Apply topically 2 times daily as needed Indications: Yeast Infection (inactive)     COENZYME Q10 (COQ-10) 200 MG CAPS    Take by mouth daily     CONJUGATED ESTROGENS (PREMARIN) 0.625 MG/GM VAGINAL CREAM    Apply pea sized amount to urethra 3 nights weekly. D-MANNOSE PO    Take 1 tablet by mouth 3 times daily Indications: Urinary Tract Infection Symptom     DICYCLOMINE (BENTYL) 10 MG CAPSULE    Take 10 mg by mouth 4 times daily (before meals and nightly) Indications: Pain in the Abdominal Region    DIGOXIN (LANOXIN) 125 MCG TABLET    Take 1 tablet by mouth every other day Indications: Increased Heart Rate Until Office Visit with Dr. Claudio Leventhal (EPIPEN) 0.3 MG/0.3ML SOAJ INJECTION    INJECT AS DIRECTED FOR ANAPHYLAXIS    EVOLOCUMAB (REPATHA SURECLICK SC)    Inject 342 mg into the skin every 14 days Indications: Blood Cholesterol Abnormal     FOLIC ACID (FOLVITE) 1 MG TABLET    Take 1 mg by mouth daily.     Indications: Folic Acid Supplementation    FUROSEMIDE Indications: Chronic Obstructive Lung Disease     POLYETHYLENE GLYCOL (MIRALAX) POWDER    Take 17 g by mouth as needed. Indications: Constipation    PRAMOXINE HCL (PROCTOFOAM RE)    Place rectally daily as needed     PROBIOTIC PRODUCT (SUPER PROBIOTIC) CAPS      Take 1 tablet by mouth daily Indications: Digestive Complaint     PROMETHAZINE-CODEINE ([facility]) 6.25-10 MG/5ML SOLN SOLUTION    Take by mouth See Admin Instructions. Indications: Cough     REVEFENACIN (YUPELRI IN)    Inhale 1 vial into the lungs daily    REVEFENACIN 175 MCG/3ML SOLN    Inhale 175 mcg into the lungs daily    SACUBITRIL-VALSARTAN (ENTRESTO) 24-26 MG PER TABLET    Take 1 tablet by mouth 2 times daily    TRAZODONE (DESYREL) 50 MG TABLET    Take 50 mg by mouth nightly Indications: Irritable Bowel Syndrome     TRIAMCINOLONE (KENALOG) 0.1 % CREAM    Apply topically 2 times daily as needed Anti-fungal    VITAMIN C (ASCORBIC ACID) 500 MG TABLET    Take 500 mg by mouth daily as needed    WARFARIN (COUMADIN) 1 MG TABLET    Take as directed by the [facility], 145 tablets for 90 days       ALLERGIES     is allergic to benadryl [diphenhydramine hcl]; ciprofloxacin; atorvastatin; captopril; codeine; iv dye [iodides]; lipitor; macrobid [nitrofurantoin monohydrate macrocrystals]; neomycin-bacitracin zn-polymyx; pravastatin; zetia [ezetimibe]; adhesive tape; cephalexin; doxycycline; morphine; other; propoxyphene; and sulfa antibiotics. FAMILY HISTORY     is adopted. family history includes Cancer in her sister; Heart Disease in her father. She was adopted. SOCIAL HISTORY      reports that she has been smoking cigarettes. She has a 12.50 pack-year smoking history. She has never used smokeless tobacco. She reports current alcohol use of about 1.0 standard drinks of alcohol per week. She reports that she does not use drugs. PHYSICAL EXAM     INITIAL VITALS:  height is 5' 2\" (1.575 m) and weight is 155 lb (70.3 kg).  Her oral (70.3 kg)      Height: 5' 2\" (1.575 m)          23:00    Patient is seen and evaluated in a timely fashion. Action:     Large-bore IV, normal saline bolus at 30 ml/hour, EKG, chest x-ray, sepsis workup labs, consider admission. MedicalDecision Making    Reassessment:     Patient feels better with following ED medications. Medications   0.9 % sodium chloride bolus (1,503 mLs Intravenous New Bag 2/22/20 0109)       She looks dry and has poor peripheral IV access. Multiple nurses failed multiple attempts putting IV. Right subclavian CVC was placed by me. Patient received 30 ml/kg bolus. Lab works are reviewed and lab result suggested dehydration. Troponin slightly elevated 0.021, this could be demanding ischemia versus chronic (patient had history of chronic elevation of troponin). Lab works do not support patient's hypotension has sepsis etiology. Therefore I did not start antibiotics. Discussed and admitted to hospitalist service. CRITICAL CARE:   None    CONSULTS:  Consult hospitalist    PROCEDURES:  Central Line Placement Procedure Note    Indication: vascular access    Consent: The patient provided verbal consent for this procedure. Procedure: The patient was positioned appropriately and the skin over the right subclavian vein was prepped with Chloraprep. Local anesthesia was obtained by infiltration using 1% Lidocaine without epinephrine. A large bore needle was used to identify the vein. A guide wire was then inserted into the vein through the needle. A triple lumen catheter was then inserted into the vessel over the guide wire using the Seldinger technique. All ports showed good, free flowing blood return and were flushed with saline solution. The catheter was then securely fastened to the skin with sutures and covered with a sterile dressing. A post procedure X-ray was ordered and showed good line position. The patient tolerated the procedure well.     Complications:

## 2020-02-22 NOTE — ED NOTES
Pt resting in bed with eyes closed and respirations regular. VS reassessed.  Will continue to monitor     Cherelle Richey RN  02/22/20 1664

## 2020-02-23 NOTE — PLAN OF CARE
Problem: RESPIRATORY  Goal: Clear lung sounds  Description  Clear lung sounds     2/22/2020 2046 by Hunter Reynolds RCP  Outcome: Ongoing   Breath sounds clear and diminished, continuing treatments as ordered.

## 2020-02-23 NOTE — CONSULTS
[address] [place] [address]                                  CONSULTATION    PATIENT NAME: Betty Marquez                    :        1945  MED REC NO:   [id number]                           ROOM:       0008  ACCOUNT NO:   [de-identified]                           ADMIT DATE: 2020  PROVIDER:     Florance Buerger. Artur Roberts M.D.    Malathi Lefort:  2020    REASON FOR EVALUATION:  Low blood pressure. HISTORY OF PRESENT ILLNESS:  The patient is a 65-year-old lady who is  known to me from prior encounters. She is known to have a history of  coronary artery disease, and she has a history of massive anterior wall  myocardial infarction many years ago and she has since then severe  ischemic cardiomyopathy and an ejection fraction about 20% to 25%. She  had a history of chronic atrial fibrillation and history of AICD  pacemaker implantation. The patient apparently was getting more short  of breath and she also had a low blood pressure and she was dizzy. She  was admitted to the ICU. She was treated with IV fluids and she was  found to have an influenza B antigen positive. The patient denied chest pain per se. She has chronic atrial fib and  she has been chronically anticoagulated. She has a low blood pressure  usually. This patient could not tolerate a lot of medications because of her  chronic low blood pressure. She denied syncopal episodes. REVIEW OF SYSTEMS:  The patient has no history of CVA or TIA. She is  very hard of hearing. The patient unfortunately is still smoking. The  patient is not aware of thyroid disease. She had a history of  gastroesophageal reflux and gastritis. She has been anticoagulated for  her chronic atrial fibrillation. She had a history of renal  insufficiency. The patient had a history of blood transfusion in the  past.  She had a frequent UTI. She had a history of asthma and anemia.    She had a history of peripheral vascular disease and history of heart  catheterization and endoscopy. She had a history of fall with a total  hip replacement. She had a history of left hip replacement. SOCIAL HISTORY:  Still smoking. She drinks alcohol sociably. ALLERGIES:  She has intolerance to BENADRYL. CODE STATUS:  She is a full code. PHYSICAL EXAMINATION:  VITAL SIGNS:  When seen her blood pressure has improved to 100/54. She  is in atrial fib and pacer rhythm. She was afebrile. Respiratory rate  was 18. NEUROLOGIC:  The patient has no focal neurologic deficits. HEART:  Apical pulsation laterally displaced. LUNGS:  She had a few basal crackles. ABDOMEN:  Soft. EXTREMITIES:  Lower limbs, there is no edema. DIAGNOSTIC DATA:  She had a chest x-ray, some concern about possible  pneumonia. LABORATORY DATA:  Showed the troponins are elevated, but they are  chronically elevated. Her BNP was elevated at 4159. Her hemoglobin was  11 and hematocrit 36. BUN was 32, creatinine is 1. The EKG showed a pacer rhythm, atrial fib. IMPRESSION:  1. The patient with shortness of breath and influenza B antigen  positive. She was resuscitated in the ICU, received IV fluids. 2.  History of coronary artery disease and ischemic cardiomyopathy with  a history of chronic total occlusion of the LAD and history of an AICD  implantation. 3.  Chronic atrial fib. She has been anticoagulated. 4.  History of low blood pressure. 5.  Hard of hearing. 6.  History of hip surgery recently. 7.  Chronic elevation of the troponin. 8.  Chronic mild renal insufficiency. 9.  Mild obesity. 10.  Excessive tobacco use. 11.  COPD. RECOMMENDATION:  1. From the cardiac standpoint, the patient seemed to be stable. Now  she is on small dose of Entresto with her blood pressure in 100, where  usually it _____ could the patient has some vasodilatation from her  fever and the infection.   She responds well to the IV

## 2020-02-23 NOTE — PROGRESS NOTES
Hospitalist progress note      Patient:  Mena Nick    Unit/Bed:4K-08/008-A  YOB: 1945  MRN: [id number]   Acct: [de-identified]   PCP: Evelyne Buckner MD  Date of Admission: 2/21/2020  Chief Complaint:- hypotension, fever    Assessment and Plan:    1. Acute on chronic respiratory failure: Home oxygen use continuous 2L/NC. Was on high flow nasal cannula in the ICU currently on 2 L of oxygen via nasal cannula-wean oxygen as able  2. Acute systolic congestive heart failure exacerbation-likely secondary to influenza A viral bronchitis cardiologist consulted-on Bumex 0.5 mg every 12 hours. Monitor daily weights, strict  Intake and Out put, monitor K, Ical , Mag, BMP daily, replace lytes accordingly-awaiting 2D echo report  3. Septic Shock: -Present on admission-resolved secondary to above and below  4. Influenza A: Tamiflu continued. 5. Cystitis with hematuria: Noted history of Klebsiella pneumonia 1/2020 Sensitive to Rocephin, will continue at this this  6. NICOLASA-mild improving with diuretics -watch BMP and lites being on IV diuretics    7. hyponatremia, likely volume overload, repeat labs pending and monitor   8. Atrial Fibrillation, chronic, history of AICD/PPM, Pharmacy to dose Coumadin. 9. Anemia, normocytic: trend and monitor. 10. Thrombocytopenia: likely secondary to infection, Fibrinogen/ FSP pending. 11. Hypoalbuminemia: history, monitor. INITIAL H AND P AND ICU COURSE:  Adelina Johnson is a 70-year-old female admitted to the ICU from [facility] ER with complaint of fever chills hypotension in need of Levophed drip support.     Patient has a significant history of CAD, Atrial Fibrillation s/p Coumadin, AICD/PPM, HFrEF-ECHO 2019 LVEF 35-40%, Right ventricular systolic pressure of 65 mm consistent with severe pulmonary hypertension, Chronic Respiratory Failure, hypoxia, continuous O2 2L/NC-established patient of Dr. Fauzia Putnam, COPD, DVT, Dyslipidemia, Anemia normocytic  Daughter present at bedside contributing to history, patient is [place]. Recent history of Sinus infection approximately 2 weeks ago was given Augmentin for 10 days, patient has had complaints of diarrhea with last BM approximately 2-3 days ago. Dortha Hodgkin was seen in cardiology clinic, Dr. Hali Webb approximately 1 week ago with noted hypotension Entresto and Metroprolol were decreased in dosages. Today onset of fever, productive cough, poor appetite and profound fatigue. Tmax 101.6. Patient was diagnosed with influenza A at outpatient clinic. Patient did take 2 doses of Tamiflu prior to admission. While at outpatient clinic noted blood pressure was 80/52 daughter who is a MA at PCP office went to check on them earlier this evening noted blood pressure was 78/50 and patient had complaints of dizziness and fatigue. While in the ER patient did receive 0.9NS fluid bolus of 30 ml/Kg X1 and Levophed gtt was started for blood pressure support.      Patient was in the ICU for brief period of time before getting transferred to the stepdown unit on  2/22      Subjective:- (Last 24 hours)  Fever and chills better  Diarrhea slowly resolving    Scheduled Meds:   formoterol  20 mcg Nebulization BID    sodium chloride flush  10 mL Intravenous 2 times per day    Alcaftadine  1 drop Ophthalmic Daily    aspirin  81 mg Oral Daily    budesonide  500 mcg Nebulization BID    dicyclomine  10 mg Oral 4x Daily AC & HS    sacubitril-valsartan  1 tablet Oral Daily    traZODone  50 mg Oral Nightly    oseltamivir  30 mg Oral BID    potassium (CARDIAC) replacement protocol   Other RX Placeholder    magnesium replacement protocol   Other RX Placeholder    bumetanide  0.5 mg Intravenous Q12H    cefTRIAXone (ROCEPHIN) IV  1 g Intravenous Q24H     Continuous Infusions:   pantoprozole (PROTONIX) infusion 8 mg/hr (02/23/20 1227)     PRN Meds:.sodium chloride flush, acetaminophen, acetaminophen, polyethylene glycol, promethazine, ondansetron, hydrOXYzine, levalbuterol, nitroGLYCERIN, senna     PHYSICAL EXAMINATION:  Patient Vitals for the past 8 hrs:   BP Temp Temp src Pulse Resp SpO2   02/23/20 1119 (!) 107/53 97.6 °F (36.4 °C) Oral 83 16 92 %   02/23/20 0900 (!) 106/54 97.5 °F (36.4 °C) Oral 69 16 --   02/23/20 0824 -- -- -- -- -- 94 %     I/O last 3 completed shifts: In: 370 [P.O.:340; I.V.:30]  Out: 300 [Urine:300]   Wt Readings from Last 3 Encounters:   02/23/20 142 lb 8 oz (64.6 kg)   01/07/20 155 lb 9.6 oz (70.6 kg)   11/26/19 152 lb 12.8 oz (69.3 kg)      Body mass index is 26.06 kg/m². CAM-ICU:   negative  General:   Pale, tachypneic, acutely ill in appearance  HEENT:  normocephalic and atraumatic. No scleral icterus. PERR  Neck: supple. No Thyromegaly. Lungs: clear to auscultation with crackles audible in bases. No retractions  Cardiac: S1S2. No JVD. Abdomen: soft. Nontender, bowel sounds present X4  Extremities:  No clubbing, cyanosis, Positive for anasarca    Vasculature: capillary refill < 3 seconds. Palpable dorsalis pedis pulses. Skin:  warm and dry. Psych:  Alert and oriented x3. Affect appropriate  Lymph:  No supraclavicular adenopathy. Neurologic:  No focal deficit. No seizures. Patient is [place]    Data: (All radiographs, tracings, PFTs, and imaging are personally viewed and interpreted unless otherwise noted). LABS/RADIOLOGY:-  Recent Labs     02/22/20  0002 02/23/20  0517   WBC 4.5* 4.6*   HGB 11.0* 10.2*   HCT 36.3* 33.8*   * 115*     Recent Labs     02/22/20  0015 02/22/20  0900 02/23/20  0517   * 139 141   K 4.3 4.0 4.0   CL 94* 102 104   CO2 27 28 25   BUN 39* 32* 27*   CREATININE 1.1 1.0 0.9   CALCIUM 8.8 8.0* 8.3*     Recent Labs     02/22/20  0015   AST 35   ALT 17   BILITOT 0.3   ALKPHOS 55     Recent Labs     02/22/20  0002 02/22/20  0432 02/23/20  0517   INR 1.11 1.18* 1.37*     No results for input(s): Jolie Nicholas in the last 72 hours.   Recent Labs     02/22/20  0015   PROCAL 0.29* Recent Labs     02/22/20  0002   LACTA 1.3      Microbiology:    Blood culture #1:   Lab Results   Component Value Date    BC No growth-preliminary  02/21/2020     Blood culture #2:No results found for: Silvio Avelar  Organism:  Lab Results   Component Value Date    ORG Mixed Growth     02/21/2020         Lab Results   Component Value Date    LABGRAM  11/26/2019     No segmented neutrophils observed. Few epithelial cells observed. Many large gram positive bacilli. Few small gram positive bacilli. Few gram negative bacilli. Prepubescent and postmenopausal female samples (<15and >47ears of age) are not typically suitable for bacterialvaginosis screening. MRSA culture only:No results found for: [facility]  Urine culture:   Lab Results   Component Value Date    LABURIN  10/24/2019     Growth of Contaminants. The mixture of organisms present are not a common cause of urinary tract infections and probably represent distal urethral juventino. Respiratory culture: No results found for: CULTRESP  Aerobic and Anaerobic :  Lab Results   Component Value Date    LABAERO No growth-preliminary No growth   11/11/2019     Lab Results   Component Value Date    LABANAE No growth-preliminary No growth   11/11/2019       Urinalysis:      Lab Results   Component Value Date    NITRU NEGATIVE 02/22/2020    WBCUA 15-25 02/22/2020    BACTERIA MANY 02/22/2020    RBCUA 0-2 02/22/2020    BLOODU TRACE 02/22/2020    GLUCOSEU NEGATIVE 02/22/2020       Radiology:(All radiographs, tracings, PFTs, and imaging are personally viewed and interpreted unless otherwise noted). XR CHEST PORTABLE   Final Result      Interval placement of a right subclavian central line, tip in the mid SVC, no pneumothorax. Remaining findings unchanged, see above. **This report has been created using voice recognition software. It may contain minor errors which are inherent in voice recognition technology. **      Final report electronically signed by Dr. Antonino Garcia subclavian dual lead AICD device, leads in the regions of the right ventricle and coronary sinus. Lungs/pleura: There is a questionable infiltrate or atelectasis at the lateral aspect of the left lung base adjacent to the cardiac apex versus representing a prominent epicardial fat pad. No pleural effusion. No pneumothorax. Heart: There is stable mild cardiomegaly. Mediastinum/petros: No obvious mass or adenopathy. Skeleton: No significant bone or joint abnormality. Questionable lateral left basilar infiltrate or atelectasis versus prominent epicardial fat pad. Remainder of the lungs are clear. Mild cardiomegaly. **This report has been created using voice recognition software. It may contain minor errors which are inherent in voice recognition technology. ** Final report electronically signed by Dr. Nicky Kurtz on 2/21/2020 11:50 PM      Electronically signed by Maria Marrufo MD on 2/23/2020 at 3:54 PM

## 2020-02-23 NOTE — CONSULTS
[id number] CONSULT            Patient: Ysabel Hilliard  : 1945  Acct#: [de-identified]  Consult seen for:   Ysabel Hilliard is a 67 y.o. , female with suspected GI bleed    ASSESSMENT:     1. BRBPR today with ongoing anemia  2. Internal hermorrhoids with brown stool on rectal exam, but no masses. 3. Admission of acute on chronic resp. Failure. Currently on home O2 of 2 liters/ min  4. Influenza A- on Tamiflu. 5. Recent transfer from ICU  6. Atrial fibrillation- chronic coumadin, INR 1.37  7. Chronic anemia- 10.2/33.8  8. Hx lymphocytic colitis  9. CAD- not amenable to stenting or bypass  10. NICOLASA likely from cardiorenal syndrome. PLAN:   1. Advance diet to Low Na  2. Continue present medications  3. Discussed surgical removal of hemorrhoids with pt. This would have to be approved by Dr. Samson Bustos. According to pt, Dr. Samson Bustos had previously advised against it. Now that her INR is low, if Dr. Samson uBstos approves, it would be a good time to do it. 4. H&H now and  tomorrow if hgb drops significantly, then do bleeding scan  5. Continue to follow    HISTORY OF PRESENT ILLNESS    Pt started with a fever yesterday, productive cough, and  fatigue. Temp as high as 101.6. Diagnosed with influenza A at outpatient clinic. Took 2 doses of Tamiflu prior to admission. Blood pressure was 80/52 at outpatient clinic. Daughter went to check on the pt and discovered BP was 78/50 and patient had complaints of dizziness and fatigue. Pt was admitted to ICU from ER and started on Levophed. Today, nursing staff noted reddish brown stools. Later on in the morning, there was bright red blood. She had been instructed by Dr. Zoie Mejia in the past to have her hemorrhoids removed surgically given her Coumadin use. EGD: 20 - 1. Very mild prepyloric patchy erythema. 2.  Small hiatal hernia. 3.  Otherwise normal exam.      Colonoscopy:  10/25/18-  1. Colonoscopy of the cecum.  2.  Four polyps CAPS   Take 1 tablet by mouth daily Indications: Digestive Complaint  3/2/15  Yes Historical Provider, MD   budesonide (PULMICORT) 0.5 MG/2ML nebulizer suspension   Take 1 ampule by nebulization 2 times daily Indications: Shortness of Breath (Inactive)    Yes Historical Provider, MD   Arformoterol Tartrate (BROVANA) 15 MCG/2ML NEBU   Take 1 ampule by nebulization 2 times daily Indications: Shortness of Breath (Inactive)    Yes Historical Provider, MD   ofloxacin (FLOXIN) 0.3 % otic solution Place 2 drops into both ears daily as needed Indications: Infection Long-term therapy. 12/22/14  Yes Historical Provider, MD   Carboxymethylcellul-Glycerin ([address] [facility]) Apply  to eye as needed. Indications: Dry Eyes caused by Deficiency of Tears   Yes Historical Provider, MD   levalbuterol (XOPENEX) 0.63 MG/3ML nebulization Take 1 ampule by nebulization every 8 hours as needed for Wheezing. Yes Historical Provider, MD   omeprazole (PRILOSEC) 20 MG capsule Take 20 mg by mouth 2 times daily. Indications: Gastroesophageal Reflux Disease   Yes Historical Provider, MD   polyethylene glycol (MIRALAX) powder Take 17 g by mouth as needed. Indications: Constipation   Yes Historical Provider, MD   Alcaftadine (LASTACAFT) 0.25 % SOLN Apply 1 drop to eye daily Indications: Eye Allergy Both eyes   Yes Historical Provider, MD   furosemide (LASIX) 40 MG tablet Take 40 mg by mouth daily. Indications: Treatment with Diuretic Therapy   Yes Historical Provider, MD   folic acid (FOLVITE) 1 MG tablet Take 1 mg by mouth daily.     Indications: Folic Acid Supplementation   Yes Historical Provider, MD   OXYGEN 2 L by Nasal route continuous Indications: Chronic Obstructive Lung Disease    Yes Historical Provider, MD   traZODone (DESYREL) 50 MG tablet Take 50 mg by mouth nightly Indications: Irritable Bowel Syndrome    Yes Historical Provider, MD   metoprolol succinate (TOPROL XL) 25 MG extended release tablet Take 0.5 tablets by mouth

## 2020-02-23 NOTE — PLAN OF CARE
Problem: RESPIRATORY  Goal: Clear lung sounds  Description  Clear lung sounds     2/23/2020 1831 by Vladimir Kim RCP  Outcome: Ongoing

## 2020-02-24 NOTE — PROGRESS NOTES
Gastroenterology Progress Note:     Patient Name:  Kaci Gann   MRN: [id number]  [id number]  YOB: 1945  Admit Date: 2/21/2020 10:41 PM  Primary Care Physician: Zenia Coker MD   [id number]     Patient seen and examined. 24 hours events and chart reviewed. Subjective: Patient sleeping in bed, arouses easily. Denies abdominal pain, nausea, and vomiting. Per RN, her stools have been brown. Hgb 10.0    Objective:  BP (!) 98/51   Pulse 70   Temp 98 °F (36.7 °C) (Oral)   Resp 16   Ht 5' 2\" (1.575 m)   Wt 141 lb 3.2 oz (64 kg)   SpO2 98%   BMI 25.83 kg/m²     Physical Exam:    General:  Acutely ill appearing female  HEENT: Atraumatic, normocephalic. Moist oral mucous membranes. Neck: Supple without adenopathy, JVD, thyromegaly or masses. Trachea midline. CV: Heart RRR, no murmurs, rubs, gallops. Resp: Even, easy without cough or accessory use. Scattered rhonchi noted throughout. Abd: Round, soft, nontender. No hepatosplenomegaly or mass present. Active bowel sounds heard. No distention noted. Ext:  Without cyanosis, clubbing, edema. Skin: Pale, warm, dry  Neuro:  Alert, oriented x 3 with no obvious deficits.          Labs:   CBC:   Lab Results   Component Value Date    WBC 4.6 02/23/2020    HGB 10.0 02/24/2020    HCT 32.8 02/24/2020    MCV 95.5 02/23/2020     02/23/2020     BMP:   Lab Results   Component Value Date     02/23/2020    K 4.0 02/23/2020    K 4.7 01/02/2020     02/23/2020    CO2 25 02/23/2020    BUN 27 02/23/2020    CREATININE 0.9 02/23/2020    CALCIUM 8.3 02/23/2020     PT/INR:   Lab Results   Component Value Date    PROTIME 22.3 02/05/2019    INR 2.04 02/24/2020     Lipids:   Lab Results   Component Value Date    ALKPHOS 55 02/22/2020    ALT 17 02/22/2020    AST 35 02/22/2020    BILITOT 0.3 02/22/2020    BILIDIR <0.2 01/03/2020    LABALBU 3.2 02/22/2020    AMYLASE 74 10/04/2013    LIPASE 27.2 07/07/2018     Current Meds:  Scheduled Meds:   formoterol 20 mcg Nebulization BID    sodium chloride flush  10 mL Intravenous 2 times per day    Alcaftadine  1 drop Ophthalmic Daily    aspirin  81 mg Oral Daily    budesonide  500 mcg Nebulization BID    dicyclomine  10 mg Oral 4x Daily AC & HS    sacubitril-valsartan  1 tablet Oral Daily    traZODone  50 mg Oral Nightly    oseltamivir  30 mg Oral BID    potassium (CARDIAC) replacement protocol   Other RX Placeholder    magnesium replacement protocol   Other RX Placeholder    bumetanide  0.5 mg Intravenous Q12H    cefTRIAXone (ROCEPHIN) IV  1 g Intravenous Q24H     Continuous Infusions:   pantoprozole (PROTONIX) infusion 8 mg/hr (02/24/20 0528)       Assessment:  66 yo F admitted 02/22/20 for fever, chills, and hypotension. She was diagnosed with influenza A outpatient. She was started on a Levophed gtt for hypotension. She was noted to have rectal bleeding therefore GI consulted. Upon rectal exam, hemorrhoids were noted with brown stool. Dr. Belinda Bateman had instructed her to have her hemorrhoids removed surgically in the past due to her Coumadin use. 1. BRBPR- hemorrhoidal  2. Internal hemorrhoids  3. Influenza A  4. Acute  on chronic respiratory failure  5. Acute on chronic anemia  6. Afib- on Coumadin  7. CAD  8. NICOLASA  9. Acute on chronic anemia       Plan:    1. Monitor H & H, transfuse prn  2. PPI daily  3. Recommend general surgery consult for hemorrhoidectomy if okay with cardiology  4. Anusol suppositories BID prn  5. Colace BID  6. Cardiology on board  7.  Supportive care per primary team  Will follow    Case reviewed and impression/plan reviewed in collaboration with Dr. Kiko Kitchen  Electronically signed by PETR Lindsay CNP on 2/24/2020 at 12:09 PM    [facility]

## 2020-02-24 NOTE — PROGRESS NOTES
2L/NC-established patient of Dr. Boni Newman, COPD, DVT, Dyslipidemia, Anemia normocytic  Daughter present at bedside contributing to history, patient is [place]. Recent history of Sinus infection approximately 2 weeks ago was given Augmentin for 10 days, patient has had complaints of diarrhea with last BM approximately 2-3 days ago. Sage Posada was seen in cardiology clinic, Dr. Ludy Lorenzo approximately 1 week ago with noted hypotension Entresto and Metroprolol were decreased in dosages. Today onset of fever, productive cough, poor appetite and profound fatigue. Tmax 101.6. Patient was diagnosed with influenza A at outpatient clinic. Patient did take 2 doses of Tamiflu prior to admission. While at outpatient clinic noted blood pressure was 80/52 daughter who is a MA at PCP office went to check on them earlier this evening noted blood pressure was 78/50 and patient had complaints of dizziness and fatigue. While in the ER patient did receive 0.9NS fluid bolus of 30 ml/Kg X1 and Levophed gtt was started for blood pressure support.      Patient was in the ICU for brief period of time before getting transferred to the stepdown unit on  2/22      Subjective:- (Last 24 hours)  Fever and chills better  Fatigue  Says her legs are sore    Scheduled Meds:   pantoprazole  40 mg Oral QAM AC    docusate sodium  100 mg Oral BID    formoterol  20 mcg Nebulization BID    sodium chloride flush  10 mL Intravenous 2 times per day    Alcaftadine  1 drop Ophthalmic Daily    aspirin  81 mg Oral Daily    budesonide  500 mcg Nebulization BID    dicyclomine  10 mg Oral 4x Daily AC & HS    sacubitril-valsartan  1 tablet Oral Daily    traZODone  50 mg Oral Nightly    oseltamivir  30 mg Oral BID    potassium (CARDIAC) replacement protocol   Other RX Placeholder    magnesium replacement protocol   Other RX Placeholder    bumetanide  0.5 mg Intravenous Q12H    cefTRIAXone (ROCEPHIN) IV  1 g Intravenous Q24H     Continuous Infusions:    PRN Meds:.cyclobenzaprine, hydrocortisone, sodium chloride flush, acetaminophen, acetaminophen, polyethylene glycol, promethazine, ondansetron, hydrOXYzine, levalbuterol, nitroGLYCERIN, senna     PHYSICAL EXAMINATION:  Patient Vitals for the past 8 hrs:   BP Temp Temp src Pulse Resp SpO2   02/24/20 1459 (!) 138/52 97.5 °F (36.4 °C) Oral 70 18 94 %   02/24/20 1223 (!) 136/59 97.8 °F (36.6 °C) Oral 71 16 93 %     I/O last 3 completed shifts: In: 628 [P.O.:360; I.V.:268]  Out: 200 [Urine:200]   Wt Readings from Last 3 Encounters:   02/24/20 141 lb 3.2 oz (64 kg)   01/07/20 155 lb 9.6 oz (70.6 kg)   11/26/19 152 lb 12.8 oz (69.3 kg)      Body mass index is 25.83 kg/m². CAM-ICU:   negative  General:   Pale, tachypneic, acutely ill in appearance  HEENT:  normocephalic and atraumatic. No scleral icterus. PERR  Neck: supple. No Thyromegaly. Lungs: clear to auscultation with crackles audible in bases. No retractions  Cardiac: S1S2. No JVD. Abdomen: soft. Nontender, bowel sounds present X4  Extremities:  No clubbing, cyanosis, Positive for anasarca    Vasculature: capillary refill < 3 seconds. Palpable dorsalis pedis pulses. Skin:  warm and dry. Psych:  Alert and oriented x3. Affect appropriate  Lymph:  No supraclavicular adenopathy. Neurologic:  No focal deficit. No seizures. Patient is [facility]    Data: (All radiographs, tracings, PFTs, and imaging are personally viewed and interpreted unless otherwise noted).      LABS/RADIOLOGY:-  Recent Labs     02/22/20  0002 02/23/20  0517 02/23/20  1800 02/24/20  0530   WBC 4.5* 4.6*  --   --    HGB 11.0* 10.2* 10.5* 10.0*   HCT 36.3* 33.8* 34.3* 32.8*   * 115*  --   --      Recent Labs     02/22/20  0015 02/22/20  0900 02/23/20  0517   * 139 141   K 4.3 4.0 4.0   CL 94* 102 104   CO2 27 28 25   BUN 39* 32* 27*   CREATININE 1.1 1.0 0.9   CALCIUM 8.8 8.0* 8.3*     Recent Labs     02/22/20  0015   AST 35   ALT 17   BILITOT 0.3   ALKPHOS 55     Recent Labs     02/22/20  0432 02/23/20  0517 02/24/20  0530   INR 1.18* 1.37* 2.04*     No results for input(s): Dalton Flatter in the last 72 hours. Recent Labs     02/22/20  0015   PROCAL 0.29*     Recent Labs     02/22/20  0002   LACTA 1.3      Microbiology:    Blood culture #1:   Lab Results   Component Value Date    BC No growth-preliminary  02/21/2020     Blood culture #2:No results found for: Casandra Hughes  Organism:  Lab Results   Component Value Date    ORG Mixed Growth     02/21/2020         Lab Results   Component Value Date    LABGRAM  11/26/2019     No segmented neutrophils observed. Few epithelial cells observed. Many large gram positive bacilli. Few small gram positive bacilli. Few gram negative bacilli. Prepubescent and postmenopausal female samples (<15and >47ears of age) are not typically suitable for bacterialvaginosis screening. MRSA culture only:No results found for: [facility]  Urine culture:   Lab Results   Component Value Date    LABURIN  10/24/2019     Growth of Contaminants. The mixture of organisms present are not a common cause of urinary tract infections and probably represent distal urethral juventino. Respiratory culture: No results found for: CULTRESP  Aerobic and Anaerobic :  Lab Results   Component Value Date    LABAERO No growth-preliminary No growth   11/11/2019     Lab Results   Component Value Date    LABANAE No growth-preliminary No growth   11/11/2019       Urinalysis:      Lab Results   Component Value Date    NITRU NEGATIVE 02/22/2020    WBCUA 15-25 02/22/2020    BACTERIA MANY 02/22/2020    RBCUA 0-2 02/22/2020    BLOODU TRACE 02/22/2020    GLUCOSEU NEGATIVE 02/22/2020       Radiology:(All radiographs, tracings, PFTs, and imaging are personally viewed and interpreted unless otherwise noted). XR CHEST PORTABLE   Final Result      Interval placement of a right subclavian central line, tip in the mid SVC, no pneumothorax. Remaining findings unchanged, see above.       **This report has been created using voice recognition software. It may contain minor errors which are inherent in voice recognition technology. **      Final report electronically signed by Dr. Shaista Rubin on 2/22/2020 12:56 AM      XR CHEST PORTABLE   Final Result      Questionable lateral left basilar infiltrate or atelectasis versus prominent epicardial fat pad. Remainder of the lungs are clear. Mild cardiomegaly. **This report has been created using voice recognition software. It may contain minor errors which are inherent in voice recognition technology. **      Final report electronically signed by Dr. Shaista Rubin on 2/21/2020 11:50 PM        Xr Chest Portable    Result Date: 2/22/2020  PROCEDURE: XR CHEST PORTABLE CLINICAL INFORMATION: CVC placement. COMPARISON: Chest x-ray dated 9/21/2020 TECHNIQUE: AP Portable chest xray FINDINGS: Lines/tubes/devices: Interval placement of right subclavian central venous catheter, tip in the mid SVC region. No change in the satisfactory position of left subclavian dual-lead AICD device. Lungs/pleura:  No change in the lateral left basilar opacity which may represent pneumonia, atelectasis, or an epicardial fat pad. Interstitial markings remain prominent, unchanged dating back to 10/31/2019. This may represent pulmonary fibrosis versus recurrent mild interstitial pulmonary edema or atypical pneumonia. No pleural effusion. No pneumothorax. Heart: There is stable mild cardiomegaly. Mediastinum/petros: No obvious mass or adenopathy. Skeleton: There is mild bilateral AC joint DJD. Interval placement of a right subclavian central line, tip in the mid SVC, no pneumothorax. Remaining findings unchanged, see above. **This report has been created using voice recognition software. It may contain minor errors which are inherent in voice recognition technology. ** Final report electronically signed by Dr. Shaista Rubin on 2/22/2020 12:56 AM    Xr Chest Portable    Result Date: 2/21/2020  PROCEDURE: XR CHEST PORTABLE CLINICAL INFORMATION: weakness. COMPARISON: Chest x-ray dated 1/8/2020 TECHNIQUE: AP Portable chest xray FINDINGS: Lines/tubes/devices: There is a left subclavian dual lead AICD device, leads in the regions of the right ventricle and coronary sinus. Lungs/pleura: There is a questionable infiltrate or atelectasis at the lateral aspect of the left lung base adjacent to the cardiac apex versus representing a prominent epicardial fat pad. No pleural effusion. No pneumothorax. Heart: There is stable mild cardiomegaly. Mediastinum/petros: No obvious mass or adenopathy. Skeleton: No significant bone or joint abnormality. Questionable lateral left basilar infiltrate or atelectasis versus prominent epicardial fat pad. Remainder of the lungs are clear. Mild cardiomegaly. **This report has been created using voice recognition software. It may contain minor errors which are inherent in voice recognition technology. ** Final report electronically signed by Dr. David Wong on 2/21/2020 11:50 PM      Electronically signed by Martín Wellington MD on 2/24/2020 at 6:31 PM

## 2020-02-24 NOTE — CARE COORDINATION
2/24/20, 10:04 AM  DISCHARGE PLANNING EVALUATION:    Mariah Crane       Admitted from: ED 2/21/2020/ [address] day: 2   Location: -08/008-A Reason for admit: Influenza A [J10.1]  Septic shock ([facility] [address] [A41.9, R65.21] Status: IP  Admit order signed?: yes  PMH:  has a past medical history of Allergic rhinitis, Anemia, Anxiety, Arthritis, Asthma, Atrial fibrillation ([facility] [address] CAD (coronary artery disease), COPD (chronic obstructive pulmonary disease) ([facility] [address] Frequent UTI, GERD (gastroesophageal reflux disease), History of blood transfusion, Hx of blood clots, Hyperlipidemia, Hypertension, Influenza A, Kidney stone, LONG TERM ANTICOAGULENT USE, MI, old, Prolonged emergence from general anesthesia, and Systolic CHF, acute on chronic ([facility] [address] Procedure:   2/21 CVC   2/22 ECHO EF 30-35%, moderate MI anteroseptal LV  Medications:  Scheduled Meds:   formoterol  20 mcg Nebulization BID    sodium chloride flush  10 mL Intravenous 2 times per day    Alcaftadine  1 drop Ophthalmic Daily    aspirin  81 mg Oral Daily    budesonide  500 mcg Nebulization BID    dicyclomine  10 mg Oral 4x Daily AC & HS    sacubitril-valsartan  1 tablet Oral Daily    traZODone  50 mg Oral Nightly    oseltamivir  30 mg Oral BID    potassium (CARDIAC) replacement protocol   Other RX Placeholder    magnesium replacement protocol   Other RX Placeholder    bumetanide  0.5 mg Intravenous Q12H    cefTRIAXone (ROCEPHIN) IV  1 g Intravenous Q24H     Continuous Infusions:   pantoprozole (PROTONIX) infusion 8 mg/hr (02/24/20 0528)      Pertinent Info/Orders/Treatment Plan:  Client admitted with Influenza A/Hypotension to ICU (pressors there now off) now on 4K, treated with IV Diuretics, IV AB. Elevated BNP; monitor. GI following. Oxygen 2L continued, PPI gtt continued  Diet: DIET LOW SODIUM 2 GM;   Smoking status:  reports that she has been smoking cigarettes. She has a 6.25 pack-year smoking history.  She has never used smokeless tobacco.   PCP: Earl Rebolledo MD  Readmission 30 days or less: none  Readmission Risk Score: 55%    Discharge Planning Evaluation  Current Residence:  Private Residence  Living Arrangements:  Spouse/Significant Other   Support Systems:  Spouse/Significant Other, Children  Current Services PTA:     Potential Assistance Needed:  Outpatient PT/OT, Home Care  Potential Assistance Purchasing Medications:  No  Does patient want to participate in local refill/ meds to beds program?  Yes  Type of Home Care Services:  Nursing Services  Patient expects to be discharged to:  home with   Expected Discharge date:  02/26/20  Follow Up Appointment: Best Day/ Time: Tuesday AM    Patient Goals/Plan/Treatment Preferences: plans home with spouse, has RW, nebulizer, WC, [employer] home oxygen 2L at rest, 4L with activity, current with OP IV Iron at Dr. Darline Villela office, current with OP PT/OT (s/p 10/26 hip surgery, current with Coumadin Clinic; Sydni  in past, therapies following; await therapy input  Transportation/Food Security/Housekeeping Addressed:  No issues identified.     Evaluation: no

## 2020-02-24 NOTE — PLAN OF CARE
Problem: Falls - Risk of:  Goal: Will remain free from falls  Description  Will remain free from falls  2/24/2020 1426 by Gopal Christian RN  Outcome: Met This Shift  Note:   Up with one assist  No falls this shift  2/24/2020 0158 by Suzie Gonzalez RN  Outcome: Ongoing  Note:   Patient free from falls this shift with call light within reach, slipper socks on, and bed alarm turned on. Problem: Pain:  Goal: Pain level will decrease  Description  Pain level will decrease  2/24/2020 1426 by Gopal Christian RN  Outcome: Met This Shift  Note:   Complains of pain in left foot from prior surgery in October  Pain Assessment: 0-10  Pain Level: 3   Patient's Stated Pain Goal: No pain   Is pain goal met at this time? No     Non-Pharmaceutical Pain Intervention(s): Massage, pain medication prn    2/24/2020 0158 by Suzie Gonzalez RN  Outcome: Ongoing  Note:   Patient chronic pain being controlled by pain relief medication and muscle relaxer this shift. Problem: Falls - Risk of:  Goal: Absence of physical injury  Description  Absence of physical injury  2/24/2020 0158 by Suzie Gonzalez RN  Outcome: Ongoing  Note:   Patient free from physical injury and from falls this shift with call light within reach, slipper socks on, and bed alarm turned on. Problem: RESPIRATORY  Goal: Clear lung sounds  Description  Clear lung sounds     2/24/2020 0902 by Jesús Cummins RCP  Outcome: Ongoing     Problem: Pain:  Goal: Control of acute pain  Description  Control of acute pain  2/24/2020 0158 by Suzie Gonzalez RN  Outcome: Ongoing  Note:   Patient chronic pain being controlled by pain relief medication and muscle relaxer this shift. Goal: Control of chronic pain  Description  Control of chronic pain  2/24/2020 0158 by Suzie Gonzalez RN  Outcome: Ongoing  Note:   Patient chronic pain being controlled by pain relief medication and muscle relaxer this shift. Problem:  Activity:  Goal: Ability to return to normal activity level will improve  Description  Ability to return to normal activity level will improve  Outcome: Ongoing  Note:   Patient states she is fatigued this shift     Problem: Physical Regulation:  Goal: Complications related to the disease process, condition or treatment will be avoided or minimized  Description  Complications related to the disease process, condition or treatment will be avoided or minimized  Outcome: Ongoing  Note:   Remains in contact isolation for flu  Currently taking Tamiflu     Problem: Respiratory:  Goal: Respiratory status will improve  Description  Respiratory status will improve  Outcome: Ongoing  Note:   Has strong productive cough     Problem: Discharge Planning  Intervention: Interaction with patient/family and care team  Note:   Discharge pending physician approval  From home with , who also has influenza at this time     Care plan reviewed with patient. Patient verbalizes understanding of the plan of care and contributes to goal setting.

## 2020-02-24 NOTE — CARE COORDINATION
DISCHARGE/PLANNING EVALUATION  2/24/20, 3:16 PM    Reason for Referral:  \"discharge planning\"  Mental Status:  Alert and oriented   Decision Making:  Makes her own decisions  Family/Social/Home Environment:  SW spoke to the patient about her needs. She and her spouse and a son live in a one story home without a basement. They both were housekeeping. She is able to drive as well as her spouse but did not directly answer if she was back to driving from her hip surgery. She has a walk-in shower with seat but no grab bar. She has a walker with wheels and wheelchair for long distances. She has home o2 from [facility]. She states her spouse is sick with the flu as well. They have 2 children who both only live a few houses away and another son lives with them but he works night shift. Current Services including food security, transportation and housekeeping:   She states she was going to [facility] for therapy, the last time was a week ago. Her spouse drove her. She also states the nurse from [facility] was coming to see her. THIEN did confirm this with Stacia with HH. She states their plan of care started in Jan but only for nursing and the last visit was Feb 17. Current Equipment: see above  Payment Source:  Medicare and [employer] [place]  Concerns or Barriers to Discharge:  None voiced at this time, she would like to return home  Post acute provider list with quality measures, geographic area and applicable managed care information provided. Questions regarding selection process answered:  N/A    Teach Back Method used with patient regarding care plan and needs  Patient verbalize understanding of the plan of care and contribute to goal setting.        Patient goals, treatment preferences and discharge plan:  Plan home with [facility]    Electronically signed by BLANCA Hamilton on 2/24/2020 at 3:16 PM

## 2020-02-24 NOTE — PLAN OF CARE
Problem: RESPIRATORY  Goal: Clear lung sounds  Description  Clear lung sounds     Outcome: Ongoing   Continue therapy to help improve aeration throughout all lung fields.

## 2020-02-25 NOTE — PLAN OF CARE
Problem: Respiratory:  Goal: Respiratory status will improve  Description  Respiratory status will improve  2/25/2020 0754 by Kelley Amaya RCP  Outcome: Ongoing

## 2020-02-25 NOTE — PROGRESS NOTES
Transdermal Daily    pantoprazole  40 mg Oral QAM AC    docusate sodium  100 mg Oral BID    formoterol  20 mcg Nebulization BID    sodium chloride flush  10 mL Intravenous 2 times per day    Alcaftadine  1 drop Ophthalmic Daily    aspirin  81 mg Oral Daily    budesonide  500 mcg Nebulization BID    dicyclomine  10 mg Oral 4x Daily AC & HS    sacubitril-valsartan  1 tablet Oral Daily    traZODone  50 mg Oral Nightly    oseltamivir  30 mg Oral BID    potassium (CARDIAC) replacement protocol   Other RX Placeholder    magnesium replacement protocol   Other RX Placeholder    bumetanide  0.5 mg Intravenous Q12H    cefTRIAXone (ROCEPHIN) IV  1 g Intravenous Q24H     Continuous Infusions:    PRN Meds:.menthol, cyclobenzaprine, hydrocortisone, sodium chloride flush, acetaminophen, acetaminophen, polyethylene glycol, promethazine, ondansetron, hydrOXYzine, levalbuterol, nitroGLYCERIN, senna     PHYSICAL EXAMINATION:  Patient Vitals for the past 8 hrs:   BP Temp Temp src Pulse Resp SpO2   02/25/20 1208 122/60 98.4 °F (36.9 °C) Oral 70 18 94 %   02/25/20 0942 132/72 97.5 °F (36.4 °C) Oral 70 16 95 %     I/O last 3 completed shifts: In: 548.9 [P.O.:400; I.V.:148.9]  Out: 200 [Urine:200]   Wt Readings from Last 3 Encounters:   02/24/20 141 lb 3.2 oz (64 kg)   01/07/20 155 lb 9.6 oz (70.6 kg)   11/26/19 152 lb 12.8 oz (69.3 kg)      Body mass index is 25.83 kg/m². CAM-ICU:   negative  General:   Pale, tachypneic, acutely ill in appearance  HEENT:  normocephalic and atraumatic. No scleral icterus. PERR  Neck: supple. No Thyromegaly. Lungs: clear to auscultation with crackles audible in bases. No retractions  Cardiac: S1S2. No JVD. Abdomen: soft. Nontender, bowel sounds present X4  Extremities:  No clubbing, cyanosis, Positive for anasarca    Vasculature: capillary refill < 3 seconds. Palpable dorsalis pedis pulses. Skin:  warm and dry. Psych:  Alert and oriented x3.   Affect appropriate  Lymph:  No supraclavicular adenopathy. Neurologic:  No focal deficit. No seizures. Patient is [facility]    Data: (All radiographs, tracings, PFTs, and imaging are personally viewed and interpreted unless otherwise noted). LABS/RADIOLOGY:-  Recent Labs     02/23/20  0517 02/23/20  1800 02/24/20  0530 02/25/20  0440   WBC 4.6*  --   --   --    HGB 10.2* 10.5* 10.0* 9.4*   HCT 33.8* 34.3* 32.8* 31.2*   *  --   --   --      Recent Labs     02/23/20  0517 02/25/20  0950    140   K 4.0 3.8    102   CO2 25 27   BUN 27* 14   CREATININE 0.9 0.9   CALCIUM 8.3* 8.7     No results for input(s): AST, ALT, BILIDIR, BILITOT, ALKPHOS in the last 72 hours. Recent Labs     02/23/20  0517 02/24/20  0530 02/25/20  0440   INR 1.37* 2.04* 2.11*     No results for input(s): CKTOTAL, TROPONINI in the last 72 hours. No results for input(s): PROCAL in the last 72 hours. No results for input(s): LACTA in the last 72 hours. Microbiology:    Blood culture #1:   Lab Results   Component Value Date    BC No growth-preliminary  02/21/2020     Blood culture #2:No results found for: Tato Trevonraz  Organism:  Lab Results   Component Value Date    ORG Mixed Growth     02/21/2020         Lab Results   Component Value Date    LABGRAM  11/26/2019     No segmented neutrophils observed. Few epithelial cells observed. Many large gram positive bacilli. Few small gram positive bacilli. Few gram negative bacilli. Prepubescent and postmenopausal female samples (<15and >47ears of age) are not typically suitable for bacterialvaginosis screening. MRSA culture only:No results found for: [facility]  Urine culture:   Lab Results   Component Value Date    LABURIN  10/24/2019     Growth of Contaminants. The mixture of organisms present are not a common cause of urinary tract infections and probably represent distal urethral juventino.       Respiratory culture: No results found for: CULTRESP  Aerobic and Anaerobic :  Lab Results   Component Value Date    LABAERO No growth-preliminary No growth   11/11/2019     Lab Results   Component Value Date    LABANAE No growth-preliminary No growth   11/11/2019       Urinalysis:      Lab Results   Component Value Date    NITRU NEGATIVE 02/22/2020    WBCUA 15-25 02/22/2020    BACTERIA MANY 02/22/2020    RBCUA 0-2 02/22/2020    BLOODU TRACE 02/22/2020    GLUCOSEU NEGATIVE 02/22/2020       Radiology:(All radiographs, tracings, PFTs, and imaging are personally viewed and interpreted unless otherwise noted). VL OMAYRA BILATERAL LIMITED 1-2 LEVELS   Final Result   Findings of mild peripheral vascular disease bilaterally. **This report has been created using voice recognition software. It may contain minor errors which are inherent in voice recognition technology. **      Final report electronically signed by Dr. Redd Burr on 2/25/2020 2:23 PM      XR CHEST PORTABLE   Final Result      Interval placement of a right subclavian central line, tip in the mid SVC, no pneumothorax. Remaining findings unchanged, see above. **This report has been created using voice recognition software. It may contain minor errors which are inherent in voice recognition technology. **      Final report electronically signed by Dr. Javier Puckett on 2/22/2020 12:56 AM      XR CHEST PORTABLE   Final Result      Questionable lateral left basilar infiltrate or atelectasis versus prominent epicardial fat pad. Remainder of the lungs are clear. Mild cardiomegaly. **This report has been created using voice recognition software. It may contain minor errors which are inherent in voice recognition technology. **      Final report electronically signed by Dr. Javier Puckett on 2/21/2020 11:50 PM        Xr Chest Portable    Result Date: 2/22/2020  PROCEDURE: XR CHEST PORTABLE CLINICAL INFORMATION: CVC placement.  COMPARISON: Chest x-ray dated 9/21/2020 TECHNIQUE: AP Portable chest xray FINDINGS: Lines/tubes/devices: Interval placement of right subclavian central venous catheter, tip in the mid SVC region. No change in the satisfactory position of left subclavian dual-lead AICD device. Lungs/pleura:  No change in the lateral left basilar opacity which may represent pneumonia, atelectasis, or an epicardial fat pad. Interstitial markings remain prominent, unchanged dating back to 10/31/2019. This may represent pulmonary fibrosis versus recurrent mild interstitial pulmonary edema or atypical pneumonia. No pleural effusion. No pneumothorax. Heart: There is stable mild cardiomegaly. Mediastinum/petros: No obvious mass or adenopathy. Skeleton: There is mild bilateral AC joint DJD. Interval placement of a right subclavian central line, tip in the mid SVC, no pneumothorax. Remaining findings unchanged, see above. **This report has been created using voice recognition software. It may contain minor errors which are inherent in voice recognition technology. ** Final report electronically signed by Dr. Dre Colby on 2/22/2020 12:56 AM    Xr Chest Portable    Result Date: 2/21/2020  PROCEDURE: XR CHEST PORTABLE CLINICAL INFORMATION: weakness. COMPARISON: Chest x-ray dated 1/8/2020 TECHNIQUE: AP Portable chest xray FINDINGS: Lines/tubes/devices: There is a left subclavian dual lead AICD device, leads in the regions of the right ventricle and coronary sinus. Lungs/pleura: There is a questionable infiltrate or atelectasis at the lateral aspect of the left lung base adjacent to the cardiac apex versus representing a prominent epicardial fat pad. No pleural effusion. No pneumothorax. Heart: There is stable mild cardiomegaly. Mediastinum/petros: No obvious mass or adenopathy. Skeleton: No significant bone or joint abnormality. Questionable lateral left basilar infiltrate or atelectasis versus prominent epicardial fat pad. Remainder of the lungs are clear. Mild cardiomegaly. **This report has been created using voice recognition software.  It may contain minor errors which are inherent in voice recognition technology. ** Final report electronically signed by Dr. Luz Marina Singer on 2/21/2020 11:50 PM      Electronically signed by Monroe León MD on 2/25/2020 at 2:34 PM

## 2020-02-25 NOTE — PROGRESS NOTES
2018     Current Meds:  Scheduled Meds:   lidocaine  1 patch Transdermal Daily    pantoprazole  40 mg Oral QAM AC    docusate sodium  100 mg Oral BID    formoterol  20 mcg Nebulization BID    sodium chloride flush  10 mL Intravenous 2 times per day    Alcaftadine  1 drop Ophthalmic Daily    aspirin  81 mg Oral Daily    budesonide  500 mcg Nebulization BID    dicyclomine  10 mg Oral 4x Daily AC & HS    sacubitril-valsartan  1 tablet Oral Daily    traZODone  50 mg Oral Nightly    oseltamivir  30 mg Oral BID    potassium (CARDIAC) replacement protocol   Other RX Placeholder    magnesium replacement protocol   Other RX Placeholder    bumetanide  0.5 mg Intravenous Q12H    cefTRIAXone (ROCEPHIN) IV  1 g Intravenous Q24H     Assessment:  66 yo F admitted 19 for fever, chills, and hypotension. She was diagnosed with influenza A outpatient. She was started on a Levophed gtt for hypotension. She was noted to have rectal bleeding therefore GI consulted. Upon rectal exam, hemorrhoids were noted with brown stool. Dr. Samantha Araujo had instructed her to have her hemorrhoids removed surgically in the past due to her Coumadin use. 1. BRBPR- hemorrhoidal  2. Internal hemorrhoids  3. Influenza A  4. Acute  on chronic respiratory failure  5. Acute on chronic anemia  6. Afib- on Coumadin  7. CAD  8. NICOLASA  9. Acute on chronic anemia     Plan:    1. Monitor H & H, transfuse prn  2. PPI daily  3. Recommend general surgery consult for hemorrhoidectomy if okay with cardiology  4. Anusol suppositories BID prn, script sent  5. Colace BID, script sent  6. Cardiology on board  7. Supportive care per primary team  8.  Will need 2-3 week follow-up with Rossy BAUTISTA  GI signing off      Case reviewed and impression/plan reviewed in collaboration with Dr. Sandhya Phan  Electronically signed by PETR Frias CNP on 2020 at 11:53 AM    [facility]

## 2020-02-25 NOTE — CARE COORDINATION
2/25/20, 10:30 AM    DISCHARGE PLANNING EVALUATION      SW spoke to patient about her plan, explained that she could not have [facility] and do out patient therapy. She does not want to be home bound, she states that she kept telling staff that came to talk to her (from a previous hospital admission) about this but she states they were insistent that she have [facility]. So this was set up for her, she chose [facility]. She likes her nurse but really wants to do her therapy thru [facility]. She goes 2 times a week and her spouse takes her. Presently he is sick, THIEN gave information on [facility] for transport. 2/25/20, 2:25 PM    DISCHARGE PLANNING EVALUATION      THIEN informed Cindy Section with [facility] that the patient prefers to get her therapy thru [facility], which does not make her home bound so she can not get the [facility] nurse.

## 2020-02-25 NOTE — FLOWSHEET NOTE
Pt is a 71y.o. female, laying in bed watching television in 1K[employer]. She has turned a corner with respect to getting over the flu, and hopes to come out of droplet isolation within a day or so. Delightful visit, as she recounted her wai journey as well as reminiscing about growing up in the geographic area similar to [place]. She shared that she really 'hates this place' tongue firmly in cheek, due to being here on and off since last October for various reasons. Shared her journey with rehabilitation from broken hip and experience with medications for the pain.  discussed current day job working with addiction and pt related family member dealing with it. She is thanking God for healing her and how He's watched over her during her stays here; but still getting eager to go home. She knows she is dealing with a couple other things and not sure how much longer she'll be here, but is taking it in stride and doing what's asked.  prayed with her, offered encouragement. 02/24/20 1900   Encounter Summary   Services provided to: Patient   Referral/Consult From: [facility] Family members   Continue Visiting Yes  (2/24)   Complexity of Encounter Moderate   Length of Encounter 30 minutes   Spiritual/Voodoo   Type Spiritual support   Assessment Calm; Approachable;Coping; Hopeful;Peaceful   Intervention Active listening;Explored feelings, thoughts, concerns;Prayer;Nurtured hope   Outcome Connection/belonging;Comfort;Expressed gratitude;Engaged in conversation; Shared reminiscences; Hopeful

## 2020-02-25 NOTE — PROGRESS NOTES
[facility] 59  INPATIENT OCCUPATIONAL THERAPY  [facility] ICU STEPDOWN TELEMETRY 4K  EVALUATION    Time:   Time In: 0940  Time Out: 1010  Timed Code Treatment Minutes: 15 Minutes  Minutes: 30          Date: 2020  Patient Name: Betsey Russ,   Gender: female      MRN: [id number]  : 1945  (67 y.o.)  Referring Practitioner: Tamar Bocanegra MD  Diagnosis: influenza A  Additional Pertinent Hx: Per H&P: Anais Hogue is a 77-year-old female admitted to the ICU from [facility] ER with complaint of fever chills hypotension in need of Levophed drip support. Today onset of fever, productive cough, poor appetite and profound fatigue. Tmax 101.6. Patient was diagnosed with influenza A at outpatient clinic. Patient did take 2 doses of Tamiflu prior to admission. While at outpatient clinic noted blood pressure was 80/52 daughter who is a MA at PCP office went to check on them earlier this evening noted blood pressure was 78/50 and patient had complaints of dizziness and fatigue. Restrictions/Precautions:  Restrictions/Precautions: General Precautions, Fall Risk, Isolation(Droplet for influenza)    Subjective  Chart Reviewed: Yes, Orders, Progress Notes, History and Physical  Patient assessed for rehabilitation services?: Yes  Family / Caregiver Present: No    Subjective: Pt supine in bed upon arrival, agreeable to OT session with minimal encouragement.      Pain:  Pain Assessment  Patient Currently in Pain: Yes  Pain Assessment: 0-10  Pain Level: 5  Pain Type: Chronic pain  Pain Location: Ankle;Leg  Pain Descriptors: Aching  Pain Frequency: Continuous  Clinical Progression: Not changed    Social/Functional History:  Lives With: Spouse(currently has flu as well)  Type of Home: House  Home Layout: One level  Home Equipment: Rolling walker, Wheelchair-manual, Oxygen   Bathroom Shower/Tub: Walk-in shower  Bathroom Toilet: Standard  Bathroom Equipment: Shower chair       ADL Assistance: Independent  Homemaking Assistance: Independent  Ambulation Assistance: Independent  Transfer Assistance: Independent          Additional Comments: Per SW notes, pt was using RW for short distances and w/c for long distances. Pt requires 2L O2 at rest, 4L with activity. Pt reported was participating in outpatient therapy at [facility] 2x/week. VISION:Corrected    HEARING:  Corrected-hearing aide    COGNITION: WFL    RANGE OF MOTION:  Bilateral Upper Extremity:  WFL    STRENGTH:  Bilateral Upper Extremity:  WFL    ADL:   Grooming: Supervision. standing at sink to brush teeth, wash face, comb hair; pt tolerated standing ~5 minutes  Toileting: Independent. Toilet Transfer: Modified Independent. [place] [employer] BALANCE:  Sitting Balance:  Independent. Standing Balance: Supervision. bilateral UE release from RW without LOB    BED MOBILITY:  Supine to Sit: Modified Independent    Sit to Supine: Modified Independent    Scooting: Modified Independent      TRANSFERS:  Sit to Stand:  Supervision. from EOB  Stand to Sit: Supervision. to EOB    FUNCTIONAL MOBILITY:  Assistive Device: Rolling Walker  Assist Level:  Supervision and Stand By Assistance. Distance: To and from bathroom and and 1 additional lap in room  Assistance for mgmt of O2 tubing throughout, otherwise steady without LOB. Sp02 did decrease to 88% during mobility although quickly recovered to 91-92% with brief seated rest break. Activity Tolerance:  Patient tolerance of  treatment: good. Assessment:  Assessment: Pt presents requiring increased assistance for ADLs, transfers, and functional mobility compared to PLOF. Pt will continue to benefit from OT services to improve independence with these tasks, in addition to overall strength/endurance to facilitate return to PLOF.      Performance deficits / Impairments: Decreased functional mobility , Decreased ADL status, Decreased strength, Decreased endurance, Decreased high-level IADLs  Prognosis: Good  REQUIRES OT FOLLOW UP: Yes  Decision Making: Medium Complexity    Treatment Initiated: Treatment and education initiated within context of evaluation. Evaluation time included review of current medical information, gathering information related to past medical, social and functional history, completion of standardized testing, formal and informal observation of tasks, assessment of data and development of plan of care and goals. Treatment time included skilled education and facilitation of tasks to increase safety and independence with ADL's for improved functional independence and quality of life. Pt expressed concerns with being able to get to/from outpatient therapy as  normally brings her and assists with getting out her RW and O2, although  now is sick. Discussed asking family members, friends, neighbors to assist with transportation, otherwise did educate re: community transportation. Reinforced SW could assist with providing further information re: local transportation agencies if needed although pt initially declining and said \"I'll check in with my  later and see how he's feeling. \" Discussed all with SW.     Discharge Recommendations:  Home with assist PRN, Continue to assess pending progress    Patient Education:  OT Education: OT Role, Plan of Care    Equipment Recommendations:  Equipment Needed: No    Plan:  Times per week: 3-5x  Current Treatment Recommendations: Strengthening, Functional Mobility Training, Endurance Training, Patient/Caregiver Education & Training, Self-Care / ADL, Home Management Training. See long-term goal time frame for expected duration of plan of care. If no long-term goals established, a short length of stay is anticipated.     Goals:     Short term goals  Time Frame for Short term goals: by discharge  Short term goal 1: Pt will safely navigate to/from bathroom and household distances with MI and Sp02 >=90% to increase activity tolerance needed for ADL/IADL tasks. Short term goal 2: Pt will complete BADL/IADL tasks with Mi to increase independence with self care/homemaking tasks. Short term goal 3: Pt will tolerate dynamic standing X10 minutes with MI in prep for IADL tasks. Long term goals  Time Frame for Long term goals : not set due to ELOS         Following session, patient left in safe position with all fall risk precautions in place.

## 2020-02-25 NOTE — PROGRESS NOTES
Assistance: Independent  Transfer Assistance: Independent          Additional Comments: Per SW notes, pt was using RW for short distances and w/c for long distances. Pt requires 2L O2 at rest, 4L with activity. Pt reported was participating in outpatient therapy at [facility] 2x/week. OBJECTIVE:  Range of Motion:  Bilateral Lower Extremity: WFL    Strength:  Bilateral Lower Extremity: WFL, LLE weaker than RLE due to surgery 10/2019    Balance:  Static Sitting Balance:  Modified Independent  Static Standing Balance: Stand By Assistance with walker    Bed Mobility:  Rolling to Left: Modified Independent   Supine to Sit: Stand By Assistance  Scooting: Stand By Assistance    Transfers:  Sit to Stand: Stand By Assistance  Stand to Sit:Stand By Assistance    Ambulation:  Stand By Assistance  Distance: 2'x1 30'x1  Surface: Level Tile  Device:Rolling Walker  Gait Deviations:  Slow Cassie and Decreased Weight Shift Left    Exercise:  Patient was guided in 1 set(s) 10 reps of exercise to both lower extremities. Ankle pumps, Glut sets, Quad sets, Heelslides and Short arc quads. Exercises were completed for increased independence with functional mobility. Functional Outcome Measures: Completed  AM-PAC Inpatient Mobility without Stair Climbing Raw Score : 16  AM-PAC Inpatient without Stair Climbing T-Scale Score : 45.54    ASSESSMENT:  Activity Tolerance:  Patient tolerance of  treatment: good. Treatment Initiated: Treatment and education initiated within context of evaluation. Evaluation time included review of current medical information, gathering information related to past medical, social and functional history, completion of standardized testing, formal and informal observation of tasks, assessment of data and development of plan of care and goals.   Treatment time included skilled education and facilitation of tasks to increase safety and independence with functional mobility for improved independence and quality of life. Assessment: Body structures, Functions, Activity limitations: Decreased functional mobility , Decreased strength, Decreased balance, Increased pain  Assessment: pt tolerated well, pt in droplet isolation for +flu, generalized weakness, pain in LLE, use of walker and inc assist for safe mobility, recommend cont PT to inc pt I with functional mobility  Prognosis: Excellent    REQUIRES PT FOLLOW UP: Yes    Discharge Recommendations:  Discharge Recommendations: Continue to assess pending progress, Outpatient PT, Patient would benefit from continued therapy after discharge    Patient Education:  PT Education: Goals, PT Role, Plan of Care, Home Exercise Program, Functional Mobility Training    Equipment Recommendations:  Equipment Needed: No    Plan:  Times per week: 5X GM  Times per day: Daily  Specific instructions for Next Treatment: therex and mobility    Goals:  Patient goals : return home  Short term goals  Time Frame for Short term goals: by discharge  Short term goal 1: bed mobility with MOD I  Short term goal 2: transfer with MOD I  Short term goal 3: amb >75'x1 with AD and MOD I to walk safely in home  Short term goal 4: negotiate 3 steps with HR and S to enter home safely  Long term goals  Time Frame for Long term goals : no LTGs set secondary to short ELOS    Following session, patient left in safe position with all fall risk precautions in place.

## 2020-02-25 NOTE — PLAN OF CARE
Problem: Falls - Risk of:  Goal: Will remain free from falls  Description  Will remain free from falls  2/25/2020 0129 by Vernon Gray  Outcome: Ongoing  Note:   Fall precautions in place. Call light and bedside table within reach. Patient demonstrates proper use of call light. Bed locked and in lowest position. Bed alarm on. Non skid socks while ambulating. Hourly rounding. Problem: Pain:  Goal: Pain level will decrease  Description  Pain level will decrease  2/25/2020 0129 by AKHIL COLLIER  Outcome: Ongoing  Note:   Pain Assessment: 0-10  Pain Level: 4   Pain goal:  0   Is pain goal met at this time? No     Additional interventions to be implemented: medications Tylenol and Flexiril , position change, and rest       Problem: Activity:  Goal: Ability to return to normal activity level will improve  Description  Ability to return to normal activity level will improve  2/25/2020 0129 by Vernon Gray  Outcome: Ongoing  Note:   Patient up with stand by assist with walker. Patient ambulates well. Problem: Physical Regulation:  Goal: Complications related to the disease process, condition or treatment will be avoided or minimized  Description  Complications related to the disease process, condition or treatment will be avoided or minimized  2/25/2020 0129 by Vernon Gray  Outcome: Ongoing  Note:   Droplet isolation for Flu A. Patient currently taking Tamiflu     Problem: Respiratory:  Goal: Respiratory status will improve  Description  Respiratory status will improve  2/25/2020 0129 by Vernon Gray  Outcome: Ongoing  Note:   Patient on 2L NC (home dose). C/o cough, lozenges ordered. Care plan reviewed with patient. Patient verbalized understanding of the plan of care and contribute to goal setting.

## 2020-02-26 NOTE — DISCHARGE INSTR - MEDS
Warfarin recommendations for discharge:   Date Warfarin Dose   Wednesday, 2/26/2020 1.5 mg   Thursday, 2/27/2020 1.5 mg   Friday, 2/28/2020 1.5 mg   Saturday, 2/29/2020 1.5 mg   Sunday, 3/01/2020 1.5 mg   Monday, 3/2/2020 INR        Provider dosing warfarin: [facility]     Recheck INR:  Monday.  3/2/2020 at 8:40 AM

## 2020-02-26 NOTE — CARE COORDINATION
2/26/20, 9:40 AM  Client plans home with spouse and OP therapy as PTA when medically cleared (IV Diuretics/AB yet, Attending wanted 1 more day of Tamiflu and likely discharge today per report yesterday-discussed at rounds), on baseline oxygen ([employer] 2L oxygen PTA); will need CVC removed prior to discharge; client moved to [address] [place] [address]; updated Saritha Esquivel CM  Patient goals/plan/ treatment preferences discussed by  and . Patient goals/plan/ treatment preferences reviewed with patient/ family. Patient/ family verbalize understanding of discharge plan and are in agreement with goal/plan/treatment preferences. Understanding was demonstrated using the teach back method. AVS provided by RN at time of discharge, which includes all necessary medical information pertaining to the patients current course of illness, treatment, post-discharge goals of care, and treatment preferences.

## 2020-02-26 NOTE — PROGRESS NOTES
CLINICAL PHARMACY: DISCHARGE MED RECONCILIATION/REVIEW    [facility] ([facility]) Select Patient?: No  Total # of Interventions Recommended: 7 -   - Updated Order #: 7   -   Total # Interventions Accepted: 7  Intervention Severity:   - Level 1 Intervention Present?: No   - Level 2 #: 2   - Level 3 #: 5   Time Spent (min): [address] PharmD 2/26/2020 1:22 PM

## 2020-02-26 NOTE — CARE COORDINATION
2/26/20, 8:33 AM    DISCHARGE ON GOING 1701 Distra day: 4  Location: [facility]04/004-A Reason for admit: Influenza A [J10.1]  Septic shock ([address] [A41.9, R65.21]   Procedure:  02/22   PICC line insertion    Treatment Plan of Care: resume Coumadin, INR 1.72, GI signed off, Annusol suppositories, Colace. Barriers to Discharge: none    PCP: Ally Patrick MD  Readmission Risk Score: 50%        Patient Goals/Plan/Treatment Preferences: planning home with OP-PT and OT at [facility]. Patient plans to follow with [facility]Coumadin clinic; she requested th ephone number to call and cancel the appt. for this afternoon. Has home oxygen and she is at her baseline; has nebulizer, WC, [employer] home oxygen 2L at rest, 4L with activity, current with OP IV Iron at Dr. Jonah Watson office      2/26/20, 10:59 AM    Patient goals/plan/ treatment preferences discussed by  and . Patient goals/plan/ treatment preferences reviewed with patient/ family. Patient/ family verbalize understanding of discharge plan and are in agreement with goal/plan/treatment preferences. Understanding was demonstrated using the teach back method. AVS provided by RN at time of discharge, which includes all necessary medical information pertaining to the patients current course of illness, treatment, post-discharge goals of care, and treatment preferences.         IMM Letter  IMM Letter given to Patient/Family/Significant other/Guardian/POA/by[de-identified] care manager  IMM Letter date given[de-identified] 02/26/20  IMM Letter time given[de-identified] 8537

## 2020-02-26 NOTE — FLOWSHEET NOTE
Prayer and encouragement     02/26/20 5448   Encounter Summary   Services provided to: Patient and family together   Referral/Consult From: Rounding   Support System Spouse   Continue Visiting No  (2/26 )   Complexity of Encounter Low   Length of Encounter 15 minutes   Routine   Type Follow up   Assessment Approachable;Calm   Intervention [place];Prayer;Nurtured hope   Outcome Expressed gratitude; Acceptance;Encouraged; Hopeful

## 2020-02-26 NOTE — CARE COORDINATION
Update: client moved to [address] [place] [address]; updated Katerin Michaels 20  Electronically signed by Valentina Ely RN on 2/26/2020 at 7:20 AM

## 2020-02-26 NOTE — PROGRESS NOTES
[address]  Occupational Therapy  Daily Note  Time:   Time In: 4962  Time Out: 4033  Timed Code Treatment Minutes: 28 Minutes  Minutes: 28          Date: 2020  Patient Name: Elvia Bird,   Gender: female      Room: [facility]  MRN: [id number]  : 1945  (67 y.o.)  Referring Practitioner: Jamal Marina MD  Diagnosis: influenza A  Additional Pertinent Hx: Per H&P: Pa Thompson is a 67-year-old female admitted to the ICU from [facility] ER with complaint of fever chills hypotension in need of Levophed drip support. Today onset of fever, productive cough, poor appetite and profound fatigue. Tmax 101.6. Patient was diagnosed with influenza A at outpatient clinic. Patient did take 2 doses of Tamiflu prior to admission. While at outpatient clinic noted blood pressure was 80/52 daughter who is a MA at PCP office went to check on them earlier this evening noted blood pressure was 78/50 and patient had complaints of dizziness and fatigue. Restrictions/Precautions:  Restrictions/Precautions: General Precautions, Fall Risk, Isolation(Droplet for influenza)    SUBJECTIVE: Pt. Sitting in chair required much encouragement to participate and was educated on the importance of increasing activity level to improve strength and endurance needed to complete ADL tasks. PAIN: pt. Did not state/10:     COGNITION: WFL    ADL:   Grooming: Contact Guard Assistance. standing at sink to wash face and brush teeth. Pt. leaned on sink with both arms to complete  Lower Extremity Dressing: Moderate Assistance to thread brief and pull up to knees, pt then able to pull up rest of way  Toiletin [address]. Pt. Able to complete hygiene   Toilet Transfer: Contact Guard Assistance. Olga Banerjee BALANCE:  Sitting Balance:  Supervision. Standing Balance: Contact Guard Assistance.  Pt. stood with AD or leaning on sink and did not stand without support     BED

## 2020-02-27 NOTE — DISCHARGE SUMMARY
Hospitalist Discharge Summary    Patient: Teddy Mathews  YOB: 1945  MRN: [id number]   Acct: [de-identified]    Primary Care Physician: Kalyani Guevara MD    Admit date  2/21/2020    Discharge date:  2/26/2020  Disposition: Home       Discharge Assessment and Plan:    1. Acute on chronic respiratory failure: Was on high flow nasal cannula in the ICU. Home oxygen use continuous 2L/NC. Resolved, pt at baseline. 2. Acute on chronic systolic congestive heart failure exacerbation: likely secondary to influenza A viral bronchitis. Cardiology consult apprec. Pt compensated at discharge, cont Lasix 40mg daily. Follow up with cardiology as scheduled. 3. GI bleed secondary to internal hemorrhoids-patient has had multiple banding's previously by GI however this has failed. GI recommends hemorrhoidectomy however INR need to be reversed and cardiology need this to be approved. Per cardiology, plan is to do watchman's device in a few weeks once over the flu. 4-6 wks after watchman's, pt can stop coumadin for hemorrhoidectomy. Pt stable for discharge at this time from GI standpoint, Hgb stable. Follow up as scheduled. 4. Septic Shock: -Present on admission-resolved, secondary to above and below    5. Influenza A: Tamiflu therapy completed. 6. Cystitis with hematuria: Noted history of Klebsiella pneumonia 1/2020 Sensitive to Rocephin. S/p 4 day therapy IV rocephin, sx improved. 7. NICOLASA- likely cardiorenal, resolved with diuretics. BMP as OP    8. Hyponatremia, likely volume overload, repeat labs pending and monitor  -resolved. BMP prior to follow up. 9. Atrial Fibrillation, chronic, history of AICD/PPM, on  Coumadin. 10. Anemia, normocytic: 2/2 #3, stable. CBC prior to pcp follow up. 11. Thrombocytopenia: likely secondary to infection, CBC as OP prior to pcp follow up. 12. Hypoalbuminemia: history, stable.        Chief Complaint on presentation: SOB    Initial H&P / Hospital Course: Angel Jimenes is a 76-year-old female admitted to the ICU from [address] [place] [address] ER with complaint of fever chills hypotension in need of Levophed drip support.     Patient has a significant history of CAD, Atrial Fibrillation s/p Coumadin, AICD/PPM, HFrEF-ECHO 2019 LVEF 35-40%, Right ventricular systolic pressure of 65 mm consistent with severe pulmonary hypertension, Chronic Respiratory Failure, hypoxia, continuous O2 2L/NC-established patient of Dr. Varinder Bryant, COPD, DVT, Dyslipidemia, Anemia normocytic  Daughter present at bedside contributing to history, patient is Elim IRA.     Recent history of Sinus infection approximately 2 weeks ago was given Augmentin for 10 days, patient has had complaints of diarrhea with last BM approximately 2-3 days ago. Dary Holloway was seen in cardiology clinic, Dr. Mary Siegel approximately 1 week ago with noted hypotension Entresto and Metroprolol were decreased in dosages.      Today onset of fever, productive cough, poor appetite and profound fatigue. Tmax 101.6. Patient was diagnosed with influenza A at outpatient clinic. Patient did take 2 doses of Tamiflu prior to admission. While at outpatient clinic noted blood pressure was 80/52 daughter who is a MA at PCP office went to check on them earlier this evening noted blood pressure was 78/50 and patient had complaints of dizziness and fatigue.     While in the ER patient did receive 0.9NS fluid bolus of 30 ml/Kg X1 and Levophed gtt was started for blood pressure support. Patient was in the ICU for brief period of time before getting transferred to the stepdown unit on  2/22   She was transferred off stepdown unit 2/24. Subjective (day of discharge): Note, I took over care day of planned discharge. Pt denies fever/chills, SOB, CP. Reports continued fatigue, mild cough. Pt states her breathing is at baseline. No peripheral edema. Pt agrees she feels safe for discharge today.        Physical Exam:-  Vitals:   Patient Vitals for the past 24 hrs:   BP CREATININE 0.9 0.4 - 1.2 mg/dL    Calcium 8.7 8.5 - 10.5 mg/dL   Magnesium    Collection Time: 02/25/20  9:50 AM   Result Value Ref Range    Magnesium 1.7 1.6 - 2.4 mg/dL   Anion Gap    Collection Time: 02/25/20  9:50 AM   Result Value Ref Range    Anion Gap 11.0 8.0 - 16.0 meq/L   Glomerular Filtration Rate, Estimated    Collection Time: 02/25/20  9:50 AM   Result Value Ref Range    Est, Glom Filt Rate 61 (A) ml/min/1.73m2   Protime-INR    Collection Time: 02/26/20  5:33 AM   Result Value Ref Range    INR 1.72 (H) 0.85 - 8.69   Basic Metabolic Panel    Collection Time: 02/26/20  5:33 AM   Result Value Ref Range    Sodium 144 135 - 145 meq/L    Potassium 4.0 3.5 - 5.2 meq/L    Chloride 104 98 - 111 meq/L    CO2 29 23 - 33 meq/L    Glucose 98 70 - 108 mg/dL    BUN 12 7 - 22 mg/dL    CREATININE 0.7 0.4 - 1.2 mg/dL    Calcium 8.5 8.5 - 10.5 mg/dL   Magnesium    Collection Time: 02/26/20  5:33 AM   Result Value Ref Range    Magnesium 1.8 1.6 - 2.4 mg/dL   Anion Gap    Collection Time: 02/26/20  5:33 AM   Result Value Ref Range    Anion Gap 11.0 8.0 - 16.0 meq/L   Glomerular Filtration Rate, Estimated    Collection Time: 02/26/20  5:33 AM   Result Value Ref Range    Est, Glom Filt Rate 81 (A) ml/min/1.73m2        Microbiology:    Blood culture #1:   Lab Results   Component Value Date    BC No growth-preliminary  02/21/2020     Blood culture #2:No results found for: BLOODCULT2  Organism:    Lab Results   Component Value Date    LABGRAM  11/26/2019     No segmented neutrophils observed. Few epithelial cells observed. Many large gram positive bacilli. Few small gram positive bacilli. Few gram negative bacilli. Prepubescent and postmenopausal female samples (<15and >47ears of age) are not typically suitable for bacterialvaginosis screening. MRSA culture only:No results found for: [facility]  Urine culture:   Lab Results   Component Value Date    LABURIN  10/24/2019     Growth of Contaminants.   The mixture of organisms present are not a common cause of urinary tract infections and probably represent distal urethral juventino. Lab Results   Component Value Date    ORG Mixed Growth     02/21/2020      Respiratory culture: No results found for: CULTRESP  Aerobic and Anaerobic :  Lab Results   Component Value Date    LABAERO No growth-preliminary No growth   11/11/2019     Lab Results   Component Value Date    LABANAE No growth-preliminary No growth   11/11/2019       Urinalysis:     Lab Results   Component Value Date    NITRU NEGATIVE 02/22/2020    WBCUA 15-25 02/22/2020    BACTERIA MANY 02/22/2020    RBCUA 0-2 02/22/2020    BLOODU TRACE 02/22/2020    GLUCOSEU NEGATIVE 02/22/2020       Radiology:  Xr Chest Portable    Result Date: 2/22/2020  PROCEDURE: XR CHEST PORTABLE CLINICAL INFORMATION: CVC placement. COMPARISON: Chest x-ray dated 9/21/2020 TECHNIQUE: AP Portable chest xray FINDINGS: Lines/tubes/devices: Interval placement of right subclavian central venous catheter, tip in the mid SVC region. No change in the satisfactory position of left subclavian dual-lead AICD device. Lungs/pleura:  No change in the lateral left basilar opacity which may represent pneumonia, atelectasis, or an epicardial fat pad. Interstitial markings remain prominent, unchanged dating back to 10/31/2019. This may represent pulmonary fibrosis versus recurrent mild interstitial pulmonary edema or atypical pneumonia. No pleural effusion. No pneumothorax. Heart: There is stable mild cardiomegaly. Mediastinum/petros: No obvious mass or adenopathy. Skeleton: There is mild bilateral AC joint DJD. Interval placement of a right subclavian central line, tip in the mid SVC, no pneumothorax. Remaining findings unchanged, see above. **This report has been created using voice recognition software. It may contain minor errors which are inherent in voice recognition technology. ** Final report electronically signed by Dr. Noni Coleman on 2/22/2020 12:56 AM    Xr Chest Portable    Result Date: 2/21/2020  PROCEDURE: XR CHEST PORTABLE CLINICAL INFORMATION: weakness. COMPARISON: Chest x-ray dated 1/8/2020 TECHNIQUE: AP Portable chest xray FINDINGS: Lines/tubes/devices: There is a left subclavian dual lead AICD device, leads in the regions of the right ventricle and coronary sinus. Lungs/pleura: There is a questionable infiltrate or atelectasis at the lateral aspect of the left lung base adjacent to the cardiac apex versus representing a prominent epicardial fat pad. No pleural effusion. No pneumothorax. Heart: There is stable mild cardiomegaly. Mediastinum/petros: No obvious mass or adenopathy. Skeleton: No significant bone or joint abnormality. Questionable lateral left basilar infiltrate or atelectasis versus prominent epicardial fat pad. Remainder of the lungs are clear. Mild cardiomegaly. **This report has been created using voice recognition software. It may contain minor errors which are inherent in voice recognition technology. ** Final report electronically signed by Dr. Adia Beckford on 2/21/2020 11:50 PM       Consults:   IP CONSULT TO CARDIOLOGY  IP CONSULT TO GI  IP CONSULT TO SOCIAL WORK  PHARMACY TO DOSE WARFARIN  [facility]    Discharge Medications:      Medication List      START taking these medications    benzonatate 100 MG capsule  Commonly known as:  TESSALON  Take 1 capsule by mouth 3 times daily as needed for Cough     cyclobenzaprine 10 MG tablet  Commonly known as:  FLEXERIL  Take 1 tablet by mouth 3 times daily as needed for Muscle spasms     docusate 100 MG Caps  Commonly known as:  COLACE, DULCOLAX  Take 100 mg by mouth 2 times daily     hydrocortisone 25 MG suppository  Commonly known as:  ANUSOL-HC  Place 1 suppository rectally 2 times daily as needed for Hemorrhoids     ondansetron 4 MG disintegrating tablet  Commonly known as:  Zofran ODT  Take 1 tablet by mouth every 8 hours as needed for Nausea or Vomiting        CHANGE how

## 2020-03-02 NOTE — PROGRESS NOTES
Medication Management [address]   [phone] (phone)  [phone] (fax)      Ms. João Houston is a 67 y.o.  female with history of persistent Afib who presents today for anticoagulation monitoring and adjustment. Patient verifies current dosing regimen and tablet strength. No missed or extra doses. Patient denies s/s bleeding/bruising. Has fleeting chest pain if gets in a hurry, relieved with O2. Fany Dull IVERSON worse than before admitted and same since discharged. Has a little lower leg swelling. States ate TACO salad last night. Recommended she avoid salty foods. No blood in urine or stool. Has not been eating much since admitted to hospital.  No changes in OTC agents/Herbals. States is on an antibiotic. No change in alcohol use or tobacco use. Decrease in activity level. Patient denies dizziness/lightheadedness/falls. Has had some headaches. No vomiting or acute illness. Has diarrhea at times. No procedures scheduled in the future at this time. Wearing O2 via nasal cannula. Assessment:   Lab Results   Component Value Date    INR 2.10 (H) 03/02/2020    INR 1.72 (H) 02/26/2020    INR 2.11 (H) 02/25/2020    PROTIME 22.3 02/05/2019     INR therapeutic   Recent Labs     03/02/20  1448   INR 2.10*     Plan: POCT INR ordered and result reviewed with Marshall, Pharm. D. Order received and verified to continue Coumadin 1 mg po MWF, 1.5 mg po TTHSS. Recheck INR in 1 week. Patient reminded to call the Anticoagulation Clinic with any signs or symptoms of bleeding or with any medication changes. Patient given instructions utilizing the teach back method. Discharged via wheelchair in no apparent distress with . After visit summary printed and reviewed with patient.       Medications reviewed and updated on home medication list Yes    Influenza vaccine:     [] given    [] declined   [x] received previously   [] plans to receive at a later time   [] refused    [x] documented in EPIC

## 2020-03-11 NOTE — PROGRESS NOTES
Medication Management [address]  [phone] (phone)  [phone] (fax)      Ms. Ysabel Hilliard is a 67 y.o.  female with history of persistent Afib who presents today for anticoagulation monitoring and adjustment. Patient verifies current dosing regimen and tablet strength. No missed or extra doses. Patient denies s/s bleeding/swelling/SOB. Has a small bruise on left thigh from trying to lift a case of water. Usual fleeting chest pain. No blood in urine. Has a little blood in stool yesterday. No dietary changes. No changes in medication/OTC agents/Herbals. No change in alcohol use or tobacco use. No change in activity level. Patient denies headaches/dizziness/lightheadedness/falls. No vomiting or acute illness. Has had diarrhea for 2-3 days. Thinks it might be from ice cream.  No procedures scheduled in the future at this time. Assessment:   Lab Results   Component Value Date    INR 1.90 (H) 03/11/2020    INR 2.10 (H) 03/02/2020    INR 1.72 (H) 02/26/2020    PROTIME 22.3 02/05/2019     INR subtherapeutic   Recent Labs     03/11/20  1548   INR 1.90*     Plan: POCT INR ordered and result reviewed with Renetta, Pharm. D. Order received and verified to take coumadin 2 mg po today, then continue Coumadin 1 mg po MWF, 1.5 mg po TTHSS. Recheck INR in 2 weeks. Patient reminded to call the Anticoagulation Clinic with any signs or symptoms of bleeding or with any medication changes. Patient given instructions utilizing the teach back method. Discharged via transport chair in no apparent distress with granddaughter. After visit summary printed and reviewed with patient.       Medications reviewed and updated on home medication list Yes    Influenza vaccine:     [] given    [] declined   [x] received previously   [] plans to receive at a later time   [] refused    [x] documented in EPIC

## 2020-03-16 NOTE — TELEPHONE ENCOUNTER
Patient  Called stating she thinks she messed up her Coumadin,  Also she is having a nose bleed.   Please call the patient back

## 2020-03-24 NOTE — PROGRESS NOTES
Medication Management [address]  [phone] (phone)  [phone] (fax)      Ms. João Houston is a 67 y.o.  female with history of Afib who presents today for anticoagulation monitoring and adjustment. Patient verifies current dosing regimen and tablet strength. Missed her dose on 3/11  Patient denies s/s bleeding/bruising/swelling/SOB/chest pain. No blood in urine or stool. No dietary changes. No changes in medication/OTC agents/Herbals. No change in alcohol use or tobacco use. Change in activity level. States she is less active than she has been in the past.  Patient denies headaches/dizziness/lightheadedness/falls. No vomiting/diarrhea or acute illness. No Procedures scheduled in the future at this time. Patient interview performed by Jelly Ruth PharmD Candidate [id number]. Assessment:   Lab Results   Component Value Date    INR 1.90 (H) 03/24/2020    INR 1.90 (H) 03/11/2020    INR 2.10 (H) 03/02/2020    PROTIME 22.3 02/05/2019     INR subtherapeutic   Recent Labs     03/24/20  1432   INR 1.90*     Plan:  Coumadin 2mg today, then continue Coumadin 1mg MWF and 1.5mg TThSaS. Recheck INR in 3 weeks. Pt educated on new telephone process. Pt gives approval to leave a detailed VM. Patient reminded to call the Anticoagulation Clinic with any signs or symptoms of bleeding or with any medication changes. Patient given instructions utilizing the teach back method. Discharged in [facility] in no apparent distress. After visit summary printed and reviewed with patient.       Medications reviewed and updated on home medication list Yes    Influenza vaccine:     [] given     [x] declined   [x] received previously   [] plans to receive at a later time   [] refused    [x] documented in [facility]: MED RECONCILIATION/REVIEW [address]: No  Total # of Interventions Recommended: 1  - Increased Dose #: 1  - Maintenance Safety Lab Monitoring #: 1  Total Interventions Accepted: 1  Time Spent (min): 20    Andres YepezD

## 2020-03-27 PROBLEM — J10.1 INFLUENZA A: Status: RESOLVED | Noted: 2020-01-01 | Resolved: 2020-01-01

## 2020-05-12 NOTE — PROGRESS NOTES
Medication Management [address]  [phone] (phone)  [phone] (fax)      Anticoagulation encounter completed by telephone. Patient had lab result completed at outside lab. Ms. Hoa Hernandez is a 67 y.o.  female with history of Afib. Patient verifies current dosing regimen and tablet strength. No missed or extra doses. Patient denies s/s bleeding/bruising/swelling/SOB/chest pain. +edema in legs today. States she didn't take water pills yesterday. +SOB - at baseline, chest pain sometimes with exertion. No blood in urine or stool. No dietary changes. No changes in medication/OTC agents/Herbals. No change in alcohol use or tobacco use. +tob - 0.5ppd. No change in activity level. States activity level has increased some  Patient denies headaches/dizziness/lightheadedness/falls. No vomiting/diarrhea or acute illness. +D due to antibiotic  No Procedures scheduled in the future at this time. Assessment:   Lab Results   Component Value Date    INR 1.59 (H) 05/12/2020    INR 2.05 (H) 04/14/2020    INR 1.90 (H) 03/24/2020    PROTIME 22.3 02/05/2019     INR subtherapeutic   Recent Labs     05/12/20  1128   INR 1.59*         Plan:  2mg x1,1.5mg x1 then continue Coumadin 1mg MWF, 1.5mg TThSS. Recheck INR in 3 weeks. Patient reminded to call the Anticoagulation Clinic with any signs or symptoms of bleeding or with any medication changes. Patient given instructions utilizing the teach back method. Discharged ambulatory in no apparent distress. The following statement was review with patient regarding this virtual visit:  We want to confirm that, for purposes of billing, this is a virtual visit with your provider for which we will submit a claim for reimbursement with your insurance company. You may be responsible for any copays, coinsurance amounts or other amounts not covered by your insurance company.  If you do not accept this, unfortunately we will not be able to schedule a virtual visit with the provider. Do you accept? Yes    Verbal Consent for Consult Agreement participation during COVID-19 Pandemic  Verbiage for CPA Consent:  Discussed with patient the Pharmacist Collaborative Practice Agreement. Patient provided verbal and/or electronic (Zachery Matute) consent to be managed by a pharmacist per collaborative practice agreement between the pharmacist and referring physician. This is in lieu of paper consent due to COVID-19 precautions and the use of remote/virtual visits.      CLINICAL PHARMACY CONSULT: MED RECONCILIATION/REVIEW ADDENDUM    For Pharmacy Admin Tracking Only    PHSO: No  Total # of Interventions Recommended: 1  - Decreased Dose #: 1  - Maintenance Safety Lab Monitoring #: 1  Total Interventions Accepted: 1  Time Spent (min): 130 [facility], [address] [place] [address]

## 2020-06-02 NOTE — PROGRESS NOTES
reimbursement with your insurance company. You may be responsible for any copays, coinsurance amounts or other amounts not covered by your insurance company. If you do not accept this, unfortunately we will not be able to schedule a virtual visit with the provider. Do you accept?   Yes

## 2020-06-16 NOTE — PROGRESS NOTES
insurance company. You may be responsible for any copays, coinsurance amounts or other amounts not covered by your insurance company. If you do not accept this, unfortunately we will not be able to schedule a virtual visit with the provider. Do you accept?   Yes    CLINICAL PHARMACY CONSULT: MED RECONCILIATION/REVIEW ADDENDUM    For Pharmacy Admin Tracking Only    PHSO: No  Total # of Interventions Recommended: 2  - Increased Dose #: 1  - Maintenance Safety Lab Monitoring #: 1  Total Interventions Accepted: 2  Time Spent (min): [address], PharmD, BCPS  6/16/2020  4:24 PM

## 2020-06-30 NOTE — PROGRESS NOTES
Medication Management [address]  [phone] (phone)  [phone] (fax)      Ms. Yash Hatfield is a 67 y.o.  female with history of persistent atrial fib. , per Dr. Juanito Marin referral, who presents today for Warfarin monitoring and adjustment (2 week visit after increasing dose by 1 mg/week - second increase in a row). Patient verifies current dosing regimen and tablet strength. No missed or extra doses, except as ordered last time. Patient denies bleeding/chest pain/SOB. Swelling in legs better. Has usual easy bruising. Had some breastbone soreness relieved by Gaviscon. No blood in urine or stool. No dietary changes. No changes in medication/OTC agents/herbals. No change in alcohol use or tobacco use. Change in activity level: decreased. Legs feel weak. Feels off-balance. Patient denies dizziness/lightheadedness/falls. Taking  Tylenol for what she thinks are sinus headaches. No vomiting/diarrhea or acute illness. No procedures scheduled in the future at this time. To have back injection, but was told by that provider no need to stop Coumadin. Saw Dr. Ronnell Santiago yesterday - gave OK for procedure. Using Tylenol for back pain, rarely Ultram.  States temp. was 99.8 two evenings recently. Assessment:   Lab Results   Component Value Date    INR 2.30 (H) 06/30/2020    INR 1.53 (H) 06/16/2020    INR 1.77 (H) 06/02/2020    PROTIME 22.3 02/05/2019     INR therapeutic - goal 2-3. Recent Labs     06/30/20  1319   INR 2.30*     Plan:  POCT INR ordered/performed/result reviewed. Continue PO Coumadin 2 mg W, 1.5 mg MTThFSS. Recheck INR in 2 weeks. (Report given - orders entered by VICTOR MANUEL Farrell, PharmD.) Patient reminded to call the Anticoagulation Clinic with any signs or symptoms of bleeding or with any medication changes. Patient given instructions utilizing the teach back method.     Discharged via wheelchair, with oxygen and , in no apparent

## 2020-07-15 NOTE — PROGRESS NOTES
Medication Management [address]  [phone] (phone)  [phone] (fax)      Ms. Ernestine Brooke is a 67 y.o.  female with history of Afib who presents today for anticoagulation monitoring and adjustment. Patient verifies current dosing regimen and tablet strength. No missed or extra doses. Patient denies s/s bleeding/bruising/swelling/SOB/chest pain  No blood in urine or stool. No dietary changes. No changes in OTC agents/Herbals. Patient reports: steroid joint injection ~1 week ago, partial course of Augmentin last week, iron transfusion Monday  No change in alcohol use or tobacco use. No change in activity level. Patient denies headaches/lightheadedness/falls. Endorses some dizziness. No vomiting/diarrhea or acute illness. Endorses some diarrhea last week  Back injection next week - will need INR check on 7/22    Assessment:   Lab Results   Component Value Date    INR 4.00 (H) 07/15/2020    INR 2.30 (H) 06/30/2020    INR 1.53 (H) 06/16/2020    PROTIME 22.3 02/05/2019     INR supratherapeutic   Recent Labs     07/15/20  1349   INR 4.00*     Patient with supratherapeutic INR today. Unable to identify the specific cause. Will need to be on the lower end of therapeutic prior to back injection next week. Plan:  Hold today's dose and give reduced dose of 0.5mg tomorrow, 7/15. Then, continue Coumadin 1.5mg daily, except for 2mg on Wednesdays. Recheck INR in 1 week prior to back injection. Patient reminded to call the Anticoagulation Clinic with any signs or symptoms of bleeding or with any medication changes. Patient given instructions utilizing the teach back method. Discharged in wheelchair in no apparent distress.     CLINICAL PHARMACY CONSULT: MED RECONCILIATION/REVIEW ADDENDUM    For Pharmacy Admin Tracking Only    PHSO: No  Total # of Interventions Recommended: 1  - Decreased Dose #: 1  - Maintenance Safety Lab Monitoring #: 1  Total Interventions Accepted: 1  Time Spent (min): 1005 [facility], PharmD  [address]

## 2020-07-22 NOTE — PROGRESS NOTES
Medication Management [address]  [phone] (phone)  [phone] (fax)      Ms. Patricia Daigle is a 67 y.o.  female with history of Afib who presents today for anticoagulation monitoring and adjustment. Patient verifies current dosing regimen and tablet strength. No missed or extra doses. Patient denies s/s bleeding/bruising. Baseline swelling and SOB that is unchanged per patient. Patient gets occasional chest pain if she overexerts herself from being too out of breath. No blood in urine or stool. No dietary changes. No changes in OTC agents/Herbals. Patient is now taking Protonix 40 mg BID. No change in alcohol use or tobacco use. Slowly increasing activity with therapy. Patient denies headaches/lightheadedness/falls. Patient states she gets dizzy occasionally since starting Protonix, but this has been improving. No vomiting/diarrhea or acute illness. Patient is having a nerve procedure with Dr. Ren Bravo 7/23/20 and 8/6/20 and needs INRs the day prior. Assessment:   Lab Results   Component Value Date    INR 2.40 (H) 07/22/2020    INR 4.00 (H) 07/15/2020    INR 2.30 (H) 06/30/2020    PROTIME 22.3 02/05/2019     INR therapeutic   Recent Labs     07/22/20  0919   INR 2.40*     Patient has extremely labile INRs and has received 8 mg over the past seven days. Faxed result to Dr. Ren Bravo. Plan:  Decrease Coumadin from 2 mg W and 1.5 mg MTuThFSaSu to Coumadin 1 mg W and 1.5 mg MTuThFSaSu (9.1% decrease). Recheck INR in 1 weeks. Patient reminded to call the Anticoagulation Clinic with signs or symptoms of bleeding or with any medication changes. Patient given instructions utilizing the teach back method. Discharged via wheelchair in no apparent distress. After visit summary printed and reviewed with patient.       Medications reviewed and updated on home medication list Yes    Influenza vaccine:     [] given    [x] declined   [x] received

## 2020-07-29 NOTE — PROGRESS NOTES
Medication Management [address]  [phone] (phone)  [phone] (fax)      Ms. Mary Liu is a 67 y.o.  female with history of Afib who presents today for anticoagulation monitoring and adjustment. Patient verifies current dosing regimen and tablet strength. No missed or extra doses. Patient denies s/s bleeding/bruising. Baseline swelling and SOB that is unchanged per patient. Patient gets occasional chest pain if she overexerts herself from being too out of breath  No blood in urine or stool. No dietary changes. No changes in OTC agents/Herbals. Patient started on Linzess. No change in alcohol use or tobacco use. Patient has been increasing activity with therapy. Patient denies dizziness/lightheadedness/falls. Patient gets headaches often, but nothing unusual per patient. No vomiting/diarrhea or acute illness. Patient having another procedure with Dr. Frank Wayne on 8/6/20 and requires INR 8/5/20. Assessment:   Lab Results   Component Value Date    INR 1.90 (H) 07/29/2020    INR 2.40 (H) 07/22/2020    INR 4.00 (H) 07/15/2020    PROTIME 22.3 02/05/2019     INR subtherapeutic   Recent Labs     07/29/20  1317   INR 1.90*     Patient has a history of labile INRs, but is slightly subtherapeutic following a recent dose adjustment over the past week. Plan:  Increase Coumadin from 1 mg W and 1.5 mg MTuThFSaSu to Coumadin 1.5 mg daily (5% increase). Recheck INR in 1 weeks for procedure. Patient reminded to call the Anticoagulation Clinic with signs or symptoms of bleeding or with any medication changes. Patient given instructions utilizing the teach back method. Discharged via wheelchair in no apparent distress. After visit summary printed and reviewed with patient.       Medications reviewed and updated on home medication list Yes    Influenza vaccine:     [] given    [x] declined   [x] received previously   [] plans to receive at a later time   []

## 2020-08-05 NOTE — PROGRESS NOTES
Medication Management [address]  [phone] (phone)  [phone] (fax)      Ms. Andreas Valdez is a 67 y.o.  female with history of persistent atrial fib. , per Dr. Tamie Mccormack referral, who presents today for Warfarin monitoring and adjustment (1 week visit after increasing dose by 5% - second change in a row). Patient verifies current dosing regimen and tablet strength. No missed or extra doses. Patient denies bleeding/bruising. Hasn't taken Nitro. for usual chest pain. SOB increased with wearing mask. No blood in urine or stool. No dietary changes. Changes in medication/OTC agents/herbals: finished IV iron this week. Had a 6# weight loss at that office this week, but 9# gain last week. Had eaten tomato with salt. No change in alcohol use or tobacco use. Change in activity level: increased - resumed PT last week. Patient denies falls. Takes Tylenol for usual headaches; has usual dizziness. No vomiting or acute illness. Had diarrhea 8/1 and 8/2 - blames on Linzess that she started 7/22 (doesn't take it every day - brought bottle to visit). Procedures scheduled in the future at this time: left back \"nerve burning\" per Dr. Carmen Liz tomorrow - just needed INR day before. Assessment:   Lab Results   Component Value Date    INR 2.30 (H) 08/05/2020    INR 1.90 (H) 07/29/2020    INR 2.40 (H) 07/22/2020    PROTIME 22.3 02/05/2019     INR therapeutic - goal 2-3. Recent Labs     08/05/20  1011   INR 2.30*     Plan:  POCT INR ordered/performed/result reviewed. Continue PO Coumadin 1.5 mg daily. Recheck INR in 2 weeks. (Report given - orders entered by VICTOR MANUEL Rodriguez, PharmD.) Patient reminded to call the Anticoagulation Clinic with any signs or symptoms of bleeding or with any medication changes. Patient given instructions utilizing the teach back method. While scheduling next visit, noted 8/27 ICD generator replacement on schedule.   States she's to get instructions in mail; advised to call this clinic regarding Coumadin instructions. Discharged via wheelchair in no apparent distress, wearing mask and oxygen, with . INR routed to Dr. Adrien Cool. After visit summary printed and reviewed with patient.       Medications reviewed and updated on home medication list.    Influenza vaccine:     [] given    [] declined   [] received previously   [] plans to receive at a later time   [] refused    [] documented in EPIC

## 2020-08-19 NOTE — PROGRESS NOTES
Geremias Cass: No  Total # of Interventions Recommended: 1  - Increased Dose #: 1  - Maintenance Safety Lab Monitoring #: 1  Total Interventions Accepted: 1  Time Spent (min): 1005 [facility] Jem, PharmD  [address]

## 2020-08-20 NOTE — TELEPHONE ENCOUNTER
----- Message from [email or web address] sent at 8/20/2020  1:14 PM EDT -----  Contact: [phone]  Mely Mitchell called to let us know that on 8/27/2020, she is scheduled for a procedure to get a pacemaker and she is to be off of the coumadin 2 days prior to the procedure. She would like to have her INR checked the day before her procedure(8-26) instead of waiting until 9/3/2020. I told her I would have the [facility] review this and give her a call.

## 2020-08-21 NOTE — TELEPHONE ENCOUNTER
Discussed with patient that INR check is typically done by the operating provider before the procedure. Patient comfortable with keeping originally scheduled follow-up appt on 9/3.

## 2020-08-27 PROBLEM — Z45.010 PACEMAKER GENERATOR END OF LIFE: Status: ACTIVE | Noted: 2020-01-01

## 2020-08-27 NOTE — FLOWSHEET NOTE
Pt admitted to  2E14 per w/c for gen change. Pt NPO. Patient accompanied by . Vital signs obtained. Assessment and data collection initiated. Oriented to room. Policies and procedures for 2E explained   All questions answered with no further questions at this time. Fall prevention and safety precautions discussed with patient.

## 2020-08-27 NOTE — PROCEDURES
implanted  11/21/2008. This lead demonstrated a capture threshold of 1.75 volts at  1 millisecond with pacing impedance of 437 ohms. There was no right  atrial lead due to the presence of atrial fibrillation. The removed  pulse generator was a Medtronic CRT-D Viva XT model [id number], serial  [id number]. This device was originally implanted on 01/06/2014. The  replacement implanted device was a Medtronic Amplia CRT-D model [id number],  serial [id number]. The new generator was placed on the field and  connected to each of the respective lead pins. A pin plug was placed in  the atrial PowerPort due to lack of an atrial lead. Each pin was placed  within its respective port of the pulse generator header and secured  with a header set screw. A gentle tug test confirmed that each pin was  well secured within the header. Lead slack was then placed behind the  pulse generator and back into the left pectoral pocket. The pocket was  irrigated with normal saline and closed with sequential layers of Vicryl  2-0, Vicryl 2-0, and Vicryl 4-0 suture. A final layer of Steri-Strips  was applied using Mastisol adhesive. Estimated blood loss was 10 mL. There are no apparent complications. CONCLUSIONS:  1. Successful biventricular pacemaker/ICD generator change out. 2.  Stable chronic right ventricle and left ventricle lead function. 3.  Normal biventricular pacing function postoperatively. RECOMMENDATIONS:  1. Routine postoperative wound care. 2.  Continue warfarin anticoagulation. 3.  Follow up with Dr. Kolby Cancino on a routine basis.         Sabina Smallwood M.D.    D: 08/27/2020 14:14:10       T: 08/27/2020 14:23:41     [email or web address]  Job#: [id number]     Doc#: [id number]    CC:

## 2020-08-27 NOTE — H&P
for defibrillation/cardioversion, and death. Alternatives to and omission of the suggested procedure were discussed. The patient had no further questions and wished to proceed; the consent form was signed. MEDICAL HISTORY  [x]ASHD/ANGINA/MI/CHF   []Hypertension  []Diabetes  []Hyperlipidemia  [x]Smoking  [x]Family Hx of ASHD  [x]Additional information:       has a past medical history of Allergic rhinitis, Anemia, Anxiety, Arthritis, Asthma, Atrial fibrillation ([address] CAD (coronary artery disease), COPD (chronic obstructive pulmonary disease) ([address] Frequent UTI, GERD (gastroesophageal reflux disease), History of blood transfusion, Hx of blood clots, Hyperlipidemia, Hypertension, Influenza A, Kidney stone, LONG TERM ANTICOAGULENT USE, MI, old, Prolonged emergence from general anesthesia, and Systolic CHF, acute on chronic ([address] SURGICAL HISTORY   has a past surgical history that includes Hysterectomy; sinus surgery; Tonsillectomy; ovarian cyst removal; Colonoscopy (10/16/2015); Cardiac defibrillator placement (2008); Upper gastrointestinal endoscopy (2013); Cardiac catheterization (1994); Endoscopy, colon, diagnostic (01/04/2017); pacemaker placement; Colonoscopy (N/A, 10/25/2018); Total hip arthroplasty (Left, 10/24/2019); joint replacement; and Upper gastrointestinal endoscopy (N/A, 1/5/2020).   Additional information:       ALLERGIES   Allergies as of 08/27/2020 - Review Complete 08/27/2020   Allergen Reaction Noted    Benadryl [diphenhydramine hcl] Itching and Swelling 10/19/2012    Ciprofloxacin Nausea And Vomiting 01/07/2014    Clarithromycin Nausea And Vomiting 08/05/2020    Vitamin k Other (See Comments) 08/05/2020    Atorvastatin Other (See Comments) 07/06/2012    Captopril Other (See Comments) 05/25/2016    Codeine Itching 07/06/2012    Iv dye [iodides] Dermatitis 07/04/2019    Lipitor Other (See Comments) 07/06/2012    Macrobid [nitrofurantoin monohydrate macrocrystals] Other (See tablet Take 650 mg by mouth as needed for Pain or Fever Indications: Pain Don't take more then 3,000 mg each day, including what's in Percocet. Yes Historical Provider, MD   Multiple Vitamins-Minerals (MULTIVITAMIN ADULT) CHEW Take 2 tablets by mouth daily   Yes Historical Provider, MD   vitamin C (ASCORBIC ACID) 500 MG tablet Take 500 mg by mouth daily as needed   Yes Historical Provider, MD   Menthol-Methyl Salicylate (ICY HOT) 07-15 % STCK Apply topically daily   Yes Historical Provider, MD   Alum Hydroxide-Mag Carbonate (GAVISCON PO) Take by mouth See Admin Instructions Indications: Acid Indigestion   Yes Historical Provider, MD   lidocaine (XYLOCAINE) 5 % ointment Apply topically Before venipuncture in Dr. Martha Lion office.  6/5/19  Yes Historical Provider, MD   hydrOXYzine (ATARAX) 25 MG tablet Take 25 mg by mouth 3 times daily as needed Indications: Feeling Anxious    Yes Historical Provider, MD   Cholecalciferol (VITAMIN D3) 5000 units TABS Take 1 tablet by mouth three times a week Indications: Treatment with Vitamin D Monday, Wednesday, Friday   Yes Historical Provider, MD   levocetirizine (XYZAL) 5 MG tablet Take 5 mg by mouth nightly    Yes Historical Provider, MD   B Complex-C (RA B-COMPLEX/VITAMIN C CR PO) Take by mouth daily Indications: Treatment to Prevent Vitamin Deficiency    Yes Historical Provider, MD   dicyclomine (BENTYL) 10 MG capsule Take 10 mg by mouth 4 times daily (before meals and nightly) Indications: Pain in the Abdominal Region   Yes Historical Provider, MD   Coenzyme Q10 (COQ-10) 200 MG CAPS Take by mouth daily    Yes Historical Provider, MD   Probiotic Product (SUPER PROBIOTIC) CAPS   Take 1 tablet by mouth daily Indications: Digestive Complaint  3/2/15  Yes Historical Provider, MD   budesonide (PULMICORT) 0.5 MG/2ML nebulizer suspension   Take 1 ampule by nebulization 2 times daily Indications: Shortness of Breath (Inactive)    Yes Historical Provider, MD   Arformoterol Tartrate as needed for Hemorrhoids 2/25/20   PETR Trejo - CNP   warfarin (COUMADIN) 1 MG tablet Take as directed by the Coumadin Clinic, 145 tablets for 90 days 12/26/19 12/30/20  Sabine Iraheta MD   Revefenacin 175 MCG/3ML SOLN Inhale 175 mcg into the lungs daily 10/26/19   Nunu Topete MD   hydrocortisone 2.5 % cream Apply topically 3 times daily Apply topically 2 times daily. Historical Provider, MD   albuterol sulfate  (90 Base) MCG/ACT inhaler Inhale 2 puffs into the lungs every 6 hours as needed for Wheezing    Historical Provider, MD   Pramoxine HCl (PROCTOFOAM RE) Place rectally daily as needed     Historical Provider, MD   EPINEPHrine (EPIPEN) 0.3 MG/0.3ML SOAJ injection INJECT AS DIRECTED FOR ANAPHYLAXIS 3/19/18   Historical Provider, MD   Evolocumab (REPATHA SURECLICK SC) Inject 593 mg into the skin every 14 days Indications: Blood Cholesterol Abnormal     Historical Provider, MD   NITROGLYCERIN RE Place 0.3 mg rectally as needed Indications: Hemorrhoids     Historical Provider, MD   triamcinolone (KENALOG) 0.1 % cream Apply topically 2 times daily as needed Anti-fungal    Historical Provider, MD   clotrimazole-betamethasone (LOTRISONE) cream Apply topically 2 times daily as needed Indications: Yeast Infection (inactive)     Historical Provider, MD   nitroGLYCERIN (NITROSTAT) 0.4 MG SL tablet Place 0.4 mg under the tongue every 5 minutes as needed. If third one does not relieve pain, call 9-1-1.   Indications: Chest Pain    Historical Provider, MD     Additional information:       VITAL SIGNS   Vitals:    08/27/20 0928   BP: (!) 154/102   Pulse: 74   Resp: 22   Temp: 98.3 °F (36.8 °C)   SpO2: 97%       PHYSICAL:   General: No acute distress  HEENT:  Unremarkable for age  Neck: without increased JVD, carotid pulses 2+ bilaterally without bruits  Heart: RRR, S1 & S2 WNL, S4 gallop, without murmurs or rubs; left pectoral icd site well healed but header superficial.    NYHA: III   Lungs:

## 2020-08-27 NOTE — PROGRESS NOTES
Pt care assumed from Kathie Farr RN  Left upper chest drsg D/I   @ bedside  IV 0.9NS cont'd  Pt denies any pain - ice pack applied to left upper chest area

## 2020-08-27 NOTE — PROGRESS NOTES
IV site/fluids discont'd  Telemetry discont'd  Discharge instructions reviewed with pt and her  - they voice understanding of f/u appointment with pacer clinic, how to care for procedure site and activity restrictions  Pt states that she had received instructions from coumadin clinic prior to adm   Pacer booklet/temporary ID card given to pt

## 2020-08-27 NOTE — FLOWSHEET NOTE
Returned to room from cath lab with dressing to left chest that is dry and intact w/o s/sof bleeding or hematoma. Good circulation/sensation to left chest, shoulder, arm,hand, and fingers. Iv infusing w/o s/s of infiltration.

## 2020-08-27 NOTE — PROGRESS NOTES
Clinical Pharmacy Note                                               Warfarin Discharge Recommendations      Pt discharged from [facility] today after admission for pacemaker    INR today:  Recent Labs     08/27/20  0928   INR 1.09       Coumadin 1 mg tabs    Interacting medications at discharge: none    INR goal during admission:n/a    Recommendations for discharge:   Date Warfarin Dose   08/27/20 3 mg   08/28/20 3 mg   08/29/20 1.5 mg   08/30/20 1.5 mg   08/31/20 1.5 mg   09/01/20 1.5 mg   09/02/20 1.5 mg       Recheck INR:  09/03/20 @ 1:20 PM with results to [address] [place] [address], PharmD, BCPS  8/27/2020  3:14 PM

## 2020-08-27 NOTE — POST SEDATION
Sedation Post Procedure Note    Patient Name: Polo Malone   YOB: 1945  Room/Bed: [facility]14/014-A  Medical Record Number: [id number]  Date: 8/27/2020   Time: 1:53 PM         Physicians/Assistants: Lela Del Valle MD    Procedure Performed:  BIV ICD generator changeout    Post-Sedation Vital Signs:  Vitals:    08/27/20 0930   BP: (!) 176/78   Pulse:    Resp:    Temp:    SpO2:       Vital signs were reviewed and were stable after the procedure (see flow sheet for vitals)            Post-Sedation Exam: Lungs: clear and Cardiovascular: regular rate and rhythm           Complications: none    Electronically signed by Lela Del Valle MD on 8/27/2020 at 1:53 PM

## 2020-08-27 NOTE — PLAN OF CARE
Problem: Falls - Risk of:  Goal: Will remain free from falls  Description: Will remain free from falls  Outcome: Met This Shift  Goal: Absence of physical injury  Description: Absence of physical injury  Outcome: Met This Shift   Pt free from fall/injury

## 2020-09-03 NOTE — PROGRESS NOTES
Medication Management [address]  [phone] (phone)  [phone] (fax)      Ms. Patrick Venegas is a 67 y.o.  female with history of Afib who presents today for anticoagulation monitoring and adjustment. Patient verifies current dosing regimen and tablet strength. No missed or extra doses. Patient denies s/s bleeding/bruising/swelling/SOB/chest pain  No blood in urine or stool. No dietary changes. No changes in OTC agents/Herbals. Increased Entresto recently  No change in alcohol use or tobacco use. No change in activity level. Patient denies dizziness/lightheadedness/falls. Reports having a headache today (has sinus headaches periodically); denies s/sx of limb weakness, vision changes, slurred speech  No vomiting/diarrhea or acute illness. No Procedures scheduled in the future at this time. Assessment:   Lab Results   Component Value Date    INR 1.80 (H) 09/03/2020    INR 1.09 08/27/2020    INR 1.30 (H) 08/19/2020    PROTIME 22.3 02/05/2019     INR subtherapeutic   Recent Labs     09/03/20  1526   INR 1.80*     Patient presents with sl subtherapeutic INR 6 days after warfarin hold. Plan:  Take 2mg x1, then continue Coumadin 1.5mg daily. Recheck INR in 2 week(s). Patient reminded to call the Anticoagulation Clinic with any signs or symptoms of bleeding or with any medication changes. Patient given instructions utilizing the teach back method. Discharged ambulatory in no apparent distress. After visit summary printed and reviewed with patient.       Medications reviewed and updated on home medication list Yes    CLINICAL PHARMACY CONSULT: MED RECONCILIATION/REVIEW [address] Only    PHSO: No  Total # of Interventions Recommended: 1  - Increased Dose #: 1  - Maintenance Safety Lab Monitoring #: 1  Total Interventions Accepted: 1  Time Spent (min): [facility], [address] [facility] [address]

## 2020-09-28 NOTE — PROGRESS NOTES
a later time   [] refused    [] documented in [address]:  [address] [facility] [address] Tracking Only    PHSO: No  Total # of Interventions Recommended: 0  - Updated Order #: 1 Updated Order Reason(s):  Other updated home medication list  - Maintenance Safety Lab Monitoring #: 1  Total Interventions Accepted: 0  Time Spent (min): 7702  [facility], [place], BCPS  9/28/2020  2:49 PM

## 2020-10-12 NOTE — PROGRESS NOTES
Medication Management [address]  [phone] (phone)  [phone] (fax)      Ms. Zahra De La Torre is a 67 y.o.  female with history of Afib who presents today for anticoagulation monitoring and adjustment. Patient verifies current dosing regimen and tablet strength. No missed or extra doses. Patient denies s/s bleeding/bruising/swelling/SOB/chest pain. Baseline swelling and SOB that is unchanged per patient. No blood in urine or stool. No dietary changes. No changes in medication/Herbals. Patient only takes Vitamin C 100 mg daily. No change in alcohol use or tobacco use. More busy recently. Patient denies headaches/dizziness/lightheadedness/falls. No vomiting/diarrhea or acute illness. No Procedures scheduled in the future at this time. Assessment:   Lab Results   Component Value Date    INR 2.20 (H) 10/12/2020    INR 2.10 (H) 09/28/2020    INR 3.10 (H) 09/15/2020    PROTIME 22.3 02/05/2019     INR therapeutic   Recent Labs     10/12/20  1456   INR 2.20*     Patient presents with second consecutive therapeutic INR while on current regimen. Plan:  Continue Coumadin 1.5 mg daily. Recheck INR in 3 week(s). Patient reminded to call the Anticoagulation Clinic with any signs or symptoms of bleeding or with any medication changes. Patient given instructions utilizing the teach back method. Discharged via wheelchair in no apparent distress. After visit summary printed and reviewed with patient.       Medications reviewed and updated on home medication list Yes    Influenza vaccine:     [] given    [x] declined   [x] received previously   [] plans to receive at a later time   [] refused    [x] documented in [address]:  [facility] [address]: No  Total # of Interventions Recommended: 0  - Maintenance Safety Lab Monitoring #: 1  Total Interventions Accepted: 0  Time Spent (min): 1978  [facility], [place], BCPS  10/12/2020  4:37 PM

## 2020-10-25 PROBLEM — M72.6 NECROTIZING FASCIITIS (HCC): Status: ACTIVE | Noted: 2020-01-01

## 2020-10-25 PROBLEM — M79.605 LEFT LEG PAIN: Status: ACTIVE | Noted: 2020-01-01

## 2020-10-25 NOTE — ED NOTES
Notified Remy Du NP of patients BP of 79/50. Fluids bolus started.       Amadeo John RN  10/25/20 0042

## 2020-10-25 NOTE — PROGRESS NOTES
1742-Called [facility] and notified no ID physician on call at this time . 1800-Patient arrived to unit from ER via strecher. Patient transferred to ICU bed and placed on continuous ICU bedside monitor. Patient admitted for Necrotizing fasciitis ([address] [M72.6]  Left leg pain [M79.605]. Vitals obtained. See flowsheets. Patient's IV access includes, see flowsheet. Current infusions and rates of infusion include, see MAR. Assessment completed by Ab Arora RN. Two nurse skin assessment completed by Ab Arora RN and Nena Monteiro. See flowsheets for assessment details. Policies and procedures of ICU able to be explained to patient at this time. Family member(s)/representative(s) present at time of admission include none. Patient rights explained to family member(s)/representatives and patient, as able. Patient/patient's family member(s)/representative(s) Declined to have physician notified of their admission. All questions posed by patient's family member(s)/representative(s) and patient answered at this time.

## 2020-10-25 NOTE — H&P
Prior to Admission medications    Medication Sig Start Date End Date Taking? Authorizing Provider   Ascorbic Acid (VITAMIN C) 100 MG tablet Take 100 mg by mouth daily    Historical Provider, MD   metoprolol succinate (TOPROL XL) 25 MG extended release tablet Take 25 mg by mouth daily    Historical Provider, MD   Cholecalciferol (VITAMIN D3) 50 MCG (2000 UT) CAPS Take 2,000 Units by mouth daily    Historical Provider, MD   sacubitril-valsartan (ENTRESTO) 49-51 MG per tablet Take 1 tablet by mouth 2 times daily    Historical Provider, MD   pantoprazole (PROTONIX) 40 MG tablet Take 40 mg by mouth 2 times daily 30 minutes before two heaviest meals on an empty stomach    Historical Provider, MD   LINZESS 290 MCG CAPS capsule Take 290 mcg by mouth daily  7/23/20   Historical Provider, MD   loperamide (IMODIUM) 2 MG capsule Take 2 mg by mouth 4 times daily as needed for Diarrhea    Historical Provider, MD   traMADol (ULTRAM) 50 MG tablet Take 50 mg by mouth every 6 hours as needed for Pain. Only takes 1/2 tablet at a time.     Historical Provider, MD   nystatin (MYCOSTATIN) 064655 UNIT/GM cream KEELY EXT AA BID FOR 10 DAYS 4/13/20   Historical Provider, MD   ondansetron (ZOFRAN) 8 MG tablet daily as needed for Nausea or Vomiting  4/29/20   Historical Provider, MD   hydrocortisone (ANUSOL-HC) 25 MG suppository Place 1 suppository rectally 2 times daily as needed for Hemorrhoids 2/25/20   PETR Edward - CNP   warfarin (COUMADIN) 1 MG tablet Take as directed by the Coumadin Clinic, 145 tablets for 90 days 12/26/19 12/30/20  Scot Coelho MD   digoxin (LANOXIN) 125 MCG tablet Take 1 tablet by mouth every other day Indications: Increased Heart Rate Until Office Visit with Dr. Amanda Kohler 11/28/19   Ross Dunlap MD   Revefenacin 175 MCG/3ML SOLN Inhale 175 mcg into the lungs daily 10/26/19   Homero Harvey MD   hydrocortisone 2.5 % cream Apply topically 2 times daily as needed     Historical Provider, MD acetaminophen (TYLENOL) 325 MG tablet Take 650 mg by mouth as needed for Pain or Fever Indications: Pain Don't take more then 3,000 mg each day, including what's in Percocet. Historical Provider, MD   Multiple Vitamins-Minerals (MULTIVITAMIN ADULT) CHEW Take 2 tablets by mouth daily    Historical Provider, MD   Menthol-Methyl Salicylate (ICY HOT) 09-54 % STCK Apply topically daily    Historical Provider, MD   Alum Hydroxide-Mag Carbonate (GAVISCON PO) Take by mouth See Admin Instructions Indications: Acid Indigestion    Historical Provider, MD   lidocaine (XYLOCAINE) 5 % ointment Apply topically Before venipuncture in Dr. Eliberto Schilder office.  6/5/19   Historical Provider, MD   hydrOXYzine (ATARAX) 25 MG tablet Take 25 mg by mouth 3 times daily as needed Indications: Feeling Anxious     Historical Provider, MD   albuterol sulfate  (90 Base) MCG/ACT inhaler Inhale 2 puffs into the lungs every 6 hours as needed for Wheezing    Historical Provider, MD   Pramoxine HCl (PROCTOFOAM RE) Place rectally daily as needed     Historical Provider, MD   EPINEPHrine (EPIPEN) 0.3 MG/0.3ML SOAJ injection INJECT AS DIRECTED FOR ANAPHYLAXIS 3/19/18   Historical Provider, MD   Evolocumab (REPATHA SURECLICK SC) Inject 020 mg into the skin every 14 days Indications: Blood Cholesterol Abnormal     Historical Provider, MD   B Complex-C (RA B-COMPLEX/VITAMIN C CR PO) Take by mouth daily Indications: Treatment to Prevent Vitamin Deficiency     Historical Provider, MD   NITROGLYCERIN RE Place 0.3 mg rectally as needed Indications: Hemorrhoids     Historical Provider, MD   dicyclomine (BENTYL) 10 MG capsule Take 10 mg by mouth 4 times daily (before meals and nightly) Indications: Pain in the Abdominal Region    Historical Provider, MD   Coenzyme Q10 (COQ-10) 200 MG CAPS Take by mouth daily     Historical Provider, MD   Probiotic Product (SUPER PROBIOTIC) CAPS   Take 1 tablet by mouth daily Indications: Digestive Complaint  3/2/15 Historical Provider, MD   budesonide (PULMICORT) 0.5 MG/2ML nebulizer suspension   Take 1 ampule by nebulization 2 times daily Indications: Shortness of Breath (Inactive)     Historical Provider, MD   Arformoterol Tartrate (BROVANA) 15 MCG/2ML NEBU   Take 1 ampule by nebulization 2 times daily Indications: Shortness of Breath (Inactive)     Historical Provider, MD   ofloxacin (FLOXIN) 0.3 % otic solution Place 2 drops into both ears daily as needed Indications: Infection Long-term therapy. 12/22/14   Historical Provider, MD   Carboxymethylcellul-Glycerin (REFRESH OPTIVE OP) Apply  to eye as needed. Indications: Dry Eyes caused by Deficiency of Tears    Historical Provider, MD   levalbuterol (XOPENEX) 0.63 MG/3ML nebulization Take 1 ampule by nebulization every 8 hours as needed for Wheezing. Historical Provider, MD   triamcinolone (KENALOG) 0.1 % cream Apply topically 2 times daily as needed Anti-fungal    Historical Provider, MD   clotrimazole-betamethasone (LOTRISONE) cream Apply topically 2 times daily as needed Indications: Yeast Infection (inactive)     Historical Provider, MD   polyethylene glycol (MIRALAX) powder Take 17 g by mouth as needed. Indications: Constipation    Historical Provider, MD   Alcaftadine (LASTACAFT) 0.25 % SOLN Apply 1 drop to eye daily Indications: Eye Allergy Both eyes    Historical Provider, MD   aspirin 81 MG EC tablet Take 81 mg by mouth daily. With food  Indications: Anticoagulant Therapy    Historical Provider, MD   azelastine (ASTELIN) 137 MCG/SPRAY nasal spray 1 spray by Nasal route 2 times daily as needed Indications: Allergic Rhinitis Use in each nostril as directed    Historical Provider, MD   furosemide (LASIX) 40 MG tablet Take 40 mg by mouth daily. Indications: Treatment with Diuretic Therapy    Historical Provider, MD   folic acid (FOLVITE) 1 MG tablet Take 1 mg by mouth daily.     Indications: Folic Acid Supplementation    Historical Provider, MD   OXYGEN 2 L by Nasal route continuous Indications: Chronic Obstructive Lung Disease     Historical Provider, MD   traZODone (DESYREL) 50 MG tablet Take 50 mg by mouth nightly Indications: Irritable Bowel Syndrome     Historical Provider, MD   nitroGLYCERIN (NITROSTAT) 0.4 MG SL tablet Place 0.4 mg under the tongue every 5 minutes as needed. If third one does not relieve pain, call 9-1-1. Indications: Chest Pain    Historical Provider, MD    Scheduled Meds:   vancomycin  25 mg/kg Intravenous Once    lidocaine PF  5 mL Intradermal Once     Continuous Infusions:   norepinephrine 7 mcg/min (10/25/20 1450)     PRN Meds:. Allergies:  Benadryl [diphenhydramine hcl]; Ciprofloxacin; Clarithromycin; Vitamin k; Atorvastatin; Captopril; Codeine; Iv dye [iodides]; Lipitor; Macrobid [nitrofurantoin monohydrate macrocrystals]; Neomycin-bacitracin zn-polymyx; Pravastatin; Zetia [ezetimibe]; Adhesive tape; Cephalexin; Doxycycline; Morphine; Other; Propoxyphene; and Sulfa antibiotics    Social History:   Social History     Socioeconomic History    Marital status:      Spouse name: Aleksandar Valdez Number of children: 3    Years of education: None    Highest education level: None   Occupational History    None   Social Needs    Financial resource strain: None    Food insecurity     Worry: None     Inability: None    Transportation needs     Medical: None     Non-medical: None   Tobacco Use    Smoking status: Current Every Day Smoker     Packs/day: 0.25     Years: 25.00     Pack years: 6.25     Types: Cigarettes    Smokeless tobacco: Never Used   Substance and Sexual Activity    Alcohol use:  Yes     Alcohol/week: 1.0 standard drinks     Types: 1 Cans of beer per week     Comment: ocassionaly    Drug use: No    Sexual activity: Never   Lifestyle    Physical activity     Days per week: None     Minutes per session: None    Stress: None   Relationships    Social connections     Talks on phone: None     Gets together: None 10/25/20 1110 -- 97.9 °F (36.6 °C) Oral 80 20 97 % -- --     Gen: alert and oriented  Head: normorcephalic, atraumatic  Neck: supple  Heart: RRR  Lungs: No audible wheezes  Abdomen: soft  Pelvis: stable  Extremity left lower leg with redness and swelling full ROM of toes and ankle. Full ROM knee. circumferential redness     DATA:  CBC:   Lab Results   Component Value Date    WBC 16.3 10/25/2020    HGB 13.1 10/25/2020     10/25/2020     BMP:    Lab Results   Component Value Date     10/25/2020    K 4.9 10/25/2020    K 4.8 08/27/2020     10/25/2020    CO2 26 10/25/2020    BUN 28 10/25/2020    CREATININE 0.8 10/25/2020    CALCIUM 9.1 10/25/2020    GLUCOSE 96 10/25/2020    GLUCOSE 115 07/06/2018     PT/INR:    Lab Results   Component Value Date    PROTIME 22.3 02/05/2019    INR 2.74 10/25/2020     Troponin:    Lab Results   Component Value Date    TROPONINI <0.006 10/04/2013       Radiology: CT swelling no gas seen     ASSESSMENT: left leg pain and swelling      PLAN:  1)  As discussed with Dr Adela Segura will admit to ortho services. Consult hospitalist and cardiologist secondary to hypotension and history of afib. Will start on IV abx, repeat eval in the morning to see if redness has improved if no improvement will discuss surgical options.          Moriah Nunez

## 2020-10-25 NOTE — ED NOTES
Pt resting in bed at this time, daughter at the bedside. PT pain decreased to a 7 at this time. Will continue to monitor.       [place]y [place], [place]  83/29/96 8050

## 2020-10-25 NOTE — ED NOTES
ED to inpatient nurses report    Chief Complaint   Patient presents with    Leg Pain     left      Present to ED from home  LOC: alert and orientated to name, place, date  Vital signs   Vitals:    10/25/20 1441 10/25/20 1445 10/25/20 1450 10/25/20 1456   BP: (!) 80/42 (!) 94/38 (!) 85/45 (!) 126/47   Pulse: 73      Resp: 24      Temp:       TempSrc:       SpO2: 99%      Weight:       Height:          Oxygen Baseline 2L NC    Current needs required 2L NC Bipap/Cpap No  LDAs:   Peripheral IV 10/25/20 Left Antecubital (Active)   Site Assessment Clean;Dry; Intact 10/25/20 1141   Line Status Blood return noted 10/25/20 1141   Dressing Status Clean;Dry; Intact 10/25/20 1141   Dressing Intervention New 10/25/20 1141       Peripheral IV 10/25/20 Right Forearm (Active)   Site Assessment Clean;Dry; Intact 10/25/20 1312   Line Status Blood return noted;Brisk blood return;Flushed;Normal saline locked 10/25/20 1312   Dressing Status Clean;Dry; Intact 10/25/20 1312   Dressing Intervention New 10/25/20 1312     Mobility: Requires assistance * 2  Pending ED orders: None  Present condition: Stable    Electronically signed by Neo Rodríguez RN on 10/25/2020 at 3:12 PM       Neo Rodríguez, [address] [place] [address]  10/25/20 [id number]

## 2020-10-25 NOTE — ED NOTES
Patient to ED for left leg pain. Patient reports pain started two days ago.  Leg is swollen red and warm to the touch     Symone Liriano RN  10/25/20 4112

## 2020-10-25 NOTE — PROGRESS NOTES
Patient not observed in milieu and did not attend programing this day.   Pharmacy Note  Vancomycin Consult    Sammy Jorgensen is a 67 y.o. female started on Vancomycin for cellulitis; consult received from Dr. Bautista Anaya (Fraser Placido PA) to manage therapy. Also receiving the following antibiotics: received zosyn and clinda in ED. Patient Active Problem List   Diagnosis    MI (myocardial infarction) ([facility] [address]    Chronic obstructive pulmonary disease (HCC)    Hyperlipidemia    Hypertension    Seasonal allergic rhinitis    Heartburn    Persistent atrial fibrillation ([facility])    Systolic CHF, acute on chronic ([facility])    Biventricular implantable cardioverter-defibrillator in situ    Anticoagulated on Coumadin    Arteriosclerosis of coronary artery    Left displaced femoral neck fracture ([facility])    Closed head injury    CKD (chronic kidney disease) stage 2, GFR 60-89 ml/min    S/p left hip fracture    Chronic systolic CHF (congestive heart failure) ([facility])    IBS (irritable bowel syndrome)    Acute blood loss as cause of postoperative anemia    Atelectasis of left lung    Insomnia    Chest pain    Rectal bleeding    Acute on chronic anemia    Vaginitis    Digitalis toxicity    GI bleed    Coagulopathy ([facility])    Bilateral leg edema    Azotemia    Hypoalbuminemia    Urinary tract infection with hematuria    Septic shock ([facility])    Pacemaker generator end of life    Necrotizing fasciitis ([facility]    Left leg pain       Allergies:  Benadryl [diphenhydramine hcl]; Ciprofloxacin; Clarithromycin; Vitamin k; Atorvastatin; Captopril; Codeine; Iv dye [iodides]; Lipitor; Macrobid [nitrofurantoin monohydrate macrocrystals]; Neomycin-bacitracin zn-polymyx; Pravastatin; Zetia [ezetimibe]; Adhesive tape; Cephalexin; Doxycycline; Morphine;  Other; Propoxyphene; and Sulfa antibiotics     Temp max: 97.6    Recent Labs     10/25/20  1153   BUN 28*       Recent Labs     10/25/20  1153   CREATININE 0.8       Recent Labs     10/25/20  1153   WBC 16.3*       No intake or output data in the 24 hours ending 10/25/20 9905    Culture Date Source Results   10/25 BCx pending                 Ht Readings from Last 1 Encounters:   10/25/20 5' 2\" (1.575 m)        Wt Readings from Last 1 Encounters:   10/25/20 140 lb (63.5 kg)         Body mass index is 25.61 kg/m². CrCl: 52 mL/min      Assessment/Plan:  Will initiate vancomycin 1000 mg IV every 24 hours. Timing of trough level will be determined based on culture results, renal function, and clinical response. Thank you for the consult. Will continue to follow.

## 2020-10-25 NOTE — PROGRESS NOTES
0 - patient's daughter, Dorian Addison, updated over phone at this time. All questions and concerns addressed. Dorian Addison states that when Festus Davison had her hip surgery she received dilaudid, percocet and flexeril and became confused with hallucinations. Will pass on questions related to possible surgery to ortho in the morning. Visitation policies reviewed, she will be up in the morning to visit.

## 2020-10-25 NOTE — ED PROVIDER NOTES
[id number] Replaced by [facility]  EMERGENCY MEDICINE ATTENDING ATTESTATION      Evaluation of Marly Avilez. Case discussed and care plan developed with nurse practitioner. I agree with the nurse practitioner documentation and plan as documented by him, except if my documentation differs. Patient seen, interviewed and examined by me. I reviewed the medical, surgical, family and social history, medications and allergies. I have reviewed the nursing documentation. Brief H&P   Patient c/o worsening pain, swelling, erythema of the left lower extremity. Per patient and her daughter erythema started in the ankle earlier in the morning and by the time they came to the hospital her entire calf was swelling, red, with progressively worsening pain. Patient was recently seen and started on antibiotics, orthopedics team was consulted. Patient's blood pressure downtrending during treatment, at which point I became directable with the case team's request.  Ordered additional antibiotics, start vasopressors and placed central line. Physical exam is notable for well appearing, uncomfortable from pain, anxious. There is significant erythema on the left lower extremity below the knee. At the moment of initial evaluation by nurse practitioner image was obtained, see media section for details. At the moment of my evaluation there is blistering of the area with erythema that was not present before. There is no crepitation but area is exquisitely tender. Medical Decision Making   MDM: Rapidly progressing cellulitis with concern for potential fasciitis. Plan: Continue treatment as ordered by nurse practitioner, placed central line, ordered additional antibiotics and IV fluid bolus. Will start on vasopressors and recommend ICU admission with stat orthopedics consult.       Central Line    Date/Time: 10/25/2020 2:26 PM  Performed by: Airam Corbett MD  Authorized by: Samantha Miles DO Consent:     Consent obtained:  Emergent situation    Consent given by:  Patient (And daughter)    Risks discussed:  Arterial puncture, incorrect placement, nerve damage, bleeding and infection    Alternatives discussed:  Alternative treatment  Pre-procedure details:     Hand hygiene: Hand hygiene performed prior to insertion      Sterile barrier technique: All elements of maximal sterile technique followed      Skin preparation:  2% chlorhexidine    Skin preparation agent: Skin preparation agent completely dried prior to procedure    Sedation:     Sedation type: Anxiolysis (Fentanyl)  Anesthesia (see MAR for exact dosages): Anesthesia method:  Local infiltration    Local anesthetic:  Lidocaine 1% w/o epi  Procedure details:     Location:  R femoral    Site selection rationale:  Previous catheterization of right upper veins, patient has pacemaker in place on the left side, patient partially cooperative due to anxiety    Patient position:  Flat    Procedural supplies:  Triple lumen    Catheter size:  7 Fr    Landmarks identified: yes      Ultrasound guidance: yes      Sterile ultrasound techniques: Sterile gel and sterile probe covers were used      Number of attempts:  1    Successful placement: yes    Post-procedure details:     Post-procedure:  Dressing applied and line sutured    Assessment:  Blood return through all ports and free fluid flow    Patient tolerance of procedure: Tolerated well, no immediate complications        Please see the nurse practitioner completed note for final disposition except as documented on this attestation. I have reviewed and interpreted all available lab, radiology and ekg results available at the moment. Diagnosis, treatment and disposition plans were discussed and agreed upon by patient. This transcription was electronically signed. It was dictated by use of voice recognition software and electronically transcribed.  The transcription may contain errors not detected in proofreading. CRITICAL CARE ADDENDUM:  This patient was identified as critically ill on my evaluation due to septic shock from progressing skin infection in the left lower extremity suspicious for necrotizing fasciitis, requiring multiple antibiotic infusion, central line placement, vasopressors and IV fluid infusion. There is a high probability of imminent or life-threatening deterioration in the patients condition. A total time of 30 minutes has been spent by me providing critical care to this patient, excluding separately billable procedures. I personally supervised all procedures and actions performed on this patient. I agree with the nurse practitioner's assessment and plan of care except as modified by me or on my addendum.       Electronically signed by Sarah Coon MD on 10/25/20 at 2:25 PM EDT        Sarah Coon MD  10/25/20 [id number]

## 2020-10-25 NOTE — ED PROVIDER NOTES
[facility] Emergency Department    CHIEF COMPLAINT       Chief Complaint   Patient presents with    Leg Pain     left       Nurses Notes reviewed and I agree except as noted in the HPI. HISTORY OF PRESENT ILLNESS    Quentin Parikh is a 67 y.o. female with an extensive medical history including, congestive heart failure, COPD, A. Fib., Pacemaker placement post MI, hypertension, anemia, and IBS who presents to the ED for an evaluation of severe left lower extremity pain, redness, and warmth that started yesterday evening. The patient states that yesterday morning she started feeling some discomfort to her lower extremity, but believed it was due to her compression stockings she wears for CHF. Yesterday evening she removed the stockings and saw that her left lower leg was extremely red, which has continued to spread, becoming more red, and even more painful. Denies any recent trauma, falls, accidents, or wounds to the lower extremity but states that she occasionally scratches her legs with her nails when taking on and off the compression stockings. The patient has attempted tylenol, percocet, biofreeze, and icyhot yesterday with no relief. 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 Endorses history of blood clots, but is on coumadin which was last checked approximately 1 week ago and was in theraputic range. Further endorses feeling feverish and chills, increased shortness of breath, worsening left lower extremity pain, the inability to bear weight at this time, and denies numbness or tingling to the lower extremities, diabetes mellitus, previous lower leg infections, chest pain, abdominal pain, changes in her bowel or bladder function or habits. Patient still smokes 1/2 a pack of cigarettes daily and is on home oxygen. No alcohol or illicit drug use.     Pain description:  Onset: 1 day ago  Location: Left lower extremity  Duration: Constant  Character: Extreme pain, redness, and warmth that is worsening. Aggravating factors: Weight bearing and palpation. Radiation: None. Timing: Constant  Severity: 10/10    Experienced previously: No previous infection or injury to the left lower leg. Extensive past medical history. HPI was provided by the patient. REVIEW OF SYSTEMS     Review of Systems   Constitutional: Positive for chills, fatigue and fever. Negative for diaphoresis. HENT: Negative for congestion and sore throat. Eyes: Positive for visual disturbance (Cataracts). Respiratory: Positive for shortness of breath and wheezing. Negative for cough and chest tightness. Cardiovascular: Positive for leg swelling. Negative for chest pain and palpitations. Gastrointestinal: Negative for abdominal distention, abdominal pain, blood in stool, constipation, diarrhea, nausea and vomiting. Genitourinary: Negative for difficulty urinating, dysuria and hematuria. Musculoskeletal: Positive for gait problem. Skin: Positive for color change and rash. Negative for wound. Neurological: Negative for dizziness, syncope, light-headedness, numbness and headaches.         PAST MEDICAL HISTORY     Past Medical History:   Diagnosis Date    Allergic rhinitis     Anemia     Anxiety     Arthritis     Asthma     Atrial fibrillation ([address]     CAD (coronary artery disease)     COPD (chronic obstructive pulmonary disease) (HCC)     Frequent UTI     GERD (gastroesophageal reflux disease)     History of blood transfusion     X8    Hx of blood clots     left arm    Hyperlipidemia     Hypertension     Influenza A 02/22/2020    Kidney stone     LONG TERM ANTICOAGULENT USE 7/6/2012    MI, old 11    Prolonged emergence from general anesthesia     Systolic CHF, acute on chronic ([address] 10/3/2013       SURGICALHISTORY      has a past surgical history that includes Sanju  Qty: 30 tablet, Refills: 3      Revefenacin 175 MCG/3ML SOLN Inhale 175 mcg into the lungs daily  Qty: 3 mL, Refills: 0      hydrocortisone 2.5 % cream Apply topically 2 times daily as needed       acetaminophen (TYLENOL) 325 MG tablet Take 650 mg by mouth as needed for Pain or Fever Indications: Pain Don't take more then 3,000 mg each day, including what's in Percocet. Multiple Vitamins-Minerals (MULTIVITAMIN ADULT) CHEW Take 2 tablets by mouth daily      Menthol-Methyl Salicylate (ICY HOT) 57-08 % STCK Apply topically daily      Alum Hydroxide-Mag Carbonate (GAVISCON PO) Take by mouth See Admin Instructions Indications: Acid Indigestion      lidocaine (XYLOCAINE) 5 % ointment Apply topically Before venipuncture in Dr. Glenn Hernandes office.       hydrOXYzine (ATARAX) 25 MG tablet Take 25 mg by mouth 3 times daily as needed Indications: Feeling Anxious       albuterol sulfate  (90 Base) MCG/ACT inhaler Inhale 2 puffs into the lungs every 6 hours as needed for Wheezing      Pramoxine HCl (PROCTOFOAM RE) Place rectally daily as needed       EPINEPHrine (EPIPEN) 0.3 MG/0.3ML SOAJ injection INJECT AS DIRECTED FOR ANAPHYLAXIS  Refills: 1      Evolocumab (REPATHA SURECLICK SC) Inject 926 mg into the skin every 14 days Indications: Blood Cholesterol Abnormal       B Complex-C (RA B-COMPLEX/VITAMIN C CR PO) Take by mouth daily Indications: Treatment to Prevent Vitamin Deficiency       NITROGLYCERIN RE Place 0.3 mg rectally as needed Indications: Hemorrhoids       dicyclomine (BENTYL) 10 MG capsule Take 10 mg by mouth 4 times daily (before meals and nightly) Indications: Pain in the Abdominal Region      Coenzyme Q10 (COQ-10) 200 MG CAPS Take by mouth daily       Probiotic Product (SUPER PROBIOTIC) CAPS   Take 1 tablet by mouth daily Indications: Digestive Complaint   Refills: 0      budesonide (PULMICORT) 0.5 MG/2ML nebulizer suspension   Take 1 ampule by nebulization 2 times daily Indications: Shortness of Breath (Inactive)       Arformoterol Tartrate (BROVANA) 15 MCG/2ML NEBU   Take 1 ampule by nebulization 2 times daily Indications: Shortness of Breath (Inactive)       ofloxacin (FLOXIN) 0.3 % otic solution Place 2 drops into both ears daily as needed Indications: Infection Long-term therapy. Refills: 0      Carboxymethylcellul-Glycerin (REFRESH OPTIVE OP) Apply  to eye as needed. Indications: Dry Eyes caused by Deficiency of Tears      levalbuterol (XOPENEX) 0.63 MG/3ML nebulization Take 1 ampule by nebulization every 8 hours as needed for Wheezing. triamcinolone (KENALOG) 0.1 % cream Apply topically 2 times daily as needed Anti-fungal      clotrimazole-betamethasone (LOTRISONE) cream Apply topically 2 times daily as needed Indications: Yeast Infection (inactive)       polyethylene glycol (MIRALAX) powder Take 17 g by mouth as needed. Indications: Constipation      Alcaftadine (LASTACAFT) 0.25 % SOLN Apply 1 drop to eye daily Indications: Eye Allergy Both eyes      aspirin 81 MG EC tablet Take 81 mg by mouth daily. With food  Indications: Anticoagulant Therapy      azelastine (ASTELIN) 137 MCG/SPRAY nasal spray 1 spray by Nasal route 2 times daily as needed Indications: Allergic Rhinitis Use in each nostril as directed      furosemide (LASIX) 40 MG tablet Take 40 mg by mouth daily. Indications: Treatment with Diuretic Therapy      folic acid (FOLVITE) 1 MG tablet Take 1 mg by mouth daily. Indications: Folic Acid Supplementation      OXYGEN 2 L by Nasal route continuous Indications: Chronic Obstructive Lung Disease       traZODone (DESYREL) 50 MG tablet Take 50 mg by mouth nightly Indications: Irritable Bowel Syndrome       nitroGLYCERIN (NITROSTAT) 0.4 MG SL tablet Place 0.4 mg under the tongue every 5 minutes as needed. If third one does not relieve pain, call 9-1-1.   Indications: Chest Pain             ALLERGIES     is allergic to benadryl [diphenhydramine hcl]; ciprofloxacin; clarithromycin; vitamin k; atorvastatin; captopril; codeine; iv dye [iodides]; lipitor; macrobid [nitrofurantoin monohydrate macrocrystals]; neomycin-bacitracin zn-polymyx; pravastatin; zetia [ezetimibe]; adhesive tape; cephalexin; doxycycline; morphine; other; propoxyphene; and sulfa antibiotics. FAMILY HISTORY     is adopted. family history includes Cancer in her sister; Heart Disease in her father. She was adopted. SOCIAL HISTORY       Social History     Socioeconomic History    Marital status:      Spouse name: Augustin Sinha Number of children: 3    Years of education: Not on file    Highest education level: Not on file   Occupational History    Not on file   Social Needs    Financial resource strain: Not on file    Food insecurity     Worry: Not on file     Inability: Not on file   [employer] needs     Medical: Not on file     Non-medical: Not on file   Tobacco Use    Smoking status: Current Every Day Smoker     Packs/day: 0.25     Years: 25.00     Pack years: 6.25     Types: Cigarettes    Smokeless tobacco: Never Used   Substance and Sexual Activity    Alcohol use:  Yes     Alcohol/week: 1.0 standard drinks     Types: 1 Cans of beer per week     Comment: ocassionaly    Drug use: No    Sexual activity: Never   Lifestyle    Physical activity     Days per week: Not on file     Minutes per session: Not on file    Stress: Not on file   Relationships    Social connections     Talks on phone: Not on file     Gets together: Not on file     Attends Yazidism service: Not on file     Active member of club or organization: Not on file     Attends meetings of clubs or organizations: Not on file     Relationship status: Not on file    Intimate partner violence     Fear of current or ex partner: Not on file     Emotionally abused: Not on file     Physically abused: Not on file     Forced sexual activity: Not on file   Other Topics Concern    Not on file   Social History Narrative    Not on file       PHYSICAL EXAM     INITIAL VITALS:  height is 5' 2\" (1.575 m) and weight is 153 lb (69.4 kg). Her oral temperature is 98.5 °F (36.9 °C). Her blood pressure is 122/53 (abnormal) and her pulse is 70. Her respiration is 26 and oxygen saturation is 96%. Physical Exam  Constitutional:       General: She is not in acute distress. Appearance: Normal appearance. She is not ill-appearing, toxic-appearing or diaphoretic. HENT:      Head: Normocephalic and atraumatic. Cardiovascular:      Rate and Rhythm: Normal rate and regular rhythm. Heart sounds: Murmur present. Comments: Paced by pacemaker  Pulmonary:      Breath sounds: Wheezing present. Comments: Unable to take long deep breaths. Short breaths that require effort. Abdominal:      General: There is no distension. Palpations: Abdomen is soft. Tenderness: There is no abdominal tenderness. Musculoskeletal:         General: Tenderness (left lower extremity) present. No signs of injury. Right lower leg: Edema present. Left lower leg: Edema present. Skin:     Findings: Erythema and rash present. No bruising or lesion. Comments: Review image below. Severe redness and warmth, without clear border. Extreme tenderness to palpation. 3+ pitting edema in the bilateral feet. Dorsalis pedal and posterior tibial pulses are present. Patient has similar sensation bilaterally to the feet and distal to the wound. Neurological:      General: No focal deficit present. Mental Status: She is alert and oriented to person, place, and time.    Psychiatric:         Mood and Affect: Mood normal.         Behavior: Behavior normal.             DIFFERENTIAL DIAGNOSIS:   Necrotizing fascitis  Cellulitis  Low suspicion for DVT due to anticoagulation    DIAGNOSTIC RESULTS     EKG: All EKG's are interpreted by the Emergency Department Physician who eithersigns or Co-signs this chart in the absence of a cardiologist.    EKG read and interpreted by myself with comparison to August 27, 2020 gives impression of ventricularly paced rhythm with heart rate of 80; ;QTc 470; axis R--68 T-121. RADIOLOGY: non-plainfilm images(s) such as CT, Ultrasound and MRI are read by the radiologist.  Plain radiographic images are visualized and preliminarily interpreted by the emergency physician unless otherwise stated below. XR CHEST PORTABLE   Final Result   1. Mild cardiomegaly. Permanent pacemaker/defibrillator. 2. Old fracture mid left humeral shaft, incompletely imaged on this study. 3. No central venous line is seen on this particular study. 4. No acute findings. No infiltrates or effusions are seen. **This report has been created using voice recognition software. It may contain minor errors which are inherent in voice recognition technology. **      Final report electronically signed by Dr. Kian Gutierrez on 10/25/2020 3:43 PM      CT TIBIA FIBULA LEFT WO CONTRAST   Final Result   1. There is indistinctness of the fascial along the lateral and posterolateral aspects of the left lower leg with indistinctness of the musculature within the lateral and superficial/deep posterior compartments of the left lower leg. Intrafascial edema    cannot be excluded. There is no intra-tricompartmental gas. There is indistinctness of the peroneal musculature and the lateral aspect of the posterior compartment including the soleus and lateral head of the gastrocnemius muscles. Findings can be    associated with a fasciitis however there is no intrafascial gas to suggest a necrotizing fasciitis. Correlation with clinical findings recommended to exclude a compartment syndrome as this is a clinical diagnosis. The noncontrast CT appearance of the    musculature of the left lower leg is otherwise unremarkable. There is extensive skin and subcutaneous edema throughout the left lower leg which in the right clinical setting can be associated with a cellulitis.  There is also extensive skin and    subcutaneous edema throughout the right lower leg which was included within the field of imaging on the coronal images. There is no subcutaneous gas. In addition, there is edema within Kager's fat. Clinical management is recommended. 2. There is no osseous abnormality. **This report has been created using voice recognition software. It may contain minor errors which are inherent in voice recognition technology. **      Final report electronically signed by Dr. Joseline Tanner on 10/25/2020 12:40 PM            LABS:   Labs Reviewed   VRE SCREEN BY PCR - Abnormal; Notable for the following components:       Result Value    Vancomycin Resistant Enterococcus POSITIVE (*)     All other components within normal limits   CBC WITH AUTO DIFFERENTIAL - Abnormal; Notable for the following components:    WBC 16.3 (*)     .0 (*)     MCHC 31.2 (*)     RDW-CV 15.0 (*)     RDW-SD 55.4 (*)     Segs Absolute 14.3 (*)     Lymphocytes Absolute 0.8 (*)     Immature Grans (Abs) 0.08 (*)     All other components within normal limits   BRAIN NATRIURETIC PEPTIDE - Abnormal; Notable for the following components:    Pro-BNP 9460.0 (*)     All other components within normal limits   BASIC METABOLIC PANEL - Abnormal; Notable for the following components:    BUN 28 (*)     All other components within normal limits   PROTIME-INR - Abnormal; Notable for the following components:    INR 2.74 (*)     All other components within normal limits   GLOMERULAR FILTRATION RATE, ESTIMATED - Abnormal; Notable for the following components:    Est, Glom Filt Rate 70 (*)     All other components within normal limits   CULTURE, BLOOD 1   CULTURE, BLOOD 2   CULTURE, MRSA, SCREENING   LACTIC ACID, PLASMA   CK   ANION GAP   OSMOLALITY   LACTATE, SEPSIS   MRSA BY PCR   COVID-19   LACTATE, SEPSIS   CBC WITH AUTO DIFFERENTIAL   BASIC METABOLIC PANEL   PROTIME-INR       EMERGENCY DEPARTMENT COURSE:   Vitals:    Vitals: 10/25/20 1930 10/25/20 2000 10/25/20 2030 10/25/20 2100   BP: (!) 114/45 (!) 128/50 (!) 115/55 (!) 122/53   Pulse: 70 73 71 70   Resp: 18 16 22 26   Temp:   98.5 °F (36.9 °C)    TempSrc:   Oral    SpO2: 94% 96% 95% 96%   Weight:       Height:           MDM    Patient was seen and evaluated in the emergency department, patient appeared to be in acute distress associated with lower left leg pain. Vital signs were reviewed, no significant findings were initially noted. Physical exam was completed, there was significant discoloration of the left lower leg, there was tenderness that was out of proportion to exam.  Discussed the case with my attending provider Dr. Isaiah Rosenberg, who agrees with my plan of care. Lab work obtained, CT scan obtained. Patient was treated with fentanyl. Patient's blood pressure trended down, she was given IV fluid bolus. Initial lab work reveals significant leukocytosis. CT scan concerning for fasciitis, but no gas noted on exam.  Discussed the case with Lorenzo Erickson PA-C of the orthopedic service, he plans to come and evaluate the patient. Patient was treated with clindamycin, vancomycin, Zosyn. Blood pressure continued to be low, Dr. Isaiah Rosenberg, placed central line in the right femoral area. Levophed was initiated. I discussed the case with Dr. Sagar Pena, who agreed to admit the patient. While here in the emergency department patient maintained a stable course and was appropriate for admission. I discussed my findings my plan of care with the patient and her family and they are amenable with this plan of care.     Medications   norepinephrine (LEVOPHED) 16 mg in dextrose 5% 250 mL infusion (8 mcg/min Intravenous Rate/Dose Change 10/25/20 2109)   lidocaine PF 1 % injection 5 mL (has no administration in time range)   0.9 % sodium chloride infusion ( Intravenous New Bag 10/25/20 1750)   HYDROmorphone (DILAUDID) injection 0.5 mg (0.5 mg Intravenous Given 10/25/20 1529)   HYDROcodone-acetaminophen (NORCO) 5-325 MG per tablet 2 tablet (has no administration in time range)   HYDROcodone-acetaminophen (NORCO) 5-325 MG per tablet 1 tablet (has no administration in time range)   cyclobenzaprine (FLEXERIL) tablet 10 mg (has no administration in time range)   ondansetron (ZOFRAN) injection 4 mg (has no administration in time range)   vancomycin (VANCOCIN) intermittent dosing (placeholder) (has no administration in time range)   vancomycin 1000 mg IVPB in 250 mL D5W addavial (has no administration in time range)   albuterol sulfate  (90 Base) MCG/ACT inhaler 2 puff (has no administration in time range)   Arformoterol Tartrate (BROVANA) nebulizer solution 15 mcg (has no administration in time range)   budesonide (PULMICORT) nebulizer suspension 500 mcg (has no administration in time range)   levalbuterol (XOPENEX) nebulization 0.63 mg (has no administration in time range)   tiotropium (SPIRIVA RESPIMAT) 2.5 MCG/ACT inhaler 2 puff (has no administration in time range)   fentaNYL (SUBLIMAZE) injection 50 mcg (50 mcg Intravenous Given 10/25/20 1158)   clindamycin (CLEOCIN) 600 mg in dextrose 5 % 50 mL IVPB (0 mg Intravenous Stopped 10/25/20 1230)   0.9 % sodium chloride bolus (0 mLs Intravenous Stopped 10/25/20 1437)   piperacillin-tazobactam (ZOSYN) 4.5 g in dextrose 5 % 100 mL IVPB (mini-bag) (0 g Intravenous Stopped 10/25/20 1456)   vancomycin (VANCOCIN) 1,500 mg in dextrose 5 % 500 mL IVPB (0 mg Intravenous Stopped 10/25/20 1750)   fentaNYL (SUBLIMAZE) injection 50 mcg (50 mcg Intravenous Given 10/25/20 1543)       Patient was seenindependently by myself. The patient's final impression and disposition and plan was determined by myself. CRITICAL CARE:   None    CONSULTS:  Edwin Moran Sic    PROCEDURES:  None    FINAL IMPRESSION     1. Cellulitis of left lower extremity    2.  Septicemia [facility])          DISPOSITION/PLAN   Patient admitted to the ICU    PATIENT REFERREDTO:  No follow-up provider

## 2020-10-26 NOTE — PROGRESS NOTES
Pharmacy Medication History Note      List of current medications patient is taking is complete. Source of information: patient, outside dispense history, daughter     Changes made to medication list:  Medications removed (include reason, ex. therapy complete or physician discontinued):   Albuterol- alternate therapy     Medications added/doses adjusted:  Xyzal 5  mg daily   Xolair- received at Dr Drew Corrales       Electronically signed by Dennis Chavez, [address] [place] [address] on 10/26/2020 at 11:24 AM

## 2020-10-26 NOTE — PROGRESS NOTES
During my encounter with the 75yr old patient, I attempted to visit with the pt on 4D. The pt's nurse stated that the pt will be moved to a stepdown unit 4K or 7K. The patient appears to be resting now and I didnt want to disturb the patient. I or another Chito Hernández will attempt to visit the patient or the family at another time.

## 2020-10-26 NOTE — PROGRESS NOTES
CRITICAL CARE PROGRESS NOTE      Patient:  Johnny Garcia    Unit/Bed:4D-15/015-A  YOB: 1945  MRN: [id number]   PCP: Xavi Guadalupe MD  Date of Admission: 10/25/2020  Chief Complaint:- left sided leg pain    Assessment and Plan:    1. Septic Shock, likely secondary to cellulitis of left lower extremity, resolved   On admission, hypotension, BPs 79/50 initially. WBC 16.3. Procal 2.66   CT tib-fib ? Nec fasc.  Levophed drip weaned overnight. VSS. 2. LLE cellulitis/fasciitis   CT tib-fib showed no intraischial gas to suggest nec fas, just edema   Vanco and zosyn in ED   Stop vanc due to VRE on PCR, will start Zyvox for 10 days total treatment   Blood cultures, pending   Ortho plans to manage medically  3. Hx of COPD   Stable   Continue home Brovana-Pulmicort and Spiriva  4. Hx of HFrEF   Stable. BNP 9469, frequent elevations in the past on admission.  Echo 2/20: EF 30-35%   Continue home entresto and lasix  5. Atrial Fib, chronic   History of AICD   S/p permanent pacemaker   Chronic coumadin usage, INR 2.40   Lanoxin continued  6. Essential hypertension   Currently hypotensive   Required levophed, DC'd.  Continue to monitor  7. GERD   monitor  8. Nicotine abuse   Current everyday smoker, smoking cessation provided   9. Bicytopenia   Hemoglobin 10.9 from 13.1   platelets 872 from 960   Likely secondary to sepsis vs. Dilutional vs zosyn    Continue to monitor  10. Hx of constipation  · States she takes linzess at home  · Constipated today  · Prn dulcolax and glycerin suppository  11. Hx of hallucinations with narcotics  · AOx3 today  · No hallucinations  · Continue to monitor  12.  DISPO   Full code, NPO (ortho to advance diet), SCDs    INITIAL H AND P AND ICU COURSE:  Lexx Topete is a 67year old  female admitted to [facility] ICU 10/25/2020 with concerns of necrotizing fascitis.      Patient has a significant history of current everyday smoker, COPD, chronic ischemic heart disease, systolic heart failure, recurrent atrial fibrillation-Coumadin, remote biventricular pacemaker-AICD, echo-2020 LVEF 30 to 35%, essential hypertension, left arm DVT, GERD, recurrent UTIs, anxiety, anemia.     Antione Weems presented to [facility] ED for an evaluation of severe left lower extremity pain, redness, and warmth that started yesterday evening. The patient states that yesterday morning she started feeling some discomfort to her lower extremity, but believed it was due to her compression stockings she wears for CHF. Yesterday evening she removed the stockings and saw that her left lower leg was extremely red, which has continued to spread, becoming more red, and even more painful. Denies any recent trauma, falls, accidents, or wounds to the lower extremity but states that she occasionally scratches her legs with her nails when taking on and off the compression stockings. The patient has attempted tylenol, percocet, biofreeze, and icyhot yesterday with no relief. Work-up while in the ER includes CT tib-fib was concerning for fasciitis however there was no intrafacial gas to suggest a necrotizing fasciitis. Ortho was consulted. A femoral central line catheter was placed. Levophed drip was started. She was admitted to the ICU for further evaluation and care.     10/26/2020:  Levophed weaned overnight. VS stable. Pain controlled on dilaudid and norco, history of HA on narcotics. Ortho to manage medically. Transfer to Medicine bed. Past Medical History:  See HPI. Family History: Mother , Father . Social History:  Current every day smoker, Denies any ETOH or illicit drug use. ROS   GENERAL: Positive for chills and fatigue. Negative for fever. SKIN: Positive for left lower leg redness warmth, welling and pain. Improving from yesterday. Pain 2/10 at rest, 10/10 with movement and touch.     HEAD: No headaches or recent injury  EYES: Positive for cataracts no acute changes in vision, no diplopia or blurred vision  EARS: No new hearing loss, no tinnitus, drainage. Patient is deaf on right side, hearing aid on left. NOSE/THROAT: No rhinorrhea or pharyngitis, no nasal drainage  NECK: No lumps or unusual neck stiffness  PULMONARY: No dyspnea, orthopnea or paroxysmal nocturnal dyspnea, stridor. Mild wheeze diffusely. CARDIAC: No chest pain, pressure, venous or tightness  GI: Denies any abdominal pain no history melena or hematochezia, no diarrhea or constipation  : Denies any hematuria no CVA tenderness no suprapubic pressure. PERIPHERAL VASCULAR: No intermittent claudication or unusual leg cramps. MUSCULOSKELETAL: Positive for gait ambulation problems. Occasional arthralgias, myalgias  NEUROLOGICAL: No Seizures or Syncope  HEMATOLOGIC:  No unusual bruising or bleeding  PSYCH: Denies any homicidal or suicidal ideations    Scheduled Meds:   aspirin  81 mg Oral Daily    [START ON 10/27/2020] digoxin  125 mcg Oral Every Other Day    [START ON 10/27/2020] furosemide  40 mg Oral Daily    metoprolol succinate  25 mg Oral Daily    pantoprazole  40 mg Oral BID AC    sacubitril-valsartan  1 tablet Oral BID    traZODone  50 mg Oral Nightly    piperacillin-tazobactam  3.375 g Intravenous Q8H    warfarin (COUMADIN) daily dosing (placeholder)   Other RX Placeholder    vancomycin (VANCOCIN) intermittent dosing (placeholder)   Other RX Placeholder    vancomycin  1,000 mg Intravenous Q24H    Arformoterol Tartrate  15 mcg Nebulization BID    budesonide  500 mcg Nebulization BID    tiotropium  2 puff Inhalation Daily    sodium chloride flush  10 mL Intravenous 2 times per day     Continuous Infusions:   norepinephrine Stopped (10/26/20 4709)    sodium chloride 100 mL/hr at 10/25/20 1750       PHYSICAL EXAMINATION:  /47 mmHg, Pulse 70 bpm, RR 15, SpO2 90%  Body mass index is 27.98 kg/m². GCS:   15  General:   No acute distress.   Patient resting comfortably in bed, grimaces with movement. HEENT:  normocephalic and atraumatic. No scleral icterus. PERR  Neck: supple. No Thyromegaly. Lungs: clear to auscultation. No retractions  Cardiac: RRR. No JVD. Abdomen: soft. Nontender. Extremities:  No clubbing, cyanosis. Non-pitting edema bilateral lower extremities. Vasculature: capillary refill < 3 seconds. Unable to palpate distal pulses secondary edema and pain on left side. 2+ on right side. Skin:  warm and dry. Psych:  Alert and oriented x3. Affect appropriate  Lymph:  No supraclavicular adenopathy. Neurologic:  No focal deficit. No seizures. Left leg 10/26 a.m. Left leg 10/25      Data: (All radiographs, tracings, PFTs, and imaging are personally viewed and interpreted unless otherwise noted). Sodium 138, potassium 4.4, chloride 106, creatinine 0.7 calcium 8.7, pro-Jonnie 2.66 CRP 31.61,  · Albumin 2.7  · Random cortisol 10.51  · WBC 11.4, hemoglobin 10.9, hematocrit 34.9, platelets 032, INR 2.4  · VRE positive  · CT tibia-fibula left without contrast: Indistinctness of the fascia along the lateral and posterolateral aspects of the left lower leg. Intrafascial edema cannot be excluded. No intra-tricompartmental gas. Findings can be associated with the fasciitis however there is no intrafascial gas to suggest necrotizing fasciitis. There is no subcutaneous gas. Clinical management is recommended. · CXR 10/25: Mild cardiomegaly, pacemaker/defibrillator in place. No acute findings. Seen with multidisciplinary ICU team.  Meets Continued ICU Level Care Criteria:    [] Yes   [x] No - Transfer Planned to listed location:  Pending.  [] HOSPITALIST CONTACTED - Per hub, Charge Nurse will contact me once bed is available to establish which hospitalist to contact. Case and plan discussed with Dr. Joel Boxer.     Electronically signed by Raina Trevizo DO

## 2020-10-26 NOTE — H&P
CRITICAL CARE H+P NOTE      Patient:  Yoana Shen    Unit/Bed:4D-15/015-A  YOB: 1945  MRN: [id number]   PCP: Leonila Wood MD  Date of Admission: 10/25/2020  Chief Complaint:- left lower leg redness and pain    Assessment and Plan:    1. Septic shock: Likely secondary to cellulitis left lower extremity. CT tib-fib questioning fasciitis. Levophed drip maintain mean arterial pressure greater than 65.  2. LLE cellulitis/ fasciitis: CT tib-fib no intraischial gas to suggest necrotizing fasciitis. Blood cultures have been drawn. Patient did receive Vanco and Zosyn while in the ER. We will continue at this time. Ortho to follow-commendations if no improvement on a.m. rounds will consider surgical options. 3. COPD: History-stable, monitor. Brovana-Pulmicort, Spiriva have been continued  4. HFrEF:  History, stable. Echo 2/2020 LVEF 30 to 35%. Entresto and Lasix has been continued. 5. Atrial fib, chronic: Patient has a history of AICD and permanent pacemaker, on chronic Coumadin therapy. Lanoxin has been continued. 6. Essential hypertension: History patient is currently hypotensive and requiring Levophed for blood pressure support, will monitor  7. GERD: History monitor  8. Nicotine abuse: Current everyday smoker, smoking cessation will be provided. INITIAL H AND P AND ICU COURSE:  Yadi Tripp is a 67year old  female admitted to [facility] ICU 10/25/2020 with concerns of necrotizing fascitis. Patient has a significant history of current everyday smoker, COPD, chronic ischemic heart disease, systolic heart failure, recurrent atrial fibrillation-Coumadin, remote biventricular pacemaker-AICD, echo-2/2020 LVEF 30 to 35%, essential hypertension, left arm DVT, GERD, recurrent UTIs, anxiety, anemia. Nancy Walker presented to [facility] ED for an evaluation of severe left lower extremity pain, redness, and warmth that started yesterday evening.  The patient states that yesterday morning she started feeling some discomfort to her lower extremity, but believed it was due to her compression stockings she wears for CHF. Yesterday evening she removed the stockings and saw that her left lower leg was extremely red, which has continued to spread, becoming more red, and even more painful. Denies any recent trauma, falls, accidents, or wounds to the lower extremity but states that she occasionally scratches her legs with her nails when taking on and off the compression stockings. The patient has attempted tylenol, percocet, biofreeze, and icyhot yesterday with no relief. Work-up while in the ER includes CT tib-fib was concerning for fasciitis however there was no intrafacial gas to suggest a necrotizing fasciitis. Ortho was consulted. A femoral central line catheter was placed. Levophed drip was started. She was admitted to the ICU for further evaluation and care.     Past Medical History:  See HPI. Family History: Mother , Father . Social History:  Current every day smoker, Denies any ETOH or illicit drug use. ROS   GENERAL: Positive for chills fever fatigue   SKN: Positive for left lower leg redness warmth, welling and pain. HEAD: No headaches or recent injury  EYES: Positive for cataracts no acute changes in vision, no diplopia or blurred vision  EARS: No hearing loss, no tinnitus, drainage  NOSE/THROAT: No rhinorrhea or pharyngitis, no nasal drainage  NECK: No lumps or unusual neck stiffness  PULMONARY: No dyspnea, orthopnea or paroxysmal nocturnal dyspnea, writer or wheezing  CARDIAC: No chest pain, pressure, venous or tightness  GI: Denies any abdominal pain no history melena or hematochezia, no diarrhea or constipation  : Denies any hematuria no CVA tenderness no suprapubic pressure  PERIPHERAL VASCULAR: No intermittent claudication or unusual leg cramps  MUSCULOSKELETAL: Positive for gait ambulation problems.   Occasional arthralgias, myalgias  NEUROLOGICAL: No Seizures or lower leg. In trial facial edema cannot be excluded. There is no intratricompartmental cast.  Findings may be associated with a fasciitis however there is no intrafacial gas to suggest necrotizing fasciitis.  Chest x-ray mild cardiomegaly old fracture mid left humeral shaft no acute findings no infiltrates or effusions are seen   EKG-V paced   Telemetry V paced        Seen with multidisciplinary ICU team yes. Meets Continued ICU Level Care Criteria:    [x] Yes   [] No - Transfer Planned to listed location:  [] HOSPITALIST CONTACTED-      Case and plan discussed with Dr. Adriana Forrester.         Electronically signed by PETR Melgoza - CNP  CRITICAL CARE SPECIALIST

## 2020-10-26 NOTE — CONSULTS
POLYPECTOMY SNARE/COLD BIOPSY performed by Derian Sam MD at [address] End[facility]    ENDOSCOPY, COLON, DIAGNOSTIC  01/04/2017    Dr. Flex Meyers      left hip    OVARIAN CYST REMOVAL      PACEMAKER PLACEMENT      SINUS SURGERY      several    TONSILLECTOMY      TOTAL HIP ARTHROPLASTY Left 10/24/2019    DANIEL LEFT HIP ARTHROPLASTY performed by Jamarcus Solis MD at [address]  2013    X2    UPPER GASTROINTESTINAL ENDOSCOPY N/A 1/5/2020    EGD DIAGNOSTIC ONLY performed by Hellen Back MD at [address] End[facility]         MEDICATIONS:       Scheduled Meds:   aspirin  81 mg Oral Daily    [START ON 10/27/2020] digoxin  125 mcg Oral Every Other Day    [START ON 10/27/2020] furosemide  40 mg Oral Daily    metoprolol succinate  25 mg Oral Daily    pantoprazole  40 mg Oral BID AC    sacubitril-valsartan  1 tablet Oral BID    traZODone  50 mg Oral Nightly    piperacillin-tazobactam  3.375 g Intravenous Q8H    linezolid  600 mg Intravenous Q12H    docusate sodium  100 mg Oral Daily    senna  1 tablet Oral Nightly    sodium chloride flush  10 mL Intravenous 2 times per day    lidocaine 1 % injection  5 mL Intradermal Once    Arformoterol Tartrate  15 mcg Nebulization BID    budesonide  500 mcg Nebulization BID    tiotropium  2 puff Inhalation Daily     Continuous Infusions:   norepinephrine Stopped (10/26/20 0447)     PRN Meds:bisacodyl, sodium chloride flush, sodium chloride flush, HYDROmorphone, HYDROcodone 5 mg - acetaminophen, HYDROcodone 5 mg - acetaminophen, cyclobenzaprine, albuterol sulfate HFA, levalbuterol, acetaminophen **OR** acetaminophen, polyethylene glycol, promethazine **OR** ondansetron  Allergies:   ALLERGIES:    Benadryl [diphenhydramine hcl]; Ciprofloxacin; Clarithromycin; Vitamin k; Atorvastatin; Captopril; Codeine; Iv dye [iodides]; Lipitor; Macrobid [nitrofurantoin monohydrate macrocrystals];  Neomycin-bacitracin zn-polymyx; Pravastatin; Zetia [ezetimibe]; Adhesive tape; Cephalexin; Doxycycline; Morphine; Other; Propoxyphene; and Sulfa antibiotics        SOCIAL HISTORY:     TOBACCO:   reports that she has been smoking cigarettes. She has a 6.25 pack-year smoking history. She has never used smokeless tobacco.     ETOH:   reports current alcohol use of about 1.0 standard drinks of alcohol per week. Patient currently lives with family        FAMILY HISTORY:         Adopted: Yes   Problem Relation Age of Onset    Heart Disease Father          AGE 35    Cancer Sister         breast       REVIEW OF SYSTEMS:     Constitutional: + fever, no night sweats,+ fatigue, no weight loss. Head: no head ache , no head injury, no migranes. Eye: no eye discharge, blurring of vision, no double vision,no eye pain. Ears: no hearing difficulty, no tinnitus  Mouth/throat: no ulceration, dental caries , dysphagia, no hoarseness and voice change  Respiratory: no cough no chest pain,no shortness of breath,no wheezing  CVS: no palpitation, no chest pain,   GI: no abdominal pain, no nausea , no vomiting, no constipation,no diarrhea. SOFIYA: no dysuria, frequency and urgency, no hematuria, no kidney stones  Musculoskeletal: no joint pain, +swelling , stiffness,  Endocrine: no polyuria, polydipsia, no cold or heat intolerance  Hematology: + anemia, no easy brusing or bleeding, no hx of clotting disorder  Dermatology: no skin rash, no skin lesions, no pruritis,  Neurological:no headaches,no dizziness, no seizure, no numbness. Psychiatry: no depression, no anxiety,no panic attacks, no suicide ideation    PHYSICAL EXAM:     BP (!) 92/49   Pulse 70   Temp 98 °F (36.7 °C) (Oral)   Resp 20   Ht 5' 2\" (1.575 m)   Wt 153 lb (69.4 kg)   SpO2 94%   BMI 27.98 kg/m²   General apperance:  Awake, alert, not in distress. HEENT: pink conjunctiva, unicteric sclera, moist oral mucosa.   Chest:  Diminished breath sound  Cardiovascular:  RRR ,S1S2, no murmur or gallop. Abdomen:  Soft, non tender to palpation. Extremities: the left lower leg is very red and warm and tender. No crepotitus. Pulse is palpable, bilateral swelling of extremites  Skin:  Warm and dry. CNS:oriented to person place and time.         LABS:     CBC:   Recent Labs     10/25/20  1153 10/26/20  0445   WBC 16.3* 11.4*   HGB 13.1 10.9*    123*     BMP:    Recent Labs     10/25/20  1153 10/26/20  0445    138   K 4.9 4.4    106   CO2 26 24   BUN 28* 25*   CREATININE 0.8 0.7   GLUCOSE 96 128*     Calcium:  Recent Labs     10/26/20  0445   CALCIUM 8.7      Recent Labs     10/26/20  0445   INR 2.40*     Hepatic:   Recent Labs     10/26/20  0445   ALKPHOS 63   ALT 8*   AST 9   PROT 5.3*   BILITOT 0.6   BILIDIR <0.2   LABALBU 2.7*     Amylase and Lipase:  Recent Labs     10/25/20  1153   LACTA 2.2     Lactic Acid:   Recent Labs     10/25/20  1153   LACTA 2.2     Troponin:   Recent Labs     10/25/20  1153   CKTOTAL 34           IMAGING:            Micro:   Lab Results   Component Value Date    BC No growth-preliminary  10/25/2020       Problem list of patient      Patient Active Problem List   Diagnosis Code    MI (myocardial infarction) ([facility] I21.9    Chronic obstructive pulmonary disease ([facility] J44.9    Hyperlipidemia E78.5    Hypertension I10    Seasonal allergic rhinitis J30.2    Heartburn R12    Persistent atrial fibrillation ([facility]) V83.47    Systolic CHF, acute on chronic ([facility]) I50.23    Biventricular implantable cardioverter-defibrillator in situ Z95.810    Anticoagulated on Coumadin Z79.01    Arteriosclerosis of coronary artery I25.10    Left displaced femoral neck fracture ([facility]) S72.002A    Closed head injury S09.90XA    CKD (chronic kidney disease) stage 2, GFR 60-89 ml/min N18.2    S/p left hip fracture Z87.81    Chronic systolic CHF (congestive heart failure) ([facility]) I50.22    IBS (irritable bowel syndrome) K58.9    Acute blood loss as cause of postoperative anemia D62    Atelectasis of left lung J98.11    Insomnia G47.00    Chest pain R07.9    Rectal bleeding K62.5    Acute on chronic anemia D64.9    Vaginitis N76.0    Digitalis toxicity T46.0X1A    GI bleed K92.2    Coagulopathy (HCC) D68.9    Bilateral leg edema R60.0    Azotemia R79.89    Hypoalbuminemia E88.09    Urinary tract infection with hematuria N39.0, R31.9    Septic shock (HCC) A41.9, R65.21    Pacemaker generator end of life Z45.010    Necrotizing fasciitis (HCC) M72.6    Left leg pain M79.605    Cellulitis of left lower extremity L03.116           Impression and Recommendation:   Left lower leg severe cellulites: will continue iv zosyn and zyvox (has antitoxin effect). No need to add clindamycin  CHF  Severe sepsis  COPD  Will continue to monitor patient closely     Thank you Jamarcus Solis MD for allowing me to participate in this patient's care.     Pawel Gant MD,FACP 10/26/2020 5:25 PM

## 2020-10-26 NOTE — PROGRESS NOTES
Orthopaedic Progress Note      SUBJECTIVE:    Chief Complaint   Patient presents with    Leg Pain     left   hospital day 1 LLE cellulitis  Pain worse with movement and touch. WBC trending down  Afebrile  Nurses note improvement in calf swelling    Physical    Vitals:    10/26/20 0900   BP: (!) 89/52   Pulse: 71   Resp: 16   Temp:    SpO2: 96%         OBJECTIVE  LLE circumferential erythema mid tib/fib, pitting edema foot and ankle. TTP throughout. NO increased pain with passive stretch. DP/PT pulses 1+. ROM limited 2/2 pain.        Data  CBC:   Lab Results   Component Value Date    WBC 11.4 10/26/2020    RBC 3.48 10/26/2020    HGB 10.9 10/26/2020    HCT 34.9 10/26/2020    .3 10/26/2020    MCH 31.3 10/26/2020    MCHC 31.2 10/26/2020    RDW 14.9 06/25/2020     10/26/2020    MPV 10.8 10/26/2020     BMP:    Lab Results   Component Value Date     10/26/2020    K 4.4 10/26/2020     10/26/2020    CO2 24 10/26/2020    BUN 25 10/26/2020    LABALBU 2.7 10/26/2020    CREATININE 0.7 10/26/2020    CALCIUM 8.7 10/26/2020    LABGLOM 81 10/26/2020    GLUCOSE 128 10/26/2020    GLUCOSE 115 07/06/2018     Uric Acid:  No components found for: URIC  PT/INR:    Lab Results   Component Value Date    PROTIME 22.3 02/05/2019    INR 2.40 10/26/2020     PTT:    Lab Results   Component Value Date    APTT 21.3 08/27/2020   [APTT  Troponin:    Lab Results   Component Value Date    TROPONINI <0.006 10/04/2013     Urine Culture:  No components found for: CURINE    Current Inpatient Medications    Current Facility-Administered Medications: aspirin EC tablet 81 mg, 81 mg, Oral, Daily  [START ON 10/27/2020] digoxin (LANOXIN) tablet 125 mcg, 125 mcg, Oral, Every Other Day  [START ON 10/27/2020] furosemide (LASIX) tablet 40 mg, 40 mg, Oral, Daily  metoprolol succinate (TOPROL XL) extended release tablet 25 mg, 25 mg, Oral, Daily  pantoprazole (PROTONIX) tablet 40 mg, 40 mg, Oral, BID AC  sacubitril-valsartan (ENTRESTO) 49-51 MG per tablet 1 tablet, 1 tablet, Oral, BID  traZODone (DESYREL) tablet 50 mg, 50 mg, Oral, Nightly  piperacillin-tazobactam (ZOSYN) 3.375 g in dextrose 5 % 50 mL IVPB extended infusion (mini-bag), 3.375 g, Intravenous, Q8H  warfarin (COUMADIN) daily dosing (placeholder), , Other, RX Placeholder  bisacodyl (DULCOLAX) EC tablet 5 mg, 5 mg, Oral, Daily PRN  linezolid (ZYVOX) IVPB 600 mg, 600 mg, Intravenous, Q12H  norepinephrine (LEVOPHED) 16 mg in dextrose 5% 250 mL infusion, 10 mcg/min, Intravenous, Continuous  0.9 % sodium chloride infusion, , Intravenous, Continuous  HYDROmorphone (DILAUDID) injection 0.5 mg, 0.5 mg, Intravenous, Q2H PRN  HYDROcodone-acetaminophen (NORCO) 5-325 MG per tablet 2 tablet, 2 tablet, Oral, Q4H PRN  HYDROcodone-acetaminophen (NORCO) 5-325 MG per tablet 1 tablet, 1 tablet, Oral, Q4H PRN  cyclobenzaprine (FLEXERIL) tablet 10 mg, 10 mg, Oral, TID PRN  albuterol sulfate  (90 Base) MCG/ACT inhaler 2 puff, 2 puff, Inhalation, Q6H PRN  Arformoterol Tartrate (BROVANA) nebulizer solution 15 mcg, 15 mcg, Nebulization, BID  budesonide (PULMICORT) nebulizer suspension 500 mcg, 500 mcg, Nebulization, BID  levalbuterol (XOPENEX) nebulization 0.63 mg, 1 ampule, Nebulization, Q8H PRN  tiotropium (SPIRIVA RESPIMAT) 2.5 MCG/ACT inhaler 2 puff, 2 puff, Inhalation, Daily  sodium chloride flush 0.9 % injection 10 mL, 10 mL, Intravenous, 2 times per day  sodium chloride flush 0.9 % injection 10 mL, 10 mL, Intravenous, PRN  acetaminophen (TYLENOL) tablet 650 mg, 650 mg, Oral, Q6H PRN **OR** acetaminophen (TYLENOL) suppository 650 mg, 650 mg, Rectal, Q6H PRN  polyethylene glycol (GLYCOLAX) packet 17 g, 17 g, Oral, Daily PRN  promethazine (PHENERGAN) tablet 12.5 mg, 12.5 mg, Oral, Q6H PRN **OR** ondansetron (ZOFRAN) injection 4 mg, 4 mg, Intravenous, Q6H PRN    . ASSESSMENT AND PLAN  No surgery today, will continue to monitor  NPO after MN  Continue abx.    Will consult ID  Repeat CRP  Uric acid  Venous doppler.

## 2020-10-26 NOTE — CARE COORDINATION
10/26/20, 9:32 AM EDT  DISCHARGE PLANNING EVALUATION:    Mahesh Morrison       Admitted from: ED 10/25/2020/ [address] day: 1   Location: -15/015-A Reason for admit: Necrotizing fasciitis ([address] [M72.6]  Left leg pain [M79.605] Status: IP  Admit order signed?: yes  PMH:  has a past medical history of Allergic rhinitis, Anemia, Anxiety, Arthritis, Asthma, Atrial fibrillation ([address] CAD (coronary artery disease), COPD (chronic obstructive pulmonary disease) ([address] Frequent UTI, GERD (gastroesophageal reflux disease), History of blood transfusion, Hx of blood clots, Hyperlipidemia, Hypertension, Influenza A, Kidney stone, LONG TERM ANTICOAGULENT USE, MI, old, Prolonged emergence from general anesthesia, and Systolic CHF, acute on chronic ([address] Procedure:   10/25 CT Left Tib/Fib:   1. There is indistinctness of the fascial along the lateral and posterolateral aspects of the left lower leg with indistinctness of the musculature within the lateral and superficial/deep posterior compartments of the left lower leg. Intrafascial edema    cannot be excluded. There is no intra-tricompartmental gas. There is indistinctness of the peroneal musculature and the lateral aspect of the posterior compartment including the soleus and lateral head of the gastrocnemius muscles. Findings can be    associated with a fasciitis however there is no intrafascial gas to suggest a necrotizing fasciitis. Correlation with clinical findings recommended to exclude a compartment syndrome as this is a clinical diagnosis. The noncontrast CT appearance of the    musculature of the left lower leg is otherwise unremarkable. There is extensive skin and subcutaneous edema throughout the left lower leg which in the right clinical setting can be associated with a cellulitis. There is also extensive skin and    subcutaneous edema throughout the right lower leg which was included within the field of imaging on the coronal images.  There is no subcutaneous gas. In addition, there is edema within Kager's fat. Clinical management is recommended.         2. There is no osseous abnormality. 10/25 CXR: No acute findings  10/25 CVC Right femoral   10/26 XR Left ankle: Diffuse soft tissue swelling of the ankle. No acute fracture seen  10/26 XR Left foot: Moderate swelling without a gross fracture  Medications:  Scheduled Meds:   aspirin  81 mg Oral Daily    [START ON 10/27/2020] digoxin  125 mcg Oral Every Other Day    [START ON 10/27/2020] furosemide  40 mg Oral Daily    metoprolol succinate  25 mg Oral Daily    pantoprazole  40 mg Oral BID AC    sacubitril-valsartan  1 tablet Oral BID    traZODone  50 mg Oral Nightly    piperacillin-tazobactam  3.375 g Intravenous Q8H    warfarin (COUMADIN) daily dosing (placeholder)   Other RX Placeholder    glycerin (ADULT)  1 suppository Rectal Once    linezolid  600 mg Intravenous Q12H    Arformoterol Tartrate  15 mcg Nebulization BID    budesonide  500 mcg Nebulization BID    tiotropium  2 puff Inhalation Daily    sodium chloride flush  10 mL Intravenous 2 times per day     Continuous Infusions:   norepinephrine Stopped (10/26/20 0447)    sodium chloride 100 mL/hr at 10/25/20 1750      Pertinent Info/Orders/Treatment Plan: Presented with c/o circumferential redness and pain of LLE that began on 10/24 and progressively worsened. Hypotensive in ED, given 1L fld bolus and started on levophed drip. Orthopedic Surgery and Intensivist following. Levo weaned off early this morning. LLE venous doppler ordered. Afebrile. NSR. Sats 96% on 2L O2. Ox4. COVID 19 was negative. +VRE rectum. Telemetry, I&O, daily weight, n/v checks, SCDs. IVF, nebs, asa, digoxin, po lasix daily, prn norco, prn IV dilaudid, IV zyvox, toprol xl - held this am, protonix, IV zosyn, entresto, inhaler, coumadin - pharmacy dosing. Received IV cleocin x1, IV zosyn x1, and IV vancomycin x1.  Procal 2.66, CRP 31.61, pro-bnp 9460, alb 2.7, wbc 16.3 - now 11.4, hgb 13.1 - now 10.9, plt 149 - now 123, INR 2.4. Blood cultures sent. Diet: Diet NPO Effective Now   Smoking status:  reports that she has been smoking cigarettes. She has a 6.25 pack-year smoking history. She has never used smokeless tobacco.   PCP: Niki Rhodes MD  Readmission 30 days or less: no  Readmission Risk Score: 36%    Discharge Planning Evaluation  Current Residence:     Living Arrangements:      Support Systems:     Current Services PTA:     Potential Assistance Needed:     Potential Assistance Purchasing Medications:     Does patient want to participate in local refill/ meds to beds program?     Type of Home Care Services:     Patient expects to be discharged to:     Expected Discharge date: Follow Up Appointment: Best Day/ Time:      Patient Goals/Plan/Treatment Preferences: Spoke with Endy Perez; states he lives at home with her  and uses a rolling walker and has a wheelchair. She also wears 2L O2 at rest and 3L with activity thru [facility] and uses a nebulizer. She did not have any  services PTA. She drives, cares for herself independently, and has a PCP. Endy Perez states she plans to return home at discharge, denies needs, and declines [place] stating she doesn't think she will need it. Transportation/Food Security/Housekeeping Addressed:  No issues identified.  Evaluation: no    10/26/20 1:03 PM    Pt transferring to [facility]. Handoff report given to Ana Garcia CM.

## 2020-10-26 NOTE — PROGRESS NOTES
Patient transferred from ICU to [facility] room 8. Patient oriented to room and call light. Zosyn infusing in RAC at 12.5 ml/hr. INT in left AC. Left leg red, painful and 3+ swelling. Patient complaining of pain in left lower extremity.

## 2020-10-27 NOTE — PLAN OF CARE
Problem: Impaired respiratory status  Goal: Clear lung sounds  10/26/2020 2129 by Ranulfo Argueta RCP  Outcome: Met This Shift  Note:  Patient lung sounds are considered normal for their current lung condition. No signs of distress noted. Current treatment regimen appropriate        Problem: Fluid Volume - Imbalance:  Goal: Absence of imbalanced fluid volume signs and symptoms  Description: Absence of imbalanced fluid volume signs and symptoms  Outcome: Ongoing  Note: Patient has demonstrated low BP this shift. Albumin and fluid bolus were given to raise BP. Patients BP has reached an acceptable level. Will continue to monitor. Problem: Gas Exchange - Impaired:  Goal: Levels of oxygenation will improve  Description: Levels of oxygenation will improve  Outcome: Ongoing  Note: Patient has remained free of hypoxic episodes throughout this shift. Will continue to monitor. Problem: Pain:  Goal: Pain level will decrease  Description: Pain level will decrease  Outcome: Ongoing  Note: Patients pain level has decreased when repositioned for maximum comfort and medicated according to [facility] orders. Will continue to monitor. Problem: Skin Integrity - Impaired:  Goal: Will show no infection signs and symptoms  Description: Will show no infection signs and symptoms  Outcome: Ongoing  Note: Patient has remained afebrile throughout this shift. Will continue to monitor. Problem: Falls - Risk of:  Goal: Will remain free from falls  Description: Will remain free from falls  Outcome: Ongoing  Note: Patient has remained free of falls throughout this shift. Patient educated on the importance of using the call light and waiting for assistance before ambulating. Will continue to monitor. Problem: Falls - Risk of:  Goal: Absence of physical injury  Description: Absence of physical injury  Outcome: Ongoing  Note: Patient has remained free of physical injury throughout this shift.  Patient educated on the importance of using the call light and waiting for assistance before ambulating. Will continue to monitor. Problem: Pain:  Goal: Pain level will decrease  Description: Pain level will decrease  Outcome: Ongoing  Note: Patients pain level has decreased when repositioned for maximum comfort and medicated according to [facility] orders. Will continue to monitor. Problem: Pain:  Goal: Control of acute pain  Description: Control of acute pain  Outcome: Ongoing  Note: Patient denies having acute pain. Will continue to monitor. Problem: GI  Intervention: Assessment for constipation risk factors including opiods, immobility, etc.  Note: Patient has had several loose bowel movements this shift. Will continue to monitor. Problem:   Intervention: Straight Cath, PRN  Note: Patient has demonstrated adequate urine output. Will continue to monitor. Problem: Skin Integrity/Risk  Intervention: Positioning  Note: Patient educated on the importance of turing frequently to decrease the risk of new skin breakdown. Will continue to monitor. Problem: Discharge Planning:  Intervention: Assess knowledge level of healthcare  Note: Pt plans at discharge. Care manager and social working helping with discharge needs. Care plan reviewed with patient. Patient verbalize understanding of the plan of care and contribute to goal setting.

## 2020-10-27 NOTE — PLAN OF CARE
Problem: Impaired respiratory status  Goal: Clear lung sounds  10/27/2020 0919 by Nancy Stratton RCP  Outcome: Ongoing    Improve breath sounds, increase aeration and decrease WOB.

## 2020-10-27 NOTE — CARE COORDINATION
10/27/20, 9:41 AM SHUBHAM Jeronimo day: 2  Location: [facility]08/008-A Reason for admit: Necrotizing fasciitis ([address] [M72.6]  Left leg pain [M79.605]   Procedure: PICC line insertion planned  Treatment Plan of Care: Dr Aleksandra Flowers attending. Dr Daniela Hill managing IV antibiotics. Following blood cultures. PICC line planned. I&O. Pain Management. N/V checks. LLE wound care. Barriers to Discharge: Dr Daniela Hill following blood cultures  PCP: Jose Croft MD  Readmission Risk Score: 37%  Patient Goals/Plan/Treatment Preferences: I spoke with Charlene Russell. She is from home with her . She uses a rolling walker and has a wheelchair. She also wears 2L O2 at rest and 3L with activity thru [address] and uses a nebulizer. She did not have any  services PTA  PICC line ordered to be inserted today for IV antibiotics. I had Cheo Miranda from [address] [place] [address] [place] check [place] benefits for home IV antibiotics. No coverage for IV infusion at home. I discussed with her that she will have coverage in nursing home. (If only daily IV antibiotics ordered - she could consider doing as OP if  would be able to safely trasport her daily). She will discuss with her  and I told her that Jovanni Mota, would talk with her later today about her preferences for ECF.

## 2020-10-27 NOTE — PROGRESS NOTES
Orthopaedic Progress Note      SUBJECTIVE:    Chief Complaint   Patient presents with    Leg Pain     left   hospital day 2 LLE cellulitis  No worsening symptoms, erythema within marked area. Afebrile        Physical    Vitals:    10/27/20 0915   BP:    Pulse: 70   Resp: 18   Temp: 97.7 °F (36.5 °C)   SpO2:          OBJECTIVE  LLE erythema, warmth and TTP. Continued pitting edema BLE. Toes warm, brisk cap refill, flex and extends toes and ankle.  SILT      Data  CBC:   Lab Results   Component Value Date    WBC 11.4 10/26/2020    RBC 3.48 10/26/2020    HGB 10.9 10/26/2020    HCT 34.9 10/26/2020    .3 10/26/2020    MCH 31.3 10/26/2020    MCHC 31.2 10/26/2020    RDW 14.9 06/25/2020     10/26/2020    MPV 10.8 10/26/2020     BMP:    Lab Results   Component Value Date     10/26/2020    K 4.4 10/26/2020     10/26/2020    CO2 24 10/26/2020    BUN 25 10/26/2020    LABALBU 2.7 10/26/2020    CREATININE 0.7 10/26/2020    CALCIUM 8.7 10/26/2020    LABGLOM 81 10/26/2020    GLUCOSE 128 10/26/2020    GLUCOSE 115 07/06/2018     Uric Acid:  No components found for: URIC  PT/INR:    Lab Results   Component Value Date    PROTIME 22.3 02/05/2019    INR 1.90 10/27/2020     PTT:    Lab Results   Component Value Date    APTT 21.3 08/27/2020   [APTT  Troponin:    Lab Results   Component Value Date    TROPONINI <0.006 10/04/2013     Urine Culture:  No components found for: COLE    Current Inpatient Medications    Current Facility-Administered Medications: aspirin EC tablet 81 mg, 81 mg, Oral, Daily  digoxin (LANOXIN) tablet 125 mcg, 125 mcg, Oral, Every Other Day  [Held by provider] furosemide (LASIX) tablet 40 mg, 40 mg, Oral, Daily  metoprolol succinate (TOPROL XL) extended release tablet 25 mg, 25 mg, Oral, Daily  pantoprazole (PROTONIX) tablet 40 mg, 40 mg, Oral, BID AC  sacubitril-valsartan (ENTRESTO) 49-51 MG per tablet 1 tablet, 1 tablet, Oral, BID  traZODone (DESYREL) tablet 50 mg, 50 mg, Oral, Nightly  piperacillin-tazobactam (ZOSYN) 3.375 g in dextrose 5 % 50 mL IVPB extended infusion (mini-bag), 3.375 g, Intravenous, Q8H  linezolid (ZYVOX) IVPB 600 mg, 600 mg, Intravenous, Q12H  docusate sodium (COLACE) capsule 100 mg, 100 mg, Oral, Daily  senna (SENOKOT) tablet 8.6 mg, 1 tablet, Oral, Nightly  bisacodyl (DULCOLAX) suppository 10 mg, 10 mg, Rectal, Daily PRN  sodium chloride flush 0.9 % injection 10 mL, 10 mL, Intravenous, 2 times per day  sodium chloride flush 0.9 % injection 10 mL, 10 mL, Intravenous, PRN  lidocaine PF 1 % injection 5 mL, 5 mL, Intradermal, Once  sodium chloride flush 0.9 % injection 10 mL, 10 mL, Intravenous, PRN  norepinephrine (LEVOPHED) 16 mg in dextrose 5% 250 mL infusion, 10 mcg/min, Intravenous, Continuous  HYDROmorphone (DILAUDID) injection 0.5 mg, 0.5 mg, Intravenous, Q2H PRN  HYDROcodone-acetaminophen (NORCO) 5-325 MG per tablet 2 tablet, 2 tablet, Oral, Q4H PRN  HYDROcodone-acetaminophen (NORCO) 5-325 MG per tablet 1 tablet, 1 tablet, Oral, Q4H PRN  cyclobenzaprine (FLEXERIL) tablet 10 mg, 10 mg, Oral, TID PRN  albuterol sulfate  (90 Base) MCG/ACT inhaler 2 puff, 2 puff, Inhalation, Q6H PRN  Arformoterol Tartrate (BROVANA) nebulizer solution 15 mcg, 15 mcg, Nebulization, BID  budesonide (PULMICORT) nebulizer suspension 500 mcg, 500 mcg, Nebulization, BID  levalbuterol (XOPENEX) nebulization 0.63 mg, 1 ampule, Nebulization, Q8H PRN  tiotropium (SPIRIVA RESPIMAT) 2.5 MCG/ACT inhaler 2 puff, 2 puff, Inhalation, Daily  acetaminophen (TYLENOL) tablet 650 mg, 650 mg, Oral, Q6H PRN **OR** acetaminophen (TYLENOL) suppository 650 mg, 650 mg, Rectal, Q6H PRN  polyethylene glycol (GLYCOLAX) packet 17 g, 17 g, Oral, Daily PRN  promethazine (PHENERGAN) tablet 12.5 mg, 12.5 mg, Oral, Q6H PRN **OR** ondansetron (ZOFRAN) injection 4 mg, 4 mg, Intravenous, Q6H PRN    .     ASSESSMENT AND PLAN  Continue medical management  PICC per ID  No plan for surgery today, will reassess tomorrow  CRP trending down

## 2020-10-27 NOTE — PROGRESS NOTES
Pharmacy Heparin Consult    Talya Dumont is a 67 y.o. female. Pharmacy has been consulted to adjust heparin.     Height:   Ht Readings from Last 1 Encounters:   10/25/20 5' 2\" (1.575 m)     Weight:  Wt Readings from Last 1 Encounters:   10/25/20 153 lb (69.4 kg)       Heparin Indication: afib  Bolus Permitted: yes  Goal aPTT: 60-95    Plan:  Bolus: 80 units/kg  Rate: 18 units/kg/hr  Next aPTT: 2100 10/27/20    Rashawn Giron RPh  10/27/2020  2:37 PM

## 2020-10-27 NOTE — PROGRESS NOTES
Hospitalist Progress Note      Patient:  Zahra De La Torre    Unit/Bed:7-08/008-A  YOB: 1945  MRN: [id number]   Acct: [de-identified]   PCP: Bret Summers MD  Date of Admission: 10/25/2020    Assessment/Plan:    1. Septic Shock, likely secondary to cellulitis of left lower extremity, resolved  · On admission, hypotension, BPs 79/50 initially. WBC 16.3. Procal 2.66  · CT tib-fib results noted    10/27: Off pressors, transferred out of ICU    2. LLE cellulitis/fasciitis  · CT tib-fib showed no intraischial gas to suggest nec fas, just edema  · ID following. On Zosyn and Zyvox  · Ortho following. No plan for any surgical intervention. CCM    3. Hx of COPD on 2L home O2  · Stable  · Continue home Brovana-Pulmicort and Spiriva    4. Hx of HFrEF  · Stable. BNP 9469, frequent elevations in the past on admission. · Echo 2/20: EF 30-35%  · Continue home entresto. Will restart home lasix if BP stable. Still borderline BP    5. Atrial Fib, chronic  · History of AICD  · S/p permanent pacemaker  · Chronic coumadin usage, INR 2.40. Start IV heparin for now. Transition to coumadin when ready to go home and no intervention planned  · Lanoxin continued    6. Essential hypertension  · Currently hypotensive  · Required levophed, DC'd. · Continue to monitor    7. GERD  · monitor    8. Nicotine abuse  · Current everyday smoker, smoking cessation provided     10. Hx of constipation  · States she takes linzess at home  · Constipated today  · Prn dulcolax and glycerin suppository    11.  Hx of hallucinations with narcotics  · AOx3 today  · No hallucinations  · Continue to monitor      Chief Complaint: Leg pain    Initial H and P:-      Patient has a significant history of current everyday smoker, COPD, chronic ischemic heart disease, systolic heart failure, recurrent atrial fibrillation-Coumadin, remote biventricular pacemaker-AICD, echo-2/2020 LVEF 30 to 35%, essential hypertension, left arm DVT, GERD, recurrent UTIs, anxiety, anemia.     Sharona presented to [facility] ED for an evaluation of severe left lower extremity pain, redness, and warmth that started yesterday evening. The patient states that yesterday morning she started feeling some discomfort to her lower extremity, but believed it was due to her compression stockings she wears for CHF. Yesterday evening she removed the stockings and saw that her left lower leg was extremely red, which has continued to spread, becoming more red, and even more painful. Denies any recent trauma, falls, accidents, or wounds to the lower extremity but states that she occasionally scratches her legs with her nails when taking on and off the compression stockings. The patient has attempted tylenol, percocet, biofreeze, and icyhot yesterday with no relief. Work-up while in the ER includes CT tib-fib was concerning for fasciitis however there was no intrafacial gas to suggest a necrotizing fasciitis. Chong Tran was consulted.  A femoral central line catheter was placed.  Levophed drip was started. Olga Lidia De La Cruz was admitted to the ICU for further evaluation and care.        Subjective (past 24 hours):     Patient continues to feel pain on left lower extremity that is worsened with touch and weightbearing. Denies any fevers or chills. No nausea or vomiting. She was transferred out of ICU to medical floor and has had no issues overnight. Past medical history, family history, social history and allergies reviewed again and is unchanged since admission. ROS (12 point review of systems completed. Pertinent positives noted.  Otherwise ROS is negative)     Medications:  Reviewed    Infusion Medications    norepinephrine Stopped (10/26/20 3937)     Scheduled Medications    aspirin  81 mg Oral Daily    digoxin  125 mcg Oral Every Other Day    [Held by provider] furosemide  40 mg Oral Daily    metoprolol succinate  25 mg Oral Daily    pantoprazole  40 mg Oral BID AC    sacubitril-valsartan  1 tablet Oral BID    traZODone  50 mg Oral Nightly    piperacillin-tazobactam  3.375 g Intravenous Q8H    linezolid  600 mg Intravenous Q12H    docusate sodium  100 mg Oral Daily    senna  1 tablet Oral Nightly    sodium chloride flush  10 mL Intravenous 2 times per day    lidocaine 1 % injection  5 mL Intradermal Once    Arformoterol Tartrate  15 mcg Nebulization BID    budesonide  500 mcg Nebulization BID    tiotropium  2 puff Inhalation Daily     PRN Meds: bisacodyl, sodium chloride flush, sodium chloride flush, HYDROmorphone, HYDROcodone 5 mg - acetaminophen, HYDROcodone 5 mg - acetaminophen, cyclobenzaprine, albuterol sulfate HFA, levalbuterol, acetaminophen **OR** acetaminophen, polyethylene glycol, promethazine **OR** ondansetron      Intake/Output Summary (Last 24 hours) at 10/27/2020 1332  Last data filed at 10/27/2020 1313  Gross per 24 hour   Intake 1864.81 ml   Output --   Net 1864.81 ml       Diet:  DIET GENERAL;    Exam:  BP (!) 132/56   Pulse 75   Temp 97.5 °F (36.4 °C) (Oral)   Resp 18   Ht 5' 2\" (1.575 m)   Wt 153 lb (69.4 kg)   SpO2 96%   BMI 27.98 kg/m²   General appearance: No apparent distress, appears stated age and cooperative. HEENT: Pupils equal, round, and reactive to light. Conjunctivae/corneas clear. Neck: Supple, with full range of motion. No jugular venous distention. Trachea midline. Respiratory:  Normal respiratory effort. Clear to auscultation, bilaterally without Rales/Wheezes/Rhonchi. Cardiovascular: Regular rate and rhythm with normal S1/S2 without murmurs, rubs or gallops. Abdomen: Soft, non-tender, non-distended with normal bowel sounds. Musculoskeletal: passive and active ROM x 4 extremities. Skin: LLE +erythema, +tender, +warm, Complete ROM  Neurologic:  Neurovascularly intact without any focal sensory/motor deficits.  Cranial nerves: II-XII intact, grossly non-focal.  Psychiatric: Alert and oriented, thought content appropriate, normal insight  Capillary Refill: Brisk,< 3 seconds   Peripheral Pulses: +2 palpable, equal bilaterally     Labs:   Recent Labs     10/25/20  1153 10/26/20  0445   WBC 16.3* 11.4*   HGB 13.1 10.9*   HCT 42.0 34.9*    123*     Recent Labs     10/25/20  1153 10/26/20  0445    138   K 4.9 4.4    106   CO2 26 24   BUN 28* 25*   CREATININE 0.8 0.7   CALCIUM 9.1 8.7     Recent Labs     10/26/20  0445   AST 9   ALT 8*   BILIDIR <0.2   BILITOT 0.6   ALKPHOS 63     Recent Labs     10/25/20  1153 10/26/20  0445 10/27/20  0701   INR 2.74* 2.40* 1.90*     Recent Labs     10/25/20  1153   CKTOTAL 34       Microbiology:    Blood culture #1:   Lab Results   Component Value Date    BC No growth-preliminary  10/25/2020       Blood culture #2:No results found for: Rosie Peña    Organism:  Lab Results   Component Value Date    ORG Mixed Growth     02/21/2020         Lab Results   Component Value Date    LABGRAM  11/26/2019     No segmented neutrophils observed. Few epithelial cells observed. Many large gram positive bacilli. Few small gram positive bacilli. Few gram negative bacilli. Prepubescent and postmenopausal female samples (<15and >47ears of age) are not typically suitable for bacterialvaginosis screening. MRSA culture only:No results found for: [facility]    Urine culture:   Lab Results   Component Value Date    LABURIN  10/24/2019     Growth of Contaminants. The mixture of organisms present are not a common cause of urinary tract infections and probably represent distal urethral juventino.         Respiratory culture: No results found for: CULTRESP    Aerobic and Anaerobic :  Lab Results   Component Value Date    LABAERO No growth-preliminary No growth   11/11/2019     Lab Results   Component Value Date    LABANAE No growth-preliminary No growth   11/11/2019       Urinalysis:      Lab Results   Component Value Date    NITRU NEGATIVE 02/22/2020    WBCUA 15-25 02/22/2020    BACTERIA MANY 02/22/2020 RBCUA 0-2 02/22/2020    BLOODU TRACE 02/22/2020    GLUCOSEU NEGATIVE 02/22/2020       Radiology:  VL DUP LOWER EXTREMITY VENOUS LEFT   Final Result   Normal venous ultrasound. No evidence for acute deep venous thrombosis. **This report has been created using voice recognition software. It may contain minor errors which are inherent in voice recognition technology. **      Final report electronically signed by Dr. Kian Gutierrez on 10/26/2020 1:02 PM      XR FOOT LEFT (MIN 3 VIEWS)   Final Result      Moderate swelling without a gross fracture. **This report has been created using voice recognition software. It may contain minor errors which are inherent in voice recognition technology. **      Final report electronically signed by Dr. Sada Rowe on 10/26/2020 12:34 PM      XR ANKLE LEFT (MIN 3 VIEWS)   Final Result   Diffuse soft tissue swelling of the ankle. No acute fracture seen. **This report has been created using voice recognition software. It may contain minor errors which are inherent in voice recognition technology. **      Final report electronically signed by Dr. Kian Gutierrez on 10/26/2020 12:41 PM      XR CHEST PORTABLE   Final Result   1. Mild cardiomegaly. Permanent pacemaker/defibrillator. 2. Old fracture mid left humeral shaft, incompletely imaged on this study. 3. No central venous line is seen on this particular study. 4. No acute findings. No infiltrates or effusions are seen. **This report has been created using voice recognition software. It may contain minor errors which are inherent in voice recognition technology. **      Final report electronically signed by Dr. Kian Gutierrez on 10/25/2020 3:43 PM      CT TIBIA FIBULA LEFT WO CONTRAST   Final Result   1.  There is indistinctness of the fascial along the lateral and posterolateral aspects of the left lower leg with indistinctness of the musculature within the lateral and superficial/deep posterior compartments of the left lower leg. Intrafascial edema    cannot be excluded. There is no intra-tricompartmental gas. There is indistinctness of the peroneal musculature and the lateral aspect of the posterior compartment including the soleus and lateral head of the gastrocnemius muscles. Findings can be    associated with a fasciitis however there is no intrafascial gas to suggest a necrotizing fasciitis. Correlation with clinical findings recommended to exclude a compartment syndrome as this is a clinical diagnosis. The noncontrast CT appearance of the    musculature of the left lower leg is otherwise unremarkable. There is extensive skin and subcutaneous edema throughout the left lower leg which in the right clinical setting can be associated with a cellulitis. There is also extensive skin and    subcutaneous edema throughout the right lower leg which was included within the field of imaging on the coronal images. There is no subcutaneous gas. In addition, there is edema within Kager's fat. Clinical management is recommended. 2. There is no osseous abnormality. **This report has been created using voice recognition software. It may contain minor errors which are inherent in voice recognition technology. **      Final report electronically signed by Dr. Mamadou Sparks on 10/25/2020 12:40 PM        Xr Ankle Left (min 3 Views)    Result Date: 10/26/2020  PROCEDURE:XR ANKLE LEFT (MIN 3 VIEWS) CLINICAL INFORMATION: swelling pain TECHNIQUE: 3 standard views of the left ankle were obtained. FINDINGS:  Moderate soft tissue swelling overlies ankle diffusely. No acute fracture or dislocation is seen. The ankle mortise is intact. There is a large plantar calcaneal spur. Diffuse soft tissue swelling of the ankle. No acute fracture seen. **This report has been created using voice recognition software. It may contain minor errors which are inherent in voice recognition technology. ** Final report electronically signed by Dr. Lio Espinal on 10/26/2020 12:41 PM    Xr Foot Left (min 3 Views)    Result Date: 10/26/2020  PROCEDURE: XR FOOT LEFT (MIN 3 VIEWS) CLINICAL INFORMATION: swelling. COMPARISON: No prior study. TECHNIQUE: 4 views of the left foot. FINDINGS: Moderate dorsal soft tissue swelling. Films are overpenetrated. No definite fracture. Alignment appears intact. Mild vascular calcification is seen. Moderate swelling without a gross fracture. **This report has been created using voice recognition software. It may contain minor errors which are inherent in voice recognition technology. ** Final report electronically signed by Dr. Juno Cormier on 10/26/2020 12:34 PM    Xr Chest Portable    Result Date: 10/25/2020  PROCEDURE: XR CHEST PORTABLE CLINICAL INFORMATION: CVC placement COMPARISON: 2/22/2020 TECHNIQUE: A single mobile view of the chest was obtained. 1. Mild cardiomegaly. Permanent pacemaker/defibrillator. 2. Old fracture mid left humeral shaft, incompletely imaged on this study. 3. No central venous line is seen on this particular study. 4. No acute findings. No infiltrates or effusions are seen. **This report has been created using voice recognition software. It may contain minor errors which are inherent in voice recognition technology. ** Final report electronically signed by Dr. Lio Espinal on 10/25/2020 3:43 PM    Ct Tibia Fibula Left Wo Contrast    Result Date: 10/25/2020  PROCEDURE: CT TIBIA FIBULA LEFT WO CONTRAST CLINICAL INFORMATION: Left lower leg pain, redness and swelling for one day; assess for necrotizing fasciitis. COMPARISON: No prior study. TECHNIQUE: 0.9 mm axial imaging through the tibia/fibula without contrast. All CT scans at this facility use dose modulation, iterative reconstruction, and/or weight based dosing when appropriate to reduce the radiation dose to as low as reasonably achievable. FINDINGS: ALIGNMENT: Anatomic.  MINERALIZATION: Normal. FRACTURE/OSSEOUS DESTRUCTION/PERIOSTITIS: None. KNEE: Unremarkable. ANKLE: Unremarkable. MUSCULATURE: 1. There is indistinctness of the fascial along the lateral and posterolateral aspects of the left lower leg with indistinctness of the musculature within the lateral and superficial/deep posterior compartments of the left lower leg. Intrafascial edema cannot be excluded. There is no intra-tricompartmental gas. There is indistinctness of the peroneal musculature and the lateral aspect of the posterior compartment including the soleus and lateral head of the gastrocnemius muscles. Findings can be associated with a fasciitis however there is no intrafascial gas to suggest a necrotizing fasciitis. Correlation with clinical findings recommended to exclude a compartment syndrome as this is a clinical diagnosis. The noncontrast CT appearance of the musculature of the left lower leg is otherwise unremarkable. There is extensive skin and subcutaneous edema throughout the left lower leg which in the right clinical setting can be associated with a cellulitis. There is also extensive skin and subcutaneous edema throughout the right lower leg which was included within the field of imaging on the coronal images. There is no subcutaneous gas. In addition, there is edema within Kager's fat. NEUROVASCULAR BUNDLE: 1. Atheromatous calcification. OTHER: None. 1. There is indistinctness of the fascial along the lateral and posterolateral aspects of the left lower leg with indistinctness of the musculature within the lateral and superficial/deep posterior compartments of the left lower leg. Intrafascial edema cannot be excluded. There is no intra-tricompartmental gas. There is indistinctness of the peroneal musculature and the lateral aspect of the posterior compartment including the soleus and lateral head of the gastrocnemius muscles.  Findings can be associated with a fasciitis however there is no intrafascial gas to suggest a necrotizing fasciitis. Correlation with clinical findings recommended to exclude a compartment syndrome as this is a clinical diagnosis. The noncontrast CT appearance of the musculature of the left lower leg is otherwise unremarkable. There is extensive skin and subcutaneous edema throughout the left lower leg which in the right clinical setting can be associated with a cellulitis. There is also extensive skin and subcutaneous edema throughout the right lower leg which was included within the field of imaging on the coronal images. There is no subcutaneous gas. In addition, there is edema within Kager's fat. Clinical management is recommended. 2. There is no osseous abnormality. **This report has been created using voice recognition software. It may contain minor errors which are inherent in voice recognition technology. ** Final report electronically signed by Dr. Branden Miles on 10/25/2020 12:40 PM    Vl Dup Lower Extremity Venous Left    Result Date: 10/26/2020  PROCEDURE: VL DUP LOWER EXTREMITY VENOUS LEFT CLINICAL INFORMATION: pain, COMPARISON: No prior study. TECHNIQUE: Multiple grayscale and color flow images of the major veins of the left lower extremity were obtained from the level of the groin to the level of the ankle. Multiple compression and augmentation maneuvers were performed. A few images of the contralateral common femoral vein were also obtained. FINDINGS:   All the  deep veins  are widely patent with normal flow and normal compressibility. .   The contralateral common femoral vein is unremarkable. Normal venous ultrasound. No evidence for acute deep venous thrombosis. **This report has been created using voice recognition software. It may contain minor errors which are inherent in voice recognition technology. ** Final report electronically signed by Dr. Prosper Washington on 10/26/2020 1:02 PM      Electronically signed by Shamir Nevarez MD on 10/27/2020 at 1:32 PM

## 2020-10-27 NOTE — PROGRESS NOTES
Progress note: Infectious diseases    Patient - Pako Dee,  Age - 67 y.o.    - 1945      Room Number - 7K-08/008-A   MRN -  [id number]   Acct # - [de-identified]  Date of Admission -  10/25/2020 10:58 AM    SUBJECTIVE:   No fever, crp improving. OBJECTIVE   VITALS    height is 5' 2\" (1.575 m) and weight is 153 lb (69.4 kg). Her oral temperature is 97.7 °F (36.5 °C). Her blood pressure is 94/45 (abnormal) and her pulse is 70. Her respiration is 18 and oxygen saturation is 95%. Wt Readings from Last 3 Encounters:   10/25/20 153 lb (69.4 kg)   20 140 lb (63.5 kg)   20 144 lb 12.8 oz (65.7 kg)       I/O (24 Hours)    Intake/Output Summary (Last 24 hours) at 10/27/2020 1140  Last data filed at 10/27/2020 0900  Gross per 24 hour   Intake 1834.81 ml   Output --   Net 1834.81 ml       General Appearance  Awake, alert, oriented,  Dry oral mucosa  HEENT - normocephalic, atraumatic, pink conjunctiva,  anicteric sclera  Neck - Supple, no mass  Lungs -  Bilateral good air entry, no rhonchi, no wheeze  Cardiovascular - Heart sounds are normal.   Abdomen - soft, not distended, nontender,   Neurologic -oriented. Skin - the left lower leg is less red, still swollen and warm. It appears to be better than yesterday  Extremities - + edema, no cyanosis, clubbing.     MEDICATIONS:      aspirin  81 mg Oral Daily    digoxin  125 mcg Oral Every Other Day    [Held by provider] furosemide  40 mg Oral Daily    metoprolol succinate  25 mg Oral Daily    pantoprazole  40 mg Oral BID AC    sacubitril-valsartan  1 tablet Oral BID    traZODone  50 mg Oral Nightly    piperacillin-tazobactam  3.375 g Intravenous Q8H    linezolid  600 mg Intravenous Q12H    docusate sodium  100 mg Oral Daily    senna  1 tablet Oral Nightly    sodium chloride flush  10 mL Intravenous 2 times per day    lidocaine 1 % injection  5 mL Intradermal Once    Arformoterol Tartrate  15 mcg Nebulization BID    budesonide  500 mcg Nebulization BID    tiotropium  2 puff Inhalation Daily      norepinephrine Stopped (10/26/20 0447)     bisacodyl, sodium chloride flush, sodium chloride flush, HYDROmorphone, HYDROcodone 5 mg - acetaminophen, HYDROcodone 5 mg - acetaminophen, cyclobenzaprine, albuterol sulfate HFA, levalbuterol, acetaminophen **OR** acetaminophen, polyethylene glycol, promethazine **OR** ondansetron      LABS:     CBC:   Recent Labs     10/25/20  1153 10/26/20  0445   WBC 16.3* 11.4*   HGB 13.1 10.9*    123*     BMP:    Recent Labs     10/25/20  1153 10/26/20  0445    138   K 4.9 4.4    106   CO2 26 24   BUN 28* 25*   CREATININE 0.8 0.7   GLUCOSE 96 128*     Calcium:  Recent Labs     10/26/20  0445   CALCIUM 8.7   INR:   Recent Labs     10/25/20  1153 10/26/20  0445 10/27/20  0701   INR 2.74* 2.40* 1.90*     Hepatic:   Recent Labs     10/26/20  0445   ALKPHOS 63   ALT 8*   AST 9   PROT 5.3*   BILITOT 0.6   BILIDIR <0.2   LABALBU 2.7*     Amylase and Lipase:  Recent Labs     10/25/20  1153   LACTA 2.2     Lactic Acid:   Recent Labs     10/25/20  1153   LACTA 2.2     Troponin:   Recent Labs     10/25/20  1153   CKTOTAL 34        Problem list of patient:     Patient Active Problem List   Diagnosis Code    MI (myocardial infarction) ([facility] [address] [facility] I21.9    [facility] J44.9    Hyperlipidemia E78.5    Hypertension I10    Seasonal allergic rhinitis J30.2    Heartburn R12    Persistent atrial fibrillation (HCC) P74.73    Systolic CHF, acute on chronic ([facility]) I50.23    Biventricular implantable cardioverter-defibrillator in situ Z95.810    Anticoagulated on Coumadin Z79.01    Arteriosclerosis of coronary artery I25.10    Left displaced femoral neck fracture ([facility]) S72.002A    Closed head injury S09.90XA    CKD (chronic kidney disease) stage 2, GFR 60-89 ml/min N18.2    S/p left hip fracture

## 2020-10-28 NOTE — PLAN OF CARE
Problem: Fluid Volume - Imbalance:  Goal: Absence of imbalanced fluid volume signs and symptoms  Description: Absence of imbalanced fluid volume signs and symptoms  Outcome: Ongoing  Note: Patient has maintained a blood pressure within adequate limits throughout this shift. Will continue to monitor. Problem: Gas Exchange - Impaired:  Goal: Levels of oxygenation will improve  Description: Levels of oxygenation will improve  Outcome: Ongoing  Note: Patient has maintained O2 levels above 90% throughout this shift. Will continue to monitor. Problem: Pain:  Goal: Pain level will decrease  Description: Pain level will decrease  Outcome: Ongoing  Note: Patients pain level has decreased when repositioned. Will continue to monitor. Problem: Skin Integrity - Impaired:  Goal: Will show no infection signs and symptoms  Description: Will show no infection signs and symptoms  Outcome: Ongoing  Note: Patient has maintained afebrile throughout this shift. Will continue to monitor. Problem: Falls - Risk of:  Goal: Will remain free from falls  Description: Will remain free from falls  Outcome: Ongoing  Note: Patient has maintained free of falls throughout this shift. Will continue to monitor. Problem: Falls - Risk of:  Goal: Absence of physical injury  Description: Absence of physical injury  Outcome: Ongoing  Note: Patient has remained free of physical injury throughout this shift. Will continue to monitor. Problem: Pain:  Goal: Pain level will decrease  Description: Pain level will decrease  Outcome: Ongoing  Note: Patients pain level has decreased when repositioned. Will continue to monitor. Problem: Pain:  Goal: Control of acute pain  Description: Control of acute pain  Outcome: Ongoing  Note: Patient reports her pain is under control when repositioned to where nothing touches the effected leg, and medicated per MAR orders. Will continue to monitor.       Problem: Pain:  Goal: Control of

## 2020-10-28 NOTE — PROGRESS NOTES
Heparin Consult  Lab Results   Component Value Date    APTT 65.5 10/28/2020     Lab Results   Component Value Date    HGB 9.6 10/27/2020    HCT 31.1 10/27/2020    PLT 85 10/27/2020    INR 1.90 10/27/2020       Current Rate: 18 units/kg/hr    Plan:  Continue heparin drip at current rate of 18 units/kg/hr  Next aPTT: today at 0700    Monica  10/28/2020   3:48 AM

## 2020-10-28 NOTE — PROGRESS NOTES
Hospitalist Progress Note      Patient:  Tessy Colmenares    Unit/Bed:7K-08/008-A  YOB: 1945  MRN: [id number]   Acct: [de-identified]   PCP: Susie Mix MD  Date of Admission: 10/25/2020    Assessment and Plan:        1. Septic Shock, likely secondary to cellulitis of left lower extremity, resolved  · On admission, hypotension, BPs 79/50 initially.  WBC 16.3.  Procal 2.66  · CT tib-fib results noted     10/27: Off pressors, transferred out of ICU   2. LLE cellulitis/fasciitis  · CT tib-fib showed no intraischial gas to suggest nec fas, just edema  · ID following. On Zosyn and Zyvox  · Ortho following. No plan for any surgical intervention. CCM   · 10/28/2020: Dr. Kevin Peoples managing cellulitis. He mentioned it may discharge in 1 or 2 days if she is able to walk  3. Hx of COPD on 2L home O2  · Stable  · Continue home Brovana-Pulmicort and Spiriva     4. Hx of HFrEF  · Stable.  BNP 9469, frequent elevations in the past on admission. · Echo 2/20: EF 30-35%  · Continue home entresto. Will restart home lasix if BP stable. Still borderline BP   10/28/2020: Currently on 2 L nasal cannula oxygen satting 96% which is her baseline. Monitor closely. 5. Atrial Fib, chronic  · History of AICD  · S/p permanent pacemaker  · Chronic coumadin usage, INR 2.40. Start IV heparin for now. Transition to coumadin when ready to go home and no intervention planned  · Lanoxin continued    6. INR 1.3 still subtherapeutic, pharmacy to dose Coumadin continue. 7. Essential hypertension  · Currently hypotensive  · Required levophed, DC'd. · Continue to monitor     7. GERD  · monitor     8. Nicotine abuse  · Current everyday smoker, smoking cessation provided      10. Hx of constipation  · States she takes linzess at home  · Constipated today  · Prn dulcolax and glycerin suppository     11.  Hx of hallucinations with narcotics  · AOx3 today  · No hallucinations  · Continue to monitor    PMH, PSH, SH, FHX reviewed in chart review snap shot in EPIC    CC: Left lower extremity cellulitis, subtherapeutic INR    HPI: Initial admission HPI by admitting physician reviewed as below:  Patient has a significant history of current everyday smoker, COPD, chronic ischemic heart disease, systolic heart failure, recurrent atrial fibrillation-Coumadin, remote biventricular pacemaker-AICD, echo-2/2020 LVEF 30 to 35%, essential hypertension, left arm DVT, GERD, recurrent UTIs, anxiety, anemia.     Sharona presented to [facility] ED for an evaluation of severe left lower extremity pain, redness, and warmth that started yesterday evening. The patient states that yesterday morning she started feeling some discomfort to her lower extremity, but believed it was due to her compression stockings she wears for CHF. Yesterday evening she removed the stockings and saw that her left lower leg was extremely red, which has continued to spread, becoming more red, and even more painful. Denies any recent trauma, falls, accidents, or wounds to the lower extremity but states that she occasionally scratches her legs with her nails when taking on and off the compression stockings. The patient has attempted tylenol, percocet, biofreeze, and icyhot yesterday with no relief. Work-up while in the ER includes CT tib-fib was concerning for fasciitis however there was no intrafacial gas to suggest a necrotizing fasciitis. Charmayne Brave was consulted.  A femoral central line catheter was placed.  Levophed drip was started. [facility] was admitted to the ICU for further evaluation and care.   For further details and updates please refer to assessment and plan at the beginning of the note. ROS (10 point review of systems completed. Pertinent positives noted.  Otherwise ROS is negative) : Left lower extremity pain  PMH:  Per HPI and reviewed in the chart  SHX: Reviewed in the chart, also the patient  FHX: Reviewed in the chart, also with the patient  Allergies: Reviewed in the chart  Medications:     heparin (PORCINE) Infusion 19 Units/kg/hr (10/28/20 0800)      aspirin  81 mg Oral Daily    digoxin  125 mcg Oral Every Other Day    [Held by provider] furosemide  40 mg Oral Daily    metoprolol succinate  25 mg Oral Daily    pantoprazole  40 mg Oral BID AC    sacubitril-valsartan  1 tablet Oral BID    traZODone  50 mg Oral Nightly    piperacillin-tazobactam  3.375 g Intravenous Q8H    linezolid  600 mg Intravenous Q12H    docusate sodium  100 mg Oral Daily    senna  1 tablet Oral Nightly    sodium chloride flush  10 mL Intravenous 2 times per day    lidocaine 1 % injection  5 mL Intradermal Once    Arformoterol Tartrate  15 mcg Nebulization BID    budesonide  500 mcg Nebulization BID    tiotropium  2 puff Inhalation Daily   Subjective 10/28/2020: Dr. Dickey Pace up on cellulitis and managing antibiotics, slight left lower extremity pain, on 2 L nasal cannula oxygen which is her baseline because of COPD, INR subtherapeutic 1.3 today. Continue heparin drip with Coumadin to dose by pharmacy. Monitor closely, nursing notes, labs, vitals reviewed. Vital Signs:   Vitals reviewed and compared with the previous ones  BP (!) 117/56   Pulse 72   Temp 98.1 °F (36.7 °C) (Oral)   Resp 20   Ht 5' 2\" (1.575 m)   Wt 153 lb (69.4 kg)   SpO2 96%   BMI 27.98 kg/m²      Intake/Output Summary (Last 24 hours) at 10/28/2020 0908  Last data filed at 10/28/2020 0554  Gross per 24 hour   Intake 5091.73 ml   Output 0 ml   Net 5091.73 ml        General:   Age-appropriate, in no acute distress  HEENT:  normocephalic and atraumatic. No scleral icterus. PERRLA. Neck: supple. No thyromegaly. Lungs: clear to auscultation. No abnormal breathing sounds appreciated. Cardiac: S1, S2, RRR without murmur. No JVD. Abdomen: soft. Nontender. Bowel sounds positive. Extremities:  No clubbing, cyanosis, slight tenderness and warmness of the left lower extremity. Vasculature: capillary refill < 3 seconds.  Pedal pulses intact bilaterally. Skin: Left lower extremity erythema and slight tenderness and slight warmness. Psych:  Alert to time, person and place. Communicable. Affect appropriate  Lymph:  No supraclavicular ,and no anterior cervical lymphadenopathy. Neurologic:  No focal deficit. CN II-XII grossly intact. Motor and Sensory capacity intact in all extremities. Labs:   Recent Labs     10/26/20  0445 10/27/20  1446 10/28/20  0635   WBC 11.4* 7.4 6.9   HGB 10.9* 9.6* 10.0*   HCT 34.9* 31.1* 31.9*   * 85* 96*     Recent Labs     10/25/20  1153 10/26/20  0445 10/28/20  0635    138 139   K 4.9 4.4 4.7    106 106   CO2 26 24 20*   BUN 28* 25* 21   CREATININE 0.8 0.7 0.7   CALCIUM 9.1 8.7 9.2     Recent Labs     10/26/20  0445   AST 9   ALT 8*   BILIDIR <0.2   BILITOT 0.6   ALKPHOS 63     Recent Labs     10/26/20  0445 10/27/20  0701 10/28/20  0635   INR 2.40* 1.90* 1.32*     Recent Labs     10/25/20  1153   CKTOTAL 34       Microbiology:    Blood culture #1:   Lab Results   Component Value Date    BC No growth-preliminary  10/25/2020       Blood culture #2:No results found for: [facility]    Organism:  Lab Results   Component Value Date    ORG Mixed Growth     02/21/2020         Lab Results   Component Value Date    LABGRAM  11/26/2019     No segmented neutrophils observed. Few epithelial cells observed. Many large gram positive bacilli. Few small gram positive bacilli. Few gram negative bacilli. Prepubescent and postmenopausal female samples (<15and >47ears of age) are not typically suitable for bacterialvaginosis screening. MRSA culture only:No results found for: [facility]    Urine culture:   Lab Results   Component Value Date    LABURIN  10/24/2019     Growth of Contaminants. The mixture of organisms present are not a common cause of urinary tract infections and probably represent distal urethral juventino.         Respiratory culture: No results found for: CULTRESP    Aerobic and Anaerobic :  Lab Results   Component Value Date    LABAERO No growth-preliminary No growth   11/11/2019     Lab Results   Component Value Date    LABANAE No growth-preliminary No growth   11/11/2019       Urinalysis:      Lab Results   Component Value Date    NITRU NEGATIVE 02/22/2020    WBCUA 15-25 02/22/2020    BACTERIA MANY 02/22/2020    RBCUA 0-2 02/22/2020    BLOODU TRACE 02/22/2020    GLUCOSEU NEGATIVE 02/22/2020       Radiology:  VL DUP LOWER EXTREMITY VENOUS LEFT   Final Result   Normal venous ultrasound. No evidence for acute deep venous thrombosis. **This report has been created using voice recognition software. It may contain minor errors which are inherent in voice recognition technology. **      Final report electronically signed by Dr. Huan Melendez on 10/26/2020 1:02 PM      XR FOOT LEFT (MIN 3 VIEWS)   Final Result      Moderate swelling without a gross fracture. **This report has been created using voice recognition software. It may contain minor errors which are inherent in voice recognition technology. **      Final report electronically signed by Dr. Slick Schmidt on 10/26/2020 12:34 PM      XR ANKLE LEFT (MIN 3 VIEWS)   Final Result   Diffuse soft tissue swelling of the ankle. No acute fracture seen. **This report has been created using voice recognition software. It may contain minor errors which are inherent in voice recognition technology. **      Final report electronically signed by Dr. Huan Melendez on 10/26/2020 12:41 PM      XR CHEST PORTABLE   Final Result   1. Mild cardiomegaly. Permanent pacemaker/defibrillator. 2. Old fracture mid left humeral shaft, incompletely imaged on this study. 3. No central venous line is seen on this particular study. 4. No acute findings. No infiltrates or effusions are seen. **This report has been created using voice recognition software.   It may contain minor errors which are inherent in voice recognition technology. **      Final report electronically signed by Dr. Tony Gutiérrez on 10/25/2020 3:43 PM      CT TIBIA FIBULA LEFT WO CONTRAST   Final Result   1. There is indistinctness of the fascial along the lateral and posterolateral aspects of the left lower leg with indistinctness of the musculature within the lateral and superficial/deep posterior compartments of the left lower leg. Intrafascial edema    cannot be excluded. There is no intra-tricompartmental gas. There is indistinctness of the peroneal musculature and the lateral aspect of the posterior compartment including the soleus and lateral head of the gastrocnemius muscles. Findings can be    associated with a fasciitis however there is no intrafascial gas to suggest a necrotizing fasciitis. Correlation with clinical findings recommended to exclude a compartment syndrome as this is a clinical diagnosis. The noncontrast CT appearance of the    musculature of the left lower leg is otherwise unremarkable. There is extensive skin and subcutaneous edema throughout the left lower leg which in the right clinical setting can be associated with a cellulitis. There is also extensive skin and    subcutaneous edema throughout the right lower leg which was included within the field of imaging on the coronal images. There is no subcutaneous gas. In addition, there is edema within Kager's fat. Clinical management is recommended. 2. There is no osseous abnormality. **This report has been created using voice recognition software. It may contain minor errors which are inherent in voice recognition technology. **      Final report electronically signed by Dr. Izaiah Curtis on 10/25/2020 12:40 PM        Xr Ankle Left (min 3 Views)    Result Date: 10/26/2020  PROCEDURE:XR ANKLE LEFT (MIN 3 VIEWS) CLINICAL INFORMATION: swelling pain TECHNIQUE: 3 standard views of the left ankle were obtained. FINDINGS:  Moderate soft tissue swelling overlies ankle diffusely.  No acute fracture or dislocation is seen. The ankle mortise is intact. There is a large plantar calcaneal spur. Diffuse soft tissue swelling of the ankle. No acute fracture seen. **This report has been created using voice recognition software. It may contain minor errors which are inherent in voice recognition technology. ** Final report electronically signed by Dr. Austen Moseley on 10/26/2020 12:41 PM    Xr Foot Left (min 3 Views)    Result Date: 10/26/2020  PROCEDURE: XR FOOT LEFT (MIN 3 VIEWS) CLINICAL INFORMATION: swelling. COMPARISON: No prior study. TECHNIQUE: 4 views of the left foot. FINDINGS: Moderate dorsal soft tissue swelling. Films are overpenetrated. No definite fracture. Alignment appears intact. Mild vascular calcification is seen. Moderate swelling without a gross fracture. **This report has been created using voice recognition software. It may contain minor errors which are inherent in voice recognition technology. ** Final report electronically signed by Dr. Natalya Car on 10/26/2020 12:34 PM    Xr Chest Portable    Result Date: 10/25/2020  PROCEDURE: XR CHEST PORTABLE CLINICAL INFORMATION: CVC placement COMPARISON: 2/22/2020 TECHNIQUE: A single mobile view of the chest was obtained. 1. Mild cardiomegaly. Permanent pacemaker/defibrillator. 2. Old fracture mid left humeral shaft, incompletely imaged on this study. 3. No central venous line is seen on this particular study. 4. No acute findings. No infiltrates or effusions are seen. **This report has been created using voice recognition software. It may contain minor errors which are inherent in voice recognition technology. ** Final report electronically signed by Dr. Boyce Both on 10/25/2020 3:43 PM    Ct Tibia Fibula Left Wo Contrast    Result Date: 10/25/2020  PROCEDURE: CT TIBIA FIBULA LEFT WO CONTRAST CLINICAL INFORMATION: Left lower leg pain, redness and swelling for one day; assess for necrotizing fasciitis.  COMPARISON: No prior study. TECHNIQUE: 0.9 mm axial imaging through the tibia/fibula without contrast. All CT scans at this facility use dose modulation, iterative reconstruction, and/or weight based dosing when appropriate to reduce the radiation dose to as low as reasonably achievable. FINDINGS: ALIGNMENT: Anatomic. MINERALIZATION: Normal. FRACTURE/OSSEOUS DESTRUCTION/PERIOSTITIS: None. KNEE: Unremarkable. ANKLE: Unremarkable. MUSCULATURE: 1. There is indistinctness of the fascial along the lateral and posterolateral aspects of the left lower leg with indistinctness of the musculature within the lateral and superficial/deep posterior compartments of the left lower leg. Intrafascial edema cannot be excluded. There is no intra-tricompartmental gas. There is indistinctness of the peroneal musculature and the lateral aspect of the posterior compartment including the soleus and lateral head of the gastrocnemius muscles. Findings can be associated with a fasciitis however there is no intrafascial gas to suggest a necrotizing fasciitis. Correlation with clinical findings recommended to exclude a compartment syndrome as this is a clinical diagnosis. The noncontrast CT appearance of the musculature of the left lower leg is otherwise unremarkable. There is extensive skin and subcutaneous edema throughout the left lower leg which in the right clinical setting can be associated with a cellulitis. There is also extensive skin and subcutaneous edema throughout the right lower leg which was included within the field of imaging on the coronal images. There is no subcutaneous gas. In addition, there is edema within Kager's fat. NEUROVASCULAR BUNDLE: 1. Atheromatous calcification. OTHER: None. 1. There is indistinctness of the fascial along the lateral and posterolateral aspects of the left lower leg with indistinctness of the musculature within the lateral and superficial/deep posterior compartments of the left lower leg.  Intrafascial edema cannot be excluded. There is no intra-tricompartmental gas. There is indistinctness of the peroneal musculature and the lateral aspect of the posterior compartment including the soleus and lateral head of the gastrocnemius muscles. Findings can be associated with a fasciitis however there is no intrafascial gas to suggest a necrotizing fasciitis. Correlation with clinical findings recommended to exclude a compartment syndrome as this is a clinical diagnosis. The noncontrast CT appearance of the musculature of the left lower leg is otherwise unremarkable. There is extensive skin and subcutaneous edema throughout the left lower leg which in the right clinical setting can be associated with a cellulitis. There is also extensive skin and subcutaneous edema throughout the right lower leg which was included within the field of imaging on the coronal images. There is no subcutaneous gas. In addition, there is edema within Kager's fat. Clinical management is recommended. 2. There is no osseous abnormality. **This report has been created using voice recognition software. It may contain minor errors which are inherent in voice recognition technology. ** Final report electronically signed by Dr. Lynn Truong on 10/25/2020 12:40 PM    Vl Dup Lower Extremity Venous Left    Result Date: 10/26/2020  PROCEDURE: VL DUP LOWER EXTREMITY VENOUS LEFT CLINICAL INFORMATION: pain, COMPARISON: No prior study. TECHNIQUE: Multiple grayscale and color flow images of the major veins of the left lower extremity were obtained from the level of the groin to the level of the ankle. Multiple compression and augmentation maneuvers were performed. A few images of the contralateral common femoral vein were also obtained. FINDINGS:   All the  deep veins  are widely patent with normal flow and normal compressibility. .   The contralateral common femoral vein is unremarkable. Normal venous ultrasound. No evidence for acute deep venous thrombosis.  **This report has been created using voice recognition software. It may contain minor errors which are inherent in voice recognition technology. ** Final report electronically signed by Dr. Rosa Collier on 10/26/2020 1:02 PM      Discussed plan with patient and nurse. Patient and nurse verbalized understanding and agree. All questions addressed with concerns. Please excuse my TYPOS!     Electronically signed by Cain Coleman MD on 10/28/2020 at 9:08 AM

## 2020-10-28 NOTE — PROGRESS NOTES
Progress note: Infectious diseases    Patient - Mary Ellen Valladares,  Age - 67 y.o.    - 1945      Room Number - 7K-08/008-A   MRN -  [id number]   Acct # - [de-identified]  Date of Admission -  10/25/2020 10:58 AM    SUBJECTIVE:   No new issues  The pain is better. OBJECTIVE   VITALS    height is 5' 2\" (1.575 m) and weight is 153 lb (69.4 kg). Her oral temperature is 98.1 °F (36.7 °C). Her blood pressure is 117/56 (abnormal) and her pulse is 72. Her respiration is 20 and oxygen saturation is 96%. Wt Readings from Last 3 Encounters:   10/25/20 153 lb (69.4 kg)   20 140 lb (63.5 kg)   20 144 lb 12.8 oz (65.7 kg)       I/O (24 Hours)    Intake/Output Summary (Last 24 hours) at 10/28/2020 1417  Last data filed at 10/28/2020 1320  Gross per 24 hour   Intake 5719.73 ml   Output 0 ml   Net 5719.73 ml       General Appearance  Awake, alert, oriented,  Dry oral mucosa  HEENT - normocephalic, atraumatic, pink conjunctiva,  anicteric sclera  Neck - Supple, no mass  Lungs -  Bilateral   air entry, no rhonchi, no wheeze  Cardiovascular - Heart sounds are normal.   Abdomen - soft, not distended, nontender,   Neurologic -oriented. The redness is receeding the redness is improving.     MEDICATIONS:      gabapentin  100 mg Oral TID    aspirin  81 mg Oral Daily    digoxin  125 mcg Oral Every Other Day    [Held by provider] furosemide  40 mg Oral Daily    metoprolol succinate  25 mg Oral Daily    pantoprazole  40 mg Oral BID AC    sacubitril-valsartan  1 tablet Oral BID    traZODone  50 mg Oral Nightly    piperacillin-tazobactam  3.375 g Intravenous Q8H    linezolid  600 mg Intravenous Q12H    docusate sodium  100 mg Oral Daily    senna  1 tablet Oral Nightly    sodium chloride flush  10 mL Intravenous 2 times per day    lidocaine 1 % injection  5 mL Intradermal Once    Arformoterol Tartrate  15 mcg Nebulization BID    budesonide  500 mcg Nebulization BID    tiotropium  2 puff Inhalation Daily      heparin (PORCINE) Infusion 19 Units/kg/hr (10/28/20 1239)     bisacodyl, sodium chloride flush, sodium chloride flush, HYDROmorphone, HYDROcodone 5 mg - acetaminophen, HYDROcodone 5 mg - acetaminophen, cyclobenzaprine, albuterol sulfate HFA, levalbuterol, acetaminophen **OR** acetaminophen, polyethylene glycol, promethazine **OR** ondansetron      LABS:     CBC:   Recent Labs     10/26/20  0445 10/27/20  1446 10/28/20  0635   WBC 11.4* 7.4 6.9   HGB 10.9* 9.6* 10.0*   * 85* 96*     BMP:    Recent Labs     10/26/20  0445 10/28/20  0635    139   K 4.4 4.7    106   CO2 24 20*   BUN 25* 21   CREATININE 0.7 0.7   GLUCOSE 128* 149*     Calcium:  Recent Labs     10/28/20  0635   CALCIUM 9.2   INR:   Recent Labs     10/26/20  0445 10/27/20  0701 10/28/20  0635   INR 2.40* 1.90* 1.32*     Hepatic:   Recent Labs     10/26/20  0445   ALKPHOS 63   ALT 8*   AST 9   PROT 5.3*   BILITOT 0.6   BILIDIR <0.2   LABALBU 2.7*        Problem list of patient:     Patient Active Problem List   Diagnosis Code    MI (myocardial infarction) ([facility] [address] I21.9    Chronic obstructive pulmonary disease ([facility]) J44.9    Hyperlipidemia E78.5    Hypertension I10    Seasonal allergic rhinitis J30.2    Heartburn R12    Persistent atrial fibrillation ([facility]) Y86.59    Systolic CHF, acute on chronic ([facility]) I50.23    Biventricular implantable cardioverter-defibrillator in situ Z95.810    Anticoagulated on Coumadin Z79.01    Arteriosclerosis of coronary artery I25.10    Left displaced femoral neck fracture ([facility]) S72.002A    Closed head injury S09.90XA    CKD (chronic kidney disease) stage 2, GFR 60-89 ml/min N18.2    S/p left hip fracture Z87.81    Chronic systolic CHF (congestive heart failure) ([facility]) I50.22    IBS (irritable bowel syndrome) K58.9    Acute blood loss as cause of postoperative anemia D62    Atelectasis of left lung J98.11    Insomnia G47.00    Chest pain R07.9    Rectal bleeding K62.5    Acute on chronic anemia D64.9    Vaginitis N76.0    Digitalis toxicity T46.0X1A    GI bleed K92.2    Coagulopathy ([facility]) D68.9    Bilateral leg edema R60.0    Azotemia R79.89    Hypoalbuminemia E88.09    Urinary tract infection with hematuria N39.0, R31.9    Septic shock ([facility]) A41.9, R65.21    Pacemaker generator end of life Z45.010    Necrotizing fasciitis ([facility]) M72.6    Left leg pain M79.605    Cellulitis of left lower extremity L03.116         ASSESSMENT/PLAN   Left lower leg cellulites: continue iv antibiotic   CHF with chronic leg edema.   Will plan discharge in 1-2 days if she is able to walk       Krishna Alexandre MD, Dilma Perez 10/28/2020 2:17 PM

## 2020-10-28 NOTE — PLAN OF CARE
Problem: Fluid Volume - Imbalance:  Goal: Absence of imbalanced fluid volume signs and symptoms  Description: Absence of imbalanced fluid volume signs and symptoms  10/28/2020 1236 by Ney Raines RN  Outcome: Ongoing     Problem: Gas Exchange - Impaired:  Goal: Levels of oxygenation will improve  Description: Levels of oxygenation will improve  10/28/2020 1236 by Ney Raines RN  Outcome: Ongoing     Problem: Pain:  Goal: Pain level will decrease  Description: Pain level will decrease  10/28/2020 1236 by Ney Raines RN  Outcome: Ongoing  Note: Patient complaining of left leg pain. PRN pain medication given. Problem: Skin Integrity - Impaired:  Goal: Will show no infection signs and symptoms  Description: Will show no infection signs and symptoms  10/28/2020 1236 by Ney Raines RN  Outcome: Ongoing     Problem: Falls - Risk of:  Goal: Will remain free from falls  Description: Will remain free from falls  10/28/2020 1236 by Ney Raines RN  Outcome: Ongoing  Note: Patient is free from falls. One assist with ambulation. Gait belt. Problem: Falls - Risk of:  Goal: Absence of physical injury  Description: Absence of physical injury  10/28/2020 1236 by Ney Raines RN  Outcome: Ongoing     Problem: Pain:  Goal: Pain level will decrease  Description: Pain level will decrease  10/28/2020 1236 by Ney Raines RN  Outcome: Ongoing  Note: Patient complaining of left leg pain. PRN pain medication given. Problem: Pain:  Goal: Control of acute pain  Description: Control of acute pain  10/28/2020 1236 by Ney Raines RN  Outcome: Ongoing     Problem: Pain:  Goal: Control of chronic pain  Description: Control of chronic pain  10/28/2020 1236 by Ney Raines RN  Outcome: Ongoing       Care plan reviewed with patient. Patient verbalize understanding of the plan of care and contribute to goal setting.

## 2020-10-28 NOTE — CARE COORDINATION
10/28/20, 2:11 PM EDT    DISCHARGE ON [address] day: 3  Location: -08/008-A Reason for admit: Necrotizing fasciitis ([address] [M72.6]  Left leg pain [M79.605]   Procedure: no  Treatment Plan of Care: Dr Ervin Lemus attending. Dr Sunni Cruz managing IV antibiotics. Following blood cultures. PICC line cancelled. I&O. Pain Management. N/V checks. LLE wound care. Barriers to Discharge: Dr Moeller Buys just in - PT/OT ordered today per Ortho. Planning home in 1-2 days on oral antibiotics if safe with PT/OT. PCP: Kristin Gaytan MD  Readmission Risk Score: 35%  Patient Goals/Plan/Treatment Preferences:  I spoke with Daniella Verduzco. She is from home with her . She uses a rolling walker and has a wheelchair. She also wears 2L O2 at rest and 3L with activity thru [address] and uses a nebulizer. She did not have any  services PTA. Dr Sunni Cruz told me today that he is now planning oral antibiotics at discharge. Plan home in 1-2 days if safe with PT/OT. - - -

## 2020-10-28 NOTE — PROGRESS NOTES
Clinical Pharmacy Note    Quentin Parikh is a 67 y.o. female for whom pharmacy has been asked to manage warfarin therapy. Reason for Admission: leg pain    Consulting Physician: Beba Narayanan CNP   Warfarin dose prior to admission: 1.5 mg daily  Warfarin indication: afib  Target INR range: 2-3   Outpatient warfarin provider: [facility]    Past Medical History:   Diagnosis Date    Allergic rhinitis     Anemia     Anxiety     Arthritis     Asthma     Atrial fibrillation ([address]     CAD (coronary artery disease)     COPD (chronic obstructive pulmonary disease) (HCC)     Frequent UTI     GERD (gastroesophageal reflux disease)     History of blood transfusion     X8    Hx of blood clots     left arm    Hyperlipidemia     Hypertension     Influenza A 02/22/2020    Kidney stone     LONG TERM ANTICOAGULENT USE 7/6/2012    MI, old 1994    Prolonged emergence from general anesthesia     Systolic CHF, acute on chronic ([address] 10/3/2013                Recent Labs     10/28/20  0635   INR 1.32*     Recent Labs     10/26/20  0445 10/27/20  1446 10/28/20  0635   HGB 10.9* 9.6* 10.0*   HCT 34.9* 31.1* 31.9*   * 85* 96*       Current warfarin drug-drug interactions: aspirin, heparin drip      Date INR Warfarin Dose   10/25-10/27  No Coumadin   10/28/2020 1.32 2 mg                              Daily PT/INR until stable within therapeutic range. Daily INR already ordered. Thank you for the consult. Will continue to follow and monitor closely.    Marylen Juba, PharmD 10/28/2020 2:41 PM

## 2020-10-28 NOTE — PROGRESS NOTES
Heparin Consult  Lab Results   Component Value Date    APTT 56.3 10/28/2020     Lab Results   Component Value Date    HGB 10.0 10/28/2020    HCT 31.9 10/28/2020    PLT 96 10/28/2020    INR 1.32 10/28/2020       Current Rate: 18 units/kg/hr    Plan:  Bolus: none  Rate: Increase to 19 units/kg/hr  Next aPTT: 1400    Natalia Elam PharmD 10/28/2020 7:38 AM

## 2020-10-28 NOTE — PROGRESS NOTES
Heparin Consult  Lab Results   Component Value Date    APTT 53.5 10/28/2020     Lab Results   Component Value Date    HGB 10.0 10/28/2020    HCT 31.9 10/28/2020    PLT 96 10/28/2020    INR 1.32 10/28/2020       Current Rate: 19 units/kg/hr    Plan:  Bolus: none  Rate: increase to 21 units/kg/hr  Next aPTT: 10/29/20 0000    Dilan Rodríguez, PharmD 10/28/2020 5:13 PM

## 2020-10-28 NOTE — PROGRESS NOTES
(ENTRESTO) 49-51 MG per tablet 1 tablet, 1 tablet, Oral, BID  traZODone (DESYREL) tablet 50 mg, 50 mg, Oral, Nightly  piperacillin-tazobactam (ZOSYN) 3.375 g in dextrose 5 % 50 mL IVPB extended infusion (mini-bag), 3.375 g, Intravenous, Q8H  linezolid (ZYVOX) IVPB 600 mg, 600 mg, Intravenous, Q12H  docusate sodium (COLACE) capsule 100 mg, 100 mg, Oral, Daily  senna (SENOKOT) tablet 8.6 mg, 1 tablet, Oral, Nightly  bisacodyl (DULCOLAX) suppository 10 mg, 10 mg, Rectal, Daily PRN  sodium chloride flush 0.9 % injection 10 mL, 10 mL, Intravenous, 2 times per day  sodium chloride flush 0.9 % injection 10 mL, 10 mL, Intravenous, PRN  lidocaine PF 1 % injection 5 mL, 5 mL, Intradermal, Once  sodium chloride flush 0.9 % injection 10 mL, 10 mL, Intravenous, PRN  HYDROmorphone (DILAUDID) injection 0.5 mg, 0.5 mg, Intravenous, Q2H PRN  HYDROcodone-acetaminophen (NORCO) 5-325 MG per tablet 2 tablet, 2 tablet, Oral, Q4H PRN  HYDROcodone-acetaminophen (NORCO) 5-325 MG per tablet 1 tablet, 1 tablet, Oral, Q4H PRN  cyclobenzaprine (FLEXERIL) tablet 10 mg, 10 mg, Oral, TID PRN  albuterol sulfate  (90 Base) MCG/ACT inhaler 2 puff, 2 puff, Inhalation, Q6H PRN  Arformoterol Tartrate (BROVANA) nebulizer solution 15 mcg, 15 mcg, Nebulization, BID  budesonide (PULMICORT) nebulizer suspension 500 mcg, 500 mcg, Nebulization, BID  levalbuterol (XOPENEX) nebulization 0.63 mg, 1 ampule, Nebulization, Q8H PRN  tiotropium (SPIRIVA RESPIMAT) 2.5 MCG/ACT inhaler 2 puff, 2 puff, Inhalation, Daily  acetaminophen (TYLENOL) tablet 650 mg, 650 mg, Oral, Q6H PRN **OR** acetaminophen (TYLENOL) suppository 650 mg, 650 mg, Rectal, Q6H PRN  polyethylene glycol (GLYCOLAX) packet 17 g, 17 g, Oral, Daily PRN  promethazine (PHENERGAN) tablet 12.5 mg, 12.5 mg, Oral, Q6H PRN **OR** ondansetron (ZOFRAN) injection 4 mg, 4 mg, Intravenous, Q6H PRN    . ASSESSMENT AND PLAN  Continue medical management  ID for abx recs.   May restart Coumadin from an ortho standpoint  PT.OT  Patient will need ECF at DC once abx decided

## 2020-10-29 NOTE — PROGRESS NOTES
[facility]  INPATIENT PHYSICAL THERAPY  EVALUATION  [facility] ORTHOPEDICS 7K - 7K-08/008-A    Time In: 1005  Time Out: 1027  Timed Code Treatment Minutes: 8 Minutes  Minutes: 22          Date: 10/29/2020  Patient Name: Rhianna Wagner,  Gender:  female        MRN: [id number]  : 1945  (76 y.o.)      Referring Practitioner: Ruby Mendez  Diagnosis: necrotizing fascitis  Additional Pertinent Hx: Per EMR, \"Sharona Little is a 67 y.o. female with an extensive medical history including, congestive heart failure, COPD, A. Fib., Pacemaker placement post MI, hypertension, anemia, and IBS who presents to the ED for an evaluation of severe left lower extremity pain, redness, and warmth that started yesterday evening. The patient states that yesterday morning she started feeling some discomfort to her lower extremity, but believed it was due to her compression stockings she wears for CHF. Yesterday evening she removed the stockings and saw that her left lower leg was extremely red, which has continued to spread, becoming more red, and even more painful. Denies any recent trauma, falls, accidents, or wounds to the lower extremity but states that she occasionally scratches her legs with her nails when taking on and off the compression stockings. The patient has attempted tylenol, percocet, biofreeze, and icyhot yesterday with no relief. 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 Endorses history of blood clots, but is on coumadin which was last checked approximately 1 week ago and was in theraputic range.  Further endorses feeling feverish and chills, increased shortness of breath, worsening left lower extremity pain, the inability to bear weight at this time, and denies numbness or tingling to the lower extremities, diabetes mellitus, previous lower leg infections, chest pain, abdominal pain, changes in her bowel or bladder function or habits. Patient still smokes 1/2 a pack of cigarettes daily and is on home oxygen. No alcohol or illicit drug use. \"     Restrictions/Precautions:  Restrictions/Precautions: Weight Bearing, General Precautions, Fall Risk  Left Lower Extremity Weight Bearing: Weight Bearing As Tolerated    Subjective:  Chart Reviewed: Yes  Patient assessed for rehabilitation services?: Yes  Subjective: pt up in chair, cooperative, pain in LLE limiting    General:       Vision: Impaired  Vision Exceptions: Wears glasses at all times    Hearing: Within functional limits         Pain: 5/10 pain in LLE, edema noted    Social/Functional History:    Lives With: Spouse  Type of Home: House  Home Layout: One level  Home Access: Stairs to enter with rails  Entrance Stairs - Number of Steps: 4 ОЛЬГА  Entrance Stairs - Rails: Both  Home Equipment: Rolling walker, 4 wheeled walker, Wheelchair-manual     Bathroom Shower/Tub: Walk-in shower, Shower chair with back  Bathroom Toilet: Bedside commode  Bathroom Accessibility: Accessible    Receives Help From: Family  ADL Assistance: Independent  Homemaking Assistance: Needs assistance(Spouse assists with cooking (Pt reports they eat out frequently or complete microwave meals), cleaning, and laundry PRN.)  Homemaking Responsibilities: No  Ambulation Assistance: Independent  Transfer Assistance: Independent    Active : Yes  Occupation: Retired       OBJECTIVE:  Range of Motion:  Bilateral Lower Extremity: WFL, left ankle DF to neutral pain and edema limitinig    Strength:  Bilateral Lower Extremity: WFL, limited at LLE due to weakness    Balance:  Static Sitting Balance:  Modified Independent  Static Standing Balance: Stand By Assistance, with RW    Bed Mobility:  Rolling to Right: Modified Independent   Sit to Supine: Supervision   Scooting: Supervision  HOB flat and no BR  Transfers:  Sit to Stand: Stand By Assistance  Stand to Sit:Stand By Assistance    Ambulation:  Contact Guard Assistance, to SBA  Distance: 15'x1  Surface: Level Tile  Device:Rolling Walker  Gait Deviations: Forward Flexed Posture, Slow Cassie, Decreased Heel Strike on Left, Narrow Base of Support and min unsteady, pain limiting     Exercise:  Patient was guided in 1 set(s) 2-3 reps of exercise to both lower extremities. Ankle pumps, Glut sets, Quad sets, Heelslides, Short arc quads, Hip abduction/adduction, Straight leg raises, Seated marches and Long arc quads. Issued for HEP to cont 2-3x/day. Exercises were completed for increased independence with functional mobility. Functional Outcome Measures: Completed  AM-PAC Inpatient Mobility Raw Score : 17  AM-PAC Inpatient T-Scale Score : 42.13    ASSESSMENT:  Activity Tolerance:  Patient tolerance of  treatment: fair. Treatment Initiated: Treatment and education initiated within context of evaluation. Evaluation time included review of current medical information, gathering information related to past medical, social and functional history, completion of standardized testing, formal and informal observation of tasks, assessment of data and development of plan of care and goals. Treatment time included skilled education and facilitation of tasks to increase safety and independence with functional mobility for improved independence and quality of life. Assessment:   Body structures, Functions, Activity limitations: Decreased functional mobility , Increased pain, Decreased balance, Decreased strength, Decreased endurance  Assessment: pt tolerated fair, pain and edema in LLE, generalized weakness, IV lines, O2, inc assist with use of walker for safe mobility, recommend cont PT to inc pt I with functional mobility  Prognosis: Excellent    REQUIRES PT FOLLOW UP: Yes    Discharge Recommendations:  Discharge Recommendations: Continue to assess pending progress, Patient would benefit from continued therapy after discharge(pt may benefit from HHPT at discharge, cont to monitor)    Patient Education:  PT Education: Goals, PT Role, Plan of Care, Home Exercise Program, Functional Mobility Training    Equipment Recommendations:  Equipment Needed: No  Other: cont to monitor for needs    Plan:  Times per week: 4-5X GM  Times per day: Daily  Specific instructions for Next Treatment: therex and mobility    Goals:  Patient goals : less pain  Short term goals  Time Frame for Short term goals: by discharge  Short term goal 1: bed mobility with MOD I to get in/out of bed  Short term goal 2: transfer with MOD I to get in/out of chairs  Short term goal 3: amb >50'x1 with walker and S to walk safely in home  Short term goal 4: negotiate 4 steps with HR and SBA to enter home safely  Long term goals  Time Frame for Long term goals : no LTGs set secondary to short ELOS    Following session, patient left in safe position with all fall risk precautions in place.

## 2020-10-29 NOTE — PROGRESS NOTES
[facility]  INPATIENT OCCUPATIONAL THERAPY  [facility] ORTHOPEDICS 7K  EVALUATION    Time:   Time In: 8677  Time Out: 9864  Timed Code Treatment Minutes: 28 Minutes  Minutes: 36    Date: 10/29/2020  Patient Name: Talya Dumont,   Gender: female      MRN: [id number]  : 1945  (67 y.o.)  Referring Practitioner: PETR Sharif CNP  Diagnosis: Necrotizing Fascitis  Additional Pertinent Hx: Per EMR, \"Sharona Avila is a 67 y.o. female with an extensive medical history including, congestive heart failure, COPD, A. Fib., Pacemaker placement post MI, hypertension, anemia, and IBS who presents to the ED for an evaluation of severe left lower extremity pain, redness, and warmth that started yesterday evening. The patient states that yesterday morning she started feeling some discomfort to her lower extremity, but believed it was due to her compression stockings she wears for CHF. Yesterday evening she removed the stockings and saw that her left lower leg was extremely red, which has continued to spread, becoming more red, and even more painful. Denies any recent trauma, falls, accidents, or wounds to the lower extremity but states that she occasionally scratches her legs with her nails when taking on and off the compression stockings. The patient has attempted tylenol, percocet, biofreeze, and icyhot yesterday with no relief. 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 Endorses history of blood clots, but is on coumadin which was last checked approximately 1 week ago and was in theraputic range.  Further endorses feeling feverish and chills, increased shortness of breath, worsening left lower extremity pain, the inability to bear weight at this time, and denies numbness or tingling to the lower extremities, diabetes mellitus, previous lower leg infections, Assistance. To don B socks while sitting EOB. [place] BALANCE:  Sitting Balance:  Supervision. Standing Balance: Contact Guard Assistance. with BUE supported on walker. BED MOBILITY:  Supine to Sit: Minimal Assistance, with head of bed raised, with increased time for completion    Scooting: Contact Guard Assistance      TRANSFERS:  Sit to Stand:  Minimal Assistance, cues for hand placement. Stand to Sit: Contact Guard Assistance. FUNCTIONAL MOBILITY:  Assistive Device: Rolling Walker  Assist Level:  Contact Guard Assistance, with verbal cues  and with increased time for completion. Distance: ~4 feet  Slow pace, No LOB, severe pain with WBAT through LLE, decreased step length. Activity Tolerance:  Patient tolerance of  treatment: fair. Assessment:  Assessment: This 67year old female presents with Cellulitis. Pt demonstrates significant pain with all activity limiting her ability to complete ADLs and mobility. Pt requires skilled OT intervention to increase indep and safety with all self cares, transfers, and mobility to decrease fall risk and caregiver burden. Without skilled OT intervention pt is at increased risk for falling and hospital readmission. Performance deficits / Impairments: Decreased functional mobility , Decreased ADL status, Decreased strength, Decreased endurance, Decreased safe awareness, Decreased balance  Prognosis: Fair  REQUIRES OT FOLLOW UP: Yes    Treatment Initiated: Treatment and education initiated within context of evaluation. Evaluation time included review of current medical information, gathering information related to past medical, social and functional history, completion of standardized testing, formal and informal observation of tasks, assessment of data and development of plan of care and goals.   Treatment time included skilled education and facilitation of tasks to increase safety and independence with ADL's for improved functional independence and quality of life. Discharge Recommendations:  Continue to assess pending progress, 24 hour supervision or assist, Home with Home health OT    Patient Education:  OT Education: Plan of Care, OT Role, Precautions, ADL Adaptive Strategies, Transfer Training    Equipment Recommendations:  Equipment Needed: No  Other: Pt owns BSC and shower chair. Plan:  Times per week: 5x  Current Treatment Recommendations: Strengthening, Endurance Training, Balance Training, Functional Mobility Training, Patient/Caregiver Education & Training, Equipment Evaluation, Education, & procurement, Self-Care / ADL, Safety Education & Training. See long-term goal time frame for expected duration of plan of care. If no long-term goals established, a short length of stay is anticipated. Goals:  Patient goals : Go Home with Spouse  Short term goals  Time Frame for Short term goals: Until d/c  Short term goal 1: Pt will complete BUE light resistive exercises with min vcs for technique to increase indep and safety with all self cares and transfers. Short term goal 2: Pt will complete standing tolerance x 4 minutes with 1-2 UE release and S to increase indep and safety with all grooming. Short term goal 3: Pt will complete LB dressing with LHAE PRN and min A to increase indep and safety within home environment. Short term goal 4: Pt will complete functional mobility to/from BR and HH distances with S and 0 vcs for safety to increase indep and safety with toileting. Following session, patient left in safe position with all fall risk precautions in place.

## 2020-10-29 NOTE — PROGRESS NOTES
Heparin Consult  Lab Results   Component Value Date    APTT 67.4 10/29/2020     Lab Results   Component Value Date    HGB 9.7 10/29/2020    HCT 30.9 10/29/2020     10/29/2020    INR 1.49 10/29/2020       Current Rate: 21 units/kg/hr    Plan:  Rate: Continue at 21 units/kg/hr  Next aPTT: 10/30/20 @ 0600    Kathryn Floyd PharmD, BCPS  10/29/2020  7:14 PM

## 2020-10-29 NOTE — PROGRESS NOTES
hallucinations  · Continue to monitor    PMH, PSH, SH, FHX reviewed in chart review snap shot in EPIC    CC: Left lower extremity cellulitis, subtherapeutic INR    HPI: Initial admission HPI by admitting physician reviewed as below:  Patient has a significant history of current everyday smoker, COPD, chronic ischemic heart disease, systolic heart failure, recurrent atrial fibrillation-Coumadin, remote biventricular pacemaker-AICD, echo-2/2020 LVEF 30 to 35%, essential hypertension, left arm DVT, GERD, recurrent UTIs, anxiety, anemia.     Sharona presented to [facility] ED for an evaluation of severe left lower extremity pain, redness, and warmth that started yesterday evening. The patient states that yesterday morning she started feeling some discomfort to her lower extremity, but believed it was due to her compression stockings she wears for CHF. Yesterday evening she removed the stockings and saw that her left lower leg was extremely red, which has continued to spread, becoming more red, and even more painful. Denies any recent trauma, falls, accidents, or wounds to the lower extremity but states that she occasionally scratches her legs with her nails when taking on and off the compression stockings. The patient has attempted tylenol, percocet, biofreeze, and icyhot yesterday with no relief. Work-up while in the ER includes CT tib-fib was concerning for fasciitis however there was no intrafacial gas to suggest a necrotizing fasciitis. Ericka Keith was consulted.  A femoral central line catheter was placed.  Levophed drip was started. Maria Guadalupe Wilkerson was admitted to the ICU for further evaluation and care.   For further details and updates please refer to assessment and plan at the beginning of the note. ROS (10 point review of systems completed. Pertinent positives noted.  Otherwise ROS is negative) : Left lower extremity pain  PMH:  Per HPI and reviewed in the chart  SHX: Reviewed in the chart, also the patient  FHX: Reviewed in the chart, also with the patient  Allergies: Reviewed in the chart  Medications:     heparin (PORCINE) Infusion 21 Units/kg/hr (10/29/20 0513)      warfarin  2 mg Oral Once    gabapentin  100 mg Oral TID    warfarin (COUMADIN) daily dosing (placeholder)   Other RX Placeholder    aspirin  81 mg Oral Daily    digoxin  125 mcg Oral Every Other Day    [Held by provider] furosemide  40 mg Oral Daily    metoprolol succinate  25 mg Oral Daily    pantoprazole  40 mg Oral BID AC    sacubitril-valsartan  1 tablet Oral BID    traZODone  50 mg Oral Nightly    piperacillin-tazobactam  3.375 g Intravenous Q8H    linezolid  600 mg Intravenous Q12H    docusate sodium  100 mg Oral Daily    senna  1 tablet Oral Nightly    sodium chloride flush  10 mL Intravenous 2 times per day    lidocaine 1 % injection  5 mL Intradermal Once    Arformoterol Tartrate  15 mcg Nebulization BID    budesonide  500 mcg Nebulization BID    tiotropium  2 puff Inhalation Daily   Subjective 10/28/2020: Dr. Hetal Bustosoms up on cellulitis and managing antibiotics, slight left lower extremity pain, on 2 L nasal cannula oxygen which is her baseline because of COPD, INR subtherapeutic 1.3 today. Continue heparin drip with Coumadin to dose by pharmacy. Monitor closely, nursing notes, labs, vitals reviewed. 10/29/2020: Improving, still having left lower extremity pain, less erythema left lower extremity decreased down the marker line. Dr. Omega Alvarez taking care of her antibiotics, still subtherapeutic INR we will continue bridging.   Consider discharge in 1 to 2 days  Vital Signs:   Vitals reviewed and compared with the previous ones  BP (!) 89/52   Pulse 86   Temp 97.8 °F (36.6 °C) (Oral)   Resp 16   Ht 5' 2\" (1.575 m)   Wt 153 lb (69.4 kg)   SpO2 91%   BMI 27.98 kg/m²      Intake/Output Summary (Last 24 hours) at 10/29/2020 0921  Last data filed at 10/29/2020 0249  Gross per 24 hour   Intake 1560.97 ml   Output 0 ml   Net 1560.97 ml General:   Age-appropriate, in no acute distress  HEENT:  normocephalic and atraumatic. No scleral icterus. PERRLA. Neck: supple. No thyromegaly. Lungs: clear to auscultation. No abnormal breathing sounds appreciated. Cardiac: S1, S2, RRR without murmur. No JVD. Abdomen: soft. Nontender. Bowel sounds positive. Extremities:  No clubbing, cyanosis, slight tenderness and warmness with erythema of the left lower extremity although improving since yesterday. Vasculature: capillary refill < 3 seconds. Pedal pulses intact bilaterally. Skin: Left lower extremity erythema and slight tenderness and slight warmness. Psych:  Alert to time, person and place. Communicable. Affect appropriate  Lymph:  No supraclavicular ,and no anterior cervical lymphadenopathy. Neurologic:  No focal deficit. CN II-XII grossly intact. Motor and Sensory capacity intact in all extremities. Labs:   Recent Labs     10/27/20  1446 10/28/20  0635 10/29/20  0639   WBC 7.4 6.9 5.2   HGB 9.6* 10.0* 9.7*   HCT 31.1* 31.9* 30.9*   PLT 85* 96* 113*     Recent Labs     10/28/20  0635      K 4.7      CO2 20*   BUN 21   CREATININE 0.7   CALCIUM 9.2     No results for input(s): AST, ALT, BILIDIR, BILITOT, ALKPHOS in the last 72 hours. Recent Labs     10/27/20  0701 10/28/20  0635 10/29/20  0639   INR 1.90* 1.32* 1.49*     No results for input(s): CKTOTAL, TROPONINI in the last 72 hours. Microbiology:    Blood culture #1:   Lab Results   Component Value Date    BC No growth-preliminary  10/25/2020       Blood culture #2:No results found for: Yong Downey    Organism:  Lab Results   Component Value Date    ORG Mixed Growth     02/21/2020         Lab Results   Component Value Date    LABGRAM  11/26/2019     No segmented neutrophils observed. Few epithelial cells observed. Many large gram positive bacilli. Few small gram positive bacilli. Few gram negative bacilli. Prepubescent and postmenopausal female samples (<13 and >54years of age) are not typically suitable for bacterialvaginosis screening. MRSA culture only:No results found for: [facility]    Urine culture:   Lab Results   Component Value Date    LABURIN  10/24/2019     Growth of Contaminants. The mixture of organisms present are not a common cause of urinary tract infections and probably represent distal urethral juventino. Respiratory culture: No results found for: CULTRESP    Aerobic and Anaerobic :  Lab Results   Component Value Date    LABAERO No growth-preliminary No growth   11/11/2019     Lab Results   Component Value Date    LABANAE No growth-preliminary No growth   11/11/2019       Urinalysis:      Lab Results   Component Value Date    NITRU NEGATIVE 02/22/2020    WBCUA 15-25 02/22/2020    BACTERIA MANY 02/22/2020    RBCUA 0-2 02/22/2020    BLOODU TRACE 02/22/2020    GLUCOSEU NEGATIVE 02/22/2020       Radiology:  VL DUP LOWER EXTREMITY VENOUS LEFT   Final Result   Normal venous ultrasound. No evidence for acute deep venous thrombosis. **This report has been created using voice recognition software. It may contain minor errors which are inherent in voice recognition technology. **      Final report electronically signed by Dr. Bonnie Kaminski on 10/26/2020 1:02 PM      XR FOOT LEFT (MIN 3 VIEWS)   Final Result      Moderate swelling without a gross fracture. **This report has been created using voice recognition software. It may contain minor errors which are inherent in voice recognition technology. **      Final report electronically signed by Dr. Emily Scott on 10/26/2020 12:34 PM      XR ANKLE LEFT (MIN 3 VIEWS)   Final Result   Diffuse soft tissue swelling of the ankle. No acute fracture seen. **This report has been created using voice recognition software. It may contain minor errors which are inherent in voice recognition technology. **      Final report electronically signed by Dr. Bonnie Kaminski on 10/26/2020 12:41 PM XR CHEST PORTABLE   Final Result   1. Mild cardiomegaly. Permanent pacemaker/defibrillator. 2. Old fracture mid left humeral shaft, incompletely imaged on this study. 3. No central venous line is seen on this particular study. 4. No acute findings. No infiltrates or effusions are seen. **This report has been created using voice recognition software. It may contain minor errors which are inherent in voice recognition technology. **      Final report electronically signed by Dr. Huan Melendez on 10/25/2020 3:43 PM      CT TIBIA FIBULA LEFT WO CONTRAST   Final Result   1. There is indistinctness of the fascial along the lateral and posterolateral aspects of the left lower leg with indistinctness of the musculature within the lateral and superficial/deep posterior compartments of the left lower leg. Intrafascial edema    cannot be excluded. There is no intra-tricompartmental gas. There is indistinctness of the peroneal musculature and the lateral aspect of the posterior compartment including the soleus and lateral head of the gastrocnemius muscles. Findings can be    associated with a fasciitis however there is no intrafascial gas to suggest a necrotizing fasciitis. Correlation with clinical findings recommended to exclude a compartment syndrome as this is a clinical diagnosis. The noncontrast CT appearance of the    musculature of the left lower leg is otherwise unremarkable. There is extensive skin and subcutaneous edema throughout the left lower leg which in the right clinical setting can be associated with a cellulitis. There is also extensive skin and    subcutaneous edema throughout the right lower leg which was included within the field of imaging on the coronal images. There is no subcutaneous gas. In addition, there is edema within Kager's fat. Clinical management is recommended. 2. There is no osseous abnormality.             **This report has been created using voice recognition software. It may contain minor errors which are inherent in voice recognition technology. **      Final report electronically signed by Dr. Shy Vides on 10/25/2020 12:40 PM        Xr Ankle Left (min 3 Views)    Result Date: 10/26/2020  PROCEDURE:XR ANKLE LEFT (MIN 3 VIEWS) CLINICAL INFORMATION: swelling pain TECHNIQUE: 3 standard views of the left ankle were obtained. FINDINGS:  Moderate soft tissue swelling overlies ankle diffusely. No acute fracture or dislocation is seen. The ankle mortise is intact. There is a large plantar calcaneal spur. Diffuse soft tissue swelling of the ankle. No acute fracture seen. **This report has been created using voice recognition software. It may contain minor errors which are inherent in voice recognition technology. ** Final report electronically signed by Dr. Denisha Westfall on 10/26/2020 12:41 PM    Xr Foot Left (min 3 Views)    Result Date: 10/26/2020  PROCEDURE: XR FOOT LEFT (MIN 3 VIEWS) CLINICAL INFORMATION: swelling. COMPARISON: No prior study. TECHNIQUE: 4 views of the left foot. FINDINGS: Moderate dorsal soft tissue swelling. Films are overpenetrated. No definite fracture. Alignment appears intact. Mild vascular calcification is seen. Moderate swelling without a gross fracture. **This report has been created using voice recognition software. It may contain minor errors which are inherent in voice recognition technology. ** Final report electronically signed by Dr. Genaro Canavan on 10/26/2020 12:34 PM    Xr Chest Portable    Result Date: 10/25/2020  PROCEDURE: XR CHEST PORTABLE CLINICAL INFORMATION: CVC placement COMPARISON: 2/22/2020 TECHNIQUE: A single mobile view of the chest was obtained. 1. Mild cardiomegaly. Permanent pacemaker/defibrillator. 2. Old fracture mid left humeral shaft, incompletely imaged on this study. 3. No central venous line is seen on this particular study. 4. No acute findings. No infiltrates or effusions are seen.  **This report has been created using voice recognition software. It may contain minor errors which are inherent in voice recognition technology. ** Final report electronically signed by Dr. Maycol Andrade on 10/25/2020 3:43 PM    Ct Tibia Fibula Left Wo Contrast    Result Date: 10/25/2020  PROCEDURE: CT TIBIA FIBULA LEFT WO CONTRAST CLINICAL INFORMATION: Left lower leg pain, redness and swelling for one day; assess for necrotizing fasciitis. COMPARISON: No prior study. TECHNIQUE: 0.9 mm axial imaging through the tibia/fibula without contrast. All CT scans at this facility use dose modulation, iterative reconstruction, and/or weight based dosing when appropriate to reduce the radiation dose to as low as reasonably achievable. FINDINGS: ALIGNMENT: Anatomic. MINERALIZATION: Normal. FRACTURE/OSSEOUS DESTRUCTION/PERIOSTITIS: None. KNEE: Unremarkable. ANKLE: Unremarkable. MUSCULATURE: 1. There is indistinctness of the fascial along the lateral and posterolateral aspects of the left lower leg with indistinctness of the musculature within the lateral and superficial/deep posterior compartments of the left lower leg. Intrafascial edema cannot be excluded. There is no intra-tricompartmental gas. There is indistinctness of the peroneal musculature and the lateral aspect of the posterior compartment including the soleus and lateral head of the gastrocnemius muscles. Findings can be associated with a fasciitis however there is no intrafascial gas to suggest a necrotizing fasciitis. Correlation with clinical findings recommended to exclude a compartment syndrome as this is a clinical diagnosis. The noncontrast CT appearance of the musculature of the left lower leg is otherwise unremarkable. There is extensive skin and subcutaneous edema throughout the left lower leg which in the right clinical setting can be associated with a cellulitis.  There is also extensive skin and subcutaneous edema throughout the right lower leg which was included within the field of imaging on the coronal images. There is no subcutaneous gas. In addition, there is edema within Kager's fat. NEUROVASCULAR BUNDLE: 1. Atheromatous calcification. OTHER: None. 1. There is indistinctness of the fascial along the lateral and posterolateral aspects of the left lower leg with indistinctness of the musculature within the lateral and superficial/deep posterior compartments of the left lower leg. Intrafascial edema cannot be excluded. There is no intra-tricompartmental gas. There is indistinctness of the peroneal musculature and the lateral aspect of the posterior compartment including the soleus and lateral head of the gastrocnemius muscles. Findings can be associated with a fasciitis however there is no intrafascial gas to suggest a necrotizing fasciitis. Correlation with clinical findings recommended to exclude a compartment syndrome as this is a clinical diagnosis. The noncontrast CT appearance of the musculature of the left lower leg is otherwise unremarkable. There is extensive skin and subcutaneous edema throughout the left lower leg which in the right clinical setting can be associated with a cellulitis. There is also extensive skin and subcutaneous edema throughout the right lower leg which was included within the field of imaging on the coronal images. There is no subcutaneous gas. In addition, there is edema within Kager's fat. Clinical management is recommended. 2. There is no osseous abnormality. **This report has been created using voice recognition software. It may contain minor errors which are inherent in voice recognition technology. ** Final report electronically signed by Dr. Lynn Truong on 10/25/2020 12:40 PM    Vl Dup Lower Extremity Venous Left    Result Date: 10/26/2020  PROCEDURE: VL DUP LOWER EXTREMITY VENOUS LEFT CLINICAL INFORMATION: pain, COMPARISON: No prior study.  TECHNIQUE: Multiple grayscale and color flow images of the major veins of the left lower extremity were obtained from the level of the groin to the level of the ankle. Multiple compression and augmentation maneuvers were performed. A few images of the contralateral common femoral vein were also obtained. FINDINGS:   All the  deep veins  are widely patent with normal flow and normal compressibility. .   The contralateral common femoral vein is unremarkable. Normal venous ultrasound. No evidence for acute deep venous thrombosis. **This report has been created using voice recognition software. It may contain minor errors which are inherent in voice recognition technology. ** Final report electronically signed by Dr. Bonnie Kaminski on 10/26/2020 1:02 PM      Discussed plan with patient and nurse. Patient and nurse verbalized understanding and agree. All questions addressed with concerns. Please excuse my TYPOS!     Electronically signed by Lisa Freitas MD on 10/29/2020 at 9:21 AM

## 2020-10-29 NOTE — PROGRESS NOTES
Heparin Consult  Lab Results   Component Value Date    APTT 79.3 10/29/2020     Lab Results   Component Value Date    HGB 9.7 10/29/2020    HCT 30.9 10/29/2020     10/29/2020    INR 1.49 10/29/2020       Current Rate: 21 units/kg/hr    Plan:  Bolus: none  Rate: Continue at 21 units/kg/hr  Next aPTT: today at [facility], [place], BCPS 10/29/2020 8:40 AM

## 2020-10-29 NOTE — PROGRESS NOTES
Heparin Consult  Lab Results   Component Value Date    APTT 73.9 10/29/2020     Lab Results   Component Value Date    HGB 10.0 10/28/2020    HCT 31.9 10/28/2020    PLT 96 10/28/2020    INR 1.32 10/28/2020       Current Rate: 21 units/kg/hr    Plan:  Bolus: none  Rate: Continue at 21 units/kg/hr  Next aPTT: 0600

## 2020-10-29 NOTE — PROGRESS NOTES
Orthopaedic Progress Note      SUBJECTIVE   Ms. Sevilla Benton is hospital stay day 4 for LLE   ID following-iv abx   Improvements noted from border markings  Pt states she has pain to the L leg   Planning to weight bear today with therapy   hgb 9.7    Allergies:     Allergies as of 10/25/2020 - Review Complete 10/25/2020   Allergen Reaction Noted    Benadryl [diphenhydramine hcl] Itching and Swelling 10/19/2012    Ciprofloxacin Nausea And Vomiting 01/07/2014    Clarithromycin Nausea And Vomiting 08/05/2020    Vitamin k Other (See Comments) 08/05/2020    Atorvastatin Other (See Comments) 07/06/2012    Captopril Other (See Comments) 05/25/2016    Codeine Itching 07/06/2012    Iv dye [iodides] Dermatitis 07/04/2019    Lipitor Other (See Comments) 07/06/2012    Macrobid [nitrofurantoin monohydrate macrocrystals] Other (See Comments) 07/09/2012    Neomycin-bacitracin zn-polymyx Swelling 05/25/2016    Pravastatin  06/20/2017    Zetia [ezetimibe]  06/20/2017    Adhesive tape Rash 11/15/2013    Cephalexin Nausea And Vomiting 07/06/2012    Doxycycline Nausea And Vomiting 01/07/2019    Morphine Hives, Swelling, and Rash 07/06/2012    Other Nausea And Vomiting 05/25/2016    Propoxyphene Nausea And Vomiting 12/21/2016    Sulfa antibiotics Nausea And Vomiting 07/06/2012     Current Inpatient Medications:  Current Facility-Administered Medications: warfarin (COUMADIN) tablet 2 mg, 2 mg, Oral, Once  gabapentin (NEURONTIN) capsule 100 mg, 100 mg, Oral, TID  warfarin (COUMADIN) daily dosing (placeholder), , Other, RX Placeholder  heparin 25,000 units in dextrose 5% 250 mL infusion, 21 Units/kg/hr, Intravenous, Continuous  aspirin EC tablet 81 mg, 81 mg, Oral, Daily  digoxin (LANOXIN) tablet 125 mcg, 125 mcg, Oral, Every Other Day  [Held by provider] furosemide (LASIX) tablet 40 mg, 40 mg, Oral, Daily  metoprolol succinate (TOPROL XL) extended release tablet 25 mg, 25 mg, Oral, Daily  pantoprazole (PROTONIX) tablet 40 chills. Derm: redness improving to L leg. Musculoskeletal: Denies numbness and tingling LLE, Pain to L leg. Neuro: Denies any dizziness, paresthesia or weakness. OBJECTIVE    Patient Vitals for the past 24 hrs:   BP Temp Temp src Pulse Resp SpO2   10/29/20 0900 -- -- -- -- -- 91 %   10/29/20 0858 -- -- -- -- -- (!) 89 %   10/29/20 0845 (!) 89/52 97.8 °F (36.6 °C) Oral 86 16 (!) 87 %   10/29/20 0608 -- -- -- -- -- 91 %   10/29/20 0545 (!) 122/57 97.6 °F (36.4 °C) Oral 70 18 90 %   10/28/20 2218 (!) 115/57 97.5 °F (36.4 °C) Oral 96 18 90 %   10/28/20 2002 -- -- -- -- 20 90 %   10/28/20 1509 112/80 97.8 °F (36.6 °C) Oral 74 20 94 %     INTAKE/OUTPUT:    Intake/Output Summary (Last 24 hours) at 10/29/2020 0914  Last data filed at 10/29/2020 0249  Gross per 24 hour   Intake 1560.97 ml   Output 0 ml   Net 1560.97 ml     I/O last 3 completed shifts: In: 6386 [P.O.:940; I.V.:621]  Out: 0     PHYSICAL EXAM:  General appearance:  Alert and oriented x 3. No apparent distress, appears stated age and cooperative. Musculoskeletal: LLE:  Denies calf pain to palpation. good range of motion without deformity. Pt can flex and extend left toes. redness noted to LLE. No  drainage noted from LLE. Neurologic:  Neurovascularly intact without any focal sensory/motor deficits. Sensation intact.        Data  CBC:   Lab Results   Component Value Date    WBC 5.2 10/29/2020    HGB 9.7 10/29/2020     10/29/2020     BMP:    Lab Results   Component Value Date     10/28/2020    K 4.7 10/28/2020    K 4.4 10/26/2020     10/28/2020    CO2 20 10/28/2020    BUN 21 10/28/2020    CREATININE 0.7 10/28/2020    CALCIUM 9.2 10/28/2020    GLUCOSE 149 10/28/2020    GLUCOSE 115 07/06/2018     Uric Acid:  No components found for: URIC  PT/INR:    Lab Results   Component Value Date    PROTIME 22.3 02/05/2019    INR 1.49 10/29/2020     Troponin:    Lab Results   Component Value Date    TROPONINI <0.006 10/04/2013     Urine Culture: No components found for: COLE    ASSESSMENT:  Active Hospital Problems    Diagnosis Date Noted    Cellulitis of left lower extremity [U48.320]     Necrotizing fasciitis ([facility] [address] [M72.6] 10/25/2020    Left leg pain [M79.605] 10/25/2020       PLAN  1. Dry drsg changes as needed   2. WBAT  3. PT/OT to eval and treat   4. Continue current medical management   5.  ID dosing abx      Electronically signed by PETR Verduzco CNP on 10/29/2020 at 9:08 AM

## 2020-10-29 NOTE — PROGRESS NOTES
Progress note: Infectious diseases    Patient - Vesna Miller,  Age - 67 y.o.    - 1945      Room Number - 7K-08/008-A   MRN -  [id number]   Acct # - [de-identified]  Date of Admission -  10/25/2020 10:58 AM    SUBJECTIVE:   No new complaints  Discussed with her daughter  She is very weak    OBJECTIVE   VITALS    height is 5' 2\" (1.575 m) and weight is 153 lb (69.4 kg). Her oral temperature is 98 °F (36.7 °C). Her blood pressure is 131/62 and her pulse is 75. Her respiration is 16 and oxygen saturation is 93%. Wt Readings from Last 3 Encounters:   10/25/20 153 lb (69.4 kg)   20 140 lb (63.5 kg)   20 144 lb 12.8 oz (65.7 kg)       I/O (24 Hours)    Intake/Output Summary (Last 24 hours) at 10/29/2020 1408  Last data filed at 10/29/2020 1236  Gross per 24 hour   Intake 1645.97 ml   Output 0 ml   Net 1645.97 ml       General Appearance  Awake, alert, oriented,  Dry oral mucosa  HEENT - normocephalic, atraumatic, pink conjunctiva,  anicteric sclera  Neck - Supple, no mass  Lungs -  Bilateral   air entry, no rhonchi, no wheeze  Cardiovascular - Heart sounds are normal.   Abdomen - soft, not distended, nontender,   Neurologic -oriented. The redness isimproving.  Very sensitive to touch    MEDICATIONS:      warfarin  2 mg Oral Once    gabapentin  100 mg Oral TID    warfarin (COUMADIN) daily dosing (placeholder)   Other RX Placeholder    aspirin  81 mg Oral Daily    digoxin  125 mcg Oral Every Other Day    [Held by provider] furosemide  40 mg Oral Daily    metoprolol succinate  25 mg Oral Daily    pantoprazole  40 mg Oral BID AC    sacubitril-valsartan  1 tablet Oral BID    traZODone  50 mg Oral Nightly    piperacillin-tazobactam  3.375 g Intravenous Q8H    linezolid  600 mg Intravenous Q12H    docusate sodium  100 mg Oral Daily    senna  1 tablet Oral Nightly    sodium chloride flush  10 mL Intravenous 2 times per day    lidocaine 1 % injection  5 mL Intradermal Once    Arformoterol Tartrate  15 mcg Nebulization BID    budesonide  500 mcg Nebulization BID    tiotropium  2 puff Inhalation Daily      heparin (PORCINE) Infusion 21 Units/kg/hr (10/29/20 0546)     bisacodyl, sodium chloride flush, sodium chloride flush, HYDROmorphone, HYDROcodone 5 mg - acetaminophen, HYDROcodone 5 mg - acetaminophen, cyclobenzaprine, albuterol sulfate HFA, levalbuterol, acetaminophen **OR** acetaminophen, polyethylene glycol, promethazine **OR** ondansetron      LABS:     CBC:   Recent Labs     10/27/20  1446 10/28/20  0635 10/29/20  0639   WBC 7.4 6.9 5.2   HGB 9.6* 10.0* 9.7*   PLT 85* 96* 113*     BMP:    Recent Labs     10/28/20  0635      K 4.7      CO2 20*   BUN 21   CREATININE 0.7   GLUCOSE 149*     Calcium:  Recent Labs     10/28/20  0635   CALCIUM 9.2   INR:   Recent Labs     10/27/20  0701 10/28/20  0635 10/29/20  0639   INR 1.90* 1.32* 1.49*     Hepatic:   No results for input(s): ALKPHOS, ALT, AST, PROT, BILITOT, BILIDIR, LABALBU in the last 72 hours.      Problem list of patient:     Patient Active Problem List   Diagnosis Code    MI (myocardial infarction) ([facility] I21.9    Chronic obstructive pulmonary disease ([facility] J44.9    Hyperlipidemia E78.5    Hypertension I10    Seasonal allergic rhinitis J30.2    Heartburn R12    Persistent atrial fibrillation (HCC) R73.74    Systolic CHF, acute on chronic ([facility]) I50.23    Biventricular implantable cardioverter-defibrillator in situ Z95.810    Anticoagulated on Coumadin Z79.01    Arteriosclerosis of coronary artery I25.10    Left displaced femoral neck fracture ([facility]) S72.002A    Closed head injury S09.90XA    CKD (chronic kidney disease) stage 2, GFR 60-89 ml/min N18.2    S/p left hip fracture Z87.81    Chronic systolic CHF (congestive heart failure) ([facility]) I50.22    IBS (irritable bowel syndrome) K58.9    Acute blood loss as cause of postoperative anemia D62    Atelectasis of left lung J98.11    Insomnia G47.00    Chest pain R07.9    Rectal bleeding K62.5    Acute on chronic anemia D64.9    Vaginitis N76.0    Digitalis toxicity T46.0X1A    GI bleed K92.2    Coagulopathy ([facility]) D68.9    Bilateral leg edema R60.0    Azotemia R79.89    Hypoalbuminemia E88.09    Urinary tract infection with hematuria N39.0, R31.9    Septic shock ([facility]) A41.9, R65.21    Pacemaker generator end of life Z45.010    Necrotizing fasciitis ([facility]) M72.6    Left leg pain M79.605    Cellulitis of left lower extremity L03.116         ASSESSMENT/PLAN   Left lower leg cellulites: continue iv antibiotic   CHF with chronic leg edema.   She is very weak, suggest [address] Adarsh Orta, MD, FACP 10/29/2020 2:08 PM

## 2020-10-29 NOTE — CARE COORDINATION
10/29/20, 10:37 AM EDT    [facility] [address] [facility]  Location: -08/008-A Reason for admit: Necrotizing fasciitis ([address] [M72.6]  Left leg pain [M79.605]   Procedure: no  Treatment Plan of Care:  Dr Hannah Parikh attending. Hospitalist for medical management. Dr Logan Flores managing IV antibiotics. Following blood cultures. PICC line cancelled. I&O. Pain Management. N/V checks. LLE wound care. PT/OT. FWB. Heparin / Coumadin. INR 1.49  Barriers to Discharge: Heparin / Coumadin. INR 1.49. Planning home in tomorrow on oral antibiotics if safe with PT/OT. PCP: Hunter Bedolla MD  Readmission Risk Score: 36%  Patient Goals/Plan/Treatment Preferences: Thalia Dinh is from home with her . She uses a rolling walker and has a wheelchair. She also wears 2L O2 at rest and 3L with activity thru [employer] and uses a nebulizer. She did not have any HH services PTA. Dr Logan Flores said he is planning oral antibiotics at discharge. Dr Kodi Briceno said plan home tomorrow is OK with Dr Logan Flores and safe with PT/OT. Coumadin management.  HH?

## 2020-10-30 NOTE — PROGRESS NOTES
Heparin Consult  Lab Results   Component Value Date    APTT 84.6 10/30/2020     Lab Results   Component Value Date    HGB 9.7 10/29/2020    HCT 30.9 10/29/2020     10/29/2020    INR 2.00 10/30/2020       Current Rate: 21 units/kg/hr    Plan:    Continue Rate: 21 units/kg/hr  Next aPTT: 0600 (ordered)    Mer Rey PharmD, RPh  10/30/2020  7:17 PM

## 2020-10-30 NOTE — PROGRESS NOTES
Heparin Consult  Lab Results   Component Value Date    APTT 91.6 10/30/2020     Lab Results   Component Value Date    HGB 9.7 10/29/2020    HCT 30.9 10/29/2020     10/29/2020    INR 2.00 10/30/2020       Current Rate: 21 units/kg/hr    Plan:  Continue heparin drip rate at 21 units/kg/hr  Next aPTT: 1800 (ordered)

## 2020-10-30 NOTE — PROGRESS NOTES
Orthopaedic Progress Note      SUBJECTIVE     Ms. Sevilla Benton is hospital stay day 5 for LLE   ID following-iv abx   Improvements noted from border markings  Pt states she has pain to the L leg   Pt seen up walking  today with therapy   Pt states she has coughed up bloody mucous, she is very SOB with ambulation, weakness  Pt is a smoker and wears oxygen at home     Allergies:     Allergies as of 10/25/2020 - Review Complete 10/25/2020   Allergen Reaction Noted    Benadryl [diphenhydramine hcl] Itching and Swelling 10/19/2012    Ciprofloxacin Nausea And Vomiting 01/07/2014    Clarithromycin Nausea And Vomiting 08/05/2020    Vitamin k Other (See Comments) 08/05/2020    Atorvastatin Other (See Comments) 07/06/2012    Captopril Other (See Comments) 05/25/2016    Codeine Itching 07/06/2012    Iv dye [iodides] Dermatitis 07/04/2019    Lipitor Other (See Comments) 07/06/2012    Macrobid [nitrofurantoin monohydrate macrocrystals] Other (See Comments) 07/09/2012    Neomycin-bacitracin zn-polymyx Swelling 05/25/2016    Pravastatin  06/20/2017    Zetia [ezetimibe]  06/20/2017    Adhesive tape Rash 11/15/2013    Cephalexin Nausea And Vomiting 07/06/2012    Doxycycline Nausea And Vomiting 01/07/2019    Morphine Hives, Swelling, and Rash 07/06/2012    Other Nausea And Vomiting 05/25/2016    Propoxyphene Nausea And Vomiting 12/21/2016    Sulfa antibiotics Nausea And Vomiting 07/06/2012     Current Inpatient Medications:  Current Facility-Administered Medications: gabapentin (NEURONTIN) capsule 100 mg, 100 mg, Oral, TID  warfarin (COUMADIN) daily dosing (placeholder), , Other, RX Placeholder  heparin 25,000 units in dextrose 5% 250 mL infusion, 21 Units/kg/hr, Intravenous, Continuous  aspirin EC tablet 81 mg, 81 mg, Oral, Daily  digoxin (LANOXIN) tablet 125 mcg, 125 mcg, Oral, Every Other Day  furosemide (LASIX) tablet 40 mg, 40 mg, Oral, Daily  metoprolol succinate (TOPROL XL) extended release tablet 25 mg, 25 mg, Oral, Daily  pantoprazole (PROTONIX) tablet 40 mg, 40 mg, Oral, BID AC  sacubitril-valsartan (ENTRESTO) 49-51 MG per tablet 1 tablet, 1 tablet, Oral, BID  traZODone (DESYREL) tablet 50 mg, 50 mg, Oral, Nightly  piperacillin-tazobactam (ZOSYN) 3.375 g in dextrose 5 % 50 mL IVPB extended infusion (mini-bag), 3.375 g, Intravenous, Q8H  linezolid (ZYVOX) IVPB 600 mg, 600 mg, Intravenous, Q12H  docusate sodium (COLACE) capsule 100 mg, 100 mg, Oral, Daily  senna (SENOKOT) tablet 8.6 mg, 1 tablet, Oral, Nightly  bisacodyl (DULCOLAX) suppository 10 mg, 10 mg, Rectal, Daily PRN  sodium chloride flush 0.9 % injection 10 mL, 10 mL, Intravenous, 2 times per day  sodium chloride flush 0.9 % injection 10 mL, 10 mL, Intravenous, PRN  lidocaine PF 1 % injection 5 mL, 5 mL, Intradermal, Once  sodium chloride flush 0.9 % injection 10 mL, 10 mL, Intravenous, PRN  HYDROmorphone (DILAUDID) injection 0.5 mg, 0.5 mg, Intravenous, Q2H PRN  HYDROcodone-acetaminophen (NORCO) 5-325 MG per tablet 2 tablet, 2 tablet, Oral, Q4H PRN  HYDROcodone-acetaminophen (NORCO) 5-325 MG per tablet 1 tablet, 1 tablet, Oral, Q4H PRN  cyclobenzaprine (FLEXERIL) tablet 10 mg, 10 mg, Oral, TID PRN  albuterol sulfate  (90 Base) MCG/ACT inhaler 2 puff, 2 puff, Inhalation, Q6H PRN  Arformoterol Tartrate (BROVANA) nebulizer solution 15 mcg, 15 mcg, Nebulization, BID  budesonide (PULMICORT) nebulizer suspension 500 mcg, 500 mcg, Nebulization, BID  levalbuterol (XOPENEX) nebulization 0.63 mg, 1 ampule, Nebulization, Q8H PRN  tiotropium (SPIRIVA RESPIMAT) 2.5 MCG/ACT inhaler 2 puff, 2 puff, Inhalation, Daily  acetaminophen (TYLENOL) tablet 650 mg, 650 mg, Oral, Q6H PRN **OR** acetaminophen (TYLENOL) suppository 650 mg, 650 mg, Rectal, Q6H PRN  polyethylene glycol (GLYCOLAX) packet 17 g, 17 g, Oral, Daily PRN  promethazine (PHENERGAN) tablet 12.5 mg, 12.5 mg, Oral, Q6H PRN **OR** ondansetron (ZOFRAN) injection 4 mg, 4 mg, Intravenous, Q6H PRN    REVIEW OF COLE    ASSESSMENT:  Active Hospital Problems    Diagnosis Date Noted    Cellulitis of left lower extremity [I06.210]     Necrotizing fasciitis ([facility] [M72.6] 10/25/2020    Left leg pain [M79.605] 10/25/2020       PLAN  1. Dry drsg changes as needed           2. WBAT  3. PT/OT to eval and treat   4. Continue current medical management   5.  ID dosing abx  6. Rehab consult       Electronically signed by PETR Smiley CNP on 10/30/2020 at 10:57 AM

## 2020-10-30 NOTE — PROGRESS NOTES
[facility]  [address] [facility]  Occupational Therapy  Daily Note  Time:   Time In: 8521  Time Out: 1105  Timed Code Treatment Minutes: 43 Minutes  Minutes: 43          Date: 10/30/2020  Patient Name: Johnny Garcia,   Gender: female      Room: [facility]008-A  MRN: [id number]  : 1945  (67 y.o.)  Referring Practitioner: PETR Lomeli CNP  Diagnosis: Necrotizing Fascitis  Additional Pertinent Hx: Per EMR, \"Shaorna López is a 67 y.o. female with an extensive medical history including, congestive heart failure, COPD, A. Fib., Pacemaker placement post MI, hypertension, anemia, and IBS who presents to the ED for an evaluation of severe left lower extremity pain, redness, and warmth that started yesterday evening. The patient states that yesterday morning she started feeling some discomfort to her lower extremity, but believed it was due to her compression stockings she wears for CHF. Yesterday evening she removed the stockings and saw that her left lower leg was extremely red, which has continued to spread, becoming more red, and even more painful. Denies any recent trauma, falls, accidents, or wounds to the lower extremity but states that she occasionally scratches her legs with her nails when taking on and off the compression stockings. The patient has attempted tylenol, percocet, biofreeze, and icyhot yesterday with no relief. 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 Endorses history of blood clots, but is on coumadin which was last checked approximately 1 week ago and was in theraputic range.  Further endorses feeling feverish and chills, increased shortness of breath, worsening left lower extremity pain, the inability to bear weight at this time, and denies numbness or tingling to the lower extremities, diabetes mellitus, previous lower leg infections, chest pain, abdominal pain, changes in her bowel or bladder function or habits. Patient still smokes 1/2 a pack of cigarettes daily and is on home oxygen. No alcohol or illicit drug use. \"    Restrictions/Precautions:  Restrictions/Precautions: Weight Bearing, General Precautions, Fall Risk  Left Lower Extremity Weight Bearing: Weight Bearing As Tolerated     SUBJECTIVE: pt lying in bed when arrived. Pt agreeable to OT with some encouragement, pt on 3L of O2 and maintianed at 88-92 % during session. Long time to recover after mobility     PAIN: pt having lots of pain in LLE, coughing some due to smoker at home per RN however Dr. Briana Victor ordered a chest xray. COGNITION: Decreased Insight, Decreased Problem Solving and Decreased Safety Awareness    ADL:   Grooming: with set-up.  to comb hair in sitting, and CGA standing at sink to wash hands   Toileting: Stand By Assistance. able to complete care in standing   Toilet Transfer: [address] to STS . BALANCE:  Standing Balance: Contact Guard Assistance. at [facility] adn sink for 1-2 min     BED MOBILITY:  Supine to Sit: Contact Guard Assistance use of bedrails and HOB slightly elevated     TRANSFERS:  Sit to Stand:  contact guard  Assistance. from EOB and STS, recliner   Stand to Sit: Contact Guard Assistance. to recliner and STS     FUNCTIONAL MOBILITY:  Assistive Device: Rolling Walker  Assist Level:  Contact Guard Assistance and with increased time for completion. Distance: To and from bathroom and around in room   Pt had no LOB, slightly unsteady at times due to pain in LLE some edema noted in L foot and RN aware , Pt very SOB after mobility in room.   Has her own inhaler     ADDITIONAL ACTIVITIES:  .Patient identified a personal goal to increase UB strength and improve overall endurance so they can complete their toilet & shower transfers; skilled edu on UE strengthening and patient completed BUE strengthening exercises x10 reps x1 set this date with a minimal resistive band  in all joints and all planes. Patient tolerated in sitting , requiring short  rest breaks. Pt required min verbal  cues for technique. ASSESSMENT:     Activity Tolerance:  Patient tolerance of  treatment: fair. Limits self       Discharge Recommendations: Continue to assess pending progress, 24 hour supervision or assist, Home with Home health OT   Equipment Recommendations: Equipment Needed: No  Other: Pt owns BSC and shower chair. Plan: Times per week: 5x  Current Treatment Recommendations: Strengthening, Endurance Training, Balance Training, Functional Mobility Training, Patient/Caregiver Education & Training, Equipment Evaluation, Education, & procurement, Self-Care / ADL, Safety Education & Training    Patient Education  Patient Education: ADL's, Home Exercise Program, Home Safety and Importance of Increasing Activity    Goals  Short term goals  Time Frame for Short term goals: Until d/c  Short term goal 1: Pt will complete BUE light resistive exercises with min vcs for technique to increase indep and safety with all self cares and transfers. Short term goal 2: Pt will complete standing tolerance x 4 minutes with 1-2 UE release and S to increase indep and safety with all grooming. Short term goal 3: Pt will complete LB dressing with LHAE PRN and min A to increase indep and safety within home environment. Short term goal 4: Pt will complete functional mobility to/from BR and HH distances with S and 0 vcs for safety to increase indep and safety with toileting. Following session, patient left in safe position with all fall risk precautions in place.

## 2020-10-30 NOTE — PROGRESS NOTES
Hospitalist Progress Note      Patient:  Gopal Escamilla    Unit/Bed:7K-08/008-A  YOB: 1945  MRN: [id number]   Acct: [de-identified]   PCP: Hunter Bedolla MD  Date of Admission: 10/25/2020    Assessment and Plan:        1. Septic Shock, likely secondary to cellulitis of left lower extremity, resolved  · On admission, hypotension, BPs 79/50 initially.  WBC 16.3.  Procal 2.66  · CT tib-fib results noted     10/27: Off pressors, transferred out of ICU   2. LLE cellulitis/fasciitis  · CT tib-fib showed no intraischial gas to suggest nec fas, just edema  · ID following. On Zosyn and Zyvox. Plan to transition to p.o. antibiotics on discharge  · Ortho following. No plan for any surgical intervention. CCM   · 10/28/2020: Dr. Logan Flores managing cellulitis. Plan to proceed with inpatient rehab  3. Hx of COPD on 2L home O2  · Stable  · Continue home Brovana-Pulmicort and Spiriva     4. Hx of HFrEF  · Stable.  BNP 9469, frequent elevations in the past on admission. Difficult to interpret with nephrolysin inhibitor on board  · Echo 2/20: EF 30-35%  · Continue home entresto. Home Lasix restarted   10/28/2020: Currently on 2-3 L nasal cannula oxygen satting 96% which is her baseline. Monitor closely. 5. Atrial Fib, chronic  · History of AICD  · S/p permanent pacemaker  · Chronic coumadin usage, INR 2  Lanoxin continued    6. Essential hypertension  · Currently hypotensive  · Required levophed, DC'd. · Continue to monitor     7. GERD  · monitor     8. Nicotine abuse  · Current everyday smoker, smoking cessation provided      10. Hx of constipation  · States she takes linzess at home  · Constipated today  · Prn dulcolax and glycerin suppository     11.  Hx of hallucinations with narcotics  · AOx3 today  · No hallucinations  · Continue to monitor    PMH, PSH, SH, FHX reviewed in chart review snap shot in EPIC    CC: Left lower extremity cellulitis, subtherapeutic INR    HPI: Initial admission HPI by admitting physician reviewed as below:  Patient has a significant history of current everyday smoker, COPD, chronic ischemic heart disease, systolic heart failure, recurrent atrial fibrillation-Coumadin, remote biventricular pacemaker-AICD, echo-2/2020 LVEF 30 to 35%, essential hypertension, left arm DVT, GERD, recurrent UTIs, anxiety, anemia.     Sharona presented to [facility] ED for an evaluation of severe left lower extremity pain, redness, and warmth that started yesterday evening. The patient states that yesterday morning she started feeling some discomfort to her lower extremity, but believed it was due to her compression stockings she wears for CHF. Yesterday evening she removed the stockings and saw that her left lower leg was extremely red, which has continued to spread, becoming more red, and even more painful. Denies any recent trauma, falls, accidents, or wounds to the lower extremity but states that she occasionally scratches her legs with her nails when taking on and off the compression stockings. The patient has attempted tylenol, percocet, biofreeze, and icyhot yesterday with no relief. Work-up while in the ER includes CT tib-fib was concerning for fasciitis however there was no intrafacial gas to suggest a necrotizing fasciitis. Tere Olivera was consulted.  A femoral central line catheter was placed.  Levophed drip was started. Alison Givens was admitted to the ICU for further evaluation and care.     Patient much improved and transferred from ICU to the floor    Subjective: Patient continues to improve. She appears to be very weak and because so we are now pursuing inpatient rehab. For further details and updates please refer to assessment and plan at the beginning of the note. ROS (10 point review of systems completed. Pertinent positives noted.  Otherwise ROS is negative) : Left lower extremity pain  PMH:  Per HPI and reviewed in the chart  SHX: Reviewed in the chart, also the patient  FHX: Reviewed in the chart, also with the patient  Allergies: Reviewed in the chart  Medications:     heparin (PORCINE) Infusion 21 Units/kg/hr (10/29/20 3264)      warfarin  1.5 mg Oral Once    gabapentin  100 mg Oral TID    warfarin (COUMADIN) daily dosing (placeholder)   Other RX Placeholder    aspirin  81 mg Oral Daily    digoxin  125 mcg Oral Every Other Day    furosemide  40 mg Oral Daily    metoprolol succinate  25 mg Oral Daily    pantoprazole  40 mg Oral BID AC    sacubitril-valsartan  1 tablet Oral BID    traZODone  50 mg Oral Nightly    piperacillin-tazobactam  3.375 g Intravenous Q8H    linezolid  600 mg Intravenous Q12H    docusate sodium  100 mg Oral Daily    senna  1 tablet Oral Nightly    sodium chloride flush  10 mL Intravenous 2 times per day    lidocaine 1 % injection  5 mL Intradermal Once    Arformoterol Tartrate  15 mcg Nebulization BID    budesonide  500 mcg Nebulization BID    tiotropium  2 puff Inhalation Daily     Vital Signs:   Vitals reviewed and compared with the previous ones  BP (!) 130/56   Pulse 70   Temp 97 °F (36.1 °C)   Resp 18   Ht 5' 2\" (1.575 m)   Wt 153 lb (69.4 kg)   SpO2 94%   BMI 27.98 kg/m²      Intake/Output Summary (Last 24 hours) at 10/30/2020 1509  Last data filed at 10/30/2020 1348  Gross per 24 hour   Intake 1849.65 ml   Output --   Net 1849.65 ml        General:   Age-appropriate, in no acute distress  HEENT:  normocephalic and atraumatic. No scleral icterus. PERRLA. Neck: supple. No thyromegaly. Lungs: clear to auscultation. No abnormal breathing sounds appreciated. Cardiac: S1, S2, RRR without murmur. No JVD. Abdomen: soft. Nontender. Bowel sounds positive. Extremities:  No clubbing, cyanosis, slight tenderness and warmness with erythema of the left lower extremity although improving since yesterday. Vasculature: capillary refill < 3 seconds. Pedal pulses intact bilaterally.   Skin: Left lower extremity erythema and slight tenderness and slight warmness. Psych:  Alert to time, person and place. Communicable. Affect appropriate  Lymph:  No supraclavicular ,and no anterior cervical lymphadenopathy. Neurologic:  No focal deficit. CN II-XII grossly intact. Motor and Sensory capacity intact in all extremities. Labs:   Recent Labs     10/28/20  0635 10/29/20  0639   WBC 6.9 5.2   HGB 10.0* 9.7*   HCT 31.9* 30.9*   PLT 96* 113*     Recent Labs     10/28/20  0635      K 4.7      CO2 20*   BUN 21   CREATININE 0.7   CALCIUM 9.2     No results for input(s): AST, ALT, BILIDIR, BILITOT, ALKPHOS in the last 72 hours. Recent Labs     10/28/20  0635 10/29/20  0639 10/30/20  0647   INR 1.32* 1.49* 2.00*     No results for input(s): Kemal Favre in the last 72 hours. Microbiology:    Blood culture #1:   Lab Results   Component Value Date    BC No growth-preliminary  10/25/2020       Blood culture #2:No results found for: Alex Boone    Organism:  Lab Results   Component Value Date    ORG Mixed Growth     02/21/2020         Lab Results   Component Value Date    LABGRAM  11/26/2019     No segmented neutrophils observed. Few epithelial cells observed. Many large gram positive bacilli. Few small gram positive bacilli. Few gram negative bacilli. Prepubescent and postmenopausal female samples (<15and >47ears of age) are not typically suitable for bacterialvaginosis screening. MRSA culture only:No results found for: [facility]    Urine culture:   Lab Results   Component Value Date    LABURIN  10/24/2019     Growth of Contaminants. The mixture of organisms present are not a common cause of urinary tract infections and probably represent distal urethral juventino.         Respiratory culture: No results found for: CULTRESP    Aerobic and Anaerobic :  Lab Results   Component Value Date    LABAERO No growth-preliminary No growth   11/11/2019     Lab Results   Component Value Date    LABANAE No growth-preliminary No growth   11/11/2019       Urinalysis: Lab Results   Component Value Date    NITRU NEGATIVE 02/22/2020    WBCUA 15-25 02/22/2020    BACTERIA MANY 02/22/2020    RBCUA 0-2 02/22/2020    BLOODU TRACE 02/22/2020    GLUCOSEU NEGATIVE 02/22/2020       Radiology:  XR CHEST (2 VW)   Final Result   1. Mild cardiomegaly. Permanent pacemaker/defibrillator. No effusion. 2. Moderate interstitial infiltrates scattered throughout the right lung. Questionable minimal infiltrate left mid and lower lung fields. Findings most consistent with interstitial pneumonia. **This report has been created using voice recognition software. It may contain minor errors which are inherent in voice recognition technology. **      Final report electronically signed by Dr. Blaire Tillman on 10/30/2020 1:47 PM      VL DUP LOWER EXTREMITY VENOUS LEFT   Final Result   Normal venous ultrasound. No evidence for acute deep venous thrombosis. **This report has been created using voice recognition software. It may contain minor errors which are inherent in voice recognition technology. **      Final report electronically signed by Dr. Blaire Tillman on 10/26/2020 1:02 PM      XR FOOT LEFT (MIN 3 VIEWS)   Final Result      Moderate swelling without a gross fracture. **This report has been created using voice recognition software. It may contain minor errors which are inherent in voice recognition technology. **      Final report electronically signed by Dr. Manuel Ann on 10/26/2020 12:34 PM      XR ANKLE LEFT (MIN 3 VIEWS)   Final Result   Diffuse soft tissue swelling of the ankle. No acute fracture seen. **This report has been created using voice recognition software. It may contain minor errors which are inherent in voice recognition technology. **      Final report electronically signed by Dr. Blaire Tillman on 10/26/2020 12:41 PM      XR CHEST PORTABLE   Final Result   1. Mild cardiomegaly. Permanent pacemaker/defibrillator.    2. Old fracture mid left humeral shaft, incompletely imaged on this study. 3. No central venous line is seen on this particular study. 4. No acute findings. No infiltrates or effusions are seen. **This report has been created using voice recognition software. It may contain minor errors which are inherent in voice recognition technology. **      Final report electronically signed by Dr. Elzbieta Whitney on 10/25/2020 3:43 PM      CT TIBIA FIBULA LEFT WO CONTRAST   Final Result   1. There is indistinctness of the fascial along the lateral and posterolateral aspects of the left lower leg with indistinctness of the musculature within the lateral and superficial/deep posterior compartments of the left lower leg. Intrafascial edema    cannot be excluded. There is no intra-tricompartmental gas. There is indistinctness of the peroneal musculature and the lateral aspect of the posterior compartment including the soleus and lateral head of the gastrocnemius muscles. Findings can be    associated with a fasciitis however there is no intrafascial gas to suggest a necrotizing fasciitis. Correlation with clinical findings recommended to exclude a compartment syndrome as this is a clinical diagnosis. The noncontrast CT appearance of the    musculature of the left lower leg is otherwise unremarkable. There is extensive skin and subcutaneous edema throughout the left lower leg which in the right clinical setting can be associated with a cellulitis. There is also extensive skin and    subcutaneous edema throughout the right lower leg which was included within the field of imaging on the coronal images. There is no subcutaneous gas. In addition, there is edema within Kager's fat. Clinical management is recommended. 2. There is no osseous abnormality. **This report has been created using voice recognition software. It may contain minor errors which are inherent in voice recognition technology. **      Final report electronically signed by Dr. Wen Cole on 10/25/2020 12:40 PM        Xr Ankle Left (min 3 Views)    Result Date: 10/26/2020  PROCEDURE:XR ANKLE LEFT (MIN 3 VIEWS) CLINICAL INFORMATION: swelling pain TECHNIQUE: 3 standard views of the left ankle were obtained. FINDINGS:  Moderate soft tissue swelling overlies ankle diffusely. No acute fracture or dislocation is seen. The ankle mortise is intact. There is a large plantar calcaneal spur. Diffuse soft tissue swelling of the ankle. No acute fracture seen. **This report has been created using voice recognition software. It may contain minor errors which are inherent in voice recognition technology. ** Final report electronically signed by Dr. Rhea Mir on 10/26/2020 12:41 PM    Xr Foot Left (min 3 Views)    Result Date: 10/26/2020  PROCEDURE: XR FOOT LEFT (MIN 3 VIEWS) CLINICAL INFORMATION: swelling. COMPARISON: No prior study. TECHNIQUE: 4 views of the left foot. FINDINGS: Moderate dorsal soft tissue swelling. Films are overpenetrated. No definite fracture. Alignment appears intact. Mild vascular calcification is seen. Moderate swelling without a gross fracture. **This report has been created using voice recognition software. It may contain minor errors which are inherent in voice recognition technology. ** Final report electronically signed by Dr. Daren Mckenna on 10/26/2020 12:34 PM    Xr Chest Portable    Result Date: 10/25/2020  PROCEDURE: XR CHEST PORTABLE CLINICAL INFORMATION: CVC placement COMPARISON: 2/22/2020 TECHNIQUE: A single mobile view of the chest was obtained. 1. Mild cardiomegaly. Permanent pacemaker/defibrillator. 2. Old fracture mid left humeral shaft, incompletely imaged on this study. 3. No central venous line is seen on this particular study. 4. No acute findings. No infiltrates or effusions are seen. **This report has been created using voice recognition software.   It may contain minor errors which are inherent in voice

## 2020-10-30 NOTE — PROGRESS NOTES
Progress note: Infectious diseases    Patient - Tab Pyle,  Age - 67 y.o.    - 1945      Room Number - 7K-08/008-A   MRN -  [id number]   Acct # - [de-identified]  Date of Admission -  10/25/2020 10:58 AM    SUBJECTIVE:   She has cough with blood stained. Known smoker, recent CXR was negative    OBJECTIVE   VITALS    height is 5' 2\" (1.575 m) and weight is 153 lb (69.4 kg). Her temperature is 97 °F (36.1 °C). Her blood pressure is 130/56 (abnormal) and her pulse is 70. Her respiration is 18 and oxygen saturation is 94%. Wt Readings from Last 3 Encounters:   10/25/20 153 lb (69.4 kg)   20 140 lb (63.5 kg)   20 144 lb 12.8 oz (65.7 kg)       I/O (24 Hours)    Intake/Output Summary (Last 24 hours) at 10/30/2020 1050  Last data filed at 10/30/2020 0351  Gross per 24 hour   Intake 1584.96 ml   Output --   Net 1584.96 ml       General Appearance  Awake, alert, oriented,  Dry oral mucosa  HEENT - normocephalic, atraumatic, pink conjunctiva,  anicteric sclera  Neck - Supple, no mass  Lungs -  Bilateral   air entry, no rhonchi, no wheeze  Cardiovascular - Heart sounds are normal.   Abdomen - soft, not distended, nontender,   Neurologic -oriented. The redness isimproving.  Very sensitive to touch    MEDICATIONS:      gabapentin  100 mg Oral TID    warfarin (COUMADIN) daily dosing (placeholder)   Other RX Placeholder    aspirin  81 mg Oral Daily    digoxin  125 mcg Oral Every Other Day    furosemide  40 mg Oral Daily    metoprolol succinate  25 mg Oral Daily    pantoprazole  40 mg Oral BID AC    sacubitril-valsartan  1 tablet Oral BID    traZODone  50 mg Oral Nightly    piperacillin-tazobactam  3.375 g Intravenous Q8H    linezolid  600 mg Intravenous Q12H    docusate sodium  100 mg Oral Daily    senna  1 tablet Oral Nightly    sodium chloride flush  10 mL Intravenous 2 times per day    lidocaine 1 % injection  5 mL Intradermal Once    Arformoterol Tartrate  15 mcg Nebulization BID    budesonide  500 mcg Nebulization BID    tiotropium  2 puff Inhalation Daily      heparin (PORCINE) Infusion 21 Units/kg/hr (10/29/20 9235)     bisacodyl, sodium chloride flush, sodium chloride flush, HYDROmorphone, HYDROcodone 5 mg - acetaminophen, HYDROcodone 5 mg - acetaminophen, cyclobenzaprine, albuterol sulfate HFA, levalbuterol, acetaminophen **OR** acetaminophen, polyethylene glycol, promethazine **OR** ondansetron      LABS:     CBC:   Recent Labs     10/27/20  1446 10/28/20  0635 10/29/20  0639   WBC 7.4 6.9 5.2   HGB 9.6* 10.0* 9.7*   PLT 85* 96* 113*     BMP:    Recent Labs     10/28/20  0635      K 4.7      CO2 20*   BUN 21   CREATININE 0.7   GLUCOSE 149*     Calcium:  Recent Labs     10/28/20  0635   CALCIUM 9.2   INR:   Recent Labs     10/28/20  0635 10/29/20  0639 10/30/20  0647   INR 1.32* 1.49* 2.00*     Hepatic:   No results for input(s): ALKPHOS, ALT, AST, PROT, BILITOT, BILIDIR, LABALBU in the last 72 hours.      Problem list of patient:     Patient Active Problem List   Diagnosis Code    MI (myocardial infarction) ([facility] [address] I21.9    [facility] J44.9    Hyperlipidemia E78.5    Hypertension I10    Seasonal allergic rhinitis J30.2    Heartburn R12    Persistent atrial fibrillation (HCC) J32.46    Systolic CHF, acute on chronic ([facility]) I50.23    Biventricular implantable cardioverter-defibrillator in situ Z95.810    Anticoagulated on Coumadin Z79.01    Arteriosclerosis of coronary artery I25.10    Left displaced femoral neck fracture ([facility]) S72.002A    Closed head injury S09.90XA    CKD (chronic kidney disease) stage 2, GFR 60-89 ml/min N18.2    S/p left hip fracture Z87.81    Chronic systolic CHF (congestive heart failure) ([facility]) I50.22    IBS (irritable bowel syndrome) K58.9    Acute blood loss as cause of postoperative anemia D62    Atelectasis of left lung J98.11    Insomnia G47.00    Chest pain R07.9    Rectal bleeding K62.5    Acute on chronic anemia D64.9    Vaginitis N76.0    Digitalis toxicity T46.0X1A    GI bleed K92.2    Coagulopathy ([facility]) D68.9    Bilateral leg edema R60.0    Azotemia R79.89    Hypoalbuminemia E88.09    Urinary tract infection with hematuria N39.0, R31.9    Septic shock ([facility]) A41.9, R65.21    Pacemaker generator end of life Z45.010    Necrotizing fasciitis ([facility]) M72.6    Left leg pain M79.605    Cellulitis of left lower extremity L03.116         ASSESSMENT/PLAN   Left lower leg cellulites: continue iv antibiotic  CHF with chronic leg edema. Rehab evaluation as she is weak and deconditioned.        Unk Sandhoff, MD, FACP 10/30/2020 10:50 AM

## 2020-10-31 NOTE — PLAN OF CARE
Problem: Impaired respiratory status  Goal: Clear lung sounds  Outcome: Ongoing   Improve aeration of lungs.

## 2020-10-31 NOTE — PROGRESS NOTES
Hospitalist Progress Note      Patient:  Mahesh Morrison    Unit/Bed:7K-08/008-A  YOB: 1945  MRN: [id number]   Acct: [de-identified]   PCP: Natalie Dean MD  Date of Admission: 10/25/2020    Assessment/Plan:    1. Septic Shock 2/2 LLE Cellulitis, resolved: Noted on admission with BP 79/50 initially, WBC 16.3, procal 2.66. CT Tib/fib showed 10/25/20 showed findings possibly associated with fasciitis but no intrafascial gas suggestive of necrotizing fasciitis. Vasopressors discontinued and transferred out of ICU 10/27/20. ID following and assisting with management. Ortho following with no plans for surgical intervention. 1. Antibiotics initiated 10/25/20. Zosyn/Zyvox day #6. Dr. Jamaal Buckner, ID following to assist with cellulitis management. Planning IP rehab per primary, likely 11/2/20.   2. COPD without acute exacerbation: Continue Brovana/Pulmicort and Spiriva. Chronically on 2L O2 via NC. Continue. 3. Chronic HFrEF:  BNP elevated however difficult to interperet while on Entresto. Echo 2/2020 with EF 30-35%. Continue Entresto and Lasix. Maintaining O2 sat on home 2-3L O2 via NC.   4. Essential HTN: Required Levophed while in ICU. Continue home medications at this time. 5. Chronic A-Fib: AICD/permanent pacemaker. Chronically on Coumadin. Completed 4 day heparin bridge back to PO coumadin with INR 2.78. Discussed case with pharmacy, will d/c heparin bridge at this time. Continue Coumadin and Digoxin. 6. GERD: Continue PPI. 7. Tobacco Abuse: Continued everyday smoker. Discussed cessation  8. Hx of Constipation: Continue prn dulcolax and glycerin suppository. Linzess at home. 9. Hx hallucinations with narcotics: AO x3. No hallucinations.      Disposition: Planning IP Rehab eval.    Chief Complaint: LLE Cellulitis, Subtherapeutic INR    Initial H and P:-    \"Initial admission HPI by admitting physician reviewed as below:  Patient has a significant history of current everyday smoker, COPD, chronic ischemic heart disease, systolic heart failure, recurrent atrial fibrillation-Coumadin, remote biventricular pacemaker-AICD, echo-2/2020 LVEF 30 to 35%, essential hypertension, left arm DVT, GERD, recurrent UTIs, anxiety, anemia.     Sharona presented to [facility] ED for an evaluation of severe left lower extremity pain, redness, and warmth that started yesterday evening. The patient states that yesterday morning she started feeling some discomfort to her lower extremity, but believed it was due to her compression stockings she wears for CHF. Yesterday evening she removed the stockings and saw that her left lower leg was extremely red, which has continued to spread, becoming more red, and even more painful. Denies any recent trauma, falls, accidents, or wounds to the lower extremity but states that she occasionally scratches her legs with her nails when taking on and off the compression stockings. The patient has attempted tylenol, percocet, biofreeze, and icyhot yesterday with no relief. Work-up while in the ER includes CT tib-fib was concerning for fasciitis however there was no intrafacial gas to suggest a necrotizing fasciitis. Everet Lion was consulted.  A femoral central line catheter was placed.  Levophed drip was started. Daniela Mooney was admitted to the ICU for further evaluation and care.      Patient much improved and transferred from ICU to the floor\"    Subjective (past 24 hours):   Patient value resting comfortably in bed. States that she has had improving left lower extremity pain. States that the swelling and erythema appears to be improving as well. Reports chronic back pain. Shortness of breath is at baseline and resting comfortably on O2 via nasal cannula. Reports intermittent productive cough. No fever or chills. Past medical history, family history, social history and allergies reviewed again and is unchanged since admission.     ROS (12 point review of without Rales/Wheezes/Rhonchi. Cardiovascular: Regular rate and rhythm with normal S1/S2 without murmurs, rubs or gallops. Abdomen: Soft, non-tender, non-distended with normal bowel sounds. Musculoskeletal: passive and active ROM x 4 extremities. LLE erythema, warmth, and mild TTP below the knee into superior aspect of ankle. Significant recession behind previous borders. Skin: Skin color, texture, turgor normal. Erythema as above. .  Neurologic:  Neurovascularly intact without any focal sensory/motor deficits. Cranial nerves: II-XII intact, grossly non-focal.  Psychiatric: Alert and oriented, thought content appropriate, normal insight  Capillary Refill: Brisk,< 3 seconds   Peripheral Pulses: +2 palpable, equal bilaterally     Labs:   Recent Labs     10/29/20  0639 10/31/20  0709   WBC 5.2 5.8   HGB 9.7* 8.9*   HCT 30.9* 28.9*   * 117*     No results for input(s): NA, K, CL, CO2, BUN, CREATININE, CALCIUM, PHOS in the last 72 hours. Invalid input(s): MAGNES  No results for input(s): AST, ALT, BILIDIR, BILITOT, ALKPHOS in the last 72 hours. Recent Labs     10/29/20  0639 10/30/20  0647 10/31/20  0709   INR 1.49* 2.00* 2.78*     No results for input(s): CKTOTAL, TROPONINI in the last 72 hours. Microbiology:    Blood culture #1:   Lab Results   Component Value Date    BC No growth-preliminary No growth  10/25/2020       Blood culture #2:No results found for: Hadyeeleah Rhiannasuzanne    Organism:  Lab Results   Component Value Date    ORG Mixed Growth     02/21/2020         Lab Results   Component Value Date    LABGRAM  11/26/2019     No segmented neutrophils observed. Few epithelial cells observed. Many large gram positive bacilli. Few small gram positive bacilli. Few gram negative bacilli. Prepubescent and postmenopausal female samples (<15and >47ears of age) are not typically suitable for bacterialvaginosis screening.        MRSA culture only:No results found for: [address] [place] [address]    Urine culture:   Lab Results   Component INFORMATION: CVC placement COMPARISON: 2/22/2020 TECHNIQUE: A single mobile view of the chest was obtained. 1. Mild cardiomegaly. Permanent pacemaker/defibrillator. 2. Old fracture mid left humeral shaft, incompletely imaged on this study. 3. No central venous line is seen on this particular study. 4. No acute findings. No infiltrates or effusions are seen. **This report has been created using voice recognition software. It may contain minor errors which are inherent in voice recognition technology. ** Final report electronically signed by Dr. Khushboo Tobias on 10/25/2020 3:43 PM    Ct Tibia Fibula Left Wo Contrast    Result Date: 10/25/2020  PROCEDURE: CT TIBIA FIBULA LEFT WO CONTRAST CLINICAL INFORMATION: Left lower leg pain, redness and swelling for one day; assess for necrotizing fasciitis. COMPARISON: No prior study. TECHNIQUE: 0.9 mm axial imaging through the tibia/fibula without contrast. All CT scans at this facility use dose modulation, iterative reconstruction, and/or weight based dosing when appropriate to reduce the radiation dose to as low as reasonably achievable. FINDINGS: ALIGNMENT: Anatomic. MINERALIZATION: Normal. FRACTURE/OSSEOUS DESTRUCTION/PERIOSTITIS: None. KNEE: Unremarkable. ANKLE: Unremarkable. MUSCULATURE: 1. There is indistinctness of the fascial along the lateral and posterolateral aspects of the left lower leg with indistinctness of the musculature within the lateral and superficial/deep posterior compartments of the left lower leg. Intrafascial edema cannot be excluded. There is no intra-tricompartmental gas. There is indistinctness of the peroneal musculature and the lateral aspect of the posterior compartment including the soleus and lateral head of the gastrocnemius muscles. Findings can be associated with a fasciitis however there is no intrafascial gas to suggest a necrotizing fasciitis.  Correlation with clinical findings recommended to exclude a compartment syndrome as this is a clinical diagnosis. The noncontrast CT appearance of the musculature of the left lower leg is otherwise unremarkable. There is extensive skin and subcutaneous edema throughout the left lower leg which in the right clinical setting can be associated with a cellulitis. There is also extensive skin and subcutaneous edema throughout the right lower leg which was included within the field of imaging on the coronal images. There is no subcutaneous gas. In addition, there is edema within Kager's fat. NEUROVASCULAR BUNDLE: 1. Atheromatous calcification. OTHER: None. 1. There is indistinctness of the fascial along the lateral and posterolateral aspects of the left lower leg with indistinctness of the musculature within the lateral and superficial/deep posterior compartments of the left lower leg. Intrafascial edema cannot be excluded. There is no intra-tricompartmental gas. There is indistinctness of the peroneal musculature and the lateral aspect of the posterior compartment including the soleus and lateral head of the gastrocnemius muscles. Findings can be associated with a fasciitis however there is no intrafascial gas to suggest a necrotizing fasciitis. Correlation with clinical findings recommended to exclude a compartment syndrome as this is a clinical diagnosis. The noncontrast CT appearance of the musculature of the left lower leg is otherwise unremarkable. There is extensive skin and subcutaneous edema throughout the left lower leg which in the right clinical setting can be associated with a cellulitis. There is also extensive skin and subcutaneous edema throughout the right lower leg which was included within the field of imaging on the coronal images. There is no subcutaneous gas. In addition, there is edema within Kager's fat. Clinical management is recommended. 2. There is no osseous abnormality. **This report has been created using voice recognition software.   It may contain minor errors which are inherent in voice recognition technology. ** Final report electronically signed by Dr. Corinne Grippe on 10/25/2020 12:40 PM    Vl Dup Lower Extremity Venous Left    Result Date: 10/26/2020  PROCEDURE: VL DUP LOWER EXTREMITY VENOUS LEFT CLINICAL INFORMATION: pain, COMPARISON: No prior study. TECHNIQUE: Multiple grayscale and color flow images of the major veins of the left lower extremity were obtained from the level of the groin to the level of the ankle. Multiple compression and augmentation maneuvers were performed. A few images of the contralateral common femoral vein were also obtained. FINDINGS:   All the  deep veins  are widely patent with normal flow and normal compressibility. .   The contralateral common femoral vein is unremarkable. Normal venous ultrasound. No evidence for acute deep venous thrombosis. **This report has been created using voice recognition software. It may contain minor errors which are inherent in voice recognition technology. ** Final report electronically signed by Dr. Francisco Hensley on 10/26/2020 1:02 PM      Electronically signed by Chris Rain PA-C on 10/31/2020 at 11:58 AM

## 2020-10-31 NOTE — CONSULTS
Physical Medicine and Rehabilitation Consult Note     Clarissa Henriquez  [id number]    Reason for Consult: evaluation for rehabilitation needs s/p Physical deconditioning secondary to Left lower leg severe cellulitis. Impression/Recommendations:     Impression:  1. Left lower leg severe cellulitis. 2. Pneumonia. 3. Hypotension. 4. Hypoxia. 5. COPD exacerbation with chronic home oxygen use at 2 L. Follows with Dr. Jatinder Ayers. 6. Acute on chronic systolic congestive heart failure with ejection fraction of 30 to 35%. 7. Physical deconditioning. 8. Gait instability. 9. Septic shock secondary to left lower leg cellulitis. 10. Atrial fibrillation on chronic anticoagulation with Coumadin. 11. Biventricular implanted cardioverter-defibrillator  12. Coronary artery disease. 13. Hyperlipidemia. 14. Hypertension. 15. History of prior left displaced femoral neck fracture status post hemiarthroplasty by Dr. Chris Cook on 10/24/2019. 16. History of prior closed head injury. [place] Cognitive Assessment [facility]) version 8.3 completed. Patient scored 21/30 on 11/4. 16. History of prior myocardial infarction, 1994. 18. Mild peripheral vascular disease per vascular OMAYRA bilateral study dated 2/25/2020. 19. Severe left external carotid artery stenosis and left subclavian artery stenosis/occlusion per vascular carotid bilateral ultrasound on 4/1/2014. 20. Mild to moderate central spinal stenosis at L5-L6 with moderate right S1 lateral recess stenosis and findings of 6 lumbar type vertebral body per CT of the lumbar spine without contrast on 10/19/2012  21. Anxiety. 22. Osteopenia per DEXA bone scan on 12/7/2015 with medium fracture risk. 23. Chronic constipation. 24. GERD. 25. Current every day smoker. Recommendations:  1. Patient is a good candidate for IPR and is agreeable to do so. 2. Continue PT/OT. 3. Can transfer once medically stable. It was my pleasure to evaluate Clarissa Henriquez today.   Please call to her left lower limb cellulitis. Strength is grossly 4 out of 5 in all 4 limbs with exception of testing the left ankle. She has noted significant muscle loss in both lower limbs out of proportion to her body habitus. Her left lower limb has border markings outlining the cellulitic areas which appear a deep red color. The potential for coming to the acute inpatient rehabilitation unit was discussed and she understands that she would not require prior authorization under her Medicare benefits. The expected length of stay would be 10 to 12 days for her given diagnosis. The patient feels that such a stay would be reasonable given that she was on the Transitional Care Unit last year after she had a hip fracture and felt that that was a beneficial stay as well. Prior Function  Receives Help From: Family  ADL Assistance: Independent  Homemaking Assistance: Needs assistance(Spouse assists with cooking (Pt reports they eat out frequently or complete microwave meals), cleaning, and laundry PRN.)  Ambulation Assistance: Independent  Transfer Assistance: Independent    Previously was independent of ADLs and used no AD for ambulation prior to recent admission. Initially has ambulated 13' with RW at [facility] assistance with PT. With therapy is Supervision for bed mobility, standby assistance for transfers, and Modified independence with balance.     ROM restrictions, WB restrictions, precautions: Restrictions/Precautions: Weight Bearing, General Precautions, Fall Risk  Left Lower Extremity Weight Bearing: Weight Bearing As Tolerated    Fall Risk    Current Rehabilitation Assessments:  PT:    OBJECTIVE:  Range of Motion:  Bilateral Lower Extremity: WFL, left ankle DF to neutral pain and edema limitinig     Strength:  Bilateral Lower Extremity: WFL, limited at LLE due to weakness     Balance:  Static Sitting Balance:  Modified Independent  Static Standing Balance: Stand By Assistance, with RW     Bed Mobility:  Rolling to Right: Modified Independent   Sit to Supine: Supervision   Scooting: Supervision  HOB flat and no BR    Transfers:  Sit to Stand: Stand By Assistance  Stand to Sit:Stand By Assistance     Ambulation:  Contact Guard Assistance, to SBA  Distance: 15'x1  Surface: Level Tile  Device:Rolling Walker  Gait Deviations: Forward Flexed Posture, Slow Cassie, Decreased Heel Strike on Left, Narrow Base of Support and min unsteady, pain limiting      Exercise:  Patient was guided in 1 set(s) 2-3 reps of exercise to both lower extremities. Ankle pumps, Glut sets, Quad sets, Heelslides, Short arc quads, Hip abduction/adduction, Straight leg raises, Seated marches and Long arc quads. Issued for HEP to cont 2-3x/day. Exercises were completed for increased independence with functional mobility.     Functional Outcome Measures: Completed  AM-PAC Inpatient Mobility Raw Score : 17  AM-PAC Inpatient T-Scale Score : 42.13     ASSESSMENT:  Activity Tolerance:  Patient tolerance of  treatment: fair.        Treatment Initiated: Treatment and education initiated within context of evaluation. Evaluation time included review of current medical information, gathering information related to past medical, social and functional history, completion of standardized testing, formal and informal observation of tasks, assessment of data and development of plan of care and goals. Treatment time included skilled education and facilitation of tasks to increase safety and independence with functional mobility for improved independence and quality of life.     Assessment:   Body structures, Functions, Activity limitations: Decreased functional mobility , Increased pain, Decreased balance, Decreased strength, Decreased endurance  Assessment: pt tolerated fair, pain and edema in LLE, generalized weakness, IV lines, O2, inc assist with use of walker for safe mobility, recommend cont PT to inc pt I with functional mobility  Prognosis: Excellent     REQUIRES PT FOLLOW UP: Yes     Discharge Recommendations:  Discharge Recommendations: Continue to assess pending progress, Patient would benefit from continued therapy after discharge(pt may benefit from HHPT at discharge, cont to monitor)     Patient Education:  PT Education: Goals, PT Role, Plan of Care, Home Exercise Program, Functional Mobility Training     Equipment Recommendations:  Equipment Needed: No  Other: cont to monitor for needs  OT:  COGNITION: Decreased Insight, Decreased Problem Solving and Decreased Safety Awareness     ADL:   Grooming: with set-up.  to comb hair in sitting, and CGA standing at sink to wash hands   Toileting: Stand By Assistance. able to complete care in standing   Toilet Transfer: [employer]. to STS .    BALANCE:  Standing Balance: Contact Guard Assistance. at [facility] adn sink for 1-2 min      BED MOBILITY:  Supine to Sit: Contact Guard Assistance use of bedrails and HOB slightly elevated      TRANSFERS:  Sit to Stand:  contact guard  Assistance. from EOB and STS, recliner   Stand to Sit: Contact Guard Assistance. to recliner and STS      FUNCTIONAL MOBILITY:  Assistive Device: Rolling Walker  Assist Level:  Contact Guard Assistance and with increased time for completion. Distance: To and from bathroom and around in room   Pt had no LOB, slightly unsteady at times due to pain in LLE some edema noted in L foot and RN aware , Pt very SOB after mobility in room. Has her own inhaler      ADDITIONAL ACTIVITIES:  Patient identified a personal goal to increase UB strength and improve overall endurance so they can complete their toilet & shower transfers; skilled edu on UE strengthening and patient completed BUE strengthening exercises x10 reps x1 set this date with a minimal resistive band  in all joints and all planes. Patient tolerated in sitting , requiring short  rest breaks. Pt required min verbal  cues for technique.     ASSESSMENT:  Activity Tolerance:  pantoprazole  40 mg Oral BID AC    sacubitril-valsartan  1 tablet Oral BID    traZODone  50 mg Oral Nightly    piperacillin-tazobactam  3.375 g Intravenous Q8H    linezolid  600 mg Intravenous Q12H    docusate sodium  100 mg Oral Daily    senna  1 tablet Oral Nightly    sodium chloride flush  10 mL Intravenous 2 times per day    lidocaine 1 % injection  5 mL Intradermal Once    Arformoterol Tartrate  15 mcg Nebulization BID    budesonide  500 mcg Nebulization BID    tiotropium  2 puff Inhalation Daily     PRNs: bisacodyl, sodium chloride flush, sodium chloride flush, HYDROmorphone, HYDROcodone 5 mg - acetaminophen, HYDROcodone 5 mg - acetaminophen, cyclobenzaprine, albuterol sulfate HFA, levalbuterol, acetaminophen **OR** acetaminophen, polyethylene glycol, promethazine **OR** ondansetron    Allergies:    Allergies   Allergen Reactions    Benadryl [Diphenhydramine Hcl] Itching and Swelling    Ciprofloxacin Nausea And Vomiting    Clarithromycin Nausea And Vomiting    Vitamin K Other (See Comments)    Atorvastatin Other (See Comments)    Captopril Other (See Comments)     unknown    Codeine Itching    Iv Dye [Iodides] Dermatitis    Lipitor Other (See Comments)     Muscle pain      Macrobid [Nitrofurantoin Monohydrate Macrocrystals] Other (See Comments)     Chest pain, headache    Neomycin-Bacitracin Zn-Polymyx Swelling    Pravastatin      Muscle weakness and nate horses    Zetia [Ezetimibe]      Muscle weakness and nate horses    Adhesive Tape Rash    Cephalexin Nausea And Vomiting    Doxycycline Nausea And Vomiting    Morphine Hives, Swelling and Rash    Other Nausea And Vomiting    Propoxyphene Nausea And Vomiting    Sulfa Antibiotics Nausea And Vomiting     Physical Exam:     Physical Exam:  BP (!) 121/55   Pulse 72   Temp 98.3 °F (36.8 °C) (Oral)   Resp 18   Ht 5' 2\" (1.575 m)   Wt 154 lb (69.9 kg)   SpO2 95%   BMI 28.17 kg/m²     awake  Orientation:   person, place, time, situation  Mood: within normal limits  Affect: flat  General appearance: Patient is well nourished, well developed, well groomed and in no acute distress, overweight, appearing stated age, glasses and [email or web address]    Memory:   grossly normal  Attention/Concentration: normal  Language:  normal    Cranial Nerves:  cranial nerves II-XII are grossly intact  ROM:  Normal, although hesitant to move left ankle secondary to known cellulitis  Motor Exam:  Motor exam is 4 out of 5 all extremities with the exception of left ankle not tested    Tone:  normal  Muscle bulk: within normal limits  Sensory:  Sensory intact  Coordination:   normal  Deep Tendon Reflexes:  Reflexes are intact and symmetrical bilaterally with left Achilles tendon not tested    Skin: warm and dry, no rash with notable erythema involving the left ankle to approximately mid calf with surgical markers demarcating the current extent of the cellulitis  Peripheral vascular: Pulses: Normal upper and diminished lower extremity pulses; Edema: 1+    Relevant Studies:     Diagnostics:  Recent Results (from the past 24 hour(s))   APTT    Collection Time: 10/30/20  6:38 PM   Result Value Ref Range    aPTT 84.6 (H) 22.0 - 38.0 seconds   Protime-INR    Collection Time: 10/31/20  7:09 AM   Result Value Ref Range    INR 2.78 (H) 0.85 - 1.13   CBC    Collection Time: 10/31/20  7:09 AM   Result Value Ref Range    WBC 5.8 4.8 - 10.8 thou/mm3    RBC 2.88 (L) 4.20 - 5.40 mill/mm3    Hemoglobin 8.9 (L) 12.0 - 16.0 gm/dl    Hematocrit 28.9 (L) 37.0 - 47.0 %    .3 (H) 81.0 - 99.0 fL    MCH 30.9 26.0 - 33.0 pg    MCHC 30.8 (L) 32.2 - 35.5 gm/dl    RDW-CV 14.6 (H) 11.5 - 14.5 %    RDW-SD 53.0 (H) 35.0 - 45.0 fL    Platelets 894 (L) 738 - 400 thou/mm3    MPV 11.0 9.4 - 12.4 fL   APTT    Collection Time: 10/31/20  7:09 AM   Result Value Ref Range    aPTT 105.6 (H) 22.0 - 38.0 seconds     Lab Results   Component Value Date    LABA1C 5.9 11/24/2015     No results found for: EAG  Recent Labs     10/31/20  0709 10/29/20  0639 10/28/20  0635   WBC 5.8 5.2 6.9   HGB 8.9* 9.7* 10.0*   HCT 28.9* 30.9* 31.9*   .3* 99.4* 100.0*   * 113* 96*     Imaging  Xr Chest (2 Vw)    Result Date: 10/30/2020  PROCEDURE: XR CHEST (2 VW) CLINICAL INFORMATION: cough with hemoptysis COMPARISON: 10/25/2020 TECHNIQUE: PA and lateral views of the chest were obtained. 1. Mild cardiomegaly. Permanent pacemaker/defibrillator. No effusion. 2. Moderate interstitial infiltrates scattered throughout the right lung. Questionable minimal infiltrate left mid and lower lung fields. Findings most consistent with interstitial pneumonia. **This report has been created using voice recognition software. It may contain minor errors which are inherent in voice recognition technology. ** Final report electronically signed by Dr. Boyce Both on 10/30/2020 1:47 PM    Xr Ankle Left (min 3 Views)    Result Date: 10/26/2020  PROCEDURE:XR ANKLE LEFT (MIN 3 VIEWS) CLINICAL INFORMATION: swelling pain TECHNIQUE: 3 standard views of the left ankle were obtained. FINDINGS:  Moderate soft tissue swelling overlies ankle diffusely. No acute fracture or dislocation is seen. The ankle mortise is intact. There is a large plantar calcaneal spur. Diffuse soft tissue swelling of the ankle. No acute fracture seen. **This report has been created using voice recognition software. It may contain minor errors which are inherent in voice recognition technology. ** Final report electronically signed by Dr. Boyce Both on 10/26/2020 12:41 PM    Xr Foot Left (min 3 Views)    Result Date: 10/26/2020  PROCEDURE: XR FOOT LEFT (MIN 3 VIEWS) CLINICAL INFORMATION: swelling. COMPARISON: No prior study. TECHNIQUE: 4 views of the left foot. FINDINGS: Moderate dorsal soft tissue swelling. Films are overpenetrated. No definite fracture. Alignment appears intact. Mild vascular calcification is seen. Moderate swelling without a gross fracture. **This report has been created using voice recognition software. It may contain minor errors which are inherent in voice recognition technology. ** Final report electronically signed by Dr. Millie Richter on 10/26/2020 12:34 PM    Xr Chest Portable    Result Date: 10/25/2020  PROCEDURE: XR CHEST PORTABLE CLINICAL INFORMATION: CVC placement COMPARISON: 2/22/2020 TECHNIQUE: A single mobile view of the chest was obtained. 1. Mild cardiomegaly. Permanent pacemaker/defibrillator. 2. Old fracture mid left humeral shaft, incompletely imaged on this study. 3. No central venous line is seen on this particular study. 4. No acute findings. No infiltrates or effusions are seen. **This report has been created using voice recognition software. It may contain minor errors which are inherent in voice recognition technology. ** Final report electronically signed by Dr. Evita Ochoa on 10/25/2020 3:43 PM    Ct Tibia Fibula Left Wo Contrast    Result Date: 10/25/2020  PROCEDURE: CT TIBIA FIBULA LEFT WO CONTRAST CLINICAL INFORMATION: Left lower leg pain, redness and swelling for one day; assess for necrotizing fasciitis. COMPARISON: No prior study. TECHNIQUE: 0.9 mm axial imaging through the tibia/fibula without contrast. All CT scans at this facility use dose modulation, iterative reconstruction, and/or weight based dosing when appropriate to reduce the radiation dose to as low as reasonably achievable. FINDINGS: ALIGNMENT: Anatomic. MINERALIZATION: Normal. FRACTURE/OSSEOUS DESTRUCTION/PERIOSTITIS: None. KNEE: Unremarkable. ANKLE: Unremarkable. MUSCULATURE: 1. There is indistinctness of the fascial along the lateral and posterolateral aspects of the left lower leg with indistinctness of the musculature within the lateral and superficial/deep posterior compartments of the left lower leg. Intrafascial edema cannot be excluded. There is no intra-tricompartmental gas.  There is indistinctness of the peroneal musculature and the lateral aspect of the posterior compartment including the soleus and lateral head of the gastrocnemius muscles. Findings can be associated with a fasciitis however there is no intrafascial gas to suggest a necrotizing fasciitis. Correlation with clinical findings recommended to exclude a compartment syndrome as this is a clinical diagnosis. The noncontrast CT appearance of the musculature of the left lower leg is otherwise unremarkable. There is extensive skin and subcutaneous edema throughout the left lower leg which in the right clinical setting can be associated with a cellulitis. There is also extensive skin and subcutaneous edema throughout the right lower leg which was included within the field of imaging on the coronal images. There is no subcutaneous gas. In addition, there is edema within Kager's fat. NEUROVASCULAR BUNDLE: 1. Atheromatous calcification. OTHER: None. 1. There is indistinctness of the fascial along the lateral and posterolateral aspects of the left lower leg with indistinctness of the musculature within the lateral and superficial/deep posterior compartments of the left lower leg. Intrafascial edema cannot be excluded. There is no intra-tricompartmental gas. There is indistinctness of the peroneal musculature and the lateral aspect of the posterior compartment including the soleus and lateral head of the gastrocnemius muscles. Findings can be associated with a fasciitis however there is no intrafascial gas to suggest a necrotizing fasciitis. Correlation with clinical findings recommended to exclude a compartment syndrome as this is a clinical diagnosis. The noncontrast CT appearance of the musculature of the left lower leg is otherwise unremarkable. There is extensive skin and subcutaneous edema throughout the left lower leg which in the right clinical setting can be associated with a cellulitis.  There is also extensive skin and subcutaneous edema throughout the right lower leg which was included within the field of imaging on the coronal images. There is no subcutaneous gas. In addition, there is edema within Kager's fat. Clinical management is recommended. 2. There is no osseous abnormality. **This report has been created using voice recognition software. It may contain minor errors which are inherent in voice recognition technology. ** Final report electronically signed by Dr. Partha Villarreal on 10/25/2020 12:40 PM    Vl Dup Lower Extremity Venous Left    Result Date: 10/26/2020  PROCEDURE: VL DUP LOWER EXTREMITY VENOUS LEFT CLINICAL INFORMATION: pain, COMPARISON: No prior study. TECHNIQUE: Multiple grayscale and color flow images of the major veins of the left lower extremity were obtained from the level of the groin to the level of the ankle. Multiple compression and augmentation maneuvers were performed. A few images of the contralateral common femoral vein were also obtained. FINDINGS:   All the  deep veins  are widely patent with normal flow and normal compressibility. .   The contralateral common femoral vein is unremarkable. Normal venous ultrasound. No evidence for acute deep venous thrombosis. **This report has been created using voice recognition software. It may contain minor errors which are inherent in voice recognition technology. ** Final report electronically signed by Dr. Sally Felix on 10/26/2020 1:02 PM

## 2020-10-31 NOTE — PROGRESS NOTES
Heparin Consult  Lab Results   Component Value Date    APTT 105.6 10/31/2020     Lab Results   Component Value Date    HGB 8.9 10/31/2020    HCT 28.9 10/31/2020     10/31/2020    INR 2.78 10/31/2020       Current Rate: 21 units/kg/hr    Plan:  Rate: Decrease rate to 19 units/kg/hr  Next aPTT: 1500    Theone Standing, PharmD, BCPS   10/31/2020  9:13 AM

## 2020-10-31 NOTE — PROGRESS NOTES
Orthopaedic Progress Note      SUBJECTIVE   Ms. Teddy Alvarado is hospital stay day #6 for LLE cellulitis. Patient seen resting in bed with family at bedside. Pain adequately controlled however she does endorse intermittent sharp/shooting pains around the ankle/foot of the LLE. ID following and treating. No new concerns from ortho standpoint. OBJECTIVE      Physical    VITALS:  BP (!) 121/55   Pulse 72   Temp 98.3 °F (36.8 °C) (Oral)   Resp 18   Ht 5' 2\" (1.575 m)   Wt 154 lb (69.9 kg)   SpO2 95%   BMI 28.17 kg/m²   I/O last 3 completed shifts: In: 1187.7 [P.O.:510; I.V.:677.7]  Out: -       LLE:  Skin intact. Erythema and swelling noted, moderate swelling in foot. Denies calf pain to palpation proximal to the cellulitis.  good range of motion without deformity. Pt can flex and extend left toes. No obvious drainage noted.  Dorsalis pedal pulse weak but palpable as swelling made it difficult to assess      Data  CBC:   Lab Results   Component Value Date    WBC 5.8 10/31/2020    HGB 8.9 10/31/2020     10/31/2020     BMP:    Lab Results   Component Value Date     10/28/2020    K 4.7 10/28/2020    K 4.4 10/26/2020     10/28/2020    CO2 20 10/28/2020    BUN 21 10/28/2020    CREATININE 0.7 10/28/2020    CALCIUM 9.2 10/28/2020    GLUCOSE 149 10/28/2020    GLUCOSE 115 07/06/2018     Uric Acid:  No components found for: URIC  PT/INR:    Lab Results   Component Value Date    PROTIME 22.3 02/05/2019    INR 2.78 10/31/2020     Troponin:    Lab Results   Component Value Date    TROPONINI <0.006 10/04/2013     Urine Culture:  No components found for: COLE      Current Inpatient Medications    Current Facility-Administered Medications: warfarin (COUMADIN) split-tablet 0.5 mg, 0.5 mg, Oral, Once  gabapentin (NEURONTIN) capsule 100 mg, 100 mg, Oral, TID  warfarin (COUMADIN) daily dosing (placeholder), , Other, RX Placeholder  aspirin EC tablet 81 mg, 81 mg, Oral, Daily  digoxin (LANOXIN) tablet 125 mcg, 125 mcg, Oral, Every Other Day  furosemide (LASIX) tablet 40 mg, 40 mg, Oral, Daily  metoprolol succinate (TOPROL XL) extended release tablet 25 mg, 25 mg, Oral, Daily  pantoprazole (PROTONIX) tablet 40 mg, 40 mg, Oral, BID AC  sacubitril-valsartan (ENTRESTO) 49-51 MG per tablet 1 tablet, 1 tablet, Oral, BID  traZODone (DESYREL) tablet 50 mg, 50 mg, Oral, Nightly  piperacillin-tazobactam (ZOSYN) 3.375 g in dextrose 5 % 50 mL IVPB extended infusion (mini-bag), 3.375 g, Intravenous, Q8H  linezolid (ZYVOX) IVPB 600 mg, 600 mg, Intravenous, Q12H  docusate sodium (COLACE) capsule 100 mg, 100 mg, Oral, Daily  senna (SENOKOT) tablet 8.6 mg, 1 tablet, Oral, Nightly  bisacodyl (DULCOLAX) suppository 10 mg, 10 mg, Rectal, Daily PRN  sodium chloride flush 0.9 % injection 10 mL, 10 mL, Intravenous, 2 times per day  sodium chloride flush 0.9 % injection 10 mL, 10 mL, Intravenous, PRN  lidocaine PF 1 % injection 5 mL, 5 mL, Intradermal, Once  sodium chloride flush 0.9 % injection 10 mL, 10 mL, Intravenous, PRN  HYDROmorphone (DILAUDID) injection 0.5 mg, 0.5 mg, Intravenous, Q2H PRN  HYDROcodone-acetaminophen (NORCO) 5-325 MG per tablet 2 tablet, 2 tablet, Oral, Q4H PRN  HYDROcodone-acetaminophen (NORCO) 5-325 MG per tablet 1 tablet, 1 tablet, Oral, Q4H PRN  cyclobenzaprine (FLEXERIL) tablet 10 mg, 10 mg, Oral, TID PRN  albuterol sulfate  (90 Base) MCG/ACT inhaler 2 puff, 2 puff, Inhalation, Q6H PRN  Arformoterol Tartrate (BROVANA) nebulizer solution 15 mcg, 15 mcg, Nebulization, BID  budesonide (PULMICORT) nebulizer suspension 500 mcg, 500 mcg, Nebulization, BID  levalbuterol (XOPENEX) nebulization 0.63 mg, 1 ampule, Nebulization, Q8H PRN  tiotropium (SPIRIVA RESPIMAT) 2.5 MCG/ACT inhaler 2 puff, 2 puff, Inhalation, Daily  acetaminophen (TYLENOL) tablet 650 mg, 650 mg, Oral, Q6H PRN **OR** acetaminophen (TYLENOL) suppository 650 mg, 650 mg, Rectal, Q6H PRN  polyethylene glycol (GLYCOLAX) packet 17 g, 17 g, Oral, Daily PRN  promethazine (PHENERGAN) tablet 12.5 mg, 12.5 mg, Oral, Q6H PRN **OR** ondansetron (ZOFRAN) injection 4 mg, 4 mg, Intravenous, Q6H PRN        PLAN    1)  Cont with current medical management  2)  WBAT LLE, activity as tolerated  3)  Ice and elevation LLE  4)  PT/OT  5)  ID following  6)  Anticipating rehab, hopefully Monday    Cassie Yu PA-C

## 2020-10-31 NOTE — CONSULTS
Patient seen and examined for left lower leg severe cellulites with physical deconditioning. Patient is a good candidate for rehab admission and is agreeable to do so. She will not require prior authorization under her Medicare benefits. It was also discussed that her expected length of stay would be 10 to 12 days. Thank you for the consult.     Alcides Quezada MD

## 2020-11-01 NOTE — PLAN OF CARE
Problem: Impaired respiratory status  Goal: Clear lung sounds  Outcome: Ongoing    Improve breath sounds, increase aeration and decrease WOB.

## 2020-11-01 NOTE — PROGRESS NOTES
Hospitalist Progress Note      Patient:  Sammy Jorgensen    Unit/Bed:7K-08/008-A  YOB: 1945  MRN: [id number]   Acct: [de-identified]   PCP: Chel Crisostomo MD  Date of Admission: 10/25/2020    Assessment/Plan:    1. Hypotension/Hypoxia   1. CXR with PNA, known LLE cellulitis. 2. 1L NS bolus, cbc, cmp, bnp ordered. Pt herself asymptomatic. No complaints. 3. On abx. ID following  4. On multiple antihypertensives. Consulted cardio given elevated bnp, worsening o2 sats, low BP.   5. Hold parameters for meds <67 systolic. 2. Septic Shock 2/2 LLE Cellulitis/Pneumonia: Noted on admission with BP 79/50 initially, WBC 16.3, procal 2.66. CT Tib/fib showed 10/25/20 showed findings possibly associated with fasciitis but no intrafascial gas suggestive of necrotizing fasciitis. Vasopressors discontinued and transferred out of ICU 10/27/20. ID following and assisting with management. Ortho following with no plans for surgical intervention. 1. Antibiotics initiated 10/25/20. Zosyn/Zyvox day #7. Dr. Johnson Sharp, ID following to assist with cellulitis management. Planning IP rehab per primary, likely 11/2/20. 2. Patient with xray 10/29 showing PNA. On abx  3. COPD with acute exacerbation: Continue Brovana/Pulmicort and Spiriva. Chronically on 2L O2 via NC. This morning o2 sats low 82 but improved with 5L NC. Repeat CT chest w/o contrast ordered. Solumedrol and duonebs. 4. ?Acute on Chronic HFrEF:  BNP elevated however difficult to interperet while on Entresto. pts bnp has risen since last check. Due to low bps, increase o2 needs, bnp was repeated. Echo 2/2020 with EF 30-35%. Continue Entresto and Lasix. Maintaining O2 sat on home 2-3L O2 via NC. Cardio consulted as bps low, hypoxic and bnp elevated. Ct chest pending read. Exam with wheezing on left side  5. Essential HTN: Required Levophed while in ICU. Continue home medications at this time.    6. Chronic A-Fib: She wants you to know that she has to cancel the surgery that is scheduled for 01/03/2020 because she cannot get in to get clearance to go off her coumadin before then. The soonest she could see the doctor was on the 30 th. Please call her when you return to the office on Thursday ,12/26. Thank you.    AICD/permanent pacemaker. Chronically on Coumadin. Completed 4 day heparin bridge back to PO coumadin with INR 2.78. Discussed case with pharmacy, will d/c heparin bridge at this time. Continue Coumadin and Digoxin. 7. GERD: Continue PPI. 8. Tobacco Abuse: Continued everyday smoker. Discussed cessation  9. Hx of Constipation: Continue prn dulcolax and glycerin suppository. Linzess at home. 10. Hx hallucinations with narcotics: AO x3. No hallucinations. Disposition: Planning IP Rehab eval.    Chief Complaint: LLE Cellulitis, Subtherapeutic INR    Initial H and P:-    \"Initial admission HPI by admitting physician reviewed as below:  Patient has a significant history of current everyday smoker, COPD, chronic ischemic heart disease, systolic heart failure, recurrent atrial fibrillation-Coumadin, remote biventricular pacemaker-AICD, echo-2/2020 LVEF 30 to 35%, essential hypertension, left arm DVT, GERD, recurrent UTIs, anxiety, anemia.     Sharona presented to [facility] ED for an evaluation of severe left lower extremity pain, redness, and warmth that started yesterday evening. The patient states that yesterday morning she started feeling some discomfort to her lower extremity, but believed it was due to her compression stockings she wears for CHF. Yesterday evening she removed the stockings and saw that her left lower leg was extremely red, which has continued to spread, becoming more red, and even more painful. Denies any recent trauma, falls, accidents, or wounds to the lower extremity but states that she occasionally scratches her legs with her nails when taking on and off the compression stockings. The patient has attempted tylenol, percocet, biofreeze, and icyhot yesterday with no relief.   Work-up while in the ER includes CT tib-fib was concerning for fasciitis however there was no intrafacial gas to suggest a necrotizing fasciitis. Everet Falling was consulted.  A femoral central line catheter was placed.  Levophed drip was started. Gabbi Martinez was admitted to the ICU for further evaluation and care.      Patient much improved and transferred from ICU to the floor\"    Subjective (past 24 hours):   Patient with sob, hypoxia, low Bps this am. She can get low Bps in the past but currently 80s/40s Given 1 L NS bolus and ordered labs and chest CT    Past medical history, family history, social history and allergies reviewed again and is unchanged since admission. ROS (12 point review of systems completed. Pertinent positives noted.  Otherwise ROS is negative)     Medications:  Reviewed    Infusion Medications   Scheduled Medications    warfarin  1.5 mg Oral Once    methylPREDNISolone  60 mg Intravenous Q6H    albuterol  2.5 mg Nebulization Q4H    gabapentin  100 mg Oral TID    warfarin (COUMADIN) daily dosing (placeholder)   Other RX Placeholder    aspirin  81 mg Oral Daily    digoxin  125 mcg Oral Every Other Day    furosemide  40 mg Oral Daily    metoprolol succinate  25 mg Oral Daily    pantoprazole  40 mg Oral BID AC    sacubitril-valsartan  1 tablet Oral BID    traZODone  50 mg Oral Nightly    piperacillin-tazobactam  3.375 g Intravenous Q8H    linezolid  600 mg Intravenous Q12H    docusate sodium  100 mg Oral Daily    senna  1 tablet Oral Nightly    sodium chloride flush  10 mL Intravenous 2 times per day    lidocaine 1 % injection  5 mL Intradermal Once    Arformoterol Tartrate  15 mcg Nebulization BID    budesonide  500 mcg Nebulization BID    tiotropium  2 puff Inhalation Daily     PRN Meds: bisacodyl, sodium chloride flush, sodium chloride flush, HYDROmorphone, HYDROcodone 5 mg - acetaminophen, HYDROcodone 5 mg - acetaminophen, cyclobenzaprine, albuterol sulfate HFA, levalbuterol, acetaminophen **OR** acetaminophen, polyethylene glycol, promethazine **OR** ondansetron      Intake/Output Summary (Last 24 hours) at 11/1/2020 9638  Last data filed at 11/1/2020 1400  Gross per 24 hour   Intake 4450.01 ml Output --   Net 4450.01 ml       Diet:  DIET GENERAL;    Exam:  BP (!) 90/42   Pulse 70   Temp 97.9 °F (36.6 °C) (Oral)   Resp 20   Ht 5' 2\" (1.575 m)   Wt 159 lb (72.1 kg)   SpO2 92%   BMI 29.08 kg/m²   General appearance: No apparent distress, appears stated age and cooperative. HEENT: Pupils equal, round, and reactive to light. Conjunctivae/corneas clear. Neck: Supple, with full range of motion. No jugular venous distention. Trachea midline. Respiratory:  Left side with wheezing  Cardiovascular: Regular rate and rhythm  Abdomen: Soft, non-tender, non-distended with normal bowel sounds. Musculoskeletal: passive and active ROM x 4 extremities. LLE erythema, warmth, and mild TTP below the knee into superior aspect of ankle. Significant recession behind previous borders. Skin: Skin color, texture, turgor normal. Erythema as above. .  Neurologic:  Neurovascularly intact without any focal sensory/motor deficits. Cranial nerves: II-XII intact, grossly non-focal.  Psychiatric: Alert and oriented, thought content appropriate, normal insight  Capillary Refill: Brisk,< 3 seconds   Peripheral Pulses: +2 palpable, equal bilaterally     Labs:   Recent Labs     10/31/20  0709 11/01/20  1138   WBC 5.8 5.6   HGB 8.9* 9.5*   HCT 28.9* 31.2*   * 137     Recent Labs     11/01/20  1138      K 4.5      CO2 26   BUN 26*   CREATININE 1.1   CALCIUM 8.6     Recent Labs     11/01/20  1138   AST 10   ALT 13   BILITOT 0.4   ALKPHOS 67     Recent Labs     10/30/20  0647 10/31/20  0709 11/01/20  0640   INR 2.00* 2.78* 2.37*     No results for input(s): Shaista Morrow in the last 72 hours.     Microbiology:    Blood culture #1:   Lab Results   Component Value Date    BC No growth-preliminary No growth  10/25/2020       Blood culture #2:No results found for: Geno Floyd    Organism:  Lab Results   Component Value Date    ORG Mixed Growth     02/21/2020         Lab Results   Component Value Date    LABGRAM 11/26/2019     No segmented neutrophils observed. Few epithelial cells observed. Many large gram positive bacilli. Few small gram positive bacilli. Few gram negative bacilli. Prepubescent and postmenopausal female samples (<15and >47ears of age) are not typically suitable for bacterialvaginosis screening. MRSA culture only:No results found for: [facility]    Urine culture:   Lab Results   Component Value Date    LABURIN  10/24/2019     Growth of Contaminants. The mixture of organisms present are not a common cause of urinary tract infections and probably represent distal urethral juventino. Respiratory culture: No results found for: CULTRESP    Aerobic and Anaerobic :  Lab Results   Component Value Date    LABAERO No growth-preliminary No growth   11/11/2019     Lab Results   Component Value Date    LABANAE No growth-preliminary No growth   11/11/2019       Urinalysis:      Lab Results   Component Value Date    NITRU NEGATIVE 02/22/2020    WBCUA 15-25 02/22/2020    BACTERIA MANY 02/22/2020    RBCUA 0-2 02/22/2020    BLOODU TRACE 02/22/2020    GLUCOSEU NEGATIVE 02/22/2020       Radiology:  XR CHEST (2 VW)   Final Result   1. Mild cardiomegaly. Permanent pacemaker/defibrillator. No effusion. 2. Moderate interstitial infiltrates scattered throughout the right lung. Questionable minimal infiltrate left mid and lower lung fields. Findings most consistent with interstitial pneumonia. **This report has been created using voice recognition software. It may contain minor errors which are inherent in voice recognition technology. **      Final report electronically signed by Dr. Chuy Arteaga on 10/30/2020 1:47 PM      VL DUP LOWER EXTREMITY VENOUS LEFT   Final Result   Normal venous ultrasound. No evidence for acute deep venous thrombosis. **This report has been created using voice recognition software. It may contain minor errors which are inherent in voice recognition technology. **      Final report electronically signed by Dr. Lio Espinal on 10/26/2020 1:02 PM      XR FOOT LEFT (MIN 3 VIEWS)   Final Result      Moderate swelling without a gross fracture. **This report has been created using voice recognition software. It may contain minor errors which are inherent in voice recognition technology. **      Final report electronically signed by Dr. Juno Cormier on 10/26/2020 12:34 PM      XR ANKLE LEFT (MIN 3 VIEWS)   Final Result   Diffuse soft tissue swelling of the ankle. No acute fracture seen. **This report has been created using voice recognition software. It may contain minor errors which are inherent in voice recognition technology. **      Final report electronically signed by Dr. Lio Espinal on 10/26/2020 12:41 PM      XR CHEST PORTABLE   Final Result   1. Mild cardiomegaly. Permanent pacemaker/defibrillator. 2. Old fracture mid left humeral shaft, incompletely imaged on this study. 3. No central venous line is seen on this particular study. 4. No acute findings. No infiltrates or effusions are seen. **This report has been created using voice recognition software. It may contain minor errors which are inherent in voice recognition technology. **      Final report electronically signed by Dr. Lio Espinal on 10/25/2020 3:43 PM      CT TIBIA FIBULA LEFT WO CONTRAST   Final Result   1. There is indistinctness of the fascial along the lateral and posterolateral aspects of the left lower leg with indistinctness of the musculature within the lateral and superficial/deep posterior compartments of the left lower leg. Intrafascial edema    cannot be excluded. There is no intra-tricompartmental gas. There is indistinctness of the peroneal musculature and the lateral aspect of the posterior compartment including the soleus and lateral head of the gastrocnemius muscles.  Findings can be    associated with a fasciitis however there is no intrafascial gas to fracture. Alignment appears intact. Mild vascular calcification is seen. Moderate swelling without a gross fracture. **This report has been created using voice recognition software. It may contain minor errors which are inherent in voice recognition technology. ** Final report electronically signed by Dr. Heather Montgomery on 10/26/2020 12:34 PM    Xr Chest Portable    Result Date: 10/25/2020  PROCEDURE: XR CHEST PORTABLE CLINICAL INFORMATION: CVC placement COMPARISON: 2/22/2020 TECHNIQUE: A single mobile view of the chest was obtained. 1. Mild cardiomegaly. Permanent pacemaker/defibrillator. 2. Old fracture mid left humeral shaft, incompletely imaged on this study. 3. No central venous line is seen on this particular study. 4. No acute findings. No infiltrates or effusions are seen. **This report has been created using voice recognition software. It may contain minor errors which are inherent in voice recognition technology. ** Final report electronically signed by Dr. Farnaz Silva on 10/25/2020 3:43 PM    Ct Tibia Fibula Left Wo Contrast    Result Date: 10/25/2020  PROCEDURE: CT TIBIA FIBULA LEFT WO CONTRAST CLINICAL INFORMATION: Left lower leg pain, redness and swelling for one day; assess for necrotizing fasciitis. COMPARISON: No prior study. TECHNIQUE: 0.9 mm axial imaging through the tibia/fibula without contrast. All CT scans at this facility use dose modulation, iterative reconstruction, and/or weight based dosing when appropriate to reduce the radiation dose to as low as reasonably achievable. FINDINGS: ALIGNMENT: Anatomic. MINERALIZATION: Normal. FRACTURE/OSSEOUS DESTRUCTION/PERIOSTITIS: None. KNEE: Unremarkable. ANKLE: Unremarkable. MUSCULATURE: 1. There is indistinctness of the fascial along the lateral and posterolateral aspects of the left lower leg with indistinctness of the musculature within the lateral and superficial/deep posterior compartments of the left lower leg.  Intrafascial edema cannot be excluded. There is no intra-tricompartmental gas. There is indistinctness of the peroneal musculature and the lateral aspect of the posterior compartment including the soleus and lateral head of the gastrocnemius muscles. Findings can be associated with a fasciitis however there is no intrafascial gas to suggest a necrotizing fasciitis. Correlation with clinical findings recommended to exclude a compartment syndrome as this is a clinical diagnosis. The noncontrast CT appearance of the musculature of the left lower leg is otherwise unremarkable. There is extensive skin and subcutaneous edema throughout the left lower leg which in the right clinical setting can be associated with a cellulitis. There is also extensive skin and subcutaneous edema throughout the right lower leg which was included within the field of imaging on the coronal images. There is no subcutaneous gas. In addition, there is edema within Kager's fat. NEUROVASCULAR BUNDLE: 1. Atheromatous calcification. OTHER: None. 1. There is indistinctness of the fascial along the lateral and posterolateral aspects of the left lower leg with indistinctness of the musculature within the lateral and superficial/deep posterior compartments of the left lower leg. Intrafascial edema cannot be excluded. There is no intra-tricompartmental gas. There is indistinctness of the peroneal musculature and the lateral aspect of the posterior compartment including the soleus and lateral head of the gastrocnemius muscles. Findings can be associated with a fasciitis however there is no intrafascial gas to suggest a necrotizing fasciitis. Correlation with clinical findings recommended to exclude a compartment syndrome as this is a clinical diagnosis. The noncontrast CT appearance of the musculature of the left lower leg is otherwise unremarkable.  There is extensive skin and subcutaneous edema throughout the left lower leg which in the right clinical setting can be associated with a cellulitis. There is also extensive skin and subcutaneous edema throughout the right lower leg which was included within the field of imaging on the coronal images. There is no subcutaneous gas. In addition, there is edema within Kager's fat. Clinical management is recommended. 2. There is no osseous abnormality. **This report has been created using voice recognition software. It may contain minor errors which are inherent in voice recognition technology. ** Final report electronically signed by Dr. Ashutosh Drummond on 10/25/2020 12:40 PM    Vl Dup Lower Extremity Venous Left    Result Date: 10/26/2020  PROCEDURE: VL DUP LOWER EXTREMITY VENOUS LEFT CLINICAL INFORMATION: pain, COMPARISON: No prior study. TECHNIQUE: Multiple grayscale and color flow images of the major veins of the left lower extremity were obtained from the level of the groin to the level of the ankle. Multiple compression and augmentation maneuvers were performed. A few images of the contralateral common femoral vein were also obtained. FINDINGS:   All the  deep veins  are widely patent with normal flow and normal compressibility. .   The contralateral common femoral vein is unremarkable. Normal venous ultrasound. No evidence for acute deep venous thrombosis. **This report has been created using voice recognition software. It may contain minor errors which are inherent in voice recognition technology. ** Final report electronically signed by Dr. Ifeanyi Munoz on 10/26/2020 1:02 PM      Electronically signed by Andrew Collier MD on 11/1/2020 at 5:38 PM

## 2020-11-01 NOTE — PROGRESS NOTES
Clinical Pharmacy Note    Warfarin consult follow-up    Recent Labs     11/01/20  0640   INR 2.37*     Recent Labs     10/31/20  0709   HGB 8.9*   HCT 28.9*   *     Significant Drug-Drug Interactions:  New warfarin drug-drug interactions: none  Discontinued drug-drug interactions: heparin drip  Current warfarin drug-drug interactions: aspirin,  trazodone ()      Date INR Warfarin Dose   10/25-10/27   No Coumadin   10/28/2020 1.32 2 mg    10/29/2020  1.49  2 mg   10/30/2020  2  1.5 mg    10/31/2020  2.78   0.5 mg   11/1/2020  2.37  1.5 mg              Notes:                     Daily PT/INR until stable within therapeutic range.

## 2020-11-01 NOTE — PROGRESS NOTES
Orthopaedic Progress Note      SUBJECTIVE   Ms. Okeefe Mode is hospital stay day #7 for LLE cellulitis. Patient seen resting in bed with family at bedside. Pain adequately controlled however she does endorse intermittent sharp/shooting pains around the ankle/foot of the LLE, somewhat improved since yesterday. ID following and treating. She did have a bout of hypotension today, medicine following and addressing. No new concerns from ortho standpoint. OBJECTIVE      Physical    VITALS:  BP (!) 89/42   Pulse 74   Temp 97.9 °F (36.6 °C) (Oral)   Resp 20   Ht 5' 2\" (1.575 m)   Wt 159 lb (72.1 kg)   SpO2 95%   BMI 29.08 kg/m²   I/O last 3 completed shifts: In: 2386.4 [P.O.:900; I.V.:1486.4]  Out: -       LLE:  Skin intact. Erythema and swelling noted, moderate swelling in foot. Denies calf pain to palpation proximal to the cellulitis.  good range of motion without deformity. Pt can flex and extend left toes. No obvious drainage noted.  Dorsalis pedal pulse weak but palpable as swelling made it difficult to assess         Data  CBC:   Lab Results   Component Value Date    WBC 5.6 11/01/2020    HGB 9.5 11/01/2020     11/01/2020     BMP:    Lab Results   Component Value Date     10/28/2020    K 4.7 10/28/2020    K 4.4 10/26/2020     10/28/2020    CO2 20 10/28/2020    BUN 21 10/28/2020    CREATININE 0.7 10/28/2020    CALCIUM 9.2 10/28/2020    GLUCOSE 149 10/28/2020    GLUCOSE 115 07/06/2018     Uric Acid:  No components found for: URIC  PT/INR:    Lab Results   Component Value Date    PROTIME 22.3 02/05/2019    INR 2.37 11/01/2020     Troponin:    Lab Results   Component Value Date    TROPONINI <0.006 10/04/2013     Urine Culture:  No components found for: COLE      Current Inpatient Medications    Current Facility-Administered Medications: 0.9 % sodium chloride bolus, 1,000 mL, Intravenous, Once  warfarin (COUMADIN) split-tablet 1.5 mg, 1.5 mg, Oral, Once  methylPREDNISolone sodium (SOLU-MEDROL) injection 60 mg, 60 mg, Intravenous, Q6H  albuterol (PROVENTIL) nebulizer solution 2.5 mg, 2.5 mg, Nebulization, Q4H  gabapentin (NEURONTIN) capsule 100 mg, 100 mg, Oral, TID  warfarin (COUMADIN) daily dosing (placeholder), , Other, RX Placeholder  aspirin EC tablet 81 mg, 81 mg, Oral, Daily  digoxin (LANOXIN) tablet 125 mcg, 125 mcg, Oral, Every Other Day  furosemide (LASIX) tablet 40 mg, 40 mg, Oral, Daily  metoprolol succinate (TOPROL XL) extended release tablet 25 mg, 25 mg, Oral, Daily  pantoprazole (PROTONIX) tablet 40 mg, 40 mg, Oral, BID AC  sacubitril-valsartan (ENTRESTO) 49-51 MG per tablet 1 tablet, 1 tablet, Oral, BID  traZODone (DESYREL) tablet 50 mg, 50 mg, Oral, Nightly  piperacillin-tazobactam (ZOSYN) 3.375 g in dextrose 5 % 50 mL IVPB extended infusion (mini-bag), 3.375 g, Intravenous, Q8H  linezolid (ZYVOX) IVPB 600 mg, 600 mg, Intravenous, Q12H  docusate sodium (COLACE) capsule 100 mg, 100 mg, Oral, Daily  senna (SENOKOT) tablet 8.6 mg, 1 tablet, Oral, Nightly  bisacodyl (DULCOLAX) suppository 10 mg, 10 mg, Rectal, Daily PRN  sodium chloride flush 0.9 % injection 10 mL, 10 mL, Intravenous, 2 times per day  sodium chloride flush 0.9 % injection 10 mL, 10 mL, Intravenous, PRN  lidocaine PF 1 % injection 5 mL, 5 mL, Intradermal, Once  sodium chloride flush 0.9 % injection 10 mL, 10 mL, Intravenous, PRN  HYDROmorphone (DILAUDID) injection 0.5 mg, 0.5 mg, Intravenous, Q2H PRN  HYDROcodone-acetaminophen (NORCO) 5-325 MG per tablet 2 tablet, 2 tablet, Oral, Q4H PRN  HYDROcodone-acetaminophen (NORCO) 5-325 MG per tablet 1 tablet, 1 tablet, Oral, Q4H PRN  cyclobenzaprine (FLEXERIL) tablet 10 mg, 10 mg, Oral, TID PRN  albuterol sulfate  (90 Base) MCG/ACT inhaler 2 puff, 2 puff, Inhalation, Q6H PRN  Arformoterol Tartrate (BROVANA) nebulizer solution 15 mcg, 15 mcg, Nebulization, BID  budesonide (PULMICORT) nebulizer suspension 500 mcg, 500 mcg, Nebulization, BID  levalbuterol (Doyle Slates) nebulization 0.63 mg, 1 ampule, Nebulization, Q8H PRN  tiotropium (SPIRIVA RESPIMAT) 2.5 MCG/ACT inhaler 2 puff, 2 puff, Inhalation, Daily  acetaminophen (TYLENOL) tablet 650 mg, 650 mg, Oral, Q6H PRN **OR** acetaminophen (TYLENOL) suppository 650 mg, 650 mg, Rectal, Q6H PRN  polyethylene glycol (GLYCOLAX) packet 17 g, 17 g, Oral, Daily PRN  promethazine (PHENERGAN) tablet 12.5 mg, 12.5 mg, Oral, Q6H PRN **OR** ondansetron (ZOFRAN) injection 4 mg, 4 mg, Intravenous, Q6H PRN        PLAN    1)  Cont with current medical management  2)  WBAT LLE, activity as tolerated  3)  Ice and elevation LLE  4)  PT/OT  5)  ID following  6)  Anticipating rehab, hopefully tomorrow if medically stable     Cassie Yu PA-C

## 2020-11-01 NOTE — PLAN OF CARE
Problem: Fluid Volume - Imbalance:  Goal: Absence of imbalanced fluid volume signs and symptoms  Description: Absence of imbalanced fluid volume signs and symptoms  Outcome: Ongoing  Note: VS stable this shift. Problem: Gas Exchange - Impaired:  Goal: Levels of oxygenation will improve  Description: Levels of oxygenation will improve  Outcome: Ongoing  Note: Pt SpO2 at 91% on 3 L via NC      Problem: Pain:  Goal: Pain level will decrease  Description: Pain level will decrease  Outcome: Ongoing  Note: Pt pain level at 7/10 on scale. Pt has available prn pain medicine if needed. Pt uses rest and repositioning to help her to achieve her pain goal of no pain. Problem: Skin Integrity - Impaired:  Goal: Will show no infection signs and symptoms  Description: Will show no infection signs and symptoms  Outcome: Ongoing  Note: No s/s of infection noted     Problem: Falls - Risk of:  Goal: Will remain free from falls  Description: Will remain free from falls  Outcome: Ongoing  Note: Pt remains free from fall. Fall prevention measures and hourly rounding in place. Problem: Pain:  Goal: Pain level will decrease  Description: Pain level will decrease  Outcome: Ongoing  Note: Pt pain level at 7/10 on scale. Pt has available prn pain medicine if needed. Pt uses rest and repositioning to help her to achieve her pain goal of no pain. Care plan reviewed with patient. Patient verbalizes understanding of the plan of care and contribute to goal setting.

## 2020-11-02 NOTE — PLAN OF CARE
Problem: Impaired respiratory status  Goal: Clear lung sounds  11/2/2020 1642 by Lavonne Colmenares RCP  Outcome: Ongoing   Continue aerosol treatments to improve aeration

## 2020-11-02 NOTE — PROGRESS NOTES
Spoke with patient this am about inpatient rehab. Patient stated that she is ready for inpatient rehab whenever she is cleared. Plan rehab when patient is medically cleared. Patient will go to [phone]. Morgan Tejeda and Ruchi NOEL made aware.

## 2020-11-02 NOTE — CONSULTS
[address] [place] [address]                                  CONSULTATION    PATIENT NAME: Yakov Mccarty                    :        1945  MED REC NO:   [id number]                           ROOM:       0008  ACCOUNT NO:   [de-identified]                           ADMIT DATE: 10/25/2020  PROVIDER:     Juany Man. Tarun Castellanos M.D.    Mel Dills:  2020    HISTORY OF PRESENT ILLNESS:  This is a 54-year-old lady admitted to the  hospital with cellulitis of the left lower limb. She did improve, but  she has shortness of breath. The patient is known to have a history of  coronary artery disease. She has a history of severe systolic  dysfunction of the left ventricle and anterior wall myocardial  infarction. Her chronic atrial fib has been under control. She has a  history of an AICD implantation. The patient had a cath in the past  that showed occlusion of the LAD chronically and circumflex is still  patent. PAST MEDICAL HISTORY:  The patient has no history of CVA. She does have  a history of congestive heart failure. She has a history of COPD,  chronic atrial fibrillation. She has a history of chronic back pain. She had a history of hip surgery, history of diabetes mellitus,  hypertension, peripheral vascular disease, chronic atrial fibrillation,  coagulopathy, and chronic anemia. PAST SURGICAL HISTORY:  The patient had a history of an AICD  implantation, she had a history of joint replacement. SOCIAL HISTORY:  Unfortunately, she still smokes about half a pack of  cigarettes a day. Drinks alcohol sociably. ALLERGIES:  This patient has multiple allergies, I am not sure how  accurate they are. CODE STATUS:  She is a full code. PHYSICAL EXAMINATION:  VITAL SIGNS:  Showed blood pressure was 130/80. She was in atrial fib,  controlled ventricular response. She was afebrile. Respiratory rate  was 22.   GENERAL:  She had no focal neurological deficits. NECK:  She has positive JVD. LUNGS:  The patient had poor air exchange of both lung bases, absent  sounds in the lung bases. CARDIOVASCULAR:  Apical pulsation laterally displaced. She had a 3/6  systolic murmur. ABDOMEN:  Soft. GENITAL:  Deferred. RECTAL:  Deferred. EXTREMITIES:  Lower limbs, there was no edema. The patient had possible  cellulitis of the left lower limb. She has ecchymosis in the upper arm  secondary to infiltration of fluid. DIAGNOSTIC DATA:  Her chest x-ray showed bilateral pleural effusions and  atelectasis. IMPRESSION:  1. Acute exacerbation of chronic systolic congestive heart failure. The patient needed to be on her diuretics all the time. Discontinuation  of the diuretics will exacerbate her congestive heart failure. I do  suggest that she will get Bumex IV and then put her back on her Lasix 40  mg twice a day. 2.  History of coronary artery disease, stable. 3.  History of ischemic cardiomyopathy with history of an AICD  implantation. 4.  Diabetes mellitus. 5.  COPD. 6.  Excessive tobacco use. 7.  Peripheral vascular disease. 8.  Chronic anemia. 9.  Chronic atrial fibrillation. 10.  History of dyslipidemia. PLAN:  I decreased her Entresto to 24/26. I will advice to continue  with the diuretics and continue with the rest of her home medications. If her blood pressure would drop, we could consider ProAmatine 5 mg  three times a day. The patient is not a candidate for any invasive procedure at this point. Thank you very much for allowing us to share in the management of this  patient.         Hansel Crooks M.D.    D: 11/02/2020 9:38:54       T: 11/02/2020 10:18:10     [email or web address]  Job#: [id number]     Doc#: [id number]    CC:

## 2020-11-02 NOTE — PROGRESS NOTES
Awake in bed. Gave 2 cups, one with water and one without along with tooth brush and tooth paste for patient to brush teeth. Also gave pasha crackers and fresh ice water per patient request.  Call light in reach.

## 2020-11-02 NOTE — PLAN OF CARE
Problem: Impaired respiratory status  Goal: Clear lung sounds  11/2/2020 0816 by Samantha Encarnacion RCP  Outcome: Ongoing  Continue therapy as ordered to improve breath sounds/aeration.

## 2020-11-02 NOTE — PROGRESS NOTES
 linezolid  600 mg Intravenous Q12H    docusate sodium  100 mg Oral Daily    senna  1 tablet Oral Nightly    sodium chloride flush  10 mL Intravenous 2 times per day    lidocaine 1 % injection  5 mL Intradermal Once    Arformoterol Tartrate  15 mcg Nebulization BID    budesonide  500 mcg Nebulization BID    tiotropium  2 puff Inhalation Daily       bisacodyl, sodium chloride flush, sodium chloride flush, HYDROmorphone, HYDROcodone 5 mg - acetaminophen, HYDROcodone 5 mg - acetaminophen, cyclobenzaprine, albuterol sulfate HFA, levalbuterol, acetaminophen **OR** acetaminophen, polyethylene glycol, promethazine **OR** ondansetron      LABS:     CBC:   Recent Labs     10/31/20  0709 11/01/20  1138 11/02/20  0544   WBC 5.8 5.6 3.9*   HGB 8.9* 9.5* 9.4*   * 137 135     BMP:    Recent Labs     11/01/20  1138 11/02/20  0544    138   K 4.5 4.8    102   CO2 26 24   BUN 26* 26*   CREATININE 1.1 0.9   GLUCOSE 79 195*     Calcium:  Recent Labs     11/02/20  0544   CALCIUM 9.3   INR:   Recent Labs     10/31/20  0709 11/01/20  0640 11/02/20  0544   INR 2.78* 2.37* 2.55*     Hepatic:   Recent Labs     11/01/20  1138   ALKPHOS 67   ALT 13   AST 10   PROT 5.1*   BILITOT 0.4   LABALBU 3.0*        Problem list of patient:     Patient Active Problem List   Diagnosis Code    MI (myocardial infarction) ([facility] [address] I21.9    Chronic obstructive pulmonary disease (HCC) J44.9    Hyperlipidemia E78.5    Hypertension I10    Seasonal allergic rhinitis J30.2    Heartburn R12    Persistent atrial fibrillation ([facility]) X80.82    Systolic CHF, acute on chronic ([facility]) I50.23    Biventricular implantable cardioverter-defibrillator in situ Z95.810    Anticoagulated on Coumadin Z79.01    Arteriosclerosis of coronary artery I25.10    Left displaced femoral neck fracture ([facility]) S72.002A    Closed head injury S09.90XA    CKD (chronic kidney disease) stage 2, GFR 60-89 ml/min N18.2    S/p left hip fracture Z87.81    Chronic systolic CHF (congestive heart failure) ([facility]) I50.22    IBS (irritable bowel syndrome) K58.9    Acute blood loss as cause of postoperative anemia D62    Atelectasis of left lung J98.11    Insomnia G47.00    Chest pain R07.9    Rectal bleeding K62.5    Acute on chronic anemia D64.9    Vaginitis N76.0    Digitalis toxicity T46.0X1A    GI bleed K92.2    Coagulopathy ([facility]) D68.9    Bilateral leg edema R60.0    Azotemia R79.89    Hypoalbuminemia E88.09    Urinary tract infection with hematuria N39.0, R31.9    Septic shock ([facility]) A41.9, R65.21    Pacemaker generator end of life Z45.010    Necrotizing fasciitis ([facility]) M72.6    Left leg pain M79.605    Cellulitis of left lower extremity L03.116         ASSESSMENT/PLAN   Left lower leg cellulites: much improved  CHF with chronic leg edema. Right side pleural effusion  Hemoptysis:with new CT changes likely to excacerbation. The bleeding may be contributing to the shortness of breath  Rehab evaluation as she is weak and deconditioned.   Change zyvox to oral         Nellie Garcia MD, FACP 11/2/2020 11:22 AM

## 2020-11-02 NOTE — PROGRESS NOTES
Orthopedic Surgery      Orthopaedic Attending Note  Patient anticipating rehab once medically optimized. No surgery planned from an ortho standpoint, will sign off. FU with Dr. Ewa Soto in 3 weeks.    Electronically signed by PETR Carter CNP on 11/2/2020 at 11:42 AM

## 2020-11-02 NOTE — PLAN OF CARE
Problem: Impaired respiratory status  Goal: Clear lung sounds  Outcome: Ongoing   Breath sounds are diminished with expiratory wheezes. Continue with treatments to help improve breath sounds.

## 2020-11-02 NOTE — PROGRESS NOTES
Called for nurse. Up to the commode with steadying assist.  Urinated and had a small BM. Back to bed and left leg elevated on two pillows. New ice packs placed on the left leg per patient request.  Gave fresh ice water. Patient request breathing tx due to SOB on exertion. Nurse called RT Michele who will be to unit to administer Tx. Patient denies pain at this time. Telemetry continues- Ventricular paced rate 70. Call light in reach. Will monitor.

## 2020-11-02 NOTE — PROGRESS NOTES
Clinical Pharmacy Note    Warfarin consult follow-up    Recent Labs     11/02/20  0544   INR 2.55*     Recent Labs     10/31/20  0709 11/01/20  1138 11/02/20  0544   HGB 8.9* 9.5* 9.4*   HCT 28.9* 31.2* 31.1*   * 137 135     Significant Drug-Drug Interactions:  New warfarin drug-drug interactions: Methylprednisolone IV  Discontinued drug-drug interactions: heparin drip  Current warfarin drug-drug interactions: aspirin,  trazodone ()      Date INR Warfarin Dose   10/25-10/27   No Coumadin   10/28/2020 1.32 2 mg    10/29/2020  1.49  2 mg   10/30/2020  2  1.5 mg    10/31/2020  2.78   0.5 mg   11/1/2020  2.37  1.5 mg    11/2/2020   2.55 1 mg                  Notes:                     Daily PT/INR until stable within therapeutic range.

## 2020-11-02 NOTE — PROGRESS NOTES
Out of bed to the bedside commode with steadying assist.  Urinated and had a small BM. Daylin care completed with baby wipes. Chux pad changed. Returned to bed. Left leg elevated on 2 pillows. Two New ice packs applied to LLE. Has dyspnea with exertion. Gave fresh ice water to drink. Call light in reach. Will monitor.

## 2020-11-02 NOTE — CARE COORDINATION
11/2/20, 10:04 AM EST    Patient goals/plan/ treatment preferences discussed by  and . Patient goals/plan/ treatment preferences reviewed with patient/ family. Patient/ family verbalize understanding of discharge plan and are in agreement with goal/plan/treatment preferences. Understanding was demonstrated using the teach back method. AVS provided by RN at time of discharge, which includes all necessary medical information pertaining to the patients current course of illness, treatment, post-discharge goals of care, and treatment preferences.     Services After Discharge  Services At/After Discharge: PT, OT, Nursing Services, Skilled Therapy   IMM Letter  IMM Letter given to Patient/Family/Significant other/Guardian/POA/by[de-identified] cm  IMM Letter date given[de-identified] 11/02/20  IMM Letter time given[de-identified] 0900     Planning discharge readmit to [facility] today if cleared medically

## 2020-11-02 NOTE — PROGRESS NOTES
[facility]  Acute Inpatient Rehab Preadmission Assessment    Patient Name: Tab Pyle        MRN: [id number]    : 1945  (67 y.o.)  Gender: female     Admitted from:[address]  Initial Assessment    Date of admission to the hospital: 10/25/2020 10:58 AM  Date patient eligible for admission:2020    Primary Diagnosis:Debility D/T LLE Cellulitis      Did patient have surgery?  no    Physicians: Juanita Ramos PA-C,  Dr. Moises Acosta, DR. Heidi Clement, Dr. Yves Griggs, Dr. Christie Solis for clinical complications/co-morbidities:   Past Medical History:   Diagnosis Date    Allergic rhinitis     Anemia     Anxiety     Arthritis     Asthma     Atrial fibrillation ([facility] [address]     CAD (coronary artery disease)     [facility] systolic [facility]congest[facility] failure) [facility] 10/27/2019    COPD (chronic obstructive pulmonary disease) (HCC)     Frequent UTI     GERD (gastroesophageal reflux disease)     History of blood transfusion     X8    Hx of blood clots     left arm    Hyperlipidemia     Hypertension     Influenza A 2020    Kidney stone     LONG TERM ANTICOAGULENT USE 2012    Lumbar spinal stenosis     MI, old 11    Prolonged emergence from general anesthesia      Financial Information  Primary insurance: Medicare    Secondary Insurance:  Commercial: Company: ., Contact Information: . Has the patient had two or more falls in the past year or any fall with injury in the past year? yes    Did the patient have major surgery during the 100 days prior to admission?   yes    Precautions:   falls, infections and skin  Restrictions/Precautions: Weight Bearing, General Precautions, Fall Risk  Left Lower Extremity Weight Bearing: Weight Bearing As Tolerated    Isolation Precautions: None       Physiatrist: Dr. Moises Acosta    Patients Occupation: Retired  Reviewed Lab and Diagnostic reports from Current Admission: Yes    Patients Prior Functional  Level: Prior [employer] Help From: Family  ADL Assistance: Independent  Homemaking Assistance: Needs assistance(Spouse assists with cooking (Pt reports they eat out frequently or complete microwave meals), cleaning, and laundry PRN.)  Ambulation Assistance: Independent  Transfer Assistance: Independent    Current functional status for upper extremity ADLs: contact guard assistance    Current functional status for lower extremity ADLs: contact guard assistance    Current functional status for bed, chair, wheelchair transfers: contact guard assistance    Current functional status for toilet transfers: contact guard assistance    Current functional status for locomotion: Assist Level:  [employer] and with increased time for completion. Distance: To and from bathroom and around in room   Pt had no LOB, slightly unsteady at times due to pain in LLE some edema noted in L foot and RN aware , Pt very SOB after mobility in room.   Has her own inhaler   Current functional status for bladder management: Modified independence    Current functional status for bowel management:Modified independence    Current functional status for comprehension: Modified independence    Current functional status for expression: Modified independence    Current functional status for social interaction: Modified independence    Current functional status for problem solving: Modified independence    Current functional status for memory: Modified independence    Expected level of Improvement in Self-Care:  Modified independence    Expected level of Improvement in Sphincter Control:  Modified independence    Expected level of Improvement in Transfers: Modified independence    Expected level of Improvement in Locomotion:  Modified independence    Expected level of Improvement in Communication and Social Cognition: Complete independence    Expected length of time to achieve that level of improvement: 2 weeks    Current rehab issues: ADL dysfunction,bladder management,bowel management,carry over of therapy techniques, discharge planning, disease and co-morbidity management, gait/mobility dysfunction, medication management, nutrition and hydration management,Ongoing assessment of safety, Pain management, Patient and family education, Prevention of secondary complications, Skin Integrity,     Required therapy: Physical Therapy, Occupational Therapy 3 hours per day, 5-6 days per week. Recreational Therapy 1 hour per week. Expected Discharge Destination: Home    Expected Post Discharge Treatments: Out Patient    Other information relevant to the care needs:     Acute Inpatient Rehabilitation Disclosure Statement provided to patient. Patient verbalized understanding. I have reviewed and concur with the findings and results of the pre-admission screening assessment completed by the Inpatient Rehabilitation Admissions Coordinator.     Malcolm Nagel MD

## 2020-11-02 NOTE — PROGRESS NOTES
Hospitalist Progress Note    Patient:  Gopal Escamilla    Unit/Bed:7K-08/008-A  YOB: 1945  MRN: [id number]   Acct: [de-identified]   PCP: Hunter Bedolla MD  Code Status: Full Code  Date of Admission: 10/25/2020    Expected Discharge: 11/3-4  Disposition: Inpatient Rehab when medically stable. Assessment/Plan:    1. Acute on chronic hypoxic respiratory failure: 2/2 COPD exacerbation, see below. 2. Septic shock 2/2 LLE Cellulitis and PNA: Noted on admission with BP 79/50 initially, WBC 16.3, procal 2.66. CT Tib/fib showed 10/25/20 showed findings possibly associated with fasciitis but no intrafascial gas suggestive of necrotizing fasciitis. Vasopressors discontinued and transferred out of ICU 10/27/20. ID following and assisting with management. Ortho following with no plans for surgical intervention. 3. COPD with acute exacerbation: chronically on 2-3L O2. Continue Brovana, Pulmicort, Spiriva, albuterol. Continue IV Solu-Medrol (start date 11/1), plan to transition to PO tomorrow. Pt follows with Dr. Pat Suresh and requesting consult. 4. Hemoptysis: Pt reports dark red sputum ongoing for days. No clots or large amounts. Hgb stable. Consult Pulmonary. 5. Acute on chronic HFrEF: EF 35-40% per Echo 10/2019. Pt has AICD. Cardiology following - decreased Entresto dose for low BP. Added IV Bumex 2mg once, then continue home PO Lasix 40mg BID; advises not to stop her diuretics. 6. Essential HTN: Required Levophed while in ICU, dc'ed. Home meds resumed with parameters, Entresto dose decreased. Cardio following. Monitor. 7. Chronic A-Fib: H/o AICD/PPM. Anticoagulated on coumadin. Rate controlled on digoxin, metoprolol. 8. GERD: Continue PPI. 9. Tobacco Abuse: Continued everyday smoker. Discussed cessation    10. Hx Constipation: Continue prn dulcolax and glycerin suppository. Linzess at home. 11. Hx hallucinations with narcotics: AO x3. No hallucinations. 12. Oral thrush: Nystatin suspension, started 11/2. Chief Complaint: LLE Cellulitis, Subtherapeutic INR    HPI / Hospital Course: Initial HPI: \"Initial admission HPI by admitting physician reviewed as below:  Patient has a significant history of current everyday smoker, COPD, chronic ischemic heart disease, systolic heart failure, recurrent atrial fibrillation-Coumadin, remote biventricular pacemaker-AICD, echo-2/2020 LVEF 30 to 35%, essential hypertension, left arm DVT, GERD, recurrent UTIs, anxiety, anemia.     Sharona presented to [facility] ED for an evaluation of severe left lower extremity pain, redness, and warmth that started yesterday evening. The patient states that yesterday morning she started feeling some discomfort to her lower extremity, but believed it was due to her compression stockings she wears for CHF. Yesterday evening she removed the stockings and saw that her left lower leg was extremely red, which has continued to spread, becoming more red, and even more painful. Denies any recent trauma, falls, accidents, or wounds to the lower extremity but states that she occasionally scratches her legs with her nails when taking on and off the compression stockings. The patient has attempted tylenol, percocet, biofreeze, and icyhot yesterday with no relief. Work-up while in the ER includes CT tib-fib was concerning for fasciitis however there was no intrafacial gas to suggest a necrotizing fasciitis. Tere Olivera was consulted.  A femoral central line catheter was placed.  Levophed drip was started. Alison Givens was admitted to the ICU for further evaluation and care.      Patient much improved and transferred from ICU to the floor\"     Subjective (past 24 hours): Pt states that her SOB is slightly improved from yesterday but she still has audible wheezing. She reports it is worse with ambulation and does not feel she could tolerate rehab today.  Patient reports new white spots and redness on her tongue that is painful. LE redness/cellulitis improved. Denies fever/chills, cp, abd pain, n/v/d. Spent >30 minutes discussing patients plan of care with pt and family. Resumed IV steroids, added nystatin, consulted pulm      ROS: Pertinent positives as noted in HPI. All other systems reviewed and negative. Past medical history, family history, social history and allergies reviewed again and is unchanged since admission. Medications:  Reviewed.   Infusion Medications   Scheduled Medications    bumetanide        sacubitril-valsartan  1 tablet Oral BID    albuterol  2.5 mg Nebulization Q4H    gabapentin  100 mg Oral TID    warfarin (COUMADIN) daily dosing (placeholder)   Other RX Placeholder    aspirin  81 mg Oral Daily    digoxin  125 mcg Oral Every Other Day    furosemide  40 mg Oral Daily    metoprolol succinate  25 mg Oral Daily    pantoprazole  40 mg Oral BID AC    traZODone  50 mg Oral Nightly    piperacillin-tazobactam  3.375 g Intravenous Q8H    linezolid  600 mg Intravenous Q12H    docusate sodium  100 mg Oral Daily    senna  1 tablet Oral Nightly    sodium chloride flush  10 mL Intravenous 2 times per day    lidocaine 1 % injection  5 mL Intradermal Once    Arformoterol Tartrate  15 mcg Nebulization BID    budesonide  500 mcg Nebulization BID    tiotropium  2 puff Inhalation Daily     PRN Meds: bisacodyl, sodium chloride flush, sodium chloride flush, HYDROmorphone, HYDROcodone 5 mg - acetaminophen, HYDROcodone 5 mg - acetaminophen, cyclobenzaprine, albuterol sulfate HFA, levalbuterol, acetaminophen **OR** acetaminophen, polyethylene glycol, promethazine **OR** ondansetron    I/O:     Intake/Output Summary (Last 24 hours) at 11/2/2020 1017  Last data filed at 11/1/2020 2155  Gross per 24 hour   Intake 2133.6 ml   Output --   Net 2133.6 ml       Diet:  DIET GENERAL;    Exam:  /73   Pulse 88   Temp 97.5 °F (36.4 °C) (Oral)   Resp 20   Ht 5' 2\" (1.575 m)   Wt 159 lb (72.1 kg)   SpO2 92% BMI 29.08 kg/m²   General:   Pleasant female. NAD. NC in place. HEENT:  normocephalic and atraumatic. No scleral icterus. PERR. Neck: supple. No JVD. No thyromegaly. Lungs: Diffuse wheezing. No retractions  Cardiac: RRR without murmur. Abdomen: soft. Nontender. Bowel sounds positive. Extremities:  No clubbing, cyanosis, or edema x 4. Vasculature: capillary refill < 3 seconds. Palpable LE pulses bilaterally. Skin:  warm and dry. Psych:  Alert and oriented x3. Affect appropriate  Lymph:  No supraclavicular adenopathy. Neurologic:  No focal deficit. No seizures. Data: (All radiographs, tracings, PFTs, and imaging are personally viewed and interpreted unless otherwise noted)  Labs:   Recent Labs     10/31/20  0709 11/01/20  1138 11/02/20  0544   WBC 5.8 5.6 3.9*   HGB 8.9* 9.5* 9.4*   HCT 28.9* 31.2* 31.1*   * 137 135     Recent Labs     11/01/20  1138 11/02/20  0544    138   K 4.5 4.8    102   CO2 26 24   BUN 26* 26*   CREATININE 1.1 0.9   CALCIUM 8.6 9.3     Recent Labs     11/01/20  1138   AST 10   ALT 13   BILITOT 0.4   ALKPHOS 67     Recent Labs     10/31/20  0709 11/01/20  0640 11/02/20  0544   INR 2.78* 2.37* 2.55*     No results for input(s): [place] Dec in the last 72 hours. Urinalysis:   Lab Results   Component Value Date    NITRU NEGATIVE 02/22/2020    WBCUA 15-25 02/22/2020    BACTERIA MANY 02/22/2020    RBCUA 0-2 02/22/2020    BLOODU TRACE 02/22/2020    GLUCOSEU NEGATIVE 02/22/2020     Urine culture:   Lab Results   Component Value Date    LABURIN  10/24/2019     Growth of Contaminants. The mixture of organisms present are not a common cause of urinary tract infections and probably represent distal urethral juventino.       Micro:   Blood culture #1:   Lab Results   Component Value Date    BC No growth-preliminary No growth  10/25/2020     Blood culture #2:No results found for: BLOODCULT2  Organism:  Lab Results   Component Value Date    ORG Mixed Growth 02/21/2020         Lab Results   Component Value Date    LABGRAM  11/26/2019     No segmented neutrophils observed. Few epithelial cells observed. Many large gram positive bacilli. Few small gram positive bacilli. Few gram negative bacilli. Prepubescent and postmenopausal female samples (<15and >47ears of age) are not typically suitable for bacterialvaginosis screening. MRSA culture only:No results found for: [facility]  Respiratory culture: No results found for: CULTRESP  Aerobic and Anaerobic :  Lab Results   Component Value Date    LABAERO No growth-preliminary No growth   11/11/2019     Lab Results   Component Value Date    LABANAE No growth-preliminary No growth   11/11/2019       Radiology Reports:  CT CHEST WO CONTRAST   Final Result   Right larger than left pleural effusions. Bilateral areas of compressive atelectasis and interstitial edema interstitial pneumonic infiltrates and developing consolidation are not excludable. This most likely at the posterior right upper    lung. **This report has been created using voice recognition software. It may contain minor errors which are inherent in voice recognition technology. **      Final report electronically signed by Dr. Los Mendez on 11/1/2020 6:14 PM      XR CHEST (2 VW)   Final Result   1. Mild cardiomegaly. Permanent pacemaker/defibrillator. No effusion. 2. Moderate interstitial infiltrates scattered throughout the right lung. Questionable minimal infiltrate left mid and lower lung fields. Findings most consistent with interstitial pneumonia. **This report has been created using voice recognition software. It may contain minor errors which are inherent in voice recognition technology. **      Final report electronically signed by Dr. Khushboo Tobias on 10/30/2020 1:47 PM      VL DUP LOWER EXTREMITY VENOUS LEFT   Final Result   Normal venous ultrasound. No evidence for acute deep venous thrombosis.             **This report has been created using voice recognition software. It may contain minor errors which are inherent in voice recognition technology. **      Final report electronically signed by Dr. Wesley Escalante on 10/26/2020 1:02 PM      XR FOOT LEFT (MIN 3 VIEWS)   Final Result      Moderate swelling without a gross fracture. **This report has been created using voice recognition software. It may contain minor errors which are inherent in voice recognition technology. **      Final report electronically signed by Dr. Lonnie Ramirez on 10/26/2020 12:34 PM      XR ANKLE LEFT (MIN 3 VIEWS)   Final Result   Diffuse soft tissue swelling of the ankle. No acute fracture seen. **This report has been created using voice recognition software. It may contain minor errors which are inherent in voice recognition technology. **      Final report electronically signed by Dr. Wesley Escalante on 10/26/2020 12:41 PM      XR CHEST PORTABLE   Final Result   1. Mild cardiomegaly. Permanent pacemaker/defibrillator. 2. Old fracture mid left humeral shaft, incompletely imaged on this study. 3. No central venous line is seen on this particular study. 4. No acute findings. No infiltrates or effusions are seen. **This report has been created using voice recognition software. It may contain minor errors which are inherent in voice recognition technology. **      Final report electronically signed by Dr. Wesley Escalante on 10/25/2020 3:43 PM      CT TIBIA FIBULA LEFT WO CONTRAST   Final Result   1. There is indistinctness of the fascial along the lateral and posterolateral aspects of the left lower leg with indistinctness of the musculature within the lateral and superficial/deep posterior compartments of the left lower leg. Intrafascial edema    cannot be excluded. There is no intra-tricompartmental gas.  There is indistinctness of the peroneal musculature and the lateral aspect of the posterior compartment including the soleus and lateral head of the gastrocnemius muscles. Findings can be    associated with a fasciitis however there is no intrafascial gas to suggest a necrotizing fasciitis. Correlation with clinical findings recommended to exclude a compartment syndrome as this is a clinical diagnosis. The noncontrast CT appearance of the    musculature of the left lower leg is otherwise unremarkable. There is extensive skin and subcutaneous edema throughout the left lower leg which in the right clinical setting can be associated with a cellulitis. There is also extensive skin and    subcutaneous edema throughout the right lower leg which was included within the field of imaging on the coronal images. There is no subcutaneous gas. In addition, there is edema within Kager's fat. Clinical management is recommended. 2. There is no osseous abnormality. **This report has been created using voice recognition software. It may contain minor errors which are inherent in voice recognition technology. **      Final report electronically signed by Dr. Steven Rain on 10/25/2020 12:40 PM        Xr Ankle Left (min 3 Views)    Result Date: 10/26/2020  PROCEDURE:XR ANKLE LEFT (MIN 3 VIEWS) CLINICAL INFORMATION: swelling pain TECHNIQUE: 3 standard views of the left ankle were obtained. FINDINGS:  Moderate soft tissue swelling overlies ankle diffusely. No acute fracture or dislocation is seen. The ankle mortise is intact. There is a large plantar calcaneal spur. Diffuse soft tissue swelling of the ankle. No acute fracture seen. **This report has been created using voice recognition software. It may contain minor errors which are inherent in voice recognition technology. ** Final report electronically signed by Dr. Wesley Escalante on 10/26/2020 12:41 PM    Xr Foot Left (min 3 Views)    Result Date: 10/26/2020  PROCEDURE: XR FOOT LEFT (MIN 3 VIEWS) CLINICAL INFORMATION: swelling. COMPARISON: No prior study.  TECHNIQUE: 4 views of the left foot. FINDINGS: Moderate dorsal soft tissue swelling. Films are overpenetrated. No definite fracture. Alignment appears intact. Mild vascular calcification is seen. Moderate swelling without a gross fracture. **This report has been created using voice recognition software. It may contain minor errors which are inherent in voice recognition technology. ** Final report electronically signed by Dr. Shraddha Acevedo on 10/26/2020 12:34 PM    Xr Chest Portable    Result Date: 10/25/2020  PROCEDURE: XR CHEST PORTABLE CLINICAL INFORMATION: CVC placement COMPARISON: 2/22/2020 TECHNIQUE: A single mobile view of the chest was obtained. 1. Mild cardiomegaly. Permanent pacemaker/defibrillator. 2. Old fracture mid left humeral shaft, incompletely imaged on this study. 3. No central venous line is seen on this particular study. 4. No acute findings. No infiltrates or effusions are seen. **This report has been created using voice recognition software. It may contain minor errors which are inherent in voice recognition technology. ** Final report electronically signed by Dr. Yeimy Melchor on 10/25/2020 3:43 PM    Ct Tibia Fibula Left Wo Contrast    Result Date: 10/25/2020  PROCEDURE: CT TIBIA FIBULA LEFT WO CONTRAST CLINICAL INFORMATION: Left lower leg pain, redness and swelling for one day; assess for necrotizing fasciitis. COMPARISON: No prior study. TECHNIQUE: 0.9 mm axial imaging through the tibia/fibula without contrast. All CT scans at this facility use dose modulation, iterative reconstruction, and/or weight based dosing when appropriate to reduce the radiation dose to as low as reasonably achievable. FINDINGS: ALIGNMENT: Anatomic. MINERALIZATION: Normal. FRACTURE/OSSEOUS DESTRUCTION/PERIOSTITIS: None. KNEE: Unremarkable. ANKLE: Unremarkable. MUSCULATURE: 1.  There is indistinctness of the fascial along the lateral and posterolateral aspects of the left lower leg with indistinctness of the musculature within the lateral and superficial/deep posterior compartments of the left lower leg. Intrafascial edema cannot be excluded. There is no intra-tricompartmental gas. There is indistinctness of the peroneal musculature and the lateral aspect of the posterior compartment including the soleus and lateral head of the gastrocnemius muscles. Findings can be associated with a fasciitis however there is no intrafascial gas to suggest a necrotizing fasciitis. Correlation with clinical findings recommended to exclude a compartment syndrome as this is a clinical diagnosis. The noncontrast CT appearance of the musculature of the left lower leg is otherwise unremarkable. There is extensive skin and subcutaneous edema throughout the left lower leg which in the right clinical setting can be associated with a cellulitis. There is also extensive skin and subcutaneous edema throughout the right lower leg which was included within the field of imaging on the coronal images. There is no subcutaneous gas. In addition, there is edema within Kager's fat. NEUROVASCULAR BUNDLE: 1. Atheromatous calcification. OTHER: None. 1. There is indistinctness of the fascial along the lateral and posterolateral aspects of the left lower leg with indistinctness of the musculature within the lateral and superficial/deep posterior compartments of the left lower leg. Intrafascial edema cannot be excluded. There is no intra-tricompartmental gas. There is indistinctness of the peroneal musculature and the lateral aspect of the posterior compartment including the soleus and lateral head of the gastrocnemius muscles. Findings can be associated with a fasciitis however there is no intrafascial gas to suggest a necrotizing fasciitis. Correlation with clinical findings recommended to exclude a compartment syndrome as this is a clinical diagnosis. The noncontrast CT appearance of the musculature of the left lower leg is otherwise unremarkable.  There is extensive skin and subcutaneous edema throughout the left lower leg which in the right clinical setting can be associated with a cellulitis. There is also extensive skin and subcutaneous edema throughout the right lower leg which was included within the field of imaging on the coronal images. There is no subcutaneous gas. In addition, there is edema within Kager's fat. Clinical management is recommended. 2. There is no osseous abnormality. **This report has been created using voice recognition software. It may contain minor errors which are inherent in voice recognition technology. ** Final report electronically signed by Dr. Joseline Tanner on 10/25/2020 12:40 PM    Vl Dup Lower Extremity Venous Left    Result Date: 10/26/2020  PROCEDURE: VL DUP LOWER EXTREMITY VENOUS LEFT CLINICAL INFORMATION: pain, COMPARISON: No prior study. TECHNIQUE: Multiple grayscale and color flow images of the major veins of the left lower extremity were obtained from the level of the groin to the level of the ankle. Multiple compression and augmentation maneuvers were performed. A few images of the contralateral common femoral vein were also obtained. FINDINGS:   All the  deep veins  are widely patent with normal flow and normal compressibility. .   The contralateral common femoral vein is unremarkable. Normal venous ultrasound. No evidence for acute deep venous thrombosis. **This report has been created using voice recognition software. It may contain minor errors which are inherent in voice recognition technology. ** Final report electronically signed by Dr. Yeimy Melchor on 10/26/2020 1:02 PM        Tele:   [] yes             [] no      Active Hospital Problems    Diagnosis Date Noted    Cellulitis of left lower extremity [K56.976]     Necrotizing fasciitis ([address] [M72.6] 10/25/2020    Left leg pain [M79.605] 10/25/2020       Electronically signed by Duy Lee PA-C on 11/2/2020 at 10:17 AM

## 2020-11-03 PROBLEM — R53.81 PHYSICAL DECONDITIONING: Status: ACTIVE | Noted: 2020-01-01

## 2020-11-03 PROBLEM — R53.81 PHYSICAL DEBILITY: Status: ACTIVE | Noted: 2020-01-01

## 2020-11-03 NOTE — CONSULTS
Department of Internal Medicine  Pulmonary  Attending Consult Note      Reason for Consult:  Hemoptysis, copd/asthma  Requesting Physician:  hospitalist    CHIEF COMPLAINT:  Left infection    History Obtained From:  patient    HISTORY OF PRESENT ILLNESS:                The patient is a 67 y.o. female presents for swelling and infection of RLE. The patient has been here getting treatment for this for approximately 11 days and started to have more issues with her breathing recently. The patient has COPD/Asthma and gets 225mg of Xolair normally but hasn't gotten due to being in the hospital.  The patient also coughed up a blood clot for the past 2 days. The patient does note she picked some mucous that was hard and bloody from her nostril a few days ago, doesn't note any nose bleeding at this time. The patient admits to shortness of breath worse than normal, wheezing and the hemoptysis, but no productive mucous from cough. The patient denies any headache, dizziness/lightheadedness or other complaints with vision changes. The patient's leg has shown favorable response to treatment and less edema and redness. The patient feels weak and worsening activity due to this. No other noted complaints at this time.     Past Medical History:        Diagnosis Date    Allergic rhinitis     Anemia     Anxiety     Arthritis     Asthma     Atrial fibrillation (HCC)     CAD (coronary artery disease)     Chronic systolic CHF (congestive heart failure) ([facility] [address] 10/27/2019    COPD (chronic obstructive pulmonary disease) (HCC)     Frequent UTI     GERD (gastroesophageal reflux disease)     History of blood transfusion     X8    Hx of blood clots     left arm    Hyperlipidemia     Hypertension     Influenza A 02/22/2020    Kidney stone     LONG TERM ANTICOAGULENT USE 7/6/2012    Lumbar spinal stenosis     MI, old 11    Prolonged emergence from general anesthesia      Past Surgical History:        Procedure mg, 100 mg, Oral, Daily  senna (SENOKOT) tablet 8.6 mg, 1 tablet, Oral, Nightly  bisacodyl (DULCOLAX) suppository 10 mg, 10 mg, Rectal, Daily PRN  sodium chloride flush 0.9 % injection 10 mL, 10 mL, Intravenous, 2 times per day  sodium chloride flush 0.9 % injection 10 mL, 10 mL, Intravenous, PRN  lidocaine PF 1 % injection 5 mL, 5 mL, Intradermal, Once  sodium chloride flush 0.9 % injection 10 mL, 10 mL, Intravenous, PRN  HYDROmorphone (DILAUDID) injection 0.5 mg, 0.5 mg, Intravenous, Q2H PRN  HYDROcodone-acetaminophen (NORCO) 5-325 MG per tablet 2 tablet, 2 tablet, Oral, Q4H PRN  HYDROcodone-acetaminophen (NORCO) 5-325 MG per tablet 1 tablet, 1 tablet, Oral, Q4H PRN  cyclobenzaprine (FLEXERIL) tablet 10 mg, 10 mg, Oral, TID PRN  albuterol sulfate  (90 Base) MCG/ACT inhaler 2 puff, 2 puff, Inhalation, Q6H PRN  Arformoterol Tartrate (BROVANA) nebulizer solution 15 mcg, 15 mcg, Nebulization, BID  budesonide (PULMICORT) nebulizer suspension 500 mcg, 500 mcg, Nebulization, BID  levalbuterol (XOPENEX) nebulization 0.63 mg, 1 ampule, Nebulization, Q8H PRN  tiotropium (SPIRIVA RESPIMAT) 2.5 MCG/ACT inhaler 2 puff, 2 puff, Inhalation, Daily  acetaminophen (TYLENOL) tablet 650 mg, 650 mg, Oral, Q6H PRN **OR** acetaminophen (TYLENOL) suppository 650 mg, 650 mg, Rectal, Q6H PRN  polyethylene glycol (GLYCOLAX) packet 17 g, 17 g, Oral, Daily PRN  promethazine (PHENERGAN) tablet 12.5 mg, 12.5 mg, Oral, Q6H PRN **OR** ondansetron (ZOFRAN) injection 4 mg, 4 mg, Intravenous, Q6H PRN  Allergies:  Benadryl [diphenhydramine hcl]; Ciprofloxacin; Clarithromycin; Vitamin k; Atorvastatin; Captopril; Codeine; Iv dye [iodides]; Lipitor; Macrobid [nitrofurantoin monohydrate macrocrystals]; Neomycin-bacitracin zn-polymyx; Pravastatin; Zetia [ezetimibe]; Adhesive tape; Cephalexin; Doxycycline; Morphine; Other; Propoxyphene; and Sulfa antibiotics    Social History:    No recent etoh, drugs, tob    Family History:       Adopted:  Yes Problem Relation Age of Onset    Heart Disease Father          AGE 35    Cancer Sister         breast     REVIEW OF SYSTEMS:    12 point ROS completed all negative except above noted pertinent positives in the HPI. PHYSICAL EXAM:      Vitals:    BP (!) 124/58   Pulse 69   Temp 97.5 °F (36.4 °C) (Oral)   Resp 20   Ht 5' 2\" (1.575 m)   Wt 159 lb (72.1 kg)   SpO2 91%   BMI 29.08 kg/m²     CONSTITUTIONAL:  awake, alert, cooperative, no apparent distress, and appears stated age  EYES:  Lids and lashes normal, pupils equal, round and reactive to light, extra ocular muscles intact, sclera clear, conjunctiva normal  ENT:  Normocephalic, without obvious abnormality, atraumatic, sinuses nontender on palpation, external ears without lesions, oral pharynx with moist mucus membranes, tonsils without erythema or exudates, gums normal and good dentition. - there is an area of recent bleeding ulcer on the right nare septum, with edema bilaterally. LUNGS:  No increased work of breathing, good air exchange, diminished sounds auscultation bilaterally, no crackles or wheezing  CARDIOVASCULAR:  Normal apical impulse, regular rate and rhythm, normal S1 and S2, no S3 or S4, and no murmur noted  ABDOMEN:  No scars, normal bowel sounds, soft, non-distended, non-tender, no masses palpated, no hepatosplenomegally  MUSCULOSKELETAL:  There is no redness, warmth, or swelling of the joints. Full range of motion noted. Motor strength is 5 out of 5 all extremities bilaterally. Tone is normal.  NEUROLOGIC:  Awake, alert, oriented to name, place and time. Cranial nerves II-XII are grossly intact. Motor is 5 out of 5 bilaterally. Cerebellar finger to nose, heel to shin intact. Sensory is intact.   Babinski down going, Romberg negative, and gait is normal.  SKIN:  no bruising or bleeding  DATA:    CBC:   Lab Results   Component Value Date    WBC 3.9 2020    RBC 3.05 2020    HGB 9.4 2020    HCT 31.1 2020 .0 11/02/2020    MCH 30.8 11/02/2020    MCHC 30.2 11/02/2020    RDW 14.9 06/25/2020     11/02/2020    MPV 10.9 11/02/2020     BMP:    Lab Results   Component Value Date     11/02/2020    K 4.8 11/02/2020    K 4.4 10/26/2020     11/02/2020    CO2 24 11/02/2020    BUN 26 11/02/2020    LABALBU 3.0 11/01/2020    CREATININE 0.9 11/02/2020    CALCIUM 9.3 11/02/2020    LABGLOM 61 11/02/2020    GLUCOSE 195 11/02/2020    GLUCOSE 115 07/06/2018       IMPRESSION/RECOMMENDATIONS:      Acute on chronic hypoxemic respiratory failure  Hemoptysis - which is secondary to epistaxis  Epistaxis due to severe dryness and oxygen flow  COPD without exacerbation    Plan:  The patient can wean her oxygen for 88-92% and will need to humidify the oxygen to prevent further epistaxis. No concern for hemoptysis. Antibiotics per ID. The patient's Edwyna Buggy was brought to resume which should help her breathing. Continue spiriva in place of Yupelri and budesonide and brovana for her breathing. Recommend snf/rehab for improvement and strengthening. Will monitor closely ok to transition to rehab at any time from pulmonary stand point. Call with questions/concerns.

## 2020-11-03 NOTE — DISCHARGE SUMMARY
Hospitalist Discharge Summary    Patient: Robson Amin  YOB: 1945  MRN: [id number]   Acct: [de-identified]    Primary Care Physician: Yusra Chu MD    Admit date  10/25/2020    Discharge date:  11/3/2020  Disposition: Inpatient Rehab. Discharge Assessment and Plan:    1. Acute on chronic hypoxic respiratory failure: 2/2 COPD exacerbation, see below. Resolved. 2. Septic shock 2/2 LLE Cellulitis and PNA: Noted on admission with BP 79/50 initially, WBC 16.3, procal 2.66. CT Tib/fib showed 10/25/20 showed findings possibly associated with fasciitis but no intrafascial gas suggestive of necrotizing fasciitis. Ortho following with no plans for surgical intervention. Vasopressors discontinued and transferred out of ICU 10/27/20. Treated with 9 days IV Zosyn and 8 days IV Zyvox, transitioned to PO 11/2. Continue PO Zyvox oral at discharge per ID. 3. COPD with acute exacerbation: chronically on 2-3L O2. Continue Brovana, Pulmicort, Spiriva, albuterol. Treated with IV Solu-Medrol x 48hrs, continue PO prednisone for 5 days. Pt follows with Dr. Khushi Garibay, consulted - pts xolair was brought in to nicke. Taj Olson for discharge from [facility] standpoint, follow up as scheduled. 4. Hemoptysis, secondary to epistaxis: Pt reports dark red sputum ongoing for days. No clots or large amounts. Hgb stable. Pulm following - no concerns for hemopytsis, this was d/t epistaxis. Improved with humidified oxygen. 5. Acute on chronic HFrEF: EF 35-40% per Echo 10/2019. Pt has AICD. Cardiology following - decreased Entresto dose for low BP. Received IV Bumex 2mg once, fluid status improved. Continue home PO Lasix 40mg BID. Follow up with Cardio as scheduled. advises not to stop her diuretics. 6. Essential HTN: Required Levophed while in ICU, dc'ed. Home meds resumed with parameters, Entresto dose decreased. Cardio following. BP has been stable. Continue home medications at discharge.  Pt is concerned that her stockings. The patient has attempted tylenol, percocet, biofreeze, and icyhot yesterday with no relief. Work-up while in the ER includes CT tib-fib was concerning for fasciitis however there was no intrafacial gas to suggest a necrotizing fasciitis. Shilo Cruz was consulted.  A femoral central line catheter was placed.  Levophed drip was started. Brent Bartlett was admitted to the ICU for further evaluation and care.      Patient much improved and transferred from ICU to the floor\"      11/2: I took over care today. Pt states that her SOB is slightly improved from yesterday but she still has audible wheezing. She reports it is worse with ambulation and does not feel she could tolerate rehab today. Patient reports new white spots and redness on her tongue that is painful. LE redness/cellulitis improved. Denies fever/chills, cp, abd pain, n/v/d. Spent >30 minutes discussing patients plan of care with pt and family. Resumed IV steroids, added nystatin, consulted pulm    Subjective (day of discharge): Patient is doing well. Reports continued fatigue, weakness, mild SOB. SOB and wheezing have improved from day prior. No acute events overnight. Patient responded well to medical management and is discharged to inpatient rehab in stable condition with appropriate follow up.        Physical Exam:-  Vitals:   Patient Vitals for the past 24 hrs:   BP Temp Temp src Pulse Resp SpO2   11/03/20 1202 (!) 124/55 97.5 °F (36.4 °C) Oral 71 16 94 %   11/03/20 0924 133/63 97.5 °F (36.4 °C) Oral 70 18 94 %   11/03/20 0421 -- -- -- -- 16 95 %   11/03/20 0406 117/73 97.8 °F (36.6 °C) Oral 74 16 94 %   11/03/20 0051 -- -- -- -- 18 94 %   11/03/20 0010 (!) 103/53 97.9 °F (36.6 °C) Oral 70 18 93 %   11/02/20 2129 -- -- -- -- -- 92 %   11/02/20 1954 (!) 141/40 97.9 °F (36.6 °C) Oral 80 18 92 %     Weight: Weight: 159 lb (72.1 kg)   24 hour intake/output:     Intake/Output Summary (Last 24 hours) at 11/3/2020 4824  Last data filed at 11/3/2020 0402  Gross per 24 hour   Intake 1772.05 ml   Output --   Net 1772.05 ml       General appearance: No apparent distress, appears stated age and cooperative. HEENT: Normal cephalic, atraumatic without obvious deformity. Pupils equal, round, and reactive to light. Extra ocular muscles intact. Conjunctivae/corneas clear. Neck: Supple, with full range of motion. No jugular venous distention. Trachea midline. Respiratory:  Normal respiratory effort. Clear to auscultation, bilaterally without Rales/Wheezes/Rhonchi. Cardiovascular: Regular rate and rhythm with normal S1/S2 without murmurs, rubs or gallops. Abdomen: Soft, non-tender, non-distended with normal bowel sounds. Musculoskeletal:  No clubbing, cyanosis or edema bilaterally. Skin: Skin color, texture, turgor normal.  No rashes or lesions. Neurologic:  Neurovascularly intact without any focal sensory/motor deficits.  Cranial nerves: II-XII intact, grossly non-focal.  Psychiatric: Alert and oriented, thought content appropriate, normal insight  Capillary Refill: Brisk,< 3 seconds   Peripheral Pulses: +2 palpable, equal bilaterally     Labs :  Recent Results (from the past 72 hour(s))   Protime-INR    Collection Time: 11/01/20  6:40 AM   Result Value Ref Range    INR 2.37 (H) 0.85 - 1.13   CBC auto differential    Collection Time: 11/01/20 11:38 AM   Result Value Ref Range    WBC 5.6 4.8 - 10.8 thou/mm3    RBC 3.04 (L) 4.20 - 5.40 mill/mm3    Hemoglobin 9.5 (L) 12.0 - 16.0 gm/dl    Hematocrit 31.2 (L) 37.0 - 47.0 %    .6 (H) 81.0 - 99.0 fL    MCH 31.3 26.0 - 33.0 pg    MCHC 30.4 (L) 32.2 - 35.5 gm/dl    RDW-CV 14.7 (H) 11.5 - 14.5 %    RDW-SD 54.7 (H) 35.0 - 45.0 fL    Platelets 140 531 - 760 thou/mm3    MPV 11.1 9.4 - 12.4 fL    Seg Neutrophils 71.2 %    Lymphocytes 17.5 %    Monocytes 7.7 %    Eosinophils 2.5 %    Basophils 0.2 %    Immature Granulocytes 0.9 %    Segs Absolute 4.0 1.8 - 7.7 thou/mm3    Lymphocytes Absolute 1.0 1.0 - 4.8 thou/mm3    Monocytes Absolute 0.4 0.4 - 1.3 thou/mm3    Eosinophils Absolute 0.1 0.0 - 0.4 thou/mm3    Basophils Absolute 0.0 0.0 - 0.1 thou/mm3    Immature Grans (Abs) 0.05 0.00 - 0.07 thou/mm3    nRBC 0 /100 wbc   Comprehensive metabolic panel    Collection Time: 11/01/20 11:38 AM   Result Value Ref Range    Glucose 79 70 - 108 mg/dL    CREATININE 1.1 0.4 - 1.2 mg/dL    BUN 26 (H) 7 - 22 mg/dL    Sodium 138 135 - 145 meq/L    Potassium 4.5 3.5 - 5.2 meq/L    Chloride 101 98 - 111 meq/L    CO2 26 23 - 33 meq/L    Calcium 8.6 8.5 - 10.5 mg/dL    AST 10 5 - 40 U/L    Alkaline Phosphatase 67 38 - 126 U/L    Total Protein 5.1 (L) 6.1 - 8.0 g/dL    Alb 3.0 (L) 3.5 - 5.1 g/dL    Total Bilirubin 0.4 0.3 - 1.2 mg/dL    ALT 13 11 - 66 U/L   Magnesium    Collection Time: 11/01/20 11:38 AM   Result Value Ref Range    Magnesium 2.0 1.6 - 2.4 mg/dL   Procalcitonin    Collection Time: 11/01/20 11:38 AM   Result Value Ref Range    Procalcitonin 0.24 (H) 0.01 - 0.09 ng/mL   Brain Natriuretic Peptide    Collection Time: 11/01/20 11:38 AM   Result Value Ref Range    Pro-BNP 56822.0 (H) 0.0 - 1800.0 pg/mL   Anion Gap    Collection Time: 11/01/20 11:38 AM   Result Value Ref Range    Anion Gap 11.0 8.0 - 16.0 meq/L   Glomerular Filtration Rate, Estimated    Collection Time: 11/01/20 11:38 AM   Result Value Ref Range    Est, Glom Filt Rate 48 (A) ml/min/1.73m2   Troponin    Collection Time: 11/01/20  6:04 PM   Result Value Ref Range    Troponin T < 0.010 ng/ml   Protime-INR    Collection Time: 11/02/20  5:44 AM   Result Value Ref Range    INR 2.55 (H) 0.85 - 1.13   CBC auto differential    Collection Time: 11/02/20  5:44 AM   Result Value Ref Range    WBC 3.9 (L) 4.8 - 10.8 thou/mm3    RBC 3.05 (L) 4.20 - 5.40 mill/mm3    Hemoglobin 9.4 (L) 12.0 - 16.0 gm/dl    Hematocrit 31.1 (L) 37.0 - 47.0 %    .0 (H) 81.0 - 99.0 fL    MCH 30.8 26.0 - 33.0 pg    MCHC 30.2 (L) 32.2 - 35.5 gm/dl    RDW-CV 14.5 11.5 - 14.5 %    RDW-SD 53.7 (H) 35.0 - 45.0 fL TRACE 02/22/2020    GLUCOSEU NEGATIVE 02/22/2020       Radiology:  No results found.      Consults:   PHARMACY TO DOSE VANCOMYCIN  IP CONSULT TO ORTHOPEDIC SURGERY  IP CONSULT TO INFECTIOUS DISEASES  IP CONSULT TO INFECTIOUS DISEASES  IP CONSULT TO SOCIAL WORK  PHARMACY TO DOSE MEDICATION  PHARMACY TO DOSE WARFARIN  IP CONSULT TO REHAB/TCU ADMISSION COORDINATOR  IP CONSULT TO PHYSICAL MEDICINE REHAB  IP CONSULT TO CARDIOLOGY  IP CONSULT TO PULMONOLOGY    Discharge Medications:      Medication List      STOP taking these medications    albuterol sulfate  (90 Base) MCG/ACT inhaler        ASK your doctor about these medications    acetaminophen 325 MG tablet  Commonly known as:  TYLENOL     aspirin 81 MG EC tablet     azelastine 0.1 % nasal spray  Commonly known as:  ASTELIN     Brovana 15 MCG/2ML Nebu  Generic drug:  Arformoterol Tartrate     budesonide 0.5 MG/2ML nebulizer suspension  Commonly known as:  PULMICORT     clotrimazole-betamethasone 1-0.05 % cream  Commonly known as:  LOTRISONE     CoQ-10 200 MG Caps     dicyclomine 10 MG capsule  Commonly known as:  BENTYL     digoxin 125 MCG tablet  Commonly known as:  LANOXIN  Take 1 tablet by mouth every other day Indications: Increased Heart Rate Until Office Visit with Dr. Reynaldo Florentino 49-51 MG per tablet  Generic drug:  sacubitril-valsartan     EPINEPHrine 0.3 MG/0.3ML Soaj injection  Commonly known as:  EPIPEN     folic acid 1 MG tablet  Commonly known as:  FOLVITE     furosemide 40 MG tablet  Commonly known as:  LASIX     GAVISCON PO     hydrocortisone 2.5 % cream     hydrocortisone 25 MG suppository  Commonly known as:  ANUSOL-HC  Place 1 suppository rectally 2 times daily as needed for Hemorrhoids     hydrOXYzine 25 MG tablet  Commonly known as:  ATARAX     Icy Hot 10-30 % Stck     Lastacaft 0.25 % Soln  Generic drug:  Alcaftadine     levocetirizine 5 MG tablet  Commonly known as:  XYZAL     lidocaine 5 % ointment  Commonly known as:  XYLOCAINE Linzess 290 MCG Caps capsule  Generic drug:  linaclotide     loperamide 2 MG capsule  Commonly known as:  IMODIUM     metoprolol succinate 25 MG extended release tablet  Commonly known as:  TOPROL XL     MiraLax 17 GM/SCOOP powder  Generic drug:  polyethylene glycol     Multivitamin Adult Chew     nitroGLYCERIN 0.4 MG SL tablet  Commonly known as:  NITROSTAT     NITROGLYCERIN RE     nystatin 325157 UNIT/GM cream  Commonly known as:  MYCOSTATIN     ofloxacin 0.3 % otic solution  Commonly known as:  FLOXIN     ondansetron 8 MG tablet  Commonly known as:  ZOFRAN     OXYGEN     pantoprazole 40 MG tablet  Commonly known as:  PROTONIX     PROCTOFOAM RE     RA B-COMPLEX/VITAMIN C CR PO     REFRESH OPTIVE OP     REPATHA SURECLICK SC     Revefenacin 175 MCG/3ML Soln  Inhale 175 mcg into the lungs daily     Super Probiotic Caps     traMADol 50 MG tablet  Commonly known as:  ULTRAM     traZODone 50 MG tablet  Commonly known as:  DESYREL     triamcinolone 0.1 % cream  Commonly known as:  KENALOG     vitamin C 100 MG tablet     Vitamin D3 50 MCG (2000 UT) Caps     warfarin 1 MG tablet  Commonly known as:  Coumadin  Take as directed. If you are unsure how to take this medication, talk to your nurse or doctor. Original instructions:   Take as directed by the [facility], 145 tablets for 90 days     XOLAIR SC     Xopenex 0.63 MG/3ML nebulization  Generic drug:  levalbuterol             Patient Instructions:    Discharge lab work: bmp, cbc  Activity: activity as tolerated  Diet: No diet orders on file      Follow-up visits:   [address] [place] [address] and Rehab Unit  [place] [address]  [address] [place] [id number] [phone]    On 12/1/2020  3pm    Antonio Ulloa MD  [address] [place] [address]  [phone]    Schedule an appointment as soon as possible for a visit in 1 week           Disposition: Inpatient Rehab  Condition at Discharge: Stable    Time Spent: 80 minutes    Signed: Thank you Jose Croft MD for the opportunity to be involved in this patient's care.     Electronically signed by Judith Coats PA-C on 11/3/2020 at 6:56 PM  Discharging Hospitalist

## 2020-11-03 NOTE — PROGRESS NOTES
Admitted to the Inpatient Rehabilitation Unit via. Patient was then oriented to room and unit. Education provided on the rehabilitation routine: three hours of therapy five days per week and dining room for lunch. Explained patients right to have family, representative or physician notified of their admission. Patient has ined for physician to be notified. Patient has ddeclined for family/representative to be notified. Admitting medication orders compared with acute stay medications; home medication list reviewed with patient/family. Medication issues identified no  If yes, physician notified n/a    Bladder and Bowel Function Assessment:  1. Prior history of bladder problems: occasional dribbles  2. Number of pads used per day:=none at home  3. Frequency of night time voidin-2  4. Fluid intake volume and pattern: 5-6  5. Last BM:  6. Prior history of bowel problems:denies     Incontinence      Frequent diarrhea      Constipation      Hemorrhoids      Diverticulitis      Bowel Surgery     Two nurse skin assessment performed by Trevor Pérez was created with patient's input and goals were agreed upon. Admission folder provided with education regarding patients diagnoses, fall prevention, skin care, and M in the box. \"Data Collection Information Summary for Patients in Inpatient Rehabilitation Facilities\" and \"Privacy Act Statement - Health Care Records\" provided. Please refer to the admission navigator for further information.

## 2020-11-03 NOTE — PROGRESS NOTES
Plan inpatient rehab today if patient is medically cleared. Patient will go to [phone]. Karla NOEL made  Aware.

## 2020-11-03 NOTE — CARE COORDINATION
11/3/20, 11:07 AM EST    Patient goals/plan/ treatment preferences discussed by  and . Patient goals/plan/ treatment preferences reviewed with patient/ family. Patient/ family verbalize understanding of discharge plan and are in agreement with goal/plan/treatment preferences. Understanding was demonstrated using the teach back method. AVS provided by RN at time of discharge, which includes all necessary medical information pertaining to the patients current course of illness, treatment, post-discharge goals of care, and treatment preferences.     Services After Discharge  Services At/After Discharge: PT, OT, Nursing Services, Skilled Therapy   IMM Letter  IMM Letter given to Patient/Family/Significant other/Guardian/POA/by[de-identified] cm  IMM Letter date given[de-identified] 11/02/20  IMM Letter time given[de-identified] 0900     Planning discharge / readmit to [phone] today

## 2020-11-03 NOTE — PROGRESS NOTES
Clinical Pharmacy Note    Warfarin consult follow-up    Recent Labs     11/03/20  0650   INR 3.54*     Recent Labs     11/01/20  1138 11/02/20  0544 11/03/20  0650   HGB 9.5* 9.4* 9.3*   HCT 31.2* 31.1* 30.4*    135 151       Significant Drug-Drug Interactions:  New warfarin drug-drug interactions: linezolid  Discontinued drug-drug interactions: none  Current warfarin drug-drug interactions: aspirin,  trazodone (HM), methylprednisolone, linezolid, Zosyn      Date INR Warfarin Dose   10/25-10/27   No Coumadin   10/28/2020 1.32 2 mg    10/29/2020 1.49  2 mg   10/30/2020  2  1.5 mg    10/31/2020   2.78   0.5 mg   11/1/2020   2.37  1.5 mg    11/2/2020  2.55 1 mg     11/3/2020  3.54   No Coumadin              Notes:                     Daily PT/INR until stable within therapeutic range.      Aliya Rossi, PharmD   11/3/2020, 12:00 PM

## 2020-11-03 NOTE — PROGRESS NOTES
Patient discharged via wheelchair to [facility]. Personal belongings sent with patient.

## 2020-11-04 NOTE — PROGRESS NOTES
Tile  Device:Rolling Walker  Gait Deviations:  Slow Cassie, Decreased Step Length Bilaterally, Decreased Gait Speed and shortness of breath noted after ea walk. Pt given cues to inhale through nose as tends to be a mouth breather. ASSESSMENT:  Activity Tolerance:  Patient tolerance of  treatment: fair. Required frequent rest breaks after ea activity. Treatment Initiated: Treatment and education initiated within context of evaluation. Evaluation time included review of current medical information, gathering information related to past medical, social and functional history, completion of standardized testing, formal and informal observation of tasks, assessment of data and development of plan of care and goals. Treatment time included skilled education and facilitation of tasks to increase safety and independence with functional mobility for improved independence and quality of life. Assessment: Body structures, Functions, Activity limitations: Decreased functional mobility , Decreased balance, Decreased strength, Decreased endurance  Assessment: Pt demonstrates a decrease in baseline by way of bed mobility, transfers, gait and steps due to decreased functional strength and endurance requiring her to currently use min to CGA for bed mobility, transfers and gait. Pt will benefit from skilled PT to advance in these areas for improved functional mobility  and safety.   Prognosis: Good         Discharge Recommendations:  Discharge Recommendations: Continue to assess pending progress, Patient would benefit from continued therapy after discharge    Patient Education:  PT Education: Goals, PT Role, Plan of Care, Home Exercise Program, Functional Mobility Training, Transfer Training, Gait Training    Equipment Recommendations:  Equipment Needed: No(Pt has 4WW, RW, w/c and cane)    Plan:  Times per week: 5x/wk 90 min, 1x/ wk 30 min  Current Treatment Recommendations: Strengthening, Functional Mobility Training, Transfer Training, Endurance Training, Balance Training, Stair training, Gait Training, Home Exercise Program, Safety Education & Training, Equipment Evaluation, Education, & procurement, Patient/Caregiver Education & Training    Goals:  Patient goals : get my legs stronger to walk better. Short term goals  Time Frame for Short term goals: 1 wk  Short term goal 1: Pt to go supine <->sit, SBA, to get in/out of bed, no rail. Short term goal 2: Pt to get up/down from various seated surfaces, SBA to get up to walk. Short term goal 3: Pt to walk with RW >= 100 ft, SBA to progress to home and community mobility  Short term goal 4: negotiate 4 steps with HR and CGA to enter home safely  Short term goal 5: Pt to get in/out of car, SBA for transportation needs. Long term goals  Time Frame for Long term goals : 3 wks  Long term goal 1: Pt to go supine <->sit, Mod I, to get in/out of bed, no rail. Long term goal 2: Pt to get up/down from various seated surfaces, Mod I, to get up to walk. Long term goal 3: Pt to walk with RW >= 150 ft, Mod I, to progress to home and community mobility  Long term goal 4: negotiate 4 steps with 1 HR and Mod I to enter home safely  Long term goal 5: Pt to get in/out of car, Mod I, for transportation needs. Following session, patient left in safe position with all fall risk precautions in place.

## 2020-11-04 NOTE — PROGRESS NOTES
[facility]  Recreational Therapy  Evaluation  Inpatient Rehabilitation Unit      Time Spent with Patient: 40 minutes    Date:  11/4/2020       Patient Name: Carmela Patterson      MRN: [id number]       YOB: 1945 (76 y.o.)       Gender: female          RESTRICTIONS/PRECAUTIONS:  Restrictions/Precautions: General Precautions, Fall Risk  Vision: Impaired  Hearing: Exceptions to [facility]  Hearing Exceptions: Bilateral hearing aid, Hard of hearing/hearing concerns    PAIN: 2-just had pain medication     SUBJECTIVE:  Pt lives with her spouse and 1 son-she has 2 other children that live close by too    VISION:  Visual Impairment-has cataracts that need removed-uses a magnifying glass to read her bible and to see her computer at [email or web address]    HEARING: Hearing Aid - Left -states she is deaf without it     LEISURE INTERESTS:   Pt states her and  eat out frequently or uses the microwave for her meals, she enjoys getting on her computer at home and playing games or getting on facebook-she  Has her laptop in her room and gave her a magnifier to use in room-she enjoys reading her bible but also needs a magnifier-pleasant and social-enjoyed getting her hair done by our beautician this am-sit to stand from recliner and w/c with CGA-ambulated to chair with RW and CGA-paid for pt's hair and when her  comes in he will pay me back    BARRIERS TO LEISURE INTERESTS:    Decreased endurance and Impaired vision           Patient Education  New Education Provided: Importance of Leisure, RT Plan of Care    Plan:  Continue to follow patient through this admission  See patient individually    Electronically signed by: THIERRY Lopez  Date: 11/4/2020

## 2020-11-04 NOTE — DISCHARGE SUMMARY
Physical Medicine & Rehabilitation  Discharge Summary     Patient Identification:  Tab Pyle  : 1945  Admit date: 11/3/2020  Discharge date:  2020   Attending provider: Afia Trent MD        Primary care provider: Isha Martinez MD     Discharge Diagnoses:   Primary impairment requiring rehabilitation: 12 Debility     Etiologic Diagnosis that led to the condition: Left lower leg severe cellulitis. Comorbid conditions affecting rehabilitation:  1. Left lower leg severe cellulitis. 2. Physical deconditioning. 3. Gait instability. 4. Septic shock secondary to left lower leg cellulitis. 5. IV infiltration into the left forearm with heparin on 10/31/2020 treated with Hylenex. 6. Insomnia. 7. Atrial fibrillation on chronic anticoagulation with Coumadin. 8. Biventricular implanted cardioverter-defibrillator  9. Coronary artery disease. 10. COPD chronic home oxygen at 2 L.    11. *Acute kidney injury. 12. CKD 2.  13. Hyperlipidemia. 14. Hypertension. 15. History of prior left displaced femoral neck fracture status post hemiarthroplasty by Dr. Alba Bernal on 10/24/2019. 16. History of prior closed head injury. 16. History of prior myocardial infarction, .  18. *Chronic systolic congestive heart failure with ejection fraction of 35 to 40%. 19. Mild peripheral vascular disease per vascular OMAYRA bilateral study dated 2020.  20. Severe left external carotid artery stenosis and left subclavian artery stenosis/occlusion per vascular carotid bilateral ultrasound on 2014. 21. Mild to moderate central spinal stenosis at L5-L6 with moderate right S1 lateral recess stenosis and findings of 6 lumbar type vertebral body per CT of the lumbar spine without contrast on 10/19/2012.  22. Hemorrhoids. 23. Anxiety. 24. Osteopenia per DEXA bone scan on 2015 with medium fracture risk. 25. Chronic constipation. 26. Oral thrush. 27. Chronic iron deficiency anemia.   28. Current every day smoker. 29. Hypernatremia. 30. Leukocytosis. Discharge Functional Status:    Physical therapy:  Assessment:  Pt has shown improved functional bed mobility, transfers and stairs. Gait has progressed to SBA, but distance significantly limited due to respiratory concerns. Pt will benefit from f/u PT once medically stable for continued gait progression and safety with functional mobility.     Equipment Recommendations:  Equipment Needed: No(Pt has 4WW, RW, w/c and cane)     Plan:  Times per week: 5x/wk 90 min, 1x/ wk 30 min  Current Treatment Recommendations: Strengthening, Functional Mobility Training, Transfer Training, Endurance Training, Balance Training, Stair training, Gait Training, Home Exercise Program, Safety Education & Training, Equipment Evaluation, Education, & procurement, Patient/Caregiver Education & Training     Goals:  Short term goals  Time Frame for Short term goals: 1 wk  Short term goal 1: Pt to go supine <->sit, SBA, to get in/out of bed, no rail. - GOAL MET, SEE LTG  Short term goal 2: Pt to get up/down from various seated surfaces, S to get up to walk. MET  Short term goal 3: Pt to walk with RW >= 100 ft, SBA to progress to home and community mobility NOT MET  Short term goal 4: negotiate 4 steps with HR and CGA to enter home safely MET  Short term goal 5: Pt to get in/out of car, S for transportation needs. MET     Long term goals  Time Frame for Long term goals : 3 wks  Long term goal 1: Pt to go supine <->sit, Mod I, to get in/out of bed, no rail. NOT MET  Long term goal 2: Pt to get up/down from various seated surfaces, Mod I, to get up to walk. - GOAL MET, CONTINUE  Long term goal 3: Pt to walk with RW >= 150 ft, Mod I, to progress to home and community mobility NOT MET  Long term goal 4: negotiate 4 steps with 1 HR and Mod I to enter home safely NOT MET  Long term goal 5: Pt to get in/out of car, Mod I, for transportation needs. NOT MET  Long term goal 6: Pt to score >= 22, conservation or safe tech to increase independence in self care tasks GOAL NOT MET  Long term goal 2: Pt will complete 2 step homemaking tasks with S and 0-1 cues for safe tech to increase ability to prepare a snack. GOAL NOT MET    Assessment: Pt is progressing towards her goals at slow pace and requires min vcs for walker safety. LImited by low activity tolerance. Pt continues to demonstrate deficits with ADLs, functional mobility, and transfers requiring assist to complete these tasks. Pt will continue to benefit from OT services to increase independence with these tasks to facilitate return to PLOF    Speech therapy:  Not applicable     Wt Readings from Last 3 Encounters:   11/12/20 167 lb 4.8 oz (75.9 kg)   11/01/20 159 lb (72.1 kg)   08/27/20 140 lb (63.5 kg)     Vitals:    11/12/20 2233 11/12/20 2237 11/13/20 0722 11/13/20 0736   BP: (!) 98/42 (!) 93/50  (!) 91/51   Pulse: 73 72  72   Resp: 16   18   Temp: 97.7 °F (36.5 °C)   95.2 °F (35.1 °C)   TempSrc: Oral   Oral   SpO2: 93%  91% 91%   Weight:       Height:          Inpatient Rehabilitation Course:   Carmela Patterson is a 67 y.o. female admitted to inpatient rehabilitation on 11/3/2020. Original admission 10/25/2020 for complaint of pain, swelling and erythema over the left lower limb. Patient felt that when she takes off her JEANE hose used for her chronic systolic congestive heart failure she sometimes catches her nails on her skin and that this may have contributed to the infection. Initial imaging was concerning for possible necrotizing fasciitis but with absence of intrafascial gas this diagnosis was ruled out. She did have issues with hypotension and required admission to the ICU. She was started on vancomycin and Zosyn with plan by infectious disease to transition to oral antibiotics at time of discharge.   Patient's medical course was further complicated by COPD exacerbation with increased oxygen requirements above her baseline 2 L at home and acute on chronic systolic congestive heart failure, as well as a pneumonia. The patient was previously on the Transitional Care Unit from 10/26 to 11/21/2019 for a left hip hemiarthroplasty after a left displaced femoral neck fracture. She was also at that time noted to have a closed head injury with an initial [address] of 10/30 which improved to 21/30 at time of discharge. On initial consultation exam the patient is resting comfortably, sitting on the side of her hospital bed. She states that her current pain level is 5/10 respectively to her left lower limb cellulitis. Strength is grossly 4 out of 5 in all 4 limbs with exception of testing the left ankle. She has noted significant muscle loss in both lower limbs out of proportion to her body habitus. Her left lower limb has border markings outlining the cellulitic areas which appear a deep red color. The potential for coming to the acute inpatient rehabilitation unit was discussed and she understands that she would not require prior authorization under her Medicare benefits. The expected length of stay would be 10 to 12 days for her given diagnosis. The patient feels that such a stay would be reasonable given that she was on the Transitional Care Unit last year after she had a hip fracture and felt that that was a beneficial stay as well. In the interim the patient was deemed medically stable and she remains in agreement to coming to the acute inpatient rehabilitation unit. The cellulitic wound on her left lower limb has improved to a normal flesh color with mild edema; both of her feet are significantly edematous with 3+ pitting edema. Her current pain level remains at a 5 out of 10; her pain medications are working appropriately without endorsed side effects. She states that her goal is to improve her endurance and to be able to get back home and care for herself. Last recorded bowel movement was today.   The expectations for the acute inpatient rehabilitation unit were discussed and the patient had no further concerns or questions at this time other than wanting some of her medications to be kept at bedside so that these would be available to her. These medications included her Gaviscon, her inhaler, eardrops, and eyedrops. The patient was discharged with stable vital signs with exception of hypotension and low oxygen saturation requiring a than baseline administration of supplemental oxygen; pain was well controlled at time of discharge. The patient was tolerating a diet at discharge; last bowel movement was on 11/12. Medical course on the inpatient rehabilitation unit was notable for persistent hypoxia, completion of treatment of her lower left leg cellulitis and restart of her anticoagulation with complaints of coughing up \"blood clots\". The patient remained on a 4 or 5 L supplemental oxygen requirement during her stay on the acute inpatient rehabilitation unit compared to her 2 L of baseline oxygen at home. Chest x-rays did show interstitial edema as well as a grossly elevated proBNP for which her Lasix dosing was increased from once to twice daily with some improvements noted in her proBNP; but chest x-rays did still show persistence of interstitial edema as well as presence of pleural effusions. The patient originally was followed by her Pulmonologist from [facility]; but he was unable to tenuously follow her and approval was given for a hospital Pulmonologist to follow her and address her current issues. Her antibiotics for the left lower leg cellulitis were completed and up until the day prior to her discharge no issues were noted. On the day prior to discharge she did have some noted blisters and mild erythema for which Infectious Disease service was asked to see her again with no change in plans for her treatment provided.   Her Coumadin anticoagulation was restarted while she was on the acute inpatient rehabilitation unit after which she did have some complaints of coughing up blood clots for which Otolaryngology was consulted with impression that these were of a pulmonary source. Hemoglobin remained stable during her stay on the acute inpatient rehabilitation unit in spite of the complaint of coughing up blood clots. Details are provided below. The patient progressed well with the offered therapies and was to be discharged to home with Home Health Physical Therapy, Occupational Therapy, Nursing and an aide. Because of persistent hypoxemia and requiring a Venturi mask briefly on the day prior to admission as well as a persistent higher than baseline supplemental oxygen requirement; pulmonology was consulted and it was noted that she in addition to her pulmonary interstitial edema had pleural effusions bilaterally noted on ultrasound of the chest.  Because of the potential plan of a thoracentesis to address the pleural effusions and requirement for reversing her anticoagulation she was transferred to the acute floor for further medical management instead of being discharged to home. Issues addressed during rehabilitation stay and medication changes:   Acute/Rehabilitation Problems:  1. Left lower leg severe cellulitis. 1. Infectious Disease service following. Appreciate the assistance. 2. Linezolid for 5 more doses. Completed on 11/5.  3. Pain control. 1. Tylenol. 2. Norco.  3. Flexeril. 4. Gabapentin. 2. Culturelle. 3. Physical deconditioning/Gait instability. 1. PT/OT. 2. Tinetti 19/28. Improved to 20/28. Risk Indicators: Less than/equal to 18 = high risk; 19-23 Moderate risk; Greater than/equal to 24 = low risk. 4. Septic shock secondary to left lower leg cellulitis. 1. WBCs normal at 5.7 on 11/3. Increased to 15.1 on 11/9 without signs of infection based on current chest x-ray. WBCs normal on 11/12 at 10.4. Procalcitonin 0.10 and thus not suggestive of a pneumonia.   5. Nutrition:  Consultation to dietician for nutritional counseling and recommendations. 1. Total protein 5.7 and albumin 3.0. Low.  2. Vitamin D 25 hydroxy was 36 on 1/5/2020. Normal at 44 on 11/4/2020.  6. Electrolytes. 1. Normal on 11/9.  7. Acute kidney injury/CKD 2.  1. Patient had normal renal function at the beginning of 2020 with a persistence of GFR suggestive of CKD 2. During this hospitalization Cr/BUN/GFR has worsened to 1.1/40/48 from 1.0/36/54. Improved on 11/9 to 0.7/38/81. Worsened to 0.9/29/61 on 11/11. 2. Encourage fluids  8. Oral thrush. 1. Nystatin x5 days. Completed. 9. Insomnia. 1. Trazodone 50 mg nightly. 10. Bladder: No issues  11. Bowel: Senna, colace, MOM  1. GlycoLax. 2. Bisacodyl suppository. 12. Rehabilitation nursing will be involved for bowel, bladder, skin, and pain management. Nursing will also provide education and training to patient and family. 13. Prophylaxis:  DVT: Coumadin. GI: Protonix. 14.  and case management consultations for coordination of care and discharge planning. Chronic Problems:  1. Atrial fibrillation on chronic anticoagulation with Coumadin. 1. Coumadin to be dosed by the pharmacy. 2. Last INR was 2.72 on 11/12/2020.  2. Biventricular implanted cardioverter-defibrillator  3. Coronary artery disease. 1. ASA 81 mg.  4. COPD on chronic home oxygen at 2 L. Follows with Dr. Humaira Zuniga. 1. Dr. Humaira Zuniga following during this admission. 2. Veleria Ar. 3. Pulmicort. 4. Xopenex. 5. Xolair injection every 28 days with next injection on 11/30. 6. Spiriva Respimat. 7. Patient normally on 2 L chronically at home. Current oxygen setting is 4 L on 11/12. 5. Hyperlipidemia. 1. No current medication. 6. History of prior left displaced femoral neck fracture status post hemiarthroplasty by Dr. Joann Garcia on 10/24/2019.  7. History of prior closed head injury. [facility]) version 8.3 completed.   Patient scored 21/30 on 11/4.  8. History of prior myocardial infarction, 1994.  9. Chronic systolic congestive heart failure with ejection fraction of 35 to 40%/Hypertension. 1. Digoxin. 2. Lasix. 3. Toprol-XL. 4. Entresto. 5. Evolucumab. 6. proBNP remains elevated on 11/9 at 9817 with only mild improvement over prior lab on 11/1 of 10,721. Decreased to 6830 on 11/11. 1. Lasix has been increased to 40 mg twice daily. IV line placed on 11/10 so that p.o. Lasix could be changed to IV Lasix at same dosing. 2. Chest x-ray on 11/9 consistent with likely interstitial edema. 3. Chest x-ray on 11/11 showed improvement; but still with interstitial edema. 4. Ultrasound of the chest on 11/12 showed small to moderate bilateral pleural effusions. 5. Pulmonology planning on possible thoracenteses by Interventional Radiology tomorrow. In preparation for this Coumadin has been held. 10. Mild peripheral vascular disease per vascular OMAYRA bilateral study dated 2/25/2020. 11. Severe left external carotid artery stenosis and left subclavian artery stenosis/occlusion per vascular carotid bilateral ultrasound on 4/1/2014. 12. Mild to moderate central spinal stenosis at L5-L6 with moderate right S1 lateral recess stenosis and findings of 6 lumbar type vertebral body per CT of the lumbar spine without contrast on 10/19/2012. 13. Hemorrhoids. 1. No current medication ordered. 14. Anxiety. 1. No current medication ordered. 15. Osteopenia per DEXA bone scan on 12/7/2015 with medium fracture risk. 1. Vitamin D 25 hydroxy level was normal on 1/5/2020. 16. Chronic constipation. 1. Patient normally on Linzess at home. 17. Chronic iron deficiency anemia. 1. Receives IV iron transfusions. Last iron on 11/3 was 173 with a ferritin of 305. 2. Hemoglobin 9.5 on 11/4 which is stable over 9.3 on 11/3. Hemoglobin slightly decreased to 8.6 on 11/9. Stable at 8.3 on 11/12.  18. Current every day smoker. 1. Patient not interested in smoking cessation.       The patient participated in an aggressive multidisciplinary inpatient rehabilitation program involving 3 hours per day, 5 days per week of rehabilitation. Estimated Discharge Date: 11/13   Destination: home health  Services at Discharge: [address] [place] [address] Rd, Occupational Therapy, Nursing and an aide 2x week  Is patient appropriate for an outpatient driving evaluation? no  Equipment at Discharge: None    Hypertension management was undertaken with medication management on any patient with systolic blood pressure greater than 660 or diastolic blood pressure greater than 90. Hyperlipidemia was considered and the patient was started or continued on a statin medication for any LDL>100. Appropriate DVT prophylaxis options were considered throughout rehabilitation stay. DME:  None    Consults:   Orthopedic Surgery, Critical Care, Infectious Disease, Cardiology, Pulmonology, Otolaryngology, Family Medicine Physical Medicine    Significant Diagnostics:     Lab Results   Component Value Date     11/13/2020    K 4.3 11/13/2020    CL 96 (L) 11/13/2020    CO2 30 11/13/2020    BUN 19 11/13/2020    CREATININE 0.8 11/13/2020    GLUCOSE 154 (H) 11/13/2020    CALCIUM 9.2 11/13/2020    PROT 6.3 11/13/2020    LABALBU 3.0 (L) 11/04/2020    BILITOT 0.3 11/04/2020    ALKPHOS 60 11/04/2020    AST 12 11/04/2020    ALT 18 11/04/2020    LABGLOM 70 (A) 11/13/2020     Recent Labs     11/13/20  0906 11/12/20  1320 11/09/20  1014   WBC 10.4 10.4 15.1*   HGB 9.1* 8.3* 8.6*   HCT 30.4* 27.8* 28.7*   .5* 102.2* 101.8*    158 183     Labs Renal Latest Ref Rng & Units 11/13/2020 11/11/2020 11/9/2020 11/4/2020 11/3/2020   BUN 7 - 22 mg/dL 19 29(H) 38(H) 40(H) 36(H)   Cr 0.4 - 1.2 mg/dL 0.8 0.9 0.7 1.1 1.0   K 3.5 - 5.2 meq/L 4.3 4.1 4.1 4.1 4.2   Na 135 - 145 meq/L 140 139 146(H) 139 141      Results for Nina Reed (MRN [id number]) as of 11/15/2020 22:11   Ref.  Range 1/5/2020 09:28 11/4/2020 05:34   Vit D, 25-Hydroxy Latest Ref Range: 30 - 100 ng/ml 36 44     Xr Chest (2 Vw)    Result Date: 11/11/2020  PROCEDURE: XR CHEST (2 VW) CLINICAL INFORMATION: Lower extremity cellulitis. Hypertension. COPD. Coronary disease. Atrial flutter. COMPARISON: 11/9/2020 TECHNIQUE: AP upright and lateral views of the chest were obtained. 1. Cardiomegaly. Permanent pacemaker/defibrillator. 2. Mildly increased interstitial markings in both lungs, consistent with interstitial edema/pneumonia with underlying element of interstitial fibrosis. 3. Overall appearance of chest has improved since prior. **This report has been created using voice recognition software. It may contain minor errors which are inherent in voice recognition technology. ** Final report electronically signed by Dr. Elzbieta Whitney on 11/11/2020 4:50 PM    Xr Chest (2 Vw)    Result Date: 11/9/2020  PROCEDURE: XR CHEST (2 VW) CLINICAL INFORMATION: 2 view chest for shortness of breath, resolving pneumonia. COMPARISON: 11/5/2020 TECHNIQUE: AP upright and lateral views of the chest were obtained. 1. Moderate cardiomegaly. Permanent pacemaker/defibrillator. 2. Small bilateral pleural effusions. Mildly increased interstitial markings both mid and lower lung fields, consistent with interstitial edema/pneumonia likely with underlying element of interstitial fibrosis. . 3. Overall appearance of chest slightly worse than prior. **This report has been created using voice recognition software. It may contain minor errors which are inherent in voice recognition technology. ** Final report electronically signed by Dr. Elzbieta Whitney on 11/9/2020 1:02 PM    Us Chest Including Mediastinum    Result Date: 11/12/2020  PROCEDURE: US CHEST INCLUDING MEDIASTINUM CLINICAL INFORMATION: Please do ultrasoud of chest on right and left side to evaluate for presence/quantity of pleural fluid to plan thoracentesis by IR service.  . COMPARISON: CT 11/1/2020 FINDINGS: There are small-to-moderate bilateral pleural effusions. This appears complex on the right. There are small-to-moderate bilateral pleural effusions. Thoracentesis could be performed if indicated clinically. **This report has been created using voice recognition software. It may contain minor errors which are inherent in voice recognition technology. ** Final report electronically signed by Dr. Cassius Ahuja on 2020 8:26 PM    Patient Instructions:    See AVS  Follow-up visits: See after visit summary from hospitalization    Discharge Medications:   Radha Roca   Home Medication Instructions HI    Printed on:20 1214   Medication Information                      acetaminophen (TYLENOL) 325 MG tablet  Take 650 mg by mouth as needed for Pain or Fever Indications: Pain Don't take more then 3,000 mg each day             Alcaftadine (LASTACAFT) 0.25 % SOLN  Apply 1 drop to eye daily as needed Indications: Eye Allergy Both eyes             Alum Hydroxide-Mag Carbonate (GAVISCON PO)  Take by mouth as needed Indications: Acid Indigestion              Arformoterol Tartrate (BROVANA) 15 MCG/2ML NEBU    Take 1 ampule by nebulization 2 times daily Indications: Shortness of Breath (Inactive)              Ascorbic Acid (VITAMIN C) 100 MG tablet  Take 100 mg by mouth daily             aspirin 81 MG EC tablet  Take 81 mg by mouth daily. With food  Indications: Anticoagulant Therapy             azelastine (ASTELIN) 137 MCG/SPRAY nasal spray  1 spray by Nasal route 2 times daily as needed Indications: Allergic Rhinitis Use in each nostril as directed             B Complex-C (RA B-COMPLEX/VITAMIN C CR PO)  Take 1 tablet by mouth daily Indications: Treatment to Prevent Vitamin Deficiency              budesonide (PULMICORT) 0.5 MG/2ML nebulizer suspension    Take 1 ampule by nebulization 2 times daily Indications: Shortness of Breath (Inactive)              Carboxymethylcellul-Glycerin (REFRESH OPTIVE OP)  Apply  to eye as needed. Indications: Dry Eyes caused by Deficiency of Tears             Cholecalciferol (VITAMIN D3) 50 MCG (2000 UT) CAPS  Take 2,000 Units by mouth daily             clotrimazole-betamethasone (LOTRISONE) cream  Apply topically 2 times daily as needed Indications: Yeast Infection (inactive)              Coenzyme Q10 (COQ-10) 200 MG CAPS  Take by mouth daily              dicyclomine (BENTYL) 10 MG capsule  Take 10 mg by mouth 4 times daily (before meals and nightly) Indications: Pain in the Abdominal Region             digoxin (LANOXIN) 125 MCG tablet  Take 1 tablet by mouth every other day Indications: Increased Heart Rate Until Office Visit with Dr. Kimberly Street             EPINEPHrine (EPIPEN) 0.3 MG/0.3ML SOAJ injection  INJECT AS DIRECTED FOR ANAPHYLAXIS             Evolocumab (REPATHA SURECLICK SC)  Inject 307 mg into the skin every 14 days Indications: Blood Cholesterol Abnormal              folic acid (FOLVITE) 1 MG tablet  Take 1 mg by mouth daily. Indications: Folic Acid Supplementation             furosemide (LASIX) 40 MG tablet  Take 40 mg by mouth daily. Indications: Treatment with Diuretic Therapy             hydrocortisone (ANUSOL-HC) 25 MG suppository  Place 1 suppository rectally 2 times daily as needed for Hemorrhoids             hydrocortisone 2.5 % cream  Apply topically 2 times daily as needed              hydrOXYzine (ATARAX) 25 MG tablet  Take 25 mg by mouth 3 times daily as needed Indications: Feeling Anxious              levalbuterol (XOPENEX) 0.63 MG/3ML nebulization  Take 1 ampule by nebulization every 8 hours as needed for Wheezing. levocetirizine (XYZAL) 5 MG tablet  Take 5 mg by mouth nightly             lidocaine (XYLOCAINE) 5 % ointment  Apply topically Before venipuncture in Dr. Navjot Chaudhry office.              LINZESS 290 MCG CAPS capsule  Take 290 mcg by mouth daily              loperamide (IMODIUM) 2 MG capsule  Take 2 mg by mouth 4 times daily as needed for Diarrhea Menthol-Methyl Salicylate (ICY HOT) 44-47 % STCK  Apply topically daily             metoprolol succinate (TOPROL XL) 25 MG extended release tablet  Take 25 mg by mouth daily             Multiple Vitamins-Minerals (MULTIVITAMIN ADULT) CHEW  Take 2 tablets by mouth daily             nitroGLYCERIN (NITROSTAT) 0.4 MG SL tablet  Place 0.4 mg under the tongue every 5 minutes as needed. If third one does not relieve pain, call 9-1-1. Indications: Chest Pain             NITROGLYCERIN RE  Place 0.3 mg rectally as needed Indications: Hemorrhoids              nystatin (MYCOSTATIN) 223587 UNIT/GM cream  as needed              ofloxacin (FLOXIN) 0.3 % otic solution  Place 2 drops into both ears daily as needed Indications: Infection Long-term therapy. Omalizumab (XOLAIR SC)  Inject into the skin At [facility]up[facility] office             ondansetron (ZOFRAN) 8 MG tablet  daily as needed for Nausea or Vomiting              OXYGEN  2 L by Nasal route continuous Indications: Chronic Obstructive Lung Disease              pantoprazole (PROTONIX) 40 MG tablet  Take 40 mg by mouth 2 times daily 30 minutes before two heaviest meals on an empty stomach             polyethylene glycol (MIRALAX) powder  Take 17 g by mouth as needed. Indications: Constipation             Pramoxine HCl (PROCTOFOAM RE)  Place rectally daily as needed              Probiotic Product (SUPER PROBIOTIC) CAPS    Take 1 tablet by mouth daily Indications: Digestive Complaint              Revefenacin 175 MCG/3ML SOLN  Inhale 175 mcg into the lungs daily             sacubitril-valsartan (ENTRESTO) 49-51 MG per tablet  Take 1 tablet by mouth 2 times daily             traMADol (ULTRAM) 50 MG tablet  Take 50 mg by mouth every 6 hours as needed for Pain.               traZODone (DESYREL) 50 MG tablet  Take 50 mg by mouth nightly              triamcinolone (KENALOG) 0.1 % cream  Apply topically 2 times daily as needed Anti-fungal             warfarin (COUMADIN) 1 MG tablet  Take as directed by the [facility], 145 tablets for 90 days               Controlled substances monitoring: not applicable.      40 minutes spent preparing the patient for discharge    Lainey Porras MD

## 2020-11-04 NOTE — PROGRESS NOTES
Clinical Pharmacy Note    Warfarin consult follow-up    Recent Labs     11/04/20  0534   INR 3.09*     Recent Labs     11/02/20  0544 11/03/20  0650 11/04/20  0534   HGB 9.4* 9.3* 9.5*   HCT 31.1* 30.4* 31.1*    151 180       Significant Drug-Drug Interactions:  New warfarin drug-drug interactions: prednisone  Discontinued drug-drug interactions: methylprednisolone, Zosyn  Current warfarin drug-drug interactions: aspirin, trazodone (HM), linezolid, prednisone      Date INR Warfarin Dose   10/25-10/27   No Coumadin   10/28/2020 1.32 2 mg    10/29/2020 1.49  2 mg   10/30/2020  2  1.5 mg    10/31/2020   2.78   0.5 mg   11/1/2020   2.37  1.5 mg    11/2/2020  2.55 1 mg     11/3/2020  3.54   No Coumadin   11/4/2020  3.09  1 mg       Notes:                     Daily PT/INR until stable within therapeutic range.      Apurva Johnson, PharmD   11/4/2020, 11:07 AM

## 2020-11-04 NOTE — PROGRESS NOTES
[facility]  Individualized Disclosure Statement      Patient: Marly Avilez      [facility] provides 24 hour individualized service to patients with functional limitations due to, but not limited to: stroke, brain injury, spinal cord injury, major multiple trauma, fractures, amputation, and neurological disorders. The [address] [place] [address] provides rehabilitative nursing and medical services as well as physical, occupational, speech, and recreation therapies. [address] [facility] is fully accredited by the Commission on Accreditation of Rehabilitation Facilities (CARF) as a comprehensive provider of rehabilitation services. Patients admitted to the [address] [place] [address] receive a minimum of three hours of therapy per day, at least six days per week, with a revised therapy schedule on weekends and holidays. Physical therapy, occupational therapy, and speech therapy are provided seven days per week including holidays. Other therapeutic services are available on weekends and evenings as needed or scheduled. Intensity of Treatment  Your treatment program will consist of Nursing Care and:  1.5 hours of Physical Therapy, per day  1.5 hours of Occupational Therapy, per day  1 hour of Recreational Therapy, per week    [address] [address] maintains contracts with most insurance plans. Depending on the type of coverage, the insurance may impose limits on the coverage for rehabilitation care. Coverage is based on the premise that you are able to fully participate in the rehabilitation program and show continued progress. Please verify your own insurance information A copy of this was given to the patient/ family on this date.   Insurance Coverage  Your insurance company has made the following determination relative to the length of your stay:   Your estimated length of stay is 14 days   Your insurance Coverage has been verified as follows:    Primary Insurance:Medicare    Deductible:[phone]  Coverage: active   Secondary Insurance:;secondary insurance policies often cover co-pay amounts, but to ensure payment please contact your insurance company.     Alternative Resources: Please ask the  for more information [phone]

## 2020-11-04 NOTE — PROGRESS NOTES
lower leg infections, chest pain, abdominal pain, changes in her bowel or bladder function or habits. Patient still smokes 1/2 a pack of cigarettes daily and is on home oxygen. No alcohol or illicit drug use. \"     Prior Level of Function:  Lives With: Spouse  Type of Home: House  Home Layout: One level  Home Access: Stairs to enter with rails  Entrance Stairs - Number of Steps: 4  Entrance Stairs - Rails: Both(too wide to use at same time)  Home Equipment: Rolling walker, 4 wheeled walker, Darlen Hey   Bathroom Shower/Tub: Walk-in shower, Shower chair with back  [employer]: Grab bars in shower, Hand-held shower, 3-in-1 commode  Bathroom Accessibility: Accessible    Receives Help From: Family  ADL Assistance: Independent  Homemaking Assistance: Needs assistance  Ambulation Assistance: Independent  Transfer Assistance: Independent  Active : Yes  Additional Comments: Pt reports that her spouse assists with IADL tasks, Pt completes own self care . Spouse does have to assist with compression stockings. Restrictions/Precautions:  Restrictions/Precautions: General Precautions, Fall Risk     SUBJECTIVE: Pt resting in bed. Cooperative, but needed encouragement. PAIN: 0/10: at rest.    OBJECTIVE:  Bed Mobility:  Supine to Sit: Stand By Assistance  Sit to Supine: Stand By Assistance     Transfers:  Sit to Stand: Stand By Assistance  Stand to Sit:Stand By Assistance    Ambulation:  Contact Guard Assistance  Distance: 13 ft to restroom, 21 feet with Tinetti  Surface: Level Tile  Device:Rolling Walker  Gait Deviations:  Slow Cassie, Decreased Step Length Bilaterally, Decreased Gait Speed and c/o shortness of breath after ea walk.         Functional Outcome Measures: Completed  Tinetti;  Balance Score: 11  Gait Score: 8  Tinetti Total Score: 19  Risk Indicators:  Less than/equal to 18 = high risk  19-23 Moderate risk  Greater than/equal to 24 = low risk     ASSESSMENT:  Assessment: Patient progressing toward established goals. Activity Tolerance:  Patient tolerance of  treatment: good. Equipment Recommendations:Equipment Needed: No(Pt has 4WW, RW, w/c and cane)  Discharge Recommendations:  Continue to assess pending progress, Patient would benefit from continued therapy after discharge    Plan: Times per week: 5x/wk 90 min, 1x/ wk 30 min  Current Treatment Recommendations: Strengthening, Functional Mobility Training, Transfer Training, Endurance Training, Balance Training, Stair training, Gait Training, Home Exercise Program, Safety Education & Training, Equipment Evaluation, Education, & procurement, Patient/Caregiver Education & Training    Patient Education  Patient Education: [employer], Transfers, Gait,  - Patient Verbalized Understanding, - Patient Requires Continued Education    Goals:  Patient goals : get my legs stronger to walk better. Short term goals  Time Frame for Short term goals: 1 wk  Short term goal 1: Pt to go supine <->sit, SBA, to get in/out of bed, no rail. Short term goal 2: Pt to get up/down from various seated surfaces, SBA to get up to walk. Short term goal 3: Pt to walk with RW >= 100 ft, SBA to progress to home and community mobility  Short term goal 4: negotiate 4 steps with HR and CGA to enter home safely  Short term goal 5: Pt to get in/out of car, SBA for transportation needs. Long term goals  Time Frame for Long term goals : 3 wks  Long term goal 1: Pt to go supine <->sit, Mod I, to get in/out of bed, no rail. Long term goal 2: Pt to get up/down from various seated surfaces, Mod I, to get up to walk. Long term goal 3: Pt to walk with RW >= 150 ft, Mod I, to progress to home and community mobility  Long term goal 4: negotiate 4 steps with 1 HR and Mod I to enter home safely  Long term goal 5: Pt to get in/out of car, Mod I, for transportation needs.   Long term goal 6: Pt to score >= 22, indicating improved balance    Following session, patient left in safe position with all fall risk precautions in place.

## 2020-11-04 NOTE — PLAN OF CARE
Problem: OXYGENATION/RESPIRATORY FUNCTION  Goal: Patient will achieve/maintain normal respiratory rate/effort  Description: Respiratory rate and effort will be within normal limits for the patient  11/4/2020 4643 by Steve Hsu RCP  Outcome: Ongoing  Note:   Treatment will be continued as ordered. Patient on home regimen.

## 2020-11-04 NOTE — PROGRESS NOTES
[facility]  EVALUATION    Time:    Time In: 1030  Time Out: 1200  Timed Code Treatment Minutes: 75 Minutes  Minutes: 90          Date: 2020  Patient Name: Tab Pyle,   Gender: female      MRN: [id number]  : 1945  (67 y.o.)  Referring Practitioner: Dr. Felipa Andujar  Diagnosis: left lower extremity cellulitis with physical deconditioning  Additional Pertinent Hx: She was admitted 10/25/2020 for complaint of pain, swelling and erythema over the left lower limb. Patient felt that when she takes off her JEANE hose used for her chronic systolic congestive heart failure she sometimes catches her nails on her skin and that this may have contributed to the infection. Initial imaging was concerning for possible necrotizing fasciitis but with absence of intrafascial gas this diagnosis was ruled out. She did have issues with hypotension and required admission to the ICU. She was started on vancomycin and Zosyn with plan by infectious disease to transition to oral antibiotics at time of discharge. Patient's medical course was further complicated by COPD exacerbation with increased oxygen requirements above her baseline 2 L at home and acute on chronic systolic congestive heart failure, as well as a pneumonia. Restrictions/Precautions:  Restrictions/Precautions: General Precautions, Fall Risk    Subjective  Chart Reviewed: Yes, Orders, Progress Notes, History and Physical  Patient assessed for rehabilitation services?: Yes  Family / Caregiver Present: No  Talkative and tangential. Min cues for attention to task. Pt on 3 L O2 throughout. O2 sats stable. Pt wanting to use inhaler x 1 during session after toileting and standing sinkside x 2 min       Pain:   back pain, moderate, no number speficied.      Social/Functional History:  Lives With: Spouse  Type of Home: House  Home Layout: One level  Home Access: Stairs to guard assistance    Upper Extremity Assessment:   LUE AROM : WFL  RUE AROM : WFL    LUE Strength  LUE Strength Comment: deconditioning throughout B UEs. SOB and fair endurance during MMT. Sensation  Overall Sensation Status: WFL       Activity Tolerance: Patient limited by fatigue, Patient limited by pain       Assessment:  Assessment: Pt presents with a decline in independent functioning due to recent hospitalization with cellulitis. Pt currently requires moderate assist for ADL routines, CGA for safe mobility with use of the RW for short distant ambulation. She requires frequent rest breaks with SOB on 3 LO2, during basic ADLs. This is a decline from pt being independent functioning with self care and mobility PTA>She would benefit from additional skilled OT services to educate pt in self care tasks, energy conseration and endurance building to return home wiht spouse with the highest quality of life and independent functioning to reduce cargiver burdon. Performance deficits / Impairments: Decreased functional mobility , Decreased ADL status, Decreased strength, Decreased endurance, Decreased safe awareness, Decreased balance, Decreased high-level IADLs  Prognosis: Good  Decision Making: Medium Complexity  Safety Devices in place: Yes  Type of devices: All fall risk precautions in place, Patient at risk for falls, Call light within reach, Left in bed, Bed alarm in place, Gait belt    Treatment Initiated: Treatment and education initiated within context of evaluation. Evaluation time included review of current medical information, gathering information related to past medical, social and functional history, completion of standardized testing, formal and informal observation of tasks, assessment of data and development of plan of care and goals.   Treatment time included skilled education and facilitation of tasks to increase safety and independence with ADL's for improved functional independence and quality of life.    Discharge Recommendations:  Continue to assess pending progress    Patient Education:  OT Education: Plan of Care, OT Role, Precautions, ADL Adaptive Strategies, Transfer Training    Equipment Recommendations:  Equipment Needed: No  Other: Pt owns BSC and shower chair. Pt has grab bars in the shower. Plan:  Times per week: 5xs week x 90 min, 1 x week x 30 min  Current Treatment Recommendations: Strengthening, Endurance Training, Balance Training, Functional Mobility Training, Patient/Caregiver Education & Training, Equipment Evaluation, Education, & procurement, Self-Care / ADL, Safety Education & Training, Home Management Training    Goals:  Patient goals : I want to take care of myself and not rely on my  as much  Short term goals  Time Frame for Short term goals: 1 week  Short term goal 1: PT will complete ADL routine with setup and no  > min cues for energy conservation or attention to task  to increase ability to complete self care tasks  Short term goal 2: Pt will complete standing tolerance x 4 minutes with 1-2 UE release and S to increase indep and safety with all grooming. Short term goal 3: Pt will complete functional ambulation to and from the BR as well as around obstacles with SBA and 0-2cues for RW safety to increase independence in home mobility to/ fropm the BR  Short term goal 4: Pt will complete UE light strengthening ex x 10 reps with HEP to increase strength/ endurance   needed for safe light IADL tasks  Long term goals  Time Frame for Long term goals : 2-3 weeks  Long term goal 1: Pt will complete ADL routine with S and no  cues for energy conservation or safe tech to increase independence in self care tasks  Long term goal 2: Pt will complete 2 step homemaking tasks with S and 0-1 cues for safe tech to increase ability to prepare a snack. See long-term goal time frame for expected duration of plan of care.   If no long-term goals established, a short length of stay is anticipated. Following session, patient left in safe position with all fall risk precautions in place.

## 2020-11-04 NOTE — PLAN OF CARE
Problem: OXYGENATION/RESPIRATORY FUNCTION  Goal: Patient will achieve/maintain normal respiratory rate/effort  Description: Respiratory rate and effort will be within normal limits for the patient  Outcome: Ongoing  Note: Treatment will be continued as ordered. Patient on home regimen.

## 2020-11-04 NOTE — PLAN OF CARE
Care plan reviewed with patient and verbalize understanding of the plan of care and contribute to goal setting. Problem: Pain:  Goal: Pain level will decrease  Description: Pain level will decrease  Outcome: Met This Shift  Note: Patient states she has 4/10 pain in left leg. Medication given, repositioned, elevating. Will continue to monitor. Problem: Skin Integrity:  Goal: Will show no infection signs and symptoms  Description: Will show no infection signs and symptoms  Outcome: Met This Shift     Problem: Skin Integrity:  Goal: Absence of new skin breakdown  Description: Absence of new skin breakdown  Outcome: Met This Shift  Note: Shows no s/sx of new skin breakdown. Will continue to monitor. Problem: Falls - Risk of:  Goal: Will remain free from falls  Description: Will remain free from falls  Outcome: Met This Shift  Note: Patient free from falls. Will continue to monitor.

## 2020-11-04 NOTE — PROGRESS NOTES
Physical Medicine & Rehabilitation  Progress Note    Chief Complaint:  Physical deconditioning secondary to Left lower leg severe cellulitis    Subjective: \"I get very short of breath as soon as I am asked to get up for therapy. \"  She states that this is not new. She remains on 3 L of oxygen. Pain level is 6 out of 10. She is having some hemorrhoidal bleeding today. She does not endorse any other concerns or questions at this time. Rehabilitation:   Physical Therapy:  OBJECTIVE:  Bed Mobility:  Supine to Sit: Stand By Assistance  Sit to Supine: Stand By Assistance      Transfers:  Sit to Stand: Stand By Assistance  Stand to Sit:Stand By Assistance     Ambulation:  Contact Guard Assistance  Distance: 13 ft to restroom, 21 feet with Tinetti  Surface: Level Tile  Device:Rolling Walker  Gait Deviations:  Slow Cassie, Decreased Step Length Bilaterally, Decreased Gait Speed and c/o shortness of breath after ea walk. Functional Outcome Measures: Completed  Tinetti;  Balance Score: 11  Gait Score: 8  Tinetti Total Score: 19  Risk Indicators:  Less than/equal to 18 = high risk  19-23 Moderate risk  Greater than/equal to 24 = low risk      ASSESSMENT:  Assessment: Patient progressing toward established goals. Activity Tolerance:  Patient tolerance of  treatment: good. Equipment Recommendations:Equipment Needed: No(Pt has 4WW, RW, w/c and cane)  Discharge Recommendations:  Continue to assess pending progress, Patient would benefit from continued therapy after discharge    Occupational Therapy:  Cognition/Orientation:     ADL's:     Functional Mobility:     Functional Mobility  Functional - Mobility Device: Rolling Walker  Activity: To/from bathroom  Assist Level: Contact guard assistance      Balance:  Balance  Sitting Balance: Supervision  Standing Balance: Contact guard assistance  Standing Balance  Time: x 2 min x 3 trials during bathing tasks.  1 UE supuport needed at times.     Transfers:  Sit to stand: Contact guard assistance  Stand to sit: Contact guard assistance  Toilet Transfers  Toilet - Technique: Ambulating  Equipment Used: Raised toilet seat with rails  Toilet Transfer: Contact guard assistance     Upper Extremity Assessment:   LUE AROM : WFL  RUE AROM : WFL     LUE Strength  LUE Strength Comment: deconditioning throughout B UEs. SOB and fair endurance during MMT.     Sensation  Overall Sensation Status: WFL     Activity Tolerance: Patient limited by fatigue, Patient limited by pain     Assessment:  Assessment: Pt presents with a decline in independent functioning due to recent hospitalization with cellulitis. Pt currently requires moderate assist for ADL routines, CGA for safe mobility with use of the RW for short distant ambulation. She requires frequent rest breaks with SOB on 3 LO2, during basic ADLs. This is a decline from pt being independent functioning with self care and mobility PTA>She would benefit from additional skilled OT services to educate pt in self care tasks, energy conseration and endurance building to return home wiht spouse with the highest quality of life and independent functioning to reduce cargiver burdon. Performance deficits / Impairments: Decreased functional mobility , Decreased ADL status, Decreased strength, Decreased endurance, Decreased safe awareness, Decreased balance, Decreased high-level IADLs  Prognosis: Good  Decision Making: Medium Complexity  Safety Devices in place: Yes  Type of devices: All fall risk precautions in place, Patient at risk for falls, Call light within reach, Left in bed, Bed alarm in place, Gait belt     Treatment Initiated: Treatment and education initiated within context of evaluation.   Evaluation time included review of current medical information, gathering information related to past medical, social and functional history, completion of standardized testing, formal and informal observation of tasks, assessment of data and development of plan of care and goals. Treatment time included skilled education and facilitation of tasks to increase safety and independence with ADL's for improved functional independence and quality of life.     Discharge Recommendations:  Continue to assess pending progress    Speech Therapy:  Not applicable     Review of Systems:  Review of Systems   Constitutional: Positive for activity change and fatigue. Negative for appetite change. HENT: Negative for trouble swallowing and voice change. Eyes: Negative for visual disturbance. Respiratory: Positive for shortness of breath. Negative for cough. Cardiovascular: Positive for leg swelling. Gastrointestinal: Positive for anal bleeding. Negative for blood in stool, constipation and diarrhea. Endocrine: Negative for cold intolerance. Genitourinary: Negative for frequency and urgency. Musculoskeletal: Positive for gait problem and myalgias. Negative for back pain. Skin: Positive for wound. Negative for pallor. Allergic/Immunologic: Negative. Neurological: Positive for weakness. Negative for dizziness, speech difficulty, numbness and headaches. Hematological: Negative. Psychiatric/Behavioral: Negative for confusion, decreased concentration and dysphoric mood. The patient is not nervous/anxious. All other systems reviewed and are negative. Objective:  /74   Pulse 70   Temp 97.2 °F (36.2 °C)   Resp 16   Ht 5' 2\" (1.575 m)   Wt 165 lb (74.8 kg)   SpO2 92%   Breastfeeding No   BMI 30.18 kg/m²   CURRENT VITALS:  height is 5' 2\" (1.575 m) and weight is 165 lb (74.8 kg). Her temperature is 97.2 °F (36.2 °C). Her blood pressure is 126/74 and her pulse is 70. Her respiration is 16 and oxygen saturation is 92%. Body mass index is 30.18 kg/m².   Temperature Range (24h):Temp: 97.2 °F (36.2 °C) Temp  Av.4 °F (36.3 °C)  Min: 97.2 °F (36.2 °C)  Max: 97.6 °F (36.4 °C)  BP Range (87O): Systolic (86NET), QNP:992 , Min:126 , QKD:286     Diastolic (24hrs), Av, Min:59, Max:74    Pulse Range (24h): Pulse  Av  Min: 70  Max: 72  Respiration Range (24h): Resp  Av.4  Min: 14  Max: 18  Current Pulse Ox (24h):  SpO2: 92 %  Pulse Ox Range (24h):  SpO2  Av.6 %  Min: 92 %  Max: 95 %  Oxygen Amount and Delivery: O2 Flow Rate (L/min): 5 L/min    awake  Orientation:   person, place, time, situation  Mood: within normal limits  Affect: flat  General appearance:  in no acute distress, glasses and [email or web address], in bed    Memory:   grossly normal  Attention/Concentration: normal  Language:  normal    ROM:  Normal  Motor Exam:  Motor exam is 4 out of 5 all extremities with the exception of left ankle not tested    Sensory:  Sensory intact  Coordination:   normal  Deep Tendon Reflexes:  Reflexes are intact and symmetrical bilaterally with left Achilles tendon not tested    Skin: warm and dry, no rash with notable erythema involving the left ankle to approximately mid calf with surgical markers demarcating the current extent of the cellulitis  Peripheral vascular: Pulses: Normal upper and diminished lower extremity pulses; Edema: 3+ pedal    Diagnostics:   Recent Results (from the past 24 hour(s))   Protime-INR    Collection Time: 20  5:34 AM   Result Value Ref Range    INR 3.09 (H) 0.85 - 1.13   CBC    Collection Time: 20  5:34 AM   Result Value Ref Range    WBC 7.8 4.8 - 10.8 thou/mm3    RBC 3.06 (L) 4.20 - 5.40 mill/mm3    Hemoglobin 9.5 (L) 12.0 - 16.0 gm/dl    Hematocrit 31.1 (L) 37.0 - 47.0 %    .6 (H) 81.0 - 99.0 fL    MCH 31.0 26.0 - 33.0 pg    MCHC 30.5 (L) 32.2 - 35.5 gm/dl    RDW-CV 14.9 (H) 11.5 - 14.5 %    RDW-SD 55.4 (H) 35.0 - 45.0 fL    Platelets 672 830 - 724 thou/mm3    MPV 9.9 9.4 - 12.4 fL   Comprehensive Metabolic Panel    Collection Time: 20  5:34 AM   Result Value Ref Range    Glucose 115 (H) 70 - 108 mg/dL    CREATININE 1.1 0.4 - 1.2 mg/dL    BUN 40 (H) 7 - 22 mg/dL    Sodium 139 135 - 145 meq/L    Potassium 4.1 3.5 - 5.2 meq/L    Chloride 104 98 - 111 meq/L    CO2 24 23 - 33 meq/L    Calcium 9.2 8.5 - 10.5 mg/dL    AST 12 5 - 40 U/L    Alkaline Phosphatase 60 38 - 126 U/L    Total Protein 5.7 (L) 6.1 - 8.0 g/dL    Alb 3.0 (L) 3.5 - 5.1 g/dL    Total Bilirubin 0.3 0.3 - 1.2 mg/dL    ALT 18 11 - 66 U/L   Vitamin D 25 Hydroxy    Collection Time: 11/04/20  5:34 AM   Result Value Ref Range    Vit D, 25-Hydroxy 44 30 - 100 ng/ml   Anion Gap    Collection Time: 11/04/20  5:34 AM   Result Value Ref Range    Anion Gap 11.0 8.0 - 16.0 meq/L   Glomerular Filtration Rate, Estimated    Collection Time: 11/04/20  5:34 AM   Result Value Ref Range    Est, Glom Filt Rate 48 (A) ml/min/1.73m2     Labs Renal Latest Ref Rng & Units 11/4/2020 11/3/2020 11/2/2020 11/1/2020 10/28/2020   BUN 7 - 22 mg/dL 40(H) 36(H) 26(H) 26(H) 21   Cr 0.4 - 1.2 mg/dL 1.1 1.0 0.9 1.1 0.7   K 3.5 - 5.2 meq/L 4.1 4.2 4.8 4.5 4.7   Na 135 - 145 meq/L 139 141 138 138 139      Recent Labs     11/04/20  0534 11/03/20  0650 11/02/20  0544   WBC 7.8 5.7 3.9*   HGB 9.5* 9.3* 9.4*   HCT 31.1* 30.4* 31.1*   .6* 101.0* 102.0*    151 135      Impression:  1. Left lower leg severe cellulitis. 2. Physical deconditioning. 3. Gait instability. 4. Septic shock secondary to left lower leg cellulitis. 5. IV infiltration into the left forearm with heparin on 10/31/2020 treated with Hylenex. 6. Insomnia. 7. Atrial fibrillation on chronic anticoagulation with Coumadin. 8. Biventricular implanted cardioverter-defibrillator  9. Coronary artery disease. 10. COPD chronic home oxygen at 2 L. Follows with Dr. Karsten Spencer. 11. Acute kidney injury. 12. CKD 2.  13. Hyperlipidemia. 14. Hypertension. 15. History of prior left displaced femoral neck fracture status post hemiarthroplasty by Dr. Ja Whitmore on 10/24/2019. 16. History of prior closed head injury. [place] Cognitive Assessment [facility]) version 8.3 completed. Patient scored 21/30 on 11/4.   17. History of prior myocardial infarction, 1994. 18. Chronic systolic congestive heart failure with ejection fraction of 35 to 40%. 19. Mild peripheral vascular disease per vascular OMAYRA bilateral study dated 2/25/2020.  20. Severe left external carotid artery stenosis and left subclavian artery stenosis/occlusion per vascular carotid bilateral ultrasound on 4/1/2014. 21. Mild to moderate central spinal stenosis at L5-L6 with moderate right S1 lateral recess stenosis and findings of 6 lumbar type vertebral body per CT of the lumbar spine without contrast on 10/19/2012.  22. Hemorrhoids. 23. Anxiety. 24. Osteopenia per DEXA bone scan on 12/7/2015 with medium fracture risk. 25. Chronic constipation. 26. Oral thrush. 27. Chronic iron deficiency anemia. 28. Current every day smoker. Plan:     Medical management: Per primary team and Dr. Jay Boo. Consultants:  Orthopedic Surgery, Critical Care, Infectious Disease, Cardiology, Pulmonology, Family Medicine Physical Medicine    Narcotic usage: Norco last 24 hour usage 20 mg. Last BM:  Stool Amount: Small (11/04/20 2204)    FUNCTIONAL OUTCOMES TOOLS:    LOUIS -      Tinetti - Balance Score: 11  Gait Score: 8  Tinetti Total Score: 19    TUG -      Acute/Rehabilitation Problems:  1. Left lower leg severe cellulitis. 1. Infectious Disease service following. Appreciate the assistance. 2. Linezolid for 5 more doses. 3. Pain control. 1. Tylenol. 2. Norco.  3. Flexeril. 4. Gabapentin. 2. Culturelle. 3. Physical deconditioning/Gait instability. 1. PT/OT. 2. Tinetti 19/28. Risk Indicators: Less than/equal to 18 = high risk; 19-23 Moderate risk; Greater than/equal to 24 = low risk. 4. Septic shock secondary to left lower leg cellulitis. 1. WBCs normal at 5.7 on 11/3.  5. Nutrition:  Consultation to dietician for nutritional counseling and recommendations. 1. Total protein 5.7 and albumin 3.0. Low.  2. Vitamin D 25 hydroxy was 36 on 1/5/2020.   Normal at 44 on 11/4/2020.  6. Electrolytes. 1. Normal on 11/4/2020.  7. Acute kidney injury/CKD 2.  1. Patient had normal renal function at the beginning of 2020 with a persistence of GFR suggestive of CKD 2. During this hospitalization Cr/BUN/GFR has worsened to 1.1/40/48 from 1.0/36/54. 2. Encourage fluids  8. Oral thrush. 1. Nystatin x5 days. 9. Insomnia. 1. Trazodone 50 mg nightly. 10. Bladder: No issues  11. Bowel: Senna, colace, MOM  1. GlycoLax. 2. Bisacodyl suppository. 12. Rehabilitation nursing will be involved for bowel, bladder, skin, and pain management. Nursing will also provide education and training to patient and family. 13. Prophylaxis:  DVT: Coumadin. GI: Protonix. 14.  and case management consultations for coordination of care and discharge planning. Chronic Problems:  1. Atrial fibrillation on chronic anticoagulation with Coumadin. 1. Coumadin to be dosed by the pharmacy. 2. Last INR was 3.09 on 11/4/2020.  2. Biventricular implanted cardioverter-defibrillator  3. Coronary artery disease. 1. ASA 81 mg.  4. COPD chronic home oxygen at 2 L. Follows with Dr. Dakota Dee. 1. Dr. Dakota Dee following during this admission. 2. Charisse Lana. 3. Pulmicort. 4. Xopenex. 5. Xolair injection every 28 days with next injection on 11/30. 6. Spiriva Respimat. 7. Patient normally on 2 L chronically at home. Current oxygen setting is 3 L on 11/3.  5. Hyperlipidemia. 1. No current medication. 6. History of prior left displaced femoral neck fracture status post hemiarthroplasty by Dr. Verenice Johnson on 10/24/2019.  7. History of prior closed head injury. [place] Cognitive Assessment [facility]) version 8.3 completed. Patient scored 21/30 on 11/4.  8. History of prior myocardial infarction, 1994.  9. Chronic systolic congestive heart failure with ejection fraction of 35 to 40%/Hypertension. 1. Digoxin. 2. Lasix. 3. Toprol-XL. 4. Entresto. 5. Evolucumab.   10. Mild peripheral vascular disease per vascular OMAYRA bilateral study dated 2/25/2020. 11. Severe left external carotid artery stenosis and left subclavian artery stenosis/occlusion per vascular carotid bilateral ultrasound on 4/1/2014. 12. Mild to moderate central spinal stenosis at L5-L6 with moderate right S1 lateral recess stenosis and findings of 6 lumbar type vertebral body per CT of the lumbar spine without contrast on 10/19/2012. 13. Hemorrhoids. 1. No current medication ordered. 14. Anxiety. 1. No current medication ordered. 15. Osteopenia per DEXA bone scan on 12/7/2015 with medium fracture risk. 1. Vitamin D 25 hydroxy level was normal on 1/5/2020. 16. Chronic constipation. 1. Patient normally on Linzess at home. 17. Chronic iron deficiency anemia. 1. Receives IV iron transfusions. Last iron on 11/3 was 173 with a ferritin of 305. 2. Hemoglobin 9.5 on 11/4 which is stable over 9.3 on 11/3. 18. Current every day smoker. 1. Patient not interested in smoking cessation. Labs reviewed on:  1. 11/3.  2. 11/4    Infectious Disease:  1. Linezolid. Missed Therapy Time:  None     DME:    Discharge Plan:      Greater than 50% of 30 minutes was spent with the patient reviewing her current level of pain control, her tolerance of therapies, and her respiratory status. An additional 10 minutes of patient related documentation for completion of the IPOC was in addition to the time previously noted.       Onur Honeycutt MD

## 2020-11-04 NOTE — PLAN OF CARE
Individualized Plan of [facility] Inpatient Rehabilitation Unit    Rehabilitation physician: Lainey Porras MD     Admit Date: 11/3/2020     Rehabilitation Diagnosis: Physical deconditioning [R53.81]  Physical debility [R53.81]     Rehabilitation impairments: self care, mobility, bowel/bladder management, pain management and safety    Factors facilitating achievement of predicted outcomes: Friend support, Motivated, Cooperative, Pleasant, Good insight into deficits, Has needed Durable Medical Equipment at home and Knowledge about rehab  Barriers to the achievement of predicted outcomes: Pain, Anxiety, Lower extremity weakness, Long standing deficits, Skin Care and Wound Care    Patient Goals: Improve independence with mobility, Increase overall strength and endurance, Increase balance, Increase independence with activities of daily living, Improve cognition, Increase self-awareness, Increase safety awareness, Increase community integration, Increase socialization, Functional communication with caregivers, Integrate appropriate pain management plan, Assure adequate nutritional option for discharge, Continence of bowel and bladder and Provide appropriate patient and family education    NURSING:  Nursing goals for Uriel Rodriguez while on the rehabilitation unit will include:  Continence of bowel and bladder, Adequate number of bowel movements, Maintain O2 SATs at an acceptable level during stay, Effective pain management while on the rehabilitation unit, Establish adequate pain control plan for discharge, Absence of skin breakdown while on the rehabilitation unit, Improved skin integrity via assessments including wound measurements, Avoidance of any hospital acquired infections, No signs/symptoms of infection at the wound site, Freedom from injury during hospitalization and Complete education with patient/family with understanding demonstrated regarding disease process and resultant impairment     In order to achieve these goals, nursing interventions may include bowel/bladder training, education for medical assistive devices, medication education, O2 saturation management, energy conservation, stress management techniques, fall prevention, alarms protocol, seating and positioning, skin/wound care, pressure relief instruction, dressing changes, infection protection, DVT prophylaxis, assistance with safe transfers , and/or assistance with bathroom activities and hygiene. PHYSICAL THERAPY:  Goals:        Short term goals  Time Frame for Short term goals: 1 wk  Short term goal 1: Pt to go supine <->sit, SBA, to get in/out of bed, no rail. Short term goal 2: Pt to get up/down from various seated surfaces, SBA to get up to walk. Short term goal 3: Pt to walk with RW >= 100 ft, SBA to progress to home and community mobility  Short term goal 4: negotiate 4 steps with HR and CGA to enter home safely  Short term goal 5: Pt to get in/out of car, SBA for transportation needs. Long term goals  Time Frame for Long term goals : 3 wks  Long term goal 1: Pt to go supine <->sit, Mod I, to get in/out of bed, no rail. Long term goal 2: Pt to get up/down from various seated surfaces, Mod I, to get up to walk. Long term goal 3: Pt to walk with RW >= 150 ft, Mod I, to progress to home and community mobility  Long term goal 4: negotiate 4 steps with 1 HR and Mod I to enter home safely  Long term goal 5: Pt to get in/out of car, Mod I, for transportation needs. Long term goal 6: Pt to score >= 22, indicating improved balance    Plan of Care:  Patient to be seen by physical therapy services 90 minutes per day Monday through Friday and 30 minutes on Saturday or Sunday    Anticipated interventions may include therapeutic exercises, gait training, neuromuscular re-ed, transfer training, community reintegration, bed mobility, w/c mobility and training.     OCCUPATIONAL THERAPY:  Goals:             Short term goals  Time Frame for Short term goals: 1 week  Short term goal 1: PT will complete ADL routine with setup and no  > min cues for energy conservation or attention to task  to increase ability to complete self care tasks  Short term goal 2: Pt will complete standing tolerance x 4 minutes with 1-2 UE release and S to increase indep and safety with all grooming. Short term goal 3: Pt will complete functional ambulation to and from the BR as well as around obstacles with SBA and 0-2cues for RW safety to increase independence in home mobility to/ fropm the BR  Short term goal 4: Pt will complete UE light strengthening ex x 10 reps with HEP to increase strength/ endurance   needed for safe light IADL tasks  Long term goals  Time Frame for Long term goals : 2-3 weeks  Long term goal 1: Pt will complete ADL routine with S and no  cues for energy conservation or safe tech to increase independence in self care tasks  Long term goal 2: Pt will complete 2 step homemaking tasks with S and 0-1 cues for safe tech to increase ability to prepare a snack. Plan of Care: Patient to be seen by occupational therapy services 90 minutes per day Monday through Friday and 30 minutes on Saturday or Sunday    Anticipated interventions may include ADL and IADL retraining, strengthening, safety education and training, patient/caregiver education and training, equipment evaluation/ training/procurement, neuromuscular reeducation, wheelchair mobility training. SPEECH THERAPY:   Goals:  N/A    CASE MANAGEMENT:  Goals:   Assist patient/family with discharge planning, patient/family counseling,  and coordination with insurance during the inpatient rehabilitation stay. Other members of the multidisciplinary rehabilitation team that will be involved in the patient's plan of care include recreational therapy, dietary, respiratory therapy, and neuropsychology.     Medical issues being managed closely and that require 24 hour availability of a physician:  Bowel/Bladder function, Wound care, Pain management, Infection protection, DVT prophylaxis, Fall precautions, Fluid/Electrolyte balance, Nutritional status, Respiratory needs, Anemia and History of heart disease                                           Physician anticipated functional outcomes: Improved independence with functional measures   Estimated length of stay for this admission 10 days. Medical Prognosis: Good  Anticipated disposition: Home. The potential to achieve the above medical and rehabilitative goals is very good. This plan of care has been developed with the assistance and input of the multidisciplinary rehabilitation team.  The plan was reviewed with the patient. The patient has had the opportunity to provide input to the therapy team.    I have reviewed this Individualized Plan of Care and agree with its contents. Above documentation has been expanded, modified, adjusted to reflect the findings of my evaluations and goals for the patient.     Physician:  Elsi Felix MD

## 2020-11-04 NOTE — PLAN OF CARE
Problem: DISCHARGE BARRIERS  Goal: Patient's continuum of care needs are met  Note:   [address] [address]  Physical Medicine Case Management Assessment    [x] Inpatient Rehabilitation Unit  [] Transitional Care Unit    Patient Name: Yesika Bravo        MRN: [id number]    : 1945  (67 y.o.)  Gender: female     Date of Admission: 2020      Family/Social/Home Environment: Prior to hospitalization, patient indicates independence with most ADL's and personal care. Patient lives with spouse, Charlene Fan, of 28 years. Patient reports use of walker for ambulation inside the home and wheelchair for community mobility (doctor appointments, errands). Patient reports ability to complete errands and driving, manage own medications, and laundry. Patient and spouse, Charlene Fan, both complete housekeeping and meal preparation. Patient wears home oxygen at 2L supplied through [employer]. Patient is on coumadin and managed through [facility]. Patient reports having three children, two that live on the same street. Patient reports daughter, Anderson Pelletier, and spouse, Charlene Fan, will be primary support upon discharge home. Social/Functional History  Lives With: Spouse  Type of Home: House  Home Layout: One level  Home Access: Stairs to enter with rails  Entrance Stairs - Number of Steps: 4  Entrance Stairs - Rails: Both(too wide to use at same time)  Bathroom Shower/Tub: Walk-in shower, Shower chair with back  [employer]: Grab bars in shower, Hand-held shower, 3-in-1 commode  Bathroom Accessibility: Accessible  Home Equipment: Rolling walker, 4 wheeled walker, [address] Help From: Family  ADL Assistance: Independent  Homemaking Assistance: Needs assistance  Ambulation Assistance: Independent  Transfer Assistance: Independent  Active : Yes  Occupation: Retired  Additional Comments: Pt reports that her spouse assists with IADL tasks, Pt completes own self care .  Spouse does have to assist with compression stockings. Contact/Guardian Information: Davian Iglesias, [phone]. Daughter, Glen Lubin, [phone]. Community Resources Utilized: No community resources utilized prior to admission. Sexuality/Intimacy: No issues or concerns identified at time of SW assessment. Complementary Health Approaches: Patient with no current interest in complementary health approaches at time of SW assessment. Anticipated Needs/Discharge Plans: Anticipate patient would benefit from continued therapy upon discharge. SW met with patient to introduce self and explain role, complete SW assessment, and initiate discharge planning. Prior to hospitalization, patient indicates independence with most ADL's and personal care. Patient lives with spouse, Prema Latham, of 28 years. Patient reports use of walker for ambulation inside the home and wheelchair for community mobility (doctor appointments, errands). Patient reports ability to complete errands and driving, manage own medications, and laundry. Patient and spouse, Prema Latham, both complete housekeeping and meal preparation. Patient wears home oxygen at 2L supplied through [employer]. Patient is on coumadin and managed through [email or web address]. Patient reports having three children, two that live on the same street. Patient reports daughter, Leonor De La Torre, and spouse, Prema Latham, will be primary support upon discharge home. Anticipate patient would benefit from continued therapy upon discharge. SW reviewed with patient team conference on Thursday, 11/05/2020, to further discuss progress and discharge recommendations. SW to follow and maintain involvement in discharge planning.            Discharge Planning  Living Arrangements: Spouse/Significant Other  Support Systems: Spouse/Significant Other, Children  Potential Assistance Needed: N/A  Meds-to-Beds: Does the patient want to have any new prescriptions delivered to bedside prior to discharge?: No  Type of Home Care Services: None  Patient expects to be discharged to[de-identified] home  Expected Discharge Date: (Undetermined)  Follow Up Appointment: Best Day/Time : Monday AM      Electronically signed by BLANCA Garcia on 11/4/2020 at 3:55 PM

## 2020-11-04 NOTE — PROGRESS NOTES
prednisone), Albumin 3.0; Rx includes Lasix, Zyvox, Protonix, Entresto, Senokot, Probiotic (at home pt takes: Vitamin C, D, B Complex, MVI, Probiotic, Folic Acid); Last BM was yesterday. Wounds:  None       Current Nutrition Therapies:    DIET GENERAL; Anthropometric Measures:  · Height: 5' 2\" (157.5 cm)  · Current Body Weight: 165 lb (74.8 kg)(+3 RLE pitting edema; +2 LLE pitting edema)   · Admission Body Weight: 165 lb (74.8 kg)    · Usual Body Weight: 159 lb (72.1 kg)     · Ideal Body Weight: 110 lbs   · BMI: 30.2  · BMI Categories: Obese Class 1 (BMI 30.0-34. 9)       Nutrition Diagnosis:   · No nutrition diagnosis at this time related to   as evidenced by      Nutrition Interventions:   Food and/or Nutrient Delivery:  Continue Current Diet  Nutrition Education/Counseling:  No recommendation at this time   Coordination of Nutrition Care:  Continue to monitor while inpatient    Goals:  Pt will eat at least 75% of her meals during Rehab stay       Nutrition Monitoring and Evaluation:   Behavioral-Environmental Outcomes:  None Identified   Food/Nutrient Intake Outcomes:  Food and Nutrient Intake  Physical Signs/Symptoms Outcomes:  Biochemical Data, Fluid Status or Edema, Weight, Nutrition Focused Physical Findings     Discharge Planning:     Too soon to determine     Electronically signed by Bev Cabrera RD, LD on 11/4/20 at 1:46 PM EST    Contact: [phone]

## 2020-11-04 NOTE — PROGRESS NOTES
[address] NOTE    Conference Date: 2020  Admit Date:  11/3/2020  2:20 PM  Patient Name: Quentin Parikh    MRN: [id number]    : 1945  (67 y.o.)  Rehabilitation Admitting Diagnosis:  Physical deconditioning [R53.81]  Physical debility [R53.81]  Referring Practitioner: Dr. Britney Mack issues/needs regarding patient and family discharge status: SW met with patient to introduce self and explain role, complete SW assessment, and initiate discharge planning. Prior to hospitalization, patient indicates independence with most ADL's and personal care. Patient lives with spouse, Warden Jurado, of 28 years. Patient reports use of walker for ambulation inside the home and wheelchair for community mobility (doctor appointments, errands). Patient reports ability to complete errands and driving, manage own medications, and laundry. Patient and spouse, Warden Jurado, both complete housekeeping and meal preparation. Patient wears home oxygen at 2L supplied through [employer]. Patient is on coumadin and managed through [facility] [employer]. Patient reports having three children, two that live on the same street. Patient reports daughter, Carly Parikh, and spouse, Warden Jurado, will be primary support upon discharge home. Anticipate patient would benefit from continued therapy upon discharge. SW reviewed with patient team conference on Thursday, 2020, to further discuss progress and discharge recommendations. SW to follow and maintain involvement in discharge planning. PHYSICAL THERAPY  Assessment: Pt demonstrates a decrease in baseline by way of bed mobility, transfers, gait and steps due to decreased functional strength and endurance requiring her to currently use min to CGA for bed mobility, transfers and gait. Pt will benefit from skilled PT to advance in these areas for improved functional mobility and safety.     Equipment Needed: No(Pt has 4WW, RW, w/c and cane)    SPEECH THERAPY  Not applicable     OCCUPATIONAL THERAPY  Pt presents with a decline in independent functioning due to recent hospitalization with cellulitis. Pt currently requires moderate assist for ADL routines, CGA for safe mobility with use of the RW for short distant ambulation. She requires frequent rest breaks with SOB on 3 LO2, during basic ADLs. This is a decline from pt being independent functioning with self care and mobility PTA>She would benefit from additional skilled OT services to educate pt in self care tasks, energy conseration and endurance building to return home wiht spouse with the highest quality of life and independent functioning to reduce cargiver burdon. Equipment Needed: No  Other: Pt owns BSC and shower chair. Pt has grab bars in the shower. RECREATIONAL THERAPY  Patient has been offered participation in recreational therapy activities and participates as able. Pt states her and  eat out frequently or uses the microwave for her meals, she enjoys getting on her computer at home and playing games or getting on facebook-she  Has her laptop in her room and gave her a magnifier to use in room-she enjoys reading her bible but also needs a magnifier-pleasant and social-enjoyed getting her hair done by our beautician this am-sit to stand from recliner and w/c with CGA-ambulated to chair with RW and CGA-paid for pt's hair and when her  comes in he will pay me back     NUTRITION  Weight: 169 lb (76.7 kg) / Body mass index is 30.91 kg/m². Current diet: DIET GENERAL;  Please see nutrition note for details.     NURSING  Continent of Bowel and Bladder: Yes  Pain is Managed:  Yes  Signs and Symptoms of Infection:  Yes, cellulitis  Signs and Symptoms of Skin Breakdown:  No  Injury and Fall Free during Inpatient Rehabilitation Admission: Yes    Consultations/Labs/X-rays: Orthopedic Surgery, Critical Care, Infectious Disease, Cardiology, Pulmonology, Family Medicine Physical Medicine    No results for input(s): POCGLU in the last 72 hours. Lab Results   Component Value Date    LDLCALC 30 12/02/2019    LDLDIRECT 54 07/06/2018       Vitals:    11/04/20 2203 11/05/20 0500 11/05/20 0730 11/05/20 0745   BP:    (!) 146/67   Pulse:    70   Resp: 16      Temp:       TempSrc:       SpO2: 92%  100%    Weight:  169 lb (76.7 kg)     Height:           Family Education: Need to make contact with family to initiate education  Fall Risk:  Falling star program initiated  Is the patient appropriate for a stay in the functional apartment? no    Discharge Plan   Estimated Discharge Date: 11/13   Destination: home health  Services at Discharge: [address] [place] [address], Occupational Therapy, Nursing and an aide 2x week  Is patient appropriate for an outpatient driving evaluation? no  Equipment at Discharge: None  Factors facilitating achievement of predicted outcomes: Friend support, Motivated, Cooperative, Pleasant, Good insight into deficits, Has needed Durable Medical Equipment at home and Knowledge about rehab  Barriers to the achievement of predicted outcomes: Pain, Anxiety, Lower extremity weakness, Long standing deficits, Skin Care and Wound Care    Team Members Present at Conference:  :Hanh Atwood [place]  Occupational Therapist:Annie [address] OTR/L [place] , [facility]  Speech Therapist:N/A  Mariana Collier RN  Psychologist: Doyle Barnhart, PhD.    I approve the established interdisciplinary plan of care as documented within the medical record of Zahra Mojica

## 2020-11-04 NOTE — H&P
MOCA of 10/30 which improved to 21/30 at time of discharge. On initial consultation exam the patient is resting comfortably, sitting on the side of her hospital bed. She states that her current pain level is 5/10 respectively to her left lower limb cellulitis. Strength is grossly 4 out of 5 in all 4 limbs with exception of testing the left ankle. She has noted significant muscle loss in both lower limbs out of proportion to her body habitus. Her left lower limb has border markings outlining the cellulitic areas which appear a deep red color. The potential for coming to the acute inpatient rehabilitation unit was discussed and she understands that she would not require prior authorization under her Medicare benefits. The expected length of stay would be 10 to 12 days for her given diagnosis. The patient feels that such a stay would be reasonable given that she was on the Transitional Care Unit last year after she had a hip fracture and felt that that was a beneficial stay as well. In the interim the patient was deemed medically stable and she remains in agreement to coming to the acute inpatient rehabilitation unit. The cellulitic wound on her left lower limb has improved to a normal flesh color with mild edema; both of her feet are significantly edematous with 3+ pitting edema. Her current pain level remains at a 5 out of 10; her pain medications are working appropriately without endorsed side effects. She states that her goal is to improve her endurance and to be able to get back home and care for herself. Last recorded bowel movement was today. The expectations for the acute inpatient rehabilitation unit were discussed and the patient had no further concerns or questions at this time other than wanting some of her medications to be kept at bedside so that these would be available to her. These medications included her Gaviscon, her inhaler, eardrops, and eyedrops.     Previously was independent of ADLs and used no AD for ambulation prior to recent admission. Initially has ambulated 13' with RW at [facility] assistance with PT. With therapy is Supervision for bed mobility, standby assistance for transfers, and standby assistance with balance. ROM restrictions, WB restrictions, precautions:      Fall Risk    Current Rehabilitation Assessments:  Physical Therapy:  OBJECTIVE:  Range of Motion:  Bilateral Lower Extremity: WFL, left ankle DF to neutral pain and edema limitinig     Strength:  Bilateral Lower Extremity: WFL, limited at LLE due to weakness     Balance:  Static Sitting Balance:  Modified Independent  Static Standing Balance: Stand By Assistance, with RW     Bed Mobility:  Rolling to Right: Modified Independent   Sit to Supine: Supervision   Scooting: Supervision  HOB flat and no BR    Transfers:  Sit to Stand: Stand By Assistance  Stand to Sit:Stand By Assistance     Ambulation:  Contact Guard Assistance, to [facility]  Distance: 15'x1  Surface: Level Tile  Device:Rolling Walker  Gait Deviations: Forward Flexed Posture, Slow Cassie, Decreased Heel Strike on Left, Narrow Base of Support and min unsteady, pain limiting      Exercise:  Patient was guided in 1 set(s) 2-3 reps of exercise to both lower extremities. Ankle pumps, Glut sets, Quad sets, Heelslides, Short arc quads, Hip abduction/adduction, Straight leg raises, Seated marches and Long arc quads. Issued for HEP to cont 2-3x/day. Exercises were completed for increased independence with functional mobility.     Functional Outcome Measures: Completed  -PAC Inpatient Mobility Raw Score : 17  AM-PAC Inpatient T-Scale Score : 42.13     ASSESSMENT:  Activity Tolerance:  Patient tolerance of  treatment: fair.        Treatment Initiated: Treatment and education initiated within context of evaluation.   Evaluation time included review of current medical information, gathering information related to past medical, social and functional history, completion of standardized testing, formal and informal observation of tasks, assessment of data and development of plan of care and goals. Treatment time included skilled education and facilitation of tasks to increase safety and independence with functional mobility for improved independence and quality of life.     Assessment: Body structures, Functions, Activity limitations: Decreased functional mobility , Increased pain, Decreased balance, Decreased strength, Decreased endurance  Assessment: pt tolerated fair, pain and edema in LLE, generalized weakness, IV lines, O2, inc assist with use of walker for safe mobility, recommend cont PT to inc pt I with functional mobility  Prognosis: Excellent     REQUIRES PT FOLLOW UP: Yes     Discharge Recommendations:  Discharge Recommendations: Continue to assess pending progress, Patient would benefit from continued therapy after discharge(pt may benefit from HHPT at discharge, cont to monitor)     Occupational Therapy:  COGNITION: Slow Processing and Decreased Recall     ADL:   Lower Extremity Dressing: Dependent. Donning sock onto L foot. .     BALANCE:  Sitting Balance:  Supervision. Standing Balance: Contact Guard Assistance. x4 minutes standing at RW for BP reading and dynamic standing activity.     BED MOBILITY:  Supine to Sit: Stand By Assistance    Sit to Supine: Stand By Assistance    Scooting: Supervision EOB     TRANSFERS:  Sit to Stand:  Contact Guard Assistance. Stand to Sit: Contact Guard Assistance.       FUNCTIONAL MOBILITY:  Assistive Device: Rolling Walker  Assist Level:  Contact Guard Assistance. Distance: Completed functional mobility taking side steps towards HOB within pt room at slow pace, no LOB noted.  Pt requires seated rest break after trial of mobility, min fatigue noted.     ADDITIONAL ACTIVITIES:  Patient identified one of their personal goals is to be able to sustain functional standing positions in order to complete various ADL and IADL skills in standing position, such as sinkside grooming or washing dishes. Pt participated in dynamic standing activity to challenge balance and facilitate functional reach into various planes for rings and toss onto target with BUE and 1 UE release from RW. Pt stood for x4 minutes with CGA before requiring seated rest break, mod fatigue noted. Completed to increase safety with dynamic standing required for showering tasks.     ASSESSMENT:  Activity Tolerance:  Patient tolerance of  treatment: fair. Pt limited by pain and dizziness. Discharge Recommendations: Continue to assess pending progress, 24 hour supervision or assist, Home with Home health OT   Equipment Recommendations: Equipment Needed: No  Other: Pt owns BSC and shower chair.     Speech Therapy:  Not applicable    Past Medical History:      Diagnosis Date    Allergic rhinitis     Anemia     Anxiety     Arthritis     Asthma     Atrial fibrillation ([facility]     CAD (coronary artery disease)     Chronic systolic CHF (congestive heart failure) ([facility] 10/27/2019    COPD (chronic obstructive pulmonary disease) (HCC)     Frequent UTI     GERD (gastroesophageal reflux disease)     History of blood transfusion     X8    Hx of blood clots     left arm    Hyperlipidemia     Hypertension     Influenza A 02/22/2020    Kidney stone     LONG TERM ANTICOAGULENT USE 7/6/2012    Lumbar spinal stenosis     MI, old 11    Prolonged emergence from general anesthesia      Primary care provider: Adan Nicole MD     Past Surgical History:      Procedure Laterality Date   [facility]  2008    OUS IN [place]    COLONOSCOPY  10/16/2015    Dr. Luz Marina Agustin    COLONOSCOPY N/A 10/25/2018    COLONOSCOPY POLYPECTOMY SNARE/COLD BIOPSY performed by Akilah Morgan MD at [facility]    ENDOSCOPY, COLON, DIAGNOSTIC  01/04/2017    Dr. Nisa Keith      left hip    OVARIAN CYST REMOVAL      PACEMAKER PLACEMENT      SINUS SURGERY      several    TONSILLECTOMY      TOTAL HIP ARTHROPLASTY Left 10/24/2019    DANIEL LEFT HIP ARTHROPLASTY performed by Hail Harding MD at [address]  2013    X2    UPPER GASTROINTESTINAL ENDOSCOPY N/A 1/5/2020    EGD DIAGNOSTIC ONLY performed by Timur Ochoa MD at [facility]     Allergies:    Benadryl [diphenhydramine hcl]; Ciprofloxacin; Clarithromycin; Vitamin k; Atorvastatin; Captopril; Codeine; Iv dye [iodides]; Lipitor; Macrobid [nitrofurantoin monohydrate macrocrystals]; Neomycin-bacitracin zn-polymyx; Pravastatin; Zetia [ezetimibe]; Adhesive tape; Cephalexin; Doxycycline; Morphine;  Other; Propoxyphene; and Sulfa antibiotics    Current Medications:    Current Facility-Administered Medications: polyethylene glycol (GLYCOLAX) packet 17 g, 17 g, Oral, Daily PRN  bisacodyl (DULCOLAX) suppository 10 mg, 10 mg, Rectal, Daily PRN  acetaminophen (TYLENOL) tablet 650 mg, 650 mg, Oral, Q4H PRN  albuterol (PROVENTIL) nebulizer solution 2.5 mg, 2.5 mg, Nebulization, Q4H PRN  Arformoterol Tartrate (BROVANA) nebulizer solution 15 mcg, 15 mcg, Nebulization, BID  [START ON 11/4/2020] aspirin EC tablet 81 mg, 81 mg, Oral, Daily  budesonide (PULMICORT) nebulizer suspension 500 mcg, 500 mcg, Nebulization, BID  cyclobenzaprine (FLEXERIL) tablet 10 mg, 10 mg, Oral, TID PRN  [START ON 11/4/2020] digoxin (LANOXIN) tablet 125 mcg, 125 mcg, Oral, Every Other Day  [START ON 11/4/2020] docusate sodium (COLACE) capsule 100 mg, 100 mg, Oral, Daily  [START ON 11/17/2020] Evolocumab SOAJ 140 mg, 140 mg, Subcutaneous, Q14 Days  [START ON 11/4/2020] furosemide (LASIX) tablet 40 mg, 40 mg, Oral, Daily  gabapentin (NEURONTIN) capsule 100 mg, 100 mg, Oral, TID  HYDROcodone-acetaminophen (NORCO) 5-325 MG per tablet 1 tablet, 1 tablet, Oral, Q4H PRN  HYDROcodone-acetaminophen (NORCO) 5-325 MG per tablet 2 tablet, 2 tablet, Oral, Q4H PRN  levalbuterol (XOPENEX) nebulization 0.63 mg, 1 ampule, Nebulization, Q8H PRN  linezolid (ZYVOX) tablet 600 mg, 600 mg, Oral, 2 times per day  [START ON 11/4/2020] metoprolol succinate (TOPROL XL) extended release tablet 25 mg, 25 mg, Oral, Daily  nystatin (MYCOSTATIN) 209190 UNIT/ML suspension 500,000 Units, 5 mL, Oral, 4x Daily  [START ON 11/30/2020] omalizumab (XOLAIR) injection 225 mg, 225 mg, Subcutaneous, Q28 Days  pantoprazole (PROTONIX) tablet 40 mg, 40 mg, Oral, BID AC  predniSONE (DELTASONE) tablet 40 mg, 40 mg, Oral, Daily  sacubitril-valsartan (ENTRESTO) 24-26 MG per tablet 1 tablet, 1 tablet, Oral, BID  senna (SENOKOT) tablet 8.6 mg, 1 tablet, Oral, Nightly  [START ON 11/4/2020] tiotropium (SPIRIVA RESPIMAT) 2.5 MCG/ACT inhaler 2 puff, 2 puff, Inhalation, Daily  traZODone (DESYREL) tablet 50 mg, 50 mg, Oral, Nightly  [START ON 10/28/2021] warfarin (COUMADIN) daily dosing (placeholder), , Other, RX Placeholder  lactobacillus (CULTURELLE) capsule 1 capsule, 1 capsule, Oral, BID WC  magnesium hydroxide (MILK OF MAGNESIA) 400 MG/5ML suspension 30 mL, 30 mL, Oral, Daily PRN     Social History:   reports that she has been smoking cigarettes. She has a 6.25 pack-year smoking history. She has never used smokeless tobacco. She reports current alcohol use of about 1.0 standard drinks of alcohol per week. She reports that she does not use drugs. Lives at [address] [address].      Social/Functional History  Lives With: Spouse  Type of Home: House  Home Layout: One level  Home Access: Stairs to enter with rails  Entrance Stairs - Number of Steps: 4  Entrance Stairs - Rails: Both(too wide to use at same time)  Bathroom Shower/Tub: Walk-in shower, Shower chair with back  [employer]: Grab bars in shower, Hand-held shower, 3-in-1 commode  Bathroom Accessibility: Accessible  Home Equipment: Rolling walker, 4 wheeled walker, Wheelchair-manual  Receives Help From: Family  ADL Assistance: Independent  Homemaking Assistance: Needs assistance  Ambulation Assistance: Independent  Transfer Assistance: Independent  Active : Yes  Occupation: Retired  Additional Comments: Pt reports that her spouse assists with IADL tasks, Pt completes own self care . Spouse does have to assist with compression stockings. Functional History:  Prior Function  Receives Help From: Family  ADL Assistance: Independent  Homemaking Assistance: Needs assistance  Ambulation Assistance: Independent  Transfer Assistance: Independent  Additional Comments: Pt reports that her spouse assists with IADL tasks, Pt completes own self care . Spouse does have to assist with compression stockings. Family History:       Adopted: Yes   Problem Relation Age of Onset    Heart Disease Father          AGE 35    Cancer Sister         breast     Review of Systems:  Review of Systems   Constitutional: Positive for appetite change and fatigue. Negative for activity change and fever. HENT: Negative for trouble swallowing and voice change. Eyes: Negative. Respiratory: Positive for shortness of breath. Negative for cough. Cardiovascular: Positive for leg swelling. Gastrointestinal: Negative for constipation, diarrhea and nausea. Endocrine: Negative. Genitourinary: Negative for frequency and urgency. Musculoskeletal: Positive for gait problem and myalgias. Skin: Positive for rash. Negative for pallor. Allergic/Immunologic: Negative. Neurological: Positive for weakness. Negative for dizziness, numbness and headaches. Hematological: Negative. Psychiatric/Behavioral: Positive for dysphoric mood. Negative for confusion and decreased concentration. The patient is nervous/anxious.        Physical Exam:  BP (!) 107/51   Pulse 80   Temp 97.8 °F (36.6 °C) (Oral)   Resp 20   Ht 5' 2\" (1.575 m)   Wt 165 lb (74.8 kg)   SpO2 90%   Breastfeeding No   BMI 30.18 kg/m²     awake  Orientation:   person, place, time, situation  Mood: within normal limits  Affect: flat  General appearance: Patient is well nourished, well developed, well groomed and in no acute distress, overweight, appearing stated age, glasses and [email or web address], up in bed    Memory:   grossly normal  Attention/Concentration: normal  Language:  normal    Cranial Nerves:  cranial nerves II-XII are grossly intact  ROM:  Normal, although hesitant to move left ankle secondary to known cellulitis  Motor Exam:  Motor exam is 4 out of 5 all extremities with the exception of left ankle not tested    Tone:  normal  Muscle bulk: within normal limits  Sensory:  Sensory intact  Coordination:   normal  Deep Tendon Reflexes:  Reflexes are intact and symmetrical bilaterally with left Achilles tendon not tested    Skin: warm and dry, no rash with notable erythema involving the left ankle to approximately mid calf with surgical markers demarcating the current extent of the cellulitis  Peripheral vascular: Pulses: Normal upper and diminished lower extremity pulses; Edema: 1+    Diagnostics:  Recent Results (from the past 24 hour(s))   Protime-INR    Collection Time: 11/03/20  6:50 AM   Result Value Ref Range    INR 3.54 (H) 0.85 - 6.17   Basic Metabolic Panel    Collection Time: 11/03/20  6:50 AM   Result Value Ref Range    Sodium 141 135 - 145 meq/L    Potassium 4.2 3.5 - 5.2 meq/L    Chloride 104 98 - 111 meq/L    CO2 21 (L) 23 - 33 meq/L    Glucose 207 (H) 70 - 108 mg/dL    BUN 36 (H) 7 - 22 mg/dL    CREATININE 1.0 0.4 - 1.2 mg/dL    Calcium 9.4 8.5 - 10.5 mg/dL   CBC    Collection Time: 11/03/20  6:50 AM   Result Value Ref Range    WBC 5.7 4.8 - 10.8 thou/mm3    RBC 3.01 (L) 4.20 - 5.40 mill/mm3    Hemoglobin 9.3 (L) 12.0 - 16.0 gm/dl    Hematocrit 30.4 (L) 37.0 - 47.0 %    .0 (H) 81.0 - 99.0 fL    MCH 30.9 26.0 - 33.0 pg    MCHC 30.6 (L) 32.2 - 35.5 gm/dl    RDW-CV 14.8 (H) 11.5 - 14.5 %    RDW-SD 54.1 (H) 35.0 - 45.0 fL    Platelets 538 458 - 650 thou/mm3    MPV 10.2 9.4 - 12.4 fL   Anion Gap    Collection Time: 11/03/20  6:50 AM   Result Value Ref Range    Anion Gap 16.0 8.0 - 16.0 meq/L   Glomerular Filtration Rate, Estimated    Collection Time: 11/03/20  6:50 AM   Result Value Ref Range    Est, Glom Filt Rate 54 (A) ml/min/1.73m2   Iron    Collection Time: 11/03/20  6:50 AM   Result Value Ref Range    Iron 173 (H) 50 - 170 ug/dL   Iron binding capacity    Collection Time: 11/03/20  6:50 AM   Result Value Ref Range    TIBC 239 171 - 450 ug/dL   Ferritin    Collection Time: 11/03/20  6:50 AM   Result Value Ref Range    Ferritin 305 (H) 10 - 291 ng/mL      Recent Labs     11/03/20  0650 11/02/20  0544 11/01/20  1138   WBC 5.7 3.9* 5.6   HGB 9.3* 9.4* 9.5*   HCT 30.4* 31.1* 31.2*   .0* 102.0* 102.6*    135 137     Lab Results   Component Value Date    VITD25 36 01/05/2020      Lab Results   Component Value Date    LABA1C 5.9 11/24/2015     Impression:  1. Left lower leg severe cellulitis. 2. Physical deconditioning. 3. Gait instability. 4. Septic shock secondary to left lower leg cellulitis. 5. IV infiltration into the left forearm with heparin on 10/31/2020 treated with Hylenex. 6. Insomnia. 7. Atrial fibrillation on chronic anticoagulation with Coumadin. 8. Biventricular implanted cardioverter-defibrillator  9. Coronary artery disease. 10. COPD chronic home oxygen at 2 L. Follows with Dr. Erickson Taylor. 11. Hyperlipidemia. 12. Hypertension. 13. History of prior left displaced femoral neck fracture status post hemiarthroplasty by Dr. Norman Crum on 10/24/2019. 14. History of prior closed head injury. [facility] Cognitive Assessment [facility]) version 8.3 completed. Patient scored 21/30 on 11/4. 15. History of prior myocardial infarction, 1994. 16. Chronic systolic congestive heart failure with ejection fraction of 35 to 40%. 17. Mild peripheral vascular disease per vascular OMAYRA bilateral study dated 2/25/2020.   18. Severe left external carotid artery stenosis and left subclavian artery stenosis/occlusion per vascular carotid bilateral ultrasound on 4/1/2014. 19. Mild to moderate central spinal stenosis at L5-L6 with moderate right S1 lateral recess stenosis and findings of 6 lumbar type vertebral body per CT of the lumbar spine without contrast on 10/19/2012.  20. Hemorrhoids. 21. Anxiety. 22. Osteopenia per DEXA bone scan on 12/7/2015 with medium fracture risk. 23. Chronic constipation. 24. Oral thrush. 25. Chronic iron deficiency anemia. 26. Current every day smoker. Plan:   The patient demonstrates good potential to participate in an inpatient rehabilitation program involving at least 3 hours per day, 5 days per week of intensive rehabilitation. Rehabilitation services will include PT and OT/RT in order to improve functional status prior to discharge. Family education and training will be completed. Equipment evaluations and recommendations will be completed as appropriate. Medical management: Per primary team and Dr. Jeanette Oneill. Consultants:  Orthopedic Surgery, Critical Care, Infectious Disease, Cardiology, Pulmonology, Family Medicine Physical Medicine    Narcotic usage: Norco last 24 hour usage pending. Last BM:  Stool Amount: Medium (11/03/20 1858)    Acute/Rehabilitation Problems:  1. Left lower leg severe cellulitis. 1. Infectious Disease service following. Appreciate the assistance. 2. Linezolid for 5 more doses. 3. Pain control. 1. Tylenol. 2. Norco.  3. Flexeril. 4. Gabapentin. 2. Culturelle. 3. Physical deconditioning/Gait instability. 1. PT/OT  4. Septic shock secondary to left lower leg cellulitis. 1. WBCs normal at 5.7 on 11/3.  5. Nutrition:  Consultation to dietician for nutritional counseling and recommendations. 1. CMP pending. 2. Vitamin D 25 hydroxy was 36 on 1/5/2020.  6. Electrolytes. 7. Oral thrush. 1. Nystatin x5 days. 8. Insomnia. 1. Trazodone 50 mg nightly. 9. Bladder: No issues  10.  Bowel: Senna, colace, MOM  1. GlycoLax. 2. Bisacodyl suppository. 11. Rehabilitation nursing will be involved for bowel, bladder, skin, and pain management. Nursing will also provide education and training to patient and family. 12. Prophylaxis:  DVT: Coumadin. GI: Protonix. 13.  and case management consultations for coordination of care and discharge planning. Chronic Problems:  1. Atrial fibrillation on chronic anticoagulation with Coumadin. 1. Coumadin to be dosed by the pharmacy. 2. Last INR was 3.54 on 11/3.  2. Biventricular implanted cardioverter-defibrillator  3. Coronary artery disease. 1. ASA 81 mg.  4. COPD chronic home oxygen at 2 L. Follows with Dr. Agata Still. 1. Dr. Agata Still following during this admission. 2. Mitchell Santino. 3. Pulmicort. 4. Xopenex. 5. Xolair injection every 28 days with next injection on 11/30. 6. Spiriva Respimat. 7. Patient normally on 2 L chronically at home. Current oxygen setting is 3 L on 11/3.  5. Hyperlipidemia. 1. No current medication. 6. History of prior left displaced femoral neck fracture status post hemiarthroplasty by Dr. Sonia Godinez on 10/24/2019.  7. History of prior closed head injury. [facility] Cognitive Assessment [facility]) version 8.3 completed. Patient scored 21/30 on 11/4.  8. History of prior myocardial infarction, 1994.  9. Chronic systolic congestive heart failure with ejection fraction of 35 to 40%/Hypertension. 1. Digoxin. 2. Lasix. 3. Toprol-XL. 4. Entresto. 5. Evolucumab. 10. Mild peripheral vascular disease per vascular OMAYRA bilateral study dated 2/25/2020. 11. Severe left external carotid artery stenosis and left subclavian artery stenosis/occlusion per vascular carotid bilateral ultrasound on 4/1/2014. 12. Mild to moderate central spinal stenosis at L5-L6 with moderate right S1 lateral recess stenosis and findings of 6 lumbar type vertebral body per CT of the lumbar spine without contrast on 10/19/2012. 13. Hemorrhoids.   1. No current medication ordered. 14. Anxiety. 1. No current medication ordered. 15. Osteopenia per DEXA bone scan on 12/7/2015 with medium fracture risk. 1. Vitamin D 25 hydroxy level was normal on 1/5/2020. 16. Chronic constipation. 1. Patient normally on Linzess at home. 17. Chronic iron deficiency anemia. 18. Current every day smoker. Labs reviewed on:  1. 11/3. Infectious Disease:  1. Linezolid. The main medical problem(s) and comorbidities being actively managed by the physicians and requiring 24 hour rehabilitation nursing care during this stay include pain control, wound care, bowel management, physical deconditioning, oral thrush, and edema management. The domains of functional impairment present in this patient which will require an intensive and interdisciplinary rehabilitation environment include self care, mobility, bowel/bladder management, pain management and safety. Estimated length of stay for this admission 10 days. Anticipated disposition: Home. The potential to achieve that is very good. The post admission physician evaluation (SETH) is consistent with the pre-admission assessment. See above findings to reflect the elements required in the SETH. Patient's admitting condition is consistent with the findings of the preadmission assessment by the rehabilitation admissions coordinator. Greater than 50% of 70 minutes was spent formulating an acute inpatient rehabilitation unit admission plan, reviewing labs, records, and the electronic medical record as well as answering the patient's questions in detail.     Lazara Gordon MD

## 2020-11-05 PROBLEM — D50.9 CHRONIC IRON DEFICIENCY ANEMIA: Status: ACTIVE | Noted: 2020-01-01

## 2020-11-05 PROBLEM — J84.9 INTERSTITIAL PNEUMONIA (HCC): Status: ACTIVE | Noted: 2020-01-01

## 2020-11-05 PROBLEM — B37.0 THRUSH: Status: ACTIVE | Noted: 2020-01-01

## 2020-11-05 PROBLEM — F41.9 ANXIETY: Status: ACTIVE | Noted: 2020-01-01

## 2020-11-05 NOTE — PROGRESS NOTES
the lower extremities, diabetes mellitus, previous lower leg infections, chest pain, abdominal pain, changes in her bowel or bladder function or habits. Patient still smokes 1/2 a pack of cigarettes daily and is on home oxygen. No alcohol or illicit drug use. \"     Prior Level of Function:  Lives With: Spouse  Type of Home: House  Home Layout: One level  Home Access: Stairs to enter with rails  Entrance Stairs - Number of Steps: 4  Entrance Stairs - Rails: Both(too wide to use at same time)  Home Equipment: Rolling walker, 4 wheeled walker, [employer]   Bathroom Shower/Tub: Walk-in shower, Shower chair with back  [employer]: Grab bars in shower, Hand-held shower, 3-in-1 commode  Bathroom Accessibility: Accessible    Receives Help From: Family  ADL Assistance: Independent  Homemaking Assistance: Needs assistance  Ambulation Assistance: Independent  Transfer Assistance: Independent  Active : Yes  Additional Comments: Pt reports that her spouse assists with IADL tasks, Pt completes own self care . Spouse does have to assist with compression stockings. Restrictions/Precautions:  Restrictions/Precautions: General Precautions, Fall Risk     SUBJECTIVE: Pt. Seated on BS chair and agrees to make up therapy session from earlier this date. Pt. Talkative throughout session with difficulty staying on task. Pt. Reports fatigue as she had just finished PT session prior to arrival.       PAIN: None indicated    OBJECTIVE:  Transfers:  Not Tested    Ambulation:  Not Tested      Exercise:  Patient was guided in 2 set(s) 15 reps of exercise to both lower extremities. Glut sets, Hip abduction/adduction, Seated marches, Seated hamstring curls, Seated heel/toe raises, Long arc quads and Seated isometric hip adduction. Exercises were completed for increased independence with functional mobility.     Functional Outcome Measures: Not completed       ASSESSMENT:  Assessment: Patient progressing toward established goals. Activity Tolerance:  Patient tolerance of  treatment: fair. Pt. Fatigued     Equipment Recommendations:Equipment Needed: No(Pt has 4WW, RW, w/c and cane)  Discharge Recommendations:  Continue to assess pending progress, Patient would benefit from continued therapy after discharge    Plan: Times per week: 5x/wk 90 min, 1x/ wk 30 min  Current Treatment Recommendations: Strengthening, Functional Mobility Training, Transfer Training, Endurance Training, Balance Training, Stair training, Gait Training, Home Exercise Program, Safety Education & Training, Equipment Evaluation, Education, & procurement, Patient/Caregiver Education & Training    Patient Education  Patient Education: Plan of Care, Reviewed Prior Education, Verbal Exercise Instruction    Goals:  Patient goals : get my legs stronger to walk better. Short term goals  Time Frame for Short term goals: 1 wk  Short term goal 1: Pt to go supine <->sit, SBA, to get in/out of bed, no rail. Short term goal 2: Pt to get up/down from various seated surfaces, SBA to get up to walk. Short term goal 3: Pt to walk with RW >= 100 ft, SBA to progress to home and community mobility  Short term goal 4: negotiate 4 steps with HR and CGA to enter home safely  Short term goal 5: Pt to get in/out of car, SBA for transportation needs. Long term goals  Time Frame for Long term goals : 3 wks  Long term goal 1: Pt to go supine <->sit, Mod I, to get in/out of bed, no rail. Long term goal 2: Pt to get up/down from various seated surfaces, Mod I, to get up to walk. Long term goal 3: Pt to walk with RW >= 150 ft, Mod I, to progress to home and community mobility  Long term goal 4: negotiate 4 steps with 1 HR and Mod I to enter home safely  Long term goal 5: Pt to get in/out of car, Mod I, for transportation needs. Long term goal 6: Pt to score >= 22, indicating improved balance    Following session, patient left in safe position with all fall risk precautions in place.

## 2020-11-05 NOTE — PROGRESS NOTES
[facility] [address]  Occupational Therapy  Daily Note  Time:   Time In: 1430  Time Out: 1500  Timed Code Treatment Minutes: 30 Minutes  Minutes: 30          Date: 2020  Patient Name: Quentin Parikh,   Gender: female      Room: [facility]59/059-A  MRN: [id number]  : 1945  (67 y.o.)  Referring Practitioner: Dr. Chinmay Gabriel  Diagnosis: left lower extremity cellulitis with physical deconditioning  Additional Pertinent Hx: She was admitted 10/25/2020 for complaint of pain, swelling and erythema over the left lower limb. Patient felt that when she takes off her JEANE hose used for her chronic systolic congestive heart failure she sometimes catches her nails on her skin and that this may have contributed to the infection. Initial imaging was concerning for possible necrotizing fasciitis but with absence of intrafascial gas this diagnosis was ruled out. She did have issues with hypotension and required admission to the ICU. She was started on vancomycin and Zosyn with plan by infectious disease to transition to oral antibiotics at time of discharge. Patient's medical course was further complicated by COPD exacerbation with increased oxygen requirements above her baseline 2 L at home and acute on chronic systolic congestive heart failure, as well as a pneumonia. Restrictions/Precautions:  Restrictions/Precautions: General Precautions, Fall Risk     SUBJECTIVE: pt sitting in recliner     PAIN: pt having SOB but is little better this pm session     COGNITION: Decreased Insight, Decreased Problem Solving and Decreased Safety Awareness    ADL:   No ADL's completed this session. Chris Sanchez BALANCE:  Standing Balance: Stand By Assistance. at [facility]     BED MOBILITY:  Not Tested    TRANSFERS:  Sit to Stand:  Stand By Assistance. from recliner   Stand to Sit: Stand By Assistance.  to recliner     FUNCTIONAL MOBILITY:  Did not complete this session     ADDITIONAL ACTIVITIES:  .Patient identified a personal goal to increase UB strength and improve overall endurance so they can complete their toilet & shower transfers; skilled edu on UE strengthening and patient completed BUE strengthening exercises x10 reps x1 set this date with a minimal resistive band  in all joints and all planes. Patient tolerated in sitting , requiring short  rest breaks. Pt required min verbal  cues for technique. .Patient identified one of their personal goals is to be able to sustain functional standing positions in order to complete various ADL and IADL skills in standing position, such as sinkside grooming or washing dishes. Dynamic standing task was then facilitated to challenge 2 hand release . Patient required CGA , and demo'ed an endurance of 2-3  minutes. ASSESSMENT:     Activity Tolerance:  Patient tolerance of  treatment: good. Discharge Recommendations: Continue to assess pending progress   Equipment Recommendations: Equipment Needed: No  Other: Pt owns BSC and shower chair. Pt has grab bars in the shower. Plan: Times per week: 5xs week x 90 min, 1 x week x 30 min  Current Treatment Recommendations: Strengthening, Endurance Training, Balance Training, Functional Mobility Training, Patient/Caregiver Education & Training, Equipment Evaluation, Education, & procurement, Self-Care / ADL, Safety Education & Training, Home Management Training    Patient Education  Patient Education: Home Exercise Program and Importance of Increasing Activity    Goals  Short term goals  Time Frame for Short term goals: 1 week  Short term goal 1: PT will complete ADL routine with setup and no  > min cues for energy conservation or attention to task  to increase ability to complete self care tasks  Short term goal 2: Pt will complete standing tolerance x 4 minutes with 1-2 UE release and S to increase indep and safety with all grooming.   Short term goal 3: Pt will complete functional ambulation to and from the BR as well as around obstacles with SBA and 0-2cues for RW safety to increase independence in home mobility to/ fropm the BR  Short term goal 4: Pt will complete UE light strengthening ex x 10 reps with HEP to increase strength/ endurance   needed for safe light IADL tasks  Long term goals  Time Frame for Long term goals : 2-3 weeks  Long term goal 1: Pt will complete ADL routine with S and no  cues for energy conservation or safe tech to increase independence in self care tasks  Long term goal 2: Pt will complete 2 step homemaking tasks with S and 0-1 cues for safe tech to increase ability to prepare a snack. Following session, patient left in safe position with all fall risk precautions in place.

## 2020-11-05 NOTE — FLOWSHEET NOTE
1. Continue spiritual care     11/05/20 1100   Encounter Summary   Services provided to: Patient   Support System Family members; Children;Spouse   Place of [address]   Continue Visiting Yes   Complexity of Encounter Moderate   Length of Encounter 15 minutes   Spiritual/Anglican   Type Spiritual support   Assessment Calm; Approachable   Intervention Active listening;Explored feelings, thoughts, concerns;Prayer; Facilitated forgiveness   Outcome Comfort;Expressed gratitude;Engaged in conversation

## 2020-11-05 NOTE — PROGRESS NOTES
Clinical Pharmacy Note    Warfarin consult follow-up    Recent Labs     11/05/20  0620   INR 2.41*     Recent Labs     11/03/20  0650 11/04/20  0534   HGB 9.3* 9.5*   HCT 30.4* 31.1*    180       Significant Drug-Drug Interactions:  New warfarin drug-drug interactions: none  Discontinued drug-drug interactions: none  Current warfarin drug-drug interactions: aspirin, trazodone (HM), linezolid, prednisone      Date INR Warfarin Dose   10/25-10/27   No Coumadin   10/28/2020 1.32 2 mg    10/29/2020 1.49  2 mg   10/30/2020  2  1.5 mg    10/31/2020   2.78   0.5 mg   11/1/2020   2.37  1.5 mg    11/2/2020  2.55 1 mg     11/3/2020  3.54   No Coumadin   11/4/2020  3.09  1 mg   11/5/2020 2.41 1.5 mg       Notes:                     Daily PT/INR until stable within therapeutic range.      Iram Lopez, PharmD   11/5/2020, 3:07 PM

## 2020-11-05 NOTE — PROGRESS NOTES
Jesusita Leo [address] [facility]  [address] [facility]  Occupational Therapy  Daily Note  Time:   Time In: 830  Time Out: 930  Timed Code Treatment Minutes: 60 Minutes  Minutes: 60          Date: 2020  Patient Name: Uriel Rodriguez,   Gender: female      Room: [facility]59/059-A  MRN: [id number]  : 1945  (67 y.o.)  Referring Practitioner: Dr. Konstantin Ríos  Diagnosis: left lower extremity cellulitis with physical deconditioning  Additional Pertinent Hx: She was admitted 10/25/2020 for complaint of pain, swelling and erythema over the left lower limb. Patient felt that when she takes off her JEANE hose used for her chronic systolic congestive heart failure she sometimes catches her nails on her skin and that this may have contributed to the infection. Initial imaging was concerning for possible necrotizing fasciitis but with absence of intrafascial gas this diagnosis was ruled out. She did have issues with hypotension and required admission to the ICU. She was started on vancomycin and Zosyn with plan by infectious disease to transition to oral antibiotics at time of discharge. Patient's medical course was further complicated by COPD exacerbation with increased oxygen requirements above her baseline 2 L at home and acute on chronic systolic congestive heart failure, as well as a pneumonia. Restrictions/Precautions:  Restrictions/Precautions: General Precautions, Fall Risk     SUBJECTIVE:  Pt sitting up in bed just finished eating breakfast. Pt agreeable to OT with some encouragement. Pt on 4 L of O2 in room at 90% and with activity had to increase to 5 L at 93%      PAIN: pt having some pain in LLE and very SOB     COGNITION: Decreased Insight, Impaired Memory, Inattention, Decreased Problem Solving, Decreased Safety Awareness and Impulsive    ADL:   Grooming: Supervision. pt able to compelte oral care and wash face sitting in w/c at sink   Bathing: Stand By Assistance.   pt able to wash rod and bottom in standing at sink   Lower Extremity Dressing: Minimal Assistance, Moderate Assistance, with verbal cues  and with increased time for completion. to don and doff depends and pants and reg hose she is dependent. Reinier Ferrer BALANCE:  Standing Balance: Stand By Assistance. at sink and RW     BED MOBILITY:  Supine to Sit: Stand By Assistance with HOB elevated and use of bedrail   Sit to Supine: Contact Guard Assistance with HOB elevated and use of bedrail     TRANSFERS:  Sit to Stand:  Stand By Assistance. from EOB , w/c   Stand to Sit: Contact Guard Assistance. to w/c and EOB     FUNCTIONAL MOBILITY:  Assistive Device: Rolling Walker  Assist Level:  Contact Guard Assistance, with verbal cues  and with increased time for completion. Distance: into hallway of unit   Pt had no LOB but very SOB and required lots of standing rest breaks. Pt on 5 L of O2 with exercise and at 90%-95%. Pt was in room on 4 L but did have to increase to 5 L with activity. Pt placed back on 4 L when left room at 92%  Pt takes lots of extended rest breaks due to SOB and fatigue. Did talk with RN of her increase weight and maybe holding fluid so she to report on to Dr. Vikki Dejesus this and to check it out. ASSESSMENT:     Activity Tolerance:  Patient tolerance of  treatment: fair. Very SOB       Discharge Recommendations: Continue to assess pending progress   Equipment Recommendations: Equipment Needed: No  Other: Pt owns BSC and shower chair. Pt has grab bars in the shower.   Plan: Times per week: 5xs week x 90 min, 1 x week x 30 min  Current Treatment Recommendations: Strengthening, Endurance Training, Balance Training, Functional Mobility Training, Patient/Caregiver Education & Training, Equipment Evaluation, Education, & procurement, Self-Care / ADL, Safety Education & Training, Home Management Training    Patient Education  Patient Education: ADL's, Energy Conservation, Home Exercise Program, Home Safety and Importance of Increasing Activity    Goals  Short term goals  Time Frame for Short term goals: 1 week  Short term goal 1: PT will complete ADL routine with setup and no  > min cues for energy conservation or attention to task  to increase ability to complete self care tasks  Short term goal 2: Pt will complete standing tolerance x 4 minutes with 1-2 UE release and S to increase indep and safety with all grooming. Short term goal 3: Pt will complete functional ambulation to and from the BR as well as around obstacles with SBA and 0-2cues for RW safety to increase independence in home mobility to/ fropm the BR  Short term goal 4: Pt will complete UE light strengthening ex x 10 reps with HEP to increase strength/ endurance   needed for safe light IADL tasks  Long term goals  Time Frame for Long term goals : 2-3 weeks  Long term goal 1: Pt will complete ADL routine with S and no  cues for energy conservation or safe tech to increase independence in self care tasks  Long term goal 2: Pt will complete 2 step homemaking tasks with S and 0-1 cues for safe tech to increase ability to prepare a snack. Following session, patient left in safe position with all fall risk precautions in place.

## 2020-11-05 NOTE — PROGRESS NOTES
Alert and oriented to person, place, and time. Calm and appropriate interaction with nursing staff. LEOBARDO 2mm-3mm brisk. Mucous membranes pink/ moist. Hair clean. Skin, cool, bruising on bilateral arms. Speech clear. States difficulty swallowing r/t painful tongue. Lung sounds, clear anterior, posteriorly, laterally. Respiratory labored and even, O2 ON 5 mL nasal canula. Heart sounds, even and regular. Bowel sounds, active in all quadrants, non tender to palpation. Abdomen, round and soft. Patient states passing gas, last BM 11/5/2020 @ 1000. Radial pulses strong and even. Hand grasp, strong and even. Arm drift negative, INT site capped, clean, clear, and intact. Capillary refill less then 3 seconds. Skin turgor brisk. Edema noted in bilateral feet. Pedal pulses, weak and even. Pedal push and pull strong and even. homans' sign, negative. Leg lift, sting bilaterally. Left lower leg redness. Denies numbness and tingling. Denies any needs at this time.

## 2020-11-05 NOTE — PROGRESS NOTES
[address] [address]  INPATIENT PHYSICAL THERAPY  DAILY NOTE  3 [facility]    Time In: 1330  Time Out: 1400  Timed Code Treatment Minutes: 30 Minutes  Minutes: 30       Date: 2020  Patient Name: Jenna Powers,  Gender:  female        MRN: [id number]  : 1945  (67 y.o.)     Referring Practitioner: Dr. Josefina Still  Diagnosis: Physical Debility  Additional Pertinent Hx: Per EMR, Sharda Nova is a 67 y.o. female with an extensive medical history including, congestive heart failure, COPD, A. Fib., Pacemaker placement post MI, hypertension, anemia, and IBS who presents to the ED for an evaluation of severe left lower extremity pain, redness, and warmth that started yesterday evening. The patient states that yesterday morning she started feeling some discomfort to her lower extremity, but believed it was due to her compression stockings she wears for CHF. Yesterday evening she removed the stockings and saw that her left lower leg was extremely red, which has continued to spread, becoming more red, and even more painful. Denies any recent trauma, falls, accidents, or wounds to the lower extremity but states that she occasionally scratches her legs with her nails when taking on and off the compression stockings. The patient has attempted tylenol, percocet, biofreeze, and icyhot yesterday with no relief. 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 Endorses history of blood clots, but is on coumadin which was last checked approximately 1 week ago and was in theraputic range.  Further endorses feeling feverish and chills, increased shortness of breath, worsening left lower extremity pain, the inability to bear weight at this time, and denies numbness or tingling to the lower extremities, diabetes mellitus, previous lower leg infections, chest pain, abdominal pain, changes in her bowel or bladder function or habits. Patient still smokes 1/2 a pack of cigarettes daily and is on home oxygen. No alcohol or illicit drug use. \"     Prior Level of Function:  Lives With: Spouse  Type of Home: House  Home Layout: One level  Home Access: Stairs to enter with rails  Entrance Stairs - Number of Steps: 4  Entrance Stairs - Rails: Both(too wide to use at same time)  Home Equipment: Rolling walker, 4 wheeled walker, [employer]   Bathroom Shower/Tub: Walk-in shower, Shower chair with back  [employer]: Grab bars in shower, Hand-held shower, 3-in-1 commode  Bathroom Accessibility: Accessible    Receives Help From: Family  ADL Assistance: Independent  Homemaking Assistance: Needs assistance  Ambulation Assistance: Independent  Transfer Assistance: Independent  Active : Yes  Additional Comments: Pt reports that her spouse assists with IADL tasks, Pt completes own self care . Spouse does have to assist with compression stockings. Restrictions/Precautions:  Restrictions/Precautions: General Precautions, Fall Risk     SUBJECTIVE: Patient in bed upon PT arrival with daughter present. She states that she feels like she has not been able to rest at all, but is agreeable to therapy. PAIN: denies    OBJECTIVE:  Bed Mobility:  Supine to Sit: Stand By Assistance, with head of bed raised    Transfers:  Sit to Stand: Stand By Assistance  Stand to Sit:Stand By Assistance    Ambulation:  [address], with verbal cues   Distance: 35'  Surface: Level Tile  Device:Rolling Walker  Gait Deviations: Forward Flexed Posture, Slow Cassie, Decreased Step Length Bilaterally, Decreased Gait Speed and Decreased Heel Strike Bilaterally    O2  sats monitored throughout session, noted to drop to 86% with ambulation, with seated rest and cues for pursed lip breathing, O2 sats increased back up to 92%.     Balance:  Dynamic Standing Balance: Contact Guard Assistance   Pt completed dynamic reaching tasks to target challenging limits of stability. No LOB noted. ASSESSMENT:  Assessment: Patient progressing toward established goals. Activity Tolerance:  Patient tolerance of  treatment: fair, limited by fatigue and SOB. Equipment Recommendations:Equipment Needed: No(Pt has 4WW, RW, w/c and cane)  Discharge Recommendations:  Continue to assess pending progress, Patient would benefit from continued therapy after discharge    Plan: Times per week: 5x/wk 90 min, 1x/ wk 30 min  Current Treatment Recommendations: Strengthening, Functional Mobility Training, Transfer Training, Endurance Training, Balance Training, Stair training, Gait Training, Home Exercise Program, Safety Education & Training, Equipment Evaluation, Education, & procurement, Patient/Caregiver Education & Training    Patient Education  Patient Education: Plan of Care, Transfers, Gait    Goals:  Patient goals : get my legs stronger to walk better. Short term goals  Time Frame for Short term goals: 1 wk  Short term goal 1: Pt to go supine <->sit, SBA, to get in/out of bed, no rail. Short term goal 2: Pt to get up/down from various seated surfaces, SBA to get up to walk. Short term goal 3: Pt to walk with RW >= 100 ft, SBA to progress to home and community mobility  Short term goal 4: negotiate 4 steps with HR and CGA to enter home safely  Short term goal 5: Pt to get in/out of car, SBA for transportation needs. Long term goals  Time Frame for Long term goals : 3 wks  Long term goal 1: Pt to go supine <->sit, Mod I, to get in/out of bed, no rail. Long term goal 2: Pt to get up/down from various seated surfaces, Mod I, to get up to walk.   Long term goal 3: Pt to walk with RW >= 150 ft, Mod I, to progress to home and community mobility  Long term goal 4: negotiate 4 steps with 1 HR and Mod I to enter home safely  Long term goal 5: Pt to get in/out of car, Mod I, for transportation needs.  Long term goal 6: Pt to score >= 22, indicating improved balance    Following session, patient left in safe position with all fall risk precautions in place.     Hector Velazquez, PT, DPT

## 2020-11-05 NOTE — DISCHARGE INSTR - COC
Continuity of Care Form    Patient Name: Sarai Villegas   :  1945  MRN:  [id number]    Admit date:  11/3/2020  Discharge date:  2020    Code Status Order: Full Code   Advance Directives:   Advance Care Flowsheet Documentation     Date/Time Healthcare Directive Type of Healthcare Directive Copy in [address] Agent's Name Healthcare Agent's Phone Number    [phone]  No, patient does not have an advance directive for healthcare treatment -- -- -- -- --          Admitting Physician:  Juana Pham MD  PCP: Gena Mclean MD    Discharging Nurse: [facility]/Room#: 6E-56/200-A  Discharging Unit Phone Number: [phone]    Emergency Contact:   Extended Emergency Contact Information  Primary Emergency Contact: Marcello Horton  Address: [address] [place] [address] Phone: [phone]  Mobile Phone: [phone]  Relation: Spouse  Secondary Emergency Contact: [place] [address] Phone: [phone]  Mobile Phone: [phone]  Relation: Child    Past Surgical History:  Past Surgical History:   Procedure Laterality Date   [facility]      OUS IN [place]    COLONOSCOPY  10/16/2015    Dr. Mauricio Douglas COLONOSCOPY N/A 10/25/2018    COLONOSCOPY POLYPECTOMY SNARE/COLD BIOPSY performed by Josefina Walton MD at [facility] Endoscopy    ENDOSCOPY, COLON, DIAGNOSTIC  2017    Dr. Lester Spivey      left hip    OVARIAN CYST REMOVAL      PACEMAKER PLACEMENT     [facility]      several    TONSILLECTOMY      TOTAL HIP ARTHROPLASTY Left 10/24/2019    DANIEL LEFT HIP ARTHROPLASTY performed by Kya Ogden MD at [facility]  2013    X2    UPPER GASTROINTESTINAL ENDOSCOPY N/A 2020    EGD DIAGNOSTIC ONLY performed by Katie Corbett MD at [facility] Cellulitis of left lower extremity L03. 116    Physical deconditioning R53.81    Physical debility R53.81       Isolation/Infection:   Isolation          Contact        Patient Infection Status     Infection Onset Added Last Indicated Last Indicated By Review Planned Expiration Resolved Resolved By    VRE 10/25/20 10/25/20 10/25/20 VRE Screen by PCR        PCR 10/2020    Resolved    COVID-19 Rule Out 10/25/20 10/25/20 10/25/20 COVID-19 (Ordered)   10/25/20 Rule-Out Test Resulted    INFLUENZA 20 Respiratory Panel, Molecular   20           Nurse Assessment:  Last Vital Signs: BP (!) 146/67   Pulse 70   Temp 97.2 °F (36.2 °C)   Resp 16   Ht 5' 2\" (1.575 m)   Wt 169 lb (76.7 kg)   SpO2 100% Comment: decreased to 4 lpm rn notified  Breastfeeding No   BMI 30.91 kg/m²     Last documented pain score (0-10 scale): Pain Level: 0  Last Weight:   Wt Readings from Last 1 Encounters:   20 169 lb (76.7 kg)     Mental Status:  oriented, alert, coherent, logical, thought processes intact and able to concentrate and follow conversation    IV Access:  - None    Nursing Mobility/ADLs:  Walking   Assisted  Transfer  Assisted  Bathing  Assisted  Dressing  Assisted  Toileting  Assisted  Feeding  Independent  Med Admin  Assisted  Med Delivery   whole    Wound Care Documentation and Therapy:  Wound 10/23/19 Tibial Right;Posterior; Inner (Active)   Number of days: 378        Elimination:  Continence:   · Bowel: Yes  · Bladder: Yes  Urinary Catheter: None   Colostomy/Ileostomy/Ileal Conduit: No       Date of Last BM:20    Intake/Output Summary (Last 24 hours) at 2020 0936  Last data filed at 2020 1852  Gross per 24 hour   Intake 460 ml   Output --   Net 460 ml     I/O last 3 completed shifts: In: [address] [P.O.:460]  Out: -     Safety Concerns:      At Risk for Falls    Impairments/Disabilities:      None    Nutrition Therapy:  Current Nutrition Therapy:   - Oral Diet: General    Routes of Feeding: Oral  Liquids: Thin Liquids  Daily Fluid Restriction: no  Last Modified Barium Swallow with Video (Video Swallowing Test): not done    Treatments at the Time of Hospital Discharge:   Respiratory Treatments: aerosole  Oxygen Therapy:  is not on home oxygen therapy.   Ventilator:    - No ventilator support    Rehab Therapies: Physical Therapy and Occupational Therapy  Weight Bearing Status/Restrictions: No weight bearing restirctions  Other Medical Equipment (for information only, NOT a DME order):  walker  Other Treatments: n/a    Patient's personal belongings (please select all that are sent with patient):  {ELODIA DME Belongings:[id number]}    RN SIGNATURE:  {Esignature:[id number]}

## 2020-11-05 NOTE — PROGRESS NOTES
[facility]  PHYSICAL THERAPY MISSED TREATMENT NOTE  [address]    Date: 2020  Patient Name: Baldev Ramirez        MRN: [id number]   : 1945  (67 y.o.)  Gender: female   Referring Practitioner: Dr. Ryan Wilkerson  Diagnosis: Physical Debility         REASON FOR MISSED TREATMENT:  Patient at testing and/or off unit. Pt. Being taken off unit for chest x-ray at scheduled PT time. Missed '.

## 2020-11-05 NOTE — FLOWSHEET NOTE
11/05/20 1100   Encounter Summary   Services provided to: Patient   Support System Family members; Children;Spouse   Place of [address]   Continue Visiting Yes   Complexity of Encounter Moderate   Length of Encounter 15 minutes   Spiritual/Mu-ism   Type Spiritual support   Assessment Calm; Approachable   Intervention Active listening;Explored feelings, thoughts, concerns;Prayer; Facilitated forgiveness

## 2020-11-05 NOTE — PLAN OF CARE
Care plan reviewed with patient and verbalize understanding of the plan of care and contribute to goal setting. Problem: Pain:  Goal: Pain level will decrease  Description: Pain level will decrease  11/5/2020 1327 by Sammi Mccollum LPN  Outcome: Ongoing  Note: Pain level has been maintained throughout the day. Rest, ice, repositioned. Problem: Pain:  Goal: Control of chronic pain  Description: Control of chronic pain  11/5/2020 1327 by Sammi Mccollum LPN  Outcome: Ongoing     Problem: Skin Integrity:  Goal: Will show no infection signs and symptoms  Description: Will show no infection signs and symptoms  11/5/2020 1327 by Sammi Mccollum, TERRYN  Outcome: Ongoing  Note: Shows no s/sx of infection. Will continue to monitor. Problem: Skin Integrity:  Goal: Absence of new skin breakdown  Description: Absence of new skin breakdown  11/5/2020 1327 by Sammi Mccollum LPN  Outcome: Ongoing  Note: Absence of new skin breakdown. Will continue to monitor.       Problem: Bleeding:  Goal: Will show no signs and symptoms of excessive bleeding  Description: Will show no signs and symptoms of excessive bleeding  11/5/2020 1327 by Sammi Mccollum LPN  Outcome: Ongoing

## 2020-11-05 NOTE — PROGRESS NOTES
[facility]  Diagnosis List for Inpatient Rehab facility (IRF) - Patient Assessment Instrument (JEFFERY)    Patient Name: Gopal Escamilla        MRN: [id number]    : 1945  (76 y.o.)  Gender: female     Primary impairment requiring rehabilitation: 12 Debility     Etiologic Diagnosis that led to the condition: Left lower leg severe cellulitis. Comorbid conditions affecting rehabilitation:  1. Left lower leg severe cellulitis. 2. Physical deconditioning. 3. Gait instability. 4. Septic shock secondary to left lower leg cellulitis. 5. IV infiltration into the left forearm with heparin on 10/31/2020 treated with Hylenex. 6. Insomnia. 7. Atrial fibrillation on chronic anticoagulation with Coumadin. 8. Biventricular implanted cardioverter-defibrillator  9. Coronary artery disease. 10. COPD chronic home oxygen at 2 L.    11. *Acute kidney injury. 12. CKD 2.  13. Hyperlipidemia. 14. Hypertension. 15. History of prior left displaced femoral neck fracture status post hemiarthroplasty by Dr. Sheldon Islas on 10/24/2019. 16. History of prior closed head injury. 16. History of prior myocardial infarction, .  18. *Chronic systolic congestive heart failure with ejection fraction of 35 to 40%. 19. Mild peripheral vascular disease per vascular OMAYRA bilateral study dated 2020.  20. Severe left external carotid artery stenosis and left subclavian artery stenosis/occlusion per vascular carotid bilateral ultrasound on 2014. 21. Mild to moderate central spinal stenosis at L5-L6 with moderate right S1 lateral recess stenosis and findings of 6 lumbar type vertebral body per CT of the lumbar spine without contrast on 10/19/2012.  22. Hemorrhoids. 23. Anxiety. 24. Osteopenia per DEXA bone scan on 2015 with medium fracture risk. 25. Chronic constipation. 26. Oral thrush. 27. Chronic iron deficiency anemia. 28. Current every day smoker. 29. Hypernatremia. 30. Leukocytosis.     Drusilla Rear, MD

## 2020-11-05 NOTE — PROGRESS NOTES
Physical Medicine & Rehabilitation  Progress Note    Chief Complaint:  Physical deconditioning secondary to Left lower leg severe cellulitis    Subjective:  Care conference held today with proposed discharge date on 11/13 to home with Home Health Physical Therapy, Occupational Therapy, Nursing and an aide. Family not present. She is in agreement with plan and timing. Oxygen is currently set to 5 L and she notes that with activity she required having the oxygen level turned up; she is concerned that she has \"fluid on my lungs. \"  She is agreeable to obtaining a chest x-ray and she is also concerned that she is getting \"too much Coumadin\" since she is coughing up \"big blood clots. \"  Patient was assured that her Coumadin dosing is appropriate and she is in a therapeutic range today. Pain is stated to be minimal at this time and limited to her left lower limb when she is walking secondary to the area affected by cellulitis at her ankle. She did not endorse any additional concerns or questions at this time. Rehabilitation:   Physical Therapy:  OBJECTIVE:  Bed Mobility:  Supine to Sit: Stand By Assistance, with head of bed raised     Transfers:  Sit to Stand: Stand By Assistance  Stand to Sit:Stand By Assistance     Ambulation:  [address], with verbal cues   Distance: 35'  Surface: Level Tile  Device:Rolling Walker  Gait Deviations: Forward Flexed Posture, Slow Cassie, Decreased Step Length Bilaterally, Decreased Gait Speed and Decreased Heel Strike Bilaterally     O2  sats monitored throughout session, noted to drop to 86% with ambulation, with seated rest and cues for pursed lip breathing, O2 sats increased back up to 92%.     Balance:  Dynamic Standing Balance: Contact Guard Assistance   Pt completed dynamic reaching tasks to target challenging limits of stability. No LOB noted. ASSESSMENT:  Assessment: Patient progressing toward established goals.   Activity Tolerance:  Patient tolerance of treatment: fair, limited by fatigue and SOB. Equipment Recommendations:Equipment Needed: No(Pt has 4WW, RW, w/c and cane)  Discharge Recommendations:  Continue to assess pending progress, Patient would benefit from continued therapy after discharge     Occupational Therapy:  COGNITION: Decreased Insight, Decreased Problem Solving and Decreased Safety Awareness     ADL:   No ADL's completed this session. .     BALANCE:  Standing Balance: Stand By Assistance. at [facility]      BED MOBILITY:  Not Tested     TRANSFERS:  Sit to Stand:  Stand By Assistance. from recliner   Stand to Sit: Stand By Assistance. to recliner      FUNCTIONAL MOBILITY:  Did not complete this session      ADDITIONAL ACTIVITIES:  Patient identified a personal goal to increase UB strength and improve overall endurance so they can complete their toilet & shower transfers; skilled edu on UE strengthening and patient completed BUE strengthening exercises x10 reps x1 set this date with a minimal resistive band  in all joints and all planes. Patient tolerated in sitting , requiring short  rest breaks. Pt required min verbal  cues for technique.     Patient identified one of their personal goals is to be able to sustain functional standing positions in order to complete various ADL and IADL skills in standing position, such as sinkside grooming or washing dishes. Dynamic standing task was then facilitated to challenge 2 hand release . Patient required CGA , and demo'ed an endurance of 2-3  minutes.      ASSESSMENT:  Activity Tolerance:  Patient tolerance of  treatment: good. Discharge Recommendations: Continue to assess pending progress   Equipment Recommendations: Equipment Needed: No  Other: Pt owns BSC and shower chair. Pt has grab bars in the shower. Speech Therapy:  Not applicable     Review of Systems:  Review of Systems   Constitutional: Positive for activity change and fatigue. Negative for appetite change.    HENT: Negative for trouble swallowing and voice change. Eyes: Negative for visual disturbance. Respiratory: Positive for cough and shortness of breath. Cardiovascular: Positive for leg swelling. Gastrointestinal: Negative for anal bleeding, blood in stool, constipation and diarrhea. Endocrine: Negative for cold intolerance. Genitourinary: Negative for frequency and urgency. Musculoskeletal: Positive for gait problem and myalgias. Negative for back pain. Skin: Positive for wound. Negative for pallor. Allergic/Immunologic: Negative. Neurological: Positive for weakness. Negative for dizziness, speech difficulty, numbness and headaches. Hematological: Negative. Psychiatric/Behavioral: Negative for confusion, decreased concentration and dysphoric mood. The patient is not nervous/anxious. All other systems reviewed and are negative. Objective:  /72   Pulse 82   Temp 97.5 °F (36.4 °C) (Oral)   Resp 20   Ht 5' 2\" (1.575 m)   Wt 169 lb (76.7 kg)   SpO2 95%   Breastfeeding No   BMI 30.91 kg/m²   CURRENT VITALS:  height is 5' 2\" (1.575 m) and weight is 169 lb (76.7 kg). Her oral temperature is 97.5 °F (36.4 °C). Her blood pressure is 130/72 and her pulse is 82. Her respiration is 20 and oxygen saturation is 95%. Body mass index is 30.91 kg/m².   Temperature Range (24h):Temp: 97.5 °F (36.4 °C) Temp  Av.7 °F (36.5 °C)  Min: 97.5 °F (36.4 °C)  Max: 97.8 °F (36.6 °C)  BP Range (21O): Systolic (13JUK), ZSV:121 , Min:130 , SAUL:059     Diastolic (07DVR), EX, Min:67, Max:72    Pulse Range (24h): Pulse  Av  Min: 70  Max: 82  Respiration Range (24h): Resp  Av  Min: 18  Max: 20  Current Pulse Ox (24h):  SpO2: 95 %  Pulse Ox Range (24h):  SpO2  Av.8 %  Min: 95 %  Max: 100 %  Oxygen Amount and Delivery: O2 Flow Rate (L/min): 4 L/min    awake  Orientation:   person, place, time, situation  Mood: within normal limits  Affect: flat  General appearance:  in no acute distress, glasses and [email or web address], in bed    Memory:   grossly normal  Attention/Concentration: normal  Language:  normal    ROM:  Normal  Motor Exam:  Motor exam is 4 out of 5 all extremities with the exception of left ankle not tested    Sensory:  Sensory intact  Coordination:   normal  Deep Tendon Reflexes:  Reflexes are intact and symmetrical bilaterally with left Achilles tendon not tested    Skin: warm and dry, no rash with notable erythema involving the left ankle to approximately mid calf with surgical markers demarcating the current extent of the cellulitis  Peripheral vascular: Pulses: Normal upper and diminished lower extremity pulses; Edema: 3+ pedal    Diagnostics:   Recent Results (from the past 24 hour(s))   Protime-INR    Collection Time: 11/05/20  6:20 AM   Result Value Ref Range    INR 2.41 (H) 0.85 - 1.13     Labs Renal Latest Ref Rng & Units 11/4/2020 11/3/2020 11/2/2020 11/1/2020 10/28/2020   BUN 7 - 22 mg/dL 40(H) 36(H) 26(H) 26(H) 21   Cr 0.4 - 1.2 mg/dL 1.1 1.0 0.9 1.1 0.7   K 3.5 - 5.2 meq/L 4.1 4.2 4.8 4.5 4.7   Na 135 - 145 meq/L 139 141 138 138 139      Recent Labs     11/04/20  0534 11/03/20  0650 11/02/20  0544   WBC 7.8 5.7 3.9*   HGB 9.5* 9.3* 9.4*   HCT 31.1* 30.4* 31.1*   .6* 101.0* 102.0*    151 135      Xr Chest (2 Vw)    Result Date: 11/5/2020  PROCEDURE: XR CHEST (2 VW) CLINICAL INFORMATION: Lung infiltrates. COPD. Atrial fibrillation. Dyspnea. COMPARISON: 10/30/2020 TECHNIQUE: AP upright and lateral views of the chest were obtained. 1. Moderate cardiomegaly. Permanent pacemaker/defibrillator. Small bilateral pleural effusions best seen posteriorly. 2. Mild interstitial infiltrates right mid and lower lung fields, consistent with interstitial pneumonitis. Appearance improved from prior. **This report has been created using voice recognition software. It may contain minor errors which are inherent in voice recognition technology. ** Final report electronically signed by Dr. César Bruce on 11/5/2020 12:35 PM    Impression:  1. Left lower leg severe cellulitis. 2. Physical deconditioning. 3. Gait instability. 4. Septic shock secondary to left lower leg cellulitis. 5. IV infiltration into the left forearm with heparin on 10/31/2020 treated with Hylenex. 6. Insomnia. 7. Atrial fibrillation on chronic anticoagulation with Coumadin. 8. Biventricular implanted cardioverter-defibrillator  9. Coronary artery disease. 10. COPD chronic home oxygen at 2 L. Follows with Dr. Lazaro Nolan. 11. Acute kidney injury. 12. CKD 2.  13. Hyperlipidemia. 14. Hypertension. 15. History of prior left displaced femoral neck fracture status post hemiarthroplasty by Dr. Pati Rasmussen on 10/24/2019. 16. History of prior closed head injury. [place] Cognitive Assessment [facility]) version 8.3 completed. Patient scored 21/30 on 11/4. 16. History of prior myocardial infarction, 1994. 18. Chronic systolic congestive heart failure with ejection fraction of 35 to 40%. 19. Mild peripheral vascular disease per vascular OMAYRA bilateral study dated 2/25/2020.  20. Severe left external carotid artery stenosis and left subclavian artery stenosis/occlusion per vascular carotid bilateral ultrasound on 4/1/2014. 21. Mild to moderate central spinal stenosis at L5-L6 with moderate right S1 lateral recess stenosis and findings of 6 lumbar type vertebral body per CT of the lumbar spine without contrast on 10/19/2012.  22. Hemorrhoids. 23. Anxiety. 24. Osteopenia per DEXA bone scan on 12/7/2015 with medium fracture risk. 25. Chronic constipation. 26. Oral thrush. 27. Chronic iron deficiency anemia. 28. Current every day smoker. 29. Small bilateral pleural effusions. Plan:     Medical management: Per primary team and Dr. Tico Bain. Consultants:  Orthopedic Surgery, Critical Care, Infectious Disease, Cardiology, Pulmonology, Family Medicine Physical Medicine    Narcotic usage: Norco last 24 hour usage 5 mg. Decreased.     Last BM: Stool Amount: Unable to assess (11/05/20 1230)    FUNCTIONAL OUTCOMES TOOLS:    LOUIS -      Tinetti - Balance Score: 11  Gait Score: 8  Tinetti Total Score: 19    TUG -      Acute/Rehabilitation Problems:  1. Left lower leg severe cellulitis. 1. Infectious Disease service following. Appreciate the assistance. 2. Linezolid for 5 more doses. Pleated on 11/5.  3. Pain control. 1. Tylenol. 2. Norco.  3. Flexeril. 4. Gabapentin. 2. Culturelle. 3. Physical deconditioning/Gait instability. 1. PT/OT. 2. Tinetti 19/28. Risk Indicators: Less than/equal to 18 = high risk; 19-23 Moderate risk; Greater than/equal to 24 = low risk. 4. Septic shock secondary to left lower leg cellulitis. 1. WBCs normal at 5.7 on 11/3.  5. Nutrition:  Consultation to dietician for nutritional counseling and recommendations. 1. Total protein 5.7 and albumin 3.0. Low.  2. Vitamin D 25 hydroxy was 36 on 1/5/2020. Normal at 44 on 11/4/2020.  6. Electrolytes. 1. Normal on 11/4/2020.  7. Acute kidney injury/CKD 2.  1. Patient had normal renal function at the beginning of 2020 with a persistence of GFR suggestive of CKD 2. During this hospitalization Cr/BUN/GFR has worsened to 1.1/40/48 from 1.0/36/54. 2. Encourage fluids  8. Oral thrush. 1. Nystatin x5 days. 9. Insomnia. 1. Trazodone 50 mg nightly. 10. Bladder: No issues  11. Bowel: Senna, colace, MOM  1. GlycoLax. 2. Bisacodyl suppository. 12. Rehabilitation nursing will be involved for bowel, bladder, skin, and pain management. Nursing will also provide education and training to patient and family. 13. Prophylaxis:  DVT: Coumadin. GI: Protonix. 14.  and case management consultations for coordination of care and discharge planning. Chronic Problems:  1. Atrial fibrillation on chronic anticoagulation with Coumadin. 1. Coumadin to be dosed by the pharmacy.   2. Last INR was 2.41 on 11/5/2020.  2. Biventricular implanted cardioverter-defibrillator  3. Coronary artery disease. 1. ASA 81 mg.  4. COPD chronic home oxygen at 2 L. Follows with Dr. Khushi Garibay. 1. Dr. Khushi Garibay following during this admission. 2. Adonna Lota. 3. Pulmicort. 4. Xopenex. 5. Xolair injection every 28 days with next injection on 11/30. 6. Spiriva Respimat. 7. Patient normally on 2 L chronically at home. Current oxygen setting is 5 L on 11/5.  5. Hyperlipidemia. 1. No current medication. 6. History of prior left displaced femoral neck fracture status post hemiarthroplasty by Dr. Fei Cruz on 10/24/2019.  7. History of prior closed head injury. [place] Cognitive Assessment [facility]) version 8.3 completed. Patient scored 21/30 on 11/4.  8. History of prior myocardial infarction, 1994.  9. Chronic systolic congestive heart failure with ejection fraction of 35 to 40%/Hypertension. 1. Digoxin. 2. Lasix. 3. Toprol-XL. 4. Entresto. 5. Evolucumab. 10. Mild peripheral vascular disease per vascular OMAYRA bilateral study dated 2/25/2020. 11. Severe left external carotid artery stenosis and left subclavian artery stenosis/occlusion per vascular carotid bilateral ultrasound on 4/1/2014. 12. Mild to moderate central spinal stenosis at L5-L6 with moderate right S1 lateral recess stenosis and findings of 6 lumbar type vertebral body per CT of the lumbar spine without contrast on 10/19/2012. 13. Hemorrhoids. 1. No current medication ordered. 14. Anxiety. 1. No current medication ordered. 15. Osteopenia per DEXA bone scan on 12/7/2015 with medium fracture risk. 1. Vitamin D 25 hydroxy level was normal on 1/5/2020. 16. Chronic constipation. 1. Patient normally on Linzess at home. 17. Chronic iron deficiency anemia. 1. Receives IV iron transfusions. Last iron on 11/3 was 173 with a ferritin of 305. 2. Hemoglobin 9.5 on 11/4 which is stable over 9.3 on 11/3. 18. Current every day smoker. 1. Patient not interested in smoking cessation.     Labs reviewed on:  1. 11/3.  2. 11/4    Infectious Disease:  1. Linezolid. Missed Therapy Time:  11/5. Physical therapy missed 30 minutes while the patient was off of the unit to obtain a chest x-ray.      DME:    Discharge Plan:  Estimated Discharge Date: 11/13   Destination: home health  Services at Discharge: [address] [place] [address], Occupational Therapy, Nursing and an aide 2x week  Is patient appropriate for an outpatient driving evaluation? no  Equipment at Discharge: None    Greater than 50% of 30 minutes was spent with the patient reviewing the plan and recommendations from today's care conference, executing the plan for a chest x-ray for patient's endorsed worsened respiratory status, her current level of pain control, and discussing her current anticoagulation regimen     Hemanth Haddad MD

## 2020-11-05 NOTE — PROGRESS NOTES
Standing heel/toe raises and Standing hip flexion. Exercises were completed for increased independence with functional mobility. Functional Outcome Measures: Not completed       ASSESSMENT:  Assessment: Patient progressing toward established goals. Activity Tolerance:  Patient tolerance of  treatment: fair, limited by SOB with activity     Equipment Recommendations:Equipment Needed: No(Pt has 4WW, RW, w/c and cane)  Discharge Recommendations:  Continue to assess pending progress, Patient would benefit from continued therapy after discharge    Plan: Times per week: 5x/wk 90 min, 1x/ wk 30 min  Current Treatment Recommendations: Strengthening, Functional Mobility Training, Transfer Training, Endurance Training, Balance Training, Stair training, Gait Training, Home Exercise Program, Safety Education & Training, Equipment Evaluation, Education, & procurement, Patient/Caregiver Education & Training    Patient Education  Patient Education: Transfers, Gait, Verbal Exercise Instruction    Goals:  Patient goals : get my legs stronger to walk better. Short term goals  Time Frame for Short term goals: 1 wk  Short term goal 1: Pt to go supine <->sit, SBA, to get in/out of bed, no rail. Short term goal 2: Pt to get up/down from various seated surfaces, SBA to get up to walk. Short term goal 3: Pt to walk with RW >= 100 ft, SBA to progress to home and community mobility  Short term goal 4: negotiate 4 steps with HR and CGA to enter home safely  Short term goal 5: Pt to get in/out of car, SBA for transportation needs. Long term goals  Time Frame for Long term goals : 3 wks  Long term goal 1: Pt to go supine <->sit, Mod I, to get in/out of bed, no rail. Long term goal 2: Pt to get up/down from various seated surfaces, Mod I, to get up to walk.   Long term goal 3: Pt to walk with RW >= 150 ft, Mod I, to progress to home and community mobility  Long term goal 4: negotiate 4 steps with 1 HR and Mod I to enter home safely  Long term goal 5: Pt to get in/out of car, Mod I, for transportation needs. Long term goal 6: Pt to score >= 22, indicating improved balance    Following session, patient left in safe position with all fall risk precautions in place.     Vero Smiley, PT, DPT

## 2020-11-05 NOTE — PLAN OF CARE
Problem: Pain:  Goal: Pain level will decrease  Description: Pain level will decrease  11/5/2020 0131 by Sindy Ley LPN  Outcome: Ongoing  Note: Pt. C/o pain to her throat, Pt. Medicated with PRN [place] per request. B/P checked prior to [address]. Patient resting. 11/4/2020 1210 by Daren Hamilton LPN  Outcome: Met This Shift  Note: Patient states she has 4/10 pain in left leg. Medication given, repositioned, elevating. Will continue to monitor. Goal: Control of acute pain  Description: Control of acute pain  11/5/2020 0131 by Sindy Ley LPN  Outcome: Met This Shift  11/4/2020 1210 by Daren Hamilton LPN  Outcome: Met This Shift  Goal: Control of chronic pain  Description: Control of chronic pain  11/5/2020 0131 by Sindy Ley LPN  Outcome: Met This Shift  11/4/2020 1210 by Daren Hamilton LPN  Outcome: Met This Shift     Problem: Skin Integrity:  Goal: Will show no infection signs and symptoms  Description: Will show no infection signs and symptoms  11/5/2020 0131 by Sindy Ley LPN  Outcome: Met This Shift  11/4/2020 1210 by Daren Hamilton LPN  Outcome: Met This Shift  Goal: Absence of new skin breakdown  Description: Absence of new skin breakdown  11/5/2020 0131 by Sindy Ley LPN  Outcome: Met This Shift  11/4/2020 1210 by Daren Hamilton LPN  Outcome: Met This Shift  Note: Shows no s/sx of new skin breakdown. Will continue to monitor. Problem: OXYGENATION/RESPIRATORY FUNCTION  Goal: Patient will achieve/maintain normal respiratory rate/effort  Description: Respiratory rate and effort will be within normal limits for the patient  11/5/2020 0131 by Sindy Ley LPN  Outcome: Ongoing  Note: Pt. 02 Sats dropped after being up to bathroom and coughing episode.  Sp02 88% 02 increased to 5 liters per N/C.  11/4/2020 1210 by Daren Hamilton LPN  Outcome: Met This Shift     Problem: Bleeding:  Goal: Will show no signs and symptoms of excessive bleeding  Description: Will show no signs and symptoms of excessive bleeding  2020 by Regina Ley LPN  Outcome: Ongoing  Note: Pt. States that she does not coumadin if INR is greater than 2.7. Pt. States she has a history of increased bleeding that caused a blood transfusion. INR to be checked in am. Pt. Educated on therapeutic levels of INR. 2020 by Olesya Phillips LPN  Outcome: Met This Shift  Note: Shows no s/sx of excessive bleeding. Will continue to monitor. Problem: Falls - Risk of:  Goal: Will remain free from falls  Description: Will remain free from falls  2020 by Regina Ley LPN  Outcome: Met This Shift  Note: Pt. Educated on safety issues. Safe transfers and monitor where 02 tubing is with ambulation. 2020 by Olesya Phillips LPN  Outcome: Met This Shift  Note: Patient free from falls. Will continue to monitor. Goal: Absence of physical injury  Description: Absence of physical injury  2020 by Regina Ley LPN  Outcome: Met This Shift  2020 by Olesya Phillips LPN  Outcome: Met This Shift     Problem: IP MOBILITY  Goal: LTG - patient will demonstrate safe mobility requirements  2020 by Olesya Phillips LPN  Outcome: Met This Shift     Problem: DISCHARGE BARRIERS  Goal: Patient's continuum of care needs are met  2020 by Regina Ley LPN  Outcome: Ongoing  Note: Patient educated on 02 safety and monitoring 02 sats. To keep sats above 90%. Patient voices understanding. 2020 1549 by BLANCA Spear  Note:   [address] [id number]  Physical Medicine Case Management Assessment    [x] Inpatient Rehabilitation Unit  [] Transitional Care Unit    Patient Name: Janet Bauer        MRN: [id number]    : 1945  (67 y.o.)  Gender: female     Date of Admission: 2020      Family/Social/Home Environment: Prior to hospitalization, patient indicates independence with most ADL's and personal care. Patient lives with spouse, Madelaien Pyle, of 28 years.  Patient reports use of walker for ambulation inside the home and wheelchair for community mobility (doctor appointments, errands). Patient reports ability to complete errands and driving, manage own medications, and laundry. Patient and spouse, Han Arana, both complete housekeeping and meal preparation. Patient wears home oxygen at 2L supplied through [employer]. Patient is on coumadin and managed through [facility]. Patient reports having three children, two that live on the same street. Patient reports daughter, Santos Isaacs, and spouse, Han Arana, will be primary support upon discharge home. Social/Functional History  Lives With: Spouse  Type of Home: House  Home Layout: One level  Home Access: Stairs to enter with rails  Entrance Stairs - Number of Steps: 4  Entrance Stairs - Rails: Both(too wide to use at same time)  Bathroom Shower/Tub: Walk-in shower, Shower chair with back  [employer]: Grab bars in shower, Hand-held shower, 3-in-1 commode  Bathroom Accessibility: Accessible  Home Equipment: Rolling walker, 4 wheeled walker, [address] Help From: Family  ADL Assistance: Independent  Homemaking Assistance: Needs assistance  Ambulation Assistance: Independent  Transfer Assistance: Independent  Active : Yes  Occupation: Retired  Additional Comments: Pt reports that her spouse assists with IADL tasks, Pt completes own self care . Spouse does have to assist with compression stockings. Contact/Guardian Information: Ginny Cuellar, [phone]. DaughterKatie, [phone]. Community Resources Utilized: No community resources utilized prior to admission. Sexuality/Intimacy: No issues or concerns identified at time of SW assessment. Complementary Health Approaches: Patient with no current interest in complementary health approaches at time of SW assessment.      Anticipated Needs/Discharge Plans: Anticipate patient would benefit from continued therapy upon discharge. SW met with patient to introduce self and explain role, complete SW assessment, and initiate discharge planning. Prior to hospitalization, patient indicates independence with most ADL's and personal care. Patient lives with spouse, Darryn Finn, of 28 years. Patient reports use of walker for ambulation inside the home and wheelchair for community mobility (doctor appointments, errands). Patient reports ability to complete errands and driving, manage own medications, and laundry. Patient and spouse, Darryn Finn, both complete housekeeping and meal preparation. Patient wears home oxygen at 2L supplied through [employer]. Patient is on coumadin and managed through [facility]. Patient reports having three children, two that live on the same street. Patient reports daughter, Pilo Hernández, and spouse, Darryn Finn, will be primary support upon discharge home. Anticipate patient would benefit from continued therapy upon discharge. SW reviewed with patient team conference on Thursday, 11/05/2020, to further discuss progress and discharge recommendations. SW to follow and maintain involvement in discharge planning. Discharge Planning  Living Arrangements: Spouse/Significant Other  Support Systems: Spouse/Significant Other, Children  Potential Assistance Needed: N/A  Meds-to-Beds: Does the patient want to have any new prescriptions delivered to bedside prior to discharge?: No  Type of Home Care Services: None  Patient expects to be discharged to[de-identified] home  Expected Discharge Date: (Undetermined)  Follow Up Appointment: Best Day/Time : Monday AM      Electronically signed by BLANCA Rolon on 11/4/2020 at 3:55 PM               Problem: DISCHARGE BARRIERS  Goal: Patient's continuum of care needs are met  11/5/2020 0131 by Tanner Ley LPN  Outcome: Ongoing  Note: Patient educated on 02 safety and monitoring 02 sats. To keep sats above 90%. Patient voices understanding.   11/4/2020 1549 by Krupa Cesar home  Expected Discharge Date: (Undetermined)  Follow Up Appointment: Best Day/Time : Monday AM      Electronically signed by BLANCA Prado on 11/4/2020 at 3:55 PM

## 2020-11-05 NOTE — PROGRESS NOTES
Pt. C/o needing to have bowel movement. Patient strained and had a scant amount of bright red blood to toilet paper, pt. Enc. It wasn't a \"hemmorhage\". Pt. Is concerne with getting coumadin with INR greater than 2.7. Pt. Did rest well.

## 2020-11-06 NOTE — CONSULTS
Department of Family Practice  Consult Note        Reason for Consult:  Medical management while on the Inpatient Rehab unit. Requesting Physician:  Dr. Vladimir Graff:  Cellulitis of the left lower leg. History Obtained From:  patient, spouse    HISTORY OF PRESENT ILLNESS:              The patient is a 67 y.o. female with significant past medical history of       Diagnosis Date    Allergic rhinitis     Anemia     Anxiety     Arthritis     Asthma     [facility]rial [facility]     CAD (coronary artery disease)     Chronic systolic CHF (congestive heart failure) ([facility] 10/27/2019    COPD (chronic obstructive pulmonary disease) (HCC)     Frequent UTI     GERD (gastroesophageal reflux disease)     Hemorrhoids     History of blood transfusion     X8    Hx of blood clots     left arm    Hyperlipidemia     Hypertension     Influenza A 02/22/2020    Kidney stone     LONG TERM ANTICOAGULENT USE 7/6/2012    Lumbar spinal stenosis     MI, old 11    Prolonged emergence from general anesthesia       who presents with a recent episode of cellulitis of the left lower leg. She states that the swelling progressed rapidly and caused significant pain. She came to the ED and was admitted. There was improvement in the leg over time and now she comes to the Inpatient Rehab Unit for more intensive time with therapies before the return home.     Past Medical History:        Diagnosis Date    Allergic rhinitis     Anemia     Anxiety     Arthritis     Asthma     Atrial fibrillation (HCC)     CAD (coronary artery disease)     Chronic systolic CHF (congestive heart failure) ([facility] 10/27/2019    COPD (chronic obstructive pulmonary disease) (HCC)     Frequent UTI     GERD (gastroesophageal reflux disease)     Hemorrhoids     History of blood transfusion     X8    Hx of blood clots     left arm    Hyperlipidemia     Hypertension     Influenza A 02/22/2020    Kidney stone     LONG TERM ANTICOAGULENT USE 7/6/2012    Lumbar spinal stenosis     MI, old 12    Prolonged emergence from general anesthesia      Past Surgical History:        Procedure Laterality Date    CARDIAC CATHETERIZATION  1994    CARDIAC DEFIBRILLATOR PLACEMENT  2008    OUS [place]    COLONOSCOPY  10/16/2015    Dr. Johnathon Matthew COLONOSCOPY N/A 10/25/2018    COLONOSCOPY POLYPECTOMY SNARE/COLD BIOPSY performed by Derian Sam MD at [address] Endoscopy    ENDOSCOPY, COLON, DIAGNOSTIC  01/04/2017    Dr. Flex Meyers      left hip    OVARIAN CYST REMOVAL      PACEMAKER PLACEMENT      SINUS SURGERY      several    TONSILLECTOMY      TOTAL HIP ARTHROPLASTY Left 10/24/2019    DANIEL LEFT HIP ARTHROPLASTY performed by Jamarcus Solis MD at [address]  2013    X2    UPPER GASTROINTESTINAL ENDOSCOPY N/A 1/5/2020    EGD DIAGNOSTIC ONLY performed by Hellen Back MD at [address] [facility]     Current Medications:   Current Facility-Administered Medications: hydrocortisone (ANUSOL-HC) suppository 25 mg, 25 mg, Rectal, BID  polyethylene glycol (GLYCOLAX) packet 17 g, 17 g, Oral, Daily PRN  bisacodyl (DULCOLAX) suppository 10 mg, 10 mg, Rectal, Daily PRN  acetaminophen (TYLENOL) tablet 650 mg, 650 mg, Oral, Q4H PRN  Arformoterol Tartrate (BROVANA) nebulizer solution 15 mcg, 15 mcg, Nebulization, BID  aspirin EC tablet 81 mg, 81 mg, Oral, Daily  budesonide (PULMICORT) nebulizer suspension 500 mcg, 500 mcg, Nebulization, BID  cyclobenzaprine (FLEXERIL) tablet 10 mg, 10 mg, Oral, TID PRN  digoxin (LANOXIN) tablet 125 mcg, 125 mcg, Oral, Every Other Day  docusate sodium (COLACE) capsule 100 mg, 100 mg, Oral, Daily  [START ON 11/17/2020] Evolocumab SOAJ 140 mg, 140 mg, Subcutaneous, Q14 Days  furosemide (LASIX) tablet 40 mg, 40 mg, Oral, Daily  gabapentin (NEURONTIN) capsule 100 mg, 100 mg, Oral, TID  HYDROcodone-acetaminophen (NORCO) 5-325 MG per tablet 1 tablet, 1 tablet, Oral, Q4H PRN  HYDROcodone-acetaminophen (NORCO) 5-325 MG per tablet 2 tablet, 2 tablet, Oral, Q4H PRN  levalbuterol (XOPENEX) nebulization 0.63 mg, 1 ampule, Nebulization, Q8H PRN  linezolid (ZYVOX) tablet 600 mg, 600 mg, Oral, 2 times per day  metoprolol succinate (TOPROL XL) extended release tablet 25 mg, 25 mg, Oral, Daily  nystatin (MYCOSTATIN) 639525 UNIT/ML suspension 500,000 Units, 5 mL, Oral, 4x Daily  [START ON 2020] omalizumab (XOLAIR) injection 225 mg, 225 mg, Subcutaneous, Q28 Days  pantoprazole (PROTONIX) tablet 40 mg, 40 mg, Oral, BID AC  predniSONE (DELTASONE) tablet 40 mg, 40 mg, Oral, Daily  sacubitril-valsartan (ENTRESTO) 24-26 MG per tablet 1 tablet, 1 tablet, Oral, BID  senna (SENOKOT) tablet 8.6 mg, 1 tablet, Oral, Nightly  tiotropium (SPIRIVA RESPIMAT) 2.5 MCG/ACT inhaler 2 puff, 2 puff, Inhalation, Daily  traZODone (DESYREL) tablet 50 mg, 50 mg, Oral, Nightly  [START ON 10/28/2021] warfarin (COUMADIN) daily dosing (placeholder), , Other, RX Placeholder  lactobacillus (CULTURELLE) capsule 1 capsule, 1 capsule, Oral, BID WC  magnesium hydroxide (MILK OF MAGNESIA) 400 MG/5ML suspension 30 mL, 30 mL, Oral, Daily PRN  Allergies:  Benadryl [diphenhydramine hcl]; Ciprofloxacin; Clarithromycin; Vitamin k; Atorvastatin; Captopril; Codeine; Iv dye [iodides]; Lipitor; Macrobid [nitrofurantoin monohydrate macrocrystals]; Neomycin-bacitracin zn-polymyx; Pravastatin; Zetia [ezetimibe]; Adhesive tape; Cephalexin; Doxycycline; Morphine;  Other; Propoxyphene; and Sulfa antibiotics    Social History:   MARITAL STATUS:      Family History:       Adopted: Yes   Problem Relation Age of Onset    Heart Disease Father          AGE 35    Cancer Sister         breast     REVIEW OF SYSTEMS:    CONSTITUTIONAL:  positive for  malaise  EYES:  positive for  glasses  HEENT:  positive for  epistaxis  RESPIRATORY:  positive for  dyspnea  CARDIOVASCULAR:  negative for  chest pain  GASTROINTESTINAL:  negative for diarrhea  GENITOURINARY:  negative for dysuria  INTEGUMENT/BREAST:  negative for rash  HEMATOLOGIC/LYMPHATIC:  negative for petechiae  ALLERGIC/IMMUNOLOGIC:  negative for anaphylaxis  ENDOCRINE:  negative for tremor  MUSCULOSKELETAL:  positive for  myalgias, arthralgias, decreased range of motion, muscle weakness and bone pain  NEUROLOGICAL:  positive for gait problems and weakness  BEHAVIOR/PSYCH:  negative for increased agitation  PHYSICAL EXAM:      Vitals:    /72   Pulse 82   Temp 97.5 °F (36.4 °C) (Oral)   Resp 20   Ht 5' 2\" (1.575 m)   Wt 169 lb (76.7 kg)   SpO2 95%   Breastfeeding No   BMI 30.91 kg/m²      Well developed well nourished white female who is awake alert and cooperative  Skin atrophic  Membranes moist  Head normocephalic  Neck without mass  Chest symmetrical expansion  Heart S1S2 without murmur  Lungs CTA  Abd soft, non tender, normoactive BS and no mass  Ext without edema, she has bilateral compression hose on  Neuro weak  Psy pleasant        IMPRESSION/RECOMMENDATIONS:      Active Hospital Problems    Diagnosis Date Noted    [facility] [J84.9] 11/05/2020    Thrush [B37.0] 11/05/2020    Chronic iron deficiency anemia [D50.9] 11/05/2020    Anxiety [F41.9] 11/05/2020    Physical deconditioning [R53.81] 11/03/2020    Physical debility [R53.81] 11/03/2020    Cellulitis of left lower extremity [L03.116]     Chronic systolic CHF (congestive heart failure) (HCC) [I50.22] 10/27/2019    Class 1 obesity due to excess calories with serious comorbidity and body mass index (BMI) of 30.0 to 30.9 in adult [E66.09, Z68.30] 10/27/2019    Arteriosclerosis of coronary artery [I25.10] 06/22/2016    Anticoagulated on Coumadin [Z79.01] 03/31/2016    Biventricular implantable cardioverter-defibrillator in situ [Z95.810] 01/07/2014    Persistent atrial fibrillation ([facility] [I48.19] 04/23/2013    Chronic obstructive pulmonary disease ([facility] [J44.9] 07/06/2012    Hypertension [I10] 07/06/2012

## 2020-11-06 NOTE — PROGRESS NOTES
doff shirt   Lower Extremity Dressing: Contact Guard Assistance and Minimal Assistance. min A to don and doff depends and CGA to don and doff shorts/pants  Max A for reg hose   Toileting: Stand By Assistance. Able to complete all her care   Toilet Transfer: [employer]. To RTS . Lots of rest breaks needed due to fatigue     BALANCE:  Standing Balance: Contact Guard Assistance. at [facility]     BED MOBILITY:  Supine to Sit: Contact Guard Assistance using bedrails     TRANSFERS:  Sit to Stand:  Contact Guard Assistance. from recliner and w/c and EOB, RTS   Stand to Sit: Contact Guard Assistance. to w/c , recliner and RTS     FUNCTIONAL MOBILITY:  Assistive Device: Rolling Walker  Assist Level:  Contact Guard Assistance. Distance: To and from bathroom  Pt had no LOB, slow pace and very SOB at times with mobility. Pt on 4L had to go up to 5L due to SOB and less then 88% to get to 92%       ASSESSMENT:     Activity Tolerance:  Patient tolerance of  treatment: fair. Discharge Recommendations: Continue to assess pending progress   Equipment Recommendations: Equipment Needed: No  Other: Pt owns BSC and shower chair. Pt has grab bars in the shower.   Plan: Times per week: 5xs week x 90 min, 1 x week x 30 min  Current Treatment Recommendations: Strengthening, Endurance Training, Balance Training, Functional Mobility Training, Patient/Caregiver Education & Training, Equipment Evaluation, Education, & procurement, Self-Care / ADL, Safety Education & Training, Home Management Training    Patient Education  Patient Education: ADL's, Energy Conservation and Importance of Increasing Activity    Goals  Short term goals  Time Frame for Short term goals: 1 week  Short term goal 1: PT will complete ADL routine with setup and no  > min cues for energy conservation or attention to task  to increase ability to complete self care tasks  Short term goal 2: Pt will complete standing tolerance x 4 minutes with 1-2 UE release and S to increase indep and safety with all grooming. Short term goal 3: Pt will complete functional ambulation to and from the BR as well as around obstacles with SBA and 0-2cues for RW safety to increase independence in home mobility to/ fropm the BR  Short term goal 4: Pt will complete UE light strengthening ex x 10 reps with HEP to increase strength/ endurance   needed for safe light IADL tasks  Long term goals  Time Frame for Long term goals : 2-3 weeks  Long term goal 1: Pt will complete ADL routine with S and no  cues for energy conservation or safe tech to increase independence in self care tasks  Long term goal 2: Pt will complete 2 step homemaking tasks with S and 0-1 cues for safe tech to increase ability to prepare a snack. Following session, patient left in safe position with all fall risk precautions in place.

## 2020-11-06 NOTE — PROGRESS NOTES
Patient alert, LEOBARDO 3mm-2mm. Mucous membranes pink and moist. Hair, shiny and clean. Skin dry, scattered bruising on bilateral arms. Right forearm INT site, capped, clean, dry, and intact. Speech clear. Denies difficulty swallowing. Respirations even and unlabored. Cough noted with productive, bloody mucous. O2 via nasal cannula 4 liter/hr. States passing gas, no difficulty urinating. Last BM 10am today. Abdomen, active in all 4 quadrants, round and soft. Non- tender to palpation. Radial pulses, strong and even. Hand grasp stong and equal. Arm drift negative bilaterally. Capillary refill less then 3 seconds. Skin turgor brisk. Edema noted in bilateral lower legs. Pedal pulses weak bilaterally. Pedal push and pull strong equally. stephany's sign negative. Leg lift, strong bilaterally. States pain with palpation to the left lower leg. Light red in color. Denies numbness and tingling. Side table in reach, call light in reach. In chair resting. No needs at this time.

## 2020-11-06 NOTE — PROGRESS NOTES
[facility]  INPATIENT PHYSICAL THERAPY  DAILY NOTE  [address] [facility]    Time In: 1330  Time Out: 1400  Timed Code Treatment Minutes: 30 Minutes  Minutes: 30          Date: 2020  Patient Name: Tessy Colmenares,  Gender:  female        MRN: [id number]  : 1945  (67 y.o.)     Referring Practitioner: Dr. Gilford Cage  Diagnosis: Physical Debility  Additional Pertinent Hx: Per EMR, Adore Crews is a 67 y.o. female with an extensive medical history including, congestive heart failure, COPD, A. Fib., Pacemaker placement post MI, hypertension, anemia, and IBS who presents to the ED for an evaluation of severe left lower extremity pain, redness, and warmth that started yesterday evening. The patient states that yesterday morning she started feeling some discomfort to her lower extremity, but believed it was due to her compression stockings she wears for CHF. Yesterday evening she removed the stockings and saw that her left lower leg was extremely red, which has continued to spread, becoming more red, and even more painful. Denies any recent trauma, falls, accidents, or wounds to the lower extremity but states that she occasionally scratches her legs with her nails when taking on and off the compression stockings. The patient has attempted tylenol, percocet, biofreeze, and icyhot yesterday with no relief. 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 Endorses history of blood clots, but is on coumadin which was last checked approximately 1 week ago and was in theraputic range.  Further endorses feeling feverish and chills, increased shortness of breath, worsening left lower extremity pain, the inability to bear weight at this time, and denies numbness or tingling to the lower extremities, diabetes mellitus, previous lower leg infections, chest pain, abdominal pain, changes in her bowel or bladder function or habits. Patient still smokes 1/2 a pack of cigarettes daily and is on home oxygen. No alcohol or illicit drug use. \"     Prior Level of Function:  Lives With: Spouse  Type of Home: House  Home Layout: One level  Home Access: Stairs to enter with rails  Entrance Stairs - Number of Steps: 4  Entrance Stairs - Rails: Both(too wide to use at same time)  Home Equipment: Rolling walker, 4 wheeled walker, [address]   Bathroom Shower/Tub: Walk-in shower, Shower chair with back  [employer]: Grab bars in shower, Hand-held shower, 3-in-1 commode  Bathroom Accessibility: Accessible    Receives Help From: Family  ADL Assistance: Independent  Homemaking Assistance: Needs assistance  Ambulation Assistance: Independent  Transfer Assistance: Independent  Active : Yes  Additional Comments: Pt reports that her spouse assists with IADL tasks, Pt completes own self care . Spouse does have to assist with compression stockings. Restrictions/Precautions:  Restrictions/Precautions: General Precautions, Fall Risk     SUBJECTIVE: Pt. Seated in BS chair upon arrival. Pt. Pleasantly agrees to therapy session. PAIN: Not rated: L LE    OBJECTIVE:  Transfers:  Sit to Stand: Stand By Assistance  Stand to Sit:Stand By Assistance    Ambulation:  Stand By Assistance  Distance: 70' x 1  Surface: Level Tile  Device:Rolling Walker  Gait Deviations: Forward Flexed Posture, Slow Cassie, Decreased Step Length Bilaterally, Decreased Gait Speed and Decreased Heel Strike Bilaterally    Exercise:  Patient was guided in 1 set(s) 10 reps of exercise to both lower extremities. Glut sets, Hip abduction/adduction, Seated marches, Seated hamstring curls, Seated heel/toe raises, Long arc quads and Seated isometric hip adduction. Exercises were completed for increased independence with functional mobility.     Functional Outcome Measures: Not completed       ASSESSMENT:  Assessment: Patient progressing toward established goals. Activity Tolerance:  Patient tolerance of  treatment: good. Equipment Recommendations:Equipment Needed: No(Pt has 4WW, RW, w/c and cane)  Discharge Recommendations:  Continue to assess pending progress, Patient would benefit from continued therapy after discharge    Plan: Times per week: 5x/wk 90 min, 1x/ wk 30 min  Current Treatment Recommendations: Strengthening, Functional Mobility Training, Transfer Training, Endurance Training, Balance Training, Stair training, Gait Training, Home Exercise Program, Safety Education & Training, Equipment Evaluation, Education, & procurement, Patient/Caregiver Education & Training    Patient Education  Patient Education: Plan of Care, Transfers, Reviewed Prior Education, Gait, Verbal Exercise Instruction    Goals:  Patient goals : get my legs stronger to walk better. Short term goals  Time Frame for Short term goals: 1 wk  Short term goal 1: Pt to go supine <->sit, SBA, to get in/out of bed, no rail. Short term goal 2: Pt to get up/down from various seated surfaces, SBA to get up to walk. Short term goal 3: Pt to walk with RW >= 100 ft, SBA to progress to home and community mobility  Short term goal 4: negotiate 4 steps with HR and CGA to enter home safely  Short term goal 5: Pt to get in/out of car, SBA for transportation needs. Long term goals  Time Frame for Long term goals : 3 wks  Long term goal 1: Pt to go supine <->sit, Mod I, to get in/out of bed, no rail. Long term goal 2: Pt to get up/down from various seated surfaces, Mod I, to get up to walk. Long term goal 3: Pt to walk with RW >= 150 ft, Mod I, to progress to home and community mobility  Long term goal 4: negotiate 4 steps with 1 HR and Mod I to enter home safely  Long term goal 5: Pt to get in/out of car, Mod I, for transportation needs.   Long term goal 6: Pt to score >= 22, indicating improved balance    Following session, patient left in safe position with all fall risk precautions in place.

## 2020-11-06 NOTE — PROGRESS NOTES
Just done physical Medicine & Rehabilitation  Progress Note    Chief Complaint:  Physical deconditioning secondary to Left lower leg severe cellulitis    Subjective: She still endorses shooting pains into her left ankle; otherwise she states her therapies are going well. Her hemorrhoid related bleeding is better with use of the Anusol suppositories as well as \"my Coumadin being better. \"  Still with complaint of coughing up \"blood clots\" which make it difficult to breathe. Otherwise she has no other specific concerns or questions. Rehabilitation:   Physical Therapy:  OBJECTIVE:  Transfers:  Sit to Stand: Stand By Assistance  Stand to Sit:Stand By Assistance     Ambulation:  Stand By Assistance  Distance: 70' x 1  Surface: Level Tile  Device:Rolling Walker  Gait Deviations: Forward Flexed Posture, Slow Cassie, Decreased Step Length Bilaterally, Decreased Gait Speed and Decreased Heel Strike Bilaterally     Exercise:  Patient was guided in 1 set(s) 10 reps of exercise to both lower extremities. Glut sets, Hip abduction/adduction, Seated marches, Seated hamstring curls, Seated heel/toe raises, Long arc quads and Seated isometric hip adduction. Exercises were completed for increased independence with functional mobility.     Functional Outcome Measures: Not completed     ASSESSMENT:  Assessment: Patient progressing toward established goals. Activity Tolerance:  Patient tolerance of  treatment: good. Equipment Recommendations:Equipment Needed: No(Pt has 4WW, RW, w/c and cane)  Discharge Recommendations:  Continue to assess pending progress, Patient would benefit from continued therapy after discharge     Occupational Therapy:  PAIN: pt having pain in mid back area. And very SOB at times.   Pt on 4.5 L of O2 in gym with 93% and in room 4 L O2 at 92-95%     COGNITION: Decreased Insight, Impaired Memory, Inattention, Decreased Problem Solving and Decreased Safety Awareness     ADL:   Lower Extremity Dressing: Contact Guard Assistance. sitting EOB to doff shoes .     BALANCE:  Standing Balance: Stand By Assistance. at [facility]      BED MOBILITY:  Sit to Supine: Minimal Assistance, with verbal cues  adjsut hips in bed      TRANSFERS:  Sit to Stand:  Stand By Assistance. from w/c   Stand to Sit: [address] to w/c and EOB      FUNCTIONAL MOBILITY:  Assistive Device: Rolling Walker  Assist Level:  Stand By Assistance. Distance: down hallway and from w/c inroom to EOB. Pt becomes very SOB at longer distance of 25 feet and at 88-90% when sitting O2 on 5 L   Pt does recover fairly quickly. Pt has to be reminded to not try to go too far need to limit distance due to SOB and drop in O2.         ADDITIONAL ACTIVITIES:  .Patient identified a personal goal to increase UB strength and improve overall endurance so they can complete their toilet & shower transfers; skilled edu on UE strengthening and patient completed BUE strengthening exercises x10 reps x1 set this date with a minimal resistive band  in all joints and all planes. Patient tolerated in sitting , requiring lengthy  rest breaks. Pt required min verbal   cues for technique. ASSESSMENT:  Activity Tolerance:  Patient tolerance of  treatment: fair very SOB and lots of rest breaks needed       Discharge Recommendations: Continue to assess pending progress   Equipment Recommendations: Equipment Needed: No  Other: Pt owns BSC and shower chair. Pt has grab bars in the shower. Speech Therapy:  Not applicable     Review of Systems:  Review of Systems   Constitutional: Positive for activity change and fatigue. Negative for appetite change. HENT: Negative for trouble swallowing and voice change. Eyes: Negative for visual disturbance. Respiratory: Positive for cough and shortness of breath. Cardiovascular: Positive for leg swelling. Gastrointestinal: Negative for anal bleeding, blood in stool, constipation and diarrhea.    Endocrine: Negative for cold intolerance. Genitourinary: Negative for frequency and urgency. Musculoskeletal: Positive for gait problem and myalgias. Negative for back pain. Skin: Positive for wound. Negative for pallor. Allergic/Immunologic: Negative. Neurological: Positive for weakness. Negative for dizziness, speech difficulty, numbness and headaches. Hematological: Negative. Psychiatric/Behavioral: Negative for confusion, decreased concentration and dysphoric mood. The patient is not nervous/anxious. All other systems reviewed and are negative. Objective:  /64   Pulse 70   Temp 96.5 °F (35.8 °C) (Oral)   Resp 18   Ht 5' 2\" (1.575 m)   Wt 169 lb (76.7 kg)   SpO2 94%   Breastfeeding No   BMI 30.91 kg/m²   CURRENT VITALS:  height is 5' 2\" (1.575 m) and weight is 169 lb (76.7 kg). Her oral temperature is 96.5 °F (35.8 °C). Her blood pressure is 135/64 and her pulse is 70. Her respiration is 18 and oxygen saturation is 94%. Body mass index is 30.91 kg/m².   Temperature Range (24h):Temp: 96.5 °F (35.8 °C) Temp  Av.4 °F (36.3 °C)  Min: 96.5 °F (35.8 °C)  Max: 97.9 °F (36.6 °C)  BP Range (29Q): Systolic (77PEX), AEN:461 , Min:118 , WWB:624     Diastolic (79CPR), YFT:65, Min:54, Max:84    Pulse Range (24h): Pulse  Av.8  Min: 70  Max: 82  Respiration Range (24h): Resp  Av.5  Min: 14  Max: 20  Current Pulse Ox (24h):  SpO2: 94 %  Pulse Ox Range (24h):  SpO2  Av.3 %  Min: 94 %  Max: 99 %  Oxygen Amount and Delivery: O2 Flow Rate (L/min): 5 L/min(decreased to 4l)    awake  Orientation:   person, place, time, situation  Mood: within normal limits  Affect: flat  General appearance:  in no acute distress, glasses and [email or web address], in bed    Memory:   grossly normal  Attention/Concentration: normal  Language:  normal    ROM:  Normal  Motor Exam:  Motor exam is 4 out of 5 all extremities with the exception of left ankle not tested    Sensory:  Sensory intact  Coordination:   normal  Deep Tendon Reflexes: Reflexes are intact and symmetrical bilaterally with left Achilles tendon not tested    Skin: warm and dry, no rash with notable erythema involving the left ankle to approximately mid calf with surgical markers demarcating the current extent of the cellulitis  Peripheral vascular: Pulses: Normal upper and diminished lower extremity pulses; Edema: 3+ pedal    Diagnostics:   Recent Results (from the past 24 hour(s))   Protime-INR    Collection Time: 11/06/20  5:54 AM   Result Value Ref Range    INR 2.82 (H) 0.85 - 1.13     Labs Renal Latest Ref Rng & Units 11/4/2020 11/3/2020 11/2/2020 11/1/2020 10/28/2020   BUN 7 - 22 mg/dL 40(H) 36(H) 26(H) 26(H) 21   Cr 0.4 - 1.2 mg/dL 1.1 1.0 0.9 1.1 0.7   K 3.5 - 5.2 meq/L 4.1 4.2 4.8 4.5 4.7   Na 135 - 145 meq/L 139 141 138 138 139      Recent Labs     11/04/20  0534 11/03/20  0650 11/02/20  0544   WBC 7.8 5.7 3.9*   HGB 9.5* 9.3* 9.4*   HCT 31.1* 30.4* 31.1*   .6* 101.0* 102.0*    151 135      Xr Chest (2 Vw)    Result Date: 11/5/2020  PROCEDURE: XR CHEST (2 VW) CLINICAL INFORMATION: Lung infiltrates. COPD. Atrial fibrillation. Dyspnea. COMPARISON: 10/30/2020 TECHNIQUE: AP upright and lateral views of the chest were obtained. 1. Moderate cardiomegaly. Permanent pacemaker/defibrillator. Small bilateral pleural effusions best seen posteriorly. 2. Mild interstitial infiltrates right mid and lower lung fields, consistent with interstitial pneumonitis. Appearance improved from prior. **This report has been created using voice recognition software. It may contain minor errors which are inherent in voice recognition technology. ** Final report electronically signed by Dr. César Bruce on 11/5/2020 12:35 PM    Impression:  1. Left lower leg severe cellulitis. 2. Physical deconditioning. 3. Gait instability. 4. Septic shock secondary to left lower leg cellulitis.   5. IV infiltration into the left forearm with heparin on 10/31/2020 treated with Hylenex. 6. Insomnia. 7. Atrial fibrillation on chronic anticoagulation with Coumadin. 8. Biventricular implanted cardioverter-defibrillator  9. Coronary artery disease. 10. COPD chronic home oxygen at 2 L. Follows with Dr. Narayan Kaiser. 11. Acute kidney injury. 12. CKD 2.  13. Hyperlipidemia. 14. Hypertension. 15. History of prior left displaced femoral neck fracture status post hemiarthroplasty by Dr. Darrian Magana on 10/24/2019. 16. History of prior closed head injury. [place] Cognitive Assessment [facility]) version 8.3 completed. Patient scored 21/30 on 11/4. 16. History of prior myocardial infarction, 1994. 18. Chronic systolic congestive heart failure with ejection fraction of 35 to 40%. 19. Mild peripheral vascular disease per vascular OMAYRA bilateral study dated 2/25/2020.  20. Severe left external carotid artery stenosis and left subclavian artery stenosis/occlusion per vascular carotid bilateral ultrasound on 4/1/2014. 21. Mild to moderate central spinal stenosis at L5-L6 with moderate right S1 lateral recess stenosis and findings of 6 lumbar type vertebral body per CT of the lumbar spine without contrast on 10/19/2012.  22. Hemorrhoids. 23. Anxiety. 24. Osteopenia per DEXA bone scan on 12/7/2015 with medium fracture risk. 25. Chronic constipation. 26. Oral thrush. 27. Chronic iron deficiency anemia. 28. Current every day smoker. 29. Small bilateral pleural effusions. Plan:     Medical management: Per primary team and Dr. Kishore Bedoya. Consultants:  Orthopedic Surgery, Critical Care, Infectious Disease, Cardiology, Pulmonology, Family Medicine Physical Medicine    Narcotic usage: Norco last 24 hour usage 10 mg. Increased. Last BM:  Stool Amount: Medium (11/06/20 1507)    FUNCTIONAL OUTCOMES TOOLS:    LOUIS -      Tinetti - Balance Score: 11  Gait Score: 8  Tinetti Total Score: 19    TUG -      Acute/Rehabilitation Problems:  1. Left lower leg severe cellulitis.   1. Infectious Disease service 11/30. 6. Spiriva Respimat. 7. Patient normally on 2 L chronically at home. Current oxygen setting is 5 L on 11/5.  5. Hyperlipidemia. 1. No current medication. 6. History of prior left displaced femoral neck fracture status post hemiarthroplasty by Dr. Phil Miller on 10/24/2019.  7. History of prior closed head injury. [place] Cognitive Assessment [facility]) version 8.3 completed. Patient scored 21/30 on 11/4.  8. History of prior myocardial infarction, 1994.  9. Chronic systolic congestive heart failure with ejection fraction of 35 to 40%/Hypertension. 1. Digoxin. 2. Lasix. 3. Toprol-XL. 4. Entresto. 5. Evolucumab. 10. Mild peripheral vascular disease per vascular OMAYRA bilateral study dated 2/25/2020. 11. Severe left external carotid artery stenosis and left subclavian artery stenosis/occlusion per vascular carotid bilateral ultrasound on 4/1/2014. 12. Mild to moderate central spinal stenosis at L5-L6 with moderate right S1 lateral recess stenosis and findings of 6 lumbar type vertebral body per CT of the lumbar spine without contrast on 10/19/2012. 13. Hemorrhoids. 1. No current medication ordered. 14. Anxiety. 1. No current medication ordered. 15. Osteopenia per DEXA bone scan on 12/7/2015 with medium fracture risk. 1. Vitamin D 25 hydroxy level was normal on 1/5/2020. 16. Chronic constipation. 1. Patient normally on Linzess at home. 17. Chronic iron deficiency anemia. 1. Receives IV iron transfusions. Last iron on 11/3 was 173 with a ferritin of 305. 2. Hemoglobin 9.5 on 11/4 which is stable over 9.3 on 11/3. 18. Current every day smoker. 1. Patient not interested in smoking cessation. Labs reviewed on:  1. 11/3.  2. 11/4    Infectious Disease:  1. N/A    Missed Therapy Time:  11/5. Physical therapy missed 30 minutes while the patient was off of the unit to obtain a chest x-ray.      DME:    Discharge Plan:  Estimated Discharge Date: 11/13   Destination: home health  Services at Discharge: Home Health Physical Therapy, Occupational Therapy, Nursing and an aide 2x week  Is patient appropriate for an outpatient driving evaluation? no  Equipment at Discharge: None    Greater than 50% of 30 minutes was spent with the patient reviewing her current respiratory function and complaint of coughing up blood clots with plan if this persists will consult ENT to evaluate for whether this is related to bleeding from her nose, her progress with therapies, and her current level of pain control.      Henrietta Lobo MD

## 2020-11-06 NOTE — PROGRESS NOTES
cues  adjsut hips in bed     TRANSFERS:  Sit to Stand:  Stand By Assistance. from w/c   Stand to Sit: [address] to w/c and EOB     FUNCTIONAL MOBILITY:  Assistive Device: Rolling Walker  Assist Level:  Stand By Assistance. Distance: down hallway and from w/c inroom to EOB. Pt becomes very SOB at longer distance of 25 feet and at 88-90% when sitting O2 on 5 L   Pt does recover fairly quickly. Pt has to be reminded to not try to go too far need to limit distance due to SOB and drop in O2. ADDITIONAL ACTIVITIES:  .Patient identified a personal goal to increase UB strength and improve overall endurance so they can complete their toilet & shower transfers; skilled edu on UE strengthening and patient completed BUE strengthening exercises x10 reps x1 set this date with a minimal resistive band  in all joints and all planes. Patient tolerated in sitting , requiring lengthy  rest breaks. Pt required min verbal   cues for technique. ASSESSMENT:     Activity Tolerance:  Patient tolerance of  treatment: fair very SOB and lots of rest breaks needed       Discharge Recommendations: Continue to assess pending progress   Equipment Recommendations: Equipment Needed: No  Other: Pt owns BSC and shower chair. Pt has grab bars in the shower.   Plan: Times per week: 5xs week x 90 min, 1 x week x 30 min  Current Treatment Recommendations: Strengthening, Endurance Training, Balance Training, Functional Mobility Training, Patient/Caregiver Education & Training, Equipment Evaluation, Education, & procurement, Self-Care / ADL, Safety Education & Training, Home Management Training    Patient Education  Patient Education: Energy Conservation and Home Exercise Program    Goals  Short term goals  Time Frame for Short term goals: 1 week  Short term goal 1: PT will complete ADL routine with setup and no  > min cues for energy conservation or attention to task  to increase ability to complete self care tasks  Short term goal 2: Pt will complete standing tolerance x 4 minutes with 1-2 UE release and S to increase indep and safety with all grooming. Short term goal 3: Pt will complete functional ambulation to and from the BR as well as around obstacles with SBA and 0-2cues for RW safety to increase independence in home mobility to/ fropm the BR  Short term goal 4: Pt will complete UE light strengthening ex x 10 reps with HEP to increase strength/ endurance   needed for safe light IADL tasks  Long term goals  Time Frame for Long term goals : 2-3 weeks  Long term goal 1: Pt will complete ADL routine with S and no  cues for energy conservation or safe tech to increase independence in self care tasks  Long term goal 2: Pt will complete 2 step homemaking tasks with S and 0-1 cues for safe tech to increase ability to prepare a snack. Following session, patient left in safe position with all fall risk precautions in place.

## 2020-11-06 NOTE — PROGRESS NOTES
Patient continues to have blood in her stool. Patient states \"that its from her hemorrhoids\". Sruthi Temple LPN informed.

## 2020-11-06 NOTE — PROGRESS NOTES
RECREATIONAL THERAPY  MISSED TREATMENT    []Transitional Care Unit  [x]Inpatient Rehabilitation    Date:  11/6/2020            Patient Name: Carmela Patterson           MRN: [id number]  Omar: [de-identified]          YOB: 1945 (67 y.o.)       Gender: female   Diagnosis: left lower extremity cellulitis with physical deconditioning  Physician: Referring Practitioner: Dr. Darlene Desai:    []Hold treatment per nursing request  []Patient refusal  []Family declined treatment  []Patient at testing and/or off the floor  []Patient unavailable, with PT/OT/nursing, etc.  [x]Other pt asleep in recliner with feel elevated and ice packs placed-no RT today   Andreas, [place]    11/6/2020

## 2020-11-06 NOTE — PROGRESS NOTES
[facility]  INPATIENT PHYSICAL THERAPY  DAILY NOTE  3 [facility]ly [facility], later [facility]     Time In: 1130  Time Out: 1230  Timed Code Treatment Minutes: 60 Minutes  Minutes: 60          Date: 2020  Patient Name: Sayra Hawk,  Gender:  female        MRN: [id number]  : 1945  (67 y.o.)     Referring Practitioner: Dr. Jaja Alvares  Diagnosis: Physical Debility  Additional Pertinent Hx: Per EMR, Tamra Wu is a 67 y.o. female with an extensive medical history including, congestive heart failure, COPD, A. Fib., Pacemaker placement post MI, hypertension, anemia, and IBS who presents to the ED for an evaluation of severe left lower extremity pain, redness, and warmth that started yesterday evening. The patient states that yesterday morning she started feeling some discomfort to her lower extremity, but believed it was due to her compression stockings she wears for CHF. Yesterday evening she removed the stockings and saw that her left lower leg was extremely red, which has continued to spread, becoming more red, and even more painful. Denies any recent trauma, falls, accidents, or wounds to the lower extremity but states that she occasionally scratches her legs with her nails when taking on and off the compression stockings. The patient has attempted tylenol, percocet, biofreeze, and icyhot yesterday with no relief. 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 Endorses history of blood clots, but is on coumadin which was last checked approximately 1 week ago and was in theraputic range.  Further endorses feeling feverish and chills, increased shortness of breath, worsening left lower extremity pain, the inability to bear weight at this time, and denies numbness or tingling to the lower extremities, diabetes mellitus, previous lower leg infections, chest pain, abdominal pain, changes in her bowel or bladder function or habits. Patient still smokes 1/2 a pack of cigarettes daily and is on home oxygen. No alcohol or illicit drug use. \"     Prior Level of Function:  Lives With: Spouse  Type of Home: House  Home Layout: One level  Home Access: Stairs to enter with rails  Entrance Stairs - Number of Steps: 4  Entrance Stairs - Rails: Both(too wide to use at same time)  Home Equipment: Rolling walker, 4 wheeled walker, [employer]   Bathroom Shower/Tub: Walk-in shower, Shower chair with back  [employer]: Grab bars in shower, Hand-held shower, 3-in-1 commode  Bathroom Accessibility: Accessible    Receives Help From: Family  ADL Assistance: Independent  Homemaking Assistance: Needs assistance  Ambulation Assistance: Independent  Transfer Assistance: Independent  Active : Yes  Additional Comments: Pt reports that her spouse assists with IADL tasks, Pt completes own self care . Spouse does have to assist with compression stockings. Restrictions/Precautions:  Restrictions/Precautions: General Precautions, Fall Risk     SUBJECTIVE: Pt. Seated on BS chair and pleasantly agrees to therapy session. Pt. Complains of increased pain in L LE this date. Pt. Requests to use restroom at end of session. PAIN: Not rated: L LE    OBJECTIVE:  Transfers:  Sit to Stand: Contact Guard Assistance  Stand to [employer]  Stand Pivot:Contact Guard Assistance    Ambulation:  Stand By Assistance  Distance: 60' x 1, 12' x 1  Surface: Level Tile  Device:Rolling Walker  Gait Deviations:   Forward Flexed Posture, Slow Cassie, Decreased Step Length Bilaterally, Decreased Gait Speed, Decreased Heel Strike Bilaterally and Decreased Terminal Knee Extension    Wheelchair Mobility:  Stand By Assistance  Extremities Used: Bilateral Upper Extremities  Type of Chair:Manual  Surface: Level Tile  Distance: 150' x 1  Quality: Slow Velocity, Short Strokes and Decreased Fluidity        Balance:  Pt. Completed standing dynamic balance activity with Normal ANABELL on level tile and Single UE support on Rolling Walker with Stand By Assistance. Activity completed to improve balance, enhance functional mobility and, reduce risk of falls. Exercise:  Patient was guided in 1 set(s) 10 reps of exercise to both lower extremities. Glut sets, Hip abduction/adduction, Seated marches, Seated hamstring curls, Seated heel/toe raises, Long arc quads, Seated isometric hip adduction, Standing heel/toe raises, Standing marches, Standing hip abduction/adduction, Standing hip extension, Standing hamstring curls and Mini squats. Exercises were completed for increased independence with functional mobility. Functional Outcome Measures: Completed       ASSESSMENT:  Assessment: Patient progressing toward established goals. Activity Tolerance:  Patient tolerance of  treatment: good. Equipment Recommendations:Equipment Needed: No(Pt has 4WW, RW, w/c and cane)  Discharge Recommendations:  Continue to assess pending progress, Patient would benefit from continued therapy after discharge    Plan: Times per week: 5x/wk 90 min, 1x/ wk 30 min  Current Treatment Recommendations: Strengthening, Functional Mobility Training, Transfer Training, Endurance Training, Balance Training, Stair training, Gait Training, Home Exercise Program, Safety Education & Training, Equipment Evaluation, Education, & procurement, Patient/Caregiver Education & Training    Patient Education  Patient Education: Plan of Care, Transfers, Gait, Wheelchair Mobility, Verbal Exercise Instruction, Health Promotion and Wellness Education    Goals:  Patient goals : get my legs stronger to walk better. Short term goals  Time Frame for Short term goals: 1 wk  Short term goal 1: Pt to go supine <->sit, SBA, to get in/out of bed, no rail.   Short term goal 2: Pt to get up/down from various seated surfaces, SBA to get

## 2020-11-06 NOTE — PROGRESS NOTES
Clinical Pharmacy Note    Warfarin consult follow-up    Recent Labs     11/06/20  0554   INR 2.82*     Recent Labs     11/04/20  0534   HGB 9.5*   HCT 31.1*        Significant Drug-Drug Interactions:  New warfarin drug-drug interactions: none  Discontinued drug-drug interactions: linezolid  Current warfarin drug-drug interactions: aspirin, trazodone (HM), linezolid, prednisone      Date INR Warfarin Dose   10/25-10/27   No Coumadin   10/28/2020 1.32 2 mg    10/29/2020 1.49  2 mg   10/30/2020  2  1.5 mg    10/31/2020   2.78   0.5 mg   11/1/2020   2.37  1.5 mg    11/2/2020  2.55 1 mg     11/3/2020  3.54   No Coumadin   11/4/2020  3.09  1 mg   11/5/2020 2.41 1.5 mg   11/6/2020 2.82 1 mg                 Notes:                     Daily PT/INR until stable within therapeutic range.

## 2020-11-06 NOTE — PLAN OF CARE
Problem: Pain:  Goal: Pain level will decrease  Description: Pain level will decrease  11/6/2020 0104 by Selwyn Ley LPN  Outcome: Ongoing  Note: Patient continues to c/o pain to terrell. Lower extremities and feet, edema continues. 11/5/2020 1327 by Christiano Keith LPN  Outcome: Ongoing  Note: Pain level has been maintained throughout the day. Rest, ice, repositioned. Goal: Control of acute pain  Description: Control of acute pain  11/6/2020 0104 by Selwyn Ley LPN  Outcome: Met This Shift  11/5/2020 1327 by Christiano Keith LPN  Outcome: Ongoing  Goal: Control of chronic pain  Description: Control of chronic pain  11/6/2020 0104 by Selwyn Ley LPN  Outcome: Met This Shift  11/5/2020 1327 by Christiano Keith LPN  Outcome: Ongoing     Problem: Skin Integrity:  Goal: Will show no infection signs and symptoms  Description: Will show no infection signs and symptoms  11/6/2020 0104 by Selwyn Ley LPN  Outcome: Ongoing  Note: Patient is afebrile. No s/s of infections noted  11/5/2020 1327 by Christiano Keith LPN  Outcome: Ongoing  Note: Shows no s/sx of infection. Will continue to monitor. Goal: Absence of new skin breakdown  Description: Absence of new skin breakdown  11/6/2020 0104 by Selwyn Ley LPN  Outcome: Met This Shift  11/5/2020 1327 by Christiano Keith LPN  Outcome: Ongoing  Note: Absence of new skin breakdown. Will continue to monitor. Problem: OXYGENATION/RESPIRATORY FUNCTION  Goal: Patient will achieve/maintain normal respiratory rate/effort  Description: Respiratory rate and effort will be within normal limits for the patient  11/6/2020 0104 by Selwyn Ley LPN  Outcome: Ongoing  Note: 02 at 4 liters per N/C. Per therapy increase 02 to 5 liters before ambulating patient. Sp02 is 94 with 02 at 4 liters/ min.   11/5/2020 1327 by Christiano Keith LPN  Outcome: Ongoing     Problem: Bleeding:  Goal: Will show no signs and symptoms of excessive bleeding  Description: Will show no signs and symptoms of excessive bleeding  11/6/2020 0104 by Charissa Ley LPN  Outcome: Ongoing  Note: When patient was up to bathroom, noted to have bright red bleeding from rectum. Hemorrhoids are very large and protruding and patient continues on Coumadin. 11/5/2020 1327 by Paco Beltran LPN  Outcome: Ongoing     Problem: Falls - Risk of:  Goal: Will remain free from falls  Description: Will remain free from falls  11/6/2020 0104 by Charissa Ley LPN  Outcome: Met This Shift  Note: No attempts to get up per self. Patient uses call light as needed for assistance. Non slip footwear on and gait steady with staff and walker. 11/5/2020 1327 by Paco Beltran LPN  Outcome: Ongoing  Note: Remains free from falls. Will continue to monitor. Goal: Absence of physical injury  Description: Absence of physical injury  11/6/2020 0104 by Charissa Ley LPN  Outcome: Met This Shift  11/5/2020 1327 by Paco Beltran LPN  Outcome: Ongoing     Problem: IP MOBILITY  Goal: LTG - patient will demonstrate safe mobility requirements  11/5/2020 1327 by Paco Beltran LPN  Outcome: Ongoing     Problem: IP BALANCE  Goal: LTG - patient will maintain standing balance to allow for completion of daily activities  11/5/2020 1327 by Paco Beltran LPN  Outcome: Ongoing  Goal: BALANCE EDUCATION  Description: Educate patients on maintaining dynamic/static standing/sitting balance, with/without upper extremity support. 11/5/2020 1327 by Paco Beltran LPN  Outcome: Ongoing     Problem: DISCHARGE BARRIERS  Goal: Patient's continuum of care needs are met  11/6/2020 0104 by Charissa Ley LPN  Outcome: Ongoing  Note: Pt. Continues to have respiratory issues and weakness. Patient educated on not straining to have a bm and not forcefully cough, due to bleeding tendensies. 11/5/2020 1551 by BLANCA Uriostegui  Note: Team conference held Thursday, 11/05/2020.  Recommendations of the team were explained to the patient by BLANCA Hernandez, and  Cayla Terrell. Team is recommending that patient continue on acute inpatient rehab for eight more days, with expected discharge date of Friday, 11/13/2020. Prior to discharge, team is recommending family education with spouse, Kerel Holes. Following discharge, team is recommending home health care services for RN/PT/OT/HHA. Care plan reviewed with patient. Patient verbalized understanding of the plan of care and contributed to goal setting. SW to follow and maintain involvement in discharge planning.          11/5/2020 1327 by Elizabeth Diehl LPN  Outcome: Ongoing

## 2020-11-06 NOTE — PLAN OF CARE
Problem: Pain:  Goal: Pain level will decrease  Description: Pain level will decrease  11/6/2020 1154 by Rita Somers LPN  Outcome: Ongoing  Pt has no complaint of pain at this time, pt is repositioned and ambulated, pt had a very restless night and wants to rest between therapy sessions. Problem: Skin Integrity:  Goal: Will show no infection signs and symptoms  Description: Will show no infection signs and symptoms  11/6/2020 1154 by Rita Somers LPN  Outcome: Ongoing  Skin assessment every shift. No new skin issues at this time. Problem: Bleeding:  Goal: Will show no signs and symptoms of excessive bleeding  Description: Will show no signs and symptoms of excessive bleeding  11/6/2020 1154 by Rita Somers LPN  Outcome: Ongoing  Pt will have no active bleeding. Pt has no noticeable bleeding from rectum as in previous shift, no active bleeding. Problem: Falls - Risk of:  Goal: Will remain free from falls  Description: Will remain free from falls  11/6/2020 1154 by Rita Somers LPN  Outcome: Ongoing  Pt will remain free of falls. Pt is compliant with call light usage, pt is 1 assist with walker and gait belt.

## 2020-11-07 NOTE — PROGRESS NOTES
[address] [address]  INPATIENT PHYSICAL THERAPY  DAILY NOTE  3 [facility]    Time In: 1050  Time Out: 1200  Timed Code Treatment Minutes: 70 Minutes  Minutes: 70          Date: 2020  Patient Name: Manuel Dominguez,  Gender:  female        MRN: [id number]  : 1945  (67 y.o.)     Referring Practitioner: Dr. Luigi Contreras  Diagnosis: Physical Debility  Additional Pertinent Hx: Per EMR, Jim Hagen is a 67 y.o. female with an extensive medical history including, congestive heart failure, COPD, A. Fib., Pacemaker placement post MI, hypertension, anemia, and IBS who presents to the ED for an evaluation of severe left lower extremity pain, redness, and warmth that started yesterday evening. The patient states that yesterday morning she started feeling some discomfort to her lower extremity, but believed it was due to her compression stockings she wears for CHF. Yesterday evening she removed the stockings and saw that her left lower leg was extremely red, which has continued to spread, becoming more red, and even more painful. Denies any recent trauma, falls, accidents, or wounds to the lower extremity but states that she occasionally scratches her legs with her nails when taking on and off the compression stockings. The patient has attempted tylenol, percocet, biofreeze, and icyhot yesterday with no relief. 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 Endorses history of blood clots, but is on coumadin which was last checked approximately 1 week ago and was in theraputic range.  Further endorses feeling feverish and chills, increased shortness of breath, worsening left lower extremity pain, the inability to bear weight at this time, and denies numbness or tingling to the lower extremities, diabetes mellitus, previous lower leg infections, chest pain, abdominal pain, changes in her bowel or bladder function or habits. Patient still smokes 1/2 a pack of cigarettes daily and is on home oxygen. No alcohol or illicit drug use. \"     Prior Level of Function:  Lives With: Spouse  Type of Home: House  Home Layout: One level  Home Access: Stairs to enter with rails  Entrance Stairs - Number of Steps: 4  Entrance Stairs - Rails: Both(too wide to use at same time)  Home Equipment: Rolling walker, 4 wheeled walker, [employer]   Bathroom Shower/Tub: Walk-in shower, Shower chair with back  [employer]: Grab bars in shower, Hand-held shower, 3-in-1 commode  Bathroom Accessibility: Accessible    Receives Help From: Family  ADL Assistance: Independent  Homemaking Assistance: Needs assistance  Ambulation Assistance: Independent  Transfer Assistance: Independent  Active : Yes  Additional Comments: Pt reports that her spouse assists with IADL tasks, Pt completes own self care . Spouse does have to assist with compression stockings. Restrictions/Precautions:  Restrictions/Precautions: General Precautions, Fall Risk     SUBJECTIVE: Pt resting in bedside chair upon arrival, pleasant and agreeable to therapy. Pt notes her O2 has been dropping into the 80's with mobility today. PAIN: 0/10: Denies pain. OBJECTIVE:  Bed Mobility:  Rolling to Left: Stand By Assistance   Supine to Sit: Minimal Assistance, Up off of mat table  Sit to Supine: Stand By Assistance     Transfers:  Sit to Stand: Contact Guard Assistance  Stand to [employer] Assistance    Ambulation:  Stand By Assistance  Distance: 55 feet x1 and 5 feet x1   Surface: Level Tile  Device:Rolling Walker  Gait Deviations:   Forward Flexed Posture, Slow Cassie, Decreased Step Length Bilaterally, Decreased Weight Shift Bilaterally, Decreased Gait Speed, Decreased Heel Strike Bilaterally and Decreased Terminal Knee Extension    Balance:  Dynamic Sitting Balance: Stand 1: Pt to go supine <->sit, Mod I, to get in/out of bed, no rail. Long term goal 2: Pt to get up/down from various seated surfaces, Mod I, to get up to walk. Long term goal 3: Pt to walk with RW >= 150 ft, Mod I, to progress to home and community mobility  Long term goal 4: negotiate 4 steps with 1 HR and Mod I to enter home safely  Long term goal 5: Pt to get in/out of car, Mod I, for transportation needs. Long term goal 6: Pt to score >= 22, indicating improved balance    Following session, patient left in safe position with all fall risk precautions in place.

## 2020-11-07 NOTE — PROGRESS NOTES
Clinical Pharmacy Note    Warfarin consult follow-up    Recent Labs     11/07/20  1229   INR 2.61*     No results for input(s): HGB, HCT, PLT in the last 72 hours. Significant Drug-Drug Interactions:  New warfarin drug-drug interactions: none  Discontinued drug-drug interactions: none  Current warfarin drug-drug interactions: aspirin, trazodone (HM), linezolid, prednisone      Date INR Warfarin Dose   10/25-10/27   No Coumadin   10/28/2020 1.32 2 mg    10/29/2020 1.49  2 mg   10/30/2020  2  1.5 mg    10/31/2020   2.78   0.5 mg   11/1/2020   2.37  1.5 mg    11/2/2020  2.55 1 mg     11/3/2020  3.54   No Coumadin   11/4/2020  3.09  1 mg   11/5/2020 2.41 1.5 mg   11/6/2020 2.82 1 mg    11/7/2020  2.61  1.5 mg            Notes:                   Daily PT/INR until stable within therapeutic range.      Lara Turcios, PharmD, BCPS, BCCCP  11/7/2020 1:38 PM

## 2020-11-07 NOTE — PROGRESS NOTES
[facility]  [address] [facility]  Occupational Therapy  Daily Note  Time:   Time In: 0830  Time Out: 1000  Timed Code Treatment Minutes: 90 Minutes  Minutes: 90          Date: 2020  Patient Name: Clarissa Henriquez,   Gender: female      Room: [facility]59/059-A  MRN: [id number]  : 1945  (67 y.o.)  Referring Practitioner: Dr. Melanie Duff  Diagnosis: left lower extremity cellulitis with physical deconditioning  Additional Pertinent Hx: She was admitted 10/25/2020 for complaint of pain, swelling and erythema over the left lower limb. Patient felt that when she takes off her JEANE hose used for her chronic systolic congestive heart failure she sometimes catches her nails on her skin and that this may have contributed to the infection. Initial imaging was concerning for possible necrotizing fasciitis but with absence of intrafascial gas this diagnosis was ruled out. She did have issues with hypotension and required admission to the ICU. She was started on vancomycin and Zosyn with plan by infectious disease to transition to oral antibiotics at time of discharge. Patient's medical course was further complicated by COPD exacerbation with increased oxygen requirements above her baseline 2 L at home and acute on chronic systolic congestive heart failure, as well as a pneumonia. Restrictions/Precautions:  Restrictions/Precautions: General Precautions, Fall Risk     SUBJECTIVE: In bed upon arrival, agreeable to OT session, several lengthy rest breaks required due to low pulse ox      Patient on 3 L O2 via Nasal Canula  upon arrival to room. Patient O2 sats at 74 %. Upon activity patient O2 at 90 % on 5L02. Pt requiring lengthy rest break(s) to recover. PAIN: 2/10: L LE    COGNITION: Slow Processing    ADL:   Grooming: Stand By Assistance. Brushed teeth standing sinkside  Bathing: Minimal Assistance.   A for washing B feet, completed sitting in w/c sinkside, increased time due to SOB  Upper Extremity Dressing: with set-up.  donned sitting in w/c  Lower Extremity Dressing: Minimal Assistance. completed sitting in w/c, A for B compression socks  Toileting: Stand By Assistance. For clothing management, completed x 2 events  Toilet Transfer: Stand By Assistance. RTS. BALANCE:  Standing Balance: Stand By Assistance. approx 4 minutes with 0-1 UE support for oral care    BED MOBILITY:  Supine to Sit: Supervision HOB elevated, used bedrail, increased time required    TRANSFERS:  Sit to Stand:  Contact Guard Assistance. w/c, EOB and bedside chair  Stand to Sit: Contact Guard Assistance. FUNCTIONAL MOBILITY:  Assistive Device: Rolling Walker  Assist Level:  Contact Guard Assistance. Distance: To and from bathroom  Slow pace      ASSESSMENT:     Activity Tolerance:  Patient tolerance of  treatment: good. Limited due to SOB  Discharge Recommendations: Continue to assess pending progress   Equipment Recommendations: Equipment Needed: No  Other: Pt owns BSC and shower chair. Pt has grab bars in the shower. Plan: Times per week: 5xs week x 90 min, 1 x week x 30 min  Current Treatment Recommendations: Strengthening, Endurance Training, Balance Training, Functional Mobility Training, Patient/Caregiver Education & Training, Equipment Evaluation, Education, & procurement, Self-Care / ADL, Safety Education & Training, Home Management Training    Patient Education  Patient Education: ADL's    Goals  Short term goals  Time Frame for Short term goals: 1 week  Short term goal 1: PT will complete ADL routine with setup and no  > min cues for energy conservation or attention to task  to increase ability to complete self care tasks  Short term goal 2: Pt will complete standing tolerance x 4 minutes with 1-2 UE release and S to increase indep and safety with all grooming.   Short term goal 3: Pt will complete functional ambulation to and from the BR as well as around obstacles with SBA and 0-2cues for RW safety to increase independence in home mobility to/ fropm the BR  Short term goal 4: Pt will complete UE light strengthening ex x 10 reps with HEP to increase strength/ endurance   needed for safe light IADL tasks  Long term goals  Time Frame for Long term goals : 2-3 weeks  Long term goal 1: Pt will complete ADL routine with S and no  cues for energy conservation or safe tech to increase independence in self care tasks  Long term goal 2: Pt will complete 2 step homemaking tasks with S and 0-1 cues for safe tech to increase ability to prepare a snack. Following session, patient left in safe position with all fall risk precautions in place.

## 2020-11-07 NOTE — PROGRESS NOTES
[address] [place] [address] PHYSICAL THERAPY  DAILY NOTE  3 [facility]    Time In: 1330  Time Out: 1400  Timed Code Treatment Minutes: 70 Minutes  Minutes: 30     Minute Variance  Variance: 30    Date: 2020  Patient Name: Vesna Miller,  Gender:  female        MRN: [id number]  : 1945  (67 y.o.)     Referring Practitioner: Dr. Quynh Jones  Diagnosis: Physical Debility  Additional Pertinent Hx: Per EMR, Danyell Rabago is a 67 y.o. female with an extensive medical history including, congestive heart failure, COPD, A. Fib., Pacemaker placement post MI, hypertension, anemia, and IBS who presents to the ED for an evaluation of severe left lower extremity pain, redness, and warmth that started yesterday evening. The patient states that yesterday morning she started feeling some discomfort to her lower extremity, but believed it was due to her compression stockings she wears for CHF. Yesterday evening she removed the stockings and saw that her left lower leg was extremely red, which has continued to spread, becoming more red, and even more painful. Denies any recent trauma, falls, accidents, or wounds to the lower extremity but states that she occasionally scratches her legs with her nails when taking on and off the compression stockings. The patient has attempted tylenol, percocet, biofreeze, and icyhot yesterday with no relief. 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 Endorses history of blood clots, but is on coumadin which was last checked approximately 1 week ago and was in theraputic range.  Further endorses feeling feverish and chills, increased shortness of breath, worsening left lower extremity pain, the inability to bear weight at this time, and denies numbness or tingling to the lower extremities, diabetes mellitus, previous lower leg infections, chest pain, abdominal pain, changes in her bowel or bladder function or habits. Patient still smokes 1/2 a pack of cigarettes daily and is on home oxygen. No alcohol or illicit drug use. \"     Prior Level of Function:  Lives With: Spouse  Type of Home: House  Home Layout: One level  Home Access: Stairs to enter with rails  Entrance Stairs - Number of Steps: 4  Entrance Stairs - Rails: Both(too wide to use at same time)  Home Equipment: Rolling walker, 4 wheeled walker, [employer]   Bathroom Shower/Tub: Walk-in shower, Shower chair with back  [employer]: Grab bars in shower, Hand-held shower, 3-in-1 commode  Bathroom Accessibility: Accessible    Receives Help From: Family  ADL Assistance: Independent  Homemaking Assistance: Needs assistance  Ambulation Assistance: Independent  Transfer Assistance: Independent  Active : Yes  Additional Comments: Pt reports that her spouse assists with IADL tasks, Pt completes own self care . Spouse does have to assist with compression stockings. Restrictions/Precautions:  Restrictions/Precautions: General Precautions, Fall Risk     SUBJECTIVE: Pt resting in bed upon arrival, pleasant and agreeable to therapy. PAIN: 0/10: Denies     OBJECTIVE:  Bed Mobility:  Rolling to Left: Supervision   Supine to Sit: Stand By Assistance  Sit to Supine: Contact Guard Assistance     Transfers:  Sit to Stand: Contact Guard Assistance, From EOB  Stand to Sit:Stand By Assistance    Ambulation:  Contact Guard Assistance  Distance: 70 feet x1   Surface: Level Tile  Device:Rolling Walker  Gait Deviations: Forward Flexed Posture, Slow Cassie, Decreased Step Length Bilaterally, Decreased Weight Shift Bilaterally, Decreased Gait Speed and 2 short standing rest breaks due to SOB. Pt on 6L O2 during gait, sats at 86% upon returning to room.     Balance:  Dynamic Sitting Balance: Independent    Exercise:  Patient was guided in 1 set(s) 15 reps of exercise to both lower extremities. Seated marches, Seated hamstring curls, Seated heel/toe raises, Long arc quads and Seated isometric hip adduction. Exercises were completed for increased independence with functional mobility. Functional Outcome Measures: Not completed       ASSESSMENT:  Assessment: Patient progressing toward established goals. Activity Tolerance:  Patient tolerance of  treatment: good. Equipment Recommendations:Equipment Needed: No(Pt has 4WW, RW, w/c and cane)  Discharge Recommendations:  Continue to assess pending progress, Patient would benefit from continued therapy after discharge    Plan: Times per week: 5x/wk 90 min, 1x/ wk 30 min  Current Treatment Recommendations: Strengthening, Functional Mobility Training, Transfer Training, Endurance Training, Balance Training, Stair training, Gait Training, Home Exercise Program, Safety Education & Training, Equipment Evaluation, Education, & procurement, Patient/Caregiver Education & Training    Patient Education  Patient Education: Plan of Care, Gait    Goals:  Patient goals : get my legs stronger to walk better. Short term goals  Time Frame for Short term goals: 1 wk  Short term goal 1: Pt to go supine <->sit, SBA, to get in/out of bed, no rail. Short term goal 2: Pt to get up/down from various seated surfaces, SBA to get up to walk. Short term goal 3: Pt to walk with RW >= 100 ft, SBA to progress to home and community mobility  Short term goal 4: negotiate 4 steps with HR and CGA to enter home safely  Short term goal 5: Pt to get in/out of car, SBA for transportation needs. Long term goals  Time Frame for Long term goals : 3 wks  Long term goal 1: Pt to go supine <->sit, Mod I, to get in/out of bed, no rail. Long term goal 2: Pt to get up/down from various seated surfaces, Mod I, to get up to walk.   Long term goal 3: Pt to walk with RW >= 150 ft, Mod I, to progress to home and community mobility  Long term goal 4: negotiate 4 steps with 1 HR and Mod I to enter home safely  Long term goal 5: Pt to get in/out of car, Mod I, for transportation needs. Long term goal 6: Pt to score >= 22, indicating improved balance    Following session, patient left in safe position with all fall risk precautions in place.

## 2020-11-07 NOTE — FLOWSHEET NOTE
Patient was receiving breathing treatment. Respiratory therapist welcomed in . Huan Villeda shared that she scratched her leg and it became severely infected quickly and the infection has led to other problems including rof her breathing issues. She has an aunt who is a few years younger than she who  4 weeks ago of the same chronic illness Huan Villeda has. This  Adds worry to her grief for the aunt. She shared those feelings but declined OP spiritual support at this time. [id number] [phone]   Encounter Summary   Services provided to: Patient   Support System Spouse; Children   Continue Visiting Yes  ()   Complexity of Encounter Moderate   Length of Encounter 30 minutes   Spiritual/Anabaptism   Type Spiritual struggle   Assessment Approachable;Grieving;Concerns with suffering   Intervention Active listening;Explored feelings, thoughts, concerns;Explored coping resources;Nurtured hope;Sustaining presence/ Ministry of presence; Discussed illness/injury and it's impact   Outcome Comfort;Expressed gratitude;Engaged in conversation;Receptive

## 2020-11-08 NOTE — PROGRESS NOTES
Just done physical Medicine & Rehabilitation  Progress Note    Chief Complaint:  Physical deconditioning secondary to Left lower leg severe cellulitis    Subjective: The weekend the patient did have persistent complaint of \"coughing up clots of blood\" and so Cardiology, Otolaryngology, and Pulmonology were asked to see the patient. She is still having stated difficulty breathing and does have a persistently elevated proBNP for which Lasix dosing was increased. Her labs were discussed. As well as results of her chest x-ray which also suggest interstitial edema. She did not have any other concerns or questions at this time. Rehabilitation:   Physical Therapy:  OBJECTIVE:     Transfers:  Sit to Stand: [employer], cues for hand placement, with verbal cues  Stand to Sit:Contact Guard Assistance, cues for hand placement     Ambulation:  Contact Guard Assistance  Distance: 10 ft. X2; 60 ft. x1   Surface: Level Tile  Device:Rolling Walker  Gait Deviations: Forward Flexed Posture, Slow Cassie, Decreased Step Length Bilaterally, Decreased Weight Shift Bilaterally, Decreased Gait Speed, Decreased Heel Strike Bilaterally and Mild Path Deviations     Balance:  Dynamic Standing Balance: Contact Guard Assistance   -Standing in bathroom with intermittent UE support on RW while completing rod-care and clothing management, min unsteady but no LOB, fatigued throughout.      Exercise:  Patient was guided in 1 set(s) 10 reps of exercise to both lower extremities. Seated marches, Seated heel/toe raises and Long arc quads. Exercises were completed for increased independence with functional mobility.     Functional Outcome Measures: Not completed     ASSESSMENT:  Assessment: Patient progressing toward established goals. Activity Tolerance:  Patient tolerance of  treatment: good. Limited by fatigue and SOB.       Equipment Recommendations:Equipment Needed: No(Pt has 4WW, RW, w/c and cane)  Discharge Recommendations:  Continue to assess pending progress, Patient would benefit from continued therapy after discharge     Occupational Therapy:  COGNITION: Inattention and Decreased Problem Solving     ADL:   Feeding: Modified Independent. Grooming: Stand By Assistance. standing to wash hands after toileting . fatigue noted . Bathing: min A for washing feet. p requested a sponge bath due to fatigue. Upper Extremity Dressing: Stand By Assistance. setup seated. SBA for clothing retrieval with min cues for safe tech with retrieval using the RW. Lower Extremity Dressing: Minimal Assistance. For compression socks. Pt initiated use of a rubber glove for increased  strength and to decrease sharpness of nails. Increased time needed for SOB. Pt introduced to a reacher to ease with avoiding excessive bending. PT able to return demo however requested to not use the reacher. Toileting: SBA  Toilet Transfer: Contact Guard Assistance. ETS.     BALANCE:  Sitting Balance:  Supervision. Standing Balance: Contact Guard Assistance.       BED MOBILITY:  Not Tested     TRANSFERS:  Sit to Stand:  Contact Guard Assistance. Stand to Sit: Contact Guard Assistance. min cues for safe hand placement.     FUNCTIONAL MOBILITY:  Assistive Device: Rolling Walker  Assist Level:  Contact Guard Assistance. Distance: To and from bathroom and within room to gather items for dressing then to return to dresser to organize clothing     ADDITIONAL ACTIVITIES:  PT stood at the dresser to organize clothing items, x 5 min with SBA noted. Fatigue and pt occasionally leaning on 1 UE for SOB .       ASSESSMENT:  Activity Tolerance:  Patient tolerance of  treatment: fair. SOB upon exertion . Discharge Recommendations: Continue to assess pending progress    Equipment Recommendations: Equipment Needed: No  Other: Pt owns BSC and shower chair. Pt has grab bars in the shower.     Speech Therapy:  Not applicable     Review of Systems:  Review of Systems   Constitutional: Positive for activity change and fatigue. Negative for appetite change. HENT: Negative for trouble swallowing and voice change. Eyes: Negative for visual disturbance. Respiratory: Positive for shortness of breath. Negative for cough. Cardiovascular: Positive for leg swelling. Gastrointestinal: Negative for anal bleeding, blood in stool, constipation and diarrhea. Endocrine: Negative for cold intolerance. Genitourinary: Negative for frequency and urgency. Musculoskeletal: Positive for gait problem and myalgias. Negative for back pain. Skin: Positive for wound. Negative for pallor. Allergic/Immunologic: Negative. Neurological: Positive for weakness. Negative for dizziness, speech difficulty, numbness and headaches. Hematological: Negative. Psychiatric/Behavioral: Negative for confusion, decreased concentration and dysphoric mood. The patient is not nervous/anxious. All other systems reviewed and are negative. Objective:  /60   Pulse 69   Temp 98.6 °F (37 °C) (Oral)   Resp 16   Ht 5' 2\" (1.575 m)   Wt 166 lb 14.4 oz (75.7 kg)   SpO2 94%   Breastfeeding No   BMI 30.53 kg/m²   CURRENT VITALS:  height is 5' 2\" (1.575 m) and weight is 166 lb 14.4 oz (75.7 kg). Her oral temperature is 98.6 °F (37 °C). Her blood pressure is 133/60 and her pulse is 69. Her respiration is 16 and oxygen saturation is 94%. Body mass index is 30.53 kg/m².   Temperature Range (24h):Temp: 98.6 °F (37 °C) Temp  Av.4 °F (36.9 °C)  Min: 98.2 °F (36.8 °C)  Max: 98.6 °F (37 °C)  BP Range (58X): Systolic (73XFO), RRP:783 , Min:120 , RPF:964     Diastolic (88RXU), YVW:71, Min:54, Max:60    Pulse Range (24h): Pulse  Av.5  Min: 69  Max: 70  Respiration Range (24h): Resp  Av.7  Min: 16  Max: 18  Current Pulse Ox (24h):  SpO2: 94 %  Pulse Ox Range (24h):  SpO2  Av.8 %  Min: 94 %  Max: 96 %  Oxygen Amount and Delivery: O2 Flow Rate (L/min): 5 Ref Range    Est, Glom Filt Rate 81 (A) ml/min/1.73m2   Brain Natriuretic Peptide    Collection Time: 11/09/20 10:14 AM   Result Value Ref Range    Pro-BNP 9817.0 (H) 0.0 - 1800.0 pg/mL     Labs Renal Latest Ref Rng & Units 11/9/2020 11/4/2020 11/3/2020 11/2/2020 11/1/2020   BUN 7 - 22 mg/dL 38(H) 40(H) 36(H) 26(H) 26(H)   Cr 0.4 - 1.2 mg/dL 0.7 1.1 1.0 0.9 1.1   K 3.5 - 5.2 meq/L 4.1 4.1 4.2 4.8 4.5   Na 135 - 145 meq/L 146(H) 139 141 138 138      Recent Labs     11/09/20  1014 11/04/20  0534 11/03/20  0650   WBC 15.1* 7.8 5.7   HGB 8.6* 9.5* 9.3*   HCT 28.7* 31.1* 30.4*   .8* 101.6* 101.0*    180 151      Results for Tae Fuentes (MRN [id number]) as of 11/10/2020 00:59   Ref. Range 2/22/2020 00:15 10/25/2020 11:53 10/27/2020 07:01 11/1/2020 11:38 11/9/2020 10:14   Pro-BNP Latest Ref Range: 0.0 - 1800.0 pg/mL 4159.0 (H) 9460.0 (H) 5432.0 (H) 22130.0 (H) 9817.0 (H)     Xr Chest (2 Vw)    Result Date: 11/5/2020  PROCEDURE: XR CHEST (2 VW) CLINICAL INFORMATION: Lung infiltrates. COPD. Atrial fibrillation. Dyspnea. COMPARISON: 10/30/2020 TECHNIQUE: AP upright and lateral views of the chest were obtained. 1. Moderate cardiomegaly. Permanent pacemaker/defibrillator. Small bilateral pleural effusions best seen posteriorly. 2. Mild interstitial infiltrates right mid and lower lung fields, consistent with interstitial pneumonitis. Appearance improved from prior. **This report has been created using voice recognition software. It may contain minor errors which are inherent in voice recognition technology. ** Final report electronically signed by Dr. Yeimy Melchor on 11/5/2020 12:35 PM    Xr Chest (2 Vw)    Result Date: 11/9/2020  PROCEDURE: XR CHEST (2 VW) CLINICAL INFORMATION: 2 view chest for shortness of breath, resolving pneumonia. COMPARISON: 11/5/2020 TECHNIQUE: AP upright and lateral views of the chest were obtained. 1. Moderate cardiomegaly. Permanent pacemaker/defibrillator.  2. Small bilateral pleural effusions. Mildly increased interstitial markings both mid and lower lung fields, consistent with interstitial edema/pneumonia likely with underlying element of interstitial fibrosis. . 3. Overall appearance of chest slightly worse than prior. **This report has been created using voice recognition software. It may contain minor errors which are inherent in voice recognition technology. ** Final report electronically signed by Dr. Prosper Washington on 11/9/2020 1:02 PM    Impression:  1. Left lower leg severe cellulitis. 2. Physical deconditioning. 3. Gait instability. 4. Septic shock secondary to left lower leg cellulitis. 5. IV infiltration into the left forearm with heparin on 10/31/2020 treated with Hylenex. 6. Insomnia. 7. Atrial fibrillation on chronic anticoagulation with Coumadin. 8. Biventricular implanted cardioverter-defibrillator  9. Coronary artery disease. 10. COPD chronic home oxygen at 2 L. Follows with Dr. Harjinder Palacios. 11. Acute kidney injury. 12. CKD 2.  13. Hyperlipidemia. 14. Hypertension. 15. History of prior left displaced femoral neck fracture status post hemiarthroplasty by Dr. Maci Staples on 10/24/2019. 16. History of prior closed head injury. [facility] Cognitive Assessment [facility]) version 8.3 completed. Patient scored 21/30 on 11/4. 16. History of prior myocardial infarction, 1994. 18. Chronic systolic congestive heart failure with ejection fraction of 35 to 40%. 19. Mild peripheral vascular disease per vascular OMAYRA bilateral study dated 2/25/2020.  20. Severe left external carotid artery stenosis and left subclavian artery stenosis/occlusion per vascular carotid bilateral ultrasound on 4/1/2014. 21. Mild to moderate central spinal stenosis at L5-L6 with moderate right S1 lateral recess stenosis and findings of 6 lumbar type vertebral body per CT of the lumbar spine without contrast on 10/19/2012.  22. Hemorrhoids. 23. Anxiety.   24. Osteopenia per DEXA bone scan on 12/7/2015 with medium fracture risk. 25. Chronic constipation. 26. Oral thrush. 27. Chronic iron deficiency anemia. 28. Current every day smoker. 29. Small bilateral pleural effusions. 30. Hypernatremia. 31. Leukocytosis. Plan:     Medical management: Per primary team and Dr. Luiza Reid. Consultants:  Orthopedic Surgery, Critical Care, Infectious Disease, Cardiology, Pulmonology, Otolaryngology, Family Medicine Physical Medicine    Narcotic usage: Norco last 24 hour usage 15 mg. Increased. Last BM:  Stool Amount: Large (11/09/20 1926)    FUNCTIONAL OUTCOMES TOOLS:    LOUIS -      Tinetti - Balance Score: 11  Gait Score: 8  Tinetti Total Score: 19    TUG -      Acute/Rehabilitation Problems:  1. Left lower leg severe cellulitis. 1. Infectious Disease service following. Appreciate the assistance. 2. Linezolid for 5 more doses. Completed on 11/5.  3. Pain control. 1. Tylenol. 2. Norco.  3. Flexeril. 4. Gabapentin. 2. Culturelle. 3. Physical deconditioning/Gait instability. 1. PT/OT. 2. Tinetti 19/28. Risk Indicators: Less than/equal to 18 = high risk; 19-23 Moderate risk; Greater than/equal to 24 = low risk. 4. Septic shock secondary to left lower leg cellulitis. 1. WBCs normal at 5.7 on 11/3.  5. Nutrition:  Consultation to dietician for nutritional counseling and recommendations. 1. Total protein 5.7 and albumin 3.0. Low.  2. Vitamin D 25 hydroxy was 36 on 1/5/2020. Normal at 44 on 11/4/2020.  6. Electrolytes. 1. Normal on 11/9.  7. Acute kidney injury/CKD 2.  1. Patient had normal renal function at the beginning of 2020 with a persistence of GFR suggestive of CKD 2. During this hospitalization Cr/BUN/GFR has worsened to 1.1/40/48 from 1.0/36/54. Improved on 11/9 to 0.7/38/81. 2. Encourage fluids  8. Oral thrush. 1. Nystatin x5 days. Completed. 9. Insomnia. 1. Trazodone 50 mg nightly. 10. Bladder: No issues  11. Bowel: Senna, colace, MOM  1. GlycoLax.   2. Bisacodyl suppository. 12. Rehabilitation nursing will be involved for bowel, bladder, skin, and pain management. Nursing will also provide education and training to patient and family. 13. Prophylaxis:  DVT: Coumadin. GI: Protonix. 14.  and case management consultations for coordination of care and discharge planning. Chronic Problems:  1. Atrial fibrillation on chronic anticoagulation with Coumadin. 1. Coumadin to be dosed by the pharmacy. 2. Last INR was 2.12 on 11/9/2020.  2. Biventricular implanted cardioverter-defibrillator  3. Coronary artery disease. 1. ASA 81 mg.  4. COPD on chronic home oxygen at 2 L. Follows with Dr. Randal Boyle. 1. Dr. Randal Boyle following during this admission. 2. Velma Sevin. 3. Pulmicort. 4. Xopenex. 5. Xolair injection every 28 days with next injection on 11/30. 6. Spiriva Respimat. 7. Patient normally on 2 L chronically at home. Current oxygen setting is 5 L on 11/9.  5. Hyperlipidemia. 1. No current medication. 6. History of prior left displaced femoral neck fracture status post hemiarthroplasty by Dr. Tyler Hameed on 10/24/2019.  7. History of prior closed head injury. [facility] Cognitive Assessment [facility]) version 8.3 completed. Patient scored 21/30 on 11/4.  8. History of prior myocardial infarction, 1994.  9. Chronic systolic congestive heart failure with ejection fraction of 35 to 40%/Hypertension. 1. Digoxin. 2. Lasix. 3. Toprol-XL. 4. Entresto. 5. Evolucumab. 6. proBNP remains elevated on 11/9 at 9817 with only mild improvement over prior lab on 11/1 of 10,721.  1. Lasix has been increased to 40 mg twice daily. 2. Chest x-ray on 11/9 consistent with likely interstitial edema. 10. Mild peripheral vascular disease per vascular OMAYRA bilateral study dated 2/25/2020. 11. Severe left external carotid artery stenosis and left subclavian artery stenosis/occlusion per vascular carotid bilateral ultrasound on 4/1/2014.   12. Mild to moderate central spinal stenosis at L5-L6 with moderate right S1 lateral recess stenosis and findings of 6 lumbar type vertebral body per CT of the lumbar spine without contrast on 10/19/2012. 13. Hemorrhoids. 1. No current medication ordered. 14. Anxiety. 1. No current medication ordered. 15. Osteopenia per DEXA bone scan on 12/7/2015 with medium fracture risk. 1. Vitamin D 25 hydroxy level was normal on 1/5/2020. 16. Chronic constipation. 1. Patient normally on Linzess at home. 17. Chronic iron deficiency anemia. 1. Receives IV iron transfusions. Last iron on 11/3 was 173 with a ferritin of 305. 2. Hemoglobin 9.5 on 11/4 which is stable over 9.3 on 11/3. Hemoglobin slightly decreased to 8.6 on 11/9. 18. Current every day smoker. 1. Patient not interested in smoking cessation. Labs reviewed on:  1. 11/3.  2. 11/4.  3. 11/9. Infectious Disease:  1. N/A    Missed Therapy Time:  11/5. Physical therapy missed 30 minutes while the patient was off of the unit to obtain a chest x-ray. DME:    Discharge Plan:  Estimated Discharge Date: 11/13   Destination: home health  Services at Discharge: [address] [place] [address] Rd, Occupational Therapy, Nursing and an aide 2x week  Is patient appropriate for an outpatient driving evaluation? no  Equipment at Discharge: None    Greater than 50% of 30 minutes was spent with the patient reviewing her respiratory status in regards to persistent elevation of her proBNP with chest x-ray being consistent with interstitial edema and the plan to increase her Lasix frequency; her progress with therapies; and her other labs.     Cezar Banks MD

## 2020-11-08 NOTE — PROGRESS NOTES
Patient: Jessica Cash  Unit/Bed: 3U-87/494-W  YOB: 1945  MRN: [id number] Acct: [de-identified]   Admitting Diagnosis: Physical deconditioning [R53.81]  Physical debility [R53.81]  Admit Date:  11/3/2020  Hospital Day: 4    Assessment:     Principal Problem:    Cellulitis of left lower extremity  Active Problems:    Chronic obstructive pulmonary disease (HCC)    Hypertension    Persistent atrial fibrillation (HCC)    Biventricular implantable cardioverter-defibrillator in situ    Anticoagulated on Coumadin    Arteriosclerosis of coronary artery    Class 1 obesity due to excess calories with serious comorbidity and body mass index (BMI) of 30.0 to 30.9 in adult    Chronic systolic CHF (congestive heart failure) (HCC)    Physical deconditioning    Physical debility    Interstitial pneumonia (HCC)    Thrush    Chronic iron deficiency anemia    Anxiety  Resolved Problems:    * No resolved hospital problems. *      Plan:     1. Ask ENT to see for a possible upper airway source of the continued blood she is coughing up  2. Ask her pulmonologist to see her for a possible lower respiratory source of the blood and her persistent cough  3. Ask her cardiologist to see her as she is worried about stopping the coumadin temporarily   4. Use some zinc oxide to the vaginal area and we discussed keeping the area more clean. Possible need for UA if the soreness continues. 5. The swelling will not improve much till the total protein and albumin improve. She has the lasix and the compression stockings        Subjective:     Patient has complaints of continuing to cough up clots of blood. She states she cannot tell if it's from the posterior pharynx or not. She mentions the swelling of the lower legs and that it causes discomfort. There is burning on voiding but some generalized burning also to the introitus area.   Medication side effects: bleeding    Scheduled Meds:   hydrocortisone  25 mg Rectal BID    Arformoterol Tartrate  15 mcg Nebulization BID    aspirin  81 mg Oral Daily    budesonide  500 mcg Nebulization BID    digoxin  125 mcg Oral Every Other Day    docusate sodium  100 mg Oral Daily    [START ON 11/17/2020] Evolocumab  140 mg Subcutaneous Q14 Days    furosemide  40 mg Oral Daily    gabapentin  100 mg Oral TID    metoprolol succinate  25 mg Oral Daily    nystatin  5 mL Oral 4x Daily    [START ON 11/30/2020] omalizumab  225 mg Subcutaneous Q28 Days    pantoprazole  40 mg Oral BID AC    predniSONE  40 mg Oral Daily    sacubitril-valsartan  1 tablet Oral BID    senna  1 tablet Oral Nightly    tiotropium  2 puff Inhalation Daily    traZODone  50 mg Oral Nightly    lactobacillus  1 capsule Oral BID WC     Continuous Infusions:  PRN Meds:polyethylene glycol, bisacodyl, acetaminophen, cyclobenzaprine, HYDROcodone 5 mg - acetaminophen, HYDROcodone 5 mg - acetaminophen, levalbuterol, magnesium hydroxide    Review of Systems  Pertinent items are noted in HPI. Objective:     Patient Vitals for the past 8 hrs:   BP Temp Temp src Pulse Resp SpO2   11/07/20 1951 (!) 119/57 98.2 °F (36.8 °C) Oral 70 16 96 %     I/O last 3 completed shifts:   In: 120 [P.O.:120]  Out: -   I/O this shift:  In: 300 [P.O.:300]  Out: -     BP (!) 119/57   Pulse 70   Temp 98.2 °F (36.8 °C) (Oral)   Resp 16   Ht 5' 2\" (1.575 m)   Wt 169 lb (76.7 kg)   SpO2 96%   Breastfeeding No   BMI 30.91 kg/m²     BP (!) 119/57   Pulse 70   Temp 98.2 °F (36.8 °C) (Oral)   Resp 16   Ht 5' 2\" (1.575 m)   Wt 169 lb (76.7 kg)   SpO2 96%   Breastfeeding No   BMI 30.91 kg/m²   General appearance: alert, appears stated age and cooperative  Head: Normocephalic, without obvious abnormality, atraumatic  Lungs: clear to auscultation bilaterally  Heart: regular rate and rhythm, S1, S2 normal, no murmur, click, rub or gallop  Abdomen: soft, non-tender; bowel sounds normal; no masses,  no organomegaly  Pelvic: in the presence of her nurse Lawernce Patito,

## 2020-11-08 NOTE — PLAN OF CARE
Care plan reviewed with patient and verbalize understanding of the plan of care and contribute to goal setting. Problem: Pain:  Goal: Pain level will decrease  Description: Pain level will decrease  Outcome: Ongoing     Problem: Skin Integrity:  Goal: Will show no infection signs and symptoms  Description: Will show no infection signs and symptoms  Outcome: Ongoing     Problem: Skin Integrity:  Goal: Absence of new skin breakdown  Description: Absence of new skin breakdown  Outcome: Ongoing  Note: Absent of new skin breakdown. Will continue to monitor. Problem: Bleeding:  Goal: Will show no signs and symptoms of excessive bleeding  Description: Will show no signs and symptoms of excessive bleeding  Outcome: Ongoing  Note: No s/sx of excessive bleeding. Has scant amount of bleeding from hemorrhoids. Will continue to monitor. Problem: Falls - Risk of:  Goal: Will remain free from falls  Description: Will remain free from falls  Outcome: Ongoing  Note: Remains free from falls. Will continue to monitor.

## 2020-11-08 NOTE — PROGRESS NOTES
Clinical Pharmacy Note    Warfarin consult follow-up    Recent Labs     11/08/20  1614   INR 2.22*     No results for input(s): HGB, HCT, PLT in the last 72 hours. Significant Drug-Drug Interactions:  New warfarin drug-drug interactions: none  Discontinued drug-drug interactions: linezolid, prednisone (last dose today)  Current warfarin drug-drug interactions: aspirin, trazodone ()      Date INR Warfarin Dose   10/25-10/27   No Coumadin   10/28/2020 1.32 2 mg    10/29/2020 1.49  2 mg   10/30/2020  2  1.5 mg    10/31/2020   2.78   0.5 mg   11/1/2020   2.37  1.5 mg    11/2/2020  2.55 1 mg     11/3/2020  3.54   No Coumadin   11/4/2020  3.09  1 mg   11/5/2020 2.41 1.5 mg   11/6/2020 2.82 1 mg    11/7/2020  2.61  1.5 mg    11/8/2020   2.22                      1.5 mg       Notes:                     Daily PT/INR until stable within therapeutic range.      Electronically signed by Shannen Ellis, [facility] on 11/8/2020 at 4:54 PM

## 2020-11-09 NOTE — PLAN OF CARE
Problem: Falls - Risk of:  Goal: Will remain free from falls  Description: Will remain free from falls  11/9/2020 0100 by Katy Glass RN  Outcome: Ongoing  11/8/2020 1532 by Katie Cason LPN  Outcome: Ongoing  Note: Remains free from falls. Will continue to monitor.    Goal: Absence of physical injury  Description: Absence of physical injury  11/9/2020 0100 by Katy Glass RN  Outcome: Ongoing  11/8/2020 1532 by Katie Cason LPN  Outcome: Ongoing

## 2020-11-09 NOTE — CONSULTS
Asked to see this patient regarding upper airway source of hemoptysis. She is being transferred off the floor to acute care for a Lasix IV drip. Her pro-BNP is extremely elevated. She has pneumonia, CHF, and on isolation for VRE. She has not produced any blood in her sputum today, and only scant blood tinge in sputum in the past, per the nurse yesterday. That blood was produced with a wet cough, as opposed to just spitting it out. Apparently no one has seen any blood in her nose or from her nose. I brought the cart to fiberscope her. Given the circumstances and the very low probability of the source not being from the lungs, I will defer scoping her today. There would be no therapeutic intervention with no  Bleeding. Please let us know if the circumstances change, and she needs endoscopy.     Electronically signed by Marlee Quintana MD on 11/9/2020 at 2:03 PM

## 2020-11-09 NOTE — PROGRESS NOTES
lower leg infections, chest pain, abdominal pain, changes in her bowel or bladder function or habits. Patient still smokes 1/2 a pack of cigarettes daily and is on home oxygen. No alcohol or illicit drug use. \"     Prior Level of Function:  Lives With: Spouse  Type of Home: House  Home Layout: One level  Home Access: Stairs to enter with rails  Entrance Stairs - Number of Steps: 4  Entrance Stairs - Rails: Both(too wide to use at same time)  Home Equipment: Rolling walker, 4 wheeled walker, [place]   Bathroom Shower/Tub: Walk-in shower, Shower chair with back  [employer]: Grab bars in shower, Hand-held shower, 3-in-1 commode  Bathroom Accessibility: Accessible    Receives Help From: Family  ADL Assistance: Independent  Homemaking Assistance: Needs assistance  Ambulation Assistance: Independent  Transfer Assistance: Independent  Active : Yes  Additional Comments: Pt reports that her spouse assists with IADL tasks, Pt completes own self care . Spouse does have to assist with compression stockings. Restrictions/Precautions:  Restrictions/Precautions: General Precautions, Fall Risk     SUBJECTIVE: Pt resting in bed. PAIN: 0/10: at rest.  C/o Lt leg pain/soreness at times with activity. OBJECTIVE:  Bed Mobility:  Supine to Sit: Modified Independent, though pt performed prior to therapist putting rail down or lower bed to level surface    Transfers:  Sit to Stand: Stand By Assistance  Stand to [employer] 1st trial with pt having RW too far out and only reaching back with 1 UE. After cues for safe technique, then SBA. Repetitive sit <->stand performed from w/c without cushion to simulate typical chairs with pt showing good control and hand placement. 2 reps x3 trials performed. Rest breaks taken b/w trials due to shortness of breath.    Stand Pivot:Stand By Assistance with RW, cues to fully align RW and self    Ambulation:  Stand By Assistance  Distance: 50 ft  Surface: Level Tile  Device:Rolling Walker  Gait Deviations:  Slow Cassie and shortness of breath. O2 on 6Liters during the walk. Sats after the walk 92%  Stairs:  Contact Guard Assistance  Number of Steps: 4  Height: 6\" step with Parallel Bars   Cues for sequencing. BALANCE:  Standing for pericare and clothing management. SBA with rW support. ASSESSMENT:  Assessment: Patient progressing toward established goals. Activity Tolerance:  Patient tolerance of  treatment: fair. O2 sats dropped to 85% with stairs, otherwise 92% or higher at 6 liters throughout. Equipment Recommendations:Equipment Needed: No(Pt has 4WW, RW, w/c and cane)  Discharge Recommendations:  Continue to assess pending progress, Patient would benefit from continued therapy after discharge    Plan: Times per week: 5x/wk 90 min, 1x/ wk 30 min  Current Treatment Recommendations: Strengthening, Functional Mobility Training, Transfer Training, Endurance Training, Balance Training, Stair training, Gait Training, Home Exercise Program, Safety Education & Training, Equipment Evaluation, Education, & procurement, Patient/Caregiver Education & Training    Patient Education  Patient Education: Transfers, Gait, Stairs,  - Patient Verbalized Understanding, - Patient Requires Continued Education    Goals:  Patient goals : get my legs stronger to walk better. Short term goals  Time Frame for Short term goals: 1 wk  Short term goal 1: Pt to go supine <->sit, SBA, to get in/out of bed, no rail. Short term goal 2: Pt to get up/down from various seated surfaces, SBA to get up to walk. Short term goal 3: Pt to walk with RW >= 100 ft, SBA to progress to home and community mobility  Short term goal 4: negotiate 4 steps with HR and CGA to enter home safely  Short term goal 5: Pt to get in/out of car, SBA for transportation needs.   Long term goals  Time Frame for Long term goals : 3 wks  Long term goal 1: Pt to go supine <->sit, Mod I, to get in/out of bed, no rail.  Long term goal 2: Pt to get up/down from various seated surfaces, Mod I, to get up to walk. Long term goal 3: Pt to walk with RW >= 150 ft, Mod I, to progress to home and community mobility  Long term goal 4: negotiate 4 steps with 1 HR and Mod I to enter home safely  Long term goal 5: Pt to get in/out of car, Mod I, for transportation needs. Long term goal 6: Pt to score >= 22, indicating improved balance    Following session, patient left in safe position with all fall risk precautions in place.

## 2020-11-09 NOTE — PROGRESS NOTES
[address]  Occupational Therapy  Daily Note  Time:    Time In: 1100  Time Out: 1230  Timed Code Treatment Minutes: 90 Minutes  Minutes: 90          Date: 2020  Patient Name: Tab Pyle,   Gender: female      Room: [facility]59/059-A  MRN: [id number]  : 1945  (67 y.o.)  Referring Practitioner: Dr. Felipa Andujar  Diagnosis: left lower extremity cellulitis with physical deconditioning  Additional Pertinent Hx: She was admitted 10/25/2020 for complaint of pain, swelling and erythema over the left lower limb. Patient felt that when she takes off her JEANE hose used for her chronic systolic congestive heart failure she sometimes catches her nails on her skin and that this may have contributed to the infection. Initial imaging was concerning for possible necrotizing fasciitis but with absence of intrafascial gas this diagnosis was ruled out. She did have issues with hypotension and required admission to the ICU. She was started on vancomycin and Zosyn with plan by infectious disease to transition to oral antibiotics at time of discharge. Patient's medical course was further complicated by COPD exacerbation with increased oxygen requirements above her baseline 2 L at home and acute on chronic systolic congestive heart failure, as well as a pneumonia. Restrictions/Precautions:  Restrictions/Precautions: General Precautions, Fall Risk      SUBJECTIVE: min encouragement to attempt tasks more independently . Pt on 5 L O2. Throughout. 90 % at rest. 89 % with activities. PAIN: 4/10: in L LE and back. Especially with mobility    COGNITION: Inattention and Decreased Problem Solving    ADL:   Feeding: Modified Independent. Grooming: Stand By Assistance. standing to wash hands after toileting . fatigue noted . Bathing: min A for washing feet. p requested a sponge bath due to fatigue. Upper Extremity Dressing: Stand By Assistance. setup seated.  SBA for clothing retrieval with min cues for safe tech with retrieval using the RW. Lower Extremity Dressing: Minimal Assistance. For compression socks. Pt initiated use of a rubber glove for increased  strength and to decrease sharpness of nails. Increased time needed for SOB. Pt introduced to a reacher to ease with avoiding excessive bending. PT able to return demo however requested to not use the reacher. Toileting: SBA  Toilet Transfer: Contact Guard Assistance. ETS. BALANCE:  Sitting Balance:  Supervision. Standing Balance: Contact Guard Assistance. BED MOBILITY:  Not Tested    TRANSFERS:  Sit to Stand:  Contact Guard Assistance. Stand to Sit: Contact Guard Assistance. min cues for safe hand placement. FUNCTIONAL MOBILITY:  Assistive Device: Rolling Walker  Assist Level:  Contact Guard Assistance. Distance: To and from bathroom and within room to gather items for dressing then to return to dresser to organize clothing         ADDITIONAL ACTIVITIES:  PT stood at the dresser to organize clothing items, x 5 min with SBA noted. Fatigue and pt occasionally leaning on 1 UE for SOB . ASSESSMENT:     Activity Tolerance:  Patient tolerance of  treatment: fair. SOB upon exertion . Discharge Recommendations: Continue to assess pending progress    Equipment Recommendations: Equipment Needed: No  Other: Pt owns BSC and shower chair. Pt has grab bars in the shower.   Plan: Times per week: 5xs week x 90 min, 1 x week x 30 min  Current Treatment Recommendations: Strengthening, Endurance Training, Balance Training, Functional Mobility Training, Patient/Caregiver Education & Training, Equipment Evaluation, Education, & procurement, Self-Care / ADL, Safety Education & Training, Home Management Training    Patient Education  Patient Education: Plan of Care, ADL's and IADL's    Goals  Short term goals  Time Frame for Short term goals: 1 week  Short term goal 1: PT will complete ADL routine with setup and no  > min cues for energy conservation or attention to task  to increase ability to complete self care tasks  Short term goal 2: Pt will complete standing tolerance x 4 minutes with 1-2 UE release and S to increase indep and safety with all grooming. Short term goal 3: Pt will complete functional ambulation to and from the BR as well as around obstacles with SBA and 0-2cues for RW safety to increase independence in home mobility to/ fropm the BR  Short term goal 4: Pt will complete UE light strengthening ex x 10 reps with HEP to increase strength/ endurance   needed for safe light IADL tasks  Long term goals  Time Frame for Long term goals : 2-3 weeks  Long term goal 1: Pt will complete ADL routine with S and no  cues for energy conservation or safe tech to increase independence in self care tasks  Long term goal 2: Pt will complete 2 step homemaking tasks with S and 0-1 cues for safe tech to increase ability to prepare a snack. Following session, patient left in safe position with all fall risk precautions in place.

## 2020-11-09 NOTE — PROGRESS NOTES
Clinical Pharmacy Note    Warfarin consult follow-up    Recent Labs     11/09/20  1014   INR 2.12*     Recent Labs     11/09/20  1014   HGB 8.6*   HCT 28.7*          Significant Drug-Drug Interactions:  New warfarin drug-drug interactions: none  Discontinued drug-drug interactions: none  Current warfarin drug-drug interactions: aspirin, trazodone (HM)      Date INR Warfarin Dose   10/25-10/27   No Coumadin   10/28/2020 1.32 2 mg    10/29/2020 1.49  2 mg   10/30/2020  2  1.5 mg    10/31/2020   2.78   0.5 mg   11/1/2020   2.37  1.5 mg    11/2/2020  2.55 1 mg     11/3/2020  3.54   No Coumadin   11/4/2020  3.09  1 mg   11/5/2020 2.41 1.5 mg   11/6/2020 2.82 1 mg    11/7/2020  2.61  1.5 mg    11/8/2020   2.22                     1.5 mg   11/9/2020  2.12                      2 mg           Notes:                     Daily PT/INR until stable within therapeutic range.         Electronically signed by Bronwyn Favre, [facility] on 11/9/2020 at 1:38 PM

## 2020-11-09 NOTE — PROGRESS NOTES
[address] [address]  INPATIENT PHYSICAL THERAPY  DAILY NOTE  [address] [facility] - 7E-[id number]    Time In: 0930  Time Out: 1000  Timed Code Treatment Minutes: 30 Minutes  Minutes: 30          Date: 2020  Patient Name: Frank Nielsen,  Gender:  female        MRN: [id number]  : 1945  (67 y.o.)     Referring Practitioner: Dr. Adria Bunch  Diagnosis: Physical Debility  Additional Pertinent Hx: Per EMR, Joie Juarez is a 67 y.o. female with an extensive medical history including, congestive heart failure, COPD, A. Fib., Pacemaker placement post MI, hypertension, anemia, and IBS who presents to the ED for an evaluation of severe left lower extremity pain, redness, and warmth that started yesterday evening. The patient states that yesterday morning she started feeling some discomfort to her lower extremity, but believed it was due to her compression stockings she wears for CHF. Yesterday evening she removed the stockings and saw that her left lower leg was extremely red, which has continued to spread, becoming more red, and even more painful. Denies any recent trauma, falls, accidents, or wounds to the lower extremity but states that she occasionally scratches her legs with her nails when taking on and off the compression stockings. The patient has attempted tylenol, percocet, biofreeze, and icyhot yesterday with no relief. 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 Endorses history of blood clots, but is on coumadin which was last checked approximately 1 week ago and was in theraputic range.  Further endorses feeling feverish and chills, increased shortness of breath, worsening left lower extremity pain, the inability to bear weight at this time, and denies numbness or tingling to the lower extremities, diabetes mellitus, previous lower leg infections, chest pain, abdominal pain, changes in her bowel or bladder function or habits. Patient still smokes 1/2 a pack of cigarettes daily and is on home oxygen. No alcohol or illicit drug use. \"     Prior Level of Function:  Lives With: Spouse  Type of Home: House  Home Layout: One level  Home Access: Stairs to enter with rails  Entrance Stairs - Number of Steps: 4  Entrance Stairs - Rails: Both(too wide to use at same time)  Home Equipment: Rolling walker, 4 wheeled walker, [address]   Bathroom Shower/Tub: Walk-in shower, Shower chair with back  [employer]: Grab bars in shower, Hand-held shower, 3-in-1 commode  Bathroom Accessibility: Accessible    Receives Help From: Family  ADL Assistance: Independent  Homemaking Assistance: Needs assistance  Ambulation Assistance: Independent  Transfer Assistance: Independent  Active : Yes  Additional Comments: Pt reports that her spouse assists with IADL tasks, Pt completes own self care . Spouse does have to assist with compression stockings. Restrictions/Precautions:  Restrictions/Precautions: General Precautions, Fall Risk     SUBJECTIVE: Patient in recliner upon arrival, agreed to therapy but reports being very tired and also requesting to use bathroom during session. Mildly impulsive during session, poor awareness and required verbal cues for watching 02 line and other obstacles. PAIN: Yes, pain in back and left foot/ankle     OBJECTIVE:    Transfers:  Sit to Stand: [employer], cues for hand placement, with verbal cues  Stand to Sit:Contact [employer], cues for hand placement    Ambulation:  Contact Guard Assistance  Distance: 10 ft. X2; 60 ft. x1   Surface: Level Tile  Device:Rolling Walker  Gait Deviations:   Forward Flexed Posture, Slow Cassie, Decreased Step Length Bilaterally, Decreased Weight Shift Bilaterally, Decreased Gait Speed, Decreased Heel Strike Bilaterally and Mild Path Deviations    Balance:  Dynamic Standing Balance: Contact Guard Assistance   -Standing in bathroom with intermittent UE support on RW while completing rod-care and clothing management, min unsteady but no LOB, fatigued throughout. Exercise:  Patient was guided in 1 set(s) 10 reps of exercise to both lower extremities. Seated marches, Seated heel/toe raises and Long arc quads. Exercises were completed for increased independence with functional mobility. Functional Outcome Measures: Not completed       ASSESSMENT:  Assessment: Patient progressing toward established goals. Activity Tolerance:  Patient tolerance of  treatment: good. Limited by fatigue and SOB. Equipment Recommendations:Equipment Needed: No(Pt has 4WW, RW, w/c and cane)  Discharge Recommendations:  Continue to assess pending progress, Patient would benefit from continued therapy after discharge    Plan: Times per week: 5x/wk 90 min, 1x/ wk 30 min  Current Treatment Recommendations: Strengthening, Functional Mobility Training, Transfer Training, Endurance Training, Balance Training, Stair training, Gait Training, Home Exercise Program, Safety Education & Training, Equipment Evaluation, Education, & procurement, Patient/Caregiver Education & Training    Patient Education  Patient Education: Plan of Care, Precautions/Restrictions, Transfers, Reviewed Prior Education, Gait, Health Promotion and Wellness Education, Home Safety Education,  - Patient Verbalized Understanding, - Patient Requires Continued Education    Goals:  Patient goals : get my legs stronger to walk better. Short term goals  Time Frame for Short term goals: 1 wk  Short term goal 1: Pt to go supine <->sit, SBA, to get in/out of bed, no rail. Short term goal 2: Pt to get up/down from various seated surfaces, SBA to get up to walk.   Short term goal 3: Pt to walk with RW >= 100 ft, SBA to progress to home and community mobility  Short term goal 4: negotiate 4 steps with HR and

## 2020-11-10 NOTE — PROGRESS NOTES
Clinical Pharmacy Note    Warfarin consult follow-up    Recent Labs     11/10/20  0613   INR 2.05*     Recent Labs     11/09/20  1014   HGB 8.6*   HCT 28.7*          Significant Drug-Drug Interactions:  New warfarin drug-drug interactions: none  Discontinued drug-drug interactions: none  Current warfarin drug-drug interactions: aspirin, trazodone (HM)      Date INR Warfarin Dose   10/25-10/27   No Coumadin   10/28/2020 1.32 2 mg    10/29/2020 1.49  2 mg   10/30/2020  2  1.5 mg    10/31/2020   2.78   0.5 mg   11/1/2020   2.37  1.5 mg    11/2/2020  2.55 1 mg     11/3/2020  3.54   No Coumadin   11/4/2020  3.09  1 mg   11/5/2020 2.41 1.5 mg   11/6/2020 2.82 1 mg    11/7/2020  2.61  1.5 mg    11/8/2020   2.22                     1.5 mg   11/9/2020  2.12                      2 mg   11/10/2020 2.05  2 mg                 Notes:                     Daily PT/INR until stable within therapeutic range.      Rolf Monterroso, PharmD   11/10/2020, 1:56 PM

## 2020-11-10 NOTE — PLAN OF CARE
Problem: DISCHARGE BARRIERS  Goal: Patient's continuum of care needs are met  Note: SW met with patient and contacted patient's spouse, Esperanza Shukla, to further discuss discharge planning. SW reviewed recommendation for patient to return home with continued medical management and therapy services through home health care. Patient and spouse, Marshalrosio Shukla, agreeable to home health care services for RN/PT/OT/HHA. Patient indicates preference for [facility], as she has used the agency before. SW reviewed with spouse, Esperanza Shukla, recommendation for participation in family education prior to patient's discharge. Spouse, Esperanza Shukla, agreeable to education and scheduled for Friday, 11/13/2020, beginning at 9:00am with discharge to follow. SW reminded patient's spouse, Esperanza Shukla, to bring patient's home oxygen tank for use at discharge. Care plan reviewed with patient and spouse, Marshalrosio Shukla. Patient and spouse, Marshalrosio Shukla, verbalized understanding of the plan of care and contributed to goal setting. SW contacted [facility] with referral for RN/PT/OT/HHA. Referral information and discharge date of Friday, 11/13/2020, provided to [facility]. Agency has access to referral information. SW to follow and maintain involvement in discharge planning.

## 2020-11-10 NOTE — PROGRESS NOTES
[facility]  INPATIENT PHYSICAL THERAPY  DAILY NOTE  [address] [facility] - [id number]    Time In: 5718  Time Out: 1055  Timed Code Treatment Minutes: 60 Minutes  Minutes: 60          Date: 11/10/2020  Patient Name: Liz Red,  Gender:  female        MRN: [id number]  : 1945  (67 y.o.)     Referring Practitioner: Dr. Madhu Mora  Diagnosis: Physical Debility  Additional Pertinent Hx: Per EMR, Johnson Stapleton is a 67 y.o. female with an extensive medical history including, congestive heart failure, COPD, A. Fib., Pacemaker placement post MI, hypertension, anemia, and IBS who presents to the ED for an evaluation of severe left lower extremity pain, redness, and warmth that started yesterday evening. The patient states that yesterday morning she started feeling some discomfort to her lower extremity, but believed it was due to her compression stockings she wears for CHF. Yesterday evening she removed the stockings and saw that her left lower leg was extremely red, which has continued to spread, becoming more red, and even more painful. Denies any recent trauma, falls, accidents, or wounds to the lower extremity but states that she occasionally scratches her legs with her nails when taking on and off the compression stockings. The patient has attempted tylenol, percocet, biofreeze, and icyhot yesterday with no relief. 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 Endorses history of blood clots, but is on coumadin which was last checked approximately 1 week ago and was in theraputic range.  Further endorses feeling feverish and chills, increased shortness of breath, worsening left lower extremity pain, the inability to bear weight at this time, and denies numbness or tingling to the lower extremities, diabetes mellitus, previous lower leg infections, chest pain, abdominal pain, changes in her bowel or bladder function or habits. Patient still smokes 1/2 a pack of cigarettes daily and is on home oxygen. No alcohol or illicit drug use. \"     Prior Level of Function:  Lives With: Spouse  Type of Home: House  Home Layout: One level  Home Access: Stairs to enter with rails  Entrance Stairs - Number of Steps: 4  Entrance Stairs - Rails: Both(too wide to use at same time)  Home Equipment: Rolling walker, 4 wheeled walker, [employer]   Bathroom Shower/Tub: Walk-in shower, Shower chair with back  [employer]: Grab bars in shower, Hand-held shower, 3-in-1 commode  Bathroom Accessibility: Accessible    Receives Help From: Family  ADL Assistance: Independent  Homemaking Assistance: Needs assistance  Ambulation Assistance: Independent  Transfer Assistance: Independent  Active : Yes  Additional Comments: Pt reports that her spouse assists with IADL tasks, Pt completes own self care . Spouse does have to assist with compression stockings. Restrictions/Precautions:  Restrictions/Precautions: General Precautions, Fall Risk     SUBJECTIVE: Pt. Seated on BS chair and pleasantly agrees to therapy session. PAIN: Not rated    OBJECTIVE:    Transfers:  Sit to Stand: Stand By Assistance  Stand to Sit:Stand By Assistance    Ambulation:  Stand By Assistance  Distance: 60' x 1, 125' x 1, 50' x 1  Surface: Level Tile  Device:Rolling Walker  Gait Deviations: Forward Flexed Posture, Slow Cassie, Decreased Step Length Bilaterally, Decreased Gait Speed and Increased reliance on RW. Pt. Fatigues easily and requiring multiple standing rest breaks. Balance:  Pt. Completed standing dynamic balance activity with Narrow ANABELL, Normal ANABELL on level tile and Single UE support on Rolling Walker with Stand By Assistance, [employer]. Activity completed to improve balance, enhance functional mobility and, reduce risk of falls. Exercise:  Patient was guided in 1 set(s) 10 reps of exercise to both lower extremities. Glut sets, Hip abduction/adduction, Seated marches, Seated hamstring curls, Seated heel/toe raises, Long arc quads and Seated isometric hip adduction. Exercises were completed for increased independence with functional mobility. Functional Outcome Measures: Not completed       ASSESSMENT:  Assessment: Patient progressing toward established goals. Activity Tolerance:  Patient tolerance of  treatment: good. Equipment Recommendations:Equipment Needed: No(Pt has 4WW, RW, w/c and cane)  Discharge Recommendations:  Continue to assess pending progress, Patient would benefit from continued therapy after discharge    Plan: Times per week: 5x/wk 90 min, 1x/ wk 30 min  Current Treatment Recommendations: Strengthening, Functional Mobility Training, Transfer Training, Endurance Training, Balance Training, Stair training, Gait Training, Home Exercise Program, Safety Education & Training, Equipment Evaluation, Education, & procurement, Patient/Caregiver Education & Training    Patient Education  Patient Education: Plan of Care, Transfers, Reviewed Prior Education, Gait, Verbal Exercise Instruction, Health Promotion and Wellness Education    Goals:  Patient goals : get my legs stronger to walk better. Short term goals  Time Frame for Short term goals: 1 wk  Short term goal 1: Pt to go supine <->sit, SBA, to get in/out of bed, no rail. Short term goal 2: Pt to get up/down from various seated surfaces, SBA to get up to walk. Short term goal 3: Pt to walk with RW >= 100 ft, SBA to progress to home and community mobility  Short term goal 4: negotiate 4 steps with HR and CGA to enter home safely  Short term goal 5: Pt to get in/out of car, SBA for transportation needs. Long term goals  Time Frame for Long term goals : 3 wks  Long term goal 1: Pt to go supine <->sit, Mod I, to get in/out of bed, no rail.   Long term goal 2: Pt to get up/down from various seated surfaces, Mod I, to get up to walk. Long term goal 3: Pt to walk with RW >= 150 ft, Mod I, to progress to home and community mobility  Long term goal 4: negotiate 4 steps with 1 HR and Mod I to enter home safely  Long term goal 5: Pt to get in/out of car, Mod I, for transportation needs. Long term goal 6: Pt to score >= 22, indicating improved balance    Following session, patient left in safe position with all fall risk precautions in place.

## 2020-11-10 NOTE — PROGRESS NOTES
Just done physical Medicine & Rehabilitation  Progress Note    Chief Complaint:  Physical deconditioning secondary to Left lower leg severe cellulitis    Subjective: She endorses that she coughed up to bloody clots today. Still feels limited by shortness of breath with activity and has used as much as 6 L of oxygen with activity and is down to 4 L at rest today. She does feel that her breathing is somewhat improved after adding the additional Lasix dose and today she had an IV line placed to provide ability to give IV Lasix. She denies any pain at this time. She has no other concerns or questions. Rehabilitation:   Physical Therapy:  OBJECTIVE:  Bed Mobility:  Sit to Supine: Supervision      Transfers:  Sit to Stand: Contact Guard Assistance  Stand to Sit:Contact Guard Assistance     Ambulation:  Contact Guard Assistance  Distance: 120 feet x1   Surface: Level Tile  Device:Rolling Walker  Gait Deviations: Forward Flexed Posture, Slow Cassie, Decreased Gait Speed and Pt required 2 standing rest breaks due to SOB     Exercise:  Patient was guided in 1 set(s) 12 reps of exercise to both lower extremities. Ankle pumps, Glut sets, Quad sets, Heelslides, Short arc quads, Hip abduction/adduction and Straight leg raises. Exercises were completed for increased independence with functional mobility.     Functional Outcome Measures: Not completed     ASSESSMENT:  Assessment: Patient progressing toward established goals. and Pt tolerated well. Limited by SOB. Pt on 6L O2 while walking and 4L O2 at rest  Activity Tolerance:  Patient tolerance of  treatment: good.        Equipment Recommendations:Equipment Needed: No(Pt has 4WW, RW, w/c and cane)  Discharge Recommendations:  Continue to assess pending progress, Patient would benefit from continued therapy after discharge     Occupational Therapy:  COGNITION: Slow Processing and Decreased Problem Solving     ADL:   Toileting: Contact Guard Assistance.     Toilet current extent of the cellulitis  Peripheral vascular: Pulses: Normal upper and diminished lower extremity pulses; Edema: 3+ pedal    Diagnostics:   Recent Results (from the past 24 hour(s))   Protime-INR    Collection Time: 11/10/20  6:13 AM   Result Value Ref Range    INR 2.05 (H) 0.85 - 1.13     Labs Renal Latest Ref Rng & Units 11/9/2020 11/4/2020 11/3/2020 11/2/2020 11/1/2020   BUN 7 - 22 mg/dL 38(H) 40(H) 36(H) 26(H) 26(H)   Cr 0.4 - 1.2 mg/dL 0.7 1.1 1.0 0.9 1.1   K 3.5 - 5.2 meq/L 4.1 4.1 4.2 4.8 4.5   Na 135 - 145 meq/L 146(H) 139 141 138 138      Recent Labs     11/09/20  1014 11/04/20  0534 11/03/20  0650   WBC 15.1* 7.8 5.7   HGB 8.6* 9.5* 9.3*   HCT 28.7* 31.1* 30.4*   .8* 101.6* 101.0*    180 151      Results for Loura Drilling (MRN [id number]) as of 11/10/2020 00:59   Ref. Range 2/22/2020 00:15 10/25/2020 11:53 10/27/2020 07:01 11/1/2020 11:38 11/9/2020 10:14   Pro-BNP Latest Ref Range: 0.0 - 1800.0 pg/mL 4159.0 (H) 9460.0 (H) 5432.0 (H) 47071.0 (H) 9817.0 (H)     Xr Chest (2 Vw)    Result Date: 11/5/2020  PROCEDURE: XR CHEST (2 VW) CLINICAL INFORMATION: Lung infiltrates. COPD. Atrial fibrillation. Dyspnea. COMPARISON: 10/30/2020 TECHNIQUE: AP upright and lateral views of the chest were obtained. 1. Moderate cardiomegaly. Permanent pacemaker/defibrillator. Small bilateral pleural effusions best seen posteriorly. 2. Mild interstitial infiltrates right mid and lower lung fields, consistent with interstitial pneumonitis. Appearance improved from prior. **This report has been created using voice recognition software. It may contain minor errors which are inherent in voice recognition technology. ** Final report electronically signed by Dr. Cari Mason on 11/5/2020 12:35 PM    Xr Chest (2 Vw)    Result Date: 11/9/2020  PROCEDURE: XR CHEST (2 VW) CLINICAL INFORMATION: 2 view chest for shortness of breath, resolving pneumonia.  COMPARISON: 11/5/2020 TECHNIQUE: AP upright and lateral views of the chest were obtained. 1. Moderate cardiomegaly. Permanent pacemaker/defibrillator. 2. Small bilateral pleural effusions. Mildly increased interstitial markings both mid and lower lung fields, consistent with interstitial edema/pneumonia likely with underlying element of interstitial fibrosis. . 3. Overall appearance of chest slightly worse than prior. **This report has been created using voice recognition software. It may contain minor errors which are inherent in voice recognition technology. ** Final report electronically signed by Dr. Diamond Viveros on 11/9/2020 1:02 PM    Impression:  1. Left lower leg severe cellulitis. 2. Physical deconditioning. 3. Gait instability. 4. Septic shock secondary to left lower leg cellulitis. 5. IV infiltration into the left forearm with heparin on 10/31/2020 treated with Hylenex. 6. Insomnia. 7. Atrial fibrillation on chronic anticoagulation with Coumadin. 8. Biventricular implanted cardioverter-defibrillator  9. Coronary artery disease. 10. COPD chronic home oxygen at 2 L. Follows with Dr. Lazaro Nolan. 11. Acute kidney injury. 12. CKD 2.  13. Hyperlipidemia. 14. Hypertension. 15. History of prior left displaced femoral neck fracture status post hemiarthroplasty by Dr. Pati Rasmussen on 10/24/2019. 16. History of prior closed head injury. [place] Cognitive Assessment [facility]) version 8.3 completed. Patient scored 21/30 on 11/4. 16. History of prior myocardial infarction, 1994. 18. Chronic systolic congestive heart failure with ejection fraction of 35 to 40%. 19. Mild peripheral vascular disease per vascular OMAYRA bilateral study dated 2/25/2020.  20. Severe left external carotid artery stenosis and left subclavian artery stenosis/occlusion per vascular carotid bilateral ultrasound on 4/1/2014.   21. Mild to moderate central spinal stenosis at L5-L6 with moderate right S1 lateral recess stenosis and findings of 6 lumbar type vertebral body per CT of the lumbar spine without contrast on 10/19/2012.  22. Hemorrhoids. 23. Anxiety. 24. Osteopenia per DEXA bone scan on 12/7/2015 with medium fracture risk. 25. Chronic constipation. 26. Oral thrush. 27. Chronic iron deficiency anemia. 28. Current every day smoker. 29. Small bilateral pleural effusions. 30. Hypernatremia. 31. Leukocytosis. Plan:     Medical management: Per primary team and Dr. Kingsley Aleman. Consultants:  Orthopedic Surgery, Critical Care, Infectious Disease, Cardiology, Pulmonology, Otolaryngology, Family Medicine Physical Medicine    Narcotic usage: Norco last 24 hour usage 15 mg. Increased. Last BM:  Stool Amount: Large (11/10/20 1428)    FUNCTIONAL OUTCOMES TOOLS:    LOUIS -      Tinetti - Balance Score: 11  Gait Score: 8  Tinetti Total Score: 19    TUG -      Acute/Rehabilitation Problems:  1. Left lower leg severe cellulitis. 1. Infectious Disease service following. Appreciate the assistance. 2. Linezolid for 5 more doses. Completed on 11/5.  3. Pain control. 1. Tylenol. 2. Norco.  3. Flexeril. 4. Gabapentin. 2. Culturelle. 3. Physical deconditioning/Gait instability. 1. PT/OT. 2. Tinetti 19/28. Risk Indicators: Less than/equal to 18 = high risk; 19-23 Moderate risk; Greater than/equal to 24 = low risk. 4. Septic shock secondary to left lower leg cellulitis. 1. WBCs normal at 5.7 on 11/3.  5. Nutrition:  Consultation to dietician for nutritional counseling and recommendations. 1. Total protein 5.7 and albumin 3.0. Low.  2. Vitamin D 25 hydroxy was 36 on 1/5/2020. Normal at 44 on 11/4/2020.  6. Electrolytes. 1. Normal on 11/9.  7. Acute kidney injury/CKD 2.  1. Patient had normal renal function at the beginning of 2020 with a persistence of GFR suggestive of CKD 2. During this hospitalization Cr/BUN/GFR has worsened to 1.1/40/48 from 1.0/36/54. Improved on 11/9 to 0.7/38/81. 2. Encourage fluids  8. Oral thrush. 1. Nystatin x5 days. Completed. 9. Insomnia.   1. Trazodone 50 mg nightly. 10. Bladder: No issues  11. Bowel: Senna, colace, MOM  1. GlycoLax. 2. Bisacodyl suppository. 12. Rehabilitation nursing will be involved for bowel, bladder, skin, and pain management. Nursing will also provide education and training to patient and family. 13. Prophylaxis:  DVT: Coumadin. GI: Protonix. 14.  and case management consultations for coordination of care and discharge planning. Chronic Problems:  1. Atrial fibrillation on chronic anticoagulation with Coumadin. 1. Coumadin to be dosed by the pharmacy. 2. Last INR was 2.05 on 11/10/2020.  2. Biventricular implanted cardioverter-defibrillator  3. Coronary artery disease. 1. ASA 81 mg.  4. COPD on chronic home oxygen at 2 L. Follows with Dr. Sangeeta Peña. 1. Dr. Sangeeta Peña following during this admission. 2. Leva Grater. 3. Pulmicort. 4. Xopenex. 5. Xolair injection every 28 days with next injection on 11/30. 6. Spiriva Respimat. 7. Patient normally on 2 L chronically at home. Current oxygen setting is 4 L on 11/10.  5. Hyperlipidemia. 1. No current medication. 6. History of prior left displaced femoral neck fracture status post hemiarthroplasty by Dr. Murlene Rinne on 10/24/2019.  7. History of prior closed head injury. [place] Cognitive Assessment [facility]) version 8.3 completed. Patient scored 21/30 on 11/4.  8. History of prior myocardial infarction, 1994.  9. Chronic systolic congestive heart failure with ejection fraction of 35 to 40%/Hypertension. 1. Digoxin. 2. Lasix. 3. Toprol-XL. 4. Entresto. 5. Evolucumab. 6. proBNP remains elevated on 11/9 at 9817 with only mild improvement over prior lab on 11/1 of 10,721.  1. Lasix has been increased to 40 mg twice daily. IV line placed on 11/10 so that p.o. Lasix could be changed to IV Lasix at same dosing. 2. Chest x-ray on 11/9 consistent with likely interstitial edema.   10. Mild peripheral vascular disease per vascular OMAYRA bilateral study dated 2/25/2020. 11. Severe left external carotid artery stenosis and left subclavian artery stenosis/occlusion per vascular carotid bilateral ultrasound on 4/1/2014. 12. Mild to moderate central spinal stenosis at L5-L6 with moderate right S1 lateral recess stenosis and findings of 6 lumbar type vertebral body per CT of the lumbar spine without contrast on 10/19/2012. 13. Hemorrhoids. 1. No current medication ordered. 14. Anxiety. 1. No current medication ordered. 15. Osteopenia per DEXA bone scan on 12/7/2015 with medium fracture risk. 1. Vitamin D 25 hydroxy level was normal on 1/5/2020. 16. Chronic constipation. 1. Patient normally on Linzess at home. 17. Chronic iron deficiency anemia. 1. Receives IV iron transfusions. Last iron on 11/3 was 173 with a ferritin of 305. 2. Hemoglobin 9.5 on 11/4 which is stable over 9.3 on 11/3. Hemoglobin slightly decreased to 8.6 on 11/9. 18. Current every day smoker. 1. Patient not interested in smoking cessation. Labs reviewed on:  1. 11/3.  2. 11/4.  3. 11/9. Infectious Disease:  1. N/A    Missed Therapy Time:  11/5. Physical therapy missed 30 minutes while the patient was off of the unit to obtain a chest x-ray. DME:    Discharge Plan:  Estimated Discharge Date: 11/13   Destination: home health  Services at Discharge: [address] [place] [address] Rd, Occupational Therapy, Nursing and an aide 2x week  Is patient appropriate for an outpatient driving evaluation? no  Equipment at Discharge: None    Greater than 50% of 30 minutes was spent with the patient reviewing her mildly improved shortness of breath after increasing Lasix dosing yesterday, reviewing her progress with therapies, and her current level of pain control.     Diana yN MD

## 2020-11-10 NOTE — PROGRESS NOTES
Clinical Pharmacy Note    Warfarin consult follow-up    Recent Labs     11/10/20  0613   INR 2.05*     Recent Labs     11/09/20  1014   HGB 8.6*   HCT 28.7*          Significant Drug-Drug Interactions:  New warfarin drug-drug interactions: none  Discontinued drug-drug interactions: none  Current warfarin drug-drug interactions: aspirin, trazodone (HM)      Date INR Warfarin Dose   10/25-10/27   No Coumadin   10/28/2020 1.32 2 mg    10/29/2020 1.49  2 mg   10/30/2020  2  1.5 mg    10/31/2020   2.78   0.5 mg   11/1/2020   2.37  1.5 mg    11/2/2020  2.55 1 mg     11/3/2020  3.54   No Coumadin   11/4/2020  3.09  1 mg   11/5/2020 2.41 1.5 mg   11/6/2020 2.82 1 mg    11/7/2020  2.61  1.5 mg    11/8/2020   2.22                     1.5 mg   11/9/2020  2.12                      2 mg   11/10/2020  2.05  2 mg                 Notes:                     Daily PT/INR until stable within therapeutic range.

## 2020-11-10 NOTE — PROGRESS NOTES
[address]  Occupational Therapy  Daily Note  Time:    Time In: 845  Time Out: 945  Timed Code Treatment Minutes: 60 Minutes  Minutes: 60          Date: 11/10/2020  Patient Name: Tessy Colmenares,   Gender: female      Room: [facility]59/059-A  MRN: [id number]  : 1945  (67 y.o.)  Referring Practitioner: Dr. Erendira Calle  Diagnosis: left lower extremity cellulitis with physical deconditioning  Additional Pertinent Hx: She was admitted 10/25/2020 for complaint of pain, swelling and erythema over the left lower limb. Patient felt that when she takes off her JEANE hose used for her chronic systolic congestive heart failure she sometimes catches her nails on her skin and that this may have contributed to the infection. Initial imaging was concerning for possible necrotizing fasciitis but with absence of intrafascial gas this diagnosis was ruled out. She did have issues with hypotension and required admission to the ICU. She was started on vancomycin and Zosyn with plan by infectious disease to transition to oral antibiotics at time of discharge. Patient's medical course was further complicated by COPD exacerbation with increased oxygen requirements above her baseline 2 L at home and acute on chronic systolic congestive heart failure, as well as a pneumonia. Restrictions/Precautions:  Restrictions/Precautions: General Precautions, Fall Risk      SUBJECTIVE: cooperative. Pt reporting that she feels like she is having a hard time breathing. O2 monitored closely throughout session-nurse aware. Nurse reports Pt has increased water weight at this time & they are working on getting some of it off      PAIN: 0/10: no c/o pain during session    COGNITION: Slow Processing and Decreased Problem Solving    ADL:   Grooming: with set-up.  s/u while seated in recliner  Bathing: Moderate Assistance.   A for BLE, feet, & CGA while standing to wash bottom  Upper Extremity Dressing: with set-up. Lower Extremity Dressing: Maximum Assistance. for under garment, pants, compression socks, & shoes  Toileting: Contact Guard Assistance. Toilet Transfer: Contact Guard Assistance. from [facility]. BALANCE:  Standing Balance: Contact Guard Assistance. BED MOBILITY:  Not Tested    TRANSFERS:  Sit to Stand:  Contact Guard Assistance. FUNCTIONAL MOBILITY:  Assistive Device: Rolling Walker  Assist Level:  Contact Guard Assistance. Distance: 3 steps to/from recliner        ASSESSMENT:     Activity Tolerance:  Patient tolerance of  treatment: fair. O2 5L with activity (back down to 4L at end of session), O2 between 82-98% during session with lengthy RBs after standing & mobility. Discharge Recommendations: Continue to assess pending progress    Equipment Recommendations: Equipment Needed: No  Other: Pt owns BSC and shower chair. Pt has grab bars in the shower. Plan: Times per week: 5xs week x 90 min, 1 x week x 30 min  Current Treatment Recommendations: Strengthening, Endurance Training, Balance Training, Functional Mobility Training, Patient/Caregiver Education & Training, Equipment Evaluation, Education, & procurement, Self-Care / ADL, Safety Education & Training, Home Management Training    Patient Education  Patient Education: safety with mobility, energy conservation & pacing tasks    Goals  Short term goals  Time Frame for Short term goals: 1 week  Short term goal 1: PT will complete ADL routine with setup and no  > min cues for energy conservation or attention to task  to increase ability to complete self care tasks  Short term goal 2: Pt will complete standing tolerance x 4 minutes with 1-2 UE release and S to increase indep and safety with all grooming.   Short term goal 3: Pt will complete functional ambulation to and from the BR as well as around obstacles with SBA and 0-2cues for RW safety to increase independence in home mobility to/ fropm the BR  Short term goal 4: Pt will complete UE light strengthening ex x 10 reps with HEP to increase strength/ endurance   needed for safe light IADL tasks  Long term goals  Time Frame for Long term goals : 2-3 weeks  Long term goal 1: Pt will complete ADL routine with S and no  cues for energy conservation or safe tech to increase independence in self care tasks  Long term goal 2: Pt will complete 2 step homemaking tasks with S and 0-1 cues for safe tech to increase ability to prepare a snack. Following session, patient left in safe position with all fall risk precautions in place.

## 2020-11-10 NOTE — PROGRESS NOTES
25 [facility]  INPATIENT PHYSICAL THERAPY  DAILY NOTE  3 [facility]    Time In: 1400  Time Out: 1430  Timed Code Treatment Minutes: 30 Minutes  Minutes: 30          Date: 11/10/2020  Patient Name: Tessy Colmenares,  Gender:  female        MRN: [id number]  : 1945  (67 y.o.)     Referring Practitioner: Dr. Gilford Cage  Diagnosis: Physical Debility  Additional Pertinent Hx: Per EMR, Adore Crews is a 67 y.o. female with an extensive medical history including, congestive heart failure, COPD, A. Fib., Pacemaker placement post MI, hypertension, anemia, and IBS who presents to the ED for an evaluation of severe left lower extremity pain, redness, and warmth that started yesterday evening. The patient states that yesterday morning she started feeling some discomfort to her lower extremity, but believed it was due to her compression stockings she wears for CHF. Yesterday evening she removed the stockings and saw that her left lower leg was extremely red, which has continued to spread, becoming more red, and even more painful. Denies any recent trauma, falls, accidents, or wounds to the lower extremity but states that she occasionally scratches her legs with her nails when taking on and off the compression stockings. The patient has attempted tylenol, percocet, biofreeze, and icyhot yesterday with no relief. 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 Endorses history of blood clots, but is on coumadin which was last checked approximately 1 week ago and was in theraputic range.  Further endorses feeling feverish and chills, increased shortness of breath, worsening left lower extremity pain, the inability to bear weight at this time, and denies numbness or tingling to the lower extremities, diabetes mellitus, previous lower leg infections, chest pain, abdominal pain, changes in her bowel or bladder function or habits. Patient still smokes 1/2 a pack of cigarettes daily and is on home oxygen. No alcohol or illicit drug use. \"     Prior Level of Function:  Lives With: Spouse  Type of Home: House  Home Layout: One level  Home Access: Stairs to enter with rails  Entrance Stairs - Number of Steps: 4  Entrance Stairs - Rails: Both(too wide to use at same time)  Home Equipment: Rolling walker, 4 wheeled walker, [employer]   Bathroom Shower/Tub: Walk-in shower, Shower chair with back  [employer]: Grab bars in shower, Hand-held shower, 3-in-1 commode  Bathroom Accessibility: Accessible    Receives Help From: Family  ADL Assistance: Independent  Homemaking Assistance: Needs assistance  Ambulation Assistance: Independent  Transfer Assistance: Independent  Active : Yes  Additional Comments: Pt reports that her spouse assists with IADL tasks, Pt completes own self care . Spouse does have to assist with compression stockings. Restrictions/Precautions:  Restrictions/Precautions: General Precautions, Fall Risk     SUBJECTIVE: Pt resting in bedside chair. Pleasant and agreeable to therapy. PAIN: 0/10:     OBJECTIVE:  Bed Mobility:  Sit to Supine: Supervision     Transfers:  Sit to Stand: Contact Guard Assistance  Stand to [employer] Assistance    Ambulation:  Contact Guard Assistance  Distance: 120 feet x1   Surface: Level Tile  Device:Rolling Walker  Gait Deviations: Forward Flexed Posture, Slow Cassie, Decreased Gait Speed and Pt required 2 standing rest breaks due to SOB    Exercise:  Patient was guided in 1 set(s) 12 reps of exercise to both lower extremities. Ankle pumps, Glut sets, Quad sets, Heelslides, Short arc quads, Hip abduction/adduction and Straight leg raises. Exercises were completed for increased independence with functional mobility.     Functional Outcome Measures: Not completed ASSESSMENT:  Assessment: Patient progressing toward established goals. and Pt tolerated well. Limited by SOB. Pt on 6L O2 while walking and 4L O2 at rest  Activity Tolerance:  Patient tolerance of  treatment: good. Equipment Recommendations:Equipment Needed: No(Pt has 4WW, RW, w/c and cane)  Discharge Recommendations:  Continue to assess pending progress, Patient would benefit from continued therapy after discharge    Plan: Times per week: 5x/wk 90 min, 1x/ wk 30 min  Current Treatment Recommendations: Strengthening, Functional Mobility Training, Transfer Training, Endurance Training, Balance Training, Stair training, Gait Training, Home Exercise Program, Safety Education & Training, Equipment Evaluation, Education, & procurement, Patient/Caregiver Education & Training    Patient Education  Patient Education: Plan of Care, [employer] Mobility, Transfers, Gait    Goals:  Patient goals : get my legs stronger to walk better. Short term goals  Time Frame for Short term goals: 1 wk  Short term goal 1: Pt to go supine <->sit, SBA, to get in/out of bed, no rail. Short term goal 2: Pt to get up/down from various seated surfaces, SBA to get up to walk. Short term goal 3: Pt to walk with RW >= 100 ft, SBA to progress to home and community mobility  Short term goal 4: negotiate 4 steps with HR and CGA to enter home safely  Short term goal 5: Pt to get in/out of car, SBA for transportation needs. Long term goals  Time Frame for Long term goals : 3 wks  Long term goal 1: Pt to go supine <->sit, Mod I, to get in/out of bed, no rail. Long term goal 2: Pt to get up/down from various seated surfaces, Mod I, to get up to walk. Long term goal 3: Pt to walk with RW >= 150 ft, Mod I, to progress to home and community mobility  Long term goal 4: negotiate 4 steps with 1 HR and Mod I to enter home safely  Long term goal 5: Pt to get in/out of car, Mod I, for transportation needs.   Long term goal 6: Pt to score >= 22, indicating improved balance    Following session, patient left in safe position with all fall risk precautions in place.

## 2020-11-10 NOTE — PLAN OF CARE
Problem: Impaired respiratory status  Goal: Clear lung sounds  Description: Clear lung sounds  Outcome: Ongoing  Note: Pt continues on aerosols and MDI for maintenance of COPD, to clear lung sounds/improve aeration and pt does aerosols at home.

## 2020-11-10 NOTE — CONSULTS
[address] [place] [address]                                  CONSULTATION    PATIENT NAME: Desi Vaz                    :        1945  MED REC NO:   [id number]                           ROOM:       8022  ACCOUNT NO:   [de-identified]                           ADMIT DATE: 2020  PROVIDER:     BANDAR Bliss DATE:  2020    REASON FOR CONSULTATION:  Evaluation of anticoagulation due to recurrent  GI bleed. REQUESTING PROVIDER: Diony Macario MD    HISTORY OF PRESENT ILLNESS:  This is a 67-year-old patient who usually  sees Dr. Lux Ortiz who has a history of coronary artery disease, previous  revascularization, as well as significant cardiomyopathy and  biventricular ICD with history of COPD, hypertension, hyperlipidemia,  and atrial fibrillation, maintained on anticoagulation. The patient is  in for cellulitis, recovering on rehab floor. We were consulted by Dr. Lillie Pfeiffer to evaluate her Coumadin status because of recurrent  hemorrhoid bleed. The patient has had a drop in the hemoglobin. The  patient has also been complaining of worsening shortness of breath in  the last several weeks, some wheezing, and she is on home oxygen. Denies any chest pain. REVIEW OF SYSTEMS:  No obvious fever or chills. No hematuria or  dysuria. No abdominal pain, nausea, vomiting or diarrhea. The patient  has had recurrent hemorrhoid bleed. No obvious active psych problems or  suicidal ideation. No skin rashes. The patient has had some  intermittent dizziness and lightheadedness with no syncope. No recent  trauma. Chronic shortness of breath and intermittent chest pain. No  recent surgery. Bleeding as above. Diffuse arthritis. PAST MEDICAL HISTORY:  1. Coronary artery disease. 2.  Cardiomyopathy. 3.  COPD. 4.  Hypertension. 5.  Hyperlipidemia. 6.  AFib. 7.  Arthritis.     ALLERGIES:  CAPTOPRIL, CLARITHROMYCIN, BENADRYL, VITAMIN K, PRAVASTATIN,  ZETIA, MORPHINE SULFATE, LIPITOR, DOXYCYCLINE, SULFA, CEPHALEXIN. MEDICATIONS:  Digoxin 0.125 daily, Lasix 40 a day, Toprol, Entresto  24/26 twice a day, Coumadin. SOCIAL HISTORY:  Previous history of smoking. No alcohol or drug abuse. FAMILY HISTORY:  Noncontributory. PHYSICAL EXAMINATION:  VITAL SIGNS:  Showed a blood pressure of 130/80, heart rate of 70. GENERAL APPEARANCE:  Pleasant lady, seems to be short of breath. EYES AND EARS:  No discharge. NECK:  No JVD. No bruits or masses. LUNGS:  Decreased air entry with expiratory wheezes and rhonchi. HEART:  Normal S1, S2. Systolic murmur grade 2/6. ABDOMEN:  Soft, nontender. Positive bowel sounds. No organomegaly. EXTREMITIES:  Moderate edema. NEUROLOGIC EXAMINATION:  Grossly intact. Awake, alert. No focal  deficits. PSYCH:  No evidence of active psychosis. SKIN:  No rashes. LABORATORY DATA:  Showed sodium 146, potassium 4.1, BUN 38, creatinine  0.7, white count 15.1, hemoglobin 8.6, hematocrit 28.7, platelets 216. IMPRESSION:  This is a patient who has quite complicated complex history  who follows with Dr. Emily Rubin. She does have significant bleeding risk  and has had hemorrhoid bleed. PLAN:  I discussed options of treatment with her. She is very adamant  about not stopping Coumadin under any circumstance because of concern  about stroke. She would be a very good candidate to be evaluated for  possible left atrial appendage occlusion device, Watchman, as an  outpatient. We will carefully keep an eye on her hemoglobin. On the  other hand, she seems to be either in CHF or COPD exacerbation based on  her clinical scenario. We will await the results of the chest x-ray. However, she might need to be temporarily transferred to the acute side  for management of her pulmonary condition and follow accordingly.     Thank you for allowing me to participate in the care of this patient.         Oscar Harvey M.D.    D: 11/09/2020 17:08:28       T: 11/09/2020 17:12:50     [email or web address]  Job#: [id number]     Doc#: [id number]    CC:

## 2020-11-10 NOTE — PROGRESS NOTES
FUNCTIONAL MOBILITY:  Assistive Device: Rolling Walker  Assist Level:  Contact Guard Assistance. Distance: To and from bathroom     ADDITIONAL ACTIVITIES:  Patient identified a personal goal to increase UB strength and improve overall endurance so they can complete their toilet & shower transfers; skilled edu on UE strengthening and patient completed BUE strengthening exercises x10 reps x1 set this date with a minimal resistive band  in all joints and all planes. Patient tolerated in sitting , requiring lengthy  rest breaks. Pt required min verbal   cues for technique. 02 monitored throughout session dropped to 87% with time given returning to 91%. ASSESSMENT:     Activity Tolerance:  Patient tolerance of  treatment: fair. Discharge Recommendations: Continue to assess pending progress   Equipment Recommendations: Equipment Needed: No  Other: Pt owns BSC and shower chair. Pt has grab bars in the shower. Plan: Times per week: 5xs week x 90 min, 1 x week x 30 min  Current Treatment Recommendations: Strengthening, Endurance Training, Balance Training, Functional Mobility Training, Patient/Caregiver Education & Training, Equipment Evaluation, Education, & procurement, Self-Care / ADL, Safety Education & Training, Home Management Training    Patient Education  Patient Education: Home Exercise Program    Goals  Short term goals  Time Frame for Short term goals: 1 week  Short term goal 1: PT will complete ADL routine with setup and no  > min cues for energy conservation or attention to task  to increase ability to complete self care tasks  Short term goal 2: Pt will complete standing tolerance x 4 minutes with 1-2 UE release and S to increase indep and safety with all grooming.   Short term goal 3: Pt will complete functional ambulation to and from the BR as well as around obstacles with SBA and 0-2cues for RW safety to increase independence in home mobility to/ fropm the BR  Short term goal 4: Pt will complete UE light strengthening ex x 10 reps with HEP to increase strength/ endurance   needed for safe light IADL tasks  Long term goals  Time Frame for Long term goals : 2-3 weeks  Long term goal 1: Pt will complete ADL routine with S and no  cues for energy conservation or safe tech to increase independence in self care tasks  Long term goal 2: Pt will complete 2 step homemaking tasks with S and 0-1 cues for safe tech to increase ability to prepare a snack. Following session, patient left in safe position with all fall risk precautions in place.

## 2020-11-10 NOTE — PLAN OF CARE
has used the agency before. SW reviewed with spouse, Yordan Armas, recommendation for participation in family education prior to patient's discharge. Spouse, Yordan Armas, agreeable to education and scheduled for Friday, 11/13/2020, beginning at 9:00am with discharge to follow. SW reminded patient's spouse, Yordan Armas, to bring patient's home oxygen tank for use at discharge. Care plan reviewed with patient and spouse, Yordan Armas. Patient and spouse, Yordan Armas, verbalized understanding of the plan of care and contributed to goal setting. SW to follow and maintain involvement in discharge planning. Problem: Impaired respiratory status  Goal: Clear lung sounds  Description: Clear lung sounds  11/10/2020 0818 by Dong Abdalla RCP  Outcome: Ongoing  Note: Pt continues on aerosols and MDI for maintenance of COPD, to clear lung sounds/improve aeration and pt does aerosols at home.

## 2020-11-10 NOTE — PROGRESS NOTES
[address] [address]  Recreational Therapy  Daily Note  [address]    Time Spent with Patient: 30 minutes    Date:  11/10/2020       Patient Name: Mahesh Morrison      MRN: [id number]      YOB: 1945 (67 y.o.)       Gender: female  Diagnosis: left lower extremity cellulitis with physical deconditioning  Referring Practitioner: Dr. Claudia Khalil    RESTRICTIONS/PRECAUTIONS:  Restrictions/Precautions: General Precautions, Fall Risk  Vision: Impaired  Hearing: Exceptions to [facility]  Hearing Exceptions: Hard of hearing/hearing concerns, Bilateral hearing aid    PAIN: 0-no c/o pain     SUBJECTIVE:  I am having a hard time breathing     OBJECTIVE:  Pt is waiting for staff to come in and try to put an IV back in her arm to help get rid of the fluid in her -states her  usually holds her hand when she gets this done and since he is not here RT provided supportive contact-pt states she screams when they try to put an IV in but this staff person Earline Maharaj) put it in without any pain-pt so excited and appreciative -comfort measures given -pt agreed if she feels up to it to come to our craft group tomorrow and help make flags for all of our veterans on rehab          Patient Education  New Education Provided: Importance of Leisure,     Electronically signed by: THIERRY Jaeger  Date: 11/10/2020

## 2020-11-11 NOTE — PROGRESS NOTES
[address] [facility] NOTE    Conference Date: 2020  Admit Date:  11/3/2020  2:20 PM  Patient Name: Tessy Colmenares    MRN: [id number]    : 1945  (67 y.o.)  Rehabilitation Admitting Diagnosis:  Physical deconditioning [R53.81]  Physical debility [R53.81]  Referring Practitioner: Dr. Sukhwinder Jon  Current issues/needs regarding patient and family discharge status: Patient has progressed with PT/OT towards goal of returning home. Patient scheduled for discharge home on Friday, 2020. Patient's spouse, Emeterio Peña, is scheduled to complete family education on Friday, 2020, from 9:00am to 10:00am, with discharge to follow. Home health care services for RN/PT/OT/HHA arranged through [address] [place] [address].  to follow and maintain involvement in discharge planning. PHYSICAL THERAPY  Sonali Zimmer is making progress towards goals, meeting 3/5 STGs and 1 LTG. Current score of 20 on Tinetti balance test places her at moderate risk for falls. At this time she is Mod I for bed mobility, requires SBA for all transfers including in and out of a car, and is ambulating up to 50ft with use of RW at SBA. She has negotiated 3 steps with a single rail (home simulation) and requires Laurence to complete and was unable to perform the 4th step (which she has at home) secondary to fatigue. She requires skilled PT services to continue to improve mobility and level of independence as well as ability to negotiate steps required for home entry. Equipment Needed: No(Pt has 4WW, RW, w/c and cane)    SPEECH THERAPY  Not applicable    OCCUPATIONAL THERAPY  Pt is progressing towards her goals at slow pace and requires min vcs for walker safety. LImited by low activity tolerance. Pt continues to demonstrate deficits with ADLs, functional mobility, and transfers requiring assist to complete these tasks.  Pt will continue to benefit from OT services to increase

## 2020-11-11 NOTE — PROGRESS NOTES
Patient: Jesusita Zhou  Unit/Bed: 8Z-64/177-X  YOB: 1945  MRN: [id number] Acct: [de-identified]   Admitting Diagnosis: Physical deconditioning [R53.81]  Physical debility [R53.81]  Admit Date:  11/3/2020  Hospital Day: 8    Assessment:     Principal Problem:    Cellulitis of left lower extremity  Active Problems:    Permanent atrial fibrillation (HCC)    Chronic obstructive pulmonary disease (HCC)    Hypertension    Persistent atrial fibrillation (HCC)    Biventricular implantable cardioverter-defibrillator in situ    Anticoagulated on Coumadin    Arteriosclerosis of coronary artery    Class 1 obesity due to excess calories with serious comorbidity and body mass index (BMI) of 30.0 to 30.9 in adult    Chronic systolic CHF (congestive heart failure) (HCC)    Physical deconditioning    Physical debility    Interstitial pneumonia (HCC)    Thrush    Chronic iron deficiency anemia    Anxiety  Resolved Problems:    * No resolved hospital problems. *      Plan: We reviewed the continued raising of dark maroon blood with thick clear mucus. She raises sputum daily at home and we discussed Chronic Bronchitis. Perhaps she has ruptured a vessel in the lining of the bronchi. We discussed the improved BNP and that the lasix should be continued. Check follow up labs on the day of discharge the end of this week. Subjective:     Patient has complaints of the cough as above. .   Medication side effects: hemoptysis    Scheduled Meds:   furosemide  40 mg Intravenous BID    nicotine  1 patch Transdermal Q24H    warfarin (COUMADIN) daily dosing (placeholder)   Other RX Placeholder    hydrocortisone  25 mg Rectal BID    Arformoterol Tartrate  15 mcg Nebulization BID    aspirin  81 mg Oral Daily    budesonide  500 mcg Nebulization BID    digoxin  125 mcg Oral Every Other Day    docusate sodium  100 mg Oral Daily    [START ON 11/17/2020] Evolocumab  140 mg Subcutaneous Q14 Days    gabapentin  100 mg 4.1    Chloride Latest Ref Range: 98 - 111 meq/L 105   100    CO2 Latest Ref Range: 23 - 33 meq/L 31   29    BUN Latest Ref Range: 7 - 22 mg/dL 38 (H)   29 (H)    Creatinine Latest Ref Range: 0.4 - 1.2 mg/dL 0.7   0.9    Anion Gap Latest Ref Range: 8.0 - 16.0 meq/L 10.0   10.0    Est, Glom Filt Rate Latest Units: ml/min/1.73m2 81 (A)   61 (A)    Glucose Latest Ref Range: 70 - 108 mg/dL 127 (H)   129 (H)    Calcium Latest Ref Range: 8.5 - 10.5 mg/dL 8.8   8.3 (L)    Pro-BNP Latest Ref Range: 0.0 - 1800.0 pg/mL 9817.0 (H)   6830.0 (H)    WBC Latest Ref Range: 4.8 - 10.8 thou/mm3 15.1 (H)       RBC Latest Ref Range: 4.20 - 5.40 mill/mm3 2.82 (L)       Hemoglobin Quant Latest Ref Range: 12.0 - 16.0 gm/dl 8.6 (L)       Hematocrit Latest Ref Range: 37.0 - 47.0 % 28.7 (L)       MCV Latest Ref Range: 81.0 - 99.0 fL 101.8 (H)       MCH Latest Ref Range: 26.0 - 33.0 pg 30.5       MCHC Latest Ref Range: 32.2 - 35.5 gm/dl 30.0 (L)       MPV Latest Ref Range: 9.4 - 12.4 fL 10.2       RDW-CV Latest Ref Range: 11.5 - 14.5 % 14.6 (H)       RDW-SD Latest Ref Range: 35.0 - 45.0 fL 53.8 (H)       Platelet Count Latest Ref Range: 130 - 400 thou/mm3 183       INR Latest Ref Range: 0.85 - 1.13  2.12 (H)  2.05 (H)  2.54 (H)       Electronically signed by Nicolle Frank MD on 11/11/2020 at 1:00 PM

## 2020-11-11 NOTE — PROGRESS NOTES
[address]  Occupational Therapy  PROGRESS Note  Time:   Time In: 830  Time Out: 930  Timed Code Treatment Minutes: 60 Minutes  Minutes: 60          Date: 2020  Patient Name: Quentin Parikh,   Gender: female      Room: [facility]59/059-A  MRN: [id number]  : 1945  (67 y.o.)  Referring Practitioner: Dr. Chinmay Gabriel  Diagnosis: left lower extremity cellulitis with physical deconditioning  Additional Pertinent Hx: She was admitted 10/25/2020 for complaint of pain, swelling and erythema over the left lower limb. Patient felt that when she takes off her JEANE hose used for her chronic systolic congestive heart failure she sometimes catches her nails on her skin and that this may have contributed to the infection. Initial imaging was concerning for possible necrotizing fasciitis but with absence of intrafascial gas this diagnosis was ruled out. She did have issues with hypotension and required admission to the ICU. She was started on vancomycin and Zosyn with plan by infectious disease to transition to oral antibiotics at time of discharge. Patient's medical course was further complicated by COPD exacerbation with increased oxygen requirements above her baseline 2 L at home and acute on chronic systolic congestive heart failure, as well as a pneumonia. Restrictions/Precautions:  Restrictions/Precautions: General Precautions, Fall Risk     SUBJECTIVE: Pt seated in bedside chair upon arrival requesting up to BR. PAIN: Did not rate: LLE at end of session- ice provided for comfort    COGNITION: Slow Processing and Decreased Insight    ADL:   Toileting: Stand By Assistance. For hygiene and clothing managment after BM/void. Toilet Transfer: Stand By Assistance. RTS. Comment: x3 episodes during session    BALANCE:  Sitting Balance:  Modified Independent. Within w/c  Standing Balance: Stand By Assistance, [employer].     x3 minutes x3 trials within IADL task. BED MOBILITY:  Not Tested    TRANSFERS:  Sit to Stand:  Stand By Assistance, [employer]. Stand to Sit: Stand By Assistance. FUNCTIONAL MOBILITY:  Assistive Device: Rolling Walker  Assist Level:  Stand By Assistance and [employer]. Distance:   Completed functional mobility to/from BR x3 trials and within therapy kitchen at slow pace, no LOB noted. Pt requires min cues for walker safety to keep within ANABELL during IADL- lengthy seated rest break after trial of mobility, mod fatigue noted and SOB noted- HR and O2 WNL throughout session. ADDITIONAL ACTIVITIES:  Pt participated in IADL task to ambulate within therapy kitchen with use of RW to reach into various planes at high/low levels to retrieve items making toast and hot beverage. Pt required SBA for balance and min cues for safe technique or walker safety while transporting items along counter and reaching outside of ANABELL. Pt educated on appropriate rest breaks within activity and pacing self. Completed to increase kitchen safety and facilitate functional reaching required for simple meal prep tasks. ASSESSMENT:   Pt is progressing towards her goals at slow pace and requires min vcs for walker safety. LImited by low activity tolerance. Pt continues to demonstrate deficits with ADLs, functional mobility, and transfers requiring assist to complete these tasks. Pt will continue to benefit from OT services to increase independence with these tasks to facilitate return to PLOF    Activity Tolerance:  Patient tolerance of  treatment: fair. Pt limited by fatigue. Discharge Recommendations: Continue to assess pending progress   Equipment Recommendations: Equipment Needed: No  Other: Pt owns BSC and shower chair. Pt has grab bars in the shower.   Plan: Times per week: 5xs week x 90 min, 1 x week x 30 min  Current Treatment Recommendations: Strengthening, Endurance Training, Balance Training, Functional

## 2020-11-11 NOTE — PROGRESS NOTES
Clinical Pharmacy Note    Warfarin consult follow-up    Recent Labs     11/11/20  0505   INR 2.54*     Recent Labs     11/09/20  1014   HGB 8.6*   HCT 28.7*          Significant Drug-Drug Interactions:  New warfarin drug-drug interactions: none  Discontinued drug-drug interactions: none  Current warfarin drug-drug interactions: aspirin, trazodone (HM)      Date INR Warfarin Dose   10/25-10/27   No Coumadin   10/28/2020 1.32 2 mg    10/29/2020 1.49  2 mg   10/30/2020  2  1.5 mg    10/31/2020   2.78   0.5 mg   11/1/2020   2.37  1.5 mg    11/2/2020  2.55 1 mg     11/3/2020  3.54   No Coumadin   11/4/2020  3.09  1 mg   11/5/2020 2.41 1.5 mg   11/6/2020 2.82 1 mg    11/7/2020  2.61  1.5 mg    11/8/2020   2.22                     1.5 mg   11/9/2020  2.12                      2 mg   11/10/2020 2.05  2 mg   11/11/2020  2.54  1.5 mg               Notes:                     Daily PT/INR until stable within therapeutic range.      Aliya Rossi, PharmD   11/11/2020, 4:50 PM

## 2020-11-11 NOTE — PROGRESS NOTES
Patient: Sammy Jorgensen  Unit/Bed: 6R-66/463-T  YOB: 1945  MRN: [id number] Acct: [de-identified]   Admitting Diagnosis: Physical deconditioning [R53.81]  Physical debility [R53.81]  Admit Date:  11/3/2020  Hospital Day: 7    Assessment:     Principal Problem:    Cellulitis of left lower extremity  Active Problems:    Permanent atrial fibrillation (HCC)    Chronic obstructive pulmonary disease (HCC)    Hypertension    Persistent atrial fibrillation (HCC)    Biventricular implantable cardioverter-defibrillator in situ    Anticoagulated on Coumadin    Arteriosclerosis of coronary artery    Class 1 obesity due to excess calories with serious comorbidity and body mass index (BMI) of 30.0 to 30.9 in adult    Chronic systolic CHF (congestive heart failure) (HCC)    Physical deconditioning    Physical debility    Interstitial pneumonia (HCC)    Thrush    Chronic iron deficiency anemia    Anxiety  Resolved Problems:    * No resolved hospital problems. *      Plan:     I elevated the foot of the bed to help promote a reduction in soft tissue edema  Checking labs tomorrow        Subjective:     Patient states that her rod area is more comfortable, the breathing has improved too with several days of increased lasix.   Medication side effects: none    Scheduled Meds:   furosemide  40 mg Intravenous BID    nicotine  1 patch Transdermal Q24H    warfarin (COUMADIN) daily dosing (placeholder)   Other RX Placeholder    hydrocortisone  25 mg Rectal BID    Arformoterol Tartrate  15 mcg Nebulization BID    aspirin  81 mg Oral Daily    budesonide  500 mcg Nebulization BID    digoxin  125 mcg Oral Every Other Day    docusate sodium  100 mg Oral Daily    [START ON 11/17/2020] Evolocumab  140 mg Subcutaneous Q14 Days    gabapentin  100 mg Oral TID    metoprolol succinate  25 mg Oral Daily    [START ON 11/30/2020] omalizumab  225 mg Subcutaneous Q28 Days    pantoprazole  40 mg Oral BID AC    sacubitril-valsartan  1 tablet Oral BID    senna  1 tablet Oral Nightly    tiotropium  2 puff Inhalation Daily    traZODone  50 mg Oral Nightly     Continuous Infusions:  PRN Meds:polyethylene glycol, bisacodyl, acetaminophen, cyclobenzaprine, HYDROcodone 5 mg - acetaminophen, HYDROcodone 5 mg - acetaminophen, levalbuterol, magnesium hydroxide    Review of Systems  Pertinent items are noted in HPI. Objective:     No data found. I/O last 3 completed shifts: In: 300 [P.O.:300]  Out: -   No intake/output data recorded.     BP (!) 130/58   Pulse 73   Temp 98.4 °F (36.9 °C) (Oral)   Resp 16   Ht 5' 2\" (1.575 m)   Wt 166 lb 14.4 oz (75.7 kg)   SpO2 97%   Breastfeeding No   BMI 30.53 kg/m²     BP (!) 130/58   Pulse 73   Temp 98.4 °F (36.9 °C) (Oral)   Resp 16   Ht 5' 2\" (1.575 m)   Wt 166 lb 14.4 oz (75.7 kg)   SpO2 97%   Breastfeeding No   BMI 30.53 kg/m²   General appearance: alert, appears stated age and cooperative  Head: Normocephalic, without obvious abnormality, atraumatic  Lungs: clear to auscultation bilaterally  Heart: regular rate and rhythm, S1, S2 normal, no murmur, click, rub or gallop  Abdomen: soft, non-tender; bowel sounds normal; no masses,  no organomegaly  Extremities: edema 2+ to the feet and ankles   Skin: Skin color, texture, turgor normal. No rashes or lesions  Neurologic: weak      Electronically signed by Cecilia Patterson MD on 11/10/2020 at 10:26 PM

## 2020-11-11 NOTE — PROGRESS NOTES
(5L)  Assist Level:  Stand By Assistance. Distance: Completed functional mobility short distance within pt room at slow pace, no LOB noted. Pt requires no cues for walker safety- seated rest break after trial of mobility, mod fatigue noted. ADDITIONAL ACTIVITIES:  Patient identified a personal goal to increase UB strength and improve overall endurance so they can complete their toilet & shower transfers; skilled edu on UE strengthening. Completed BUE exercises x10 reps x1 sets using min resistance band in all joints/planes to increase strength and endurance required for ADLs. Pt required rest break between each exercise and min v/c for proper technique. ASSESSMENT:     Activity Tolerance:  Patient tolerance of  treatment: good. Discharge Recommendations: Continue to assess pending progress   Equipment Recommendations: Equipment Needed: No  Other: Pt owns BSC and shower chair. Pt has grab bars in the shower. Plan: Times per week: 5xs week x 90 min, 1 x week x 30 min  Current Treatment Recommendations: Strengthening, Endurance Training, Balance Training, Functional Mobility Training, Patient/Caregiver Education & Training, Equipment Evaluation, Education, & procurement, Self-Care / ADL, Safety Education & Training, Home Management Training    Patient Education  Patient Education: Home Exercise Program and Reviewed Prior Education    Goals  Short term goals  Time Frame for Short term goals: 1 week  Short term goal 1: PT will complete ADL routine with setup and no  > min cues for energy conservation or attention to task  to increase ability to complete self care tasks  Short term goal 2: Pt will complete standing tolerance x 4 minutes with 1-2 UE release and S to increase indep and safety with all grooming.   Short term goal 3: Pt will complete functional ambulation to and from the BR as well as around obstacles with SBA and 0-2cues for RW safety to increase independence in home mobility to/ fropm the BR  Short term goal 4: Pt will complete UE light strengthening ex x 10 reps with HEP to increase strength/ endurance   needed for safe light IADL tasks  Long term goals  Time Frame for Long term goals : 2-3 weeks  Long term goal 1: Pt will complete ADL routine with S and no  cues for energy conservation or safe tech to increase independence in self care tasks  Long term goal 2: Pt will complete 2 step homemaking tasks with S and 0-1 cues for safe tech to increase ability to prepare a snack. Following session, patient left in safe position with all fall risk precautions in place.

## 2020-11-11 NOTE — PROGRESS NOTES
[address] [address]  INPATIENT PHYSICAL THERAPY  PROGRESS NOTE  [facility] - 7E-59/059-A    Time In: 1100  Time Out: 1200  Timed Code Treatment Minutes: 60 Minutes  Minutes: 60          Date: 2020  Patient Name: Tarsha Silva,  Gender:  female        MRN: [id number]  : 1945  (67 y.o.)     Referring Practitioner: Dr. Mary Barragan  Diagnosis: Physical Debility  Additional Pertinent Hx: Per EMR, Brian Friedman is a 67 y.o. female with an extensive medical history including, congestive heart failure, COPD, A. Fib., Pacemaker placement post MI, hypertension, anemia, and IBS who presents to the ED for an evaluation of severe left lower extremity pain, redness, and warmth that started yesterday evening. The patient states that yesterday morning she started feeling some discomfort to her lower extremity, but believed it was due to her compression stockings she wears for CHF. Yesterday evening she removed the stockings and saw that her left lower leg was extremely red, which has continued to spread, becoming more red, and even more painful. Denies any recent trauma, falls, accidents, or wounds to the lower extremity but states that she occasionally scratches her legs with her nails when taking on and off the compression stockings. The patient has attempted tylenol, percocet, biofreeze, and icyhot yesterday with no relief. 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 Endorses history of blood clots, but is on coumadin which was last checked approximately 1 week ago and was in theraputic range.  Further endorses feeling feverish and chills, increased shortness of breath, worsening left lower extremity pain, the inability to bear weight at this time, and denies numbness or tingling to the lower extremities, diabetes mellitus, previous lower leg infections, chest pain, abdominal pain, changes in her bowel or bladder function or habits. Patient still smokes 1/2 a pack of cigarettes daily and is on home oxygen. No alcohol or illicit drug use. \"     Prior Level of Function:  Lives With: Spouse  Type of Home: House  Home Layout: One level  Home Access: Stairs to enter with rails  Entrance Stairs - Number of Steps: 4  Entrance Stairs - Rails: Both(too wide to use at same time)  Home Equipment: Rolling walker, 4 wheeled walker, [employer]   Bathroom Shower/Tub: Walk-in shower, Shower chair with back  [employer]: Grab bars in shower, Hand-held shower, 3-in-1 commode  Bathroom Accessibility: Accessible    Receives Help From: Family  ADL Assistance: Independent  Homemaking Assistance: Needs assistance  Ambulation Assistance: Independent  Transfer Assistance: Independent  Active : Yes  Additional Comments: Pt reports that her spouse assists with IADL tasks, Pt completes own self care . Spouse does have to assist with compression stockings. Restrictions/Precautions:  Restrictions/Precautions: General Precautions, Fall Risk     SUBJECTIVE: Pt. Seated on BS chair and pleasantly agrees to therapy session. Pt. With increased fatigue this session. PAIN: Not rated: L LE    OBJECTIVE:  Bed Mobility:   Rolling to Right: Modified Independent   Supine to Sit: Modified Independent  Sit to Supine: Modified Independent     Transfers:  Sit to Stand: Stand By Assistance  Stand to Sit:Stand By Assistance  Stand Pivot:Stand By Assistance  Car:Stand By Assistance    Ambulation:  Stand By Assistance  Distance: 50' x 1, 15' x 1  Surface: Level Tile  Device:Rolling Walker  Gait Deviations:  Slow Cassie, Decreased Step Length Bilaterally, Decreased Gait Speed, Decreased Heel Strike Bilaterally and Easily fatigues. Stairs:  Stairs:  6\" steps. X 3 using One Handrail and Minimal Assistance. Pt. Unable to complete 4th step due to fatigue. Pt.  With NHUNG UE support on L rail. Exercise:  None    Functional Outcome Measures: Completed  Balance Score: 13  Gait Score: 7  Tinetti Total Score: 20    Risk Indicators:  Less than/equal to 18 = high risk  19-23 Moderate risk  Greater than/equal to 24 = low risk      ASSESSMENT:  Assessment: Patient progressing toward established goals. Rachael Campoverde is making progress towards goals, meeting 3/5 STGs and 1 LTG. Current score of 20 on Tinetti balance test places her at moderate risk for falls. At this time she is Mod I for bed mobility, requires SBA for all transfers including in and out of a car, and is ambulating up to 50ft with use of RW at SBA. She has negotiated 3 steps with a single rail (home simulation) and requires Laurence to complete and was unable to perform the 4th step (which she has at home) secondary to fatigue. She requires skilled PT services to continue to improve mobility and level of independence as well as ability to negotiate steps required for home entry. Activity Tolerance:  Patient tolerance of  treatment: good. Equipment Recommendations:Equipment Needed: No(Pt has 4WW, RW, w/c and cane)  Discharge Recommendations:  Continue to assess pending progress, Patient would benefit from continued therapy after discharge    Plan: Times per week: 5x/wk 90 min, 1x/ wk 30 min  Current Treatment Recommendations: Strengthening, Functional Mobility Training, Transfer Training, Endurance Training, Balance Training, Stair training, Gait Training, Home Exercise Program, Safety Education & Training, Equipment Evaluation, Education, & procurement, Patient/Caregiver Education & Training    Patient Education  Patient Education: Plan of Care, Transfers, Reviewed Prior Education, Gait    Goals:  Patient goals : get my legs stronger to walk better. Short term goals  Time Frame for Short term goals: 1 wk  Short term goal 1: Pt to go supine <->sit, SBA, to get in/out of bed, no rail.   GOAL MET, SEE LTG  Short term goal 2: Pt to get up/down from various seated surfaces, SBA to get up to walk. GOAL MET, SEE LTG  Short term goal 3: Pt to walk with RW >= 100 ft, SBA to progress to home and community mobility  NOT MET  Short term goal 4: negotiate 4 steps with HR and CGA to enter home safely  NOT MET  Short term goal 5: Pt to get in/out of car, SBA for transportation needs. GOAL MET, SEE LTG  Long term goals  Time Frame for Long term goals : 3 wks  Long term goal 1: Pt to go supine <->sit, Mod I, to get in/out of bed, no rail. GOAL MET  Long term goal 2: Pt to get up/down from various seated surfaces, Mod I, to get up to walk. NOT MET  Long term goal 3: Pt to walk with RW >= 150 ft, Mod I, to progress to home and community mobility  NOT MET  Long term goal 4: negotiate 4 steps with 1 HR and Mod I to enter home safely   NOT MET  Long term goal 5: Pt to get in/out of car, Mod I, for transportation needs. NOT MET  Long term goal 6: Pt to score >= 22, indicating improved balance  NOT MET    Following session, patient left in safe position with all fall risk precautions in place. Treatment session and note completed by Martha Nichols PTA. Assessment and goal revision of Progress Note completed by Niyah Shukla, PT. Revised Short-Term Goals:    Short term goals  Time Frame for Short term goals: 1 wk  Short term goal 1: Pt to go supine <->sit, SBA, to get in/out of bed, no rail. - GOAL MET, SEE LTG  Short term goal 2: Pt to get up/down from various seated surfaces, S to get up to walk. Short term goal 3: Pt to walk with RW >= 100 ft, SBA to progress to home and community mobility  Short term goal 4: negotiate 4 steps with HR and CGA to enter home safely  Short term goal 5: Pt to get in/out of car, S for transportation needs. Revised Long-Term Goals  Long term goals  Time Frame for Long term goals : 3 wks  Long term goal 1: Pt to go supine <->sit, Mod I, to get in/out of bed, no rail.   Long term goal 2: Pt to get up/down

## 2020-11-11 NOTE — PROGRESS NOTES
[facility]  INPATIENT PHYSICAL THERAPY  DAILY NOTE  [facility] - [id number]    Time In: 0700  Time Out: 0730  Timed Code Treatment Minutes: 30 Minutes  Minutes: 30          Date: 2020  Patient Name: Carmela Patterson,  Gender:  female        MRN: [id number]  : 1945  (67 y.o.)     Referring Practitioner: Dr. Yuniel Sahu  Diagnosis: Physical Debility  Additional Pertinent Hx: Per EMR, Bernadette Kowalski is a 67 y.o. female with an extensive medical history including, congestive heart failure, COPD, A. Fib., Pacemaker placement post MI, hypertension, anemia, and IBS who presents to the ED for an evaluation of severe left lower extremity pain, redness, and warmth that started yesterday evening. The patient states that yesterday morning she started feeling some discomfort to her lower extremity, but believed it was due to her compression stockings she wears for CHF. Yesterday evening she removed the stockings and saw that her left lower leg was extremely red, which has continued to spread, becoming more red, and even more painful. Denies any recent trauma, falls, accidents, or wounds to the lower extremity but states that she occasionally scratches her legs with her nails when taking on and off the compression stockings. The patient has attempted tylenol, percocet, biofreeze, and icyhot yesterday with no relief. 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 Endorses history of blood clots, but is on coumadin which was last checked approximately 1 week ago and was in theraputic range.  Further endorses feeling feverish and chills, increased shortness of breath, worsening left lower extremity pain, the inability to bear weight at this time, and denies numbness or tingling to the lower extremities, diabetes mellitus, previous lower leg infections, chest pain, abdominal pain, changes in her bowel or bladder function or habits. Patient still smokes 1/2 a pack of cigarettes daily and is on home oxygen. No alcohol or illicit drug use. \"     Prior Level of Function:  Lives With: Spouse  Type of Home: House  Home Layout: One level  Home Access: Stairs to enter with rails  Entrance Stairs - Number of Steps: 4  Entrance Stairs - Rails: Both(too wide to use at same time)  Home Equipment: Rolling walker, 4 wheeled walker, [place]   Bathroom Shower/Tub: Walk-in shower, Shower chair with back  [employer]: Grab bars in shower, Hand-held shower, 3-in-1 commode  Bathroom Accessibility: Accessible    Receives Help From: Family  ADL Assistance: Independent  Homemaking Assistance: Needs assistance  Ambulation Assistance: Independent  Transfer Assistance: Independent  Active : Yes  Additional Comments: Pt reports that her spouse assists with IADL tasks, Pt completes own self care . Spouse does have to assist with compression stockings. Restrictions/Precautions:  Restrictions/Precautions: General Precautions, Fall Risk     SUBJECTIVE: Pt. Laying in bed and pleasantly agrees to therapy session. Pt. Requiring extra time to arouse. Pt. Requests to use restroom at end of session. PAIN: 5/10: L LE    OBJECTIVE:  Bed Mobility:  Rolling to Left: Supervision   Supine to Sit: Supervision    Transfers:  Sit to Stand: Stand By Assistance  Stand to Sit:Stand By Assistance    Ambulation:  Stand By Assistance  Distance: 60' x 1, 50' x 1, 15' x 2  Surface: Level Tile  Device:Rolling Walker  Gait Deviations: Forward Flexed Posture, Slow Cassie, Decreased Gait Speed, Decreased Heel Strike Bilaterally and multiple standing rest breaks due to fatigue    Balance:  Pt. sat at EOB unsupported while PTA assisted with donning pants, compression socks, and shoes. Pt. requiring no assistance for balance. Extra time required as pt. C/o increased SOB. Exercise:  None    Functional Outcome Measures: Not completed       ASSESSMENT:  Assessment: Patient progressing toward established goals. Activity Tolerance:  Patient tolerance of  treatment: good. Equipment Recommendations:Equipment Needed: No(Pt has 4WW, RW, w/c and cane)  Discharge Recommendations:  Continue to assess pending progress, Patient would benefit from continued therapy after discharge    Plan: Times per week: 5x/wk 90 min, 1x/ wk 30 min  Current Treatment Recommendations: Strengthening, Functional Mobility Training, Transfer Training, Endurance Training, Balance Training, Stair training, Gait Training, Home Exercise Program, Safety Education & Training, Equipment Evaluation, Education, & procurement, Patient/Caregiver Education & Training    Patient Education  Patient Education: Plan of Care, [employer] Mobility, Transfers, Gait    Goals:  Patient goals : get my legs stronger to walk better. Short term goals  Time Frame for Short term goals: 1 wk  Short term goal 1: Pt to go supine <->sit, SBA, to get in/out of bed, no rail. Short term goal 2: Pt to get up/down from various seated surfaces, SBA to get up to walk. Short term goal 3: Pt to walk with RW >= 100 ft, SBA to progress to home and community mobility  Short term goal 4: negotiate 4 steps with HR and CGA to enter home safely  Short term goal 5: Pt to get in/out of car, SBA for transportation needs. Long term goals  Time Frame for Long term goals : 3 wks  Long term goal 1: Pt to go supine <->sit, Mod I, to get in/out of bed, no rail. Long term goal 2: Pt to get up/down from various seated surfaces, Mod I, to get up to walk. Long term goal 3: Pt to walk with RW >= 150 ft, Mod I, to progress to home and community mobility  Long term goal 4: negotiate 4 steps with 1 HR and Mod I to enter home safely  Long term goal 5: Pt to get in/out of car, Mod I, for transportation needs.   Long term goal 6: Pt to score >= 22, indicating improved balance    Following session, patient left in safe position with all fall risk precautions in place. Treatment session and note completed by Bere Nichols PTA.   Assessment and goal revision of Daily note completed by Denia Diehl PT.

## 2020-11-11 NOTE — PROGRESS NOTES
Balance:  Supervision. Standing Balance: Stand By Assistance. x1 minute in prep for mobility.     BED MOBILITY:  Sit to Supine: Supervision    Scooting: Supervision EOB     TRANSFERS:  Sit to Stand:  Stand By Assistance. Stand to Sit: Stand By Assistance.       FUNCTIONAL MOBILITY:  Assistive Device: Rolling Walker and O2 (5L)  Assist Level:  Stand By Assistance. Distance: Completed functional mobility short distance within pt room at slow pace, no LOB noted. Pt requires no cues for walker safety- seated rest break after trial of mobility, mod fatigue noted.      ADDITIONAL ACTIVITIES:  Patient identified a personal goal to increase UB strength and improve overall endurance so they can complete their toilet & shower transfers; skilled edu on UE strengthening. Completed BUE exercises x10 reps x1 sets using min resistance band in all joints/planes to increase strength and endurance required for ADLs. Pt required rest break between each exercise and min v/c for proper technique.     ASSESSMENT:  Activity Tolerance:  Patient tolerance of  treatment: good. Discharge Recommendations: Continue to assess pending progress   Equipment Recommendations: Equipment Needed: No  Other: Pt owns BSC and shower chair. Pt has grab bars in the carol ann    Speech Therapy:  Not applicable     Review of Systems:  Review of Systems   Constitutional: Positive for activity change and fatigue. Negative for appetite change. HENT: Negative for trouble swallowing and voice change. Eyes: Negative for visual disturbance. Respiratory: Positive for shortness of breath. Negative for cough. Cardiovascular: Positive for leg swelling. Gastrointestinal: Negative for anal bleeding, blood in stool, constipation and diarrhea. Endocrine: Negative for cold intolerance. Genitourinary: Negative for frequency and urgency. Musculoskeletal: Positive for gait problem and myalgias. Negative for back pain. Skin: Positive for wound. Negative for pallor. Allergic/Immunologic: Negative. Neurological: Positive for weakness. Negative for dizziness, speech difficulty, numbness and headaches. Hematological: Negative. Psychiatric/Behavioral: Negative for confusion, decreased concentration and dysphoric mood. The patient is not nervous/anxious. All other systems reviewed and are negative. Objective:  /74   Pulse 70   Temp 98.2 °F (36.8 °C) (Oral)   Resp 18   Ht 5' 2\" (1.575 m)   Wt 171 lb (77.6 kg)   SpO2 96%   Breastfeeding No   BMI 31.28 kg/m²   CURRENT VITALS:  height is 5' 2\" (1.575 m) and weight is 171 lb (77.6 kg). Her oral temperature is 98.2 °F (36.8 °C). Her blood pressure is 124/74 and her pulse is 70. Her respiration is 18 and oxygen saturation is 96%. Body mass index is 31.28 kg/m².   Temperature Range (24h):Temp: 98.2 °F (36.8 °C) Temp  Av.9 °F (36.6 °C)  Min: 97.6 °F (36.4 °C)  Max: 98.2 °F (36.8 °C)  BP Range (12D): Systolic (28GGU), HBF:723 , Min:86 , DPX:505     Diastolic (97JFH), GFU:67, Min:54, Max:74    Pulse Range (24h): Pulse  Av  Min: 69  Max: 83  Respiration Range (24h): Resp  Av  Min: 18  Max: 18  Current Pulse Ox (24h):  SpO2: 96 %  Pulse Ox Range (24h):  SpO2  Av.5 %  Min: 96 %  Max: 97 %  Oxygen Amount and Delivery: O2 Flow Rate (L/min): 4 L/min    awake  Orientation:   person, place, time, situation  Mood: within normal limits  Affect: flat  General appearance:  in no acute distress, glasses and [email or web address], in bed    Memory:   grossly normal  Attention/Concentration: normal  Language:  normal    ROM:  Normal  Motor Exam:  Motor exam is 4 out of 5 all extremities with the exception of left ankle not tested    Sensory:  Sensory intact  Coordination:   normal  Deep Tendon Reflexes:  Reflexes are intact and symmetrical bilaterally with left Achilles tendon not tested    Skin: warm and dry, no rash with notable erythema involving the left ankle to approximately mid calf with surgical markers demarcating the current extent of the cellulitis  Peripheral vascular: Pulses: Normal upper and diminished lower extremity pulses; Edema: 3+ pedal    Diagnostics:   Recent Results (from the past 24 hour(s))   Basic Metabolic Panel    Collection Time: 11/11/20  5:02 AM   Result Value Ref Range    Sodium 139 135 - 145 meq/L    Potassium 4.1 3.5 - 5.2 meq/L    Chloride 100 98 - 111 meq/L    CO2 29 23 - 33 meq/L    Glucose 129 (H) 70 - 108 mg/dL    BUN 29 (H) 7 - 22 mg/dL    CREATININE 0.9 0.4 - 1.2 mg/dL    Calcium 8.3 (L) 8.5 - 10.5 mg/dL   Brain Natriuretic Peptide    Collection Time: 11/11/20  5:02 AM   Result Value Ref Range    Pro-BNP 6830.0 (H) 0.0 - 1800.0 pg/mL   Anion Gap    Collection Time: 11/11/20  5:02 AM   Result Value Ref Range    Anion Gap 10.0 8.0 - 16.0 meq/L   Glomerular Filtration Rate, Estimated    Collection Time: 11/11/20  5:02 AM   Result Value Ref Range    Est, Glom Filt Rate 61 (A) ml/min/1.73m2   Protime-INR    Collection Time: 11/11/20  5:05 AM   Result Value Ref Range    INR 2.54 (H) 0.85 - 1.13     Labs Renal Latest Ref Rng & Units 11/11/2020 11/9/2020 11/4/2020 11/3/2020 11/2/2020   BUN 7 - 22 mg/dL 29(H) 38(H) 40(H) 36(H) 26(H)   Cr 0.4 - 1.2 mg/dL 0.9 0.7 1.1 1.0 0.9   K 3.5 - 5.2 meq/L 4.1 4.1 4.1 4.2 4.8   Na 135 - 145 meq/L 139 146(H) 139 141 138      Recent Labs     11/09/20  1014 11/04/20  0534 11/03/20  0650   WBC 15.1* 7.8 5.7   HGB 8.6* 9.5* 9.3*   HCT 28.7* 31.1* 30.4*   .8* 101.6* 101.0*    180 151      Results for Pardeep Bradshaw (MRN [id number]) as of 11/12/2020 02:39   Ref. Range 11/1/2020 11:38 11/9/2020 10:14 11/11/2020 05:02   Pro-BNP Latest Ref Range: 0.0 - 1800.0 pg/mL 96610.0 (H) 9817.0 (H) 6830.0 (H)     Xr Chest (2 Vw)    Result Date: 11/5/2020  PROCEDURE: XR CHEST (2 VW) CLINICAL INFORMATION: Lung infiltrates. COPD. Atrial fibrillation. Dyspnea. COMPARISON: 10/30/2020 TECHNIQUE: AP upright and lateral views of the chest were obtained.      1. technology. ** Final report electronically signed by Dr. Khushboo Tobias on 11/11/2020 4:50 PM     Impression:  1. Left lower leg severe cellulitis. 2. Physical deconditioning. 3. Gait instability. 4. Septic shock secondary to left lower leg cellulitis. 5. IV infiltration into the left forearm with heparin on 10/31/2020 treated with Hylenex. 6. Insomnia. 7. Atrial fibrillation on chronic anticoagulation with Coumadin. 8. Biventricular implanted cardioverter-defibrillator  9. Coronary artery disease. 10. COPD chronic home oxygen at 2 L. Follows with Dr. Shamir Alicea. 11. Acute kidney injury. 12. CKD 2.  13. Hyperlipidemia. 14. Hypertension. 15. History of prior left displaced femoral neck fracture status post hemiarthroplasty by Dr. Hernando Trevizo on 10/24/2019. 16. History of prior closed head injury. [place] Cognitive Assessment [facility]) version 8.3 completed. Patient scored 21/30 on 11/4. 16. History of prior myocardial infarction, 1994. 18. Chronic systolic congestive heart failure with ejection fraction of 35 to 40%. 19. Mild peripheral vascular disease per vascular OMAYRA bilateral study dated 2/25/2020.  20. Severe left external carotid artery stenosis and left subclavian artery stenosis/occlusion per vascular carotid bilateral ultrasound on 4/1/2014. 21. Mild to moderate central spinal stenosis at L5-L6 with moderate right S1 lateral recess stenosis and findings of 6 lumbar type vertebral body per CT of the lumbar spine without contrast on 10/19/2012.  22. Hemorrhoids. 23. Anxiety. 24. Osteopenia per DEXA bone scan on 12/7/2015 with medium fracture risk. 25. Chronic constipation. 26. Oral thrush. 27. Chronic iron deficiency anemia. 28. Current every day smoker. 29. Small bilateral pleural effusions. 30. Hypernatremia. 31. Leukocytosis. Plan:     Medical management: Per primary team and Dr. Naz Pritchard.     Consultants:  Orthopedic Surgery, Critical Care, Infectious Disease, Cardiology, Pulmonology, Otolaryngology, Family Medicine Physical Medicine    Narcotic usage: Norco last 24 hour usage none. Decreased. Last BM:  Stool Amount: Large (11/10/20 1428)    FUNCTIONAL OUTCOMES TOOLS:    LOUIS -      Tinetti - Balance Score: 13  Gait Score: 7  Tinetti Total Score: 20    TUG -      Acute/Rehabilitation Problems:  1. Left lower leg severe cellulitis. 1. Infectious Disease service following. Appreciate the assistance. 2. Linezolid for 5 more doses. Completed on 11/5.  3. Pain control. 1. Tylenol. 2. Norco.  3. Flexeril. 4. Gabapentin. 2. Culturelle. 3. Physical deconditioning/Gait instability. 1. PT/OT. 2. Tinetti 19/28. Improved to 20/28. Risk Indicators: Less than/equal to 18 = high risk; 19-23 Moderate risk; Greater than/equal to 24 = low risk. 4. Septic shock secondary to left lower leg cellulitis. 1. WBCs normal at 5.7 on 11/3.  5. Nutrition:  Consultation to dietician for nutritional counseling and recommendations. 1. Total protein 5.7 and albumin 3.0. Low.  2. Vitamin D 25 hydroxy was 36 on 1/5/2020. Normal at 44 on 11/4/2020.  6. Electrolytes. 1. Normal on 11/9.  7. Acute kidney injury/CKD 2.  1. Patient had normal renal function at the beginning of 2020 with a persistence of GFR suggestive of CKD 2. During this hospitalization Cr/BUN/GFR has worsened to 1.1/40/48 from 1.0/36/54. Improved on 11/9 to 0.7/38/81. Worsened to 0.9/29/61 on 11/11. 2. Encourage fluids  8. Oral thrush. 1. Nystatin x5 days. Completed. 9. Insomnia. 1. Trazodone 50 mg nightly. 10. Bladder: No issues  11. Bowel: Senna, colace, MOM  1. GlycoLax. 2. Bisacodyl suppository. 12. Rehabilitation nursing will be involved for bowel, bladder, skin, and pain management. Nursing will also provide education and training to patient and family. 13. Prophylaxis:  DVT: Coumadin. GI: Protonix. 14.  and case management consultations for coordination of care and discharge planning.     Chronic Problems:  1. Atrial fibrillation on chronic anticoagulation with Coumadin. 1. Coumadin to be dosed by the pharmacy. 2. Last INR was 2.54 on 11/11/2020.  2. Biventricular implanted cardioverter-defibrillator  3. Coronary artery disease. 1. ASA 81 mg.  4. COPD on chronic home oxygen at 2 L. Follows with Dr. Narayan Kaiser. 1. Dr. Narayan Kaiser following during this admission. 2. Johnny Pesa. 3. Pulmicort. 4. Xopenex. 5. Xolair injection every 28 days with next injection on 11/30. 6. Spiriva Respimat. 7. Patient normally on 2 L chronically at home. Current oxygen setting is 4 L on 11/11.  5. Hyperlipidemia. 1. No current medication. 6. History of prior left displaced femoral neck fracture status post hemiarthroplasty by Dr. Darrian Magana on 10/24/2019.  7. History of prior closed head injury. [place] Cognitive Assessment [facility]) version 8.3 completed. Patient scored 21/30 on 11/4.  8. History of prior myocardial infarction, 1994.  9. Chronic systolic congestive heart failure with ejection fraction of 35 to 40%/Hypertension. 1. Digoxin. 2. Lasix. 3. Toprol-XL. 4. Entresto. 5. Evolucumab. 6. proBNP remains elevated on 11/9 at 9817 with only mild improvement over prior lab on 11/1 of 10,721. Decreased to 6830 on 11/11. 1. Lasix has been increased to 40 mg twice daily. IV line placed on 11/10 so that p.o. Lasix could be changed to IV Lasix at same dosing. 2. Chest x-ray on 11/9 consistent with likely interstitial edema. 3. Chest x-ray on 11/11 showed improvement; but still with interstitial edema. 10. Mild peripheral vascular disease per vascular OMAYRA bilateral study dated 2/25/2020. 11. Severe left external carotid artery stenosis and left subclavian artery stenosis/occlusion per vascular carotid bilateral ultrasound on 4/1/2014.   12. Mild to moderate central spinal stenosis at L5-L6 with moderate right S1 lateral recess stenosis and findings of 6 lumbar type vertebral body per CT of the lumbar spine without contrast on 10/19/2012. 13. Hemorrhoids. 1. No current medication ordered. 14. Anxiety. 1. No current medication ordered. 15. Osteopenia per DEXA bone scan on 12/7/2015 with medium fracture risk. 1. Vitamin D 25 hydroxy level was normal on 1/5/2020. 16. Chronic constipation. 1. Patient normally on Linzess at home. 17. Chronic iron deficiency anemia. 1. Receives IV iron transfusions. Last iron on 11/3 was 173 with a ferritin of 305. 2. Hemoglobin 9.5 on 11/4 which is stable over 9.3 on 11/3. Hemoglobin slightly decreased to 8.6 on 11/9. 18. Current every day smoker. 1. Patient not interested in smoking cessation. Labs reviewed on:  1. 11/3.  2. 11/4.  3. 11/9.  4. 11/11. Infectious Disease:  1. N/A    Missed Therapy Time:  11/5. Physical therapy missed 30 minutes while the patient was off of the unit to obtain a chest x-ray. DME:    Discharge Plan:  Estimated Discharge Date: 11/13   Destination: home health  Services at Discharge: [address] [place] [address] Rd, Occupational Therapy, Nursing and an aide 2x week  Is patient appropriate for an outpatient driving evaluation? no  Equipment at Discharge: None    Greater than 50% of 30 minutes was spent with the patient reviewing the x-ray imaging from today, her improved proBNP and concurrent improved respiratory status, and her progress with therapy.     Sidney Heart, MD

## 2020-11-12 NOTE — PLAN OF CARE
Problem: Pain:  Goal: Pain level will decrease  Description: Pain level will decrease  Outcome: Ongoing  Patient complains of increased pain in her left lower leg today compared to yesterday. Left lower leg and foot are noted to be reddened and blistered. Patient noted to have known cellulitis in left lower leg. Dr. Miguelangel Cesar notified as well as Dr. Kyaw Mejia. PRN Norco given with effective relief. Goal: Control of acute pain  Description: Control of acute pain  Outcome: Ongoing  PRN Norco and tylenol given for left leg pain. Patient reports effective pain relief with the use of norco.   Goal: Control of chronic pain  Description: Control of chronic pain  Outcome: Ongoing     Problem: Skin Integrity:  Goal: Will show no infection signs and symptoms  Description: Will show no infection signs and symptoms  Outcome: Ongoing  Patient has known cellulitis to left lower leg. Left leg noted to be more reddened/swollen/blistered/and painful today. Dr. Kyaw Mejia and Dr. Miguelangel Cesar notified. Further plan to come. Goal: Absence of new skin breakdown  Description: Absence of new skin breakdown  Outcome: Ongoing  Continuing to monitor skin each shift. No new skin issues noted. Problem: OXYGENATION/RESPIRATORY FUNCTION  Goal: Patient will achieve/maintain normal respiratory rate/effort  Description: Respiratory rate and effort will be within normal limits for the patient  Outcome: Ongoing   Patient's 02 sat 90% on 5L NC. Patient normally on [facility] at home. Continuing scheduled Lasix and breathing treatments. Problem: Bleeding:  Goal: Will show no signs and symptoms of excessive bleeding  Description: Will show no signs and symptoms of excessive bleeding  Outcome: Ongoing     Problem: Falls - Risk of:  Goal: Will remain free from falls  Description: Will remain free from falls  Outcome: Ongoing  Patient ambulates standy by assist with the use of the gait belt and walker.    Goal: Absence of physical injury  Description: Absence of physical injury  Outcome: Ongoing     Problem: Impaired respiratory status  Goal: Clear lung sounds  Description: Clear lung sounds  11/12/2020 0452 by Sammie Khan RCP  Outcome: Ongoing  Note: Treatment will be continued as ordered  Patient reports feeling more SOB today. Wheezes noted on ausculation. Dr. Moises Acosta notified and Duoneb ordered and administered by respiratory therapy. Patient reports the breathing treatment helped and she no longer felt SOB. 02 sat 99% on 6LNC.

## 2020-11-12 NOTE — PROGRESS NOTES
[address] [address]  INPATIENT PHYSICAL THERAPY  DAILY NOTE  [facility] - [id number]    Time In: 1100  Time Out: 1200  Timed Code Treatment Minutes: 60 Minutes  Minutes: 60          Date: 2020  Patient Name: Jenna Powers,  Gender:  female        MRN: [id number]  : 1945  (67 y.o.)     Referring Practitioner: Dr. Josefina Still  Diagnosis: Physical Debility  Additional Pertinent Hx: Per EMR, Sharda Nova is a 67 y.o. female with an extensive medical history including, congestive heart failure, COPD, A. Fib., Pacemaker placement post MI, hypertension, anemia, and IBS who presents to the ED for an evaluation of severe left lower extremity pain, redness, and warmth that started yesterday evening. The patient states that yesterday morning she started feeling some discomfort to her lower extremity, but believed it was due to her compression stockings she wears for CHF. Yesterday evening she removed the stockings and saw that her left lower leg was extremely red, which has continued to spread, becoming more red, and even more painful. Denies any recent trauma, falls, accidents, or wounds to the lower extremity but states that she occasionally scratches her legs with her nails when taking on and off the compression stockings. The patient has attempted tylenol, percocet, biofreeze, and icyhot yesterday with no relief. 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 Endorses history of blood clots, but is on coumadin which was last checked approximately 1 week ago and was in theraputic range.  Further endorses feeling feverish and chills, increased shortness of breath, worsening left lower extremity pain, the inability to bear weight at this time, and denies numbness or tingling to the lower extremities, diabetes mellitus, previous lower leg infections, chest pain, abdominal pain, changes in her bowel or bladder function or habits. Patient still smokes 1/2 a pack of cigarettes daily and is on home oxygen. No alcohol or illicit drug use. \"     Prior Level of Function:  Lives With: Spouse  Type of Home: House  Home Layout: One level  Home Access: Stairs to enter with rails  Entrance Stairs - Number of Steps: 4  Entrance Stairs - Rails: Both(too wide to use at same time)  Home Equipment: Rolling walker, 4 wheeled walker, [employer]   Bathroom Shower/Tub: Walk-in shower, Shower chair with back  [employer]: Grab bars in shower, Hand-held shower, 3-in-1 commode  Bathroom Accessibility: Accessible    Receives Help From: Family  ADL Assistance: Independent  Homemaking Assistance: Needs assistance  Ambulation Assistance: Independent  Transfer Assistance: Independent  Active : Yes  Additional Comments: Pt reports that her spouse assists with IADL tasks, Pt completes own self care . Spouse does have to assist with compression stockings. Restrictions/Precautions:  Restrictions/Precautions: General Precautions, Fall Risk     SUBJECTIVE: Pt. Seated on BS chair and agrees to therapy session with encouragement. Son present but not attending session. Extra time required at beginning of session for encouragement and for education. Pt. Initially on venturi mask, reporting that she is not doing well today. RN approves for pt. To be placed on nasal cannula. PAIN: 6/10: L LE    OBJECTIVE:  Bed Mobility:  Not Tested    Transfers:  Sit to Stand: Modified Independent  Stand to Sit:Modified Independent  Stand Pivot:Modified Independent    Ambulation:  Modified Independent  Distance: 50' x 1 and 10' x 1 with modified independence, 100' + 48' with SBA due to fatigue. Up/down 10' ramp with SBA  Surface: Level Tile and Ramp  Device:Rolling Walker  Gait Deviations: Forward Flexed Posture, Slow Casise, Decreased Gait Speed and easily fatigues. Balance:  Pt. able to  object from floor using a long handled reacher with modified independence. Exercise:  None    Stairs:  Stairs:  6\" steps. X 4 using Bilateral Handrails and Stand By Assistance. Platform:  6\" platform X 1 using Rolling Walker and Contact Guard Assistance. Functional Outcome Measures: Not completed       ASSESSMENT:  Assessment: Patient progressing toward established goals. Activity Tolerance:  Patient tolerance of  treatment: good. Equipment Recommendations:Equipment Needed: No(Pt has 4WW, RW, w/c and cane)  Discharge Recommendations:  Continue to assess pending progress, Patient would benefit from continued therapy after discharge, Home with Home health PT    Plan: Times per week: 5x/wk 90 min, 1x/ wk 30 min  Current Treatment Recommendations: Strengthening, Functional Mobility Training, Transfer Training, Endurance Training, Balance Training, Stair training, Gait Training, Home Exercise Program, Safety Education & Training, Equipment Evaluation, Education, & procurement, Patient/Caregiver Education & Training    Patient Education  Patient Education: Plan of Care, Reviewed Prior Education, Gait, Stairs    Goals:  Patient goals : get my legs stronger to walk better. Short term goals  Time Frame for Short term goals: 1 wk  Short term goal 1: Pt to go supine <->sit, SBA, to get in/out of bed, no rail. - GOAL MET, SEE LTG  Short term goal 2: Pt to get up/down from various seated surfaces, S to get up to walk. Short term goal 3: Pt to walk with RW >= 100 ft, SBA to progress to home and community mobility  Short term goal 4: negotiate 4 steps with HR and CGA to enter home safely  Short term goal 5: Pt to get in/out of car, S for transportation needs. Long term goals  Time Frame for Long term goals : 3 wks  Long term goal 1: Pt to go supine <->sit, Mod I, to get in/out of bed, no rail.   Long term goal 2: Pt to get up/down from various seated surfaces, Mod I, to get up to walk. - GOAL MET, CONTINUE  Long term goal 3: Pt to walk with RW >= 150 ft, Mod I, to progress to home and community mobility  Long term goal 4: negotiate 4 steps with 1 HR and Mod I to enter home safely  Long term goal 5: Pt to get in/out of car, Mod I, for transportation needs. Long term goal 6: Pt to score >= 22, indicating improved balance     Following session, patient left in safe position with all fall risk precautions in place.

## 2020-11-12 NOTE — PROGRESS NOTES
requiring occasional rest breaks. Pt required  min cues for technique. ASSESSMENT:  Assessment: Pt is progressing towards her goals at slow pace and requires min vcs for walker safety. LImited by low activity tolerance. Pt continues to demonstrate deficits with ADLs, functional mobility, and transfers requiring assist to complete these tasks. Pt will continue to benefit from OT services to increase independence with these tasks to facilitate return to PLOF  Activity Tolerance:  Patient tolerance of  treatment: good. Discharge Recommendations: Home with nursing aide, Home with Home health OT   Equipment Recommendations: Equipment Needed: No  Other: Pt owns BSC and shower chair. Pt has grab bars in the shower. Plan: Times per week: 5xs week x 90 min, 1 x week x 30 min  Current Treatment Recommendations: Strengthening, Endurance Training, Balance Training, Functional Mobility Training, Patient/Caregiver Education & Training, Equipment Evaluation, Education, & procurement, Self-Care / ADL, Safety Education & Training, Home Management Training    Patient Education  Patient Education: Home Exercise Program    Goals  Short term goals  Time Frame for Short term goals: 1 week  Short term goal 1: PT will complete ADL routine with setup and no  > min cues for energy conservation or attention to task  to increase ability to complete self care tasks  Short term goal 2: Pt will complete standing tolerance x 4 minutes with 1-2 UE release and S to increase indep and safety with all grooming.   Short term goal 3: Pt will complete functional ambulation to and from the BR as well as around obstacles with SBA and 0-2cues for RW safety to increase independence in home mobility to/ fropm the BR  Short term goal 4: Pt will complete UE light strengthening ex x 10 reps with HEP to increase strength/ endurance   needed for safe light IADL tasks  Long term goals  Time Frame for Long term goals : 2-3 weeks  Long term goal 1: Pt will complete ADL routine with S and no  cues for energy conservation or safe tech to increase independence in self care tasks  Long term goal 2: Pt will complete 2 step homemaking tasks with S and 0-1 cues for safe tech to increase ability to prepare a snack. Following session, patient left in safe position with all fall risk precautions in place.

## 2020-11-12 NOTE — PLAN OF CARE
Problem: Impaired respiratory status  Goal: Clear lung sounds  Description: Clear lung sounds  Outcome: Ongoing  Note: Treatment will be continued as ordered

## 2020-11-12 NOTE — CONSULTS
[facility]      Patient - Liliane Beltran   MRN -  [id number]   [facility] # - [de-identified]   - 1945      Date of Admission -  11/3/2020  2:20 PM  Date of evaluation -  2020  Room - 6E-56/200-A   Hospital Day - 9  Consulting - Dior Willams MD Primary Care Physician - Mildred Umana MD     Problem List      Active Hospital Problems    Diagnosis Date Noted    Permanent atrial fibrillation [facility]) [I48.21]      Priority: High    Interstitial pneumonia ([facility] [J84.9] 2020    Thrush [B37.0] 2020    Chronic iron deficiency anemia [D50.9] 2020    Anxiety [F41.9] 2020    Physical deconditioning [R53.81] 2020    Physical debility [R53.81] 2020    Cellulitis of left lower extremity [L03.116]     Chronic systolic CHF (congestive heart failure) ([facility] [I50.22] 10/27/2019    Class 1 obesity due to excess calories with serious comorbidity and body mass index (BMI) of 30.0 to 30.9 in adult [E66.09, Z68.30] 10/27/2019    Arteriosclerosis of coronary artery [I25.10] 2016    Anticoagulated on Coumadin [Z79.01] 2016    Biventricular implantable cardioverter-defibrillator in situ [Z95.810] 2014    Persistent atrial fibrillation ([facility] [I48.19] 2013    Chronic obstructive pulmonary disease ([facility] [J44.9] 2012    Hypertension [I10] 2012     Reason for Consult    For further evaluation and management of Hemoptysis and COPD  HPI   History Obtained From: Patient and electronic medical record. Liliane Beltran is a 67 y.o. female  was initially admitted under hospitalist service. Pulmonary medicine was consulted for further management of Hemoptysis and COPD. She is a 67-year-old pleasant female with a past medical history of moderately severe COPD and bronchial asthma. She used to follow with Dr. Maricarmen Escobar MD in the past. However she is currently following with Dr. Drew Yoo. DO [place] at [facility] pulmonary clinic. She was initially admitted to ICU on 25 October 2020 for management of sepsis due to left lower lower extremity cellulitis and shock. Patient having chronic minimal hemoptysis. She was evaluated by Dr. James Kessler. DO Constanza on 2 November 2020. As per Dr. James Kessler. Viry Gonzalez, [place] note the hemoptysis was thought to be due to epistaxis from a nonhumidified oxygen supplementation. She is continue to have a hemoptysis for the last 5days. She is coughing up quarter size blood clot which is a dark blood for the last few days. There is no history of active hemoptysis or bright red blood in the last few days. She still complaining of shortness of breath on minimal exertion. She was found to be on Coumadin with therapeutic INR. No active bleeding was noted. She is receiving Xolair to 25 mg from Dr.'s Shemar Gonzalez, [place] office. Patient also follows with Dr. Gaurav Garner MD allergist in [address] [place] [address]. She is having hemoptysis: Yes  Duration: for 5 days  Her hemoptysis color: Coughing up bight red blood: No. Old clotted blood. Her hemoptysis is associated with cough and sputum production: Yes  Quantity of hemoptysis:1.0ml/s. Diurnal variation: None. She was diagnosed with pneumonia in the recent past:No    She was diagnosed with pulmonary tuberculosis in the past:No   She was exposed to any patients with tuberculosis:No  Recent travel to endemic places of Tuberculosis:No  History of lung caner or any neoplastic process: No  Hstory of connective tissue diseases or vasculitis : No   She is currently on treatment with anticoagulants: Yes- Coumadin. She uses 2LPM of oxygen supplementation at rest, exercise or during sleep/at night time at home.     PMHx   Past Medical History      Diagnosis Date    Allergic rhinitis     Anemia     Anxiety     Arthritis     Asthma     Atrial fibrillation (HCC)     CAD (coronary artery disease)     Chronic systolic CHF (congestive heart failure) ([facility] 10/27/2019    COPD (chronic obstructive pulmonary disease) (HCC)     Frequent UTI     GERD (gastroesophageal reflux disease)     Hemorrhoids     History of blood transfusion     X8    Hx of blood clots     left arm    Hyperlipidemia     Hypertension     Influenza A 02/22/2020    Kidney stone     LONG TERM ANTICOAGULENT USE 7/6/2012    Lumbar spinal stenosis     MI, old 11    Prolonged emergence from general anesthesia       Past Surgical History        Procedure Laterality Date    CARDIAC CATHETERIZATION  1994    CARDIAC DEFIBRILLATOR PLACEMENT  2008    OUS [place]    COLONOSCOPY  10/16/2015    Dr. Lani Womack COLONOSCOPY N/A 10/25/2018    COLONOSCOPY POLYPECTOMY SNARE/COLD BIOPSY performed by Dustin Webber MD at [facility]    ENDOSCOPY, COLON, DIAGNOSTIC  01/04/2017    Dr. Yoselyn Mendieta      left hip    OVARIAN CYST REMOVAL      PACEMAKER PLACEMENT      SINUS SURGERY      several    TONSILLECTOMY      TOTAL HIP ARTHROPLASTY Left 10/24/2019    DANIEL LEFT HIP ARTHROPLASTY performed by Michelle Kumar MD at [address]  2013    X2    UPPER GASTROINTESTINAL ENDOSCOPY N/A 1/5/2020    EGD DIAGNOSTIC ONLY performed by Agustina Archer MD at [facility]     Meds    Current Medications    warfarin  1.5 mg Oral Once    omalizumab  225 mg Subcutaneous Q14 Days    furosemide  40 mg Intravenous BID    nicotine  1 patch Transdermal Q24H    warfarin (COUMADIN) daily dosing (placeholder)   Other RX Placeholder    hydrocortisone  25 mg Rectal BID    Arformoterol Tartrate  15 mcg Nebulization BID    aspirin  81 mg Oral Daily    budesonide  500 mcg Nebulization BID    digoxin  125 mcg Oral Every Other Day    docusate sodium  100 mg Oral Daily    [START ON 11/17/2020] Evolocumab  140 mg Subcutaneous Q14 Days    gabapentin  100 mg Oral TID    metoprolol succinate  25 mg Oral Daily    pantoprazole  40 mg Oral BID AC    sacubitril-valsartan  1 tablet Oral BID    senna  1 tablet Oral Nightly    tiotropium  2 puff Inhalation Daily    traZODone  50 mg Oral Nightly     levalbuterol, polyethylene glycol, bisacodyl, acetaminophen, cyclobenzaprine, HYDROcodone 5 mg - acetaminophen, HYDROcodone 5 mg - acetaminophen, magnesium hydroxide  IV Drips/Infusions    Home Medications  Medications Prior to Admission: levocetirizine (XYZAL) 5 MG tablet, Take 5 mg by mouth nightly  Omalizumab (XOLAIR SC), Inject into the skin At Dr Khushi Garibay office  Ascorbic Acid (VITAMIN C) 100 MG tablet, Take 100 mg by mouth daily  metoprolol succinate (TOPROL XL) 25 MG extended release tablet, Take 25 mg by mouth daily  Cholecalciferol (VITAMIN D3) 50 MCG (2000 UT) CAPS, Take 2,000 Units by mouth daily  sacubitril-valsartan (ENTRESTO) 49-51 MG per tablet, Take 1 tablet by mouth 2 times daily  pantoprazole (PROTONIX) 40 MG tablet, Take 40 mg by mouth 2 times daily 30 minutes before two heaviest meals on an empty stomach  LINZESS 290 MCG CAPS capsule, Take 290 mcg by mouth daily   loperamide (IMODIUM) 2 MG capsule, Take 2 mg by mouth 4 times daily as needed for Diarrhea  traMADol (ULTRAM) 50 MG tablet, Take 50 mg by mouth every 6 hours as needed for Pain.    nystatin (MYCOSTATIN) 502676 UNIT/GM cream, as needed   ondansetron (ZOFRAN) 8 MG tablet, daily as needed for Nausea or Vomiting   hydrocortisone (ANUSOL-HC) 25 MG suppository, Place 1 suppository rectally 2 times daily as needed for Hemorrhoids  warfarin (COUMADIN) 1 MG tablet, Take as directed by the Coumadin Clinic, 145 tablets for 90 days  digoxin (LANOXIN) 125 MCG tablet, Take 1 tablet by mouth every other day Indications: Increased Heart Rate Until Office Visit with Dr. Cally Stiles  Revefenacin 175 MCG/3ML SOLN, Inhale 175 mcg into the lungs daily  hydrocortisone 2.5 % cream, Apply topically 2 times daily as needed   acetaminophen (TYLENOL) 325 MG tablet, Take 650 mg by mouth as needed for Pain or Fever Indications: Pain Don't take more then 3,000 mg each day  Multiple Vitamins-Minerals (MULTIVITAMIN ADULT) CHEW, Take 2 tablets by mouth daily  Menthol-Methyl Salicylate (ICY HOT) 19-13 % STCK, Apply topically daily  Alum Hydroxide-Mag Carbonate (GAVISCON PO), Take by mouth as needed Indications: Acid Indigestion   lidocaine (XYLOCAINE) 5 % ointment, Apply topically Before venipuncture in Dr. Chavez Solis office. hydrOXYzine (ATARAX) 25 MG tablet, Take 25 mg by mouth 3 times daily as needed Indications: Feeling Anxious   Pramoxine HCl (PROCTOFOAM RE), Place rectally daily as needed   EPINEPHrine (EPIPEN) 0.3 MG/0.3ML SOAJ injection, INJECT AS DIRECTED FOR ANAPHYLAXIS  Evolocumab (REPATHA SURECLICK SC), Inject 298 mg into the skin every 14 days Indications: Blood Cholesterol Abnormal   B Complex-C (RA B-COMPLEX/VITAMIN C CR PO), Take 1 tablet by mouth daily Indications: Treatment to Prevent Vitamin Deficiency   NITROGLYCERIN RE, Place 0.3 mg rectally as needed Indications: Hemorrhoids   dicyclomine (BENTYL) 10 MG capsule, Take 10 mg by mouth 4 times daily (before meals and nightly) Indications: Pain in the Abdominal Region  Coenzyme Q10 (COQ-10) 200 MG CAPS, Take by mouth daily   Probiotic Product (SUPER PROBIOTIC) CAPS,  Take 1 tablet by mouth daily Indications: Digestive Complaint   budesonide (PULMICORT) 0.5 MG/2ML nebulizer suspension,  Take 1 ampule by nebulization 2 times daily Indications: Shortness of Breath (Inactive)   Arformoterol Tartrate (BROVANA) 15 MCG/2ML NEBU,  Take 1 ampule by nebulization 2 times daily Indications: Shortness of Breath (Inactive)   ofloxacin (FLOXIN) 0.3 % otic solution, Place 2 drops into both ears daily as needed Indications: Infection Long-term therapy. Carboxymethylcellul-Glycerin (REFRESH OPTIVE OP), Apply  to eye as needed.  Indications: Dry Eyes caused by Deficiency of Tears  levalbuterol (XOPENEX) 0.63 MG/3ML nebulization, Take 1 ampule by nebulization every 8 hours as needed for Wheezing. triamcinolone (KENALOG) 0.1 % cream, Apply topically 2 times daily as needed Anti-fungal  clotrimazole-betamethasone (LOTRISONE) cream, Apply topically 2 times daily as needed Indications: Yeast Infection (inactive)   polyethylene glycol (MIRALAX) powder, Take 17 g by mouth as needed. Indications: Constipation  Alcaftadine (LASTACAFT) 0.25 % SOLN, Apply 1 drop to eye daily as needed Indications: Eye Allergy Both eyes  aspirin 81 MG EC tablet, Take 81 mg by mouth daily. With food  Indications: Anticoagulant Therapy  azelastine (ASTELIN) 137 MCG/SPRAY nasal spray, 1 spray by Nasal route 2 times daily as needed Indications: Allergic Rhinitis Use in each nostril as directed  furosemide (LASIX) 40 MG tablet, Take 40 mg by mouth daily. Indications: Treatment with Diuretic Therapy  folic acid (FOLVITE) 1 MG tablet, Take 1 mg by mouth daily. Indications: Folic Acid Supplementation  OXYGEN, 2 L by Nasal route continuous Indications: Chronic Obstructive Lung Disease   traZODone (DESYREL) 50 MG tablet, Take 50 mg by mouth nightly   nitroGLYCERIN (NITROSTAT) 0.4 MG SL tablet, Place 0.4 mg under the tongue every 5 minutes as needed. If third one does not relieve pain, call 9-1-1. Indications: Chest Pain  Diet    DIET GENERAL;  Allergies    Benadryl [diphenhydramine hcl]; Ciprofloxacin; Clarithromycin; Vitamin k; Atorvastatin; Captopril; Codeine; Iv dye [iodides]; Lipitor; Macrobid [nitrofurantoin monohydrate macrocrystals]; Neomycin-bacitracin zn-polymyx; Pravastatin; Zetia [ezetimibe]; Adhesive tape; Cephalexin; Doxycycline; Morphine;  Other; Propoxyphene; and Sulfa antibiotics  Family History          Adopted: Yes   Problem Relation Age of Onset    Heart Disease Father          AGE 35    Cancer Sister         breast     Sleep History    Never diagnosed with sleep apnea in the past    Social History     Social History     Socioeconomic History    Marital status:      Spouse name: Rirosalind Boylewillie Number of children: 3    Years of education: Not on file    Highest education level: Not on file   Occupational History    Not on file   Social Needs    Financial resource strain: Not on file    Food insecurity     Worry: Not on file     Inability: Not on file    Transportation needs     Medical: Not on file     Non-medical: Not on file   Tobacco Use    Smoking status: Current Every Day Smoker     Packs/day: 0.25     Years: 25.00     Pack years: 6.25     Types: Cigarettes    Smokeless tobacco: Never Used   Substance and Sexual Activity    Alcohol use: Yes     Alcohol/week: 1.0 standard drinks     Types: 1 Cans of beer per week     Comment: ocassionaly    Drug use: No    Sexual activity: Never   Lifestyle    Physical activity     Days per week: Not on file     Minutes per session: Not on file    Stress: Not on file   Relationships    Social connections     Talks on phone: Not on file     Gets together: Not on file     Attends Buddhism service: Not on file     Active member of club or organization: Not on file     Attends meetings of clubs or organizations: Not on file     Relationship status: Not on file    Intimate partner violence     Fear of current or ex partner: Not on file     Emotionally abused: Not on file     Physically abused: Not on file     Forced sexual activity: Not on file   Other Topics Concern    Not on file   Social History Narrative    Not on file       [place] of systems   General/Constitutional:  Recent weight loss: no. She gained ~30lbs of weight in the last 1week. Appetite changes: Normal.  Fever:no        Chills:no   HENT: Negative. Eyes: Negative. Upper respiratory tract: No nasal stuffiness with no post nasal drip. Lower respiratory tract/ lungs: see HPI for details. No hemoptysis. Cardiovascular: No palpitations or chest pain. Gastrointestinal: No nausea or vomiting. Neurological: No focal neurologiacal weakness. Extremities: No edema.   Musculoskeletal: No complaints. Genitourinary: No complaints. Hematological: Negative. Psychiatric/Behavioral: Negative. Skin: No itching. Vitals     height is 5' 2\" (1.575 m) and weight is 167 lb 4.8 oz (75.9 kg). Her oral temperature is 96.6 °F (35.9 °C). Her blood pressure is 96/60 and her pulse is 75. Her respiration is 18 and oxygen saturation is 90%. Body mass index is 30.6 kg/m². SUPPLEMENTAL O2: O2 Flow Rate (L/min): 5 L/min     I/O        Intake/Output Summary (Last 24 hours) at 11/12/2020 1724  Last data filed at 11/12/2020 1050  Gross per 24 hour   Intake 500 ml   Output 600 ml   Net -100 ml     I/O last 3 completed shifts: In: 500 [P.O.:500]  Out: 600 [Urine:600]   Patient Vitals for the past 96 hrs (Last 3 readings):   Weight   11/12/20 0527 167 lb 4.8 oz (75.9 kg)   11/11/20 2130 171 lb (77.6 kg)   11/11/20 0615 168 lb 11.2 oz (76.5 kg)       Exam   General Appearance: moderately built, moderately nourished in no acute distress on O2 via nasal cannula at 5Lpm  HEENT: Normal, Head is normocephalic, atraumatic. Oropharynx is clear and moist.  No oral thrush. PERRL  Neck - Supple, Elevated JVD present. No tracheal deviation. Lungs - Bilateral air entry present. Good breath sounds on both sides of chest. No wheezes. Bilateral basal rales. Cardiovascular - Heart sounds are normal.  Regular rhythm normal rate without murmur, gallop or rub. Abdomen - Soft, obese, nontender, nondistended, no masses or organomegaly  Neurologic - Awake, alert, oriented. There are no focal motor or sensory deficits. Extremities - No cyanosis, clubbing. Bilateral 2+ leg edema with erythema on her left leg. Musculoskeletal: Normal range of motion. Patient exhibits no tenderness. Lymphadenopathy:  No cervical adenopathy. Psychiatric: Patient  has a normal mood and affect. Skin - No bruising or bleeding.     Labs  - Old records and notes have been reviewed in [facility]   AB  Lab Results   Component Value Date    PH 7.42 01/05/2020    PO2 60 01/05/2020    PCO2 39 01/05/2020    HCO3 25 01/05/2020    O2SAT 91 01/05/2020     Lab Results   Component Value Date    IFIO2 3 01/05/2020     CBC  Recent Labs     11/12/20  1320   WBC 10.4   RBC 2.72*   HGB 8.3*   HCT 27.8*   .2*   MCH 30.5   MCHC 29.9*      MPV 10.9      BMP  Recent Labs     11/11/20  0502      K 4.1      CO2 29   BUN 29*   CREATININE 0.9   GLUCOSE 129*   CALCIUM 8.3*     LFT  No results for input(s): AST, ALT, ALB, BILITOT, ALKPHOS, LIPASE in the last 72 hours. Invalid input(s): AMYLASE  TROP  Lab Results   Component Value Date    TROPONINT < 0.010 11/01/2020    TROPONINT 0.016 02/22/2020    TROPONINT 0.021 02/22/2020     BNP  No results for input(s): BNP in the last 72 hours. Lactic Acid  No results for input(s): LACTA in the last 72 hours. INR  Recent Labs     11/10/20  0613 11/11/20  0505 11/12/20  0556   INR 2.05* 2.54* 2.72*     PTT  No results for input(s): APTT in the last 72 hours. Glucose  No results for input(s): POCGLU in the last 72 hours. UA No results for input(s): SPECGRAV, PHUR, COLORU, CLARITYU, MUCUS, PROTEINU, BLOODU, RBCUA, WBCUA, BACTERIA, NITRU, GLUCOSEU, BILIRUBINUR, UROBILINOGEN, KETUA, LABCAST, LABCASTTY, AMORPHOS in the last 72 hours. Invalid input(s): CRYSTALS. PFTs 2017             Sleep studies   None in Epic    Cultures    None    Echocardiogram     [phone] 2/24/2020   Narrative & Impression      Transthoracic Echocardiography Report (TTE)     Conclusions      Summary   Left ventricle size is normal.   Normal left ventricular wall thickness. Ejection fraction is visually estimated in the range of 30% to 35%. Apical anteroseptal wall segments were not clearly visualized. Moderate myocardial infarction of the anteroseptal LV.       Signature      ----------------------------------------------------------------   Electronically signed by Derrell Garcia MD (Interpreting   physician) on 02/24/2020 at 09:42 AM   ----------------------------------------------------------------    Radiology    Chest Xray: Nov 11, 2020  PROCEDURE: XR CHEST (2 VW)   1. Cardiomegaly. Permanent pacemaker/defibrillator. 2. Mildly increased interstitial markings in both lungs, consistent with interstitial edema/pneumonia with underlying element of interstitial fibrosis. 3. Overall appearance of chest has improved since prior.   //  Oct 22, 2019   PROCEDURE: XR CHEST 1 VIEW   Stable radiographic appearance of the chest. No evidence of an acute process. CT Scans  (See actual reports for details)  Nov 1, 2020   PROCEDURE: CT CHEST WO CONTRAST   Right larger than left pleural effusions. Bilateral areas of compressive atelectasis and interstitial edema interstitial pneumonic infiltrates and developing consolidation are not excludable. This most likely at the posterior right upper lung.        PROCEDURE: CT CHEST WO CONTRAST, CT ABDOMEN PELVIS WO CONTRAST, CT THORACIC RECONSTRUCTION WO POST PROCESS, CT LUMBAR RECONSTRUCTION WO POST PROCESS  CLINICAL INFORMATION: fall. Harvie Bacca today and struck head. Left hip pain.   COMPARISON: CT abdomen pelvis dated 9/26/2019 and CT chest dated 2/22/2019.     1. No evidence of acute intracranial thoracic, intra-abdominal or intrapelvic abnormality. 2. No evidence of acute osseous injury of the thoracic or lumbar spine. 3. Angulated subcapital femoral fracture on the left with overlying subcutaneous hematoma.        Venous duplex scan: Oct 23, 2019   PROCEDURE: VL DUP LOWER EXTREMITY VENOUS BILATERAL   No evidence of deep venous thrombosis in either lower extremity. Assessment   -Hemoptysis of uncertain etiology. Differential includes due to current anticoagulation with the Coumadin Vs epistaxis Vs other etiologies. -Moderate COPD- under moderate control.  -Chronic tobacco smoking in the past.  -Severe persistent bronchial asthma currently on treatment with Xolair.   She follows with Dr. Nereida Hernandez MD [place]  -S/p Pacemker/defib placement by MD Kelly. No echocardiogram in Epic. -CAD (coronary artery disease). -GERD (gastroesophageal reflux disease). -Hypertension.  -Systolic CHF, acute on chronic (HCC)  -Left lower leg cellulitis S/p treatment with antibiotics. Dr. Logan Flores MD is following from ID service    Plan   -Need optimization of her congestive heart failure by primary service.  -Continue patient on pulmicort 0.5mg via neb bid.  -Continue patient on Brovana 15 mcg via nebulization twice daily.  -Continue Spiriva Respimat 2puffs daily. This is a replacement for Yupelri (Revefenacin inhaled) 175mcg/3ml via nebs daily- she takes at home.  -Continue Xopenex 0.63 mg via nebulization every 8 hourly as needed.  -We will request a respirator therapist to provide humidification for her oxygen supplementation to prevent dryness of nose. -We will monitor patient for further hemoptysis. -Will hold off on Coumadin for now.  -We will consider starting patient on heparin drip to plan thoracentesis on her right side of the chest by interventional radiology service once INR is less than 2 if there is a significant pleural effusion by ultrasound of the chest.  -Will order ultrasound of the chest to check for the adequacy of pleural effusion to plan thoracentesis. -Follow CBC and INR and labs in a.m.  -Will request Dr. Jennifer Moss MD to resume care in AM.  -Acapella Q4h as tolerated. -Titrate Oxygen to keep Spo2 >90%. -Deep Venous Thrombosis Prophylaxis: Coumadin. \"Thank you for asking us to see this patient\"     -Case discussed with registered nurse.  -Gopal Escamilla and her daughter were educated about my impression and plan. They verbalizes understanding. Questions and concerns addressed.     Electronically signed by   Dagoberto Gomez MD on 11/12/2020 at 5:24 PM

## 2020-11-12 NOTE — PROGRESS NOTES
trouble swallowing and voice change. Eyes: Negative for visual disturbance. Respiratory: Positive for shortness of breath. Negative for cough. Cardiovascular: Positive for leg swelling. Gastrointestinal: Negative for anal bleeding, blood in stool, constipation and diarrhea. Endocrine: Negative for cold intolerance. Genitourinary: Negative for frequency and urgency. Musculoskeletal: Positive for gait problem and myalgias. Negative for back pain. Skin: Positive for wound. Negative for pallor. Allergic/Immunologic: Negative. Neurological: Positive for weakness. Negative for dizziness, speech difficulty, numbness and headaches. Hematological: Negative. Psychiatric/Behavioral: Negative for confusion, decreased concentration and dysphoric mood. The patient is not nervous/anxious. All other systems reviewed and are negative. Objective:  BP (!) 93/50   Pulse 72   Temp 97.7 °F (36.5 °C) (Oral)   Resp 16   Ht 5' 2\" (1.575 m)   Wt 167 lb 4.8 oz (75.9 kg)   SpO2 93%   Breastfeeding No   BMI 30.60 kg/m²   CURRENT VITALS:  height is 5' 2\" (1.575 m) and weight is 167 lb 4.8 oz (75.9 kg). Her oral temperature is 97.7 °F (36.5 °C). Her blood pressure is 93/50 (abnormal) and her pulse is 72. Her respiration is 16 and oxygen saturation is 93%. Body mass index is 30.6 kg/m².   Temperature Range (24h):Temp: 97.7 °F (36.5 °C) Temp  Av.2 °F (36.2 °C)  Min: 96.6 °F (35.9 °C)  Max: 97.7 °F (36.5 °C)  BP Range (42V): Systolic (19HOD), AFT:80 , Min:93 , CZH:530     Diastolic (21ZSJ), ANC:62, Min:42, Max:60    Pulse Range (24h): Pulse  Av  Min: 72  Max: 75  Respiration Range (24h): Resp  Av.3  Min: 16  Max: 18  Current Pulse Ox (24h):  SpO2: 93 %  Pulse Ox Range (24h):  SpO2  Av %  Min: 90 %  Max: 97 %  Oxygen Amount and Delivery: O2 Flow Rate (L/min): 4 L/min    awake  Orientation:   person, place, time, situation  Mood: within normal limits  Affect: flat  General appearance:  in no acute distress, glasses and [place], in bed    Memory:   grossly normal  Attention/Concentration: normal  Language:  normal    ROM:  Normal  Motor Exam:  Motor exam is 4 out of 5 all extremities with the exception of left ankle not tested    Sensory:  Sensory intact  Coordination:   normal  Deep Tendon Reflexes:  Reflexes are intact and symmetrical bilaterally with left Achilles tendon not tested    Skin: warm and dry, no rash with mild erythema involving the left ankle to approximately mid calf  Peripheral vascular: Pulses: Normal upper and diminished lower extremity pulses; Edema: 3+ pedal    Diagnostics:   Recent Results (from the past 24 hour(s))   Protime-INR    Collection Time: 11/12/20  5:56 AM   Result Value Ref Range    INR 2.72 (H) 0.85 - 1.13   Procalcitonin    Collection Time: 11/12/20  1:20 PM   Result Value Ref Range    Procalcitonin 0.10 (H) 0.01 - 0.09 ng/mL   CBC Auto Differential    Collection Time: 11/12/20  1:20 PM   Result Value Ref Range    WBC 10.4 4.8 - 10.8 thou/mm3    RBC 2.72 (L) 4.20 - 5.40 mill/mm3    Hemoglobin 8.3 (L) 12.0 - 16.0 gm/dl    Hematocrit 27.8 (L) 37.0 - 47.0 %    .2 (H) 81.0 - 99.0 fL    MCH 30.5 26.0 - 33.0 pg    MCHC 29.9 (L) 32.2 - 35.5 gm/dl    RDW-CV 15.5 (H) 11.5 - 14.5 %    RDW-SD 54.9 (H) 35.0 - 45.0 fL    Platelets 334 054 - 929 thou/mm3    MPV 10.9 9.4 - 12.4 fL    Seg Neutrophils 74.4 %    Lymphocytes 9.5 %    Monocytes 11.7 %    Eosinophils 0.4 %    Basophils 0.2 %    Immature Granulocytes 3.8 %    Segs Absolute 7.7 1.8 - 7.7 thou/mm3    Lymphocytes Absolute 1.0 1.0 - 4.8 thou/mm3    Monocytes Absolute 1.2 0.4 - 1.3 thou/mm3    Eosinophils Absolute 0.0 0.0 - 0.4 thou/mm3    Basophils Absolute 0.0 0.0 - 0.1 thou/mm3    Immature Grans (Abs) 0.40 (H) 0.00 - 0.07 thou/mm3    nRBC 2 /100 wbc     Labs Renal Latest Ref Rng & Units 11/11/2020 11/9/2020 11/4/2020 11/3/2020 11/2/2020   BUN 7 - 22 mg/dL 29(H) 38(H) 40(H) 36(H) 26(H)   Cr 0.4 - 1.2 mg/dL 0.9 0.7 1.1 1.0 0.9   K 3.5 - 5.2 meq/L 4.1 4.1 4.1 4.2 4.8   Na 135 - 145 meq/L 139 146(H) 139 141 138      Recent Labs     11/12/20  1320 11/09/20  1014 11/04/20  0534   WBC 10.4 15.1* 7.8   HGB 8.3* 8.6* 9.5*   HCT 27.8* 28.7* 31.1*   .2* 101.8* 101.6*    183 180      Results for Palomo Hamilton (MRN [id number]) as of 11/12/2020 02:39   Ref. Range 11/1/2020 11:38 11/9/2020 10:14 11/11/2020 05:02   Pro-BNP Latest Ref Range: 0.0 - 1800.0 pg/mL 60924.0 (H) 9817.0 (H) 6830.0 (H)     Xr Chest (2 Vw)    Result Date: 11/5/2020  PROCEDURE: XR CHEST (2 VW) CLINICAL INFORMATION: Lung infiltrates. COPD. Atrial fibrillation. Dyspnea. COMPARISON: 10/30/2020 TECHNIQUE: AP upright and lateral views of the chest were obtained. 1. Moderate cardiomegaly. Permanent pacemaker/defibrillator. Small bilateral pleural effusions best seen posteriorly. 2. Mild interstitial infiltrates right mid and lower lung fields, consistent with interstitial pneumonitis. Appearance improved from prior. **This report has been created using voice recognition software. It may contain minor errors which are inherent in voice recognition technology. ** Final report electronically signed by Dr. Rhea Mir on 11/5/2020 12:35 PM    Xr Chest (2 Vw)    Result Date: 11/9/2020  PROCEDURE: XR CHEST (2 VW) CLINICAL INFORMATION: 2 view chest for shortness of breath, resolving pneumonia. COMPARISON: 11/5/2020 TECHNIQUE: AP upright and lateral views of the chest were obtained. 1. Moderate cardiomegaly. Permanent pacemaker/defibrillator. 2. Small bilateral pleural effusions. Mildly increased interstitial markings both mid and lower lung fields, consistent with interstitial edema/pneumonia likely with underlying element of interstitial fibrosis. . 3. Overall appearance of chest slightly worse than prior. **This report has been created using voice recognition software. It may contain minor errors which are inherent in voice recognition technology. ** Final more doses. Completed on 11/5.  3. Pain control. 1. Tylenol. 2. Norco.  3. Flexeril. 4. Gabapentin. 2. Culturelle. 3. Physical deconditioning/Gait instability. 1. PT/OT. 2. Tinetti 19/28. Improved to 20/28. Risk Indicators: Less than/equal to 18 = high risk; 19-23 Moderate risk; Greater than/equal to 24 = low risk. 4. Septic shock secondary to left lower leg cellulitis. 1. WBCs normal at 5.7 on 11/3. Increased to 15.1 on 11/9 without signs of infection based on current chest x-ray. WBCs normal on 11/12 at 10.4. Procalcitonin 0.10 and thus not suggestive of a pneumonia. 5. Nutrition:  Consultation to dietician for nutritional counseling and recommendations. 1. Total protein 5.7 and albumin 3.0. Low.  2. Vitamin D 25 hydroxy was 36 on 1/5/2020. Normal at 44 on 11/4/2020.  6. Electrolytes. 1. Normal on 11/9.  7. Acute kidney injury/CKD 2.  1. Patient had normal renal function at the beginning of 2020 with a persistence of GFR suggestive of CKD 2. During this hospitalization Cr/BUN/GFR has worsened to 1.1/40/48 from 1.0/36/54. Improved on 11/9 to 0.7/38/81. Worsened to 0.9/29/61 on 11/11. 2. Encourage fluids  8. Oral thrush. 1. Nystatin x5 days. Completed. 9. Insomnia. 1. Trazodone 50 mg nightly. 10. Bladder: No issues  11. Bowel: Senna, colace, MOM  1. GlycoLax. 2. Bisacodyl suppository. 12. Rehabilitation nursing will be involved for bowel, bladder, skin, and pain management. Nursing will also provide education and training to patient and family. 13. Prophylaxis:  DVT: Coumadin. GI: Protonix. 14.  and case management consultations for coordination of care and discharge planning. Chronic Problems:  1. Atrial fibrillation on chronic anticoagulation with Coumadin. 1. Coumadin to be dosed by the pharmacy. 2. Last INR was 2.72 on 11/12/2020.  2. Biventricular implanted cardioverter-defibrillator  3. Coronary artery disease.   1. ASA 81 mg.  4. COPD on chronic home oxygen at 2 L. Follows with Dr. Erickson Taylor. 1. Dr. Erickson Taylor following during this admission. 2. Ivis Axel. 3. Pulmicort. 4. Xopenex. 5. Xolair injection every 28 days with next injection on 11/30. 6. Spiriva Respimat. 7. Patient normally on 2 L chronically at home. Current oxygen setting is 4 L on 11/12. 5. Hyperlipidemia. 1. No current medication. 6. History of prior left displaced femoral neck fracture status post hemiarthroplasty by Dr. Norman Crum on 10/24/2019.  7. History of prior closed head injury. [facility] Cognitive Assessment [facility]) version 8.3 completed. Patient scored 21/30 on 11/4.  8. History of prior myocardial infarction, 1994.  9. Chronic systolic congestive heart failure with ejection fraction of 35 to 40%/Hypertension. 1. Digoxin. 2. Lasix. 3. Toprol-XL. 4. Entresto. 5. Evolucumab. 6. proBNP remains elevated on 11/9 at 9817 with only mild improvement over prior lab on 11/1 of 10,721. Decreased to 6830 on 11/11. 1. Lasix has been increased to 40 mg twice daily. IV line placed on 11/10 so that p.o. Lasix could be changed to IV Lasix at same dosing. 2. Chest x-ray on 11/9 consistent with likely interstitial edema. 3. Chest x-ray on 11/11 showed improvement; but still with interstitial edema. 4. Ultrasound of the chest on 11/12 showed small to moderate bilateral pleural effusions. 5. Pulmonology planning on possible thoracenteses by Interventional Radiology tomorrow. In preparation for this Coumadin has been held. 10. Mild peripheral vascular disease per vascular OMAYRA bilateral study dated 2/25/2020. 11. Severe left external carotid artery stenosis and left subclavian artery stenosis/occlusion per vascular carotid bilateral ultrasound on 4/1/2014. 12. Mild to moderate central spinal stenosis at L5-L6 with moderate right S1 lateral recess stenosis and findings of 6 lumbar type vertebral body per CT of the lumbar spine without contrast on 10/19/2012. 13. Hemorrhoids.   1. No current medication ordered. 14. Anxiety. 1. No current medication ordered. 15. Osteopenia per DEXA bone scan on 12/7/2015 with medium fracture risk. 1. Vitamin D 25 hydroxy level was normal on 1/5/2020. 16. Chronic constipation. 1. Patient normally on Linzess at home. 17. Chronic iron deficiency anemia. 1. Receives IV iron transfusions. Last iron on 11/3 was 173 with a ferritin of 305. 2. Hemoglobin 9.5 on 11/4 which is stable over 9.3 on 11/3. Hemoglobin slightly decreased to 8.6 on 11/9. Stable at 8.3 on 11/12.  18. Current every day smoker. 1. Patient not interested in smoking cessation. Labs reviewed on:  1. 11/3.  2. 11/4.  3. 11/9.  4. 11/11.  5. 11/12. Infectious Disease:  1. N/A    Missed Therapy Time:  11/5. Physical therapy missed 30 minutes while the patient was off of the unit to obtain a chest x-ray. DME:    Discharge Plan:  Estimated Discharge Date: 11/13   Destination: home health  Services at Discharge: [address] [employer], Occupational Therapy, Nursing and an aide 2x week  Is patient appropriate for an outpatient driving evaluation? no  Equipment at Discharge: None    Greater than 50% of 40 minutes was spent with the patient and daughter reviewing her recent x-rays and labs, reconsulting her pulmonologist was unable to come and consulting a Pulmonologist in this hospital, asking Infectious Disease service to see the patient again due to her mild increase in redness and edema in her left leg, and reviewing the potential plan for discharge tomorrow if no procedures are planned.     Florina Ansari MD

## 2020-11-12 NOTE — PROGRESS NOTES
Progress note: Infectious diseases    Patient - Quentin Parikh,  Age - 67 y.o.    - 1945      Room Number - 6E-56/200-A   MRN -  [id number]   Acct # - [de-identified]  Date of Admission -  11/3/2020  2:20 PM    SUBJECTIVE:   I was asked to see her to evaluate her leg  She was recently seen for left lower leg cellulites  No report of fever  She has been having shortness of breath and hemoptysis  OBJECTIVE   VITALS    height is 5' 2\" (1.575 m) and weight is 167 lb 4.8 oz (75.9 kg). Her oral temperature is 96.6 °F (35.9 °C). Her blood pressure is 108/51 (abnormal) and her pulse is 72. Her respiration is 18 and oxygen saturation is 96%. Wt Readings from Last 3 Encounters:   20 167 lb 4.8 oz (75.9 kg)   20 159 lb (72.1 kg)   20 140 lb (63.5 kg)       I/O (24 Hours)    Intake/Output Summary (Last 24 hours) at 2020 1804  Last data filed at 2020 1050  Gross per 24 hour   Intake 500 ml   Output 600 ml   Net -100 ml       General Appearance  Awake, alert, oriented,  Chronically sick looking  HEENT - normocephalic, atraumatic, pale conjunctiva,  anicteric sclera  Neck - Supple, no mass  Lungs -  Bilateral   air entry,crackles on both lung fields  Cardiovascular - Heart sounds are normal.  Regular rate and rhythm without murmur, gallop or rub.   Abdomen - soft, not distended, nontender,   Neurologic -oriented  Skin - No bruising or bleeding  Extremities - she has mottling of the skin, has edema on both legs  The left leg is red but much improved from the recent hospitalization, pitting edema  Unable to feel the pulse, cold extremites    MEDICATIONS:      omalizumab  225 mg Subcutaneous Q14 Days    furosemide  40 mg Intravenous BID    nicotine  1 patch Transdermal Q24H    warfarin (COUMADIN) daily dosing (placeholder)   Other RX Placeholder    hydrocortisone  25 mg Rectal BID    Arformoterol Tartrate  15 mcg Nebulization BID    aspirin  81 mg Oral Daily    budesonide  500 mcg Nebulization BID    digoxin  125 mcg Oral Every Other Day    docusate sodium  100 mg Oral Daily    [START ON 11/17/2020] Evolocumab  140 mg Subcutaneous Q14 Days    gabapentin  100 mg Oral TID    metoprolol succinate  25 mg Oral Daily    pantoprazole  40 mg Oral BID AC    sacubitril-valsartan  1 tablet Oral BID    senna  1 tablet Oral Nightly    tiotropium  2 puff Inhalation Daily    traZODone  50 mg Oral Nightly       levalbuterol, polyethylene glycol, bisacodyl, acetaminophen, cyclobenzaprine, HYDROcodone 5 mg - acetaminophen, HYDROcodone 5 mg - acetaminophen, magnesium hydroxide      LABS:     CBC:   Recent Labs     11/12/20  1320   WBC 10.4   HGB 8.3*        BMP:    Recent Labs     11/11/20  0502      K 4.1      CO2 29   BUN 29*   CREATININE 0.9   GLUCOSE 129*     Calcium:  Recent Labs     11/11/20  0502   CALCIUM 8.3*     Ionized Calcium:No results for input(s): IONCA in the last 72 hours. Magnesium:No results for input(s): MG in the last 72 hours. Phosphorus:No results for input(s): PHOS in the last 72 hours. BNP:No results for input(s): BNP in the last 72 hours. Glucose:No results for input(s): POCGLU in the last 72 hours. HgbA1C: No results for input(s): LABA1C in the last 72 hours. INR:   Recent Labs     11/10/20  0613 11/11/20  0505 11/12/20  0556   INR 2.05* 2.54* 2.72*     Hepatic: No results for input(s): ALKPHOS, ALT, AST, PROT, BILITOT, BILIDIR, LABALBU in the last 72 hours. Amylase and Lipase:No results for input(s): LACTA, AMYLASE in the last 72 hours. Lactic Acid: No results for input(s): LACTA in the last 72 hours. Troponin: No results for input(s): CKTOTAL, CKMB, TROPONINI in the last 72 hours. BNP: No results for input(s): BNP in the last 72 hours.     CULTURES:   UA: No results for input(s): SPECGRAV, [address], [address], [place], [address] [facility] [address] [place] [address], [address], BACTERIA, NITRU, GLUCOSEU, BILIRUBINUR, UROBILINOGEN, KETUA, LABCAST, LABCASTTY, AMORPHOS in the last 72 hours. Invalid input(s): CRYSTALS  Micro:   Lab Results   Component Value Date    BC No growth-preliminary No growth  10/25/2020         IMAGING:         Problem list of patient:     Patient Active Problem List   Diagnosis Code    MI (myocardial infarction) ([facility] I21.9    Chronic obstructive pulmonary disease ([facility] J44.9    Hyperlipidemia E78.5    Hypertension I10    Seasonal allergic rhinitis J30.2    Heartburn R12    Persistent atrial fibrillation ([facility]) Q44.69    Systolic CHF, acute on chronic ([facility]) I50.23    Biventricular implantable cardioverter-defibrillator in situ Z95.810    Anticoagulated on Coumadin Z79.01    Arteriosclerosis of coronary artery I25.10    Left displaced femoral neck fracture ([facility]) S72.002A    Closed head injury S09.90XA    Class 1 obesity due to excess calories with serious comorbidity and body mass index (BMI) of 30.0 to 30.9 in adult E66.09, Z68.30    CKD (chronic kidney disease) stage 2, GFR 60-89 ml/min N18.2    S/p left hip fracture Z87.81    Chronic systolic CHF (congestive heart failure) ([facility]) I50.22    IBS (irritable bowel syndrome) K58.9    Acute blood loss as cause of postoperative anemia D62    Atelectasis of left lung J98.11    Insomnia G47.00    Chest pain R07.9    Rectal bleeding K62.5    Acute on chronic anemia D64.9    Vaginitis N76.0    Digitalis toxicity T46.0X1A    GI bleed K92.2    Coagulopathy ([facility]) D68.9    Bilateral leg edema R60.0    Azotemia R79.89    Hypoalbuminemia E88.09    Urinary tract infection with hematuria N39.0, R31.9    Septic shock ([facility]) A41.9, R65.21    Pacemaker generator end of life Z45.010    Necrotizing fasciitis ([facility]) M72.6    Left leg pain M79.605    Cellulitis of left lower extremity L03. 116    Physical deconditioning R53.81    Physical debility R53.81    Interstitial pneumonia ([facility] J84.9    Thrush B37.0    Chronic iron deficiency anemia D50.9    Anxiety F41.9    Permanent atrial fibrillation ([facility]) I48.21         ASSESSMENT/PLAN   COPD exacerbation  CHF with cardiomyopathy  Lower leg edema  Recent left leg cellulites: improved  PVD with mottling of skin  Will apply compression wrap and re-assess the left leg. She has multiple comorbidites:some of her medical issues are not fixable: her COPD with fibrosis.       Bibiana Zarco MD, [address] 11/12/2020 6:04 PM

## 2020-11-12 NOTE — PROGRESS NOTES
[address]  Occupational Therapy  Daily Note  Time:   Time In: 845  Time Out: 945  Timed Code Treatment Minutes: 60 Minutes  Minutes: 60    Date: 2020  Patient Name: Cinthia Sandy,   Gender: female      Room: [facility]59/059-A  MRN: [id number]  : 1945  (67 y.o.)  Referring Practitioner: Dr. Tristan Lane  Diagnosis: left lower extremity cellulitis with physical deconditioning  Additional Pertinent Hx: She was admitted 10/25/2020 for complaint of pain, swelling and erythema over the left lower limb. Patient felt that when she takes off her JEANE hose used for her chronic systolic congestive heart failure she sometimes catches her nails on her skin and that this may have contributed to the infection. Initial imaging was concerning for possible necrotizing fasciitis but with absence of intrafascial gas this diagnosis was ruled out. She did have issues with hypotension and required admission to the ICU. She was started on vancomycin and Zosyn with plan by infectious disease to transition to oral antibiotics at time of discharge. Patient's medical course was further complicated by COPD exacerbation with increased oxygen requirements above her baseline 2 L at home and acute on chronic systolic congestive heart failure, as well as a pneumonia. Restrictions/Precautions:  Restrictions/Precautions: General Precautions, Fall Risk     SUBJECTIVE: Patient EOB upon arrival, beginning nursing medication. Nursing reported that pt was found to be wheezing, c/o SOB and stating she could not breath. Pt was on 5L 02 via NC and was stating at 85. Nursing switched patient to non re-breather at 15L during BADLs and pt stated back up to normal limits. Nursing ok'ed to cont with BADL session and switched back back to NC, however at end of session, pt again began to c/o SOB and patient was placed on Venturi mask by nursing. At EOS, HR was also dropping to 50's.    RN Kristy Serrano was present during majority of session and aware. Dr. Jannette Rodríguez also aware. PAIN: 10/10: L foot. Nursing aware. Pt denied ice, however ice was provided due to swelling & pain. COGNITION: WFL    ADL:   EATING:Independent. Michael Darron CARE Score: 6. ORAL HYGIENE:Independent. Sergiocindy Darron CARE Score: 6. TOILETING HYGIENE:Supervision or touching assistance. Sergiocindy Darron CARE Score: 4. SHOWERING/BATHING:Supervision or touching assistance. Sergiocindy Darron CARE Score: 4.     UPPER BODY DRESSING:Independent. donned without issues EOB. Sergiocindy Darron CARE Score: 6. LOWER BODY DRESSING:Supervision or touching assistance. SBA balance d/t shortness of breaht. CARE Score: 4. FOOTWEAR:Setup or clean-up assistance   . CARE Score: 5.     TOILET TRANSFER: Partial/moderate assistance. Michael Rob CARE Score: 3. BALANCE:  Sitting Balance:  Independent. Standing Balance: Stand By Assistance. BED MOBILITY:  Not Tested    TRANSFERS:  Sit to Stand:  Stand By Assistance. EOB, recliner, ETS. Stand to Sit: Stand By Assistance. FUNCTIONAL MOBILITY:  Assistive Device: Rolling Walker  Assist Level:  Contact Guard Assistance. Distance: To and from bathroom  Close CGA provided as pt was having difficulty with breathing. ASSESSMENT:  Activity Tolerance:  Patient tolerance of  treatment: poor. Limited by medical issues, patient is not as appropriate at this time for skilled therapy than previously -- will cont to monitor. Discharge Recommendations: Home with nursing aide, Home with Home health OT   Equipment Recommendations: Equipment Needed: No  Other: Pt owns BSC and shower chair. Pt has grab bars in the shower.   Plan: Times per week: 5xs week x 90 min, 1 x week x 30 min  Current Treatment Recommendations: Strengthening, Endurance Training, Balance Training, Functional Mobility Training, Patient/Caregiver Education & Training, Equipment Evaluation, Education, & procurement, Self-Care / ADL, Safety Education & Training, Home Management Training    Patient Education  Patient Education: ADL's and Education Related to [employer] and [facility]ness    Goals  Short term goals  Time Frame for Short term goals: 1 week  Short term goal 1: PT will complete ADL routine with setup and no  > min cues for energy conservation or attention to task  to increase ability to complete self care tasks  Short term goal 2: Pt will complete standing tolerance x 4 minutes with 1-2 UE release and S to increase indep and safety with all grooming. Short term goal 3: Pt will complete functional ambulation to and from the BR as well as around obstacles with SBA and 0-2cues for RW safety to increase independence in home mobility to/ fropm the BR  Short term goal 4: Pt will complete UE light strengthening ex x 10 reps with HEP to increase strength/ endurance   needed for safe light IADL tasks  Long term goals  Time Frame for Long term goals : 2-3 weeks  Long term goal 1: Pt will complete ADL routine with S and no  cues for energy conservation or safe tech to increase independence in self care tasks  Long term goal 2: Pt will complete 2 step homemaking tasks with S and 0-1 cues for safe tech to increase ability to prepare a snack. Following session, patient left in safe position with all fall risk precautions in place.

## 2020-11-12 NOTE — PROGRESS NOTES
[address] [address]  INPATIENT PHYSICAL THERAPY  DAILY NOTE  3 [facility]    Time In: 1330  Time Out: 1400  Timed Code Treatment Minutes: 30 Minutes  Minutes: 30          Date: 2020  Patient Name: Tab Pyle,  Gender:  female        MRN: [id number]  : 1945  (67 y.o.)     Referring Practitioner: Dr. Felix Velazco  Diagnosis: Physical Debility  Additional Pertinent Hx: Per EMR, Teto Rosado is a 67 y.o. female with an extensive medical history including, congestive heart failure, COPD, A. Fib., Pacemaker placement post MI, hypertension, anemia, and IBS who presents to the ED for an evaluation of severe left lower extremity pain, redness, and warmth that started yesterday evening. The patient states that yesterday morning she started feeling some discomfort to her lower extremity, but believed it was due to her compression stockings she wears for CHF. Yesterday evening she removed the stockings and saw that her left lower leg was extremely red, which has continued to spread, becoming more red, and even more painful. Denies any recent trauma, falls, accidents, or wounds to the lower extremity but states that she occasionally scratches her legs with her nails when taking on and off the compression stockings. The patient has attempted tylenol, percocet, biofreeze, and icyhot yesterday with no relief. 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 Endorses history of blood clots, but is on coumadin which was last checked approximately 1 week ago and was in theraputic range.  Further endorses feeling feverish and chills, increased shortness of breath, worsening left lower extremity pain, the inability to bear weight at this time, and denies numbness or tingling to the lower extremities, diabetes mellitus, previous toward established goals. Patient worried about d/c home scheduled for tomorrow, is worried about readmission and difficulty raising her Spo2 levels this AM. Pt fatigued quickly but recovered well, Spo2 WFL throughout. Activity Tolerance:  Patient tolerance of  treatment: fair. Pt fatigued easily during session, recovered with rest.      Equipment Recommendations:Equipment Needed: No(Pt has 4WW, RW, w/c and cane)  Discharge Recommendations:  Continue to assess pending progress, Patient would benefit from continued therapy after discharge, Home with Home health PT    Plan: Times per week: 5x/wk 90 min, 1x/ wk 30 min  Current Treatment Recommendations: Strengthening, Functional Mobility Training, Transfer Training, Endurance Training, Balance Training, Stair training, Gait Training, Home Exercise Program, Safety Education & Training, Equipment Evaluation, Education, & procurement, Patient/Caregiver Education & Training    Patient Education  Patient Education: Home Exercise Program    Goals:  Patient goals : get my legs stronger to walk better. Short term goals  Time Frame for Short term goals: 1 wk  Short term goal 1: Pt to go supine <->sit, SBA, to get in/out of bed, no rail. - GOAL MET, SEE LTG  Short term goal 2: Pt to get up/down from various seated surfaces, S to get up to walk. Short term goal 3: Pt to walk with RW >= 100 ft, SBA to progress to home and community mobility  Short term goal 4: negotiate 4 steps with HR and CGA to enter home safely  Short term goal 5: Pt to get in/out of car, S for transportation needs. Long term goals  Time Frame for Long term goals : 3 wks  Long term goal 1: Pt to go supine <->sit, Mod I, to get in/out of bed, no rail. Long term goal 2: Pt to get up/down from various seated surfaces, Mod I, to get up to walk.  - GOAL MET, CONTINUE  Long term goal 3: Pt to walk with RW >= 150 ft, Mod I, to progress to home and community mobility  Long term goal 4: negotiate 4 steps with 1 HR

## 2020-11-12 NOTE — PROGRESS NOTES
Clinical Pharmacy Note    Warfarin consult follow-up    Recent Labs     11/12/20  0556   INR 2.72*     Recent Labs     11/09/20  1014   HGB 8.6*   HCT 28.7*          Significant Drug-Drug Interactions:  New warfarin drug-drug interactions: none  Discontinued drug-drug interactions: none  Current warfarin drug-drug interactions: aspirin, trazodone (HM)      Date INR Warfarin Dose   10/25-10/27   No Coumadin   10/28/2020 1.32 2 mg    10/29/2020 1.49  2 mg   10/30/2020  2  1.5 mg    10/31/2020   2.78   0.5 mg   11/1/2020   2.37  1.5 mg    11/2/2020  2.55 1 mg     11/3/2020  3.54   No Coumadin   11/4/2020  3.09  1 mg   11/5/2020 2.41 1.5 mg   11/6/2020 2.82 1 mg    11/7/2020  2.61  1.5 mg    11/8/2020   2.22                     1.5 mg   11/9/2020  2.12                      2 mg   11/10/2020 2.05  2 mg   11/11/2020  2.54  1.5 mg   11/12/2020 2.72  1.5 mg       Notes:                     Daily PT/INR until stable within therapeutic range.        Jose Luis Anne, PharmD   11/12/2020, 11:01 AM

## 2020-11-13 PROBLEM — I50.21 ACUTE SYSTOLIC CHF (CONGESTIVE HEART FAILURE) (HCC): Status: ACTIVE | Noted: 2020-01-01

## 2020-11-13 NOTE — PROGRESS NOTES
visited during rounding and pt refused spiritual care, stating she was 'not in the mood right now.'

## 2020-11-13 NOTE — PROGRESS NOTES
diabetes mellitus, previous lower leg infections, chest pain, abdominal pain, changes in her bowel or bladder function or habits. Patient still smokes 1/2 a pack of cigarettes daily and is on home oxygen. No alcohol or illicit drug use. \"     Prior Level of Function:  Lives With: Spouse  Type of Home: House  Home Layout: One level  Home Access: Stairs to enter with rails  Entrance Stairs - Number of Steps: 4  Entrance Stairs - Rails: Both(too wide to use at same time)  Home Equipment: Rolling walker, 4 wheeled walker, [employer]   Bathroom Shower/Tub: Walk-in shower, Shower chair with back  [employer]: Grab bars in shower, Hand-held shower, 3-in-1 commode  Bathroom Accessibility: Accessible    Receives Help From: Family  ADL Assistance: Independent  Homemaking Assistance: Needs assistance  Ambulation Assistance: Independent  Transfer Assistance: Independent  Active : Yes  Additional Comments: Pt reports that her spouse assists with IADL tasks, Pt completes own self care . Spouse does have to assist with compression stockings. Restrictions/Precautions:  Restrictions/Precautions: General Precautions, Fall Risk     SUBJECTIVE: Pt resting in bedside chair, pleasant and agreeable to therapy. PAIN: 0/10: Denies pain    OBJECTIVE:  Bed Mobility:  Not Tested    Transfers:  Sit to Stand: Supervision  Stand to Sit:Supervision    Ambulation:  Stand By Assistance  Distance: 60 feet x2   Surface: Level Tile  Device:Rolling Walker  Gait Deviations: Forward Flexed Posture, Slow Cassie, Decreased Step Length Bilaterally, Decreased Weight Shift Bilaterally, Decreased Gait Speed and Decreased Heel Strike Bilaterally. Min SOB at end of gait, O2 sats at 91% on 6L O2 with mobility. Exercise:  Patient was guided in 1 set(s) 15 reps of exercise to both lower extremities. Seated marches, Seated hamstring curls, Seated heel/toe raises, Long arc quads and Seated isometric hip adduction.   Exercises were in/out of car, Mod I, for transportation needs. Long term goal 6: Pt to score >= 22, indicating improved balance    Following session, patient left in safe position with all fall risk precautions in place.

## 2020-11-13 NOTE — H&P
History & Physical        Patient:  Carmela Patterson  YOB: 1945    MRN: [id number]     Acct: [de-identified]    PCP: Jhonatan Montano MD    Date of Admission: 11/3/2020    Date of Service: Pt seen/examined on 11/13/20  and Admitted to Inpatient with expected LOS greater than two midnights due to medical therapy. Chief Complaint:  Hypoxia    Assessment/Plan:    1. Acute on Chronic Resp failure likely 2/2 Pleural Effusions/Acute Systolic CHF exacerbation  - continue IV diuresis  - monitor I/o, cardiac, low salt diet  - daily weights  - wean o2 to baseline 2 L  - echo 2/2020 EF 30-35 % --> may need to repeat echo --> cath in 8/2020  - asa on hold due to planned thora  - continue bb, entresto,   - allergic to statin  - telemetry   - pulm discussed need for chest tube if needed. 2. CAD/Biventricular Pacemaker-AICD/HTN/HLD/CKD2/PVD  - meds as above    3. Permanent Atrial Fibrillation/Supratherapeutic INR  - Heparin on hold  - Getting FFP x1 and then recheck INR in am for thoracentesis. (allergic to Vit K)  - goal INR < or 2.5    4. COPD w/ ? Lung Fibrosis (on imaging)/Tobacco abuse  - pulmicort/Brovana/spriva/xopenex  - acapella q4h  - supplemental o2 to keep o2 sat >90    5. Hemoptysis  - pulm following  - continue to monitor    6. LE cellulitis s/p Septic shock/Sepsis  - S/p Treatment and mx by ID  - continue compression wrap      History Of Present Illness:      67 y.o. female with significant pmhx and recent hospitalization with dc to  rehab and then after a 10 day stay, is getting transferred back to inpatient due to worsening hypoxia. Patient was previously admitted for LE cellulitis s/p ID management. ID was following her at [facility]. She has completed her antibiotics at this time. She developed hemoptysis at rehab which was evaluated by pulmonary. Pt had repeat labs including cxr which showed mod pleural effusions hence pulm recommended FFP, and thoracentesis in am with fluid analysis.  Patient uses 2 L of o2 at home but is currently needing 5L. This is likely attributable to her effusions. She does have a hx of copd, chronic resp failure, asthma, cad, htn. She will be admitted to [facility] for further management. Past Medical History:          Diagnosis Date    Allergic rhinitis     Anemia     Anxiety     Arthritis     Asthma     Atrial fibrillation (HCC)     CAD (coronary artery disease)     Chronic systolic CHF (congestive heart failure) ([address] 10/27/2019    COPD (chronic obstructive pulmonary disease) (HCC)     Frequent UTI     GERD (gastroesophageal reflux disease)     Hemorrhoids     History of blood transfusion     X8    Hx of blood clots     left arm    Hyperlipidemia     Hypertension     Influenza A 02/22/2020    Kidney stone     LONG TERM ANTICOAGULENT USE 7/6/2012    Lumbar spinal stenosis     MI, old 11    Prolonged emergence from general anesthesia        Past Surgical History:          Procedure Laterality Date    CARDIAC CATHETERIZATION  1994    CARDIAC DEFIBRILLATOR PLACEMENT  2008    [facility] [place]    COLONOSCOPY  10/16/2015    Dr. Paco Rodriguez    COLONOSCOPY N/A 10/25/2018    COLONOSCOPY POLYPECTOMY SNARE/COLD BIOPSY performed by Meryle Ellison, MD at [facility]    ENDOSCOPY, COLON, DIAGNOSTIC  01/04/2017    Dr. Jillian Chu      left hip    OVARIAN CYST REMOVAL      PACEMAKER PLACEMENT      SINUS SURGERY      several    TONSILLECTOMY      TOTAL HIP ARTHROPLASTY Left 10/24/2019    DANIEL LEFT HIP ARTHROPLASTY performed by Delmy Galarza MD at [facility]  2013    X2    UPPER GASTROINTESTINAL ENDOSCOPY N/A 1/5/2020    EGD DIAGNOSTIC ONLY performed by Sherron Benitez MD at [facility]       Medications Prior to Admission:      Prior to Admission medications    Medication Sig Start Date End Date Taking?  Authorizing Provider   levocetirizine (XYZAL) 5 MG tablet Take 5 mg by mouth nightly Historical Provider, MD Stefanie Alvaradoina Bis SC) Inject into the skin At Dr Meghan Hansen office    Historical Provider, MD   Ascorbic Acid (VITAMIN C) 100 MG tablet Take 100 mg by mouth daily    Historical Provider, MD   metoprolol succinate (TOPROL XL) 25 MG extended release tablet Take 25 mg by mouth daily    Historical Provider, MD   Cholecalciferol (VITAMIN D3) 50 MCG (2000 UT) CAPS Take 2,000 Units by mouth daily    Historical Provider, MD   sacubitril-valsartan (ENTRESTO) 49-51 MG per tablet Take 1 tablet by mouth 2 times daily    Historical Provider, MD   pantoprazole (PROTONIX) 40 MG tablet Take 40 mg by mouth 2 times daily 30 minutes before two heaviest meals on an empty stomach    Historical Provider, MD   LINZESS 290 MCG CAPS capsule Take 290 mcg by mouth daily  7/23/20   Historical Provider, MD   loperamide (IMODIUM) 2 MG capsule Take 2 mg by mouth 4 times daily as needed for Diarrhea    Historical Provider, MD   traMADol (ULTRAM) 50 MG tablet Take 50 mg by mouth every 6 hours as needed for Pain.      Historical Provider, MD   nystatin (MYCOSTATIN) 501943 UNIT/GM cream as needed  4/13/20   Historical Provider, MD   ondansetron (ZOFRAN) 8 MG tablet daily as needed for Nausea or Vomiting  4/29/20   Historical Provider, MD   hydrocortisone (ANUSOL-HC) 25 MG suppository Place 1 suppository rectally 2 times daily as needed for Hemorrhoids 2/25/20   PETR Nguyen - CNP   warfarin (COUMADIN) 1 MG tablet Take as directed by the [facility], 145 tablets for 90 days 12/26/19 12/30/20  Kian Chacon MD   digoxin (LANOXIN) 125 MCG tablet Take 1 tablet by mouth every other day Indications: Increased Heart Rate Until Office Visit with Dr. William Geller 11/28/19   Asif Zambrano MD   Revefenacin 175 MCG/3ML SOLN Inhale 175 mcg into the lungs daily 10/26/19   Nirmala Mcfadden MD   hydrocortisone 2.5 % cream Apply topically 2 times daily as needed     Historical Provider, MD   acetaminophen (TYLENOL) 325 MG Tartrate (BROVANA) 15 MCG/2ML NEBU   Take 1 ampule by nebulization 2 times daily Indications: Shortness of Breath (Inactive)     Historical Provider, MD   ofloxacin (FLOXIN) 0.3 % otic solution Place 2 drops into both ears daily as needed Indications: Infection Long-term therapy. 12/22/14   Historical Provider, MD   Carboxymethylcellul-Glycerin (REFRESH OPTIVE OP) Apply  to eye as needed. Indications: Dry Eyes caused by Deficiency of Tears    Historical Provider, MD   levalbuterol (XOPENEX) 0.63 MG/3ML nebulization Take 1 ampule by nebulization every 8 hours as needed for Wheezing. Historical Provider, MD   triamcinolone (KENALOG) 0.1 % cream Apply topically 2 times daily as needed Anti-fungal    Historical Provider, MD   clotrimazole-betamethasone (LOTRISONE) cream Apply topically 2 times daily as needed Indications: Yeast Infection (inactive)     Historical Provider, MD   polyethylene glycol (MIRALAX) powder Take 17 g by mouth as needed. Indications: Constipation    Historical Provider, MD   Alcaftadine (LASTACAFT) 0.25 % SOLN Apply 1 drop to eye daily as needed Indications: Eye Allergy Both eyes    Historical Provider, MD   aspirin 81 MG EC tablet Take 81 mg by mouth daily. With food  Indications: Anticoagulant Therapy    Historical Provider, MD   azelastine (ASTELIN) 137 MCG/SPRAY nasal spray 1 spray by Nasal route 2 times daily as needed Indications: Allergic Rhinitis Use in each nostril as directed    Historical Provider, MD   furosemide (LASIX) 40 MG tablet Take 40 mg by mouth daily. Indications: Treatment with Diuretic Therapy    Historical Provider, MD   folic acid (FOLVITE) 1 MG tablet Take 1 mg by mouth daily.     Indications: Folic Acid Supplementation    Historical Provider, MD   OXYGEN 2 L by Nasal route continuous Indications: Chronic Obstructive Lung Disease     Historical Provider, MD   traZODone (DESYREL) 50 MG tablet Take 50 mg by mouth nightly     Historical Provider, MD   nitroGLYCERIN (NITROSTAT) 0.4 MG SL tablet Place 0.4 mg under the tongue every 5 minutes as needed. If third one does not relieve pain, call -. Indications: Chest Pain    Historical Provider, MD       Allergies:  Benadryl [diphenhydramine hcl]; Ciprofloxacin; Clarithromycin; Vitamin k; Atorvastatin; Captopril; Codeine; Iv dye [iodides]; Lipitor; Macrobid [nitrofurantoin monohydrate macrocrystals]; Neomycin-bacitracin zn-polymyx; Pravastatin; Zetia [ezetimibe]; Adhesive tape; Cephalexin; Doxycycline; Morphine; Other; Propoxyphene; and Sulfa antibiotics    Social History:      The patient currently lives home    TOBACCO:   reports that she has been smoking cigarettes. She has a 6.25 pack-year smoking history. She has never used smokeless tobacco.  ETOH:   reports current alcohol use of about 1.0 standard drinks of alcohol per week. Family History:       Reviewed in detail and negative for DM, CAD, Cancer, CVA. Positive as follows: Adopted: Yes   Problem Relation Age of Onset    Heart Disease Father          AGE 35    Cancer Sister         breast       Diet:  Cardiac    REVIEW OF SYSTEMS:   Pertinent positives as noted in the HPI. All other systems reviewed and negative. PHYSICAL EXAM:    BP (!) 101/49   Pulse 70   Temp 97.5 °F (36.4 °C) (Oral)   Resp 18   Ht 5' 2\" (1.575 m)   Wt 167 lb 4.8 oz (75.9 kg)   SpO2 95%   Breastfeeding No   BMI 30.60 kg/m²     General appearance:  No apparent distress, appears stated age and cooperative. HEENT:  Normal cephalic, atraumatic without obvious deformity. Pupils equal, round, and reactive to light. Extra ocular muscles intact. Conjunctivae/corneas clear. NC in place  Neck: Supple, with full range of motion. No jugular venous distention. Trachea midline. Respiratory:  Normal respiratory effort.  Diminished  Cardiovascular:  irreg rhythm  Abdomen: Soft, non-tender, non-distended   Musculoskeletal:  LE with compression stockings in place  Skin: Skin color, texture, turgor normal.  No rashes or lesions. Neurologic: grossly non-focal  Psychiatric:  Alert and oriented, thought content appropriate, normal insight  Peripheral Pulses: present     Labs:     Recent Labs     11/12/20  1320 11/13/20  0906   WBC 10.4 10.4   HGB 8.3* 9.1*   HCT 27.8* 30.4*    190     Recent Labs     11/11/20  0502 11/13/20  0905    140   K 4.1 4.3    96*   CO2 29 30   BUN 29* 19   CREATININE 0.9 0.8   CALCIUM 8.3* 9.2     No results for input(s): AST, ALT, BILIDIR, BILITOT, ALKPHOS in the last 72 hours. Recent Labs     11/11/20  0505 11/12/20  0556 11/13/20  0905   INR 2.54* 2.72* 3.08*     No results for input(s): CKTOTAL, TROPONINI in the last 72 hours. Urinalysis:      Lab Results   Component Value Date    NITRU NEGATIVE 11/08/2020    WBCUA 0-2 11/08/2020    BACTERIA NONE SEEN 11/08/2020    RBCUA 5-10 11/08/2020    BLOODU TRACE 11/08/2020    GLUCOSEU NEGATIVE 11/08/2020       Intake & Output:  I/O last 3 completed shifts: In: 200 [P.O.:200]  Out: 500 [Urine:500]  No intake/output data recorded. Radiology:     CXR: I have reviewed the CXR with the following interpretation:   EKG:  I have reviewed the EKG with the following interpretation:     US CHEST INCLUDING MEDIASTINUM   Final Result   There are small-to-moderate bilateral pleural effusions. Thoracentesis could be performed if indicated clinically. **This report has been created using voice recognition software. It may contain minor errors which are inherent in voice recognition technology. **      Final report electronically signed by Dr. Johnny Diaz on 11/12/2020 8:26 PM      XR CHEST (2 VW)   Final Result   1. Cardiomegaly. Permanent pacemaker/defibrillator. 2. Mildly increased interstitial markings in both lungs, consistent with interstitial edema/pneumonia with underlying element of interstitial fibrosis. 3. Overall appearance of chest has improved since prior.             **This report has been created using voice recognition software. It may contain minor errors which are inherent in voice recognition technology. **      Final report electronically signed by Dr. Diamond Viveros on 11/11/2020 4:50 PM      XR CHEST (2 VW)   Final Result   1. Moderate cardiomegaly. Permanent pacemaker/defibrillator. 2. Small bilateral pleural effusions. Mildly increased interstitial markings both mid and lower lung fields, consistent with interstitial edema/pneumonia likely with underlying element of interstitial fibrosis. .   3. Overall appearance of chest slightly worse than prior. **This report has been created using voice recognition software. It may contain minor errors which are inherent in voice recognition technology. **      Final report electronically signed by Dr. Diamond Viveros on 11/9/2020 1:02 PM      XR CHEST (2 VW)   Final Result   1. Moderate cardiomegaly. Permanent pacemaker/defibrillator. Small bilateral pleural effusions best seen posteriorly. 2. Mild interstitial infiltrates right mid and lower lung fields, consistent with interstitial pneumonitis. Appearance improved from prior. **This report has been created using voice recognition software. It may contain minor errors which are inherent in voice recognition technology. **      Final report electronically signed by Dr. Diamond Viveros on 11/5/2020 12:35 PM      [address]    (Results Pending)        DVT prophylaxis: coumadin / currently held.     Code Status: Full Code      PT/OT Eval Status: was being discharged from [address] [place] [address] Problems    Diagnosis Date Noted    Permanent atrial fibrillation [facility]) [I48.21]      Priority: High    Acute systolic CHF (congestive heart failure) ([facility] [I50.21] 11/13/2020    Interstitial pneumonia ([facility] [J84.9] 11/05/2020    Thrush [B37.0] 11/05/2020    Chronic iron deficiency anemia [D50.9] 11/05/2020    Anxiety [F41.9] 11/05/2020    Physical deconditioning [R53.81] 11/03/2020    Physical debility [R53.81] 11/03/2020    Cellulitis of left lower extremity [L03.116]     Chronic systolic CHF (congestive [facility] failure) ([facility] [I50.22] 10/27/2019    Class 1 obesity due to excess calories with serious comorbidity and body mass index (BMI) of 30.0 to 30.9 in adult [E66.09, Z68.30] 10/27/2019    Arteriosclerosis of coronary artery [I25.10] 06/22/2016    Anticoagulated on Coumadin [Z79.01] 03/31/2016    Biventricular implantable cardioverter-defibrillator in situ [Z95.810] 01/07/2014    Persistent atrial fibrillation ([facility] [I48.19] 04/23/2013    Chronic obstructive pulmonary disease ([facility] [J44.9] 07/06/2012    Hypertension [I10] 07/06/2012     Thank you Dieter Aburto MD for the opportunity to be involved in this patient's care.     Electronically signed by Siobhan Park MD on 11/13/2020 at 3:40 PM

## 2020-11-13 NOTE — PLAN OF CARE
Problem: DISCHARGE BARRIERS  Goal: Patient's continuum of care needs are met  11/13/2020 1514 by BLANCA Lopez  Note: SW contacted [address] [place] [address] with update on patient's medical changes and current plan for transfer to acute when bed becomes available. Information provided to [facility] in order to maintain continuity of care.

## 2020-11-13 NOTE — FLOWSHEET NOTE
11/13/20 0220   Provider Notification   Reason for Communication Evaluate   Provider Name Yony Cano   Provider Notification Physician   Method of Communication Secure Message   Response No new orders   Notification Time 2301   Shift Event   (low BP 98/42 and Chest xray results)

## 2020-11-13 NOTE — PLAN OF CARE
Problem: DISCHARGE BARRIERS  Goal: Patient's continuum of care needs are met  Note: Team conference held Thursday, 11/12/2020. Recommendations of the team were explained to the patient and daughter, Darron Malik, by BLANCA Aleman, and Dr. Mandie Palma. Team is recommending that patient continue on acute inpatient rehab for one more day, with expected discharge date of Friday, 11/13/2020, as patient has completed recommended therapy. Following discharge, team is recommending home health care services for RN/PT/OT/HHA. Home health care services arranged through [address] [place] [address]. Await further direction from pulmonology and infectious disease regarding medical concerns. Care plan reviewed with patient and daughter, Darron Malik. Patient and daughter, Darron Malik, verbalized understanding of the plan of care and contributed to goal setting. SW to follow and maintain involvement in discharge planning.

## 2020-11-13 NOTE — PROGRESS NOTES
Progress note: Infectious diseases    Patient - Janet Bauer,  Age - 67 y.o.    - 1945      Room Number - 6E-56/200-A   MRN -  [id number]   Acct # - [de-identified]  Date of Admission -  11/3/2020  2:20 PM    SUBJECTIVE:   She has no new complaints  She will have thoracentesis  OBJECTIVE   VITALS    height is 5' 2\" (1.575 m) and weight is 167 lb 4.8 oz (75.9 kg). Her oral temperature is 95.2 °F (35.1 °C). Her blood pressure is 91/51 (abnormal) and her pulse is 72. Her respiration is 18 and oxygen saturation is 91%. Wt Readings from Last 3 Encounters:   20 167 lb 4.8 oz (75.9 kg)   20 159 lb (72.1 kg)   20 140 lb (63.5 kg)       I/O (24 Hours)    Intake/Output Summary (Last 24 hours) at 2020 1048  Last data filed at 2020 2315  Gross per 24 hour   Intake 200 ml   Output 750 ml   Net -550 ml       General Appearance  Awake, alert, oriented,  Chronically sick looking  HEENT - normocephalic, atraumatic, pale conjunctiva,  anicteric sclera  Neck - Supple, no mass  Lungs -  Bilateral   air entry,crackles on both lung fields, diminished breath sound  Cardiovascular - Heart sounds are normal.  Regular rate and rhythm without murmur, gallop or rub.   Abdomen - soft, not distended, nontender,   Neurologic -oriented  Skin - No bruising or bleeding  Extremities -  The swelling and redness is much better on the left leg, non tender    MEDICATIONS:      sodium chloride  20 mL Intravenous Once    omalizumab  225 mg Subcutaneous Q14 Days    furosemide  40 mg Intravenous BID    nicotine  1 patch Transdermal Q24H    hydrocortisone  25 mg Rectal BID    Arformoterol Tartrate  15 mcg Nebulization BID    [Held by provider] aspirin  81 mg Oral Daily    budesonide  500 mcg Nebulization BID    digoxin  125 mcg Oral Every Other Day    docusate sodium  100 mg Oral Daily    [START ON 2020] Evolocumab  140 mg Subcutaneous Q14 Days    gabapentin  100 mg Oral TID    metoprolol succinate  25 mg Oral Daily    pantoprazole  40 mg Oral BID AC    sacubitril-valsartan  1 tablet Oral BID    senna  1 tablet Oral Nightly    tiotropium  2 puff Inhalation Daily    traZODone  50 mg Oral Nightly      [Held by provider] heparin (PORCINE) Infusion       levalbuterol, polyethylene glycol, bisacodyl, acetaminophen, cyclobenzaprine, HYDROcodone 5 mg - acetaminophen, HYDROcodone 5 mg - acetaminophen, magnesium hydroxide      LABS:     CBC:   Recent Labs     11/12/20  1320 11/13/20  0906   WBC 10.4 10.4   HGB 8.3* 9.1*    190     BMP:    Recent Labs     11/11/20  0502 11/13/20  0905    140   K 4.1 4.3    96*   CO2 29 30   BUN 29* 19   CREATININE 0.9 0.8   GLUCOSE 129* 154*     Calcium:  Recent Labs     11/13/20  0905   CALCIUM 9.2    INR:   Recent Labs     11/11/20  0505 11/12/20  0556 11/13/20  0905   INR 2.54* 2.72* 3.08*     Hepatic:   Recent Labs     11/13/20  0905   PROT 6.3            Problem list of patient:     Patient Active Problem List   Diagnosis Code    MI (myocardial infarction) ([facility] I21.9    Chronic obstructive pulmonary disease ([facility] J44.9    Hyperlipidemia E78.5    Hypertension I10    Seasonal allergic rhinitis J30.2    Heartburn R12    Persistent atrial fibrillation (HCC) Q70.79    Systolic CHF, acute on chronic (HCC) I50.23    Biventricular implantable cardioverter-defibrillator in situ Z95.810    Anticoagulated on Coumadin Z79.01    Arteriosclerosis of coronary artery I25.10    Left displaced femoral neck fracture ([facility]) S72.002A    Closed head injury S09.90XA    Class 1 obesity due to excess calories with serious comorbidity and body mass index (BMI) of 30.0 to 30.9 in adult E66.09, Z68.30    CKD (chronic kidney disease) stage 2, GFR 60-89 ml/min N18.2    S/p left hip fracture Z87.81    Chronic systolic CHF (congestive heart failure) (HCC) I50.22    IBS (irritable bowel syndrome) K58.9    Acute blood loss as cause of postoperative anemia D62    Atelectasis of left lung J98.11    Insomnia G47.00    Chest pain R07.9    Rectal bleeding K62.5    Acute on chronic anemia D64.9    Vaginitis N76.0    Digitalis toxicity T46.0X1A    GI bleed K92.2    Coagulopathy (HCC) D68.9    Bilateral leg edema R60.0    Azotemia R79.89    Hypoalbuminemia E88.09    Urinary tract infection with hematuria N39.0, R31.9    Septic shock (HCC) A41.9, R65.21    Pacemaker generator end of life Z45.010    Necrotizing fasciitis ([facility]) M72.6    Left leg pain M79.605    Cellulitis of left lower extremity L03. 116    Physical deconditioning R53.81    Physical debility R53.81    Interstitial pneumonia ([facility]) J84.9    Thrush B37.0    Chronic iron deficiency anemia D50.9    Anxiety F41.9    Permanent atrial fibrillation ([facility]) I48.21         ASSESSMENT/PLAN   COPD exacerbation  CHF with cardiomyopathy  Lower leg edema: better after compression wrap   COPD with lung fibrosis per radiology  PVD   Continue compression wrap  Antonio Nichols MD, FACP 11/13/2020 10:48 AM

## 2020-11-13 NOTE — PROGRESS NOTES
[address]      Date: 2020  Patient Name: Baldev Ramirez,  Gender:  female        MRN: [id number]  : 1945  (67 y.o.)     Referring Practitioner: Dr. Ryan Wilkerson  Diagnosis: Physical Debility  Additional Pertinent Hx: Per EMR, Ab Horn is a 67 y.o. female with an extensive medical history including, congestive heart failure, COPD, A. Fib., Pacemaker placement post MI, hypertension, anemia, and IBS who presents to the ED for an evaluation of severe left lower extremity pain, redness, and warmth that started yesterday evening. The patient states that yesterday morning she started feeling some discomfort to her lower extremity, but believed it was due to her compression stockings she wears for CHF. Yesterday evening she removed the stockings and saw that her left lower leg was extremely red, which has continued to spread, becoming more red, and even more painful. Denies any recent trauma, falls, accidents, or wounds to the lower extremity but states that she occasionally scratches her legs with her nails when taking on and off the compression stockings. The patient has attempted tylenol, percocet, biofreeze, and icyhot yesterday with no relief. 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 Endorses history of blood clots, but is on coumadin which was last checked approximately 1 week ago and was in theraputic range. Further endorses feeling feverish and chills, increased shortness of breath, worsening left lower extremity pain, the inability to bear weight at this time, and denies numbness or tingling to the lower extremities, diabetes mellitus, previous lower leg infections, chest pain, abdominal pain, changes in her bowel or bladder function or habits. Patient still smokes 1/2 a pack of cigarettes daily and is on home oxygen. No alcohol or illicit drug use. \"     Restrictions/Precautions:  Restrictions/Precautions: General Precautions, Fall Risk                      Social/Functional:  Type of Home: House  Home Layout: One level  Home Access: Stairs to enter with rails  Entrance Stairs - Number of Steps: 4  Entrance Stairs - Rails: Both(too wide to use at same time)  Home Equipment: Rolling walker, 4 wheeled walker, Wheelchair-manual     Subjective: Word received pt to be discharged to acute care for a thoracentesis. Assessment:  Pt has shown improved functional bed mobility, transfers and stairs. Gait has progressed to SBA, but distance significantly limited due to respiratory concerns. Pt will benefit from f/u PT once medically stable for continued gait progression and safety with functional mobility. Equipment Recommendations:  Equipment Needed: No(Pt has 4WW, RW, w/c and cane)    Plan:  Times per week: 5x/wk 90 min, 1x/ wk 30 min  Current Treatment Recommendations: Strengthening, Functional Mobility Training, Transfer Training, Endurance Training, Balance Training, Stair training, Gait Training, Home Exercise Program, Safety Education & Training, Equipment Evaluation, Education, & procurement, Patient/Caregiver Education & Training    Goals:  Short term goals  Time Frame for Short term goals: 1 wk  Short term goal 1: Pt to go supine <->sit, SBA, to get in/out of bed, no rail. - GOAL MET, SEE LTG  Short term goal 2: Pt to get up/down from various seated surfaces, S to get up to walk. MET  Short term goal 3: Pt to walk with RW >= 100 ft, SBA to progress to home and community mobility NOT MET  Short term goal 4: negotiate 4 steps with HR and CGA to enter home safely MET  Short term goal 5: Pt to get in/out of car, S for transportation needs.  MET    Long term goals  Time Frame for Long term goals : 3 wks  Long term goal 1: Pt to go supine <->sit, Mod I, to get in/out of bed, no rail. NOT MET  Long term goal 2: Pt to get up/down from various seated surfaces, Mod I, to get up to walk. - GOAL MET, CONTINUE  Long term goal 3: Pt to walk with RW >= 150 ft, Mod I, to progress to home and community mobility NOT MET  Long term goal 4: negotiate 4 steps with 1 HR and Mod I to enter home safely NOT MET  Long term goal 5: Pt to get in/out of car, Mod I, for transportation needs. NOT MET  Long term goal 6: Pt to score >= 22, indicating improved balance NOT ADDRESSED

## 2020-11-13 NOTE — PROGRESS NOTES
[address]  Occupational Therapy  Daily Note  Time:   Time In: 830  Time Out: 930  Timed Code Treatment Minutes: 60 Minutes  Minutes: 60     Date: 2020  Patient Name: Tessy Colmenares,   Gender: female      Room: -59/059-A  MRN: [id number]  : 1945  (67 y.o.)  Referring Practitioner: Dr. Erendira Calle  Diagnosis: left lower extremity cellulitis with physical deconditioning  Additional Pertinent Hx: She was admitted 10/25/2020 for complaint of pain, swelling and erythema over the left lower limb. Patient felt that when she takes off her JEANE hose used for her chronic systolic congestive heart failure she sometimes catches her nails on her skin and that this may have contributed to the infection. Initial imaging was concerning for possible necrotizing fasciitis but with absence of intrafascial gas this diagnosis was ruled out. She did have issues with hypotension and required admission to the ICU. She was started on vancomycin and Zosyn with plan by infectious disease to transition to oral antibiotics at time of discharge. Patient's medical course was further complicated by COPD exacerbation with increased oxygen requirements above her baseline 2 L at home and acute on chronic systolic congestive heart failure, as well as a pneumonia. Restrictions/Precautions:  Restrictions/Precautions: General Precautions, Fall Risk      SUBJECTIVE: Patient pleasant and cooperative but did require mod encouragement to participate in therapy. Pt stating, \"I'm done with therapy now\" Education on the role of family education (as  was present) and pt was agreeable but did require excessive rest breaks during session     PAIN: Denies pain     COGNITION: WFL    ADL:   EATING:Independent. Nathaniel Ceron CARE Score: 6. ORAL HYGIENE:Independent. Nathaniel Ceron CARE Score: 6. TOILETING HYGIENE:Supervision or touching assistance. Nathaniel Ceron CARE Score: 4. grooming. Short term goal 3: Pt will complete functional ambulation to and from the BR as well as around obstacles with SBA and 0-2cues for RW safety to increase independence in home mobility to/ fropm the BR  Short term goal 4: Pt will complete UE light strengthening ex x 10 reps with HEP to increase strength/ endurance   needed for safe light IADL tasks  Long term goals  Time Frame for Long term goals : 2-3 weeks  Long term goal 1: Pt will complete ADL routine with S and no  cues for energy conservation or safe tech to increase independence in self care tasks  Long term goal 2: Pt will complete 2 step homemaking tasks with S and 0-1 cues for safe tech to increase ability to prepare a snack. Following session, patient left in safe position with all fall risk precautions in place.

## 2020-11-13 NOTE — PROGRESS NOTES
[facility]  INPATIENT PHYSICAL THERAPY  DAILY NOTE  [facility] - 7E-59/059-A    Time In: 0930  Time Out: 1000  Timed Code Treatment Minutes: 30 Minutes  Minutes: 30          Date: 2020  Patient Name: Baldev Ramirez,  Gender:  female        MRN: [id number]  : 1945  (67 y.o.)     Referring Practitioner: Dr. Ryan Wilkerson  Diagnosis: Physical Debility  Additional Pertinent Hx: Per EMR, Ab Horn is a 67 y.o. female with an extensive medical history including, congestive heart failure, COPD, A. Fib., Pacemaker placement post MI, hypertension, anemia, and IBS who presents to the ED for an evaluation of severe left lower extremity pain, redness, and warmth that started yesterday evening. The patient states that yesterday morning she started feeling some discomfort to her lower extremity, but believed it was due to her compression stockings she wears for CHF. Yesterday evening she removed the stockings and saw that her left lower leg was extremely red, which has continued to spread, becoming more red, and even more painful. Denies any recent trauma, falls, accidents, or wounds to the lower extremity but states that she occasionally scratches her legs with her nails when taking on and off the compression stockings. The patient has attempted tylenol, percocet, biofreeze, and icyhot yesterday with no relief. 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 Endorses history of blood clots, but is on coumadin which was last checked approximately 1 week ago and was in theraputic range.  Further endorses feeling feverish and chills, increased shortness of breath, worsening left lower extremity pain, the inability to bear weight at this time, and denies numbness or tingling to the lower extremities, diabetes mellitus, previous lower leg infections, chest pain, abdominal pain, changes in her bowel or bladder function or habits. Patient still smokes 1/2 a pack of cigarettes daily and is on home oxygen. No alcohol or illicit drug use. \"     Prior Level of Function:  Lives With: Spouse  Type of Home: House  Home Layout: One level  Home Access: Stairs to enter with rails  Entrance Stairs - Number of Steps: 4  Entrance Stairs - Rails: Both(too wide to use at same time)  Home Equipment: Rolling walker, 4 wheeled walker, [employer]   Bathroom Shower/Tub: Walk-in shower, Shower chair with back  [employer]: Grab bars in shower, Hand-held shower, 3-in-1 commode  Bathroom Accessibility: Accessible    Receives Help From: Family  ADL Assistance: Independent  Homemaking Assistance: Needs assistance  Ambulation Assistance: Independent  Transfer Assistance: Independent  Active : Yes  Additional Comments: Pt reports that her spouse assists with IADL tasks, Pt completes own self care . Spouse does have to assist with compression stockings. Restrictions/Precautions:  Restrictions/Precautions: General Precautions, Fall Risk     SUBJECTIVE: Patient in therapy gym upon arrival agreed and cooperative for therapy. Spouse present for education. Patient on 6L of 02 during session, monitored     PAIN: Yes, complained of pain in low back, not rated. OBJECTIVE:    Transfers:  Sit to Stand: Modified Independent  Stand to Sit:Modified Independent  Car:Supervision, cues for hand placement, with verbal cues, for safe hand placements    Ambulation:  Stand By Assistance  Distance: 3-4 ft. x2 to destinations   Surface: Level Tile  Device:Rolling Walker  Gait Deviations: Forward Flexed Posture, Slow Cassie and Decreased Gait Speed    Stairs:  Stairs:  6\" steps. X 4 using L ascending rail and Contact Guard Assistance, with verbal cues , with increased time for completion.     Functional Outcome Measures: Not completed       ASSESSMENT:  Assessment: with 1 HR and Mod I to enter home safely  Long term goal 5: Pt to get in/out of car, Mod I, for transportation needs. Long term goal 6: Pt to score >= 22, indicating improved balance    Following session, patient left in safe position with all fall risk precautions in place.

## 2020-11-13 NOTE — PROGRESS NOTES
[address]  DISCHARGE NOTE    Date: 2020  Patient Name: Marly Avilez,   Gender: female      MRN: [id number]  : 1945  (67 y.o.)  Referring Practitioner: Dr. Danni Aceves  Diagnosis: left lower extremity cellulitis with physical deconditioning  Additional Pertinent Hx: She was admitted 10/25/2020 for complaint of pain, swelling and erythema over the left lower limb. Patient felt that when she takes off her JEANE hose used for her chronic systolic congestive heart failure she sometimes catches her nails on her skin and that this may have contributed to the infection. Initial imaging was concerning for possible necrotizing fasciitis but with absence of intrafascial gas this diagnosis was ruled out. She did have issues with hypotension and required admission to the ICU. She was started on vancomycin and Zosyn with plan by infectious disease to transition to oral antibiotics at time of discharge. Patient's medical course was further complicated by COPD exacerbation with increased oxygen requirements above her baseline 2 L at home and acute on chronic systolic congestive heart failure, as well as a pneumonia.     Restrictions/Precautions:  Restrictions/Precautions: General Precautions, Fall Risk                      Past Medical History:   Diagnosis Date    Allergic rhinitis     Anemia     Anxiety     Arthritis     Asthma     Atrial fibrillation ([address]     CAD (coronary artery disease)     Chronic systolic CHF (congestive heart failure) ([address] 10/27/2019    COPD (chronic obstructive pulmonary disease) (HCC)     Frequent UTI     GERD (gastroesophageal reflux disease)     Hemorrhoids     History of blood transfusion     X8    Hx of blood clots     left arm    Hyperlipidemia     Hypertension     Influenza A 2020    Kidney stone     LONG TERM ANTICOAGULENT USE 2012    Lumbar spinal stenosis MET  Short term goal 3: Pt will complete functional ambulation to and from the BR as well as around obstacles with SBA and 0-2cues for RW safety to increase independence in home mobility to/ fropm the BR GOAL MET  Short term goal 4: Pt will complete UE light strengthening ex x 10 reps with HEP to increase strength/ endurance   needed for safe light IADL tasks GOAL MET    Long term goals  Time Frame for Long term goals : 2-3 weeks  Long term goal 1: Pt will complete ADL routine with S and no  cues for energy conservation or safe tech to increase independence in self care tasks GOAL NOT MET  Long term goal 2: Pt will complete 2 step homemaking tasks with S and 0-1 cues for safe tech to increase ability to prepare a snack.  GOAL NOT MET

## 2020-11-13 NOTE — PROGRESS NOTES
[facility]      Patient - Manuel Dominguez   MRN -  [id number]   [facility] # - [de-identified]   - 1945      Date of Admission -  11/3/2020  2:20 PM  Date of evaluation -  2020  Room - 6E-56/200-A   Hospital Day - 3 [facility] Gonzalez Gordon MD Primary Care Physician - Author MD William     Problem List      Active Hospital Problems    Diagnosis Date Noted    Permanent atrial fibrillation [facility]) [I48.21]      Priority: High    Interstitial pneumonia ([facility] [J84.9] 2020    Thrush [B37.0] 2020    Chronic iron deficiency anemia [D50.9] 2020    Anxiety [F41.9] 2020    Physical deconditioning [R53.81] 2020    Physical debility [R53.81] 2020    Cellulitis of left lower extremity [L03.116]     Chronic systolic CHF (congestive heart failure) ([facility] [I50.22] 10/27/2019    Class 1 obesity due to excess calories with serious comorbidity and body mass index (BMI) of 30.0 to 30.9 in adult [E66.09, Z68.30] 10/27/2019    Arteriosclerosis of coronary artery [I25.10] 2016    Anticoagulated on Coumadin [Z79.01] 2016    Biventricular implantable cardioverter-defibrillator in situ [Z95.810] 2014    Persistent atrial fibrillation ([facility] [I48.19] 2013    Chronic obstructive pulmonary disease ([facility] [J44.9] 2012    Hypertension [I10] 2012     Reason for Consult    For further evaluation and management of Hemoptysis and COPD  HPI   History Obtained From: Patient and electronic medical record. Manuel Dominguez is a 67 y.o. female  was initially admitted under hospitalist service. Pulmonary medicine was consulted for further management of Hemoptysis and COPD. She is a 67-year-old pleasant female with a past medical history of moderately severe COPD and bronchial asthma. She used to follow with Dr. Ileana Wise MD in the past. However she is currently following with Dr. Edilson Apodaca DO at [facility] pulmonary clinic. She was initially admitted to ICU on 25 October 2020 for management of sepsis due to left lower lower extremity cellulitis and shock. Patient having chronic minimal hemoptysis. She was evaluated by Dr. José Burgos. DO Constanza on 2 November 2020. As per Dr. José Burgos. Ruchi Villarreal note the hemoptysis was thought to be due to epistaxis from a nonhumidified oxygen supplementation. She is continue to have a hemoptysis for the last 5days. She is coughing up quarter size blood clot which is a dark blood for the last few days. There is no history of active hemoptysis or bright red blood in the last few days. She still complaining of shortness of breath on minimal exertion. She was found to be on Coumadin with therapeutic INR. No active bleeding was noted. She is receiving Xolair to 25 mg from Ruchi Diaz office. Patient also follows with Dr. Thalia Yung MD allergist in [address] [place] [address]. She is having hemoptysis: Yes  Duration: for 5 days  Her hemoptysis color: Coughing up bight red blood: No. Old clotted blood. Her hemoptysis is associated with cough and sputum production: Yes  Quantity of hemoptysis:1.0ml/s. Diurnal variation: None. She was diagnosed with pneumonia in the recent past:No    She was diagnosed with pulmonary tuberculosis in the past:No   She was exposed to any patients with tuberculosis:No  Recent travel to endemic places of Tuberculosis:No  History of lung caner or any neoplastic process: No  Hstory of connective tissue diseases or vasculitis : No   She is currently on treatment with anticoagulants: Yes- Coumadin. She uses 2LPM of oxygen supplementation at rest, exercise or during sleep/at night time at home. Past 24 Hours   -Right thoracentesis today  -Stable on 5LPM- 91%  -SOB continues    -All systems reviewed.    PMHx   Past Medical History      Diagnosis Date    Allergic rhinitis     Anemia     Anxiety     Arthritis     Asthma     Atrial fibrillation ([facility]     CAD (coronary artery disease)     Chronic systolic [facility] ([facility]est[facility] failure) [facility] 10/27/2019    COPD (chronic obstructive pulmonary disease) (HCC)     Frequent UTI     GERD (gastroesophageal reflux disease)     Hemorrhoids     History of blood transfusion     X8    Hx of blood clots     left arm    Hyperlipidemia     Hypertension     Influenza A 02/22/2020    Kidney stone     LONG TERM ANTICOAGULENT USE 7/6/2012    Lumbar spinal stenosis     MI, old 11    Prolonged emergence from general anesthesia       Past Surgical History        Procedure Laterality Date   [facility]  2008    OUS IN [place]    COLONOSCOPY  10/16/2015    Dr. Maura Simpson    COLONOSCOPY N/A 10/25/2018    COLONOSCOPY POLYPECTOMY SNARE/COLD BIOPSY performed by Jacklyn Quezada MD at [facility]    ENDOSCOPY, COLON, DIAGNOSTIC  01/04/2017    Dr. Jewel Damon      left hip    OVARIAN CYST REMOVAL      PACEMAKER PLACEMENT      SINUS SURGERY      several    TONSILLECTOMY      TOTAL HIP ARTHROPLASTY Left 10/24/2019    DANIEL LEFT HIP ARTHROPLASTY performed by Josefina Bhat MD at [facility]  2013    X2    UPPER GASTROINTESTINAL ENDOSCOPY N/A 1/5/2020    EGD DIAGNOSTIC ONLY performed by Adi Dewey MD at [facility]     Meds    Current Medications    sodium chloride  20 mL Intravenous Once    omalizumab  225 mg Subcutaneous Q14 Days    furosemide  40 mg Intravenous BID    nicotine  1 patch Transdermal Q24H    hydrocortisone  25 mg Rectal BID    Arformoterol Tartrate  15 mcg Nebulization BID    aspirin  81 mg Oral Daily    budesonide  500 mcg Nebulization BID    digoxin  125 mcg Oral Every Other Day    docusate sodium  100 mg Oral Daily    [START ON 11/17/2020] Evolocumab  140 mg Subcutaneous Q14 Days    gabapentin  100 mg Oral TID    metoprolol succinate  25 daily as needed   acetaminophen (TYLENOL) 325 MG tablet, Take 650 mg by mouth as needed for Pain or Fever Indications: Pain Don't take more then 3,000 mg each day  Multiple Vitamins-Minerals (MULTIVITAMIN ADULT) CHEW, Take 2 tablets by mouth daily  Menthol-Methyl Salicylate (ICY HOT) 23-44 % STCK, Apply topically daily  Alum Hydroxide-Mag Carbonate (GAVISCON PO), Take by mouth as needed Indications: Acid Indigestion   lidocaine (XYLOCAINE) 5 % ointment, Apply topically Before venipuncture in Dr. Martha Love office. hydrOXYzine (ATARAX) 25 MG tablet, Take 25 mg by mouth 3 times daily as needed Indications: Feeling Anxious   Pramoxine HCl (PROCTOFOAM RE), Place rectally daily as needed   EPINEPHrine (EPIPEN) 0.3 MG/0.3ML SOAJ injection, INJECT AS DIRECTED FOR ANAPHYLAXIS  Evolocumab (REPATHA SURECLICK SC), Inject 251 mg into the skin every 14 days Indications: Blood Cholesterol Abnormal   B Complex-C (RA B-COMPLEX/VITAMIN C CR PO), Take 1 tablet by mouth daily Indications: Treatment to Prevent Vitamin Deficiency   NITROGLYCERIN RE, Place 0.3 mg rectally as needed Indications: Hemorrhoids   dicyclomine (BENTYL) 10 MG capsule, Take 10 mg by mouth 4 times daily (before meals and nightly) Indications: Pain in the Abdominal Region  Coenzyme Q10 (COQ-10) 200 MG CAPS, Take by mouth daily   Probiotic Product (SUPER PROBIOTIC) CAPS,  Take 1 tablet by mouth daily Indications: Digestive Complaint   budesonide (PULMICORT) 0.5 MG/2ML nebulizer suspension,  Take 1 ampule by nebulization 2 times daily Indications: Shortness of Breath (Inactive)   Arformoterol Tartrate (BROVANA) 15 MCG/2ML NEBU,  Take 1 ampule by nebulization 2 times daily Indications: Shortness of Breath (Inactive)   ofloxacin (FLOXIN) 0.3 % otic solution, Place 2 drops into both ears daily as needed Indications: Infection Long-term therapy. Carboxymethylcellul-Glycerin (REFRESH OPTIVE OP), Apply  to eye as needed.  Indications: Dry Eyes caused by Deficiency of Tears  levalbuterol (XOPENEX) 0.63 MG/3ML nebulization, Take 1 ampule by nebulization every 8 hours as needed for Wheezing. triamcinolone (KENALOG) 0.1 % cream, Apply topically 2 times daily as needed Anti-fungal  clotrimazole-betamethasone (LOTRISONE) cream, Apply topically 2 times daily as needed Indications: Yeast Infection (inactive)   polyethylene glycol (MIRALAX) powder, Take 17 g by mouth as needed. Indications: Constipation  Alcaftadine (LASTACAFT) 0.25 % SOLN, Apply 1 drop to eye daily as needed Indications: Eye Allergy Both eyes  aspirin 81 MG EC tablet, Take 81 mg by mouth daily. With food  Indications: Anticoagulant Therapy  azelastine (ASTELIN) 137 MCG/SPRAY nasal spray, 1 spray by Nasal route 2 times daily as needed Indications: Allergic Rhinitis Use in each nostril as directed  furosemide (LASIX) 40 MG tablet, Take 40 mg by mouth daily. Indications: Treatment with Diuretic Therapy  folic acid (FOLVITE) 1 MG tablet, Take 1 mg by mouth daily. Indications: Folic Acid Supplementation  OXYGEN, 2 L by Nasal route continuous Indications: Chronic Obstructive Lung Disease   traZODone (DESYREL) 50 MG tablet, Take 50 mg by mouth nightly   nitroGLYCERIN (NITROSTAT) 0.4 MG SL tablet, Place 0.4 mg under the tongue every 5 minutes as needed. If third one does not relieve pain, call 9--1. Indications: Chest Pain  Diet    DIET GENERAL;  Allergies    Benadryl [diphenhydramine hcl]; Ciprofloxacin; Clarithromycin; Vitamin k; Atorvastatin; Captopril; Codeine; Iv dye [iodides]; Lipitor; Macrobid [nitrofurantoin monohydrate macrocrystals]; Neomycin-bacitracin zn-polymyx; Pravastatin; Zetia [ezetimibe]; Adhesive tape; Cephalexin; Doxycycline; Morphine;  Other; Propoxyphene; and Sulfa antibiotics  Family History          Adopted: Yes   Problem Relation Age of Onset    Heart Disease Father          AGE 35    Cancer Sister         breast     Sleep History    Never diagnosed with sleep apnea in the past    Social History     Social History     Socioeconomic History    Marital status:      Spouse name: Pa Walker Number of children: 3    Years of education: Not on file    Highest education level: Not on file   Occupational History    Not on file   Social Needs    Financial resource strain: Not on file    Food insecurity     Worry: Not on file     Inability: Not on file   [employer] needs     Medical: Not on file     Non-medical: Not on file   Tobacco Use    Smoking status: Current Every Day Smoker     Packs/day: 0.25     Years: 25.00     Pack years: 6.25     Types: Cigarettes    Smokeless tobacco: Never Used   Substance and Sexual Activity    Alcohol use: Yes     Alcohol/week: 1.0 standard drinks     Types: 1 Cans of beer per week     Comment: ocassionaly    Drug use: No    Sexual activity: Never   Lifestyle    Physical activity     Days per week: Not on file     Minutes per session: Not on file    Stress: Not on file   Relationships    Social connections     Talks on phone: Not on file     Gets together: Not on file     Attends Yarsani service: Not on file     Active member of club or organization: Not on file     Attends meetings of clubs or organizations: Not on file     Relationship status: Not on file    Intimate partner violence     Fear of current or ex partner: Not on file     Emotionally abused: Not on file     Physically abused: Not on file     Forced sexual activity: Not on file   Other Topics Concern    Not on file   Social History Narrative    Not on file       Vitals     height is 5' 2\" (1.575 m) and weight is 167 lb 4.8 oz (75.9 kg). Her oral temperature is 95.2 °F (35.1 °C). Her blood pressure is 91/51 (abnormal) and her pulse is 72. Her respiration is 18 and oxygen saturation is 91%. Body mass index is 30.6 kg/m².     SUPPLEMENTAL O2: O2 Flow Rate (L/min): 5 L/min     I/O        Intake/Output Summary (Last 24 hours) at 11/13/2020 1481  Last data filed at 11/12/2020 5060  Gross per 24 hour   Intake 200 ml   Output 750 ml   Net -550 ml     I/O last 3 completed shifts: In: 400 [P.O.:400]  Out: 1100 [Urine:1100]   Patient Vitals for the past 96 hrs (Last 3 readings):   Weight   11/12/20 0527 167 lb 4.8 oz (75.9 kg)   11/11/20 2130 171 lb (77.6 kg)   11/11/20 0615 168 lb 11.2 oz (76.5 kg)       Exam   General Appearance: moderately built, moderately nourished; slight SOB with exertion on 5LPM  HEENT: Normal, Head is normocephalic, atraumatic. Oropharynx is clear and moist.  No oral thrush. PERRL  Neck - Supple, Elevated JVD present. No tracheal deviation. Lungs - Bilateral air entry present. Good breath sounds on both sides of chest. No wheezes. Bilateral basal rales. Cardiovascular - NSRl. Regular rhythm normal rate without murmur, gallop or rub. Abdomen - Soft, obese, nontender, nondistended, no masses or organomegaly  Neurologic - Awake, alert, oriented. There are no focal motor or sensory deficits. Extremities - No cyanosis, clubbing. Bilateral 2+ leg edema with redness on LLE  Musculoskeletal: Normal range of motion. Patient exhibits no tenderness. Lymphadenopathy:  No cervical adenopathy. Psychiatric: Patient  has a normal mood and affect. Skin - No bruising or bleeding.     Labs  - Old records and notes have been reviewed in [facility]   ABG  Lab Results   Component Value Date    PH 7.42 01/05/2020    PO2 60 01/05/2020    PCO2 39 01/05/2020    HCO3 25 01/05/2020    O2SAT 91 01/05/2020     Lab Results   Component Value Date    IFIO2 3 01/05/2020     CBC  Recent Labs     11/12/20  1320 11/13/20  0906   WBC 10.4 10.4   RBC 2.72* 2.91*   HGB 8.3* 9.1*   HCT 27.8* 30.4*   .2* 104.5*   MCH 30.5 31.3   MCHC 29.9* 29.9*    190   MPV 10.9 10.9      BMP  Recent Labs     11/11/20  0502      K 4.1      CO2 29   BUN 29*   CREATININE 0.9   GLUCOSE 129*   CALCIUM 8.3*     LFT  No results for input(s): AST, ALT, ALB, BILITOT, ALKPHOS, LIPASE in the last 72 hours.    Invalid input(s): AMYLASE  TROP  Lab Results   Component Value Date    TROPONINT < 0.010 11/01/2020    TROPONINT 0.016 02/22/2020    TROPONINT 0.021 02/22/2020     BNP  No results for input(s): BNP in the last 72 hours. Lactic Acid  No results for input(s): LACTA in the last 72 hours. INR  Recent Labs     11/11/20  0505 11/12/20  0556 11/13/20  0905   INR 2.54* 2.72* 3.08*     PTT  No results for input(s): APTT in the last 72 hours. Glucose  No results for input(s): POCGLU in the last 72 hours. UA No results for input(s): SPECGRAV, PHUR, COLORU, CLARITYU, MUCUS, PROTEINU, BLOODU, RBCUA, WBCUA, BACTERIA, NITRU, GLUCOSEU, BILIRUBINUR, UROBILINOGEN, KETUA, LABCAST, LABCASTTY, AMORPHOS in the last 72 hours. Invalid input(s): CRYSTALS. PFTs 2017             Sleep studies   None in Epic    Cultures    None      Echocardiogram     [phone] 2/24/2020   Narrative & Impression      Transthoracic Echocardiography Report (TTE)     Conclusions      Summary   Left ventricle size is normal.   Normal left ventricular wall thickness. Ejection fraction is visually estimated in the range of 30% to 35%. Apical anteroseptal wall segments were not clearly visualized. Moderate myocardial infarction of the anteroseptal LV. Signature      ----------------------------------------------------------------   Electronically signed by Rufino Wallace MD (Interpreting   physician) on 02/24/2020 at 09:42 AM   ----------------------------------------------------------------    Radiology    Chest Xray: Nov 11, 2020  PROCEDURE: XR CHEST (2 VW)   1. Cardiomegaly. Permanent pacemaker/defibrillator. 2. Mildly increased interstitial markings in both lungs, consistent with interstitial edema/pneumonia with underlying element of interstitial fibrosis.    3. Overall appearance of chest has improved since prior.   //  Oct 22, 2019   PROCEDURE: XR CHEST 1 VIEW   Stable radiographic appearance of the chest. No evidence of an acute process. CT Scans  (See actual reports for details)  Nov 1, 2020   PROCEDURE: CT CHEST WO CONTRAST   Right larger than left pleural effusions. Bilateral areas of compressive atelectasis and interstitial edema interstitial pneumonic infiltrates and developing consolidation are not excludable. This most likely at the posterior right upper lung.        PROCEDURE: CT CHEST WO CONTRAST, CT ABDOMEN PELVIS WO CONTRAST, CT THORACIC RECONSTRUCTION WO POST PROCESS, CT LUMBAR RECONSTRUCTION WO POST PROCESS  CLINICAL INFORMATION: fall. Agata Jamia today and struck head. Left hip pain.   COMPARISON: CT abdomen pelvis dated 9/26/2019 and CT chest dated 2/22/2019.     1. No evidence of acute intracranial thoracic, intra-abdominal or intrapelvic abnormality. 2. No evidence of acute osseous injury of the thoracic or lumbar spine. 3. Angulated subcapital femoral fracture on the left with overlying subcutaneous hematoma.        Venous duplex scan: Oct 23, 2019   PROCEDURE: VL DUP LOWER EXTREMITY VENOUS BILATERAL   No evidence of deep venous thrombosis in either lower extremity. Assessment   -Hemoptysis of uncertain etiology. Differential includes due to current anticoagulation with the Coumadin Vs epistaxis Vs other etiologies. -Moderate COPD- under moderate control.  -Chronic tobacco smoking in the past.  -Severe persistent bronchial asthma currently on treatment with Xolair. She follows with Dr. Cisco Barrera. Angel Alarcon MD  -S/p Pacemker/defib placement by MD Kelly. No echocardiogram in Epic. -CAD (coronary artery disease). -GERD (gastroesophageal reflux disease). -Hypertension.  -Systolic CHF, acute on chronic (HCC)  -Left lower leg cellulitis S/p treatment with antibiotics.  Dr. Shaista Lares MD is following from ID service  -Full Code    Plan   -Right thoracentesis per IR when INR 2.5 or less  -Will schedule patient for ultrasound guided thoracentesis on right side of chest by interventional radiology service.  -Will send pleural fluid to routine analysis including PH, total protein, LDH, triglycerides, Amylase, cell count, Cytology with cell block, Gram stain and cultures and  Fungal cultures. Please send simultaneous serum total protein and LDH for comparison.  -Manuel Dominguez was educated and informed about planned thoracentesis procedure and its associated compliacations including but not limited to bleeding, infection, development of pneumothorax with requirement of chest tube placement and prolonged hospital stay. She agreed to take risk. She verbalizes understading of complications.  -Will follow cultures and cytology  -Need optimization of her congestive heart failure by primary service.  -Continue patient on pulmicort 0.5mg via neb bid.  -Continue patient on Brovana 15 mcg via nebulization twice daily.  -Continue Spiriva Respimat 2puffs daily. This is a replacement for Yupelri (Revefenacin inhaled) 175mcg/3ml via nebs daily- she takes at home.  -Continue Xopenex 0.63 mg via nebulization every 8 hourly as needed.  -We will monitor patient for further hemoptysis. -Will hold off on Coumadin for now.  -Acapella Q4h as tolerated. -Titrate Oxygen to keep Spo2 >90%. -Deep Venous Thrombosis Prophylaxis: Coumadin. -INR 3.08 cannot give Vitamin K patient allergic (states high severity)  -Will administer 1 unit FFP and then re-evaluate INR     -Case discussed with registered nurse.  -Manuel Dominguez and her daughter were educated about my impression and plan. They verbalizes understanding. Questions and concerns addressed. Meds and orders reviewed. Electronically signed by   PETR Wyatt CNP on 11/13/2020 at 9:58 AM     Addendum by Dr. Sathya Wren MD:  I have seen and examined the patient independently. Face to face evaluation and examination was performed. The above evaluation and note has been reviewed. Labs and radiographs were reviewed. I Have discussed with Ms. SHANELL Wyatt CNP about this patient

## 2020-11-13 NOTE — PROGRESS NOTES
[address] [address]  Recreational Therapy  Discharge Note  Inpatient Rehabilitation Unit           Date:  11/13/2020       Patient Name: Yesika Bravo      MRN: [id number]       YOB: 1945 (67 y.o.)       Gender: female  Diagnosis: left lower extremity cellulitis with physical deconditioning  Referring Practitioner: Dr. Marcia Oseguera    Patient discharged from Recreational Therapy at this time. See recreational therapy notes for details.     Electronically signed by: THIERRY Mercedes  Date: 11/13/2020

## 2020-11-14 NOTE — FLOWSHEET NOTE
Pt is a 75y.o. female, sitting up in bed in 6K-02.  attended to pt and family as a result of a rapid response called; pt was in respiratory distress. Pt , son and daughters arrived in the hallway as well as in the room.  provided listening ear for all, words of encouragement, provided updates for family as treatment progressed, contacted their  upon request for her coverage, directed visiting  to rooms and family. Pt was moved to current room about an hour after the rapid response called. Family members expressed gratitude for time together, hospitality and care given. 11/13/20 2030   Encounter Summary   Services provided to: Patient and family together   Support System Spouse; Children   Continue Visiting Yes  (11/13)   Complexity of Encounter Moderate   Length of Encounter 1 hour   Crisis   Type Rapid response   Assessment Anxious; Hopeful;Coping   Intervention Active listening;Explored feelings, thoughts, concerns;Nurtured hope;Provide update during procedure   Outcome Acceptance;Expressed gratitude;Engaged in conversation;Encouraged

## 2020-11-14 NOTE — SIGNIFICANT EVENT
Rapid Response      CRITICAL CARE RAPID RESPONSE NOTE      Patient:  Shyann Grier    Unit/Bed:6K-02/002-A  YOB: 1945  MRN: [id number]   PCP: Eliseo Dsouza MD  Date of Admission: 11/13/2020  Chief Complaint:- shortness of breath    Assessment and Plan:    1. Acute on chronic respiratory failure, hypoxia: Patient was placed on BiPAP with noted improvement this will be continued at this time. 2. Acute decompensated heart failure: Patient had received FFP on 11/13/2020. Family present at bedside identify weight gain. Patient was given IV push Lasix 40, nitro drip was started. EKG-V paced rhythm. Troponin evaluation-negative. 3. Hypertensive emergency: With decompensated heart failure. NTG gtt was started with target blood pressure 135-150 will monitor and titrate as needed. 4. Anemia, chronic, macrocytic:  Doubtful this is contributing, H/H running 9.7-8.6, will continue to monitor. INITIAL H AND P AND ICU COURSE:  Sudhir Be is a 67year old  female transferred to [facility] 11/13/2020 from [facility] earlier today. Rapid Response was called secondary to hypoxia, tachypnea and hypertension after transfer from [facility] to bed. Patient has a significant history of current everyday smoker, chronic respiratory failure-home oxygen, COPD, chronic ischemic heart disease, systolic heart failure, recurrent atrial fibrillation-Coumadin, remote biventricular pacemaker-AICD, echo-2/2020 LVEF 30 to 35%, essential hypertension, left arm DVT, GERD, recurrent UTIs, anxiety, anemia,macrocytic    Noted recent [address] [place] [address] hospitalization 11/3-11/13/2020 with dc to  rehab and then after a 10 day stay. 11/13/2020 Patient was transferred to [facility] due to worsening hypoxia. She developed hemoptysis at rehab which was evaluated by pulmonary. Pt had repeat labs including cxr which showed mod pleural effusions hence pulm recommended FFP, and thoracentesis. She will be admitted to [facility] for further management. 11/13/2020 Rapid response was called. Patient was pale diaphoretic cool to touch. Systolic blood pressure was 180/110. Complaint of right sided chest heaviness. SaO2 was 85%. Tachypneic with use of accessory muscles and nasal flaring. Bipap was applied with noted improvement after NTG gtt, Fentanyl and Lasix was given. Patient was transferred to [facility] for further management and care.      Past Medical History:  See HPI. ROS   GENERAL: Positive for fatigue,   SKN: No lesions or rashes. HEAD: No headaches or recent injury  EYES: No acute changes in vision, no diplopia or blurred vision  EARS: No hearing loss, no tinnitus  NOSE/THROAT: No rhinorrhea or pharyngitis, no nasal drainage  NECK: No lumps or unusual neck stiffness  PULMONARY: Positive for dyspnea, orthopnea and tachypnea. Audible crackles. CARDIAC: Positive for chest pain and hypertension. GI: Denies any abdominal pain, No history melena or hematochezia, no diarrhea or constipation  PERIPHERAL VASCULAR: No intermittent claudication or unusual leg cramps  MUSCULOSKELETAL: Occasional arthralgias, myalgias  NEUROLOGICAL: No Seizures or Syncope  HEMATOLOGIC:  No unusual bruising or bleeding  PSYCH: No homicidal or suicidal ideations.      Scheduled Meds:   Arformoterol Tartrate  15 mcg Nebulization BID    [START ON 11/14/2020] aspirin  81 mg Oral Daily    budesonide  500 mcg Nebulization BID    [START ON 11/14/2020] digoxin  125 mcg Oral Every Other Day    [START ON 11/14/2020] docusate sodium  100 mg Oral Daily    [START ON 11/17/2020] Evolocumab  140 mg Subcutaneous Q14 Days    [START ON 11/14/2020] furosemide  40 mg Intravenous BID    gabapentin  100 mg Oral TID    hydrocortisone  25 mg Rectal BID    [START ON 11/14/2020] metoprolol succinate  25 mg Oral Daily    nicotine  1 patch Transdermal Q24H    [START ON 11/17/2020] omalizumab  225 mg Subcutaneous Q14 Days    [START ON 11/14/2020] pantoprazole  40 mg Oral BID AC    4K-10  Handoff was given to [facility], hospitalist service    Update was given to family present at bedside. CODE STATUS reviewed with patient and family: Full code    Electronically signed by PETR Lacy CNP  CRITICAL CARE SPECIALIST    Critical Care Time 45 minutes.

## 2020-11-14 NOTE — PROGRESS NOTES
Pt admitted to  4K10 from home and was transferred here from [facility]. Complaints: Chest pain / discomfort  Shortness of breath. IV Nitro running at 25 mcg into the hand left, condition patent and no redness. IV site free of s/s of infection or infiltration. Vital signs obtained. Assessment and data collection initiated. Two nurse skin assessment performed by Aylin Jimenez RN and Patric Lawson RN. Oriented to room. Policies and procedures for  explained. All questions answered with no further questions at this time. Fall prevention and safety brochure discussed with patient. Bed alarm on. Call light in reach.

## 2020-11-14 NOTE — OP NOTE
Pre-Procedure Diagnosis:  pleural effusion    Procedure Performed:  Thoracentesis    Anesthesia: local     Findings: 700 ml herrera serous fluid removed    Immediate Complications:  None    Estimated Blood Loss: minimal    SEE DICTATED PROCEDURE NOTE FOR COMPLETE DETAILS.     Mandie Jonas MD   11/14/2020 2:23 PM

## 2020-11-14 NOTE — H&P
Formulation and discussion of sedation / procedure plans, risks, benefits, side effects and alternatives with patient and/or responsible adult completed.     Electronically signed by Matilde Phelan MD on 11/14/2020 at 2:23 PM

## 2020-11-14 NOTE — PROGRESS NOTES
Hospitalist Progress Note    Patient:  Tarsha Silva      Unit/Bed:4K-10/010-A    YOB: 1945    MRN: [id number]       Acct: [de-identified]     PCP: Julian Ma MD    Date of Admission: 11/13/2020    Active Hospital Problems    Diagnosis Date Noted    Physical debility [R53.81] 11/03/2020     Assessment/Plan:     1. Acute on Chronic Resp failure likely 2/2 Pleural Effusions/Acute Systolic CHF exacerbation  - continue IV diuresis  - monitor I/o, cardiac, low salt diet  - daily weights  - wean o2 to baseline 2 L  - echo 2/2020 EF 30-35 % --> may need to repeat echo --> cath in 8/2020  - thoracentesis on 11/14 with 700cc removed   - continue bb, entresto  - allergic to statin  - telemetry      2. CAD/Biventricular Pacemaker-AICD/HTN/HLD/CKD2/PVD  - meds as above     3. Permanent Atrial Fibrillation/Supratherapeutic INR  - Heparin on hold  - resume blood thinners when ok   - goal INR < or 2.5     4. COPD w/ ? Lung Fibrosis (on imaging)/Tobacco abuse  - pulmicort/Brovana/spriva/xopenex  - acapella q4h  - supplemental o2 to keep o2 sat >90     5. Hemoptysis  - pulm following  - continue to monitor     6. LE cellulitis s/p Septic shock/Sepsis  - S/p Treatment and mx by ID  - continue compression wrap        Chief Complaint: sob     Hospital Course: 67 y.o. female with significant pmhx and recent hospitalization with dc to [facility] rehab and then after a 10 day stay, is getting transferred back to inpatient due to worsening hypoxia. Patient was previously admitted for LE cellulitis s/p ID management. ID was following her at [facility]. She has completed her antibiotics at this time. She developed hemoptysis at rehab which was evaluated by pulmonary. Pt had repeat labs including cxr which showed mod pleural effusions hence pulm recommended FFP, and thoracentesis in am with fluid analysis. Patient uses 2 L of o2 at home but is currently needing 5L. This is likely attributable to her effusions.   She does have a hx of copd, chronic resp failure, asthma, cad, htn. She will be admitted to [facility] for further management. Subjective: no acute events overnight. Pt reports improvement in sob from yesterday. No fevers or chills. No chest pain. Tolerating PO intake. No nausea or vomiting. Medications:  Reviewed    Infusion Medications   Scheduled Medications    sodium chloride  20 mL Intravenous Once    Arformoterol Tartrate  15 mcg Nebulization BID    aspirin  81 mg Oral Daily    budesonide  500 mcg Nebulization BID    digoxin  125 mcg Oral Every Other Day    docusate sodium  100 mg Oral Daily    [START ON 11/17/2020] Evolocumab  140 mg Subcutaneous Q14 Days    furosemide  40 mg Intravenous BID    gabapentin  100 mg Oral TID    hydrocortisone  25 mg Rectal BID    metoprolol succinate  25 mg Oral Daily    nicotine  1 patch Transdermal Q24H    [START ON 11/17/2020] omalizumab  225 mg Subcutaneous Q14 Days    pantoprazole  40 mg Oral BID AC    sacubitril-valsartan  1 tablet Oral BID    senna  1 tablet Oral Nightly    tiotropium  2 puff Inhalation Daily    traZODone  50 mg Oral Nightly     PRN Meds: polyethylene glycol, bisacodyl, acetaminophen, cyclobenzaprine, HYDROcodone 5 mg - acetaminophen, HYDROcodone 5 mg - acetaminophen, levalbuterol, magnesium hydroxide      Intake/Output Summary (Last 24 hours) at 11/14/2020 1621  Last data filed at 11/14/2020 1523  Gross per 24 hour   Intake 324 ml   Output 1050 ml   Net -726 ml       Diet:  DIET CARDIAC; Low Sodium (2 GM); Daily Fluid Restriction: 1500 ml    Exam:  BP (!) 121/53   Pulse 70   Temp 98 °F (36.7 °C) (Oral)   Resp 18   Wt 160 lb (72.6 kg)   SpO2 96%   BMI 29.26 kg/m²     General appearance:  No apparent distress, appears stated age and cooperative. HEENT:  Normal cephalic, atraumatic without obvious deformity. Pupils equal, round, and reactive to light. Extra ocular muscles intact. Conjunctivae/corneas clear.  NC in place  Neck: Supple, with full range of motion. No jugular venous distention. Trachea midline. Respiratory:  Normal respiratory effort. Diminished  Cardiovascular:  irreg rhythm  Abdomen: Soft, non-tender, non-distended   Musculoskeletal:  LE with compression stockings in place  Skin: Skin color, texture, turgor normal.  No rashes or lesions. Neurologic: grossly non-focal  Psychiatric:  Alert and oriented, thought content appropriate, normal insight  Peripheral Pulses: present       Labs:   Recent Labs     11/13/20  0906 11/13/20 2057   WBC 10.4  --    HGB 9.1* 8.0*   HCT 30.4* 27.0*     --      Recent Labs     11/13/20 2057      K 4.7   CL 95*   CO2 28   BUN 23*   CREATININE 0.7   CALCIUM 8.8     No results for input(s): AST, ALT, BILIDIR, BILITOT, ALKPHOS in the last 72 hours. Recent Labs     11/14/20  0537   INR 2.30*     No results for input(s): [place] in the last 72 hours. Urinalysis:      Lab Results   Component Value Date    NITRU NEGATIVE 11/08/2020    WBCUA 0-2 11/08/2020    BACTERIA NONE SEEN 11/08/2020    RBCUA 5-10 11/08/2020    BLOODU TRACE 11/08/2020    GLUCOSEU NEGATIVE 11/08/2020       Radiology: All imaging reviewed     Diet: DIET CARDIAC; Low Sodium (2 GM);  Daily Fluid Restriction: 1500 ml      Code Status: Full Code            Electronically signed by Coral Ortiz MD on 11/14/2020 at 4:21 PM

## 2020-11-14 NOTE — PROGRESS NOTES
[facility]      Patient - Liz Red   MRN -  [id number]   [facility]   - 1945      Date of Admission -  2020  5:45 PM  Date of evaluation -  2020  Room - [address] [place] [address] Street, MD Primary Care Physician - Anne-Marie Martinez MD     Problem List      Active Hospital Problems    Diagnosis Date Noted    Physical debility [R53.81] 2020     Reason for Consult    Further management of hypoxia and hemoptysis. HPI   History Obtained From: Patient and electronic medical record. Liz Red is a 67 y.o. female  was initially admitted under hospitalist service. Pulmonary medicine was consulted for further management of Hemoptysis and COPD. She is a 43-year-old pleasant female with a past medical history of moderately severe COPD and bronchial asthma. She used to follow with Dr. Maqruis Sacnhez MD in the past. However she is currently following with Dr. Bhavin Dumont. DO Constanza at [facility] pulmonary clinic. She was initially admitted to ICU on 2020 for management of sepsis due to left lower lower extremity cellulitis and shock. Patient having chronic minimal hemoptysis. She was evaluated by Dr. Bhavin Dumont. DO Constanza on 2020. As per Dr. Bhavin Dumont. Ruchi Bragg note the hemoptysis was thought to be due to epistaxis from a nonhumidified oxygen supplementation. She is continue to have a hemoptysis for the last 5days. She is coughing up quarter size blood clot which is a dark blood for the last few days. There is no history of active hemoptysis or bright red blood in the last few days. She still complaining of shortness of breath on minimal exertion. She was found to be on Coumadin with therapeutic INR. No active bleeding was noted. She is receiving Xolair to 25 mg from Dr.'s David GUEVARA. Ruchi Bragg office.   Patient also follows with Dr. Sherie Lund MD allergist in [place].    She uses 2LPM of oxygen supplementation at rest, exercise or during sleep/at night time at home. Past 24 Hours   -Father right thoracentesis today by IR service.  -She was moved to  due to an episode of hypoxia.  -She was treated with the BiPAP 13/6 cmH2O with a 35% oxygen.  -Stable on 6LPM- 91% during my exam.  -She lost her hearing aid.  -Rest of review of systems were reviewed.   PMHx   Past Medical History      Diagnosis Date    Allergic rhinitis     Anemia     Anxiety     Arthritis     Asthma     Atrial fibrillation (HCC)     CAD (coronary artery disease)     Chronic systolic CHF (congestive heart failure) ([facility] [address] 10/27/2019    COPD (chronic obstructive pulmonary disease) (HCC)     Frequent UTI     GERD (gastroesophageal reflux disease)     Hemorrhoids     History of blood transfusion     X8    Hx of blood clots     left arm    Hyperlipidemia     Hypertension     Influenza A 02/22/2020    Kidney stone     LONG TERM ANTICOAGULENT USE 7/6/2012    Lumbar spinal stenosis     MI, old 12    Prolonged emergence from general anesthesia       Past Surgical History        Procedure Laterality Date    CARDIAC CATHETERIZATION  1994    CARDIAC DEFIBRILLATOR PLACEMENT  2008    OUS [place]    COLONOSCOPY  10/16/2015    Dr. Marianela Rachel    COLONOSCOPY N/A 10/25/2018    COLONOSCOPY POLYPECTOMY SNARE/COLD BIOPSY performed by Alfa Black MD at [facility]    ENDOSCOPY, COLON, DIAGNOSTIC  01/04/2017    Dr. Nichole Holden      left hip    OVARIAN CYST REMOVAL      PACEMAKER PLACEMENT      SINUS SURGERY      several    TONSILLECTOMY      TOTAL HIP ARTHROPLASTY Left 10/24/2019    DANIEL LEFT HIP ARTHROPLASTY performed by Zaina Steinberg MD at [facility]  2013    X2    UPPER GASTROINTESTINAL ENDOSCOPY N/A 1/5/2020    EGD DIAGNOSTIC ONLY performed by Anuradha Clark MD at [facility]     Meds    Current Medications  sodium chloride  20 mL Intravenous Once    Arformoterol Tartrate  15 mcg Nebulization BID    aspirin  81 mg Oral Daily    budesonide  500 mcg Nebulization BID    digoxin  125 mcg Oral Every Other Day    docusate sodium  100 mg Oral Daily    [START ON 11/17/2020] Evolocumab  140 mg Subcutaneous Q14 Days    furosemide  40 mg Intravenous BID    gabapentin  100 mg Oral TID    hydrocortisone  25 mg Rectal BID    metoprolol succinate  25 mg Oral Daily    nicotine  1 patch Transdermal Q24H    [START ON 11/17/2020] omalizumab  225 mg Subcutaneous Q14 Days    pantoprazole  40 mg Oral BID AC    sacubitril-valsartan  1 tablet Oral BID    senna  1 tablet Oral Nightly    tiotropium  2 puff Inhalation Daily    traZODone  50 mg Oral Nightly     polyethylene glycol, bisacodyl, acetaminophen, cyclobenzaprine, HYDROcodone 5 mg - acetaminophen, HYDROcodone 5 mg - acetaminophen, levalbuterol, magnesium hydroxide  IV Drips/Infusions    Home Medications  Medications Prior to Admission: levocetirizine (XYZAL) 5 MG tablet, Take 5 mg by mouth nightly  Omalizumab (XOLAIR SC), Inject into the skin At Dr Shamir Alicea office  Ascorbic Acid (VITAMIN C) 100 MG tablet, Take 100 mg by mouth daily  metoprolol succinate (TOPROL XL) 25 MG extended release tablet, Take 25 mg by mouth daily  Cholecalciferol (VITAMIN D3) 50 MCG (2000 UT) CAPS, Take 2,000 Units by mouth daily  sacubitril-valsartan (ENTRESTO) 49-51 MG per tablet, Take 1 tablet by mouth 2 times daily  pantoprazole (PROTONIX) 40 MG tablet, Take 40 mg by mouth 2 times daily 30 minutes before two heaviest meals on an empty stomach  LINZESS 290 MCG CAPS capsule, Take 290 mcg by mouth daily   loperamide (IMODIUM) 2 MG capsule, Take 2 mg by mouth 4 times daily as needed for Diarrhea  traMADol (ULTRAM) 50 MG tablet, Take 50 mg by mouth every 6 hours as needed for Pain.    nystatin (MYCOSTATIN) 300547 UNIT/GM cream, as needed   ondansetron (ZOFRAN) 8 MG tablet, daily as needed for Nausea or Vomiting   hydrocortisone (ANUSOL-HC) 25 MG suppository, Place 1 suppository rectally 2 times daily as needed for Hemorrhoids  warfarin (COUMADIN) 1 MG tablet, Take as directed by the Coumadin Clinic, 145 tablets for 90 days  digoxin (LANOXIN) 125 MCG tablet, Take 1 tablet by mouth every other day Indications: Increased Heart Rate Until Office Visit with Dr. Amadou Valenzuela  Revefenacin 175 MCG/3ML SOLN, Inhale 175 mcg into the lungs daily  hydrocortisone 2.5 % cream, Apply topically 2 times daily as needed   acetaminophen (TYLENOL) 325 MG tablet, Take 650 mg by mouth as needed for Pain or Fever Indications: Pain Don't take more then 3,000 mg each day  Multiple Vitamins-Minerals (MULTIVITAMIN ADULT) CHEW, Take 2 tablets by mouth daily  Menthol-Methyl Salicylate (ICY HOT) 43-70 % STCK, Apply topically daily  Alum Hydroxide-Mag Carbonate (GAVISCON PO), Take by mouth as needed Indications: Acid Indigestion   lidocaine (XYLOCAINE) 5 % ointment, Apply topically Before venipuncture in Dr. Jayne Lopez office.   hydrOXYzine (ATARAX) 25 MG tablet, Take 25 mg by mouth 3 times daily as needed Indications: Feeling Anxious   Pramoxine HCl (PROCTOFOAM RE), Place rectally daily as needed   EPINEPHrine (EPIPEN) 0.3 MG/0.3ML SOAJ injection, INJECT AS DIRECTED FOR ANAPHYLAXIS  Evolocumab (REPATHA SURECLICK SC), Inject 426 mg into the skin every 14 days Indications: Blood Cholesterol Abnormal   B Complex-C (RA B-COMPLEX/VITAMIN C CR PO), Take 1 tablet by mouth daily Indications: Treatment to Prevent Vitamin Deficiency   NITROGLYCERIN RE, Place 0.3 mg rectally as needed Indications: Hemorrhoids   dicyclomine (BENTYL) 10 MG capsule, Take 10 mg by mouth 4 times daily (before meals and nightly) Indications: Pain in the Abdominal Region  Coenzyme Q10 (COQ-10) 200 MG CAPS, Take by mouth daily   Probiotic Product (SUPER PROBIOTIC) CAPS,  Take 1 tablet by mouth daily Indications: Digestive Complaint   budesonide (PULMICORT) 0.5 MG/2ML nebulizer suspension,  Take 1 ampule by nebulization 2 times daily Indications: Shortness of Breath (Inactive)   Arformoterol Tartrate (BROVANA) 15 MCG/2ML NEBU,  Take 1 ampule by nebulization 2 times daily Indications: Shortness of Breath (Inactive)   ofloxacin (FLOXIN) 0.3 % otic solution, Place 2 drops into both ears daily as needed Indications: Infection Long-term therapy. Carboxymethylcellul-Glycerin (REFRESH OPTIVE OP), Apply  to eye as needed. Indications: Dry Eyes caused by Deficiency of Tears  levalbuterol (XOPENEX) 0.63 MG/3ML nebulization, Take 1 ampule by nebulization every 8 hours as needed for Wheezing. triamcinolone (KENALOG) 0.1 % cream, Apply topically 2 times daily as needed Anti-fungal  clotrimazole-betamethasone (LOTRISONE) cream, Apply topically 2 times daily as needed Indications: Yeast Infection (inactive)   polyethylene glycol (MIRALAX) powder, Take 17 g by mouth as needed. Indications: Constipation  Alcaftadine (LASTACAFT) 0.25 % SOLN, Apply 1 drop to eye daily as needed Indications: Eye Allergy Both eyes  aspirin 81 MG EC tablet, Take 81 mg by mouth daily. With food  Indications: Anticoagulant Therapy  azelastine (ASTELIN) 137 MCG/SPRAY nasal spray, 1 spray by Nasal route 2 times daily as needed Indications: Allergic Rhinitis Use in each nostril as directed  furosemide (LASIX) 40 MG tablet, Take 40 mg by mouth daily. Indications: Treatment with Diuretic Therapy  folic acid (FOLVITE) 1 MG tablet, Take 1 mg by mouth daily. Indications: Folic Acid Supplementation  OXYGEN, 2 L by Nasal route continuous Indications: Chronic Obstructive Lung Disease   traZODone (DESYREL) 50 MG tablet, Take 50 mg by mouth nightly   nitroGLYCERIN (NITROSTAT) 0.4 MG SL tablet, Place 0.4 mg under the tongue every 5 minutes as needed. If third one does not relieve pain, call 9-1-1. Indications: Chest Pain  Diet    DIET CARDIAC; Low Sodium (2 GM);  Daily Fluid Restriction: 1500 ml  Allergies    Benadryl [diphenhydramine sexual activity: Not on file   Other Topics Concern    Not on file   Social History Narrative    Not on file       Vitals     weight is 160 lb (72.6 kg). Her oral temperature is 98 °F (36.7 °C). Her blood pressure is 121/53 (abnormal) and her pulse is 70. Her respiration is 18 and oxygen saturation is 94%. Body mass index is 29.26 kg/m². SUPPLEMENTAL O2: O2 Flow Rate (L/min): 4 L/min     I/O        Intake/Output Summary (Last 24 hours) at 11/14/2020 1736  Last data filed at 11/14/2020 1523  Gross per 24 hour   Intake 324 ml   Output 1050 ml   Net -726 ml     I/O last 3 completed shifts: In: 324 [P.O.:300; I.V.:24]  Out: 550 [Urine:550]   Patient Vitals for the past 96 hrs (Last 3 readings):   Weight   11/14/20 0351 160 lb (72.6 kg)       Exam   Nursing note and vitals reviewed. Constitutional: Patient appears moderately built and moderately nourished. No distress. Patient is oriented to person, place, and time. HENT:   Head: Normocephalic and atraumatic. Right Ear: External ear normal.   Left Ear: External ear normal.   Mouth/Throat: Oropharynx is clear and moist.  No oral thrush. Eyes: Conjunctivae are normal. Pupils are equal, round, and reactive to light. No scleral icterus. Neck: Neck supple. Elevated JVD present. No tracheal deviation present. Cardiovascular: Normal rate, regular rhythm, normal heart sounds. No murmur heard. Pulmonary/Chest: Effort normal and breath sounds normal. No stridor. No respiratory distress. No wheezes. Bilateral basal rales. Patient exhibits no tenderness. Abdominal: Soft. Patient exhibits no distension. No tenderness. Musculoskeletal: Normal range of motion. Extremities: Patient exhibits bilateral leg edema 2+ and no tenderness. Lymphadenopathy:  No cervical adenopathy. Neurological: Patient is alert and oriented to person, place, and time. Skin: Skin is warm and dry. Patient is not diaphoretic. Psychiatric: Patient  has a normal mood and affect. Total protein ratio i.e Pleural fluid Total protein/ Serum Total protein:     Pleural fluid cultures were negative so far. Gram stain of pleural fluid is negative for any organisms. PFTs 2017             Sleep studies   None in Epic    Cultures    None      Echocardiogram     [phone] 2/24/2020   Narrative & Impression      Transthoracic Echocardiography Report (TTE)     Conclusions      Summary   Left ventricle size is normal.   Normal left ventricular wall thickness. Ejection fraction is visually estimated in the range of 30% to 35%. Apical anteroseptal wall segments were not clearly visualized. Moderate myocardial infarction of the anteroseptal LV. Signature      ----------------------------------------------------------------   Electronically signed by Mayo Fleischer MD (Interpreting   physician) on 02/24/2020 at 09:42 AM   ----------------------------------------------------------------    Radiology    Chest Xray:   Nov 14, 2020   COMPARISON: 11/13/2020   No pneumothorax post right thoracentesis. Chest Xray: Nov 11, 2020  PROCEDURE: XR CHEST (2 VW)   1. Cardiomegaly. Permanent pacemaker/defibrillator. 2. Mildly increased interstitial markings in both lungs, consistent with interstitial edema/pneumonia with underlying element of interstitial fibrosis. 3. Overall appearance of chest has improved since prior. Oct 22, 2019   PROCEDURE: XR CHEST 1 VIEW   Stable radiographic appearance of the chest. No evidence of an acute process. CT Scans  (See actual reports for details)  Nov 1, 2020   PROCEDURE: CT CHEST WO CONTRAST   Right larger than left pleural effusions. Bilateral areas of compressive atelectasis and interstitial edema interstitial pneumonic infiltrates and developing consolidation are not excludable.  This most likely at the posterior right upper lung.        PROCEDURE: CT CHEST WO CONTRAST, CT ABDOMEN PELVIS WO CONTRAST, CT THORACIC RECONSTRUCTION WO POST PROCESS, CT LUMBAR RECONSTRUCTION WO POST PROCESS  CLINICAL INFORMATION: fall. [place] today and struck head. Left hip pain.   COMPARISON: CT abdomen pelvis dated 9/26/2019 and CT chest dated 2/22/2019.     1. No evidence of acute intracranial thoracic, intra-abdominal or intrapelvic abnormality. 2. No evidence of acute osseous injury of the thoracic or lumbar spine. 3. Angulated subcapital femoral fracture on the left with overlying subcutaneous hematoma.        Venous duplex scan: Oct 23, 2019   PROCEDURE: VL DUP LOWER EXTREMITY VENOUS BILATERAL   No evidence of deep venous thrombosis in either lower extremity. Assessment   -Hemoptysis of uncertain etiology. Differential includes due to current anticoagulation with the Coumadin Vs epistaxis Vs other etiologies- improving. She is coughing old clotted blood. No active hemoptysis. Her Hb/HCT is stable. -Moderate COPD- under moderate control.  -Chronic tobacco smoking in the past.  -Severe persistent bronchial asthma currently on treatment with Xolair. She follows with Dr. Dev Kaur. Chidi Back MD  -S/p Pacemker/defib placement by MD Kelly. No echocardiogram in Epic. -CAD (coronary artery disease). -GERD (gastroesophageal reflux disease). -Hypertension.  -Systolic CHF, acute on chronic (HCC)  -Left lower leg cellulitis S/p treatment with antibiotics. Dr. Roberto Germain MD is following from ID service    Plan   -Follow pleural fluid cultures and cytology  -Need optimization of her congestive heart failure by primary service.  -Continue patient on pulmicort 0.5mg via neb bid.  -Continue patient on Brovana 15 mcg via nebulization twice daily.  -Continue Spiriva Respimat 2puffs daily. This is a replacement for Yupelri (Revefenacin inhaled) 175mcg/3ml via nebs daily- she takes at home.  -Continue Xopenex 0.63 mg via nebulization every 8 hourly as needed.  -We will monitor patient for further hemoptysis. At this time she is coughing old clotted blood. No active hemoptysis.  Her Hb/HCT is stable. She is very high risk for bronchoscopy for air way exam including prolonged mechanical ventilation. Patient refused to take high risk.  -Will hold off on Coumadin for now. -Will re consult cardiology service to stop anticoagulation until her hemoptysis completely resolves and to consider alternate treatments for her Afib -Watchman device placement to avoid anticoagulation due to recurrent hemoptysis. -Acapella Q4h as tolerated. -Titrate Oxygen to keep Spo2 >90%. -Deep Venous Thrombosis Prophylaxis: Coumadin.     -Case discussed with registered nurse.  -Valente Valencia and her daughter were educated about my impression and plan. They verbalizes understanding. Questions and concerns addressed.  -Discussed with Dr. Miguel Ángel Weems- from IR service regarding patient condition and management plan. Meds and orders reviewed.      Electronically signed by   Dia Polanco MD on 11/14/2020 at 5:36 PM

## 2020-11-14 NOTE — PLAN OF CARE
Problem: RESPIRATORY  Goal: Clear lung sounds  Outcome: Ongoing  Note:  Patient lung sounds are considered normal for their current lung condition. No signs of distress noted.  Current treatment regimen appropriate        Problem: RESPIRATORY  Goal: Effective breathing pattern  Outcome: Ongoing  Note: Continue on BiPAP for WOB

## 2020-11-15 PROBLEM — J96.01 ACUTE RESPIRATORY FAILURE WITH HYPOXIA (HCC): Status: ACTIVE | Noted: 2020-01-01

## 2020-11-15 NOTE — PROGRESS NOTES
Pharmacy Heparin Consult    Rahul Dee is a 67 y.o. female. Pharmacy has been consulted to adjust heparin.     Height:   Ht Readings from Last 1 Encounters:   11/03/20 5' 2\" (1.575 m)     Weight:  Wt Readings from Last 1 Encounters:   11/14/20 160 lb (72.6 kg)       Heparin Indication: atrial fibrillation - warfarin on hold  Bolus Permitted: yes  Goal aPTT: 60-95    Plan:  Bolus: 80 units/kg  Rate: 18 units/kg/hr  Next aPTT: 14:00 on 11/15/20 - 4K will start dosing with next aPTT    Litzy Ochoa, PharmD, BCPS  11/15/2020  7:08 AM

## 2020-11-15 NOTE — PROGRESS NOTES
Hospitalist Progress Note    Patient:  Janet Bauer      Unit/Bed:4K-10/010-A    YOB: 1945    MRN: [id number]       Acct: [de-identified]     PCP: Florin Dumont MD    Date of Admission: 11/13/2020    Active Hospital Problems    Diagnosis Date Noted    Acute respiratory failure with hypoxia ([facility] [address] [J96.01] 11/15/2020    Hemoptysis [R04.2]     Physical debility [R53.81] 11/03/2020    Moderate COPD (chronic obstructive pulmonary disease) ([facility] [address] [J44.9] 07/06/2012     Assessment/Plan:     1. Acute on Chronic Resp failure likely 2/2 Pleural Effusions/Acute Systolic CHF exacerbation  - continue IV diuresis  - monitor I/o, cardiac, low salt diet  - daily weights  - wean o2 to baseline 2 L  - echo 2/2020 EF 30-35 % --> may need to repeat echo --> cath in 8/2020  - thoracentesis on 11/14 with 700cc removed   - continue bb, entresto  - allergic to statin  - telemetry      2. CAD/Biventricular Pacemaker-AICD/HTN/HLD/CKD2/PVD  - meds as above     3. Permanent Atrial Fibrillation/Supratherapeutic INR  - Heparin and coumadin on hold  - pt very worried about stopping coumadin  - will need to discuss risks vs benefits with family   - Cardiology consulted, plan outpatient Watchmen      4. COPD w/ ? Lung Fibrosis (on imaging)/Tobacco abuse  - pulmicort/Brovana/spriva/xopenex  - acapella q4h  - supplemental o2 to keep o2 sat >90     5. Hemoptysis of Uncertain Etiology   - was on anticoagulation with the Coumadin Vs epistaxis Vs other etiologies  - coumadin stopped and reversed   - hemoptysis significantly improving   - Hb and HD stable   - Pulmonary consulted, recommending hold coumadin. Consult Cardiology for recs      6. LE cellulitis s/p Septic shock/Sepsis  - S/p Treatment and mx by ID  - continue compression wrap      Dispo: improvement in hemoptysis with holding coumadin, pt on 5L NC. Poor prognosis given very poor lungs. Cont IV diuresis.  Will need to discuss with family, Pulmonary and Cardiology regarding best treatment plan regarding [facility]. Chief Complaint: [address]: 67 y. o. female with significant pmhx and recent hospitalization with dc to  rehab and then after a 10 day stay, is getting transferred back to inpatient due to worsening hypoxia. Patient was previously admitted for LE cellulitis s/p ID management. ID was following her at [facility]. She has completed her antibiotics at this time. She developed hemoptysis at rehab which was evaluated by pulmonary. Pt had repeat labs including cxr which showed mod pleural effusions hence pulm recommended FFP, and thoracentesis in am with fluid analysis. Patient uses 2 L of o2 at home but is currently needing 5L. This is likely attributable to her effusions. She does have a hx of copd, chronic resp failure, asthma, cad, htn. She will be admitted to [facility] for further management. Subjective: no acute events overnight. Pt resting comfortably in bed on her laptop. Reports improvement in hemoptysis. Pt very anxious and nervous with holding coumadin, continues to state she doesn't want to have a stroke. Explained in detail about her hemoptysis as well. Will need to discuss with family, Pulmonary and Cardiology regarding best treatment plan regarding [facility].        Medications:  Reviewed    Infusion Medications   Scheduled Medications    sodium chloride  20 mL Intravenous Once    Arformoterol Tartrate  15 mcg Nebulization BID    aspirin  81 mg Oral Daily    budesonide  500 mcg Nebulization BID    digoxin  125 mcg Oral Every Other Day    docusate sodium  100 mg Oral Daily    [START ON 11/17/2020] Evolocumab  140 mg Subcutaneous Q14 Days    furosemide  40 mg Intravenous BID    gabapentin  100 mg Oral TID    hydrocortisone  25 mg Rectal BID    metoprolol succinate  25 mg Oral Daily    nicotine  1 patch Transdermal Q24H    [START ON 11/17/2020] omalizumab  225 mg Subcutaneous Q14 Days    pantoprazole  40 mg Oral BID AC    sacubitril-valsartan  1 tablet Oral BID    senna  1 tablet Oral Nightly    tiotropium  2 puff Inhalation Daily    traZODone  50 mg Oral Nightly     PRN Meds: polyethylene glycol, bisacodyl, acetaminophen, cyclobenzaprine, HYDROcodone 5 mg - acetaminophen, HYDROcodone 5 mg - acetaminophen, levalbuterol, magnesium hydroxide      Intake/Output Summary (Last 24 hours) at 11/15/2020 1204  Last data filed at 11/15/2020 0914  Gross per 24 hour   Intake 1551.32 ml   Output 1500 ml   Net 51.32 ml       Diet:  DIET CARDIAC; Low Sodium (2 GM); Daily Fluid Restriction: 1500 ml    Exam:  BP (!) 108/58   Pulse 82   Temp 97.9 °F (36.6 °C) (Oral)   Resp 18   Wt 160 lb (72.6 kg)   SpO2 94%   BMI 29.26 kg/m²     General appearance:  No apparent distress, appears stated age and cooperative. HEENT:  Normal cephalic, atraumatic without obvious deformity. Pupils equal, round, and reactive to light.  Extra ocular muscles intact. Conjunctivae/corneas clear. NC in place  Neck: Supple, with full range of motion. No jugular venous distention. Trachea midline. Respiratory:  Normal respiratory effort. Diminished  Cardiovascular:  irreg rhythm  Abdomen: Soft, non-tender, non-distended   Musculoskeletal:  LE with compression stockings in place  Skin: Skin color, texture, turgor normal.  No rashes or lesions. Neurologic: grossly non-focal  Psychiatric:  Alert and oriented, thought content appropriate, normal insight  Peripheral Pulses: present          Labs:   Recent Labs     11/15/20  0536   WBC 6.5   HGB 7.4*   HCT 25.2*        Recent Labs     11/15/20  0536      K 4.2   CL 99   CO2 35*   BUN 20   CREATININE 0.8   CALCIUM 9.0     No results for input(s): AST, ALT, BILIDIR, BILITOT, ALKPHOS in the last 72 hours. Recent Labs     11/15/20  0536   INR 1.70*     No results for input(s): Towana Ball in the last 72 hours.     Urinalysis:      Lab Results   Component Value Date    NITRU NEGATIVE 11/08/2020    WBCUA 0-2 11/08/2020    BACTERIA NONE SEEN 11/08/2020    RBCUA 5-10 11/08/2020    BLOODU TRACE 11/08/2020    GLUCOSEU NEGATIVE 11/08/2020       Radiology: All imaging reviewed     Diet: DIET CARDIAC; Low Sodium (2 GM);  Daily Fluid Restriction: 1500 ml      Code Status: Full Code            Electronically signed by Anderson Smith MD on 11/15/2020 at 12:04 PM

## 2020-11-15 NOTE — PROGRESS NOTES
Brief encounter note:    I saw this patient for onset of acute chest pain about half an hour ago. The patient said that she \"had to go potty. \"  The patient's daughter stated that she stood up and then sat down to use the restroom. The patient describes it as a 6 or 7 out of 10 pressure in her chest and shoulders. The patient stated that she felt like it was \"building up. \"  She stated that she has been \"really belchy,\" \"really gassy,\" and \"bloated. \"  Patient also states she gets out of breath when she gets up. The patient attributes her pain to acid reflux and states that it feels like the acid reflux pain she had before. On physical examination, I noted normal heart sounds and clear lung sounds. Normal bowel sounds are heard. However, left upper quadrant tenderness was noted. An EKG was obtained which showed no changes from previous. Chest x-ray and serial troponins are pending. If first set of troponins is normal, will follow up with cardiology in the morning. If first set of troponins is elevated, we will call cardiology stat.

## 2020-11-15 NOTE — FLOWSHEET NOTE
Consult from nursing for AD. When  entered room, patient and daughter were there. Radha Meza stated she had chest pains that were moving into her shoulders and she was short of breath.  immediately called for assistance from nurse and related patient's symptoms. Nurse came and evaluated, in addition to Dr. Mahsa Galeas, EKG, Lab, and radiology. Rashawn Poser stayed for a bit to provide support for patient's daughter as well as patient, who remembered this  from earlier in her admission. Will come back later to complete AD consult. 11/15/20 1705   Encounter Summary   Services provided to: Patient and family together   Referral/Consult From: Nurse   Support System Spouse; Children;Family members   Continue Visiting Yes  (11/15)   Complexity of Encounter High   Length of Encounter 45 minutes   Crisis   Type Emotional distress   Assessment Approachable   Intervention Sustaining presence/ Ministry of presence   Outcome Comfort;Receptive

## 2020-11-15 NOTE — PROGRESS NOTES
[facility]      Patient - Jessica Cash   MRN -  [id number]   Omar # - [de-identified]   - 1945      Date of Admission -  2020  5:45 PM  Date of evaluation -  11/15/2020  Room - 701  [place], [place] Primary Care Physician - Tal Graham MD     Problem List      Active Hospital Problems    Diagnosis Date Noted    Acute respiratory failure with hypoxia ([facility] [address] [J96.01] 11/15/2020    Hemoptysis [R04.2]     Physical debility [R53.81] 2020    Moderate COPD (chronic obstructive pulmonary disease) ([address] [J44.9] 2012     Reason for Consult    Hypoxia and hemoptysis management  HPI   History Obtained From: Patient and electronic medical record. Jessica Cash is a 67 y.o. female  was initially admitted under hospitalist service. Pulmonary medicine was consulted for further management of Hemoptysis and COPD. She is a 69-year-old pleasant female with a past medical history of moderately severe COPD and bronchial asthma. She used to follow with Dr. Mariely Valdes MD in the past. However she is currently following with Dr. Arnold Apodaca DO at [facility] pulmonary clinic. She was initially admitted to ICU on 2020 for management of sepsis due to left lower lower extremity cellulitis and shock. Patient having chronic minimal hemoptysis. She was evaluated by Dr. Arnold Apodaca DO on 2020. As per Dr. Arnold Soto. Ruchi Herrera note the hemoptysis was thought to be due to epistaxis from a nonhumidified oxygen supplementation. She is continue to have a hemoptysis for the last 5days. She is coughing up quarter size blood clot which is a dark blood for the last few days. There is no history of active hemoptysis or bright red blood in the last few days. She still complaining of shortness of breath on minimal exertion. She was found to be on Coumadin with therapeutic INR.   No PACEMAKER PLACEMENT      SINUS SURGERY      several    TONSILLECTOMY      TOTAL HIP ARTHROPLASTY Left 10/24/2019    DANIEL LEFT HIP ARTHROPLASTY performed by Briana Mendez MD at [address]  2013    X2    UPPER GASTROINTESTINAL ENDOSCOPY N/A 1/5/2020    EGD DIAGNOSTIC ONLY performed by Jessica Hale MD at [facility]     Meds    Current Medications    sodium chloride  20 mL Intravenous Once    Arformoterol Tartrate  15 mcg Nebulization BID    aspirin  81 mg Oral Daily    budesonide  500 mcg Nebulization BID    digoxin  125 mcg Oral Every Other Day    docusate sodium  100 mg Oral Daily    [START ON 11/17/2020] Evolocumab  140 mg Subcutaneous Q14 Days    furosemide  40 mg Intravenous BID    gabapentin  100 mg Oral TID    hydrocortisone  25 mg Rectal BID    metoprolol succinate  25 mg Oral Daily    nicotine  1 patch Transdermal Q24H    [START ON 11/17/2020] omalizumab  225 mg Subcutaneous Q14 Days    pantoprazole  40 mg Oral BID AC    sacubitril-valsartan  1 tablet Oral BID    senna  1 tablet Oral Nightly    tiotropium  2 puff Inhalation Daily    traZODone  50 mg Oral Nightly     polyethylene glycol, bisacodyl, acetaminophen, cyclobenzaprine, HYDROcodone 5 mg - acetaminophen, HYDROcodone 5 mg - acetaminophen, levalbuterol, magnesium hydroxide  IV Drips/Infusions    Home Medications  Medications Prior to Admission: levocetirizine (XYZAL) 5 MG tablet, Take 5 mg by mouth nightly  Omalizumab (XOLAIR SC), Inject into the skin At Dr Vincent Josue office  Ascorbic Acid (VITAMIN C) 100 MG tablet, Take 100 mg by mouth daily  metoprolol succinate (TOPROL XL) 25 MG extended release tablet, Take 25 mg by mouth daily  Cholecalciferol (VITAMIN D3) 50 MCG (2000 UT) CAPS, Take 2,000 Units by mouth daily  sacubitril-valsartan (ENTRESTO) 49-51 MG per tablet, Take 1 tablet by mouth 2 times daily  pantoprazole (PROTONIX) 40 MG tablet, Take 40 mg by mouth 2 times daily 30 minutes mg rectally as needed Indications: Hemorrhoids   dicyclomine (BENTYL) 10 MG capsule, Take 10 mg by mouth 4 times daily (before meals and nightly) Indications: Pain in the Abdominal Region  Coenzyme Q10 (COQ-10) 200 MG CAPS, Take by mouth daily   Probiotic Product (SUPER PROBIOTIC) CAPS,  Take 1 tablet by mouth daily Indications: Digestive Complaint   budesonide (PULMICORT) 0.5 MG/2ML nebulizer suspension,  Take 1 ampule by nebulization 2 times daily Indications: Shortness of Breath (Inactive)   Arformoterol Tartrate (BROVANA) 15 MCG/2ML NEBU,  Take 1 ampule by nebulization 2 times daily Indications: Shortness of Breath (Inactive)   ofloxacin (FLOXIN) 0.3 % otic solution, Place 2 drops into both ears daily as needed Indications: Infection Long-term therapy. Carboxymethylcellul-Glycerin (REFRESH OPTIVE OP), Apply  to eye as needed. Indications: Dry Eyes caused by Deficiency of Tears  levalbuterol (XOPENEX) 0.63 MG/3ML nebulization, Take 1 ampule by nebulization every 8 hours as needed for Wheezing. triamcinolone (KENALOG) 0.1 % cream, Apply topically 2 times daily as needed Anti-fungal  clotrimazole-betamethasone (LOTRISONE) cream, Apply topically 2 times daily as needed Indications: Yeast Infection (inactive)   polyethylene glycol (MIRALAX) powder, Take 17 g by mouth as needed. Indications: Constipation  Alcaftadine (LASTACAFT) 0.25 % SOLN, Apply 1 drop to eye daily as needed Indications: Eye Allergy Both eyes  aspirin 81 MG EC tablet, Take 81 mg by mouth daily. With food  Indications: Anticoagulant Therapy  azelastine (ASTELIN) 137 MCG/SPRAY nasal spray, 1 spray by Nasal route 2 times daily as needed Indications: Allergic Rhinitis Use in each nostril as directed  furosemide (LASIX) 40 MG tablet, Take 40 mg by mouth daily. Indications: Treatment with Diuretic Therapy  folic acid (FOLVITE) 1 MG tablet, Take 1 mg by mouth daily.     Indications: Folic Acid Supplementation  OXYGEN, 2 L by Nasal route continuous Indications: Chronic Obstructive Lung Disease   traZODone (DESYREL) 50 MG tablet, Take 50 mg by mouth nightly   nitroGLYCERIN (NITROSTAT) 0.4 MG SL tablet, Place 0.4 mg under the tongue every 5 minutes as needed. If third one does not relieve pain, call . Indications: Chest Pain  Diet    DIET CARDIAC; Low Sodium (2 GM); Daily Fluid Restriction: 1500 ml  Allergies    Benadryl [diphenhydramine hcl]; Ciprofloxacin; Clarithromycin; Vitamin k; Atorvastatin; Captopril; Codeine; Iv dye [iodides]; Lipitor; Macrobid [nitrofurantoin monohydrate macrocrystals]; Neomycin-bacitracin zn-polymyx; Pravastatin; Zetia [ezetimibe]; Adhesive tape; Cephalexin; Doxycycline; Morphine; Other; Propoxyphene; and Sulfa antibiotics  Family History          Adopted: Yes   Problem Relation Age of Onset    Heart Disease Father          AGE 35    Cancer Sister         breast     Sleep History    Never diagnosed with sleep apnea in the past    Social History     Social History     Socioeconomic History    Marital status:      Spouse name: Malou Ahmadi Number of children: 3    Years of education: Not on file    Highest education level: Not on file   Occupational History    Not on file   Social Needs    Financial resource strain: Not on file    Food insecurity     Worry: Not on file     Inability: Not on file   [employer] needs     Medical: Not on file     Non-medical: Not on file   Tobacco Use    Smoking status: Current Every Day Smoker     Packs/day: 0.25     Years: 25.00     Pack years: 6.25     Types: Cigarettes    Smokeless tobacco: Never Used   Substance and Sexual Activity    Alcohol use:  Yes     Alcohol/week: 1.0 standard drinks     Types: 1 Cans of beer per week     Comment: ocassionaly    Drug use: No    Sexual activity: Never   Lifestyle    Physical activity     Days per week: Not on file     Minutes per session: Not on file    Stress: Not on file   Relationships    Social connections     Talks on phone: Not on file     Gets together: Not on file     Attends Orthodoxy service: Not on file     Active member of club or organization: Not on file     Attends meetings of clubs or organizations: Not on file     Relationship status: Not on file    Intimate partner violence     Fear of current or ex partner: Not on file     Emotionally abused: Not on file     Physically abused: Not on file     Forced sexual activity: Not on file   Other Topics Concern    Not on file   Social History Narrative    Not on file     Vitals     weight is 160 lb (72.6 kg). Her oral temperature is 98 °F (36.7 °C). Her blood pressure is 110/62 and her pulse is 82. Her respiration is 18 and oxygen saturation is 91%. Body mass index is 29.26 kg/m². SUPPLEMENTAL O2: O2 Flow Rate (L/min): 5 L/min     I/O        Intake/Output Summary (Last 24 hours) at 11/15/2020 1306  Last data filed at 11/15/2020 0914  Gross per 24 hour   Intake 1551.32 ml   Output 1500 ml   Net 51.32 ml     I/O last 3 completed shifts: In: 1251.3 [P.O.:1250; I.V.:1.3]  Out: 1300 [Urine:1300]   Patient Vitals for the past 96 hrs (Last 3 readings):   Weight   11/14/20 0351 160 lb (72.6 kg)     Exam   Nursing note and vitals reviewed. Constitutional: Patient appears moderately built and moderately nourished. No distress. Patient is oriented to person, place, and time. HENT:   Head: Normocephalic and atraumatic. Right Ear: External ear normal.   Left Ear: External ear normal.   Nose: Internal nares clear; no evidence of bleeding or erythema. Mouth/Throat: Oropharynx is clear and moist.  No oral thrush. Eyes: Conjunctivae are normal. Pupils are equal, round, and reactive to light. No scleral icterus. Neck: Neck supple. Elevated JVD present. No tracheal deviation present. Cardiovascular: Normal rate, regular rhythm, normal heart sounds. No murmur heard. Pulmonary/Chest: Effort normal and breath sounds normal. Mild expiratory wheezing noted. No stridor. No rales. Patient exhibits no chest tenderness. Abdominal: Soft. Patient exhibits no distension. No tenderness. Musculoskeletal: Normal range of motion. Extremities: +2 pitting edema to bilateral lower extremities. Non-tender to palpation. Lymphadenopathy:  No cervical adenopathy. Neurological: Patient is alert and oriented to person, place, and time. Skin: Skin is warm and dry. Patient is not diaphoretic. Psychiatric: Patient  has a normal mood and affect. Patient behavior is normal.     Labs  - Old records and notes have been reviewed in [facility]   AB  Lab Results   Component Value Date    PH 7.41 11/13/2020    PO2 47 11/13/2020    PCO2 45 11/13/2020    HCO3 28 11/13/2020    O2SAT 83 11/13/2020     Lab Results   Component Value Date    IFIO2 6 11/13/2020     CBC  Recent Labs     11/12/20  1320 11/13/20  0906 11/13/20  2057 11/15/20  0536   WBC 10.4 10.4  --  6.5   RBC 2.72* 2.91*  --  2.41*   HGB 8.3* 9.1* 8.0* 7.4*   HCT 27.8* 30.4* 27.0* 25.2*   .2* 104.5*  --  104.6*   MCH 30.5 31.3  --  30.7   MCHC 29.9* 29.9*  --  29.4*    190  --  139   MPV 10.9 10.9  --  11.0      BMP  Recent Labs     11/13/20  0905 11/13/20  2057 11/15/20  0536    135 141   K 4.3 4.7 4.2   CL 96* 95* 99   CO2 30 28 35*   BUN 19 23* 20   CREATININE 0.8 0.7 0.8   GLUCOSE 154* 236* 130*   MG  --  2.2 2.3   CALCIUM 9.2 8.8 9.0     LFT  No results for input(s): AST, ALT, ALB, BILITOT, ALKPHOS, LIPASE in the last 72 hours. Invalid input(s): AMYLASE  TROP  Lab Results   Component Value Date    TROPONINT < 0.010 11/13/2020    TROPONINT < 0.010 11/01/2020    TROPONINT 0.016 02/22/2020     BNP  No results for input(s): BNP in the last 72 hours. Lactic Acid  No results for input(s): LACTA in the last 72 hours. INR  Recent Labs     11/13/20 2057 11/14/20 0537 11/15/20  0536   INR 2.72* 2.30* 1.70*     PTT  No results for input(s): APTT in the last 72 hours.   Glucose  No results for input(s): POCGLU in the last 72 since prior. Oct 22, 2019   PROCEDURE: XR CHEST 1 VIEW   Stable radiographic appearance of the chest. No evidence of an acute process. CT Scans  (See actual reports for details)  Nov 1, 2020   PROCEDURE: CT CHEST WO CONTRAST   Right larger than left pleural effusions. Bilateral areas of compressive atelectasis and interstitial edema interstitial pneumonic infiltrates and developing consolidation are not excludable. This most likely at the posterior right upper lung.        PROCEDURE: CT CHEST WO CONTRAST, CT ABDOMEN PELVIS WO CONTRAST, CT THORACIC RECONSTRUCTION WO POST PROCESS, CT LUMBAR RECONSTRUCTION WO POST PROCESS  CLINICAL INFORMATION: fall. Lorette Degree today and struck head. Left hip pain.   COMPARISON: CT abdomen pelvis dated 9/26/2019 and CT chest dated 2/22/2019.     1. No evidence of acute intracranial thoracic, intra-abdominal or intrapelvic abnormality. 2. No evidence of acute osseous injury of the thoracic or lumbar spine. 3. Angulated subcapital femoral fracture on the left with overlying subcutaneous hematoma.        Venous duplex scan: Oct 23, 2019   PROCEDURE: VL DUP LOWER EXTREMITY VENOUS BILATERAL   No evidence of deep venous thrombosis in either lower extremity. Assessment   -Hemoptysis of uncertain etiology, improving: Differential includes due to current anticoagulation with the Coumadin Vs epistaxis Vs other etiologies. She is coughing old clotted blood. No active hemoptysis. -Acute on chronic hypoxic respiratory failure 2/2 pleural effusion vs. Congestive heart failure, improving: Baseline oxygen of 2L at home.  -Right pleural effusion s/p thoracentesis on 11/14/20 - Likely due to her acute decompensated heart failure as pleural fluid indicates Transudative effusion  -Acute decompensated systolic HFrEF 05-59%, improving   -Moderate COPD, stable  -Chronic tobacco smoking in the past.  -Severe persistent bronchial asthma currently on treatment with Xolair, stable.   She follows with  Jamison Benites MD  -S/p Pacemker/defib placement by MD Kelly.  -CAD  -GERD  -Hypertension.  -Left lower leg cellulitis S/p treatment with antibiotics. Dr. Joellen Barkley MD is following from ID service    Plan   -Pleural fluid cytology pending  -Need optimization of her congestive heart failure by primary service. Continue diuresis. -Continue Pulmicort 500 mcg via neb BID  -Continue Brovana 15 mcg via nebulization BID  -Continue Spiriva Respimat 2 puffs daily. This is a replacement for Yupelri (Revefenacin inhaled) 175mcg/3ml via nebs daily- she takes at home.  -Continue Xopenex 0.63 mg via nebulization every 8 hourly as needed.  -We will monitor patient for further hemoptysis. No active hemoptysis. Her Hb/HCT is stable. She is very high risk for bronchoscopy for air way exam including prolonged mechanical ventilation. Patient refused to take high risk.  -Continue to hold coumadin  -Acapella Q4h as tolerated. -Titrate Oxygen to keep Spo2 >90%. -Plan for patient follow-up in our pulmonology clinic in one month to monitor pleural effusion with chest x-ray before her appointment  -Patient is on Xolair which she gets from Dr. Annmarie Huddleston office every two weeks. Patient may need to have family bring in her Xolair if possible from Dr. Vázquez [facility] office if she is not discharged before her Xolair is due.    -Yoana Shen were educated about my impression and plan. They verbalizes understanding. Questions and concerns addressed. Meds and orders reviewed.      Electronically signed by   Oksana Lin DO on 11/15/2020 at 1:06 PM

## 2020-11-15 NOTE — PROGRESS NOTES
therapeutic range. Noted H/H. Thank you for the consult. Will continue to follow and monitor closely. Joe Tello PharmD 11/15/2020 2:45 PM    Of note:   Per physician note from 11/15:   -She is coughing old clotted blood. No active hemoptysis. -We will arrange for a follow-up with Dr. Edilson Apodaca, DO in a month with a chest x-ray PA and lateral views to follow her a pleural effusion and also hemoptysis. I had an extensive discussion with the patient regarding her current anticoagulation with ongoing hemoptysis as an etiology. She was informed about the possibility of fatal pulmonary hemorrhage leading to death if her hemoptysis don't improve. However he refused to go off antianticoagulation at this time due to risk of cerebrovascular accident from her atrial fibrillation.

## 2020-11-15 NOTE — FLOWSHEET NOTE
1710 Patient was complaining of chest pain radiating into bilateral shoulders and back of head. Vital signs obtained, EKG, and troponin ordered.  Secure message sent to Dr Ace Rocha at bedside, orders placed    1822 Troponin resulted, secure message sent to Dr Kiki Ayala

## 2020-11-15 NOTE — CONSULTS
[facility] [address] [place] [address]  Cardiology Consult        Patient:  Marly Avilez  YOB: 1945  MRN: [id number]     Acct: [de-identified]    PCP: Gilmar Slaughter MD    Date of Admission: 11/13/2020      Reason for Consultation:  Hemoptysis      History Of Present Illness:    67 y.o. female c hx of Afib on coumadin ([id number]: 5), COPD, HTN, HLD, CAD who was transferred from rehab for hemoptysis. Patient follows up with Dr. Eduardo Masters for cardiac care. Patient was recently seen by cardiology for coughing up blood. Was seen by Dr. Candi Aleman and Dr. Robert Arevalo for possible watchman implantation. Patient is very adamant about being on anticoagulation with Coumadin and does not want to miss a dose. She states that if her INR is within 2-3 she will not bleed. Cardiology was consulted for hemoptysis and need for anticoagulation. EKGs shows BiV paced rhythm. Echocardiogram from February 2020 showed ejection fraction of 30 to 35%, anteroseptal akinesis. Her hemoglobin trend is as below    Results for Radha Salcido (MRN [id number]) as of 11/15/2020 12:22   Ref.  Range 10/31/2020 07:09 11/1/2020 11:38 11/2/2020 05:44 11/3/2020 06:50 11/4/2020 05:34 11/9/2020 10:14 11/12/2020 13:20 11/13/2020 09:06 11/13/2020 20:57 11/15/2020 05:36   Hemoglobin Quant Latest Ref Range: 12.0 - 16.0 gm/dl 8.9 (L) 9.5 (L) 9.4 (L) 9.3 (L) 9.5 (L) 8.6 (L) 8.3 (L) 9.1 (L) 8.0 (L) 7.4 (L)     Past Medical History:          Diagnosis Date    Allergic rhinitis     Anemia     Anxiety     Arthritis     Asthma     Atrial fibrillation (HCC)     CAD (coronary artery disease)     Chronic systolic CHF (congestive heart failure) ([facility] [address] 10/27/2019    COPD (chronic obstructive pulmonary disease) (HCC)     Frequent UTI     GERD (gastroesophageal reflux disease)     Hemorrhoids     History of blood transfusion     X8    Hx of blood clots     left arm    Hyperlipidemia     Hypertension     Influenza A 02/22/2020    Kidney stone  LONG TERM ANTICOAGULENT USE 7/6/2012    Lumbar spinal stenosis     MI, old 11    Prolonged emergence from general anesthesia        Past Surgical History:          Procedure Laterality Date    CARDIAC CATHETERIZATION  1994    CARDIAC DEFIBRILLATOR PLACEMENT  2008    OUS [place]    COLONOSCOPY  10/16/2015    Dr. Holger Madrigal COLONOSCOPY N/A 10/25/2018    COLONOSCOPY POLYPECTOMY SNARE/COLD BIOPSY performed by Sherley Jernigan MD at [facility]    ENDOSCOPY, COLON, DIAGNOSTIC  01/04/2017    Dr. Michal Saint      left hip    OVARIAN CYST REMOVAL      PACEMAKER PLACEMENT      SINUS SURGERY      several    TONSILLECTOMY      TOTAL HIP ARTHROPLASTY Left 10/24/2019    DANIEL LEFT HIP ARTHROPLASTY performed by Keaton Thompson MD at [facility] 26  2013    X2    UPPER GASTROINTESTINAL ENDOSCOPY N/A 1/5/2020    EGD DIAGNOSTIC ONLY performed by Ann Ceja MD at [facility]       Medications Prior to Admission:      Prior to Admission medications    Medication Sig Start Date End Date Taking?  Authorizing Provider   levocetirizine (XYZAL) 5 MG tablet Take 5 mg by mouth nightly    Historical Provider, MD Stefanie Smith SC) Inject into the skin At Dr Erickson Taylor office    Historical Provider, MD   Ascorbic Acid (VITAMIN C) 100 MG tablet Take 100 mg by mouth daily    Historical Provider, MD   metoprolol succinate (TOPROL XL) 25 MG extended release tablet Take 25 mg by mouth daily    Historical Provider, MD   Cholecalciferol (VITAMIN D3) 50 MCG (2000 UT) CAPS Take 2,000 Units by mouth daily    Historical Provider, MD   sacubitril-valsartan (ENTRESTO) 49-51 MG per tablet Take 1 tablet by mouth 2 times daily    Historical Provider, MD   pantoprazole (PROTONIX) 40 MG tablet Take 40 mg by mouth 2 times daily 30 minutes before two heaviest meals on an empty stomach    Historical Provider, MD   LINZESS 290 MCG CAPS capsule Take 290 mcg by mouth daily  7/23/20   Historical Provider, MD   loperamide (IMODIUM) 2 MG capsule Take 2 mg by mouth 4 times daily as needed for Diarrhea    Historical Provider, MD   traMADol (ULTRAM) 50 MG tablet Take 50 mg by mouth every 6 hours as needed for Pain. Historical Provider, MD   nystatin (MYCOSTATIN) 170060 UNIT/GM cream as needed  4/13/20   Historical Provider, MD   ondansetron (ZOFRAN) 8 MG tablet daily as needed for Nausea or Vomiting  4/29/20   Historical Provider, MD   hydrocortisone (ANUSOL-HC) 25 MG suppository Place 1 suppository rectally 2 times daily as needed for Hemorrhoids 2/25/20   Renetta Pelayo APRN - CNP   warfarin (COUMADIN) 1 MG tablet Take as directed by the [facility], 145 tablets for 90 days 12/26/19 12/30/20  Manolo Bee MD   digoxin (LANOXIN) 125 MCG tablet Take 1 tablet by mouth every other day Indications: Increased Heart Rate Until Office Visit with Dr. Stacey Augustine 11/28/19   Ted Gay MD   Revefenacin 175 MCG/3ML SOLN Inhale 175 mcg into the lungs daily 10/26/19   Yair Whitmore MD   hydrocortisone 2.5 % cream Apply topically 2 times daily as needed     Historical Provider, MD   acetaminophen (TYLENOL) 325 MG tablet Take 650 mg by mouth as needed for Pain or Fever Indications: Pain Don't take more then 3,000 mg each day    Historical Provider, MD   Multiple Vitamins-Minerals (MULTIVITAMIN ADULT) CHEW Take 2 tablets by mouth daily    Historical Provider, MD   Menthol-Methyl Salicylate (ICY HOT) 43-58 % STCK Apply topically daily    Historical Provider, MD   Alum Hydroxide-Mag Carbonate (GAVISCON PO) Take by mouth as needed Indications: Acid Indigestion     Historical Provider, MD   lidocaine (XYLOCAINE) 5 % ointment Apply topically Before venipuncture in Dr. Irlanda Irving office.  6/5/19   Historical Provider, MD   hydrOXYzine (ATARAX) 25 MG tablet Take 25 mg by mouth 3 times daily as needed Indications: Feeling Anxious     Historical Provider, MD   Pramoxine HCl (PROCTOFOAM RE) Place rectally daily as needed     Historical Provider, MD   EPINEPHrine (EPIPEN) 0.3 MG/0.3ML SOAJ injection INJECT AS DIRECTED FOR ANAPHYLAXIS 3/19/18   Historical Provider, MD   Evolocumab (REPATHA SURECLICK SC) Inject 600 mg into the skin every 14 days Indications: Blood Cholesterol Abnormal     Historical Provider, MD   B Complex-C (RA B-COMPLEX/VITAMIN C CR PO) Take 1 tablet by mouth daily Indications: Treatment to Prevent Vitamin Deficiency     Historical Provider, MD   NITROGLYCERIN RE Place 0.3 mg rectally as needed Indications: Hemorrhoids     Historical Provider, MD   dicyclomine (BENTYL) 10 MG capsule Take 10 mg by mouth 4 times daily (before meals and nightly) Indications: Pain in the Abdominal Region    Historical Provider, MD   Coenzyme Q10 (COQ-10) 200 MG CAPS Take by mouth daily     Historical Provider, MD   Probiotic Product (SUPER PROBIOTIC) CAPS   Take 1 tablet by mouth daily Indications: Digestive Complaint  3/2/15   Historical Provider, MD   budesonide (PULMICORT) 0.5 MG/2ML nebulizer suspension   Take 1 ampule by nebulization 2 times daily Indications: Shortness of Breath (Inactive)     Historical Provider, MD   Arformoterol Tartrate (BROVANA) 15 MCG/2ML NEBU   Take 1 ampule by nebulization 2 times daily Indications: Shortness of Breath (Inactive)     Historical Provider, MD   ofloxacin (FLOXIN) 0.3 % otic solution Place 2 drops into both ears daily as needed Indications: Infection Long-term therapy. 12/22/14   Historical Provider, MD   Carboxymethylcellul-Glycerin (REFRESH OPTIVE OP) Apply  to eye as needed. Indications: Dry Eyes caused by Deficiency of Tears    Historical Provider, MD   levalbuterol (XOPENEX) 0.63 MG/3ML nebulization Take 1 ampule by nebulization every 8 hours as needed for Wheezing.     Historical Provider, MD   triamcinolone (KENALOG) 0.1 % cream Apply topically 2 times daily as needed Anti-fungal    Historical Provider, MD   clotrimazole-betamethasone (LOTRISONE) cream Apply topically 2 times daily as needed Indications: Yeast Infection (inactive)     Historical Provider, MD   polyethylene glycol (MIRALAX) powder Take 17 g by mouth as needed. Indications: Constipation    Historical Provider, MD   Alcaftadine (LASTACAFT) 0.25 % SOLN Apply 1 drop to eye daily as needed Indications: Eye Allergy Both eyes    Historical Provider, MD   aspirin 81 MG EC tablet Take 81 mg by mouth daily. With food  Indications: Anticoagulant Therapy    Historical Provider, MD   azelastine (ASTELIN) 137 MCG/SPRAY nasal spray 1 spray by Nasal route 2 times daily as needed Indications: Allergic Rhinitis Use in each nostril as directed    Historical Provider, MD   furosemide (LASIX) 40 MG tablet Take 40 mg by mouth daily. Indications: Treatment with Diuretic Therapy    Historical Provider, MD   folic acid (FOLVITE) 1 MG tablet Take 1 mg by mouth daily. Indications: Folic Acid Supplementation    Historical Provider, MD   OXYGEN 2 L by Nasal route continuous Indications: Chronic Obstructive Lung Disease     Historical Provider, MD   traZODone (DESYREL) 50 MG tablet Take 50 mg by mouth nightly     Historical Provider, MD   nitroGLYCERIN (NITROSTAT) 0.4 MG SL tablet Place 0.4 mg under the tongue every 5 minutes as needed. If third one does not relieve pain, call 9-1-1.   Indications: Chest Pain    Historical Provider, MD       Current Facility-Administered Medications   Medication Dose Route Frequency Provider Last Rate Last Dose    0.9 % sodium chloride bolus  20 mL Intravenous Once Polo Rocha MD        polyethylene glycol (GLYCOLAX) packet 17 g  17 g Oral Daily PRN Juana Pham MD        bisacodyl (DULCOLAX) suppository 10 mg  10 mg Rectal Daily PRN Juana Pham MD        acetaminophen (TYLENOL) tablet 650 mg  650 mg Oral Q4H PRN Juana Pham MD        Arformoterol Tartrate [facility]) nebulizer solution 15 mcg  15 mcg Nebulization BID Juana Pham MD   15 mcg at 11/14/20 1817    aspirin EC tablet 81 mg  81 mg Oral Daily Sagar Barron MD        budesonide (PULMICORT) nebulizer suspension 500 mcg  500 mcg Nebulization BID Sagar Barron MD   500 mcg at 11/14/20 1817    cyclobenzaprine (FLEXERIL) tablet 10 mg  10 mg Oral TID PRN Sagar Barron MD        digoxin [facility]) tablet 125 mcg  125 mcg Oral Every Other Day Sagar Barron MD   125 mcg at 11/14/20 3469    docusate sodium (COLACE) capsule 100 mg  100 mg Oral Daily Sagar Barron MD   100 mg at 11/14/20 2883    [START ON 11/17/2020] Evolocumab SOAJ 140 mg  140 mg Subcutaneous Q14 Days Sagar Barron MD        furosemide (LASIX) injection 40 mg  40 mg Intravenous BID Sagar Barron MD   40 mg at 11/14/20 1653    gabapentin (NEURONTIN) capsule 100 mg  100 mg Oral TID Sagar Barron MD   100 mg at 11/14/20 1956    HYDROcodone-acetaminophen ([address]) 5-325 MG per tablet 1 tablet  1 tablet Oral Q4H PRN Sagar Barron MD   1 tablet at 11/15/20 0020    HYDROcodone-acetaminophen (NORCO) 5-325 MG per tablet 2 tablet  2 tablet Oral Q4H PRN Sagar Barron MD        hydrocortisone (ANUSOL-HC) suppository 25 mg  25 mg Rectal BID Sagar Barron MD   25 mg at 11/14/20 1957    levalbuterol (XOPENEX) nebulizer solution 0.63 mg  0.63 mg Nebulization Q8H PRN Sagar Barron MD   0.63 mg at 11/13/20 2019    magnesium hydroxide (MILK OF MAGNESIA) 400 MG/5ML suspension 30 mL  30 mL Oral Daily PRN Sagar Barron MD        metoprolol succinate (TOPROL XL) extended release tablet 25 mg  25 mg Oral Daily Sagar Barron MD        nicotine (NICODERM CQ) 7 MG/24HR 1 patch  1 patch Transdermal Q24H Sagar Barron MD   1 patch at 11/14/20 1814    [START ON 11/17/2020] omalizumab Norma Anthony) injection 225 mg  225 mg Subcutaneous Q14 Days Sagar Barron MD        pantoprazole (PROTONIX) tablet 40 mg  40 mg Oral BID AC Sagar Barron MD   40 mg at 11/15/20 0456    sacubitril-valsartan (ENTRESTO) 24-26 MG per tablet 1 tablet  1 tablet Oral BID Lazara Gordon MD   1 tablet at 20    senna (SENOKOT) tablet 8.6 mg  1 tablet Oral Nightly Lazara Gordon MD   8.6 mg at 20    tiotropium (SPIRIVA RESPIMAT) 2.5 MCG/ACT inhaler 2 puff  2 puff Inhalation Daily Lazara Gordon MD        traZODone (DESYREL) tablet 50 mg  50 mg Oral Nightly Lazara Gordon MD   50 mg at 20       Allergies:  Benadryl [diphenhydramine hcl]; Ciprofloxacin; Clarithromycin; Vitamin k; Atorvastatin; Captopril; Codeine; Iv dye [iodides]; Lipitor; Macrobid [nitrofurantoin monohydrate macrocrystals]; Neomycin-bacitracin zn-polymyx; Pravastatin; Zetia [ezetimibe]; Adhesive tape; Cephalexin; Doxycycline; Morphine; Other; Propoxyphene; and Sulfa antibiotics    Social History:    TOBACCO:   reports that she has been smoking cigarettes. She has a 6.25 pack-year smoking history. She has never used smokeless tobacco.  ETOH:   reports current alcohol use of about 1.0 standard drinks of alcohol per week. Family History:        Adopted: Yes   Problem Relation Age of Onset    Heart Disease Father          AGE 35    Cancer Sister         breast         Review of Systems -   General ROS: negative  Psychological ROS: negative  Hematological and Lymphatic ROS: No history of blood clots or bleeding disorder. Respiratory ROS: no cough, shortness of breath, or wheezing  Cardiovascular ROS: As per HPI  Gastrointestinal ROS: negative  Genito-Urinary ROS: no dysuria, trouble voiding, or hematuria  Musculoskeletal ROS: negative  Neurological ROS: no TIA or stroke symptoms  Dermatological ROS: negative    All others reviewed and are negative.        BP (!) 115/54   Pulse 70   Temp 98.7 °F (37.1 °C) (Oral)   Resp 18   Wt 160 lb (72.6 kg)   SpO2 97%   BMI 29.26 kg/m²       Physical Examination:   General appearance - alert, in no distress  Mental status - alert, oriented to person, place, and time  Neck - supple, no components found for: PRO-BNP      Assessment/Plan:    Patient Active Problem List   Diagnosis    MI (myocardial infarction) ([address]    Moderate COPD (chronic obstructive pulmonary disease) ([address]    Hyperlipidemia    Hypertension    Seasonal allergic rhinitis    Heartburn    Persistent atrial fibrillation (HCC)    Systolic CHF, acute on chronic (HCC)    Biventricular implantable cardioverter-defibrillator in situ    Anticoagulated on Coumadin    Arteriosclerosis of coronary artery    Left displaced femoral neck fracture (HCC)    Closed head injury    Class 1 obesity due to excess calories with serious comorbidity and body mass index (BMI) of 30.0 to 30.9 in adult    CKD (chronic kidney disease) stage 2, GFR 60-89 ml/min    S/p left hip fracture    Chronic systolic CHF (congestive heart failure) (HCC)    IBS (irritable bowel syndrome)    Acute blood loss as cause of postoperative anemia    Atelectasis of left lung    Insomnia    Chest pain    Rectal bleeding    Acute on chronic anemia    Vaginitis    Digitalis toxicity    GI bleed    Coagulopathy (HCC)    Bilateral leg edema    Azotemia    Hypoalbuminemia    Urinary tract infection with hematuria    Septic shock ([address]    Pacemaker generator end of life    Necrotizing fasciitis ([address]    Left leg pain    Cellulitis of left lower extremity    Physical deconditioning    Physical debility    Interstitial pneumonia (HCC)    Thrush    Chronic iron deficiency anemia    Anxiety    Permanent atrial fibrillation (HCC)    Acute systolic CHF (congestive heart failure) (HCC)    Pleural effusion, bilateral    Hemoptysis    Acute respiratory failure with hypoxia (HCC)     Recurrent hemoptysis  History of atrial fibrillation with chads vas score of 5, high risk for embolic event  LV dysfunction status post BiV pacing  History of MI  Hypertension/hyperlipidemia    At this present time patient continues to have episodes of hemoptysis. Discussed with patient about holding anticoagulation. Patient has already been discussed about watchman implantation by Dr. Chelsea Sommer and Dr. Velazco Knows  Monitor H&H  Transfuse to keep H&H above 7/21  Follow-up with pulmonology as far as the etiology of hemoptysis  Patient will be considered for watchman implantation as outpatient once acute issues resolve  We will need to follow-up with primary cardiologist Dr. Aniyah Brothers as outpatient  Discussed extensively with patient for the need of additional imaging to be considered for watchman device  Patient verbalizes understanding  Continue rest of the management          Please do note hesitate to contact me for any further questions. Thank you for the opportunity to be involved in this patient's care.     Code Status: Full Code    Electronically signed by Nuria Martins MD on 11/15/2020 at 9:22 AM

## 2020-11-16 NOTE — FLOWSHEET NOTE
The AD information was given to Sharona's daughter as she was sleeping. Advance Directive Consult: Advance Directive materials were provided to patient and explained. Patient is not ready to complete at this time, gave information for Spiritual Care to be contacted if further assistance is needed. 11/16/20 1000   Encounter Summary   Services provided to: Patient and family together   Referral/Consult From: Nurse   Support System Children   Continue Visiting Yes  (11/16 AD info)   Complexity of Encounter Low   Length of Encounter 15 minutes   Spiritual/Restoration   Type Spiritual support   Assessment Sleeping   the daughter also wanted to know about durable POA for financial help. Questions were answered. We do have a notary if she brings in there papers. Care Plan:  Continue spiritual and emotional care for patient and family. Including prayers.

## 2020-11-16 NOTE — PROGRESS NOTES
Hospitalist Progress Note    Patient:  Clarissa Henriquez      Unit/Bed:4K-10/010-A    YOB: 1945    MRN: [id number]       Acct: [de-identified]     PCP: Nura Nelson MD    Date of Admission: 11/13/2020    Active Hospital Problems    Diagnosis Date Noted    Acute respiratory failure with hypoxia ([facility] [address] [J96.01] 11/15/2020    Hemoptysis [R04.2]     Physical debility [R53.81] 11/03/2020    Moderate COPD (chronic obstructive pulmonary disease) ([facility] [address] [J44.9] 07/06/2012     Assessment/Plan:     1. Acute on Chronic Resp failure likely 2/2 Pleural Effusions/Acute Systolic CHF exacerbation  - continue IV diuresis  - monitor I/o, cardiac, low salt diet  - daily weights  - wean o2 to baseline 2 L  - echo 2/2020 EF 30-35 % --> may need to repeat echo --> cath in 8/2020  - thoracentesis on 11/14 with 700cc removed   - continue bb, entresto  - allergic to statin  - telemetry      2. CAD/Biventricular Pacemaker-AICD/HTN/HLD/CKD2/PVD  - meds as above     3. Permanent Atrial Fibrillation (CHADsVASc 5)   - Heparin and coumadin on hold initially due to hemoptysis   - will need to discuss risks vs benefits with family   - Cardiology consulted, plan outpatient Watchmen   - Coumadin and Heparin drip started on 11/16      4. COPD w/ ? Lung Fibrosis (on imaging)/Tobacco abuse  - pulmicort/Brovana/spriva/xopenex  - acapella q4h  - supplemental o2 to keep o2 sat >90     5. Hemoptysis of Uncertain Etiology   - was on anticoagulation with the Coumadin Vs epistaxis Vs other etiologies  - coumadin stopped and reversed initially   - hemoptysis significantly improving   - Hb and HD stable   - Pulmonary following, not good candidate for bronchoscopy   - Coumadin and Heparin resumed on 11/15, will watch for any recurrent of hemoptysis      6. LE cellulitis s/p Septic shock/Sepsis  - S/p Treatment and mx by ID  - continue compression wrap        Dispo: coumadin and heparin resumed today, will watch for any recurrence of hemoptysis. If hemoptysis reoccurs will need stop anticoagulation indefinitely, had extensive discussion with patient and family. Palliative Care to discuss code status. Chief Complaint: [facility] Course: 67 y. o. female with significant pmhx and recent hospitalization with dc to  rehab and then after a 10 day stay, is getting transferred back to inpatient due to worsening hypoxia. Patient was previously admitted for LE cellulitis s/p ID management. ID was following her at [facility]. She has completed her antibiotics at this time. She developed hemoptysis at rehab which was evaluated by pulmonary. Pt had repeat labs including cxr which showed mod pleural effusions hence pulm recommended FFP, and thoracentesis in am with fluid analysis. Patient uses 2 L of o2 at home but is currently needing 5L. This is likely attributable to her effusions.  She does have a hx of copd, chronic resp failure, asthma, cad, htn.  She will be admitted to [facility] for further management. Subjective: pt had episode of dizziness, lightheadedness and palpitations when ambulated to bathroom. No chest pain. Still having sob. Had couple episodes of small hemoptysis episodes. Tolerating PO intake. No fevers or chills.        Medications:  Reviewed    Infusion Medications    heparin (PORCINE) Infusion 18 Units/kg/hr (11/16/20 1150)     Scheduled Medications    warfarin (COUMADIN) daily dosing (placeholder)   Other RX Placeholder    warfarin  2 mg Oral Once    albumin human  25 g Intravenous Once    sodium chloride  20 mL Intravenous Once    Arformoterol Tartrate  15 mcg Nebulization BID    [Held by provider] aspirin  81 mg Oral Daily    budesonide  500 mcg Nebulization BID    digoxin  125 mcg Oral Every Other Day    docusate sodium  100 mg Oral Daily    [START ON 11/17/2020] Evolocumab  140 mg Subcutaneous Q14 Days    furosemide  40 mg Intravenous BID    gabapentin  100 mg Oral TID    hydrocortisone  25 mg Rectal BID    metoprolol succinate  25 mg Oral Daily    nicotine  1 patch Transdermal Q24H    [START ON 11/17/2020] omalizumab  225 mg Subcutaneous Q14 Days    pantoprazole  40 mg Oral BID AC    [Held by provider] sacubitril-valsartan  1 tablet Oral BID    senna  1 tablet Oral Nightly    tiotropium  2 puff Inhalation Daily    traZODone  50 mg Oral Nightly     PRN Meds: heparin (porcine), heparin (porcine), polyethylene glycol, bisacodyl, acetaminophen, cyclobenzaprine, HYDROcodone 5 mg - acetaminophen, HYDROcodone 5 mg - acetaminophen, levalbuterol, magnesium hydroxide      Intake/Output Summary (Last 24 hours) at 11/16/2020 1314  Last data filed at 11/16/2020 0300  Gross per 24 hour   Intake 240 ml   Output 500 ml   Net -260 ml       Diet:  DIET CARDIAC; Low Sodium (2 GM); Daily Fluid Restriction: 1500 ml    Exam:  BP (!) 94/30   Pulse 74   Temp 98 °F (36.7 °C) (Oral)   Resp 24   Wt 150 lb 14.4 oz (68.4 kg)   SpO2 91%   BMI 27.60 kg/m²     General appearance:  No apparent distress, appears stated age and cooperative. HEENT:  Normal cephalic, atraumatic without obvious deformity. Pupils equal, round, and reactive to light.  Extra ocular muscles intact. Conjunctivae/corneas clear. NC in place  Neck: Supple, with full range of motion. No jugular venous distention. Trachea midline. Respiratory:  Normal respiratory effort. Diminished  Cardiovascular:  irreg rhythm  Abdomen: Soft, non-tender, non-distended   Musculoskeletal:  LE with compression stockings in place  Skin: Skin color, texture, turgor normal.  No rashes or lesions.   Neurologic: grossly non-focal  Psychiatric:  Alert and oriented, thought content appropriate, normal insight  Peripheral Pulses: present       Labs:   Recent Labs     11/16/20  1232   WBC 11.6*   HGB 7.9*   HCT 27.5*        Recent Labs     11/16/20  0530      K 4.5   CL 99   CO2 36*   BUN 18   CREATININE 0.9   CALCIUM 9.2     No results for input(s): AST, ALT, BILIDIR, BILITOT, ALKPHOS in the

## 2020-11-16 NOTE — PLAN OF CARE
Consult received, see THIEN note, Nov 16, home with family and [facility] or return to rehab

## 2020-11-16 NOTE — CARE COORDINATION
11/16/20 1238   Readmission Assessment   Number of Days since last admission? 8-30 days   Previous Disposition Acute Rehab   Who is being Interviewed Caregiver   What was the patient's/caregiver's perception as to why they think they needed to return back to the hospital? Other (Comment)  (Requiring more oxygen)   Did you visit your Primary Care Physician after you left the hospital, before you returned this time? No  (Was in IPR)   Why weren't you able to visit your PCP? Did not have an appointment   Did you see a specialist, such as Cardiac, Pulmonary, Orthopedic Physician, etc. after you left the hospital? Other (Comment)  (Was in IPR)   Does the patient report anything that got in the way of taking their medications? No   In our efforts to provide the best possible care to you and others like you, can you think of anything that we could have done to help you after you left the hospital the first time, so that you might not have needed to return so soon?  Other (Comment)  (No)

## 2020-11-16 NOTE — CARE COORDINATION
DISCHARGE/PLANNING EVALUATION  11/16/20, 1:44 PM EST    Reason for Referral:  \"new HH\"  Mental Status:  Alert but sleepy  Decision Making:  Makes decisions with her family  Family/Social/Home Environment:   SW spoke to patient's daughter, Renato Chirinos, who was visiting. She states her mother and father were living in a one story home without a basement. They were managing the housekeeping together and both were able to drive. She has a walk-in shower with seat and grab bar. She has a walker but was not using it on a routine basis. She has home o2 from [facility]--2 liters. There are 3 children  Current Services including food security, transportation and housekeeping:  none  Current Equipment:  See above   Payment Source:  Medicare and [facility] [place]  Concerns or Barriers to Discharge: We discussed the plan. Renato Chirinos states she was getting close to discharge when she became very SOB and was retaining fluid. They are hopeful for her to return home and Renato Chirinos and her brother can assist with taking care of her. Renato Chirinos works and her brother is disabled but states they will be able to do this. Post acute provider list with quality measures, geographic area and applicable managed care information provided. Questions regarding selection process answered: prefers [facility]    Teach Back Method used with daughter, Renato Chirinos regarding care plan and  needs  Patient's daughter verbalize understanding of the plan of care and contribute to goal setting.        Patient goals, treatment preferences and discharge plan:  Final plan to be determined, either return to rehab or home with family assisting and [facility]    Electronically signed by BLANCA Alexander on 11/16/2020 at 1:44 PM

## 2020-11-16 NOTE — FLOWSHEET NOTE
11/15/20 2025   Provider Notification   Reason for Communication Review case  (low BP)   Provider Name [facility]   Provider Notification Advance Practice Clinician (CNS, NP, CNM, CRNA, PA)   Method of Communication Secure Message   Response Waiting for response   Notification Time 2025     Messaged hospitalist due to BP 78/39 with MAP 56. Took a manual BP and it was 82/48. Hospitalist ordered for bolus to be given and came to saw patient. Upon arrival we put patient in trendelenburg and systolic BP low 262G. Bolus was cancelled and albumin was ordered. Hospitalist states to not give albumin yet. Patient placed on bedside monitor and BP cycled every 30 minutes. Will update hospitalist with any changes in patient status.

## 2020-11-16 NOTE — PROGRESS NOTES
[facility]      Patient - Mary Ellen Valladares   MRN -  [id number]   Acct # - [de-identified]   - 1945      Date of Admission -  2020  5:45 PM  Date of evaluation -  2020  Room - 85 Carlymbdelores Hood MD Primary Care Physician - Liborio Vela MD     Problem List      Active Hospital Problems    Diagnosis Date Noted    Acute respiratory failure with hypoxia ([address] [J96.01] 11/15/2020    Hemoptysis [R04.2]     Physical debility [R53.81] 2020    Moderate COPD (chronic obstructive pulmonary disease) ([address] [J44.9] 2012     Reason for Consult    Hypoxia and hemoptysis management  HPI   History Obtained From: Patient and electronic medical record. Mary Ellen Valladares is a 67 y.o. female  was initially admitted under hospitalist service. Pulmonary medicine was consulted for further management of Hemoptysis and COPD. She is a 58-year-old pleasant female with a past medical history of moderately severe COPD and bronchial asthma. She used to follow with Dr. Shaylee Payan MD in the past. However she is currently following with Dr. Cisco Apodaca DO at [facility] pulmonary clinic. She was initially admitted to ICU on 2020 for management of sepsis due to left lower lower extremity cellulitis and shock. Patient having chronic minimal hemoptysis. She was evaluated by Dr. Cisco Barrera. DO Constanza on 2020. As per Dr. Cisco Barrera. Ruchi Woodard note the hemoptysis was thought to be due to epistaxis from a nonhumidified oxygen supplementation. She is continue to have a hemoptysis for the last 5days. She is coughing up quarter size blood clot which is a dark blood for the last few days. There is no history of active hemoptysis or bright red blood in the last few days. She still complaining of shortness of breath on minimal exertion. She was found to be on Coumadin with therapeutic INR.   No active bleeding was noted. She is receiving Xolair to 25 mg from s Ruchi Mcdaniel office. Patient also follows with Dr. Pepe Rodriguez MD allergist in [address] [place] [address]. She uses 2LPM of oxygen supplementation at rest, exercise or during sleep/at night time at home. Past 24 Hours   Patient states she is doing well today. She is currently on 4L of O2 via NC. She was attempted to wean down to 3L this morning, but her SpO2 dropped to 88%. She is not in distress. Patient states her hemoptysis has significantly improved. She still has a small amount of pink-tinge sputum per nursing. She has no concerns at this time. Rest of review of systems were reviewed.   PMHx   Past Medical History      Diagnosis Date    Allergic rhinitis     Anemia     Anxiety     Arthritis     Asthma     Atrial fibrillation (HCC)     CAD (coronary artery disease)     Chronic systolic CHF (congestive heart failure) ([facility] 10/27/2019    COPD (chronic obstructive pulmonary disease) (HCC)     Frequent UTI     GERD (gastroesophageal reflux disease)     Hemorrhoids     History of blood transfusion     X8    Hx of blood clots     left arm    Hyperlipidemia     Hypertension     Influenza A 02/22/2020    Kidney stone     LONG TERM ANTICOAGULENT USE 7/6/2012    Lumbar spinal stenosis     MI, old 11    Prolonged emergence from general anesthesia       Past Surgical History        Procedure Laterality Date    [facility]    [facility] [place]    COLONOSCOPY  10/16/2015    Dr. Marielena Storey    COLONOSCOPY N/A 10/25/2018    COLONOSCOPY POLYPECTOMY SNARE/COLD BIOPSY performed by Ludwin Mendoza MD at [facility]    ENDOSCOPY, COLON, DIAGNOSTIC  01/04/2017    Dr. Thania Guzman      left hip    OVARIAN CYST REMOVAL      PACEMAKER PLACEMENT      SINUS SURGERY      several    TONSILLECTOMY      TOTAL HIP ARTHROPLASTY Left 10/24/2019    DANIEL LEFT HIP ARTHROPLASTY performed by Endy Ewing MD at [address] [place] [address] Street  2013    X2    UPPER GASTROINTESTINAL ENDOSCOPY N/A 1/5/2020    EGD DIAGNOSTIC ONLY performed by Katheran Saint, MD at [facility]     Meds    Current Medications    heparin (porcine)  80 Units/kg Intravenous Once    warfarin (COUMADIN) daily dosing (placeholder)   Other RX Placeholder    albumin human  25 g Intravenous Once    sodium chloride  20 mL Intravenous Once    Arformoterol Tartrate  15 mcg Nebulization BID    [Held by provider] aspirin  81 mg Oral Daily    budesonide  500 mcg Nebulization BID    digoxin  125 mcg Oral Every Other Day    docusate sodium  100 mg Oral Daily    [START ON 11/17/2020] Evolocumab  140 mg Subcutaneous Q14 Days    furosemide  40 mg Intravenous BID    gabapentin  100 mg Oral TID    hydrocortisone  25 mg Rectal BID    metoprolol succinate  25 mg Oral Daily    nicotine  1 patch Transdermal Q24H    [START ON 11/17/2020] omalizumab  225 mg Subcutaneous Q14 Days    pantoprazole  40 mg Oral BID AC    [Held by provider] sacubitril-valsartan  1 tablet Oral BID    senna  1 tablet Oral Nightly    tiotropium  2 puff Inhalation Daily    traZODone  50 mg Oral Nightly     heparin (porcine), heparin (porcine), polyethylene glycol, bisacodyl, acetaminophen, cyclobenzaprine, HYDROcodone 5 mg - acetaminophen, HYDROcodone 5 mg - acetaminophen, levalbuterol, magnesium hydroxide  IV Drips/Infusions   heparin (PORCINE) Infusion       Home Medications  Medications Prior to Admission: levocetirizine (XYZAL) 5 MG tablet, Take 5 mg by mouth nightly  Omalizumab (XOLAIR SC), Inject into the skin At Dr Dakota Dee office  Ascorbic Acid (VITAMIN C) 100 MG tablet, Take 100 mg by mouth daily  metoprolol succinate (TOPROL XL) 25 MG extended release tablet, Take 25 mg by mouth daily  Cholecalciferol (VITAMIN D3) 50 MCG (2000 UT) CAPS, Take 2,000 Units by mouth daily  sacubitril-valsartan (ENTRESTO) 49-51 MG per tablet, Take 1 tablet by mouth 2 times daily  pantoprazole (PROTONIX) 40 MG tablet, Take 40 mg by mouth 2 times daily 30 minutes before two heaviest meals on an empty stomach  LINZESS 290 MCG CAPS capsule, Take 290 mcg by mouth daily   loperamide (IMODIUM) 2 MG capsule, Take 2 mg by mouth 4 times daily as needed for Diarrhea  traMADol (ULTRAM) 50 MG tablet, Take 50 mg by mouth every 6 hours as needed for Pain. nystatin (MYCOSTATIN) 776162 UNIT/GM cream, as needed   ondansetron (ZOFRAN) 8 MG tablet, daily as needed for Nausea or Vomiting   hydrocortisone (ANUSOL-HC) 25 MG suppository, Place 1 suppository rectally 2 times daily as needed for Hemorrhoids  warfarin (COUMADIN) 1 MG tablet, Take as directed by the [facility], 145 tablets for 90 days  digoxin (LANOXIN) 125 MCG tablet, Take 1 tablet by mouth every other day Indications: Increased Heart Rate Until Office Visit with Dr. Deanne Kuhn  Revefenacin 175 MCG/3ML SOLN, Inhale 175 mcg into the lungs daily  hydrocortisone 2.5 % cream, Apply topically 2 times daily as needed   acetaminophen (TYLENOL) 325 MG tablet, Take 650 mg by mouth as needed for Pain or Fever Indications: Pain Don't take more then 3,000 mg each day  Multiple Vitamins-Minerals (MULTIVITAMIN ADULT) CHEW, Take 2 tablets by mouth daily  Menthol-Methyl Salicylate (ICY HOT) 96-38 % STCK, Apply topically daily  Alum Hydroxide-Mag Carbonate (GAVISCON PO), Take by mouth as needed Indications: Acid Indigestion   lidocaine (XYLOCAINE) 5 % ointment, Apply topically Before venipuncture in Dr. Clementina Hurst office.   hydrOXYzine (ATARAX) 25 MG tablet, Take 25 mg by mouth 3 times daily as needed Indications: Feeling Anxious   Pramoxine HCl (PROCTOFOAM RE), Place rectally daily as needed   EPINEPHrine (EPIPEN) 0.3 MG/0.3ML SOAJ injection, INJECT AS DIRECTED FOR ANAPHYLAXIS  Evolocumab (REPATHA SURECLICK SC), Inject 497 mg into the skin every 14 days Indications: Blood Cholesterol Abnormal   B Complex-C (RA B-COMPLEX/VITAMIN C CR PO), Take 1 tablet by mouth daily Indications: Treatment to Prevent Vitamin Deficiency   NITROGLYCERIN RE, Place 0.3 mg rectally as needed Indications: Hemorrhoids   dicyclomine (BENTYL) 10 MG capsule, Take 10 mg by mouth 4 times daily (before meals and nightly) Indications: Pain in the Abdominal Region  Coenzyme Q10 (COQ-10) 200 MG CAPS, Take by mouth daily   Probiotic Product (SUPER PROBIOTIC) CAPS,  Take 1 tablet by mouth daily Indications: Digestive Complaint   budesonide (PULMICORT) 0.5 MG/2ML nebulizer suspension,  Take 1 ampule by nebulization 2 times daily Indications: Shortness of Breath (Inactive)   Arformoterol Tartrate (BROVANA) 15 MCG/2ML NEBU,  Take 1 ampule by nebulization 2 times daily Indications: Shortness of Breath (Inactive)   ofloxacin (FLOXIN) 0.3 % otic solution, Place 2 drops into both ears daily as needed Indications: Infection Long-term therapy. Carboxymethylcellul-Glycerin (REFRESH OPTIVE OP), Apply  to eye as needed. Indications: Dry Eyes caused by Deficiency of Tears  levalbuterol (XOPENEX) 0.63 MG/3ML nebulization, Take 1 ampule by nebulization every 8 hours as needed for Wheezing. triamcinolone (KENALOG) 0.1 % cream, Apply topically 2 times daily as needed Anti-fungal  clotrimazole-betamethasone (LOTRISONE) cream, Apply topically 2 times daily as needed Indications: Yeast Infection (inactive)   polyethylene glycol (MIRALAX) powder, Take 17 g by mouth as needed. Indications: Constipation  Alcaftadine (LASTACAFT) 0.25 % SOLN, Apply 1 drop to eye daily as needed Indications: Eye Allergy Both eyes  aspirin 81 MG EC tablet, Take 81 mg by mouth daily. With food  Indications: Anticoagulant Therapy  azelastine (ASTELIN) 137 MCG/SPRAY nasal spray, 1 spray by Nasal route 2 times daily as needed Indications: Allergic Rhinitis Use in each nostril as directed  furosemide (LASIX) 40 MG tablet, Take 40 mg by mouth daily.     Indications: Treatment with Diuretic Therapy  folic acid (FOLVITE) 1 MG tablet, Take 1 mg by mouth daily. Indications: Folic Acid Supplementation  OXYGEN, 2 L by Nasal route continuous Indications: Chronic Obstructive Lung Disease   traZODone (DESYREL) 50 MG tablet, Take 50 mg by mouth nightly   nitroGLYCERIN (NITROSTAT) 0.4 MG SL tablet, Place 0.4 mg under the tongue every 5 minutes as needed. If third one does not relieve pain, call . Indications: Chest Pain  Diet    DIET CARDIAC; Low Sodium (2 GM); Daily Fluid Restriction: 1500 ml  Allergies    Benadryl [diphenhydramine hcl]; Ciprofloxacin; Clarithromycin; Vitamin k; Atorvastatin; Captopril; Codeine; Iv dye [iodides]; Lipitor; Macrobid [nitrofurantoin monohydrate macrocrystals]; Neomycin-bacitracin zn-polymyx; Pravastatin; Zetia [ezetimibe]; Adhesive tape; Cephalexin; Doxycycline; Morphine; Other; Propoxyphene; and Sulfa antibiotics  Family History          Adopted: Yes   Problem Relation Age of Onset    Heart Disease Father          AGE 35    Cancer Sister         breast     Sleep History    Never diagnosed with sleep apnea in the past    Social History     Social History     Socioeconomic History    Marital status:      Spouse name: Adi Boss Number of children: 3    Years of education: Not on file    Highest education level: Not on file   Occupational History    Not on file   Social Needs    Financial resource strain: Not on file    Food insecurity     Worry: Not on file     Inability: Not on file   [employer] needs     Medical: Not on file     Non-medical: Not on file   Tobacco Use    Smoking status: Current Every Day Smoker     Packs/day: 0.25     Years: 25.00     Pack years: 6.25     Types: Cigarettes    Smokeless tobacco: Never Used   Substance and Sexual Activity    Alcohol use:  Yes     Alcohol/week: 1.0 standard drinks     Types: 1 Cans of beer per week     Comment: ocassionaly    Drug use: No    Sexual activity: Never   Lifestyle    Physical activity     Days per week: Not on file     Minutes per session: Not on file    Stress: Not on file   Relationships    Social connections     Talks on phone: Not on file     Gets together: Not on file     Attends Yazidi service: Not on file     Active member of club or organization: Not on file     Attends meetings of clubs or organizations: Not on file     Relationship status: Not on file    Intimate partner violence     Fear of current or ex partner: Not on file     Emotionally abused: Not on file     Physically abused: Not on file     Forced sexual activity: Not on file   Other Topics Concern    Not on file   Social History Narrative    Not on file     Vitals     weight is 150 lb 14.4 oz (68.4 kg). Her oral temperature is 98.6 °F (37 °C). Her blood pressure is 129/51 (abnormal) and her pulse is 74. Her respiration is 20 and oxygen saturation is 90%. Body mass index is 27.6 kg/m². SUPPLEMENTAL O2: O2 Flow Rate (L/min): 4 L/min     I/O        Intake/Output Summary (Last 24 hours) at 11/16/2020 1014  Last data filed at 11/16/2020 0300  Gross per 24 hour   Intake 490 ml   Output 500 ml   Net -10 ml     I/O last 3 completed shifts: In: 1030 [P.O.:1030]  Out: 700 [Urine:700]   Patient Vitals for the past 96 hrs (Last 3 readings):   Weight   11/16/20 0300 150 lb 14.4 oz (68.4 kg)   11/14/20 0351 160 lb (72.6 kg)     Exam   Nursing note and vitals reviewed. Constitutional: Patient appears moderately built and moderately nourished. No distress. Patient is oriented to person, place, and time. HENT:   Head: Normocephalic and atraumatic. Right Ear: External ear normal.   Left Ear: External ear normal.   Mouth/Throat: Oropharynx is clear and moist.  No oral thrush. Eyes: Conjunctivae are normal. Pupils are equal, round, and reactive to light. No scleral icterus. Neck: Neck supple. Elevated JVD present. No tracheal deviation present. Cardiovascular: Normal rate, regular rhythm, normal heart sounds.  No murmur heard. Pulmonary/Chest: Effort normal and breath sounds normal. No wheezing. No stridor. No rales. Patient exhibits no chest tenderness. Abdominal: Soft. Patient exhibits no distension. No tenderness. Musculoskeletal: Normal range of motion. Extremities: +2 pitting edema to bilateral lower extremities. Non-tender to palpation. Lymphadenopathy:  No cervical adenopathy. Neurological: Patient is alert and oriented to person, place, and time. Skin: Skin is warm and dry. Patient is not diaphoretic. Psychiatric: Patient  has a normal mood and affect. Patient behavior is normal.     Labs  - Old records and notes have been reviewed in [facility]   AB  Lab Results   Component Value Date    PH 7.41 11/13/2020    PO2 47 11/13/2020    PCO2 45 11/13/2020    HCO3 28 11/13/2020    O2SAT 83 11/13/2020     Lab Results   Component Value Date    IFIO2 6 11/13/2020     CBC  Recent Labs     11/13/20  2057 11/15/20  0536 11/15/20  2041   WBC  --  6.5  --    RBC  --  2.41*  --    HGB 8.0* 7.4* 7.4*   HCT 27.0* 25.2* 24.8*   MCV  --  104.6*  --    MCH  --  30.7  --    MCHC  --  29.4*  --    PLT  --  139  --    MPV  --  11.0  --       BMP  Recent Labs     11/13/20  2057 11/15/20  0536 11/16/20  0530    141 142   K 4.7 4.2 4.5   CL 95* 99 99   CO2 28 35* 36*   BUN 23* 20 18   CREATININE 0.7 0.8 0.9   GLUCOSE 236* 130* 107   MG 2.2 2.3 2.3   CALCIUM 8.8 9.0 9.2     LFT  No results for input(s): AST, ALT, ALB, BILITOT, ALKPHOS, LIPASE in the last 72 hours. Invalid input(s): AMYLASE  TROP  Lab Results   Component Value Date    TROPONINT 0.016 11/16/2020    TROPONINT 0.023 11/16/2020    TROPONINT 0.018 11/16/2020     BNP  No results for input(s): BNP in the last 72 hours. Lactic Acid  No results for input(s): LACTA in the last 72 hours. INR  Recent Labs     11/14/20  0537 11/15/20  0536 11/16/20  0530   INR 2.30* 1.70* 1.48*     PTT  No results for input(s): APTT in the last 72 hours.   Glucose  No results for input(s): POCGLU in the last 72 hours. UA   Recent Labs     11/14/20  1420   COLORU YELLOW     Thoracentesis fluid analysis results. Performed on: 11/14/2020  Side: right side of chest .  By IR:Yes- Dr. Tanner Fry of pleural fluid drained:    Lab Results   Component Value Date    PHFL 7.71 11/14/2020    COLORU YELLOW 11/14/2020    COLORU YELLOW 11/08/2020    LDFL 84 11/14/2020    PROTEINFL 1.0 11/14/2020    AMYLASE 74 10/04/2013    TRIG 236 12/02/2019     Lab Results   Component Value Date    PROT 6.3 11/13/2020     CYTOLOGY: Pending    Serum LDH: 377  LDH ratio i.e Pleural fluid LDH/ Serum LDH: 84/377 = 0.22  Total protein ratio i.e Pleural fluid Total protein/ Serum Total protein: 1/6.3 = 0.16    Pleural fluid cultures were negative so far. Gram stain of pleural fluid is negative for any organisms. PFTs 2017             Sleep studies   None in Epic    Cultures    None    Echocardiogram     [phone] 2/24/2020   Narrative & Impression      Transthoracic Echocardiography Report (TTE)     Conclusions      Summary   Left ventricle size is normal.   Normal left ventricular wall thickness. Ejection fraction is visually estimated in the range of 30% to 35%. Apical anteroseptal wall segments were not clearly visualized. Moderate myocardial infarction of the anteroseptal LV. Signature      ----------------------------------------------------------------   Electronically signed by Rufino Wallace MD (Interpreting   physician) on 02/24/2020 at 09:42 AM   ----------------------------------------------------------------    Radiology    Chest Xray:   Nov 14, 2020   COMPARISON: 11/13/2020   No pneumothorax post right thoracentesis. Chest Xray: Nov 11, 2020  PROCEDURE: XR CHEST (2 VW)   1. Cardiomegaly. Permanent pacemaker/defibrillator. 2. Mildly increased interstitial markings in both lungs, consistent with interstitial edema/pneumonia with underlying element of interstitial fibrosis.    3. Overall appearance of chest has improved since prior. Oct 22, 2019   PROCEDURE: XR CHEST 1 VIEW   Stable radiographic appearance of the chest. No evidence of an acute process. CT Scans  (See actual reports for details)  Nov 1, 2020   PROCEDURE: CT CHEST WO CONTRAST   Right larger than left pleural effusions. Bilateral areas of compressive atelectasis and interstitial edema interstitial pneumonic infiltrates and developing consolidation are not excludable. This most likely at the posterior right upper lung.        PROCEDURE: CT CHEST WO CONTRAST, CT ABDOMEN PELVIS WO CONTRAST, CT THORACIC RECONSTRUCTION WO POST PROCESS, CT LUMBAR RECONSTRUCTION WO POST PROCESS  CLINICAL INFORMATION: fall. Regan Williamson today and struck head. Left hip pain.   COMPARISON: CT abdomen pelvis dated 9/26/2019 and CT chest dated 2/22/2019.     1. No evidence of acute intracranial thoracic, intra-abdominal or intrapelvic abnormality. 2. No evidence of acute osseous injury of the thoracic or lumbar spine. 3. Angulated subcapital femoral fracture on the left with overlying subcutaneous hematoma.        Venous duplex scan: Oct 23, 2019   PROCEDURE: VL DUP LOWER EXTREMITY VENOUS BILATERAL   No evidence of deep venous thrombosis in either lower extremity. Assessment   -Hemoptysis of uncertain etiology, improving: Differential includes due to current anticoagulation with the Coumadin Vs epistaxis Vs other etiologies. Her hemoptysis has improved since being off coumadin. Epistaxis is less likely as no visible evidence of dried blood in her nares were noted. No active hemoptysis. Patient wants to continue her coumadin because of concern for CVA - she was educated on the risk of hemoptysis due to her anticoagulation and informed of fatal pulmonary hemorrhage if her hemoptysis continues with coumadin.  She states she understands the risks and does not want to stop her coumadin at this time.  -Acute on chronic hypoxic respiratory failure 2/2 pleural effusion vs. Congestive heart failure, improving: Baseline oxygen of 2L at home.  -Right pleural effusion s/p thoracentesis on 11/14/20, stable - Likely due to her acute decompensated heart failure as pleural fluid indicates Transudative effusion  -Acute decompensated systolic HFrEF 76-12%, improving   -Moderate COPD, stable  -Chronic tobacco smoking in the past.  -Severe persistent bronchial asthma currently on treatment with Xolair, stable. She follows with Dr. John Chung. Edvin Cuevas MD  -S/p Pacemker/defib placement by MD Kelly.  -CAD  -GERD  -Hypertension.  -Left lower leg cellulitis S/p treatment with antibiotics. Dr. Brissa Kohler MD is following from ID service    Plan   -Pleural fluid cytology pending  -Continue diuresis. -Continue Pulmicort 500 mcg via neb BID  -Continue Brovana 15 mcg via nebulization BID  -Continue Spiriva Respimat 2 puffs daily. This is a replacement for Yupelri (Revefenacin inhaled) 175mcg/3ml via nebs daily- she takes at home.  -Continue Xopenex 0.63 mg via nebulization every 8 hourly as needed.  -We will monitor patient for further hemoptysis. No active hemoptysis. Her Hb/HCT is stable. She is very high risk for bronchoscopy for air way exam including prolonged mechanical ventilation. Patient refused to take high risk.  -Acapella Q4h as tolerated. -Titrate Oxygen to keep Spo2 >90% - Her baseline is 2L O2 via NC.  -Plan for patient follow-up with Dr. John Chung. DO Constanza in one month to monitor pleural effusion with chest x-ray PA and lateral before her appointment  -Patient is on Xolair which she gets from Dr. Randi Krishnamurthy office every two weeks. Patient may need to have family bring in her Xolair if possible from Dr. Pilar Villa office if she is not discharged before her Xolair is due.    -Zahra Morillos were educated about my impression and plan. They verbalizes understanding. Questions and concerns addressed. Meds and orders reviewed.      Electronically signed by   Luis Escamilla DO on 11/16/2020 at 10:14 AM

## 2020-11-16 NOTE — PROGRESS NOTES
[facility]  OCCUPATIONAL THERAPY MISSED TREATMENT NOTE  [facility] ICU STEPDOWN TELEMETRY 4K  4K-10/010-A      Date: 2020  Patient Name: Sammy Jorgensen        CSN: [id number]   : 1945  (67 y.o.)  Gender: female                REASON FOR MISSED TREATMENT: OT attempted at this time. Pt sleeping on arrival although easily woke, refused therapy stating \"I said I wasn't going to do anything today. I'm too tired. \" Despite encouragement, pt continued to decline.  Will check back as able

## 2020-11-17 NOTE — PROGRESS NOTES
[address] [place] [address]  INPATIENT PHYSICAL THERAPY  EVALUATION  STR ICU STEPDOWN TELEMETRY 4K - [id number]    Time In: 1118  Time Out: 1204  Timed Code Treatment Minutes: 30 Minutes  Minutes: 46          Date: 2020  Patient Name: Baldev Ramirez,  Gender:  female        MRN: [id number]  : 1945  (67 y.o.)      Referring Practitioner: Coral Ortiz MD  Diagnosis: Physical Debility  Additional Pertinent Hx: Pt readmitted to Acute from IP Rehab due to decline in status and requiring thoracentesis and respiratory management. Per EMR, \"Sharona Arvizu is a 67 y.o. female with an extensive medical history including, congestive heart failure, COPD, A. Fib., Pacemaker placement post MI, hypertension, anemia, and IBS who presents to the ED for an evaluation of severe left lower extremity pain, redness, and warmth that started yesterday evening. The patient states that yesterday morning she started feeling some discomfort to her lower extremity, but believed it was due to her compression stockings she wears for CHF. Yesterday evening she removed the stockings and saw that her left lower leg was extremely red, which has continued to spread, becoming more red, and even more painful. Denies any recent trauma, falls, accidents, or wounds to the lower extremity but states that she occasionally scratches her legs with her nails when taking on and off the compression stockings. The patient has attempted tylenol, percocet, biofreeze, and icyhot yesterday with no relief. 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 Endorses history of blood clots, but is on coumadin which was last checked approximately 1 week ago and was in theraputic range.  Further endorses feeling feverish and chills, increased shortness of breath, worsening left lower extremity pain, the inability WFL    Strength:  Bilateral Lower Extremity: grossly 4/5    Balance:  Static Sitting Balance:  Modified Independent  Dynamic Sitting Balance: Modified Independent  Static Standing Balance: Supervision, Stand By Assistance  Dynamic Standing Balance: Stand By Assistance    Bed Mobility:  Not Tested. Up in chair    Transfers:  Sit to Stand: Stand By Assistance  Stand to Sit:Stand By Assistance    Ambulation:  Stand By Assistance  Distance: 25 ft - limitied due to O2 sats dropped to low 80's  Surface: Level Tile  Device:Rolling Walker  Gait Deviations:  Decreased Step Length Bilaterally, Decreased Gait Speed and needed assist to manage O2    Exercise:  Patient was guided in 1 set(s) 10 reps of exercise to both lower extremities. Ankle pumps, Glut sets, Quad sets, Hip abduction/adduction, Seated marches, Seated heel/toe raises and Long arc quads. Exercises were completed for increased independence with functional mobility. Functional Outcome Measures: Completed  AM-PAC Inpatient Mobility without Stair Climbing Raw Score : 15  AM-PAC Inpatient without Stair Climbing T-Scale Score : 43.03    ASSESSMENT:  Activity Tolerance:  Patient tolerance of  treatment: good. minus due to O2 sats dropping. Treatment Initiated: Treatment and education initiated within context of evaluation. Evaluation time included review of current medical information, gathering information related to past medical, social and functional history, completion of standardized testing, formal and informal observation of tasks, assessment of data and development of plan of care and goals. Treatment time included skilled education and facilitation of tasks to increase safety and independence with functional mobility for improved independence and quality of life. Assessment: Body structures, Functions, Activity limitations: Decreased functional mobility , Decreased balance, Decreased strength, Decreased endurance  Assessment: Pt is a 67 y.o. female that is deconditioned and requiring 5 L/min O2 to maintain sats in the 90s. Pt participated well and is hoping to be able to go home soon, but recognizes that her breathing is keeping her from going home. Pt is at SBA to Supervision for mobility, but needs assist to manage O2 line. Pt would benefit from continued skilled PT to address strengthening, endurance building, and functional mobility. Prognosis: Good    REQUIRES PT FOLLOW UP: Yes    Discharge Recommendations:  Discharge Recommendations: Continue to assess pending progress, Patient would benefit from continued therapy after discharge    Patient Education:  PT Education: Goals, PT Role, Plan of Care, Home Exercise Program, Functional Mobility Training, Transfer Training, Gait Training    Equipment Recommendations:  Equipment Needed: No  Other: cont to monitor for needs    Plan:  Times per week: 4-5x GM  Specific instructions for Next Treatment: therex and mobility  Current Treatment Recommendations: Strengthening, Functional Mobility Training, Transfer Training, Endurance Training, Balance Training, Stair training, Gait Training, Home Exercise Program, Safety Education & Training, Equipment Evaluation, Education, & procurement, Patient/Caregiver Education & Training    Goals:  Patient goals : get stronger to be able to go home. Short term goals  Time Frame for Short term goals: at discharge  Short term goal 1: Pt to go supine <-> sit, with Mod I to get in/out of bed, no rail. Short term goal 2: Pt to get up/down from various seated surfaces, with S to get up to walk. Short term goal 3: Pt to walk with RW >= 100 ft, SBA to progress to home and community mobility  Short term goal 4: Pt to negotiate 4 steps with HR and CGA to enter home safely  Long term goals  Time Frame for Long term goals : not set due to short ELOS    Following session, patient left in safe position with all fall risk precautions in place. Francisco Canas.  Yanira Craft Opplanluis fernando [place] 8

## 2020-11-17 NOTE — PROGRESS NOTES
diuresis. PT/OT. Chief Complaint: [address] Course: 67 y. o. female with significant pmhx and recent hospitalization with dc to  rehab and then after a 10 day stay, is getting transferred back to inpatient due to worsening hypoxia. Patient was previously admitted for LE cellulitis s/p ID management. ID was following her at [facility]. She has completed her antibiotics at this time. She developed hemoptysis at rehab which was evaluated by pulmonary. Pt had repeat labs including cxr which showed mod pleural effusions hence pulm recommended FFP, and thoracentesis in am with fluid analysis. Patient uses 2 L of o2 at home but is currently needing 5L. This is likely attributable to her effusions.  She does have a hx of copd, chronic resp failure, asthma, cad, htn.  She will be admitted to [facility] for further management.        Subjective: no acute events overnight. Pt sitting up in chair, doing well. Improvement in sob, not coughing blood. No fevers or chills. No chest pain. Tolerating PO intake.        Medications:  Reviewed    Infusion Medications    heparin (PORCINE) Infusion 18 Units/kg/hr (11/17/20 0530)     Scheduled Medications    albuterol  2.5 mg Nebulization Q4H While awake    warfarin (COUMADIN) daily dosing (placeholder)   Other RX Placeholder    famotidine (PEPCID) injection  20 mg Intravenous BID    albumin human  25 g Intravenous Once    sodium chloride  20 mL Intravenous Once    Arformoterol Tartrate  15 mcg Nebulization BID    [Held by provider] aspirin  81 mg Oral Daily    budesonide  500 mcg Nebulization BID    digoxin  125 mcg Oral Every Other Day    docusate sodium  100 mg Oral Daily    Evolocumab  140 mg Subcutaneous Q14 Days    furosemide  40 mg Intravenous BID    gabapentin  100 mg Oral TID    hydrocortisone  25 mg Rectal BID    metoprolol succinate  25 mg Oral Daily    nicotine  1 patch Transdermal Q24H    omalizumab  225 mg Subcutaneous Q14 Days    pantoprazole  40 mg Oral BID AC    [Held by provider] sacubitril-valsartan  1 tablet Oral BID    senna  1 tablet Oral Nightly    tiotropium  2 puff Inhalation Daily    traZODone  50 mg Oral Nightly     PRN Meds: heparin (porcine), heparin (porcine), polyethylene glycol, bisacodyl, acetaminophen, cyclobenzaprine, HYDROcodone 5 mg - acetaminophen, HYDROcodone 5 mg - acetaminophen, levalbuterol, magnesium hydroxide      Intake/Output Summary (Last 24 hours) at 11/17/2020 1403  Last data filed at 11/17/2020 0322  Gross per 24 hour   Intake 386.6 ml   Output --   Net 386.6 ml       Diet:  DIET CARDIAC; Low Sodium (2 GM); Daily Fluid Restriction: 1500 ml    Exam:  BP (!) 122/46   Pulse 70   Temp 97.4 °F (36.3 °C) (Oral)   Resp 18   Wt 151 lb 5 oz (68.6 kg)   SpO2 94%   BMI 27.68 kg/m²     General appearance:  No apparent distress, appears stated age and cooperative. HEENT:  Normal cephalic, atraumatic without obvious deformity. Pupils equal, round, and reactive to light.  Extra ocular muscles intact. Conjunctivae/corneas clear. NC in place  Neck: Supple, with full range of motion. No jugular venous distention. Trachea midline. Respiratory:  Normal respiratory effort. Diminished  Cardiovascular:  irreg rhythm  Abdomen: Soft, non-tender, non-distended   Musculoskeletal:  LE with compression stockings in place  Skin: Skin color, texture, turgor normal.  No rashes or lesions. Neurologic: grossly non-focal  Psychiatric:  Alert and oriented, thought content appropriate, normal insight  Peripheral Pulses: present       Labs:   Recent Labs     11/17/20 0356   WBC 7.1   HGB 8.2*   HCT 27.9*        Recent Labs     11/17/20 0356      K 4.1   CL 98   CO2 33   BUN 18   CREATININE 0.9   CALCIUM 8.7     No results for input(s): AST, ALT, BILIDIR, BILITOT, ALKPHOS in the last 72 hours. Recent Labs     11/17/20 0356   INR 1.43*     No results for input(s): Michele Hug in the last 72 hours.     Urinalysis:      Lab Results Component Value Date    NITRU NEGATIVE 11/08/2020    WBCUA 0-2 11/08/2020    BACTERIA NONE SEEN 11/08/2020    RBCUA 5-10 11/08/2020    BLOODU TRACE 11/08/2020    GLUCOSEU NEGATIVE 11/08/2020       Radiology: All imaging reviewed     Diet: DIET CARDIAC; Low Sodium (2 GM);  Daily Fluid Restriction: 1500 ml      Code Status: Full Code            Electronically signed by Wilmer Wu MD on 11/17/2020 at 2:03 PM

## 2020-11-17 NOTE — PROGRESS NOTES
Clinical Pharmacy Note    Warfarin consult follow-up    Recent Labs     11/17/20  0356   INR 1.43*     Recent Labs     11/15/20  0536 11/15/20  2041 11/16/20  1232 11/17/20  0356   HGB 7.4* 7.4* 7.9* 8.2*   HCT 25.2* 24.8* 27.5* 27.9*     --  153 155       Significant Drug-Drug Interactions:  New warfarin drug-drug interactions: none  Discontinued drug-drug interactions: none  Current warfarin drug-drug interactions: heparin drip, aspirin (on hold), trazodone (HM)      Date INR Warfarin Dose   10/25-10/27   No Coumadin   10/28/2020 1.32 2 mg    10/29/2020 1.49  2 mg   10/30/2020  2  1.5 mg    10/31/2020   2.78   0.5 mg   11/1/2020   2.37  1.5 mg    11/2/2020  2.55 1 mg     11/3/2020  3.54   No Coumadin   11/4/2020  3.09  1 mg   11/5/2020 2.41 1.5 mg   11/6/2020 2.82 1 mg    11/7/2020  2.61  1.5 mg    11/8/2020   2.22                     1.5 mg   11/9/2020  2.12                      2 mg   11/10/2020 2.05  2 mg   11/11/2020  2.54  1.5 mg   11/12/2020 2.72  1.5 mg   11/13/2020 2.72 No Coumadin   11/14/2020 2.3  No Coumadin    11/15/2020 1.7  (not given)   11/16/2020   1.48 2 mg    11/17/2020   1.43 2 mg                 Notes:                     Daily PT/INR until stable within therapeutic range.      Karol Rhoades, PharmD   11/17/2020, 3:46 PM

## 2020-11-17 NOTE — CARE COORDINATION
DISASTER CHARTING    11/17/20, 12:38 PM EST    DISCHARGE ONGOING EVALUATION:     Ariana Franco day: 4  Location: [facility]10/010-A Reason for admit: Physical debility [R53.81]   Barriers to Discharge: not medically ready  PCP: Antonio Ulloa MD  Patient Goals/Plan/Treatment Preferences: Very light hemoptysis today. Afebrile. VPaced. Sats 94% on 6L O2. Afebrile. Ox4. PT/OT. Heparin drip, nebs, IV lasix 40 mg bid, prn norco,  coumadin - pharmacy dosing. INR 1.43. COVID 19 was negative on 11/13. From home w/. Plan for [facility] at discharge. Has home O2 2L O2 at rest and 3L with activity provided by Eloina Henry. Has nebulizer, walker, wheelchair, and scooter. SW on case. Plan for home O2 eval at discharge.

## 2020-11-17 NOTE — PROGRESS NOTES
[address] 59  INPATIENT OCCUPATIONAL THERAPY  STRZ ICU STEPDOWN TELEMETRY 4K  EVALUATION    Time:    Time In: 9890  Time Out: 1456  Timed Code Treatment Minutes: 9 Minutes  Minutes: 19          Date: 2020  Patient Name: Jessica Cash,   Gender: female      MRN: [id number]  : 1945  (67 y.o.)  Referring Practitioner: Dr. Soledad Childs  Diagnosis: physical debility  Additional Pertinent Hx: Per ER note 10/25/2020:75 y.o. female with an extensive medical history including, congestive heart failure, COPD, A. Fib., Pacemaker placement post MI, hypertension, anemia, and IBS who presents to the ED for an evaluation of severe left lower extremity pain, redness, and warmth that started yesterday evening. The patient states that yesterday morning she started feeling some discomfort to her lower extremity, but believed it was due to her compression stockings she wears for CHF. Yesterday evening she removed the stockings and saw that her left lower leg was extremely red, which has continued to spread, becoming more red, and even more painful. Pt was discharged from [facility] on 2020 back to acute d/t SOB & retaining fluid.     Restrictions/Precautions:  Restrictions/Precautions: General Precautions, Fall Risk  Left Lower Extremity Weight Bearing: Weight Bearing As Tolerated  Position Activity Restriction  Other position/activity restrictions: O 2    Subjective  Chart Reviewed: Yes, Orders, Progress Notes, History and Physical  Patient assessed for rehabilitation services?: Yes  Family / Caregiver Present: Yes    Subjective: agreeable with encouragement    Pain:  Pain Assessment  Patient Currently in Pain: Yes(LLE mid shin to toes)    Social/Functional History:  Lives With: Spouse  Type of Home: House  Home Layout: One level  Home Access: Stairs to enter with rails  Entrance Stairs - Number of Steps: 4  Entrance Stairs - Rails: Both(too wide to use at same time)  Home Equipment: Rolling walker, 4 wheeled walker, Wheelchair-manual   Bathroom Shower/Tub: Walk-in shower, Shower chair with back  [employer]: Bedside commode  Bathroom Equipment: Grab bars in shower, Hand-held shower, 3-in-1 commode  Bathroom Accessibility: Accessible    Receives Help From: Family  ADL Assistance: Independent  Homemaking Assistance: Needs assistance  Homemaking Responsibilities: No  Ambulation Assistance: Independent  Transfer Assistance: Independent    Active : Yes  Occupation: Retired  Additional Comments: Pt reports that her spouse assists with IADL tasks, Pt completes own self care . Spouse does have to assist with compression stockings. Cognition/Orientation:     Overall Cognitive Status: Exceptions(decreased safety awareness)    ADL's:  LE Dressing: Dependent/Total       Functional Mobility:       Functional Mobility  Functional - Mobility Device: Rolling Walker  Activity: Other(2 steps forward & back)  Assist Level: Contact guard assistance  Functional Mobility Comments: 6L O2 during session, saturation 96-86%; vcs to deep breathe & take RBs     Balance:  Balance  Standing Balance: Contact guard assistance    Transfers:  Sit to stand: Contact guard assistance  Stand to sit: Contact guard assistance  Transfer Comments: from recliner; vcs for UE placement with t/fs       Upper Extremity Assessment:           LUE Strength  Gross LUE Strength: (appears 4/5)  RUE Strength  Gross RUE Strength: (appears 4/5)    Sensation  Overall Sensation Status: WFL       Activity Tolerance: Patient limited by fatigue, Patient limited by pain       Assessment:  Assessment: Pt demo decreased ADL & functional mobility status over PLOF d/t decreased endurance, balance, & safety awareness. Continued OT recommended to educate Pt on safety & adaptative strategies for returning home.   Performance deficits / Impairments: Decreased functional mobility , Decreased ADL status, Decreased strength, Decreased endurance, Decreased safe awareness, Decreased balance, Decreased high-level IADLs  Prognosis: Fair  REQUIRES OT FOLLOW UP: Yes  Decision Making: Medium Complexity  Safety Devices in place: Yes  Type of devices: All fall risk precautions in place, Call light within reach, Gait belt, Chair alarm in place    Treatment Initiated: Treatment and education initiated within context of evaluation. Evaluation time included review of current medical information, gathering information related to past medical, social and functional history, completion of standardized testing, formal and informal observation of tasks, assessment of data and development of plan of care and goals. Treatment time included skilled education and facilitation of tasks to increase safety and independence with ADL's for improved functional independence and quality of life. Discharge Recommendations:  Continue to assess pending progress    Patient Education:  OT Education: Plan of Care, OT Role, Precautions, ADL Adaptive Strategies, Transfer Training  Barriers to Learning: decreased cognition    Equipment Recommendations: Other: Pt owns BSC and shower chair. Pt has grab bars in the shower.     Plan:  Times per week: 3-5x  Current Treatment Recommendations: Balance Training, Functional Mobility Training, Endurance Training, Safety Education & Training, Self-Care / ADL, ROM    Goals:  Patient goals : Not be so SOB  Short term goals  Time Frame for Short term goals: until discharge  Short term goal 1: Pt will complete various sit-stand t/fs including toilet with S & 0-2 vcs for safety  Short term goal 2: Pt will complete 2 min standing with CGA for increased ease of sinkside grooming  Short term goal 3: Pt will complete mobility to/from bathroom with RW, S, & 0-2 vcs for safety  Short term goal 4: Pt will complete UE light strengthening ex x 10 reps with HEP to increase strength/ endurance   needed for safe light IADL tasks  Long term goals  Time Frame for Long term goals : No LTG set d/t short ELOS  See long-term goal time frame for expected duration of plan of care. If no long-term goals established, a short length of stay is anticipated. Following session, patient left in safe position with all fall risk precautions in place.

## 2020-11-17 NOTE — PROGRESS NOTES
[facility]      Patient - Sammy Jorgensen   MRN -  [id number]   Omar # - [de-identified]   - 1945      Date of Admission -  2020  5:45 PM  Date of evaluation -  2020  Room - -10/010-A   Hospital Day - [address], MD Primary Care Physician - Chel Crisostomo MD     Problem List      Active Hospital Problems    Diagnosis Date Noted    Acute respiratory failure with hypoxia ([address] [J96.01] 11/15/2020    Hemoptysis [R04.2]     Physical debility [R53.81] 2020    Moderate COPD (chronic obstructive pulmonary disease) ([address] [J44.9] 2012     Reason for Consult    Hypoxia and hemoptysis management  HPI   History Obtained From: Patient and electronic medical record. Sammy Jorgensen is a 67 y.o. female  was initially admitted under hospitalist service. Pulmonary medicine was consulted for further management of Hemoptysis and COPD. She is a 54-year-old pleasant female with a past medical history of moderately severe COPD and bronchial asthma. She used to follow with Dr. Jony Santoyo MD in the past. However she is currently following with Dr. Toño George. DO Constanza at [facility] pulmonary clinic. She was initially admitted to ICU on 2020 for management of sepsis due to left lower lower extremity cellulitis and shock. Patient having chronic minimal hemoptysis. She was evaluated by Dr. Toño George. DO Constanza on 2020. As per Dr. Toño George. Ruchi Gallagher note the hemoptysis was thought to be due to epistaxis from a nonhumidified oxygen supplementation. She is continue to have a hemoptysis for the last 5days. She is coughing up quarter size blood clot which is a dark blood for the last few days. There is no history of active hemoptysis or bright red blood in the last few days. She still complaining of shortness of breath on minimal exertion. She was found to be on Coumadin with therapeutic INR.   No active bleeding was noted. She is receiving Xolair to 25 mg from 's Ruchi Holland office. Patient also follows with Dr. Mirna Braun MD allergist in [facility]. She uses 2LPM of oxygen supplementation at rest, exercise or during sleep/at night time at home. Past 24 Hours   Currently on 5LPM via NC 94%  OOB to chair  Afebrile  Cytology (-) malignant cells   Hypoxia with very little activity noted- very SOB just with eating   Very light hemoptysis today per RN- brownish in color (nickel sized)   -All systems reviewed.    PMHx   Past Medical History      Diagnosis Date    Allergic rhinitis     Anemia     Anxiety     Arthritis     Asthma     Atrial fibrillation (HCC)     CAD (coronary artery disease)     Chronic systolic CHF (congestive heart failure) [facility] 10/27/2019    COPD (chronic obstructive pulmonary disease) (HCC)     Frequent UTI     GERD (gastroesophageal reflux disease)     Hemorrhoids     History of blood transfusion     X8    Hx of blood clots     left arm    Hyperlipidemia     Hypertension     Influenza A 02/22/2020    Kidney stone     LONG TERM ANTICOAGULENT USE 7/6/2012    Lumbar spinal stenosis     MI, old 11    Prolonged emergence from general anesthesia       Past Surgical History        Procedure Laterality Date    CARDIAC CATHETERIZATION  1994    CARDIAC DEFIBRILLATOR PLACEMENT  2008    [facility] [place]    COLONOSCOPY  10/16/2015    Dr. Susie White    COLONOSCOPY N/A 10/25/2018    COLONOSCOPY POLYPECTOMY SNARE/COLD BIOPSY performed by Maci Richards MD at [facility]    ENDOSCOPY, COLON, DIAGNOSTIC  01/04/2017    Dr. Ramesh Pickandrews      left hip    OVARIAN CYST REMOVAL      PACEMAKER PLACEMENT      SINUS SURGERY      several    TONSILLECTOMY      TOTAL HIP ARTHROPLASTY Left 10/24/2019    DANIEL LEFT HIP ARTHROPLASTY performed by Alba Smith MD at [facility]  2013    X2    UPPER GASTROINTESTINAL ENDOSCOPY N/A 1/5/2020    EGD DIAGNOSTIC ONLY performed by Gomez Badillo MD at [facility]     Meds    Current Medications    warfarin (COUMADIN) daily dosing (placeholder)   Other RX Placeholder    famotidine (PEPCID) injection  20 mg Intravenous BID    albumin human  25 g Intravenous Once    sodium chloride  20 mL Intravenous Once    Arformoterol Tartrate  15 mcg Nebulization BID    [Held by provider] aspirin  81 mg Oral Daily    budesonide  500 mcg Nebulization BID    digoxin  125 mcg Oral Every Other Day    docusate sodium  100 mg Oral Daily    Evolocumab  140 mg Subcutaneous Q14 Days    furosemide  40 mg Intravenous BID    gabapentin  100 mg Oral TID    hydrocortisone  25 mg Rectal BID    metoprolol succinate  25 mg Oral Daily    nicotine  1 patch Transdermal Q24H    omalizumab  225 mg Subcutaneous Q14 Days    pantoprazole  40 mg Oral BID AC    [Held by provider] sacubitril-valsartan  1 tablet Oral BID    senna  1 tablet Oral Nightly    tiotropium  2 puff Inhalation Daily    traZODone  50 mg Oral Nightly     heparin (porcine), heparin (porcine), polyethylene glycol, bisacodyl, acetaminophen, cyclobenzaprine, HYDROcodone 5 mg - acetaminophen, HYDROcodone 5 mg - acetaminophen, levalbuterol, magnesium hydroxide  IV Drips/Infusions   heparin (PORCINE) Infusion 18 Units/kg/hr (11/17/20 0530)     Home Medications  Medications Prior to Admission: levocetirizine (XYZAL) 5 MG tablet, Take 5 mg by mouth nightly  Omalizumab (XOLAIR SC), Inject into the skin At Dr Khushi leigh  Ascorbic Acid (VITAMIN C) 100 MG tablet, Take 100 mg by mouth daily  metoprolol succinate (TOPROL XL) 25 MG extended release tablet, Take 25 mg by mouth daily  Cholecalciferol (VITAMIN D3) 50 MCG (2000 UT) CAPS, Take 2,000 Units by mouth daily  sacubitril-valsartan (ENTRESTO) 49-51 MG per tablet, Take 1 tablet by mouth 2 times daily  pantoprazole (PROTONIX) 40 MG tablet, Take 40 mg by mouth 2 times daily 30 minutes before two heaviest meals on an empty stomach  LINZESS 290 MCG CAPS capsule, Take 290 mcg by mouth daily   loperamide (IMODIUM) 2 MG capsule, Take 2 mg by mouth 4 times daily as needed for Diarrhea  traMADol (ULTRAM) 50 MG tablet, Take 50 mg by mouth every 6 hours as needed for Pain. nystatin (MYCOSTATIN) 317809 UNIT/GM cream, as needed   ondansetron (ZOFRAN) 8 MG tablet, daily as needed for Nausea or Vomiting   hydrocortisone (ANUSOL-HC) 25 MG suppository, Place 1 suppository rectally 2 times daily as needed for Hemorrhoids  warfarin (COUMADIN) 1 MG tablet, Take as directed by the [facility], 145 tablets for 90 days  digoxin (LANOXIN) 125 MCG tablet, Take 1 tablet by mouth every other day Indications: Increased Heart Rate Until Office Visit with Dr. Montse Bonilla  Revefenacin 175 MCG/3ML SOLN, Inhale 175 mcg into the lungs daily  hydrocortisone 2.5 % cream, Apply topically 2 times daily as needed   acetaminophen (TYLENOL) 325 MG tablet, Take 650 mg by mouth as needed for Pain or Fever Indications: Pain Don't take more then 3,000 mg each day  Multiple Vitamins-Minerals (MULTIVITAMIN ADULT) CHEW, Take 2 tablets by mouth daily  Menthol-Methyl Salicylate (ICY HOT) 15-80 % STCK, Apply topically daily  Alum Hydroxide-Mag Carbonate (GAVISCON PO), Take by mouth as needed Indications: Acid Indigestion   lidocaine (XYLOCAINE) 5 % ointment, Apply topically Before venipuncture in Dr. Tomma Bloch office.   hydrOXYzine (ATARAX) 25 MG tablet, Take 25 mg by mouth 3 times daily as needed Indications: Feeling Anxious   Pramoxine HCl (PROCTOFOAM RE), Place rectally daily as needed   EPINEPHrine (EPIPEN) 0.3 MG/0.3ML SOAJ injection, INJECT AS DIRECTED FOR ANAPHYLAXIS  Evolocumab (REPATHA SURECLICK SC), Inject 409 mg into the skin every 14 days Indications: Blood Cholesterol Abnormal   B Complex-C (RA B-COMPLEX/VITAMIN C CR PO), Take 1 tablet by mouth daily Indications: Treatment to Prevent Vitamin Deficiency NITROGLYCERIN RE, Place 0.3 mg rectally as needed Indications: Hemorrhoids   dicyclomine (BENTYL) 10 MG capsule, Take 10 mg by mouth 4 times daily (before meals and nightly) Indications: Pain in the Abdominal Region  Coenzyme Q10 (COQ-10) 200 MG CAPS, Take by mouth daily   Probiotic Product (SUPER PROBIOTIC) CAPS,  Take 1 tablet by mouth daily Indications: Digestive Complaint   budesonide (PULMICORT) 0.5 MG/2ML nebulizer suspension,  Take 1 ampule by nebulization 2 times daily Indications: Shortness of Breath (Inactive)   Arformoterol Tartrate (BROVANA) 15 MCG/2ML NEBU,  Take 1 ampule by nebulization 2 times daily Indications: Shortness of Breath (Inactive)   ofloxacin (FLOXIN) 0.3 % otic solution, Place 2 drops into both ears daily as needed Indications: Infection Long-term therapy. Carboxymethylcellul-Glycerin (REFRESH OPTIVE OP), Apply  to eye as needed. Indications: Dry Eyes caused by Deficiency of Tears  levalbuterol (XOPENEX) 0.63 MG/3ML nebulization, Take 1 ampule by nebulization every 8 hours as needed for Wheezing. triamcinolone (KENALOG) 0.1 % cream, Apply topically 2 times daily as needed Anti-fungal  clotrimazole-betamethasone (LOTRISONE) cream, Apply topically 2 times daily as needed Indications: Yeast Infection (inactive)   polyethylene glycol (MIRALAX) powder, Take 17 g by mouth as needed. Indications: Constipation  Alcaftadine (LASTACAFT) 0.25 % SOLN, Apply 1 drop to eye daily as needed Indications: Eye Allergy Both eyes  aspirin 81 MG EC tablet, Take 81 mg by mouth daily. With food  Indications: Anticoagulant Therapy  azelastine (ASTELIN) 137 MCG/SPRAY nasal spray, 1 spray by Nasal route 2 times daily as needed Indications: Allergic Rhinitis Use in each nostril as directed  furosemide (LASIX) 40 MG tablet, Take 40 mg by mouth daily. Indications: Treatment with Diuretic Therapy  folic acid (FOLVITE) 1 MG tablet, Take 1 mg by mouth daily.     Indications: Folic Acid Supplementation  OXYGEN, 2 L by Nasal route continuous Indications: Chronic Obstructive Lung Disease   traZODone (DESYREL) 50 MG tablet, Take 50 mg by mouth nightly   nitroGLYCERIN (NITROSTAT) 0.4 MG SL tablet, Place 0.4 mg under the tongue every 5 minutes as needed. If third one does not relieve pain, call . Indications: Chest Pain  Diet    DIET CARDIAC; Low Sodium (2 GM); Daily Fluid Restriction: 1500 ml  Allergies    Benadryl [diphenhydramine hcl]; Ciprofloxacin; Clarithromycin; Vitamin k; Atorvastatin; Captopril; Codeine; Iv dye [iodides]; Lipitor; Macrobid [nitrofurantoin monohydrate macrocrystals]; Neomycin-bacitracin zn-polymyx; Pravastatin; Zetia [ezetimibe]; Adhesive tape; Cephalexin; Doxycycline; Morphine; Other; Propoxyphene; and Sulfa antibiotics  Family History          Adopted: Yes   Problem Relation Age of Onset    Heart Disease Father          AGE 35    Cancer Sister         breast     Sleep History    Never diagnosed with sleep apnea in the past    Social History     Social History     Socioeconomic History    Marital status:      Spouse name: Pamelia Counter Number of children: 3    Years of education: Not on file    Highest education level: Not on file   Occupational History    Not on file   Social Needs    Financial resource strain: Not on file    Food insecurity     Worry: Not on file     Inability: Not on file   Ischemix needs     Medical: Not on file     Non-medical: Not on file   Tobacco Use    Smoking status: Current Every Day Smoker     Packs/day: 0.25     Years: 25.00     Pack years: 6.25     Types: Cigarettes    Smokeless tobacco: Never Used   Substance and Sexual Activity    Alcohol use:  Yes     Alcohol/week: 1.0 standard drinks     Types: 1 Cans of beer per week     Comment: ocassionaly    Drug use: No    Sexual activity: Never   Lifestyle    Physical activity     Days per week: Not on file     Minutes per session: Not on file    Stress: Not on file   Relationships    Social connections     Talks on phone: Not on file     Gets together: Not on file     Attends [employer] service: Not on file     Active member of club or organization: Not on file     Attends meetings of clubs or organizations: Not on file     Relationship status: Not on file    Intimate partner violence     Fear of current or ex partner: Not on file     Emotionally abused: Not on file     Physically abused: Not on file     Forced sexual activity: Not on file   Other Topics Concern    Not on file   Social History Narrative    Not on file     Vitals     weight is 151 lb 5 oz (68.6 kg). Her oral temperature is 98.1 °F (36.7 °C). Her blood pressure is 137/51 (abnormal) and her pulse is 70. Her respiration is 21 and oxygen saturation is 91%. Body mass index is 27.68 kg/m². SUPPLEMENTAL O2: O2 Flow Rate (L/min): 4 L/min     I/O        Intake/Output Summary (Last 24 hours) at 11/17/2020 0819  Last data filed at 11/17/2020 0322  Gross per 24 hour   Intake 386.6 ml   Output --   Net 386.6 ml     I/O last 3 completed shifts: In: 386.6 [P.O.:200; I.V.:186.6]  Out: -    Patient Vitals for the past 96 hrs (Last 3 readings):   Weight   11/17/20 0322 151 lb 5 oz (68.6 kg)   11/16/20 0300 150 lb 14.4 oz (68.4 kg)   11/14/20 0351 160 lb (72.6 kg)     Exam   Physical Exam   Constitutional: No distress on O2 5LPM per NC. Patient appears moderately built and  moderately nourished. OOB to chaird atraumatic. Mouth/Throat: Oropharynx is clear and moist.  No oral thrush. Eyes: Conjunctivae are normal. Pupils are equal, round. No scleral icterus. Neck: Neck supple. No tracheal deviation present. No JVD  Cardiovascular: S1 and S2 with no murmur. 1-2+BLE peripheral edema  Pulmonary/Chest: Normal effort with bilateral air entry, clear breath sounds. No stridor. No respiratory distress. Patient exhibits no tenderness. No wheezing   Abdominal: Soft. Bowel sounds audible. No distension or tenderness to palp.    Musculoskeletal: Moves all extremities; no clubbing or cyanosis  Neurological: Patient is alert and oriented to person, place, and time. Skin: Warm and dry. Labs  - Old records and notes have been reviewed in [facility]   ABG  Lab Results   Component Value Date    PH 7.41 11/13/2020    PO2 47 11/13/2020    PCO2 45 11/13/2020    HCO3 28 11/13/2020    O2SAT 83 11/13/2020     Lab Results   Component Value Date    IFIO2 6 11/13/2020     CBC  Recent Labs     11/15/20  0536 11/15/20  2041 11/16/20  1232 11/17/20  0356   WBC 6.5  --  11.6* 7.1   RBC 2.41*  --  2.57* 2.68*   HGB 7.4* 7.4* 7.9* 8.2*   HCT 25.2* 24.8* 27.5* 27.9*   .6*  --  107.0* 104.1*   MCH 30.7  --  30.7 30.6   MCHC 29.4*  --  28.7* 29.4*     --  153 155   MPV 11.0  --  11.0 11.3      BMP  Recent Labs     11/15/20  0536 11/16/20  0530 11/17/20  0356    142 141   K 4.2 4.5 4.1   CL 99 99 98   CO2 35* 36* 33   BUN 20 18 18   CREATININE 0.8 0.9 0.9   GLUCOSE 130* 107 89   MG 2.3 2.3 2.4   CALCIUM 9.0 9.2 8.7     LFT  No results for input(s): AST, ALT, ALB, BILITOT, ALKPHOS, LIPASE in the last 72 hours. Invalid input(s): AMYLASE  TROP  Lab Results   Component Value Date    TROPONINT 0.016 11/16/2020    TROPONINT 0.023 11/16/2020    TROPONINT 0.018 11/16/2020     BNP  No results for input(s): BNP in the last 72 hours. Lactic Acid  No results for input(s): LACTA in the last 72 hours. INR  Recent Labs     11/15/20  0536 11/16/20  0530 11/17/20  0356   INR 1.70* 1.48* 1.43*     PTT  Recent Labs     11/16/20  1232 11/16/20  1846 11/17/20  0356   APTT > 200.0* 74.3* 85.3*     Glucose  No results for input(s): POCGLU in the last 72 hours. UA   Recent Labs     11/14/20  1420   COLORU YELLOW     Thoracentesis fluid analysis results.    Performed on: 11/14/2020  Side: right side of chest .  By IR:Yes- Dr. Steve Castro  Amount of pleural fluid drained:    Lab Results   Component Value Date    PHFL 7.71 11/14/2020    COLORU YELLOW 11/14/2020    COLORU YELLOW 11/08/2020 LDFL 84 11/14/2020    PROTEINFL 1.0 11/14/2020    AMYLASE 74 10/04/2013    TRIG 236 12/02/2019     Lab Results   Component Value Date    PROT 6.3 11/13/2020     CYTOLOGY: (-) malignant cells     Serum LDH: 377  LDH ratio i.e Pleural fluid LDH/ Serum LDH: 84/377 = 0.22  Total protein ratio i.e Pleural fluid Total protein/ Serum Total protein: 1/6.3 = 0.16    Pleural fluid cultures were negative so far. Gram stain of pleural fluid is negative for any organisms. PFTs 2017             Sleep studies   None in Epic    Cultures    MRSA (-)  VRE (-)    Echocardiogram     [phone] 2/24/2020   Narrative & Impression      Transthoracic Echocardiography Report (TTE)     Conclusions      Summary   Left ventricle size is normal.   Normal left ventricular wall thickness. Ejection fraction is visually estimated in the range of 30% to 35%. Apical anteroseptal wall segments were not clearly visualized. Moderate myocardial infarction of the anteroseptal LV. Signature      ----------------------------------------------------------------   Electronically signed by Rufino Wallace MD (Interpreting   physician) on 02/24/2020 at 09:42 AM   ----------------------------------------------------------------    Radiology    Chest Xray:   11/16/2020   XR CHEST (2 VW)   Stable chest given technical differences. Left lower lobe infiltrate and effusion. Generalized interstitial edema and cardiomegaly. Nov 14, 2020   COMPARISON: 11/13/2020   No pneumothorax post right thoracentesis. Chest Xray: Nov 11, 2020  PROCEDURE: XR CHEST (2 VW)   1. Cardiomegaly. Permanent pacemaker/defibrillator. 2. Mildly increased interstitial markings in both lungs, consistent with interstitial edema/pneumonia with underlying element of interstitial fibrosis. 3. Overall appearance of chest has improved since prior. Oct 22, 2019   PROCEDURE: XR CHEST 1 VIEW   Stable radiographic appearance of the chest. No evidence of an acute process. CT Scans  (See actual reports for details)  Nov 1, 2020   PROCEDURE: CT CHEST WO CONTRAST   Right larger than left pleural effusions. Bilateral areas of compressive atelectasis and interstitial edema interstitial pneumonic infiltrates and developing consolidation are not excludable. This most likely at the posterior right upper lung.        PROCEDURE: CT CHEST WO CONTRAST, CT ABDOMEN PELVIS WO CONTRAST, CT THORACIC RECONSTRUCTION WO POST PROCESS, CT LUMBAR RECONSTRUCTION WO POST PROCESS  CLINICAL INFORMATION: fall. Pineda Schimke today and struck head. Left hip pain.   COMPARISON: CT abdomen pelvis dated 9/26/2019 and CT chest dated 2/22/2019.     1. No evidence of acute intracranial thoracic, intra-abdominal or intrapelvic abnormality. 2. No evidence of acute osseous injury of the thoracic or lumbar spine. 3. Angulated subcapital femoral fracture on the left with overlying subcutaneous hematoma.        Venous duplex scan: Oct 23, 2019   PROCEDURE: VL DUP LOWER EXTREMITY VENOUS BILATERAL   No evidence of deep venous thrombosis in either lower extremity. Assessment   -Hemoptysis of uncertain etiology, improving: Differential includes due to current anticoagulation with the Coumadin Vs epistaxis Vs other etiologies. Her hemoptysis has improved since being off coumadin. Epistaxis is less likely as no visible evidence of dried blood in her nares were noted. No active hemoptysis. Patient wants to continue her coumadin because of concern for CVA - she was educated on the risk of hemoptysis due to her anticoagulation and informed of fatal pulmonary hemorrhage if her hemoptysis continues with coumadin.  She states she understands the risks and does not want to stop her coumadin at this time.  -Acute on chronic hypoxic respiratory failure 2/2 pleural effusion vs. Congestive heart failure, improving: Baseline oxygen of 2L at home.  -Right pleural effusion s/p thoracentesis on 11/14/20, stable - Likely due to her acute decompensated heart failure as pleural fluid indicates Transudative effusion  -Acute decompensated systolic HFrEF 75-06%, improving   -Moderate COPD, stable  -Chronic tobacco smoking in the past.  -Severe persistent bronchial asthma currently on treatment with Xolair, stable. She follows with Dr. Riaz Chapa. Mary Galan MD  -S/p Pacemker/defib placement by MD Kelly.  -CAD  -GERD  -Hypertension.  -Left lower leg cellulitis S/p treatment with antibiotics. Dr. Feliciano Pritchett MD is following from ID service  -Full Code     Plan   -Pleural fluid cytology reviewed with (-) findings   -Diuresis per primary   -Pulmicort 500 mcg via neb BID  -Brovana 15 mcg via nebulization BID  -Spiriva Respimat 2 puffs daily. This is a replacement for Yupelri (Revefenacin inhaled) 175mcg/3ml via nebs daily- she takes at home.  -Albuterol neb every 4 hours w/a   -Continue Xopenex 0.63 mg via nebulization every 8 hourly as needed.  -We will monitor patient for further hemoptysis. No active hemoptysis. Her Hb/HCT is stable. She is very high risk for bronchoscopy for air way exam including prolonged mechanical ventilation. Patient refused to take high risk.  -Acapella Q4h as tolerated. -Titrate Oxygen to keep Spo2 >90% - Her baseline is 2L O2 via NC.  -Patient is on Xolair which she gets from Dr. Alfred Rendon office every two weeks. Patient may need to have family bring in her Xolair if possible from Dr. Catalino Boone office if she is not discharged before her Xolair is due. -VTE prophylaxis: Coumadin  -GI prophylaxis: Protonix   -Patient to go home with family and [address] O2 evaluation prior to [address] were educated about my impression and plan. They verbalizes understanding. Questions and concerns addressed. Meds and orders reviewed.      Follow up with Dr. Mary Galan MD in 2 weeks at his clinic    Electronically signed by   PETR Connolly - CNP on 11/17/2020 at 8:19 AM     Addendum by Dr. Herman Skelton MD:  I have seen and examined the patient independently. Face to face evaluation and examination was performed. The above evaluation and note has been reviewed. Labs and radiographs were reviewed. I Have discussed with SHANELL Powell CNP about this patient in detail. The above assessment and plan has been reviewed. Please see my modifications mentioned below. My modifications:  She is not in distress. No hemoptysis last night. Tolerating heparin drip well. INR is 1.43  She gets 225mg of Xolair normally per Dr. Milagro Webster' note  Will arrange for follow up as above after discharge.      Santa Blanco MD 11/17/2020 5:06 PM

## 2020-11-18 NOTE — PROGRESS NOTES
[facility] 4[facility]  Occupational Therapy  Daily Note  Time:   Time In: 6610  Time Out: 1055  Timed Code Treatment Minutes: 15 Minutes  Minutes: 15          Date: 2020  Patient Name: Tessy Colmenares,   Gender: female      Room: [facility]10/010-A  MRN: [id number]  : 1945  (67 y.o.)  Referring Practitioner: Dr. Hina Vazquez  Diagnosis: physical debility  Additional Pertinent Hx: Per ER note 10/25/2020:75 y.o. female with an extensive medical history including, congestive heart failure, COPD, A. Fib., Pacemaker placement post MI, hypertension, anemia, and IBS who presents to the ED for an evaluation of severe left lower extremity pain, redness, and warmth that started yesterday evening. The patient states that yesterday morning she started feeling some discomfort to her lower extremity, but believed it was due to her compression stockings she wears for CHF. Yesterday evening she removed the stockings and saw that her left lower leg was extremely red, which has continued to spread, becoming more red, and even more painful. Pt was discharged from [facility] on 2020 back to acute d/t SOB & retaining fluid. Restrictions/Precautions:  Restrictions/Precautions: General Precautions, Fall Risk  Left Lower Extremity Weight Bearing: Weight Bearing As Tolerated  Position Activity Restriction  Other position/activity restrictions: O 2     SUBJECTIVE: Nurse Jose Roberto Hernandez ok'd session, Up in chair upon arrival, agreeable to OT session, talkative      Patient on 4 L O2 via Nasal Canula  upon arrival to room. Patient O2 sats at 92 %. Upon activity patient dropping O2 at 77 %. Pt requiring moderate rest break(s) to recover. PAIN: 0/10:     COGNITION: WFL    ADL:   Grooming: with set-up. Combed hair sitting in bedside chair.       ADDITIONAL ACTIVITIES:  Guided patient through BUE AROM this date x10 reps x1 set in chair in order to increase BUE strength and improve activity tolerance for ADLs and homemaking tasks. Several rest breaks required due to SOB       ASSESSMENT:     Activity Tolerance:  Patient tolerance of  treatment: limited due to SOB and low pulse ox      Discharge Recommendations: Continue to assess pending progress   Equipment Recommendations: Other: Pt owns BSC and shower chair. Pt has grab bars in the shower. Plan: Times per week: 3-5x  Current Treatment Recommendations: Balance Training, Functional Mobility Training, Endurance Training, Safety Education & Training, Self-Care / ADL, ROM    Patient Education  Patient Education: Home Exercise Program    Goals  Short term goals  Time Frame for Short term goals: until discharge  Short term goal 1: Pt will complete various sit-stand t/fs including toilet with S & 0-2 vcs for safety  Short term goal 2: Pt will complete 2 min standing with CGA for increased ease of sinkside grooming  Short term goal 3: Pt will complete mobility to/from bathroom with RW, S, & 0-2 vcs for safety  Short term goal 4: Pt will complete UE light strengthening ex x 10 reps with HEP to increase strength/ endurance   needed for safe light IADL tasks  Long term goals  Time Frame for Long term goals : No LTG set d/t short ELOS    Following session, patient left in safe position with all fall risk precautions in place.

## 2020-11-18 NOTE — PROGRESS NOTES
of hemoptysis of unknown cause. Bronchoscopy could possibly provide diagnostic information. However, we would not recommend this as the bronchoscopy would be in considerable risk and would likely not change her long-term care management plan per CHF and advanced COPD. Will address as described above for chronic A. Fib. 4.  Anemia, unspecified-stable. The patient reported a longstanding history of anemia. This may be secondary to blood loss from hemoptysis described above. However, the patient has a macrocytic profile with an MCV of 108. 2. Recent high ferritin on 11/3/2020, high iron, and normal total iron-binding capacity makes this diagnosis less likely. Acute bleed could still be the cause or contributor to this anemia, this would not necessarily present as the \"classic\" iron deficiency profile. Normal folate and vitamin B12 makes nutritional anemia less likely cause of macrocytic anemia. We will obtain blood smear and reassess. 5.  Left lower extremity cellulitis treated on previous admission-stable. We will maintain wound care. No need for antibiotics currently. 6.  Coronary artery disease-stable. This based on the history. We will treat as described above for CHF and A. Fib. 7.  Benign essential hypertension-stable. We know this based on the history. We will treat as described above for CHF and A. Fib.    8.  Hyperlipidemia, unspecified-stable. We know this based on the history. We will treat with Evolocumab as described above. 9.  Chronic obstructive pulmonary disease asthma component, unspecified-stable. We know this based on the patient's history. We will maintain patient on albuterol nebulizer, Xolair, Spiriva Respimat, Brovana, and Pulmicort.       Expected discharge date: Unknown    Disposition:    [x] Home       [] TCU       [] Rehab       [] Psych       [] SNF       [] [place]       [] Other-    Chief Complaint: Hypoxia and Hemoptysis    Opening statement:     The patient is a 79-year-old female with a complex past medical history with an old MI, long-term anticoagulation with Coumadin for atrial fibrillation, kidney stone, hypertension, hyperlipidemia, history of blood clots, GERD, CAD with biventricular pacemaker, frequent UTI, COPD, systolic CHF, coronary artery disease, recent hospitalization for lower extremity cellulitis, and current smoker who presents with a chief complaint of hypoxia and hemoptysis. Hospital Treatment Course:     Patient was diagnosed with acute on chronic respiratory failure likely secondary to pleural effusions and acute systolic heart failure. Cause of hemoptysis unclear.     The patient was initially transferred to [facility].     Rapid was called the patient 11/13/2020 secondary to hypoxia, tachypnea and hypertension after transfer from [facility] to [facility]. Patient was placed on BiPAP which dramatically improved her presentation. The patient was then transferred to [facility].     Patient was evaluated on the same day for chest pain and shortness of breath. An EKG found no change but a mild troponin elevated was noted. Pain is likely secondary to dyspepsia or GERD.     Pulmonology was consulted for hemoptysis and cardiology was consulted for hemoptysis, possible watchman implantation, and management of Coumadin.     The patient's Coumadin was initially held and her supratherapeutic INR was corrected. However, the patient was placed back on Coumadin as she was concerned for stroke secondary to A. fib. The patient was counseled with regards to the risks from Coumadin.     Thoracentesis was obtained on 11/14/2020 with 700 cc of fluid removed. Pleural fluid cytology showed no significant findings.   Patient was maintained on Pulmicort 200 mcg via neb twice daily, Brovana 50 mcg by nebulization twice daily, Spiriva Respimat 2 puffs daily, albuterol nebs every 4 hours while awake, and Xopenex 0.60 mg by nebulization every 8 Refill: Brisk,< 3 seconds   Peripheral Pulses: +2 palpable, equal bilaterally     Labs:   Recent Labs     11/16/20  1232 11/17/20  0356 11/18/20  0558   WBC 11.6* 7.1 7.7   HGB 7.9* 8.2* 7.2*   HCT 27.5* 27.9* 25.0*    155 135     Recent Labs     11/16/20  0530 11/17/20  0356 11/18/20  0558    141 140   K 4.5 4.1 4.3   CL 99 98 98   CO2 36* 33 31   BUN 18 18 18   CREATININE 0.9 0.9 1.0   CALCIUM 9.2 8.7 8.8     No results for input(s): AST, ALT, BILIDIR, BILITOT, ALKPHOS in the last 72 hours. Recent Labs     11/16/20  0530 11/17/20  0356 11/18/20  0558   INR 1.48* 1.43* 1.99*     No results for input(s): Towana Ball in the last 72 hours. Microbiology:    Blood culture #1:   Lab Results   Component Value Date    BC No growth-preliminary No growth  10/25/2020       Blood culture #2:No results found for: Maria Man    Organism:  Lab Results   Component Value Date    ORG Mixed Growth     02/21/2020         Lab Results   Component Value Date    LABGRAM  11/26/2019     No segmented neutrophils observed. Few epithelial cells observed. Many large gram positive bacilli. Few small gram positive bacilli. Few gram negative bacilli. Prepubescent and postmenopausal female samples (<15and >47ears of age) are not typically suitable for bacterialvaginosis screening. MRSA culture only:No results found for: [facility]    Urine culture:   Lab Results   Component Value Date    LABURIN  10/24/2019     Growth of Contaminants. The mixture of organisms present are not a common cause of urinary tract infections and probably represent distal urethral juventino.         Respiratory culture: No results found for: CULTRESP    Aerobic and Anaerobic :  Lab Results   Component Value Date    LABAERO No growth-preliminary No growth   11/11/2019     Lab Results   Component Value Date    LABANAE No growth-preliminary No growth   11/11/2019       Urinalysis:      Lab Results   Component Value Date    NITRU NEGATIVE 11/08/2020 WBCUA 0-2 11/08/2020    BACTERIA NONE SEEN 11/08/2020    RBCUA 5-10 11/08/2020    BLOODU TRACE 11/08/2020    GLUCOSEU NEGATIVE 11/08/2020       Radiology:  XR CHEST (2 VW)   Final Result   Stable chest given technical differences. Left lower lobe infiltrate and effusion. Generalized interstitial edema and cardiomegaly. **This report has been created using voice recognition software. It may contain minor errors which are inherent in voice recognition technology. **      Final report electronically signed by Dr. Gary Shi on 11/16/2020 11:22 AM      XR CHEST PORTABLE   Final Result   Moderately worsened aeration of the left lung base. **This report has been created using voice recognition software. It may contain minor errors which are inherent in voice recognition technology. **      Final report electronically signed by Dr. Millie Richter on 11/15/2020 6:00 PM      XR CHEST PORTABLE   Final Result   No pneumothorax post right thoracentesis. **This report has been created using voice recognition software. It may contain minor errors which are inherent in voice recognition technology. **      Final report electronically signed by Dr. Millie Richter on 11/14/2020 2:46 PM      XR CHEST PORTABLE   Final Result   Impression:   Moderate CHF, increased. Bilateral lower lobe pneumonia versus subsegmental atelectasis      This document has been electronically signed by: Leighann Cohen MD on    11/13/2020 10:07 PM           Xr Chest Portable    Result Date: 11/14/2020  PROCEDURE: XR CHEST PORTABLE CLINICAL INFORMATION: post thoracentesis right side. COMPARISON: 11/13/2020 TECHNIQUE: AP upright view of the chest. FINDINGS: Right pleural fluid has been evacuated. No pneumothorax. Small left pleural effusion persists. Mild congestion. No definite infiltrate. AICD is unchanged. No pneumothorax post right thoracentesis. **This report has been created using voice recognition software.  It may contain minor errors which are inherent in voice recognition technology. ** Final report electronically signed by Dr. Emperatriz Lainez on 11/14/2020 2:46 PM    Xr Chest Portable    Result Date: 11/13/2020  Chest xray 1 view Comparison:  CR,SR  - XR CHEST PORTABLE  - 10/25/2020 03:23 PM EDT Findings: Cardiac conduction device. Moderate cardiomegaly. Hypoinflated. Moderately increased interstitial lung markings. There are space opacities notably left lower lobe. Small bilateral pleural effusions. No pneumothorax No acute fracture. Impression: Moderate CHF, increased. Bilateral lower lobe pneumonia versus subsegmental atelectasis This document has been electronically signed by: Kindra Simpson MD on 11/13/2020 10:07 PM     Us Thoracentesis    Result Date: 11/15/2020  THORACENTESIS WITH ULTRASOUND GUIDANCE: PERFORMED BY: Ladonna Rivero M.D. CLINICAL INFORMATION: Pleural effusion APPROACH: Right side, posterior, inferior FLUID WITHDRAWN: 700 mL herrera serous fluid ESTIMATED BLOOD LOSS: Minimal PROCEDURE: Signed informed consent was obtained prior to performing this procedure. The thorax was initially evaluated sonographically to determine appropriate puncture site. The skin was marked, prepped, and draped in a sterile fashion. Following local anesthesia and utilizing aseptic technique, a 5 Estonian one-step catheter was successfully inserted into the pleural effusion at the position indicated above. Pleural fluid in the amount was above was then aspirated and the needle was removed. The patient tolerated the procedure well. A post procedure chest radiograph will be obtained. Status post thoracentesis **This report has been created using voice recognition software. It may contain minor errors which are inherent in voice recognition technology. ** Final report electronically signed by Dr. Bernardo Villa on 11/15/2020 7:00 AM      Support Devices (date placed):  [] ETT []Oral / [] Nasal  [] Gastric Tube [] OG /

## 2020-11-18 NOTE — PROGRESS NOTES
Clinical Pharmacy Note    Warfarin consult follow-up    Recent Labs     11/18/20  0558   INR 1.99*     Recent Labs     11/16/20  1232 11/17/20  0356 11/18/20  0558   HGB 7.9* 8.2* 7.2*   HCT 27.5* 27.9* 25.0*    155 135       Significant Drug-Drug Interactions:  New warfarin drug-drug interactions: none  Discontinued drug-drug interactions: none  Current warfarin drug-drug interactions: heparin drip, aspirin (on hold), trazodone (HM)      Date INR Warfarin Dose   10/25-10/27   No Coumadin   10/28/2020 1.32 2 mg    10/29/2020 1.49  2 mg   10/30/2020  2  1.5 mg    10/31/2020   2.78   0.5 mg   11/1/2020   2.37  1.5 mg    11/2/2020  2.55 1 mg     11/3/2020  3.54   No Coumadin   11/4/2020  3.09  1 mg   11/5/2020 2.41 1.5 mg   11/6/2020 2.82 1 mg    11/7/2020  2.61  1.5 mg    11/8/2020   2.22                     1.5 mg   11/9/2020  2.12                      2 mg   11/10/2020 2.05  2 mg   11/11/2020  2.54  1.5 mg   11/12/2020 2.72  1.5 mg   11/13/2020 2.72 No Coumadin   11/14/2020 2.3  No Coumadin    11/15/2020 1.7  (not given)   11/16/2020   1.48 2 mg    11/17/2020   1.43 2 mg    11/18/2020   1.99 1.5 mg        Notes:  Patient's H&H dropped. RN states they are monitoring for bleeding and would like to continue Coumadin for now, but will reassess Coumadin and heparin tomorrow. Daily PT/INR until stable within therapeutic range.      Nils Anthony, PharmD   11/18/2020, 4:35 PM

## 2020-11-18 NOTE — CARE COORDINATION
DISASTER CHARTING    11/18/20, 11:30 AM EST    DISCHARGE ONGOING EVALUATION:     Araceli Shah day: 5  [facility] Reason for admit: Physical debility [R53.81]   Barriers to Discharge: Afebrile. Heparin gtt continues. INR today 1.99. Pharm dosing coumadin. 4L/NC with O2 sat 97%. (baseline 2L). Hgb 7.2 today from 8.2 yesterday. Monitor. PT/OT following and awaiting recommendations. No current hemoptysis. PCP: Dieter Aburto MD  Patient Goals/Plan/Treatment Preferences: Home O2 eval prior to discharge per Pulmonary.

## 2020-11-18 NOTE — PROGRESS NOTES
[facility]      Patient - Uriel Rodriguez   MRN -  [id number]   Omar # - [[id number]]   - 1945      Date of Admission -  2020  5:45 PM  Date of evaluation -  2020  Room - -10/010-A   Hospital Day - [facility] Eboni Paul MD Primary Care Physician - Niki Rhodes MD     Problem List      Active Hospital Problems    Diagnosis Date Noted    Acute respiratory failure with hypoxia ([address] [J96.01] 11/15/2020    Hemoptysis [R04.2]     Physical debility [R53.81] 2020    Moderate COPD (chronic obstructive pulmonary disease) ([address] [J44.9] 2012     Reason for Consult    Hypoxia and hemoptysis management  HPI   History Obtained From: Patient and electronic medical record. Uriel Rodriguez is a 67 y.o. female  was initially admitted under hospitalist service. Pulmonary medicine was consulted for further management of Hemoptysis and COPD. She is a 59-year-old pleasant female with a past medical history of moderately severe COPD and bronchial asthma. She used to follow with Dr. Konstantin Madera MD in the past. However she is currently following with Dr. Blaire Munguia. DO Const[place] at [facility] pulmonary clinic. She was initially admitted to ICU on 2020 for management of sepsis due to left lower lower extremity cellulitis and shock. Patient having chronic minimal hemoptysis. She was evaluated by Dr. Blaire Munguia. DO Constanza on 2020. As per Dr. Blaire Munguia. Ruchi Kitchen note the hemoptysis was thought to be due to epistaxis from a nonhumidified oxygen supplementation. She is continue to have a hemoptysis for the last 5days. She is coughing up quarter size blood clot which is a dark blood for the last few days. There is no history of active hemoptysis or bright red blood in the last few days. She still complaining of shortness of breath on minimal exertion. She was found to be on Coumadin with therapeutic INR.   No active bleeding was noted. She is receiving Xolair to 25 mg from Dr.'s Sanya Corrales, [place] office. Patient also follows with Dr. Thalia Yung MD allergist in [facility]. She uses 2LPM of oxygen supplementation at rest, exercise or during sleep/at night time at home. Past 24 Hours   Currently on 4LPM via NC 95%  Afebrile  No hemoptysis today   Hgb 7.2 (8.2)    -All systems reviewed.    PMHx   Past Medical History      Diagnosis Date    Allergic rhinitis     Anemia     Anxiety     Arthritis     Asthma     Atrial fibrillation (HCC)     CAD (coronary artery disease)     Chronic systolic CHF (congestive heart failure) [address] [address] 10/27/2019    COPD (chronic obstructive pulmonary disease) (HCC)     Frequent UTI     GERD (gastroesophageal reflux disease)     Hemorrhoids     History of blood transfusion     X8    Hx of blood clots     left arm    Hyperlipidemia     Hypertension     Influenza A 02/22/2020    Kidney stone     LONG TERM ANTICOAGULENT USE 7/6/2012    Lumbar spinal stenosis     MI, old 11    Prolonged emergence from general anesthesia       Past Surgical History        Procedure Laterality Date    CARDIAC CATHETERIZATION  1994    CARDIAC DEFIBRILLATOR PLACEMENT  2008    OUS [place]    COLONOSCOPY  10/16/2015    Dr. Chapis Reying    COLONOSCOPY N/A 10/25/2018    COLONOSCOPY POLYPECTOMY SNARE/COLD BIOPSY performed by Junito Barba MD at [facility]    ENDOSCOPY, COLON, DIAGNOSTIC  01/04/2017    Dr. Katy Glynn      left hip    OVARIAN CYST REMOVAL      PACEMAKER PLACEMENT      SINUS SURGERY      several    TONSILLECTOMY      TOTAL HIP ARTHROPLASTY Left 10/24/2019    DANIEL LEFT HIP ARTHROPLASTY performed by Leobardo Essex, MD at [facility]  2013    X2    UPPER GASTROINTESTINAL ENDOSCOPY N/A 1/5/2020    EGD DIAGNOSTIC ONLY performed by Cliff Staples MD at [facility]     Meds    Current Medications    albuterol 2.5 mg Nebulization Q4H While awake    warfarin (COUMADIN) daily dosing (placeholder)   Other RX Placeholder    famotidine (PEPCID) injection  20 mg Intravenous BID    albumin human  25 g Intravenous Once    sodium chloride  20 mL Intravenous Once    Arformoterol Tartrate  15 mcg Nebulization BID    [Held by provider] aspirin  81 mg Oral Daily    budesonide  500 mcg Nebulization BID    digoxin  125 mcg Oral Every Other Day    docusate sodium  100 mg Oral Daily    Evolocumab  140 mg Subcutaneous Q14 Days    furosemide  40 mg Intravenous BID    gabapentin  100 mg Oral TID    hydrocortisone  25 mg Rectal BID    metoprolol succinate  25 mg Oral Daily    nicotine  1 patch Transdermal Q24H    omalizumab  225 mg Subcutaneous Q14 Days    pantoprazole  40 mg Oral BID AC    [Held by provider] sacubitril-valsartan  1 tablet Oral BID    senna  1 tablet Oral Nightly    tiotropium  2 puff Inhalation Daily    traZODone  50 mg Oral Nightly     heparin (porcine), heparin (porcine), polyethylene glycol, bisacodyl, acetaminophen, cyclobenzaprine, HYDROcodone 5 mg - acetaminophen, HYDROcodone 5 mg - acetaminophen, levalbuterol, magnesium hydroxide  IV Drips/Infusions   heparin (PORCINE) Infusion 18 Units/kg/hr (11/18/20 0736)     Home Medications  Medications Prior to Admission: levocetirizine (XYZAL) 5 MG tablet, Take 5 mg by mouth nightly  Omalizumab (XOLAIR SC), Inject into the skin At Dr Kalina leigh  Ascorbic Acid (VITAMIN C) 100 MG tablet, Take 100 mg by mouth daily  metoprolol succinate (TOPROL XL) 25 MG extended release tablet, Take 25 mg by mouth daily  Cholecalciferol (VITAMIN D3) 50 MCG (2000 UT) CAPS, Take 2,000 Units by mouth daily  sacubitril-valsartan (ENTRESTO) 49-51 MG per tablet, Take 1 tablet by mouth 2 times daily  pantoprazole (PROTONIX) 40 MG tablet, Take 40 mg by mouth 2 times daily 30 minutes before two heaviest meals on an empty stomach  LINZESS 290 MCG CAPS capsule, Take 290 mcg by mouth daily   loperamide (IMODIUM) 2 MG capsule, Take 2 mg by mouth 4 times daily as needed for Diarrhea  traMADol (ULTRAM) 50 MG tablet, Take 50 mg by mouth every 6 hours as needed for Pain. nystatin (MYCOSTATIN) 165799 UNIT/GM cream, as needed   ondansetron (ZOFRAN) 8 MG tablet, daily as needed for Nausea or Vomiting   hydrocortisone (ANUSOL-HC) 25 MG suppository, Place 1 suppository rectally 2 times daily as needed for Hemorrhoids  warfarin (COUMADIN) 1 MG tablet, Take as directed by the [facility], 145 tablets for 90 days  digoxin (LANOXIN) 125 MCG tablet, Take 1 tablet by mouth every other day Indications: Increased Heart Rate Until Office Visit with Dr. Sherice Lucia  Revefenacin 175 MCG/3ML SOLN, Inhale 175 mcg into the lungs daily  hydrocortisone 2.5 % cream, Apply topically 2 times daily as needed   acetaminophen (TYLENOL) 325 MG tablet, Take 650 mg by mouth as needed for Pain or Fever Indications: Pain Don't take more then 3,000 mg each day  Multiple Vitamins-Minerals (MULTIVITAMIN ADULT) CHEW, Take 2 tablets by mouth daily  Menthol-Methyl Salicylate (ICY HOT) 71-88 % STCK, Apply topically daily  Alum Hydroxide-Mag Carbonate (GAVISCON PO), Take by mouth as needed Indications: Acid Indigestion   lidocaine (XYLOCAINE) 5 % ointment, Apply topically Before venipuncture in Dr. Johnathon Muniz office.   hydrOXYzine (ATARAX) 25 MG tablet, Take 25 mg by mouth 3 times daily as needed Indications: Feeling Anxious   Pramoxine HCl (PROCTOFOAM RE), Place rectally daily as needed   EPINEPHrine (EPIPEN) 0.3 MG/0.3ML SOAJ injection, INJECT AS DIRECTED FOR ANAPHYLAXIS  Evolocumab (REPATHA SURECLICK SC), Inject 633 mg into the skin every 14 days Indications: Blood Cholesterol Abnormal   B Complex-C (RA B-COMPLEX/VITAMIN C CR PO), Take 1 tablet by mouth daily Indications: Treatment to Prevent Vitamin Deficiency   NITROGLYCERIN RE, Place 0.3 mg rectally as needed Indications: Hemorrhoids   dicyclomine (BENTYL) 10 MG capsule, Take 10 mg by mouth 4 times daily (before meals and nightly) Indications: Pain in the Abdominal Region  Coenzyme Q10 (COQ-10) 200 MG CAPS, Take by mouth daily   Probiotic Product (SUPER PROBIOTIC) CAPS,  Take 1 tablet by mouth daily Indications: Digestive Complaint   budesonide (PULMICORT) 0.5 MG/2ML nebulizer suspension,  Take 1 ampule by nebulization 2 times daily Indications: Shortness of Breath (Inactive)   Arformoterol Tartrate (BROVANA) 15 MCG/2ML NEBU,  Take 1 ampule by nebulization 2 times daily Indications: Shortness of Breath (Inactive)   ofloxacin (FLOXIN) 0.3 % otic solution, Place 2 drops into both ears daily as needed Indications: Infection Long-term therapy. Carboxymethylcellul-Glycerin (REFRESH OPTIVE OP), Apply  to eye as needed. Indications: Dry Eyes caused by Deficiency of Tears  levalbuterol (XOPENEX) 0.63 MG/3ML nebulization, Take 1 ampule by nebulization every 8 hours as needed for Wheezing. triamcinolone (KENALOG) 0.1 % cream, Apply topically 2 times daily as needed Anti-fungal  clotrimazole-betamethasone (LOTRISONE) cream, Apply topically 2 times daily as needed Indications: Yeast Infection (inactive)   polyethylene glycol (MIRALAX) powder, Take 17 g by mouth as needed. Indications: Constipation  Alcaftadine (LASTACAFT) 0.25 % SOLN, Apply 1 drop to eye daily as needed Indications: Eye Allergy Both eyes  aspirin 81 MG EC tablet, Take 81 mg by mouth daily. With food  Indications: Anticoagulant Therapy  azelastine (ASTELIN) 137 MCG/SPRAY nasal spray, 1 spray by Nasal route 2 times daily as needed Indications: Allergic Rhinitis Use in each nostril as directed  furosemide (LASIX) 40 MG tablet, Take 40 mg by mouth daily. Indications: Treatment with Diuretic Therapy  folic acid (FOLVITE) 1 MG tablet, Take 1 mg by mouth daily.     Indications: Folic Acid Supplementation  OXYGEN, 2 L by Nasal route continuous Indications: Chronic Obstructive Lung Disease   traZODone (DESYREL) 50 MG tablet, Take 50 mg by mouth nightly   nitroGLYCERIN (NITROSTAT) 0.4 MG SL tablet, Place 0.4 mg under the tongue every 5 minutes as needed. If third one does not relieve pain, call . Indications: Chest Pain  Diet    DIET CARDIAC; Low Sodium (2 GM); Daily Fluid Restriction: 1500 ml  Allergies    Benadryl [diphenhydramine hcl]; Ciprofloxacin; Clarithromycin; Vitamin k; Atorvastatin; Captopril; Codeine; Iv dye [iodides]; Lipitor; Macrobid [nitrofurantoin monohydrate macrocrystals]; Neomycin-bacitracin zn-polymyx; Pravastatin; Zetia [ezetimibe]; Adhesive tape; Cephalexin; Doxycycline; Morphine; Other; Propoxyphene; and Sulfa antibiotics  Family History          Adopted: Yes   Problem Relation Age of Onset    Heart Disease Father          AGE 35    Cancer Sister         breast     Sleep History    Never diagnosed with sleep apnea in the past    Social History     Social History     Socioeconomic History    Marital status:      Spouse name: Vincent Lakhani Number of children: 3    Years of education: Not on file    Highest education level: Not on file   Occupational History    Not on file   Social Needs    Financial resource strain: Not on file    Food insecurity     Worry: Not on file     Inability: Not on file   [employer] needs     Medical: Not on file     Non-medical: Not on file   Tobacco Use    Smoking status: Current Every Day Smoker     Packs/day: 0.25     Years: 25.00     Pack years: 6.25     Types: Cigarettes    Smokeless tobacco: Never Used   Substance and Sexual Activity    Alcohol use:  Yes     Alcohol/week: 1.0 standard drinks     Types: 1 Cans of beer per week     Comment: ocassionaly    Drug use: No    Sexual activity: Never   Lifestyle    Physical activity     Days per week: Not on file     Minutes per session: Not on file    Stress: Not on file   Relationships    Social connections     Talks on phone: Not on file     Gets together: Not on file     Attends Congregational service: Not on file Active member of club or organization: Not on file     Attends meetings of clubs or organizations: Not on file     Relationship status: Not on file    Intimate partner violence     Fear of current or ex partner: Not on file     Emotionally abused: Not on file     Physically abused: Not on file     Forced sexual activity: Not on file   Other Topics Concern    Not on file   Social History Narrative    Not on file     Vitals     weight is 151 lb 5 oz (68.6 kg). Her oral temperature is 98 °F (36.7 °C). Her blood pressure is 118/80 and her pulse is 75. Her respiration is 16 and oxygen saturation is 95%. Body mass index is 27.68 kg/m². SUPPLEMENTAL O2: O2 Flow Rate (L/min): 4 L/min     I/O        Intake/Output Summary (Last 24 hours) at 11/18/2020 1551  Last data filed at 11/18/2020 1456  Gross per 24 hour   Intake 1781.6 ml   Output 1500 ml   Net 281.6 ml     I/O last 3 completed shifts: In: 1781.6 [P.O.:1350; I.V.:431.6]  Out: 1500 [Urine:1500]   Patient Vitals for the past 96 hrs (Last 3 readings):   Weight   11/17/20 0322 151 lb 5 oz (68.6 kg)   11/16/20 0300 150 lb 14.4 oz (68.4 kg)     Exam   Physical Exam   Constitutional: No distress on O2 4LPM per NC. Patient appears moderately built and  moderately nourished. OOB to chair  Mouth/Throat: Oropharynx is clear and moist.  No oral thrush. Neck: Neck supple. No tracheal deviation present. No JVD  Cardiovascular: S1 and S2 with no murmur. 1-2+BLE peripheral edema  Pulmonary/Chest: Normal effort with bilateral air entry, clear breath sounds with diminished bases BLL. No stridor. No respiratory distress. Patient exhibits no tenderness. No wheezing   Abdominal: Soft. Bowel sounds audible. No distension or tenderness to palp. Musculoskeletal: Moves all extremities; no clubbing or cyanosis  Neurological: Patient is alert and oriented to person, place, and time. Skin: Warm and dry.      Labs  - Old records and notes have been reviewed in [facility]   AB  Lab Results   Component Value Date    PH 7.41 11/13/2020    PO2 47 11/13/2020    PCO2 45 11/13/2020    HCO3 28 11/13/2020    O2SAT 83 11/13/2020     Lab Results   Component Value Date    IFIO2 6 11/13/2020     CBC  Recent Labs     11/16/20  1232 11/17/20  0356 11/18/20  0558   WBC 11.6* 7.1 7.7   RBC 2.57* 2.68* 2.31*   HGB 7.9* 8.2* 7.2*   HCT 27.5* 27.9* 25.0*   .0* 104.1* 108.2*   MCH 30.7 30.6 31.2   MCHC 28.7* 29.4* 28.8*    155 135   MPV 11.0 11.3 10.9      BMP  Recent Labs     11/16/20  0530 11/17/20  0356 11/18/20  0558    141 140   K 4.5 4.1 4.3   CL 99 98 98   CO2 36* 33 31   BUN 18 18 18   CREATININE 0.9 0.9 1.0   GLUCOSE 107 89 138*   MG 2.3 2.4 2.2   CALCIUM 9.2 8.7 8.8     LFT  No results for input(s): AST, ALT, ALB, BILITOT, ALKPHOS, LIPASE in the last 72 hours. Invalid input(s): AMYLASE  TROP  Lab Results   Component Value Date    TROPONINT 0.016 11/16/2020    TROPONINT 0.023 11/16/2020    TROPONINT 0.018 11/16/2020     BNP  No results for input(s): BNP in the last 72 hours. Lactic Acid  No results for input(s): LACTA in the last 72 hours. INR  Recent Labs     11/16/20  0530 11/17/20  0356 11/18/20  0558   INR 1.48* 1.43* 1.99*     PTT  Recent Labs     11/17/20  0356 11/17/20  1646 11/18/20  0558   APTT 85.3* 69.4* 76.5*     Glucose  No results for input(s): POCGLU in the last 72 hours. UA   No results for input(s): SPECGRAV, PHUR, COLORU, CLARITYU, MUCUS, PROTEINU, BLOODU, RBCUA, WBCUA, BACTERIA, NITRU, GLUCOSEU, BILIRUBINUR, UROBILINOGEN, KETUA, LABCAST, LABCASTTY, AMORPHOS in the last 72 hours. Invalid input(s): CRYSTALS  Thoracentesis fluid analysis results.    Performed on: 11/14/2020  Side: right side of chest .  By IR:Yes- Dr. Mario Earl  Amount of pleural fluid drained:    Lab Results   Component Value Date    PHFL 7.71 11/14/2020    COLORU YELLOW 11/14/2020    COLORU YELLOW 11/08/2020    LDFL 84 11/14/2020    PROTEINFL 1.0 11/14/2020    AMYLASE 74 10/04/2013    TRIG 236 CONTRAST   Right larger than left pleural effusions. Bilateral areas of compressive atelectasis and interstitial edema interstitial pneumonic infiltrates and developing consolidation are not excludable. This most likely at the posterior right upper lung.        PROCEDURE: CT CHEST WO CONTRAST, CT ABDOMEN PELVIS WO CONTRAST, CT THORACIC RECONSTRUCTION WO POST PROCESS, CT LUMBAR RECONSTRUCTION WO POST PROCESS  CLINICAL INFORMATION: fall. Flora Matthews today and struck head. Left hip pain.   COMPARISON: CT abdomen pelvis dated 9/26/2019 and CT chest dated 2/22/2019.     1. No evidence of acute intracranial thoracic, intra-abdominal or intrapelvic abnormality. 2. No evidence of acute osseous injury of the thoracic or lumbar spine. 3. Angulated subcapital femoral fracture on the left with overlying subcutaneous hematoma.        Venous duplex scan: Oct 23, 2019   PROCEDURE: VL DUP LOWER EXTREMITY VENOUS BILATERAL   No evidence of deep venous thrombosis in either lower extremity. Assessment   -Hemoptysis of uncertain etiology, improving: Differential includes due to current anticoagulation with the Coumadin Vs epistaxis Vs other etiologies. Her hemoptysis has improved since being off coumadin. Epistaxis is less likely as no visible evidence of dried blood in her nares were noted. No active hemoptysis. Patient wants to continue her coumadin because of concern for CVA - she was educated on the risk of hemoptysis due to her anticoagulation and informed of fatal pulmonary hemorrhage if her hemoptysis continues with coumadin.  She states she understands the risks and does not want to stop her coumadin at this time.  -Acute on chronic hypoxic respiratory failure 2/2 pleural effusion vs. Congestive heart failure, improving: Baseline oxygen of 2L at home.  -Right pleural effusion s/p thoracentesis on 11/14/20, stable - Likely due to her acute decompensated heart failure as pleural fluid indicates Transudative effusion  -Acute decompensated systolic HFrEF 05-76%, improving   -Moderate COPD, stable  -Chronic tobacco smoking in the past.  -Severe persistent bronchial asthma currently on treatment with Xolair, stable. She follows with Dr. Jason Lee. Kar Banerjee MD  -S/p Pacemker/defib placement by MD Kelly.  -CAD  -GERD  -Hypertension.  -Left lower leg cellulitis S/p treatment with antibiotics. Dr. Logan Flores MD is following from ID service  -Full Code     Plan   -Monitor spO2 to keep > 90%  -Diuresis per primary   -Pulmicort 500 mcg via neb BID  -Brovana 15 mcg via nebulization BID  -Spiriva Respimat 2 puffs daily. This is a replacement for Yupelri (Revefenacin inhaled) 175mcg/3ml via nebs daily- she takes at home.  -Albuterol neb every 4 hours w/a   -Continue Xopenex 0.63 mg via nebulization every 8 hourly as needed.  -We will monitor patient for further hemoptysis. No active hemoptysis. Her Hb/HCT is stable. She is very high risk for bronchoscopy for air way exam including prolonged mechanical ventilation. Patient refused to take high risk.  -Acapella Q4h as tolerated. -Patient is on Xolair which she gets from Dr. Rita Ramos office every two weeks. Patient may need to have family bring in her Xolair if possible from Dr. Elizabeth Lucio office if she is not discharged before her Xolair is due. -VTE prophylaxis: Coumadin  -GI prophylaxis: Protonix   -Patient to go home with family and [address] O2 evaluation prior to DC home.   -Primary plan to stop Coumadin tomorrow 11/19/20  -Tolerating heparin gtt  -No nosebleeds since O2 has humidification     -Gopal Escamilla were educated about my impression and plan. They verbalizes understanding. Questions and concerns addressed. Meds and orders reviewed. Follow up with Dr. Kar Banerjee MD in 2 weeks at his clinic    Electronically signed by   PETR Carreno - CNP on 11/18/2020 at 3:51 PM     Addendum by Dr. Mojgan Oliver MD:  I have seen and examined the patient independently.  Face to face evaluation and examination was performed. The above evaluation and note has been reviewed. Labs and radiographs were reviewed. I Have discussed with Ms. Bedelia Dubin, APRN- CNP about this patient in detail. The above assessment and plan has been reviewed. Please see my modifications mentioned below. My modifications:  She is on 4 L of oxygen via nasal cannula. No active hemoptysis  No chest pain  Her INR today is 1.99. Follow-up as above.     Yovany Whitmore MD 11/18/2020 6:01 PM

## 2020-11-18 NOTE — PROGRESS NOTES
[address] [place] [address]  INPATIENT PHYSICAL THERAPY  DAILY NOTE  [facility] ICU STEPDOWN TELEMETRY 4K - 4K-10010-A     Time In: 1125  Time Out: 1200  Timed Code Treatment Minutes: 35 Minutes  Minutes: 35          Date: 2020  Patient Name: Taylor Mancilla,  Gender:  female        MRN: [id number]  : 1945  (67 y.o.)     Referring Practitioner: Christine Pelayo MD  Diagnosis: Physical Debility  Additional Pertinent Hx: Pt readmitted to Acute from IP Rehab due to decline in status and requiring thoracentesis and respiratory management. Per EMR, \"Sharona Shelley is a 67 y.o. female with an extensive medical history including, congestive heart failure, COPD, A. Fib., Pacemaker placement post MI, hypertension, anemia, and IBS who presents to the ED for an evaluation of severe left lower extremity pain, redness, and warmth that started yesterday evening. The patient states that yesterday morning she started feeling some discomfort to her lower extremity, but believed it was due to her compression stockings she wears for CHF. Yesterday evening she removed the stockings and saw that her left lower leg was extremely red, which has continued to spread, becoming more red, and even more painful. Denies any recent trauma, falls, accidents, or wounds to the lower extremity but states that she occasionally scratches her legs with her nails when taking on and off the compression stockings. The patient has attempted tylenol, percocet, biofreeze, and icyhot yesterday with no relief. 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 Endorses history of blood clots, but is on coumadin which was last checked approximately 1 week ago and was in theraputic range.  Further endorses feeling feverish and chills, increased shortness of breath, worsening left lower extremity pain, the inability to bear weight at this time, and denies numbness or tingling to the lower extremities, diabetes mellitus, previous lower leg infections, chest pain, abdominal pain, changes in her bowel or bladder function or habits. Patient still smokes 1/2 a pack of cigarettes daily and is on home oxygen. No alcohol or illicit drug use. \"     Prior Level of Function:  Lives With: Spouse  Type of Home: House  Home Layout: One level  Home Access: Stairs to enter with rails  Entrance Stairs - Number of Steps: 4  Entrance Stairs - Rails: Both(too wide to use at same time)  Home Equipment: Rolling walker, 4 wheeled walker, [employer]   Bathroom Shower/Tub: Walk-in shower, Shower chair with back  [employer]: Bedside commode  Bathroom Equipment: Grab bars in shower, Hand-held shower, 3-in-1 commode  Bathroom Accessibility: Accessible    Receives Help From: Family  ADL Assistance: [address] [place] [address] Avenue: Needs assistance  Homemaking Responsibilities: No  Ambulation Assistance: Independent  Transfer Assistance: Independent  Active : Yes  Additional Comments: Pt reports that her spouse assists with IADL tasks, Pt completes own self care . Spouse does have to assist with compression stockings. Restrictions/Precautions:  Restrictions/Precautions: General Precautions, Fall Risk  Left Lower Extremity Weight Bearing: Weight Bearing As Tolerated  Position Activity Restriction  Other position/activity restrictions: O 2      SUBJECTIVE: Pt sitting up in room chair and agrees to therapy. PAIN: denies      OBJECTIVE:  Bed Mobility:  Not Tested    Transfers:  Sit to Stand: Stand By Assistance  Stand to Sit:Supervision    Ambulation:  Stand By Assistance  Distance: 80 ft with 3 brief standing rest breaks  Surface: Level Tile  Device:Rolling Walker  Gait Deviations: Forward Flexed Posture, Decreased Step Length Bilaterally, Decreased Gait Speed and O2 at 6 L/min.      O2 sats varied with 89%, 96%, and 1 episode to 84% with quick recovery during standing rest break. Balance:  grossly steady with use of walker    Exercise:  Patient was guided in 1 set(s) 12-15 reps of exercise to both lower extremities. Hip abduction/adduction, Seated marches, Seated heel/toe raises and Long arc quads. Exercises were completed for increased independence with functional mobility. Functional Outcome Measures: Completed  AM-PAC Inpatient Mobility without Stair Climbing Raw Score : 15  AM-PAC Inpatient without Stair Climbing T-Scale Score : 43.03    ASSESSMENT:  Assessment: Patient progressing toward established goals. and tolerated longer ambulation today with improved O2 sats during activity. Activity Tolerance:  Patient tolerance of  treatment: good. Equipment Recommendations:Equipment Needed: No  Other: cont to monitor for needs  Discharge Recommendations:   Continue to assess pending progress, Patient would benefit from continued therapy after discharge    Plan: Times per week: 4-5x GM  Specific instructions for Next Treatment: therex and mobility  Current Treatment Recommendations: Strengthening, Functional Mobility Training, Transfer Training, Endurance Training, Balance Training, Stair training, Gait Training, Home Exercise Program, Safety Education & Training, Equipment Evaluation, Education, & procurement, Patient/Caregiver Education & Training    Patient Education  Patient Education: Plan of Care, Home Exercise Program    Goals:  Patient goals : get stronger to be able to go home. Short term goals  Time Frame for Short term goals: at discharge  Short term goal 1: Pt to go supine <-> sit, with Mod I to get in/out of bed, no rail. Short term goal 2: Pt to get up/down from various seated surfaces, with S to get up to walk.   Short term goal 3: Pt to walk with RW >= 100 ft, SBA to progress to home and community mobility  Short term goal 4: Pt to negotiate 4 steps with HR and CGA to enter home safely  Long term goals  Time Frame for Long term goals : not set due to short ELOS    Following session, patient left in safe position with all fall risk precautions in place. Frieda Parmar.  Nathalie Lamar, [address] [place] [address]

## 2020-11-19 NOTE — PROGRESS NOTES
[address] [place] [address]  INPATIENT PHYSICAL THERAPY  DAILY NOTE  [facility] ICU STEPDOWN TELEMETRY 4K - 4K-10010-A    Time In: 6095  Time Out: 1210  Timed Code Treatment Minutes: 32 Minutes  Minutes: 32          Date: 2020  Patient Name: Yesika Bravo,  Gender:  female        MRN: [id number]  : 1945  (67 y.o.)     Referring Practitioner: Dani Soliz MD  Diagnosis: Physical Debility  Additional Pertinent Hx: Pt readmitted to Acute from IP Rehab due to decline in status and requiring thoracentesis and respiratory management. Per EMR, \"Sharona Chaudhry is a 67 y.o. female with an extensive medical history including, congestive heart failure, COPD, A. Fib., Pacemaker placement post MI, hypertension, anemia, and IBS who presents to the ED for an evaluation of severe left lower extremity pain, redness, and warmth that started yesterday evening. The patient states that yesterday morning she started feeling some discomfort to her lower extremity, but believed it was due to her compression stockings she wears for CHF. Yesterday evening she removed the stockings and saw that her left lower leg was extremely red, which has continued to spread, becoming more red, and even more painful. Denies any recent trauma, falls, accidents, or wounds to the lower extremity but states that she occasionally scratches her legs with her nails when taking on and off the compression stockings. The patient has attempted tylenol, percocet, biofreeze, and icyhot yesterday with no relief. 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 Endorses history of blood clots, but is on coumadin which was last checked approximately 1 week ago and was in theraputic range.  Further endorses feeling feverish and chills, increased shortness of breath, worsening left lower extremity pain, the inability Speed  **Slow pace, steady, O2 sats 95% on 6 L after ambulation    Functional Outcome Measures: Not completed     ASSESSMENT:  Assessment: Patient progressing toward established goals. Pt education on mobility while in the hospital.  Activity Tolerance:  Patient tolerance of  treatment: good. Equipment Recommendations:Equipment Needed: No  Other: cont to monitor for needs  Discharge Recommendations:  Home with Home health PT    Plan: Times per week: 3-5x GM  Specific instructions for Next Treatment: therex and mobility  Current Treatment Recommendations: Strengthening, Functional Mobility Training, Transfer Training, Endurance Training, Balance Training, Stair training, Gait Training, Home Exercise Program, Safety Education & Training, Equipment Evaluation, Education, & procurement, Patient/Caregiver Education & Training    Patient Education  Patient Education: Transfers, Gait    Goals:  Patient goals : get stronger to be able to go home. Short term goals  Time Frame for Short term goals: at discharge  Short term goal 1: Pt to go supine <-> sit, with Mod I to get in/out of bed, no rail. Short term goal 2: Pt to get up/down from various seated surfaces, with S to get up to walk. Short term goal 3: Pt to walk with RW >= 100 ft, SBA to progress to home and community mobility  Short term goal 4: Pt to negotiate 4 steps with HR and CGA to enter home safely  Long term goals  Time Frame for Long term goals : not set due to short ELOS    Following session, patient left in safe position with all fall risk precautions in place.

## 2020-11-19 NOTE — PROGRESS NOTES
Daughter, Meka Moore, called and updated on current status/plan of care. All questions answered at this time.

## 2020-11-19 NOTE — PLAN OF CARE
Problem: RESPIRATORY  Goal: Clear lung sounds  Outcome: Met This Shift     Problem: RESPIRATORY  Goal: Effective breathing pattern  Outcome: Met This Shift   Patient lung sounds are considered normal for their current lung condition. No signs of distress noted.  Current treatment regimen appropriate

## 2020-11-19 NOTE — PROGRESS NOTES
[facility]      Patient - Clarissa Henriquez   MRN -  [id number]   Omar # - [de-identified]   - 1945      Date of Admission -  2020  5:45 PM  Date of evaluation -  2020  Room - -10/010-A   Hospital Day - [address], [place] Primary Care Physician - Nura Nelson MD     Problem List      Active Hospital Problems    Diagnosis Date Noted    Acute respiratory failure with hypoxia ([address] [J96.01] 11/15/2020    Hemoptysis [R04.2]     Physical debility [R53.81] 2020    Moderate COPD (chronic obstructive pulmonary disease) ([address] [J44.9] 2012     Reason for Consult    Hypoxia and hemoptysis management  HPI   History Obtained From: Patient and electronic medical record. Clarissa Henriquez is a 67 y.o. female  was initially admitted under hospitalist service. Pulmonary medicine was consulted for further management of Hemoptysis and COPD. She is a 48-year-old pleasant female with a past medical history of moderately severe COPD and bronchial asthma. She used to follow with Dr. Fausto Cuenca MD in the past. However she is currently following with Dr. Kaela Mayorga. DO Constanza at [facility] pulmonary clinic. She was initially admitted to ICU on 2020 for management of sepsis due to left lower lower extremity cellulitis and shock. Patient having chronic minimal hemoptysis. She was evaluated by Dr. Kaela Mayorga. DO Constanza on 2020. As per Dr. Kaela Mayorga. Ruchi Bae note the hemoptysis was thought to be due to epistaxis from a nonhumidified oxygen supplementation. She is continue to have a hemoptysis for the last 5days. She is coughing up quarter size blood clot which is a dark blood for the last few days. There is no history of active hemoptysis or bright red blood in the last few days. She still complaining of shortness of breath on minimal exertion. She was found to be on Coumadin with therapeutic INR.   No active bleeding was noted. She is receiving Xolair to 25 mg from Ruchi Estrada office. Patient also follows with Dr. Lynette Jaramillo MD allergist in [facility]. She uses 2LPM of oxygen supplementation at rest, exercise or during sleep/at night time at home. Past 24 Hours   Currently on 4LPM via NC 92%  Afebrile  Very slight hemoptysis with cough- very light pink to light brown   Hgb 7.0- Heparin gtt DC  No events overnight     -All systems reviewed.    PMHx   Past Medical History      Diagnosis Date    Allergic rhinitis     Anemia     Anxiety     Arthritis     Asthma     Atrial fibrillation (HCC)     CAD (coronary artery disease)     Chronic systolic CHF (congestive heart failure) ([address] 10/27/2019    COPD (chronic obstructive pulmonary disease) (HCC)     Frequent UTI     GERD (gastroesophageal reflux disease)     Hemorrhoids     History of blood transfusion     X8    Hx of blood clots     left arm    Hyperlipidemia     Hypertension     Influenza A 02/22/2020    Kidney stone     LONG TERM ANTICOAGULENT USE 7/6/2012    Lumbar spinal stenosis     MI, old 11    Prolonged emergence from general anesthesia       Past Surgical History        Procedure Laterality Date    CARDIAC CATHETERIZATION  1994    CARDIAC DEFIBRILLATOR PLACEMENT  2008    OUS [place]    COLONOSCOPY  10/16/2015    Dr. Deshaun Delaney    COLONOSCOPY N/A 10/25/2018    COLONOSCOPY POLYPECTOMY SNARE/COLD BIOPSY performed by Aaliyah House MD at [facility]    ENDOSCOPY, COLON, DIAGNOSTIC  01/04/2017    Dr. Jasmin Aponte      left hip    OVARIAN CYST REMOVAL      PACEMAKER PLACEMENT      SINUS SURGERY      several    TONSILLECTOMY      TOTAL HIP ARTHROPLASTY Left 10/24/2019    DANIEL LEFT HIP ARTHROPLASTY performed by Jose Martines MD at [address]  2013    X2    UPPER GASTROINTESTINAL ENDOSCOPY N/A 1/5/2020    EGD DIAGNOSTIC ONLY performed by Jamin Vásquez Jeannie Kruse MD at [facility] End[facility]     Meds    Current Medications    albuterol  2.5 mg Nebulization Q4H While awake    warfarin (COUMADIN) daily dosing (placeholder)   Other RX Placeholder    famotidine (PEPCID) injection  20 mg Intravenous BID    albumin human  25 g Intravenous Once    sodium chloride  20 mL Intravenous Once    Arformoterol Tartrate  15 mcg Nebulization BID    [Held by provider] aspirin  81 mg Oral Daily    budesonide  500 mcg Nebulization BID    digoxin  125 mcg Oral Every Other Day    docusate sodium  100 mg Oral Daily    Evolocumab  140 mg Subcutaneous Q14 Days    furosemide  40 mg Intravenous BID    gabapentin  100 mg Oral TID    hydrocortisone  25 mg Rectal BID    metoprolol succinate  25 mg Oral Daily    nicotine  1 patch Transdermal Q24H    omalizumab  225 mg Subcutaneous Q14 Days    pantoprazole  40 mg Oral BID AC    [Held by provider] sacubitril-valsartan  1 tablet Oral BID    senna  1 tablet Oral Nightly    tiotropium  2 puff Inhalation Daily    traZODone  50 mg Oral Nightly     polyethylene glycol, bisacodyl, acetaminophen, cyclobenzaprine, HYDROcodone 5 mg - acetaminophen, HYDROcodone 5 mg - acetaminophen, levalbuterol, magnesium hydroxide  IV Drips/Infusions    Home Medications  Medications Prior to Admission: levocetirizine (XYZAL) 5 MG tablet, Take 5 mg by mouth nightly  Omalizumab (XOLAIR SC), Inject into the skin At Dr Harjinder Palacios office  Ascorbic Acid (VITAMIN C) 100 MG tablet, Take 100 mg by mouth daily  metoprolol succinate (TOPROL XL) 25 MG extended release tablet, Take 25 mg by mouth daily  Cholecalciferol (VITAMIN D3) 50 MCG (2000 UT) CAPS, Take 2,000 Units by mouth daily  sacubitril-valsartan (ENTRESTO) 49-51 MG per tablet, Take 1 tablet by mouth 2 times daily  pantoprazole (PROTONIX) 40 MG tablet, Take 40 mg by mouth 2 times daily 30 minutes before two heaviest meals on an empty stomach  LINZESS 290 MCG CAPS capsule, Take 290 mcg by mouth daily loperamide (IMODIUM) 2 MG capsule, Take 2 mg by mouth 4 times daily as needed for Diarrhea  traMADol (ULTRAM) 50 MG tablet, Take 50 mg by mouth every 6 hours as needed for Pain. nystatin (MYCOSTATIN) 914732 UNIT/GM cream, as needed   ondansetron (ZOFRAN) 8 MG tablet, daily as needed for Nausea or Vomiting   hydrocortisone (ANUSOL-HC) 25 MG suppository, Place 1 suppository rectally 2 times daily as needed for Hemorrhoids  warfarin (COUMADIN) 1 MG tablet, Take as directed by the [facility] Clinic, 145 tablets for 90 days  digoxin (LANOXIN) 125 MCG tablet, Take 1 tablet by mouth every other day Indications: Increased Heart Rate Until Office Visit with Dr. Andry Ho  Revefenacin 175 MCG/3ML SOLN, Inhale 175 mcg into the lungs daily  hydrocortisone 2.5 % cream, Apply topically 2 times daily as needed   acetaminophen (TYLENOL) 325 MG tablet, Take 650 mg by mouth as needed for Pain or Fever Indications: Pain Don't take more then 3,000 mg each day  Multiple Vitamins-Minerals (MULTIVITAMIN ADULT) CHEW, Take 2 tablets by mouth daily  Menthol-Methyl Salicylate (ICY HOT) 79-06 % STCK, Apply topically daily  Alum Hydroxide-Mag Carbonate (GAVISCON PO), Take by mouth as needed Indications: Acid Indigestion   lidocaine (XYLOCAINE) 5 % ointment, Apply topically Before venipuncture in Dr. Jonathan Schafer office.   hydrOXYzine (ATARAX) 25 MG tablet, Take 25 mg by mouth 3 times daily as needed Indications: Feeling Anxious   Pramoxine HCl (PROCTOFOAM RE), Place rectally daily as needed   EPINEPHrine (EPIPEN) 0.3 MG/0.3ML SOAJ injection, INJECT AS DIRECTED FOR ANAPHYLAXIS  Evolocumab (REPATHA SURECLICK SC), Inject 527 mg into the skin every 14 days Indications: Blood Cholesterol Abnormal   B Complex-C (RA B-COMPLEX/VITAMIN C CR PO), Take 1 tablet by mouth daily Indications: Treatment to Prevent Vitamin Deficiency   NITROGLYCERIN RE, Place 0.3 mg rectally as needed Indications: Hemorrhoids   dicyclomine (BENTYL) 10 MG capsule, Take 10 mg by mouth nitroGLYCERIN (NITROSTAT) 0.4 MG SL tablet, Place 0.4 mg under the tongue every 5 minutes as needed. If third one does not relieve pain, call -1. Indications: Chest Pain  Diet    DIET CARDIAC; Low Sodium (2 GM); Daily Fluid Restriction: 1500 ml  Allergies    Benadryl [diphenhydramine hcl]; Ciprofloxacin; Clarithromycin; Vitamin k; Atorvastatin; Captopril; Codeine; Iv dye [iodides]; Lipitor; Macrobid [nitrofurantoin monohydrate macrocrystals]; Neomycin-bacitracin zn-polymyx; Pravastatin; Zetia [ezetimibe]; Adhesive tape; Cephalexin; Doxycycline; Morphine; Other; Propoxyphene; and Sulfa antibiotics  Family History          Adopted: Yes   Problem Relation Age of Onset    Heart Disease Father          AGE 35    Cancer Sister         breast     Sleep History    Never diagnosed with sleep apnea in the past    Social History     Social History     Socioeconomic History    Marital status:      Spouse name: Henrique Del Valle Number of children: 3    Years of education: Not on file    Highest education level: Not on file   Occupational History    Not on file   Social Needs    Financial resource strain: Not on file    Food insecurity     Worry: Not on file     Inability: Not on file   [employer] needs     Medical: Not on file     Non-medical: Not on file   Tobacco Use    Smoking status: Current Every Day Smoker     Packs/day: 0.25     Years: 25.00     Pack years: 6.25     Types: Cigarettes    Smokeless tobacco: Never Used   Substance and Sexual Activity    Alcohol use:  Yes     Alcohol/week: 1.0 standard drinks     Types: 1 Cans of beer per week     Comment: ocassionaly    Drug use: No    Sexual activity: Never   Lifestyle    Physical activity     Days per week: Not on file     Minutes per session: Not on file    Stress: Not on file   Relationships    Social connections     Talks on phone: Not on file     Gets together: Not on file     Attends Mandaen service: Not on file     Active member of club or organization: Not on file     Attends meetings of clubs or organizations: Not on file     Relationship status: Not on file    Intimate partner violence     Fear of current or ex partner: Not on file     Emotionally abused: Not on file     Physically abused: Not on file     Forced sexual activity: Not on file   Other Topics Concern    Not on file   Social History Narrative    Not on file     Vitals     weight is 148 lb 12.8 oz (67.5 kg). Her oral temperature is 98.2 °F (36.8 °C). Her blood pressure is 108/49 (abnormal) and her pulse is 84. Her respiration is 25 and oxygen saturation is 92%. Body mass index is 27.22 kg/m². SUPPLEMENTAL O2: O2 Flow Rate (L/min): 4 L/min     I/O        Intake/Output Summary (Last 24 hours) at 11/19/2020 1136  Last data filed at 11/19/2020 0941  Gross per 24 hour   Intake 1952.72 ml   Output 700 ml   Net 1252.72 ml     I/O last 3 completed shifts: In: 1952.7 [P.O.:1660; I.V.:292.7]  Out: 500 [Urine:500]   Patient Vitals for the past 96 hrs (Last 3 readings):   Weight   11/19/20 0320 148 lb 12.8 oz (67.5 kg)   11/17/20 0322 151 lb 5 oz (68.6 kg)   11/16/20 0300 150 lb 14.4 oz (68.4 kg)     Exam   Physical Exam   Constitutional: No distress on O2 4LPM per NC. Patient appears moderately built and  moderately nourished. OOB to chair  Mouth/Throat: Oropharynx is clear and moist.  No oral thrush. Neck: Neck supple. No tracheal deviation present. No JVD  Cardiovascular: S1 and S2 with no murmur. 1-2+BLE peripheral edema. Bi-V pacemaker  Pulmonary/Chest: Normal effort with bilateral air entry, clear breath sounds with diminished bases BLL. No stridor. No respiratory distress. Patient exhibits no tenderness. No wheezing   Abdominal: Soft. Bowel sounds audible. No distension or tenderness to palp. Musculoskeletal: Moves all extremities; no clubbing or cyanosis  Neurological: Patient is alert and oriented to person, place, and time. Skin: Warm and dry.      Labs  - Old records and notes have been reviewed in [facility]   ABG  Lab Results   Component Value Date    PH 7.41 11/13/2020    PO2 47 11/13/2020    PCO2 45 11/13/2020    HCO3 28 11/13/2020    O2SAT 83 11/13/2020     Lab Results   Component Value Date    IFIO2 6 11/13/2020     CBC  Recent Labs     11/17/20  0356 11/18/20  0558 11/19/20  0417   WBC 7.1 7.7 5.8   RBC 2.68* 2.31* 2.23*   HGB 8.2* 7.2* 7.0*   HCT 27.9* 25.0* 23.6*   .1* 108.2* 105.8*   MCH 30.6 31.2 31.4   MCHC 29.4* 28.8* 29.7*    135 132   MPV 11.3 10.9 11.3      BMP  Recent Labs     11/17/20  0356 11/18/20  0558 11/19/20 0417    140 141   K 4.1 4.3 4.3   CL 98 98 99   CO2 33 31 30   BUN 18 18 19   CREATININE 0.9 1.0 1.0   GLUCOSE 89 138* 138*   MG 2.4 2.2  --    CALCIUM 8.7 8.8 8.8     LFT  No results for input(s): AST, ALT, ALB, BILITOT, ALKPHOS, LIPASE in the last 72 hours. Invalid input(s): AMYLASE  TROP  Lab Results   Component Value Date    TROPONINT 0.016 11/16/2020    TROPONINT 0.023 11/16/2020    TROPONINT 0.018 11/16/2020     BNP  No results for input(s): BNP in the last 72 hours. Lactic Acid  No results for input(s): LACTA in the last 72 hours. INR  Recent Labs     11/17/20  0356 11/18/20  0558 11/19/20  0417   INR 1.43* 1.99* 2.66*     PTT  Recent Labs     11/18/20  0558 11/18/20  1809 11/19/20 0417   APTT 76.5* 90.2* 108.6*     Glucose  No results for input(s): POCGLU in the last 72 hours. UA   No results for input(s): SPECGRAV, PHUR, COLORU, CLARITYU, MUCUS, PROTEINU, BLOODU, RBCUA, WBCUA, BACTERIA, NITRU, GLUCOSEU, BILIRUBINUR, UROBILINOGEN, KETUA, LABCAST, LABCASTTY, AMORPHOS in the last 72 hours. Invalid input(s): CRYSTALS  Thoracentesis fluid analysis results.    Performed on: 11/14/2020  Side: right side of chest .  By IR:Yes- Dr. Mario Earl  Amount of pleural fluid drained:    Lab Results   Component Value Date    PHFL 7.71 11/14/2020    COLORU YELLOW 11/14/2020    COLORU YELLOW 11/08/2020    LDFL 84 11/14/2020    PROTEINFL 1.0 11/14/2020    AMYLASE 74 10/04/2013    TRIG 236 12/02/2019     Lab Results   Component Value Date    PROT 6.3 11/13/2020     CYTOLOGY: (-) malignant cells     Serum LDH: 377  LDH ratio i.e Pleural fluid LDH/ Serum LDH: 84/377 = 0.22  Total protein ratio i.e Pleural fluid Total protein/ Serum Total protein: 1/6.3 = 0.16    Pleural fluid cultures were negative so far. Gram stain of pleural fluid is negative for any organisms. PFTs 2017             Sleep studies   None in Epic    Cultures    MRSA (-)  VRE (-)    Echocardiogram     Narrative & Impression      Transthoracic Echocardiography Report (TTE)     Conclusions      Summary   Left ventricle size is normal.   Normal left ventricular wall thickness. Ejection fraction is visually estimated in the range of 30% to 35%. Apical anteroseptal wall segments were not clearly visualized. Moderate myocardial infarction of the anteroseptal LV. Signature      ----------------------------------------------------------------   Electronically signed by Mayo Fleischer MD (Interpreting   physician) on 02/24/2020 at 09:42 AM   ----------------------------------------------------------------    Radiology    Chest Xray:   11/16/2020   XR CHEST (2 VW)   Stable chest given technical differences. Left lower lobe infiltrate and effusion. Generalized interstitial edema and cardiomegaly. Nov 14, 2020   COMPARISON: 11/13/2020   No pneumothorax post right thoracentesis. Chest Xray: Nov 11, 2020  PROCEDURE: XR CHEST (2 VW)   1. Cardiomegaly. Permanent pacemaker/defibrillator. 2. Mildly increased interstitial markings in both lungs, consistent with interstitial edema/pneumonia with underlying element of interstitial fibrosis. 3. Overall appearance of chest has improved since prior. Oct 22, 2019   PROCEDURE: XR CHEST 1 VIEW   Stable radiographic appearance of the chest. No evidence of an acute process.      CT Scans  (See actual reports for details)  Nov 1, 2020 PROCEDURE: CT CHEST WO CONTRAST   Right larger than left pleural effusions. Bilateral areas of compressive atelectasis and interstitial edema interstitial pneumonic infiltrates and developing consolidation are not excludable. This most likely at the posterior right upper lung.        PROCEDURE: CT CHEST WO CONTRAST, CT ABDOMEN PELVIS WO CONTRAST, CT THORACIC RECONSTRUCTION WO POST PROCESS, CT LUMBAR RECONSTRUCTION WO POST PROCESS  CLINICAL INFORMATION: fall. Alonza Gayle today and struck head. Left hip pain.   COMPARISON: CT abdomen pelvis dated 9/26/2019 and CT chest dated 2/22/2019.   1. No evidence of acute intracranial thoracic, intra-abdominal or intrapelvic abnormality. 2. No evidence of acute osseous injury of the thoracic or lumbar spine. 3. Angulated subcapital femoral fracture on the left with overlying subcutaneous hematoma.        Venous duplex scan: Oct 23, 2019   PROCEDURE: VL DUP LOWER EXTREMITY VENOUS BILATERAL   No evidence of deep venous thrombosis in either lower extremity. Assessment   -Hemoptysis of uncertain etiology, improving: Differential includes due to current anticoagulation with the Coumadin Vs epistaxis Vs other etiologies. Her hemoptysis has improved since being off coumadin. Epistaxis is less likely as no visible evidence of dried blood in her nares were noted. No active hemoptysis. Patient wants to continue her coumadin because of concern for CVA - she was educated on the risk of hemoptysis due to her anticoagulation and informed of fatal pulmonary hemorrhage if her hemoptysis continues with coumadin.  She states she understands the risks and does not want to stop her coumadin at this time.  -Acute on chronic hypoxic respiratory failure 2/2 pleural effusion vs. Congestive heart failure, improving: Baseline oxygen of 2L at home.  -Right pleural effusion s/p thoracentesis on 11/14/20, stable - Likely due to her acute decompensated heart failure as pleural fluid indicates Transudative effusion  -Acute decompensated systolic HFrEF 35-64%, improving   -Moderate COPD, stable  -Chronic tobacco smoking in the past.  -Severe persistent bronchial asthma currently on treatment with Xolair, stable. She follows with Dr. Giles Lang. Kaylin Otero MD  -S/p Pacemker/defib placement by MD Kelly.  -CAD  -GERD  -Hypertension.  -Left lower leg cellulitis S/p treatment with antibiotics. Dr. Shaw Atkins MD is following from ID service  -Full Code     Plan   -Monitor spO2 to keep > 90%  -Diuresis per primary   -Pulmicort 500 mcg via neb BID  -Brovana 15 mcg via nebulization BID  -Spiriva Respimat 2 puffs daily. This is a replacement for Yupelri (Revefenacin inhaled) 175mcg/3ml via nebs daily- she takes at home.  -Albuterol neb every 4 hours w/a   -Continue Xopenex 0.63 mg via nebulization every 8 hourly as needed.  -We will monitor patient for further hemoptysis. No active hemoptysis. Her Hb/HCT is stable. She is very high risk for bronchoscopy for air way exam including prolonged mechanical ventilation. Patient refused to take high risk.  -Acapella Q4h as tolerated. -Patient is on Xolair which she gets from Dr. Kris Sam office every two weeks. Patient may need to have family bring in her Xolair if possible from Dr. Amina Padron office if she is not discharged before her Xolair is due. -VTE prophylaxis: Coumadin  -GI prophylaxis: Protonix   -Patient to go home with family and [address] O2 evaluation prior to DC home.   -Primary plan to discuss today NO OAC at all  -HGB to 7.0 today; Heparin gtt stopped  -No pulmonary issues; will sign off at this time. -F/u with Dr. Kaylin Otero MD as mentioned above     -Yesika Bravo were educated about my impression and plan. They verbalizes understanding. Questions and concerns addressed. Meds and orders reviewed.      Follow up with Dr. Kaylin Otero MD in 2 weeks at his clinic    Electronically signed by   PETR Maki CNP on 11/19/2020 at 11:36 AM     Addendum by Dr. Zhane Rodas MD:  I have seen and examined the patient independently. Face to face evaluation and examination was performed. The above evaluation and note has been reviewed. Labs and radiographs were reviewed. I Have discussed with PETR Crabtree- CNP about this patient in detail. The above assessment and plan has been reviewed. Please see my modifications mentioned below. My modifications:  Improving shortness of breath. No active hemoptysis. INR is therapeutic  Patient advised to follow with Dr. Drew Yoo. Cristina Kinney MD in 2 weeks for further evaluation of her COPD and pleural effusions.     Suleiman Mcgowan MD 11/19/2020 6:59 PM

## 2020-11-19 NOTE — FLOWSHEET NOTE
11/19/20 0508   Provider Notification   Reason for Communication Critical Value (comment)  (Hgb: 7.0)   Provider Name Katalina England   Provider Notification Advance Practice Clinician (CNS, NP, CNM, CRNA, PA)   Method of Communication Secure Message   Response See orders   Notification Time 0508   Notified Karla Rabago of hgb of 7.0. Karla stated to recheck in 6 hours. See new orders.

## 2020-11-19 NOTE — CARE COORDINATION
DISASTER CHARTING    11/19/20, 11:06 AM EST    DISCHARGE ONGOING EVALUATION:     Lovjosette Matters day: 6  Location: [facility]10/010-A Reason for admit: Physical debility [R53.81]   Barriers to Discharge: Hgb down to 7.0 this am and will repeat later today. Heparin gtt discontinued. Palliative Care eval ordered. O2 at 4L/NC. PCP: Alba Fulton MD  Patient Goals/Plan/Treatment Preferences: Planning home with family staying with her and [facility]. SW following. Current with Lyla for O2 (2L at rest and 3L with activity).

## 2020-11-19 NOTE — PROGRESS NOTES
Hospitalist Progress Note      Patient:  Liliane Beltran    Unit/Bed:4K-10/010-A  YOB: 1945  MRN: [id number]   Acct: [de-identified]   PCP: Mildred Umana MD  Date of Admission: 11/13/2020    Assessment/Plan:    1. Acute on chronic hypoxic respiratory failure likely secondary to pleural effusions and acute systolic CHF exacerbation-resolving. This is apparent based on imaging findings and improvement with thoracentesis and diuresis. +2 pitting pedal edema was noted. However, this edema was much improved from previously, per patient report. This is also apparent based on improvement of symptoms with BiPAP. Limited echo performed on 2/22/2020 showed ejection fraction of 35 to 40%. The patient is on a biventricular pacemaker. We will maintain the patient on digoxin 125 mcg every other day, Evolocumab 140 subcutaneous every 14 days, furosemide 40 mg twice daily, and Toprol-XL 25 mg twice daily. We will hold aspirin secondary to hemoptysis. Patient is allergic to statin so we will not maintain patient on statin as an inpatient. We will follow-up with PCP, cardiology, and pulmonology as an outpatient. 2.  Chronic atrial fibrillation-stable. We know this based on the patient's history. The patient has a ARIANNA(2)DS(2)-VASc of 6. The patient is being treated with Coumadin. The patient had hemoptysis as described below. Therefore, the patient's Coumadin was reversed and she was maintained off of Coumadin. However, the patient stated that she was concerned with stroke risk consider atrial fibrillation. Therefore, the patient was put back on the Coumadin with a heparin bridge. The patient's INR this morning was 2.66 and her APTT was 108. 6. As her INR is now in the therapeutic range, we can DC heparin bridge. We will continue to monitor with daily INR.     The patient's hemoglobin this morning was 7.0, which is borderline for whether she could safely remain on Coumadin. Therefore, another hemoglobin was obtained, which showed 8.4. Therefore, the patient remain on Coumadin for now. We will obtain another H&H at 2100 hrs. If this hemoglobin is below 7, we will transfuse 1 unit of packed red blood cells and DC Coumadin. Night team will follow-up on this. We will continue to monitor with daily CBC. 3.  Hemoptysis, unspecified-resolving. Patient had a history of hemoptysis of unknown cause. Bronchoscopy could possibly provide diagnostic information. However, we would not recommend this as the bronchoscopy would be in considerable risk and would likely not change her long-term care management plan per CHF and advanced COPD. Will address as described above for chronic A. Fib. 4.  Anemia, unspecified-stable. The patient reported a longstanding history of anemia. This may be secondary to blood loss from hemoptysis described above. However, the patient has a macrocytic profile with an MCV of 108. 2. Recent high ferritin on 11/3/2020, high iron, and normal total iron-binding capacity makes this diagnosis less likely. Acute bleed could still be the cause or contributor to this anemia, this would not necessarily present as the \"classic\" iron deficiency profile. Normal folate and vitamin B12 makes nutritional anemia less likely cause of macrocytic anemia. 5.  Left lower extremity cellulitis treated on previous admission-stable. We will maintain wound care. No need for antibiotics currently. 6.  Coronary artery disease-stable. This based on the history. We will treat as described above for CHF and A. Fib. 7.  Benign essential hypertension-stable. We know this based on the history. We will treat as described above for CHF and A. Fib.    8.  Hyperlipidemia, unspecified-stable. We know this based on the history. We will treat with Evolocumab as described above.     9.  Chronic obstructive pulmonary disease asthma component, unspecified-stable. We know this based on the patient's history. We will maintain patient on albuterol nebulizer, Xolair, Spiriva Respimat, Brovana, and Pulmicort. Expected discharge date: Unknown    Disposition:    [x] Home       [] TCU       [] Rehab       [] Psych       [] SNF       [] [place]       [] Other-    Chief Complaint: Hypoxia and Hemoptysis    Opening statement:     The patient is a 62-year-old female with a complex past medical history with an old MI, long-term anticoagulation with Coumadin for atrial fibrillation, kidney stone, hypertension, hyperlipidemia, history of blood clots, GERD, CAD with biventricular pacemaker, frequent UTI, COPD, systolic CHF, coronary artery disease, recent hospitalization for lower extremity cellulitis, and current smoker who presents with a chief complaint of hypoxia and hemoptysis. Hospital Treatment Course:     11/13/2020-11/17/2020:    Patient was diagnosed with acute on chronic respiratory failure likely secondary to pleural effusions and acute systolic heart failure. Cause of hemoptysis unclear.     The patient was initially transferred to [facility].     Rapid was called the patient 11/13/2020 secondary to hypoxia, tachypnea and hypertension after transfer from [facility] to [facility]. Patient was placed on BiPAP which dramatically improved her presentation. The patient was then transferred to [facility].     Patient was evaluated on the same day for chest pain and shortness of breath. An EKG found no change but a mild troponin elevated was noted. Pain is likely secondary to dyspepsia or GERD.     Pulmonology was consulted for hemoptysis and cardiology was consulted for hemoptysis, possible watchman implantation, and management of Coumadin.     The patient's Coumadin was initially held and her supratherapeutic INR was corrected. However, the patient was placed back on Coumadin as she was concerned for stroke secondary to A. fib.  The patient was counseled with regards to the risks from Coumadin.     Thoracentesis was obtained on 11/14/2020 with 700 cc of fluid removed. Pleural fluid cytology showed no significant findings. Patient was maintained on Pulmicort 200 mcg via neb twice daily, Brovana 50 mcg by nebulization twice daily, Spiriva Respimat 2 puffs daily, albuterol nebs every 4 hours while awake, and Xopenex 0.60 mg by nebulization every 8 hours as needed. Also maintained on Acapella every 4 hours. Oxygen titrated to greater than 90%. Baseline is 2 L at home. Patient maintained on Coumadin for VTE prophylaxis and Protonix for GI prophylaxis. Patient wants to go home with family. 11/18/2020:    Nursing report: Patient is still on 5 L, with plans to wean down. Patient been getting up from the chair to her bed. Little subjective dyspnea, however she dropped from a pulse ox of 95% to 89%. Hemoglobin has been stable at 7.2. The patient was 95 to 97% throughout the night. The patient did not need BiPAP during the night. The patient states that her breathing is doing much better than before. She affirms improvement with diuresis and with thoracentesis for drainage. However, the patient is still dyspneic with exertion. She affirms slight hemoptysis with cough productive of pinkish to very light brown sputum. A long discussion with the patient about long-term prognosis and goals. The patient particularly noted that she want to live long enough to see her granddaughter's high school graduation in May. Subjective (past 24 hours):     Nursing report: The patient is doing well. She slept through the night. INR is 2.66. The patient had dinner. Her oxygenation was good. Hemoglobin is 7.0. The patient states that her breathing is doing better. She affirms slight ongoing chest pain and \"indigestion. \"  She affirms productive cough with a little bit of blood.   The patient and her daughter were extensively counseled with regards to risk of bleeding and anticoagulation. Otherwise, no new specific complaints or concerns. Constitutional: Denies fevers and myalgias. Cardiovascular: Denies chest pain and palpitations. Respiratory: As above. GI: Denies abdominal pain and nausea. Otherwise as above. Note: The patient was updated on a hemoglobin obtained later in the day, which is found to be 8.4. Therefore, the patient was counseled that she could remain on Coumadin for now. Past medical history, family history, social history and allergies reviewed again and is unchanged since admission. ROS (12 point review of systems completed. Pertinent positives noted.  Otherwise ROS is negative)     Medications:  Reviewed    Infusion Medications     Scheduled Medications    warfarin  0.5 mg Oral Once    albuterol  2.5 mg Nebulization Q4H While awake    warfarin (COUMADIN) daily dosing (placeholder)   Other RX Placeholder    famotidine (PEPCID) injection  20 mg Intravenous BID    albumin human  25 g Intravenous Once    sodium chloride  20 mL Intravenous Once    Arformoterol Tartrate  15 mcg Nebulization BID    [Held by provider] aspirin  81 mg Oral Daily    budesonide  500 mcg Nebulization BID    digoxin  125 mcg Oral Every Other Day    docusate sodium  100 mg Oral Daily    Evolocumab  140 mg Subcutaneous Q14 Days    furosemide  40 mg Intravenous BID    gabapentin  100 mg Oral TID    hydrocortisone  25 mg Rectal BID    metoprolol succinate  25 mg Oral Daily    nicotine  1 patch Transdermal Q24H    omalizumab  225 mg Subcutaneous Q14 Days    pantoprazole  40 mg Oral BID AC    [Held by provider] sacubitril-valsartan  1 tablet Oral BID    senna  1 tablet Oral Nightly    tiotropium  2 puff Inhalation Daily    traZODone  50 mg Oral Nightly     PRN Meds: polyethylene glycol, bisacodyl, acetaminophen, cyclobenzaprine, HYDROcodone 5 mg - acetaminophen, HYDROcodone 5 mg - acetaminophen, levalbuterol, magnesium hydroxide      Intake/Output Summary (Last 24 hours) at 11/19/2020 1442  Last data filed at 11/19/2020 1352  Gross per 24 hour   Intake 2272.72 ml   Output 900 ml   Net 1372.72 ml       Diet:  DIET CARDIAC; Low Sodium (2 GM); Daily Fluid Restriction: 1500 ml    Exam:  BP (!) 108/49   Pulse 84   Temp 98.2 °F (36.8 °C) (Oral)   Resp 25   Wt 148 lb 12.8 oz (67.5 kg)   SpO2 92%   BMI 27.22 kg/m²   General appearance: Patient is awake, alert, and in no acute distress. The patient softly chronically ill-appearing. HEENT: Conjunctivae/corneas clear. Respiratory: Clear to auscultation but reduced respiratory effort. Cardiovascular: Regular rate and rhythm with normal S1/S2 without murmurs, rubs or gallops. Abdomen: Soft, non-tender, non-distended with normal bowel sounds. Capillary Refill: Brisk,< 3 seconds   Peripheral Pulses: +2 palpable, equal bilaterally   Extremities: +2 peripheral pitting edema. However, patient states this is much improved from before. Labs:   Recent Labs     11/17/20 0356 11/18/20 0558 11/19/20 0417 11/19/20  1215   WBC 7.1 7.7 5.8  --    HGB 8.2* 7.2* 7.0* 8.4*   HCT 27.9* 25.0* 23.6* 28.5*    135 132  --      Recent Labs     11/17/20 0356 11/18/20 0558 11/19/20 0417    140 141   K 4.1 4.3 4.3   CL 98 98 99   CO2 33 31 30   BUN 18 18 19   CREATININE 0.9 1.0 1.0   CALCIUM 8.7 8.8 8.8     No results for input(s): AST, ALT, BILIDIR, BILITOT, ALKPHOS in the last 72 hours. Recent Labs     11/17/20 0356 11/18/20 0558 11/19/20 0417   INR 1.43* 1.99* 2.66*     No results for input(s): CKTOTAL, TROPONINI in the last 72 hours.     Microbiology:    Blood culture #1:   Lab Results   Component Value Date    BC No growth-preliminary No growth  10/25/2020       Blood culture #2:No results found for: [place]    Organism:  Lab Results   Component Value Date    ORG Mixed Growth     02/21/2020         Lab Results   Component Value Date    LABGRAM 11/26/2019     No segmented neutrophils observed. Few epithelial cells observed. Many large gram positive bacilli. Few small gram positive bacilli. Few gram negative bacilli. Prepubescent and postmenopausal female samples (<15and >47ears of age) are not typically suitable for bacterialvaginosis screening. MRSA culture only:No results found for: [facility]    Urine culture:   Lab Results   Component Value Date    LABURIN  10/24/2019     Growth of Contaminants. The mixture of organisms present are not a common cause of urinary tract infections and probably represent distal urethral juventino. Respiratory culture: No results found for: CULTRESP    Aerobic and Anaerobic :  Lab Results   Component Value Date    LABAERO No growth-preliminary No growth   11/11/2019     Lab Results   Component Value Date    LABANAE No growth-preliminary No growth   11/11/2019       Urinalysis:      Lab Results   Component Value Date    NITRU NEGATIVE 11/08/2020    WBCUA 0-2 11/08/2020    BACTERIA NONE SEEN 11/08/2020    RBCUA 5-10 11/08/2020    BLOODU TRACE 11/08/2020    GLUCOSEU NEGATIVE 11/08/2020       Radiology:  XR CHEST (2 VW)   Final Result   Stable chest given technical differences. Left lower lobe infiltrate and effusion. Generalized interstitial edema and cardiomegaly. **This report has been created using voice recognition software. It may contain minor errors which are inherent in voice recognition technology. **      Final report electronically signed by Dr. Darrin Borrego on 11/16/2020 11:22 AM      XR CHEST PORTABLE   Final Result   Moderately worsened aeration of the left lung base. **This report has been created using voice recognition software. It may contain minor errors which are inherent in voice recognition technology. **      Final report electronically signed by Dr. Begum Kidney on 11/15/2020 6:00 PM      XR CHEST PORTABLE   Final Result   No pneumothorax post right thoracentesis. **This report has been created using voice recognition software. It may contain minor errors which are inherent in voice recognition technology. **      Final report electronically signed by Dr. Slick Schmidt on 11/14/2020 2:46 PM      XR CHEST PORTABLE   Final Result   Impression:   Moderate CHF, increased. Bilateral lower lobe pneumonia versus subsegmental atelectasis      This document has been electronically signed by: Dmitry Burr MD on    11/13/2020 10:07 PM           Xr Chest Portable    Result Date: 11/14/2020  PROCEDURE: XR CHEST PORTABLE CLINICAL INFORMATION: post thoracentesis right side. COMPARISON: 11/13/2020 TECHNIQUE: AP upright view of the chest. FINDINGS: Right pleural fluid has been evacuated. No pneumothorax. Small left pleural effusion persists. Mild congestion. No definite infiltrate. AICD is unchanged. No pneumothorax post right thoracentesis. **This report has been created using voice recognition software. It may contain minor errors which are inherent in voice recognition technology. ** Final report electronically signed by Dr. Slick Schmidt on 11/14/2020 2:46 PM    Xr Chest Portable    Result Date: 11/13/2020  Chest xray 1 view Comparison:  CR,SR  - XR CHEST PORTABLE  - 10/25/2020 03:23 PM EDT Findings: Cardiac conduction device. Moderate cardiomegaly. Hypoinflated. Moderately increased interstitial lung markings. There are space opacities notably left lower lobe. Small bilateral pleural effusions. No pneumothorax No acute fracture. Impression: Moderate CHF, increased. Bilateral lower lobe pneumonia versus subsegmental atelectasis This document has been electronically signed by: Dmitry Burr MD on 11/13/2020 10:07 PM     Us Thoracentesis    Result Date: 11/15/2020  THORACENTESIS WITH ULTRASOUND GUIDANCE: PERFORMED BY: Rnoal Brooks M.D.  CLINICAL INFORMATION: Pleural effusion APPROACH: Right side, posterior, inferior FLUID WITHDRAWN: 700 mL herrera serous fluid

## 2020-11-19 NOTE — PROGRESS NOTES
Clinical Pharmacy Note    Warfarin consult follow-up    Recent Labs     11/19/20  0417   INR 2.66*     Recent Labs     11/17/20  0356 11/18/20  0558 11/19/20  0417 11/19/20  1215   HGB 8.2* 7.2* 7.0* 8.4*   HCT 27.9* 25.0* 23.6* 28.5*    135 132  --        Significant Drug-Drug Interactions:  New warfarin drug-drug interactions: none  Discontinued drug-drug interactions: heparin drip  Current warfarin drug-drug interactions: aspirin (on hold), trazodone (HM)      Date INR Warfarin Dose   10/25-10/27   No Coumadin   10/28/2020 1.32 2 mg    10/29/2020 1.49  2 mg   10/30/2020  2  1.5 mg    10/31/2020   2.78   0.5 mg   11/1/2020   2.37  1.5 mg    11/2/2020  2.55 1 mg     11/3/2020  3.54   No Coumadin   11/4/2020  3.09  1 mg   11/5/2020 2.41 1.5 mg   11/6/2020 2.82 1 mg    11/7/2020  2.61  1.5 mg    11/8/2020   2.22                     1.5 mg   11/9/2020  2.12                      2 mg   11/10/2020 2.05  2 mg   11/11/2020  2.54  1.5 mg   11/12/2020 2.72  1.5 mg   11/13/2020 2.72 No Coumadin   11/14/2020 2.3  No Coumadin    11/15/2020 1.7  (not given)   11/16/2020   1.48 2 mg    11/17/2020   1.43 2 mg    11/18/2020   1.99 1.5 mg    11/19/2020  2.66 0.5 mg            Notes: Okay per provider to give small dose of Coumadin today. H&H slightly improved since this AM.  Will continue to monitor. Daily PT/INR until stable within therapeutic range.        Iram Lopez, PharmD   11/19/2020, 1:19 PM

## 2020-11-20 NOTE — PROGRESS NOTES
Clinical Pharmacy Note    Warfarin consult follow-up    Recent Labs     11/20/20  0339   INR 1.68*     Recent Labs     11/18/20  0558 11/19/20  0417  11/19/20  2243 11/20/20  0339 11/20/20  0957   HGB 7.2* 7.0*   < > 7.6* 7.0* 8.5*   HCT 25.0* 23.6*   < > 25.8* 24.3* 29.0*    132  --   --  139  --     < > = values in this interval not displayed. Significant Drug-Drug Interactions:  New warfarin drug-drug interactions: none  Discontinued drug-drug interactions: none  Current warfarin drug-drug interactions: aspirin (on hold), trazodone (HM)      Date INR Warfarin Dose   10/25-10/27   No Coumadin   10/28/2020 1.32 2 mg    10/29/2020 1.49  2 mg   10/30/2020  2  1.5 mg    10/31/2020   2.78   0.5 mg   11/1/2020   2.37  1.5 mg    11/2/2020  2.55 1 mg     11/3/2020  3.54   No Coumadin   11/4/2020  3.09  1 mg   11/5/2020 2.41 1.5 mg   11/6/2020 2.82 1 mg    11/7/2020  2.61  1.5 mg    11/8/2020   2.22                     1.5 mg   11/9/2020  2.12                      2 mg   11/10/2020 2.05  2 mg   11/11/2020  2.54  1.5 mg   11/12/2020 2.72  1.5 mg   11/13/2020 2.72 No Coumadin   11/14/2020 2.3  No Coumadin    11/15/2020 1.7  (not given)   11/16/2020   1.48 2 mg    11/17/2020   1.43 2 mg    11/18/2020   1.99 1.5 mg    11/19/2020  2.66 0.5 mg   11/20/2020   1.68   2 mg       Notes:               Daily PT/INR until stable within therapeutic range.      Janet Shaw, PharmD   11/20/2020, 1:07 PM

## 2020-11-20 NOTE — CARE COORDINATION
11/20/20, 2:40 PM EST    Patient goals/plan/ treatment preferences discussed by  and . Patient goals/plan/ treatment preferences reviewed with patient/ family. Patient/ family verbalize understanding of discharge plan and are in agreement with goal/plan/treatment preferences. Understanding was demonstrated using the teach back method. AVS provided by RN at time of discharge, which includes all necessary medical information pertaining to the patients current course of illness, treatment, post-discharge goals of care, and treatment preferences. IMM Letter  IMM Letter given to Patient/Family/Significant other/Guardian/POA/by[de-identified] Emily NOEL Case Manager. IMM Letter date given[de-identified] 11/20/20  IMM Letter time given[de-identified] 65     SW was notified of discharge and wanting [facility]. Physician aware of need for [place] order. THIEN left message with Palmer Homans with [facility] regarding referral and discharge.

## 2020-11-20 NOTE — PROGRESS NOTES
[facility]           Progress Note    Patient Name:  Marley Holguin Record Number:  [id number]  YOB: 1945    Date:  11/20/2020  Time:  12:12 PM  Completed By:  Artur Peter RN    Reason for Palliative Care Evaluation:  Code status and goals of care. Case discussed with pt's nurse,BERT Olivia and Dr Anahi Noble. Current Issues:  []  Pain  [x]  Fatigue  []  Nausea  []  Anxiety  []  Depression  [x]  Shortness of Breath- baseline shortness of breath, pt states her breathing is \"pretty close\" to normal.  []  Constipation  []  Appetite  []  Other:    Advance Directives  [] [facility] DNR Form  [] Living Will  [] Medical POA    Current Code Status  [x] Full Resuscitation  [] DNR-Comfort Care-Arrest  [] DNR-Comfort Care  [] Limited   [] No CPR   [] No shock   [] No ET intubation/reintubation   [] No resuscitative medications   [] Other limitation:    Family/Patient Discussion:  Pt currently sitting up in bedside, O2 on per nasal cannula. No visitors here. Palliative care introduced, pt general medical condition reviewed. Pt is able to accurately explain current condition and plan of care. Advance care planning discussed, including review of purpose of and process for completing LW and DPOA. Code status, as it pertains to be hospitalized also explained. All questions answered and pt states understanding. Pt states that she wants to continue with Full code status, also states that she has spoken with her family about what she would want if something \"awful\" were to happen. Pt explains that she experienced cardiac arrest on 1994 and they didn't think she would do but she did. Pt states no further questions or needs. Emotional support and encouragement given. BERT Olivia updated. Plan/Follow-Up: Continue current plan of care. Palliative care will continue to follow as needed. Floor to notify PRN for needs.      Artur Peter RN  11/20/2020,  12:12 PM

## 2020-11-20 NOTE — PROGRESS NOTES
to bear weight at this time, and denies numbness or tingling to the lower extremities, diabetes mellitus, previous lower leg infections, chest pain, abdominal pain, changes in her bowel or bladder function or habits. Patient still smokes 1/2 a pack of cigarettes daily and is on home oxygen. No alcohol or illicit drug use. \"     Prior Level of Function:  Lives With: Spouse  Type of Home: House  Home Layout: One level  Home Access: Stairs to enter with rails  Entrance Stairs - Number of Steps: 4  Entrance Stairs - Rails: Both(too wide to use at same time)  Home Equipment: Rolling walker, 4 wheeled walker, [employer]   Bathroom Shower/Tub: Walk-in shower, Shower chair with back  [employer]: Bedside commode  Bathroom Equipment: Grab bars in shower, Hand-held shower, 3-in-1 commode  Bathroom Accessibility: Accessible    Receives Help From: Family  ADL Assistance: [address] [place] [address] Avenue: Needs assistance  Homemaking Responsibilities: No  Ambulation Assistance: Independent  Transfer Assistance: Independent  Active : Yes  Additional Comments: Pt reports that her spouse assists with IADL tasks, Pt completes own self care . Spouse does have to assist with compression stockings. Restrictions/Precautions:  Restrictions/Precautions: General Precautions, Fall Risk  Left Lower Extremity Weight Bearing: Weight Bearing As Tolerated  Position Activity Restriction  Other position/activity restrictions: 4 L O2 at rest, 6 L O2 with activity     SUBJECTIVE: RN approved session. Second session per pt request. Pt resting in bedside chair, pleasant and agreeable to therapy. PAIN: 0/10: Jerry     OBJECTIVE:  Bed Mobility:  Not Tested    Transfers:  Sit to Stand: Contact Guard Assistance  Stand to [employer] Assistance    Ambulation:  Contact Guard Assistance  Distance: 25 feet x1   Surface: Level Tile  Device:Rolling Walker  Gait Deviations:   Forward Flexed Posture, Slow Cassie, Decreased Weight Shift Left and Decreased Gait Speed    Exercise:  HEP written up on board for weekend and pt completed BLE ankle pumps, quad set, heel slides, hip abd/add all x10 reps to increase strength for improved functional mobility. Functional Outcome Measures: Completed  AM-PAC Inpatient Mobility without Stair Climbing Raw Score : 15  AM-PAC Inpatient without Stair Climbing T-Scale Score : 43.03    ASSESSMENT:  Assessment: Patient progressing toward established goals. Activity Tolerance:  Patient tolerance of  treatment: good. Equipment Recommendations:Equipment Needed: No  Other: cont to monitor for needs  Discharge Recommendations:  Pt would benefit from continued therapy at discharge. Plan: Times per week: 3-5x GM  Specific instructions for Next Treatment: therex and mobility  Current Treatment Recommendations: Strengthening, Functional Mobility Training, Transfer Training, Endurance Training, Balance Training, Stair training, Gait Training, Home Exercise Program, Safety Education & Training, Equipment Evaluation, Education, & procurement, Patient/Caregiver Education & Training    Patient Education  Patient Education: Plan of Care, [employer] Mobility, Transfers, Gait    Goals:  Patient goals : get stronger to be able to go home. Short term goals  Time Frame for Short term goals: at discharge  Short term goal 1: Pt to go supine <-> sit, with Mod I to get in/out of bed, no rail. Short term goal 2: Pt to get up/down from various seated surfaces, with S to get up to walk. Short term goal 3: Pt to walk with RW >= 100 ft, SBA to progress to home and community mobility  Short term goal 4: Pt to negotiate 4 steps with HR and CGA to enter home safely  Long term goals  Time Frame for Long term goals : not set due to short ELOS    Following session, patient left in safe position with all fall risk precautions in place.

## 2020-11-20 NOTE — PROGRESS NOTES
[address] [address]  INPATIENT PHYSICAL THERAPY  DAILY NOTE  [facility] ICU STEPDOWN TELEMETRY 4K - [id number]    Time In: 0845  Time Out: 0930  Timed Code Treatment Minutes: 39 Minutes  Minutes: 45          Date: 2020  Patient Name: Clarissa Henriquez,  Gender:  female        MRN: [id number]  : 1945  (67 y.o.)     Referring Practitioner: Greg Núñez MD  Diagnosis: Physical Debility  Additional Pertinent Hx: Pt readmitted to Acute from [facility] Rehab due to decline in status and requiring thoracentesis and respiratory management. Per EMR, \"Sharona Sandra is a 67 y.o. female with an extensive medical history including, congestive heart failure, COPD, A. Fib., Pacemaker placement post MI, hypertension, anemia, and IBS who presents to the ED for an evaluation of severe left lower extremity pain, redness, and warmth that started yesterday evening. The patient states that yesterday morning she started feeling some discomfort to her lower extremity, but believed it was due to her compression stockings she wears for CHF. Yesterday evening she removed the stockings and saw that her left lower leg was extremely red, which has continued to spread, becoming more red, and even more painful. Denies any recent trauma, falls, accidents, or wounds to the lower extremity but states that she occasionally scratches her legs with her nails when taking on and off the compression stockings. The patient has attempted tylenol, percocet, biofreeze, and icyhot yesterday with no relief. 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 Endorses history of blood clots, but is on coumadin which was last checked approximately 1 week ago and was in theraputic range.  Further endorses feeling feverish and chills, increased shortness of breath, worsening left lower extremity pain, the inability to bear weight at this time, and denies numbness or tingling to the lower extremities, diabetes mellitus, previous lower leg infections, chest pain, abdominal pain, changes in her bowel or bladder function or habits. Patient still smokes 1/2 a pack of cigarettes daily and is on home oxygen. No alcohol or illicit drug use. \"     Prior Level of Function:  Lives With: Spouse  Type of Home: House  Home Layout: One level  Home Access: Stairs to enter with rails  Entrance Stairs - Number of Steps: 4  Entrance Stairs - Rails: Both(too wide to use at same time)  Home Equipment: Rolling walker, 4 wheeled walker, [employer]   Bathroom Shower/Tub: Walk-in shower, Shower chair with back  [employer]: Bedside commode  Bathroom Equipment: Grab bars in shower, Hand-held shower, 3-in-1 commode  Bathroom Accessibility: Accessible    Receives Help From: Family  ADL Assistance: [facility] Avenue: Needs assistance  Homemaking Responsibilities: No  Ambulation Assistance: Independent  Transfer Assistance: Independent  Active : Yes  Additional Comments: Pt reports that her spouse assists with IADL tasks, Pt completes own self care . Spouse does have to assist with compression stockings. Restrictions/Precautions:  Restrictions/Precautions: General Precautions, Fall Risk  Left Lower Extremity Weight Bearing: Weight Bearing As Tolerated  Position Activity Restriction  Other position/activity restrictions: 4 L O2 at rest, 6 L O2 with activity     SUBJECTIVE: RN approved session. Pt resting in bed upon arrival, very pleasant and agreeable to therapy. PAIN: 4/10: L LE    OBJECTIVE:  Bed Mobility:  Rolling to Left: Supervision   Supine to Sit: Supervision    Transfers:  Sit to Stand: Contact Guard Assistance  Stand to [employer] Assistance    Ambulation:  Contact Guard Assistance  Distance: 15 feet x1 and 25 feet x1   Surface: Level Tile  Device:Rolling Walker  Gait Deviations:   Forward Flexed Posture, Slow Cassie, Decreased Gait Speed and Decreased Heel Strike Bilaterally    Balance:  Dynamic Sitting Balance: Supervision    Exercise:  Patient was guided in 1 set(s) 12 reps of exercise to both lower extremities. Seated marches, Seated hamstring curls, Seated heel/toe raises, Long arc quads and Seated isometric hip adduction. Exercises were completed for increased independence with functional mobility. Functional Outcome Measures: Completed  -PAC Inpatient Mobility without Stair Climbing Raw Score : 15  -PAC Inpatient without Stair Climbing T-Scale Score : 43.03    ASSESSMENT:  Assessment: Patient progressing toward established goals. and Pt tolerated session fairly well. Limited by SOB, on 6L O2 throughout session and sats ranging from 88-93%  Activity Tolerance:  Patient tolerance of  treatment: good. Equipment Recommendations:Equipment Needed: No  Other: cont to monitor for needs  Discharge Recommendations: Patient would benefit from continued therapy at discharge. Plan: Times per week: 3-5x GM  Specific instructions for Next Treatment: therex and mobility  Current Treatment Recommendations: Strengthening, Functional Mobility Training, Transfer Training, Endurance Training, Balance Training, Stair training, Gait Training, Home Exercise Program, Safety Education & Training, Equipment Evaluation, Education, & procurement, Patient/Caregiver Education & Training    Patient Education  Patient Education: Plan of Care, [employer] Mobility, Transfers, Gait    Goals:  Patient goals : get stronger to be able to go home. Short term goals  Time Frame for Short term goals: at discharge  Short term goal 1: Pt to go supine <-> sit, with Mod I to get in/out of bed, no rail. Short term goal 2: Pt to get up/down from various seated surfaces, with S to get up to walk.   Short term goal 3: Pt to walk with RW >= 100 ft, SBA to progress to home and community mobility  Short term goal 4: Pt to negotiate 4 steps with HR and CGA to enter home safely  Long term goals  Time Frame for Long term goals : not set due to short ELOS    Following session, patient left in safe position with all fall risk precautions in place.

## 2020-11-20 NOTE — CARE COORDINATION
DISASTER CHARTING    11/20/20, 10:32 AM EST    DISCHARGE ONGOING EVALUATION:     Jessica Gray day: 7  Location: [facility]10/010-A Reason for admit: Physical debility [R53.81]   Barriers to Discharge: Hgb this am 8.5. O2 4L/NC. Sat 94%. Afebrile. On coumadin. PCP: Harrie Goodpasture, MD  Patient Goals/Plan/Treatment Preferences: Plans home with family staying with her and her current O2 from [facility] (baseline is 2L at rest and 3L with activity). May need new home O2 eval prior to discharge.

## 2020-11-20 NOTE — PROGRESS NOTES
Hospitalist Progress Note      Patient:  Sayra Hawk    Unit/Bed:4K-10/010-A  YOB: 1945  MRN: [id number]   Acct: [de-identified]   PCP: Martha Sandhu MD  Date of Admission: 11/13/2020    Assessment/Plan:    1. Acute on chronic hypoxic respiratory failure likely secondary to pleural effusions and acute systolic CHF exacerbation-stable. +2 stable pitting edema. Improved respiratory function with oxygen try to down to 4 L/min. Patient reported minimal dyspnea at rest dyspnea with exertion and excessive talking. This is also apparent based on improvement of symptoms with BiPAP. Limited echo performed on 2/22/2020 showed ejection fraction of 35 to 40%. The patient is on a biventricular pacemaker. Maintained on digoxin 125 mcg every other day, Evolocumab 140 subcutaneous every 14 days, furosemide 40 mg twice daily, and Toprol-XL 25 mg twice daily. Hold aspirin secondary to hemoptysis. Patient is allergic to statin so do not use. We will follow-up with PCP, cardiology, and pulmonology as an outpatient. 2.  Chronic atrial fibrillation-stable. The patient has a ARIANNA(2)DS(2)-VASc of 6. The patient is being treated with Coumadin. The patient's hemoglobin this morning was 7.0, which is borderline for whether she could safely remain on Coumadin. Therefore, another hemoglobin was obtained, which showed 8.5. Therefore, remain on Coumadin for now. Monitor with daily CBC. 3.  Hemoptysis, unspecified-resolving. Patient had a history of hemoptysis of unknown cause. Bronchoscopy could possibly provide diagnostic information. However, we would not recommend this as the bronchoscopy would be in considerable risk and would likely not change her long-term care management plan per CHF and advanced COPD. Will address as described above for chronic A. Fib. 4.  Anemia, unspecified-stable.     The patient reported a longstanding history of anemia. This may be secondary to blood loss from hemoptysis described above. However, the patient has a macrocytic profile with an MCV of 108. 2. Recent high ferritin on 11/3/2020, high iron, and normal total iron-binding capacity makes this diagnosis less likely. Acute bleed could still be the cause or contributor to this anemia, this would not necessarily present as the \"classic\" iron deficiency profile. Normal folate and vitamin B12 makes nutritional anemia less likely cause of macrocytic anemia. The patient continues to have fluctuating hemoglobin that today went from 7.0-8.5. The patient sees Dr. Josefina Núñez as an outpatient. An attempt was made to reach Dr. Josefina Núñez, which was unsuccessful. Consult inpatient heme-onc. 5.  Left lower extremity cellulitis treated on previous admission-worsening    We will maintain wound care. Consult ID. 6.  Coronary artery disease-stable. Treat as described above for CHF and A. Fib. 7.  Benign essential hypertension-stable. Treat as described above for CHF and A. Fib.    8.  Hyperlipidemia, unspecified-stable. Treat with evolocumab as described above. 9.  Chronic obstructive pulmonary disease asthma component, unspecified-stable. Maintain patient on albuterol nebulizer, Spiriva Respimat, Brovana, and Pulmicort. Xolair cannot be given inpatient. Follow-up with outpatient pulmonology.       Expected discharge date: Unknown    Disposition:    [x] Home       [] TCU       [] Rehab       [] Psych       [] SNF       [] [place]       [] Other-    Chief Complaint: Hypoxia and Hemoptysis    Opening statement:     The patient is a 59-year-old female with a complex past medical history with an old MI, long-term anticoagulation with Coumadin for atrial fibrillation, kidney stone, hypertension, hyperlipidemia, history of blood clots, GERD, CAD with biventricular pacemaker, frequent UTI, COPD, systolic CHF, coronary artery disease, recent hospitalization for lower extremity cellulitis, and current smoker who presents with a chief complaint of hypoxia and hemoptysis. Hospital Treatment Course:     11/13/2020-11/17/2020:    Patient was diagnosed with acute on chronic respiratory failure likely secondary to pleural effusions and acute systolic heart failure. Cause of hemoptysis unclear.     The patient was initially transferred to [facility].     Rapid was called om the patient on 11/13/2020 secondary to hypoxia, tachypnea and hypertension after transfer from [facility] to [facility]. Patient was placed on BiPAP which dramatically improved her presentation. The patient was then transferred to [facility].     Patient was evaluated on the same day for chest pain and shortness of breath. An EKG found no change but a mild troponin elevation was noted. Pain is likely secondary to dyspepsia or GERD.     Pulmonology was consulted for hemoptysis and cardiology was consulted for hemoptysis, possible watchman implantation, and management of Coumadin.     The patient's Coumadin was initially held and her supratherapeutic INR was corrected. However, the patient was placed back on Coumadin as she was concerned for stroke secondary to A. fib. The patient was counseled with regards to the risks from Coumadin.     Thoracentesis was obtained on 11/14/2020 with 700 cc of fluid removed. Pleural fluid cytology showed no significant findings. Patient was maintained on Pulmicort 200 mcg via neb twice daily, Brovana 50 mcg by nebulization twice daily, Spiriva Respimat 2 puffs daily, albuterol nebs every 4 hours while awake, and Xopenex 0.60 mg by nebulization every 8 hours as needed. Also maintained on Acapella every 4 hours. Oxygen titrated to greater than 90%. Baseline is 2 L at home. Patient maintained on Coumadin for VTE prophylaxis and Protonix for GI prophylaxis. Patient wants to go home with family.     11/18/2020:    Nursing report: Patient is still on 5 L, with plans to wean down. Patient been getting up from the chair to her bed. Little subjective dyspnea, however she dropped from a pulse ox of 95% to 89%. Hemoglobin has been stable at 7.2. The patient was 95 to 97% throughout the night. The patient did not need BiPAP during the night. The patient states that her breathing is doing much better than before. She affirms improvement with diuresis and with thoracentesis for drainage. However, the patient is still dyspneic with exertion. She affirms slight hemoptysis with cough productive of pinkish to very light brown sputum. A long discussion with the patient about long-term prognosis and goals. The patient particularly noted that she want to live long enough to see her granddaughter's high school graduation in May. 11/19/2020:    Nursing report: The patient is doing well. She slept through the night. INR is 2.66. The patient had dinner. Her oxygenation was good. Hemoglobin is 7.0. The patient states that her breathing is doing better. She affirms slight ongoing chest pain and \"indigestion. \"  She affirms productive cough with a little bit of blood. The patient and her daughter were extensively counseled with regards to risk of bleeding and anticoagulation. Otherwise, no new specific complaints or concerns. Subjective (past 24 hours):     Nursing report: Patient doing well. On 4 L. Shortness of breath with activity. Bowel movement. Eating overnight. The patient states she is feeling okay. However, notes worsening left lower limb pain. \"Indigestion\" is resolved. Patient attributes resolution to no longer eating hospital food and getting outside food. Affirms minimal dyspnea at rest but significant dyspnea with exertion and excessive talking. Affirms coughing up \"a little bit of blood. \"    Otherwise, no new specific complaints or concerns.     Past medical history, family history, social history and allergies reviewed again and is chronically ill-appearing. HEENT: Conjunctivae/corneas clear. Respiratory: Clear to auscultation but reduced respiratory effort. Cardiovascular: Regular rate and rhythm with normal S1/S2 without murmurs, rubs or gallops. Abdomen: Soft, non-tender, non-distended with normal bowel sounds. Capillary Refill: Brisk,< 3 seconds   Peripheral Pulses: +2 palpable, equal bilaterally   Extremities: +2 peripheral pitting edema. However, patient states this is much improved from before. Erythema and tenderness over the left lower extremity. No warmth. Labs:   Recent Labs     11/18/20 0558 11/19/20 0417 11/19/20  2243 11/20/20  0339 11/20/20  0957   WBC 7.7 5.8  --   --  5.7  --    HGB 7.2* 7.0*   < > 7.6* 7.0* 8.5*   HCT 25.0* 23.6*   < > 25.8* 24.3* 29.0*    132  --   --  139  --     < > = values in this interval not displayed. Recent Labs     11/18/20 0558 11/19/20 0417 11/20/20 0339    141 142   K 4.3 4.3 3.9   CL 98 99 102   CO2 31 30 29   BUN 18 19 20   CREATININE 1.0 1.0 1.0   CALCIUM 8.8 8.8 8.9     No results for input(s): AST, ALT, BILIDIR, BILITOT, ALKPHOS in the last 72 hours. Recent Labs     11/18/20 0558 11/19/20 0417 11/20/20 0339   INR 1.99* 2.66* 1.68*     No results for input(s): CKTOTAL, TROPONINI in the last 72 hours. Microbiology:    Blood culture #1:   Lab Results   Component Value Date    BC No growth-preliminary No growth  10/25/2020       Blood culture #2:No results found for: Maria Man    Organism:  Lab Results   Component Value Date    ORG Mixed Growth     02/21/2020         Lab Results   Component Value Date    LABGRAM  11/26/2019     No segmented neutrophils observed. Few epithelial cells observed. Many large gram positive bacilli. Few small gram positive bacilli. Few gram negative bacilli. Prepubescent and postmenopausal female samples (<15and >47ears of age) are not typically suitable for bacterialvaginosis screening.        MRSA culture only:No results found for: [facility]    Urine culture:   Lab Results   Component Value Date    LABURIN  10/24/2019     Growth of Contaminants. The mixture of organisms present are not a common cause of urinary tract infections and probably represent distal urethral juventino. Respiratory culture: No results found for: CULTRESP    Aerobic and Anaerobic :  Lab Results   Component Value Date    LABAERO No growth-preliminary No growth   11/11/2019     Lab Results   Component Value Date    LABANAE No growth-preliminary No growth   11/11/2019       Urinalysis:      Lab Results   Component Value Date    NITRU NEGATIVE 11/08/2020    WBCUA 0-2 11/08/2020    BACTERIA NONE SEEN 11/08/2020    RBCUA 5-10 11/08/2020    BLOODU TRACE 11/08/2020    GLUCOSEU NEGATIVE 11/08/2020       Radiology:  XR CHEST (2 VW)   Final Result   Stable chest given technical differences. Left lower lobe infiltrate and effusion. Generalized interstitial edema and cardiomegaly. **This report has been created using voice recognition software. It may contain minor errors which are inherent in voice recognition technology. **      Final report electronically signed by Dr. Gerardo Andrade on 11/16/2020 11:22 AM      XR CHEST PORTABLE   Final Result   Moderately worsened aeration of the left lung base. **This report has been created using voice recognition software. It may contain minor errors which are inherent in voice recognition technology. **      Final report electronically signed by Dr. Vi Valdivia on 11/15/2020 6:00 PM      XR CHEST PORTABLE   Final Result   No pneumothorax post right thoracentesis. **This report has been created using voice recognition software. It may contain minor errors which are inherent in voice recognition technology. **      Final report electronically signed by Dr. Vi Valdivia on 11/14/2020 2:46 PM      XR CHEST PORTABLE   Final Result   Impression:   Moderate CHF, increased.    Bilateral lower lobe pneumonia versus subsegmental atelectasis      This document has been electronically signed by: Anmol Kimbrough MD on    11/13/2020 10:07 PM           Xr Chest Portable    Result Date: 11/14/2020  PROCEDURE: XR CHEST PORTABLE CLINICAL INFORMATION: post thoracentesis right side. COMPARISON: 11/13/2020 TECHNIQUE: AP upright view of the chest. FINDINGS: Right pleural fluid has been evacuated. No pneumothorax. Small left pleural effusion persists. Mild congestion. No definite infiltrate. AICD is unchanged. No pneumothorax post right thoracentesis. **This report has been created using voice recognition software. It may contain minor errors which are inherent in voice recognition technology. ** Final report electronically signed by Dr. Karyn Lewis on 11/14/2020 2:46 PM    Xr Chest Portable    Result Date: 11/13/2020  Chest xray 1 view Comparison:  MATHEW,SR  - XR CHEST PORTABLE  - 10/25/2020 03:23 PM EDT Findings: Cardiac conduction device. Moderate cardiomegaly. Hypoinflated. Moderately increased interstitial lung markings. There are space opacities notably left lower lobe. Small bilateral pleural effusions. No pneumothorax No acute fracture. Impression: Moderate CHF, increased. Bilateral lower lobe pneumonia versus subsegmental atelectasis This document has been electronically signed by: Anmol Kimbrough MD on 11/13/2020 10:07 PM     Us Thoracentesis    Result Date: 11/15/2020  THORACENTESIS WITH ULTRASOUND GUIDANCE: PERFORMED BY: Rosemary Gonzalez M.D. CLINICAL INFORMATION: Pleural effusion APPROACH: Right side, posterior, inferior FLUID WITHDRAWN: 700 mL herrera serous fluid ESTIMATED BLOOD LOSS: Minimal PROCEDURE: Signed informed consent was obtained prior to performing this procedure. The thorax was initially evaluated sonographically to determine appropriate puncture site. The skin was marked, prepped, and draped in a sterile fashion.  Following local anesthesia and utilizing aseptic technique, a [address] [place] [address] one-step catheter was successfully inserted into the pleural effusion at the position indicated above. Pleural fluid in the amount was above was then aspirated and the needle was removed. The patient tolerated the procedure well. A post procedure chest radiograph will be obtained. Status post thoracentesis **This report has been created using voice recognition software. It may contain minor errors which are inherent in voice recognition technology. ** Final report electronically signed by Dr. Knight Or on 11/15/2020 7:00 AM      Support Devices (date placed):  [] ETT []Oral / [] Nasal  [] Gastric Tube [] OG / [] NG    [] Central Venous Line (Specify Site):  [] Urinary Catheter  [] Arterial Line (Specify Site)  [x] Peripheral IV access  [] Other:    DVT prophylaxis: [] Lovenox                                 [] SCDs                                 [] SQ Heparin                                 [] Encourage ambulation           [x] Already on Anticoagulation     Code Status: Full Code    Tele:   [x] yes             [] no    Electronically signed by Celia Garcia MD, MPH, [facility] on 11/20/2020 at 3:40 PM   Supervised by Dr. Regina Salguero

## 2020-11-20 NOTE — PROGRESS NOTES
Q4H While awake    warfarin (COUMADIN) daily dosing (placeholder)   Other RX Placeholder    albumin human  25 g Intravenous Once    sodium chloride  20 mL Intravenous Once    Arformoterol Tartrate  15 mcg Nebulization BID    [Held by provider] aspirin  81 mg Oral Daily    budesonide  500 mcg Nebulization BID    digoxin  125 mcg Oral Every Other Day    docusate sodium  100 mg Oral Daily    Evolocumab  140 mg Subcutaneous Q14 Days    gabapentin  100 mg Oral TID    hydrocortisone  25 mg Rectal BID    metoprolol succinate  25 mg Oral Daily    nicotine  1 patch Transdermal Q24H    omalizumab  225 mg Subcutaneous Q14 Days    pantoprazole  40 mg Oral BID AC    [Held by provider] sacubitril-valsartan  1 tablet Oral BID    senna  1 tablet Oral Nightly    tiotropium  2 puff Inhalation Daily    traZODone  50 mg Oral Nightly       polyethylene glycol, bisacodyl, acetaminophen, cyclobenzaprine, HYDROcodone 5 mg - acetaminophen, HYDROcodone 5 mg - acetaminophen, levalbuterol, magnesium hydroxide      LABS:     CBC:   Recent Labs     11/18/20  0558 11/19/20  0417  11/19/20  2243 11/20/20  0339 11/20/20  0957   WBC 7.7 5.8  --   --  5.7  --    HGB 7.2* 7.0*   < > 7.6* 7.0* 8.5*    132  --   --  139  --     < > = values in this interval not displayed. BMP:    Recent Labs     11/18/20  0558 11/19/20 0417 11/20/20  0339    141 142   K 4.3 4.3 3.9   CL 98 99 102   CO2 31 30 29   BUN 18 19 20   CREATININE 1.0 1.0 1.0   GLUCOSE 138* 138* 142*     Calcium:  Recent Labs     11/20/20  0339   CALCIUM 8.9     Ionized Calcium:No results for input(s): IONCA in the last 72 hours. Magnesium:  Recent Labs     11/18/20  0558   MG 2.2     Phosphorus:No results for input(s): PHOS in the last 72 hours. BNP:No results for input(s): BNP in the last 72 hours. Glucose:No results for input(s): POCGLU in the last 72 hours. HgbA1C: No results for input(s): LABA1C in the last 72 hours.   INR:   Recent Labs fasciitis ([facility] [address] M72.6    Left leg pain M79.605    Cellulitis of left lower extremity L03. 116    Physical deconditioning R53.81    Physical debility R53.81    Interstitial pneumonia ([facility]) J84.9    Thrush B37.0    Chronic iron deficiency anemia D50.9    Anxiety F41.9    Permanent atrial fibrillation ([facility]) O67.72    Acute systolic CHF (congestive heart failure) ([facility]) I50.21    Pleural effusion, bilateral J90    Hemoptysis R04.2    Acute respiratory failure with hypoxia ([facility]) J96.01         ASSESSMENT/PLAN   Shortness of breath due to COPD/fibrosis  CHF  Hx of left leg cellulites  Neuropathic pain: consider increasing neurontin  Compression wrap as needed.       Jacinto Jonas MD, [address] 11/20/2020 3:59 PM

## 2020-11-21 NOTE — PROGRESS NOTES
Clinical Pharmacy Note    Warfarin consult follow-up    Recent Labs     11/21/20  0435   INR 1.80*     Recent Labs     11/19/20  0417  11/20/20  0339 11/20/20  0957 11/21/20  0435   HGB 7.0*   < > 7.0* 8.5* 7.7*   HCT 23.6*   < > 24.3* 29.0* 26.1*     --  139  --  139    < > = values in this interval not displayed. Significant Drug-Drug Interactions:  New warfarin drug-drug interactions: none  Discontinued drug-drug interactions: none  Current warfarin drug-drug interactions: aspirin (on hold), trazodone (HM)      Date INR Warfarin Dose   10/25-10/27   No Coumadin   10/28/2020 1.32 2 mg    10/29/2020 1.49  2 mg   10/30/2020  2  1.5 mg    10/31/2020   2.78   0.5 mg   11/1/2020   2.37  1.5 mg    11/2/2020  2.55 1 mg     11/3/2020  3.54   No Coumadin   11/4/2020  3.09  1 mg   11/5/2020 2.41 1.5 mg   11/6/2020 2.82 1 mg    11/7/2020  2.61  1.5 mg    11/8/2020   2.22                     1.5 mg   11/9/2020  2.12                      2 mg   11/10/2020 2.05  2 mg   11/11/2020  2.54  1.5 mg   11/12/2020 2.72  1.5 mg   11/13/2020 2.72 No Coumadin   11/14/2020 2.3  No Coumadin    11/15/2020 1.7  (not given)   11/16/2020   1.48 2 mg    11/17/2020   1.43 2 mg    11/18/2020   1.99 1.5 mg    11/19/2020  2.66 0.5 mg   11/20/2020   1.68   2 mg   11/21/2020 1.80 1.5 mg     Notes:                   Daily PT/INR until stable within therapeutic range.      Felisa Sandy, PharmD, BCPS, BCCCP  11/21/2020 2:04 PM

## 2020-11-21 NOTE — PROGRESS NOTES
Dr. Josefina Núñez consulted for anemia and has seen pt in the past outpatient. Call received by this RN. Updated on pt status and most recent labs. Dr. Josefina Núñez would like 1 unit PRBC given, then 20 mg Lasix administered after the blood transfusion completed. He ordered a repeat H&H at 1600 and would like called with the result.  Clarified Dr. Josefina Núñez does not want to be added to this patient's care team.

## 2020-11-21 NOTE — PROGRESS NOTES
Hospitalist Progress Note      Patient:  Yesika Bravo    Unit/Bed:4K-10/010-A  YOB: 1945  MRN: [id number]   Acct: [de-identified]   PCP: Harrie Goodpasture, MD  Date of Admission: 11/13/2020    Assessment/Plan:    1. Acute on chronic hypoxic respiratory failure likely secondary to pleural effusions and acute systolic CHF exacerbation-worsening.    +2 stable pitting edema right leg greater than left. The patient has minimal dyspnea at rest on 4 L of oxygen. However, the patient rapidly desaturates with activity and needs 6 L with activity. Nurse reports that the patient is taking longer to recover from speech and activity. Maintain on digoxin 125 mcg every other day, Evolocumab 140 subcutaneous every 14 days, furosemide 40 mg twice daily, and Toprol-XL 25 mg twice daily. Hold aspirin secondary to hemoptysis. Patient is allergic to statin so do not use. We will follow-up with PCP, cardiology, and pulmonology as an outpatient. 2.  Chronic atrial fibrillation-stable. The patient has a ARIANNA(2)DS(2)-VASc of 6. The patient is being treated with Coumadin. Patient's hemoglobin has remained above 7. Therefore, we will maintain Coumadin for now. Monitor with daily CBC. 3.  Hemoptysis, unspecified-resolving. Ongoing slight hemoptysis. Patient had a history of hemoptysis of unknown cause. Bronchoscopy could possibly provide diagnostic information. However, we would not recommend this as the bronchoscopy would be in considerable risk and would likely not change her long-term care management plan per CHF and advanced COPD. Will address as described above for chronic A. Fib. 4.  Anemia, unspecified-stable. The patient reported a longstanding history of anemia. This may be secondary to blood loss from hemoptysis described above. However, the patient has a macrocytic profile with an MCV of 108. 2.   Recent high ferritin on 11/3/2020, high iron, and normal total iron-binding capacity makes this diagnosis less likely. Acute bleed could still be the cause or contributor to this anemia, this would not necessarily present as the \"classic\" iron deficiency profile. Normal folate and vitamin B12 makes nutritional anemia less likely cause of macrocytic anemia. The patient continues to have fluctuating hemoglobin. Dr. Lakesha Vidal was consulted. He declined to see the patient in the hospital.  However, he did request a unit of blood, 20 mg of additional Lasix, and a new H&H. As the patient's hemoglobin was above 7, these orders were canceled. 5.  Left lower extremity cellulitis treated on previous admission-worsening    The patient is amenable to 4 hours on and 4 hours off of wound dressing per ID. 6.  Coronary artery disease-stable. Treat as described above for CHF and A. Fib. 7.  Benign essential hypertension-stable. Treat as described above for CHF and A. Fib.    8.  Hyperlipidemia, unspecified-stable. Treat with evolocumab as described above. 9.  Chronic obstructive pulmonary disease asthma component, unspecified-stable. Contributing to acute on chronic hypoxic respiratory failure as above. Maintain patient on albuterol nebulizer, Spiriva Respimat, Brovana, and Pulmicort. Xolair cannot be given inpatient. Follow-up with outpatient pulmonology.       Expected discharge date: Unknown    Disposition:    [x] Home       [] TCU       [] Rehab       [] Psych       [] SNF       [] [facility]       [] Other-    Chief Complaint: Hypoxia and Hemoptysis    Opening statement:     The patient is a 58-year-old female with a complex past medical history with an old MI, long-term anticoagulation with Coumadin for atrial fibrillation, kidney stone, hypertension, hyperlipidemia, history of blood clots, GERD, CAD with biventricular pacemaker, frequent UTI, COPD, systolic CHF, coronary artery disease, recent hospitalization for lower extremity cellulitis, and current smoker who presents with a chief complaint of hypoxia and hemoptysis. Hospital Treatment Course:     11/13/2020-11/17/2020:    Patient was diagnosed with acute on chronic respiratory failure likely secondary to pleural effusions and acute systolic heart failure. Cause of hemoptysis unclear.     The patient was initially transferred to [facility].     Rapid was called om the patient on 11/13/2020 secondary to hypoxia, tachypnea and hypertension after transfer from [facility] to [facility]. Patient was placed on BiPAP which dramatically improved her presentation. The patient was then transferred to [facility].     Patient was evaluated on the same day for chest pain and shortness of breath. An EKG found no change but a mild troponin elevation was noted. Pain is likely secondary to dyspepsia or GERD.     Pulmonology was consulted for hemoptysis and cardiology was consulted for hemoptysis, possible watchman implantation, and management of Coumadin.     The patient's Coumadin was initially held and her supratherapeutic INR was corrected. However, the patient was placed back on Coumadin as she was concerned for stroke secondary to A. fib. The patient was counseled with regards to the risks from Coumadin.     Thoracentesis was obtained on 11/14/2020 with 700 cc of fluid removed. Pleural fluid cytology showed no significant findings. Patient was maintained on Pulmicort 200 mcg via neb twice daily, Brovana 50 mcg by nebulization twice daily, Spiriva Respimat 2 puffs daily, albuterol nebs every 4 hours while awake, and Xopenex 0.60 mg by nebulization every 8 hours as needed. Also maintained on Acapella every 4 hours. Oxygen titrated to greater than 90%. Baseline is 2 L at home. Patient maintained on Coumadin for VTE prophylaxis and Protonix for GI prophylaxis. Patient wants to go home with family. 11/18/2020:    Nursing report: Patient is still on 5 L, with plans to wean down. Patient been getting up from the chair to her bed. Little subjective dyspnea, however she dropped from a pulse ox of 95% to 89%. Hemoglobin has been stable at 7.2. The patient was 95 to 97% throughout the night. The patient did not need BiPAP during the night. The patient states that her breathing is doing much better than before. She affirms improvement with diuresis and with thoracentesis for drainage. However, the patient is still dyspneic with exertion. She affirms slight hemoptysis with cough productive of pinkish to very light brown sputum. A long discussion with the patient about long-term prognosis and goals. The patient particularly noted that she want to live long enough to see her granddaughter's high school graduation in May. 11/19/2020:    Nursing report: The patient is doing well. She slept through the night. INR is 2.66. The patient had dinner. Her oxygenation was good. Hemoglobin is 7.0. The patient states that her breathing is doing better. She affirms slight ongoing chest pain and \"indigestion. \"  She affirms productive cough with a little bit of blood. The patient and her daughter were extensively counseled with regards to risk of bleeding and anticoagulation. Otherwise, no new specific complaints or concerns. 11/20/2020:    Nursing report: Patient doing well. On 4 L. Shortness of breath with activity. Bowel movement. Eating overnight. The patient states she is feeling okay. However, notes worsening left lower limb pain. \"Indigestion\" is resolved. Patient attributes resolution to no longer eating hospital food and getting outside food. Affirms minimal dyspnea at rest but significant dyspnea with exertion and excessive talking. Affirms coughing up \"a little bit of blood. \"    Otherwise, no new specific complaints or concerns. 11/21/2020:    Dr. Buffy Gant was consulted.   He declined to see the patient in the hospital, but ordered a unit of blood to be followed by 20 mg of Lasix and then a new H&H. However, as the patient's hemoglobin was above 7, we decided to cancel the order. Subjective (past 24 hours):     Nursing report: \"Was not too bad. \"  ID saw the patient who recommended compression wraps 4 hours on and 4 hours off. However, the patient says she did not want wraps. On 4 L of oxygen at rest and 6 with activity. Patient desaturated to 77% overnight with activity and the patient seemed to take longer than before to recover, per nurse report. Patient states that she feels about the same but nurse feels that she is doing worse. Patient slept well but did not eat much. She had no bowel movement. The patient states she feels okay. She has no shortness of breath at rest but does have shortness of breath on exertion. She believes that the shortness of breath on exertion is unchanged from previous. Patient affirms reduced appetite and no recent bowel movement. The patient states that she was upset because yesterday during the day she needed help using the restroom to go urinate. The nurse did not help her and turned off her call light for about half an hour. The patient was subsequently incontinent of urine. The patient was reassured that we would talk to nursing today. Affirms ongoing slight hemoptysis. Past medical history, family history, social history and allergies reviewed again and is unchanged since admission. ROS (12 point review of systems completed. Pertinent positives noted.  Otherwise ROS is negative)     Medications:  Reviewed    Infusion Medications     Scheduled Medications    sodium chloride  20 mL Intravenous Once    furosemide  40 mg Oral BID    famotidine  20 mg Oral Daily    levocetirizine  5 mg Oral Nightly    albuterol  2.5 mg Nebulization Q4H While awake    warfarin (COUMADIN) daily dosing (placeholder)   Other RX Placeholder    albumin human  25 g Intravenous Once    sodium chloride  20 mL Intravenous No warmth. Labs:   Recent Labs     11/19/20 0417 11/20/20 0339 11/20/20  0957 11/21/20  0435   WBC 5.8  --  5.7  --  5.9   HGB 7.0*   < > 7.0* 8.5* 7.7*   HCT 23.6*   < > 24.3* 29.0* 26.1*     --  139  --  139    < > = values in this interval not displayed. Recent Labs     11/19/20 0417 11/20/20 0339 11/21/20  0435    142 138   K 4.3 3.9 4.1   CL 99 102 100   CO2 30 29 30   BUN 19 20 20   CREATININE 1.0 1.0 0.9   CALCIUM 8.8 8.9 8.9     No results for input(s): AST, ALT, BILIDIR, BILITOT, ALKPHOS in the last 72 hours. Recent Labs     11/19/20 0417 11/20/20 0339 11/21/20 0435   INR 2.66* 1.68* 1.80*     No results for input(s): CKTOTAL, TROPONINI in the last 72 hours. Microbiology:    Blood culture #1:   Lab Results   Component Value Date    BC No growth-preliminary No growth  10/25/2020       Blood culture #2:No results found for: Maria Man    Organism:  Lab Results   Component Value Date    ORG Mixed Growth     02/21/2020         Lab Results   Component Value Date    LABGRAM  11/26/2019     No segmented neutrophils observed. Few epithelial cells observed. Many large gram positive bacilli. Few small gram positive bacilli. Few gram negative bacilli. Prepubescent and postmenopausal female samples (<15and >47ears of age) are not typically suitable for bacterialvaginosis screening. MRSA culture only:No results found for: [facility]    Urine culture:   Lab Results   Component Value Date    LABURIN  10/24/2019     Growth of Contaminants. The mixture of organisms present are not a common cause of urinary tract infections and probably represent distal urethral juventino.         Respiratory culture: No results found for: CULTRESP    Aerobic and Anaerobic :  Lab Results   Component Value Date    LABAERO No growth-preliminary No growth   11/11/2019     Lab Results   Component Value Date    LABANAE No growth-preliminary No growth   11/11/2019       Urinalysis:      Lab Results   Component Value Date    NITRU NEGATIVE 11/08/2020    WBCUA 0-2 11/08/2020    BACTERIA NONE SEEN 11/08/2020    RBCUA 5-10 11/08/2020    BLOODU TRACE 11/08/2020    GLUCOSEU NEGATIVE 11/08/2020       Radiology:  XR CHEST (2 VW)   Final Result   Stable chest given technical differences. Left lower lobe infiltrate and effusion. Generalized interstitial edema and cardiomegaly. **This report has been created using voice recognition software. It may contain minor errors which are inherent in voice recognition technology. **      Final report electronically signed by Dr. Kit Garcia on 11/16/2020 11:22 AM      XR CHEST PORTABLE   Final Result   Moderately worsened aeration of the left lung base. **This report has been created using voice recognition software. It may contain minor errors which are inherent in voice recognition technology. **      Final report electronically signed by Dr. Dick Luna on 11/15/2020 6:00 PM      XR CHEST PORTABLE   Final Result   No pneumothorax post right thoracentesis. **This report has been created using voice recognition software. It may contain minor errors which are inherent in voice recognition technology. **      Final report electronically signed by Dr. Dick Luna on 11/14/2020 2:46 PM      XR CHEST PORTABLE   Final Result   Impression:   Moderate CHF, increased. Bilateral lower lobe pneumonia versus subsegmental atelectasis      This document has been electronically signed by: Daniel Colunga MD on    11/13/2020 10:07 PM           Xr Chest Portable    Result Date: 11/14/2020  PROCEDURE: XR CHEST PORTABLE CLINICAL INFORMATION: post thoracentesis right side. COMPARISON: 11/13/2020 TECHNIQUE: AP upright view of the chest. FINDINGS: Right pleural fluid has been evacuated. No pneumothorax. Small left pleural effusion persists. Mild congestion. No definite infiltrate. AICD is unchanged. No pneumothorax post right thoracentesis.  **This report has been created / [] Nasal  [] Gastric Tube [] OG / [] NG    [] Central Venous Line (Specify Site):  [] Urinary Catheter  [] Arterial Line (Specify Site)  [x] Peripheral IV access  [] Other:    DVT prophylaxis: [] Lovenox                                 [] SCDs                                 [] SQ Heparin                                 [] Encourage ambulation           [x] Already on Anticoagulation     Code Status: Full Code    Tele:   [x] yes             [] no    Electronically signed by Ana Lopez MD, MPH, [facility] on 11/21/2020 at 1:12 PM   Supervised by Dr. Tutu Noel

## 2020-11-21 NOTE — PLAN OF CARE
Problem: RESPIRATORY  Goal: Clear lung sounds  Outcome: Ongoing     Problem: RESPIRATORY  Goal: Effective breathing pattern  Outcome: Ongoing

## 2020-11-22 NOTE — PROGRESS NOTES
Vesta Bustamante 59  PHYSICAL THERAPY MISSED TREATMENT NOTE  [facility] ICU STEPDOWN TELEMETRY 4K    Date: 2020  Patient Name: Pako Dee        MRN: [id number]   : 1945  (67 y.o.)  Gender: female   Referring Practitioner: Yamel Escamilla MD  Diagnosis: Physical Debility         REASON FOR MISSED TREATMENT:  Patient refused treatment. Patient states family is here visiting and she would like to be able to visit with family while they are here on the weekend. To check back as time allows.

## 2020-11-22 NOTE — PROGRESS NOTES
Hospitalist Progress Note      Patient:  Sammy Jorgensen    Unit/Bed:4K-10/010-A  YOB: 1945  MRN: [id number]   Acct: [de-identified]   PCP: Chel Crisostomo MD  Date of Admission: 11/13/2020    Assessment/Plan:    1. Acute on chronic hypoxic respiratory failure likely secondary to pleural effusions and acute systolic CHF exacerbation-worsening. 11/22- pt more hypoxic, desaturating down to mid 80's. Placed on BiPAP and High flow. Dr. James Dalton consulted. Maintain on digoxin 125 mcg every other day, Evolocumab 140 subcutaneous every 14 days, furosemide 40 mg twice daily, and Toprol-XL 25 mg twice daily. Hold aspirin secondary to hemoptysis. Patient is allergic to statin so do not use. We will follow-up with PCP, cardiology, and pulmonology as an outpatient. 2.  Chronic atrial fibrillation-stable. The patient has a ARIANNA(2)DS(2)-VASc of 6. The patient is being treated with Coumadin. Patient's hemoglobin has remained above 7. Therefore, we will maintain Coumadin for now. Monitor with daily CBC. 3.  Hemoptysis, unspecified-resolving. Ongoing slight hemoptysis. Patient had a history of hemoptysis of unknown cause. Bronchoscopy could possibly provide diagnostic information. However, we would not recommend this as the bronchoscopy would be in considerable risk and would likely not change her long-term care management plan per CHF and advanced COPD. Will address as described above for chronic A. Fib. 4.  Anemia, unspecified-stable. The patient reported a longstanding history of anemia. This may be secondary to blood loss from hemoptysis described above. However, the patient has a macrocytic profile with an MCV of 108. 2. Recent high ferritin on 11/3/2020, high iron, and normal total iron-binding capacity makes this diagnosis less likely.   Acute bleed could still be the cause or contributor to this anemia, this would not necessarily present as the \"classic\" iron deficiency profile. Normal folate and vitamin B12 makes nutritional anemia less likely cause of macrocytic anemia. The patient continues to have fluctuating hemoglobin. As the patient's hemoglobin was above 7, these orders were canceled. 5.  Left lower extremity cellulitis treated on previous admission-worsening    The patient is amenable to 4 hours on and 4 hours off of wound dressing per ID. 6.  Coronary artery disease-stable. Treat as described above for CHF and A. Fib. 7.  Benign essential hypertension-stable. Treat as described above for CHF and A. Fib.    8.  Hyperlipidemia, unspecified-stable. Treat with evolocumab as described above. 9.  Chronic obstructive pulmonary disease asthma component, unspecified-stable. Contributing to acute on chronic hypoxic respiratory failure as above. Maintain patient on albuterol nebulizer, Spiriva Respimat, Brovana, and Pulmicort. Xolair cannot be given inpatient. Follow-up with outpatient pulmonology. Expected discharge date: Unknown    Disposition:    [x] Home       [] TCU       [] Rehab       [] Psych       [] SNF       [] [place]       [] Other-    Chief Complaint: Hypoxia and Hemoptysis    Opening statement:     The patient is a 50-year-old female with a complex past medical history with an old MI, long-term anticoagulation with Coumadin for atrial fibrillation, kidney stone, hypertension, hyperlipidemia, history of blood clots, GERD, CAD with biventricular pacemaker, frequent UTI, COPD, systolic CHF, coronary artery disease, recent hospitalization for lower extremity cellulitis, and current smoker who presents with a chief complaint of hypoxia and hemoptysis. Hospital Treatment Course:     11/13/2020-11/17/2020:    Patient was diagnosed with acute on chronic respiratory failure likely secondary to pleural effusions and acute systolic heart failure.   Cause of hemoptysis unclear.     The patient was initially transferred to [facility].     Rapid was called om the patient on 11/13/2020 secondary to hypoxia, tachypnea and hypertension after transfer from [facility] to [facility]. Patient was placed on BiPAP which dramatically improved her presentation. The patient was then transferred to [facility].     Patient was evaluated on the same day for chest pain and shortness of breath. An EKG found no change but a mild troponin elevation was noted. Pain is likely secondary to dyspepsia or GERD.     Pulmonology was consulted for hemoptysis and cardiology was consulted for hemoptysis, possible watchman implantation, and management of Coumadin.     The patient's Coumadin was initially held and her supratherapeutic INR was corrected. However, the patient was placed back on Coumadin as she was concerned for stroke secondary to A. fib. The patient was counseled with regards to the risks from Coumadin.     Thoracentesis was obtained on 11/14/2020 with 700 cc of fluid removed. Pleural fluid cytology showed no significant findings. Patient was maintained on Pulmicort 200 mcg via neb twice daily, Brovana 50 mcg by nebulization twice daily, Spiriva Respimat 2 puffs daily, albuterol nebs every 4 hours while awake, and Xopenex 0.60 mg by nebulization every 8 hours as needed. Also maintained on Acapella every 4 hours. Oxygen titrated to greater than 90%. Baseline is 2 L at home. Patient maintained on Coumadin for VTE prophylaxis and Protonix for GI prophylaxis. Patient wants to go home with family. 11/18/2020:    Nursing report: Patient is still on 5 L, with plans to wean down. Patient been getting up from the chair to her bed. Little subjective dyspnea, however she dropped from a pulse ox of 95% to 89%. Hemoglobin has been stable at 7.2. The patient was 95 to 97% throughout the night. The patient did not need BiPAP during the night.     The patient states that her breathing is doing much better than before. She affirms improvement with diuresis and with thoracentesis for drainage. However, the patient is still dyspneic with exertion. She affirms slight hemoptysis with cough productive of pinkish to very light brown sputum. A long discussion with the patient about long-term prognosis and goals. The patient particularly noted that she want to live long enough to see her granddaughter's high school graduation in May. 11/19/2020:    Nursing report: The patient is doing well. She slept through the night. INR is 2.66. The patient had dinner. Her oxygenation was good. Hemoglobin is 7.0. The patient states that her breathing is doing better. She affirms slight ongoing chest pain and \"indigestion. \"  She affirms productive cough with a little bit of blood. The patient and her daughter were extensively counseled with regards to risk of bleeding and anticoagulation. Otherwise, no new specific complaints or concerns. 11/20/2020:    Nursing report: Patient doing well. On 4 L. Shortness of breath with activity. Bowel movement. Eating overnight. The patient states she is feeling okay. However, notes worsening left lower limb pain. \"Indigestion\" is resolved. Patient attributes resolution to no longer eating hospital food and getting outside food. Affirms minimal dyspnea at rest but significant dyspnea with exertion and excessive talking. Affirms coughing up \"a little bit of blood. \"    Otherwise, no new specific complaints or concerns. 11/21/2020:    Dr. Elis Car was consulted. He declined to see the patient in the hospital, but ordered a unit of blood to be followed by 20 mg of Lasix and then a new H&H. However, as the patient's hemoglobin was above 7, we decided to cancel the order. 11/22/2020:  Pt more sob this morning, O2 saturations down to mid 80's on 4L NC. Pt placed on BiPAP, now on high flow. Pending CXR. Dr. Soto Distance consulted.      Subjective (past 24 hours):     Nursing report: \"Was not too bad. \"  ID saw the patient who recommended compression wraps 4 hours on and 4 hours off. However, the patient says she did not want wraps. On 4 L of oxygen at rest and 6 with activity. Patient desaturated to 77% overnight with activity and the patient seemed to take longer than before to recover, per nurse report. Patient states that she feels about the same but nurse feels that she is doing worse. Patient slept well but did not eat much. She had no bowel movement. The patient states she feels okay. She has no shortness of breath at rest but does have shortness of breath on exertion. She believes that the shortness of breath on exertion is unchanged from previous. Patient affirms reduced appetite and no recent bowel movement. The patient states that she was upset because yesterday during the day she needed help using the restroom to go urinate. The nurse did not help her and turned off her call light for about half an hour. The patient was subsequently incontinent of urine. The patient was reassured that we would talk to nursing today. Affirms ongoing slight hemoptysis. Past medical history, family history, social history and allergies reviewed again and is unchanged since admission. ROS (12 point review of systems completed. Pertinent positives noted.  Otherwise ROS is negative)     Medications:  Reviewed    Infusion Medications     Scheduled Medications    warfarin  1.5 mg Oral Once    furosemide  40 mg Oral BID    famotidine  20 mg Oral Daily    levocetirizine  5 mg Oral Nightly    albuterol  2.5 mg Nebulization Q4H While awake    warfarin (COUMADIN) daily dosing (placeholder)   Other RX Placeholder    Arformoterol Tartrate  15 mcg Nebulization BID    [Held by provider] aspirin  81 mg Oral Daily    budesonide  500 mcg Nebulization BID    digoxin  125 mcg Oral Every Other Day    docusate sodium  100 mg Oral Daily    Evolocumab  140 mg Subcutaneous Q14 Days  gabapentin  100 mg Oral TID    hydrocortisone  25 mg Rectal BID    metoprolol succinate  25 mg Oral Daily    nicotine  1 patch Transdermal Q24H    omalizumab  225 mg Subcutaneous Q14 Days    pantoprazole  40 mg Oral BID AC    [Held by provider] sacubitril-valsartan  1 tablet Oral BID    senna  1 tablet Oral Nightly    tiotropium  2 puff Inhalation Daily    traZODone  50 mg Oral Nightly     PRN Meds: ondansetron, promethazine, polyethylene glycol, bisacodyl, acetaminophen, cyclobenzaprine, HYDROcodone 5 mg - acetaminophen, HYDROcodone 5 mg - acetaminophen, levalbuterol, magnesium hydroxide      Intake/Output Summary (Last 24 hours) at 11/22/2020 1403  Last data filed at 11/22/2020 0314  Gross per 24 hour   Intake 340 ml   Output 1150 ml   Net -810 ml       Diet:  DIET CARDIAC; Low Sodium (2 GM); Daily Fluid Restriction: 1500 ml  Dietary Nutrition Supplements: Standard High Calorie Oral Supplement    Exam:  /76   Pulse 87   Temp 98.7 °F (37.1 °C) (Axillary)   Resp 25   Ht 5' 2\" (1.575 m)   Wt 148 lb 6.4 oz (67.3 kg)   SpO2 96%   BMI 27.14 kg/m²   General appearance: Patient is awake, alert, and in no acute distress. The patient is chronically ill-appearing. HEENT: Conjunctivae/corneas clear. Respiratory: Clear to auscultation but reduced respiratory effort. Cardiovascular: Regular rate and rhythm with normal S1/S2 without murmurs, rubs or gallops. Abdomen: Soft, non-tender, non-distended with normal bowel sounds. Capillary Refill: Brisk,< 3 seconds   Peripheral Pulses: +2 palpable, equal bilaterally   Extremities: Peripheral pitting edema right leg greater than left. Erythema and tenderness over the left lower extremity. No warmth.     Labs:   Recent Labs     11/20/20  0339  11/21/20  0435 11/21/20  1549 11/22/20  0254   WBC 5.7  --  5.9  --  5.3   HGB 7.0*   < > 7.7* 8.6* 7.8*   HCT 24.3*   < > 26.1* 29.4* 27.0*     --  139  --  149    < > = values in this interval not displayed. Recent Labs     11/20/20 0339 11/21/20 0435 11/22/20  0254    138 140   K 3.9 4.1 4.0    100 100   CO2 29 30 30   BUN 20 20 21   CREATININE 1.0 0.9 0.9   CALCIUM 8.9 8.9 9.0     No results for input(s): AST, ALT, BILIDIR, BILITOT, ALKPHOS in the last 72 hours. Recent Labs     11/20/20 0339 11/21/20 0435 11/22/20 0254   INR 1.68* 1.80* 2.31*     No results for input(s): Lorenso Favre in the last 72 hours. Microbiology:    Blood culture #1:   Lab Results   Component Value Date    BC No growth-preliminary No growth  10/25/2020       Blood culture #2:No results found for: Alex Paolo    Organism:  Lab Results   Component Value Date    ORG Mixed Growth     02/21/2020         Lab Results   Component Value Date    LABGRAM  11/26/2019     No segmented neutrophils observed. Few epithelial cells observed. Many large gram positive bacilli. Few small gram positive bacilli. Few gram negative bacilli. Prepubescent and postmenopausal female samples (<15and >47ears of age) are not typically suitable for bacterialvaginosis screening. MRSA culture only:No results found for: [address] [place] [address]    Urine culture:   Lab Results   Component Value Date    LABURIN  10/24/2019     Growth of Contaminants. The mixture of organisms present are not a common cause of urinary tract infections and probably represent distal urethral juventino.         Respiratory culture: No results found for: CULTRESP    Aerobic and Anaerobic :  Lab Results   Component Value Date    LABAERO No growth-preliminary No growth   11/11/2019     Lab Results   Component Value Date    LABANAE No growth-preliminary No growth   11/11/2019       Urinalysis:      Lab Results   Component Value Date    NITRU NEGATIVE 11/08/2020    WBCUA 0-2 11/08/2020    BACTERIA NONE SEEN 11/08/2020    RBCUA 5-10 11/08/2020    BLOODU TRACE 11/08/2020    GLUCOSEU NEGATIVE 11/08/2020       Radiology:  XR CHEST (2 VW)   Final Result   Stable chest given technical differences. Left lower lobe infiltrate and effusion. Generalized interstitial edema and cardiomegaly. **This report has been created using voice recognition software. It may contain minor errors which are inherent in voice recognition technology. **      Final report electronically signed by Dr. Chele Cruz on 11/16/2020 11:22 AM      XR CHEST PORTABLE   Final Result   Moderately worsened aeration of the left lung base. **This report has been created using voice recognition software. It may contain minor errors which are inherent in voice recognition technology. **      Final report electronically signed by Dr. Anabela Estevez on 11/15/2020 6:00 PM      XR CHEST PORTABLE   Final Result   No pneumothorax post right thoracentesis. **This report has been created using voice recognition software. It may contain minor errors which are inherent in voice recognition technology. **      Final report electronically signed by Dr. Anabela Estevez on 11/14/2020 2:46 PM      XR CHEST PORTABLE   Final Result   Impression:   Moderate CHF, increased. Bilateral lower lobe pneumonia versus subsegmental atelectasis      This document has been electronically signed by: Shannan Whatley MD on    11/13/2020 10:07 PM         XR CHEST PORTABLE    (Results Pending)   XR CHEST 1 VIEW    (Results Pending)     Xr Chest Portable    Result Date: 11/14/2020  PROCEDURE: XR CHEST PORTABLE CLINICAL INFORMATION: post thoracentesis right side. COMPARISON: 11/13/2020 TECHNIQUE: AP upright view of the chest. FINDINGS: Right pleural fluid has been evacuated. No pneumothorax. Small left pleural effusion persists. Mild congestion. No definite infiltrate. AICD is unchanged. No pneumothorax post right thoracentesis. **This report has been created using voice recognition software. It may contain minor errors which are inherent in voice recognition technology. ** Final report electronically signed by Dr. Anabela Estevez on 11/14/2020 2:46 PM    Xr Chest Portable    Result Date: 11/13/2020  Chest xray 1 view Comparison:  CR,SR  - XR CHEST PORTABLE  - 10/25/2020 03:23 PM EDT Findings: Cardiac conduction device. Moderate cardiomegaly. Hypoinflated. Moderately increased interstitial lung markings. There are space opacities notably left lower lobe. Small bilateral pleural effusions. No pneumothorax No acute fracture. Impression: Moderate CHF, increased. Bilateral lower lobe pneumonia versus subsegmental atelectasis This document has been electronically signed by: Daniel Colunga MD on 11/13/2020 10:07 PM     Us Thoracentesis    Result Date: 11/15/2020  THORACENTESIS WITH ULTRASOUND GUIDANCE: PERFORMED BY: Aurelio Monroy M.D. CLINICAL INFORMATION: Pleural effusion APPROACH: Right side, posterior, inferior FLUID WITHDRAWN: 700 mL herrera serous fluid ESTIMATED BLOOD LOSS: Minimal PROCEDURE: Signed informed consent was obtained prior to performing this procedure. The thorax was initially evaluated sonographically to determine appropriate puncture site. The skin was marked, prepped, and draped in a sterile fashion. Following local anesthesia and utilizing aseptic technique, a 5 Lithuanian one-step catheter was successfully inserted into the pleural effusion at the position indicated above. Pleural fluid in the amount was above was then aspirated and the needle was removed. The patient tolerated the procedure well. A post procedure chest radiograph will be obtained. Status post thoracentesis **This report has been created using voice recognition software. It may contain minor errors which are inherent in voice recognition technology. ** Final report electronically signed by Dr. Chuy Arteaga on 11/15/2020 7:00 AM      Support Devices (date placed):  [] ETT []Oral / [] Nasal  [] Gastric Tube [] OG / [] NG    [] Central Venous Line (Specify Site):  [] Urinary Catheter  [] Arterial Line (Specify Site)  [x] Peripheral IV access  [] Other:    DVT prophylaxis: [] Lovenox                                 [] SCDs                                 [] SQ Heparin                                 [] Encourage ambulation           [x] Already on Anticoagulation     Code Status: Full Code    Tele:   [x] yes             [] no    Electronically signed by Felicia Painter MD, MPH, [facility] on 11/22/2020 at 2:03 PM   Supervised by Dr. Felicia Painter

## 2020-11-22 NOTE — PROGRESS NOTES
Clinical Pharmacy Note    Warfarin consult follow-up    Recent Labs     11/22/20  0254   INR 2.31*     Recent Labs     11/20/20  0339  11/21/20  0435 11/21/20  1549 11/22/20  0254   HGB 7.0*   < > 7.7* 8.6* 7.8*   HCT 24.3*   < > 26.1* 29.4* 27.0*     --  139  --  149    < > = values in this interval not displayed. Significant Drug-Drug Interactions:  New warfarin drug-drug interactions: none  Discontinued drug-drug interactions: none  Current warfarin drug-drug interactions: aspirin (on hold), trazodone (HM)      Date INR Warfarin Dose   10/25-10/27   No Coumadin   10/28/2020 1.32 2 mg    10/29/2020 1.49  2 mg   10/30/2020  2  1.5 mg    10/31/2020   2.78   0.5 mg   11/1/2020   2.37  1.5 mg    11/2/2020  2.55 1 mg     11/3/2020  3.54   No Coumadin   11/4/2020  3.09  1 mg   11/5/2020 2.41 1.5 mg   11/6/2020 2.82 1 mg    11/7/2020  2.61  1.5 mg    11/8/2020   2.22                     1.5 mg   11/9/2020  2.12                      2 mg   11/10/2020 2.05  2 mg   11/11/2020  2.54  1.5 mg   11/12/2020 2.72  1.5 mg   11/13/2020 2.72 No Coumadin   11/14/2020 2.3  No Coumadin    11/15/2020 1.7  (not given)   11/16/2020   1.48 2 mg    11/17/2020   1.43 2 mg    11/18/2020   1.99 1.5 mg    11/19/2020  2.66 0.5 mg   11/20/2020   1.68   2 mg   11/21/2020 1.80 1.5 mg   11/22/2020  2.31 1.5 mg            Notes:                     Daily PT/INR until stable within therapeutic range. Patty cardenasDelaware County Memorial HospitalSintia RAMIREZ, BCPS, BCCCP 11/22/2020 11:38 AM

## 2020-11-22 NOTE — PROGRESS NOTES
Message sent to  for a Consult Request. Rachel Duarte MD was taking messages and responded with I do not go to [facility]. Dr Alfonso Scales not on. Try calling his office in the morning. Will relay this information to the nurse tomorrow and will plan to make arrangements to contact  tomorrow in his office for this Consult Request. Bryan Maxwell notified.

## 2020-11-22 NOTE — PLAN OF CARE
Problem: RESPIRATORY  Goal: Clear lung sounds  Outcome: Ongoing  Note: Txs to help improve lung aeration.

## 2020-11-23 PROBLEM — E44.0 MODERATE MALNUTRITION (HCC): Status: ACTIVE | Noted: 2020-01-01

## 2020-11-23 NOTE — PROGRESS NOTES
[address]  ICU STEPDOWN TELEMETRY 4K  Occupational Therapy  Daily Note  Time In: 9959  Time Out: 4840  Timed Code Treatment Minutes: 24 Minutes  Minutes: 24          Date: 2020  Patient Name: Quentin Parikh,   Gender: female      Room: [facility]10/010-A  MRN: [id number]  : 1945  (67 y.o.)  Referring Practitioner: Dr. Bobo Gruber  Diagnosis: physical debility  Additional Pertinent Hx: Per ER note 10/25/2020:75 y.o. female with an extensive medical history including, congestive heart failure, COPD, A. Fib., Pacemaker placement post MI, hypertension, anemia, and IBS who presents to the ED for an evaluation of severe left lower extremity pain, redness, and warmth that started yesterday evening. The patient states that yesterday morning she started feeling some discomfort to her lower extremity, but believed it was due to her compression stockings she wears for CHF. Yesterday evening she removed the stockings and saw that her left lower leg was extremely red, which has continued to spread, becoming more red, and even more painful. Pt was discharged from [facility] on 2020 back to acute d/t SOB & retaining fluid. Restrictions/Precautions:  Restrictions/Precautions: General Precautions, Fall Risk  Left Lower Extremity Weight Bearing: Weight Bearing As Tolerated  Position Activity Restriction  Other position/activity restrictions: 4 L O2 at rest, 6 L O2 with activity      SUBJECTIVE: RN okayed session, stated to watch pt's stats as she has been destating with activity. Patient on 6 L O2 via High Flow  upon arrival to room. Patient O2 sats at 94 %. Upon activity patient dropping O2 at 79% where pt was required to sit. Other than the one drop, pt's O2 maintained between 87-94%. Pt requiring 2 rest break(s) to recover and multiple VC for breathing. PAIN: Denies    COGNITION: Decreased Problem Solving and Decreased Safety Awareness    ADL:   Grooming: Contact Guard Assistance.   For short period while standing at tray table for oral care, pt required to sit after stats dropping to 79% for brief period of time. [employer] BALANCE:  Sitting Balance:  Supervision. Standing Balance: Contact Guard Assistance. Pt stood at tray table for approx 3 min for oral care at tray table. Pt required cueing to breathe to maintain O2, pt dropped to 79% where OT  had pt complete task while seated. Pt's stats quickly sara after being seated. BED MOBILITY:  Not Tested    TRANSFERS:  Sit to Stand:  Contact Guard Assistance. Stand to Sit: Contact Guard Assistance. FUNCTIONAL MOBILITY:  Assistive Device: None  Assist Level:  Contact Guard Assistance. Distance: Pt took 2 steps forward to high tray table        ADDITIONAL ACTIVITIES:  N/A    ASSESSMENT:     Activity Tolerance:  Patient tolerance of  treatment: fair. Keep close eye on O2      Discharge Recommendations: Continue to assess pending progress    Equipment Recommendations: Other: Pt owns BSC and shower chair. Pt has grab bars in the shower.   Plan: Times per week: 3-5x  Current Treatment Recommendations: Balance Training, Functional Mobility Training, Endurance Training, Safety Education & Training, Self-Care / ADL, ROM    Patient Education  Patient Education: Role of OT and ADL's and breathing to maintain O2    Goals  Short term goals  Time Frame for Short term goals: until discharge  Short term goal 1: Pt will complete various sit-stand t/fs including toilet with S & 0-2 vcs for safety  Short term goal 2: Pt will complete 2 min standing with CGA for increased ease of sinkside grooming  Short term goal 3: Pt will complete mobility to/from bathroom with RW, S, & 0-2 vcs for safety  Short term goal 4: Pt will complete UE light strengthening ex x 10 reps with HEP to increase strength/ endurance   needed for safe light IADL tasks  Long term goals  Time Frame for Long term goals : No LTG set d/t short ELOS    Following session, patient left in safe position with all fall risk precautions in place.

## 2020-11-23 NOTE — CARE COORDINATION
DISASTER CHARTING    11/23/20, 10:55 AM EST    DISCHARGE ONGOING EVALUATION:     Ariana Franco day: 10  Location: [facility]10/010-A Reason for admit: Physical debility [R53.81]   Barriers to Discharge: Hgb 7.8 this am. Pt wishes to see Dr. Victor Manuel Dias rather than José Miguel Atkins. Dr. Victor Manuel Dias not yet in to see pt. Pt went from nasal canula to [facility] over weekend. Will continue to monitor. PCP: Antonio Ulloa MD  Patient Goals/Plan/Treatment Preferences: SW/CM: Home with family who plan to stay with pt. Current [employer] for her home O2. May need new O2 eval for home. Planning [facility]. SW following.

## 2020-11-23 NOTE — PROGRESS NOTES
Progress note: Infectious diseases    Patient - Quentin Parikh,  Age - 67 y.o.    - 1945      Room Number - 4K-10/010-A   MRN -  [id number]   Acct # - [de-identified]  Date of Admission -  2020  5:45 PM    SUBJECTIVE:   She is on high flow oxygen  Denies any fever or chills. OBJECTIVE   VITALS    height is 5' 2\" (1.575 m) and weight is 148 lb 6.4 oz (67.3 kg). Her oral temperature is 98.5 °F (36.9 °C). Her blood pressure is 136/50 (abnormal) and her pulse is 84. Her respiration is 19 and oxygen saturation is 94%. Wt Readings from Last 3 Encounters:   20 148 lb 6.4 oz (67.3 kg)   20 167 lb 4.8 oz (75.9 kg)   20 159 lb (72.1 kg)       I/O (24 Hours)    Intake/Output Summary (Last 24 hours) at 2020 1610  Last data filed at 2020 0004  Gross per 24 hour   Intake 340 ml   Output 200 ml   Net 140 ml       General Appearance  Awake, alert, oriented,  Sick looking  HEENT - normocephalic, atraumatic, pale conjunctiva,  anicteric sclera  Neck - Supple, no mass  Lungs -  Bilateral   air entry, diminished breath sound, crackles at the lower lung fields  Cardiovascular - Heart sounds are normal.  Regular rate and rhythm without murmur, gallop or rub. Abdomen - soft, not distended, nontender,   Neurologic -oriented   Skin - No bruising or bleeding  Extremities - chronic leg edema, the redness has improved.     MEDICATIONS:      warfarin  1 mg Oral Once    furosemide  40 mg Oral Daily    famotidine  20 mg Oral Daily    levocetirizine  5 mg Oral Nightly    albuterol  2.5 mg Nebulization Q4H While awake    warfarin (COUMADIN) daily dosing (placeholder)   Other RX Placeholder    Arformoterol Tartrate  15 mcg Nebulization BID    [Held by provider] aspirin  81 mg Oral Daily    budesonide  500 mcg Nebulization BID    digoxin  125 mcg Oral Every Other Day    docusate sodium  100 mg Oral Daily  Evolocumab  140 mg Subcutaneous Q14 Days    gabapentin  100 mg Oral TID    hydrocortisone  25 mg Rectal BID    metoprolol succinate  25 mg Oral Daily    nicotine  1 patch Transdermal Q24H    omalizumab  225 mg Subcutaneous Q14 Days    pantoprazole  40 mg Oral BID AC    [Held by provider] sacubitril-valsartan  1 tablet Oral BID    senna  1 tablet Oral Nightly    tiotropium  2 puff Inhalation Daily    traZODone  50 mg Oral Nightly       ondansetron, promethazine, polyethylene glycol, bisacodyl, acetaminophen, cyclobenzaprine, HYDROcodone 5 mg - acetaminophen, HYDROcodone 5 mg - acetaminophen, levalbuterol, magnesium hydroxide      LABS:     CBC:   Recent Labs     11/21/20  0435 11/21/20  1549 11/22/20 0254 11/23/20 0443   WBC 5.9  --  5.3 5.4   HGB 7.7* 8.6* 7.8* 7.8*     --  149 160     BMP:    Recent Labs     11/21/20 0435 11/22/20 0254 11/23/20 0443    140 140   K 4.1 4.0 4.0    100 98   CO2 30 30 29   BUN 20 21 28*   CREATININE 0.9 0.9 1.1   GLUCOSE 104 96 103     Calcium:  Recent Labs     11/23/20 0443   CALCIUM 8.8     Ionized Calcium:No results for input(s): IONCA in the last 72 hours. Magnesium:  Recent Labs     11/22/20 0254   MG 2.0     Phosphorus:No results for input(s): PHOS in the last 72 hours. BNP:No results for input(s): BNP in the last 72 hours. Glucose:  Recent Labs     11/22/20  1153   POCGLU 118*     HgbA1C: No results for input(s): LABA1C in the last 72 hours. INR:   Recent Labs     11/21/20  0435 11/22/20 0254 11/23/20 0443   INR 1.80* 2.31* 2.77*        CULTURES:   UA: No results for input(s): SPECGRAV, PHUR, COLORU, CLARITYU, MUCUS, PROTEINU, BLOODU, RBCUA, WBCUA, BACTERIA, NITRU, GLUCOSEU, BILIRUBINUR, UROBILINOGEN, KETUA, LABCAST, LABCASTTY, AMORPHOS in the last 72 hours.     Invalid input(s): CRYSTALS  Micro:   Lab Results   Component Value Date    BC No growth-preliminary No growth  10/25/2020          Problem list of patient:     Patient Active Problem List   Diagnosis Code    MI (myocardial infarction) ([facility] [address] I21.9    Moderate COPD (chronic obstructive pulmonary disease) ([facility]) J44.9    Hyperlipidemia E78.5    Hypertension I10    Seasonal allergic rhinitis J30.2    Heartburn R12    Persistent atrial fibrillation ([facility]) L44.58    Systolic CHF, acute on chronic ([facility]) I50.23    Biventricular implantable cardioverter-defibrillator in situ Z95.810    Anticoagulated on Coumadin Z79.01    Arteriosclerosis of coronary artery I25.10    Left displaced femoral neck fracture ([facility]) S72.002A    Closed head injury S09.90XA    Class 1 obesity due to excess calories with serious comorbidity and body mass index (BMI) of 30.0 to 30.9 in adult E66.09, Z68.30    CKD (chronic kidney disease) stage 2, GFR 60-89 ml/min N18.2    S/p left hip fracture Z87.81    Chronic systolic CHF (congestive heart failure) ([facility]) I50.22    IBS (irritable bowel syndrome) K58.9    Acute blood loss as cause of postoperative anemia D62    Atelectasis of left lung J98.11    Insomnia G47.00    Chest pain R07.9    Rectal bleeding K62.5    Acute on chronic anemia D64.9    Vaginitis N76.0    Digitalis toxicity T46.0X1A    GI bleed K92.2    Coagulopathy ([facility]) D68.9    Bilateral leg edema R60.0    Azotemia R79.89    Hypoalbuminemia E88.09    Urinary tract infection with hematuria N39.0, R31.9    Septic shock ([facility]) A41.9, R65.21    Pacemaker generator end of life Z45.010    Necrotizing fasciitis ([facility]) M72.6    Left leg pain M79.605    Cellulitis of left lower extremity L03. 116    Physical deconditioning R53.81    Physical debility R53.81    Interstitial pneumonia ([facility]) J84.9    Thrush B37.0    Chronic iron deficiency anemia D50.9    Anxiety F41.9    Permanent atrial fibrillation ([facility]) N67.81    Acute systolic CHF (congestive heart failure) ([facility]) I50.21    Pleural effusion, bilateral J90    Hemoptysis R04.2    Acute respiratory failure with hypoxia ([facility]) J96.01    Moderate malnutrition ([facility]) E44.0         ASSESSMENT/PLAN   Shortness of breath due to COPD/fibrosis  CHF  Hx of left leg cellulites: treated  Neuropathic pain:   Continue current treatment  Unk Sandhoff, MD, FACP 11/23/2020 4:10 PM

## 2020-11-23 NOTE — PROGRESS NOTES
to bear weight at this time, and denies numbness or tingling to the lower extremities, diabetes mellitus, previous lower leg infections, chest pain, abdominal pain, changes in her bowel or bladder function or habits. Patient still smokes 1/2 a pack of cigarettes daily and is on home oxygen. No alcohol or illicit drug use. \"     Prior Level of Function:  Lives With: Spouse  Type of Home: House  Home Layout: One level  Home Access: Stairs to enter with rails  Entrance Stairs - Number of Steps: 4  Entrance Stairs - Rails: Both(too wide to use at same time)  Home Equipment: Rolling walker, 4 wheeled walker, [employer]   Bathroom Shower/Tub: Walk-in shower, Shower chair with back  [employer]: Bedside commode  Bathroom Equipment: Grab bars in shower, Hand-held shower, 3-in-1 commode  Bathroom Accessibility: Accessible    Receives Help From: Family  ADL Assistance: [address] [place] [address] Avenue: Needs assistance  Homemaking Responsibilities: No  Ambulation Assistance: Independent  Transfer Assistance: Independent  Active : Yes  Additional Comments: Pt reports that her spouse assists with IADL tasks, Pt completes own self care . Spouse does have to assist with compression stockings. Restrictions/Precautions:  Restrictions/Precautions: General Precautions, Fall Risk  Left Lower Extremity Weight Bearing: Weight Bearing As Tolerated  Position Activity Restriction  Other position/activity restrictions: 4 L O2 at rest, 6 L O2 with activity 11/23 Pt now on heated high flow O2 25 lpm / 50%    SUBJECTIVE: Pt sitting up in room chair and agrees to therapy.     PAIN: not reported       OBJECTIVE:  Bed Mobility:  Not Tested    Transfers:  Sit to Stand: Contact Guard Assistance  Stand to Sit:Stand By Assistance    Ambulation:  Not Tested     Balance:  Static Sitting Balance:  Modified Independent  Dynamic Sitting Balance: Supervision  Static Standing Balance: Stand By Assistance, Contact Guard progress to home and community mobility  Short term goal 4: Pt to negotiate 4 steps with HR and CGA to enter home safely  Long term goals  Time Frame for Long term goals : not set due to short ELOS    Following session, patient left in safe position with all fall risk precautions in place. Angela Marroquin.  Ruby Ruby, Anne-Marie [place] [address]

## 2020-11-23 NOTE — PROGRESS NOTES
Hospitalist Progress Note      Patient:  Sammy Jorgensen    Unit/Bed:4K-10/010-A  YOB: 1945  MRN: [id number]   Acct: [de-identified]   PCP: Chel Crisostomo MD  Date of Admission: 11/13/2020    Assessment/Plan:    1. Acute on chronic hypoxic respiratory failure likely secondary to pleural effusions and acute systolic CHF exacerbation-worsening.    +2 stable pitting edema left leg greater than right. The patient has minimal dyspnea at rest on 50% FiO2 and 25 L/min of oxygen on high flow nasal cannula. Need for high flow nasal cannula as primary barrier to discharge. Chest x-ray findings increased bilateral pleural effusions, increased densities at both lung bases, and more pulmonary vascularity, per radiology. This combined with need for high flow nasal cannula indicates likely decompensation secondary to pulmonary edema. We will switch from Lasix 40 mg twice daily oral to Lasix 40 mg twice daily IV. Maintain on digoxin 125 mcg every other day, Evolocumab 140 subcutaneous every 14 days, furosemide 40 mg twice daily, and Toprol-XL 25 mg twice daily. Hold aspirin secondary to hemoptysis. Patient is allergic to statin so do not use. We will follow-up with PCP, cardiology, and pulmonology as an outpatient. 2.  Chronic atrial fibrillation-stable. The patient has a ARIANNA(2)DS(2)-VASc of 6. The patient is being treated with Coumadin. Patient's hemoglobin has remained above 7. Therefore, we will maintain Coumadin for now. Monitor with daily CBC and INR. Current INR 2.77.      3.  Hemoptysis, unspecified-resolved. Patient states she has not had hemoptysis for several days. Patient has a history of hemoptysis of unknown cause. Bronchoscopy could possibly provide diagnostic information.   However, we would not recommend this as the bronchoscopy would be a considerable risk and would likely not change her long-term care management plan per CHF and advanced COPD. Will address as described above for chronic A. Fib. 4.  Anemia, unspecified-stable. The patient reported a longstanding history of anemia. This may be secondary to blood loss from hemoptysis described above. However, the patient has a macrocytic profile with an MCV of 108. 2. Recent high ferritin on 11/3/2020, high iron, and normal total iron-binding capacity makes this diagnosis less likely. Acute bleed could still be the cause or contributor to this anemia, this would not necessarily present as the \"classic\" iron deficiency profile. Normal folate and vitamin B12 makes nutritional anemia less likely cause of macrocytic anemia. The patient continues to have fluctuating hemoglobin. Consider anemia of chronic disease. 5.  Left lower extremity cellulitis treated on previous admission-worsening    The patient is amenable to 4 hours on and 4 hours off of wound dressing per ID. 6.  Coronary artery disease-stable. Treat as described above for CHF and A. Fib. 7.  Benign essential hypertension-stable. Treat as described above for CHF and A. Fib.    8.  Hyperlipidemia, unspecified-stable. Treat with evolocumab as described above. 9.  Chronic obstructive pulmonary disease asthma component, unspecified-stable. Contributing to acute on chronic hypoxic respiratory failure as above. Maintain patient on albuterol nebulizer, Spiriva Respimat, Brovana, and Pulmicort. Xolair cannot be given inpatient. Follow-up with outpatient pulmonology.       Expected discharge date: Unknown    Disposition:    [x] Home       [] TCU       [] Rehab       [] Psych       [] SNF       [] [facility]       [] Other-    Chief Complaint: Hypoxia and Hemoptysis    Opening statement:     The patient is a 58-year-old female with a complex past medical history with an old MI, long-term anticoagulation with Coumadin for atrial fibrillation, kidney stone, prophylaxis and Protonix for GI prophylaxis. Patient wants to go home with family. 11/18/2020:    Nursing report: Patient is still on 5 L, with plans to wean down. Patient been getting up from the chair to her bed. Little subjective dyspnea, however she dropped from a pulse ox of 95% to 89%. Hemoglobin has been stable at 7.2. The patient was 95 to 97% throughout the night. The patient did not need BiPAP during the night. The patient states that her breathing is doing much better than before. She affirms improvement with diuresis and with thoracentesis for drainage. However, the patient is still dyspneic with exertion. She affirms slight hemoptysis with cough productive of pinkish to very light brown sputum. Long discussion with the patient about long-term prognosis and goals. The patient particularly noted that she want to live long enough to see her granddaughter's high school graduation in May. 11/19/2020:    Nursing report: The patient is doing well. She slept through the night. INR is 2.66. The patient had dinner. Her oxygenation was good. Hemoglobin is 7.0. The patient states that her breathing is doing better. She affirms slight ongoing chest pain and \"indigestion. \"  She affirms productive cough with a little bit of blood. The patient and her daughter were extensively counseled with regards to risk of bleeding with anticoagulation. Otherwise, no new specific complaints or concerns. 11/20/2020:    Nursing report: Patient doing well. On 4 L. Shortness of breath with activity. Bowel movement. Eating overnight. The patient states she is feeling okay. However, notes worsening left lower limb pain. \"Indigestion\" is resolved. Patient attributes resolution to no longer eating hospital food and getting outside food. Affirms minimal dyspnea at rest but significant dyspnea with exertion and excessive talking. Affirms coughing up \"a little bit of blood. \"    Otherwise, no new specific complaints or concerns. 11/21/2020:    Dr. Tarun Bloom was consulted. He declined to see the patient in the hospital, but ordered a unit of blood to be followed by 20 mg of Lasix and then a new H&H. However, as the patient's hemoglobin was above 7, we decided to cancel the order. Nursing report: \"Was not too bad. \"  ID saw the patient who recommended compression wraps 4 hours on and 4 hours off. However, the patient says she did not want wraps. On 4 L of oxygen at rest and 6 with activity. Patient desaturated to 77% overnight with activity and the patient seemed to take longer than before to recover, per nurse report. Patient states that she feels about the same but nurse feels that she is doing worse. Patient slept well but did not eat much. She had no bowel movement. The patient states she feels okay. She has no shortness of breath at rest but does have shortness of breath on exertion. She believes that the shortness of breath on exertion is unchanged from previous. Patient affirms reduced appetite and no recent bowel movement. The patient states that she was upset yesterday because during the day she needed help using the restroom to go urinate. The nurse did not help her and turned off her call light for about half an hour. The patient was subsequently incontinent of urine. The patient was reassured that we would talk to nursing today. Affirms ongoing slight hemoptysis. 11/22/2020:    Patient is more short of breath in the morning with oxygen saturation down to the mid 80s on 4 L by nasal cannula. Patient was placed on high flow BiPAP. Dr. Purnima Jay was consulted and chest x-ray was ordered. Subjective (past 24 hours):     Nursing report: Nursing states the patient was doing great, with no overnight desaturations and FiO2 of 50% and a flow rate of 25 L/min. The patient has been eating and did have a bowel movement.     On presentation, the patient affirmed that her breathing was doing better. The patient was on a high flow heated nasal cannula. The patient stated that this machine helped her breathe a lot better. Though the patient states she would like to take this machine at home, she verbalizes understanding that she cannot do so. The patient denies chest pain and abdominal pain. Affirms dyspnea with activity. Patient reiterated that she wanted Dr. Mohini Khan to see her. Also verbalized frustration with the nursing staff. Otherwise, no new specific complaints or concerns. Past medical history, family history, social history and allergies reviewed again and is unchanged since admission. ROS (12 point review of systems completed. Pertinent positives noted.  Otherwise ROS is negative)     Medications:  Reviewed    Infusion Medications     Scheduled Medications    warfarin  1 mg Oral Once    furosemide  40 mg Intravenous BID    famotidine  20 mg Oral Daily    levocetirizine  5 mg Oral Nightly    albuterol  2.5 mg Nebulization Q4H While awake    warfarin (COUMADIN) daily dosing (placeholder)   Other RX Placeholder    Arformoterol Tartrate  15 mcg Nebulization BID    [Held by provider] aspirin  81 mg Oral Daily    budesonide  500 mcg Nebulization BID    digoxin  125 mcg Oral Every Other Day    docusate sodium  100 mg Oral Daily    Evolocumab  140 mg Subcutaneous Q14 Days    gabapentin  100 mg Oral TID    hydrocortisone  25 mg Rectal BID    metoprolol succinate  25 mg Oral Daily    nicotine  1 patch Transdermal Q24H    omalizumab  225 mg Subcutaneous Q14 Days    pantoprazole  40 mg Oral BID AC    [Held by provider] sacubitril-valsartan  1 tablet Oral BID    senna  1 tablet Oral Nightly    tiotropium  2 puff Inhalation Daily    traZODone  50 mg Oral Nightly     PRN Meds: ondansetron, promethazine, polyethylene glycol, bisacodyl, acetaminophen, cyclobenzaprine, HYDROcodone 5 mg - acetaminophen, HYDROcodone 5 mg - acetaminophen, levalbuterol, magnesium hydroxide      Intake/Output Summary (Last 24 hours) at 11/23/2020 1127  Last data filed at 11/23/2020 0004  Gross per 24 hour   Intake 340 ml   Output 200 ml   Net 140 ml       Diet:  DIET CARDIAC; Low Sodium (2 GM); Daily Fluid Restriction: 1500 ml  Dietary Nutrition Supplements: Standard High Calorie Oral Supplement    Exam:  BP (!) 132/54   Pulse 90   Temp 97.5 °F (36.4 °C) (Axillary)   Resp 21   Ht 5' 2\" (1.575 m)   Wt 148 lb 6.4 oz (67.3 kg)   SpO2 96%   BMI 27.14 kg/m²   General appearance: Patient is awake, alert, and in no acute distress. The patient is chronically ill-appearing. HEENT: Conjunctivae/corneas clear. Respiratory: Clear to auscultation but reduced respiratory effort. Cardiovascular: Regular rate and rhythm with normal S1/S2 without murmurs, rubs or gallops. Abdomen: Soft, non-tender, non-distended with normal bowel sounds. Capillary Refill: Brisk,< 3 seconds   Peripheral Pulses: +2 palpable pitting pedal edema  Extremities: Peripheral pitting edema left leg greater than right. Erythema and tenderness over the left lower extremity. No warmth. Labs:   Recent Labs     11/21/20 0435 11/21/20  1549 11/22/20 0254 11/23/20 0443   WBC 5.9  --  5.3 5.4   HGB 7.7* 8.6* 7.8* 7.8*   HCT 26.1* 29.4* 27.0* 26.1*     --  149 160     Recent Labs     11/21/20 0435 11/22/20 0254 11/23/20 0443    140 140   K 4.1 4.0 4.0    100 98   CO2 30 30 29   BUN 20 21 28*   CREATININE 0.9 0.9 1.1   CALCIUM 8.9 9.0 8.8     No results for input(s): AST, ALT, BILIDIR, BILITOT, ALKPHOS in the last 72 hours. Recent Labs     11/21/20 0435 11/22/20 0254 11/23/20 0443   INR 1.80* 2.31* 2.77*     No results for input(s): CKTOTAL, TROPONINI in the last 72 hours.     Microbiology:    Blood culture #1:   Lab Results   Component Value Date    BC No growth-preliminary No growth  10/25/2020       Blood culture #2:No results found for: BLOODCULT2    Organism:  Lab Results   Component Value Date    ORG Mixed Growth     02/21/2020         Lab Results   Component Value Date    LABGRAM  11/26/2019     No segmented neutrophils observed. Few epithelial cells observed. Many large gram positive bacilli. Few small gram positive bacilli. Few gram negative bacilli. Prepubescent and postmenopausal female samples (<15and >47ears of age) are not typically suitable for bacterialvaginosis screening. MRSA culture only:No results found for: [facility] [place] [place]    Urine culture:   Lab Results   Component Value Date    LABURIN  10/24/2019     Growth of Contaminants. The mixture of organisms present are not a common cause of urinary tract infections and probably represent distal urethral juventino. Respiratory culture: No results found for: CULTRESP    Aerobic and Anaerobic :  Lab Results   Component Value Date    LABAERO No growth-preliminary No growth   11/11/2019     Lab Results   Component Value Date    LABANAE No growth-preliminary No growth   11/11/2019       Urinalysis:      Lab Results   Component Value Date    NITRU NEGATIVE 11/08/2020    WBCUA 0-2 11/08/2020    BACTERIA NONE SEEN 11/08/2020    RBCUA 5-10 11/08/2020    BLOODU TRACE 11/08/2020    GLUCOSEU NEGATIVE 11/08/2020       Radiology:  XR CHEST PORTABLE   Final Result   1. Interval deterioration since previous study dated November 16, 2020 with increasing infiltrate and effusion at the right lung base. 2. Mild cardiomegaly status post pacemaker placement. .               **This report has been created using voice recognition software. It may contain minor errors which are inherent in voice recognition technology. **      Final report electronically signed by DR Jayla Pfeiffer on 11/22/2020 2:26 PM      XR CHEST (2 VW)   Final Result   Stable chest given technical differences. Left lower lobe infiltrate and effusion. Generalized interstitial edema and cardiomegaly. **This report has been created using voice recognition software.  It may contain minor errors which are inherent in voice recognition technology. **      Final report electronically signed by Dr. Vincent Montiel on 11/16/2020 11:22 AM      XR CHEST PORTABLE   Final Result   Moderately worsened aeration of the left lung base. **This report has been created using voice recognition software. It may contain minor errors which are inherent in voice recognition technology. **      Final report electronically signed by Dr. Juno Cormier on 11/15/2020 6:00 PM      XR CHEST PORTABLE   Final Result   No pneumothorax post right thoracentesis. **This report has been created using voice recognition software. It may contain minor errors which are inherent in voice recognition technology. **      Final report electronically signed by Dr. Juno Cormier on 11/14/2020 2:46 PM      XR CHEST PORTABLE   Final Result   Impression:   Moderate CHF, increased. Bilateral lower lobe pneumonia versus subsegmental atelectasis      This document has been electronically signed by: Teresa Quijano MD on    11/13/2020 10:07 PM         XR CHEST 1 VIEW    (Results Pending)     Xr Chest Portable    Result Date: 11/14/2020  PROCEDURE: XR CHEST PORTABLE CLINICAL INFORMATION: post thoracentesis right side. COMPARISON: 11/13/2020 TECHNIQUE: AP upright view of the chest. FINDINGS: Right pleural fluid has been evacuated. No pneumothorax. Small left pleural effusion persists. Mild congestion. No definite infiltrate. AICD is unchanged. No pneumothorax post right thoracentesis. **This report has been created using voice recognition software. It may contain minor errors which are inherent in voice recognition technology. ** Final report electronically signed by Dr. Juno Cormier on 11/14/2020 2:46 PM    Xr Chest Portable    Result Date: 11/13/2020  Chest xray 1 view Comparison:  CR,SR  - XR CHEST PORTABLE  - 10/25/2020 03:23 PM EDT Findings: Cardiac conduction device. Moderate cardiomegaly. Hypoinflated. Moderately increased interstitial lung markings. There are space opacities notably left lower lobe. Small bilateral pleural effusions. No pneumothorax No acute fracture. Impression: Moderate CHF, increased. Bilateral lower lobe pneumonia versus subsegmental atelectasis This document has been electronically signed by: Kindra Simpson MD on 11/13/2020 10:07 PM     Us Thoracentesis    Result Date: 11/15/2020  THORACENTESIS WITH ULTRASOUND GUIDANCE: PERFORMED BY: Ladonna Rivero M.D. CLINICAL INFORMATION: Pleural effusion APPROACH: Right side, posterior, inferior FLUID WITHDRAWN: 700 mL herrera serous fluid ESTIMATED BLOOD LOSS: Minimal PROCEDURE: Signed informed consent was obtained prior to performing this procedure. The thorax was initially evaluated sonographically to determine appropriate puncture site. The skin was marked, prepped, and draped in a sterile fashion. Following local anesthesia and utilizing aseptic technique, a 5 Vincentian one-step catheter was successfully inserted into the pleural effusion at the position indicated above. Pleural fluid in the amount was above was then aspirated and the needle was removed. The patient tolerated the procedure well. A post procedure chest radiograph will be obtained. Status post thoracentesis **This report has been created using voice recognition software. It may contain minor errors which are inherent in voice recognition technology. ** Final report electronically signed by Dr. Bernardo Villa on 11/15/2020 7:00 AM      Support Devices (date placed):  [] ETT []Oral / [] Nasal  [] Gastric Tube [] OG / [] NG    [] Central Venous Line (Specify Site):  [] Urinary Catheter  [] Arterial Line (Specify Site)  [x] Peripheral IV access  [] Other:    DVT prophylaxis: [] Lovenox                                 [] SCDs                                 [] SQ Heparin                                 [] Encourage ambulation           [x] Already on Anticoagulation     Code Status: Limited    Tele:   [x] yes             [] no    Electronically signed by Arlena Blizzard, MD, MPH, [facility] on 11/23/2020 at 11:27 AM   Supervised by Dr. Maria Eugenia Montero

## 2020-11-23 NOTE — PROGRESS NOTES
Clinical Pharmacy Note    Warfarin consult follow-up    Recent Labs     11/23/20  0443   INR 2.77*     Recent Labs     11/21/20  0435 11/21/20  1549 11/22/20  0254 11/23/20  0443   HGB 7.7* 8.6* 7.8* 7.8*   HCT 26.1* 29.4* 27.0* 26.1*     --  149 160       Significant Drug-Drug Interactions:  New warfarin drug-drug interactions: none  Discontinued drug-drug interactions: none  Current warfarin drug-drug interactions: aspirin (on hold), trazodone (HM)      Date INR Warfarin Dose   10/25-10/27   No Coumadin   10/28/2020 1.32 2 mg    10/29/2020 1.49  2 mg   10/30/2020  2  1.5 mg    10/31/2020   2.78   0.5 mg   11/1/2020   2.37  1.5 mg    11/2/2020  2.55 1 mg     11/3/2020  3.54   No Coumadin   11/4/2020  3.09  1 mg   11/5/2020 2.41 1.5 mg   11/6/2020 2.82 1 mg    11/7/2020  2.61  1.5 mg    11/8/2020   2.22                     1.5 mg   11/9/2020  2.12                      2 mg   11/10/2020 2.05  2 mg   11/11/2020  2.54  1.5 mg   11/12/2020 2.72  1.5 mg   11/13/2020 2.72 No Coumadin   11/14/2020 2.3  No Coumadin    11/15/2020 1.7  (not given)   11/16/2020   1.48 2 mg    11/17/2020   1.43 2 mg    11/18/2020   1.99 1.5 mg    11/19/2020  2.66 0.5 mg   11/20/2020   1.68   2 mg   11/21/2020 1.80 1.5 mg   11/22/2020  2.31 1.5 mg   11/22/2020   2.77 1 mg         Notes:                     Daily PT/INR until stable within therapeutic range.      Electronically signed by Praveena Iglesias [facility] on 11/23/2020 at 10:48 AM

## 2020-11-23 NOTE — PLAN OF CARE
Problem: RESPIRATORY  Goal: Clear lung sounds  Outcome: Ongoing     Problem: RESPIRATORY  Goal: Effective breathing pattern  Outcome: Ongoing    Improve breath sounds, increase aeration and decrease WOB.

## 2020-11-23 NOTE — PROGRESS NOTES
Measures:  · Height: 5' 2\" (157.5 cm)  · Current Body Weight: 148 lb 6.4 oz (67.3 kg)(11/21; +3 pitting BLE; +trace edema BUE)   · Admission Body Weight: 150 lb 14.4 oz (68.4 kg)(11/16;)    · Usual Body Weight: (Per EMR: 167 lb 4.8 oz on 11/3/20; 159 lb on 10/25/20; 144 lb 12.8 oz on 2/21/20; 152 lb 12.8 oz on 11/26/19)     · Ideal Body Weight: 110 lbs  · BMI: 27.1  · BMI Categories: Overweight (BMI 25.0-29. 9)       Nutrition Diagnosis:   · Moderate malnutrition, In context of acute illness or injury related to inadequate protein-energy intake as evidenced by mild loss of subcutaneous fat, mild muscle loss(pt consuming 50% or less of meals x10 days since admission)    Nutrition Interventions:   Food and/or Nutrient Delivery:  Continue Current Diet, Start Oral Nutrition Supplement, Vitamin Supplement  Nutrition Education/Counseling:  Education initiated(Encouraged po intake of meals at best effort)   Coordination of Nutrition Care:  Continue to monitor while inpatient    Goals:  Pt will consume 75% or more of meals during LOS       Nutrition Monitoring and Evaluation:   Behavioral-Environmental Outcomes:  None Identified   Food/Nutrient Intake Outcomes:  Food and Nutrient Intake, Supplement Intake, Vitamin/Mineral Intake  Physical Signs/Symptoms Outcomes:  Biochemical Data, GI Status, Fluid Status or Edema, Skin, Weight, Nutrition Focused Physical Findings     Discharge Planning:     Too soon to determine     Electronically signed by Gustavo Corona RD, LD on 11/23/20 at 2:28 PM EST    Contact: [phone]

## 2020-11-24 NOTE — PROGRESS NOTES
Hospitalist Progress Note      Patient:  Talya Dumont    Unit/Bed:4K-10/010-A  YOB: 1945  MRN: [id number]   Acct: [de-identified]   PCP: Alba Fulton MD  Date of Admission: 11/13/2020    Assessment/Plan:    1. Acute on chronic hypoxic respiratory failure likely secondary to pleural effusions and acute systolic CHF exacerbation-worsening.    +2 stable pitting edema left leg greater than right. The patient has minimal dyspnea at rest on 40% FiO2 and 25 L/min of oxygen on high flow nasal cannula. Need for high flow nasal cannula is the primary barrier to discharge. Patient was maintained on oral Lasix due to inability to obtain IV. Will obtain midline and provide IV Lasix. Maintain on digoxin 125 mcg every other day, Evolocumab 140 subcutaneous every 14 days, furosemide 40 mg twice daily, and Toprol-XL 25 mg twice daily. Hold aspirin secondary to hemoptysis. Patient is allergic to statin so do not use. We will follow-up with PCP, cardiology, and pulmonology as an outpatient. We will evaluate patient for discharge to [place]. 2.  Chronic atrial fibrillation-stable. The patient has a ARIANNA(2)DS(2)-VASc of 6. The patient is being treated with Coumadin. Patient's hemoglobin has remained above 7. Therefore, we will maintain Coumadin for now. Monitor with daily CBC and INR. Current INR 2.89.      3.  Hemoptysis, unspecified-resolved. Patient states she has not had hemoptysis for several days. Patient has a history of hemoptysis of unknown cause. Bronchoscopy could possibly provide diagnostic information. However, we would not recommend this as the bronchoscopy would be a considerable risk and would likely not change her long-term care management plan per CHF and advanced COPD. Will address as described above for chronic A. Fib. 4.  Anemia, unspecified-stable.     The patient reported a longstanding history of anemia. This may be secondary to blood loss from hemoptysis described above. However, the patient has a macrocytic profile with an MCV of 108. 2. Recent high ferritin on 11/3/2020, high iron, and normal total iron-binding capacity makes this diagnosis less likely. Acute bleed could still be the cause or contributor to this anemia, this would not necessarily present as the \"classic\" iron deficiency profile. Normal folate and vitamin B12 makes nutritional anemia less likely cause of macrocytic anemia. The patient continues to have fluctuating hemoglobin. Consider anemia of chronic disease. 5.  Left lower extremity cellulitis treated on previous admission-worsening    The patient is amenable to 4 hours on and 4 hours off of wound dressing per ID. 6.  Coronary artery disease-stable. Treat as described above for CHF and A. Fib. 7.  Benign essential hypertension-stable. Treat as described above for CHF and A. Fib.    8.  Hyperlipidemia, unspecified-stable. Treat with evolocumab as described above. 9.  Chronic obstructive pulmonary disease asthma component, unspecified-stable. Contributing to acute on chronic hypoxic respiratory failure as above. Maintain patient on albuterol nebulizer, Spiriva Respimat, Brovana, and Pulmicort. Xolair cannot be given inpatient. Follow-up with outpatient pulmonology.       Expected discharge date: Unknown    Disposition:    [x] Home       [] TCU       [] Rehab       [] Psych       [] SNF       [] [place]       [] Other-    Chief Complaint: Hypoxia and Hemoptysis    Opening statement:     The patient is a 21-year-old female with a complex past medical history with an old MI, long-term anticoagulation with Coumadin for atrial fibrillation, kidney stone, hypertension, hyperlipidemia, history of blood clots, GERD, CAD with biventricular pacemaker, frequent UTI, COPD, systolic CHF, coronary artery disease, recent hospitalization for lower extremity cellulitis, and current smoker who presents with a chief complaint of hypoxia and hemoptysis. Hospital Treatment Course:     11/13/2020-11/17/2020:    Patient was diagnosed with acute on chronic respiratory failure likely secondary to pleural effusions and acute systolic heart failure. Cause of hemoptysis unclear.     The patient was initially transferred to [facility].     Rapid was called om the patient on 11/13/2020 secondary to hypoxia, tachypnea and hypertension after transfer from [facility] to [facility]. Patient was placed on BiPAP which dramatically improved her presentation. The patient was then transferred to [facility].     Patient was evaluated on the same day for chest pain and shortness of breath. An EKG found no change but a mild troponin elevation was noted. Pain is likely secondary to dyspepsia or GERD.     Pulmonology was consulted for hemoptysis and cardiology was consulted for hemoptysis, possible watchman implantation, and management of Coumadin.     The patient's Coumadin was initially held and her supratherapeutic INR was corrected. However, the patient was placed back on Coumadin as she was concerned for stroke secondary to A. fib. The patient was counseled with regards to the risks from Coumadin.     Thoracentesis was obtained on 11/14/2020 with 700 cc of fluid removed. Pleural fluid cytology showed no significant findings. Patient was maintained on Pulmicort 200 mcg via neb twice daily, Brovana 50 mcg by nebulization twice daily, Spiriva Respimat 2 puffs daily, albuterol nebs every 4 hours while awake, and Xopenex 0.60 mg by nebulization every 8 hours as needed. Also maintained on Acapella every 4 hours. Oxygen titrated to greater than 90%. Baseline is 2 L at home. Patient maintained on Coumadin for VTE prophylaxis and Protonix for GI prophylaxis. Patient wants to go home with family. 11/18/2020:    Nursing report: Patient is still on 5 L, with plans to wean down.   Patient been getting up from the chair to her bed. Little subjective dyspnea, however she dropped from a pulse ox of 95% to 89%. Hemoglobin has been stable at 7.2. The patient was 95 to 97% throughout the night. The patient did not need BiPAP during the night. The patient states that her breathing is doing much better than before. She affirms improvement with diuresis and with thoracentesis for drainage. However, the patient is still dyspneic with exertion. She affirms slight hemoptysis with cough productive of pinkish to very light brown sputum. Long discussion with the patient about long-term prognosis and goals. The patient particularly noted that she want to live long enough to see her granddaughter's high school graduation in May. 11/19/2020:    Nursing report: The patient is doing well. She slept through the night. INR is 2.66. The patient had dinner. Her oxygenation was good. Hemoglobin is 7.0. The patient states that her breathing is doing better. She affirms slight ongoing chest pain and \"indigestion. \"  She affirms productive cough with a little bit of blood. The patient and her daughter were extensively counseled with regards to risk of bleeding with anticoagulation. Otherwise, no new specific complaints or concerns. 11/20/2020:    Nursing report: Patient doing well. On 4 L. Shortness of breath with activity. Bowel movement. Eating overnight. The patient states she is feeling okay. However, notes worsening left lower limb pain. \"Indigestion\" is resolved. Patient attributes resolution to no longer eating hospital food and getting outside food. Affirms minimal dyspnea at rest but significant dyspnea with exertion and excessive talking. Affirms coughing up \"a little bit of blood. \"    Otherwise, no new specific complaints or concerns. 11/21/2020:    Dr. Kenyatta Elaine was consulted.   He declined to see the patient in the hospital, but ordered a unit of blood to be followed by 20 mg of Lasix and then a new H&H. However, as the patient's hemoglobin was above 7, we decided to cancel the order. Nursing report: \"Was not too bad. \"  ID saw the patient who recommended compression wraps 4 hours on and 4 hours off. However, the patient says she did not want wraps. On 4 L of oxygen at rest and 6 with activity. Patient desaturated to 77% overnight with activity and the patient seemed to take longer than before to recover, per nurse report. Patient states that she feels about the same but nurse feels that she is doing worse. Patient slept well but did not eat much. She had no bowel movement. The patient states she feels okay. She has no shortness of breath at rest but does have shortness of breath on exertion. She believes that the shortness of breath on exertion is unchanged from previous. Patient affirms reduced appetite and no recent bowel movement. The patient states that she was upset yesterday because during the day she needed help using the restroom to go urinate. The nurse did not help her and turned off her call light for about half an hour. The patient was subsequently incontinent of urine. The patient was reassured that we would talk to nursing today. Affirms ongoing slight hemoptysis. 11/22/2020:    Patient is more short of breath in the morning with oxygen saturation down to the mid 80s on 4 L by nasal cannula. Patient was placed on high flow BiPAP. Dr. Gilma Reid was consulted and chest x-ray was ordered. 11/23/2020:    Nursing report: Nursing states the patient was doing great, with no overnight desaturations and FiO2 of 50% and a flow rate of 25 L/min. The patient has been eating and did have a bowel movement. On presentation, the patient affirmed that her breathing was doing better. The patient was on a high flow heated nasal cannula. The patient stated that this machine helped her breathe a lot better.   Though the patient states she would like to take this machine at home, she verbalizes understanding that she cannot do so. The patient denies chest pain and abdominal pain. Affirms dyspnea with activity. Patient reiterated that she wanted Dr. Mary Galan to see her. Also verbalized frustration with the nursing staff. Otherwise, no new specific complaints or concerns. Subjective (past 24 hours): Stable dyspnea. Denies chest pain, fever, and abdominal pain. Patient asked about consult with her outpatient pulmonologist.  The patient is counseled and attempt was made to contact him,  but that he was out of the office. Otherwise, no new specific complaints or concerns. Past medical history, family history, social history and allergies reviewed again and is unchanged since admission. ROS (12 point review of systems completed. Pertinent positives noted.  Otherwise ROS is negative)     Medications:  Reviewed    Infusion Medications     Scheduled Medications    lidocaine 1 % injection  5 mL Intradermal Once    sodium chloride flush  10 mL Intravenous 2 times per day    furosemide  40 mg Intravenous BID    warfarin  0.5 mg Oral Once    famotidine  20 mg Oral Daily    levocetirizine  5 mg Oral Nightly    albuterol  2.5 mg Nebulization Q4H While awake    warfarin (COUMADIN) daily dosing (placeholder)   Other RX Placeholder    Arformoterol Tartrate  15 mcg Nebulization BID    [Held by provider] aspirin  81 mg Oral Daily    budesonide  500 mcg Nebulization BID    digoxin  125 mcg Oral Every Other Day    docusate sodium  100 mg Oral Daily    Evolocumab  140 mg Subcutaneous Q14 Days    gabapentin  100 mg Oral TID    hydrocortisone  25 mg Rectal BID    metoprolol succinate  25 mg Oral Daily    nicotine  1 patch Transdermal Q24H    omalizumab  225 mg Subcutaneous Q14 Days    pantoprazole  40 mg Oral BID AC    [Held by provider] sacubitril-valsartan  1 tablet Oral BID    senna  1 tablet Oral Nightly    tiotropium  2 puff Inhalation Daily    Christopher Melendrez in the last 72 hours. Microbiology:    Blood culture #1:   Lab Results   Component Value Date    BC No growth-preliminary No growth  10/25/2020       Blood culture #2:No results found for: Anuja Hernandez    Organism:  Lab Results   Component Value Date    ORG Mixed Growth     02/21/2020         Lab Results   Component Value Date    LABGRAM  11/26/2019     No segmented neutrophils observed. Few epithelial cells observed. Many large gram positive bacilli. Few small gram positive bacilli. Few gram negative bacilli. Prepubescent and postmenopausal female samples (<15and >47ears of age) are not typically suitable for bacterialvaginosis screening. MRSA culture only:No results found for: [facility]    Urine culture:   Lab Results   Component Value Date    LABURIN  10/24/2019     Growth of Contaminants. The mixture of organisms present are not a common cause of urinary tract infections and probably represent distal urethral juventino. Respiratory culture: No results found for: CULTRESP    Aerobic and Anaerobic :  Lab Results   Component Value Date    LABAERO No growth-preliminary No growth   11/11/2019     Lab Results   Component Value Date    LABANAE No growth-preliminary No growth   11/11/2019       Urinalysis:      Lab Results   Component Value Date    NITRU NEGATIVE 11/08/2020    WBCUA 0-2 11/08/2020    BACTERIA NONE SEEN 11/08/2020    RBCUA 5-10 11/08/2020    BLOODU TRACE 11/08/2020    GLUCOSEU NEGATIVE 11/08/2020       Radiology:  XR CHEST PORTABLE   Final Result   1. Interval deterioration since previous study dated November 16, 2020 with increasing infiltrate and effusion at the right lung base. 2. Mild cardiomegaly status post pacemaker placement. .               **This report has been created using voice recognition software. It may contain minor errors which are inherent in voice recognition technology. **      Final report electronically signed by DR Damon Todd on 11/22/2020 2:26 PM XR CHEST (2 VW)   Final Result   Stable chest given technical differences. Left lower lobe infiltrate and effusion. Generalized interstitial edema and cardiomegaly. **This report has been created using voice recognition software. It may contain minor errors which are inherent in voice recognition technology. **      Final report electronically signed by Dr. Melani Mitchell on 11/16/2020 11:22 AM      XR CHEST PORTABLE   Final Result   Moderately worsened aeration of the left lung base. **This report has been created using voice recognition software. It may contain minor errors which are inherent in voice recognition technology. **      Final report electronically signed by Dr. Jeri Parham on 11/15/2020 6:00 PM      XR CHEST PORTABLE   Final Result   No pneumothorax post right thoracentesis. **This report has been created using voice recognition software. It may contain minor errors which are inherent in voice recognition technology. **      Final report electronically signed by Dr. Jeri Parham on 11/14/2020 2:46 PM      XR CHEST PORTABLE   Final Result   Impression:   Moderate CHF, increased. Bilateral lower lobe pneumonia versus subsegmental atelectasis      This document has been electronically signed by: Donnie Platt MD on    11/13/2020 10:07 PM           Xr Chest Portable    Result Date: 11/14/2020  PROCEDURE: XR CHEST PORTABLE CLINICAL INFORMATION: post thoracentesis right side. COMPARISON: 11/13/2020 TECHNIQUE: AP upright view of the chest. FINDINGS: Right pleural fluid has been evacuated. No pneumothorax. Small left pleural effusion persists. Mild congestion. No definite infiltrate. AICD is unchanged. No pneumothorax post right thoracentesis. **This report has been created using voice recognition software. It may contain minor errors which are inherent in voice recognition technology. ** Final report electronically signed by Dr. Jeri Parham on 11/14/2020 2:46 PM    Xr Chest Portable    Result Date: 11/13/2020  Chest xray 1 view Comparison:  CR,SR  - XR CHEST PORTABLE  - 10/25/2020 03:23 PM EDT Findings: Cardiac conduction device. Moderate cardiomegaly. Hypoinflated. Moderately increased interstitial lung markings. There are space opacities notably left lower lobe. Small bilateral pleural effusions. No pneumothorax No acute fracture. Impression: Moderate CHF, increased. Bilateral lower lobe pneumonia versus subsegmental atelectasis This document has been electronically signed by: Bimal Hale MD on 11/13/2020 10:07 PM     Us Thoracentesis    Result Date: 11/15/2020  THORACENTESIS WITH ULTRASOUND GUIDANCE: PERFORMED BY: Oleg Valdez M.D. CLINICAL INFORMATION: Pleural effusion APPROACH: Right side, posterior, inferior FLUID WITHDRAWN: 700 mL herrera serous fluid ESTIMATED BLOOD LOSS: Minimal PROCEDURE: Signed informed consent was obtained prior to performing this procedure. The thorax was initially evaluated sonographically to determine appropriate puncture site. The skin was marked, prepped, and draped in a sterile fashion. Following local anesthesia and utilizing aseptic technique, a 5 Armenian one-step catheter was successfully inserted into the pleural effusion at the position indicated above. Pleural fluid in the amount was above was then aspirated and the needle was removed. The patient tolerated the procedure well. A post procedure chest radiograph will be obtained. Status post thoracentesis **This report has been created using voice recognition software. It may contain minor errors which are inherent in voice recognition technology. ** Final report electronically signed by Dr. Blaire Tillman on 11/15/2020 7:00 AM      Support Devices (date placed):  [] ETT []Oral / [] Nasal  [] Gastric Tube [] OG / [] NG    [] Central Venous Line (Specify Site):  [] Urinary Catheter  [] Arterial Line (Specify Site)  [x] Peripheral IV access  [x] Other: High flow nasal cannula.     DVT prophylaxis: [] Lovenox                                 [] SCDs                                 [] SQ Heparin                                 [] Encourage ambulation           [x] Already on Anticoagulation     Code Status: Limited    Tele:   [x] yes             [] no    Electronically signed by Ferdinand Johnson MD, MPH, [facility] on 11/24/2020 at 6:52 PM   Supervised by Dr. Wilmer Wu

## 2020-11-24 NOTE — CARE COORDINATION
DISASTER CHARTING    11/24/20, 4:15 PM EST    DISCHARGE ONGOING EVALUATION:     [place]  day: 11  Location: [facility]10/010A Reason for admit: Physical debility [R53.81]   Barriers to Discharge: 40% FIO2/25L HF oxygen, IV Diuretics continued. H 8.1; monitor.  PICC today  PCP: Adan Nicole MD  Patient Goals/Plan/Treatment Preferences: lives with family and had planned [facility], full LTACH referral for HF oxygen weaning, patient in agreement for [facility] only; has oxygen 2L, nebulizer, current with [facility], therapy following

## 2020-11-24 NOTE — PROGRESS NOTES
Progress note: Infectious diseases    Patient - Cinthia Sandy,  Age - 67 y.o.    - 1945      Room Number - 4K-10/010-A   MRN -  [id number]   [facility]  Date of Admission -  2020  5:45 PM    SUBJECTIVE:   She is on high flow oxygen  She is very short of breath  No fever, no chest pain  OBJECTIVE   VITALS    height is 5' 2\" (1.575 m) and weight is 150 lb 3.2 oz (68.1 kg). Her oral temperature is 97.6 °F (36.4 °C). Her blood pressure is 137/67 and her pulse is 82. Her respiration is 18 and oxygen saturation is 87% (abnormal). Wt Readings from Last 3 Encounters:   20 150 lb 3.2 oz (68.1 kg)   20 167 lb 4.8 oz (75.9 kg)   20 159 lb (72.1 kg)       I/O (24 Hours)    Intake/Output Summary (Last 24 hours) at 2020 1202  Last data filed at 2020 0354  Gross per 24 hour   Intake 200 ml   Output 400 ml   Net -200 ml       General Appearance  Awake, alert, oriented, on high flow oxygenn  HEENT - normocephalic, atraumatic, pale conjunctiva,  anicteric sclera  Neck - Supple, no mass  Lungs -  Bilateral   air entry, diminished breath sound, crackles at the lower lung fields  Cardiovascular - Heart sounds are normal.  Regular rate and rhythm without murmur, gallop or rub. Abdomen - soft, not distended, nontender,   Neurologic -oriented   Skin - No bruising or bleeding  Extremities - chronic leg edema, the redness has improved.     MEDICATIONS:      lidocaine 1 % injection  5 mL Intradermal Once    sodium chloride flush  10 mL Intravenous 2 times per day    furosemide  40 mg Intravenous BID    famotidine  20 mg Oral Daily    levocetirizine  5 mg Oral Nightly    albuterol  2.5 mg Nebulization Q4H While awake    warfarin (COUMADIN) daily dosing (placeholder)   Other RX Placeholder    Arformoterol Tartrate  15 mcg Nebulization BID    [Held by provider] aspirin  81 mg Oral Daily  budesonide  500 mcg Nebulization BID    digoxin  125 mcg Oral Every Other Day    docusate sodium  100 mg Oral Daily    Evolocumab  140 mg Subcutaneous Q14 Days    gabapentin  100 mg Oral TID    hydrocortisone  25 mg Rectal BID    metoprolol succinate  25 mg Oral Daily    nicotine  1 patch Transdermal Q24H    omalizumab  225 mg Subcutaneous Q14 Days    pantoprazole  40 mg Oral BID AC    [Held by provider] sacubitril-valsartan  1 tablet Oral BID    senna  1 tablet Oral Nightly    tiotropium  2 puff Inhalation Daily    traZODone  50 mg Oral Nightly       sodium chloride flush, ondansetron, promethazine, polyethylene glycol, bisacodyl, acetaminophen, cyclobenzaprine, HYDROcodone 5 mg - acetaminophen, HYDROcodone 5 mg - acetaminophen, levalbuterol, magnesium hydroxide      LABS:     CBC:   Recent Labs     11/22/20 0254 11/23/20 0443 11/24/20  0554   WBC 5.3 5.4 4.7*   HGB 7.8* 7.8* 8.1*    160 173     BMP:    Recent Labs     11/22/20 0254 11/23/20 0443 11/24/20  0554    140 141   K 4.0 4.0 4.3    98 98   CO2 30 29 32   BUN 21 28* 26*   CREATININE 0.9 1.1 1.0   GLUCOSE 96 103 99     Calcium:  Recent Labs     11/24/20  0554   CALCIUM 8.8     Ionized Calcium:No results for input(s): IONCA in the last 72 hours. Magnesium:  Recent Labs     11/22/20  0254   MG 2.0     Phosphorus:No results for input(s): PHOS in the last 72 hours. BNP:No results for input(s): BNP in the last 72 hours. Glucose:  Recent Labs     11/22/20  1153   POCGLU 118*     HgbA1C: No results for input(s): LABA1C in the last 72 hours. INR:   Recent Labs     11/22/20 0254 11/23/20 0443 11/24/20  0554   INR 2.31* 2.77* 2.89*        CULTURES:   UA: No results for input(s): SPECGRAV, PHUR, COLORU, CLARITYU, MUCUS, PROTEINU, BLOODU, RBCUA, WBCUA, BACTERIA, NITRU, GLUCOSEU, BILIRUBINUR, UROBILINOGEN, KETUA, LABCAST, LABCASTTY, AMORPHOS in the last 72 hours.     Invalid input(s): CRYSTALS  Micro:   Lab Results   Component Value Date    BC No growth-preliminary No growth  10/25/2020          Problem list of patient:     Patient Active Problem List   Diagnosis Code    MI (myocardial infarction) ([facility] [address] [facility] I21.9    [facility] J44.9    Hyperlipidemia E78.5    Hypertension I10    Seasonal allergic rhinitis J30.2    Heartburn R12    Persistent atrial fibrillation (HCC) C51.11    Systolic CHF, acute on chronic ([facility]) I50.23    Biventricular implantable cardioverter-defibrillator in situ Z95.810    Anticoagulated on Coumadin Z79.01    Arteriosclerosis of coronary artery I25.10    Left displaced femoral neck fracture ([facility]) S72.002A    Closed head injury S09.90XA    Class 1 obesity due to excess calories with serious comorbidity and body mass index (BMI) of 30.0 to 30.9 in adult E66.09, Z68.30    CKD (chronic kidney disease) stage 2, GFR 60-89 ml/min N18.2    S/p left hip fracture Z87.81    Chronic systolic CHF (congestive heart failure) ([facility]) I50.22    IBS (irritable bowel syndrome) K58.9    Acute blood loss as cause of postoperative anemia D62    Atelectasis of left lung J98.11    Insomnia G47.00    Chest pain R07.9    Rectal bleeding K62.5    Acute on chronic anemia D64.9    Vaginitis N76.0    Digitalis toxicity T46.0X1A    GI bleed K92.2    Coagulopathy ([facility]) D68.9    Bilateral leg edema R60.0    Azotemia R79.89    Hypoalbuminemia E88.09    Urinary tract infection with hematuria N39.0, R31.9    Septic shock (HCC) A41.9, R65.21    Pacemaker generator end of life Z45.010    Necrotizing fasciitis (HCC) M72.6    Left leg pain M79.605    Cellulitis of left lower extremity L03. 116    Physical deconditioning R53.81    Physical debility R53.81    Interstitial pneumonia ([facility]) J84.9    Thrush B37.0    Chronic iron deficiency anemia D50.9    Anxiety F41.9    Permanent atrial fibrillation (HCC) F64.81    Acute systolic CHF (congestive heart failure) ([facility]) I50.21  Pleural effusion, bilateral J90    Hemoptysis R04.2    Acute respiratory failure with hypoxia (HCC) J96.01    Moderate malnutrition (HCC) E44.0         ASSESSMENT/PLAN   Shortness of breath due to COPD/fibrosis  CHF  Hx of left leg cellulites: treated  Neuropathic pain:   Continue current treatment  ID will sign off.  Call if needed  Mary Nuñez MD, [facility] 11/24/2020 12:02 PM

## 2020-11-24 NOTE — CARE COORDINATION
Update: Full LTACH referral received from Attending; collaborated with Britt Hunt, [address]  Electronically signed by Alistair Edge RN on 11/24/2020 at 10:36 AM    Update: [facility], may be in am; client refused [facility]; collaborated with Britt Hunt, [address]  Electronically signed by Alistair Edge RN on 11/24/2020 at 1:04 PM

## 2020-11-24 NOTE — PROGRESS NOTES
[address]  PHYSICAL THERAPY MISSED TREATMENT NOTE  [facility] ICU STEPDOWN TELEMETRY 4K    Date: 2020  Patient Name: Sayra Hawk        MRN: [id number]   : 1945  (67 y.o.)  Gender: female   Referring Practitioner: Raul Farley MD  Diagnosis: Physical Debility         REASON FOR MISSED TREATMENT:  Missed Treat. Pt now on high flow O2, pt supine in bed and declines PT due to fatigue.

## 2020-11-24 NOTE — PLAN OF CARE
Problem: RESPIRATORY  Goal: Clear lung sounds  Outcome: Ongoing   Improve breath sounds, increase aeration and decrease WOB.

## 2020-11-24 NOTE — PROGRESS NOTES
Clinical Pharmacy Note    Warfarin consult follow-up    Recent Labs     11/24/20  0554   INR 2.89*     Recent Labs     11/22/20  0254 11/23/20  0443 11/24/20  0554   HGB 7.8* 7.8* 8.1*   HCT 27.0* 26.1* 27.4*    160 173       Significant Drug-Drug Interactions:  New warfarin drug-drug interactions: none  Discontinued drug-drug interactions: none  Current warfarin drug-drug interactions: aspirin (on hold), trazodone (HM)      Date INR Warfarin Dose   10/25-10/27   No Coumadin   10/28/2020 1.32 2 mg    10/29/2020 1.49  2 mg   10/30/2020  2  1.5 mg    10/31/2020   2.78   0.5 mg   11/1/2020   2.37  1.5 mg    11/2/2020  2.55 1 mg     11/3/2020  3.54   No Coumadin   11/4/2020  3.09  1 mg   11/5/2020 2.41 1.5 mg   11/6/2020 2.82 1 mg    11/7/2020  2.61  1.5 mg    11/8/2020   2.22                     1.5 mg   11/9/2020  2.12                      2 mg   11/10/2020 2.05  2 mg   11/11/2020  2.54  1.5 mg   11/12/2020 2.72  1.5 mg   11/13/2020 2.72 No Coumadin   11/14/2020 2.3  No Coumadin    11/15/2020 1.7  (not given)   11/16/2020   1.48 2 mg    11/17/2020   1.43 2 mg    11/18/2020   1.99 1.5 mg    11/19/2020  2.66 0.5 mg   11/20/2020   1.68   2 mg   11/21/2020 1.80 1.5 mg   11/22/2020  2.31 1.5 mg   11/23/2020  2.77 1 mg    11/24/2020 2.89 0.5 mg            Notes:                     Daily PT/INR until stable within therapeutic range.        Nils Anthony, PharmD   11/24/2020, 1:18 PM

## 2020-11-24 NOTE — PLAN OF CARE
Problem: RESPIRATORY  Goal: Clear lung sounds  11/23/2020 1958 by Neda Griffin RCP  Outcome: Ongoing  Continue aerosol tx's to improve aeration of lungs.

## 2020-11-24 NOTE — PROGRESS NOTES
Physician Progress Note      Selam Kline  CSN #:                  [id number]  :                       1945  ADMIT DATE:       2020 5:45 PM  [address] [place] DATE:  Kavitha Mclaughlin  PROVIDER #:        Christina Tran MD          QUERY TEXT:    Dr Marlynn Pinal Dr Griselda Ripa,    Pt admitted with Hemoptysis & Acute heart failure. Noted documentation of   Sepsis & Septic Shock in initial progress notes-. If possible,   please document in progress notes and discharge summary:    The medical record reflects the following:  Risk Factors: AFIB, HTN, GERD, COPD  Clinical Indicators:  initial labs: WBC 10.4, bilateral pleural   effusions, procalcitonin 0.10, pulse <90, temp WNL  Treatment: Treatment and mx by ID - continue compression wrap  Options provided:  -- Sepsis & Septic Shock not present on admission ,but rather part of last   admission  -- Sepsis & Septic Shock- POA  -- Sepsis & Septic Shock ruled out  -- Other - I will add my own diagnosis  -- Disagree - Not applicable / Not valid  -- Disagree - Clinically unable to determine / Unknown  -- Refer to Clinical Documentation Reviewer    PROVIDER RESPONSE TEXT:    The diagnosis of Sepsis & Septic Shock was confirmed as POA.     Query created by: Rhea Kern on 2020 6:44 AM      Electronically signed by:  Christina Tran MD 2020 11:56 AM

## 2020-11-25 NOTE — PROGRESS NOTES
Clinical Pharmacy Note    Warfarin consult follow-up    Recent Labs     11/25/20  0441   INR 2.46*     Recent Labs     11/23/20  0443 11/24/20  0554 11/25/20  0441   HGB 7.8* 8.1* 8.0*   HCT 26.1* 27.4* 27.4*    173 192       Significant Drug-Drug Interactions:  New warfarin drug-drug interactions: none  Discontinued drug-drug interactions: none  Current warfarin drug-drug interactions: aspirin (on hold), trazodone (HM)      Date INR Warfarin Dose   10/25-10/27   No Coumadin   10/28/2020 1.32 2 mg    10/29/2020 1.49  2 mg   10/30/2020  2  1.5 mg    10/31/2020   2.78   0.5 mg   11/1/2020   2.37  1.5 mg    11/2/2020  2.55 1 mg     11/3/2020  3.54   No Coumadin   11/4/2020  3.09  1 mg   11/5/2020 2.41 1.5 mg   11/6/2020 2.82 1 mg    11/7/2020  2.61  1.5 mg    11/8/2020   2.22                     1.5 mg   11/9/2020  2.12                      2 mg   11/10/2020 2.05  2 mg   11/11/2020  2.54  1.5 mg   11/12/2020 2.72  1.5 mg   11/13/2020 2.72 No Coumadin   11/14/2020 2.3  No Coumadin    11/15/2020 1.7  (not given)   11/16/2020   1.48 2 mg    11/17/2020   1.43 2 mg    11/18/2020   1.99 1.5 mg    11/19/2020  2.66 0.5 mg   11/20/2020   1.68   2 mg   11/21/2020 1.80 1.5 mg   11/22/2020  2.31 1.5 mg   11/23/2020  2.77 1 mg    11/24/2020 2.89 0.5 mg   11/25/2020  2.46  1 mg        Notes:                     Daily PT/INR until stable within therapeutic range.        Rolf Monterroso, PharmD   11/25/2020, 11:43 AM

## 2020-11-25 NOTE — CARE COORDINATION
Update: client refused [facility] (they have a bed); awaiting [address] bed, possible Friday (or next week); collaborated with Ralph Carpenter, Attending, patient (yesterday), Isaiah Franco [address]  Electronically signed by Viet Marquez RN on 11/25/2020 at 11:45 AM

## 2020-11-25 NOTE — PROGRESS NOTES
Hospitalist Progress Note      Patient:  Clarissa Henriquez    Unit/Bed:4K-10/010-A  YOB: 1945  MRN: [id number]   Acct: [de-identified]   PCP: Nura Nelson MD  Date of Admission: 11/13/2020    Assessment/Plan:    1. Acute on chronic hypoxic respiratory failure likely secondary to pleural effusions and acute systolic CHF exacerbation-worsening.    +2 stable pitting edema left leg greater than right. The patient has minimal dyspnea at rest on 40% FiO2 and 25 L/min of oxygen on high flow nasal cannula. Need for high flow nasal cannula is the primary barrier to discharge. Peripheral IV obtained and patient maintained on IV Lasix. Maintain on digoxin 125 mcg every other day, Evolocumab 140 subcutaneous every 14 days, furosemide 40 mg twice daily, and Toprol-XL 25 mg twice daily. Hold aspirin secondary to hemoptysis. Patient is allergic to statin so do not use. We will follow-up with PCP, cardiology, and pulmonology as an outpatient. Room would not be available at [facility] until this Friday at the earliest, and possibly not until Monday. Successfully contacted outpatient pulmonologist, who stated that they would see patient in the hospital if they could. 2.  Chronic atrial fibrillation-stable. The patient has a ARIANNA(2)DS(2)-VASc of 6. The patient is being treated with Coumadin. Patient's hemoglobin has remained above 7. Therefore, we will maintain Coumadin for now. Monitor with daily CBC and INR. Current INR 2.46. Otherwise, treat as described above. 3.  Hemoptysis, unspecified-resolved. Patient states she has not had hemoptysis for several days. Patient has a history of hemoptysis of unknown cause. Bronchoscopy could possibly provide diagnostic information.   However, we would not recommend this as the bronchoscopy would be a considerable risk and would likely not change her long-term care management plan per CHF and advanced COPD. Will address as described above for chronic A. Fib. 4.  Anemia, unspecified-stable. The patient reported a longstanding history of anemia. This may be secondary to blood loss from hemoptysis described above. However, the patient has a macrocytic profile with an MCV of 108. 2. Recent high ferritin on 11/3/2020, high iron, and normal total iron-binding capacity makes this diagnosis less likely. Acute bleed could still be the cause or contributor to this anemia, this would not necessarily present as the \"classic\" iron deficiency profile. Normal folate and vitamin B12 makes nutritional anemia less likely cause of macrocytic anemia. The patient continues to have fluctuating hemoglobin. Consider anemia of chronic disease. 5.  Left lower extremity cellulitis treated on previous admission-worsening    The patient is amenable to 4 hours on and 4 hours off of wound dressing per ID. 6.  Coronary artery disease-stable. Treat as described above for CHF and A. Fib. 7.  Benign essential hypertension-stable. Treat as described above for CHF and A. Fib.    8.  Hyperlipidemia, unspecified-stable. Treat with evolocumab as described above. 9.  Chronic obstructive pulmonary disease asthma component, unspecified-stable. Contributing to acute on chronic hypoxic respiratory failure as above. Maintain patient on albuterol nebulizer, Spiriva Respimat, Brovana, and Pulmicort. Xolair cannot be given inpatient. Follow-up with outpatient pulmonology.       Expected discharge date: Unknown    Disposition:    [x] Home       [] TCU       [] Rehab       [] Psych       [] SNF       [] [facility]       [] Other-    Chief Complaint: Hypoxia and Hemoptysis    Opening statement:     The patient is a 69-year-old female with a complex past medical history with an old MI, long-term anticoagulation with Coumadin for atrial fibrillation, kidney stone, hypertension, hyperlipidemia, history of blood clots, GERD, CAD with biventricular pacemaker, frequent UTI, COPD, systolic CHF, coronary artery disease, recent hospitalization for lower extremity cellulitis, and current smoker who presents with a chief complaint of hypoxia and hemoptysis. Hospital Treatment Course:     11/13/2020-11/17/2020:    Patient was diagnosed with acute on chronic respiratory failure likely secondary to pleural effusions and acute systolic heart failure. Cause of hemoptysis unclear.     The patient was initially transferred to [facility].     Rapid was called om the patient on 11/13/2020 secondary to hypoxia, tachypnea and hypertension after transfer from [facility] to [facility]. Patient was placed on BiPAP which dramatically improved her presentation. The patient was then transferred to [facility].     Patient was evaluated on the same day for chest pain and shortness of breath. An EKG found no change but a mild troponin elevation was noted. Pain is likely secondary to dyspepsia or GERD.     Pulmonology was consulted for hemoptysis and cardiology was consulted for hemoptysis, possible watchman implantation, and management of Coumadin.     The patient's Coumadin was initially held and her supratherapeutic INR was corrected. However, the patient was placed back on Coumadin as she was concerned for stroke secondary to A. fib. The patient was counseled with regards to the risks from Coumadin.     Thoracentesis was obtained on 11/14/2020 with 700 cc of fluid removed. Pleural fluid cytology showed no significant findings. Patient was maintained on Pulmicort 200 mcg via neb twice daily, Brovana 50 mcg by nebulization twice daily, Spiriva Respimat 2 puffs daily, albuterol nebs every 4 hours while awake, and Xopenex 0.60 mg by nebulization every 8 hours as needed. Also maintained on Acapella every 4 hours. Oxygen titrated to greater than 90%. Baseline is 2 L at home.   Patient maintained on Coumadin for VTE prophylaxis and Protonix for GI prophylaxis. Patient wants to go home with family. 11/18/2020:    Nursing report: Patient is still on 5 L, with plans to wean down. Patient been getting up from the chair to her bed. Little subjective dyspnea, however she dropped from a pulse ox of 95% to 89%. Hemoglobin has been stable at 7.2. The patient was 95 to 97% throughout the night. The patient did not need BiPAP during the night. The patient states that her breathing is doing much better than before. She affirms improvement with diuresis and with thoracentesis for drainage. However, the patient is still dyspneic with exertion. She affirms slight hemoptysis with cough productive of pinkish to very light brown sputum. Long discussion with the patient about long-term prognosis and goals. The patient particularly noted that she want to live long enough to see her granddaughter's high school graduation in May. 11/19/2020:    Nursing report: The patient is doing well. She slept through the night. INR is 2.66. The patient had dinner. Her oxygenation was good. Hemoglobin is 7.0. The patient states that her breathing is doing better. She affirms slight ongoing chest pain and \"indigestion. \"  She affirms productive cough with a little bit of blood. The patient and her daughter were extensively counseled with regards to risk of bleeding with anticoagulation. Otherwise, no new specific complaints or concerns. 11/20/2020:    Nursing report: Patient doing well. On 4 L. Shortness of breath with activity. Bowel movement. Eating overnight. The patient states she is feeling okay. However, notes worsening left lower limb pain. \"Indigestion\" is resolved. Patient attributes resolution to no longer eating hospital food and getting outside food. Affirms minimal dyspnea at rest but significant dyspnea with exertion and excessive talking. Affirms coughing up \"a little bit of blood. \"    Otherwise, no new specific complaints or concerns. 11/21/2020:    Dr. Ceferino Logan was consulted. He declined to see the patient in the hospital, but ordered a unit of blood to be followed by 20 mg of Lasix and then a new H&H. However, as the patient's hemoglobin was above 7, we decided to cancel the order. Nursing report: \"Was not too bad. \"  ID saw the patient who recommended compression wraps 4 hours on and 4 hours off. However, the patient says she did not want wraps. On 4 L of oxygen at rest and 6 with activity. Patient desaturated to 77% overnight with activity and the patient seemed to take longer than before to recover, per nurse report. Patient states that she feels about the same but nurse feels that she is doing worse. Patient slept well but did not eat much. She had no bowel movement. The patient states she feels okay. She has no shortness of breath at rest but does have shortness of breath on exertion. She believes that the shortness of breath on exertion is unchanged from previous. Patient affirms reduced appetite and no recent bowel movement. The patient states that she was upset yesterday because during the day she needed help using the restroom to go urinate. The nurse did not help her and turned off her call light for about half an hour. The patient was subsequently incontinent of urine. The patient was reassured that we would talk to nursing today. Affirms ongoing slight hemoptysis. 11/22/2020:    Patient is more short of breath in the morning with oxygen saturation down to the mid 80s on 4 L by nasal cannula. Patient was placed on high flow BiPAP. Dr. Juan Roth was consulted and chest x-ray was ordered. 11/23/2020:    Nursing report: Nursing states the patient was doing great, with no overnight desaturations and FiO2 of 50% and a flow rate of 25 L/min. The patient has been eating and did have a bowel movement. On presentation, the patient affirmed that her breathing was doing better.   The patient was on a high flow heated nasal cannula. The patient stated that this machine helped her breathe a lot better. Though the patient states she would like to take this machine at home, she verbalizes understanding that she cannot do so. The patient denies chest pain and abdominal pain. Affirms dyspnea with activity. Patient reiterated that she wanted Dr. Valentín Cortes to see her. Also verbalized frustration with the nursing staff. Otherwise, no new specific complaints or concerns. 11/24/2020:    Stable dyspnea. Denies chest pain, fever, and abdominal pain. Patient asked about consult with her outpatient pulmonologist.  The patient is counseled and attempt was made to contact him,  but that he was out of the office. Otherwise, no new specific complaints or concerns. Subjective (past 24 hours):     Nursing report: \"Still on high flow. \"  \"Not much change. \"  Peripheral access was successfully obtained. States that daughter does not want patient to go to [place]. Has been eating but no bowel movement yesterday. The patient states that her breathing is okay at rest but that she gets very dyspneic on exertion. Denies chest pain abdominal pain. States that she has not had any hemoptysis for several days. The patient asked about reaching her outpatient pulmonologist and cardiologist.    Also asked about Thanksgiving, as she wanted to have family in her room. The patient was reassured that I would discuss this with Spiritual Care. Otherwise, no new specific complaints or concerns. Past medical history, family history, social history and allergies reviewed again and is unchanged since admission. ROS (12 point review of systems completed. Pertinent positives noted.  Otherwise ROS is negative)     Medications:  Reviewed    Infusion Medications     Scheduled Medications    warfarin  1 mg Oral Once    lidocaine 1 % injection  5 mL Intradermal Once    sodium chloride flush  10 mL Intravenous 2 times without murmurs, rubs or gallops. Abdomen: Soft, non-tender, non-distended with normal bowel sounds. Capillary Refill: Brisk,< 3 seconds   Peripheral Pulses: +2 palpable pitting pedal edema  Extremities: Peripheral pitting edema left leg greater than right. Erythema and tenderness over the left lower extremity. No warmth. Labs:   Recent Labs     11/23/20 0443 11/24/20  0554 11/25/20 0441   WBC 5.4 4.7* 4.7*   HGB 7.8* 8.1* 8.0*   HCT 26.1* 27.4* 27.4*    173 192     Recent Labs     11/23/20 0443 11/24/20 0554 11/25/20 0441    141 141   K 4.0 4.3 3.5   CL 98 98 100   CO2 29 32 33   BUN 28* 26* 23*   CREATININE 1.1 1.0 1.0   CALCIUM 8.8 8.8 8.7     No results for input(s): AST, ALT, BILIDIR, BILITOT, ALKPHOS in the last 72 hours. Recent Labs     11/23/20 0443 11/24/20 0554 11/25/20 0441   INR 2.77* 2.89* 2.46*     No results for input(s): CKTOTAL, TROPONINI in the last 72 hours. Microbiology:    Blood culture #1:   Lab Results   Component Value Date    BC No growth-preliminary No growth  10/25/2020       Blood culture #2:No results found for: Gonzales Rubensy    Organism:  Lab Results   Component Value Date    ORG Mixed Growth     02/21/2020         Lab Results   Component Value Date    LABGRAM  11/26/2019     No segmented neutrophils observed. Few epithelial cells observed. Many large gram positive bacilli. Few small gram positive bacilli. Few gram negative bacilli. Prepubescent and postmenopausal female samples (<15and >47ears of age) are not typically suitable for bacterialvaginosis screening. MRSA culture only:No results found for: [facility]    Urine culture:   Lab Results   Component Value Date    LABURIN  10/24/2019     Growth of Contaminants. The mixture of organisms present are not a common cause of urinary tract infections and probably represent distal urethral juventino.         Respiratory culture: No results found for: CULTRESP    Aerobic and Anaerobic :  Lab Results   Component Value Date    LABAERO No growth-preliminary No growth   11/11/2019     Lab Results   Component Value Date    LABANAE No growth-preliminary No growth   11/11/2019       Urinalysis:      Lab Results   Component Value Date    NITRU NEGATIVE 11/08/2020    WBCUA 0-2 11/08/2020    BACTERIA NONE SEEN 11/08/2020    RBCUA 5-10 11/08/2020    BLOODU TRACE 11/08/2020    GLUCOSEU NEGATIVE 11/08/2020       Radiology:  XR CHEST 1 VIEW   Final Result   1. Moderate cardiomegaly. Permanent pacemaker/fibrillator. Small bilateral pleural effusions. 2. Moderate pneumonia/pulmonary edema both mid and lower lung fields. Prominent vascular markings, suggesting heart failure. 3. No significant change from prior. Final report electronically signed by Dr. Evita Ochoa on 11/25/2020 1:14 PM      XR CHEST PORTABLE   Final Result   1. Interval deterioration since previous study dated November 16, 2020 with increasing infiltrate and effusion at the right lung base. 2. Mild cardiomegaly status post pacemaker placement. .               **This report has been created using voice recognition software. It may contain minor errors which are inherent in voice recognition technology. **      Final report electronically signed by DR Brii Leach on 11/22/2020 2:26 PM      XR CHEST (2 VW)   Final Result   Stable chest given technical differences. Left lower lobe infiltrate and effusion. Generalized interstitial edema and cardiomegaly. **This report has been created using voice recognition software. It may contain minor errors which are inherent in voice recognition technology. **      Final report electronically signed by Dr. Gary Shi on 11/16/2020 11:22 AM      XR CHEST PORTABLE   Final Result   Moderately worsened aeration of the left lung base. **This report has been created using voice recognition software. It may contain minor errors which are inherent in voice recognition technology. **      Final report electronically signed by Dr. Jeri Parham on 11/15/2020 6:00 PM      XR CHEST PORTABLE   Final Result   No pneumothorax post right thoracentesis. **This report has been created using voice recognition software. It may contain minor errors which are inherent in voice recognition technology. **      Final report electronically signed by Dr. Jeri Parham on 11/14/2020 2:46 PM      XR CHEST PORTABLE   Final Result   Impression:   Moderate CHF, increased. Bilateral lower lobe pneumonia versus subsegmental atelectasis      This document has been electronically signed by: Donnie Platt MD on    11/13/2020 10:07 PM           Xr Chest Portable    Result Date: 11/14/2020  PROCEDURE: XR CHEST PORTABLE CLINICAL INFORMATION: post thoracentesis right side. COMPARISON: 11/13/2020 TECHNIQUE: AP upright view of the chest. FINDINGS: Right pleural fluid has been evacuated. No pneumothorax. Small left pleural effusion persists. Mild congestion. No definite infiltrate. AICD is unchanged. No pneumothorax post right thoracentesis. **This report has been created using voice recognition software. It may contain minor errors which are inherent in voice recognition technology. ** Final report electronically signed by Dr. Jeri Parham on 11/14/2020 2:46 PM    Xr Chest Portable    Result Date: 11/13/2020  Chest xray 1 view Comparison:  CR,SR  - XR CHEST PORTABLE  - 10/25/2020 03:23 PM EDT Findings: Cardiac conduction device. Moderate cardiomegaly. Hypoinflated. Moderately increased interstitial lung markings. There are space opacities notably left lower lobe. Small bilateral pleural effusions. No pneumothorax No acute fracture. Impression: Moderate CHF, increased.  Bilateral lower lobe pneumonia versus subsegmental atelectasis This document has been electronically signed by: Donnie Platt MD on 11/13/2020 10:07 PM     Us Thoracentesis    Result Date: 11/15/2020  THORACENTESIS WITH ULTRASOUND GUIDANCE: PERFORMED

## 2020-11-25 NOTE — PROGRESS NOTES
[id number] [address]  INPATIENT PHYSICAL THERAPY  DAILY NOTE  [facility] ICU STEPDOWN TELEMETRY 4K - 4K-10/010-A     Time In: 1017  Time Out: 1040  Timed Code Treatment Minutes: 23 Minutes  Minutes: 23          Date: 2020  Patient Name: Manuel Dominguez,  Gender:  female        MRN: [id number]  : 1945  (67 y.o.)     Referring Practitioner: Wilmer Wu MD  Diagnosis: Physical Debility  Additional Pertinent Hx: Pt readmitted to Acute from IP Rehab due to decline in status and requiring thoracentesis and respiratory management. Per EMR, \"Sharona Saenz is a 67 y.o. female with an extensive medical history including, congestive heart failure, COPD, A. Fib., Pacemaker placement post MI, hypertension, anemia, and IBS who presents to the ED for an evaluation of severe left lower extremity pain, redness, and warmth that started yesterday evening. The patient states that yesterday morning she started feeling some discomfort to her lower extremity, but believed it was due to her compression stockings she wears for CHF. Yesterday evening she removed the stockings and saw that her left lower leg was extremely red, which has continued to spread, becoming more red, and even more painful. Denies any recent trauma, falls, accidents, or wounds to the lower extremity but states that she occasionally scratches her legs with her nails when taking on and off the compression stockings. The patient has attempted tylenol, percocet, biofreeze, and icyhot yesterday with no relief. 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 Endorses history of blood clots, but is on coumadin which was last checked approximately 1 week ago and was in theraputic range.  Further endorses feeling feverish and chills, increased shortness of breath, worsening left lower extremity pain, the inability to bear weight at this time, and denies numbness or tingling to the lower extremities, diabetes mellitus, previous lower leg infections, chest pain, abdominal pain, changes in her bowel or bladder function or habits. Patient still smokes 1/2 a pack of cigarettes daily and is on home oxygen. No alcohol or illicit drug use. \"     Prior Level of Function:  Lives With: Spouse  Type of Home: House  Home Layout: One level  Home Access: Stairs to enter with rails  Entrance Stairs - Number of Steps: 4  Entrance Stairs - Rails: Both(too wide to use at same time)  Home Equipment: Rolling walker, 4 wheeled walker, [employer]   Bathroom Shower/Tub: Walk-in shower, Shower chair with back  [employer]: Bedside commode  Bathroom Equipment: Grab bars in shower, Hand-held shower, 3-in-1 commode  Bathroom Accessibility: Accessible    Receives Help From: Family  ADL Assistance: [address] [place] [address] Avenue: Needs assistance  Homemaking Responsibilities: No  Ambulation Assistance: Independent  Transfer Assistance: Independent  Active : Yes  Additional Comments: Pt reports that her spouse assists with IADL tasks, Pt completes own self care . Spouse does have to assist with compression stockings. Restrictions/Precautions:  Restrictions/Precautions: General Precautions, Fall Risk  Left Lower Extremity Weight Bearing: Weight Bearing As Tolerated  Position Activity Restriction  Other position/activity restrictions: not re High flow O2 at 30 lpm / 40%    SUBJECTIVE: Pt resting in room chair and agrees to therapy. RN approved session.     PAIN: denies      OBJECTIVE:  Bed Mobility:  Not Tested    Transfers:  Sit to Stand: Stand By Assistance  Stand to Sit:Stand By Assistance    Ambulation:  Contact Guard Assistance  Distance: 2 ft fwd/bwd due to limited distance of high-flow tubing  Surface: Level Tile  Device:Hand-Held Assist  Gait Deviations:  Decreased Step Length Bilaterally and Narrow Base of Support    Balance:  Static Sitting Balance:  Modified Independent  Dynamic Sitting Balance: Supervision  Static Standing Balance: Contact Guard Assistance to SBA  Dynamic Standing Balance: Contact Guard Assistance    Exercise:  Patient was guided in 1 set(s) 10 reps of exercise to both lower extremities. Ankle pumps, Glut sets, Quad sets, Hip abduction/adduction, Seated marches, Long arc quads, Standing heel/toe raises, Standing marches, Standing hip abduction/adduction and Mini squats. Exercises were completed for increased independence with functional mobility. Functional Outcome Measures: Completed  -PAC Inpatient Mobility without Stair Climbing Raw Score : 15  AM-PAC Inpatient without Stair Climbing T-Scale Score : 43.03    ASSESSMENT:  Assessment: Patient progressing toward established goals. and is limited with ambulation trials due to O2 requirements limiting distance by equipment limitations. Activity Tolerance:  Patient tolerance of  treatment: good. With occasional rest breaks for respiratory recovery     Equipment Recommendations:Equipment Needed: No  Other: cont to monitor for needs  Discharge Recommendations:  Continue pending progress. Home with Home health PT    Plan: Times per week: 3-5x GM  Specific instructions for Next Treatment: therex and mobility  Current Treatment Recommendations: Strengthening, Functional Mobility Training, Transfer Training, Endurance Training, Balance Training, Stair training, Gait Training, Home Exercise Program, Safety Education & Training, Equipment Evaluation, Education, & procurement, Patient/Caregiver Education & Training    Patient Education  Patient Education: Plan of Care, Home Exercise Program    Goals:  Patient goals : get stronger to be able to go home. Short term goals  Time Frame for Short term goals: at discharge  Short term goal 1: Pt to go supine <-> sit, with Mod I to get in/out of bed, no rail.   Short term goal 2: Pt to get up/down from various seated surfaces, with S to get up to walk. Short term goal 3: Pt to walk with RW >= 100 ft, SBA to progress to home and community mobility  Short term goal 4: Pt to negotiate 4 steps with HR and CGA to enter home safely  Long term goals  Time Frame for Long term goals : not set due to short ELOS    Following session, patient left in safe position with all fall risk precautions in place. Taya Fermin.  Anne-Marie Spencer [place] [address]

## 2020-11-25 NOTE — PROGRESS NOTES
This RN spoke with patient's daughter, Alethea Wright. Provided updates on patient status and current plan of care. Alethea Wright expressed concern about patient possibly being discharged to [facility]. This RN stated that referral was made to [facility] but discharge plan is not definite at this time. Informed Alethea Wright that day shift RN, Ivy Grier, would call with any updates regarding discharge plan. No further questions/concerns at this time.

## 2020-11-25 NOTE — FLOWSHEET NOTE
Spiritual Care Follow Up:  Dr. Griselda Ripa, residential doctor asked this  if we can offer a zoom meeting for this pt so that she could be part of her family's thanksgiving holiday. As I entered the room to confirm this request and get the needed information to do a zoom meeting, the pt' was eating her supper. She informed me that she does not want a zoom meeting. She wanted her family here to be with her. At this point we have a one visitor policy. I let her know it was her doctor who wanted this for her. She refused a zoom meeting. This will be shared with her nurse. 11/25/20 1713   Encounter Summary   Services provided to: Patient   Referral/Consult From: Physician   Support System Children   Continue Visiting Yes  (11/25 )   Complexity of Encounter Moderate   Length of Encounter 15 minutes   Spiritual/P[employer]alism   Type Spiritual support   Care Plan:  Continue spiritual and emotional care for patient and family. Including prayers.

## 2020-11-25 NOTE — PROGRESS NOTES
[facility] [address] ICU STEPDOWN TELEMETRY 4K  Occupational Therapy  Daily Note  Time:   Time In: 4506  Time Out: 1441  Timed Code Treatment Minutes: 27 Minutes  Minutes: 27          Date: 2020  Patient Name: Jessica Cash,   Gender: female      Room: [facility]10/010-A  MRN: [id number]  : 1945  (67 y.o.)  Referring Practitioner: Dr. Soledad Childs  Diagnosis: physical debility  Additional Pertinent Hx: Per ER note 10/25/2020:75 y.o. female with an extensive medical history including, congestive heart failure, COPD, A. Fib., Pacemaker placement post MI, hypertension, anemia, and IBS who presents to the ED for an evaluation of severe left lower extremity pain, redness, and warmth that started yesterday evening. The patient states that yesterday morning she started feeling some discomfort to her lower extremity, but believed it was due to her compression stockings she wears for CHF. Yesterday evening she removed the stockings and saw that her left lower leg was extremely red, which has continued to spread, becoming more red, and even more painful. Pt was discharged from [facility] on 2020 back to acute d/t SOB & retaining fluid. Restrictions/Precautions:  Restrictions/Precautions: General Precautions, Fall Risk  Left Lower Extremity Weight Bearing: Weight Bearing As Tolerated  Position Activity Restriction  Other position/activity restrictions: not re    SUBJECTIVE: pt sitting in recliner and agreeable to OT. Pt on High flow     PAIN: pt did not state any pain during session     COGNITION: Decreased Insight, Decreased Problem Solving and Decreased Safety Awareness    ADL:   No ADL's completed this session. [facility] BALANCE:  Standing Balance: Contact Guard Assistance. standing at RW     BED MOBILITY:  Not Tested    TRANSFERS:  Sit to Stand:  Contact Guard Assistance. from recliner   Stand to Sit: [address]  to recliner     FUNCTIONAL MOBILITY:  Did not complete this session     ADDITIONAL d/t short ELOS    Following session, patient left in safe position with all fall risk precautions in place.

## 2020-11-26 NOTE — PROGRESS NOTES
possibly provide diagnostic information. However, we would not recommend this as the bronchoscopy would be a considerable risk and would likely not change her long-term care management plan per CHF and advanced COPD. Will address as described above for chronic A. Fib. 4.  Anemia, unspecified-stable. The patient reported a longstanding history of anemia. This may be secondary to blood loss from hemoptysis described above. However, the patient has a macrocytic profile with an MCV of 108. 2. Recent high ferritin on 11/3/2020, high iron, and normal total iron-binding capacity makes this diagnosis less likely. Acute bleed could still be the cause or contributor to this anemia, this would not necessarily present as the \"classic\" iron deficiency profile. Normal folate and vitamin B12 makes nutritional anemia less likely cause of macrocytic anemia. The patient continues to have fluctuating hemoglobin. Consider anemia of chronic disease. 5.  Left lower extremity cellulitis treated on previous admission-worsening    The patient is amenable to 4 hours on and 4 hours off of wound dressing per ID. 6.  Coronary artery disease-stable. Treat as described above for CHF and A. Fib. 7.  Benign essential hypertension-stable. Treat as described above for CHF and A. Fib.    8.  Hyperlipidemia, unspecified-stable. Treat with evolocumab as described above. 9.  Chronic obstructive pulmonary disease asthma component, unspecified-stable. Contributing to acute on chronic hypoxic respiratory failure as above. Maintain patient on albuterol nebulizer, Spiriva Respimat, Brovana, and Pulmicort. Xolair cannot be given inpatient. Follow-up with outpatient pulmonology.       Expected discharge date: Unknown    Disposition:    [x] Home       [] TCU       [] Rehab       [] Psych       [] SNF       [] [place]       [] Other-    Chief Complaint: Hypoxia and Hemoptysis    Opening statement:     The patient is a 62-year-old female with a complex past medical history with an old MI, long-term anticoagulation with Coumadin for atrial fibrillation, kidney stone, hypertension, hyperlipidemia, history of blood clots, GERD, CAD with biventricular pacemaker, frequent UTI, COPD, systolic CHF, coronary artery disease, recent hospitalization for lower extremity cellulitis, and current smoker who presents with a chief complaint of hypoxia and hemoptysis. Hospital Treatment Course:     11/13/2020-11/17/2020:    Patient was diagnosed with acute on chronic respiratory failure likely secondary to pleural effusions and acute systolic heart failure. Cause of hemoptysis unclear.     The patient was initially transferred to [facility].     Rapid was called om the patient on 11/13/2020 secondary to hypoxia, tachypnea and hypertension after transfer from [facility] to [facility]. Patient was placed on BiPAP which dramatically improved her presentation. The patient was then transferred to [facility].     Patient was evaluated on the same day for chest pain and shortness of breath. An EKG found no change but a mild troponin elevation was noted. Pain is likely secondary to dyspepsia or GERD.     Pulmonology was consulted for hemoptysis and cardiology was consulted for hemoptysis, possible watchman implantation, and management of Coumadin.     The patient's Coumadin was initially held and her supratherapeutic INR was corrected. However, the patient was placed back on Coumadin as she was concerned for stroke secondary to A. fib. The patient was counseled with regards to the risks from Coumadin.     Thoracentesis was obtained on 11/14/2020 with 700 cc of fluid removed. Pleural fluid cytology showed no significant findings.   Patient was maintained on Pulmicort 200 mcg via neb twice daily, Brovana 50 mcg by nebulization twice daily, Spiriva Respimat 2 puffs daily, albuterol nebs every 4 hours while awake, and Xopenex 0.60 mg by nebulization every 8 hours as needed. Also maintained on Acapella every 4 hours. Oxygen titrated to greater than 90%. Baseline is 2 L at home. Patient maintained on Coumadin for VTE prophylaxis and Protonix for GI prophylaxis. Patient wants to go home with family. 11/18/2020:    Nursing report: Patient is still on 5 L, with plans to wean down. Patient been getting up from the chair to her bed. Little subjective dyspnea, however she dropped from a pulse ox of 95% to 89%. Hemoglobin has been stable at 7.2. The patient was 95 to 97% throughout the night. The patient did not need BiPAP during the night. The patient states that her breathing is doing much better than before. She affirms improvement with diuresis and with thoracentesis for drainage. However, the patient is still dyspneic with exertion. She affirms slight hemoptysis with cough productive of pinkish to very light brown sputum. Long discussion with the patient about long-term prognosis and goals. The patient particularly noted that she want to live long enough to see her granddaughter's high school graduation in May. 11/19/2020:    Nursing report: The patient is doing well. She slept through the night. INR is 2.66. The patient had dinner. Her oxygenation was good. Hemoglobin is 7.0. The patient states that her breathing is doing better. She affirms slight ongoing chest pain and \"indigestion. \"  She affirms productive cough with a little bit of blood. The patient and her daughter were extensively counseled with regards to risk of bleeding with anticoagulation. Otherwise, no new specific complaints or concerns. 11/20/2020:    Nursing report: Patient doing well. On 4 L. Shortness of breath with activity. Bowel movement. Eating overnight. The patient states she is feeling okay. However, notes worsening left lower limb pain. \"Indigestion\" is resolved.   Patient attributes resolution to no longer eating hospital food and getting outside food. Affirms minimal dyspnea at rest but significant dyspnea with exertion and excessive talking. Affirms coughing up \"a little bit of blood. \"    Otherwise, no new specific complaints or concerns. 11/21/2020:    Dr. Odessa Valdes was consulted. He declined to see the patient in the hospital, but ordered a unit of blood to be followed by 20 mg of Lasix and then a new H&H. However, as the patient's hemoglobin was above 7, we decided to cancel the order. Nursing report: \"Was not too bad. \"  ID saw the patient who recommended compression wraps 4 hours on and 4 hours off. However, the patient says she did not want wraps. On 4 L of oxygen at rest and 6 with activity. Patient desaturated to 77% overnight with activity and the patient seemed to take longer than before to recover, per nurse report. Patient states that she feels about the same but nurse feels that she is doing worse. Patient slept well but did not eat much. She had no bowel movement. The patient states she feels okay. She has no shortness of breath at rest but does have shortness of breath on exertion. She believes that the shortness of breath on exertion is unchanged from previous. Patient affirms reduced appetite and no recent bowel movement. The patient states that she was upset yesterday because during the day she needed help using the restroom to go urinate. The nurse did not help her and turned off her call light for about half an hour. The patient was subsequently incontinent of urine. The patient was reassured that we would talk to nursing today. Affirms ongoing slight hemoptysis. 11/22/2020:    Patient is more short of breath in the morning with oxygen saturation down to the mid 80s on 4 L by nasal cannula. Patient was placed on high flow BiPAP. Dr. Rox Wallis was consulted and chest x-ray was ordered.     11/23/2020:    Nursing report: Nursing states the patient was doing great, with no overnight desaturations and FiO2 of 50% and a flow rate of 25 L/min. The patient has been eating and did have a bowel movement. On presentation, the patient affirmed that her breathing was doing better. The patient was on a high flow heated nasal cannula. The patient stated that this machine helped her breathe a lot better. Though the patient states she would like to take this machine at home, she verbalizes understanding that she cannot do so. The patient denies chest pain and abdominal pain. Affirms dyspnea with activity. Patient reiterated that she wanted Dr. Fagan Getting to see her. Also verbalized frustration with the nursing staff. Otherwise, no new specific complaints or concerns. 11/24/2020:    Stable dyspnea. Denies chest pain, fever, and abdominal pain. Patient asked about consult with her outpatient pulmonologist.  The patient is counseled and attempt was made to contact him,  but that he was out of the office. Otherwise, no new specific complaints or concerns. 11/26/20  /cardiology - placed pt on BUMEX and DOBUTAMINE drip, currently through 24 G IV  -pending PICC placement tm. IR was not available today. Subjective (past 24 hours): On HFNC. No interval change. No new complaints. Straight cath after bladder scan 352 mls this evening. Past medical history, family history, social history and allergies reviewed again and is unchanged since admission. ROS (12 point review of systems completed. Pertinent positives noted.  Otherwise ROS is negative)     Medications:  Reviewed    Infusion Medications    bumetanide 0.1 mg/mL infusion 1 mg/hr (11/26/20 0300)    DOBUTamine 5 mcg/kg/min (11/26/20 0300)     Scheduled Medications    warfarin  1.5 mg Oral Once    lidocaine 1 % injection  5 mL Intradermal Once    sodium chloride flush  10 mL Intravenous 2 times per day    famotidine  20 mg Oral Daily    levocetirizine  5 mg Oral Nightly    albuterol  2.5 mg Nebulization Q4H While awake  warfarin (COUMADIN) daily dosing (placeholder)   Other RX Placeholder    Arformoterol Tartrate  15 mcg Nebulization BID    [Held by provider] aspirin  81 mg Oral Daily    budesonide  500 mcg Nebulization BID    digoxin  125 mcg Oral Every Other Day    docusate sodium  100 mg Oral Daily    Evolocumab  140 mg Subcutaneous Q14 Days    gabapentin  100 mg Oral TID    hydrocortisone  25 mg Rectal BID    metoprolol succinate  25 mg Oral Daily    nicotine  1 patch Transdermal Q24H    omalizumab  225 mg Subcutaneous Q14 Days    pantoprazole  40 mg Oral BID AC    [Held by provider] sacubitril-valsartan  1 tablet Oral BID    senna  1 tablet Oral Nightly    tiotropium  2 puff Inhalation Daily    traZODone  50 mg Oral Nightly     PRN Meds: sodium chloride flush, ondansetron, promethazine, polyethylene glycol, bisacodyl, acetaminophen, cyclobenzaprine, HYDROcodone 5 mg - acetaminophen, HYDROcodone 5 mg - acetaminophen, levalbuterol, magnesium hydroxide      Intake/Output Summary (Last 24 hours) at 11/26/2020 0955  Last data filed at 11/26/2020 0503  Gross per 24 hour   Intake 735 ml   Output 855 ml   Net -120 ml       Diet:  DIET CARDIAC; Low Sodium (2 GM); Daily Fluid Restriction: 1500 ml  Dietary Nutrition Supplements: Standard High Calorie Oral Supplement  Dietary Nutrition Supplements: Low Volume Supplement    Exam:  BP (!) 172/71   Pulse 85   Temp 97.1 °F (36.2 °C) (Oral)   Resp 22   Ht 5' 2\" (1.575 m)   Wt 145 lb 14.4 oz (66.2 kg)   SpO2 94%   BMI 26.69 kg/m²   General appearance: Patient is awake, alert, and in no acute distress. The patient is chronically ill-appearing. HEENT: Conjunctivae/corneas clear. Respiratory: Clear to auscultation but reduced respiratory effort. Cardiovascular: Regular rate and rhythm with normal S1/S2 without murmurs, rubs or gallops. Abdomen: Soft, non-tender, non-distended with normal bowel sounds.   Capillary Refill: Brisk,< 3 seconds   Peripheral Pulses: +2 palpable pitting pedal edema  Extremities: Peripheral pitting edema left leg greater than right. Erythema and tenderness over the left lower extremity. No warmth. Labs:   Recent Labs     11/24/20  0554 11/25/20 0441 11/26/20  0256   WBC 4.7* 4.7* 6.2   HGB 8.1* 8.0* 8.8*   HCT 27.4* 27.4* 29.7*    192 232     Recent Labs     11/24/20  0554 11/25/20 0441 11/26/20  0256    141 138   K 4.3 3.5 4.0   CL 98 100 101   CO2 32 33 29   BUN 26* 23* 22   CREATININE 1.0 1.0 0.9   CALCIUM 8.8 8.7 7.5*     No results for input(s): AST, ALT, BILIDIR, BILITOT, ALKPHOS in the last 72 hours. Recent Labs     11/24/20  0554 11/25/20 0441 11/26/20 0256   INR 2.89* 2.46* 2.28*     No results for input(s): CKTOTAL, TROPONINI in the last 72 hours. Microbiology:    Blood culture #1:   Lab Results   Component Value Date    BC No growth-preliminary No growth  10/25/2020       Blood culture #2:No results found for: Nkaia Cava    Organism:  Lab Results   Component Value Date    ORG Mixed Growth     02/21/2020         Lab Results   Component Value Date    LABGRAM  11/26/2019     No segmented neutrophils observed. Few epithelial cells observed. Many large gram positive bacilli. Few small gram positive bacilli. Few gram negative bacilli. Prepubescent and postmenopausal female samples (<15and >47ears of age) are not typically suitable for bacterialvaginosis screening. MRSA culture only:No results found for: [facility]    Urine culture:   Lab Results   Component Value Date    LABURIN  10/24/2019     Growth of Contaminants. The mixture of organisms present are not a common cause of urinary tract infections and probably represent distal urethral juventino.         Respiratory culture: No results found for: CULTRESP    Aerobic and Anaerobic :  Lab Results   Component Value Date    LABAERO No growth-preliminary No growth   11/11/2019     Lab Results   Component Value Date    LABANAE No growth-preliminary No growth   11/11/2019 Urinalysis:      Lab Results   Component Value Date    NITRU NEGATIVE 11/08/2020    WBCUA 0-2 11/08/2020    BACTERIA NONE SEEN 11/08/2020    RBCUA 5-10 11/08/2020    BLOODU TRACE 11/08/2020    GLUCOSEU NEGATIVE 11/08/2020       Radiology:  XR CHEST 1 VIEW   Final Result   1. Moderate cardiomegaly. Permanent pacemaker/fibrillator. Small bilateral pleural effusions. 2. Moderate pneumonia/pulmonary edema both mid and lower lung fields. Prominent vascular markings, suggesting heart failure. 3. No significant change from prior. Final report electronically signed by Dr. Lio Espinal on 11/25/2020 1:14 PM      XR CHEST PORTABLE   Final Result   1. Interval deterioration since previous study dated November 16, 2020 with increasing infiltrate and effusion at the right lung base. 2. Mild cardiomegaly status post pacemaker placement. .               **This report has been created using voice recognition software. It may contain minor errors which are inherent in voice recognition technology. **      Final report electronically signed by DR Cheo Richardson on 11/22/2020 2:26 PM      XR CHEST (2 VW)   Final Result   Stable chest given technical differences. Left lower lobe infiltrate and effusion. Generalized interstitial edema and cardiomegaly. **This report has been created using voice recognition software. It may contain minor errors which are inherent in voice recognition technology. **      Final report electronically signed by Dr. Vincent Montiel on 11/16/2020 11:22 AM      XR CHEST PORTABLE   Final Result   Moderately worsened aeration of the left lung base. **This report has been created using voice recognition software. It may contain minor errors which are inherent in voice recognition technology. **      Final report electronically signed by Dr. Juno Cormier on 11/15/2020 6:00 PM      XR CHEST PORTABLE   Final Result   No pneumothorax post right thoracentesis.             **This report has been created using voice recognition software. It may contain minor errors which are inherent in voice recognition technology. **      Final report electronically signed by Dr. Emperatriz Lainez on 11/14/2020 2:46 PM      XR CHEST PORTABLE   Final Result   Impression:   Moderate CHF, increased. Bilateral lower lobe pneumonia versus subsegmental atelectasis      This document has been electronically signed by: Kindra Simpson MD on    11/13/2020 10:07 PM           Xr Chest Portable    Result Date: 11/14/2020  PROCEDURE: XR CHEST PORTABLE CLINICAL INFORMATION: post thoracentesis right side. COMPARISON: 11/13/2020 TECHNIQUE: AP upright view of the chest. FINDINGS: Right pleural fluid has been evacuated. No pneumothorax. Small left pleural effusion persists. Mild congestion. No definite infiltrate. AICD is unchanged. No pneumothorax post right thoracentesis. **This report has been created using voice recognition software. It may contain minor errors which are inherent in voice recognition technology. ** Final report electronically signed by Dr. Emperatriz Lainez on 11/14/2020 2:46 PM    Xr Chest Portable    Result Date: 11/13/2020  Chest xray 1 view Comparison:  CR,SR  - XR CHEST PORTABLE  - 10/25/2020 03:23 PM EDT Findings: Cardiac conduction device. Moderate cardiomegaly. Hypoinflated. Moderately increased interstitial lung markings. There are space opacities notably left lower lobe. Small bilateral pleural effusions. No pneumothorax No acute fracture. Impression: Moderate CHF, increased. Bilateral lower lobe pneumonia versus subsegmental atelectasis This document has been electronically signed by: Kindra Simpson MD on 11/13/2020 10:07 PM     Us Thoracentesis    Result Date: 11/15/2020  THORACENTESIS WITH ULTRASOUND GUIDANCE: PERFORMED BY: Ladonna Rivero M.D.  CLINICAL INFORMATION: Pleural effusion APPROACH: Right side, posterior, inferior FLUID WITHDRAWN: 700 mL herrera serous fluid ESTIMATED BLOOD LOSS: Minimal PROCEDURE: Signed informed consent was obtained prior to performing this procedure. The thorax was initially evaluated sonographically to determine appropriate puncture site. The skin was marked, prepped, and draped in a sterile fashion. Following local anesthesia and utilizing aseptic technique, a 5 Indonesian one-step catheter was successfully inserted into the pleural effusion at the position indicated above. Pleural fluid in the amount was above was then aspirated and the needle was removed. The patient tolerated the procedure well. A post procedure chest radiograph will be obtained. Status post thoracentesis **This report has been created using voice recognition software. It may contain minor errors which are inherent in voice recognition technology. ** Final report electronically signed by Dr. Farnaz Silva on 11/15/2020 7:00 AM      Support Devices (date placed):  [] ETT []Oral / [] Nasal  [] Gastric Tube [] OG / [] NG    [] Central Venous Line (Specify Site):  [] Urinary Catheter  [] Arterial Line (Specify Site)  [x] Peripheral IV access  [x] Other: High flow nasal cannula.     DVT prophylaxis: [] Lovenox                                 [] SCDs                                 [] SQ Heparin                                 [] Encourage ambulation           [x] Already on Anticoagulation     Code Status: Limited    Tele:   [x] yes             [] no    Electronically signed by Ricardo Salcido MD, MPH, [facility] on 11/26/2020 at 9:55 AM   Supervised by Dr. Clarissa Johnson

## 2020-11-26 NOTE — FLOWSHEET NOTE
11/26/20 1427   Provider Notification   Reason for Communication Evaluate; Review case   Provider Name Dr Angel Martin   Provider Notification Physician   Method of Communication Secure Message   Response No new orders   Notification Time [phone]: RN updated  that unable IV team unable to place picc line until tomorrow   1515: RN updated Dr Angel Martin that this RN talked to iv therapy, they are going to do picc tomorrow , IR is not here, she thinks the one peripheral will be okay since getting good blood return until tomorrow.  RN also updated resident that bladder scan was 352 mls, straight cath her yesterday around 1900, awaiting response  1615: updated again that bladder scan was 352 mls, awaiting response

## 2020-11-26 NOTE — CONSULTS
Patient was seen and examined face-to-face by me (Dr. Cj Atkins MD). The chart, progress notes, labs and radiographs were reviewed. Case discussed with patient's primary nurse. Questions and concerns were addressed.   I agree with the note as created with additional comments as noted  Emily Check  Dr. Cj Atkins MD   Department of Cardiology

## 2020-11-26 NOTE — PROGRESS NOTES
Clinical Pharmacy Note    Warfarin consult follow-up    Recent Labs     11/26/20  0256   INR 2.28*     Recent Labs     11/24/20  0554 11/25/20  0441 11/26/20  0256   HGB 8.1* 8.0* 8.8*   HCT 27.4* 27.4* 29.7*    192 232       Significant Drug-Drug Interactions:  New warfarin drug-drug interactions: none  Discontinued drug-drug interactions: none  Current warfarin drug-drug interactions: aspirin (on hold), trazodone (HM)      Date INR Warfarin Dose   10/25-10/27   No Coumadin   10/28/2020 1.32 2 mg    10/29/2020 1.49  2 mg   10/30/2020  2  1.5 mg    10/31/2020   2.78   0.5 mg   11/1/2020   2.37  1.5 mg    11/2/2020  2.55 1 mg     11/3/2020  3.54   No Coumadin   11/4/2020  3.09  1 mg   11/5/2020 2.41 1.5 mg   11/6/2020 2.82 1 mg    11/7/2020  2.61  1.5 mg    11/8/2020   2.22                     1.5 mg   11/9/2020  2.12                      2 mg   11/10/2020 2.05  2 mg   11/11/2020  2.54  1.5 mg   11/12/2020 2.72  1.5 mg   11/13/2020 2.72 No Coumadin   11/14/2020 2.3  No Coumadin    11/15/2020 1.7  (not given)   11/16/2020   1.48 2 mg    11/17/2020   1.43 2 mg    11/18/2020   1.99 1.5 mg    11/19/2020  2.66 0.5 mg   11/20/2020   1.68   2 mg   11/21/2020 1.80 1.5 mg   11/22/2020  2.31 1.5 mg   11/23/2020  2.77 1 mg    11/24/2020 2.89 0.5 mg   11/25/2020  2.46  1 mg    11/26/2020 2.28 1.5 mg                      Notes:                   Daily PT/INR until stable within therapeutic range.

## 2020-11-26 NOTE — CONSULTS
[address] [place] [address]                                  CONSULTATION    PATIENT NAME: Mukund Dee                    :        1945  MED REC NO:   [id number]                           ROOM:       0010  ACCOUNT NO:   [de-identified]                           ADMIT DATE: 2020  PROVIDER:     Kayli Parish. Cally Stiles M.D.    Eligio Still:  2020    REASON FOR EVALUATION:  Shortness of breath, atrial fib, coronary artery  disease. HISTORY OF PRESENT ILLNESS:  This is a patient, 24-year-old lady, well  known to me from prior encounter. She is known to have a history of  coronary artery disease. She has a history of chronic occlusion of the  LAD and history of severe systolic dysfunction of the left ventricle. The patient does have CHF class IV. The patient has a history of  chronic atrial fib. She has history of an AICD. She is pacer  dependent. This patient has been on Coumadin. REVIEW OF SYSTEMS:  She has no history of CVA. She has a history of  COPD that necessitates high-flow oxygen supplements. She has a history  of hypothyroidism. She has a history of anemia. No source of bleeding  could be identified. The patient has a history of chronic back pain. She utilized a walker. She has a history of blood transfusion,  gastroesophageal reflux. She has a history of UTIs. The patient is  also very anxious. She does have history of dyslipidemia. SOCIAL HISTORY:  She is still smoking. The patient does utilize alcohol  too. ALLERGIES:  She had multiple allergies, not clear whether they are true  or just by the patient's feelings. PHYSICAL EXAMINATION:  VITAL SIGNS:  She was on a high-flow oxygen. She was in a pacer rhythm. The patient's blood pressure was 124/55. NEUROLOGIC:  The patient has no motor deficits. She is very hard of  hearing. NECK:  She has positive JVD.   LUNGS:  She had bilateral poor air exchange. Diffuse expiratory  wheezes. Decreased sounds in both lung bases. The patient has apical  pulsation, laterally displaced. HEART:  She had S3. She has 2/6 systolic murmur. ABDOMEN:  Protuberant. EXTREMITIES:  She had dependent edema of both lower limbs. LABORATORY WORK:  Showed her BUN 22, creatinine 0.1, potassium was 4. Hemoglobin is 8.8, hematocrit 29.7. The INR was therapeutic. The patient had a COVID-19 test, was negative. IMPRESSION:  1. Acute exacerbation of chronic systolic congestive heart failure. 2.  The patient will be placed on dobutamine drip and she will be placed  also on IV Bumex. 3.  The patient cardiac-wise has end-stage disease and probably will  continue with conservative treatment. 4.  Advanced COPD, necessitates high-flow oxygen supplements. 5.  Excessive tobacco use. 6.  Chronic anemia. 7.  Chronic atrial fib. 8.  History of back pain, utilizing a walker. 9.  Less than optimal compliance. We will continue with aggressive medical treatment for the patient, her  electrolytes, and the patient needs to be monitored closely. Overall,  she has poor prognosis. We thank you very much for allowing us to share in the management of  this patient. We will follow up with you. Stacey Kaye M.D.    D: 11/26/2020 12:54:18       T: 11/26/2020 14:47:05     [email or web address]  Job#: [id number]     Doc#: [id number]    CC:  Mary Beth Tello M.D.

## 2020-11-27 NOTE — CARE COORDINATION
DISASTER CHARTING    11/27/20, 10:11 AM EST    DISCHARGE ONGOING EVALUATION:     Michelle Esteban day: 14  Location: [facility]10/010-A Reason for admit: Physical debility [R53.81]   Barriers to Discharge: HF Oxygen 40% FIO2/25L, Dobutrex/Bumex gtts; await [place] bed availability; collaborated with Jayla Menendez, [address]  PCP: Leonila Wood MD  Patient Goals/Plan/Treatment Preferences: lives with family and had planned [facility], await [place] bed availability for HF oxygen weaning/diuretics, patient in agreement for [facility] only; has [employer] oxygen 2L, nebulizer, current with [facility], therapy following

## 2020-11-27 NOTE — FLOWSHEET NOTE
11/26/20 1805   Urine Assessment   Bladder Scan Volume (mL) 68 mL   $ Bladder scan $ Yes   RN rechecked bladder scan and never straight cath, patient voided and post void scan was 68 mls

## 2020-11-27 NOTE — PROGRESS NOTES
Insertion procedure, benefits, risks and alternatives discussed with patient. Hesitant, however consenting. States \"I need this to get my medication\". Then states she has \"other things to do this morning\" and \"can lay here all day and think about it\". This RN states she will discuss with primary RN.

## 2020-11-27 NOTE — PROGRESS NOTES
Clinical Pharmacy Note    Warfarin consult follow-up    Recent Labs     11/27/20  0417   INR 2.96*     Recent Labs     11/25/20  0441 11/26/20  0256 11/27/20  0417   HGB 8.0* 8.8* 8.4*   HCT 27.4* 29.7* 28.6*    232 212       Significant Drug-Drug Interactions:  New warfarin drug-drug interactions: none  Discontinued drug-drug interactions: none  Current warfarin drug-drug interactions: aspirin (on hold), trazodone (HM)     Date INR Warfarin Dose   10/25-10/27   No Coumadin   10/28/2020 1.32 2 mg    10/29/2020 1.49  2 mg   10/30/2020  2  1.5 mg    10/31/2020   2.78   0.5 mg   11/1/2020   2.37  1.5 mg    11/2/2020  2.55 1 mg     11/3/2020  3.54   No Coumadin   11/4/2020  3.09  1 mg   11/5/2020 2.41 1.5 mg   11/6/2020 2.82 1 mg    11/7/2020  2.61  1.5 mg    11/8/2020   2.22                     1.5 mg   11/9/2020  2.12                      2 mg   11/10/2020 2.05  2 mg   11/11/2020  2.54  1.5 mg   11/12/2020 2.72  1.5 mg   11/13/2020 2.72 No Coumadin   11/14/2020 2.3  No Coumadin    11/15/2020 1.7  (not given)   11/16/2020   1.48 2 mg    11/17/2020   1.43 2 mg    11/18/2020   1.99 1.5 mg    11/19/2020  2.66 0.5 mg   11/20/2020   1.68   2 mg   11/21/2020 1.80 1.5 mg   11/22/2020  2.31 1.5 mg   11/23/2020  2.77 1 mg    11/24/2020 2.89 0.5 mg   11/25/2020  2.46  1 mg    11/26/2020 2.28 1.5 mg    11/27/2020  2.96   0.5 mg                      Notes:                     Daily PT/INR until stable within therapeutic range.      Theshimon Henriquez, PharmD, BCPS   11/27/2020  9:55 AM

## 2020-11-27 NOTE — PROGRESS NOTES
[address] [place] [address]  INPATIENT PHYSICAL THERAPY  DAILY NOTE  [facility] ICU STEPDOWN TELEMETRY 4K - [id number]-10010-A     Time In: 1525  Time Out: 1544  Timed Code Treatment Minutes: 19 Minutes  Minutes: 19          Date: 2020  Patient Name: Yoana Shen,  Gender:  female        MRN: [id number]  : 1945  (67 y.o.)     Referring Practitioner: Kaiden Mathews MD  Diagnosis: Physical Debility  Additional Pertinent Hx: Pt readmitted to Acute from [facility]hab due to decline in status and requiring thoracentesis and respiratory management. Per EMR, \"Sharona Almazan is a 67 y.o. female with an extensive medical history including, congestive heart failure, COPD, A. Fib., Pacemaker placement post MI, hypertension, anemia, and IBS who presents to the ED for an evaluation of severe left lower extremity pain, redness, and warmth that started yesterday evening. The patient states that yesterday morning she started feeling some discomfort to her lower extremity, but believed it was due to her compression stockings she wears for CHF. Yesterday evening she removed the stockings and saw that her left lower leg was extremely red, which has continued to spread, becoming more red, and even more painful. Denies any recent trauma, falls, accidents, or wounds to the lower extremity but states that she occasionally scratches her legs with her nails when taking on and off the compression stockings. The patient has attempted tylenol, percocet, biofreeze, and icyhot yesterday with no relief. 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 Endorses history of blood clots, but is on coumadin which was last checked approximately 1 week ago and was in theraputic range.  Further endorses feeling feverish and chills, increased shortness of breath, worsening left lower extremity pain, the inability to bear weight at this time, and denies numbness or tingling to the lower extremities, diabetes mellitus, previous lower leg infections, chest pain, abdominal pain, changes in her bowel or bladder function or habits. Patient still smokes 1/2 a pack of cigarettes daily and is on home oxygen. No alcohol or illicit drug use. \"     Prior Level of Function:  Lives With: Spouse  Type of Home: House  Home Layout: One level  Home Access: Stairs to enter with rails  Entrance Stairs - Number of Steps: 4  Entrance Stairs - Rails: Both(too wide to use at same time)  Home Equipment: Rolling walker, 4 wheeled walker, [employer]   Bathroom Shower/Tub: Walk-in shower, Shower chair with back  [employer]: Bedside commode  Bathroom Equipment: Grab bars in shower, Hand-held shower, 3-in-1 commode  Bathroom Accessibility: Accessible    Receives Help From: Family  ADL Assistance: [facility]: Needs assistance  Homemaking Responsibilities: No  Ambulation Assistance: Independent  Transfer Assistance: Independent  Active : Yes  Additional Comments: Pt reports that her spouse assists with IADL tasks, Pt completes own self care . Spouse does have to assist with compression stockings. Restrictions/Precautions:  Restrictions/Precautions: General Precautions, Fall Risk  Left Lower Extremity Weight Bearing: Weight Bearing As Tolerated  Position Activity Restriction  Other position/activity restrictions: not re High-flow O2    SUBJECTIVE: Pt resting in bed with IV meds for heart issues per pt report. Pt agreed to bed exercises. PAIN: not reported      OBJECTIVE:  Bed Mobility:  Not Tested    Transfers:  Not Tested    Ambulation:  Not tested    Exercise:  Patient was guided in 1 set(s) 15 reps of exercise to both lower extremities. Ankle pumps, Glut sets, Quad sets, Heelslides, Short arc quads and Hip abduction/adduction.   Exercises were completed for increased independence with functional mobility. Functional Outcome Measures: Not completed       ASSESSMENT:  Assessment: Patient progressing toward established goals. and had no shortness of breath noted with exercises. Activity Tolerance:  Patient tolerance of  treatment: good. Equipment Recommendations:Equipment Needed: No  Other: cont to monitor for needs  Discharge Recommendations:   Home with Home health PT    Plan: Times per week: 3-5x GM  Specific instructions for Next Treatment: therex and mobility  Current Treatment Recommendations: Strengthening, Functional Mobility Training, Transfer Training, Endurance Training, Balance Training, Stair training, Gait Training, Home Exercise Program, Safety Education & Training, Equipment Evaluation, Education, & procurement, Patient/Caregiver Education & Training    Patient Education  Patient Education: Plan of Care, Home Exercise Program    Goals:  Patient goals : get stronger to be able to go home. Short term goals  Time Frame for Short term goals: at discharge  Short term goal 1: Pt to go supine <-> sit, with Mod I to get in/out of bed, no rail. Short term goal 2: Pt to get up/down from various seated surfaces, with S to get up to walk. Short term goal 3: Pt to walk with RW >= 100 ft, SBA to progress to home and community mobility  Short term goal 4: Pt to negotiate 4 steps with HR and CGA to enter home safely  Long term goals  Time Frame for Long term goals : not set due to short ELOS    Following session, patient left in safe position with all fall risk precautions in place. Marcus Gonsalves.  Anne-Marie Ortiz Lilly 8

## 2020-11-28 NOTE — PLAN OF CARE
Problem: RESPIRATORY  Goal: Clear lung sounds  Outcome: Ongoing     Problem: RESPIRATORY  Goal: Effective breathing pattern  Outcome: Ongoing  Note: Patient currently on HFNC 20L and 40%

## 2020-11-28 NOTE — PROGRESS NOTES
Admit Date: 11/13/2020  Hospital day several    Subjective:     Patient has sob and pedal oedema . Medication side effects: none    Scheduled Meds:   potassium (CARDIAC) replacement protocol   Other RX Placeholder    bumetanide  2 mg Oral BID    potassium chloride  20 mEq Oral BID WC    sodium chloride flush  10 mL Intravenous 2 times per day    famotidine  20 mg Oral Daily    levocetirizine  5 mg Oral Nightly    albuterol  2.5 mg Nebulization Q4H While awake    warfarin (COUMADIN) daily dosing (placeholder)   Other RX Placeholder    Arformoterol Tartrate  15 mcg Nebulization BID    [Held by provider] aspirin  81 mg Oral Daily    budesonide  500 mcg Nebulization BID    digoxin  125 mcg Oral Every Other Day    docusate sodium  100 mg Oral Daily    Evolocumab  140 mg Subcutaneous Q14 Days    gabapentin  100 mg Oral TID    hydrocortisone  25 mg Rectal BID    metoprolol succinate  25 mg Oral Daily    nicotine  1 patch Transdermal Q24H    omalizumab  225 mg Subcutaneous Q14 Days    pantoprazole  40 mg Oral BID AC    [Held by provider] sacubitril-valsartan  1 tablet Oral BID    senna  1 tablet Oral Nightly    tiotropium  2 puff Inhalation Daily    traZODone  50 mg Oral Nightly     Continuous Infusions:   DOBUTamine 5 mcg/kg/min (11/28/20 0756)     PRN Meds:potassium chloride **OR** potassium alternative oral replacement **OR** potassium chloride, sodium chloride flush, ondansetron, promethazine, polyethylene glycol, bisacodyl, acetaminophen, cyclobenzaprine, HYDROcodone 5 mg - acetaminophen, HYDROcodone 5 mg - acetaminophen, levalbuterol, magnesium hydroxide    Review of Systems  Pertinent items are noted in HPI.     Objective:     Patient Vitals for the past 8 hrs:   BP Temp Temp src Pulse Resp SpO2 Weight   11/28/20 0801 (!) 146/65 97.7 °F (36.5 °C) Oral 74 20 94 % --   11/28/20 0738 -- -- -- -- 18 91 % --   11/28/20 0630 -- -- -- -- 20 98 % --   11/28/20 0326 (!) 122/58 97.5 °F (36.4 °C) Oral

## 2020-11-28 NOTE — PROGRESS NOTES
Clinical Pharmacy Note    Warfarin consult follow-up    Recent Labs     11/28/20  0557   INR 2.88*     Recent Labs     11/26/20  0256 11/27/20  0417 11/28/20  0557   HGB 8.8* 8.4* 9.2*   HCT 29.7* 28.6* 31.4*    212 234       Significant Drug-Drug Interactions:  New warfarin drug-drug interactions: None  Discontinued drug-drug interactions: None  Current warfarin drug-drug interactions: aspirin (on hold), trazodone (HM)      Date INR Warfarin Dose   10/25-10/27   No Coumadin   10/28/2020 1.32 2 mg    10/29/2020 1.49  2 mg   10/30/2020  2  1.5 mg    10/31/2020   2.78   0.5 mg   11/1/2020   2.37  1.5 mg    11/2/2020  2.55 1 mg     11/3/2020  3.54   No Coumadin   11/4/2020  3.09  1 mg   11/5/2020 2.41 1.5 mg   11/6/2020 2.82 1 mg    11/7/2020  2.61  1.5 mg    11/8/2020   2.22                     1.5 mg   11/9/2020  2.12                      2 mg   11/10/2020 2.05  2 mg   11/11/2020  2.54  1.5 mg   11/12/2020 2.72  1.5 mg   11/13/2020 2.72 No Coumadin   11/14/2020 2.3  No Coumadin    11/15/2020 1.7  (not given)   11/16/2020   1.48 2 mg    11/17/2020   1.43 2 mg    11/18/2020   1.99 1.5 mg    11/19/2020  2.66 0.5 mg   11/20/2020   1.68   2 mg   11/21/2020 1.80 1.5 mg   11/22/2020  2.31 1.5 mg   11/23/2020  2.77 1 mg    11/24/2020 2.89 0.5 mg   11/25/2020  2.46  1 mg    11/26/2020 2.28 1.5 mg    11/27/2020  2.96   0.5 mg    11/28/2020  2.88  1 mg                  Notes:                     Daily PT/INR until stable within therapeutic range.

## 2020-11-28 NOTE — PROGRESS NOTES
Hospitalist Progress Note      Patient:  Marly Avilez    Unit/Bed:4K-10/010-A  YOB: 1945  MRN: [id number]   Acct: [de-identified]   PCP: Gilmar Slaughter MD  Date of Admission: 11/13/2020    Assessment/Plan:    1. Acute on chronic hypoxic respiratory failure likely secondary to pleural effusions and acute systolic CHF exacerbation-worsening. 94% on FiO2 40% and 20 L/min. Dramatically reduced bilateral pedal edema. Chest x-ray shows improved aeration of the left lung base and small bilateral pleural effusions with bibasilar opacities, per radiology, as compared to previous chest x-ray. The patient states her breathing is okay at rest on high flow oxygen. Patient also states that she is not short of breath with minimal activity. An attempt was made to insert a PICC line but the patient refused. Per Dr. Eduardo Masters, the patient may receive dobutamine through peripheral IV for now. However, patient was counseled that this IV may fail and that she may need PICC line later. Room will not be available at [facility] until Monday at the earliest.  Hold aspirin, maintain on digoxin 125 mcg every other day, maintain on Evolocumab 140 mg every 14 days, and Toprol-XL 25 mg daily. Per improvement in volume status above the patient was taken off of the continuous Bumex and maintained on the continuous dobutamine infusion, per cardiology. Successfully contacted outpatient pulmonologist, who stated that they would see patient in the hospital if they could. 2.  Chronic atrial fibrillation-stable. The patient has a ARIANNA(2)DS(2)-VASc of 6. The patient is being treated with Coumadin. Patient's hemoglobin has remained above 7. Therefore, we will maintain Coumadin for now. Monitor with daily CBC and INR. Current INR 2.88. Otherwise, treat as described above. 3.  Hemoptysis, unspecified-resolved.      Patient states she has not had hemoptysis for several days. Patient has a history of hemoptysis of unknown cause. Bronchoscopy could possibly provide diagnostic information. However, we would not recommend this as the bronchoscopy would be a considerable risk and would likely not change her long-term care management plan per CHF and advanced COPD. Will address as described above for chronic A. Fib. 4.  Anemia, unspecified-stable. The patient reported a longstanding history of anemia. This may be secondary to blood loss from hemoptysis described above. However, the patient has a macrocytic profile with an MCV of 108. 2. Recent high ferritin on 11/3/2020, high iron, and normal total iron-binding capacity makes this diagnosis less likely. Acute bleed could still be the cause or contributor to this anemia, this would not necessarily present as the \"classic\" iron deficiency profile. Normal folate and vitamin B12 makes nutritional anemia less likely cause of macrocytic anemia. The patient continues to have fluctuating hemoglobin. Consider anemia of chronic disease. 5.  Left lower extremity cellulitis treated on previous admission-worsening    The patient is amenable to 4 hours on and 4 hours off of wound dressing per ID. 6.  Coronary artery disease-stable. Treat as described above for CHF and A. Fib. 7.  Benign essential hypertension-stable. Treat as described above for CHF and A. Fib.    8.  Hyperlipidemia, unspecified-stable. Treat with evolocumab as described above. 9.  Chronic obstructive pulmonary disease asthma component, unspecified-stable. Contributing to acute on chronic hypoxic respiratory failure as above. Maintain patient on albuterol nebulizer, Spiriva Respimat, Brovana, and Pulmicort. Xolair cannot be given inpatient. Follow-up with outpatient pulmonology. 10.  Hypokalemia-new onset. Potassium 3.4.     Low potassium likely secondary to continuous Bumex, which has been discontinued. We will maintain patient on oral potassium 20 mEq twice daily and potassium placement protocol. Monitor with daily BMP. Expected discharge date: Unknown    Disposition:    [x] Home       [] TCU       [] Rehab       [] Psych       [] SNF       [] [facility]       [] Other-    Chief Complaint: Hypoxia and Hemoptysis    Opening statement:     The patient is a 79-year-old female with a complex past medical history with an old MI, long-term anticoagulation with Coumadin for atrial fibrillation, kidney stone, hypertension, hyperlipidemia, history of blood clots, GERD, CAD with biventricular pacemaker, frequent UTI, COPD, systolic CHF, coronary artery disease, recent hospitalization for lower extremity cellulitis, and current smoker who presents with a chief complaint of hypoxia and hemoptysis. Hospital Treatment Course:     11/13/2020-11/17/2020:    Patient was diagnosed with acute on chronic respiratory failure likely secondary to pleural effusions and acute systolic heart failure. Cause of hemoptysis unclear.     The patient was initially transferred to [facility].     Rapid was called om the patient on 11/13/2020 secondary to hypoxia, tachypnea and hypertension after transfer from [facility] to [facility]. Patient was placed on BiPAP which dramatically improved her presentation. The patient was then transferred to [facility].     Patient was evaluated on the same day for chest pain and shortness of breath. An EKG found no change but a mild troponin elevation was noted. Pain is likely secondary to dyspepsia or GERD.     Pulmonology was consulted for hemoptysis and cardiology was consulted for hemoptysis, possible watchman implantation, and management of Coumadin.     The patient's Coumadin was initially held and her supratherapeutic INR was corrected. However, the patient was placed back on Coumadin as she was concerned for stroke secondary to A. fib.  The patient was counseled with regards to the risks from Coumadin.     Thoracentesis was obtained on 11/14/2020 with 700 cc of fluid removed. Pleural fluid cytology showed no significant findings. Patient was maintained on Pulmicort 200 mcg via neb twice daily, Brovana 50 mcg by nebulization twice daily, Spiriva Respimat 2 puffs daily, albuterol nebs every 4 hours while awake, and Xopenex 0.60 mg by nebulization every 8 hours as needed. Also maintained on Acapella every 4 hours. Oxygen titrated to greater than 90%. Baseline is 2 L at home. Patient maintained on Coumadin for VTE prophylaxis and Protonix for GI prophylaxis. Patient wants to go home with family. 11/18/2020:    Nursing report: Patient is still on 5 L, with plans to wean down. Patient been getting up from the chair to her bed. Little subjective dyspnea, however she dropped from a pulse ox of 95% to 89%. Hemoglobin has been stable at 7.2. The patient was 95 to 97% throughout the night. The patient did not need BiPAP during the night. The patient states that her breathing is doing much better than before. She affirms improvement with diuresis and with thoracentesis for drainage. However, the patient is still dyspneic with exertion. She affirms slight hemoptysis with cough productive of pinkish to very light brown sputum. Long discussion with the patient about long-term prognosis and goals. The patient particularly noted that she want to live long enough to see her granddaughter's high school graduation in May. 11/19/2020:    Nursing report: The patient is doing well. She slept through the night. INR is 2.66. The patient had dinner. Her oxygenation was good. Hemoglobin is 7.0. The patient states that her breathing is doing better. She affirms slight ongoing chest pain and \"indigestion. \"  She affirms productive cough with a little bit of blood. The patient and her daughter were extensively counseled with regards to risk of bleeding with anticoagulation.   Otherwise, no new ongoing slight hemoptysis. 11/22/2020:    Patient is more short of breath in the morning with oxygen saturation down to the mid 80s on 4 L by nasal cannula. Patient was placed on high flow BiPAP. Dr. Krystina Benites was consulted and chest x-ray was ordered. 11/23/2020:    Nursing report: Nursing states the patient was doing great, with no overnight desaturations and FiO2 of 50% and a flow rate of 25 L/min. The patient has been eating and did have a bowel movement. On presentation, the patient affirmed that her breathing was doing better. The patient was on a high flow heated nasal cannula. The patient stated that this machine helped her breathe a lot better. Though the patient states she would like to take this machine at home, she verbalizes understanding that she cannot do so. The patient denies chest pain and abdominal pain. Affirms dyspnea with activity. Patient reiterated that she wanted Dr. Krystina Benites to see her. Also verbalized frustration with the nursing staff. Otherwise, no new specific complaints or concerns. 11/24/2020:    Stable dyspnea. Denies chest pain, fever, and abdominal pain. Patient asked about consult with her outpatient pulmonologist.  The patient is counseled and attempt was made to contact him,  but that he was out of the office. Otherwise, no new specific complaints or concerns. 11/25/2020:    Nursing report: \"Still on high flow. \"  \"Not much change. \"  Peripheral access was successfully obtained. States that daughter does not want patient to go to [place]. Has been eating but no bowel movement yesterday. The patient states that her breathing is okay at rest but that she gets very dyspneic on exertion. Denies chest pain abdominal pain. States that she has not had any hemoptysis for several days. The patient asked about reaching her outpatient pulmonologist and cardiologist.    Also asked about Thanksgiving, as she wanted to have family in her room.   The patient was reassured that I would discuss this with Spirit[facility]. Otherwise, no new specific complaints or concerns. 11/26/2020: On high flow nasal cannula, no specific interval change, no new specific complaints. Straight catheter bladder scan to 152 cc.    11/27/2020:    Nursing report: Reports that the patient is still on high flow. [place] placement pending. Weaned down to 40% on high flow. Refused PICC line. Has been eating \"guzzling fluids. \"  No bowel movement. Patient reports dissatisfaction with the nursing staff, as they did not prepare her for the PICC line. The patient was counseled that while she does not need the PICC line immediately, she might needed down the road due to failure of the peripheral IV. The patient confirms refusal of the PICC line. Patient also states that nursing staff and IV staff \"yelled at her. \"  She states that the nursing staff attend her yesterday during the day was fine. She stated that her breathing was stable. Denied recent hemoptysis. Otherwise, no new specific complaints or concerns. Subjective (past 24 hours):     Patient states that she feels okay. The patient affirms good appetite and bowel movement yesterday. Patient states that she is not dyspneic at rest and not dyspneic with minimal activity to get up to the commode. Otherwise, no new specific complaints or concerns. Past medical history, family history, social history and allergies reviewed again and is unchanged since admission. ROS (12 point review of systems completed. Pertinent positives noted.  Otherwise ROS is negative)     Medications:  Reviewed    Infusion Medications    DOBUTamine 5 mcg/kg/min (11/28/20 0756)     Scheduled Medications    potassium (CARDIAC) replacement protocol   Other RX Placeholder    bumetanide  2 mg Oral BID    potassium chloride  20 mEq Oral BID     sodium chloride flush  10 mL Intravenous 2 times per day    famotidine  20 mg Oral Daily  levocetirizine  5 mg Oral Nightly    albuterol  2.5 mg Nebulization Q4H While awake    warfarin (COUMADIN) daily dosing (placeholder)   Other RX Placeholder    Arformoterol Tartrate  15 mcg Nebulization BID    [Held by provider] aspirin  81 mg Oral Daily    budesonide  500 mcg Nebulization BID    digoxin  125 mcg Oral Every Other Day    docusate sodium  100 mg Oral Daily    Evolocumab  140 mg Subcutaneous Q14 Days    gabapentin  100 mg Oral TID    hydrocortisone  25 mg Rectal BID    metoprolol succinate  25 mg Oral Daily    nicotine  1 patch Transdermal Q24H    omalizumab  225 mg Subcutaneous Q14 Days    pantoprazole  40 mg Oral BID AC    [Held by provider] sacubitril-valsartan  1 tablet Oral BID    senna  1 tablet Oral Nightly    tiotropium  2 puff Inhalation Daily    traZODone  50 mg Oral Nightly     PRN Meds: potassium chloride **OR** potassium alternative oral replacement **OR** potassium chloride, sodium chloride flush, ondansetron, promethazine, polyethylene glycol, bisacodyl, acetaminophen, cyclobenzaprine, HYDROcodone 5 mg - acetaminophen, HYDROcodone 5 mg - acetaminophen, levalbuterol, magnesium hydroxide      Intake/Output Summary (Last 24 hours) at 11/28/2020 1427  Last data filed at 11/28/2020 1128  Gross per 24 hour   Intake 1187.54 ml   Output 2775 ml   Net -1587.46 ml       Diet:  DIET CARDIAC; Low Sodium (2 GM); Daily Fluid Restriction: 1500 ml  Dietary Nutrition Supplements: Standard High Calorie Oral Supplement  Dietary Nutrition Supplements: Low Volume Supplement    Exam:  BP (!) 151/65   Pulse 69   Temp 98.4 °F (36.9 °C) (Oral)   Resp 18   Ht 5' 2\" (1.575 m)   Wt 130 lb 11.2 oz (59.3 kg)   SpO2 94%   BMI 23.91 kg/m²   General appearance: Patient is awake, alert, and in no acute distress. The patient is chronically ill-appearing. HEENT: Conjunctivae/corneas clear. Respiratory: Clear to auscultation but reduced respiratory effort.   Cardiovascular: Regular rate and rhythm with normal S1/S2 without murmurs, rubs or gallops. Abdomen: Soft, non-tender, non-distended with normal bowel sounds. Capillary Refill: Brisk,< 3 seconds   Peripheral Pulses: +2 palpable pitting pedal edema  Extremities: Trace pedal edema, dramatically improved. Labs:   Recent Labs     11/26/20 0256 11/27/20 0417 11/28/20  0557   WBC 6.2 7.0 7.1   HGB 8.8* 8.4* 9.2*   HCT 29.7* 28.6* 31.4*    212 234     Recent Labs     11/27/20 0417 11/27/20  1738 11/28/20  0557    138 141   K 3.6 3.7 3.4*   CL 98 95* 95*   CO2 35* 37* 36*   BUN 21 21 21   CREATININE 0.9 1.0 0.9   CALCIUM 8.6 8.9 9.0     No results for input(s): AST, ALT, BILIDIR, BILITOT, ALKPHOS in the last 72 hours. Recent Labs     11/26/20 0256 11/27/20 0417 11/28/20  0557   INR 2.28* 2.96* 2.88*     No results for input(s): CKTOTAL, TROPONINI in the last 72 hours. Microbiology:    Blood culture #1:   Lab Results   Component Value Date    BC No growth-preliminary No growth  10/25/2020       Blood culture #2:No results found for: Alexandr Lovelace    Organism:  Lab Results   Component Value Date    ORG Mixed Growth     02/21/2020         Lab Results   Component Value Date    LABGRAM  11/26/2019     No segmented neutrophils observed. Few epithelial cells observed. Many large gram positive bacilli. Few small gram positive bacilli. Few gram negative bacilli. Prepubescent and postmenopausal female samples (<15and >47ears of age) are not typically suitable for bacterialvaginosis screening. MRSA culture only:No results found for: [facility]    Urine culture:   Lab Results   Component Value Date    LABURIN  10/24/2019     Growth of Contaminants. The mixture of organisms present are not a common cause of urinary tract infections and probably represent distal urethral juventino.         Respiratory culture: No results found for: CULTRESP    Aerobic and Anaerobic :  Lab Results   Component Value Date    LABAERO No growth-preliminary No growth 11/11/2019     Lab Results   Component Value Date    LABANAE No growth-preliminary No growth   11/11/2019       Urinalysis:      Lab Results   Component Value Date    NITRU NEGATIVE 11/08/2020    WBCUA 0-2 11/08/2020    BACTERIA NONE SEEN 11/08/2020    RBCUA 5-10 11/08/2020    BLOODU TRACE 11/08/2020    GLUCOSEU NEGATIVE 11/08/2020       Radiology:  XR CHEST PORTABLE   Final Result   1. Improved aeration of the left lung base. 2. Small bilateral pleural effusions with bibasilar opacities. **This report has been created using voice recognition software. It may contain minor errors which are inherent in voice recognition technology. **      Final report electronically signed by Dr. Anais Alamo on 11/28/2020 11:15 AM      XR CHEST 1 VIEW   Final Result   1. Moderate cardiomegaly. Permanent pacemaker/fibrillator. Small bilateral pleural effusions. 2. Moderate pneumonia/pulmonary edema both mid and lower lung fields. Prominent vascular markings, suggesting heart failure. 3. No significant change from prior. Final report electronically signed by Dr. Sera Plaza on 11/25/2020 1:14 PM      XR CHEST PORTABLE   Final Result   1. Interval deterioration since previous study dated November 16, 2020 with increasing infiltrate and effusion at the right lung base. 2. Mild cardiomegaly status post pacemaker placement. .               **This report has been created using voice recognition software. It may contain minor errors which are inherent in voice recognition technology. **      Final report electronically signed by DR Corazon Bocanegra on 11/22/2020 2:26 PM      XR CHEST (2 VW)   Final Result   Stable chest given technical differences. Left lower lobe infiltrate and effusion. Generalized interstitial edema and cardiomegaly. **This report has been created using voice recognition software. It may contain minor errors which are inherent in voice recognition technology. **      Final report electronically signed by Dr. Joshua Morgan on 11/16/2020 11:22 AM      XR CHEST PORTABLE   Final Result   Moderately worsened aeration of the left lung base. **This report has been created using voice recognition software. It may contain minor errors which are inherent in voice recognition technology. **      Final report electronically signed by Dr. Sada Rowe on 11/15/2020 6:00 PM      XR CHEST PORTABLE   Final Result   No pneumothorax post right thoracentesis. **This report has been created using voice recognition software. It may contain minor errors which are inherent in voice recognition technology. **      Final report electronically signed by Dr. Sada Rowe on 11/14/2020 2:46 PM      XR CHEST PORTABLE   Final Result   Impression:   Moderate CHF, increased. Bilateral lower lobe pneumonia versus subsegmental atelectasis      This document has been electronically signed by: Ramesh Rabago MD on    11/13/2020 10:07 PM           Xr Chest Portable    Result Date: 11/14/2020  PROCEDURE: XR CHEST PORTABLE CLINICAL INFORMATION: post thoracentesis right side. COMPARISON: 11/13/2020 TECHNIQUE: AP upright view of the chest. FINDINGS: Right pleural fluid has been evacuated. No pneumothorax. Small left pleural effusion persists. Mild congestion. No definite infiltrate. AICD is unchanged. No pneumothorax post right thoracentesis. **This report has been created using voice recognition software. It may contain minor errors which are inherent in voice recognition technology. ** Final report electronically signed by Dr. Sada Rowe on 11/14/2020 2:46 PM    Xr Chest Portable    Result Date: 11/13/2020  Chest xray 1 view Comparison:  CR,SR  - XR CHEST PORTABLE  - 10/25/2020 03:23 PM EDT Findings: Cardiac conduction device. Moderate cardiomegaly. Hypoinflated. Moderately increased interstitial lung markings. There are space opacities notably left lower lobe.  Small bilateral pleural effusions. No pneumothorax No acute fracture. Impression: Moderate CHF, increased. Bilateral lower lobe pneumonia versus subsegmental atelectasis This document has been electronically signed by: Sandra Colmenares MD on 11/13/2020 10:07 PM     Us Thoracentesis    Result Date: 11/15/2020  THORACENTESIS WITH ULTRASOUND GUIDANCE: PERFORMED BY: Lennox Ruffing M.D. CLINICAL INFORMATION: Pleural effusion APPROACH: Right side, posterior, inferior FLUID WITHDRAWN: 700 mL herrera serous fluid ESTIMATED BLOOD LOSS: Minimal PROCEDURE: Signed informed consent was obtained prior to performing this procedure. The thorax was initially evaluated sonographically to determine appropriate puncture site. The skin was marked, prepped, and draped in a sterile fashion. Following local anesthesia and utilizing aseptic technique, a 5 Bulgarian one-step catheter was successfully inserted into the pleural effusion at the position indicated above. Pleural fluid in the amount was above was then aspirated and the needle was removed. The patient tolerated the procedure well. A post procedure chest radiograph will be obtained. Status post thoracentesis **This report has been created using voice recognition software. It may contain minor errors which are inherent in voice recognition technology. ** Final report electronically signed by Dr. Mo Heading on 11/15/2020 7:00 AM      Support Devices (date placed):  [] ETT []Oral / [] Nasal  [] Gastric Tube [] OG / [] NG    [] Central Venous Line (Specify Site):  [] Urinary Catheter  [] Arterial Line (Specify Site)  [x] Peripheral IV access  [x] Other: High flow nasal cannula.     DVT prophylaxis: [] Lovenox                                 [] SCDs                                 [] SQ Heparin                                 [] Encourage ambulation           [x] Already on Anticoagulation     Code Status: Limited    Tele:   [x] yes             [] no    Electronically signed by Tanya Hinson MD, MPH, [address] [place] [address] on 11/28/2020 at 2:27 PM   Supervised by Dr. Nalini Ayers

## 2020-11-28 NOTE — PROGRESS NOTES
Admit Date: 11/13/2020  Hospital day several    Subjective:     Patient mild sob . Medication side effects: none    Scheduled Meds:   potassium (CARDIAC) replacement protocol   Other RX Placeholder    bumetanide  2 mg Oral BID    potassium chloride  20 mEq Oral BID WC    sodium chloride flush  10 mL Intravenous 2 times per day    famotidine  20 mg Oral Daily    levocetirizine  5 mg Oral Nightly    albuterol  2.5 mg Nebulization Q4H While awake    warfarin (COUMADIN) daily dosing (placeholder)   Other RX Placeholder    Arformoterol Tartrate  15 mcg Nebulization BID    [Held by provider] aspirin  81 mg Oral Daily    budesonide  500 mcg Nebulization BID    digoxin  125 mcg Oral Every Other Day    docusate sodium  100 mg Oral Daily    Evolocumab  140 mg Subcutaneous Q14 Days    gabapentin  100 mg Oral TID    hydrocortisone  25 mg Rectal BID    metoprolol succinate  25 mg Oral Daily    nicotine  1 patch Transdermal Q24H    omalizumab  225 mg Subcutaneous Q14 Days    pantoprazole  40 mg Oral BID AC    [Held by provider] sacubitril-valsartan  1 tablet Oral BID    senna  1 tablet Oral Nightly    tiotropium  2 puff Inhalation Daily    traZODone  50 mg Oral Nightly     Continuous Infusions:   DOBUTamine 5 mcg/kg/min (11/28/20 0756)     PRN Meds:potassium chloride **OR** potassium alternative oral replacement **OR** potassium chloride, sodium chloride flush, ondansetron, promethazine, polyethylene glycol, bisacodyl, acetaminophen, cyclobenzaprine, HYDROcodone 5 mg - acetaminophen, HYDROcodone 5 mg - acetaminophen, levalbuterol, magnesium hydroxide    Review of Systems  Pertinent items are noted in HPI.     Objective:     Patient Vitals for the past 8 hrs:   BP Temp Temp src Pulse Resp SpO2 Weight   11/28/20 0801 (!) 146/65 97.7 °F (36.5 °C) Oral 74 20 94 % --   11/28/20 0738 -- -- -- -- 18 91 % --   11/28/20 0630 -- -- -- -- 20 98 % --   11/28/20 0326 (!) 122/58 97.5 °F (36.4 °C) Oral 71 20 92 % 130 lb 11.2 oz (59.3 kg)     I/O last 3 completed shifts: In: 2095.7 [P.O.:1490; I.V.:605.7]  Out: 2825 [Urine:2825]    No change     ECG: af.   Data Review:   CBC:  Lab Results   Component Value Date    WBC 7.1 11/28/2020    RBC 3.06 11/28/2020    HGB 9.2 11/28/2020    HCT 31.4 11/28/2020    .6 11/28/2020    MCH 30.1 11/28/2020    MCHC 29.3 11/28/2020    RDW 14.9 06/25/2020     11/28/2020    MPV 10.4 11/28/2020     BMP  Lab Results   Component Value Date     11/28/2020    K 3.4 11/28/2020    K 4.4 10/26/2020    CL 95 11/28/2020    CO2 36 11/28/2020    BUN 21 11/28/2020    CREATININE 0.9 11/28/2020    CALCIUM 9.0 11/28/2020      COAG PROFILE:  Lab Results   Component Value Date    APTT 108.6 11/19/2020    INR 2.88 11/28/2020       Assessment:     Active Problems: Moderate COPD (chronic obstructive pulmonary disease) (HCC)    Physical debility    Hemoptysis    Acute respiratory failure with hypoxia (HCC)    Moderate malnutrition (HCC)    Decompensated heart failure (HCC)  Resolved Problems:    * No resolved hospital problems.  *      Plan:   Patient lost about 15 ibs    will stop iv bumex    continue dobutamine    continue supportive rx    no active bleeding

## 2020-11-28 NOTE — PROGRESS NOTES
Constitutional: No distress on O2 6LPM per NC. Mouth/Throat: Oropharynx is clear and moist.  No oral thrush. Neck: Neck supple. No tracheal deviation present. No JVD  Cardiovascular: S1 and S2 with no murmur. 1-2+BLE peripheral edema. Bi-V pacemaker  Pulmonary/Chest: Normal effort with bilateral air entry. No stridor. No respiratory distress. Patient exhibits no tenderness. No wheezing  Crackles bases  Abdominal: Soft. Bowel sounds audible. No distension or tenderness to palp. Musculoskeletal: Moves all extremities; no clubbing or cyanosis  Neurological: Patient is alert and oriented to person, place, and time. Skin: Warm and dry. Data    CBC:   Lab Results   Component Value Date    WBC 7.1 11/28/2020    RBC 3.06 11/28/2020    HGB 9.2 11/28/2020    HCT 31.4 11/28/2020    .6 11/28/2020    MCH 30.1 11/28/2020    MCHC 29.3 11/28/2020    RDW 14.9 06/25/2020     11/28/2020    MPV 10.4 11/28/2020     BMP:    Lab Results   Component Value Date     11/28/2020    K 3.4 11/28/2020    K 4.4 10/26/2020    CL 95 11/28/2020    CO2 36 11/28/2020    BUN 21 11/28/2020    LABALBU 3.0 11/04/2020    CREATININE 0.9 11/28/2020    CALCIUM 9.0 11/28/2020    LABGLOM 61 11/28/2020    GLUCOSE 103 11/28/2020    GLUCOSE 115 07/06/2018       ASSESSMENT AND PLAN      Acute on chronic hypoxemic respiratory failure  Pulmonary edema, acute on chronic  Acute on chronic decompensated systolic heart failure  Severe persistent allergic asthma without complication  COPD without exacerbation    Plan:  The patient was weaned from high flow to nasal cannula, and can continue to wean from 6L to 3-4L NC. Aiming for 88-92% spo2. The patient may require a slightly higher oxygen with exertion vs. When at rest.  The patient is still on dobutamine and using this for ionotropic diuresis, which seems to have had favorable results for her. The patient will get her Xolair injection today.   Continue current nebulizers, focus on breathing exercises, IS and acapella as needed. The patient would benefit from aggressive PTOT and therapy as the more she moves/exercises the better her oxygen will become. The patient has no current indications for steroid therapy and will monitor off this treatment regimen. The patient's symptoms are most likely due to her heart failure and continued therapy for this should hopefully continue to improve her outcome. Call with questions/concerns will see again Monday.

## 2020-11-29 NOTE — PROGRESS NOTES
Clinical Pharmacy Note    Warfarin consult follow-up    Recent Labs     11/29/20  0715   INR 2.36*     Recent Labs     11/27/20  0417 11/28/20  0557 11/29/20  0715   HGB 8.4* 9.2* 9.1*   HCT 28.6* 31.4* 31.2*    234 217       Significant Drug-Drug Interactions:  New warfarin drug-drug interactions: none  Discontinued drug-drug interactions: none  Current warfarin drug-drug interactions: aspirin (on hold), trazodone (HM)      Date INR Warfarin Dose   10/25-10/27   No Coumadin   10/28/2020 1.32 2 mg    10/29/2020 1.49  2 mg   10/30/2020  2  1.5 mg    10/31/2020   2.78   0.5 mg   11/1/2020   2.37  1.5 mg    11/2/2020  2.55 1 mg     11/3/2020  3.54   No Coumadin   11/4/2020  3.09  1 mg   11/5/2020 2.41 1.5 mg   11/6/2020 2.82 1 mg    11/7/2020  2.61  1.5 mg    11/8/2020   2.22                     1.5 mg   11/9/2020  2.12                      2 mg   11/10/2020 2.05  2 mg   11/11/2020  2.54  1.5 mg   11/12/2020 2.72  1.5 mg   11/13/2020 2.72 No Coumadin   11/14/2020 2.3  No Coumadin    11/15/2020 1.7  (not given)   11/16/2020   1.48 2 mg    11/17/2020   1.43 2 mg    11/18/2020   1.99 1.5 mg    11/19/2020  2.66 0.5 mg   11/20/2020   1.68   2 mg   11/21/2020 1.80 1.5 mg   11/22/2020  2.31 1.5 mg   11/23/2020  2.77 1 mg    11/24/2020 2.89 0.5 mg   11/25/2020  2.46  1 mg    11/26/2020 2.28 1.5 mg    11/27/2020  2.96   0.5 mg    11/28/2020  2.88  1 mg    11/29/2020  2.36   1.5 mg       Notes:                   Daily PT/INR until stable within therapeutic range.

## 2020-11-29 NOTE — PROGRESS NOTES
Hospitalist Progress Note    Patient:  Tab Pyle      Unit/Bed:4K-10/010-A    YOB: 1945    MRN: [id number]       Acct: [de-identified]     PCP: Isha Martinez MD    Date of Admission: 11/13/2020      Assessment/Plan:  Active Hospital Problems    Diagnosis Date Noted    Decompensated heart failure ([address] [I50.9]     Moderate malnutrition ([address] [E44.0] 11/23/2020     Class: Acute    Acute respiratory failure with hypoxia ([address] [J96.01] 11/15/2020    Hemoptysis [R04.2]     Physical debility [R53.81] 11/03/2020    Moderate COPD (chronic obstructive pulmonary disease) ([facility]) [J44.9] 07/06/2012       Acute on chronic hypoxic respiratory failure likely secondary to pleural effusions and acute systolic CHF exacerbation  Improving, patient down to 5 L nasal cannula today. Continues on dobutamine, managed per cardiology. Also on Bumex p.o. Appreciate pulmonary assistance  Symptomatically improved  Holding Entresto for now, continue digoxin and Toprol     Bilateral Pleural Effusion--s/p thora with 700cc transudative fluid on 11/14. See above    Chronic atrial fibrillation-stable. The patient has a ARIANNA(2)DS(2)-VASc of 6. Continues on digoxin, Toprol   The patient is being treated with Coumadin, pharmacy to manage. Hemoglobin remained stable and no evidence of ongoing bleed     Hemoptysis, unspecified-resolved. Patient states she has not had hemoptysis for several days. Resolved.     Anemia, unspecified-stable. The patient reported a longstanding history of anemia. This may be secondary to blood loss from hemoptysis described above. However, the patient has a macrocytic profile with an MCV of 108. 2. Normal B12 and folate and iron indices within normal limits.  -Stabilized around 8 -9, no ongoing bleeding     Constipation: We will ramp up bowel regimen, monitor.   Good bowel sounds    Left lower extremity cellulitis treated on previous admission  The patient is amenable to 4 hours on and 4 hours off of wound dressing per ID.     Coronary artery disease-stable. Resume aspirin?     Benign essential hypertension-stable.     Hyperlipidemia, unspecified-stable. Treat with evolocumab     Chronic obstructive pulmonary disease asthma component, unspecified-  Contributing to acute on chronic hypoxic respiratory failure as above. Maintain patient on albuterol nebulizer, Spiriva Respimat, Brovana, and Pulmicort. Xolair given on 11/28. Follow-up with outpatient pulmonology.     Hypokalemia-new onset, due to diuresis. Resolved. Will monitor closely, long with magnesium  We will maintain patient on oral potassium 20 mEq twice daily and potassium placement protocol. Tobacco abuse: nrt    Deconditioning    Expected discharge date:  tbd    Disposition: Initially waiting on [facility] bed, however now patient on nasal cannula consider inpatient rehab? [] Home       [] TCU       [] Rehab       [] Psych       [] SNF       [] [place]       [] Other-    --------------------------------------------    Chief Complaint: SOB    Hospital Course: \" The patient is a 59-year-old female with a complex past medical history with an old MI, long-term anticoagulation with Coumadin for atrial fibrillation, kidney stone, hypertension, hyperlipidemia, history of blood clots, GERD, CAD with biventricular pacemaker, frequent UTI, COPD, systolic CHF, coronary artery disease, recent hospitalization for lower extremity cellulitis, and current smoker who presents with a chief complaint of hypoxia and hemoptysis. \"    Subjective (past 24 hours): Patient seen at bedside chair, she is feeling better today and is now on nasal cannula. She feels as though her shortness of breath is improving, still with some cough. She admits to constipation with no bowel movement for 2 3 days, otherwise has no major complaints at this time. No chest pain or palpitations, fevers or chills.   Her lower extremity edema is much improved.       Medications:  Reviewed    Infusion Medications    DOBUTamine 5 mcg/kg/min (11/28/20 5446)     Scheduled Medications    potassium (CARDIAC) replacement protocol   Other RX Placeholder    bumetanide  2 mg Oral BID    omalizumab  225 mg Subcutaneous Q14 Days    potassium chloride  20 mEq Oral BID WC    sodium chloride flush  10 mL Intravenous 2 times per day    famotidine  20 mg Oral Daily    levocetirizine  5 mg Oral Nightly    albuterol  2.5 mg Nebulization Q4H While awake    warfarin (COUMADIN) daily dosing (placeholder)   Other RX Placeholder    Arformoterol Tartrate  15 mcg Nebulization BID    [Held by provider] aspirin  81 mg Oral Daily    budesonide  500 mcg Nebulization BID    digoxin  125 mcg Oral Every Other Day    docusate sodium  100 mg Oral Daily    Evolocumab  140 mg Subcutaneous Q14 Days    gabapentin  100 mg Oral TID    hydrocortisone  25 mg Rectal BID    metoprolol succinate  25 mg Oral Daily    nicotine  1 patch Transdermal Q24H    pantoprazole  40 mg Oral BID AC    [Held by provider] sacubitril-valsartan  1 tablet Oral BID    senna  1 tablet Oral Nightly    tiotropium  2 puff Inhalation Daily    traZODone  50 mg Oral Nightly     PRN Meds: potassium chloride **OR** potassium alternative oral replacement **OR** potassium chloride, sodium chloride flush, ondansetron, promethazine, polyethylene glycol, bisacodyl, acetaminophen, cyclobenzaprine, HYDROcodone 5 mg - acetaminophen, HYDROcodone 5 mg - acetaminophen, levalbuterol, magnesium hydroxide      Intake/Output Summary (Last 24 hours) at 11/29/2020 0804  Last data filed at 11/29/2020 0440  Gross per 24 hour   Intake 2114.49 ml   Output 1000 ml   Net 1114.49 ml     Weight change:       Exam:  BP (!) 155/50   Pulse 70   Temp 98.3 °F (36.8 °C) (Oral)   Resp 18   Ht 5' 2\" (1.575 m)   Wt 130 lb 11.2 oz (59.3 kg)   SpO2 93%   BMI 23.91 kg/m²     General appearance: No apparent distress, well developed, appears TRACE 11/08/2020    GLUCOSEU NEGATIVE 11/08/2020       Radiology:  XR CHEST PORTABLE   Final Result   1. Improved aeration of the left lung base. 2. Small bilateral pleural effusions with bibasilar opacities. **This report has been created using voice recognition software. It may contain minor errors which are inherent in voice recognition technology. **      Final report electronically signed by Dr. Camilo Garcia on 11/28/2020 11:15 AM      XR CHEST 1 VIEW   Final Result   1. Moderate cardiomegaly. Permanent pacemaker/fibrillator. Small bilateral pleural effusions. 2. Moderate pneumonia/pulmonary edema both mid and lower lung fields. Prominent vascular markings, suggesting heart failure. 3. No significant change from prior. Final report electronically signed by Dr. Lucita Canavan on 11/25/2020 1:14 PM      XR CHEST PORTABLE   Final Result   1. Interval deterioration since previous study dated November 16, 2020 with increasing infiltrate and effusion at the right lung base. 2. Mild cardiomegaly status post pacemaker placement. .               **This report has been created using voice recognition software. It may contain minor errors which are inherent in voice recognition technology. **      Final report electronically signed by DR Paco Duvall on 11/22/2020 2:26 PM      XR CHEST (2 VW)   Final Result   Stable chest given technical differences. Left lower lobe infiltrate and effusion. Generalized interstitial edema and cardiomegaly. **This report has been created using voice recognition software. It may contain minor errors which are inherent in voice recognition technology. **      Final report electronically signed by Dr. Colette Gonzales on 11/16/2020 11:22 AM      XR CHEST PORTABLE   Final Result   Moderately worsened aeration of the left lung base. **This report has been created using voice recognition software.  It may contain minor errors which are inherent in voice recognition technology. **      Final report electronically signed by Dr. Refugio Panda on 11/15/2020 6:00 PM      XR CHEST PORTABLE   Final Result   No pneumothorax post right thoracentesis. **This report has been created using voice recognition software. It may contain minor errors which are inherent in voice recognition technology. **      Final report electronically signed by Dr. Refugio Panda on 11/14/2020 2:46 PM      XR CHEST PORTABLE   Final Result   Impression:   Moderate CHF, increased. Bilateral lower lobe pneumonia versus subsegmental atelectasis      This document has been electronically signed by: Myla Stapleton MD on    11/13/2020 10:07 PM             DVT prophylaxis: [] Lovenox                                  [] SCDs                                 [] SQ Heparin                                 [] Encourage ambulation           [x] Already on Anticoagulation     Code Status: Limited    PT/OT Eval Status: yes    Diet:   DIET CARDIAC; Low Sodium (2 GM);  Daily Fluid Restriction: 1500 ml  Dietary Nutrition Supplements: Standard High Calorie Oral Supplement  Dietary Nutrition Supplements: Low Volume Supplement    Fluids: na    Tele:   [x] yes             [] no      Electronically signed by Abdulaziz Garcia DO on 11/29/2020 at 8:04 AM

## 2020-11-29 NOTE — FLOWSHEET NOTE
11/28/20 2002   RLE Neurovascular Assessment   Capillary Refill Less than/equal to 3 seconds   Color Appropriate for ethnicity   Temperature Cool   R Post Tibial Pulse +1   R Pedal Pulse +1   LLE Neurovascular Assessment   Capillary Refill Less than/equal to 3 seconds   Color Appropriate for ethnicity   Temperature Cool   L Post Tibial Pulse +1   L Pedal Pulse +1     Wrapped bilateral lower extremities with Kerlix and Ace wrap.

## 2020-11-30 NOTE — PROGRESS NOTES
Comprehensive Nutrition Assessment    Type and Reason for Visit:  Reassess    Nutrition Recommendations/Plan:   * Honor food prefs as able  * Cont with oral supplements as ty  * Monitor weight trends. Suspect fluid shifts largely impacting weights. Nutrition Assessment:   Pt. moderately malnourished AEB criteria as listed below. At risk for further nutrition compromise r/t admit d/t SOB, underlying medical condition (hx of COPD, CAD, CHF, GERD, HTN) and need for nutrition support. Nutrition recommendations/interventions as per above. Malnutrition Assessment:  Malnutrition Status: Moderate malnutrition    Context:  Acute Illness     Findings of the 6 clinical characteristics of malnutrition:  Energy Intake:  7 - 50% or less of estimated energy requirements for 5 or more days  Weight Loss:  No significant weight loss(hx CHF/edema/fluid shifts)     Body Fat Loss:  1 - Mild body fat loss Orbital   Muscle Mass Loss:  1 - Mild muscle mass loss Temples (temporalis), Clavicles (pectoralis & deltoids)  Fluid Accumulation:  7 - Moderate to Severe Extremities    Estimated Daily Nutrient Needs:  Energy (kcal):  5224-3046 kcal/day (20-25 kcal/kg); Weight Used for Energy Requirements:  (67.3 kg on 11/21)     Protein (g):  65-75 g/day (1.3-1.5 g/kg)   Weight Used for Protein Requirements:  (IBW 50 kg)        Fluid (ml/day):  per MD     Nutrition Related Findings:  Pt with improved appetite, and ty diuresis thus far. Meds include bumex, hydrocortisone, and colace. Plans for [facility] vs Rehab noted. Wounds:  None       Current Nutrition Therapies:    DIET CARDIAC; Low Sodium (2 GM);  Daily Fluid Restriction: 1500 ml  Dietary Nutrition Supplements: Standard High Calorie Oral Supplement  Dietary Nutrition Supplements: Low Volume Supplement    Anthropometric Measures:  · Height: 5' 2\" (157.5 cm)  · Current Body Weight: 130 lb 11.2 oz (59.3 kg)(+1 and +2 edema 11/30- Noted significant diuresis over past days) · Admission Body Weight: 150 lb 14.4 oz (68.4 kg)(11/16;)    · Usual Body Weight: (Per EMR: 167 lb 4.8 oz on 11/3/20; 159 lb on 10/25/20; 144 lb 12.8 oz on 2/21/20; 152 lb 12.8 oz on 11/26/19)     · Ideal Body Weight: 110 lbs;     · BMI: 23.9  · BMI Categories: Overweight (BMI 25.0-29. 9)       Nutrition Diagnosis:   · Moderate malnutrition, In context of acute illness or injury related to inadequate protein-energy intake as evidenced by mild loss of subcutaneous fat, mild muscle loss(pt consuming 50% or less of meals x10 days since admission)      Nutrition Interventions:   Food and/or Nutrient Delivery:  Continue Current Diet, Start Oral Nutrition Supplement, Vitamin Supplement  Nutrition Education/Counseling:  Education initiated(Encouraged po intake of meals at best effort)   Coordination of Nutrition Care:  Continue to monitor while inpatient    Goals:  Pt will consume 75% or more of meals during LOS       Nutrition Monitoring and Evaluation:   Behavioral-Environmental Outcomes:  None Identified   Food/Nutrient Intake Outcomes:  Food and Nutrient Intake, Supplement Intake, Vitamin/Mineral Intake  Physical Signs/Symptoms Outcomes:  Biochemical Data, GI Status, Fluid Status or Edema, Skin, Weight, Nutrition Focused Physical Findings     Discharge Planning:     Too soon to determine     Electronically signed by Marcelo Edwards RD LD [id number]  on 11/30/20 at 1:31 PM EST    Contact: [phone]

## 2020-11-30 NOTE — PLAN OF CARE
Problem: RESPIRATORY  Goal: Clear lung sounds  Outcome: Ongoing  Continue albuterol 4 x day                  Spiriva q day                  Brovana  BID                  Pulmicort BID  As ordered to help improve lung aeration. Pt is coughing up sputum.   Currently on 4 lpm nc--sats 93%       Problem: RESPIRATORY  Goal: Effective breathing pattern  Outcome: Ongoing

## 2020-11-30 NOTE — PROGRESS NOTES
Admit Date: 11/13/2020  Hospital day several    Subjective:     Patient sob . Medication side effects: none    Scheduled Meds:   [START ON 12/1/2020] potassium chloride  20 mEq Oral Daily with breakfast    senna  1 tablet Oral BID    docusate sodium  100 mg Oral BID    albuterol  2.5 mg Nebulization Q4H WA    potassium (CARDIAC) replacement protocol   Other RX Placeholder    bumetanide  2 mg Oral BID    omalizumab  225 mg Subcutaneous Q14 Days    sodium chloride flush  10 mL Intravenous 2 times per day    famotidine  20 mg Oral Daily    levocetirizine  5 mg Oral Nightly    warfarin (COUMADIN) daily dosing (placeholder)   Other RX Placeholder    Arformoterol Tartrate  15 mcg Nebulization BID    [Held by provider] aspirin  81 mg Oral Daily    budesonide  500 mcg Nebulization BID    digoxin  125 mcg Oral Every Other Day    Evolocumab  140 mg Subcutaneous Q14 Days    gabapentin  100 mg Oral TID    hydrocortisone  25 mg Rectal BID    metoprolol succinate  25 mg Oral Daily    nicotine  1 patch Transdermal Q24H    pantoprazole  40 mg Oral BID AC    [Held by provider] sacubitril-valsartan  1 tablet Oral BID    tiotropium  2 puff Inhalation Daily    traZODone  50 mg Oral Nightly     Continuous Infusions:  PRN Meds:lidocaine, potassium chloride **OR** potassium alternative oral replacement **OR** potassium chloride, sodium chloride flush, ondansetron, promethazine, polyethylene glycol, bisacodyl, acetaminophen, cyclobenzaprine, HYDROcodone 5 mg - acetaminophen, HYDROcodone 5 mg - acetaminophen, levalbuterol, magnesium hydroxide    Review of Systems  Pertinent items are noted in HPI. Objective:     Patient Vitals for the past 8 hrs:   BP Temp Temp src Pulse Resp SpO2   11/30/20 1655 -- -- -- -- -- 92 %   11/30/20 1459 (!) 145/63 98 °F (36.7 °C) Oral 74 18 90 %   11/30/20 1115 -- -- -- -- -- 94 %   11/30/20 1100 128/60 98 °F (36.7 °C) Oral 70 18 97 %     I/O last 3 completed shifts:   In: 1236.9 [P.O.:1080; I.V.:156.9]  Out: 450 [Urine:450]    No change     ECG: af.   Data Review:   CBC:  Lab Results   Component Value Date    WBC 7.7 11/30/2020    RBC 3.36 11/30/2020    HGB 10.0 11/30/2020    HCT 34.6 11/30/2020    .0 11/30/2020    MCH 29.8 11/30/2020    MCHC 28.9 11/30/2020    RDW 14.9 06/25/2020     11/30/2020    MPV 10.6 11/30/2020     BMP  Lab Results   Component Value Date     11/30/2020    K 4.8 11/30/2020    K 4.4 10/26/2020    CL 93 11/30/2020    CO2 34 11/30/2020    BUN 35 11/30/2020    CREATININE 1.2 11/30/2020    CALCIUM 10.0 11/30/2020      COAG PROFILE:  Lab Results   Component Value Date    APTT 108.6 11/19/2020    INR 2.26 11/30/2020       Assessment:     Active Problems: Moderate COPD (chronic obstructive pulmonary disease) (HCC)    Physical debility    Hemoptysis    Acute respiratory failure with hypoxia (HCC)    Moderate malnutrition (HCC)    Decompensated heart failure (HCC)  Resolved Problems:    * No resolved hospital problems.  *      Plan:   Patient is stable cardiac wise    ok for discharge from my stand point    will see at the office    consider adding farxiga 10 mg as an out patient    f/u office few weeks

## 2020-11-30 NOTE — PROGRESS NOTES
Hospitalist Progress Note      Patient:  Florin Westbrook    Unit/Bed:4K-10/010-A  YOB: 1945  MRN: [id number]   Acct: [de-identified]   PCP: Celio George MD  Date of Admission: 11/13/2020    Assessment/Plan:    1.  Acute on chronic hypoxic respiratory failure likely secondary to pleural effusions and acute systolic CHF exacerbation-improving.     96% on 4 L.     Dramatically reduced bilateral pedal edema.     Chest x-ray shows improved aeration of the left lung base and small bilateral pleural effusions with bibasilar opacities, per radiology, as compared to previous chest x-ray.     Dobutamine drip discontinued. Bumex drip discontinued. Patient is on Bumex 2 mg twice daily.     Hold aspirin and Entresto, maintain on digoxin 125 mcg every other day, maintain on Evolocumab 140 mg every 14 days, and Toprol-XL 25 mg daily.     Aspirin held for bleeding and Entresto held for kidney function.     Outpatient pulmonology saw the patient, who recommended continuing heart failure treatment and Xolair injections.     We will consult physiatry for potential inpatient rehabilitation.     2.  Chronic atrial fibrillation-stable.     The patient has a ARIANNA(2)DS(2)-VASc of 6.     The patient is being treated with Coumadin.     Patient's hemoglobin has remained above 7.  Therefore, we will maintain Coumadin for now.     Monitor with daily CBC and INR.  Current INR  2.26.     Otherwise, treat as described above.     3.  Hemoptysis, unspecified-resolved.     Patient states she has not had hemoptysis for several days.     Patient has a history of hemoptysis of unknown cause.  Bronchoscopy could possibly provide diagnostic information.  However, we would not recommend this as the bronchoscopy would be a considerable risk and would likely not change her long-term care management plan per CHF and advanced COPD.     Will address as described above for chronic A. Fib.     4.  Anemia, unspecified-stable.     The patient reported a longstanding history of anemia.  This may be secondary to blood loss from hemoptysis described above.  However, the patient has a macrocytic profile with an MCV of 108. 2.  Recent high ferritin on 11/3/2020, high iron, and normal total iron-binding capacity makes this diagnosis less likely.  Acute bleed could still be the cause or contributor to this anemia, this would not necessarily present as the \"classic\" iron deficiency profile.      Normal folate and vitamin B12 makes nutritional anemia less likely cause of macrocytic anemia.     The patient continues to have fluctuating hemoglobin.     Consider anemia of chronic disease.     5.  Left lower extremity cellulitis treated on previous admission-worsening     The patient is amenable to 4 hours on and 4 hours off of wound dressing per ID.     6.  Coronary artery disease-stable.     Treat as described above for CHF and A. Fib.     7.  Benign essential hypertension-stable.     Treat as described above for CHF and A. Fib.     8.  Hyperlipidemia, unspecified-stable.     Treat with evolocumab as described above.     9.  Chronic obstructive pulmonary disease asthma component, unspecified-stable.     Contributing to acute on chronic hypoxic respiratory failure as above.     Maintain patient on albuterol nebulizer, Spiriva Respimat, Brovana, and Pulmicort.     Patient will be given Xolair inpatient by outpatient pulmonology.       Will be given maintain chest physiotherapy and Acapella.     10.  Hypokalemia-resolved.     Potassium 4.8.     Likely secondary to continuous Bumex drip, which has been discontinued. However, patient is on Bumex 2 mg twice daily.     We will maintain patient on oral potassium 20 mEq twice daily and potassium placement protocol.     Monitor with daily BMP.     Expected discharge date: Unknown    Disposition:    [x] Home       [] TCU       [] Rehab       [] Psych       [] SNF       [] Long Term Care Facility       [] Other-    Chief Complaint: Hypoxia and Hemoptysis    Opening statement:     The patient is a  59-year-old female with a complex past medical history with an old MI, long-term anticoagulation with Coumadin for atrial fibrillation, kidney stone, hypertension, hyperlipidemia, history of blood clots, GERD, CAD with biventricular pacemaker, frequent UTI, COPD, systolic CHF, coronary artery disease, recent hospitalization for lower extremity cellulitis, and current smoker who presents with a chief complaint of hypoxia and hemoptysis. Hospital Treatment Course:     11/13/2020-11/17/2020:    Patient was diagnosed with acute on chronic respiratory failure likely secondary to pleural effusions and acute systolic heart failure. Cause of hemoptysis unclear.     The patient was initially transferred to [facility].     Rapid was called om the patient on 11/13/2020 secondary to hypoxia, tachypnea and hypertension after transfer from [facility] to [facility]. Patient was placed on BiPAP which dramatically improved her presentation. The patient was then transferred to [facility].     Patient was evaluated on the same day for chest pain and shortness of breath. An EKG found no change but a mild troponin elevation was noted. Pain is likely secondary to dyspepsia or GERD.     Pulmonology was consulted for hemoptysis and cardiology was consulted for hemoptysis, possible watchman implantation, and management of Coumadin.     The patient's Coumadin was initially held and her supratherapeutic INR was corrected. However, the patient was placed back on Coumadin as she was concerned for stroke secondary to A. fib. The patient was counseled with regards to the risks from Coumadin.     Thoracentesis was obtained on 11/14/2020 with 700 cc of fluid removed. Pleural fluid cytology showed no significant findings.   Patient was maintained on Pulmicort 200 mcg via neb twice daily, Brovana 50 mcg by nebulization twice daily, Spiriva Respimat 2 puffs daily, albuterol nebs every 4 hours while awake, and Xopenex 0.60 mg by nebulization every 8 hours as needed. Also maintained on Acapella every 4 hours. Oxygen titrated to greater than 90%. Baseline is 2 L at home. Patient maintained on Coumadin for VTE prophylaxis and Protonix for GI prophylaxis. Patient wants to go home with family. 11/18/2020:    Nursing report: Patient is still on 5 L, with plans to wean down. Patient been getting up from the chair to her bed. Little subjective dyspnea, however she dropped from a pulse ox of 95% to 89%. Hemoglobin has been stable at 7.2. The patient was 95 to 97% throughout the night. The patient did not need BiPAP during the night. The patient states that her breathing is doing much better than before. She affirms improvement with diuresis and with thoracentesis for drainage. However, the patient is still dyspneic with exertion. She affirms slight hemoptysis with cough productive of pinkish to very light brown sputum. Long discussion with the patient about long-term prognosis and goals. The patient particularly noted that she want to live long enough to see her granddaughter's high school graduation in May. 11/19/2020:    Nursing report: The patient is doing well. She slept through the night. INR is 2.66. The patient had dinner. Her oxygenation was good. Hemoglobin is 7.0. The patient states that her breathing is doing better. She affirms slight ongoing chest pain and \"indigestion. \"  She affirms productive cough with a little bit of blood. The patient and her daughter were extensively counseled with regards to risk of bleeding with anticoagulation. Otherwise, no new specific complaints or concerns. 11/20/2020:    Nursing report: Patient doing well. On 4 L. Shortness of breath with activity. Bowel movement. Eating overnight. The patient states she is feeling okay. However, notes worsening left lower limb pain.   \"Indigestion\" is ordered. 11/23/2020:    Nursing report: Nursing states the patient was doing great, with no overnight desaturations and FiO2 of 50% and a flow rate of 25 L/min. The patient has been eating and did have a bowel movement. On presentation, the patient affirmed that her breathing was doing better. The patient was on a high flow heated nasal cannula. The patient stated that this machine helped her breathe a lot better. Though the patient states she would like to take this machine at home, she verbalizes understanding that she cannot do so. The patient denies chest pain and abdominal pain. Affirms dyspnea with activity. Patient reiterated that she wanted Dr. Anderson Corrales to see her. Also verbalized frustration with the nursing staff. Otherwise, no new specific complaints or concerns. 11/24/2020:    Stable dyspnea. Denies chest pain, fever, and abdominal pain. Patient asked about consult with her outpatient pulmonologist.  The patient is counseled and attempt was made to contact him,  but that he was out of the office. Otherwise, no new specific complaints or concerns. 11/25/2020:    Nursing report: \"Still on high flow. \"  \"Not much change. \"  Peripheral access was successfully obtained. States that daughter does not want patient to go to [place]. Has been eating but no bowel movement yesterday. The patient states that her breathing is okay at rest but that she gets very dyspneic on exertion. Denies chest pain abdominal pain. States that she has not had any hemoptysis for several days. The patient asked about reaching her outpatient pulmonologist and cardiologist.    Also asked about Thanksgiving, as she wanted to have family in her room. The patient was reassured that I would discuss this with Spiritual Care. Otherwise, no new specific complaints or concerns. 11/26/2020: On high flow nasal cannula, no specific interval change, no new specific complaints.   Straight catheter bladder scan to 152 cc.    11/27/2020:    Nursing report: Reports that the patient is still on high flow. Mandie placement pending. Weaned down to 40% on high flow. Refused PICC line. Has been eating \"guzzling fluids. \"  No bowel movement. Patient reports dissatisfaction with the nursing staff, as they did not prepare her for the PICC line. The patient was counseled that while she does not need the PICC line immediately, she might needed down the road due to failure of the peripheral IV. The patient confirms refusal of the PICC line. Patient also states that nursing staff and IV staff \"yelled at her. \"  She states that the nursing staff attending to her yesterday during the day was fine. She stated that her breathing was stable. Denied recent hemoptysis. Otherwise, no new specific complaints or concerns. 11/28/2020:    Patient states that she feels okay. The patient affirms good appetite and bowel movement yesterday. Patient states that she is not dyspneic at rest and not dyspneic with minimal activity to get up to the commode. Otherwise, no new specific complaints or concerns. 11/29/2020: The patient was seen in her bedside chair, incision she is feeling better today and is now on the regular nasal cannula. No improvement in shortness of breath but still has some cough. Ms. constipation with no bowel movement for 2 to 3 days but no other major complaints. Confirmed improvement in lower extremity edema. Subjective (past 24 hours): The patient affirms improvement in her breathing, and was maintained on regular nasal cannula. The patient states that they try to get into rehabilitation before, but that this is not appropriate because she was having difficulty breathing. However, the patient states that she would like to try inpatient rehabilitation now. Otherwise, no new specific complaints or concerns.     Past medical history, family history, social history and allergies reviewed again and is unchanged since admission. ROS (12 point review of systems completed. Pertinent positives noted. Otherwise ROS is negative)     Medications:  Reviewed    Infusion Medications     Scheduled Medications    warfarin  1 mg Oral Once    senna  1 tablet Oral BID    docusate sodium  100 mg Oral BID    albuterol  2.5 mg Nebulization Q4H WA    potassium (CARDIAC) replacement protocol   Other RX Placeholder    bumetanide  2 mg Oral BID    omalizumab  225 mg Subcutaneous Q14 Days    potassium chloride  20 mEq Oral BID WC    sodium chloride flush  10 mL Intravenous 2 times per day    famotidine  20 mg Oral Daily    levocetirizine  5 mg Oral Nightly    warfarin (COUMADIN) daily dosing (placeholder)   Other RX Placeholder    Arformoterol Tartrate  15 mcg Nebulization BID    [Held by provider] aspirin  81 mg Oral Daily    budesonide  500 mcg Nebulization BID    digoxin  125 mcg Oral Every Other Day    Evolocumab  140 mg Subcutaneous Q14 Days    gabapentin  100 mg Oral TID    hydrocortisone  25 mg Rectal BID    metoprolol succinate  25 mg Oral Daily    nicotine  1 patch Transdermal Q24H    pantoprazole  40 mg Oral BID AC    [Held by provider] sacubitril-valsartan  1 tablet Oral BID    tiotropium  2 puff Inhalation Daily    traZODone  50 mg Oral Nightly     PRN Meds: lidocaine, potassium chloride **OR** potassium alternative oral replacement **OR** potassium chloride, sodium chloride flush, ondansetron, promethazine, polyethylene glycol, bisacodyl, acetaminophen, cyclobenzaprine, HYDROcodone 5 mg - acetaminophen, HYDROcodone 5 mg - acetaminophen, levalbuterol, magnesium hydroxide      Intake/Output Summary (Last 24 hours) at 11/30/2020 1618  Last data filed at 11/30/2020 1120  Gross per 24 hour   Intake 636.86 ml   Output 450 ml   Net 186.86 ml       Diet:  DIET CARDIAC; Low Sodium (2 GM);  Daily Fluid Restriction: 1500 ml  Dietary Nutrition Supplements: Standard High Calorie Oral typically suitable for bacterialvaginosis screening. MRSA culture only:No results found for: [facility]    Urine culture:   Lab Results   Component Value Date    LABURIN  10/24/2019     Growth of Contaminants. The mixture of organisms present are not a common cause of urinary tract infections and probably represent distal urethral juventino. Respiratory culture: No results found for: CULTRESP    Aerobic and Anaerobic :  Lab Results   Component Value Date    LABAERO No growth-preliminary No growth   11/11/2019     Lab Results   Component Value Date    LABANAE No growth-preliminary No growth   11/11/2019       Urinalysis:      Lab Results   Component Value Date    NITRU NEGATIVE 11/08/2020    WBCUA 0-2 11/08/2020    BACTERIA NONE SEEN 11/08/2020    RBCUA 5-10 11/08/2020    BLOODU TRACE 11/08/2020    GLUCOSEU NEGATIVE 11/08/2020       Radiology:  XR CHEST PORTABLE   Final Result   1. Improved aeration of the left lung base. 2. Small bilateral pleural effusions with bibasilar opacities. **This report has been created using voice recognition software. It may contain minor errors which are inherent in voice recognition technology. **      Final report electronically signed by Dr. Jamie Bergman on 11/28/2020 11:15 AM      XR CHEST 1 VIEW   Final Result   1. Moderate cardiomegaly. Permanent pacemaker/fibrillator. Small bilateral pleural effusions. 2. Moderate pneumonia/pulmonary edema both mid and lower lung fields. Prominent vascular markings, suggesting heart failure. 3. No significant change from prior. Final report electronically signed by Dr. Austen Moseley on 11/25/2020 1:14 PM      XR CHEST PORTABLE   Final Result   1. Interval deterioration since previous study dated November 16, 2020 with increasing infiltrate and effusion at the right lung base. 2. Mild cardiomegaly status post pacemaker placement. .               **This report has been created using voice recognition software.  It may contain minor errors which are inherent in voice recognition technology. **      Final report electronically signed by DR Luis Hinson on 11/22/2020 2:26 PM      XR CHEST (2 VW)   Final Result   Stable chest given technical differences. Left lower lobe infiltrate and effusion. Generalized interstitial edema and cardiomegaly. **This report has been created using voice recognition software. It may contain minor errors which are inherent in voice recognition technology. **      Final report electronically signed by Dr. Lauro Hernandez on 11/16/2020 11:22 AM      XR CHEST PORTABLE   Final Result   Moderately worsened aeration of the left lung base. **This report has been created using voice recognition software. It may contain minor errors which are inherent in voice recognition technology. **      Final report electronically signed by Dr. Emperatriz Lainez on 11/15/2020 6:00 PM      XR CHEST PORTABLE   Final Result   No pneumothorax post right thoracentesis. **This report has been created using voice recognition software. It may contain minor errors which are inherent in voice recognition technology. **      Final report electronically signed by Dr. Emperatriz Lainez on 11/14/2020 2:46 PM      XR CHEST PORTABLE   Final Result   Impression:   Moderate CHF, increased. Bilateral lower lobe pneumonia versus subsegmental atelectasis      This document has been electronically signed by: Kindra Simpson MD on    11/13/2020 10:07 PM           Xr Chest Portable    Result Date: 11/14/2020  PROCEDURE: XR CHEST PORTABLE CLINICAL INFORMATION: post thoracentesis right side. COMPARISON: 11/13/2020 TECHNIQUE: AP upright view of the chest. FINDINGS: Right pleural fluid has been evacuated. No pneumothorax. Small left pleural effusion persists. Mild congestion. No definite infiltrate. AICD is unchanged. No pneumothorax post right thoracentesis. **This report has been created using voice recognition software. It may contain minor errors which are inherent in voice recognition technology. ** Final report electronically signed by Dr. Jenny Atkins on 11/14/2020 2:46 PM    Xr Chest Portable    Result Date: 11/13/2020  Chest xray 1 view Comparison:  CR,SR  - XR CHEST PORTABLE  - 10/25/2020 03:23 PM EDT Findings: Cardiac conduction device. Moderate cardiomegaly. Hypoinflated. Moderately increased interstitial lung markings. There are space opacities notably left lower lobe. Small bilateral pleural effusions. No pneumothorax No acute fracture. Impression: Moderate CHF, increased. Bilateral lower lobe pneumonia versus subsegmental atelectasis This document has been electronically signed by: Sandra Colmenares MD on 11/13/2020 10:07 PM     Us Thoracentesis    Result Date: 11/15/2020  THORACENTESIS WITH ULTRASOUND GUIDANCE: PERFORMED BY: Lennox Ruffing M.D. CLINICAL INFORMATION: Pleural effusion APPROACH: Right side, posterior, inferior FLUID WITHDRAWN: 700 mL herrera serous fluid ESTIMATED BLOOD LOSS: Minimal PROCEDURE: Signed informed consent was obtained prior to performing this procedure. The thorax was initially evaluated sonographically to determine appropriate puncture site. The skin was marked, prepped, and draped in a sterile fashion. Following local anesthesia and utilizing aseptic technique, a 5 Stateless one-step catheter was successfully inserted into the pleural effusion at the position indicated above. Pleural fluid in the amount was above was then aspirated and the needle was removed. The patient tolerated the procedure well. A post procedure chest radiograph will be obtained. Status post thoracentesis **This report has been created using voice recognition software. It may contain minor errors which are inherent in voice recognition technology. ** Final report electronically signed by Dr. Chely Thornton on 11/15/2020 7:00 AM      Support Devices (date placed):  [] ETT []Oral / [] Nasal  [] Gastric Tube [] OG / [] NG    [] Central Venous Line (Specify Site):  [] Urinary Catheter  [] Arterial Line (Specify Site)  [x] Peripheral IV access  [x] Other: Nasal Cannula    DVT prophylaxis: [] Lovenox                                 [] SCDs                                 [] SQ Heparin                                 [] Encourage ambulation           [x] Already on Anticoagulation     Code Status: Limited    Tele:   [x] yes             [] no    Electronically signed by Asuncion Patrick MD, MPH, [facility] on 11/30/2020 at 4:18 PM   Supervised by Dr. Precious Bautista

## 2020-11-30 NOTE — PROGRESS NOTES
Clinical Pharmacy Note    Warfarin consult follow-up    Recent Labs     11/30/20  0929   INR 2.26*     Recent Labs     11/28/20  0557 11/29/20  0715 11/30/20  0929   HGB 9.2* 9.1* 10.0*   HCT 31.4* 31.2* 34.6*    217 262       Significant Drug-Drug Interactions:  New warfarin drug-drug interactions: none  Discontinued drug-drug interactions: none  Current warfarin drug-drug interactions: aspirin (on hold), trazodone (HM)      Date INR Warfarin Dose   10/25-10/27   No Coumadin   10/28/2020 1.32 2 mg    10/29/2020 1.49  2 mg   10/30/2020  2  1.5 mg    10/31/2020   2.78   0.5 mg   11/1/2020   2.37  1.5 mg    11/2/2020  2.55 1 mg     11/3/2020  3.54   No Coumadin   11/4/2020  3.09  1 mg   11/5/2020 2.41 1.5 mg   11/6/2020 2.82 1 mg    11/7/2020  2.61  1.5 mg    11/8/2020   2.22                     1.5 mg   11/9/2020  2.12                      2 mg   11/10/2020 2.05  2 mg   11/11/2020  2.54  1.5 mg   11/12/2020 2.72  1.5 mg   11/13/2020 2.72 No Coumadin   11/14/2020 2.3  No Coumadin    11/15/2020 1.7  (not given)   11/16/2020   1.48 2 mg    11/17/2020   1.43 2 mg    11/18/2020   1.99 1.5 mg    11/19/2020  2.66 0.5 mg   11/20/2020   1.68   2 mg   11/21/2020 1.80 1.5 mg   11/22/2020  2.31 1.5 mg   11/23/2020  2.77 1 mg    11/24/2020 2.89 0.5 mg   11/25/2020  2.46  1 mg    11/26/2020 2.28 1.5 mg    11/27/2020  2.96   0.5 mg    11/28/2020  2.88  1 mg    11/29/2020  2.36   1.5 mg   11/30/2020 2.26 1 mg            Notes:                     Daily PT/INR until stable within therapeutic range.        Nils Anthony, PharmD   11/30/2020, 11:01 AM

## 2020-11-30 NOTE — PROGRESS NOTES
[address] [place] [address]  INPATIENT PHYSICAL THERAPY  DAILY NOTE  [facility] ICU STEPDOWN TELEMETRY 4K - 4K-10010-A     Time In: 0935  Time Out: 1003  Timed Code Treatment Minutes: 28 Minutes  Minutes: 28          Date: 2020  Patient Name: Sammy Jorgensen,  Gender:  female        MRN: [id number]  : 1945  (67 y.o.)     Referring Practitioner: Clarissa Johnson MD  Diagnosis: Physical Debility  Additional Pertinent Hx: Pt readmitted to Acute from IP Rehab due to decline in status and requiring thoracentesis and respiratory management. Per EMR, \"Sharona Khan is a 67 y.o. female with an extensive medical history including, congestive heart failure, COPD, A. Fib., Pacemaker placement post MI, hypertension, anemia, and IBS who presents to the ED for an evaluation of severe left lower extremity pain, redness, and warmth that started yesterday evening. The patient states that yesterday morning she started feeling some discomfort to her lower extremity, but believed it was due to her compression stockings she wears for CHF. Yesterday evening she removed the stockings and saw that her left lower leg was extremely red, which has continued to spread, becoming more red, and even more painful. Denies any recent trauma, falls, accidents, or wounds to the lower extremity but states that she occasionally scratches her legs with her nails when taking on and off the compression stockings. The patient has attempted tylenol, percocet, biofreeze, and icyhot yesterday with no relief. 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 Endorses history of blood clots, but is on coumadin which was last checked approximately 1 week ago and was in theraputic range.  Further endorses feeling feverish and chills, increased shortness of breath, worsening left lower extremity pain, the inability to bear weight at this time, and denies numbness or tingling to the lower extremities, diabetes mellitus, previous lower leg infections, chest pain, abdominal pain, changes in her bowel or bladder function or habits. Patient still smokes 1/2 a pack of cigarettes daily and is on home oxygen. No alcohol or illicit drug use. \"     Prior Level of Function:  Lives With: Spouse  Type of Home: House  Home Layout: One level  Home Access: Stairs to enter with rails  Entrance Stairs - Number of Steps: 4  Entrance Stairs - Rails: Both(too wide to use at same time)  Home Equipment: Rolling walker, 4 wheeled walker, [employer]   Bathroom Shower/Tub: Walk-in shower, Shower chair with back  [employer]: Bedside commode  Bathroom Equipment: Grab bars in shower, Hand-held shower, 3-in-1 commode  Bathroom Accessibility: Accessible    Receives Help From: Family  ADL Assistance: [facility]: Needs assistance  Homemaking Responsibilities: No  Ambulation Assistance: Independent  Transfer Assistance: Independent  Active : Yes  Additional Comments: Pt reports that her spouse assists with IADL tasks, Pt completes own self care . Spouse does have to assist with compression stockings. Restrictions/Precautions:  Restrictions/Precautions: General Precautions, Fall Risk  Left Lower Extremity Weight Bearing: Weight Bearing As Tolerated  Position Activity Restriction  Other position/activity restrictions: not re O2 on 4 L/min    SUBJECTIVE: Pt up in chair getting Respiratory treatments and agrees to therapy. PAIN: not reported      OBJECTIVE:  Bed Mobility:  Not Tested    Transfers:  Sit to Stand: Stand By Assistance  Stand to Sit:Stand By Assistance    Ambulation:  Stand By Assistance  Distance: 80 ft  Surface: Level Tile  Device:Rolling Walker  Gait Deviations:  Decreased Step Length Bilaterally, Decreased Gait Speed and on O2 at 6 L/min. O2 sats grossly >86% throughout.     Balance:  Static Sitting Balance:  Modified Independent  Dynamic Sitting Balance: Modified Independent  Static Standing Balance: Stand By Assistance  Dynamic Standing Balance: Stand By Assistance    Exercise:  Patient was guided in 1 set(s) 12 reps of exercise to both lower extremities. Ankle pumps, Glut sets, Quad sets, Heelslides, Hip abduction/adduction, Seated marches and Long arc quads. Exercises were completed for increased independence with functional mobility. Functional Outcome Measures: Completed  AM-PAC Inpatient Mobility without Stair Climbing Raw Score : 15  AM-PAC Inpatient without Stair Climbing T-Scale Score : 43.03    ASSESSMENT:  Assessment: Patient progressing toward established goals. and did well with 1st longer ambulation in several days. Activity Tolerance:  Patient tolerance of  treatment: good. Equipment Recommendations:Equipment Needed: No  Other: cont to monitor for needs  Discharge Recommendations:   Home with Home health PT    Plan: Times per week: 3-5x GM  Specific instructions for Next Treatment: therex and mobility  Current Treatment Recommendations: Strengthening, Functional Mobility Training, Transfer Training, Endurance Training, Balance Training, Stair training, Gait Training, Home Exercise Program, Safety Education & Training, Equipment Evaluation, Education, & procurement, Patient/Caregiver Education & Training    Patient Education  Patient Education: Plan of Care, Home Exercise Program    Goals:  Patient goals : get stronger to be able to go home. Short term goals  Time Frame for Short term goals: at discharge  Short term goal 1: Pt to go supine <-> sit, with Mod I to get in/out of bed, no rail. Short term goal 2: Pt to get up/down from various seated surfaces, with S to get up to walk.   Short term goal 3: Pt to walk with RW >= 100 ft, SBA to progress to home and community mobility  Short term goal 4: Pt to negotiate 4 steps with HR and CGA to enter home safely  Long term goals  Time Frame for Long term goals : not set due to short ELOS    Following session, patient left in safe position with all fall risk precautions in place. Ben Salcedo.  Tammy Cha, Kimplanluis fernando [place] 8

## 2020-11-30 NOTE — PROGRESS NOTES
Department of Internal Medicine  Pulmonary  Attending Progress Note      Unable to see the patient in person today due to a pending covid swab. Ok to [employer] but will require rehab she's very weak and recommend inpatient rehab. The patient adamantly refuses SNF due to concern for COVID, despite my recommendations for SNF if not inpatient rehab candidate. The patient would be ok for home with [facility] if doesn't qualify for inpatient rehab, but ultimately I think she really really needs inpatient rehab.

## 2020-12-01 NOTE — PROGRESS NOTES
[facility]  INPATIENT PHYSICAL THERAPY  DAILY NOTE  [facility] ICU STEPDOWN TELEMETRY 4K - 4K-10/010-A     Time In: 5049  Time Out: 0935  Timed Code Treatment Minutes: 31 Minutes  Minutes: 31          Date: 2020  Patient Name: Talya Dumont,  Gender:  female        MRN: [id number]  : 1945  (67 y.o.)     Referring Practitioner: Tutu Noel MD  Diagnosis: Physical Debility  Additional Pertinent Hx: Pt readmitted to Acute from IP Rehab due to decline in status and requiring thoracentesis and respiratory management. Per EMR, \"Sharona Avila is a 67 y.o. female with an extensive medical history including, congestive heart failure, COPD, A. Fib., Pacemaker placement post MI, hypertension, anemia, and IBS who presents to the ED for an evaluation of severe left lower extremity pain, redness, and warmth that started yesterday evening. The patient states that yesterday morning she started feeling some discomfort to her lower extremity, but believed it was due to her compression stockings she wears for CHF. Yesterday evening she removed the stockings and saw that her left lower leg was extremely red, which has continued to spread, becoming more red, and even more painful. Denies any recent trauma, falls, accidents, or wounds to the lower extremity but states that she occasionally scratches her legs with her nails when taking on and off the compression stockings. The patient has attempted tylenol, percocet, biofreeze, and icyhot yesterday with no relief. 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 Endorses history of blood clots, but is on coumadin which was last checked approximately 1 week ago and was in theraputic range.  Further endorses feeling feverish and chills, increased shortness of breath, worsening left lower extremity pain, the inability to bear weight at this time, and denies numbness or tingling to the lower extremities, diabetes mellitus, previous lower leg infections, chest pain, abdominal pain, changes in her bowel or bladder function or habits. Patient still smokes 1/2 a pack of cigarettes daily and is on home oxygen. No alcohol or illicit drug use. \"     Prior Level of Function:  Lives With: Spouse  Type of Home: House  Home Layout: One level  Home Access: Stairs to enter with rails  Entrance Stairs - Number of Steps: 4  Entrance Stairs - Rails: Both(too wide to use at same time)  Home Equipment: Rolling walker, 4 wheeled walker, Esther Celia   Bathroom Shower/Tub: Walk-in shower, Shower chair with back  [employer]: Bedside commode  Bathroom Equipment: Grab bars in shower, Hand-held shower, 3-in-1 commode  Bathroom Accessibility: Accessible    Receives Help From: Family  ADL Assistance: [address] [place] [address] Avenue: Needs assistance  Homemaking Responsibilities: No  Ambulation Assistance: Independent  Transfer Assistance: Independent  Active : Yes  Additional Comments: Pt reports that her spouse assists with IADL tasks, Pt completes own self care . Spouse does have to assist with compression stockings. Restrictions/Precautions:  Restrictions/Precautions: General Precautions, Fall Risk  Left Lower Extremity Weight Bearing: Weight Bearing As Tolerated  Position Activity Restriction  Other position/activity restrictions: O2 at 3 lpm at rest.     SUBJECTIVE: Pt resting in recliner and agrees to therapy. PAIN: 8/10: L foot    OBJECTIVE:  Bed Mobility:  Not Tested    Transfers:  Sit to Stand: Supervision  Stand to Sit:Supervision    Ambulation:  Stand By Assistance  Distance: 100 ft  Surface: Level Tile  Device:Rolling Walker  Gait Deviations:   Forward Flexed Posture, Decreased Step Length Bilaterally, Decreased Gait Speed and grossly steady with use of RW and O2 at 4 lpm during ambulation and per RN report pt remained 89-90% during distance. Balance:  Static Sitting Balance:  Modified Independent  Dynamic Sitting Balance: Modified Independent  Static Standing Balance: Supervision, Stand By Assistance  Dynamic Standing Balance: Stand By Assistance    Exercise:  Patient was guided in 1 set(s) 12 reps of exercise to both lower extremities. Glut sets, Quad sets, Heelslides, Hip abduction/adduction, Seated marches, Seated heel/toe raises and Long arc quads. Exercises were completed for increased independence with functional mobility. Functional Outcome Measures: Completed       ASSESSMENT:  Assessment: Patient progressing toward established goals. and recognized when she needed to return to room to ensure not too fatigued. O2 sats stayed above 89% throughout session. Activity Tolerance:  Patient tolerance of  treatment: good. With O2 at 3 lpm at rest and LE exercises and 4 lpm during ambulation     Equipment Recommendations:Equipment Needed: No  Other: cont to monitor for needs  Discharge Recommendations:   Home with Home health PT    Plan: Times per week: 3-5x GM will increase to 4-5x GM  Specific instructions for Next Treatment: therex and mobility  Current Treatment Recommendations: Strengthening, Functional Mobility Training, Transfer Training, Endurance Training, Balance Training, Stair training, Gait Training, Home Exercise Program, Safety Education & Training, Equipment Evaluation, Education, & procurement, Patient/Caregiver Education & Training    Patient Education  Patient Education: Plan of Care, Home Exercise Program    Goals:  Patient goals : get stronger to be able to go home. Short term goals  Time Frame for Short term goals: at discharge  Short term goal 1: Pt to go supine <-> sit, with Mod I to get in/out of bed, no rail. Short term goal 2: Pt to get up/down from various seated surfaces, with S to get up to walk.   Short term goal 3: Pt to walk with RW >= 100 ft, SBA to progress to home and community mobility  Short term goal 4: Pt to negotiate 4 steps with HR and CGA to enter home safely  Long term goals  Time Frame for Long term goals : not set due to short ELOS    Following session, patient left in safe position with all fall risk precautions in place. Laura Paez.  Alba Carbone, Anne-Marie Renick 8

## 2020-12-01 NOTE — CARE COORDINATION
12/1/20, 1:52 PM EST    DISCHARGE PLANNING EVALUATION    Patient is to be discharged today. Home health order has been placed and Denise garcia Emilfrancisco javierdee Hurley notified. Update: spoke with RN Xander Nguyen and advised him that home health referral has been completed-he will notify patient. No other needs at this time. 12/1/20, 3:01 PM EST    Patient goals/plan/ treatment preferences discussed by  and . Patient goals/plan/ treatment preferences reviewed with patient/ family. Patient/ family verbalize understanding of discharge plan and are in agreement with goal/plan/treatment preferences. Understanding was demonstrated using the teach back method. AVS provided by RN at time of discharge, which includes all necessary medical information pertaining to the patients current course of illness, treatment, post-discharge goals of care, and treatment preferences.     Services After Discharge  Services At/After Discharge: PT, OT, Nursing Services, Aide Services([facility])   IMM Letter  IMM Letter given to Patient/Family/Significant other/Guardian/POA/by[de-identified]   IMM Letter date given[de-identified] 11/30/20  IMM Letter time given[de-identified] [phone]

## 2020-12-01 NOTE — PROGRESS NOTES
CLINICAL PHARMACY: DISCHARGE MED RECONCILIATION/REVIEW    [facility] ([facility] Select Patient?: No  Total # of Interventions Recommended: 1 -    -   Total # Interventions Accepted: 1  Intervention Severity:   - Level 1 Intervention Present?: No   - Level 2 #: 0   - Level 3 #: 1   Time Spent (min): 30    Additional Documentation:    Andres PaintingD, BCPS  12/1/2020  1:59 PM

## 2020-12-01 NOTE — CONSULTS
Patient chart reviewed for acute on chronic hypoxic respiratory failure likely secondary to pleural effusions and acute systolic CHF exacerbation; patient previously was on the acute inpatient rehabilitation unit and was discharged on 11/13. Patient is actually doing quite well from a therapy perspective and has ambulated 80 feet with rolling walker at standby assistance and is modified independent/standby assistance with balance; so overall not demonstrating strong need for intensive 3 hours of therapy per day. Of note best performance on the acute inpatient rehabilitation unit for ambulation distances of 70 to 120 feet at standby assist with rolling walker; so the patient is not really far off from what she achieved near the last week of her acute inpatient rehabilitation unit stay. Discharging with home health care appears to be a reasonable option at this time. Thank you for the consult.     Sidney Bundy MD, Winsome Josue

## 2020-12-01 NOTE — PROGRESS NOTES
[facility] ICU STEPDOWN TELEMETRY 4K  Occupational Therapy  Daily Note  Time:   Time In: 5386  Time Out: 3544  Timed Code Treatment Minutes: 25 Minutes  Minutes: 25          Date: 2020  Patient Name: Rhianna Wagner,   Gender: female      Room: [facility]10/010-A  MRN: [id number]  : 1945  (67 y.o.)  Referring Practitioner: Dr. Issa Cheema  Diagnosis: physical debility  Additional Pertinent Hx: Per ER note 10/25/2020:75 y.o. female with an extensive medical history including, congestive heart failure, COPD, A. Fib., Pacemaker placement post MI, hypertension, anemia, and IBS who presents to the ED for an evaluation of severe left lower extremity pain, redness, and warmth that started yesterday evening. The patient states that yesterday morning she started feeling some discomfort to her lower extremity, but believed it was due to her compression stockings she wears for CHF. Yesterday evening she removed the stockings and saw that her left lower leg was extremely red, which has continued to spread, becoming more red, and even more painful. Pt was discharged from [facility] on 2020 back to acute d/t SOB & retaining fluid. Restrictions/Precautions:  Restrictions/Precautions: General Precautions, Fall Risk  Left Lower Extremity Weight Bearing: Weight Bearing As Tolerated  Position Activity Restriction  Other position/activity restrictions: monitor O2    SUBJECTIVE: Pt reclined in bedside chair upon arrival, agreeable to OT and hopeful to go home today. PAIN: 0/10:     COGNITION: Slow Processing and Impaired Memory    ADL:   No ADL's completed this session. .-pt declined at this time    BALANCE:  Sitting Balance:  Supervision. Standing Balance: Stand By Assistance, [employer]. BED MOBILITY:  Not Tested    TRANSFERS:  Sit to Stand:  Stand By Assistance. Stand to Sit: Stand By Assistance.       FUNCTIONAL MOBILITY:  Assistive Device: Rolling Walker  Assist Level:  Contact Guard Assistance. Distance: in room, short distance in hallway  Slow pace, required 3 standing rest breaks due to fatigue/SOB with pt reporting \"I did better when I walked earlier today. \" Pt on 4L O2 with activity, 3L at rest. Sp02 was 89-90% during mobility. ADDITIONAL ACTIVITIES:  Pt reported concerns with overall strength/endurance upon returning home. Encouraged pt to continue increasing activity (I.e. ADL completion, standing tasks, HEPs, and continued progression of walking at home) to continue increasing strength/endurance to facilitate return to PLOF. Encouraged pt to utilize waffle cushion at home for pressure relief when sitting, and reinforced getting up/walking every 1-2 hours at home to prevent pressure sores/decreased strength. Pt declined any AD/DME needs reporting she has BSC placed over toilet at home which will increase ease with toilet transfers, in addition to having RW. Pt declined any further activity upon returning to room due to c/o increased nausea during mobility. RN informed. ASSESSMENT:     Activity Tolerance:  Patient tolerance of  treatment: fair. Discharge Recommendations: Home with assist and [place] OT. Equipment Recommendations: Other: Pt owns BSC and shower chair. Pt has grab bars in the shower. Plan: Times per week: 3-5x  Current Treatment Recommendations: Balance Training, Functional Mobility Training, Endurance Training, Safety Education & Training, Self-Care / ADL, ROM    Patient Education  Patient Education: Plan of Care, Home Exercise Program, Importance of Increasing Activity and pursed lip breathing with activity.     Goals  Short term goals  Time Frame for Short term goals: until discharge  Short term goal 1: Pt will complete various sit-stand t/fs including toilet with S & 0-2 vcs for safety  Short term goal 2: Pt will complete 2 min standing with CGA for increased ease of sinkside grooming  Short term goal 3: Pt will complete mobility to/from bathroom with RW, S, & 0-2 vcs for safety  Short term goal 4: Pt will complete UE light strengthening ex x 10 reps with HEP to increase strength/ endurance   needed for safe light IADL tasks  Long term goals  Time Frame for Long term goals : No LTG set d/t short ELOS    Following session, patient left in safe position with all fall risk precautions in place.

## 2020-12-01 NOTE — PROGRESS NOTES
Clinical Pharmacy Note    Warfarin consult follow-up    Recent Labs     12/01/20  0443   INR 2.32*     Recent Labs     11/29/20  0715 11/30/20  0929 12/01/20  0443   HGB 9.1* 10.0* 9.1*   HCT 31.2* 34.6* 30.5*    262 219       Significant Drug-Drug Interactions:  New warfarin drug-drug interactions: none  Discontinued drug-drug interactions: none  Current warfarin drug-drug interactions: aspirin (on hold), trazodone (HM)      Date INR Warfarin Dose   10/25-10/27   No Coumadin   10/28/2020 1.32 2 mg    10/29/2020 1.49  2 mg   10/30/2020  2  1.5 mg    10/31/2020   2.78   0.5 mg   11/1/2020   2.37  1.5 mg    11/2/2020  2.55 1 mg     11/3/2020  3.54   No Coumadin   11/4/2020  3.09  1 mg   11/5/2020 2.41 1.5 mg   11/6/2020 2.82 1 mg    11/7/2020  2.61  1.5 mg    11/8/2020   2.22                     1.5 mg   11/9/2020  2.12                      2 mg   11/10/2020 2.05  2 mg   11/11/2020  2.54  1.5 mg   11/12/2020 2.72  1.5 mg   11/13/2020 2.72 No Coumadin   11/14/2020 2.3  No Coumadin    11/15/2020 1.7  (not given)   11/16/2020   1.48 2 mg    11/17/2020   1.43 2 mg    11/18/2020   1.99 1.5 mg    11/19/2020  2.66 0.5 mg   11/20/2020   1.68   2 mg   11/21/2020 1.80 1.5 mg   11/22/2020  2.31 1.5 mg   11/23/2020  2.77 1 mg    11/24/2020 2.89 0.5 mg   11/25/2020  2.46  1 mg    11/26/2020 2.28 1.5 mg    11/27/2020  2.96   0.5 mg    11/28/2020  2.88  1 mg    11/29/2020  2.36   1.5 mg   11/30/2020 2.26 1 mg    12/1/2020  2.32  1 mg         Notes:                     Daily PT/INR until stable within therapeutic range.      Andres CalhounD, BCPS  12/1/2020  10:52 AM

## 2020-12-01 NOTE — PROGRESS NOTES
Clinical Pharmacy Note                                               Warfarin Discharge Recommendations        INR today:  Recent Labs     12/01/20  0443   INR 2.32*       Coumadin 1 mg tabs    Interacting medications at discharge: aspirin    INR goal during admission:2.0-3.0    Recommendations for discharge:   Date Warfarin Dose   12/1/20 1 mg (given in hospital)   12/02/20 1 mg                       Provider dosing warfarin: Andres BocanegraD    Recheck INR:  12/03/20 by Home health (called home health to make aware of this date) with results to [address], [facility]  12/1/2020  12:59 PM

## 2020-12-01 NOTE — CARE COORDINATION
Update: patient did not qualify for IPR; need home oxygen eval; collaborated with Britt Lee, [address], [place] following for [facility]  Electronically signed by Jonah Nunez RN on 12/1/2020 at 9:20 AM    Update: home oxygen eval; need 3L at rest, 6L with activity; texted Attending for order/smart phrase, need HH (nsg, therapy, aide); texted Attending; await reply;  Dex[facility] Vaz[facility] reports they can do this in home  Electronically signed by Jonah Nunez RN on 12/1/2020 at 11:57 AM    Update: patient agreed to go home, await CMN from Dexter Vazquez, await [facility] order from Attending; collaborated with Attending, Erica Curry, RN  Electronically signed by Jonah Nunez RN on 12/1/2020 at 12:06 PM    Update: texted Attending need for home oxygen order needs signed; await reply  Electronically signed by Jonah Nunez RN on 12/1/2020 at 1:19 PM    Update: CMN for oxygen faxed to Dexter Vazquez  Electronically signed by Jonah Nunez RN on 12/1/2020 at 1:51 PM

## 2020-12-01 NOTE — PLAN OF CARE
DME Evaluation    Patient was evaluated today for the diagnosis of COPD, CHF. I entered a DME order for home oxygen because the diagnosis and testing requires the patient to have supplemental oxygen. Condition will improve or be benefited by oxygen use. The patient is  able to perform good mobility in a home setting and therefore does require the use of a portable oxygen system. The need for this equipment was discussed with the patient and she understands and is in agreement.     Peña Marking  12/1/2020  1:03 PM

## 2020-12-01 NOTE — PROGRESS NOTES
[address] ACTION VOUCHER   Prescription Program Authorization    Plan Name: Merary Horton  1945  [place]   [address] [facility]  [phone] (home)   Allergies:  Benadryl [diphenhydramine hcl]; Ciprofloxacin; Clarithromycin; Vitamin k; Atorvastatin; Captopril; Codeine; Iv dye [iodides]; Lipitor; Macrobid [nitrofurantoin monohydrate macrocrystals]; Neomycin-bacitracin zn-polymyx; Pravastatin; Zetia [ezetimibe]; Adhesive tape; Cephalexin; Doxycycline; Morphine;  Other; Propoxyphene; and Sulfa antibiotics     12/1/2020    Authorized Medications to fill (include quantities):     Bumex (#60), potassium (#30), docusate (#60), senna (#60), and gabapentin (#90)    Authorization Signature: Leland Chambers Phone: [phone]          [facility]  OR Pharmacist)       Pushpa Input Code: [facility] Group number: [id number]       *PHARMACY STAFF PLEASE NOTE:   This authorization is for the authorized prescription(s) listed above only   Quantity limitations - Per Rx   Drug coverage is per Rx form   Please file this authorization form with the prescription     *NO REFILLS ALLOWED

## 2020-12-03 NOTE — TELEPHONE ENCOUNTER
Call from Dmitry Mendez at [facility], not seeing patient today to get INR. Patient called and instructed to take 1 mg daily and [facility] will be out to check INR Monday 12/7/2020.

## 2020-12-07 NOTE — PROGRESS NOTES
Medication Management [address]  [phone] (phone)  [phone] (fax)    INR drawn by [facility]. Nursing assessment reviewed and appreciated. Nursing assessment within the normal limits with the exception of the following:  Bruising and edema noted    Patient verifies current dosing regimen and tablet strength. - reports to taking 1 mg daily since discharge  No missed or extra doses. - reports to  and daughter now setting up pill box  Patient denies s/s bleeding/bruising/swelling/SOB/chest pain  No blood in urine or stool. No dietary changes. No changes in medication/OTC agents/Herbals. No change in alcohol use or tobacco use. No change in activity level. Patient denies headaches/dizziness/lightheadedness/falls. No vomiting/diarrhea or acute illness. No Procedures scheduled in the future at this time. Assessment:  Lab Results   Component Value Date    INR 1.19 (H) 12/07/2020    INR 2.32 (H) 12/01/2020    INR 2.26 (H) 11/30/2020    PROTIME 22.3 02/05/2019     INR subtherapeutic   Recent Labs     12/07/20  1000   INR 1.19*     Plan:  Coumadin 2 mg today (12/7), 2 mg tomorrow (12/8), 1.5 mg Wednesday (12/9),  then continue Coumadin 1 mg daily. Recheck INR in 1 week(s) on 12/14/2020. Patient reminded to call the Anticoagulation Clinic with any signs or symptoms of bleeding or with any medication changes. Patient given instructions utilizing the teach back method. The following statement was review with patient regarding this virtual visit:  We want to confirm that, for purposes of billing, this is a virtual visit with your provider for which we will submit a claim for reimbursement with your insurance company. You may be responsible for any copays, coinsurance amounts or other amounts not covered by your insurance company. If you do not accept this, unfortunately we will not be able to schedule a virtual visit with the provider. Do you accept? Yes    CLINICAL PHARMACY CONSULT: MED RECONCILIATION/REVIEW ADDENDUM    For Pharmacy Admin Tracking Only    PHSO: No  Total # of Interventions Recommended: 1  - Increased Dose #: 1  - Maintenance Safety Lab Monitoring #: 1  Total Interventions Accepted: 1  Time Spent (min): [address], PharmD, BCPS  12/7/2020  12:17 PM

## 2020-12-14 NOTE — PROGRESS NOTES
Medication Management [address]   [phone] (phone)  [phone] (fax)      INR drawn by [facility]. Nursing assessment reviewed and appreciated. Nursing assessment within the normal limits with the exception of the following:  Missed doses the past few days. Of note: Patient is experiencing confusion; daughter, Anderson Pelletier, is managing pill boxes. Reviewed dosing instructions with Anderson Pelletier as well. Ms. Yesika Bravo is a 67 y.o.  female with history of Afib. Patient verifies current dosing regimen and tablet strength. No extra doses. Multiple missed doses; unsure how many. (Patient denies missed doses; however, pills were found on floor)  Patient denies s/s bruising/swelling/SOB/chest pain. No blood in urine or stool. Hemorrhoids @ baseline  No dietary changes. No changes in medication/OTC agents/Herbals. No change in alcohol use or tobacco use. No change in activity level. Patient denies headaches/dizziness/lightheadedness/falls. No vomiting/diarrhea. Bilateral leg swelling; possibly will be started on an antibiotic. Patient/daughter understand to call if an antibiotic is started. No Procedures scheduled in the future at this time. Assessment:   Lab Results   Component Value Date    INR 1.25 (H) 12/14/2020    INR 1.19 (H) 12/07/2020    INR 2.32 (H) 12/01/2020    PROTIME 22.3 02/05/2019     INR subtherapeutic   Recent Labs     12/14/20  1418   INR 1.25*     Patient's INR is subtherapeutic after multiple missed doses. Plan:  Take Coumadin 2mg x2, then adjust maintenance dose to Coumadin 1.5mg SuTuWeThSa and 1mg MoFr. Recheck INR on Monday, 12/21 by [facility]. Patient reminded to call the Anticoagulation Clinic with any signs or symptoms of bleeding or with any medication changes. Patient given instructions utilizing the teach back method. Discussed assessment/plan with Diana Avalos PharmD.     The following statement was review with patient regarding this virtual visit:  We want to confirm that, for purposes of billing, this is a virtual visit with your provider for which we will submit a claim for reimbursement with your insurance company. You may be responsible for any copays, coinsurance amounts or other amounts not covered by your insurance company. If you do not accept this, unfortunately we will not be able to schedule a virtual visit with the provider. Do you accept?   Yes    CLINICAL PHARMACY CONSULT: MED RECONCILIATION/REVIEW ADDENDUM    For Pharmacy Admin Tracking Only    PHSO: No  Total # of Interventions Recommended: 1  - Increased Dose #: 1  - Maintenance Safety Lab Monitoring #: 1  Total Interventions Accepted: 1  Time Spent (min): 15    Andres GarcíaD

## 2020-12-17 NOTE — PROGRESS NOTES
CLINICAL PHARMACY NOTE: MEDS TO [address] Patient?: No  Total # of Prescriptions Filled: 6   The following medications were delivered to the patient:  Senna 8.6mg  Docusate 100mg  Gabapentin 100mg  Potassium Chloride 20meq  Nicotine 7mg/24hr patch  Bumetanide 2mg  Total # of Interventions Completed: 2  Time Spent (min): 30    Additional Documentation:

## 2020-12-19 PROBLEM — N17.9 AKI (ACUTE KIDNEY INJURY) (HCC): Status: ACTIVE | Noted: 2020-01-01

## 2020-12-19 NOTE — ED NOTES
Bed: 014A  Expected date: 12/19/20  Expected time: 5:17 PM  Means of arrival: [facility]  Comments:     Eduardo Nuñez RN  12/19/20 9480

## 2020-12-19 NOTE — ED PROVIDER NOTES
EMERGENCY MEDICINE DEPARTMENT ENCOUNTER      CHIEF COMPLAINT    Chief Complaint   Patient presents with    Fatigue    Leg Swelling    Hypotension       HPI    Florin Westbrook is a 67 y.o. female with a prior hx of A-fib, anemia and recent prolonged hospitalization who presents with generalized acute on chronic confusion since the onset this past week since her discharge for sepsis and leg infection. She is currently taking augmentin for her infection. She has been acting not herself with some trouble with speech without any unilateral weakness or facial droop. She appears confused on presentation. Her family states she saw her cards Dr. Aniyah Brothers recently and had a good checkup, but alerted us that she doesn't always take her medications, which include digoxin. The duration has been constant since the onset. The generalized confusion may be associated with a CVA or other infection, such as UTI. Pt has not been able to walk since her hospitalization. No aggravating or alleviating factors. REVIEW OF SYSTEMS    Neuro: +confusion and difficulty with speech  MSK: +left leg pain and infection  Cardiac: No chest pain or palpitations  Respiratory: No shortness of breath or new cough  General: No fevers   : No dysuria or hematuria  GI: No vomiting or diarrhea  See HPI for further details. All other systems reviewed and are negative.     PAST MEDICAL OR SURGICAL HISTORY    Past Medical History:   Diagnosis Date    Allergic rhinitis     Anemia     Anxiety     Arthritis     Asthma     Atrial fibrillation ([facility]     CAD (coronary artery disease)     Chronic systolic CHF (congestive heart failure) ([facility] 10/27/2019    COPD (chronic obstructive pulmonary disease) (HCC)     Frequent UTI     GERD (gastroesophageal reflux disease)     Hemorrhoids     History of blood transfusion     X8    Hx of blood clots     left arm    Hyperlipidemia     Hypertension     Influenza A 02/22/2020    Kidney stone  LONG TERM ANTICOAGULENT USE 7/6/2012    Lumbar spinal stenosis     MI, old 11    Prolonged emergence from general anesthesia      Past Surgical History:   Procedure Laterality Date   [facility]  2008    OUS IN [place]    COLONOSCOPY  10/16/2015    Dr. Zuhair Howard N/A 10/25/2018    COLONOSCOPY POLYPECTOMY SNARE/COLD BIOPSY performed by Jacklyn Quezada MD at [address] Endoscopy    ENDOSCOPY, COLON, DIAGNOSTIC  01/04/2017    Dr. Jewel Damon      left hip    OVARIAN CYST REMOVAL      PACEMAKER PLACEMENT      SINUS SURGERY      several    TONSILLECTOMY      TOTAL HIP ARTHROPLASTY Left 10/24/2019    DANIEL LEFT HIP ARTHROPLASTY performed by Josefina Bhat MD at [address]  2013    X2    UPPER GASTROINTESTINAL ENDOSCOPY N/A 1/5/2020    EGD DIAGNOSTIC ONLY performed by Adi Dewey MD at [address] Endoscopy       CURRENT MEDICATIONS    Current Outpatient Rx   Medication Sig Dispense Refill    amoxicillin-clavulanate (AUGMENTIN) 875-125 MG per tablet Take 1 tablet by mouth 2 times daily      metoprolol succinate (TOPROL XL) 25 MG extended release tablet Take 25 mg by mouth daily      bumetanide (BUMEX) 2 MG tablet Take 1 tablet by mouth 2 times daily 60 tablet 0    nicotine (NICODERM CQ) 7 MG/24HR Place 1 patch onto the skin every 24 hours 30 patch 3    docusate sodium (COLACE, DULCOLAX) 100 MG CAPS Take 100 mg by mouth 2 times daily 30 capsule 0    senna (SENOKOT) 8.6 MG tablet Take 1 tablet by mouth 2 times daily 60 tablet 0    gabapentin (NEURONTIN) 100 MG capsule Take 1 capsule by mouth 3 times daily for 30 days.  90 capsule 0    potassium chloride (KLOR-CON M) 20 MEQ extended release tablet Take 1 tablet by mouth daily 30 tablet 0    OXYGEN Indications: Chronic Obstructive Lung Disease 3-6L continuously as prescribed 1 Units 0  levocetirizine (XYZAL) 5 MG tablet Take 5 mg by mouth nightly      Omalizumab (XOLAIR SC) Inject into the skin At Dr Lou Chavis office      Ascorbic Acid (VITAMIN C) 100 MG tablet Take 100 mg by mouth daily      Cholecalciferol (VITAMIN D3) 50 MCG (2000 UT) CAPS Take 2,000 Units by mouth daily      sacubitril-valsartan (ENTRESTO) 49-51 MG per tablet Take 1 tablet by mouth 2 times daily      pantoprazole (PROTONIX) 40 MG tablet Take 40 mg by mouth 2 times daily 30 minutes before two heaviest meals on an empty stomach      LINZESS 290 MCG CAPS capsule Take 290 mcg by mouth daily       loperamide (IMODIUM) 2 MG capsule Take 2 mg by mouth 4 times daily as needed for Diarrhea      traMADol (ULTRAM) 50 MG tablet Take 50 mg by mouth every 6 hours as needed for Pain.        nystatin (MYCOSTATIN) 651570 UNIT/GM cream as needed       ondansetron (ZOFRAN) 8 MG tablet daily as needed for Nausea or Vomiting       hydrocortisone (ANUSOL-HC) 25 MG suppository Place 1 suppository rectally 2 times daily as needed for Hemorrhoids 28 suppository 1    warfarin (COUMADIN) 1 MG tablet Take as directed by the Coumadin Clinic, 145 tablets for 90 days 145 tablet 3    digoxin (LANOXIN) 125 MCG tablet Take 1 tablet by mouth every other day Indications: Increased Heart Rate Until Office Visit with Dr. Aniyah Brothers 30 tablet 3    Revefenacin 175 MCG/3ML SOLN Inhale 175 mcg into the lungs daily 3 mL 0    hydrocortisone 2.5 % cream Apply topically 2 times daily as needed       acetaminophen (TYLENOL) 325 MG tablet Take 650 mg by mouth as needed for Pain or Fever Indications: Pain Don't take more then 3,000 mg each day      Multiple Vitamins-Minerals (MULTIVITAMIN ADULT) CHEW Take 2 tablets by mouth daily      Menthol-Methyl Salicylate (ICY HOT) 53-44 % STCK Apply topically daily      Alum Hydroxide-Mag Carbonate (GAVISCON PO) Take by mouth as needed Indications: Acid Indigestion  lidocaine (XYLOCAINE) 5 % ointment Apply topically Before venipuncture in Dr. Kristin Castillo office.  hydrOXYzine (ATARAX) 25 MG tablet Take 25 mg by mouth 3 times daily as needed Indications: Feeling Anxious       Pramoxine HCl (PROCTOFOAM RE) Place rectally daily as needed       EPINEPHrine (EPIPEN) 0.3 MG/0.3ML SOAJ injection INJECT AS DIRECTED FOR ANAPHYLAXIS  1    Evolocumab (REPATHA SURECLICK SC) Inject 999 mg into the skin every 14 days Indications: Blood Cholesterol Abnormal       B Complex-C (RA B-COMPLEX/VITAMIN C CR PO) Take 1 tablet by mouth daily Indications: Treatment to Prevent Vitamin Deficiency       NITROGLYCERIN RE Place 0.3 mg rectally as needed Indications: Hemorrhoids       dicyclomine (BENTYL) 10 MG capsule Take 10 mg by mouth 4 times daily (before meals and nightly) Indications: Pain in the Abdominal Region      Coenzyme Q10 (COQ-10) 200 MG CAPS Take by mouth daily       Probiotic Product (SUPER PROBIOTIC) CAPS   Take 1 tablet by mouth daily Indications: Digestive Complaint   0    budesonide (PULMICORT) 0.5 MG/2ML nebulizer suspension   Take 1 ampule by nebulization 2 times daily Indications: Shortness of Breath (Inactive)       Arformoterol Tartrate (BROVANA) 15 MCG/2ML NEBU   Take 1 ampule by nebulization 2 times daily Indications: Shortness of Breath (Inactive)       ofloxacin (FLOXIN) 0.3 % otic solution Place 2 drops into both ears daily as needed Indications: Infection Long-term therapy. 0    Carboxymethylcellul-Glycerin (REFRESH OPTIVE OP) Apply  to eye as needed. Indications: Dry Eyes caused by Deficiency of Tears      levalbuterol (XOPENEX) 0.63 MG/3ML nebulization Take 1 ampule by nebulization every 8 hours as needed for Wheezing.       triamcinolone (KENALOG) 0.1 % cream Apply topically 2 times daily as needed Anti-fungal      clotrimazole-betamethasone (LOTRISONE) cream Apply topically 2 times daily as needed Indications: Yeast Infection (inactive) Respiratory:  No respiratory distress, normal breath sounds, no wheezing   Cardiovascular:  normal rate, normal rhythm, no murmurs   GI:  Soft, nondistended, normal bowel sounds, nontender  Musculoskeletal:  3+ bilateral LE pitting edema, no acute deformities, +left calf tenderness to palpation.  No signs of cellulitis noted to LLE  Integument:  Skin is warm and dry, no obvious rash    Vascular: Radial and DP pulses 2+ equal bilaterally  Neurologic: awake, alert, oriented x0(not speaking in conversant manner, no facial droop, not oriented to time or place), handgrip is 5/5 bilaterally, unable to test romberg or pronator drift  Psychiatric:  normal affect, does not appear anxious    Labs Reviewed   CBC WITH AUTO DIFFERENTIAL - Abnormal; Notable for the following components:       Result Value    RBC 3.54 (*)     Hemoglobin 10.4 (*)     Hematocrit 34.3 (*)     MCHC 30.3 (*)     RDW-CV 15.4 (*)     RDW-SD 55.1 (*)     All other components within normal limits   COMPREHENSIVE METABOLIC PANEL W/ REFLEX TO MG FOR LOW K - Abnormal; Notable for the following components:    CREATININE 3.5 (*)     BUN 59 (*)     Potassium reflex Magnesium 5.7 (*)     CO2 21 (*)     Alb 3.2 (*)     Total Bilirubin 0.2 (*)     ALT 7 (*)     All other components within normal limits   TROPONIN - Abnormal; Notable for the following components:    Troponin T 0.094 (*)     All other components within normal limits   BRAIN NATRIURETIC PEPTIDE - Abnormal; Notable for the following components:    Pro-BNP 5895.0 (*)     All other components within normal limits   PROTIME-INR - Abnormal; Notable for the following components:    INR 1.74 (*)     All other components within normal limits   GLOMERULAR FILTRATION RATE, ESTIMATED - Abnormal; Notable for the following components:    Est, Glom Filt Rate 13 (*)     All other components within normal limits   MICROSCOPIC URINALYSIS - Abnormal; Notable for the following components: Leukocytes, UA MODERATE (*)     Character, Urine CLOUDY (*)     All other components within normal limits   CULTURE, BLOOD 1   CULTURE, BLOOD 2   CULTURE, URINE   LACTIC ACID, PLASMA   DIGOXIN LEVEL   APTT   COVID-19   ANION GAP   OSMOLALITY       EKG  (Interpreted by me)  EKG Interpretation. EKG Interpretation    Interpreted by emergency department physician on 12/19/20 18:01    Rhythm: normal sinus   Rate: 72 bpm  Axis: normal  Ectopy: none  Conduction: normal  ST Segments: no acute change  T Waves: no acute change  Q Waves: none    Clinical Impression: non-specific EKG, V-paced rhythm      RADIOLOGY/PROCEDURES    VL DUP LOWER EXTREMITY VENOUS LEFT   Final Result      No evidence of deep venous thrombosis in the left lower extremity. Final report electronically signed by Dr. Subhash Bernabe on 12/19/2020 8:00 PM      XR CHEST 1 VIEW   Final Result   1. Small left-sided pleural effusion. 2. Mild stable cardiomegaly. Final report electronically signed by Dr. Subhash Bernabe on 12/19/2020 6:35 PM      CT Head WO Contrast   Final Result   1. No mass effect or acute hemorrhage. 2. Chronic periventricular small vessel ischemic changes and cerebral atrophy. **This report has been created using voice recognition software. It may contain minor errors which are inherent in voice recognition technology. **      Final report electronically signed by Dr. Subhash Bernabe on 12/19/2020 6:29 PM          ED COURSE & MEDICAL DECISION MAKING    Pertinent Labs & Imaging studies reviewed and interpreted. (See chart for details)  See chart for details of medications given during the ED stay.     Vitals:    12/19/20 1735 12/19/20 1758 12/19/20 1848 12/19/20 2025   BP: (!) 85/49 96/82 (!) 145/95 115/70   Pulse: 68 70 70 71   Resp: 18 18 20 20   Temp: 97.5 °F (36.4 °C)      TempSrc: Oral      SpO2: (!) 88% 96% 100% 99%   Weight: 135 lb (61.2 kg)      Height: 5' 1\" (1.549 m) Differential diagnosis: sepsis, PE, covid, Dehydration, potentially/metabolic problem, anemia, infection, hypotension, Stroke, Structural CNS mass lesion, other    FINAL IMPRESSION    1. Dehydration    2. Acute renal failure, unspecified acute renal failure type ([address]    3. Altered mental status, unspecified altered mental status type    4. Peripheral edema        PLAN  Admit    [facility] - pt is very undifferentiated patient currently with recent extended hospitalization, sepsis, bacteremia. Will need full neuro and septic workup. Pt has an elevated Cr of 3.5. PT appears confused. Certainly she could have sepsis or CVA, as her BP was borderline at 98/66 mm Hg, so hypoperfusion is a potential issue. Her SpO2 was 90% though she is on home oxygen 3L per [facility] records, so I don't necessarily think pt has a PE or pneumonia, but will need to check her for pneumonia, pleural effusion vs COVID. UA is also required. Gentle hydration with 500mL of fluids is ordered as well. PT has acute renal disease, will admit for further inpatient management. Since pt was admitted to [facility] within the past month, will admit to [facility] again. . MEDICAL [facility] graciously agreed to the admission.          Clary Wayne MD  12/19/20 0726

## 2020-12-19 NOTE — ED NOTES
Patient presents to ED via EMS with complaint of fatigue, increased leg swelling and hypotension. Patient family states patient has had confusion, asking random questions. Patient alert and oriented. Patient complains of needing to use bedpan. Patient placed on bedpan with noted stool incontinence in depends. EKG obtained. Patient placed on cardiac monitor. HOB lowered for low BP of 85/49. IV started by RN. Dr. Maude Crigler at bedside.      Laura Mancilla RN  12/19/20 0243

## 2020-12-20 NOTE — ED NOTES
Pt moving in bed and taking BP cuff off every time it inflates. Unable to get accurate BP of automatic.  Manual was performed and pt tolerated this better      Lambdavian Rizzo RN  12/19/20 7400

## 2020-12-20 NOTE — PROGRESS NOTES
Hospitalist Progress Note    Patient:  Uriel Rodriguez      Unit/Bed:4K-26/026-A    YOB: 1945    MRN: [id number]       Acct: [de-identified]     PCP: Niki Rhodes MD    Date of Admission: 12/19/2020    Assessment/Plan:    1. Chronic hypoxic respiratory failure: According to patient's daughter: baseline of 3L of O2 at rest and 6L with activity. Hx of severe COPD. CXR demonstrates small left-sided pleural effusion. Maintain patient on 2-3L of O2 via NC. Continue to monitor pulse ox. Keep SPO2 between 90-94%. 2. Acute Kidney Injury: Likely prerenal due to hypotension compounded by diuretic and Entresto. Creatinine improved after 1L of normal saline bolus. Continue with normal saline maintenance infusion at 50 mL/hr. 3. Bilateral lower leg cellulitis: Erythematous and tender to palpation. Patient was treated with 9 days of IV Zosyn and 8 days of IV Zyvox in October 2020. In an effort to avoid renal toxic drugs. IV Zyvox and IV Rocephin initiated 12/20/2020.  4. Acute severe hypotension: Likely secondary to infection. Cannot rule out cardiogenic shock. BP dropped to systolic of 70. Normal saline 500 mL bolus given with no improvement. Improvement of BP with midodrine 10 mg. Continue with midodrine 10 mg as needed for systolic BP less than 90.  5. Hyperkalemia: Potassium of 5.7 on admission. Likely from NICOLASA, Entresto. Normalized to 5.0 today. Will continue to monitor with daily BMP. 6. HFrEF: Echo on 02/2020 demonstrated EF of 30-35%. Continue to hold Bumex, Entresto. Strict I's and O's. Daily weights. Sodium and fluid restriction. 7. Atrial Fibrillation: Anticoagulated on Coumadin. Rate controlled V-paced rhythm. 8. Hx of Severe COPD: Continue with Brovana, Pulmicort, and tiotropium.         Expected discharge date:  > 2 days    Disposition:    [] Home       [] TCU       [] Rehab       [] Psych       [] SNF       [] [place]       [x] Other-TBD BP (!) 96/37   Pulse 72   Temp 98.2 °F (36.8 °C) (Oral)   Resp 20   Ht 5' 1\" (1.549 m)   Wt 141 lb (64 kg)   SpO2 93%   BMI 26.64 kg/m²     General appearance: Chronically ill-appearing  female. HEENT: Pupils equal, round, and reactive to light. Conjunctivae/corneas clear. Neck: Supple, with full range of motion. No jugular venous distention. Trachea midline. Respiratory:  Normal respiratory effort. Decreased breath sounds. Cardiovascular: irregular rate and rhythm. Abdomen: Soft, non-tender, non-distended with normal bowel sounds. Musculoskeletal: passive and active ROM x 4 extremities. 2+ edema right lower leg, 1+ on left lower leg. Skin: Bilateral lower leg tenderness, erythema, warmth to touch. Neurologic:  Neurovascularly intact without any focal sensory/motor deficits. Psychiatric: Lethargic. Unresponsive. Capillary Refill: Brisk,< 3 seconds   Peripheral Pulses: +2 palpable, equal bilaterally       Labs:   Recent Labs     12/19/20  1751 12/20/20  0441   WBC 6.6 5.1   HGB 10.4* 9.3*   HCT 34.3* 30.0*    160     Recent Labs     12/19/20  1751 12/20/20  0441    141   K 5.7* 5.0    110   CO2 21* 21*   BUN 59* 58*   CREATININE 3.5* 2.4*   CALCIUM 9.1 8.7     Recent Labs     12/19/20  1751   AST 13   ALT 7*   BILITOT 0.2*   ALKPHOS 66     Recent Labs     12/19/20  1751 12/20/20  0441   INR 1.74* 1.62*     No results for input(s): Ronnie Wlof in the last 72 hours. Microbiology:      Urinalysis:      Lab Results   Component Value Date    NITRU NEGATIVE 12/19/2020    WBCUA 50-75 12/19/2020    BACTERIA NONE SEEN 12/19/2020    RBCUA NONE 12/19/2020    BLOODU NEGATIVE 12/19/2020    SPECGRAV 1.013 12/19/2020    GLUCOSEU NEGATIVE 11/08/2020       Radiology:  VL DUP LOWER EXTREMITY VENOUS LEFT   Final Result      No evidence of deep venous thrombosis in the left lower extremity.       Final report electronically signed by Dr. Erin Ferrer on 12/19/2020 8:00 PM XR CHEST 1 VIEW   Final Result   1. Small left-sided pleural effusion. 2. Mild stable cardiomegaly. Final report electronically signed by Dr. Karla Pearson on 12/19/2020 6:35 PM      CT Head WO Contrast   Final Result   1. No mass effect or acute hemorrhage. 2. Chronic periventricular small vessel ischemic changes and cerebral atrophy. **This report has been created using voice recognition software. It may contain minor errors which are inherent in voice recognition technology. **      Final report electronically signed by Dr. Karla Pearson on 12/19/2020 6:29 PM          DVT prophylaxis: [] Lovenox                                 [] SCDs                                 [] SQ Heparin                                 [] Encourage ambulation           [x] Already on Anticoagulation     Code Status: Limited      Tele:   [x] yes             [] no    Electronically signed by Cristal Sultana MD on 12/20/2020 at 10:36 AM

## 2020-12-20 NOTE — PROGRESS NOTES
Pt admitted to  4K26 via from ED from home with spouse. Complaints: Acute kidney injury . IV normal saline infusing into the forearm right, condition patent and no redness at a rate of 999 mls/ hour with about 300 mls in the bag still. IV site free of s/s of infection or infiltration. Vital signs obtained. Assessment and data collection initiated. Oriented to room. Policies and procedures for 4K explained. All questions answered with no further questions at this time. Fall prevention and safety brochure discussed with patient. Bed alarm on. Call light in reach. Oriented to room. Noreen Muir RN 12/20/2020 6:43 AM     Explained patients right to have family, representative or physician notified of their admission. Patient has Declined for physician to be notified. Patient has Declined for family/representative to be notified.

## 2020-12-20 NOTE — CONSULTS
[facility]    [address]  12/20/2020 10:13 AM    Pt Name:    Shaggy Penn  MRN:     [id number]   [id number]  YOB: 1945  Admit Date:    12/19/2020  5:21 PM  Primary Care Physician:  Trini Lee MD        Reason for Consult:  NICOLASA    History:   The patient is a 67 y.o. female with hx of systolic CHF (EF 26-50%), CAD, Afib, COPD, chronic hypoxia resp failure, asthma and as below presented with weakness and fatigue. Found to have acute kidney injury with creatinine of 3.5 on admission, last creatinine was 1.2-1.4. Also had K of 5.7. She was hypotensive in the ED and given a fluid bolus and started on maintenance IV fluids. Creatinine this morning improved to 2.4. Patient reports diarrhea. Also on Entresto and bumex chronically. She does have leg edema but per RN who knows her from past admission this is much improved. She is on 2 L NC and last admission 6 L. Recently admitted for [facility] and acute on chronic hypoxic resp failure and was diuresed. Patient is a poor historian.     Past Medical History:  Past Medical History:   Diagnosis Date    Allergic rhinitis     Anemia     Anxiety     Arthritis     Asthma     Atrial fibrillation (HCC)     CAD (coronary artery disease)     Chronic systolic CHF (congestive heart failure) (HCC) 10/27/2019    COPD (chronic obstructive pulmonary disease) (HCC)     Frequent UTI     GERD (gastroesophageal reflux disease)     Hemorrhoids     History of blood transfusion     X8    Hx of blood clots     left arm    Hyperlipidemia     Hypertension     Influenza A 02/22/2020    Kidney stone     LONG TERM ANTICOAGULENT USE 7/6/2012    Lumbar spinal stenosis     MI, old 11    Prolonged emergence from general anesthesia        Past Surgical History:  Past Surgical History:   Procedure Laterality Date   [address] CARDIAC DEFIBRILLATOR PLACEMENT  2008    OUS IN [place]    COLONOSCOPY  10/16/2015    Dr. Marielena Storey    COLONOSCOPY N/A 10/25/2018    COLONOSCOPY POLYPECTOMY SNARE/COLD BIOPSY performed by Ludwin Mendoza MD at [facility] [place] [facility] [place] [facility]    ENDOSCOPY, COLON, DIAGNOSTIC  2017    Dr. Fawad Conde      left hip    OVARIAN CYST REMOVAL      PACEMAKER PLACEMENT      SINUS SURGERY      several    TONSILLECTOMY      TOTAL HIP ARTHROPLASTY Left 10/24/2019    DANIEL LEFT HIP ARTHROPLASTY performed by Bessy Nick MD at [address]  2013    X2    UPPER GASTROINTESTINAL ENDOSCOPY N/A 2020    EGD DIAGNOSTIC ONLY performed by Raúl Fried MD at [facility] [place] [facility] [place] [facility]       Family History:  Family History   Adopted: Yes   Problem Relation Age of Onset    Heart Disease Father          AGE 35    Cancer Sister         breast       Social History:  Social History     Socioeconomic History    Marital status:      Spouse name: Velma Hay Number of children: 3    Years of education: Not on file    Highest education level: Not on file   Occupational History    Not on file   Social Needs    Financial resource strain: Not on file    Food insecurity     Worry: Not on file     Inability: Not on file   [employer] needs     Medical: Not on file     Non-medical: Not on file   Tobacco Use    Smoking status: Former Smoker     Packs/day: 0.25     Years: 25.00     Pack years: 6.25     Types: Cigarettes     Quit date: 10/25/2020     Years since quittin.1    Smokeless tobacco: Never Used   Substance and Sexual Activity    Alcohol use:  Yes     Alcohol/week: 1.0 standard drinks     Types: 1 Cans of beer per week     Comment: ocassionaly    Drug use: No    Sexual activity: Never   Lifestyle    Physical activity     Days per week: Not on file     Minutes per session: Not on file    Stress: Not on file   Relationships  Social connections     Talks on phone: Not on file     Gets together: Not on file     Attends Buddhist service: Not on file     Active member of club or organization: Not on file     Attends meetings of clubs or organizations: Not on file     Relationship status: Not on file    Intimate partner violence     Fear of current or ex partner: Not on file     Emotionally abused: Not on file     Physically abused: Not on file     Forced sexual activity: Not on file   Other Topics Concern    Not on file   Social History Narrative    Not on file       Home Meds:  Prior to Admission medications    Medication Sig Start Date End Date Taking? Authorizing Provider   amoxicillin-clavulanate (AUGMENTIN) 875-125 MG per tablet Take 1 tablet by mouth 2 times daily   Yes Historical Provider, MD   bumetanide (BUMEX) 2 MG tablet Take 1 tablet by mouth 2 times daily 12/1/20  Yes Dick Nair MD   nicotine (NICODERM CQ) 7 MG/24HR Place 1 patch onto the skin every 24 hours 12/1/20  Yes Klaus Byrne DO   docusate sodium (COLACE, DULCOLAX) 100 MG CAPS Take 100 mg by mouth 2 times daily 12/1/20  Yes Klaus Byrne DO   senna (SENOKOT) 8.6 MG tablet Take 1 tablet by mouth 2 times daily 12/1/20 12/31/20 Yes Klaus Byrne DO   gabapentin (NEURONTIN) 100 MG capsule Take 1 capsule by mouth 3 times daily for 30 days.  12/1/20 12/31/20 Yes Klaus Byrne DO   potassium chloride (KLOR-CON M) 20 MEQ extended release tablet Take 1 tablet by mouth daily 12/1/20  Yes Klaus Byrne DO   OXYGEN Indications: Chronic Obstructive Lung Disease 3-6L continuously as prescribed 12/1/20  Yes Klaus Byrne DO   levocetirizine (XYZAL) 5 MG tablet Take 5 mg by mouth nightly   Yes Historical Provider, MD   Ascorbic Acid (VITAMIN C) 100 MG tablet Take 100 mg by mouth daily   Yes Historical Provider, MD   Cholecalciferol (VITAMIN D3) 50 MCG (2000 UT) CAPS Take 2,000 Units by mouth daily   Yes Historical Provider, MD sacubitril-valsartan (ENTRESTO) 49-51 MG per tablet Take 1 tablet by mouth 2 times daily   Yes Historical Provider, MD   pantoprazole (PROTONIX) 40 MG tablet Take 40 mg by mouth 2 times daily 30 minutes before two heaviest meals on an empty stomach   Yes Historical Provider, MD   LINZESS 290 MCG CAPS capsule Take 290 mcg by mouth daily  7/23/20  Yes Historical Provider, MD   ondansetron (ZOFRAN) 8 MG tablet daily as needed for Nausea or Vomiting  4/29/20  Yes Historical Provider, MD   hydrocortisone (ANUSOL-HC) 25 MG suppository Place 1 suppository rectally 2 times daily as needed for Hemorrhoids 2/25/20  Yes PETR Wilcox - CNP   Revefenacin 175 MCG/3ML SOLN Inhale 175 mcg into the lungs daily 10/26/19  Yes Gianfranco Oseguera MD   hydrocortisone 2.5 % cream Apply topically 2 times daily as needed    Yes Historical Provider, MD   acetaminophen (TYLENOL) 325 MG tablet Take 650 mg by mouth as needed for Pain or Fever Indications: Pain Don't take more then 3,000 mg each day   Yes Historical Provider, MD   Multiple Vitamins-Minerals (MULTIVITAMIN ADULT) CHEW Take 2 tablets by mouth daily   Yes Historical Provider, MD   Menthol-Methyl Salicylate (ICY HOT) 99-99 % STCK Apply topically daily   Yes Historical Provider, MD   Alum Hydroxide-Mag Carbonate (GAVISCON PO) Take by mouth as needed Indications: Acid Indigestion    Yes Historical Provider, MD   lidocaine (XYLOCAINE) 5 % ointment Apply topically Before venipuncture in Dr. Clementina Hurst office.  6/5/19  Yes Historical Provider, MD   B Complex-C (RA B-COMPLEX/VITAMIN C CR PO) Take 1 tablet by mouth daily Indications: Treatment to Prevent Vitamin Deficiency    Yes Historical Provider, MD   dicyclomine (BENTYL) 10 MG capsule Take 10 mg by mouth 4 times daily (before meals and nightly) Indications: Pain in the Abdominal Region   Yes Historical Provider, MD   Coenzyme Q10 (COQ-10) 200 MG CAPS Take by mouth daily    Yes Historical Provider, MD Probiotic Product (SUPER PROBIOTIC) CAPS   Take 1 tablet by mouth daily Indications: Digestive Complaint  3/2/15  Yes Historical Provider, MD   budesonide (PULMICORT) 0.5 MG/2ML nebulizer suspension   Take 1 ampule by nebulization 2 times daily Indications: Shortness of Breath (Inactive)    Yes Historical Provider, MD   Arformoterol Tartrate (BROVANA) 15 MCG/2ML NEBU   Take 1 ampule by nebulization 2 times daily Indications: Shortness of Breath (Inactive)    Yes Historical Provider, MD   triamcinolone (KENALOG) 0.1 % cream Apply topically 2 times daily as needed Anti-fungal   Yes Historical Provider, MD   clotrimazole-betamethasone (LOTRISONE) cream Apply topically 2 times daily as needed Indications: Yeast Infection (inactive)    Yes Historical Provider, MD   aspirin 81 MG EC tablet Take 81 mg by mouth daily. With food  Indications: Anticoagulant Therapy   Yes Historical Provider, MD   folic acid (FOLVITE) 1 MG tablet Take 1 mg by mouth daily. Indications: Folic Acid Supplementation   Yes Historical Provider, MD   traZODone (DESYREL) 50 MG tablet Take 50 mg by mouth nightly    Yes Historical Provider, MD   Omalizumatiana Randene Rocco SC) Inject into the skin At Dr Layla Valenzuela office    Historical Provider, MD   metoprolol succinate (TOPROL XL) 25 MG extended release tablet Take 25 mg by mouth daily    Historical Provider, MD   loperamide (IMODIUM) 2 MG capsule Take 2 mg by mouth 4 times daily as needed for Diarrhea    Historical Provider, MD   traMADol (ULTRAM) 50 MG tablet Take 50 mg by mouth every 6 hours as needed for Pain.      Historical Provider, MD   nystatin (MYCOSTATIN) 417478 UNIT/GM cream as needed  4/13/20   Historical Provider, MD   warfarin (COUMADIN) 1 MG tablet Take as directed by the [facility], 145 tablets for 90 days 12/26/19 12/30/20  Patrick Denton MD digoxin (LANOXIN) 125 MCG tablet Take 1 tablet by mouth every other day Indications: Increased Heart Rate Until Office Visit with Dr. Mony Valdivia 11/28/19   Christianne Abdalla MD   hydrOXYzine (ATARAX) 25 MG tablet Take 25 mg by mouth 3 times daily as needed Indications: Feeling Anxious     Historical Provider, MD   Pramoxine HCl (PROCTOFOAM RE) Place rectally daily as needed     Historical Provider, MD   EPINEPHrine (EPIPEN) 0.3 MG/0.3ML SOAJ injection INJECT AS DIRECTED FOR ANAPHYLAXIS 3/19/18   Historical Provider, MD   Evolocumab (REPATHA SURECLICK SC) Inject 257 mg into the skin every 14 days Indications: Blood Cholesterol Abnormal     Historical Provider, MD   NITROGLYCERIN RE Place 0.3 mg rectally as needed Indications: Hemorrhoids     Historical Provider, MD   ofloxacin (FLOXIN) 0.3 % otic solution Place 2 drops into both ears daily as needed Indications: Infection Long-term therapy. 12/22/14   Historical Provider, MD   Carboxymethylcellul-Glycerin (REFRESH OPTIVE OP) Apply  to eye as needed. Indications: Dry Eyes caused by Deficiency of Tears    Historical Provider, MD   levalbuterol (XOPENEX) 0.63 MG/3ML nebulization Take 1 ampule by nebulization every 8 hours as needed for Wheezing. Historical Provider, MD   polyethylene glycol (MIRALAX) powder Take 17 g by mouth as needed. Indications: Constipation    Historical Provider, MD   Alcaftadine (LASTACAFT) 0.25 % SOLN Apply 1 drop to eye daily as needed Indications: Eye Allergy Both eyes    Historical Provider, MD   azelastine (ASTELIN) 137 MCG/SPRAY nasal spray 1 spray by Nasal route 2 times daily as needed Indications: Allergic Rhinitis Use in each nostril as directed    Historical Provider, MD   nitroGLYCERIN (NITROSTAT) 0.4 MG SL tablet Place 0.4 mg under the tongue every 5 minutes as needed. If third one does not relieve pain, call 9-1-1.   Indications: Chest Pain    Historical Provider, MD       Review of Systems: Constitutional: no fever or chills +dizziness, poor po intake  Head: No headaches  Eyes: no blurry vision, no discharge  Ears: no ear pain or hearing changes  Nose: no runny nose or epistaxis  Respiratory: no shortness of breath no cough  Cardiovascular: no chest pain, + edema  GI: no nausea, no vomiting + diarrhea  : denies any hematuria, no flank pain  Skin: no rash  Musculoskeletal: no joint pain, moves all ext  Neuro: no tremor, no slurred speech  Psychiatric: stable mood, no depression or insomnia    Current Meds:  Infusion:    sodium chloride 50 mL/hr at 12/20/20 4119     Meds:    Arformoterol Tartrate  15 mcg Nebulization BID    aspirin  81 mg Oral Daily    budesonide  500 mcg Nebulization BID    [Held by provider] bumetanide  2 mg Oral BID    dicyclomine  10 mg Oral 4x Daily AC & HS    digoxin  125 mcg Oral Every Other Day    gabapentin  100 mg Oral TID    metoprolol succinate  25 mg Oral Daily    nicotine  1 patch Transdermal Q24H    pantoprazole  40 mg Oral BID    tiotropium  2 puff Inhalation Daily    [Held by provider] sacubitril-valsartan  1 tablet Oral BID    traZODone  50 mg Oral Nightly    sodium chloride flush  10 mL Intravenous 2 times per day    cetirizine  5 mg Oral Daily    warfarin (COUMADIN) daily dosing (placeholder)   Other RX Placeholder     Meds prn: hydrOXYzine, levalbuterol, sodium chloride flush, promethazine **OR** ondansetron, polyethylene glycol, acetaminophen **OR** acetaminophen     Allergies/Intolerances:   ALLERGIES: Benadryl [diphenhydramine hcl], Ciprofloxacin, Clarithromycin, Vitamin k, Atorvastatin, Captopril, Codeine, Iv dye [iodides], Lipitor, Macrobid [nitrofurantoin monohydrate macrocrystals], Neomycin-bacitracin zn-polymyx, Pravastatin, Zetia [ezetimibe], Adhesive tape, Cephalexin, Doxycycline, Morphine, Other, Propoxyphene, and Sulfa antibiotics    24HR INTAKE/OUTPUT:      Intake/Output Summary (Last 24 hours) at 12/20/2020 1013 Last data filed at 12/20/2020 0308  Gross per 24 hour   Intake 1000 ml   Output 200 ml   Net 800 ml     I/O last 3 completed shifts: In: 1000 [I.V.:1000]  Out: 200 [Urine:200]  No intake/output data recorded. Admission weight: 135 lb (61.2 kg)  Wt Readings from Last 3 Encounters:   12/20/20 141 lb (64 kg)   12/01/20 138 lb 9.6 oz (62.9 kg)   11/12/20 167 lb 4.8 oz (75.9 kg)     Body mass index is 26.64 kg/m². Physical Examination:  VITALS:  BP (!) 96/37   Pulse 72   Temp 98.2 °F (36.8 °C) (Oral)   Resp 20   Ht 5' 1\" (1.549 m)   Wt 141 lb (64 kg)   SpO2 93%   BMI 26.64 kg/m²   Weight:   Wt Readings from Last 3 Encounters:   12/20/20 141 lb (64 kg)   12/01/20 138 lb 9.6 oz (62.9 kg)   11/12/20 167 lb 4.8 oz (75.9 kg)     Constitutional and General Appearance: awake, alert but poor historian  Eyes: no icteric sclera, no pallor conjunctiva, no discharge seen from either eye  Ears and Nose: normal external appearance of left and right ear and nose. No active drainage from nose. Oral: moist oral mucus membranes  Neck: No jugular venous distention, appears symmetric, good ROM  Lungs: diminished but clear  Heart: irregular  Extremities: 2+ edema right leg, 1 + on left  GI: soft, non-tender,   Skin: no rash seen on exposed extremities  Musculo: moves all extremities  Psychiatric: Awake, alert,     Lab Data  CBC:   Recent Labs     12/19/20  1751 12/20/20  0441   WBC 6.6 5.1   HGB 10.4* 9.3*   HCT 34.3* 30.0*    160     BMP:  Recent Labs     12/19/20  1751 12/20/20  0441    141   K 5.7* 5.0    110   CO2 21* 21*   BUN 59* 58*   CREATININE 3.5* 2.4*   GLUCOSE 89 82   CALCIUM 9.1 8.7     PTH: @PTH@  TSH: No results for input(s): TSH in the last 72 hours. HgBa1c: No results for input(s): LABA1C in the last 72 hours.   Hepatic:   Recent Labs     12/19/20  1751   LABALBU 3.2*   AST 13   ALT 7*   BILITOT 0.2*   ALKPHOS 66     ABGs: No results found for: PHART, PO2ART, BIM2ZLU

## 2020-12-20 NOTE — H&P
Hospitalist - History & Physical      Patient: Yoana Shen    Unit/Bed:14/014A  YOB: 1945  MRN: [id number]   Acct: [de-identified]   PCP: Leonila Wood MD      Assessment and Plan:        1. Acute hypoxic respiratory failure: O2 titration therapy  2. NICOLASA: Creatinine 3.5, baseline is 1.0-1.4 --> most likely hydrational, gentle fluid resuscitation, recheck labs,monitor fluid output  3. Hyperkalemia: related to NICOLASA, recheck in am  4. Atrial fibrillation: anticoagulated on coumadin, rate controlled v-paced rhythm  5. Chronic systolic heart failure: last ECHO 2/2020 EF 30-35%, watch fluid balance closely, Entresto held today for NICOLASA, restart as soon at possible    CC:  fatigue  HPI: Patient presents to the ED with a complaint of weakness and fatigue. Earlier today she developed some acute confusion and difficulty finding her words which was concerning for her. Those symptoms resolved. The patient has been having a hard time managing at home. The patient was recently discharged after an episode of necrotizing fasciitis still on oral abx. Patient was hypoxic in the ED, had NICOLASA and hyperkalemia. ROS: Review of Systems   Constitutional: Positive for fatigue. Negative for fever. HENT: Negative. Eyes: Negative. Respiratory: Negative. Cardiovascular: Negative. Gastrointestinal: Negative. Endocrine: Negative. Genitourinary: Negative. Musculoskeletal: Negative. Skin: Negative. Allergic/Immunologic: Negative. Neurological: Positive for speech difficulty and weakness. Hematological: Negative. Psychiatric/Behavioral: Positive for confusion.      PMH:    Past Medical History:   Diagnosis Date    Allergic rhinitis     Anemia     Anxiety     Arthritis     Asthma     Atrial fibrillation ([facility] [address]     CAD (coronary artery disease)     Chronic systolic CHF (congestive heart failure) ([facility] [address] 10/27/2019    [facility]  Frequent UTI     GERD (gastroesophageal reflux disease)     Hemorrhoids     History of blood transfusion     X8    Hx of blood clots     left arm    Hyperlipidemia     Hypertension     Influenza A 02/22/2020    Kidney stone     LONG TERM ANTICOAGULENT USE 7/6/2012    Lumbar spinal stenosis     MI, old 11    Prolonged emergence from general anesthesia      SHX:    Social History     Socioeconomic History    Marital status:      Spouse name: Madison Gaytan Number of children: 3    Years of education: Not on file    Highest education level: Not on file   Occupational History    Not on file   Social Needs    Financial resource strain: Not on file    Food insecurity     Worry: Not on file     Inability: Not on file   [employer] needs     Medical: Not on file     Non-medical: Not on file   Tobacco Use    Smoking status: Current Every Day Smoker     Packs/day: 0.25     Years: 25.00     Pack years: 6.25     Types: Cigarettes    Smokeless tobacco: Never Used   Substance and Sexual Activity    Alcohol use: Yes     Alcohol/week: 1.0 standard drinks     Types: 1 Cans of beer per week     Comment: ocassionaly    Drug use: No    Sexual activity: Never   Lifestyle    Physical activity     Days per week: Not on file     Minutes per session: Not on file    Stress: Not on file   Relationships    Social connections     Talks on phone: Not on file     Gets together: Not on file     Attends Gnosticism service: Not on file     Active member of club or organization: Not on file     Attends meetings of clubs or organizations: Not on file     Relationship status: Not on file    Intimate partner violence     Fear of current or ex partner: Not on file     Emotionally abused: Not on file     Physically abused: Not on file     Forced sexual activity: Not on file   Other Topics Concern    Not on file   Social History Narrative    Not on file     FHX:   Family History   Adopted:  Yes Segs Absolute 4.8 1.8 - 7.7 thou/mm3    Lymphocytes Absolute 1.0 1.0 - 4.8 thou/mm3    Monocytes Absolute 0.6 0.4 - 1.3 thou/mm3    Eosinophils Absolute 0.2 0.0 - 0.4 thou/mm3    Basophils Absolute 0.0 0.0 - 0.1 thou/mm3    Immature Grans (Abs) 0.02 0.00 - 0.07 thou/mm3    nRBC 0 /100 wbc   Comprehensive Metabolic Panel w/ Reflex to MG    Collection Time: 12/19/20  5:51 PM   Result Value Ref Range    Glucose 89 70 - 108 mg/dL    CREATININE 3.5 (HH) 0.4 - 1.2 mg/dL    BUN 59 (H) 7 - 22 mg/dL    Sodium 137 135 - 145 meq/L    Potassium reflex Magnesium 5.7 (H) 3.5 - 5.2 meq/L    Chloride 103 98 - 111 meq/L    CO2 21 (L) 23 - 33 meq/L    Calcium 9.1 8.5 - 10.5 mg/dL    AST 13 5 - 40 U/L    Alkaline Phosphatase 66 38 - 126 U/L    Total Protein 6.2 6.1 - 8.0 g/dL    Alb 3.2 (L) 3.5 - 5.1 g/dL    Total Bilirubin 0.2 (L) 0.3 - 1.2 mg/dL    ALT 7 (L) 11 - 66 U/L   Troponin    Collection Time: 12/19/20  5:51 PM   Result Value Ref Range    Troponin T 0.094 (A) ng/ml   Brain Natriuretic Peptide    Collection Time: 12/19/20  5:51 PM   Result Value Ref Range    Pro-BNP 5895.0 (H) 0.0 - 1800.0 pg/mL   Protime-INR    Collection Time: 12/19/20  5:51 PM   Result Value Ref Range    INR 1.74 (H) 0.85 - 1.13   APTT    Collection Time: 12/19/20  5:51 PM   Result Value Ref Range    aPTT 29.8 22.0 - 38.0 seconds   Anion Gap    Collection Time: 12/19/20  5:51 PM   Result Value Ref Range    Anion Gap 13.0 8.0 - 16.0 meq/L   Osmolality    Collection Time: 12/19/20  5:51 PM   Result Value Ref Range    Osmolality Calc 289.8 275.0 - 300.0 mOsmol/kg   Glomerular Filtration Rate, Estimated    Collection Time: 12/19/20  5:51 PM   Result Value Ref Range    Est, Glom Filt Rate 13 (A) ml/min/1.73m2   EKG 12 Lead    Collection Time: 12/19/20  6:01 PM   Result Value Ref Range    Ventricular Rate 72 BPM    Atrial Rate 250 BPM    QRS Duration 156 ms    Q-T Interval 390 ms    QTc Calculation (Bazett) 427 ms    R Axis -44 degrees    T Axis 146 degrees Data: (All radiographs, tracings, PFTs, and imaging are personally viewed and interpreted unless otherwise noted).    ? EKG: rhythm: normal pacemaker rhythm, rate=72 bpm, pr=. ms, iqm=967 ms, ew=451 ms, axis=-44 degrees        Electronically signed by  Maria R Abreu PA-C

## 2020-12-20 NOTE — PROGRESS NOTES
Clinical Pharmacy Note    Alma Long is a 67 y.o. female for whom pharmacy has been asked to manage warfarin therapy. Reason for Admission: NICOLASA    Consulting Physician: Xenia Rabago/Dr. Esther Villagomez  Warfarin dose prior to admission: 1 mg every Mon, Fri; 1.5 mg all other days  Warfarin indication: afib  Target INR range: 2-3   Outpatient warfarin provider: [facility]    Past Medical History:   Diagnosis Date    Allergic rhinitis     Anemia     Anxiety     Arthritis     Asthma     Atrial fibrillation (HCC)     CAD (coronary artery disease)     Chronic systolic CHF (congestive heart failure) ([facility] [address] 10/27/2019    COPD (chronic obstructive pulmonary disease) (HCC)     Frequent UTI     GERD (gastroesophageal reflux disease)     Hemorrhoids     History of blood transfusion     X8    Hx of blood clots     left arm    Hyperlipidemia     Hypertension     Influenza A 02/22/2020    Kidney stone     LONG TERM ANTICOAGULENT USE 7/6/2012    Lumbar spinal stenosis     MI, old 1994    Prolonged emergence from general anesthesia                 Recent Labs     12/20/20  0441   INR 1.62*     Recent Labs     12/19/20  1751 12/20/20  0441   HGB 10.4* 9.3*   HCT 34.3* 30.0*    160       Current warfarin drug-drug interactions: aspirin, trazodone       Date INR Warfarin Dose   12/20/20 1.62 1.5 mg                                   Daily PT/INR until stable within therapeutic range. Thank you for the consult.      John De La Rosa PharmD, BCPS   12/20/2020  10:05 AM

## 2020-12-21 NOTE — PROGRESS NOTES
Ambulation Assistance: Independent  Transfer Assistance: Independent    Active : Yes     Additional Comments: reports using RW for household amb PTA    Cognition/Orientation:     Overall Cognitive Status: Exceptions(very confused, poor problem solving & safety awareness)    ADL's:  LE Dressing: Dependent/Total(for adjusting slipper socks)       Functional Mobility:  Bed mobility  Supine to Sit: Moderate assistance  Sit to Supine: Moderate assistance     **Pt c/o pain in tailbone during session, therapist let nurse know & positioned Pt on her side at the end of the session    Functional Mobility  Functional - Mobility Device: Rolling Walker  Activity: Other(3 side steps toward [facility])  Assist Level: Minimal assistance  Functional Mobility Comments: difficulty following directions     Balance:  Balance  Sitting Balance: Stand by assistance  Standing Balance: Contact guard assistance    Transfers:  Sit to stand: Minimal assistance  Stand to sit: Contact guard assistance       Upper Extremity Assessment:   LUE AROM : WFL  RUE AROM : WFL    LUE Strength  Gross LUE Strength: (4-/5 grossly)  RUE Strength  Gross RUE Strength: (4-/5 grossly)    Sensation  Overall Sensation Status: WFL(bUE)       Activity Tolerance: Patient limited by fatigue, Treatment limited secondary to decreased cognition       Assessment:  Assessment: Pt demo decreased ADL & functional mobility status over PLOF d/t decreased balance, endurance, & safety awareness. Continued OT recommended to educate Pt on safety & adaptative strategies for returning home. Performance deficits / Impairments: Decreased functional mobility , Decreased ADL status, Decreased endurance, Decreased balance, Decreased safe awareness, Decreased cognition  Prognosis: Fair  REQUIRES OT FOLLOW UP: Yes  Decision Making: Medium Complexity  Safety Devices in place: Yes  Type of devices:  All fall risk precautions in place, Call light within reach, Gait belt Treatment Initiated: Treatment and education initiated within context of evaluation. Evaluation time included review of current medical information, gathering information related to past medical, social and functional history, completion of standardized testing, formal and informal observation of tasks, assessment of data and development of plan of care and goals. Treatment time included skilled education and facilitation of tasks to increase safety and independence with ADL's for improved functional independence and quality of life. Discharge Recommendations:  Continue to assess pending progress    Patient Education:  OT Education: OT Role, Plan of Care, ADL Adaptive Strategies, Transfer Training  Barriers to Learning: decreased cognition    Equipment Recommendations: Other: Monitor pending progress    Plan:  Times per week: 3-5x  Current Treatment Recommendations: Balance Training, Functional Mobility Training, Endurance Training, Safety Education & Training, Self-Care / ADL, Cognitive Reorientation    Goals:  Patient goals : pt did not state  Short term goals  Time Frame for Short term goals: until discharge  Short term goal 1: Complete various t/fs including toilet with CGA & 0-2 vcs for safety  Short term goal 2: Complete mobility to/from bathroom with RW, CGA, & 0-2 vcs for safety  Short term goal 3: Complete BADL routine with min A & min vcs for safety & sequencing  Short term goal 4: Complete 2-3 min standing with CGA for increased ease of toileting routine  Long term goals  Time Frame for Long term goals : No LTG set d/t short ELOS  See long-term goal time frame for expected duration of plan of care. If no long-term goals established, a short length of stay is anticipated. Following session, patient left in safe position with all fall risk precautions in place.

## 2020-12-21 NOTE — PROGRESS NOTES
[facility] PHYSICAL THERAPY  EVALUATION  [facility] ICU STEPDOWN TELEMETRY 4K - 4K-26/026-A    Time In: 0803  Time Out: 08  Timed Code Treatment Minutes: 18 Minutes  Minutes: 28          Date: 2020  Patient Name: Florin Westbrook,  Gender:  female        MRN: [id number]  : 1945  (67 y.o.)      Referring Practitioner: Marjorie Blandon MD  Diagnosis: NICOLASA (acute kidney injury)  Additional Pertinent Hx: Per H&P, \"Sharona Bhardwaj is a 67 y.o. female with a prior hx of A-fib, anemia and recent prolonged hospitalization who presents with generalized acute on chronic confusion since the onset this past week since her discharge for sepsis and leg infection. She is currently taking augmentin for her infection. She has been acting not herself with some trouble with speech without any unilateral weakness or facial droop. She appears confused on presentation. Her family states she saw her cards Dr. Aniyah Brothers recently and had a good checkup, but alerted us that she doesn't always take her medications, which include digoxin. The duration has been constant since the onset. The generalized confusion may be associated with a CVA or other infection, such as UTI. Pt has not been able to walk since her hospitalization. No aggravating or alleviating factors. \"     Restrictions/Precautions:  Restrictions/Precautions: Contact Precautions, General Precautions, Fall Risk  Position Activity Restriction  Other position/activity restrictions: on 2 L O2-    Subjective:  Chart Reviewed: Yes  Patient assessed for rehabilitation services?: Yes  Family / Caregiver Present: No  Subjective: RN approved PT evaluation. UPon entry, pt supine in bed, pleasant and agreeable to participate in therapy. Pt confused, repeating answers to questions. Post session, pt reclined in bedside chair, with chair alarm on and all needs within reach.     General:  Overall Orientation Status: Impaired Orientation Level: Oriented to person, Oriented to place, Disoriented to time, Disoriented to situation  Follows Commands: Within Functional Limits    Vision: Impaired  Vision Exceptions: Wears glasses at all times    Hearing: Exceptions to [facility]  Hearing Exceptions: Hard of hearing/hearing concerns         Pain: reports pain in L leg and R foot, also reports sciatic pain, no rating given. Pt wincing throughout session. Social/Functional History:    Lives With: Spouse  Type of Home: House  Home Layout: One level  Home Access: Stairs to enter with rails  Entrance Stairs - Number of Steps: 4  Entrance Stairs - Rails: Both  Home Equipment: Rolling walker                   Ambulation Assistance: Independent  Transfer Assistance: Independent    Active : Yes     Additional Comments: reports using RW for household amb PTA    OBJECTIVE:  Range of Motion:  Bilateral Lower Extremity: WFL    Strength:  Bilateral Lower Extremity: grossly 4-/5, refused this PT to touch L leg to assess knee strength    Balance:  Static Standing Balance: Contact Guard Assistance  Dynamic Standing Balance: Contact Guard Assistance, with RW    Bed Mobility:  Supine to Sit: Minimal Assistance, with verbal cues , with increased time for completion    Transfers:  Sit to Stand: Minimal Assistance, with increased time for completion, to/from chair with arms  Stand to Sit:Contact [employer], cues for hand placement, to/from chair with arms    Ambulation:  Contact Guard Assistance, with verbal cues , with increased time for completion  Distance: 5ft  Surface: Level Tile  Device:Rolling Walker  Gait Deviations: Forward Flexed Posture, Slow Cassie, Decreased Step Length Bilaterally, Decreased Weight Shift Left, Decreased Gait Speed, Decreased Heel Strike Bilaterally, Mild Path Deviations, Unsteady Gait and reports decreased standing tolerance due to pain in B LE.     Exercise: Patient was guided in 1 set(s) 10 reps of exercise to both lower extremities. Seated marches, Seated heel/toe raises and Long arc quads. Exercises were completed for increased independence with functional mobility. Functional Outcome Measures: Completed  AM-PAC Inpatient Mobility Raw Score : 16  AM-PAC Inpatient T-Scale Score : 40.78    ASSESSMENT:  Activity Tolerance:  Patient tolerance of  treatment: good. Pt slightly confused and limited by pain      Treatment Initiated: Treatment and education initiated within context of evaluation. Evaluation time included review of current medical information, gathering information related to past medical, social and functional history, completion of standardized testing, formal and informal observation of tasks, assessment of data and development of plan of care and goals. Treatment time included skilled education and facilitation of tasks to increase safety and independence with functional mobility for improved independence and quality of life. Pt education provided regarding the importance of mobility during admission. Pt encouraged to ambulate 3x/day with staff and sit in bedside chair for all meals. Assessment: Body structures, Functions, Activity limitations: Decreased functional mobility , Decreased strength, Decreased cognition, Increased pain  Assessment: Pt presents with NICOLASA. Demonstrates a decrease from baseline functional mobility of bed mobility, transfers, and ambulation. Pt experiences increased pain in B LE and is slighlty confused. Pt to benefit from skilled PT services to promote increased indep with functional mobiltiy activities for return to PLOF.   Prognosis: Good    REQUIRES PT FOLLOW UP: Yes    Discharge Recommendations:  Discharge Recommendations: Continue to assess pending progress    Patient Education:  PT Education: Goals, PT Role, Plan of Care, Home Exercise Program, Transfer Training, Gait Training, Functional Mobility Training Equipment Recommendations:  Equipment Needed: No    Plan:  Times per week: 5xGM  Current Treatment Recommendations: Strengthening, Balance Training, Functional Mobility Training, Transfer Training, Stair training, Gait Training, Home Exercise Program, Safety Education & Training, Patient/Caregiver Education & Training, Endurance Training, ROM    Goals:  Patient goals : not stated  Short term goals  Time Frame for Short term goals: by discharge  Short term goal 1: Pt to perform supine<>sit with S to promote ease of getting in and out of bed. Short term goal 2: Pt to perform sit<>stand with S to promote ease of transfers. Short term goal 3: Pt to ambulate >50ft with RW at S to improve ability to navigate within the home. Short term goal 4: Pt to negotiate 4 steps with B HR at SBA to improve ability to enter home. Long term goals  Time Frame for Long term goals : n/a due to short ELOS    Following session, patient left in safe position with all fall risk precautions in place.

## 2020-12-21 NOTE — CARE COORDINATION
DISASTER CHARTING    12/21/20, 7:55 AM EST    DISCHARGE ONGOING EVALUATION:     Earl Velazquez day: 2  Location: [facility]26/026-A Reason for admit: NICOLASA (acute kidney injury) ([facility] [address] [N17.9]   Barriers to Discharge: nephrology consulted, NICOLASA creat. 2.4, low blood pressures, on Midodrine, PT/OT, cellulitis on IV Zyvox and Rocephin. PCP: Anne-Marie Martinez MD  Patient Goals/Plan/Treatment Preferences: Met with Amalia Sauceda; she resides home with her spouse, he provides transportation, she uses rolling walker, has home oxygen thru [employer] at 2L/min (BL) per patient. Daughter reports baseline of 3L of O2 at rest and 6L with activity. She is current with -; called Denise to inform of IP.

## 2020-12-21 NOTE — PROGRESS NOTES
Renal Progress Note  [facility]    Patient :  Rahul Dee; 67 y.o. MRN# [id number]  Location:  3P-96/042-W  Attending:  Gary Jay MD  Admit Date:  12/19/2020   Hospital Day: 2      Subjective:     Nephrology is following the patient for NICOLASA. Patient was seen earlier this morning. No further hypotension. Feeling better. Outpatient Medications:     Medications Prior to Admission: amoxicillin-clavulanate (AUGMENTIN) 875-125 MG per tablet, Take 1 tablet by mouth 2 times daily  bumetanide (BUMEX) 2 MG tablet, Take 1 tablet by mouth 2 times daily  nicotine (NICODERM CQ) 7 MG/24HR, Place 1 patch onto the skin every 24 hours  docusate sodium (COLACE, DULCOLAX) 100 MG CAPS, Take 100 mg by mouth 2 times daily  senna (SENOKOT) 8.6 MG tablet, Take 1 tablet by mouth 2 times daily  gabapentin (NEURONTIN) 100 MG capsule, Take 1 capsule by mouth 3 times daily for 30 days.   potassium chloride (KLOR-CON M) 20 MEQ extended release tablet, Take 1 tablet by mouth daily  OXYGEN, Indications: Chronic Obstructive Lung Disease 3-6L continuously as prescribed  levocetirizine (XYZAL) 5 MG tablet, Take 5 mg by mouth nightly  Ascorbic Acid (VITAMIN C) 100 MG tablet, Take 100 mg by mouth daily  Cholecalciferol (VITAMIN D3) 50 MCG (2000 UT) CAPS, Take 2,000 Units by mouth daily  sacubitril-valsartan (ENTRESTO) 49-51 MG per tablet, Take 1 tablet by mouth 2 times daily  pantoprazole (PROTONIX) 40 MG tablet, Take 40 mg by mouth 2 times daily 30 minutes before two heaviest meals on an empty stomach  LINZESS 290 MCG CAPS capsule, Take 290 mcg by mouth daily   ondansetron (ZOFRAN) 8 MG tablet, daily as needed for Nausea or Vomiting   hydrocortisone (ANUSOL-HC) 25 MG suppository, Place 1 suppository rectally 2 times daily as needed for Hemorrhoids  Revefenacin 175 MCG/3ML SOLN, Inhale 175 mcg into the lungs daily  hydrocortisone 2.5 % cream, Apply topically 2 times daily as needed acetaminophen (TYLENOL) 325 MG tablet, Take 650 mg by mouth as needed for Pain or Fever Indications: Pain Don't take more then 3,000 mg each day  Multiple Vitamins-Minerals (MULTIVITAMIN ADULT) CHEW, Take 2 tablets by mouth daily  Menthol-Methyl Salicylate (ICY HOT) 91-68 % STCK, Apply topically daily  Alum Hydroxide-Mag Carbonate (GAVISCON PO), Take by mouth as needed Indications: Acid Indigestion   lidocaine (XYLOCAINE) 5 % ointment, Apply topically Before venipuncture in Dr. Sonal Reyez office. B Complex-C (RA B-COMPLEX/VITAMIN C CR PO), Take 1 tablet by mouth daily Indications: Treatment to Prevent Vitamin Deficiency   dicyclomine (BENTYL) 10 MG capsule, Take 10 mg by mouth 4 times daily (before meals and nightly) Indications: Pain in the Abdominal Region  Coenzyme Q10 (COQ-10) 200 MG CAPS, Take by mouth daily   Probiotic Product (SUPER PROBIOTIC) CAPS,  Take 1 tablet by mouth daily Indications: Digestive Complaint   budesonide (PULMICORT) 0.5 MG/2ML nebulizer suspension,  Take 1 ampule by nebulization 2 times daily Indications: Shortness of Breath (Inactive)   Arformoterol Tartrate (BROVANA) 15 MCG/2ML NEBU,  Take 1 ampule by nebulization 2 times daily Indications: Shortness of Breath (Inactive)   triamcinolone (KENALOG) 0.1 % cream, Apply topically 2 times daily as needed Anti-fungal  clotrimazole-betamethasone (LOTRISONE) cream, Apply topically 2 times daily as needed Indications: Yeast Infection (inactive)   aspirin 81 MG EC tablet, Take 81 mg by mouth daily. With food  Indications: Anticoagulant Therapy  folic acid (FOLVITE) 1 MG tablet, Take 1 mg by mouth daily.     Indications: Folic Acid Supplementation  traZODone (DESYREL) 50 MG tablet, Take 50 mg by mouth nightly   Omalizumab (XOLAIR SC), Inject into the skin At Dr Sangeeta Peña office  metoprolol succinate (TOPROL XL) 25 MG extended release tablet, Take 25 mg by mouth daily loperamide (IMODIUM) 2 MG capsule, Take 2 mg by mouth 4 times daily as needed for Diarrhea  traMADol (ULTRAM) 50 MG tablet, Take 50 mg by mouth every 6 hours as needed for Pain. nystatin (MYCOSTATIN) 980738 UNIT/GM cream, as needed   warfarin (COUMADIN) 1 MG tablet, Take as directed by the [facility], 145 tablets for 90 days  digoxin (LANOXIN) 125 MCG tablet, Take 1 tablet by mouth every other day Indications: Increased Heart Rate Until Office Visit with Dr. Mone Martin  hydrOXYzine (ATARAX) 25 MG tablet, Take 25 mg by mouth 3 times daily as needed Indications: Feeling Anxious   Pramoxine HCl (PROCTOFOAM RE), Place rectally daily as needed   EPINEPHrine (EPIPEN) 0.3 MG/0.3ML SOAJ injection, INJECT AS DIRECTED FOR ANAPHYLAXIS  Evolocumab (REPATHA SURECLICK SC), Inject 718 mg into the skin every 14 days Indications: Blood Cholesterol Abnormal   NITROGLYCERIN RE, Place 0.3 mg rectally as needed Indications: Hemorrhoids   ofloxacin (FLOXIN) 0.3 % otic solution, Place 2 drops into both ears daily as needed Indications: Infection Long-term therapy. Carboxymethylcellul-Glycerin (REFRESH OPTIVE OP), Apply  to eye as needed. Indications: Dry Eyes caused by Deficiency of Tears  levalbuterol (XOPENEX) 0.63 MG/3ML nebulization, Take 1 ampule by nebulization every 8 hours as needed for Wheezing. polyethylene glycol (MIRALAX) powder, Take 17 g by mouth as needed. Indications: Constipation  Alcaftadine (LASTACAFT) 0.25 % SOLN, Apply 1 drop to eye daily as needed Indications: Eye Allergy Both eyes  azelastine (ASTELIN) 137 MCG/SPRAY nasal spray, 1 spray by Nasal route 2 times daily as needed Indications: Allergic Rhinitis Use in each nostril as directed  nitroGLYCERIN (NITROSTAT) 0.4 MG SL tablet, Place 0.4 mg under the tongue every 5 minutes as needed. If third one does not relieve pain, call 9-1-1.   Indications: Chest Pain    Current Medications:     Scheduled Meds:    Arformoterol Tartrate  15 mcg Nebulization BID  aspirin  81 mg Oral Daily    budesonide  500 mcg Nebulization BID    [Held by provider] bumetanide  2 mg Oral BID    dicyclomine  10 mg Oral 4x Daily AC & HS    digoxin  125 mcg Oral Every Other Day    gabapentin  100 mg Oral TID    metoprolol succinate  25 mg Oral Daily    nicotine  1 patch Transdermal Q24H    pantoprazole  40 mg Oral BID    tiotropium  2 puff Inhalation Daily    [Held by provider] sacubitril-valsartan  1 tablet Oral BID    traZODone  50 mg Oral Nightly    sodium chloride flush  10 mL Intravenous 2 times per day    cetirizine  5 mg Oral Daily    warfarin (COUMADIN) daily dosing (placeholder)   Other RX Placeholder    sodium chloride  500 mL Intravenous Once    cefTRIAXone (ROCEPHIN) IV  1 g Intravenous Q24H    linezolid  600 mg Intravenous Q12H     Continuous Infusions:   PRN Meds:  hydrOXYzine, levalbuterol, sodium chloride flush, promethazine **OR** ondansetron, polyethylene glycol, acetaminophen **OR** acetaminophen, midodrine    Input/Output:       I/O last 3 completed shifts: In: 3190.7 [P.O.:320; I.V.:2870.7]  Out: 525 [Urine:525]. Patient Vitals for the past 96 hrs (Last 3 readings):   Weight   20 0420 144 lb 11.2 oz (65.6 kg)   20 0017 141 lb (64 kg)   20 1735 135 lb (61.2 kg)       Vital Signs:   Temperature:  Temp: 98.4 °F (36.9 °C)  TMax:   Temp (24hrs), Av °F (36.7 °C), Min:97.5 °F (36.4 °C), Max:98.4 °F (36.9 °C)    Respirations:  Resp: 18  Pulse:   Pulse: 76  BP:    BP: 125/60  BP Range: Systolic (20IIH), KSK:638 , Min:76 , XLN:836       Diastolic (47ZMJ), ABS:13, Min:42, Max:85      Physical Examination:     General:  Awake, alert  HEENT: NC/AT/ MMM  Chest:               diminished  Cardiac:  irregular  Abdomen: Soft, non-tender,  Neuro:  No facial droop, No Asterixis  SKIN:  No rashes, good skin turgor.   Extremities:  2+ LE edema on right 1+ on left, +tenderness    Labs:       Recent Labs     20  1751 20  0441 WBC 6.6 5.1   RBC 3.54* 3.08*   HGB 10.4* 9.3*   HCT 34.3* 30.0*   MCV 96.9 97.4   MCH 29.4 30.2   MCHC 30.3* 31.0*    160   MPV 11.0 11.2      BMP:   Recent Labs     12/19/20  1751 12/20/20  0441 12/21/20  0408    141 142   K 5.7* 5.0 4.6    110 111   CO2 21* 21* 22*   BUN 59* 58* 37*   CREATININE 3.5* 2.4* 1.3*   GLUCOSE 89 82 88   CALCIUM 9.1 8.7 8.8      Phosphorus:   No results for input(s): PHOS in the last 72 hours. Magnesium:  No results for input(s): MG in the last 72 hours.   Albumin:    Recent Labs     12/19/20  1751   LABALBU 3.2*     BNP:      Lab Results   Component Value Date    .0 10/03/2013     CHERYL:    No results found for: CHERYL  SPEP:  Lab Results   Component Value Date    PROT 6.2 12/19/2020     UPEP:   No results found for: LABPE  C3:   No results found for: C3  C4:   No results found for: C4  MPO ANCA:   No results found for: MPO  PR3 ANCA:   No results found for: PR3  Anti-GBM:   No results found for: GBMABIGG  Hep BsAg:       No results found for: HEPBSAG  Hep C AB:        No results found for: HEPCAB    Urinalysis/Chemistries:      Lab Results   Component Value Date    NITRU NEGATIVE 12/19/2020    COLORU YELLOW 12/19/2020    PHUR 5.0 12/19/2020    LABCAST NONE SEEN 12/19/2020    LABCAST NONE SEEN 12/19/2020    WBCUA 50-75 12/19/2020    RBCUA NONE 12/19/2020    MUCUS THREADS 12/27/2018    YEAST MANY BUDDING 12/19/2020    BACTERIA NONE SEEN 12/19/2020    SPECGRAV 1.013 12/19/2020    LEUKOCYTESUR MODERATE 12/19/2020    LEUKOCYTESUR TRACE 11/08/2020    UROBILINOGEN 0.2 12/19/2020    BILIRUBINUR NEGATIVE 12/19/2020    BLOODU NEGATIVE 12/19/2020    GLUCOSEU NEGATIVE 11/08/2020    KETUA NEGATIVE 12/19/2020     Urine Sodium:   No results found for: JANETTE  Urine Potassium:  No results found for: KUR  Urine Chloride:  No results found for: CLUR  Urine Osmolarity: No results found for: OSMOU  Urine Protein:   No components found for: Za Albarran Urine Creatinine:   No results found for: LABCREA  Urine Eosinophils:  No components found for: UEOS        Impression and Plan:  1. NICOLASA on CKD II: prerenal from hypotension, volume depletion in setting of diuretics and Entresto: improving. Will stop IV fluids. 2. S/p hyperkalemia  3. Hypotension from volume depletion: much better  4. Systolic CHF, compensated  5. Chronic hypoxic respiratory failure  6. Cellulitis  7. Afib  8. Anemia  9. COPD        Please don't hesitate to call with any questions.   Electronically signed by Marisabel Magana DO on 12/21/2020 at 9:34 AM

## 2020-12-21 NOTE — CONSULTS
[address] [place] [address]                                  CONSULTATION    PATIENT NAME: Keya Shannon                    :        1945  MED REC NO:   [id number]                           ROOM:       7490  ACCOUNT NO:   [de-identified]                           ADMIT DATE: 2020  PROVIDER:     BANDAR Omalley Karla:  2020    CARDIOLOGY CONSULTATION    REASON FOR EVALUATION:  History of atrial fibrillation with  cardiopulmonary. REQUESTING PROVIDER:  Hospitalist Service. HISTORY OF PRESENT ILLNESS:  This is a pleasant lady who follows with  Dr. Macy Maldonado, has a history of cardiomyopathy and recent defibrillator in  2020, history of atrial fibrillation and possible underlying coronary  artery disease based on previous cardiac catheterization; however,  report is not available. The patient is in for mental status change,  worsening renal function and generalized symptoms. No obvious chest  discomfort reported. The patient is a very poor historian. We were  consulted to assist in the management of the patient. The patient at  the current time denying any active chest pain. Her kidney function has  gradually improved. REVIEW OF SYSTEMS:  No obvious fever or chills. No hematuria or  dysuria. No abdominal pain, nausea, vomiting or diarrhea. No obvious  suicidal ideation. The patient has some underlying anxiety. No skin  rashes. The patient has had some dizziness and lightheadedness with no  recent syncope. No recent trauma. No obvious bleeding problem. No  HEENT-related problem. PAST MEDICAL HISTORY:  1. Coronary artery disease. 2.  Valvular heart disease. 3.  AFib. 4.  Cardiomyopathy. 5.  Hypertension. 6.  Hyperlipidemia.     ALLERGIES:  To multiple medications including BENADRYL, CIPRO,  CLARITHROMYCIN, VITAMIN K, ATORVASTATIN, CAPTOPRIL, PRAVASTATIN, ZETIA, MORPHINE, LIPITOR, CODEINE, MACROBID, CEPHALEXIN, DOXYCYCLINE, and  SULFA. CURRENT MEDICATIONS:  Aspirin 81 a day, Bumex 2 mg twice a day, digoxin  0.125 a day, metoprolol 25 a day, Protonix and Entresto 29/51 twice a  day, Coumadin. SOCIAL HISTORY:  No tobacco, no drugs, no alcohol. FAMILY HISTORY:  Noncontributory. PHYSICAL EXAMINATION:  VITAL SIGNS:  Showed a blood pressure of 130/80, heart rate of 70. GENERAL APPEARANCE:  Pleasant lady, seems to be a little bit confused. No obvious acute distress. EYES AND EARS:  No discharge. NECK:  No JVD. No bruits. No masses. LUNGS:  Decreased air entry. No crackles. No wheezes. HEART:  Normal S1, S2. Systolic murmur grade 2/6. ABDOMEN:  Soft, nontender. Positive bowel sounds. No organomegaly. EXTREMITIES:  No significant edema. NEUROLOGIC:  As above. The patient seems to be sluggish. No obvious  acute findings. PSYCH:  No obvious acute psychosis. SKIN:  No rashes. LABORATORY DATA:  Showed sodium 142, potassium 4.6, BUN 37, creatinine  1.3, white count 5.1, hemoglobin 9.3, hematocrit 30, platelets 715. EKG  showed paced rhythm. IMPRESSION:  This is a patient who comes in with the above presentation. We were consulted because of mildly elevated cardiac enzymes, which I  believe is secondary to her kidney function and her acute renal failure. The patient does have history of cardiomyopathy. RECOMMENDATIONS:  From the cardiac standpoint, my recommendation is as  follows:  1. Treat the primary problem, which is the patient's acute kidney  worsening. 2.  We will continue with medical therapy as far as her heart is  concerned and the patient will be followed accordingly. Currently,  there is no clear indication for cardiac catheterization unless her  clinical scenario changes. Thank you for allowing me to participate in the care of this patient.         Abhishek Skelton M.D. D: 12/21/2020 13:48:52       T: 12/21/2020 13:52:53     [email or web address]  Job#: [id number]     Doc#: [id number]    CC:

## 2020-12-21 NOTE — PROGRESS NOTES
Hospitalist Progress Note    Patient:  Liz Portal      Unit/Bed:4K-26/026-A    YOB: 1945    MRN: [id number]       Acct: [de-identified]     PCP: Anne-Marie Martinez MD    Date of Admission: 12/19/2020    Assessment/Plan:    1. Chronic hypoxic respiratory failure: According to patient's daughter: baseline of 3L of O2 at rest and 6L with activity. Hx of severe COPD. CXR demonstrates small left-sided pleural effusion. Maintain patient on 2-3L of O2 via NC. Continue to monitor pulse ox. Keep SPO2 between 90-94%. 2. Acute Kidney Injury: Likely prerenal due to hypotension compounded by diuretic and Entresto. Creatinine improved after 1L of normal saline bolus. Continue with normal saline maintenance infusion at 50 mL/hr.   1. 12/21/2020 --> Significant improvement of NICOLASA, Cr 1.3. IV fluid maintenance discontinued. Will continue to monitor kidney function with daily BMP. 3. Bilateral lower leg cellulitis: Erythematous and tender to palpation. Patient was treated with 9 days of IV Zosyn and 8 days of IV Zyvox in October 2020. In an effort to avoid renal toxic drugs. IV Zyvox and IV Rocephin initiated 12/20/2020.   4. Acute severe hypotension: Likely secondary to infection. BP dropped to systolic of 70. Normal saline 500 mL bolus given with no improvement. Improvement of BP with midodrine 10 mg. Continue with midodrine 10 mg as needed for systolic BP less than 90.   1. 12/21/2020 --> Blood pressure normalized after initiation of IV antibiotics. Will continue to monitor. 5. Hyperkalemia: Potassium of 5.7 on admission. Likely from NICOLASA, Entresto. Down to 4.6 today. Will continue to monitor with daily BMP. 6. HFrEF: Echo on 02/2020 demonstrated EF of 30-35%. Continue to hold Bumex, Entresto. Strict I's and O's. Daily weights. Sodium and fluid restriction. 7. Atrial Fibrillation: Anticoagulated on Coumadin. Rate controlled V-paced rhythm. 8. Hx of Severe COPD: Continue with Brovana, Pulmicort, and tiotropium. Expected discharge date:  12/23/2020    Disposition:    [x] Home       [] TCU       [] Rehab       [] Psych       [] SNF       [] [place]       [] Other-    Chief Complaint: Weakness and fatigue    Hospital Course: This is a 50-year-old female who presented to [facility] complaining of weakness and fatigue. Patient was discharged from [facility] 12/01/2020 after being treated for sepsis. According to the daughter, patient has been slowly declining in mentation. Patient has been having a hard time managing at home. Patient was placed on Augmentin last week by PCP for bilateral lower leg cellulitis. According to the daughter, patient has not been taking her medications consistently. Subjective (past 24 hours):   Patient is alert and oriented x 3. She was sitting up eating her breakfast. She states that she is doing a lot better today, complains of pain in her legs and tail bone. Denies chest pain, shortness of breath, fever, nausea, vomiting, abdominal pain. ROS (12 point review of systems completed. Pertinent positives noted. Otherwise ROS is negative).     Medications:  Reviewed    Infusion Medications     Scheduled Medications    Arformoterol Tartrate  15 mcg Nebulization BID    aspirin  81 mg Oral Daily    budesonide  500 mcg Nebulization BID    [Held by provider] bumetanide  2 mg Oral BID    dicyclomine  10 mg Oral 4x Daily AC & HS    digoxin  125 mcg Oral Every Other Day    gabapentin  100 mg Oral TID    metoprolol succinate  25 mg Oral Daily    nicotine  1 patch Transdermal Q24H    pantoprazole  40 mg Oral BID    tiotropium  2 puff Inhalation Daily    [Held by provider] sacubitril-valsartan  1 tablet Oral BID    traZODone  50 mg Oral Nightly    sodium chloride flush  10 mL Intravenous 2 times per day    cetirizine  5 mg Oral Daily  warfarin (COUMADIN) daily dosing (placeholder)   Other RX Placeholder    sodium chloride  500 mL Intravenous Once    cefTRIAXone (ROCEPHIN) IV  1 g Intravenous Q24H    linezolid  600 mg Intravenous Q12H     PRN Meds: hydrOXYzine, levalbuterol, sodium chloride flush, promethazine **OR** ondansetron, polyethylene glycol, acetaminophen **OR** acetaminophen, midodrine      Intake/Output Summary (Last 24 hours) at 12/21/2020 1132  Last data filed at 12/21/2020 0420  Gross per 24 hour   Intake 3170.74 ml   Output 525 ml   Net 2645.74 ml       Diet:  DIET RENAL;    Exam:  /60   Pulse 76   Temp 98.4 °F (36.9 °C) (Oral)   Resp 18   Ht 5' 1\" (1.549 m)   Wt 144 lb 11.2 oz (65.6 kg)   SpO2 95%   BMI 27.34 kg/m²     General appearance: Chronically ill-appearing  female. HEENT: Pupils equal, round, and reactive to light. Conjunctivae/corneas clear. Neck: Supple, with full range of motion. No jugular venous distention. Trachea midline. Respiratory:  Normal respiratory effort. Bilateral basilar crackles. Cardiovascular: irregular rate and rhythm. Abdomen: Soft, non-tender, non-distended with normal bowel sounds. Musculoskeletal: passive and active ROM x 4 extremities. 1+ BLL edema. Skin: Bilateral lower leg tenderness, erythema, warmth to touch, L worse than R. Neurologic:  Neurovascularly intact without any focal sensory/motor deficits. Psychiatric: Alert and oriented x 3, normal thought content.   Capillary Refill: Brisk,< 3 seconds   Peripheral Pulses: +2 palpable, equal bilaterally       Labs:   Recent Labs     12/19/20  1751 12/20/20  0441   WBC 6.6 5.1   HGB 10.4* 9.3*   HCT 34.3* 30.0*    160     Recent Labs     12/19/20  1751 12/20/20  0441 12/21/20  0408    141 142   K 5.7* 5.0 4.6    110 111   CO2 21* 21* 22*   BUN 59* 58* 37*   CREATININE 3.5* 2.4* 1.3*   CALCIUM 9.1 8.7 8.8     Recent Labs     12/19/20  5231   AST 13   ALT 7*   BILITOT 0.2*   ALKPHOS 66 Recent Labs     12/19/20  1751 12/20/20  0441 12/21/20  0408   INR 1.74* 1.62* 1.50*     No results for input(s): CKTOTAL, TROPONINI in the last 72 hours. Microbiology:      Urinalysis:      Lab Results   Component Value Date    NITRU NEGATIVE 12/19/2020    WBCUA 50-75 12/19/2020    BACTERIA NONE SEEN 12/19/2020    RBCUA NONE 12/19/2020    BLOODU NEGATIVE 12/19/2020    SPECGRAV 1.013 12/19/2020    GLUCOSEU NEGATIVE 11/08/2020       Radiology:  VL DUP LOWER EXTREMITY VENOUS LEFT   Final Result      No evidence of deep venous thrombosis in the left lower extremity. Final report electronically signed by Dr. Marta Thomas on 12/19/2020 8:00 PM      XR CHEST 1 VIEW   Final Result   1. Small left-sided pleural effusion. 2. Mild stable cardiomegaly. Final report electronically signed by Dr. Marta Thomas on 12/19/2020 6:35 PM      CT Head WO Contrast   Final Result   1. No mass effect or acute hemorrhage. 2. Chronic periventricular small vessel ischemic changes and cerebral atrophy. **This report has been created using voice recognition software. It may contain minor errors which are inherent in voice recognition technology. **      Final report electronically signed by Dr. Marta Thomas on 12/19/2020 6:29 PM          DVT prophylaxis: [] Lovenox                                 [] SCDs                                 [] SQ Heparin                                 [] Encourage ambulation           [x] Already on Anticoagulation     Code Status: Limited      Tele:   [x] yes             [] no    Electronically signed by Yariel Carlson MD on 12/21/2020 at 11:32 AM

## 2020-12-22 PROBLEM — L03.90 CELLULITIS: Status: ACTIVE | Noted: 2020-01-01

## 2020-12-22 NOTE — CARE COORDINATION
DISCHARGE/PLANNING EVALUATION  12/22/20, 2:46 PM EST    Reason for Referral:  \"need HH\"  Mental Status:  Alert but some confusion  Decision Making:  Makes decisions with her family  Family/Social/Home Environment:  SW spoke to patient briefly but then spoke to her spouse by phone. He confirmed that they live in a one story home without a basement. He does the majority of the housekeeping and driving (patient states she drove last week, but he denied this). A granddaughter helps with her shower and she uses a walk-in shower with seat and grab bar. She has been using her walker at home per the spouse and her o2 comes from [facility]. There are 3 children with 2 a few houses away from them. Current Services including food security, transportation and housekeeping:   See above, message was left with Denise with [facility] to confirm the services   Current Equipment:  See above  Payment Source:  Medicare and Medical [place]  Concerns or Barriers to Discharge: The spouse feels he can manage her needs at home after reading him the therapists notes. He states she was getting very confused PTA and did slip out of a chair and had to call the daughter to assist when this happened. Post acute provider list with quality measures, geographic area and applicable managed care information provided. Questions regarding selection process answered: N/A    Teach Back Method used with spouse and patient regarding care plan and needs  Patient and spouse verbalize understanding of the plan of care and contribute to goal setting. Patient goals, treatment preferences and discharge plan:  The patient and spouse are hopeful for her to return home.  THIEN called Denise with [facility] to confirm services    Electronically signed by Deanna Duane, LSW on 12/22/2020 at 2:46 PM

## 2020-12-22 NOTE — PROGRESS NOTES
Hospitalist Progress Note    Patient:  Jenna Powers      Unit/Bed:4K-26/026-A    YOB: 1945    MRN: [id number]       Acct: [de-identified]     PCP: Dieter Aburto MD    Date of Admission: 12/19/2020    Assessment/Plan:    1. Chronic hypoxic respiratory failure: According to patient's daughter: baseline of 3L of O2 at rest and 6L with activity. Hx of severe COPD. CXR demonstrates small left-sided pleural effusion. Maintain patient on 2-3L of O2 via NC. Continue to monitor pulse ox. Keep SPO2 between 90-94%. 2. Acute Kidney Injury: Likely prerenal due to hypotension compounded by diuretic and Entresto. Creatinine improved after 1L of normal saline bolus. Continue with normal saline maintenance infusion at 50 mL/hr.   1. 12/21/2020 --> Significant improvement of NICOLASA, Cr 1.3. IV fluid maintenance discontinued. Will continue to monitor kidney function with daily BMP. 2. 12/22/2020 --> Cr of 1.0. Bumex PO resumed at 1 mg BID. Holding Entresto for now d/t borderline low BP.  3. Bilateral lower leg cellulitis: Erythematous and tender to palpation. Patient was treated with 9 days of IV Zosyn and 8 days of IV Zyvox in October 2020. In an effort to avoid renal toxic drugs. IV Zyvox and IV Rocephin initiated 12/20/2020.   4. Acute severe hypotension: Likely secondary to infection. BP dropped to systolic of 70. Normal saline 500 mL bolus given with no improvement. Improvement of BP with midodrine 10 mg. Continue with midodrine 10 mg as needed for systolic BP less than 90.   1. 12/21/2020 --> Blood pressure normalized after initiation of IV antibiotics. Will continue to monitor. 5. Hyperkalemia: Potassium of 5.7 on admission. Likely from NICOLASA, Entresto. Down to 4.6 today. Will continue to monitor with daily BMP. 6. HFrEF: Echo on 02/2020 demonstrated EF of 30-35%. Continue to hold Entresto. Strict I's and O's. Daily weights. Sodium and fluid restriction. Bumex resumed. 7. Atrial Fibrillation: Anticoagulated on Coumadin. Rate controlled V-paced rhythm. 8. Hx of Severe COPD: Continue with Brovana, Pulmicort, and tiotropium. Expected discharge date:  12/23/2020    Disposition:    [x] Home       [] TCU       [] Rehab       [] Psych       [] SNF       [] [place]       [] Other-    Chief Complaint: Weakness and fatigue    Hospital Course: This is a 67-year-old female who presented to [facility] complaining of weakness and fatigue. Patient was discharged from [facility] on 12/01/2020 after being treated for sepsis. According to the daughter, patient has been slowly declining in mentation. Patient has been having a hard time managing at home. Patient was placed on Augmentin last week by PCP for bilateral lower leg cellulitis. According to the daughter, patient has not been taking her medications consistently. Subjective (past 24 hours):   Patient is alert and oriented x 3. No acute complaints. Denies chest pain, shortness of breath, fever, nausea, vomiting, and abdominal pain. ROS (12 point review of systems completed. Pertinent positives noted. Otherwise ROS is negative).     Medications:  Reviewed    Infusion Medications     Scheduled Medications    warfarin  1.5 mg Oral Once    bumetanide  1 mg Oral BID    linezolid  600 mg Oral 2 times per day    Arformoterol Tartrate  15 mcg Nebulization BID    aspirin  81 mg Oral Daily    budesonide  500 mcg Nebulization BID    dicyclomine  10 mg Oral 4x Daily AC & HS    digoxin  125 mcg Oral Every Other Day    gabapentin  100 mg Oral TID    metoprolol succinate  25 mg Oral Daily    nicotine  1 patch Transdermal Q24H    pantoprazole  40 mg Oral BID    tiotropium  2 puff Inhalation Daily    [Held by provider] sacubitril-valsartan  1 tablet Oral BID    traZODone  50 mg Oral Nightly    sodium chloride flush  10 mL Intravenous 2 times per day    cetirizine  5 mg Oral Daily  warfarin (COUMADIN) daily dosing (placeholder)   Other RX Placeholder    sodium chloride  500 mL Intravenous Once    cefTRIAXone (ROCEPHIN) IV  1 g Intravenous Q24H     PRN Meds: hydrOXYzine, levalbuterol, sodium chloride flush, promethazine **OR** ondansetron, polyethylene glycol, acetaminophen **OR** acetaminophen, midodrine      Intake/Output Summary (Last 24 hours) at 12/22/2020 1111  Last data filed at 12/22/2020 0330  Gross per 24 hour   Intake 1178.63 ml   Output 0 ml   Net 1178.63 ml       Diet:  DIET RENAL;    Exam:  BP (!) 126/57   Pulse 69   Temp 98.3 °F (36.8 °C) (Oral)   Resp 18   Ht 5' 1\" (1.549 m)   Wt 147 lb 11.2 oz (67 kg)   SpO2 92%   BMI 27.91 kg/m²     General appearance: Chronically ill-appearing  female. HEENT: Pupils equal, round, and reactive to light. Conjunctivae/corneas clear. Neck: Supple, with full range of motion. No jugular venous distention. Trachea midline. Respiratory:  Normal respiratory effort. Bilateral basilar crackles. Cardiovascular: irregular rate and rhythm. Abdomen: Soft, non-tender, non-distended with normal bowel sounds. Musculoskeletal: passive and active ROM x 4 extremities. Pedal edema  Skin: Slight bilateral lower leg tenderness without erythema  Neurologic:  Neurovascularly intact without any focal sensory/motor deficits. Psychiatric: Alert and oriented x 3, normal thought content.   Capillary Refill: Brisk,< 3 seconds   Peripheral Pulses: +2 palpable, equal bilaterally       Labs:   Recent Labs     12/19/20 1751 12/20/20 0441   WBC 6.6 5.1   HGB 10.4* 9.3*   HCT 34.3* 30.0*    160     Recent Labs     12/20/20  0441 12/21/20  0408 12/22/20  0452    142 144   K 5.0 4.6 4.3    111 112*   CO2 21* 22* 23   BUN 58* 37* 23*   CREATININE 2.4* 1.3* 1.0   CALCIUM 8.7 8.8 9.0     Recent Labs     12/19/20  1751   AST 13   ALT 7*   BILITOT 0.2*   ALKPHOS 66     Recent Labs     12/20/20  0441 12/21/20  0408 12/22/20  045 INR 1.62* 1.50* 1.89*     No results for input(s): Kelvin Hendrickson in the last 72 hours. Microbiology:      Urinalysis:      Lab Results   Component Value Date    NITRU NEGATIVE 12/19/2020    WBCUA 50-75 12/19/2020    BACTERIA NONE SEEN 12/19/2020    RBCUA NONE 12/19/2020    BLOODU NEGATIVE 12/19/2020    SPECGRAV 1.013 12/19/2020    GLUCOSEU NEGATIVE 11/08/2020       Radiology:  VL DUP LOWER EXTREMITY VENOUS LEFT   Final Result      No evidence of deep venous thrombosis in the left lower extremity. Final report electronically signed by Dr. Jerri Ya on 12/19/2020 8:00 PM      XR CHEST 1 VIEW   Final Result   1. Small left-sided pleural effusion. 2. Mild stable cardiomegaly. Final report electronically signed by Dr. Jerri Ya on 12/19/2020 6:35 PM      CT Head WO Contrast   Final Result   1. No mass effect or acute hemorrhage. 2. Chronic periventricular small vessel ischemic changes and cerebral atrophy. **This report has been created using voice recognition software. It may contain minor errors which are inherent in voice recognition technology. **      Final report electronically signed by Dr. Jerri Ya on 12/19/2020 6:29 PM          DVT prophylaxis: [] Lovenox                                 [] SCDs                                 [] SQ Heparin                                 [] Encourage ambulation           [x] Already on Anticoagulation     Code Status: Limited      Tele:   [x] yes             [] no    Electronically signed by Ebony Flor MD on 12/22/2020 at 11:11 AM

## 2020-12-22 NOTE — CARE COORDINATION
DISASTER CHARTING    12/22/20, 7:59 AM EST    DISCHARGE ONGOING EVALUATION:     Nan Chung day: 3  Location: [facility]26/026-A Reason for admit: NICOLASA (acute kidney injury) [facility]) [N17.9]   Barriers to Discharge: Nephrology following. BP stable. On midodrine. 3am 133/61. Creat this am 1.0 and BUN 23. Bumex and Entresto remains on hold. IV fluids discontinued. IV Rocephin and Zyvox for cellulitis. PT./OT working with pt. O2 at 2L/NC, baseline. PCP: Gena Mclean MD  Patient Goals/Plan/Treatment Preferences: Plans home with spouse and continued services of [address] for home O2 and [facility].       12/22/20, 10:10 AM EST    Patient goals/plan/ treatment preferences discussed by  and . Patient goals/plan/ treatment preferences reviewed with patient/ family. Patient/ family verbalize understanding of discharge plan and are in agreement with goal/plan/treatment preferences. Understanding was demonstrated using the teach back method. AVS provided by RN at time of discharge, which includes all necessary medical information pertaining to the patients current course of illness, treatment, post-discharge goals of care, and treatment preferences. Services After Discharge  Services At/After Discharge: Nursing Services   IMM Letter  IMM Letter given to Patient/Family/Significant other/Guardian/POA/by[de-identified] Emily NOEL Case Manager. IMM Letter date given[de-identified] 12/22/20  IMM Letter time given[de-identified] 4738       Discharge anticipated today. Will resume [facility]. That office was notified of discharge. Attempted to make pt a follow up appt with her PCP but staff told this RN they would speak to Dr. Jerzy Sandoval and phone pt back with appointment time.

## 2020-12-22 NOTE — PROGRESS NOTES
Cardiology Progress Note      Patient:  Yesika Bravo  YOB: 1945  MRN: [id number]   Acct: [de-identified]  Admit Date:  12/19/2020  Primary Cardiologist: dr Kiya Gregory    Note per dr Tyson oFrbes EVALUATION:  History of atrial fibrillation with  cardiopulmonary.     REQUESTING PROVIDER:  Hospitalist Service.     HISTORY OF PRESENT ILLNESS:  This is a pleasant lady who follows with  Dr. Nicola Avitia, has a history of cardiomyopathy and recent defibrillator in  08/2020, history of atrial fibrillation and possible underlying coronary  artery disease based on previous cardiac catheterization; however,  report is not available. The patient is in for mental status change,  worsening renal function and generalized symptoms. No obvious chest  discomfort reported. The patient is a very poor historian. We were  consulted to assist in the management of the patient. The patient at  the current time denying any active chest pain. Her kidney function has  gradually improved. \"    Subjective (Events in last 24 hours): pt awake and alert. NAD. No cp or sob.   Pt still seems confused and is poor historian      Objective:   BP (!) 102/51   Pulse 72   Temp 98.1 °F (36.7 °C) (Oral)   Resp 16   Ht 5' 1\" (1.549 m)   Wt 147 lb 11.2 oz (67 kg)   SpO2 94%   BMI 27.91 kg/m²        TELEMETRY: paced    Physical Exam:  General Appearance: alert and oriented to person, place and time, in no acute distress  Cardiovascular: normal rate, regular rhythm, normal S1 and S2, no murmurs, rubs, clicks, or gallops, distal pulses intact, no carotid bruits, no JVD  Pulmonary/Chest: clear to auscultation bilaterally- no wheezes, rales or rhonchi, normal air movement, no respiratory distress  Abdomen: soft, non-tender, non-distended, normal bowel sounds, no masses Extremities: no cyanosis, clubbing or edema, pulse   Skin: warm and dry, no rash or erythema  Head: normocephalic and atraumatic Eyes: pupils equal, round, and reactive to light  Neck: supple and non-tender without mass, no thyromegaly   Musculoskeletal: normal range of motion, no joint swelling, deformity or tenderness  Neurological: alert, oriented, normal speech, no focal findings or movement disorder noted    Medications:    warfarin  1.5 mg Oral Once    bumetanide  1 mg Oral BID    linezolid  600 mg Oral 2 times per day    Arformoterol Tartrate  15 mcg Nebulization BID    aspirin  81 mg Oral Daily    budesonide  500 mcg Nebulization BID    dicyclomine  10 mg Oral 4x Daily AC & HS    digoxin  125 mcg Oral Every Other Day    gabapentin  100 mg Oral TID    metoprolol succinate  25 mg Oral Daily    nicotine  1 patch Transdermal Q24H    pantoprazole  40 mg Oral BID    tiotropium  2 puff Inhalation Daily    [Held by provider] sacubitril-valsartan  1 tablet Oral BID    traZODone  50 mg Oral Nightly    sodium chloride flush  10 mL Intravenous 2 times per day    cetirizine  5 mg Oral Daily    warfarin (COUMADIN) daily dosing (placeholder)   Other RX Placeholder    sodium chloride  500 mL Intravenous Once    cefTRIAXone (ROCEPHIN) IV  1 g Intravenous Q24H           hydrOXYzine, 25 mg, TID PRN      levalbuterol, 1 ampule, Q8H PRN      sodium chloride flush, 10 mL, PRN      promethazine, 12.5 mg, Q6H PRN    Or      ondansetron, 4 mg, Q6H PRN      polyethylene glycol, 17 g, Daily PRN      acetaminophen, 650 mg, Q6H PRN    Or      acetaminophen, 650 mg, Q6H PRN      midodrine, 10 mg, TID PRN        Lab Data:    Cardiac Enzymes:  No results for input(s): CKTOTAL, CKMB, CKMBINDEX, TROPONINI in the last 72 hours.     CBC:   Lab Results   Component Value Date    WBC 5.1 12/20/2020    RBC 3.08 12/20/2020    HGB 9.3 12/20/2020    HCT 30.0 12/20/2020     12/20/2020       CMP:    Lab Results   Component Value Date     12/22/2020    K 4.3 12/22/2020    K 4.6 12/21/2020     12/22/2020    CO2 23 12/22/2020 BUN 23 12/22/2020    CREATININE 1.0 12/22/2020    LABGLOM 54 12/22/2020    GLUCOSE 99 12/22/2020    GLUCOSE 115 07/06/2018    CALCIUM 9.0 12/22/2020       Hepatic Function Panel:    Lab Results   Component Value Date    ALKPHOS 66 12/19/2020    ALT 7 12/19/2020    AST 13 12/19/2020    PROT 6.2 12/19/2020    BILITOT 0.2 12/19/2020    BILIDIR <0.2 10/26/2020    LABALBU 3.2 12/19/2020       Magnesium:    Lab Results   Component Value Date    MG 2.1 12/01/2020       PT/INR:    Lab Results   Component Value Date    PROTIME 22.3 02/05/2019    INR 1.89 12/22/2020       HgBA1c:    Lab Results   Component Value Date    LABA1C 5.9 11/24/2015       FLP:    Lab Results   Component Value Date    TRIG 236 12/02/2019    HDL 45 12/02/2019    LDLCALC 30 12/02/2019    LDLDIRECT 54 07/06/2018       TSH:    Lab Results   Component Value Date    TSH 0.739 12/02/2019         Assessment:    AMS  NICOLASA/CKD - improving - renal following  S/p hyperkalemia  Elevated troponins likely due to above  ICMP - ef 30-35 per TTE 2/24/20  Hx chronic systolic CHF  Hx BiV ICD  Hx afib - on coumadin prior to arrival   dyslipidemia  Hx COPD      Plan:    As per dr Fisher Persons note\"We will continue with medical therapy as far as her heart is concerned and the patient will be followed accordingly.   Currently, there is no clear indication for cardiac catheterization unless her clinical scenario changes\"    Attending to reevaluate pt prior to discharge to make sure pt still appropriate for longterm [address] [place] [address]  Will see prn  F/up dr Kiya Gregory 2-4 weeks         Electronically signed by Stoney Babinski, PA-C on 12/22/2020 at 10:12 AM

## 2020-12-22 NOTE — PROGRESS NOTES
Renal Progress Note  [facility]    Patient :  Sarai Villegas; 67 y.o. MRN# [id number]  Location:  [facility]/836-T  Attending:  Francisco Klinefelter, MD  Admit Date:  12/19/2020   Hospital Day: 3      Subjective:     Nephrology is following the patient for NICOLASA. Patient was seen earlier this morning. Much more awake and alert. No SOB. On 2 L NC. Outpatient Medications:     Medications Prior to Admission: amoxicillin-clavulanate (AUGMENTIN) 875-125 MG per tablet, Take 1 tablet by mouth 2 times daily  bumetanide (BUMEX) 2 MG tablet, Take 1 tablet by mouth 2 times daily  nicotine (NICODERM CQ) 7 MG/24HR, Place 1 patch onto the skin every 24 hours  docusate sodium (COLACE, DULCOLAX) 100 MG CAPS, Take 100 mg by mouth 2 times daily  senna (SENOKOT) 8.6 MG tablet, Take 1 tablet by mouth 2 times daily  gabapentin (NEURONTIN) 100 MG capsule, Take 1 capsule by mouth 3 times daily for 30 days.   potassium chloride (KLOR-CON M) 20 MEQ extended release tablet, Take 1 tablet by mouth daily  OXYGEN, Indications: Chronic Obstructive Lung Disease 3-6L continuously as prescribed  levocetirizine (XYZAL) 5 MG tablet, Take 5 mg by mouth nightly  Ascorbic Acid (VITAMIN C) 100 MG tablet, Take 100 mg by mouth daily  Cholecalciferol (VITAMIN D3) 50 MCG (2000 UT) CAPS, Take 2,000 Units by mouth daily  sacubitril-valsartan (ENTRESTO) 49-51 MG per tablet, Take 1 tablet by mouth 2 times daily  pantoprazole (PROTONIX) 40 MG tablet, Take 40 mg by mouth 2 times daily 30 minutes before two heaviest meals on an empty stomach  LINZESS 290 MCG CAPS capsule, Take 290 mcg by mouth daily   ondansetron (ZOFRAN) 8 MG tablet, daily as needed for Nausea or Vomiting   hydrocortisone (ANUSOL-HC) 25 MG suppository, Place 1 suppository rectally 2 times daily as needed for Hemorrhoids  Revefenacin 175 MCG/3ML SOLN, Inhale 175 mcg into the lungs daily  hydrocortisone 2.5 % cream, Apply topically 2 times daily as needed acetaminophen (TYLENOL) 325 MG tablet, Take 650 mg by mouth as needed for Pain or Fever Indications: Pain Don't take more then 3,000 mg each day  Multiple Vitamins-Minerals (MULTIVITAMIN ADULT) CHEW, Take 2 tablets by mouth daily  Menthol-Methyl Salicylate (ICY HOT) 93-26 % STCK, Apply topically daily  Alum Hydroxide-Mag Carbonate (GAVISCON PO), Take by mouth as needed Indications: Acid Indigestion   lidocaine (XYLOCAINE) 5 % ointment, Apply topically Before venipuncture in Dr. Navjot Chaudhry office. B Complex-C (RA B-COMPLEX/VITAMIN C CR PO), Take 1 tablet by mouth daily Indications: Treatment to Prevent Vitamin Deficiency   dicyclomine (BENTYL) 10 MG capsule, Take 10 mg by mouth 4 times daily (before meals and nightly) Indications: Pain in the Abdominal Region  Coenzyme Q10 (COQ-10) 200 MG CAPS, Take by mouth daily   Probiotic Product (SUPER PROBIOTIC) CAPS,  Take 1 tablet by mouth daily Indications: Digestive Complaint   budesonide (PULMICORT) 0.5 MG/2ML nebulizer suspension,  Take 1 ampule by nebulization 2 times daily Indications: Shortness of Breath (Inactive)   Arformoterol Tartrate (BROVANA) 15 MCG/2ML NEBU,  Take 1 ampule by nebulization 2 times daily Indications: Shortness of Breath (Inactive)   triamcinolone (KENALOG) 0.1 % cream, Apply topically 2 times daily as needed Anti-fungal  clotrimazole-betamethasone (LOTRISONE) cream, Apply topically 2 times daily as needed Indications: Yeast Infection (inactive)   aspirin 81 MG EC tablet, Take 81 mg by mouth daily. With food  Indications: Anticoagulant Therapy  folic acid (FOLVITE) 1 MG tablet, Take 1 mg by mouth daily.     Indications: Folic Acid Supplementation  traZODone (DESYREL) 50 MG tablet, Take 50 mg by mouth nightly   Omalizumab (XOLAIR SC), Inject into the skin At Dr Layla Valenzuela office  metoprolol succinate (TOPROL XL) 25 MG extended release tablet, Take 25 mg by mouth daily loperamide (IMODIUM) 2 MG capsule, Take 2 mg by mouth 4 times daily as needed for Diarrhea  traMADol (ULTRAM) 50 MG tablet, Take 50 mg by mouth every 6 hours as needed for Pain. nystatin (MYCOSTATIN) 916101 UNIT/GM cream, as needed   warfarin (COUMADIN) 1 MG tablet, Take as directed by the [facility], 145 tablets for 90 days  digoxin (LANOXIN) 125 MCG tablet, Take 1 tablet by mouth every other day Indications: Increased Heart Rate Until Office Visit with Dr. Emily Rubin  hydrOXYzine (ATARAX) 25 MG tablet, Take 25 mg by mouth 3 times daily as needed Indications: Feeling Anxious   Pramoxine HCl (PROCTOFOAM RE), Place rectally daily as needed   EPINEPHrine (EPIPEN) 0.3 MG/0.3ML SOAJ injection, INJECT AS DIRECTED FOR ANAPHYLAXIS  Evolocumab (REPATHA SURECLICK SC), Inject 512 mg into the skin every 14 days Indications: Blood Cholesterol Abnormal   NITROGLYCERIN RE, Place 0.3 mg rectally as needed Indications: Hemorrhoids   ofloxacin (FLOXIN) 0.3 % otic solution, Place 2 drops into both ears daily as needed Indications: Infection Long-term therapy. Carboxymethylcellul-Glycerin (REFRESH OPTIVE OP), Apply  to eye as needed. Indications: Dry Eyes caused by Deficiency of Tears  levalbuterol (XOPENEX) 0.63 MG/3ML nebulization, Take 1 ampule by nebulization every 8 hours as needed for Wheezing. polyethylene glycol (MIRALAX) powder, Take 17 g by mouth as needed. Indications: Constipation  Alcaftadine (LASTACAFT) 0.25 % SOLN, Apply 1 drop to eye daily as needed Indications: Eye Allergy Both eyes  azelastine (ASTELIN) 137 MCG/SPRAY nasal spray, 1 spray by Nasal route 2 times daily as needed Indications: Allergic Rhinitis Use in each nostril as directed  nitroGLYCERIN (NITROSTAT) 0.4 MG SL tablet, Place 0.4 mg under the tongue every 5 minutes as needed. If third one does not relieve pain, call 9-1-1.   Indications: Chest Pain    Current Medications:     Scheduled Meds:    warfarin  1.5 mg Oral Once  bumetanide  1 mg Oral BID    Arformoterol Tartrate  15 mcg Nebulization BID    aspirin  81 mg Oral Daily    budesonide  500 mcg Nebulization BID    dicyclomine  10 mg Oral 4x Daily AC & HS    digoxin  125 mcg Oral Every Other Day    gabapentin  100 mg Oral TID    metoprolol succinate  25 mg Oral Daily    nicotine  1 patch Transdermal Q24H    pantoprazole  40 mg Oral BID    tiotropium  2 puff Inhalation Daily    [Held by provider] sacubitril-valsartan  1 tablet Oral BID    traZODone  50 mg Oral Nightly    sodium chloride flush  10 mL Intravenous 2 times per day    cetirizine  5 mg Oral Daily    warfarin (COUMADIN) daily dosing (placeholder)   Other RX Placeholder    sodium chloride  500 mL Intravenous Once    cefTRIAXone (ROCEPHIN) IV  1 g Intravenous Q24H    linezolid  600 mg Intravenous Q12H     Continuous Infusions:   PRN Meds:  hydrOXYzine, levalbuterol, sodium chloride flush, promethazine **OR** ondansetron, polyethylene glycol, acetaminophen **OR** acetaminophen, midodrine    Input/Output:       I/O last 3 completed shifts: In: 1178.6 [P.O.:420; I.V.:758.6]  Out: 0 . Patient Vitals for the past 96 hrs (Last 3 readings):   Weight   20 0330 147 lb 11.2 oz (67 kg)   20 0420 144 lb 11.2 oz (65.6 kg)   20 0017 141 lb (64 kg)       Vital Signs:   Temperature:  Temp: 98.1 °F (36.7 °C)  TMax:   Temp (24hrs), Av °F (36.7 °C), Min:97.5 °F (36.4 °C), Max:98.3 °F (36.8 °C)    Respirations:  Resp: 16  Pulse:   Pulse: 72  BP:    BP: (!) 102/51  BP Range: Systolic (08IAY), MYS:603 , Min:102 , QF       Diastolic (00EQA), MRP:90, Min:51, Max:78      Physical Examination:     General:  Awake, alert  HEENT: NC/AT/ MMM  Chest:               diminished  Cardiac:  irregular  Abdomen: Soft, non-tender,  Neuro:  No facial droop, No Asterixis  SKIN:  No rashes, good skin turgor.   Extremities:  2+ LE edema on right 1+ on left, +tenderness    Labs:       Recent Labs     20 1751 12/20/20  0441   WBC 6.6 5.1   RBC 3.54* 3.08*   HGB 10.4* 9.3*   HCT 34.3* 30.0*   MCV 96.9 97.4   MCH 29.4 30.2   MCHC 30.3* 31.0*    160   MPV 11.0 11.2      BMP:   Recent Labs     12/20/20  0441 12/21/20  0408 12/22/20  0452    142 144   K 5.0 4.6 4.3    111 112*   CO2 21* 22* 23   BUN 58* 37* 23*   CREATININE 2.4* 1.3* 1.0   GLUCOSE 82 88 99   CALCIUM 8.7 8.8 9.0      Phosphorus:   No results for input(s): PHOS in the last 72 hours. Magnesium:  No results for input(s): MG in the last 72 hours.   Albumin:    Recent Labs     12/19/20  1751   LABALBU 3.2*     BNP:      Lab Results   Component Value Date    .0 10/03/2013     CHERYL:    No results found for: CHERYL  SPEP:  Lab Results   Component Value Date    PROT 6.2 12/19/2020     UPEP:   No results found for: LABPE  C3:   No results found for: C3  C4:   No results found for: C4  MPO ANCA:   No results found for: MPO  PR3 ANCA:   No results found for: PR3  Anti-GBM:   No results found for: GBMABIGG  Hep BsAg:       No results found for: HEPBSAG  Hep C AB:        No results found for: HEPCAB    Urinalysis/Chemistries:      Lab Results   Component Value Date    NITRU NEGATIVE 12/19/2020    COLORU YELLOW 12/19/2020    PHUR 5.0 12/19/2020    LABCAST NONE SEEN 12/19/2020    LABCAST NONE SEEN 12/19/2020    WBCUA 50-75 12/19/2020    RBCUA NONE 12/19/2020    MUCUS THREADS 12/27/2018    YEAST MANY BUDDING 12/19/2020    BACTERIA NONE SEEN 12/19/2020    SPECGRAV 1.013 12/19/2020    LEUKOCYTESUR MODERATE 12/19/2020    LEUKOCYTESUR TRACE 11/08/2020    UROBILINOGEN 0.2 12/19/2020    BILIRUBINUR NEGATIVE 12/19/2020    BLOODU NEGATIVE 12/19/2020    GLUCOSEU NEGATIVE 11/08/2020    KETUA NEGATIVE 12/19/2020     Urine Sodium:   No results found for: JANETTE  Urine Potassium:  No results found for: KUR  Urine Chloride:  No results found for: CLUR  Urine Osmolarity: No results found for: OSMOU  Urine Protein:   No components found for: Tamara Martinez Urine Creatinine:   No results found for: LABCREA  Urine Eosinophils:  No components found for: UEOS        Impression and Plan:  1. NICOLASA on CKD II: prerenal from hypotension, volume depletion in setting of diuretics and Entresto: improved. Off IV fluids. Will resume po bumex but at lower dose 1 mg BID. Will keep her off Entresto for now since blood pressure continues to be borderline low side  2. S/p hyperkalemia  3. Hypotension from volume depletion: much better  4. Systolic CHF, compensated  5. Chronic hypoxic respiratory failure  6. Cellulitis  7. Afib  8. Anemia  9. COPD  10. Candiduria: treatment per primary        Please don't hesitate to call with any questions.   Electronically signed by Rigoberto Martinez DO on 12/22/2020 at 9:36 AM

## 2020-12-22 NOTE — PROGRESS NOTES
[facility]  PHYSICAL THERAPY MISSED TREATMENT NOTE  [facility] ICU STEPDOWN TELEMETRY 4K    Date: 2020  Patient Name: Jessica Cash        MRN: [id number]   : 1945  (67 y.o.)  Gender: female   Referring Practitioner: London Garcia MD  Diagnosis: NICOLASA (acute kidney injury)         REASON FOR MISSED TREATMENT:  Missed Treat. On first attempt, respiratory present just starting breathing tx. On second attempt, pt sleeping soundly and does not want to get up.

## 2020-12-23 NOTE — CARE COORDINATION
12/23/20, 1:56 PM EST  Patient is discharged today to home. THIEN received a call from Charlene with [facility] to confirm that she is current with nursing PT OT and an aide. SW left a message with [facility] to inform of the discharge   Patient goals/plan/ treatment preferences discussed by  and . Patient goals/plan/ treatment preferences reviewed with patient/ family. Patient/ family verbalize understanding of discharge plan and are in agreement with goal/plan/treatment preferences. Understanding was demonstrated using the teach back method. AVS provided by RN at time of discharge, which includes all necessary medical information pertaining to the patients current course of illness, treatment, post-discharge goals of care, and treatment preferences. Services After Discharge  Services At/After Discharge: Nursing Services, OT, PT, Aide Services([facility] )   IMM Letter  IMM Letter given to Patient/Family/Significant other/Guardian/POA/by[de-identified] Emily NOEL Case Manager.   IMM Letter date given[de-identified] 12/22/20  IMM Letter time given[de-identified] 1813

## 2020-12-23 NOTE — PROGRESS NOTES
[facility] PHYSICAL THERAPY  DAILY NOTE  [facility] ICU STEPDOWN TELEMETRY 4K - 4K-26/026-A    Time In: 8180  Time Out: 1125  Timed Code Treatment Minutes: 28 Minutes  Minutes: 28          Date: 2020  Patient Name: Jenna Powers,  Gender:  female        MRN: [id number]  : 1945  (67 y.o.)     Referring Practitioner: Jesusita Malcolm MD  Diagnosis: NICOLASA (acute kidney injury)  Additional Pertinent Hx: Per H&P, \"Sharona Sosa is a 67 y.o. female with a prior hx of A-fib, anemia and recent prolonged hospitalization who presents with generalized acute on chronic confusion since the onset this past week since her discharge for sepsis and leg infection. She is currently taking augmentin for her infection. She has been acting not herself with some trouble with speech without any unilateral weakness or facial droop. She appears confused on presentation. Her family states she saw her cards Dr. Mony Valdivia recently and had a good checkup, but alerted us that she doesn't always take her medications, which include digoxin. The duration has been constant since the onset. The generalized confusion may be associated with a CVA or other infection, such as UTI. Pt has not been able to walk since her hospitalization. No aggravating or alleviating factors. \"     Prior Level of Function:  Lives With: Spouse  Type of Home: House  Home Layout: One level  Home Access: Stairs to enter with rails  Entrance Stairs - Number of Steps: 4  Entrance Stairs - Rails: Both  Home Equipment: Rolling walker        Ambulation Assistance: Independent  Transfer Assistance: Independent  Active :  Yes  Additional Comments: reports using RW for household amb PTA    Restrictions/Precautions:  Restrictions/Precautions: Contact Precautions, General Precautions, Fall Risk  Position Activity Restriction  Other position/activity restrictions: on 2.5 L O2- SUBJECTIVE: RN approved session. Pt in recliner upon arrival and agrees to therapy. PAIN: L foot, did not quanitfy    OBJECTIVE:  Bed Mobility:  Not Tested    Transfers:  Sit to Stand: Contact Guard Assistance  Stand to Sit:Contact Guard Assistance    Ambulation:  Contact Guard Assistance  Distance: 35ft  Surface: Level Tile  Device:Rolling Walker  Gait Deviations: Forward Flexed Posture, Slow Cassie, Decreased Step Length Bilaterally, Decreased Gait Speed, Decreased Heel Strike Bilaterally, Wide Base of Support, Mild Path Deviations and Unsteady Gait    Exercise:  Patient was guided in 1 set(s) 10 reps of exercise to both lower extremities. Ankle pumps, Glut sets, Quad sets, Heelslides and Hip abduction/adduction. Exercises were completed for increased independence with functional mobility. Provided HEP to pt    Functional Outcome Measures: Completed  AM-PAC Inpatient Mobility Raw Score : 16  AM-PAC Inpatient T-Scale Score : 40.78    ASSESSMENT:  Assessment: Patient progressing toward established goals. Activity Tolerance:  Patient tolerance of  treatment: good. Pt demos general weaness and fatigue. Pt would benefit from cont PT to improve overall function     Equipment Recommendations:Equipment Needed: No  Discharge Recommendations:  Continue to assess pending progress, would benefit from cont PT    Plan: Times per week: 5xGM  Current Treatment Recommendations: Strengthening, Balance Training, Functional Mobility Training, Transfer Training, Stair training, Gait Training, Home Exercise Program, Safety Education & Training, Patient/Caregiver Education & Training, Endurance Training, ROM    Patient Education  Patient Education: Plan of Care, [employer] Mobility, Transfers, Gait, Verbal Exercise Instruction    Goals:  Patient goals : not stated  Short term goals  Time Frame for Short term goals: by discharge  Short term goal 1: Pt to perform supine<>sit with S to promote ease of getting in and out of bed.

## 2020-12-23 NOTE — PROGRESS NOTES
Clinical Pharmacy Note                                               Warfarin Discharge Recommendations      Pt discharged from [address] [place] [address] today after admission for NICOLASA    INR today:  Recent Labs     12/23/20  0515   INR 2.83*       Coumadin 1 mg tabs    Interacting medications at discharge: augmentin, aspirin, trazodone, vitamin C    INR goal during admission: 2-3    Recommendations for discharge:   Date Warfarin Dose   12/23/2020 No coumadin   12/24/2020 1 mg    12/25/2020 1 mg   12/26/2020 1.5 mg   12/27/2020 1.5 mg   12/28/2020 INR recommended  Call clinic to schedule       Provider dosing warfarin: [address] [place] [address] [facility]    Recheck INR:  Call coumadin as soon as possible for potential appointment on 12/28/2020    Maranda Landis PharmD, BCPS   12/23/2020  12:50 PM

## 2020-12-23 NOTE — PROGRESS NOTES
Renal Progress Note  [facility]    Patient :  Liliane Beltran; 67 y.o. MRN# [id number]  Location:  [id number]-M  Attending:  Fabian Mas MD  Admit Date:  12/19/2020   Hospital Day: 4      Subjective:     Nephrology is following the patient for NICOLASA. Patient was seen earlier this morning. No new events. States she feels better. Bps stable. Outpatient Medications:     Medications Prior to Admission: amoxicillin-clavulanate (AUGMENTIN) 875-125 MG per tablet, Take 1 tablet by mouth 2 times daily  bumetanide (BUMEX) 2 MG tablet, Take 1 tablet by mouth 2 times daily  nicotine (NICODERM CQ) 7 MG/24HR, Place 1 patch onto the skin every 24 hours  docusate sodium (COLACE, DULCOLAX) 100 MG CAPS, Take 100 mg by mouth 2 times daily  senna (SENOKOT) 8.6 MG tablet, Take 1 tablet by mouth 2 times daily  gabapentin (NEURONTIN) 100 MG capsule, Take 1 capsule by mouth 3 times daily for 30 days.   potassium chloride (KLOR-CON M) 20 MEQ extended release tablet, Take 1 tablet by mouth daily  OXYGEN, Indications: Chronic Obstructive Lung Disease 3-6L continuously as prescribed  levocetirizine (XYZAL) 5 MG tablet, Take 5 mg by mouth nightly  Ascorbic Acid (VITAMIN C) 100 MG tablet, Take 100 mg by mouth daily  Cholecalciferol (VITAMIN D3) 50 MCG (2000 UT) CAPS, Take 2,000 Units by mouth daily  sacubitril-valsartan (ENTRESTO) 49-51 MG per tablet, Take 1 tablet by mouth 2 times daily  pantoprazole (PROTONIX) 40 MG tablet, Take 40 mg by mouth 2 times daily 30 minutes before two heaviest meals on an empty stomach  LINZESS 290 MCG CAPS capsule, Take 290 mcg by mouth daily   ondansetron (ZOFRAN) 8 MG tablet, daily as needed for Nausea or Vomiting   hydrocortisone (ANUSOL-HC) 25 MG suppository, Place 1 suppository rectally 2 times daily as needed for Hemorrhoids  Revefenacin 175 MCG/3ML SOLN, Inhale 175 mcg into the lungs daily  hydrocortisone 2.5 % cream, Apply topically 2 times daily as needed acetaminophen (TYLENOL) 325 MG tablet, Take 650 mg by mouth as needed for Pain or Fever Indications: Pain Don't take more then 3,000 mg each day  Multiple Vitamins-Minerals (MULTIVITAMIN ADULT) CHEW, Take 2 tablets by mouth daily  Menthol-Methyl Salicylate (ICY HOT) 71-50 % STCK, Apply topically daily  Alum Hydroxide-Mag Carbonate (GAVISCON PO), Take by mouth as needed Indications: Acid Indigestion   lidocaine (XYLOCAINE) 5 % ointment, Apply topically Before venipuncture in Dr. Kristin Castillo office. B Complex-C (RA B-COMPLEX/VITAMIN C CR PO), Take 1 tablet by mouth daily Indications: Treatment to Prevent Vitamin Deficiency   dicyclomine (BENTYL) 10 MG capsule, Take 10 mg by mouth 4 times daily (before meals and nightly) Indications: Pain in the Abdominal Region  Coenzyme Q10 (COQ-10) 200 MG CAPS, Take by mouth daily   Probiotic Product (SUPER PROBIOTIC) CAPS,  Take 1 tablet by mouth daily Indications: Digestive Complaint   budesonide (PULMICORT) 0.5 MG/2ML nebulizer suspension,  Take 1 ampule by nebulization 2 times daily Indications: Shortness of Breath (Inactive)   Arformoterol Tartrate (BROVANA) 15 MCG/2ML NEBU,  Take 1 ampule by nebulization 2 times daily Indications: Shortness of Breath (Inactive)   triamcinolone (KENALOG) 0.1 % cream, Apply topically 2 times daily as needed Anti-fungal  clotrimazole-betamethasone (LOTRISONE) cream, Apply topically 2 times daily as needed Indications: Yeast Infection (inactive)   aspirin 81 MG EC tablet, Take 81 mg by mouth daily. With food  Indications: Anticoagulant Therapy  folic acid (FOLVITE) 1 MG tablet, Take 1 mg by mouth daily.     Indications: Folic Acid Supplementation  traZODone (DESYREL) 50 MG tablet, Take 50 mg by mouth nightly   Omalizumab (XOLAIR SC), Inject into the skin At Dr Darius Lott office  metoprolol succinate (TOPROL XL) 25 MG extended release tablet, Take 25 mg by mouth daily loperamide (IMODIUM) 2 MG capsule, Take 2 mg by mouth 4 times daily as needed for Diarrhea  traMADol (ULTRAM) 50 MG tablet, Take 50 mg by mouth every 6 hours as needed for Pain. nystatin (MYCOSTATIN) 644709 UNIT/GM cream, as needed   warfarin (COUMADIN) 1 MG tablet, Take as directed by the [facility] Clinic, 145 tablets for 90 days  digoxin (LANOXIN) 125 MCG tablet, Take 1 tablet by mouth every other day Indications: Increased Heart Rate Until Office Visit with Dr. Liang Restrepo  hydrOXYzine (ATARAX) 25 MG tablet, Take 25 mg by mouth 3 times daily as needed Indications: Feeling Anxious   Pramoxine HCl (PROCTOFOAM RE), Place rectally daily as needed   EPINEPHrine (EPIPEN) 0.3 MG/0.3ML SOAJ injection, INJECT AS DIRECTED FOR ANAPHYLAXIS  Evolocumab (REPATHA SURECLICK SC), Inject 447 mg into the skin every 14 days Indications: Blood Cholesterol Abnormal   NITROGLYCERIN RE, Place 0.3 mg rectally as needed Indications: Hemorrhoids   ofloxacin (FLOXIN) 0.3 % otic solution, Place 2 drops into both ears daily as needed Indications: Infection Long-term therapy. Carboxymethylcellul-Glycerin (REFRESH OPTIVE OP), Apply  to eye as needed. Indications: Dry Eyes caused by Deficiency of Tears  levalbuterol (XOPENEX) 0.63 MG/3ML nebulization, Take 1 ampule by nebulization every 8 hours as needed for Wheezing. polyethylene glycol (MIRALAX) powder, Take 17 g by mouth as needed. Indications: Constipation  Alcaftadine (LASTACAFT) 0.25 % SOLN, Apply 1 drop to eye daily as needed Indications: Eye Allergy Both eyes  azelastine (ASTELIN) 137 MCG/SPRAY nasal spray, 1 spray by Nasal route 2 times daily as needed Indications: Allergic Rhinitis Use in each nostril as directed  nitroGLYCERIN (NITROSTAT) 0.4 MG SL tablet, Place 0.4 mg under the tongue every 5 minutes as needed. If third one does not relieve pain, call 9-1-1.   Indications: Chest Pain    Current Medications:     Scheduled Meds:    bumetanide  1 mg Oral BID  linezolid  600 mg Oral 2 times per day    Arformoterol Tartrate  15 mcg Nebulization BID    aspirin  81 mg Oral Daily    budesonide  500 mcg Nebulization BID    dicyclomine  10 mg Oral 4x Daily AC & HS    digoxin  125 mcg Oral Every Other Day    gabapentin  100 mg Oral TID    metoprolol succinate  25 mg Oral Daily    nicotine  1 patch Transdermal Q24H    pantoprazole  40 mg Oral BID    tiotropium  2 puff Inhalation Daily    sacubitril-valsartan  1 tablet Oral BID    traZODone  50 mg Oral Nightly    sodium chloride flush  10 mL Intravenous 2 times per day    cetirizine  5 mg Oral Daily    warfarin (COUMADIN) daily dosing (placeholder)   Other RX Placeholder    sodium chloride  500 mL Intravenous Once    cefTRIAXone (ROCEPHIN) IV  1 g Intravenous Q24H     Continuous Infusions:   PRN Meds:  hydrOXYzine, levalbuterol, sodium chloride flush, promethazine **OR** ondansetron, polyethylene glycol, acetaminophen **OR** acetaminophen, midodrine    Input/Output:       I/O last 3 completed shifts: In: 630.3 [P.O.:220; I.V.:410.3]  Out: 350 [Urine:350]. Patient Vitals for the past 96 hrs (Last 3 readings):   Weight   20 0330 142 lb (64.4 kg)   20 0330 147 lb 11.2 oz (67 kg)   20 0420 144 lb 11.2 oz (65.6 kg)       Vital Signs:   Temperature:  Temp: 98.6 °F (37 °C)  TMax:   Temp (24hrs), Av.5 °F (36.9 °C), Min:98.1 °F (36.7 °C), Max:98.8 °F (37.1 °C)    Respirations:  Resp: 18  Pulse:   Pulse: 74  BP:    BP: (!) 147/63  BP Range: Systolic (39EPP), PVY:365 , Min:110 , GAQ:729       Diastolic (00UWH), OCR:14, Min:53, Max:68      Physical Examination:     General:  Awake, alert  HEENT: NC/AT/ MMM  Chest:               diminished  Cardiac:  irregular  Abdomen: Soft, non-tender,  Neuro:  No facial droop, No Asterixis  SKIN:  No rashes, good skin turgor.   Extremities:  2+ LE edema on right 1+ on left, +tenderness    Labs: No results for input(s): WBC, RBC, HGB, HCT, MCV, MCH, MCHC, RDW, PLT, MPV in the last 72 hours. BMP:   Recent Labs     12/21/20  0408 12/22/20  0452 12/23/20  0515    144 143   K 4.6 4.3 4.1    112* 111   CO2 22* 23 24   BUN 37* 23* 16   CREATININE 1.3* 1.0 1.0   GLUCOSE 88 99 96   CALCIUM 8.8 9.0 9.1      Phosphorus:   No results for input(s): PHOS in the last 72 hours. Magnesium:  No results for input(s): MG in the last 72 hours. Albumin:    No results for input(s): LABALBU in the last 72 hours.   BNP:      Lab Results   Component Value Date    .0 10/03/2013     CHERYL:    No results found for: CHERYL  SPEP:  Lab Results   Component Value Date    PROT 6.2 12/19/2020     UPEP:   No results found for: LABPE  C3:   No results found for: C3  C4:   No results found for: C4  MPO ANCA:   No results found for: MPO  PR3 ANCA:   No results found for: PR3  Anti-GBM:   No results found for: GBMABIGG  Hep BsAg:       No results found for: HEPBSAG  Hep C AB:        No results found for: HEPCAB    Urinalysis/Chemistries:      Lab Results   Component Value Date    NITRU NEGATIVE 12/19/2020    COLORU YELLOW 12/19/2020    PHUR 5.0 12/19/2020    LABCAST NONE SEEN 12/19/2020    LABCAST NONE SEEN 12/19/2020    WBCUA 50-75 12/19/2020    RBCUA NONE 12/19/2020    MUCUS THREADS 12/27/2018    YEAST MANY BUDDING 12/19/2020    BACTERIA NONE SEEN 12/19/2020    SPECGRAV 1.013 12/19/2020    LEUKOCYTESUR MODERATE 12/19/2020    LEUKOCYTESUR TRACE 11/08/2020    UROBILINOGEN 0.2 12/19/2020    BILIRUBINUR NEGATIVE 12/19/2020    BLOODU NEGATIVE 12/19/2020    GLUCOSEU NEGATIVE 11/08/2020    KETUA NEGATIVE 12/19/2020     Urine Sodium:   No results found for: JANETTE  Urine Potassium:  No results found for: KUR  Urine Chloride:  No results found for: CLUR  Urine Osmolarity: No results found for: OSMOU  Urine Protein:   No components found for: TOTALPROTEIN, URINE   Urine Creatinine:   No results found for: LABCREA

## 2020-12-23 NOTE — DISCHARGE SUMMARY
Hospital Medicine Discharge Summary      Patient Identification:   Liz Red   : 1945  MRN: [id number]   Account: [de-identified]      Patient's PCP: Anne-Marie Martinez MD    Admit Date: 2020     Discharge Date:   2020    Admitting Physician: Jamaal Tabares DO     Discharge Physician: Kala Polo MD     Discharge Diagnoses:    1. Chronic hypoxic respiratory failure: According to patient's daughter: baseline of 3L of O2 at rest and 6L with activity. Hx of severe COPD. Maintain patient on 2-3L of O2 via NC. Keep SPO2 between 90-94%. 2. Bilateral lower leg cellulitis: Erythematous and tender to palpation. Patient was treated with 9 days of IV Zosyn and 8 days of IV Zyvox in 2020. In an effort to avoid renal toxic drugs. IV Zyvox and IV Rocephin initiated 2020. Continue with Augmentin 875 BID at discharge. 3. HFrEF: Echo on 2020 demonstrated EF of 30-35%. Continue to hold Entresto. Strict I's and O's. Daily weights. Sodium and fluid restriction. Bumex resumed at 1 mg BID. 4. Atrial Fibrillation: Anticoagulated on Coumadin. Rate controlled V-paced rhythm. 5. Hx of Severe COPD: Continue with Brovana, Pulmicort, and tiotropium. 6. Acute severe hypotension (Resolved): Likely secondary to infection. BP dropped to systolic of 70. Improvement of BP with midodrine 10 mg.   7. Acute Kidney Injury (Resolved): Likely prerenal due to hypotension compounded by diuretic and Entresto. Creatinine improved after 1L of normal saline bolus. Continue with normal saline maintenance infusion at 50 mL/hr.   1. 2020 --> Significant improvement of NICOLASA, Cr 1.3. IV fluid maintenance discontinued. Will continue to monitor kidney function with daily BMP. 2. 2020 --> Cr of 1.0. Bumex PO resumed at 1 mg BID. Holding Entresto for now d/t borderline low BP.  3. 2020 --> Blood pressure normalized after initiation of IV antibiotics. Will continue to monitor. 8. Hyperkalemia (Resolved): Potassium of 5.7 on admission. Likely from NICOLASA, Entresto. Down to 4.6 today. Will continue to monitor with daily BMP. The patient was seen and examined on day of discharge and this discharge summary is in conjunction with any daily progress note from day of discharge. HPI:   This is a 67-year-old female who presented to [facility] complaining of weakness and fatigue. Patient was discharged from [facility] on 12/01/2020 after being treated for sepsis. According to the daughter, patient has been slowly declining in mentation. Patient has been having a hard time managing at home. Patient was placed on Augmentin last week by PCP for bilateral lower leg cellulitis. According to the daughter, patient has not been taking her medications consistently. Exam:     Vitals:  Vitals:    12/22/20 1532 12/22/20 2041 12/22/20 2311 12/23/20 0330   BP: (!) 111/53 (!) 169/68 137/60 (!) 110/59   Pulse: 71 75 70 72   Resp: 18 20 24 20   Temp: 98.5 °F (36.9 °C) 98.1 °F (36.7 °C) 98.5 °F (36.9 °C) 98.8 °F (37.1 °C)   TempSrc: Oral Oral Oral Oral   SpO2: 91% 90% 91% 90%   Weight:    142 lb (64.4 kg)   Height:         Weight: Weight: 142 lb (64.4 kg)     24 hour intake/output:    Intake/Output Summary (Last 24 hours) at 12/23/2020 3083  Last data filed at 12/23/2020 0330  Gross per 24 hour   Intake 630.33 ml   Output 350 ml   Net 280.33 ml         General appearance:  No apparent distress, appears stated age and cooperative. HEENT:  Normal cephalic, atraumatic without obvious deformity. Pupils equal, round, and reactive to light. Neck: Supple, with full range of motion. No jugular venous distention. Trachea midline. Respiratory:  Normal respiratory effort. Clear to auscultation, bilaterally without Rales/Wheezes/Rhonchi. Cardiovascular:  Irregular rate and rhythm. Abdomen: Soft, non-tender, non-distended with normal bowel sounds. Musculoskeletal:  No clubbing, cyanosis or edema bilaterally. Full range of motion without deformity. Skin: Skin color, texture, turgor normal.   Neurologic:  Neurovascularly intact without any focal sensory/motor deficits. Psychiatric:  Alert and oriented, thought content appropriate, normal insight  Capillary Refill: Brisk,< 3 seconds   Peripheral Pulses: +2 palpable, equal bilaterally       Labs: For convenience and continuity at follow-up the following most recent labs are provided:      CBC:    Lab Results   Component Value Date    WBC 5.1 12/20/2020    HGB 9.3 12/20/2020    HCT 30.0 12/20/2020     12/20/2020       Renal:    Lab Results   Component Value Date     12/23/2020    K 4.1 12/23/2020    K 4.6 12/21/2020     12/23/2020    CO2 24 12/23/2020    BUN 16 12/23/2020    CREATININE 1.0 12/23/2020    CALCIUM 9.1 12/23/2020         Significant Diagnostic Studies    Radiology:   VL DUP LOWER EXTREMITY VENOUS LEFT   Final Result      No evidence of deep venous thrombosis in the left lower extremity. Final report electronically signed by Dr. Rashaun Min on 12/19/2020 8:00 PM      XR CHEST 1 VIEW   Final Result   1. Small left-sided pleural effusion. 2. Mild stable cardiomegaly. Final report electronically signed by Dr. Rashaun Min on 12/19/2020 6:35 PM      CT Head WO Contrast   Final Result   1. No mass effect or acute hemorrhage. 2. Chronic periventricular small vessel ischemic changes and cerebral atrophy. **This report has been created using voice recognition software. It may contain minor errors which are inherent in voice recognition technology. **      Final report electronically signed by Dr. Rashaun Min on 12/19/2020 6:29 PM             Consults:     PHARMACY TO DOSE WARFARIN  IP CONSULT TO NEPHROLOGY  IP CONSULT TO CARDIOLOGY  IP CONSULT TO SOCIAL WORK    Disposition: Home  Condition at Discharge: Stable    Code Status:  Limited Patient Instructions:    Discharge lab work: Activity: activity as tolerated  Diet: DIET RENAL;      Follow-up visits:   Antonio Ulloa MD  [address]  [phone]      Dr. Queenie Lazaro assistant will call you tomorrow 12/23 to set appointment date/time. If they do not call you by 2pm, please give them a call to arrange the follow up appointment. CM [facility]  [address] [address]  [phone]        Carla Orozco, [address] [id number] [phone]    Schedule an appointment as soon as possible for a visit  f/u in 2-4 weeks         Discharge Medications:      David Loges   Home Medication Instructions HQL:[id number]    Printed on:12/23/20 7143   Medication Information                      acetaminophen (TYLENOL) 325 MG tablet  Take 650 mg by mouth as needed for Pain or Fever Indications: Pain Don't take more then 3,000 mg each day             Alcaftadine (LASTACAFT) 0.25 % SOLN  Apply 1 drop to eye daily as needed Indications: Eye Allergy Both eyes             Alum Hydroxide-Mag Carbonate (GAVISCON PO)  Take by mouth as needed Indications: Acid Indigestion              amoxicillin-clavulanate (AUGMENTIN) 875-125 MG per tablet  Take 1 tablet by mouth 2 times daily             Arformoterol Tartrate (BROVANA) 15 MCG/2ML NEBU    Take 1 ampule by nebulization 2 times daily Indications: Shortness of Breath (Inactive)              Ascorbic Acid (VITAMIN C) 100 MG tablet  Take 100 mg by mouth daily             aspirin 81 MG EC tablet  Take 81 mg by mouth daily. With food  Indications: Anticoagulant Therapy             azelastine (ASTELIN) 137 MCG/SPRAY nasal spray  1 spray by Nasal route 2 times daily as needed Indications:  Allergic Rhinitis Use in each nostril as directed             B Complex-C (RA B-COMPLEX/VITAMIN C CR PO) Take 1 tablet by mouth daily Indications: Treatment to Prevent Vitamin Deficiency              budesonide (PULMICORT) 0.5 MG/2ML nebulizer suspension    Take 1 ampule by nebulization 2 times daily Indications: Shortness of Breath (Inactive)              bumetanide (BUMEX) 2 MG tablet  Take 1 tablet by mouth 2 times daily             Carboxymethylcellul-Glycerin (REFRESH OPTIVE OP)  Apply  to eye as needed. Indications: Dry Eyes caused by Deficiency of Tears             Cholecalciferol (VITAMIN D3) 50 MCG (2000 UT) CAPS  Take 2,000 Units by mouth daily             clotrimazole-betamethasone (LOTRISONE) cream  Apply topically 2 times daily as needed Indications: Yeast Infection (inactive)              Coenzyme Q10 (COQ-10) 200 MG CAPS  Take by mouth daily              dicyclomine (BENTYL) 10 MG capsule  Take 10 mg by mouth 4 times daily (before meals and nightly) Indications: Pain in the Abdominal Region             digoxin (LANOXIN) 125 MCG tablet  Take 1 tablet by mouth every other day Indications: Increased Heart Rate Until Office Visit with Dr. Minoo Tian             docusate sodium (COLACE, DULCOLAX) 100 MG CAPS  Take 100 mg by mouth 2 times daily             EPINEPHrine (EPIPEN) 0.3 MG/0.3ML SOAJ injection  INJECT AS DIRECTED FOR ANAPHYLAXIS             Evolocumab (REPATHA SURECLICK SC)  Inject 882 mg into the skin every 14 days Indications: Blood Cholesterol Abnormal              folic acid (FOLVITE) 1 MG tablet  Take 1 mg by mouth daily. Indications: Folic Acid Supplementation             gabapentin (NEURONTIN) 100 MG capsule  Take 1 capsule by mouth 3 times daily for 30 days.              hydrocortisone (ANUSOL-HC) 25 MG suppository  Place 1 suppository rectally 2 times daily as needed for Hemorrhoids             hydrocortisone 2.5 % cream  Apply topically 2 times daily as needed              hydrOXYzine (ATARAX) 25 MG tablet  Take 25 mg by mouth 3 times daily as needed Indications: Feeling Anxious levalbuterol (XOPENEX) 0.63 MG/3ML nebulization  Take 1 ampule by nebulization every 8 hours as needed for Wheezing. levocetirizine (XYZAL) 5 MG tablet  Take 5 mg by mouth nightly             lidocaine (XYLOCAINE) 5 % ointment  Apply topically Before venipuncture in Dr. Emir Goodwin office. LINZESS 290 MCG CAPS capsule  Take 290 mcg by mouth daily              loperamide (IMODIUM) 2 MG capsule  Take 2 mg by mouth 4 times daily as needed for Diarrhea             Menthol-Methyl Salicylate (ICY HOT) 40-97 % STCK  Apply topically daily             metoprolol succinate (TOPROL XL) 25 MG extended release tablet  Take 25 mg by mouth daily             Multiple Vitamins-Minerals (MULTIVITAMIN ADULT) CHEW  Take 2 tablets by mouth daily             nicotine (NICODERM CQ) 7 MG/24HR  Place 1 patch onto the skin every 24 hours             nitroGLYCERIN (NITROSTAT) 0.4 MG SL tablet  Place 0.4 mg under the tongue every 5 minutes as needed. If third one does not relieve pain, call 9-1-1. Indications: Chest Pain             NITROGLYCERIN RE  Place 0.3 mg rectally as needed Indications: Hemorrhoids              nystatin (MYCOSTATIN) 176746 UNIT/GM cream  as needed              ofloxacin (FLOXIN) 0.3 % otic solution  Place 2 drops into both ears daily as needed Indications: Infection Long-term therapy. Omalizumab (XOLAIR SC)  Inject into the skin At Dr Kimberly Bagley office             ondansetron (ZOFRAN) 8 MG tablet  daily as needed for Nausea or Vomiting              OXYGEN  Indications: Chronic Obstructive Lung Disease 3-6L continuously as prescribed             pantoprazole (PROTONIX) 40 MG tablet  Take 40 mg by mouth 2 times daily 30 minutes before two heaviest meals on an empty stomach             polyethylene glycol (MIRALAX) powder  Take 17 g by mouth as needed.  Indications: Constipation             potassium chloride (KLOR-CON M) 20 MEQ extended release tablet Take 1 tablet by mouth daily             Pramoxine HCl (PROCTOFOAM RE)  Place rectally daily as needed              Probiotic Product (SUPER PROBIOTIC) CAPS    Take 1 tablet by mouth daily Indications: Digestive Complaint              Revefenacin 175 MCG/3ML SOLN  Inhale 175 mcg into the lungs daily             sacubitril-valsartan (ENTRESTO) 49-51 MG per tablet  Take 1 tablet by mouth 2 times daily             senna (SENOKOT) 8.6 MG tablet  Take 1 tablet by mouth 2 times daily             traMADol (ULTRAM) 50 MG tablet  Take 50 mg by mouth every 6 hours as needed for Pain. traZODone (DESYREL) 50 MG tablet  Take 50 mg by mouth nightly              triamcinolone (KENALOG) 0.1 % cream  Apply topically 2 times daily as needed Anti-fungal             warfarin (COUMADIN) 1 MG tablet  Take as directed by the [facility], 145 tablets for 90 days                 Time Spent on discharge is more than 45 minutes in the examination, evaluation, counseling and review of medications and discharge plan. Signed: Thank you Bret Summers MD for the opportunity to be involved in this patient's care.     Electronically signed by Ari Cartagena MD on 12/23/2020 at 8:52 AM

## 2020-12-24 NOTE — TELEPHONE ENCOUNTER
Received call from Vazquez Beaulieu at [facility]. CTC informed her that patient had a black stool with foul odor this morning, and had a black stool yesterday before discharge. Vazquez Beaulieu states she is putting a call out to Dr. Hipolito Painter office to report this. Also sending a nurse out today to evaluate patient and stool. INR was 2.83 yesterday before discharge. Patient scheduled for next INR 12/28. Recommend to Vazquez Beaulieu to have patient go to the emergency department if determined having blood in stool.

## 2020-12-24 NOTE — CARE COORDINATION
,changed & stopped meds was performed with family, who verbalizes understanding of administration of home medications. Advised obtaining a 90-day supply of all daily and as-needed medications. Covid Risk Education    Patient has following risk factors of: heart failure. Education provided regarding infection prevention, and signs and symptoms of COVID-19 and when to seek medical attention with family who verbalized understanding. Discussed exposure protocols and quarantine From CDC: Are you at higher risk for severe illness?   and given an opportunity for questions and concerns. The family agrees to contact the COVID-19 hotline [phone] or PCP office for questions related to COVID-19. For more information on steps you can take to protect yourself, see CDC's How to Protect Yourself     Patient/family/caregiver given information for [employer] [facility] and agrees to enroll no    Discussed follow-up appointments. If no appointment was previously scheduled, appointment scheduling offered: No and office closed. Is follow up appointment scheduled within 7 days of discharge? Not scheduled  Non-[facility] follow up appointment(s): family will call Mon    No further calls, [facility] will follow, based on severity of symptoms and risk factors. CTN provided contact information for future needs.     Non-face-to-face services provided:  Communication with home health agencies or other community services the patient is currently using-notified [facility] of discharge    Care Transitions 24 Hour Call    Do you have any ongoing symptoms?: Yes  Patient-reported symptoms: Other (Comment: legs still red & swelling, some better     black liquid stool w odor)  Interventions for patient-reported symptoms: Notified Home Care  Do you have a copy of your discharge instructions?: Yes  Do you have all of your prescriptions and are they filled?: Yes  Have you scheduled your follow up appointment?: No  Were you discharged with any Home Care or Post Acute Services: Yes  Post Acute Services: Home Health (Comment: [facility])  Do you feel like you have everything you need to keep you well at home?: Yes  Care Transitions Interventions  No Identified Needs     Called & spoke with pt daughter Wilmer Jacome for the [place] call. Pt is still confused. Daughter reported the left leg look better, foot is still swollen. Daughter reported the right leg has not changed, \"looks the same\"  Pt has been using O2 2-3 L/M, rarely increases to 6L. Daughter stated pt had a black stool before discharge & did not tell the nurse, pt had a liquid black stool with foul odor this AM. Pt denied any abd pain & has been eating without any C/O nausea. CTN notified [facility] of above & the need for INR on Mon. Wai Garcia reported she will notify the [facility] manager of [facility]. Daughter informed this is the final transition of care call. Instructed to call the primary care provider for any concerns. Please call during the regular office hours if possible, for urgent problems,  there is a provider on call. Call the office & follow the prompts. The answering service is able to make appointments for the following day. Call 911 for emergencies.     Follow Up  Future Appointments   Date Time Provider Marley Lerma   12/28/2020  8:20 AM Colette Leo [facility] - [address] Transition Nurse  [phone]

## 2020-12-28 NOTE — TELEPHONE ENCOUNTER
INR 3.13 per [place] in lab (result having difficulty crossing over).     David Gao, PharmD, BCPS  12/28/2020  4:02 PM

## 2020-12-28 NOTE — PROGRESS NOTES
Medication Management [address]  [phone] (phone)  [phone] (fax)      INR drawn by [facility]. Nursing assessment reviewed and appreciated. Nursing assessment within the normal limits with the exception of the following:  n/a    Anticoagulation encounter completed via telephone. Patient verifies current dosing regimen and tablet strength. No missed or extra doses. Patient denies s/s bleeding/bruising/swelling/SOB/chest pain  No blood in urine or stool. No dietary changes. Overall, less of an appetite  No changes in medication/OTC agents/Herbals. Taking a course of Augmentin x7 days  No change in alcohol use or tobacco use. No change in activity level. Patient denies headaches/dizziness/lightheadedness. [place] a few days ago - a few bruises, but did not hit head. No vomiting or acute illness. Some diarrhea  No Procedures scheduled in the future at this time. Assessment:  Lab Results   Component Value Date    INR 3.13 12/28/2020    INR 2.83 (H) 12/23/2020    INR 1.89 (H) 12/22/2020    PROTIME 22.3 02/05/2019     INR supratherapeutic   Recent Labs     12/28/20   INR 3.13     Plan:  Take Coumadin 0.5mg x1, then reduce to Coumadin 1mg MoWeFr and 1.5mg SuTuThSa (5.3% decrease). Recheck INR in 1 week(s) on 1/5 by [facility]. Patient reminded to call the Anticoagulation Clinic with any signs or symptoms of bleeding or with any medication changes. Patient given instructions utilizing the teach back method. Left message for Dewitte Goodell for call back.       The following statement was review with patient regarding this virtual visit: We want to confirm that, for purposes of billing, this is a virtual visit with your provider for which we will submit a claim for reimbursement with your insurance company. You may be responsible for any copays, coinsurance amounts or other amounts not covered by your insurance company. If you do not accept this, unfortunately we will not be able to schedule a virtual visit with the provider. Do you accept?   Yes    CLINICAL PHARMACY CONSULT: MED RECONCILIATION/REVIEW ADDENDUM    For Pharmacy Admin Tracking Only    PHSO: No  Total # of Interventions Recommended: 1  - Decreased Dose #: 1  - Maintenance Safety Lab Monitoring #: 1  Total Interventions Accepted: 1  Time Spent (min): 15    Andres ForresterD

## 2021-01-01 ENCOUNTER — APPOINTMENT (OUTPATIENT)
Dept: GENERAL RADIOLOGY | Age: 76
DRG: 291 | End: 2021-01-01
Payer: MEDICARE

## 2021-01-01 ENCOUNTER — HOSPITAL ENCOUNTER (INPATIENT)
Age: 76
LOS: 8 days | DRG: 291 | End: 2021-01-14
Attending: INTERNAL MEDICINE | Admitting: INTERNAL MEDICINE
Payer: MEDICARE

## 2021-01-01 ENCOUNTER — APPOINTMENT (OUTPATIENT)
Dept: PHARMACY | Age: 76
End: 2021-01-01
Payer: COMMERCIAL

## 2021-01-01 ENCOUNTER — HOSPITAL ENCOUNTER (OUTPATIENT)
Dept: PHARMACY | Age: 76
Setting detail: THERAPIES SERIES
Discharge: HOME | End: 2021-01-05
Payer: COMMERCIAL

## 2021-01-01 VITALS
HEART RATE: 82 BPM | HEIGHT: 62 IN | BODY MASS INDEX: 24.54 KG/M2 | DIASTOLIC BLOOD PRESSURE: 52 MMHG | OXYGEN SATURATION: 85 % | SYSTOLIC BLOOD PRESSURE: 107 MMHG | TEMPERATURE: 93.3 F | WEIGHT: 133.38 LBS | RESPIRATION RATE: 22 BRPM

## 2021-01-01 DIAGNOSIS — I48.19 PERSISTENT ATRIAL FIBRILLATION (HCC): ICD-10-CM

## 2021-01-01 DIAGNOSIS — J96.20 ACUTE ON CHRONIC RESPIRATORY FAILURE, UNSPECIFIED WHETHER WITH HYPOXIA OR HYPERCAPNIA (HCC): Primary | ICD-10-CM

## 2021-01-01 LAB
ABO: NORMAL
ALBUMIN SERPL-MCNC: 3 G/DL (ref 3.5–5.1)
ALBUMIN SERPL-MCNC: 3.1 G/DL (ref 3.5–5.1)
ALBUMIN SERPL-MCNC: 3.2 G/DL (ref 3.5–5.1)
ALBUMIN SERPL-MCNC: 3.2 G/DL (ref 3.5–5.1)
ALBUMIN SERPL-MCNC: 3.6 G/DL (ref 3.5–5.1)
ALP BLD-CCNC: 59 U/L (ref 38–126)
ALP BLD-CCNC: 60 U/L (ref 38–126)
ALP BLD-CCNC: 60 U/L (ref 38–126)
ALP BLD-CCNC: 61 U/L (ref 38–126)
ALP BLD-CCNC: 70 U/L (ref 38–126)
ALT SERPL-CCNC: 6 U/L (ref 11–66)
ALT SERPL-CCNC: 6 U/L (ref 11–66)
ALT SERPL-CCNC: 7 U/L (ref 11–66)
ALT SERPL-CCNC: 7 U/L (ref 11–66)
ALT SERPL-CCNC: 8 U/L (ref 11–66)
ANION GAP SERPL CALCULATED.3IONS-SCNC: 11 MEQ/L (ref 8–16)
ANION GAP SERPL CALCULATED.3IONS-SCNC: 12 MEQ/L (ref 8–16)
ANION GAP SERPL CALCULATED.3IONS-SCNC: 5 MEQ/L (ref 8–16)
ANION GAP SERPL CALCULATED.3IONS-SCNC: 6 MEQ/L (ref 8–16)
ANION GAP SERPL CALCULATED.3IONS-SCNC: 6 MEQ/L (ref 8–16)
ANION GAP SERPL CALCULATED.3IONS-SCNC: 7 MEQ/L (ref 8–16)
ANION GAP SERPL CALCULATED.3IONS-SCNC: 9 MEQ/L (ref 8–16)
ANTIBODY SCREEN: NORMAL
APTT: 115.5 SECONDS (ref 22–38)
APTT: 31.1 SECONDS (ref 22–38)
APTT: 55 SECONDS (ref 22–38)
APTT: 61.4 SECONDS (ref 22–38)
APTT: 61.8 SECONDS (ref 22–38)
APTT: 65.4 SECONDS (ref 22–38)
APTT: 72 SECONDS (ref 22–38)
APTT: 84.3 SECONDS (ref 22–38)
APTT: 93.7 SECONDS (ref 22–38)
APTT: 94.2 SECONDS (ref 22–38)
APTT: 97.6 SECONDS (ref 22–38)
APTT: 98.2 SECONDS (ref 22–38)
AST SERPL-CCNC: 10 U/L (ref 5–40)
AST SERPL-CCNC: 12 U/L (ref 5–40)
BASE EXCESS (CALCULATED): 4.7 MMOL/L (ref -2.5–2.5)
BASOPHILS # BLD: 0.3 %
BASOPHILS # BLD: 0.3 %
BASOPHILS ABSOLUTE: 0 THOU/MM3 (ref 0–0.1)
BASOPHILS ABSOLUTE: 0 THOU/MM3 (ref 0–0.1)
BILIRUB SERPL-MCNC: 0.4 MG/DL (ref 0.3–1.2)
BILIRUB SERPL-MCNC: 0.5 MG/DL (ref 0.3–1.2)
BILIRUB SERPL-MCNC: 0.5 MG/DL (ref 0.3–1.2)
BILIRUB SERPL-MCNC: 0.6 MG/DL (ref 0.3–1.2)
BILIRUB SERPL-MCNC: 0.6 MG/DL (ref 0.3–1.2)
BILIRUBIN DIRECT: < 0.2 MG/DL (ref 0–0.3)
BILIRUBIN DIRECT: < 0.2 MG/DL (ref 0–0.3)
BLOOD CULTURE, ROUTINE: ABNORMAL
BLOOD CULTURE, ROUTINE: ABNORMAL
BLOOD CULTURE, ROUTINE: NORMAL
BLOOD CULTURE, ROUTINE: NORMAL
BUN BLDV-MCNC: 17 MG/DL (ref 7–22)
BUN BLDV-MCNC: 18 MG/DL (ref 7–22)
BUN BLDV-MCNC: 19 MG/DL (ref 7–22)
BUN BLDV-MCNC: 20 MG/DL (ref 7–22)
BUN BLDV-MCNC: 21 MG/DL (ref 7–22)
BUN BLDV-MCNC: 26 MG/DL (ref 7–22)
BUN BLDV-MCNC: 26 MG/DL (ref 7–22)
C-REACTIVE PROTEIN: 1.22 MG/DL (ref 0–1)
CALCIUM IONIZED: 1.01 MMOL/L (ref 1.12–1.32)
CALCIUM SERPL-MCNC: 8.8 MG/DL (ref 8.5–10.5)
CALCIUM SERPL-MCNC: 8.8 MG/DL (ref 8.5–10.5)
CALCIUM SERPL-MCNC: 8.9 MG/DL (ref 8.5–10.5)
CALCIUM SERPL-MCNC: 8.9 MG/DL (ref 8.5–10.5)
CALCIUM SERPL-MCNC: 9 MG/DL (ref 8.5–10.5)
CALCIUM SERPL-MCNC: 9 MG/DL (ref 8.5–10.5)
CALCIUM SERPL-MCNC: 9.7 MG/DL (ref 8.5–10.5)
CHLORIDE BLD-SCNC: 103 MEQ/L (ref 98–111)
CHLORIDE BLD-SCNC: 103 MEQ/L (ref 98–111)
CHLORIDE BLD-SCNC: 96 MEQ/L (ref 98–111)
CHLORIDE BLD-SCNC: 96 MEQ/L (ref 98–111)
CHLORIDE BLD-SCNC: 98 MEQ/L (ref 98–111)
CHLORIDE BLD-SCNC: 98 MEQ/L (ref 98–111)
CHLORIDE BLD-SCNC: 99 MEQ/L (ref 98–111)
CHOLESTEROL, TOTAL: 116 MG/DL (ref 100–199)
CHOLESTEROL, TOTAL: 98 MG/DL (ref 100–199)
CO2: 30 MEQ/L (ref 23–33)
CO2: 32 MEQ/L (ref 23–33)
CO2: 33 MEQ/L (ref 23–33)
CO2: 34 MEQ/L (ref 23–33)
COLLECTED BY:: ABNORMAL
CREAT SERPL-MCNC: 0.7 MG/DL (ref 0.4–1.2)
CREAT SERPL-MCNC: 0.8 MG/DL (ref 0.4–1.2)
CREAT SERPL-MCNC: 0.9 MG/DL (ref 0.4–1.2)
CREAT SERPL-MCNC: 0.9 MG/DL (ref 0.4–1.2)
CREAT SERPL-MCNC: 1 MG/DL (ref 0.4–1.2)
CREAT SERPL-MCNC: 1 MG/DL (ref 0.4–1.2)
CREAT SERPL-MCNC: 1.1 MG/DL (ref 0.4–1.2)
D-DIMER QUANTITATIVE: 681 NG/ML FEU (ref 0–500)
DEVICE: ABNORMAL
EKG ATRIAL RATE: 69 BPM
EKG ATRIAL RATE: 70 BPM
EKG ATRIAL RATE: 77 BPM
EKG P AXIS: 55 DEGREES
EKG Q-T INTERVAL: 440 MS
EKG Q-T INTERVAL: 440 MS
EKG Q-T INTERVAL: 462 MS
EKG QRS DURATION: 150 MS
EKG QRS DURATION: 164 MS
EKG QRS DURATION: 170 MS
EKG QTC CALCULATION (BAZETT): 475 MS
EKG QTC CALCULATION (BAZETT): 481 MS
EKG QTC CALCULATION (BAZETT): 498 MS
EKG R AXIS: -136 DEGREES
EKG R AXIS: -92 DEGREES
EKG R AXIS: -94 DEGREES
EKG T AXIS: 103 DEGREES
EKG T AXIS: 83 DEGREES
EKG T AXIS: 91 DEGREES
EKG VENTRICULAR RATE: 70 BPM
EKG VENTRICULAR RATE: 70 BPM
EKG VENTRICULAR RATE: 72 BPM
EOSINOPHIL # BLD: 1.6 %
EOSINOPHIL # BLD: 1.7 %
EOSINOPHILS ABSOLUTE: 0.2 THOU/MM3 (ref 0–0.4)
EOSINOPHILS ABSOLUTE: 0.2 THOU/MM3 (ref 0–0.4)
ERYTHROCYTE [DISTWIDTH] IN BLOOD BY AUTOMATED COUNT: 15.7 % (ref 11.5–14.5)
ERYTHROCYTE [DISTWIDTH] IN BLOOD BY AUTOMATED COUNT: 15.9 % (ref 11.5–14.5)
ERYTHROCYTE [DISTWIDTH] IN BLOOD BY AUTOMATED COUNT: 16 % (ref 11.5–14.5)
ERYTHROCYTE [DISTWIDTH] IN BLOOD BY AUTOMATED COUNT: 16.1 % (ref 11.5–14.5)
ERYTHROCYTE [DISTWIDTH] IN BLOOD BY AUTOMATED COUNT: 16.3 % (ref 11.5–14.5)
ERYTHROCYTE [DISTWIDTH] IN BLOOD BY AUTOMATED COUNT: 16.5 % (ref 11.5–14.5)
ERYTHROCYTE [DISTWIDTH] IN BLOOD BY AUTOMATED COUNT: 56.2 FL (ref 35–45)
ERYTHROCYTE [DISTWIDTH] IN BLOOD BY AUTOMATED COUNT: 56.9 FL (ref 35–45)
ERYTHROCYTE [DISTWIDTH] IN BLOOD BY AUTOMATED COUNT: 57.1 FL (ref 35–45)
ERYTHROCYTE [DISTWIDTH] IN BLOOD BY AUTOMATED COUNT: 57.1 FL (ref 35–45)
ERYTHROCYTE [DISTWIDTH] IN BLOOD BY AUTOMATED COUNT: 57.4 FL (ref 35–45)
ERYTHROCYTE [DISTWIDTH] IN BLOOD BY AUTOMATED COUNT: 58.3 FL (ref 35–45)
ERYTHROCYTE [DISTWIDTH] IN BLOOD BY AUTOMATED COUNT: 58.6 FL (ref 35–45)
ERYTHROCYTE [DISTWIDTH] IN BLOOD BY AUTOMATED COUNT: 58.6 FL (ref 35–45)
FERRITIN: 118 NG/ML (ref 10–291)
GFR SERPL CREATININE-BSD FRML MDRD: 48 ML/MIN/1.73M2
GFR SERPL CREATININE-BSD FRML MDRD: 54 ML/MIN/1.73M2
GFR SERPL CREATININE-BSD FRML MDRD: 54 ML/MIN/1.73M2
GFR SERPL CREATININE-BSD FRML MDRD: 61 ML/MIN/1.73M2
GFR SERPL CREATININE-BSD FRML MDRD: 61 ML/MIN/1.73M2
GFR SERPL CREATININE-BSD FRML MDRD: 70 ML/MIN/1.73M2
GFR SERPL CREATININE-BSD FRML MDRD: 81 ML/MIN/1.73M2
GLUCOSE BLD-MCNC: 101 MG/DL (ref 70–108)
GLUCOSE BLD-MCNC: 123 MG/DL (ref 70–108)
GLUCOSE BLD-MCNC: 123 MG/DL (ref 70–108)
GLUCOSE BLD-MCNC: 125 MG/DL (ref 70–108)
GLUCOSE BLD-MCNC: 127 MG/DL (ref 70–108)
GLUCOSE BLD-MCNC: 157 MG/DL (ref 70–108)
GLUCOSE BLD-MCNC: 158 MG/DL (ref 70–108)
GLUCOSE BLD-MCNC: 91 MG/DL (ref 70–108)
HCO3: 30 MMOL/L (ref 23–28)
HCT VFR BLD CALC: 25.7 % (ref 37–47)
HCT VFR BLD CALC: 28.7 % (ref 37–47)
HCT VFR BLD CALC: 29 % (ref 37–47)
HCT VFR BLD CALC: 29.4 % (ref 37–47)
HCT VFR BLD CALC: 31.6 % (ref 37–47)
HCT VFR BLD CALC: 33.2 % (ref 37–47)
HCT VFR BLD CALC: 36.4 % (ref 37–47)
HCT VFR BLD CALC: 37.5 % (ref 37–47)
HDLC SERPL-MCNC: 47 MG/DL
HDLC SERPL-MCNC: 55 MG/DL
HEMOGLOBIN: 10.6 GM/DL (ref 12–16)
HEMOGLOBIN: 11.2 GM/DL (ref 12–16)
HEMOGLOBIN: 7.5 GM/DL (ref 12–16)
HEMOGLOBIN: 8.3 GM/DL (ref 12–16)
HEMOGLOBIN: 8.6 GM/DL (ref 12–16)
HEMOGLOBIN: 8.7 GM/DL (ref 12–16)
HEMOGLOBIN: 9.3 GM/DL (ref 12–16)
HEMOGLOBIN: 9.8 GM/DL (ref 12–16)
IFIO2: 100
IMMATURE GRANS (ABS): 0.02 THOU/MM3 (ref 0–0.07)
IMMATURE GRANS (ABS): 0.05 THOU/MM3 (ref 0–0.07)
IMMATURE GRANULOCYTES: 0.2 %
IMMATURE GRANULOCYTES: 0.3 %
INR BLD: 1.53 (ref 0.85–1.13)
INR BLD: 1.65 (ref 0.85–1.13)
INR BLD: 1.92 (ref 0.85–1.13)
INR BLD: 1.99 (ref 0.85–1.13)
INR BLD: 2.3 (ref 0.85–1.13)
INR BLD: 2.36 (ref 0.85–1.13)
INR BLD: 2.73 (ref 0.85–1.13)
INR BLD: 3.06 (ref 0.85–1.13)
LACTIC ACID, SEPSIS: 2.3 MMOL/L (ref 0.5–1.9)
LACTIC ACID, SEPSIS: 2.7 MMOL/L (ref 0.5–1.9)
LACTIC ACID: 1.3 MMOL/L (ref 0.5–2.2)
LACTIC ACID: 1.3 MMOL/L (ref 0.5–2.2)
LD: 239 U/L (ref 100–190)
LDL CHOLESTEROL CALCULATED: 34 MG/DL
LDL CHOLESTEROL CALCULATED: 41 MG/DL
LV EF: 28 %
LVEF MODALITY: NORMAL
LYMPHOCYTES # BLD: 11.4 %
LYMPHOCYTES # BLD: 9.2 %
LYMPHOCYTES ABSOLUTE: 0.9 THOU/MM3 (ref 1–4.8)
LYMPHOCYTES ABSOLUTE: 1.6 THOU/MM3 (ref 1–4.8)
MAGNESIUM: 1.8 MG/DL (ref 1.6–2.4)
MAGNESIUM: 1.8 MG/DL (ref 1.6–2.4)
MAGNESIUM: 1.9 MG/DL (ref 1.6–2.4)
MCH RBC QN AUTO: 28.2 PG (ref 26–33)
MCH RBC QN AUTO: 28.7 PG (ref 26–33)
MCH RBC QN AUTO: 28.8 PG (ref 26–33)
MCH RBC QN AUTO: 28.9 PG (ref 26–33)
MCH RBC QN AUTO: 28.9 PG (ref 26–33)
MCH RBC QN AUTO: 29 PG (ref 26–33)
MCH RBC QN AUTO: 29.5 PG (ref 26–33)
MCH RBC QN AUTO: 29.6 PG (ref 26–33)
MCHC RBC AUTO-ENTMCNC: 28.6 GM/DL (ref 32.2–35.5)
MCHC RBC AUTO-ENTMCNC: 29.1 GM/DL (ref 32.2–35.5)
MCHC RBC AUTO-ENTMCNC: 29.2 GM/DL (ref 32.2–35.5)
MCHC RBC AUTO-ENTMCNC: 29.4 GM/DL (ref 32.2–35.5)
MCHC RBC AUTO-ENTMCNC: 29.5 GM/DL (ref 32.2–35.5)
MCHC RBC AUTO-ENTMCNC: 29.6 GM/DL (ref 32.2–35.5)
MCHC RBC AUTO-ENTMCNC: 29.9 GM/DL (ref 32.2–35.5)
MCHC RBC AUTO-ENTMCNC: 30 GM/DL (ref 32.2–35.5)
MCV RBC AUTO: 100 FL (ref 81–99)
MCV RBC AUTO: 96.9 FL (ref 81–99)
MCV RBC AUTO: 97 FL (ref 81–99)
MCV RBC AUTO: 98.1 FL (ref 81–99)
MCV RBC AUTO: 98.6 FL (ref 81–99)
MCV RBC AUTO: 98.6 FL (ref 81–99)
MCV RBC AUTO: 98.9 FL (ref 81–99)
MCV RBC AUTO: 99.2 FL (ref 81–99)
MONOCYTES # BLD: 11.7 %
MONOCYTES # BLD: 8.1 %
MONOCYTES ABSOLUTE: 0.8 THOU/MM3 (ref 0.4–1.3)
MONOCYTES ABSOLUTE: 1.7 THOU/MM3 (ref 0.4–1.3)
MRSA SCREEN RT-PCR: NEGATIVE
MRSA SCREEN: NORMAL
NUCLEATED RED BLOOD CELLS: 0 /100 WBC
NUCLEATED RED BLOOD CELLS: 0 /100 WBC
O2 SATURATION: 98 %
ORGANISM: ABNORMAL
ORGANISM: ABNORMAL
OSMOLALITY CALCULATION: 294.6 MOSMOL/KG (ref 275–300)
PCO2: 47 MMHG (ref 35–45)
PH BLOOD GAS: 7.42 (ref 7.35–7.45)
PHOSPHORUS: 2.6 MG/DL (ref 2.4–4.7)
PHOSPHORUS: 3 MG/DL (ref 2.4–4.7)
PLATELET # BLD: 161 THOU/MM3 (ref 130–400)
PLATELET # BLD: 169 THOU/MM3 (ref 130–400)
PLATELET # BLD: 197 THOU/MM3 (ref 130–400)
PLATELET # BLD: 207 THOU/MM3 (ref 130–400)
PLATELET # BLD: 223 THOU/MM3 (ref 130–400)
PLATELET # BLD: 253 THOU/MM3 (ref 130–400)
PLATELET # BLD: 262 THOU/MM3 (ref 130–400)
PLATELET # BLD: 333 THOU/MM3 (ref 130–400)
PMV BLD AUTO: 10.4 FL (ref 9.4–12.4)
PMV BLD AUTO: 11 FL (ref 9.4–12.4)
PMV BLD AUTO: 11.1 FL (ref 9.4–12.4)
PMV BLD AUTO: 11.2 FL (ref 9.4–12.4)
PMV BLD AUTO: 11.5 FL (ref 9.4–12.4)
PO2: 111 MMHG (ref 71–104)
POTASSIUM REFLEX MAGNESIUM: 3.6 MEQ/L (ref 3.5–5.2)
POTASSIUM REFLEX MAGNESIUM: 4.5 MEQ/L (ref 3.5–5.2)
POTASSIUM SERPL-SCNC: 3.3 MEQ/L (ref 3.5–5.2)
POTASSIUM SERPL-SCNC: 3.4 MEQ/L (ref 3.5–5.2)
POTASSIUM SERPL-SCNC: 4.2 MEQ/L (ref 3.5–5.2)
POTASSIUM SERPL-SCNC: 4.5 MEQ/L (ref 3.5–5.2)
POTASSIUM SERPL-SCNC: 4.8 MEQ/L (ref 3.5–5.2)
PRO-BNP: ABNORMAL PG/ML (ref 0–1800)
PROCALCITONIN: 0.11 NG/ML (ref 0.01–0.09)
PROCALCITONIN: 0.16 NG/ML (ref 0.01–0.09)
RBC # BLD: 2.59 MILL/MM3 (ref 4.2–5.4)
RBC # BLD: 2.91 MILL/MM3 (ref 4.2–5.4)
RBC # BLD: 2.94 MILL/MM3 (ref 4.2–5.4)
RBC # BLD: 3.03 MILL/MM3 (ref 4.2–5.4)
RBC # BLD: 3.22 MILL/MM3 (ref 4.2–5.4)
RBC # BLD: 3.32 MILL/MM3 (ref 4.2–5.4)
RBC # BLD: 3.68 MILL/MM3 (ref 4.2–5.4)
RBC # BLD: 3.87 MILL/MM3 (ref 4.2–5.4)
RH FACTOR: NORMAL
SARS-COV-2, NAAT: NOT DETECTED
SEG NEUTROPHILS: 74.6 %
SEG NEUTROPHILS: 80.6 %
SEGMENTED NEUTROPHILS ABSOLUTE COUNT: 10.7 THOU/MM3 (ref 1.8–7.7)
SEGMENTED NEUTROPHILS ABSOLUTE COUNT: 8.1 THOU/MM3 (ref 1.8–7.7)
SODIUM BLD-SCNC: 136 MEQ/L (ref 135–145)
SODIUM BLD-SCNC: 138 MEQ/L (ref 135–145)
SODIUM BLD-SCNC: 138 MEQ/L (ref 135–145)
SODIUM BLD-SCNC: 139 MEQ/L (ref 135–145)
SODIUM BLD-SCNC: 140 MEQ/L (ref 135–145)
SODIUM BLD-SCNC: 141 MEQ/L (ref 135–145)
SODIUM BLD-SCNC: 145 MEQ/L (ref 135–145)
SOURCE, BLOOD GAS: ABNORMAL
TOTAL PROTEIN: 5.5 G/DL (ref 6.1–8)
TOTAL PROTEIN: 5.5 G/DL (ref 6.1–8)
TOTAL PROTEIN: 5.7 G/DL (ref 6.1–8)
TOTAL PROTEIN: 5.9 G/DL (ref 6.1–8)
TOTAL PROTEIN: 6.7 G/DL (ref 6.1–8)
TRIGL SERPL-MCNC: 100 MG/DL (ref 0–199)
TRIGL SERPL-MCNC: 84 MG/DL (ref 0–199)
TROPONIN T: 0.03 NG/ML
TROPONIN T: 0.03 NG/ML
TROPONIN T: 0.04 NG/ML
URINE CULTURE, ROUTINE: ABNORMAL
VANCOMYCIN RESISTANT ENTEROCOCCUS: NEGATIVE
WBC # BLD: 10 THOU/MM3 (ref 4.8–10.8)
WBC # BLD: 10.3 THOU/MM3 (ref 4.8–10.8)
WBC # BLD: 14.3 THOU/MM3 (ref 4.8–10.8)
WBC # BLD: 3.5 THOU/MM3 (ref 4.8–10.8)
WBC # BLD: 6 THOU/MM3 (ref 4.8–10.8)
WBC # BLD: 6 THOU/MM3 (ref 4.8–10.8)
WBC # BLD: 6.9 THOU/MM3 (ref 4.8–10.8)
WBC # BLD: 8.9 THOU/MM3 (ref 4.8–10.8)

## 2021-01-01 PROCEDURE — 2000000000 HC ICU R&B

## 2021-01-01 PROCEDURE — 99221 1ST HOSP IP/OBS SF/LOW 40: CPT | Performed by: NURSE PRACTITIONER

## 2021-01-01 PROCEDURE — 85027 COMPLETE CBC AUTOMATED: CPT

## 2021-01-01 PROCEDURE — 85610 PROTHROMBIN TIME: CPT

## 2021-01-01 PROCEDURE — 36556 INSERT NON-TUNNEL CV CATH: CPT | Performed by: FAMILY MEDICINE

## 2021-01-01 PROCEDURE — 99284 EMERGENCY DEPT VISIT MOD MDM: CPT

## 2021-01-01 PROCEDURE — 83880 ASSAY OF NATRIURETIC PEPTIDE: CPT

## 2021-01-01 PROCEDURE — 71045 X-RAY EXAM CHEST 1 VIEW: CPT

## 2021-01-01 PROCEDURE — 6370000000 HC RX 637 (ALT 250 FOR IP): Performed by: INTERNAL MEDICINE

## 2021-01-01 PROCEDURE — 2580000003 HC RX 258: Performed by: INTERNAL MEDICINE

## 2021-01-01 PROCEDURE — 6360000002 HC RX W HCPCS: Performed by: INTERNAL MEDICINE

## 2021-01-01 PROCEDURE — 36592 COLLECT BLOOD FROM PICC: CPT

## 2021-01-01 PROCEDURE — 80053 COMPREHEN METABOLIC PANEL: CPT

## 2021-01-01 PROCEDURE — 85025 COMPLETE CBC W/AUTO DIFF WBC: CPT

## 2021-01-01 PROCEDURE — 2140000000 HC CCU INTERMEDIATE R&B

## 2021-01-01 PROCEDURE — 6360000002 HC RX W HCPCS

## 2021-01-01 PROCEDURE — 2500000003 HC RX 250 WO HCPCS

## 2021-01-01 PROCEDURE — 83735 ASSAY OF MAGNESIUM: CPT

## 2021-01-01 PROCEDURE — 87086 URINE CULTURE/COLONY COUNT: CPT

## 2021-01-01 PROCEDURE — 99232 SBSQ HOSP IP/OBS MODERATE 35: CPT | Performed by: INTERNAL MEDICINE

## 2021-01-01 PROCEDURE — 87081 CULTURE SCREEN ONLY: CPT

## 2021-01-01 PROCEDURE — 6370000000 HC RX 637 (ALT 250 FOR IP): Performed by: NURSE PRACTITIONER

## 2021-01-01 PROCEDURE — 2580000003 HC RX 258: Performed by: FAMILY MEDICINE

## 2021-01-01 PROCEDURE — 36415 COLL VENOUS BLD VENIPUNCTURE: CPT

## 2021-01-01 PROCEDURE — 36600 WITHDRAWAL OF ARTERIAL BLOOD: CPT

## 2021-01-01 PROCEDURE — 99291 CRITICAL CARE FIRST HOUR: CPT | Performed by: INTERNAL MEDICINE

## 2021-01-01 PROCEDURE — 6360000002 HC RX W HCPCS: Performed by: PHYSICIAN ASSISTANT

## 2021-01-01 PROCEDURE — 94761 N-INVAS EAR/PLS OXIMETRY MLT: CPT

## 2021-01-01 PROCEDURE — 82248 BILIRUBIN DIRECT: CPT

## 2021-01-01 PROCEDURE — 80048 BASIC METABOLIC PNL TOTAL CA: CPT

## 2021-01-01 PROCEDURE — 82803 BLOOD GASES ANY COMBINATION: CPT

## 2021-01-01 PROCEDURE — 2500000003 HC RX 250 WO HCPCS: Performed by: FAMILY MEDICINE

## 2021-01-01 PROCEDURE — 87641 MR-STAPH DNA AMP PROBE: CPT

## 2021-01-01 PROCEDURE — 84100 ASSAY OF PHOSPHORUS: CPT

## 2021-01-01 PROCEDURE — 2500000003 HC RX 250 WO HCPCS: Performed by: INTERNAL MEDICINE

## 2021-01-01 PROCEDURE — 2060000000 HC ICU INTERMEDIATE R&B

## 2021-01-01 PROCEDURE — 94640 AIRWAY INHALATION TREATMENT: CPT

## 2021-01-01 PROCEDURE — APPSS180 APP SPLIT SHARED TIME > 60 MINUTES: Performed by: NURSE PRACTITIONER

## 2021-01-01 PROCEDURE — 2700000000 HC OXYGEN THERAPY PER DAY

## 2021-01-01 PROCEDURE — 83605 ASSAY OF LACTIC ACID: CPT

## 2021-01-01 PROCEDURE — 94760 N-INVAS EAR/PLS OXIMETRY 1: CPT

## 2021-01-01 PROCEDURE — 80061 LIPID PANEL: CPT

## 2021-01-01 PROCEDURE — 36556 INSERT NON-TUNNEL CV CATH: CPT

## 2021-01-01 PROCEDURE — 93005 ELECTROCARDIOGRAM TRACING: CPT | Performed by: INTERNAL MEDICINE

## 2021-01-01 PROCEDURE — 94660 CPAP INITIATION&MGMT: CPT

## 2021-01-01 PROCEDURE — 99238 HOSP IP/OBS DSCHRG MGMT 30/<: CPT | Performed by: NURSE PRACTITIONER

## 2021-01-01 PROCEDURE — 83615 LACTATE (LD) (LDH) ENZYME: CPT

## 2021-01-01 PROCEDURE — 99211 OFF/OP EST MAY X REQ PHY/QHP: CPT | Performed by: PHARMACIST

## 2021-01-01 PROCEDURE — 85730 THROMBOPLASTIN TIME PARTIAL: CPT

## 2021-01-01 PROCEDURE — 84145 PROCALCITONIN (PCT): CPT

## 2021-01-01 PROCEDURE — 86900 BLOOD TYPING SEROLOGIC ABO: CPT

## 2021-01-01 PROCEDURE — 51798 US URINE CAPACITY MEASURE: CPT

## 2021-01-01 PROCEDURE — 82948 REAGENT STRIP/BLOOD GLUCOSE: CPT

## 2021-01-01 PROCEDURE — 51702 INSERT TEMP BLADDER CATH: CPT

## 2021-01-01 PROCEDURE — 85379 FIBRIN DEGRADATION QUANT: CPT

## 2021-01-01 PROCEDURE — 86140 C-REACTIVE PROTEIN: CPT

## 2021-01-01 PROCEDURE — 80076 HEPATIC FUNCTION PANEL: CPT

## 2021-01-01 PROCEDURE — 99223 1ST HOSP IP/OBS HIGH 75: CPT | Performed by: PHYSICIAN ASSISTANT

## 2021-01-01 PROCEDURE — U0002 COVID-19 LAB TEST NON-CDC: HCPCS

## 2021-01-01 PROCEDURE — 2500000003 HC RX 250 WO HCPCS: Performed by: PHYSICIAN ASSISTANT

## 2021-01-01 PROCEDURE — 2580000003 HC RX 258: Performed by: PHYSICIAN ASSISTANT

## 2021-01-01 PROCEDURE — 82330 ASSAY OF CALCIUM: CPT

## 2021-01-01 PROCEDURE — 87500 VANOMYCIN DNA AMP PROBE: CPT

## 2021-01-01 PROCEDURE — 86850 RBC ANTIBODY SCREEN: CPT

## 2021-01-01 PROCEDURE — 87040 BLOOD CULTURE FOR BACTERIA: CPT

## 2021-01-01 PROCEDURE — 84484 ASSAY OF TROPONIN QUANT: CPT

## 2021-01-01 PROCEDURE — 02HV33Z INSERTION OF INFUSION DEVICE INTO SUPERIOR VENA CAVA, PERCUTANEOUS APPROACH: ICD-10-PCS | Performed by: STUDENT IN AN ORGANIZED HEALTH CARE EDUCATION/TRAINING PROGRAM

## 2021-01-01 PROCEDURE — 93306 TTE W/DOPPLER COMPLETE: CPT

## 2021-01-01 PROCEDURE — 82728 ASSAY OF FERRITIN: CPT

## 2021-01-01 PROCEDURE — 86901 BLOOD TYPING SEROLOGIC RH(D): CPT

## 2021-01-01 RX ORDER — AMOXICILLIN 500 MG
1 CAPSULE ORAL DAILY
COMMUNITY

## 2021-01-01 RX ORDER — ASPIRIN 81 MG/1
81 TABLET ORAL DAILY
Status: DISCONTINUED | OUTPATIENT
Start: 2021-01-01 | End: 2021-01-01 | Stop reason: SDUPTHER

## 2021-01-01 RX ORDER — POLYVINYL ALCOHOL 14 MG/ML
1 SOLUTION/ DROPS OPHTHALMIC PRN
Status: DISCONTINUED | OUTPATIENT
Start: 2021-01-01 | End: 2021-01-01 | Stop reason: HOSPADM

## 2021-01-01 RX ORDER — SODIUM CHLORIDE 0.9 % (FLUSH) 0.9 %
10 SYRINGE (ML) INJECTION EVERY 12 HOURS SCHEDULED
Status: DISCONTINUED | OUTPATIENT
Start: 2021-01-01 | End: 2021-01-01 | Stop reason: HOSPADM

## 2021-01-01 RX ORDER — ASPIRIN 81 MG/1
81 TABLET, CHEWABLE ORAL DAILY
Status: DISCONTINUED | OUTPATIENT
Start: 2021-01-01 | End: 2021-01-01

## 2021-01-01 RX ORDER — POLYETHYLENE GLYCOL 3350 17 G/17G
17 POWDER, FOR SOLUTION ORAL DAILY PRN
Status: DISCONTINUED | OUTPATIENT
Start: 2021-01-01 | End: 2021-01-01 | Stop reason: HOSPADM

## 2021-01-01 RX ORDER — BUMETANIDE 0.25 MG/ML
1 INJECTION, SOLUTION INTRAMUSCULAR; INTRAVENOUS 3 TIMES DAILY
Status: DISCONTINUED | OUTPATIENT
Start: 2021-01-01 | End: 2021-01-01

## 2021-01-01 RX ORDER — POTASSIUM CHLORIDE 7.45 MG/ML
10 INJECTION INTRAVENOUS PRN
Status: DISCONTINUED | OUTPATIENT
Start: 2021-01-01 | End: 2021-01-01

## 2021-01-01 RX ORDER — SENNA PLUS 8.6 MG/1
1 TABLET ORAL 2 TIMES DAILY
Status: DISCONTINUED | OUTPATIENT
Start: 2021-01-01 | End: 2021-01-01

## 2021-01-01 RX ORDER — HYDROXYZINE HYDROCHLORIDE 25 MG/1
25 TABLET, FILM COATED ORAL 3 TIMES DAILY PRN
Status: DISCONTINUED | OUTPATIENT
Start: 2021-01-01 | End: 2021-01-01 | Stop reason: HOSPADM

## 2021-01-01 RX ORDER — HEPARIN SODIUM 1000 [USP'U]/ML
40 INJECTION, SOLUTION INTRAVENOUS; SUBCUTANEOUS PRN
Status: DISCONTINUED | OUTPATIENT
Start: 2021-01-01 | End: 2021-01-01

## 2021-01-01 RX ORDER — DICYCLOMINE HYDROCHLORIDE 10 MG/1
10 CAPSULE ORAL
Status: DISCONTINUED | OUTPATIENT
Start: 2021-01-01 | End: 2021-01-01

## 2021-01-01 RX ORDER — ASPIRIN 325 MG
325 TABLET ORAL ONCE
Status: DISCONTINUED | OUTPATIENT
Start: 2021-01-01 | End: 2021-01-01

## 2021-01-01 RX ORDER — POLYETHYLENE GLYCOL 3350 17 G/17G
17 POWDER, FOR SOLUTION ORAL DAILY
Status: DISCONTINUED | OUTPATIENT
Start: 2021-01-01 | End: 2021-01-01

## 2021-01-01 RX ORDER — ONDANSETRON 2 MG/ML
4 INJECTION INTRAMUSCULAR; INTRAVENOUS EVERY 6 HOURS PRN
Status: DISCONTINUED | OUTPATIENT
Start: 2021-01-01 | End: 2021-01-01 | Stop reason: HOSPADM

## 2021-01-01 RX ORDER — BUDESONIDE 0.5 MG/2ML
500 INHALANT ORAL 2 TIMES DAILY
Status: DISCONTINUED | OUTPATIENT
Start: 2021-01-01 | End: 2021-01-01 | Stop reason: HOSPADM

## 2021-01-01 RX ORDER — FOLIC ACID/VIT B COMPLEX AND C 5 MG
1 TABLET ORAL DAILY
Status: DISCONTINUED | OUTPATIENT
Start: 2021-01-01 | End: 2021-01-01

## 2021-01-01 RX ORDER — SODIUM CHLORIDE 0.9 % (FLUSH) 0.9 %
10 SYRINGE (ML) INJECTION PRN
Status: DISCONTINUED | OUTPATIENT
Start: 2021-01-01 | End: 2021-01-01 | Stop reason: SDUPTHER

## 2021-01-01 RX ORDER — ACETAMINOPHEN 650 MG/1
650 SUPPOSITORY RECTAL EVERY 6 HOURS PRN
Status: DISCONTINUED | OUTPATIENT
Start: 2021-01-01 | End: 2021-01-01 | Stop reason: HOSPADM

## 2021-01-01 RX ORDER — HYDROCODONE BITARTRATE AND ACETAMINOPHEN 5; 325 MG/1; MG/1
1 TABLET ORAL EVERY 6 HOURS PRN
Status: DISCONTINUED | OUTPATIENT
Start: 2021-01-01 | End: 2021-01-01 | Stop reason: HOSPADM

## 2021-01-01 RX ORDER — AZELASTINE 1 MG/ML
1 SPRAY, METERED NASAL 2 TIMES DAILY PRN
Status: DISCONTINUED | OUTPATIENT
Start: 2021-01-01 | End: 2021-01-01 | Stop reason: RX

## 2021-01-01 RX ORDER — LOPERAMIDE HYDROCHLORIDE 2 MG/1
2 CAPSULE ORAL 4 TIMES DAILY PRN
Status: DISCONTINUED | OUTPATIENT
Start: 2021-01-01 | End: 2021-01-01 | Stop reason: HOSPADM

## 2021-01-01 RX ORDER — BUMETANIDE 1 MG/1
1 TABLET ORAL 2 TIMES DAILY
Status: DISCONTINUED | OUTPATIENT
Start: 2021-01-01 | End: 2021-01-01

## 2021-01-01 RX ORDER — SENNA PLUS 8.6 MG/1
1 TABLET ORAL 2 TIMES DAILY
COMMUNITY

## 2021-01-01 RX ORDER — ONDANSETRON 4 MG/1
8 TABLET, FILM COATED ORAL DAILY PRN
Status: DISCONTINUED | OUTPATIENT
Start: 2021-01-01 | End: 2021-01-01 | Stop reason: HOSPADM

## 2021-01-01 RX ORDER — POTASSIUM CHLORIDE 20 MEQ/1
40 TABLET, EXTENDED RELEASE ORAL PRN
Status: DISCONTINUED | OUTPATIENT
Start: 2021-01-01 | End: 2021-01-01

## 2021-01-01 RX ORDER — PANTOPRAZOLE SODIUM 40 MG/1
40 TABLET, DELAYED RELEASE ORAL
Status: DISCONTINUED | OUTPATIENT
Start: 2021-01-01 | End: 2021-01-01

## 2021-01-01 RX ORDER — FENTANYL CITRATE 50 UG/ML
50 INJECTION, SOLUTION INTRAMUSCULAR; INTRAVENOUS ONCE
Status: COMPLETED | OUTPATIENT
Start: 2021-01-01 | End: 2021-01-01

## 2021-01-01 RX ORDER — MULTIVITAMIN WITH IRON
1 TABLET ORAL DAILY
Status: DISCONTINUED | OUTPATIENT
Start: 2021-01-01 | End: 2021-01-01

## 2021-01-01 RX ORDER — ALPRAZOLAM 0.25 MG/1
0.25 TABLET ORAL EVERY 6 HOURS PRN
Status: DISCONTINUED | OUTPATIENT
Start: 2021-01-01 | End: 2021-01-01

## 2021-01-01 RX ORDER — BUMETANIDE 0.25 MG/ML
INJECTION, SOLUTION INTRAMUSCULAR; INTRAVENOUS
Status: DISPENSED
Start: 2021-01-01 | End: 2021-01-01

## 2021-01-01 RX ORDER — ARFORMOTEROL TARTRATE 15 UG/2ML
15 SOLUTION RESPIRATORY (INHALATION) 2 TIMES DAILY
Status: DISCONTINUED | OUTPATIENT
Start: 2021-01-01 | End: 2021-01-01 | Stop reason: HOSPADM

## 2021-01-01 RX ORDER — CLOTRIMAZOLE AND BETAMETHASONE DIPROPIONATE 10; .64 MG/G; MG/G
CREAM TOPICAL 2 TIMES DAILY
Status: DISCONTINUED | OUTPATIENT
Start: 2021-01-01 | End: 2021-01-01 | Stop reason: HOSPADM

## 2021-01-01 RX ORDER — FENTANYL CITRATE 50 UG/ML
25 INJECTION, SOLUTION INTRAMUSCULAR; INTRAVENOUS ONCE
Status: DISCONTINUED | OUTPATIENT
Start: 2021-01-01 | End: 2021-01-01

## 2021-01-01 RX ORDER — DIPHENHYDRAMINE HCL 25 MG
50 TABLET ORAL ONCE
Status: COMPLETED | OUTPATIENT
Start: 2021-01-01 | End: 2021-01-01

## 2021-01-01 RX ORDER — HEPARIN SODIUM 10000 [USP'U]/100ML
INJECTION, SOLUTION INTRAVENOUS
Status: COMPLETED
Start: 2021-01-01 | End: 2021-01-01

## 2021-01-01 RX ORDER — NITROGLYCERIN 0.4 MG/1
0.4 TABLET SUBLINGUAL EVERY 5 MIN PRN
Status: DISCONTINUED | OUTPATIENT
Start: 2021-01-01 | End: 2021-01-01 | Stop reason: HOSPADM

## 2021-01-01 RX ORDER — OMEGA-3-ACID ETHYL ESTERS 1 G/1
1000 CAPSULE, LIQUID FILLED ORAL DAILY
Status: DISCONTINUED | OUTPATIENT
Start: 2021-01-01 | End: 2021-01-01

## 2021-01-01 RX ORDER — SODIUM CHLORIDE 0.9 % (FLUSH) 0.9 %
10 SYRINGE (ML) INJECTION EVERY 12 HOURS SCHEDULED
Status: DISCONTINUED | OUTPATIENT
Start: 2021-01-01 | End: 2021-01-01 | Stop reason: ALTCHOICE

## 2021-01-01 RX ORDER — CETIRIZINE HYDROCHLORIDE 5 MG/1
5 TABLET ORAL DAILY
Status: DISCONTINUED | OUTPATIENT
Start: 2021-01-01 | End: 2021-01-01

## 2021-01-01 RX ORDER — DIGOXIN 125 MCG
125 TABLET ORAL EVERY OTHER DAY
Status: DISCONTINUED | OUTPATIENT
Start: 2021-01-01 | End: 2021-01-01

## 2021-01-01 RX ORDER — SODIUM CHLORIDE 0.9 % (FLUSH) 0.9 %
10 SYRINGE (ML) INJECTION PRN
Status: DISCONTINUED | OUTPATIENT
Start: 2021-01-01 | End: 2021-01-01 | Stop reason: ALTCHOICE

## 2021-01-01 RX ORDER — DOBUTAMINE HYDROCHLORIDE 200 MG/100ML
5 INJECTION INTRAVENOUS CONTINUOUS
Status: DISCONTINUED | OUTPATIENT
Start: 2021-01-01 | End: 2021-01-01

## 2021-01-01 RX ORDER — HEPARIN SODIUM 1000 [USP'U]/ML
80 INJECTION, SOLUTION INTRAVENOUS; SUBCUTANEOUS PRN
Status: DISCONTINUED | OUTPATIENT
Start: 2021-01-01 | End: 2021-01-01

## 2021-01-01 RX ORDER — MIDODRINE HYDROCHLORIDE 5 MG/1
5 TABLET ORAL
Status: DISCONTINUED | OUTPATIENT
Start: 2021-01-01 | End: 2021-01-01

## 2021-01-01 RX ORDER — FENTANYL CITRATE 50 UG/ML
INJECTION, SOLUTION INTRAMUSCULAR; INTRAVENOUS
Status: COMPLETED
Start: 2021-01-01 | End: 2021-01-01

## 2021-01-01 RX ORDER — TRAMADOL HYDROCHLORIDE 50 MG/1
50 TABLET ORAL EVERY 6 HOURS PRN
Status: DISCONTINUED | OUTPATIENT
Start: 2021-01-01 | End: 2021-01-01 | Stop reason: HOSPADM

## 2021-01-01 RX ORDER — ACETAMINOPHEN 325 MG/1
650 TABLET ORAL EVERY 6 HOURS PRN
Status: DISCONTINUED | OUTPATIENT
Start: 2021-01-01 | End: 2021-01-01 | Stop reason: HOSPADM

## 2021-01-01 RX ORDER — HYDROCODONE BITARTRATE AND ACETAMINOPHEN 5; 325 MG/1; MG/1
1.5 TABLET ORAL EVERY 6 HOURS PRN
Status: DISCONTINUED | OUTPATIENT
Start: 2021-01-01 | End: 2021-01-01 | Stop reason: HOSPADM

## 2021-01-01 RX ORDER — LORAZEPAM 2 MG/ML
0.5 INJECTION INTRAMUSCULAR
Status: DISCONTINUED | OUTPATIENT
Start: 2021-01-01 | End: 2021-01-01 | Stop reason: HOSPADM

## 2021-01-01 RX ORDER — VITAMIN B COMPLEX
5000 TABLET ORAL DAILY
Status: DISCONTINUED | OUTPATIENT
Start: 2021-01-01 | End: 2021-01-01

## 2021-01-01 RX ORDER — POTASSIUM CHLORIDE 20 MEQ/1
20 TABLET, EXTENDED RELEASE ORAL DAILY
Status: DISCONTINUED | OUTPATIENT
Start: 2021-01-01 | End: 2021-01-01

## 2021-01-01 RX ORDER — ACETAMINOPHEN 325 MG/1
650 TABLET ORAL EVERY 6 HOURS PRN
Status: DISCONTINUED | OUTPATIENT
Start: 2021-01-01 | End: 2021-01-01 | Stop reason: SDUPTHER

## 2021-01-01 RX ORDER — DOCUSATE SODIUM 100 MG/1
100 CAPSULE, LIQUID FILLED ORAL 2 TIMES DAILY
Status: DISCONTINUED | OUTPATIENT
Start: 2021-01-01 | End: 2021-01-01

## 2021-01-01 RX ORDER — LACTOBACILLUS RHAMNOSUS GG 10B CELL
1 CAPSULE ORAL DAILY
Status: DISCONTINUED | OUTPATIENT
Start: 2021-01-01 | End: 2021-01-01

## 2021-01-01 RX ORDER — LEVALBUTEROL INHALATION SOLUTION 0.63 MG/3ML
1 SOLUTION RESPIRATORY (INHALATION) EVERY 8 HOURS PRN
Status: DISCONTINUED | OUTPATIENT
Start: 2021-01-01 | End: 2021-01-01 | Stop reason: HOSPADM

## 2021-01-01 RX ORDER — PROMETHAZINE HYDROCHLORIDE 25 MG/1
12.5 TABLET ORAL EVERY 6 HOURS PRN
Status: DISCONTINUED | OUTPATIENT
Start: 2021-01-01 | End: 2021-01-01 | Stop reason: HOSPADM

## 2021-01-01 RX ORDER — NITROGLYCERIN 0.4 MG/1
0.4 TABLET SUBLINGUAL EVERY 5 MIN PRN
Status: DISCONTINUED | OUTPATIENT
Start: 2021-01-01 | End: 2021-01-01 | Stop reason: SDUPTHER

## 2021-01-01 RX ORDER — BUMETANIDE 0.25 MG/ML
2 INJECTION, SOLUTION INTRAMUSCULAR; INTRAVENOUS ONCE
Status: COMPLETED | OUTPATIENT
Start: 2021-01-01 | End: 2021-01-01

## 2021-01-01 RX ORDER — FOLIC ACID 1 MG/1
1 TABLET ORAL DAILY
Status: DISCONTINUED | OUTPATIENT
Start: 2021-01-01 | End: 2021-01-01

## 2021-01-01 RX ORDER — SODIUM CHLORIDE 0.9 % (FLUSH) 0.9 %
10 SYRINGE (ML) INJECTION PRN
Status: DISCONTINUED | OUTPATIENT
Start: 2021-01-01 | End: 2021-01-01 | Stop reason: HOSPADM

## 2021-01-01 RX ORDER — SODIUM CHLORIDE 0.9 % (FLUSH) 0.9 %
10 SYRINGE (ML) INJECTION EVERY 12 HOURS SCHEDULED
Status: DISCONTINUED | OUTPATIENT
Start: 2021-01-01 | End: 2021-01-01 | Stop reason: SDUPTHER

## 2021-01-01 RX ORDER — DEXAMETHASONE 4 MG/1
6 TABLET ORAL ONCE
Status: COMPLETED | OUTPATIENT
Start: 2021-01-01 | End: 2021-01-01

## 2021-01-01 RX ORDER — TRAZODONE HYDROCHLORIDE 50 MG/1
50 TABLET ORAL NIGHTLY
Status: DISCONTINUED | OUTPATIENT
Start: 2021-01-01 | End: 2021-01-01

## 2021-01-01 RX ORDER — ASCORBIC ACID 500 MG
500 TABLET ORAL DAILY
Status: DISCONTINUED | OUTPATIENT
Start: 2021-01-01 | End: 2021-01-01

## 2021-01-01 RX ORDER — HEPARIN SODIUM 10000 [USP'U]/100ML
18 INJECTION, SOLUTION INTRAVENOUS CONTINUOUS
Status: DISCONTINUED | OUTPATIENT
Start: 2021-01-01 | End: 2021-01-01

## 2021-01-01 RX ORDER — SODIUM CHLORIDE 9 MG/ML
INJECTION, SOLUTION INTRAVENOUS CONTINUOUS
Status: DISCONTINUED | OUTPATIENT
Start: 2021-01-01 | End: 2021-01-01

## 2021-01-01 RX ORDER — POLYETHYLENE GLYCOL 3350 17 G/17G
17 POWDER, FOR SOLUTION ORAL DAILY
Status: DISCONTINUED | OUTPATIENT
Start: 2021-01-01 | End: 2021-01-01 | Stop reason: SDUPTHER

## 2021-01-01 RX ORDER — HYDROCORTISONE ACETATE 25 MG/1
25 SUPPOSITORY RECTAL 2 TIMES DAILY PRN
Status: DISCONTINUED | OUTPATIENT
Start: 2021-01-01 | End: 2021-01-01 | Stop reason: HOSPADM

## 2021-01-01 RX ADMIN — ASPIRIN 81 MG: 81 TABLET, CHEWABLE ORAL at 09:35

## 2021-01-01 RX ADMIN — POTASSIUM CHLORIDE 20 MEQ: 1500 TABLET, EXTENDED RELEASE ORAL at 10:05

## 2021-01-01 RX ADMIN — ASPIRIN 81 MG: 81 TABLET, CHEWABLE ORAL at 10:14

## 2021-01-01 RX ADMIN — TRAZODONE HYDROCHLORIDE 50 MG: 50 TABLET ORAL at 20:16

## 2021-01-01 RX ADMIN — SODIUM CHLORIDE, PRESERVATIVE FREE 10 ML: 5 INJECTION INTRAVENOUS at 10:02

## 2021-01-01 RX ADMIN — POTASSIUM CHLORIDE 20 MEQ: 1500 TABLET, EXTENDED RELEASE ORAL at 10:07

## 2021-01-01 RX ADMIN — DOCUSATE SODIUM 100 MG: 100 CAPSULE, LIQUID FILLED ORAL at 10:25

## 2021-01-01 RX ADMIN — OMEGA-3-ACID ETHYL ESTERS 1000 MG: 1 CAPSULE, LIQUID FILLED ORAL at 10:07

## 2021-01-01 RX ADMIN — DICYCLOMINE HYDROCHLORIDE 10 MG: 10 CAPSULE ORAL at 17:02

## 2021-01-01 RX ADMIN — DOBUTAMINE HYDROCHLORIDE 5 MCG/KG/MIN: 200 INJECTION INTRAVENOUS at 11:39

## 2021-01-01 RX ADMIN — DOBUTAMINE HYDROCHLORIDE 10 MCG/KG/MIN: 200 INJECTION INTRAVENOUS at 03:33

## 2021-01-01 RX ADMIN — OMEGA-3-ACID ETHYL ESTERS 1000 MG: 1 CAPSULE, LIQUID FILLED ORAL at 09:34

## 2021-01-01 RX ADMIN — ONDANSETRON 4 MG: 2 INJECTION INTRAMUSCULAR; INTRAVENOUS at 14:21

## 2021-01-01 RX ADMIN — TIOTROPIUM BROMIDE INHALATION SPRAY 2 PUFF: 3.12 SPRAY, METERED RESPIRATORY (INHALATION) at 07:50

## 2021-01-01 RX ADMIN — ARFORMOTEROL TARTRATE 15 MCG: 15 SOLUTION RESPIRATORY (INHALATION) at 16:30

## 2021-01-01 RX ADMIN — ACETAMINOPHEN 650 MG: 325 TABLET ORAL at 01:24

## 2021-01-01 RX ADMIN — DIGOXIN 125 MCG: 125 TABLET ORAL at 10:07

## 2021-01-01 RX ADMIN — BUDESONIDE 500 MCG: 0.5 INHALANT RESPIRATORY (INHALATION) at 09:13

## 2021-01-01 RX ADMIN — DOBUTAMINE HYDROCHLORIDE 5 MCG/KG/MIN: 200 INJECTION INTRAVENOUS at 01:24

## 2021-01-01 RX ADMIN — LORAZEPAM 0.5 MG: 2 INJECTION INTRAMUSCULAR; INTRAVENOUS at 21:05

## 2021-01-01 RX ADMIN — ARFORMOTEROL TARTRATE 15 MCG: 15 SOLUTION RESPIRATORY (INHALATION) at 20:45

## 2021-01-01 RX ADMIN — CLOTRIMAZOLE AND BETAMETHASONE DIPROPIONATE: 10; .5 CREAM TOPICAL at 10:02

## 2021-01-01 RX ADMIN — POTASSIUM CHLORIDE 20 MEQ: 1500 TABLET, EXTENDED RELEASE ORAL at 10:22

## 2021-01-01 RX ADMIN — LORAZEPAM 0.5 MG: 2 INJECTION INTRAMUSCULAR; INTRAVENOUS at 16:07

## 2021-01-01 RX ADMIN — ONDANSETRON 4 MG: 2 INJECTION INTRAMUSCULAR; INTRAVENOUS at 09:48

## 2021-01-01 RX ADMIN — BUMETANIDE 1 MG: 0.25 INJECTION INTRAMUSCULAR; INTRAVENOUS at 21:58

## 2021-01-01 RX ADMIN — ARFORMOTEROL TARTRATE 15 MCG: 15 SOLUTION RESPIRATORY (INHALATION) at 07:46

## 2021-01-01 RX ADMIN — SODIUM CHLORIDE, PRESERVATIVE FREE 10 ML: 5 INJECTION INTRAVENOUS at 11:04

## 2021-01-01 RX ADMIN — Medication 5000 UNITS: at 09:32

## 2021-01-01 RX ADMIN — STANDARDIZED SENNA CONCENTRATE 8.6 MG: 8.6 TABLET ORAL at 20:09

## 2021-01-01 RX ADMIN — SODIUM CHLORIDE, PRESERVATIVE FREE 10 ML: 5 INJECTION INTRAVENOUS at 09:49

## 2021-01-01 RX ADMIN — ONDANSETRON 4 MG: 2 INJECTION INTRAMUSCULAR; INTRAVENOUS at 21:14

## 2021-01-01 RX ADMIN — TIOTROPIUM BROMIDE INHALATION SPRAY 2 PUFF: 3.12 SPRAY, METERED RESPIRATORY (INHALATION) at 10:25

## 2021-01-01 RX ADMIN — TIOTROPIUM BROMIDE INHALATION SPRAY 2 PUFF: 3.12 SPRAY, METERED RESPIRATORY (INHALATION) at 08:29

## 2021-01-01 RX ADMIN — LORAZEPAM 0.5 MG: 2 INJECTION INTRAMUSCULAR; INTRAVENOUS at 03:22

## 2021-01-01 RX ADMIN — MIDODRINE HYDROCHLORIDE 5 MG: 5 TABLET ORAL at 08:00

## 2021-01-01 RX ADMIN — POTASSIUM CHLORIDE 20 MEQ: 1500 TABLET, EXTENDED RELEASE ORAL at 09:39

## 2021-01-01 RX ADMIN — OXYCODONE HYDROCHLORIDE AND ACETAMINOPHEN 500 MG: 500 TABLET ORAL at 09:33

## 2021-01-01 RX ADMIN — PANTOPRAZOLE SODIUM 40 MG: 40 TABLET, DELAYED RELEASE ORAL at 18:49

## 2021-01-01 RX ADMIN — TIOTROPIUM BROMIDE INHALATION SPRAY 2 PUFF: 3.12 SPRAY, METERED RESPIRATORY (INHALATION) at 06:28

## 2021-01-01 RX ADMIN — FOLIC ACID 1 MG: 1 TABLET ORAL at 10:06

## 2021-01-01 RX ADMIN — Medication 1 TABLET: at 10:16

## 2021-01-01 RX ADMIN — FOLIC ACID 1 MG: 1 TABLET ORAL at 09:33

## 2021-01-01 RX ADMIN — BUMETANIDE 1 MG/HR: 0.25 INJECTION INTRAMUSCULAR; INTRAVENOUS at 11:39

## 2021-01-01 RX ADMIN — TRAMADOL HYDROCHLORIDE 50 MG: 50 TABLET ORAL at 20:08

## 2021-01-01 RX ADMIN — TRAZODONE HYDROCHLORIDE 50 MG: 50 TABLET ORAL at 19:49

## 2021-01-01 RX ADMIN — TRAZODONE HYDROCHLORIDE 50 MG: 50 TABLET ORAL at 20:54

## 2021-01-01 RX ADMIN — HYDROCODONE BITARTRATE AND ACETAMINOPHEN 1 TABLET: 5; 325 TABLET ORAL at 02:37

## 2021-01-01 RX ADMIN — DOCUSATE SODIUM 100 MG: 100 CAPSULE, LIQUID FILLED ORAL at 20:52

## 2021-01-01 RX ADMIN — ASPIRIN 81 MG: 81 TABLET, CHEWABLE ORAL at 10:05

## 2021-01-01 RX ADMIN — SACUBITRIL AND VALSARTAN 1 TABLET: 49; 51 TABLET, FILM COATED ORAL at 11:20

## 2021-01-01 RX ADMIN — PANTOPRAZOLE SODIUM 40 MG: 40 TABLET, DELAYED RELEASE ORAL at 06:32

## 2021-01-01 RX ADMIN — DICYCLOMINE HYDROCHLORIDE 10 MG: 10 CAPSULE ORAL at 17:30

## 2021-01-01 RX ADMIN — DICYCLOMINE HYDROCHLORIDE 10 MG: 10 CAPSULE ORAL at 10:07

## 2021-01-01 RX ADMIN — TRAMADOL HYDROCHLORIDE 50 MG: 50 TABLET ORAL at 21:06

## 2021-01-01 RX ADMIN — OXYCODONE HYDROCHLORIDE AND ACETAMINOPHEN 500 MG: 500 TABLET ORAL at 08:52

## 2021-01-01 RX ADMIN — ARFORMOTEROL TARTRATE 15 MCG: 15 SOLUTION RESPIRATORY (INHALATION) at 21:00

## 2021-01-01 RX ADMIN — PANTOPRAZOLE SODIUM 40 MG: 40 TABLET, DELAYED RELEASE ORAL at 06:45

## 2021-01-01 RX ADMIN — CETIRIZINE HYDROCHLORIDE 5 MG: 5 TABLET, FILM COATED ORAL at 10:14

## 2021-01-01 RX ADMIN — BUDESONIDE 500 MCG: 0.5 INHALANT RESPIRATORY (INHALATION) at 20:45

## 2021-01-01 RX ADMIN — Medication 1 TABLET: at 09:33

## 2021-01-01 RX ADMIN — ARFORMOTEROL TARTRATE 15 MCG: 15 SOLUTION RESPIRATORY (INHALATION) at 21:03

## 2021-01-01 RX ADMIN — SODIUM CHLORIDE, PRESERVATIVE FREE 10 ML: 5 INJECTION INTRAVENOUS at 21:51

## 2021-01-01 RX ADMIN — DOCUSATE SODIUM 100 MG: 100 CAPSULE, LIQUID FILLED ORAL at 08:52

## 2021-01-01 RX ADMIN — HYDROCODONE BITARTRATE AND ACETAMINOPHEN 1 TABLET: 5; 325 TABLET ORAL at 16:59

## 2021-01-01 RX ADMIN — CLOTRIMAZOLE AND BETAMETHASONE DIPROPIONATE: 10; .5 CREAM TOPICAL at 20:15

## 2021-01-01 RX ADMIN — THERA TABS 1 TABLET: TAB at 08:52

## 2021-01-01 RX ADMIN — HYDROCODONE BITARTRATE AND ACETAMINOPHEN 1 TABLET: 5; 325 TABLET ORAL at 15:18

## 2021-01-01 RX ADMIN — OMEGA-3-ACID ETHYL ESTERS 1000 MG: 1 CAPSULE, LIQUID FILLED ORAL at 13:14

## 2021-01-01 RX ADMIN — BUDESONIDE 500 MCG: 0.5 INHALANT RESPIRATORY (INHALATION) at 21:03

## 2021-01-01 RX ADMIN — STANDARDIZED SENNA CONCENTRATE 8.6 MG: 8.6 TABLET ORAL at 20:51

## 2021-01-01 RX ADMIN — HYDROCODONE BITARTRATE AND ACETAMINOPHEN 1 TABLET: 5; 325 TABLET ORAL at 10:32

## 2021-01-01 RX ADMIN — TRAZODONE HYDROCHLORIDE 50 MG: 50 TABLET ORAL at 20:08

## 2021-01-01 RX ADMIN — SODIUM CHLORIDE, PRESERVATIVE FREE 10 ML: 5 INJECTION INTRAVENOUS at 21:14

## 2021-01-01 RX ADMIN — HEPARIN SODIUM 15 UNITS/KG/HR: 10000 INJECTION, SOLUTION INTRAVENOUS at 15:20

## 2021-01-01 RX ADMIN — ASPIRIN 81 MG: 81 TABLET, CHEWABLE ORAL at 08:52

## 2021-01-01 RX ADMIN — CLOTRIMAZOLE AND BETAMETHASONE DIPROPIONATE: 10; .5 CREAM TOPICAL at 10:10

## 2021-01-01 RX ADMIN — DOBUTAMINE HYDROCHLORIDE 10 MCG/KG/MIN: 200 INJECTION INTRAVENOUS at 17:51

## 2021-01-01 RX ADMIN — CLOTRIMAZOLE AND BETAMETHASONE DIPROPIONATE: 10; .5 CREAM TOPICAL at 20:51

## 2021-01-01 RX ADMIN — HYDROMORPHONE HYDROCHLORIDE 0.5 MG/HR: 10 INJECTION, SOLUTION INTRAMUSCULAR; INTRAVENOUS; SUBCUTANEOUS at 04:55

## 2021-01-01 RX ADMIN — HYDROXYZINE HYDROCHLORIDE 25 MG: 25 TABLET ORAL at 00:28

## 2021-01-01 RX ADMIN — THERA TABS 1 TABLET: TAB at 09:33

## 2021-01-01 RX ADMIN — BUDESONIDE 500 MCG: 0.5 INHALANT RESPIRATORY (INHALATION) at 10:20

## 2021-01-01 RX ADMIN — PANTOPRAZOLE SODIUM 40 MG: 40 TABLET, DELAYED RELEASE ORAL at 11:02

## 2021-01-01 RX ADMIN — HYDROMORPHONE HYDROCHLORIDE 0.25 MG: 1 INJECTION, SOLUTION INTRAMUSCULAR; INTRAVENOUS; SUBCUTANEOUS at 02:13

## 2021-01-01 RX ADMIN — BUDESONIDE 500 MCG: 0.5 INHALANT RESPIRATORY (INHALATION) at 17:10

## 2021-01-01 RX ADMIN — SODIUM CHLORIDE, PRESERVATIVE FREE 10 ML: 5 INJECTION INTRAVENOUS at 21:08

## 2021-01-01 RX ADMIN — POTASSIUM BICARBONATE 40 MEQ: 782 TABLET, EFFERVESCENT ORAL at 06:32

## 2021-01-01 RX ADMIN — ACETAMINOPHEN 650 MG: 325 TABLET ORAL at 11:14

## 2021-01-01 RX ADMIN — HEPARIN SODIUM 15 UNITS/KG/HR: 10000 INJECTION, SOLUTION INTRAVENOUS at 18:40

## 2021-01-01 RX ADMIN — CLOTRIMAZOLE AND BETAMETHASONE DIPROPIONATE: 10; .5 CREAM TOPICAL at 11:00

## 2021-01-01 RX ADMIN — OMEGA-3-ACID ETHYL ESTERS 1000 MG: 1 CAPSULE, LIQUID FILLED ORAL at 10:18

## 2021-01-01 RX ADMIN — THERA TABS 1 TABLET: TAB at 13:14

## 2021-01-01 RX ADMIN — FOLIC ACID 1 MG: 1 TABLET ORAL at 10:25

## 2021-01-01 RX ADMIN — Medication 1.5 MG: at 18:52

## 2021-01-01 RX ADMIN — ARFORMOTEROL TARTRATE 15 MCG: 15 SOLUTION RESPIRATORY (INHALATION) at 17:47

## 2021-01-01 RX ADMIN — DICYCLOMINE HYDROCHLORIDE 10 MG: 10 CAPSULE ORAL at 18:49

## 2021-01-01 RX ADMIN — STANDARDIZED SENNA CONCENTRATE 8.6 MG: 8.6 TABLET ORAL at 09:33

## 2021-01-01 RX ADMIN — PANTOPRAZOLE SODIUM 40 MG: 40 TABLET, DELAYED RELEASE ORAL at 16:59

## 2021-01-01 RX ADMIN — Medication 2 MCG/MIN: at 18:50

## 2021-01-01 RX ADMIN — DICYCLOMINE HYDROCHLORIDE 10 MG: 10 CAPSULE ORAL at 08:52

## 2021-01-01 RX ADMIN — STANDARDIZED SENNA CONCENTRATE 8.6 MG: 8.6 TABLET ORAL at 20:16

## 2021-01-01 RX ADMIN — OXYCODONE HYDROCHLORIDE AND ACETAMINOPHEN 500 MG: 500 TABLET ORAL at 13:14

## 2021-01-01 RX ADMIN — HYDROXYZINE HYDROCHLORIDE 25 MG: 25 TABLET ORAL at 01:24

## 2021-01-01 RX ADMIN — BUMETANIDE 2 MG: 0.25 INJECTION, SOLUTION INTRAMUSCULAR; INTRAVENOUS at 18:22

## 2021-01-01 RX ADMIN — DICYCLOMINE HYDROCHLORIDE 10 MG: 10 CAPSULE ORAL at 06:50

## 2021-01-01 RX ADMIN — THERA TABS 1 TABLET: TAB at 10:17

## 2021-01-01 RX ADMIN — BUDESONIDE 500 MCG: 0.5 INHALANT RESPIRATORY (INHALATION) at 21:00

## 2021-01-01 RX ADMIN — CETIRIZINE HYDROCHLORIDE 5 MG: 5 TABLET, FILM COATED ORAL at 09:33

## 2021-01-01 RX ADMIN — POLYETHYLENE GLYCOL 3350 17 G: 17 POWDER, FOR SOLUTION ORAL at 15:49

## 2021-01-01 RX ADMIN — HEPARIN SODIUM 18 UNITS/KG/HR: 10000 INJECTION, SOLUTION INTRAVENOUS at 11:28

## 2021-01-01 RX ADMIN — PANTOPRAZOLE SODIUM 40 MG: 40 TABLET, DELAYED RELEASE ORAL at 18:29

## 2021-01-01 RX ADMIN — POLYETHYLENE GLYCOL (3350) 17 G: 17 POWDER, FOR SOLUTION ORAL at 09:33

## 2021-01-01 RX ADMIN — PANTOPRAZOLE SODIUM 40 MG: 40 TABLET, DELAYED RELEASE ORAL at 06:50

## 2021-01-01 RX ADMIN — ACETAMINOPHEN 650 MG: 325 TABLET ORAL at 19:53

## 2021-01-01 RX ADMIN — DIGOXIN 125 MCG: 125 TABLET ORAL at 10:15

## 2021-01-01 RX ADMIN — ARFORMOTEROL TARTRATE 15 MCG: 15 SOLUTION RESPIRATORY (INHALATION) at 07:50

## 2021-01-01 RX ADMIN — PANTOPRAZOLE SODIUM 40 MG: 40 TABLET, DELAYED RELEASE ORAL at 16:06

## 2021-01-01 RX ADMIN — CETIRIZINE HYDROCHLORIDE 5 MG: 5 TABLET, FILM COATED ORAL at 10:07

## 2021-01-01 RX ADMIN — CLOTRIMAZOLE AND BETAMETHASONE DIPROPIONATE: 10; .5 CREAM TOPICAL at 11:20

## 2021-01-01 RX ADMIN — DICYCLOMINE HYDROCHLORIDE 10 MG: 10 CAPSULE ORAL at 10:15

## 2021-01-01 RX ADMIN — ONDANSETRON 4 MG: 2 INJECTION INTRAMUSCULAR; INTRAVENOUS at 04:02

## 2021-01-01 RX ADMIN — Medication 1 CAPSULE: at 10:25

## 2021-01-01 RX ADMIN — DICYCLOMINE HYDROCHLORIDE 10 MG: 10 CAPSULE ORAL at 18:30

## 2021-01-01 RX ADMIN — DOCUSATE SODIUM 100 MG: 100 CAPSULE, LIQUID FILLED ORAL at 20:54

## 2021-01-01 RX ADMIN — HYDROCODONE BITARTRATE AND ACETAMINOPHEN 1 TABLET: 5; 325 TABLET ORAL at 20:52

## 2021-01-01 RX ADMIN — DIPHENHYDRAMINE HCL 50 MG: 25 TABLET ORAL at 10:57

## 2021-01-01 RX ADMIN — POTASSIUM CHLORIDE 20 MEQ: 1500 TABLET, EXTENDED RELEASE ORAL at 08:52

## 2021-01-01 RX ADMIN — MIDODRINE HYDROCHLORIDE 5 MG: 5 TABLET ORAL at 11:15

## 2021-01-01 RX ADMIN — TRAZODONE HYDROCHLORIDE 50 MG: 50 TABLET ORAL at 20:52

## 2021-01-01 RX ADMIN — MIDODRINE HYDROCHLORIDE 5 MG: 5 TABLET ORAL at 09:37

## 2021-01-01 RX ADMIN — POLYETHYLENE GLYCOL (3350) 17 G: 17 POWDER, FOR SOLUTION ORAL at 11:09

## 2021-01-01 RX ADMIN — SODIUM CHLORIDE, PRESERVATIVE FREE 10 ML: 5 INJECTION INTRAVENOUS at 10:28

## 2021-01-01 RX ADMIN — BUDESONIDE 500 MCG: 0.5 INHALANT RESPIRATORY (INHALATION) at 21:02

## 2021-01-01 RX ADMIN — BUDESONIDE 500 MCG: 0.5 INHALANT RESPIRATORY (INHALATION) at 07:50

## 2021-01-01 RX ADMIN — DICYCLOMINE HYDROCHLORIDE 10 MG: 10 CAPSULE ORAL at 20:09

## 2021-01-01 RX ADMIN — DOCUSATE SODIUM 100 MG: 100 CAPSULE, LIQUID FILLED ORAL at 20:08

## 2021-01-01 RX ADMIN — Medication 1 CAPSULE: at 10:16

## 2021-01-01 RX ADMIN — Medication 1 TABLET: at 10:07

## 2021-01-01 RX ADMIN — OXYCODONE HYDROCHLORIDE AND ACETAMINOPHEN 500 MG: 500 TABLET ORAL at 10:07

## 2021-01-01 RX ADMIN — TRAMADOL HYDROCHLORIDE 50 MG: 50 TABLET ORAL at 14:45

## 2021-01-01 RX ADMIN — Medication 1 TABLET: at 10:25

## 2021-01-01 RX ADMIN — POTASSIUM CHLORIDE 20 MEQ: 1500 TABLET, EXTENDED RELEASE ORAL at 11:03

## 2021-01-01 RX ADMIN — OMEGA-3-ACID ETHYL ESTERS 1000 MG: 1 CAPSULE, LIQUID FILLED ORAL at 08:52

## 2021-01-01 RX ADMIN — POLYETHYLENE GLYCOL (3350) 17 G: 17 POWDER, FOR SOLUTION ORAL at 10:23

## 2021-01-01 RX ADMIN — DOCUSATE SODIUM 100 MG: 100 CAPSULE, LIQUID FILLED ORAL at 19:49

## 2021-01-01 RX ADMIN — HYDROCODONE BITARTRATE AND ACETAMINOPHEN 1 TABLET: 5; 325 TABLET ORAL at 13:35

## 2021-01-01 RX ADMIN — SODIUM CHLORIDE: 9 INJECTION, SOLUTION INTRAVENOUS at 04:49

## 2021-01-01 RX ADMIN — SODIUM CHLORIDE, PRESERVATIVE FREE 10 ML: 5 INJECTION INTRAVENOUS at 19:49

## 2021-01-01 RX ADMIN — STANDARDIZED SENNA CONCENTRATE 8.6 MG: 8.6 TABLET ORAL at 08:52

## 2021-01-01 RX ADMIN — ARFORMOTEROL TARTRATE 15 MCG: 15 SOLUTION RESPIRATORY (INHALATION) at 09:13

## 2021-01-01 RX ADMIN — PANTOPRAZOLE SODIUM 40 MG: 40 TABLET, DELAYED RELEASE ORAL at 10:07

## 2021-01-01 RX ADMIN — TRAZODONE HYDROCHLORIDE 50 MG: 50 TABLET ORAL at 20:34

## 2021-01-01 RX ADMIN — STANDARDIZED SENNA CONCENTRATE 8.6 MG: 8.6 TABLET ORAL at 10:06

## 2021-01-01 RX ADMIN — FENTANYL CITRATE 50 MCG: 50 INJECTION, SOLUTION INTRAMUSCULAR; INTRAVENOUS at 00:43

## 2021-01-01 RX ADMIN — CLOTRIMAZOLE AND BETAMETHASONE DIPROPIONATE: 10; .5 CREAM TOPICAL at 10:14

## 2021-01-01 RX ADMIN — PANTOPRAZOLE SODIUM 40 MG: 40 TABLET, DELAYED RELEASE ORAL at 17:40

## 2021-01-01 RX ADMIN — HYDROMORPHONE HYDROCHLORIDE 0.25 MG: 1 INJECTION, SOLUTION INTRAMUSCULAR; INTRAVENOUS; SUBCUTANEOUS at 16:43

## 2021-01-01 RX ADMIN — STANDARDIZED SENNA CONCENTRATE 8.6 MG: 8.6 TABLET ORAL at 19:49

## 2021-01-01 RX ADMIN — TIOTROPIUM BROMIDE INHALATION SPRAY 2 PUFF: 3.12 SPRAY, METERED RESPIRATORY (INHALATION) at 08:04

## 2021-01-01 RX ADMIN — HYDROCODONE BITARTRATE AND ACETAMINOPHEN 1 TABLET: 5; 325 TABLET ORAL at 20:34

## 2021-01-01 RX ADMIN — ASPIRIN 81 MG: 81 TABLET, CHEWABLE ORAL at 10:06

## 2021-01-01 RX ADMIN — TIOTROPIUM BROMIDE INHALATION SPRAY 2 PUFF: 3.12 SPRAY, METERED RESPIRATORY (INHALATION) at 07:44

## 2021-01-01 RX ADMIN — DOCUSATE SODIUM 100 MG: 100 CAPSULE, LIQUID FILLED ORAL at 20:16

## 2021-01-01 RX ADMIN — PANTOPRAZOLE SODIUM 40 MG: 40 TABLET, DELAYED RELEASE ORAL at 08:52

## 2021-01-01 RX ADMIN — SACUBITRIL AND VALSARTAN 1 TABLET: 49; 51 TABLET, FILM COATED ORAL at 21:56

## 2021-01-01 RX ADMIN — DOCUSATE SODIUM 100 MG: 100 CAPSULE, LIQUID FILLED ORAL at 10:07

## 2021-01-01 RX ADMIN — BUDESONIDE 500 MCG: 0.5 INHALANT RESPIRATORY (INHALATION) at 17:38

## 2021-01-01 RX ADMIN — DOCUSATE SODIUM 100 MG: 100 CAPSULE, LIQUID FILLED ORAL at 09:36

## 2021-01-01 RX ADMIN — Medication 5000 UNITS: at 08:51

## 2021-01-01 RX ADMIN — Medication 1 CAPSULE: at 08:52

## 2021-01-01 RX ADMIN — HYDROCODONE BITARTRATE AND ACETAMINOPHEN 1.5 TABLET: 5; 325 TABLET ORAL at 22:10

## 2021-01-01 RX ADMIN — DICYCLOMINE HYDROCHLORIDE 10 MG: 10 CAPSULE ORAL at 20:16

## 2021-01-01 RX ADMIN — BUDESONIDE 500 MCG: 0.5 INHALANT RESPIRATORY (INHALATION) at 08:04

## 2021-01-01 RX ADMIN — DICYCLOMINE HYDROCHLORIDE 10 MG: 10 CAPSULE ORAL at 18:20

## 2021-01-01 RX ADMIN — SODIUM CHLORIDE, PRESERVATIVE FREE 10 ML: 5 INJECTION INTRAVENOUS at 21:09

## 2021-01-01 RX ADMIN — VASOPRESSIN 0.02 UNITS/MIN: 20 INJECTION INTRAVENOUS at 06:20

## 2021-01-01 RX ADMIN — CLOTRIMAZOLE AND BETAMETHASONE DIPROPIONATE: 10; .5 CREAM TOPICAL at 19:49

## 2021-01-01 RX ADMIN — DIGOXIN 125 MCG: 125 TABLET ORAL at 09:36

## 2021-01-01 RX ADMIN — DICYCLOMINE HYDROCHLORIDE 10 MG: 10 CAPSULE ORAL at 20:51

## 2021-01-01 RX ADMIN — FOLIC ACID 1 MG: 1 TABLET ORAL at 08:52

## 2021-01-01 RX ADMIN — DIGOXIN 125 MCG: 125 TABLET ORAL at 08:52

## 2021-01-01 RX ADMIN — Medication 1 CAPSULE: at 10:06

## 2021-01-01 RX ADMIN — DICYCLOMINE HYDROCHLORIDE 10 MG: 10 CAPSULE ORAL at 06:32

## 2021-01-01 RX ADMIN — ARFORMOTEROL TARTRATE 15 MCG: 15 SOLUTION RESPIRATORY (INHALATION) at 17:33

## 2021-01-01 RX ADMIN — STANDARDIZED SENNA CONCENTRATE 8.6 MG: 8.6 TABLET ORAL at 20:54

## 2021-01-01 RX ADMIN — BUMETANIDE 1 MG: 0.25 INJECTION INTRAMUSCULAR; INTRAVENOUS at 11:20

## 2021-01-01 RX ADMIN — Medication 5000 UNITS: at 10:06

## 2021-01-01 RX ADMIN — ARFORMOTEROL TARTRATE 15 MCG: 15 SOLUTION RESPIRATORY (INHALATION) at 08:24

## 2021-01-01 RX ADMIN — CLOTRIMAZOLE AND BETAMETHASONE DIPROPIONATE: 10; .5 CREAM TOPICAL at 20:53

## 2021-01-01 RX ADMIN — DOBUTAMINE HYDROCHLORIDE 5 MCG/KG/MIN: 200 INJECTION INTRAVENOUS at 20:20

## 2021-01-01 RX ADMIN — BUMETANIDE 2 MG: 0.25 INJECTION, SOLUTION INTRAMUSCULAR; INTRAVENOUS at 14:47

## 2021-01-01 RX ADMIN — BUDESONIDE 500 MCG: 0.5 INHALANT RESPIRATORY (INHALATION) at 16:24

## 2021-01-01 RX ADMIN — ASPIRIN 81 MG: 81 TABLET, CHEWABLE ORAL at 10:56

## 2021-01-01 RX ADMIN — SODIUM CHLORIDE, PRESERVATIVE FREE 10 ML: 5 INJECTION INTRAVENOUS at 10:35

## 2021-01-01 RX ADMIN — ARFORMOTEROL TARTRATE 15 MCG: 15 SOLUTION RESPIRATORY (INHALATION) at 06:28

## 2021-01-01 RX ADMIN — TRAMADOL HYDROCHLORIDE 50 MG: 50 TABLET ORAL at 03:53

## 2021-01-01 RX ADMIN — DICYCLOMINE HYDROCHLORIDE 10 MG: 10 CAPSULE ORAL at 21:53

## 2021-01-01 RX ADMIN — DOCUSATE SODIUM 100 MG: 100 CAPSULE, LIQUID FILLED ORAL at 10:57

## 2021-01-01 RX ADMIN — CETIRIZINE HYDROCHLORIDE 5 MG: 5 TABLET, FILM COATED ORAL at 10:15

## 2021-01-01 RX ADMIN — CETIRIZINE HYDROCHLORIDE 5 MG: 5 TABLET, FILM COATED ORAL at 08:52

## 2021-01-01 RX ADMIN — BUMETANIDE 1 MG: 1 TABLET ORAL at 08:52

## 2021-01-01 RX ADMIN — DICYCLOMINE HYDROCHLORIDE 10 MG: 10 CAPSULE ORAL at 20:54

## 2021-01-01 RX ADMIN — HYDROCORTISONE SODIUM SUCCINATE 200 MG: 100 INJECTION, POWDER, FOR SOLUTION INTRAMUSCULAR; INTRAVENOUS at 06:25

## 2021-01-01 RX ADMIN — ARFORMOTEROL TARTRATE 15 MCG: 15 SOLUTION RESPIRATORY (INHALATION) at 08:04

## 2021-01-01 RX ADMIN — CLOTRIMAZOLE AND BETAMETHASONE DIPROPIONATE: 10; .5 CREAM TOPICAL at 21:49

## 2021-01-01 RX ADMIN — ARFORMOTEROL TARTRATE 15 MCG: 15 SOLUTION RESPIRATORY (INHALATION) at 20:58

## 2021-01-01 RX ADMIN — TRAZODONE HYDROCHLORIDE 50 MG: 50 TABLET ORAL at 21:56

## 2021-01-01 RX ADMIN — DEXAMETHASONE 6 MG: 4 TABLET ORAL at 11:47

## 2021-01-01 RX ADMIN — ARFORMOTEROL TARTRATE 15 MCG: 15 SOLUTION RESPIRATORY (INHALATION) at 17:10

## 2021-01-01 RX ADMIN — LEVALBUTEROL HYDROCHLORIDE 0.63 MG: 0.63 SOLUTION RESPIRATORY (INHALATION) at 03:12

## 2021-01-01 RX ADMIN — SACUBITRIL AND VALSARTAN 1 TABLET: 49; 51 TABLET, FILM COATED ORAL at 10:06

## 2021-01-01 RX ADMIN — BUDESONIDE 500 MCG: 0.5 INHALANT RESPIRATORY (INHALATION) at 17:53

## 2021-01-01 RX ADMIN — ACETAMINOPHEN 650 MG: 325 TABLET ORAL at 18:48

## 2021-01-01 RX ADMIN — Medication 5000 UNITS: at 10:19

## 2021-01-01 RX ADMIN — DICYCLOMINE HYDROCHLORIDE 10 MG: 10 CAPSULE ORAL at 10:14

## 2021-01-01 RX ADMIN — DICYCLOMINE HYDROCHLORIDE 10 MG: 10 CAPSULE ORAL at 06:45

## 2021-01-01 RX ADMIN — PANTOPRAZOLE SODIUM 40 MG: 40 TABLET, DELAYED RELEASE ORAL at 18:19

## 2021-01-01 RX ADMIN — TIOTROPIUM BROMIDE INHALATION SPRAY 2 PUFF: 3.12 SPRAY, METERED RESPIRATORY (INHALATION) at 09:13

## 2021-01-01 RX ADMIN — BUMETANIDE 1 MG: 1 TABLET ORAL at 18:50

## 2021-01-01 RX ADMIN — TRAMADOL HYDROCHLORIDE 50 MG: 50 TABLET ORAL at 22:00

## 2021-01-01 RX ADMIN — Medication 1 CAPSULE: at 09:33

## 2021-01-01 RX ADMIN — STANDARDIZED SENNA CONCENTRATE 8.6 MG: 8.6 TABLET ORAL at 10:18

## 2021-01-01 RX ADMIN — HYDROMORPHONE HYDROCHLORIDE 0.25 MG: 1 INJECTION, SOLUTION INTRAMUSCULAR; INTRAVENOUS; SUBCUTANEOUS at 21:54

## 2021-01-01 RX ADMIN — ONDANSETRON 4 MG: 2 INJECTION INTRAMUSCULAR; INTRAVENOUS at 14:59

## 2021-01-01 RX ADMIN — SODIUM CHLORIDE, PRESERVATIVE FREE 10 ML: 5 INJECTION INTRAVENOUS at 20:12

## 2021-01-01 RX ADMIN — DICYCLOMINE HYDROCHLORIDE 10 MG: 10 CAPSULE ORAL at 16:06

## 2021-01-01 RX ADMIN — BUMETANIDE 2 MG: 0.25 INJECTION INTRAMUSCULAR; INTRAVENOUS at 09:36

## 2021-01-01 RX ADMIN — OXYCODONE HYDROCHLORIDE AND ACETAMINOPHEN 500 MG: 500 TABLET ORAL at 10:13

## 2021-01-01 RX ADMIN — VASOPRESSIN 0.02 UNITS/MIN: 20 INJECTION INTRAVENOUS at 20:39

## 2021-01-01 RX ADMIN — HYDROXYZINE HYDROCHLORIDE 25 MG: 25 TABLET ORAL at 11:56

## 2021-01-01 RX ADMIN — SODIUM CHLORIDE, PRESERVATIVE FREE 10 ML: 5 INJECTION INTRAVENOUS at 10:14

## 2021-01-01 RX ADMIN — ALPRAZOLAM 0.25 MG: 0.25 TABLET ORAL at 21:13

## 2021-01-01 RX ADMIN — SODIUM CHLORIDE, PRESERVATIVE FREE 10 ML: 5 INJECTION INTRAVENOUS at 20:10

## 2021-01-01 RX ADMIN — THERA TABS 1 TABLET: TAB at 10:09

## 2021-01-01 RX ADMIN — DICYCLOMINE HYDROCHLORIDE 10 MG: 10 CAPSULE ORAL at 12:05

## 2021-01-01 RX ADMIN — ALPRAZOLAM 0.25 MG: 0.25 TABLET ORAL at 03:00

## 2021-01-01 RX ADMIN — BUDESONIDE 500 MCG: 0.5 INHALANT RESPIRATORY (INHALATION) at 07:52

## 2021-01-01 RX ADMIN — BUDESONIDE 500 MCG: 0.5 INHALANT RESPIRATORY (INHALATION) at 08:31

## 2021-01-01 RX ADMIN — HEPARIN SODIUM 15 UNITS/KG/HR: 10000 INJECTION, SOLUTION INTRAVENOUS at 11:12

## 2021-01-01 RX ADMIN — CLOTRIMAZOLE AND BETAMETHASONE DIPROPIONATE: 10; .5 CREAM TOPICAL at 10:13

## 2021-01-01 RX ADMIN — LORAZEPAM 0.5 MG: 2 INJECTION INTRAMUSCULAR; INTRAVENOUS at 00:04

## 2021-01-01 RX ADMIN — CLOTRIMAZOLE AND BETAMETHASONE DIPROPIONATE: 10; .5 CREAM TOPICAL at 08:57

## 2021-01-01 RX ADMIN — BUDESONIDE 500 MCG: 0.5 INHALANT RESPIRATORY (INHALATION) at 06:28

## 2021-01-01 RX ADMIN — MIDODRINE HYDROCHLORIDE 5 MG: 5 TABLET ORAL at 18:10

## 2021-01-01 RX ADMIN — HYDROCODONE BITARTRATE AND ACETAMINOPHEN 1 TABLET: 5; 325 TABLET ORAL at 10:04

## 2021-01-01 RX ADMIN — Medication 1 TABLET: at 08:52

## 2021-01-01 RX ADMIN — FOLIC ACID 1 MG: 1 TABLET ORAL at 10:16

## 2021-01-01 RX ADMIN — ARFORMOTEROL TARTRATE 15 MCG: 15 SOLUTION RESPIRATORY (INHALATION) at 10:20

## 2021-01-01 RX ADMIN — Medication 5000 UNITS: at 13:14

## 2021-01-01 RX ADMIN — LEVALBUTEROL HYDROCHLORIDE 0.63 MG: 0.63 SOLUTION RESPIRATORY (INHALATION) at 16:36

## 2021-01-01 ASSESSMENT — ENCOUNTER SYMPTOMS
COLOR CHANGE: 0
PHOTOPHOBIA: 0
ABDOMINAL PAIN: 0
COLOR CHANGE: 0
TROUBLE SWALLOWING: 0
CHEST TIGHTNESS: 0
SORE THROAT: 0
ABDOMINAL PAIN: 0
NAUSEA: 0
SORE THROAT: 0
SORE THROAT: 0
COUGH: 0
CHEST TIGHTNESS: 0
ABDOMINAL DISTENTION: 0
CHEST TIGHTNESS: 0
COUGH: 0
SORE THROAT: 0
VOMITING: 0
CONSTIPATION: 0
NAUSEA: 1
CHEST TIGHTNESS: 0
BACK PAIN: 0
SHORTNESS OF BREATH: 0
COLOR CHANGE: 0
ABDOMINAL PAIN: 0
EYE REDNESS: 0
TROUBLE SWALLOWING: 0
EYE REDNESS: 0
CONSTIPATION: 0
BACK PAIN: 0
SHORTNESS OF BREATH: 1
WHEEZING: 0
VOMITING: 0
WHEEZING: 0
DIARRHEA: 0
SHORTNESS OF BREATH: 0
NAUSEA: 0
NAUSEA: 0
COLOR CHANGE: 0
SINUS PRESSURE: 0
WHEEZING: 0
ABDOMINAL DISTENTION: 0
DIARRHEA: 0
DIARRHEA: 0
PHOTOPHOBIA: 0
SINUS PRESSURE: 0
ABDOMINAL PAIN: 0
SHORTNESS OF BREATH: 1
BACK PAIN: 0
BACK PAIN: 0
WHEEZING: 0
VOMITING: 0
VOMITING: 0

## 2021-01-01 ASSESSMENT — PAIN SCALES - GENERAL
PAINLEVEL_OUTOF10: 6
PAINLEVEL_OUTOF10: 9
PAINLEVEL_OUTOF10: 0
PAINLEVEL_OUTOF10: 7
PAINLEVEL_OUTOF10: 10
PAINLEVEL_OUTOF10: 4
PAINLEVEL_OUTOF10: 8
PAINLEVEL_OUTOF10: 4
PAINLEVEL_OUTOF10: 9
PAINLEVEL_OUTOF10: 7
PAINLEVEL_OUTOF10: 0
PAINLEVEL_OUTOF10: 9
PAINLEVEL_OUTOF10: 7
PAINLEVEL_OUTOF10: 0
PAINLEVEL_OUTOF10: 3
PAINLEVEL_OUTOF10: 3
PAINLEVEL_OUTOF10: 9

## 2021-01-01 ASSESSMENT — PAIN DESCRIPTION - PAIN TYPE
TYPE: CHRONIC PAIN
TYPE: ACUTE PAIN
TYPE: ACUTE PAIN
TYPE: CHRONIC PAIN
TYPE: CHRONIC PAIN

## 2021-01-01 ASSESSMENT — PAIN SCALES - WONG BAKER
WONGBAKER_NUMERICALRESPONSE: 0
WONGBAKER_NUMERICALRESPONSE: 6

## 2021-01-01 ASSESSMENT — PAIN DESCRIPTION - LOCATION
LOCATION: LEG
LOCATION: BUTTOCKS
LOCATION: FOOT
LOCATION: LEG

## 2021-01-01 ASSESSMENT — PAIN DESCRIPTION - ONSET
ONSET: ON-GOING

## 2021-01-01 ASSESSMENT — PAIN DESCRIPTION - ORIENTATION
ORIENTATION: LEFT
ORIENTATION: LEFT;RIGHT;LOWER
ORIENTATION: LEFT

## 2021-01-01 ASSESSMENT — PAIN DESCRIPTION - DESCRIPTORS
DESCRIPTORS: SHARP;ACHING;SORE
DESCRIPTORS: SHARP;ACHING;SORE
DESCRIPTORS: DISCOMFORT

## 2021-01-01 ASSESSMENT — PAIN DESCRIPTION - FREQUENCY
FREQUENCY: CONTINUOUS

## 2021-01-01 ASSESSMENT — PAIN DESCRIPTION - PROGRESSION: CLINICAL_PROGRESSION: NOT CHANGED

## 2021-01-05 NOTE — PROGRESS NOTES
Medication Management [address]   [phone] (phone)  [phone] (fax)        INR drawn by [facility]. Nursing assessment reviewed and appreciated. Nursing assessment within the normal limits with the exception of the following:  none    Anticoagulation encounter completed via telephone with pt's daughter, Corona England. Called pt first, reached . Patient verifies current dosing regimen and tablet strength. No missed or extra doses. Patient denies s/s bleeding/bruising/swelling/SOB/chest pain  No blood in urine or stool. Diet improving slightly. No changes in medication/OTC agents/Herbals. Augmentin completed. No change in alcohol use or tobacco use. No change in activity level. Patient denies headaches/dizziness/lightheadedness/falls. No vomiting/diarrhea or acute illness. No Procedures scheduled in the future at this time. Assessment:  Lab Results   Component Value Date    INR 1.90 (H) 01/05/2021    INR 3.13 12/28/2020    INR 2.83 (H) 12/23/2020    PROTIME 22.3 02/05/2019     INR subtherapeutic   Recent Labs     01/05/21  1202   INR 1.90*         Plan:  Continue Coumadin 1mg MWF and 1.5mg TThSaS. Recheck INR in 1 week(s), on 1/12/20. Daughter asks that we also call her after every telephone visit to review instructions as she sets up pt's pill box. Daughter reminded to call the Anticoagulation Clinic with any signs or symptoms of bleeding or with any medication changes. Daughter given instructions utilizing the teach back method. The following statement was review with patient regarding this virtual visit:  We want to confirm that, for purposes of billing, this is a virtual visit with your provider for which we will submit a claim for reimbursement with your insurance company. You may be responsible for any copays, coinsurance amounts or other amounts not covered by your insurance company.  If you do not accept this, unfortunately we will not be able to schedule a virtual visit with the provider. Do you accept?   Yes    CLINICAL PHARMACY CONSULT: MED RECONCILIATION/REVIEW ADDENDUM    For Pharmacy Admin Tracking Only    PHSO: No  Total # of Interventions Recommended: 0  - Maintenance Safety Lab Monitoring #: 1  Total Interventions Accepted: 0  Time Spent (min): [address], PharmD  [address]

## 2021-01-06 PROBLEM — J96.20 ACUTE ON CHRONIC RESPIRATORY FAILURE (HCC): Status: ACTIVE | Noted: 2021-01-01

## 2021-01-06 NOTE — ED NOTES
Daughter Mady Castellon.  If questions regarding care or medications she can be called at Viry [place], BERT  01/06/21 8289

## 2021-01-06 NOTE — PROGRESS NOTES
Pharmacy Medication History Note      List of current medications patient is taking is complete. Source of information: AVS (12/23/20), patient's bottles    Changes made to medication list:  Medications removed (include reason, ex. therapy complete or physician discontinued):  Metoprolol succinate-patient's daughter said medication was discontinued by Karuna Hernandez a month ago due to cardiac status      Medications added/doses adjusted:  Fish Oil 1200mg daily  Senna 8.6mg BID (in patient's bottles and on last AVS)  Patient has levalbuterol or Xopenex in nebulizer solution and MDI    Other notes (ex. Recent course of antibiotics, Coumadin dosing):  Patient's last instructions at coumadin clinic from 1/5/21 were coumadin 1mg on MWF, and 1.5mg on all other days. Patient was taken off Entresto last hospital visit  Denies use of other OTC or herbal medications.       Allergies reviewed      Electronically signed by Cathleen Kumari on 1/6/2021 at 4:04 PM

## 2021-01-06 NOTE — ED NOTES
Bed: 020A  Expected date:   Expected time:   Means of arrival: [facility]  Comments:     Cecilio Masters RN  01/06/21 102

## 2021-01-06 NOTE — PROGRESS NOTES
Clinical Pharmacy Note    Angy Wagner is a 67 y.o. female for whom pharmacy has been asked to manage warfarin therapy. Reason for Admission: SOB    Consulting Physician: Gregoria Alva  Warfarin dose prior to admission: 1.5mg MWF, 1 mg other days  Warfarin indication: AF  Target INR range: 2-3   Outpatient warfarin provider[facility]    Past Medical History:   Diagnosis Date    Allergic rhinitis     Anemia     Anxiety     Arthritis     Asthma     Atrial fibrillation ([facility]     CAD (coronary artery disease)     Chronic systolic CHF (congestive heart failure) ([facility] [address] 10/27/2019    COPD (chronic obstructive pulmonary disease) (HCC)     Frequent UTI     GERD (gastroesophageal reflux disease)     Hemorrhoids     History of blood transfusion     X8    Hx of blood clots     left arm    Hyperlipidemia     Hypertension     Influenza A 02/22/2020    Kidney stone     LONG TERM ANTICOAGULENT USE 7/6/2012    Lumbar spinal stenosis     MI, old 1994    Prolonged emergence from general anesthesia                 Recent Labs     01/06/21  1110   INR 2.36*     Recent Labs     01/06/21  1110   HGB 9.8*   HCT 33.2*          Current warfarin drug-drug interactions: aspirin      Date INR Warfarin Dose   1/06/21 2.36 1.5 mg                                   Daily PT/INR until stable within therapeutic range. Thank you for the consult.

## 2021-01-07 NOTE — PLAN OF CARE
Problem: Pain:  Goal: Pain level will decrease  Description: Pain level will decrease  Outcome: Ongoing  Note: Patient has required pain medication this shift. Continue to monitor for signs and symptoms of pain. Utilize the pain scale. Problem: Falls - Risk of:  Goal: Will remain free from falls  Description: Will remain free from falls  Outcome: Ongoing  Note: Patient remains free from falls this shift. Continue to implement fall risk interventions including: Bed lowest position and locked, call light and table within reach, fall sign posted outside of room, utilize bed and chair alarm. Continue to complete purposeful hourly rounding. Problem: Cardiac:  Goal: Ability to maintain an adequate cardiac output will improve  Description: Ability to maintain an adequate cardiac output will improve  Outcome: Ongoing     Problem: Fluid Volume:  Goal: Ability to achieve and maintain adequate urine output will improve  Description: Ability to achieve and maintain adequate urine output will improve  Outcome: Ongoing  Note: Marylyn Goldmann is on oral bumex, external catheter in place. Continue to complete strict I&O to monitor fluid balance. Problem: Respiratory:  Goal: Respiratory status will improve  Description: Respiratory status will improve  Outcome: Ongoing  Note: Marylyn Goldmann is on Bipap at this time to help with oxygenation. Tolerating well. Continue to monitor respiratory status. Care plan reviewed with patient. Patient verbalize understanding of the plan of care and contribute to goal setting.

## 2021-01-07 NOTE — H&P
Patient was seen and examined face-to-face by me (Dr. Heidi Garcia MD). The chart, progress notes, labs and radiographs were reviewed. Case discussed with patient's primary nurse. Questions and concerns were addressed.   I agree with the note as created with additional comments as noted    Dr. Heidi Garcia MD   Department of Cardiology

## 2021-01-07 NOTE — CARE COORDINATION
DISASTER CHARTING    1/7/21, 7:09 AM EST    DISCHARGE ONGOING EVALUATION:     David Salima day: 1  Location: [facility]22/022-A Reason for admit: Acute on chronic respiratory failure ([facility] [address] [J96.20]   Barriers to Discharge: Admitted through ED with SOB. Sats upon arrival were 77% on 6L, pt was tachycardic. Placed on BiPAP. BNP 75390. Troponins 0.044, 0.030 and 0.031. WBC 3.2, Hgb 8.6. INR 2.36 and 2.73. Given single dose of Decadron po, Bumex iv x1. CXR showed pulmonary vascular congestion and bilateral effusions - suggesting CHF. PCP: Dakota Anthony MD  Patient Goals/Plan/Treatment Preferences: Spoke with pt. She lives at home with her . She states she can drive, but that she hasn't been lately. She has a nebulizer, a Rollator and a rolling walker. She also has home O2 through [address], states has been adjusting her liter flow as needed at home. She is current with [facility] for nursing, PT/OT and an aide. SW consulted.

## 2021-01-07 NOTE — CONSULTS
Department of Internal Medicine  Pulmonary  Attending Consult Note      Reason for Consult:  Respiratory failure  Requesting Physician:  Dr. Balbir Rudolph:  Shortness of breath, lower extremity edema    History Obtained From:  patient    HISTORY OF PRESENT ILLNESS:                The patient is a 67 y.o. female presents due to increased leg swelling/pan and shortness of breath. The patient states she's been having some increasing swelling and thus increasing shortness of breath that she was trying to improve with her nebulizers but was not helping. The patient noted continued worsening oxygen saturations as the days progressed and had to turn up the oxygen higher and was on a baseline 6L since recent hospital stay. The patient has been using her medications as prescribed but has been having some high sodium meals like [employer] chicken. The patient states she's feeling better this morning with some shortness of breath, denies any chest pain, has still tenderness in the left leg and swelling with pitting edema in the right leg. The patient states she's not having any n/v/d or abdominal pain, no headaches, dizziness or lightheadedness. The patient denies recent ill contacts other than in the hospital and states she's had no fevers or chills. No other noted contributing factors or complaints at this time.      Past Medical History:        Diagnosis Date    Allergic rhinitis     Anemia     Anxiety     Arthritis     Asthma     Atrial fibrillation (HCC)     CAD (coronary artery disease)     [facility] 10/27/2019    COPD (chronic obstructive pulmonary disease) (HCC)     Frequent UTI     GERD (gastroesophageal reflux disease)     Hemorrhoids     History of blood transfusion     X8    Hx of blood clots     left arm    Hyperlipidemia     Hypertension     Influenza A 02/22/2020    Kidney stone     LONG TERM ANTICOAGULENT USE 7/6/2012 levalbuterol (XOPENEX) nebulization 0.63 mg, 1 ampule, Nebulization, Q8H PRN  cetirizine (ZYRTEC) tablet 5 mg, 5 mg, Oral, Daily  linaclotide (LINZESS) capsule 290 mcg, 290 mcg, Oral, Daily  loperamide (IMODIUM) capsule 2 mg, 2 mg, Oral, 4x Daily PRN  multivitamin 1 tablet, 1 tablet, Oral, Daily  nicotine (NICODERM CQ) 7 MG/24HR 1 patch, 1 patch, Transdermal, Q24H  nitroGLYCERIN (NITROSTAT) SL tablet 0.4 mg, 0.4 mg, Sublingual, Q5 Min PRN  omega-3 acid ethyl esters (LOVAZA) capsule 1,000 mg, 1,000 mg, Oral, Daily  ondansetron (ZOFRAN) tablet 8 mg, 8 mg, Oral, Daily PRN  pantoprazole (PROTONIX) tablet 40 mg, 40 mg, Oral, BID AC  potassium chloride (KLOR-CON M) extended release tablet 20 mEq, 20 mEq, Oral, Daily  lactobacillus (CULTURELLE) capsule 1 capsule, 1 capsule, Oral, Daily  tiotropium (SPIRIVA RESPIMAT) 2.5 MCG/ACT inhaler 2 puff, 2 puff, Inhalation, Daily  senna (SENOKOT) tablet 8.6 mg, 1 tablet, Oral, BID  traMADol (ULTRAM) tablet 50 mg, 50 mg, Oral, Q6H PRN  traZODone (DESYREL) tablet 50 mg, 50 mg, Oral, Nightly  0.9 % sodium chloride infusion, , Intravenous, Continuous  sodium chloride flush 0.9 % injection 10 mL, 10 mL, Intravenous, 2 times per day  sodium chloride flush 0.9 % injection 10 mL, 10 mL, Intravenous, PRN  potassium chloride (KLOR-CON M) extended release tablet 40 mEq, 40 mEq, Oral, PRN **OR** potassium bicarb-citric acid (EFFER-K) effervescent tablet 40 mEq, 40 mEq, Oral, PRN **OR** potassium chloride 10 mEq/100 mL IVPB (Peripheral Line), 10 mEq, Intravenous, PRN  promethazine (PHENERGAN) tablet 12.5 mg, 12.5 mg, Oral, Q6H PRN **OR** ondansetron (ZOFRAN) injection 4 mg, 4 mg, Intravenous, Q6H PRN  acetaminophen (TYLENOL) tablet 650 mg, 650 mg, Oral, Q6H PRN **OR** acetaminophen (TYLENOL) suppository 650 mg, 650 mg, Rectal, Q6H PRN  polyethylene glycol (GLYCOLAX) packet 17 g, 17 g, Oral, Daily PRN  aspirin chewable tablet 81 mg, 81 mg, Oral, Daily warfarin (COUMADIN) daily dosing (placeholder), , Other, RX Placeholder  polyethylene glycol (GLYCOLAX) packet 17 g, 17 g, Oral, Daily  Allergies:  Benadryl [diphenhydramine hcl], Ciprofloxacin, Clarithromycin, Vitamin k, Atorvastatin, Captopril, Codeine, Iv dye [iodides], Lipitor, Macrobid [nitrofurantoin monohydrate macrocrystals], Neomycin-bacitracin zn-polymyx, Pravastatin, Zetia [ezetimibe], Adhesive tape, Cephalexin, Doxycycline, Morphine, Other, Propoxyphene, and Sulfa antibiotics    Social History:    No recent drugs, etoh, tob    Family History:       Adopted: Yes   Problem Relation Age of Onset    Heart Disease Father          AGE 35    Cancer Sister         breast     REVIEW OF SYSTEMS:    12 point ROS completed all negative except above noted in HPI  PHYSICAL EXAM:      Vitals:    BP (!) 167/72   Pulse 68   Temp 98.2 °F (36.8 °C) (Axillary)   Resp 14   Wt 138 lb 14.4 oz (63 kg)   SpO2 96%   BMI 26.24 kg/m²     CONSTITUTIONAL:  awake, alert, cooperative, no apparent distress, and appears stated age  EYES:  Lids and lashes normal, pupils equal, round and reactive to light, extra ocular muscles intact, sclera clear, conjunctiva normal  LUNGS:  No increased work of breathing, good air exchange. Crackles bases - on bipap  CARDIOVASCULAR:  Normal apical impulse, regular rate and rhythm, normal S1 and S2, no S3 or S4, and no murmur noted  ABDOMEN:  No scars, normal bowel sounds, soft, non-distended, non-tender, no masses palpated, no hepatosplenomegally  MUSCULOSKELETAL:  Left leg minimal edema, scarring from recent infection, tender to touch. Right leg 1-2+ edema  NEUROLOGIC:  Awake, alert, oriented to name, place and time. Cranial nerves II-XII are grossly intact. Motor is 5 out of 5 bilaterally. Cerebellar finger to nose, heel to shin intact. Sensory is intact.   Babinski down going, Romberg negative, and gait is normal.  SKIN:  no bruising or bleeding  DATA:    CBC:   Lab Results Component Value Date    WBC 3.5 01/07/2021    RBC 2.91 01/07/2021    HGB 8.6 01/07/2021    HCT 28.7 01/07/2021    MCV 98.6 01/07/2021    MCH 29.6 01/07/2021    MCHC 30.0 01/07/2021    RDW 14.9 06/25/2020     01/07/2021    MPV 11.5 01/07/2021     BMP:    Lab Results   Component Value Date     01/06/2021    K 3.6 01/06/2021     01/06/2021    CO2 30 01/06/2021    BUN 20 01/06/2021    LABALBU 3.2 01/07/2021    CREATININE 1.1 01/06/2021    CALCIUM 9.7 01/06/2021    LABGLOM 48 01/06/2021    GLUCOSE 158 01/06/2021    GLUCOSE 115 07/06/2018       IMPRESSION/RECOMMENDATIONS:      Acute on chronic hypoxemic respiratory failure  Acute pulmonary edema  Acute exacerbation of SYSTOLIC heart failure with decompensation  COPD/Asthma overlap without exacerbaton    Plan:  The patient has clear pulmonary edema and fluid overload requiring positive pressure to improve oxygenaton and will require aggressive diuresis to help improve her status. The patient needs dietary education for sodium restriction and would recommend this prior to discharge as she was eating [employer] chicken which very high sodium and other high sodium meals which causing her fluid overload currently. The patient realizes this now but isn't sure how to monitor sodium in diet. The patient will continue with bipap HS and Prn and can use nasal cannula during the day for meals. The patient should continue care with cardiology and aggressive diuresis and monitor electrolytes and for improvement. Will monitor and follow along. No role for steroids in this venture as do not suspect copd/asthma issue. Call with questions/concerns.

## 2021-01-07 NOTE — CARE COORDINATION
DISCHARGE/PLANNING EVALUATION  1/7/21, 2:19 PM EST    Reason for Referral:Interested in home health. Mental Status: Answered questions appropriately. Decision Making: Independent with decision making. Family/Social/Home Environment: Lives at home with her spouse. She has a son that also stays on occasion. Current Services including food security, transportation and housekeeping: Current with [facility] for RN, PT, OT and aid services per Caroline at the agency. She has a grandchild who had been helping her with showers. Her spouse helps make sure all of her needs are met. Current Equipment:shower chair, grab bars in shower, walker, [employer] for Home oxygen. Payment Source:Medicare and [facility] [place]  Concerns or Barriers to Discharge: none  Post acute provider list with quality measures, geographic area and applicable managed care information provided. Questions regarding selection process answered:Current with [facility]. Teach Back Method used with Philippe Rodney and her spouse Ashley Jacobs regarding care plan and discharge planning. Patient and spouse Ashley Reynaldo verbalize understanding of the plan of care and contribute to goal setting. Patient goals, treatment preferences and discharge plan: Philippe Rodney plans to return home at discharge with current [facility] for RN, PT, OT and aid services. She lives with her spouse who is able to help meet her needs. Electronically signed by BLANCA Khan on 1/7/2021 at 2:19 PM

## 2021-01-07 NOTE — PLAN OF CARE
Patient continues on bipap; tolerating well.   Will continue to wean oxygen and monitor patients respiratory status

## 2021-01-07 NOTE — PROGRESS NOTES
Inpatient Cardiac Rehabilitation Consult    Received consult for Phase II Cardiac Rehabilitation. We will continue to follow patient's hospital course for a qualifying cardiac rehab diagnosis.

## 2021-01-07 NOTE — ED PROVIDER NOTES
Qty: 30 tablet, Refills: 0      OXYGEN Indications: Chronic Obstructive Lung Disease 3-6L continuously as prescribed  Qty: 1 Units, Refills: 0      levocetirizine (XYZAL) 5 MG tablet Take 5 mg by mouth nightly      Omalizumab (XOLAIR SC) Inject into the skin At Dr Patel Alert office      Ascorbic Acid (VITAMIN C) 100 MG tablet Take 100 mg by mouth daily      Cholecalciferol (VITAMIN D3) 125 MCG (5000 UT) TABS Take 5,000 Units by mouth daily       pantoprazole (PROTONIX) 40 MG tablet Take 40 mg by mouth 2 times daily 30 minutes before two heaviest meals on an empty stomach      LINZESS 290 MCG CAPS capsule Take 290 mcg by mouth daily       loperamide (IMODIUM) 2 MG capsule Take 2 mg by mouth 4 times daily as needed for Diarrhea      traMADol (ULTRAM) 50 MG tablet Take 50 mg by mouth every 6 hours as needed for Pain.        nystatin (MYCOSTATIN) [id number] UNIT/GM cream as needed       ondansetron (ZOFRAN) 8 MG tablet daily as needed for Nausea or Vomiting       hydrocortisone (ANUSOL-HC) 25 MG suppository Place 1 suppository rectally 2 times daily as needed for Hemorrhoids  Qty: 28 suppository, Refills: 1      warfarin (COUMADIN) 1 MG tablet Take as directed by the [facility], 145 tablets for 90 days  Qty: 145 tablet, Refills: 3    Associated Diagnoses: Persistent atrial fibrillation (HCC)      digoxin (LANOXIN) 125 MCG tablet Take 1 tablet by mouth every other day Indications: Increased Heart Rate Until Office Visit with Dr. May Sharp: 30 tablet, Refills: 3      Revefenacin 175 MCG/3ML SOLN Inhale 175 mcg into the lungs daily  Qty: 3 mL, Refills: 0      hydrocortisone 2.5 % cream Apply topically 2 times daily as needed       acetaminophen (TYLENOL) 325 MG tablet Take 650 mg by mouth as needed for Pain or Fever Indications: Pain Don't take more then 3,000 mg each day      Multiple Vitamins-Minerals (MULTIVITAMIN ADULT) CHEW Take 2 tablets by mouth daily Menthol-Methyl Salicylate (ICY HOT) 18-05 % STCK Apply topically daily      Alum Hydroxide-Mag Carbonate (GAVISCON PO) Take by mouth as needed Indications: Acid Indigestion       lidocaine (XYLOCAINE) 5 % ointment Apply topically Before venipuncture in Dr. Elsy Buchanan office. hydrOXYzine (ATARAX) 25 MG tablet Take 25 mg by mouth 3 times daily as needed Indications: Feeling Anxious       Pramoxine HCl (PROCTOFOAM RE) Place rectally daily as needed       EPINEPHrine (EPIPEN) 0.3 MG/0.3ML SOAJ injection INJECT AS DIRECTED FOR ANAPHYLAXIS  Refills: 1      Evolocumab (REPATHA SURECLICK SC) Inject 116 mg into the skin every 14 days Indications: Blood Cholesterol Abnormal       B Complex-C (RA B-COMPLEX/VITAMIN C CR PO) Take 1 tablet by mouth daily Indications: Treatment to Prevent Vitamin Deficiency       NITROGLYCERIN RE Place 0.3 mg rectally as needed Indications: Hemorrhoids       dicyclomine (BENTYL) 10 MG capsule Take 10 mg by mouth 4 times daily (before meals and nightly) Indications: Pain in the Abdominal Region      Coenzyme Q10 (COQ-10) 200 MG CAPS Take by mouth daily       Probiotic Product (SUPER PROBIOTIC) CAPS   Take 1 tablet by mouth daily Indications: Digestive Complaint   Refills: 0      budesonide (PULMICORT) 0.5 MG/2ML nebulizer suspension   Take 1 ampule by nebulization 2 times daily Indications: Shortness of Breath (Inactive)       Arformoterol Tartrate (BROVANA) 15 MCG/2ML NEBU   Take 1 ampule by nebulization 2 times daily Indications: Shortness of Breath (Inactive)       ofloxacin (FLOXIN) 0.3 % otic solution Place 2 drops into both ears daily as needed Indications: Infection Long-term therapy. Refills: 0      Carboxymethylcellul-Glycerin (REFRESH OPTIVE OP) Apply  to eye as needed. Indications: Dry Eyes caused by Deficiency of Tears      levalbuterol (XOPENEX) 0.63 MG/3ML nebulization Take 1 ampule by nebulization every 8 hours as needed for Wheezing. triamcinolone (KENALOG) 0.1 % cream Apply topically 2 times daily as needed Anti-fungal      clotrimazole-betamethasone (LOTRISONE) cream Apply topically 2 times daily as needed Indications: Yeast Infection (inactive)       polyethylene glycol (MIRALAX) powder Take 17 g by mouth as needed. Indications: Constipation      aspirin 81 MG EC tablet Take 81 mg by mouth daily. With food  Indications: Anticoagulant Therapy      azelastine (ASTELIN) 137 MCG/SPRAY nasal spray 1 spray by Nasal route 2 times daily as needed Indications: Allergic Rhinitis Use in each nostril as directed      folic acid (FOLVITE) 1 MG tablet Take 1 mg by mouth daily. Indications: Folic Acid Supplementation      traZODone (DESYREL) 50 MG tablet Take 50 mg by mouth nightly       nitroGLYCERIN (NITROSTAT) 0.4 MG SL tablet Place 0.4 mg under the tongue every 5 minutes as needed. If third one does not relieve pain, call 9-1-1. Indications: Chest Pain             ALLERGIES     is allergic to benadryl [diphenhydramine hcl]; ciprofloxacin; clarithromycin; vitamin k; atorvastatin; captopril; codeine; iv dye [iodides]; lipitor; macrobid [nitrofurantoin monohydrate macrocrystals]; neomycin-bacitracin zn-polymyx; pravastatin; zetia [ezetimibe]; adhesive tape; cephalexin; doxycycline; morphine; other; propoxyphene; and sulfa antibiotics. FAMILY HISTORY     is adopted. family history includes Cancer in her sister; Heart Disease in her father. She was adopted. SOCIAL HISTORY      reports that she quit smoking about 2 months ago. Her smoking use included cigarettes. She has a 6.25 pack-year smoking history. She has never used smokeless tobacco. She reports current alcohol use of about 1.0 standard drinks of alcohol per week. She reports that she does not use drugs.     PHYSICAL EXAM INITIAL VITALS:  weight is 137 lb 5.6 oz (62.3 kg). Her axillary temperature is 98.2 °F (36.8 °C). Her blood pressure is 178/74 (abnormal) and her pulse is 70. Her respiration is 21 and oxygen saturation is 100%. Physical Exam  Vitals signs and nursing note reviewed. HENT:      Head: Normocephalic and atraumatic. Neck:      Musculoskeletal: Normal range of motion. Cardiovascular:      Rate and Rhythm: Normal rate and regular rhythm. Heart sounds: Normal heart sounds. Pulmonary:      Effort: Tachypnea and accessory muscle usage present. Breath sounds: No stridor. Decreased breath sounds present. No rhonchi. Abdominal:      Palpations: Abdomen is soft. Tenderness: There is no abdominal tenderness. Musculoskeletal:      Right lower leg: She exhibits no tenderness. Edema present. Left lower leg: She exhibits no tenderness. Edema present. Skin:     General: Skin is warm and dry. Neurological:      General: No focal deficit present. Mental Status: She is alert. Psychiatric:         Mood and Affect: Mood normal.         DIFFERENTIAL DIAGNOSIS:   Differential diagnoses are discussed    DIAGNOSTIC RESULTS     EKG: All EKG's are interpreted by the Emergency Department Physician who either signs or Co-signsthis chart in the absence of a cardiologist.    Vent. Rate: 70 bpm  PRinterval: * ms  QRS duration: 170 ms  QTc: 475 ms  P-R-T axes: 55, -92, 83  Ventricular paced rhythm. No STEMI. Compared to old EKG on 12-      RADIOLOGY: non-plain film images(s) such as CT, Ultrasound and MRI are read by the radiologist.    XR CHEST PORTABLE   Final Result   Cardiomegaly with pulmonary vascular congestion and bilateral effusions suggesting congestive heart failure. **This report has been created using voice recognition software. It may contain minor errors which are inherent in voice recognition technology. ** Final report electronically signed by Dr. Mario Christensen on 1/6/2021 11:34 AM          LABS:      Labs Reviewed   CBC WITH AUTO DIFFERENTIAL - Abnormal; Notable for the following components:       Result Value    RBC 3.32 (*)     Hemoglobin 9.8 (*)     Hematocrit 33.2 (*)     .0 (*)     MCHC 29.5 (*)     RDW-CV 16.3 (*)     RDW-SD 58.6 (*)     Segs Absolute 8.1 (*)     Lymphocytes Absolute 0.9 (*)     All other components within normal limits   COMPREHENSIVE METABOLIC PANEL W/ REFLEX TO MG FOR LOW K - Abnormal; Notable for the following components:    Glucose 158 (*)     ALT 8 (*)     All other components within normal limits   TROPONIN - Abnormal; Notable for the following components:    Troponin T 0.044 (*)     All other components within normal limits   BRAIN NATRIURETIC PEPTIDE - Abnormal; Notable for the following components:    Pro-BNP 57229.0 (*)     All other components within normal limits   BLOOD GAS, ARTERIAL - Abnormal; Notable for the following components:    PCO2 47 (*)     PO2 111 (*)     HCO3 30 (*)     Base Excess (Calculated) 4.7 (*)     All other components within normal limits   PROTIME-INR - Abnormal; Notable for the following components:    INR 2.36 (*)     All other components within normal limits   PROCALCITONIN - Abnormal; Notable for the following components:    Procalcitonin 0.11 (*)     All other components within normal limits   LACTATE DEHYDROGENASE - Abnormal; Notable for the following components:     (*)     All other components within normal limits   C-REACTIVE PROTEIN - Abnormal; Notable for the following components:    CRP 1.22 (*)     All other components within normal limits   D-DIMER, QUANTITATIVE - Abnormal; Notable for the following components:    D-Dimer, Quant 681.00 (*)     All other components within normal limits   GLOMERULAR FILTRATION RATE, ESTIMATED - Abnormal; Notable for the following components:    Est, Glom Filt Rate 48 (*) All other components within normal limits   TROPONIN - Abnormal; Notable for the following components:    Troponin T 0.030 (*)     All other components within normal limits   TROPONIN - Abnormal; Notable for the following components:    Troponin T 0.031 (*)     All other components within normal limits   CULTURE, BLOOD 1   CULTURE, BLOOD 2   COVID-19   LACTIC ACID, PLASMA   FERRITIN   ANION GAP   OSMOLALITY   HEPATIC FUNCTION PANEL   LIPID PANEL   CBC   PROTIME-INR       EMERGENCY DEPARTMENT COURSE:   Vitals:    Vitals:    01/06/21 1536 01/06/21 1757 01/06/21 1945 01/06/21 2130   BP:   (!) 178/74    Pulse:   70    Resp: 28 21 21   Temp:   98.2 °F (36.8 °C)    TempSrc:   Axillary    SpO2:  100% 100%    Weight:          10:19 PM EST: The patient was seen and evaluated. Patient presents for worsening shortness of breath over the past 2 days. She presented at 77% oxygen on 6 L with tachycardia, reporting over use of her nebulizers and inhalers today to try to gain symptom control. She was treated initially with additional oxygen supplementation and converted to BiPAP with significant improvement in symptoms and respiratory rate. Labs revealed white blood cell count within normal limits but BNP elevation above the patient's baseline. Troponin elevation was noted but lower than the patient's last several levels. INR therapeutic. Covid screen negative. Chest x-ray showed cardiomegaly with pulmonary vascular congestion and bilateral effusions suggesting congestive heart failure. Discussed with the patient the potential for this to be a CHF exacerbation but she feels as though there is a COPD component as well. She was given Decadron and Bumex during her ED stay. Discussed admission and she was agreeable. Discussed with Dr. Santo Pineda, cardiology, who will admit the patient for further management.     CRITICAL CARE:   None    CONSULTS:  Dr. Santo Pineda, cardiology    PROCEDURES:  None    FINAL IMPRESSION 1. Acute on chronic respiratory failure, unspecified whether with hypoxia or hypercapnia (HCC)          DISPOSITION/PLAN   Admit    PATIENT REFERRED TO:  No follow-up provider specified.     DISCHARGEMEDICATIONS:  Current Discharge Medication List          (Please note that portions of this note were completedwith a voice recognition program.  Efforts were made to edit the dictations but occasionally words are mis-transcribed.)       Aden Guzman PA-C  01/06/21 2268

## 2021-01-08 NOTE — PROGRESS NOTES
1715-This RN called Lane Bills to inform him that we were unable to get ahold of Dr. Miguel Sevilla about patients condition. He gave this RN Dr. Miguel Sevilla cell phone number, this RN was still not able to contact him. 1730-Dr. Eduardo Villegas called this RN asking about patients blood pressure, I informed him that patient was started on bumex and dobutamine gtt today and is now hypotensive and bumex gtt was held by this RN at 1355 and was waiting on Dr. Miguel Sevilla to call back with orders. He informed me to increase dobutamine gtt to 10 mcg/kg/min and to start levophed and transfer to ICU. Dr. Miguel Sevilla then called the unit at [phone] and spoke with Maurilio Bartlett RN. He was informed of the situation and said he was okay with t/s to ICU. [facility] to [facility] room 6 with Jenn Rabago, [place] Jansen and Elias Woods.

## 2021-01-08 NOTE — PROGRESS NOTES
[id number]  Received pt from  via bed for hypotension. Moved to ICU bed room 6, connected to monitor and assessment completed. A/Ox4. Currently has dobutamine drip at 10 mcg/kg/min infusing. Left lower leg with brown discoloration noted and painful to touch. 2+ pitting edema BLE. Lung sounds clear,O2 on at 6L. Raghu Stern CNP in to see pt. Orders received.

## 2021-01-08 NOTE — PROGRESS NOTES
Patient put in trendelenburg position, BP still 76/50, attempted to call Dr. Sherrie Kimble again at [phone] with no answer.

## 2021-01-08 NOTE — H&P
[address] [place] [address]                              HISTORY AND PHYSICAL    PATIENT NAME: Mer Almeida                    :        1945  MED REC NO:   [id number]                           ROOM:       0022  ACCOUNT NO:   [de-identified]                           ADMIT DATE: 2021  PROVIDER:     Armando Arriola. Jannet Egan M.D. REASON FOR ADMISSION:  Shortness of breath, pedal edema. HISTORY OF PRESENT ILLNESS:  The patient is a 67years old lady with a  complex medical history. She is known to have a history of coronary  artery disease, and she does have a history of prior anterior wall MI  and systolic dysfunction of the left ventricle. She had a history of an  AICD, history of chronic atrial fib. The patient currently was in the  hospital recently with elevation of BUN and creatinine, and her  medications were adjusted. Unfortunately, she developed the acute  exacerbation of her chronic systolic congestive heart failure. The  patient had history of stress test recently was abnormal.  We have been  treating her medically. She has multiple bouts of recurrent congestive  heart failure. REVIEW OF SYSTEMS:  The patient has a history of atrial fibrillation. No history of CVA. She has a history of anemia, history of COPD,  history of hypertension. She has a history of thyroid disease, history  of renal insufficiency, history of anemia, arthritis, chronic back pain,  hip surgery. The patient has history of dyslipidemia. She has been on the Coumadin for atrial fib. The patient has a history of hemorrhoid and lower GI bleed. SOCIAL HISTORY:  She has a history of tobacco use, recently she quit. She is still utilizing the alcohol. ALLERGIES:  The patient listed over 20 allergies, I am not sure how  accurate this is. PHYSICAL EXAMINATION:  VITAL SIGNS:  Showed she is in paced rhythm. She has atrial fib.   The blood pressure is 100/51. She was afebrile. Respiratory rate is 18. LUNGS:  She has few basilar crackles, diffuse expiratory wheezes. BACK:  Scoliosis of dorsal spine. HEART:  She has 2/6 systolic murmur. Apical pulsation laterally  displaced. ABDOMEN:  Soft. EXTREMITIES:  2+ pedal edema. LABORATORY DATA:  Her lab work showed her INR was 2.6. The hemoglobin  is 8.6, hematocrit 28. The troponins were elevated. IMPRESSION:  1. Acute exacerbation of chronic systolic congestive heart failure. 2.  History of coronary artery disease with an ischemic cardiomyopathy,  history of an AICD. 3.  History of atrial fibrillation. 4.  History of hypertension. 5.  COPD. 6.  Tobacco use. 7.  Alcohol use. 8.  Diabetes mellitus. 9.  Dyslipidemia. 10.  Chronic back pain. 11.  History of hip surgery. The patient admitted to the [facility] bed. The patient treated with IV  diuretics. Cardiac enzymes will be obtained and continue home meds,  place on a sliding scale. Again, advised about smoke cessation and  alcohol cessation. Pending the results of the above test, further  recommendation will be made. The patient has poor prognosis. Radha Gooden M.D.    D: 01/08/2021 12:40:06       T: 01/08/2021 14:56:05     [email or web address]  Job#: [id number]     Doc#: [id number]    CC:  Mary Beth Crain M.D.

## 2021-01-08 NOTE — PROGRESS NOTES
Cony Nayak told this RN that patient BP is 72/42 manually. This RN checked a manual BP on patient and got 78/48. Patient stating she has been feeling nauseous for a few hours, patient given zofran at [phone] with no relief. Dr. Tyra Peck called to inform him of patients BP, no answer at this time. Bumex gtt running at 1 mg/hr, this RN held bumex at this time.  Dobutamine gtt currently running at 5 mcg/kg/min

## 2021-01-08 NOTE — PROGRESS NOTES
Spoke with Daryn Glaser in [facility]. Pt is not on the cath schedule as of now. Daryn Glaser stated that she will talk with Dr. Karuna Hernandez to find out whether or not pt will have cath done today. Will keep pt NPO until further notice and will pass this info along to day shift RN.

## 2021-01-08 NOTE — PLAN OF CARE
Problem: Respiratory:  Goal: Respiratory status will improve  Description: Respiratory status will improve  Outcome: Ongoing  Note: Pt continues on aerosols for maintenance of COPD and pt does txs at home.

## 2021-01-08 NOTE — PROGRESS NOTES
Admit Date: 1/6/2021  Hospital day 2    Subjective:     Patient sob and pedal oedema . Medication side effects: none    Scheduled Meds:   sacubitril-valsartan  1 tablet Oral BID    clotrimazole-betamethasone   Topical BID    Arformoterol Tartrate  15 mcg Nebulization BID    ascorbic acid  500 mg Oral Daily    folbee plus  1 tablet Oral Daily    budesonide  500 mcg Nebulization BID    Vitamin D  5,000 Units Oral Daily    dicyclomine  10 mg Oral 4x Daily AC & HS    digoxin  125 mcg Oral Every Other Day    docusate sodium  100 mg Oral BID    folic acid  1 mg Oral Daily    cetirizine  5 mg Oral Daily    linaclotide  290 mcg Oral Daily    multivitamin  1 tablet Oral Daily    nicotine  1 patch Transdermal Q24H    omega-3 acid ethyl esters  1,000 mg Oral Daily    pantoprazole  40 mg Oral BID AC    potassium chloride  20 mEq Oral Daily    lactobacillus  1 capsule Oral Daily    tiotropium  2 puff Inhalation Daily    senna  1 tablet Oral BID    traZODone  50 mg Oral Nightly    sodium chloride flush  10 mL Intravenous 2 times per day    aspirin  81 mg Oral Daily    polyethylene glycol  17 g Oral Daily     Continuous Infusions:   bumetanide 0.1 mg/mL infusion 1 mg/hr (01/08/21 1139)    DOBUTamine 5 mcg/kg/min (01/08/21 1139)    sodium chloride       PRN Meds:polyvinyl alcohol, hydrocortisone, hydrocortisone, hydrOXYzine, levalbuterol, loperamide, nitroGLYCERIN, ondansetron, traMADol, sodium chloride flush, potassium chloride **OR** potassium alternative oral replacement **OR** potassium chloride, promethazine **OR** ondansetron, acetaminophen **OR** acetaminophen, polyethylene glycol    Review of Systems  Pertinent items are noted in HPI.     Objective:     Patient Vitals for the past 8 hrs:   BP Temp Temp src Pulse Resp SpO2   01/08/21 1122 (!) 109/51 96.2 °F (35.7 °C) Axillary 70 20 98 %   01/08/21 1000 (!) 121/58 97.3 °F (36.3 °C) Oral 69 16 92 %   01/08/21 0628     16 92 % 01/08/21 0509 (!) 113/55 97.6 °F (36.4 °C) Oral 69 16 92 %     I/O last 3 completed shifts: In: 310 [P.O.:300; I.V.:10]  Out: 1700 [Urine:1700]    No change     ECG: pacer rythm . Data Review:   CBC:  Lab Results   Component Value Date    WBC 3.5 01/07/2021    RBC 2.91 01/07/2021    HGB 8.6 01/07/2021    HCT 28.7 01/07/2021    MCV 98.6 01/07/2021    MCH 29.6 01/07/2021    MCHC 30.0 01/07/2021    RDW 14.9 06/25/2020     01/07/2021    MPV 11.5 01/07/2021     BMP  Lab Results   Component Value Date     01/06/2021    K 3.6 01/06/2021     01/06/2021    CO2 30 01/06/2021    BUN 20 01/06/2021    CREATININE 1.1 01/06/2021    CALCIUM 9.7 01/06/2021      COAG PROFILE:  Lab Results   Component Value Date    APTT 29.8 12/19/2020    INR 3.06 01/08/2021       Assessment:     Active Problems:    Acute on chronic respiratory failure (HCC)  Resolved Problems:    * No resolved hospital problems.  *      Plan:   Still in atrial fib    with a pacer rhythm    acute exacerbation of systolic chf    copd    dm    renal insufficiency    dm    anaemia    cardiomyopathy     secondary to prior mi    copd    will arrange for iv bumex and dobutamine    heart cath when irn less than 2

## 2021-01-08 NOTE — CONSULTS
CRITICAL CARE CONSULT  NOTE      Patient:  Ashlie Alberts    Unit/Bed:3B-22/022-A  YOB: 1945  MRN: [id number]   PCP: Desiree Garrison MD  Date of Admission: 1/6/2021  Chief Complaint:- hypotension     Assessment and Plan:      1. Acute on chronic hypoxic respiratory failure: Patient wears 2 L at home. Recently had increased to 6 L due to increased shortness of breath. Currently on 6 L nasal cannula and BiPAP as needed. 2. Acute on chronic heart failure: On Bumex and dobutamine drip. Dr. Isaiah Nguyễn is following. Add levophed for hypotension. Holding [facility]. 3. Pulmonary edema: Secondary to heart failure. Bumex drip  4. Cardiogenic shock: Patient has known reduced ejection fraction. Plan for cath once INR is normal.  Patient now requiring dobutamine and Levophed to maintain blood pressure. 5. COPD: Continue Pulmicort and Brovana  6. Atrial fibrillation: Pacemaker AICD in place. On chronic Coumadin. Rate controlled. Currently holding Coumadin in anticipation of heart cath  7. Tobacco abuse: States quit in May 2020  8. Chronic elevated troponin: Chronically elevated. Dr. Wilma Alonzo for heart cath once INR is normal  9. CAD: ASA, on coumadin   10. Hypokalemia: replacement per sliding scale   11.  Normocytic anemia: mild, monitor     INITIAL H AND P AND ICU COURSE: Arnie Ceron is a 67year old white female who presented to [facility] on 1/6/2021 with complaints of increased SOB. She has a past medical history of atrial fibrillation, CHF, CAD, COPD, hypertension, hyperlipidemia, MI, AICD placement and anxiety. Per report patient was having shortness of breath that began 2 days prior, she does wear chronic 02 at home 2L at rest but was requiring 6L prior to arrival in the ED. She reported that the SOB was worse on exertion and her family brought her to the ED. Per report she was she was previously a [facility] but now reported in chart as FULL code. 1/8 pulmonary did see patient on the floor and noted that she was hypotension and we were asked to evaluate. Dr. Harley Obrien was updated on the hypotension and did ask for patient to be transferred to the ICU. Past Medical History:  Per HPI  Family History:  Father: heart disease   Social History: reformed smoker, quit 2020, social ETOH use,  Denies drug use. Review of Systems   Constitutional: Positive for fatigue. Negative for fever. HENT: Negative for sore throat and trouble swallowing. Eyes: Negative for photophobia and visual disturbance. Respiratory: Positive for shortness of breath. Negative for chest tightness and wheezing. Cardiovascular: Positive for leg swelling. Negative for chest pain. Gastrointestinal: Negative for abdominal pain, nausea and vomiting. Endocrine: Negative for polydipsia and polyphagia. Genitourinary: Negative for decreased urine volume and flank pain. Musculoskeletal: Negative for back pain and neck pain. Skin: Negative for color change, pallor and rash. Allergic/Immunologic: Negative for food allergies. Neurological: Positive for weakness. Negative for headaches. Hematological: Negative for adenopathy. Psychiatric/Behavioral: Negative for agitation and confusion. The patient is not nervous/anxious.             Scheduled Meds:   sacubitril-valsartan  1 tablet Oral BID  clotrimazole-betamethasone   Topical BID    Arformoterol Tartrate  15 mcg Nebulization BID    ascorbic acid  500 mg Oral Daily    folbee plus  1 tablet Oral Daily    budesonide  500 mcg Nebulization BID    Vitamin D  5,000 Units Oral Daily    dicyclomine  10 mg Oral 4x Daily AC & HS    digoxin  125 mcg Oral Every Other Day    docusate sodium  100 mg Oral BID    folic acid  1 mg Oral Daily    cetirizine  5 mg Oral Daily    linaclotide  290 mcg Oral Daily    multivitamin  1 tablet Oral Daily    nicotine  1 patch Transdermal Q24H    omega-3 acid ethyl esters  1,000 mg Oral Daily    pantoprazole  40 mg Oral BID AC    potassium chloride  20 mEq Oral Daily    lactobacillus  1 capsule Oral Daily    tiotropium  2 puff Inhalation Daily    senna  1 tablet Oral BID    traZODone  50 mg Oral Nightly    sodium chloride flush  10 mL Intravenous 2 times per day    aspirin  81 mg Oral Daily    polyethylene glycol  17 g Oral Daily     Continuous Infusions:   bumetanide 0.1 mg/mL infusion Stopped (01/08/21 4035)    DOBUTamine 5 mcg/kg/min (01/08/21 1139)    norepinephrine      sodium chloride         PHYSICAL EXAMINATION:  T:  98.1. P:  80. RR:  18. B/P:  85/47. FiO2:  6. O2 Sat:  95.  I/O: 310/1700  Body mass index is 26.24 kg/m². GCS:  15   General:   Acute on chronically ill appearing white female   HEENT:  normocephalic and atraumatic. No scleral icterus. PERR  Neck: supple. No Thyromegaly. Lungs: diminished to auscultation. No retractions  Cardiac: atrial fibrillation. No JVD. Abdomen: soft. Nontender. Extremities:  No clubbing, cyanosis. Trace lower extremity edema  Vasculature: capillary refill < 3 seconds. Palpable dorsalis pedis pulses. Skin:  warm and dry. Psych:  Alert and oriented x3. Affect appropriate  Lymph:  No supraclavicular adenopathy. Neurologic:  No focal deficit. No seizures. Data: (All radiographs, tracings, PFTs, and imaging are personally viewed and interpreted unless otherwise noted). ? Sodium 139, potassium 3.4, chloride 96, C02 32, BUN 26, creatinine 0.9, anion gap 11.0, ion calcium 1.01, glucose 123  ? WBC 10.3, H/H 10.6/36.4  ? Telemetry shows paced rhythm   ? Chest x-ray 1/6/2021 reports cardiomegaly with vascular congestion  ? EKG 1/7/2021 reports ventricular paced  ? Echocardiogram 2/22/2020 reports ejection fraction 30 to 35%, moderate myocardial infarct of the anteroseptal LV  ? INR 3.06        Meets Continued ICU Level Care Criteria:    [x] Yes   [] No - Transfer Planned to listed location:  [] HOSPITALIST CONTACTED-      Case and plan discussed with Dr. Ashlee Lo. Electronically signed by Mello Guevara.  April January, APRN - [facility]  CRITICAL CARE SPECIALIST

## 2021-01-08 NOTE — PROGRESS NOTES
Spoke with daughter Sahara Jaramillo to give update on pt condition. Answered questions that daughter had.

## 2021-01-09 NOTE — PROGRESS NOTES
K 3.3 01/09/2021    K 3.6 01/06/2021    CL 98 01/09/2021    CO2 33 01/09/2021    BUN 26 01/09/2021    CREATININE 1.0 01/09/2021    LABGLOM 54 01/09/2021    GLUCOSE 157 01/09/2021    GLUCOSE 115 07/06/2018    PROT 5.7 01/08/2021    CALCIUM 9.0 01/09/2021    BILITOT 0.4 01/08/2021    ALKPHOS 60 01/08/2021    AST 12 01/08/2021    ALT 6 01/08/2021       KEY ISSUES/FINDINGS/ASSESSMENT AND PLAN:    Arnie Ceron is a 67year old white female who presented to [facility] on 1/6/2021 with complaints of increased SOB. She has a past medical history of atrial fibrillation, CHF, CAD, COPD, hypertension, hyperlipidemia, MI, AICD placement and anxiety. Per report patient was having shortness of breath that began 2 days prior, she does wear chronic 02 at home 2L at rest but was requiring 6L prior to arrival in the ED. She reported that the SOB was worse on exertion and her family brought her to the ED. Per report she was she was previously a [facility] but now reported in chart as FULL code. 1/8 pulmonary did see patient on the floor and noted that she was hypotension and we were asked to evaluate. Dr. Harley Obrien was updated on the hypotension and did ask for patient to be transferred to the ICU. Assessment and Plan:       1.  acute on top of chronic hypoxic respiratory failure: Patient wears 2 L at home. Recently had increased to 6 L due to increased shortness of breath. Currently on 6 L nasal cannula and BiPAP as needed. 2. Acute on chronic heart failure: On Bumex and dobutamine drip. Add levophed/vasopressin for hypotension. Holding [place] [place]. Dr. Harley Obrien cardiologist following the patient will discuss with him the benefit of Argyle-Malgorzata insertion to assess vasopressors versus inotropic versus diuretic after the cardiac output and systemic vascular resistance obtained. PA catheter. Insert Lai catheter for accurate intake output. 3. Pulmonary edema: Secondary to heart failure.   Bumex drip 4. Cardiogenic shock: Patient has known reduced ejection fraction. Plan for cath once INR is normal.  Patient now requiring dobutamine and Levophed to maintain blood pressure. Procalcitonin ordered today to rule out infectious etiology for her shock in addition to cardiogenic cause. 5. COPD: Continue Pulmicort and Brovana  6. Atrial fibrillation: Pacemaker AICD in place. On chronic Coumadin. Rate controlled. Currently holding Coumadin in anticipation of heart cath  7. Tobacco abuse: States quit in May 2020  8. Chronic elevated troponin: Chronically elevated. Dr. Marcelo Alvares for heart cath once INR is normal  9. CAD: ASA, on coumadin   10. Hypokalemia: replacement per sliding scale   11.  Normocytic anemia: mild, monitor     Critical condition with guarded prognosis  Cc time 35 min apart from procedures

## 2021-01-09 NOTE — FLOWSHEET NOTE
Pt is a 67year old female resting in her bed as her daughter sat in the chair next to the bed. Patient is awake and oriented. Patient shared with me that she is admitted due to respiratory issues and having trouble with her breathing. Patient is calm and approachable and asked for me to pray for her to heal well before leaving the room and asked me to come back again to see her. During this encounter, I offered prayer and words of encouragement, nurtured hope and dafne listening presence. Patient engaged in conversation and showed gratitude for 's visit. SC will follow up to provide additional spiritual and emotional support as needed.

## 2021-01-09 NOTE — PROCEDURES
Central Venous Catheterization Procedure Note    Indication:  [] Lack of adequate peripheral iv access    [x] Infusion of medications with high risk of extravasation injury  [] Hemodynamic monitoring  [] Extracorporeal therapies  [] Other                     Time Out:  [x] Time out was completed immediately prior to the start of the procedure which included verification of the correct patient, correct site and agreement on the procedure to be done. [] Time out was not done because procedure was emergent      Consent:  [x] The patient/surrogate decision maker was informed of the procedure indications, risks, benefits and alternatives. Questions were answered and consent was obtained to proceed with the procedure. [] Consent was not obtained, because the procedure was emergent or patient was unable to consent and surrogate decision maker was not available. Type of Central Line Being Placed:   [x] Central venous    [] Hemodialysis  [] Pulmonary artery    Location:   [x] Internal Jugular Vein [x] Right [] Left  [] Subclavian Vein  [] Right [] Left  [] Femoral Vein   [] Right [] Left      Central Line Bundle:  [x] I washed/disinfected my hands prior to starting procedure  [x] Hat      [x] Mask      [x] Sterile gown      [x] Sterile gloves were worn throughout the procedure  [x] Everyone in the room during the procedure wore a mask  [x] Chlorhexidine was utilized to prep the skin and allowed to dry completely  [] Povidone-iodine was used to prep the skin and allowed to dry completely  [x] Full body drape was used to cover the patient    Ultrasound Guidance:   [x] Yes      [] No    Anesthetic Used:  [x] Lidocaine      [] Other    Sterile Technique Used Throughout Procedure?   [x] Yes      [] No    Description/Findings:   [x] Dark nonpulsatile blood return obtained from all ports  [] Appropriate wavefom(s) seen  [] Other    Complications:  [x] None apparent  [] Other: Follow-up x-ray: Yes, confirmed line placement in promixal SVC    Procedure Performed By: Priya Bauer DO    Assistant(s): Isabella Huston MD    If supervised: Dr. Maria Guadalupe Park  was physically present and supervised, all key elements of this procedure.     Electronically signed by Priya Bauer DO on 1/9/2021 at 4:08 AM

## 2021-01-10 NOTE — FLOWSHEET NOTE
2350-APTT sent to lab. Critical result 135 received. Lab redrawn    0205-Updated APTT from redraw received-115.  Will stop heparin for 30 minutes and decrease per protocol

## 2021-01-10 NOTE — PLAN OF CARE
Problem: Impaired respiratory status  Goal: Clear lung sounds  Outcome: Ongoing  Note: Pt continues on aerosols and MDI for maintenance of COPD and pt does aerosols and MDI's at  home.

## 2021-01-10 NOTE — PROGRESS NOTES
Patient:  Vikram Asp    Unit/Bed:4D-06/006-A  MRN: [id number]   PCP: Ramya Cohen MD  Date of Admission: 1/6/2021    Assessment and Plan(All pulmonary edema, renal failure, PE, and respiratory failure diagnoses are acute in nature unless otherwise specified):        1. Acute on chronic hypoxic respiratory failure: Patient wears 2 L at home.  Recently had increased to 6 L due to increased shortness of breath.  Currently on 6 L nasal cannula and BiPAP as needed. 2. Acute on chronic systolic HFrEF: EF 95-73% 2/22/2020. On dobutamine drip. On levophed for hypotension. Holding Entresto.  Holding Bumex drip in setting of cardiogenic shock. Dr. Monico Noe cardiologist following. Lai catheter for accurate intake output. Net -1L since admission. 3. Pulmonary edema: Secondary to heart failure. Bumex drip on hold given cardiogenic shock state. CXR unchanged/stable pulmonary congestion. Repeat CXR in AM. Net -1L since admission. 4. Cardiogenic shock: Patient has known reduced ejection fraction.  Plan for cath 1/11/2021 at 0800.  Patient now requiring dobutamine and Levophed to maintain blood pressure. Procalcitonin negative. Remains afebrile and WBC WNL.   5. COPD: Not in acute exacerbation. Continue Pulmicort and Brovana. 6. Atrial fibrillation: Pacemaker AICD in place. Chronic Coumadin (stopped); now on heparin drip in anticipation of heart cath. Rate controlled. 7. Tobacco abuse: States quit in May 2020.  8. Chronic elevated troponin: Chronically elevated.  Dr. Monico Noe plans for heart cath 1/11/2020 at 0800.  9. CAD: ASA, on heparin drip. Heart cath tomorrow as above. 10. Hypokalemia: replacement per sliding scale   11. Normocytic anemia: mild, monitor       CC:  hypotension  HPI: Larry Wyman is a 67year old white female former-smoker w/ PMH of atrial fibrillation, CHF, CAD, COPD, hypertension, hyperlipidemia, MI, AICD placement and anxiety. She presented to [facility] on 1/6/2021 with complaints of increased SOB.  Per report patient was having shortness of breath that began 2 days prior, she does wear chronic 02 at home 2L at rest but was requiring 6L prior to arrival in the ED. She reported that the SOB was worse on exertion and her family brought her to the ED.  Per report she was she was previously a [facility] but now reported in chart as FULL code.  1/8 pulmonary did see patient on the floor and noted that she was hypotension and we were asked to evaluate.  Dr. Harley Obrien was updated on the hypotension and did ask for patient to be transferred to the ICU.      ROS: Review of Systems   Constitutional: Negative for chills, diaphoresis and fever. HENT: Positive for hearing loss. Negative for congestion, sinus pressure and sore throat. Eyes: Negative for redness and visual disturbance. Respiratory: Negative for cough, chest tightness, shortness of breath and wheezing. Positive for orthopnea   Cardiovascular: Positive for leg swelling. Negative for chest pain and palpitations. Gastrointestinal: Negative for abdominal distention, abdominal pain, constipation, diarrhea, nausea and vomiting. Genitourinary: Negative for difficulty urinating and dysuria. Musculoskeletal: Negative for back pain and neck pain. Skin: Negative for color change and pallor. Neurological: Negative for dizziness, weakness, light-headedness, numbness and headaches. Psychiatric/Behavioral: Negative for agitation and confusion. The patient is not nervous/anxious. PMH:  Per HPI  SHX:  Former-smoker. Quit 10/2020. 6.25 pack-year history. Occasional alcohol use. Denies substance use. FHX: Father- heart disease. Sister- breast cancer. Allergies: Benadryl, ciprofloxacin, clarithromycin, vitamin K, atorvastatin, captopril, codeine, IV dye, Lipitor, nitrofurantoin, neomycin, bacitracin, pravastatin, Zetia, adhesive tape, cephalexin, doxycycline, morphine, propoxyphene, sulfa antibiotics. Medications:     norepinephrine 8 mcg/min (01/10/21 1349)    vasopressin (Septic Shock) infusion Stopped (01/10/21 0239)    heparin (PORCINE) Infusion 15 Units/kg/hr (01/10/21 1130)    bumetanide 0.1 mg/mL infusion Stopped (01/08/21 1555)    DOBUTamine 5 mcg/kg/min (01/09/21 2030)    sodium chloride        [Held by provider] sacubitril-valsartan  1 tablet Oral BID    clotrimazole-betamethasone   Topical BID    Arformoterol Tartrate  15 mcg Nebulization BID    ascorbic acid  500 mg Oral Daily    folbee plus  1 tablet Oral Daily    budesonide  500 mcg Nebulization BID    Vitamin D  5,000 Units Oral Daily    dicyclomine  10 mg Oral 4x Daily AC & HS    digoxin  125 mcg Oral Every Other Day    docusate sodium  100 mg Oral BID    folic acid  1 mg Oral Daily    cetirizine  5 mg Oral Daily    linaclotide  290 mcg Oral Daily    multivitamin  1 tablet Oral Daily    nicotine  1 patch Transdermal Q24H    omega-3 acid ethyl esters  1,000 mg Oral Daily    pantoprazole  40 mg Oral BID AC    potassium chloride  20 mEq Oral Daily    lactobacillus  1 capsule Oral Daily    tiotropium  2 puff Inhalation Daily    senna  1 tablet Oral BID    traZODone  50 mg Oral Nightly    sodium chloride flush  10 mL Intravenous 2 times per day    aspirin  81 mg Oral Daily    polyethylene glycol  17 g Oral Daily       Vital Signs:   T: 98.6 (Tmax 99.3 last 24h): P: 71 RR: 18 B/P: 101/49: FiO2: 6L NC: O2 Sat:96%: I/O: 1760/875 (+885) GCS: 15  Body mass index is 24.68 kg/m². Kaleb Concepcion General:   Acute on chronically ill appearing white female   HEENT:  normocephalic and atraumatic. No scleral icterus. PERR  Neck: supple. No Thyromegaly. Lungs: clear to auscultation. No retractions  Cardiac: Atrial fibrillation. S1/S2. No murmur. Positive JVD. Abdomen: soft. Nondistended. BS active x4. Nontender. Extremities:  No clubbing, cyanosis. Trace 1+ edema bilateral LE's. Vasculature: capillary refill < 3 seconds. Palpable dorsalis pedis pulses. Skin:  warm and dry. Psych:  Alert and oriented x3. Affect appropriate. Pleasant. Lymph:  No supraclavicular adenopathy. Neurologic:  No focal deficit. No seizures. Follows commands. Purposeful x4 and equal strength throughout. Data: (All radiographs, tracings, PFTs, and imaging are personally viewed and interpreted unless otherwise noted). ? Telemetry: 100% V-paced rhythm. Rate 71.  ? 12-lead ECG 1/7/2021: Ventricular paced rhythm. Ventricular rate 70.  ? Echo limited 2/22/2020 report: Left ventricle size is normal.  Normal left ventricular wall thickness. Ejection fraction is visually estimated in the range of 30-35%. Apical anteroseptal wall segments were not clearly visualized. Moderate myocardial infarction of the anteroseptal LV. ? Sodium 136 potassium 4.8 chloride 96 CO2 33 BUN 21 creatinine 0.7 glucose 123 calcium 8.9  ? Albumin 3.1 alk phos 61 ALT 7 AST 12 bilirubin 0.5 total protein 5.5  ? WBC 8.9 hemoglobin 9.3 hematocrit 31.6 platelets 977  ? INR 2.30 APTT 84.3  ? Blood culture 1/7: gram positive bacilli  ? Blood culture 1/6: preliminary no growth  ? Blood culture 1/6: preliminary no growth  ? Urine culture 1/9: growth of contaminants  ? MRSA screen: negative  ? VRE screen: negative  ? CXR 1/9/2021 report: Right central line tip proximal SVC. Stable bilateral basilar consolidation and pleural fluid. Case discussed w/ Dr. Amadeo Olvera.     Electronically signed by PETR Bond-PETRA

## 2021-01-11 NOTE — PLAN OF CARE
Problem: Pain:  Goal: Pain level will decrease  Description: Pain level will decrease  Outcome: Ongoing  Goal: Control of acute pain  Description: Control of acute pain  Outcome: Ongoing  Goal: Control of chronic pain  Description: Control of chronic pain  Outcome: Ongoing   Care plan reviewed with patient . Patient  verbalize understanding of the plan of care and contribute to goal setting.

## 2021-01-11 NOTE — PROGRESS NOTES
Comprehensive Nutrition Assessment    Type and Reason for Visit:  Initial(LOS)    Nutrition Recommendations/Plan:   Diet advancement per Dr as pt able. When diet is restarted, will start Ensure compact as pt had in past.   Agree with vitamin & mineral supplement. Nutrition Assessment:    Pt. moderately malnourished AEB criteria as listed below. At risk for further nutrition compromise r/t NPO, reported decreased intake, admitted with acute on chronic  respiratory failure, transferred to ICU d/t hypotension & plan heart cath and underlying medical condition ( CHF, COPD, Afib, GERD, HTN, CAD). Nutrition recommendations/interventions as per above. Malnutrition Assessment:  Malnutrition Status: Moderate malnutrition    Context:  Acute Illness     Findings of the 6 clinical characteristics of malnutrition:  Energy Intake:  1 - 75% or less of estimated energy requirements for 7 or more days  Weight Loss:  (20% unplanned weight loss in 2 months. pt is CHF pt . Bumex this admit.)     Body Fat Loss:      not assesed  Muscle Mass Loss:  1 - Mild muscle mass loss Temples (temporalis)  Fluid Accumulation:  1 - Mild     Strength:  Not Performed    Estimated Daily Nutrient Needs:  Energy (kcal):  ~1630 kcals ( 27); Weight Used for Energy Requirements:  (1/11/20 60.5 kg)     Protein (g):  ~89 ( 1.3); Weight Used for Protein Requirements:  (1/11/20 60.5 kg)        Fluid (ml/day):  per Dr;         Nutrition Related Findings:  Pt seen reports was supposed to have cath  has this port, but it was cancelled & not sure why yet . Pt does report that  po intake has not been great for awhile, usually skips breakfast, had a couple bites  of chicken breast yesterday with dinner & just worried about everything else  going on. Encouraged best effort when diet restarted.  meds include levophed, Bumex, vitamin C,  MVI, renal vitamin, cuturelle      Wounds:  (stage ll coccyx, stage1 heel)       Current Nutrition Therapies: Diet NPO Time Specified    Anthropometric Measures:  · Height: 5' 2\" (157.5 cm)  · Current Body Weight: 133 lb 6.1 oz (60.5 kg)(1/11/20 +1 edema)   · Admission Body Weight: 138 lb 14.2 oz (63 kg)(1/7/20)    · Usual Body Weight: 167 lb 5.3 oz (75.9 kg)(11/3/20 per EMR. Note pt with CHF)     · Ideal Body Weight: 110 lbs;    · BMI: 24.4  · BMI Categories: Normal Weight (BMI 22.0 to 24.9) age over 72       Nutrition Diagnosis:   · Moderate malnutrition related to inadequate protein-energy intake as evidenced by mild muscle loss(less than 75% of projected energy needs for greater than 7 days)      Nutrition Interventions:   Food and/or Nutrient Delivery:  (Diet per )  Nutrition Education/Counseling:  Education initiated(best effort)   Coordination of Nutrition Care:  Continue to monitor while inpatient, Interdisciplinary Rounds    Goals:  75% or more of healthy po intake during LOS       Nutrition Monitoring and Evaluation:     Food/Nutrient Intake Outcomes:  Diet Advancement/Tolerance, Food and Nutrient Intake, Supplement Intake  Physical Signs/Symptoms Outcomes:  Biochemical Data, Chewing or Swallowing, GI Status, Fluid Status or Edema, Hemodynamic Status, Nutrition Focused Physical Findings, Skin, Weight     Discharge Planning:     Too soon to determine     Electronically signed by Jena Lazcano RD, LD on 1/11/21 at 10:48 AM EST    Contact: [phone]

## 2021-01-11 NOTE — CARE COORDINATION
1/11/21, 7:23 AM EST    DISCHARGE BARRIERS        Patient transferred to 4D-06. Report given to unit Daily Huynh, regarding discharge plan for this patient.

## 2021-01-11 NOTE — CARE COORDINATION
DISASTER CHARTING    1/11/21, 12:22 PM EST    DISCHARGE ONGOING EVALUATION:     Storm El day: 5  Location: -06/006-A Reason for admit: Acute on chronic respiratory failure ([facility] [address] [J96.20]   Barriers to Discharge: Transferred from  to ICU on 1/8 d/t hypotension. CVC placed and started on levophed and dobutamine drips. Plan for cardiac cath today. Sats 95% on 6L O2. Tmax 99.3. Vpaced. Ox4. Cardiac Rehab consulted. CVC. Lai. Dobutamine, levophed, and heparin drips. IVF, nebs, vit C, asa, bentyl, digoxin, prn norco, lactobacillus, midodrine, protonix, K+, prn zofran, inhaler, prn ultram, Electrolyte replacement protocols. Received 200 mg IV solucortef x1 today. Alb 3, hgb 8.7. PCP: Gordon Gallo MD  Patient Goals/Plan/Treatment Preferences: Home w/. Current with [facility] for home O2. Current with [facility] for RN/PT/OT/Aide. SW on case.

## 2021-01-11 NOTE — FLOWSHEET NOTE
35  Pt and  informed that she is on cath lab schedule for 8AM 1/11/2021. Pt states she has had in past and understands. Pt states Dr Santo Pineda will be doing. Denies any questions from pt or .

## 2021-01-11 NOTE — PROGRESS NOTES
Patient:  Frank Santos    Unit/Bed:4D-06/006-A  MRN: [id number]   PCP: Gordon Gallo MD  Date of Admission: 1/6/2021    Assessment and Plan(All pulmonary edema, renal failure, PE, and respiratory failure diagnoses are acute in nature unless otherwise specified):        1. Acute on chronic hypoxic respiratory failure: Patient wears 2 L at home.  Recently had increased to 6 L due to increased shortness of breath.  Currently on 6 L nasal cannula. 2. Acute on chronic systolic HFrEF: EF 63-42% 2/22/2020. On dobutamine drip. On levophed. Holding Entresto.  Holding Bumex drip in setting of cardiogenic shock. Dr. Hesham Tsai cardiologist following. Lai catheter for accurate intake output. Net -376 mL since admission. Pt is on schedule for heart cath today 0800.  3. Pulmonary edema: Secondary to heart failure. Bumex drip on hold given cardiogenic shock state. CXR progressive pulmonary congestion. Net -1L since admission. 4. Cardiogenic shock: Patient has known reduced ejection fraction.  Plan for cath 1/11/2021 at 0800.  Patient requiring dobutamine and Levophed to maintain blood pressure, but dose requirements are down-trending.  Maintain MAP >65. Procalcitonin negative. Remains afebrile and WBC WNL.   5. Normocytic anemia: Hgb stable at 8.7. No active signs of bleeding. Monitor CBC daily. 6. COPD: Not in acute exacerbation. Continue Pulmicort and Brovana. Chronicaly wears 2L NC, now on 6L NC.  7. Atrial fibrillation, history of: Pacemaker AICD in place. Chronic Coumadin (stopped 1/7/21); now on heparin drip in anticipation of heart cath. Rate controlled. 8. Tobacco abuse: States quit in May 2020.  9. Chronic elevated troponin: Chronically elevated.  Dr. Hesham Tsai plans for heart cath 1/11/2020 at 0800. 10. CAD: ASA, on heparin drip. Heart cath as above.   11. Hypokalemia (resolved): replacement per sliding scale       CC:  hypotension HPI: Larry Wyman is a 67year old white female former-smoker w/ PMH of atrial fibrillation, AICD, chronic systolic HFrEF, CAD, COPD, hypertension, hyperlipidemia, MI, AICD placement and anxiety.     She presented to [facility] on 1/6/2021 with complaints of increased SOB.  Per report patient was having shortness of breath that began 2 days prior, she does wear chronic 02 at home 2L at rest but was requiring 6L prior to arrival in the ED. She reported that the SOB was worse on exertion and her family brought her to the ED.  Per report she was she was previously a [facility] but now reported in chart as FULL code.  1/8 pulmonary did see patient on the floor and noted that she was hypotension and we were asked to evaluate.  Dr. Monico Noe was updated on the hypotension and did ask for patient to be transferred to the ICU. Procalcitonin was negative and patient remained afebrile without leukocytosis. She had a known reduction of EF (30-35%) on prior ECHO. She required dobutamine, levo and vasopressin support for cardiogenic shock. Bumex drip had been held at that point. She remained on 6L NC. She is scheduled for heart cath at 0800 1/11/2021. ROS: Review of Systems   Constitutional: Negative for chills, fatigue and fever. HENT: Negative for congestion, ear pain, hearing loss, sinus pressure and sore throat. Eyes: Negative for redness and visual disturbance. Respiratory: Negative for cough, chest tightness, shortness of breath and wheezing. Cardiovascular: Negative for chest pain, palpitations and leg swelling. Gastrointestinal: Positive for nausea. Negative for abdominal distention, abdominal pain, constipation, diarrhea and vomiting. Genitourinary: Negative for difficulty urinating and dysuria. Musculoskeletal: Negative for back pain and neck pain. Skin: Negative for color change and pallor. Neurological: Negative for dizziness, weakness, light-headedness, numbness and headaches. Psychiatric/Behavioral: Negative for agitation and confusion. The patient is not nervous/anxious. PMH:  Per HPI  SHX:  Former-smoker. Quit 10/2020. 6.25 pack-year history. Occasional alcohol use. Denies substance use. FHX: Father- heart disease. Sister- breast cancer. Allergies: Benadryl, ciprofloxacin, clarithromycin, vitamin K, atorvastatin, captopril, codeine, IV dye, Lipitor, nitrofurantoin, neomycin, bacitracin, pravastatin, Zetia, adhesive tape, cephalexin, doxycycline, morphine, propoxyphene, sulfa antibiotics.   Medications:     sodium chloride 75 mL/hr at 01/11/21 0449    norepinephrine 8 mcg/min (01/10/21 1349)    heparin (PORCINE) Infusion 15 Units/kg/hr (01/10/21 1725)    [Held by provider] bumetanide 0.1 mg/mL infusion Stopped (01/08/21 1555)    DOBUTamine 5 mcg/kg/min (01/10/21 2020)    sodium chloride        sodium chloride flush  10 mL Intravenous 2 times per day    aspirin  325 mg Oral Once    diphenhydrAMINE  50 mg Oral Once    hydrocortisone sodium succinate PF  200 mg Intravenous Once    [Held by provider] sacubitril-valsartan  1 tablet Oral BID    clotrimazole-betamethasone   Topical BID    Arformoterol Tartrate  15 mcg Nebulization BID    ascorbic acid  500 mg Oral Daily    folbee plus  1 tablet Oral Daily    budesonide  500 mcg Nebulization BID    Vitamin D  5,000 Units Oral Daily    dicyclomine  10 mg Oral 4x Daily AC & HS    digoxin  125 mcg Oral Every Other Day    docusate sodium  100 mg Oral BID    folic acid  1 mg Oral Daily    cetirizine  5 mg Oral Daily    linaclotide  290 mcg Oral Daily    multivitamin  1 tablet Oral Daily    omega-3 acid ethyl esters  1,000 mg Oral Daily    pantoprazole  40 mg Oral BID AC    potassium chloride  20 mEq Oral Daily    lactobacillus  1 capsule Oral Daily    tiotropium  2 puff Inhalation Daily    senna  1 tablet Oral BID    traZODone  50 mg Oral Nightly    aspirin  81 mg Oral Daily  polyethylene glycol  17 g Oral Daily       Vital Signs:   T: 98.1 (Tmax 99.1 last 24h): P: 71 RR: 20 B/P: 100/42: FiO2: 6L NC: O2 Sat:97%: I/O: 870/325 (+545) GCS: 15  Body mass index is 24.4 kg/m². Vira Libman General:   Acute on chronically ill appearing white female   HEENT:  normocephalic and atraumatic. No scleral icterus. PERR  Neck: supple. No Thyromegaly. Lungs: clear to auscultation. No retractions  Cardiac: Atrial fibrillation. S1/S2. No murmur. Positive JVD. Abdomen: soft. Nondistended. BS active x4. Nontender. Extremities:  No clubbing, cyanosis. Trace 1+ edema bilateral LE's. Vasculature: capillary refill < 3 seconds. Palpable dorsalis pedis pulses. Skin:  warm and dry. Psych:  Alert and oriented x3. Affect appropriate. Pleasant. Lymph:  No supraclavicular adenopathy. Neurologic:  No focal deficit. No seizures. Follows commands. Purposeful x4 and equal strength throughout. Data: (All radiographs, tracings, PFTs, and imaging are personally viewed and interpreted unless otherwise noted). ? Telemetry: 100% V-paced rhythm. No ectopy. Rate 71. · 12-lead ECG 1/7/2021: Ventricular paced rhythm. Ventricular rate 70. · Echo limited 2/22/2020 report: Left ventricle size is normal.  Normal left ventricular wall thickness. Ejection fraction is visually estimated in the range of 30-35%. Apical anteroseptal wall segments were not clearly visualized. Moderate myocardial infarction of the anteroseptal LV.   · Sodium 138 potassium 4.5 chloride 98 CO2 34 BUN 18 creatinine 1.0 glucose 125 calcium 9.0  · Albumin 3.0 alk phos 59 ALT 7 AST 12 bilirubin 0.5 total protein 5.5  · WBC 6.9 hemoglobin 8.7 hematocrit 29.4 platelets 366  · INR 1.99 APTT 72.0  · Total cholesterol 116 HDL 55 LDL 41 triglycerides 100  · Blood culture 1/7: gram positive bacilli  · Blood culture 1/6: preliminary no growth  · Blood culture 1/6: preliminary no growth  · Urine culture 1/9: growth of contaminants  · MRSA screen: negative · VRE screen: negative  · CXR 1/11/2021 report: Progressive bilateral pulmonary edema or pneumonitis. Case discussed w/ Dr. Floresita Melendez. Electronically signed by LILY Solis                                     Patient seen by me. Case discussed with NP. On dobutamine drip. Await cardiology decision regarding possible cardiac cath. CC time 35 minutes. Time was discontiguous. Time does not include procedures. Time does include my direct assessment of the patient and coordination of care.   Electronically signed by Reta Ruth MD on 1/11/2021 at 4:54 PM

## 2021-01-12 NOTE — PROGRESS NOTES
Admit Date: 1/6/2021  Hospital day 5    Subjective:     Patient has osb and weakness .    Medication side effects: none    Scheduled Meds:   sodium chloride flush  10 mL Intravenous 2 times per day    aspirin  325 mg Oral Once    dicyclomine  10 mg Oral TID AC    midodrine  5 mg Oral TID WC    clotrimazole-betamethasone   Topical BID    Arformoterol Tartrate  15 mcg Nebulization BID    ascorbic acid  500 mg Oral Daily    folbee plus  1 tablet Oral Daily    budesonide  500 mcg Nebulization BID    Vitamin D  5,000 Units Oral Daily    digoxin  125 mcg Oral Every Other Day    docusate sodium  100 mg Oral BID    folic acid  1 mg Oral Daily    cetirizine  5 mg Oral Daily    linaclotide  290 mcg Oral Daily    multivitamin  1 tablet Oral Daily    omega-3 acid ethyl esters  1,000 mg Oral Daily    pantoprazole  40 mg Oral BID AC    potassium chloride  20 mEq Oral Daily    lactobacillus  1 capsule Oral Daily    tiotropium  2 puff Inhalation Daily    senna  1 tablet Oral BID    traZODone  50 mg Oral Nightly    aspirin  81 mg Oral Daily    polyethylene glycol  17 g Oral Daily     Continuous Infusions:   sodium chloride 75 mL/hr at 01/11/21 0449    norepinephrine Stopped (01/11/21 0730)    heparin (PORCINE) Infusion 13 Units/kg/hr (01/12/21 5459)    [Held by provider] bumetanide 0.1 mg/mL infusion Stopped (01/08/21 1555)    DOBUTamine 5 mcg/kg/min (01/12/21 0124)    sodium chloride       PRN Meds:sodium chloride flush, nitroGLYCERIN, heparin (porcine), heparin (porcine), HYDROcodone 5 mg - acetaminophen **OR** HYDROcodone 5 mg - acetaminophen, potassium chloride, polyvinyl alcohol, hydrocortisone, hydrocortisone, hydrOXYzine, levalbuterol, loperamide, ondansetron, traMADol, potassium chloride **OR** potassium alternative oral replacement **OR** potassium chloride, promethazine **OR** ondansetron, acetaminophen **OR** acetaminophen, polyethylene glycol    Review of Systems Pertinent items are noted in HPI. Objective:     Patient Vitals for the past 8 hrs:   BP Temp Temp src Pulse Resp SpO2   01/12/21 0852 (!) 153/51   84 28 96 %   01/12/21 0811     25    01/12/21 0800 (!) 166/61 99 °F (37.2 °C) Bladder 84 21 95 %   01/12/21 0746     20 100 %   01/12/21 0744     24 100 %   01/12/21 0700 (!) 94/55   87 17 (!) 86 %   01/12/21 0600 (!) 130/43   70 19 99 %     I/O last 3 completed shifts: In: 1584.3 [P.O.:300; I.V.:1284.3]  Out: 2100 [Urine:2100]    No change     ECG: af.   Data Review:   CBC:  Lab Results   Component Value Date    WBC 6.0 01/12/2021    RBC 2.94 01/12/2021    HGB 8.3 01/12/2021    HCT 29.0 01/12/2021    MCV 98.6 01/12/2021    MCH 28.2 01/12/2021    MCHC 28.6 01/12/2021    RDW 14.9 06/25/2020     01/12/2021    MPV 11.1 01/12/2021     BMP  Lab Results   Component Value Date     01/12/2021    K 4.2 01/12/2021    K 4.5 01/11/2021    CL 99 01/12/2021    CO2 33 01/12/2021    BUN 17 01/12/2021    CREATININE 0.9 01/12/2021    CALCIUM 8.9 01/12/2021      COAG PROFILE:  Lab Results   Component Value Date    APTT 55.0 01/12/2021    INR 1.65 01/12/2021       Assessment:     Active Problems: Moderate malnutrition (HCC)    Acute on chronic respiratory failure (HCC)    Hypotension    Ischemic cardiomyopathy    Atrial fibrillation, chronic (HCC)    Chronic bronchitis (HCC)  Resolved Problems:    * No resolved hospital problems.  *      Plan:   Patient still in atrial fib    very sob    bilateral pleural effusion    anaemia    chronic chf    severe copd    pulmonary htn    will give diuretics iv    ok to transfer to step down unit    conservative rx    not a good candidate for invasive procedures now

## 2021-01-12 NOTE — PROGRESS NOTES
CRITICAL CARE PROGRESS NOTE      Patient:  Femi Denton    Unit/Bed:4D-06/006-A  YOB: 1945  MRN: [id number]   PCP: Jose Carpenter MD  Date of Admission: 1/6/2021  Chief Complaint:- Hypoxia     Assessment and Plan:      1. Acute on chronic hypoxic respiratory failure: Patient wears 2 L at home. Recently had increased to 6 L due to increased shortness of breath. Currently on 6 L nasal cannula and BiPAP as needed. 2. Acute on chronic heart failure: s/p Bumex and dobutamine drip. Dr. David Quiñonez is attending. Holding [place]ite [place]. 3. Pulmonary edema: Secondary to heart failure. s/p Bumex drip  4. Cardiogenic shock: Patient has known reduced ejection fraction. Plan for cath once INR is normal.  Patient s/p dobutamine and Levophed to maintain blood pressure. 5. COPD: Continue Pulmicort and Brovana  6. Atrial fibrillation: Pacemaker AICD in place. On chronic Coumadin, currently on heparin drip. Rate controlled. Currently holding Coumadin in anticipation of heart cath  7. Tobacco abuse: States quit in May 2020  8. Chronic elevated troponin: Chronically elevated. Dr. Kellee Wilson for heart cath once INR is normal  9. CAD: ASA, on coumadin   10. Hypokalemia: replacement per sliding scale   11.  Normocytic anemia: mild, monitor        INITIAL H AND P AND ICU COURSE: Khushbu Astudillo is a 67year old white female who presented to [facility] on 1/6/2021 with complaints of increased SOB. She has a past medical history of atrial fibrillation, CHF, CAD, COPD, hypertension, hyperlipidemia, MI, AICD placement and anxiety. Per report patient was having shortness of breath that began 2 days prior, she does wear chronic 02 at home 2L at rest but was requiring 6L prior to arrival in the ED. She reported that the SOB was worse on exertion and her family brought her to the ED. Per report she was she was previously a [facility] but now reported in chart as FULL code. 1/8 pulmonary did see patient on the floor and noted that she was hypotension and we were asked to evaluate. Dr. Beatrice Joseph was updated on the hypotension and did ask for patient to be transferred to the ICU.  1/12 discussion with family about possible transition to comfort care. Family is awaiting discussion with Dr. Beatrice Joseph. Patient will transition to 3B under the care of Dr. Beatrice Joseph    Past Medical History:  Per HPI  Family History:  Father: heart disease   Social History: reformed smoker, quit 2020, social ETOH use,  Denies drug use. Review of Systems   Constitutional: Negative for fatigue and fever. HENT: Negative for sore throat and trouble swallowing. Eyes: Negative for photophobia and visual disturbance. Respiratory: Positive for shortness of breath. Negative for chest tightness and wheezing. Cardiovascular: Positive for leg swelling. Negative for chest pain. Gastrointestinal: Negative for abdominal pain, diarrhea, nausea and vomiting. Endocrine: Negative for polydipsia and polyphagia. Genitourinary: Positive for decreased urine volume. Negative for flank pain. Musculoskeletal: Negative for back pain and neck pain. Skin: Negative for color change, pallor and rash. Allergic/Immunologic: Negative for food allergies and immunocompromised state. Neurological: Positive for weakness. Negative for dizziness and numbness. Hematological: Negative for adenopathy. Psychiatric/Behavioral: Negative for agitation and confusion. The patient is not nervous/anxious. Scheduled Meds:   sodium chloride flush  10 mL Intravenous 2 times per day    aspirin  325 mg Oral Once    dicyclomine  10 mg Oral TID AC    midodrine  5 mg Oral TID WC    clotrimazole-betamethasone   Topical BID    Arformoterol Tartrate  15 mcg Nebulization BID    ascorbic acid  500 mg Oral Daily    folbee plus  1 tablet Oral Daily    budesonide  500 mcg Nebulization BID    Vitamin D  5,000 Units Oral Daily    digoxin  125 mcg Oral Every Other Day    docusate sodium  100 mg Oral BID    folic acid  1 mg Oral Daily    cetirizine  5 mg Oral Daily    linaclotide  290 mcg Oral Daily    multivitamin  1 tablet Oral Daily    omega-3 acid ethyl esters  1,000 mg Oral Daily    pantoprazole  40 mg Oral BID AC    potassium chloride  20 mEq Oral Daily    lactobacillus  1 capsule Oral Daily    tiotropium  2 puff Inhalation Daily    senna  1 tablet Oral BID    traZODone  50 mg Oral Nightly    aspirin  81 mg Oral Daily    polyethylene glycol  17 g Oral Daily     Continuous Infusions:   heparin (PORCINE) Infusion 15 Units/kg/hr (01/12/21 1301)       PHYSICAL EXAMINATION:  T:  99.5.  P:  84. RR:  28. B/P:  153/51. FiO2:  40. O2 Sat:  96.  I/O: 1584/2100  Body mass index is 24.4 kg/m². GCS: 15   General: Acute on chronically ill-appearing white female  HEENT:  normocephalic and atraumatic. No scleral icterus. PERR  Neck: supple. No Thyromegaly. Lungs: Left-sided crackles, diminished on the right to auscultation. No retractions  Cardiac: Paced. No JVD. Abdomen: soft. Nontender. Extremities:  No clubbing, cyanosis. Bilateral pedal edema  Vasculature: capillary refill < 3 seconds. Palpable dorsalis pedis pulses. Skin:  warm and dry. Psych:  Alert and oriented x3. Affect appropriate  Lymph:  No supraclavicular adenopathy. Neurologic:  No focal deficit. No seizures. Data: (All radiographs, tracings, PFTs, and imaging are personally viewed and interpreted unless otherwise noted). ? Sodium 38, potassium 4.2, chloride 99, CO2 33, BUN 17, creatinine 0.9, anion gap 6.0, glucose 101  ? WBC 6.0, hemoglobin 8.3, hematocrit 29.0, platelet count 224  ? Telemetry shows Paced   ? Chest x-ray 1/11/2021 reports progressive bilateral pulmonary edema  ? Echocardiogram 2/22/2020 reports ejection fraction 30 to 35%, moderate myocardial infarct of the anteroseptal LV  ? EKG 1/7/2021 reports ventricular paced      Meets Continued ICU Level Care Criteria:    [] Yes   [x] No - Transfer Planned to listed location:  [x] HOSPITALIST CONTACTED-Dr. Santo Pineda will resume care intensivist will sign off    Case and plan discussed with Dr. Kelli Araujo. Electronically signed by Priya Tena. PETR Malik - [facility]  CRITICAL CARE SPECIALIST    Patient seen by me. Case discussed with her. Patient noncompliant with BiPAP. Undergoing trial of high flow oxygen. Still with issues concerning her COPD and air hunger. Patient transferring to . Time was discontiguous. Time does not include procedures. Time does include my direct assessment of the patient and coordination of care.   Electronically signed by Hair West MD on 1/12/2021 at 4:45 PM

## 2021-01-12 NOTE — CARE COORDINATION
DISASTER CHARTING    1/12/21, 12:48 PM EST    DISCHARGE ONGOING EVALUATION:     Fariha Pavan day: 6  Location: -06/006-A Reason for admit: Acute on chronic respiratory failure ([facility] [address] [J96.20]   Barriers to Discharge: Levo weaned off yesterday. Primary RN today reports cath yesterday was cancelled d/t patient not a candidate. This morning patient required NRB mask and now on continuous bipap 16/6, FIO2 40%, sats 96%. Tmax 99. Vpaced. Ox4. Cardiac Rehab, Dietitian, and Palliative Care consulted. CVC. Lai. Dobutamine and heparin drips. IVF, nebs, vit C, asa, bentyl, digoxin, prn norco, prn atarax, lactobacillus, midodrine, protonix, K+, prn zofran, inhaler, prn ultram, Electrolyte replacement protocols. Hgb 8.3, INR 1.65. PCP: Shelton Rowley MD  Patient Goals/Plan/Treatment Preferences: Home w/. Current with [facility] for home O2. Current with [facility] for RN/PT/OT/Aide. SW on case.

## 2021-01-12 NOTE — FLOWSHEET NOTE
Pt was with her  and daughter at the time of the visit. She was dealing with acute chronic respiratory failure. Her daughter was crying when the pt said that she was having breathing difficult. Pt was strong and was not giving up. She wanted prayer to heal. I offered words of encouragement and prayer was appreciated. 01/12/21 1519   Encounter Summary   Services provided to: Patient and family together   Referral/Consult From: [address] [place] [address]; Children   Continue Visiting Yes  (1/12 )   Complexity of Encounter Moderate   Length of Encounter 15 minutes   Spiritual/Pent[employer]ostalism   Type Spiritual struggle   Assessment Approachable; Anxious; Tearful   Intervention Active listening;Nurtured hope;Prayer;Empowerment;Sustaining presence/ Ministry of presence   Outcome Connection/belonging;Expressed gratitude;Encouraged; Hopeful;Receptive

## 2021-01-12 NOTE — PROGRESS NOTES
Admit Date: 1/6/2021  Hospital day 3    Subjective:     Patient sob and coughing .    Medication side effects: none    Scheduled Meds:   sodium chloride flush  10 mL Intravenous 2 times per day    aspirin  325 mg Oral Once    dicyclomine  10 mg Oral TID AC    midodrine  5 mg Oral TID WC    clotrimazole-betamethasone   Topical BID    Arformoterol Tartrate  15 mcg Nebulization BID    ascorbic acid  500 mg Oral Daily    folbee plus  1 tablet Oral Daily    budesonide  500 mcg Nebulization BID    Vitamin D  5,000 Units Oral Daily    digoxin  125 mcg Oral Every Other Day    docusate sodium  100 mg Oral BID    folic acid  1 mg Oral Daily    cetirizine  5 mg Oral Daily    linaclotide  290 mcg Oral Daily    multivitamin  1 tablet Oral Daily    omega-3 acid ethyl esters  1,000 mg Oral Daily    pantoprazole  40 mg Oral BID AC    potassium chloride  20 mEq Oral Daily    lactobacillus  1 capsule Oral Daily    tiotropium  2 puff Inhalation Daily    senna  1 tablet Oral BID    traZODone  50 mg Oral Nightly    aspirin  81 mg Oral Daily    polyethylene glycol  17 g Oral Daily     Continuous Infusions:   sodium chloride 75 mL/hr at 01/11/21 0449    norepinephrine Stopped (01/11/21 0730)    heparin (PORCINE) Infusion 13 Units/kg/hr (01/12/21 4621)    [Held by provider] bumetanide 0.1 mg/mL infusion Stopped (01/08/21 1555)    DOBUTamine 5 mcg/kg/min (01/12/21 0124)    sodium chloride       PRN Meds:sodium chloride flush, nitroGLYCERIN, heparin (porcine), heparin (porcine), HYDROcodone 5 mg - acetaminophen **OR** HYDROcodone 5 mg - acetaminophen, potassium chloride, polyvinyl alcohol, hydrocortisone, hydrocortisone, hydrOXYzine, levalbuterol, loperamide, ondansetron, traMADol, potassium chloride **OR** potassium alternative oral replacement **OR** potassium chloride, promethazine **OR** ondansetron, acetaminophen **OR** acetaminophen, polyethylene glycol    Review of Systems Pertinent items are noted in HPI. Objective:     Patient Vitals for the past 8 hrs:   BP Temp Temp src Pulse Resp SpO2   01/12/21 0852 (!) 153/51   84 28 96 %   01/12/21 0811     25    01/12/21 0800 (!) 166/61 99 °F (37.2 °C) Bladder 84 21 95 %   01/12/21 0746     20 100 %   01/12/21 0744     24 100 %   01/12/21 0700 (!) 94/55   87 17 (!) 86 %   01/12/21 0600 (!) 130/43   70 19 99 %     I/O last 3 completed shifts: In: 1584.3 [P.O.:300; I.V.:1284.3]  Out: 2100 [Urine:2100]    No change     ECG: af.   Data Review:   CBC:  Lab Results   Component Value Date    WBC 6.0 01/12/2021    RBC 2.94 01/12/2021    HGB 8.3 01/12/2021    HCT 29.0 01/12/2021    MCV 98.6 01/12/2021    MCH 28.2 01/12/2021    MCHC 28.6 01/12/2021    RDW 14.9 06/25/2020     01/12/2021    MPV 11.1 01/12/2021     BMP  Lab Results   Component Value Date     01/12/2021    K 4.2 01/12/2021    K 4.5 01/11/2021    CL 99 01/12/2021    CO2 33 01/12/2021    BUN 17 01/12/2021    CREATININE 0.9 01/12/2021    CALCIUM 8.9 01/12/2021      COAG PROFILE:  Lab Results   Component Value Date    APTT 55.0 01/12/2021    INR 1.65 01/12/2021       Assessment:     Active Problems: Moderate malnutrition (HCC)    Acute on chronic respiratory failure (HCC)    Hypotension    Ischemic cardiomyopathy    Atrial fibrillation, chronic (HCC)    Chronic bronchitis (HCC)  Resolved Problems:    * No resolved hospital problems.  *      Plan:   Patient bp has improved    entresto is on hold    place on proamatine    cad    chf with prior ant wall mi    anaemia       chronic renal failure    chronic atrial fib    stop iv pressor    to 3 B    ambulate

## 2021-01-13 NOTE — CONSULTS
Hospitalist Consult Note      Patient:  Herbert Parmar    Unit/Bed:3B-36/036-A  YOB: 1945  MRN: [id number]   Acct: [de-identified]   PCP: Mary Garza MD  Code Status: Limited  Date of Admission: 1/6/2021  Date of Service: Pt seen/examined in consultation on 1/13/2021      Chief Complaint:  SOB  Reason for consult:  Hospice    Assessment and Plan:    1. Acute on chronic HFrEF: EF 30-35% per Echo 02/2020. Has ICD (turned off). S/p Bumex and dobutamine gtt; transitioned to Bumex IV daily. Previously on Entresto. Assumed care 1/13 from Dr. Karuna Hernandez as patient chose to proceed with comfort care. 2. Acute on chronic hypoxic respiratory failure: Secondary to #1/pulmonary edema; treated as stated above. Chronically on 2L NC. Currently requiring HF. Pt and family would like to continue HF for today in order to visit family and plan to remove with hospice tomorrow. 3. Cardiogenic shock: S/p dobutamine and levophed to maintain BP. BP now stable on midodrine. Had planned for heart cath but patient declined and hospice consulted. 4. Atrial Fibrillation: Rate controlled on digoxin, anticoagulated on coumadin (was held for possible heart cath and pt on heparin gtt, now stopped d/t code status). 5. COPD: On home O2. Cont home Sridevi Salazar. 6. CODE STATUS: See \[facility]\"" for details. Hospice following. Pt is Limited No x4 for today and plans to transition to [facility] tomorrow after family visits tonight. She would like to continue her current medications and high flow for today but adding comfort meds every hour prn, understanding that she may still decline overnight prior to transitioning fully to comfort care. Orders placed per patient/family wishes. History Of Present Illness:    Heaven gonzalez.o. female who we are asked to see/evaluate by Dr. Karuna Hernandez to assume care as pt being transitioned to hospice. Patient presented to [facility] on 1/6 complaining of increased SOB. Pt was found to have acute exacerbation of CHF and was admitted by her primary cardiologist, Dr. Rd Mojica. She was treated with Bumex gtt for pulmonary edema and acute on chronic respiratory failure. On 1/8 she was noted to be hypotensive and in cardiogenic shock. She was moved to ICU and treated with dobutamine and levophed for blood pressure support. There was plan for heart cath once INR normal, coumadin was held and pt was on heparin gtt. On 1/12 discussion was had with the patient and family about transition to comfort care and patient was transferred off ICU in stable condition. Patient is currently requiring HFNC, vitals stable. She appears anxious, SOB, with increased work of breathing. The patient is AOx4 with daughter at bedside. Hospice consulted. The patient and family would like to keep her on high flow and continue her medications today so that family can visit. They would like her code status to stay Limited No x4 for today but want her to have comfort medications every hour as needed. Family would like to make their visits today then change code status to [facility] tomorrow and remove high flow at that time. They understand that even with high flow and medications continued, the patient still could decline and even pass away overnight. Hospice following, comfort medications ordered. Hospitalists will assume care. Review of systems:   Pertinent positives as noted in the HPI. All other systems reviewed and negative.     Past Medical History:        Diagnosis Date    Allergic rhinitis     Anemia     Anxiety     Arthritis     Asthma     Atrial fibrillation (HCC)     CAD (coronary artery disease)     Chronic systolic CHF (congestive heart failure) ([facility] [address] 10/27/2019    COPD (chronic obstructive pulmonary disease) (HCC)     Frequent UTI     GERD (gastroesophageal reflux disease)     Hemorrhoids     History of blood transfusion X8    Hx of blood clots     left arm    Hyperlipidemia     Hypertension     Influenza A 02/22/2020    Kidney stone     LONG TERM ANTICOAGULENT USE 7/6/2012    Lumbar spinal stenosis     MI, old 11    Prolonged emergence from general anesthesia        Past Surgical History:        Procedure Laterality Date    CARDIAC CATHETERIZATION  1994    CARDIAC DEFIBRILLATOR PLACEMENT  2008    OUS [place]    COLONOSCOPY  10/16/2015    Dr. Lauri Rodriguez COLONOSCOPY N/A 10/25/2018    COLONOSCOPY POLYPECTOMY SNARE/COLD BIOPSY performed by Kayla Bright MD at [facility]    ENDOSCOPY, COLON, DIAGNOSTIC  01/04/2017    Dr. Areil Bo      left hip    OVARIAN CYST REMOVAL      PACEMAKER PLACEMENT      SINUS SURGERY      several    TONSILLECTOMY      TOTAL HIP ARTHROPLASTY Left 10/24/2019    DANIEL LEFT HIP ARTHROPLASTY performed by Geoff Mckenna MD at [address] [place] [address]  2013    X2    UPPER GASTROINTESTINAL ENDOSCOPY N/A 1/5/2020    EGD DIAGNOSTIC ONLY performed by Minh Simmons MD at [facility]       Home Medications:   No current facility-administered medications on file prior to encounter. Current Outpatient Medications on File Prior to Encounter   Medication Sig Dispense Refill    senna (SENOKOT) 8.6 MG tablet Take 1 tablet by mouth 2 times daily      Omega-3 Fatty Acids (FISH OIL) 1200 MG CAPS Take 1 capsule by mouth daily      bumetanide (BUMEX) 1 MG tablet Take 1 tablet by mouth 2 times daily 30 tablet 3    cetaphil (CETAPHIL) lotion Apply topically as needed. 12 oz 1    nicotine (NICODERM CQ) 7 MG/24HR Place 1 patch onto the skin every 24 hours 30 patch 3    docusate sodium (COLACE, DULCOLAX) 100 MG CAPS Take 100 mg by mouth 2 times daily 30 capsule 0    gabapentin (NEURONTIN) 100 MG capsule Take 1 capsule by mouth 3 times daily for 30 days.  90 capsule 0  potassium chloride (KLOR-CON M) 20 MEQ extended release tablet Take 1 tablet by mouth daily 30 tablet 0    OXYGEN Indications: Chronic Obstructive Lung Disease 3-6L continuously as prescribed 1 Units 0    levocetirizine (XYZAL) 5 MG tablet Take 5 mg by mouth nightly      Omalizumab (XOLAIR SC) Inject into the skin At Dr Jossie Curling office      Ascorbic Acid (VITAMIN C) 100 MG tablet Take 100 mg by mouth daily      Cholecalciferol (VITAMIN D3) 125 MCG (5000 UT) TABS Take 5,000 Units by mouth daily       pantoprazole (PROTONIX) 40 MG tablet Take 40 mg by mouth 2 times daily 30 minutes before two heaviest meals on an empty stomach      LINZESS 290 MCG CAPS capsule Take 290 mcg by mouth daily       loperamide (IMODIUM) 2 MG capsule Take 2 mg by mouth 4 times daily as needed for Diarrhea      traMADol (ULTRAM) 50 MG tablet Take 50 mg by mouth every 6 hours as needed for Pain.        nystatin (MYCOSTATIN) 746833 UNIT/GM cream as needed       ondansetron (ZOFRAN) 8 MG tablet daily as needed for Nausea or Vomiting       hydrocortisone (ANUSOL-HC) 25 MG suppository Place 1 suppository rectally 2 times daily as needed for Hemorrhoids 28 suppository 1    warfarin (COUMADIN) 1 MG tablet Take as directed by the [facility], 145 tablets for 90 days 145 tablet 3    digoxin (LANOXIN) 125 MCG tablet Take 1 tablet by mouth every other day Indications: Increased Heart Rate Until Office Visit with Dr. Lucille Pina 30 tablet 3    Revefenacin 175 MCG/3ML SOLN Inhale 175 mcg into the lungs daily 3 mL 0    hydrocortisone 2.5 % cream Apply topically 2 times daily as needed       acetaminophen (TYLENOL) 325 MG tablet Take 650 mg by mouth as needed for Pain or Fever Indications: Pain Don't take more then 3,000 mg each day      Multiple Vitamins-Minerals (MULTIVITAMIN ADULT) CHEW Take 2 tablets by mouth daily      Menthol-Methyl Salicylate (ICY HOT) 26-76 % STCK Apply topically daily  Alum Hydroxide-Mag Carbonate (GAVISCON PO) Take by mouth as needed Indications: Acid Indigestion       lidocaine (XYLOCAINE) 5 % ointment Apply topically Before venipuncture in Dr. Mirta Saleh office.  hydrOXYzine (ATARAX) 25 MG tablet Take 25 mg by mouth 3 times daily as needed Indications: Feeling Anxious       Pramoxine HCl (PROCTOFOAM RE) Place rectally daily as needed       EPINEPHrine (EPIPEN) 0.3 MG/0.3ML SOAJ injection INJECT AS DIRECTED FOR ANAPHYLAXIS  1    Evolocumab (REPATHA SURECLICK SC) Inject 333 mg into the skin every 14 days Indications: Blood Cholesterol Abnormal       B Complex-C (RA B-COMPLEX/VITAMIN C CR PO) Take 1 tablet by mouth daily Indications: Treatment to Prevent Vitamin Deficiency       NITROGLYCERIN RE Place 0.3 mg rectally as needed Indications: Hemorrhoids       dicyclomine (BENTYL) 10 MG capsule Take 10 mg by mouth 4 times daily (before meals and nightly) Indications: Pain in the Abdominal Region      Coenzyme Q10 (COQ-10) 200 MG CAPS Take by mouth daily       Probiotic Product (SUPER PROBIOTIC) CAPS   Take 1 tablet by mouth daily Indications: Digestive Complaint   0    budesonide (PULMICORT) 0.5 MG/2ML nebulizer suspension   Take 1 ampule by nebulization 2 times daily Indications: Shortness of Breath (Inactive)       Arformoterol Tartrate (BROVANA) 15 MCG/2ML NEBU   Take 1 ampule by nebulization 2 times daily Indications: Shortness of Breath (Inactive)       ofloxacin (FLOXIN) 0.3 % otic solution Place 2 drops into both ears daily as needed Indications: Infection Long-term therapy. 0    Carboxymethylcellul-Glycerin (REFRESH OPTIVE OP) Apply  to eye as needed. Indications: Dry Eyes caused by Deficiency of Tears      levalbuterol (XOPENEX) 0.63 MG/3ML nebulization Take 1 ampule by nebulization every 8 hours as needed for Wheezing.       triamcinolone (KENALOG) 0.1 % cream Apply topically 2 times daily as needed Anti-fungal  clotrimazole-betamethasone (LOTRISONE) cream Apply topically 2 times daily as needed Indications: Yeast Infection (inactive)       polyethylene glycol (MIRALAX) powder Take 17 g by mouth as needed. Indications: Constipation      aspirin 81 MG EC tablet Take 81 mg by mouth daily. With food  Indications: Anticoagulant Therapy      azelastine (ASTELIN) 137 MCG/SPRAY nasal spray 1 spray by Nasal route 2 times daily as needed Indications: Allergic Rhinitis Use in each nostril as directed      folic acid (FOLVITE) 1 MG tablet Take 1 mg by mouth daily. Indications: Folic Acid Supplementation      traZODone (DESYREL) 50 MG tablet Take 50 mg by mouth nightly       nitroGLYCERIN (NITROSTAT) 0.4 MG SL tablet Place 0.4 mg under the tongue every 5 minutes as needed. If third one does not relieve pain, call -. Indications: Chest Pain         Allergies:    Benadryl [diphenhydramine hcl], Ciprofloxacin, Clarithromycin, Vitamin k, Atorvastatin, Captopril, Codeine, Iv dye [iodides], Lipitor, Macrobid [nitrofurantoin monohydrate macrocrystals], Neomycin-bacitracin zn-polymyx, Pravastatin, Zetia [ezetimibe], Adhesive tape, Cephalexin, Doxycycline, Morphine, Other, Propoxyphene, and Sulfa antibiotics    Social History:    reports that she quit smoking about 2 months ago. Her smoking use included cigarettes. She has a 6.25 pack-year smoking history. She has never used smokeless tobacco. She reports current alcohol use of about 1.0 standard drinks of alcohol per week. She reports that she does not use drugs.     Family History:       Adopted: Yes   Problem Relation Age of Onset    Heart Disease Father          AGE 35    Cancer Sister         breast       Diet:  DIET GENERAL;      PHYSICAL EXAM:  BP (!) 111/58   Pulse 74   Temp 97.5 °F (36.4 °C) (Oral)   Resp 22   Ht 5' 2\" (1.575 m)   Wt 133 lb 6.1 oz (60.5 kg)   SpO2 96%   BMI 24.40 kg/m² Progressive bilateral pulmonary edema or pneumonitis. This document has been electronically signed by: Cherelle Alcala MD on    01/11/2021 04:13 AM         XR CHEST PORTABLE   Final Result   Impression:      Right central line tip proximal SVC      Stable bilateral basilar consolidation and pleural fluid. This document has been electronically signed by: Guillermo Lama MD on    01/09/2021 01:35 AM         XR CHEST PORTABLE   Final Result   Hazy opacities in the lung bases correlation with symptoms is advised. Possible edema or pneumonia. **This report has been created using voice recognition software. It may contain minor errors which are inherent in voice recognition technology. **      Final report electronically signed by Dr. Radames Alvarez on 1/8/2021 8:14 PM      XR CHEST PORTABLE   Final Result   Cardiomegaly with pulmonary vascular congestion and bilateral effusions suggesting congestive heart failure. **This report has been created using voice recognition software. It may contain minor errors which are inherent in voice recognition technology. **      Final report electronically signed by Dr. Ajith Pizarro on 1/6/2021 11:34 AM        Xr Chest Portable    Result Date: 1/6/2021  PROCEDURE: XR CHEST PORTABLE CLINICAL INFORMATION: sob . COMPARISON: 12/19/2020 TECHNIQUE: Portable upright FINDINGS: Heart size is enlarged. Heart borders are secured by pleural fluid. Pulmonary vessels are congested. Atelectasis right lung base. EKG leads overlie the chest. AICD over the left chest.     Cardiomegaly with pulmonary vascular congestion and bilateral effusions suggesting congestive heart failure. **This report has been created using voice recognition software. It may contain minor errors which are inherent in voice recognition technology. ** Final report electronically signed by Dr. Ajith Pizarro on 1/6/2021 11:34 AM        EKG: ventricular paced Thank you for allowing us to participate in this patient's care. Please do not hesitate to call us directly with further questions.      Electronically signed by Adriano Brothers PA-C on 1/13/2021 at 2:27 PM

## 2021-01-13 NOTE — FLOWSHEET NOTE
01/13/21 1758   Handoff   Communication Given Transfer Handoff   Oncoming Nurse/Offgoing Nurse Delvis/Jt   Handoff Communication Telephone   Time Handoff Given 1996     Report called.

## 2021-01-13 NOTE — PROGRESS NOTES
Attempted to reach Dr. Lee Martinez to discuss this case with him. Message left for him to return the phone call. Will await his return phone call.

## 2021-01-13 NOTE — CARE COORDINATION
DISASTER CHARTING    1/13/21, 2:41 PM EST    DISCHARGE ONGOING EVALUATION:     Ifeanyi Puente day: 7  Location: [address] [place] [address]- Reason for admit: Acute on chronic respiratory failure ([facility] [address] [J96.20]   Barriers to Discharge: Pt transferred from ICU to . Pt remains on High flow O2 60L and 65% FiO2. Pt refuses BiPAP. Bumex iv x1 today for decreased urine output. Dr. David Quiñonez consulted Palliative Care, they met with family and planning to consult Hospice and Hospitalist to take over care. PCP: Jose Carpenter MD  Readmission Risk Score: 56%  Patient Goals/Plan/Treatment Preferences: Plan to transition to hospice care, once seen by Hospice. SW following.

## 2021-01-13 NOTE — PROGRESS NOTES
CLINICAL PHARMACY NOTE: MEDS TO [address] Select Patient?: No  Total # of Prescriptions Filled: 3   The following medications were delivered to the patient:  Augmentin 875-125mg  Cetaphil Lotion  Bumetanide 1mg  Total # of Interventions Completed: 2  Time Spent (min): 30    Additional Documentation:

## 2021-01-13 NOTE — PROGRESS NOTES
Physician Progress Note      Dragan Alamo  CSN #:                  [id number]  :                       1945  ADMIT DATE:       2021 10:21 AM  DISCH DATE:  RESPONDING  PROVIDER #:        Misty Moody PA-C          QUERY TEXT:    Patient admitted with acute on chronic systolic CHF and developed cardiogenic   shock. Per dietician pt meets crtieria for Moderate malnutrition   If   possible, please respond below and document in your progress notes and   discharge summary if you are evaluating and /or treating any of the following: The medical record reflects the following:  Risk Factors: CHF  Clinical Indicators:  Nutrition Assessment:    Pt. moderately malnourished AEB   criteria as listed below. At risk for further nutrition compromise r/t NPO,   reported decreased intake, admitted with acute on chronic  respiratory   failure, transferred to ICU d/t hypotension & plan heart cath and underlying   medical condition ( CHF, COPD, Afib, GERD, HTN, CAD). Treatment: Nutrition Recommendations/Plan:  Diet advancement per Dr as pt   able. When diet is restarted, will start Ensure compact as pt had in past.    Agree with vitamin & mineral supplement. Thank you. Please call if you have any questions. P) [phone]. Signed   by Lizette Manning RN Clinical , CRCR  Options provided:  -- Moderate Protein Calorie Malnutrition confirmed POA  -- Moderate Protein Calorie Malnutrition confirmed not POA  -- Moderate Protein Calorie Malnutrition ruled out  -- Other - I will add my own diagnosis  -- Disagree - Not applicable / Not valid  -- Disagree - Clinically unable to determine / Unknown  -- Refer to Clinical Documentation Reviewer    PROVIDER RESPONSE TEXT:    The diagnosis of Moderate Protein Calorie Malnutrition was confirmed as POA.     Query created by: Marquise Pimentel on 2021 11:43 AM Electronically signed by:  Lary Ramirez PA-C 1/13/2021 2:26 PM

## 2021-01-13 NOTE — PLAN OF CARE
Problem: Impaired respiratory status  Goal: Clear lung sounds  Outcome: Ongoing     Continue therapy to improve lung sounds.

## 2021-01-13 NOTE — PROGRESS NOTES
Patient resting in bed with eyes closed. Patient is on high flow nasal cannula oxygen and respirations appear labored at this time. Patient's  is at the bedside.  indicates that Dr. Eldon George had recommended comfort as there is no further measures that can be done to help the patient as her heart and lungs are in poor condition. Patient's daughter, Kerri Gary is the medical POA and she is currently not present in the room. 4993  Primary RN, Carisa Gonzalez is at the bedside. Patient does awaken to name but only shakes head no when asked if she will take her medications. Patient is lethargic and not very interactive. Kerri Gary arrived to the patient's room. Palliative care introduced. Kerri Gary has multiple questions for Dr. Eldon George and she would like to speak to Dr. Eldon George for \"closure. \"  Kerri Gary is tearful but also is very aware of the patient's condition. Kerri Gary is open to hospice/comfort care. Discussed with Kerri Gary that this RN would call Dr. Eldon George and request that he speaks with her either by phone or in person. Much emotional support provided. 4487  Phone call made to Dr. Eldon George and awaiting a return phone call. 1015  Received a return phone call from Dr. Eldon George. Grace's phone number provided and Dr. Eldon George will attempt to call her when time allows. Order received for hospice consult and hospitalist consult, as Dr. Eldon George does not wish to remain on the patient's case if hospice is desired. 1050  Received a phone call from primary RN, Carisa Gonzalez and he states that family wishes to transition to comfort care/hospice. 1100  Orders placed for hospice/hospitalist.  Hospice notified of the consult. Please call palliative care if further needs arise.

## 2021-01-13 NOTE — PROGRESS NOTES
Admit Date: 1/6/2021  Hospital day a few    Subjective:     Patient very drowsy and sob . Medication side effects: none    Scheduled Meds:   bumetanide  2 mg Intravenous Once    sodium chloride flush  10 mL Intravenous 2 times per day    aspirin  325 mg Oral Once    dicyclomine  10 mg Oral TID AC    midodrine  5 mg Oral TID WC    clotrimazole-betamethasone   Topical BID    Arformoterol Tartrate  15 mcg Nebulization BID    ascorbic acid  500 mg Oral Daily    folbee plus  1 tablet Oral Daily    budesonide  500 mcg Nebulization BID    Vitamin D  5,000 Units Oral Daily    digoxin  125 mcg Oral Every Other Day    docusate sodium  100 mg Oral BID    folic acid  1 mg Oral Daily    cetirizine  5 mg Oral Daily    linaclotide  290 mcg Oral Daily    multivitamin  1 tablet Oral Daily    omega-3 acid ethyl esters  1,000 mg Oral Daily    pantoprazole  40 mg Oral BID AC    potassium chloride  20 mEq Oral Daily    lactobacillus  1 capsule Oral Daily    tiotropium  2 puff Inhalation Daily    senna  1 tablet Oral BID    traZODone  50 mg Oral Nightly    aspirin  81 mg Oral Daily    polyethylene glycol  17 g Oral Daily     Continuous Infusions:  PRN Meds:ALPRAZolam, sodium chloride flush, nitroGLYCERIN, HYDROcodone 5 mg - acetaminophen **OR** HYDROcodone 5 mg - acetaminophen, potassium chloride, polyvinyl alcohol, hydrocortisone, hydrocortisone, hydrOXYzine, levalbuterol, loperamide, ondansetron, traMADol, potassium chloride **OR** potassium alternative oral replacement **OR** potassium chloride, promethazine **OR** ondansetron, acetaminophen **OR** acetaminophen, polyethylene glycol    Review of Systems  Pertinent items are noted in HPI.     Objective:     Patient Vitals for the past 8 hrs:   BP Temp Temp src Pulse Resp SpO2   01/13/21 0900 (!) 111/58 97.5 °F (36.4 °C) Oral 74 22 96 %   01/13/21 0750      93 %   01/13/21 0535      95 %   01/13/21 0330      96 % 01/13/21 0302 (!) 154/67 98.3 °F (36.8 °C) Oral 69 24 96 %     I/O last 3 completed shifts: In: 414.1 [I.V.:414.1]  Out: 425 [Urine:425]    No change     ECG: af.   Data Review:   CBC:  Lab Results   Component Value Date    WBC 6.0 01/13/2021    RBC 2.59 01/13/2021    HGB 7.5 01/13/2021    HCT 25.7 01/13/2021    MCV 99.2 01/13/2021    MCH 29.0 01/13/2021    MCHC 29.2 01/13/2021    RDW 14.9 06/25/2020     01/13/2021    MPV 10.4 01/13/2021     BMP  Lab Results   Component Value Date     01/13/2021    K 4.5 01/13/2021    K 4.5 01/11/2021     01/13/2021    CO2 33 01/13/2021    BUN 19 01/13/2021    CREATININE 0.8 01/13/2021    CALCIUM 8.8 01/13/2021      COAG PROFILE:  Lab Results   Component Value Date    APTT 61.8 01/12/2021    INR 1.53 01/13/2021       Assessment:     Active Problems: Moderate malnutrition (HCC)    Acute on chronic respiratory failure (HCC)    Hypotension    Ischemic cardiomyopathy    Atrial fibrillation, chronic (HCC)    Chronic bronchitis (HCC)  Resolved Problems:    * No resolved hospital problems.  *      Plan:      patient is very weak and sob  bp is labile    very anxious    bilateral pleural effusion    severe chf    cant tolerate entresto    anaemia    advanced copd    pulmonary htn  Talked with  about further plans of care    not a good candidate for invasive procedures    wants palliative care    will get palliative care nurse see them    continue suppportive rx

## 2021-01-13 NOTE — FLOWSHEET NOTE
01/13/21 1100   Provider Notification   Reason for Communication Evaluate   Provider Name Mahsa Louise   Provider Notification Advance Practice Clinician (CNS, NP, CNM, CRNA, PA)   Method of Communication Secure Message   Notification Time 1100     New consult re: patient transitioning to hospice; would like to have hospitalist assume care. This is a [facility] patient. Estrella vasquez.

## 2021-01-14 NOTE — FLOWSHEET NOTE
[facility]. [facility] PROGRESS NOTE      Patient: Randee Tena  Room #: 4E-00/866-S            YOB: 1945  Age: 67 y.o. Gender: female            Admit Date & Time: 1/6/2021 10:21 AM    Assessment:  Called by hospice nurse to be with family during this end of life time with Yong Denney  I met her , a son and a daughter as well as a grand daughter. Interventions:   We had prayer,     Outcomes:  Encouraged yet still present with Yong Denney    Plan:    1.prayer will continue    Electronically signed by Amaris Kohler on 1/14/2021 at 10:16 AM.  [facility]  [phone]

## 2021-01-14 NOTE — PROGRESS NOTES
Hospitalist Progress Note    Patient:  [facility]      Unit/Bed:4K-12/012-A    YOB: 1945    MRN: [id number]       Acct: [de-identified]     PCP: Alexandr Darden MD    Date of Admission: 1/6/2021    Assessment/Plan:    1. End-of-life care--hospice is following, family wants the ICD turned off so hospice nurse is getting a magnet and also going from high flow down to 6 L as this is what the family wants and patient is actively dying  2. Acute on chronic systolic heart failure--on hospice  3. Acute on chronic hypoxic respiratory failure--on baseline oxygen at 2 L; plan is to remove high flow nasal cannula and transition to 6 L of oxygen  4. Cardiogenic shock--on hospice  5. Atrial fibrillation--on hospice  6. COPD--on hospice    Expected discharge date:  TBD    Disposition:    [] Home       [] TCU       [] Rehab       [] Psych       [] SNF       [] [place]       [] Other-    Chief Complaint: End-of-life care    Hospital Course: Per progress note dated 1/13: \"Sharona Horton75 y.o. female who we are asked to see/evaluate by Dr. Elizabeth Bermudez to assume care as pt being transitioned to hospice. Patient presented to [facility] on 1/6 complaining of increased SOB. Pt was found to have acute exacerbation of CHF and was admitted by her primary cardiologist, Dr. Mini Rushing. She was treated with Bumex gtt for pulmonary edema and acute on chronic respiratory failure. On 1/8 she was noted to be hypotensive and in cardiogenic shock. She was moved to ICU and treated with dobutamine and levophed for blood pressure support. There was plan for heart cath once INR normal, coumadin was held and pt was on heparin gtt. On 1/12 discussion was had with the patient and family about transition to comfort care and patient was transferred off ICU in stable condition. Patient is currently requiring HFNC, vitals stable. She appears anxious, SOB, with increased work of breathing. The patient is AOx4 with daughter at bedside. Hospice consulted. The patient and family would like to keep her on high flow and continue her medications today so that family can visit. They would like her code status to stay Limited No x4 for today but want her to have comfort medications every hour as needed. Family would like to make their visits today then change code status to [facility] tomorrow and remove high flow at that time. They understand that even with high flow and medications continued, the patient still could decline and even pass away overnight. Hospice following, comfort medications ordered. Hospitalists will assume care. \"    1/14--> patient is actively dying and had periods of apnea; family at the bedside and feel she is comfortable on the Dilaudid drip at this time      Subjective (past 24 hours):  Unresponsive at this time      Medications:  Reviewed    Infusion Medications    HYDROmorphone (DILAUDID) 1 mg/mL infusion 1.5 mg/hr (01/14/21 0714)     Scheduled Medications    sodium chloride flush  10 mL Intravenous 2 times per day    aspirin  325 mg Oral Once    dicyclomine  10 mg Oral TID AC    midodrine  5 mg Oral TID WC  clotrimazole-betamethasone   Topical BID    Arformoterol Tartrate  15 mcg Nebulization BID    ascorbic acid  500 mg Oral Daily    folbee plus  1 tablet Oral Daily    budesonide  500 mcg Nebulization BID    Vitamin D  5,000 Units Oral Daily    digoxin  125 mcg Oral Every Other Day    docusate sodium  100 mg Oral BID    folic acid  1 mg Oral Daily    cetirizine  5 mg Oral Daily    linaclotide  290 mcg Oral Daily    multivitamin  1 tablet Oral Daily    omega-3 acid ethyl esters  1,000 mg Oral Daily    pantoprazole  40 mg Oral BID AC    potassium chloride  20 mEq Oral Daily    lactobacillus  1 capsule Oral Daily    tiotropium  2 puff Inhalation Daily    senna  1 tablet Oral BID    traZODone  50 mg Oral Nightly    aspirin  81 mg Oral Daily    polyethylene glycol  17 g Oral Daily     PRN Meds: LORazepam, sodium chloride flush, nitroGLYCERIN, HYDROcodone 5 mg - acetaminophen **OR** HYDROcodone 5 mg - acetaminophen, polyvinyl alcohol, hydrocortisone, hydrocortisone, hydrOXYzine, levalbuterol, loperamide, ondansetron, traMADol, promethazine **OR** ondansetron, acetaminophen **OR** acetaminophen, polyethylene glycol      Intake/Output Summary (Last 24 hours) at 1/14/2021 0742  Last data filed at 1/14/2021 0434  Gross per 24 hour   Intake 20 ml   Output 900 ml   Net -880 ml       Diet:  DIET GENERAL;    Exam:  /72   Pulse 94   Temp 97.6 °F (36.4 °C) (Axillary)   Resp (!) 40   Ht 5' 2\" (1.575 m)   Wt 133 lb 6.1 oz (60.5 kg)   SpO2 92%   BMI 24.40 kg/m²     General appearance: Actively dying, unresponsive, on Dilaudid drip  Respiratory: Periods of apnea  Cardiovascular: Regular rate and rhythm       Labs:   Recent Labs     01/12/21  0449 01/13/21  0540   WBC 6.0 6.0   HGB 8.3* 7.5*   HCT 29.0* 25.7*    161     Recent Labs     01/12/21  0449 01/13/21  0540    141   K 4.2 4.5   CL 99 103   CO2 33 33   BUN 17 19   CREATININE 0.9 0.8   CALCIUM 8.9 8.8 No results for input(s): AST, ALT, BILIDIR, BILITOT, ALKPHOS in the last 72 hours. Recent Labs     01/12/21  0449 01/13/21  0540   INR 1.65* 1.53*     Urinalysis:      Lab Results   Component Value Date    NITRU NEGATIVE 12/19/2020    WBCUA 50-75 12/19/2020    BACTERIA NONE SEEN 12/19/2020    RBCUA NONE 12/19/2020    BLOODU NEGATIVE 12/19/2020    SPECGRAV 1.013 12/19/2020    GLUCOSEU NEGATIVE 11/08/2020       Radiology:  Xr Chest Portable    Result Date: 1/6/2021  PROCEDURE: XR CHEST PORTABLE CLINICAL INFORMATION: sob . COMPARISON: 12/19/2020 TECHNIQUE: Portable upright FINDINGS: Heart size is enlarged. Heart borders are secured by pleural fluid. Pulmonary vessels are congested. Atelectasis right lung base. EKG leads overlie the chest. AICD over the left chest.     Cardiomegaly with pulmonary vascular congestion and bilateral effusions suggesting congestive heart failure. **This report has been created using voice recognition software. It may contain minor errors which are inherent in voice recognition technology. ** Final report electronically signed by Dr. Mildred Pearl on 1/6/2021 11:34 AM      DVT prophylaxis: [] Lovenox                                 [] SCDs                                 [] SQ Heparin                                 [] Encourage ambulation           [] Already on Anticoagulation     Code Status: DNR-CC    Tele:   [] yes             [x] no    Active Hospital Problems    Diagnosis Date Noted    Hypotension [I95.9]     Ischemic cardiomyopathy [I25.5]     Atrial fibrillation, chronic ([address] [I48.20]     Chronic bronchitis ([address] [J42]     Acute on chronic respiratory failure ([address] [J96.20] 01/06/2021    Moderate malnutrition ([address] [E44.0] 11/23/2020     Class: Acute       Electronically signed by PETR Medley CNP on 1/14/2021 at 7:42 AM

## 2021-01-14 NOTE — CARE COORDINATION
1/14/21, 7:19 AM EST    DISCHARGE BARRIERS        Patient transferred to [facility]. Report given to unit Denis Fermin, regarding discharge plan for this patient.

## 2021-01-14 NOTE — CARE COORDINATION
1/14/21, 11:29 AM EST    DISCHARGE PLANNING EVALUATION      SW notified [facility], message left for Vladimir Garcia, of patient's passing.

## 2021-01-14 NOTE — PROGRESS NOTES
Patient  on 21; confirmed death at 0. Absence of vital signs, absence of neurological response, physician notified and orders obtained to release the body. Post-Mortem documentation completed; form printed, signed, and given to admitting.     Evan Rueda RN Nursing Supervisor  21   11:25 AM

## 2021-01-14 NOTE — PROGRESS NOTES
Inpatient Cardiac Rehabilitation Consult    Received consult for Phase II Cardiac Rehabilitation. Pt is not a candidate for cardiac rehab.

## 2021-01-14 NOTE — DISCHARGE SUMMARY
Hospital Medicine Death Summary      Patient Identification:   Kristel Feliz   : 1945  MRN: [id number]   Account: [de-identified]      Patient's PCP: Kortney Lopez MD    Admit Date: 2021     Discharge Date:   2021    Admitting Physician: Alessandra Dixon MD     Discharging Nurse Practitioner: PETR Aguilar - CNP     Discharge Diagnoses with Assessment/Plan:  1. End-of-life care  2. Acute on chronic systolic heart failure  3. Acute on chronic hypoxic respiratory failure  4. Cardiogenic shock  5. Atrial fibrillation  6. COPD    The patient was seen and examined on day of discharge and this discharge summary is in conjunction with any daily progress note from day of discharge. Hospital Course:   Kristel Feliz is a 67 y.o. female admitted to [facility] on 2021 for end of life;  Per progress note dated : \"Sharona Horton75 y. o. female who we are asked to see/evaluate by Dr. Beatrice Joseph to assume care as pt being transitioned to hospice. Patient presented to [facility] on  complaining of increased SOB. Pt was found to have acute exacerbation of CHF and was admitted by her primary cardiologist, Dr. Iram Adam. She was treated with Bumex gtt for pulmonary edema and acute on chronic respiratory failure. On  she was noted to be hypotensive and in cardiogenic shock. She was moved to ICU and treated with dobutamine and levophed for blood pressure support. There was plan for heart cath once INR normal, coumadin was held and pt was on heparin gtt. On  discussion was had with the patient and family about transition to comfort care and patient was transferred off ICU in stable condition. Patient is currently requiring HFNC, vitals stable. She appears anxious, SOB, with increased work of breathing. The patient is AOx4 with daughter at bedside. Hospice consulted. The patient and family would like to keep her on high flow and continue her medications today so that family can visit. They would like her code status to stay Limited No x4 for today but want her to have comfort medications every hour as needed. Family would like to make their visits today then change code status to [facility] tomorrow and remove high flow at that time. They understand that even with high flow and medications continued, the patient still could decline and even pass away overnight. Hospice following, comfort medications ordered. Hospitalists will assume care. \"     --> patient is actively dying and had periods of apnea; family at the bedside and feel she is comfortable on the Dilaudid drip at this time     Update at 1115: Patient  on 2021 at 1110 secondary to end-stage heart failure        Disposition:    [] Home       [] TCU       [] Rehab       [] Psych       [] SNF       [] [facility]       [x] Other-    Time Spent on discharge is more than 15 minutes in the examination, evaluation, counseling and review of medications and discharge plan. Signed: Thank you Whitney Lake MD for the opportunity to be involved in this patient's care.     Electronically signed by PETR Naqvi CNP on 1/14/2021 at 11:33 AM

## 2021-01-14 NOTE — FLOWSHEET NOTE
01/14/21 0353   Provider Notification   Reason for Communication Evaluate   Provider Name Dwayne Palomino   Provider Notification Advance Practice Clinician (CNS, NP, CNM, CRNA, PA)   Method of Communication Secure Message   Response No new orders   Notification Time 21    Secure message sent to update pt currently a limited no x4. Maxed on high flow with O2 sat dropping to the 80s now. Constantly moaning. Spoke with the hospice nurse who suggested we start a drip.

## 2021-01-14 NOTE — FLOWSHEET NOTE
Contacted hospice nurseLidia due to pt O2 sat dropping to the 80s while maxed out on high flow. Pt had labored breathing, was gasping, and was tachypneic after receiving dilaudid and ativan every hour. Family at bedside requesting anything for the patient to be more comfortable. Hospice nurse stated she would communicate with Sheryle Glee in regards to starting a dilaudid drip. Hospice nurse to reevaluate for inpatient hospice in the morning. 18- Order received to start dilaudid drip.

## 2021-01-14 NOTE — PROGRESS NOTES
Reviewed chart including medications and vital signs. Updated by primary Daisy Coles that family is bedside. Accompanied to patient room with Chen Garcia medical student and introduction made. Patient with eyes closed and non-responsive to touch/ voice. Dilaudid drip infusing 1.75 mg/hr with appearance of comfort including non-labored breathing at rate-8. Daughter Brendan Justice,  Oneda Crutch, and granddaughter bedside, appropriately tearful with review of overnight condition and care, emotional support provided. Daughter Brendan Justice voicing request to transition off high flow oxygen and information regarding pacemaker function with  in agreement. Educated family that patient breathing currently relaxed and can remove high flow with placement of cannula oxygen. Also educated on pacemaker function with placement of magnet to interrupt defibrillator function. All family present requesting to follow through with transition off of high flow oxygen and placement of defibrillator magnet when brother Hieu Ramos and family  present. Family voiced awareness of anticipated end of life with hospice support during transition and hospice sign on as appropriate.  visit offered and call placed per request.     Updated primary Rose Tellez of family request to remove high flow oxygen and place magnet, nasal cannulas and magnet received. Messaged Dr. Michelle Clark of patient status and plan of care with agreement.

## 2021-01-14 NOTE — PROGRESS NOTES
Hospice and [facility] 82 medical student with family including son Lavonia Kawasaki and Danya Holm. Prayer offered prior to family agreement with removal of high flow oxygen cannulas with placement of nasal cannulas at 6L flow. Patient respiratory rate- 6 with heart rate-80. Daughter Gino Pineda assisted with magnet placement per request. Family support provided and prayer offered. This nurse offered family time and Haganubaldo Holm given direct number to notify of condition change. Shortly after room departure Mark Polanco called with notification of patient end of life. On return to room patient absent of breathing, neurological response and heart rate confirmed by auscultation. Primary Porfirio Lauren RN notified with confirmation of death at 11:10.

## 2021-01-14 NOTE — PROGRESS NOTES
Recommendations given for dilaudid drip due to poor symptom control with IV push dilaudid. Discussed with provider Orval Guardian who gave verbal telephone order for dilaudid drip which was entered accordingly. Encouraged nursing staff to call with further comfort related issues or if family was needing emotional support overnight. If no issues arise overnight hospice nurse will reevaluate for inpatient hospice appropriateness Thursday morning.

## 2021-04-25 NOTE — PLAN OF CARE
PT / 6D - PT orders received. Cxl. Pt in OR. PT eval rescheduled.   Problem: Impaired respiratory status  Goal: Clear lung sounds  Description: Clear lung sounds  Outcome: Ongoing    Improve breath sounds, increase aeration and decrease WOB.

## 2021-09-19 NOTE — PROGRESS NOTES
No Pharmacy Note  ED Culture Follow-up    Jenny Hilario is a 75 y.o. female. Allergies: Benadryl [diphenhydramine hcl]; Ciprofloxacin; Captopril; Codeine; Iv dye [iodides]; Lipitor; Macrobid [nitrofurantoin monohydrate macrocrystals]; Neomycin-bacitracin zn-polymyx; Pravastatin; Zetia [ezetimibe]; Adhesive tape; Cephalexin; Doxycycline; Morphine; Other; Propoxyphene; and Sulfa antibiotics     Labs:  Lab Results   Component Value Date    BUN 30 (H) 07/04/2019    CREATININE 0.9 07/04/2019    WBC 9.3 07/04/2019     Estimated Creatinine Clearance: 51 mL/min (based on SCr of 0.9 mg/dL). Current antimicrobials:   Bactrim per urology    ASSESSMENT:  Micro results:   Urine culture: positive for enterobacter + enterococcus      PLAN:  Need for intervention: Yes, but will defer to specialist  Discussed with: Hugo Demarco PA-C  Chosen treatment:    Patient already on appropriate treatment as above; Urology following    Patient response:   No need to contact patient    Called/sent in prescription to: Not applicable    Please call with any questions.  Ext. [phone]

## 2022-04-18 NOTE — PROGRESS NOTES
Occupational Therapy  Visit Type: treatment  Precautions:  Medical precautions:  fall risk; standard precautions.    - Monitor BP closely (chronic hypotension)  - History of TIA with speech deficit and L side weakness. At time of eval, patient reports symptoms had completely resolved prior to current admission    CT Scan of Head 4/11:   1.  No acute intracranial abnormality.  2.  Small chronic infarct along the right corona radiata.   3.  Redemonstrated small periapical lucency involving the right first maxillary molar, possibly relating to granulation tissue or chronic periapical abscess.    Lines:     Basic: telemetry and capped IV      Lines in chart and on patient reviewed, precautions maintained throughout session.  Vision:     Current vision: wears glasses all the time  Safety Measures: bed alarm and bed rails (call light within reach)    SUBJECTIVE  Patient agreed to participate in therapy this date.    \"The two brothers who own this place came to talk to me earlier, they seemed pretty nice\"    RN, approved OT session. Pt up in chair at end of session with all needs in reach.   Patient / Family Goal: return home    OBJECTIVE     stableLevel of consciousness: lethargic    Oriented to person and place     Disoriented to time and situation    Arousal alertness: appropriate responses to stimuli  Patient activity tolerance: 1 to 1 activity to rest  Functional Communication/Cognition    Overall status:  Impaired    Form of communication:  Verbal     Attention span:  Attends with cues to redirect    Attention Span Impairment: fatigue, internal factors, reduced memory and impulsivity    Commands: follows one step commands with increased time and follows one step commands with repetition.    Transition between tasks: transition with cues.    Safety judgement: decreased awareness of need for assistance and decreased awareness of need for safety.    Awareness of deficits: assistance required to compensate for  Present to pt room for consult of left forearm IV infiltrate assessment. Pt had IV heparin infiltrate on Saturday 10/31/20 to left forearm that was treated with hylenex. Pt left forearm assessed. Appears to have some mild edema with scattered bruising t/o. No firm areas or tenderness as reported per pt. Staff to continue to monitor. No open wounds or drainage noted. Bed in low, call light in reach. Left forearm scattered bruising and mild edema    Thank you for allowing us to participate in the care of your patient. TIME   Wound/ostomy individual minutes  Time In: 0820  Time Out: 0830  Minutes: 10  Time does not include documentation. deficits.  Additional Details:     Balance (trials in sec unless otherwise indicated)  Sitting:   - Static:  supervision    - Dynamic:  minimal assist  Standing - Firm Surface - Eyes Open:    - Static: minimal assist   - Dynamic:  moderate assist    Bed mobility:      Supine to sit: minimal assist  Training completed:    Tasks: supine to sit    Education details: patient safety and body mechanics    Patient required min A for supine<>sit due to impulsivity and decreased safety awareness  Transfers:    Assistive devices: gait belt and 2-wheeled walker    Sit to stand: minimal assist    Stand to sit: minimal assist    Training completed:    Tasks: sit to stand and stand to sit    Education details: body mechanics and patient safety    Min A for steadying and safety, very impulsive with stand<>sit transfer to recliner despite verbal cuing to keep turning towards chair  Functional Ambulation:    Assistance:moderate assist   Assistive device:2-wheeled walker and gait belt    Distance (ft): 20;25      Education details: body mechanics and patient safety  Activities of Daily Living (ADLs):  Grooming/Oral Hygiene:     Grooming assist: minimal assist    Oral hygiene assist: minimal assist    Assist needed for: teeth care and wash/dry face             ASSESSMENT    Impairments: activity tolerance, balance, bed mobility, cognitive, coordination/proprioception, decreased insight into deficits, safety awareness and strength  Functional Limitations: bed mobility, functional mobility, eating, grooming, bathing, toileting, functional transfers, dressing and showering    Treatment today focused on self-cares and functional mobility.   Current overall ADL status is minimal assist  Current overall Functional Mobility for ADL and Instrumental-ADL tasks is min A for mod A for functional transfers and short distance ambulation.  Patient displays  fair progress demonstrated by OT session.    Functional limitations at this time are listed  above. Discussed patient goal, discharge planning options and therapy progress made to date with Patient and Nurse .   Recommended Consults: OT: None  Discharge Recommendations:  Recommendations for Discharge: OT WI: Post acute therapy      PT/OT Mobility Equipment for Discharge: has cane, will monitor for additional needs  PT/OT ADL Equipment for Discharge: does not have equipment , will monitor for needs , may need toilet riser  OT Identified Barriers to Discharge: medical status , h/o CVA with some residual L sided deficits (appears worse at current) , decreased ADL/functional mobility independence, spouse works (pt home alone during day)       Skilled therapy is required to address these limitations in attempt to maximize the patient's independence.  Progress: progressing toward goals    End of Session:   Location: in chair  Safety measures: alarm system in place/re-engaged, lines intact and call light within reach  Handoff to: nurse  Education Provided:   Learning assessment:  - Primary learner: patient  - Are they ready to learn: yes  - Preferred learning style: verbal  - Barriers to learning: cognitive  Education provided during session:  - Receiving education: patient  - Results of above outlined education: Needs reinforcement    PLAN  Suggestions for next session as indicated: Grooming at sink, LB dressing, safety awareness training      Frequency Comments: M-F 4/18          Interventions: activity tolerance training, ADL retraining, balance, bed mobility training, cognitive retraining, coordination, functional transfer training, neuromuscular reeducation, patient education, positioning, patient/family training, transfer training, therapeutic exercise, upper extremity strengthening/ROM, compensatory techniques, compensatory technique education, therapeutic activity, safety training and caregiver training  Agreement to plan and goals: patient agrees with goals and treatment plan      GOALS  Review Date:  4/25/2022  Long Term Goals: (to be met by time of discharge from hospital)  Grooming: Patient will complete grooming tasks modified independent. Status: progressing/ongoing  Upper body dressing: Patient will complete upper body dressing modified independent. Status: progressing/ongoing  Lower body dressing: Patient will complete lower body dressing modified independent. Status: progressing/ongoing  Toileting: Patient will complete toileting modified independent.  Status: progressing/ongoing  Bathing: Patient will complete bathingmodified independent  Status: progressing/ongoingToilet transfer: Patient will complete toilet transfer with modified independent. Status: progressing/ongoing  Tub/shower transfer: Patient will complete tub/shower transfer with modified independent. Status: progressing/ongoing  Home setting transfer: Patient will complete home setting transfers with modified independent.  Status: progressing/ongoing        Documented in the chart in the following areas: Pain. Assessment. Plan.      Therapy procedure time and total treatment time can be found documented on the Time Entry flowsheet

## 2022-10-17 NOTE — PROGRESS NOTES
Hospitalist Progress Note      Patient:  Janet Bauer    Unit/Bed:4K-10/010-A  YOB: 1945  MRN: [id number]   Acct: [de-identified]   PCP: Florin Dumont MD  Date of Admission: 11/13/2020    Assessment/Plan:    1. Acute on chronic hypoxic respiratory failure likely secondary to pleural effusions and acute systolic CHF exacerbation-worsening. 91% pulse ox on FiO2 40% and O2 flow rate of 20 L/min. Was later 88% on room air. The patient states her breathing is okay at rest on high flow oxygen. Peripheral IV obtained and patient maintained on IV Lasix. An attempt was made to insert a PICC line but the patient refused. Per Dr. Sherice Lucia, the patient may receive dobutamine through peripheral IV for now. However, patient was counseled that this IV may fail and that she may need PICC line later. Room will not be available at [facility] until Monday at the earliest.  Hold aspirin, maintained on digoxin 125 mcg every other day, maintain on Evolocumab 140 mg every 14 days, and Toprol-XL 25 mg daily. Patient was taken off of IV Lasix and placed on continuous Bumex and dobutamine infusions, per cardiology. Successfully contacted outpatient pulmonologist, who stated that they would see patient in the hospital if they could. 2.  Chronic atrial fibrillation-stable. The patient has a ARIANNA(2)DS(2)-VASc of 6. The patient is being treated with Coumadin. Patient's hemoglobin has remained above 7. Therefore, we will maintain Coumadin for now. Monitor with daily CBC and INR. Current INR 2.96. Otherwise, treat as described above. 3.  Hemoptysis, unspecified-resolved. Patient states she has not had hemoptysis for several days. Patient has a history of hemoptysis of unknown cause. Bronchoscopy could possibly provide diagnostic information.   However, we would not recommend this as the bronchoscopy would be a considerable risk and show would likely not change her long-term care management plan per CHF and advanced COPD. Will address as described above for chronic A. Fib. 4.  Anemia, unspecified-stable. The patient reported a longstanding history of anemia. This may be secondary to blood loss from hemoptysis described above. However, the patient has a macrocytic profile with an MCV of 108. 2. Recent high ferritin on 11/3/2020, high iron, and normal total iron-binding capacity makes this diagnosis less likely. Acute bleed could still be the cause or contributor to this anemia, this would not necessarily present as the \"classic\" iron deficiency profile. Normal folate and vitamin B12 makes nutritional anemia less likely cause of macrocytic anemia. The patient continues to have fluctuating hemoglobin. Consider anemia of chronic disease. 5.  Left lower extremity cellulitis treated on previous admission-worsening    The patient is amenable to 4 hours on and 4 hours off of wound dressing per ID. 6.  Coronary artery disease-stable. Treat as described above for CHF and A. Fib. 7.  Benign essential hypertension-stable. Treat as described above for CHF and A. Fib.    8.  Hyperlipidemia, unspecified-stable. Treat with evolocumab as described above. 9.  Chronic obstructive pulmonary disease asthma component, unspecified-stable. Contributing to acute on chronic hypoxic respiratory failure as above. Maintain patient on albuterol nebulizer, Spiriva Respimat, Brovana, and Pulmicort. Xolair cannot be given inpatient. Follow-up with outpatient pulmonology.       Expected discharge date: Unknown    Disposition:    [x] Home       [] TCU       [] Rehab       [] Psych       [] SNF       [] [place]       [] Other-    Chief Complaint: Hypoxia and Hemoptysis    Opening statement:     The patient is a 23-year-old female with a complex past medical history with an old MI, long-term anticoagulation with Coumadin for atrial fibrillation, kidney stone, hypertension, hyperlipidemia, history of blood clots, GERD, CAD with biventricular pacemaker, frequent UTI, COPD, systolic CHF, coronary artery disease, recent hospitalization for lower extremity cellulitis, and current smoker who presents with a chief complaint of hypoxia and hemoptysis. Hospital Treatment Course:     11/13/2020-11/17/2020:    Patient was diagnosed with acute on chronic respiratory failure likely secondary to pleural effusions and acute systolic heart failure. Cause of hemoptysis unclear.     The patient was initially transferred to [facility].     Rapid was called om the patient on 11/13/2020 secondary to hypoxia, tachypnea and hypertension after transfer from [facility] to [facility]. Patient was placed on BiPAP which dramatically improved her presentation. The patient was then transferred to [facility].     Patient was evaluated on the same day for chest pain and shortness of breath. An EKG found no change but a mild troponin elevation was noted. Pain is likely secondary to dyspepsia or GERD.     Pulmonology was consulted for hemoptysis and cardiology was consulted for hemoptysis, possible watchman implantation, and management of Coumadin.     The patient's Coumadin was initially held and her supratherapeutic INR was corrected. However, the patient was placed back on Coumadin as she was concerned for stroke secondary to A. fib. The patient was counseled with regards to the risks from Coumadin.     Thoracentesis was obtained on 11/14/2020 with 700 cc of fluid removed. Pleural fluid cytology showed no significant findings. Patient was maintained on Pulmicort 200 mcg via neb twice daily, Brovana 50 mcg by nebulization twice daily, Spiriva Respimat 2 puffs daily, albuterol nebs every 4 hours while awake, and Xopenex 0.60 mg by nebulization every 8 hours as needed. Also maintained on Acapella every 4 hours. Oxygen titrated to greater than 90%. Baseline is 2 L at home. Patient maintained on Coumadin for VTE prophylaxis and Protonix for GI prophylaxis. Patient wants to go home with family. 11/18/2020:    Nursing report: Patient is still on 5 L, with plans to wean down. Patient been getting up from the chair to her bed. Little subjective dyspnea, however she dropped from a pulse ox of 95% to 89%. Hemoglobin has been stable at 7.2. The patient was 95 to 97% throughout the night. The patient did not need BiPAP during the night. The patient states that her breathing is doing much better than before. She affirms improvement with diuresis and with thoracentesis for drainage. However, the patient is still dyspneic with exertion. She affirms slight hemoptysis with cough productive of pinkish to very light brown sputum. Long discussion with the patient about long-term prognosis and goals. The patient particularly noted that she want to live long enough to see her granddaughter's high school graduation in May. 11/19/2020:    Nursing report: The patient is doing well. She slept through the night. INR is 2.66. The patient had dinner. Her oxygenation was good. Hemoglobin is 7.0. The patient states that her breathing is doing better. She affirms slight ongoing chest pain and \"indigestion. \"  She affirms productive cough with a little bit of blood. The patient and her daughter were extensively counseled with regards to risk of bleeding with anticoagulation. Otherwise, no new specific complaints or concerns. 11/20/2020:    Nursing report: Patient doing well. On 4 L. Shortness of breath with activity. Bowel movement. Eating overnight. The patient states she is feeling okay. However, notes worsening left lower limb pain. \"Indigestion\" is resolved. Patient attributes resolution to no longer eating hospital food and getting outside food. Affirms minimal dyspnea at rest but significant dyspnea with exertion and excessive talking.     Affirms coughing up \"a little bit of blood. \"    Otherwise, no new specific complaints or concerns. 11/21/2020:    Dr. Kenyatta Elaine was consulted. He declined to see the patient in the hospital, but ordered a unit of blood to be followed by 20 mg of Lasix and then a new H&H. However, as the patient's hemoglobin was above 7, we decided to cancel the order. Nursing report: \"Was not too bad. \"  ID saw the patient who recommended compression wraps 4 hours on and 4 hours off. However, the patient says she did not want wraps. On 4 L of oxygen at rest and 6 with activity. Patient desaturated to 77% overnight with activity and the patient seemed to take longer than before to recover, per nurse report. Patient states that she feels about the same but nurse feels that she is doing worse. Patient slept well but did not eat much. She had no bowel movement. The patient states she feels okay. She has no shortness of breath at rest but does have shortness of breath on exertion. She believes that the shortness of breath on exertion is unchanged from previous. Patient affirms reduced appetite and no recent bowel movement. The patient states that she was upset yesterday because during the day she needed help using the restroom to go urinate. The nurse did not help her and turned off her call light for about half an hour. The patient was subsequently incontinent of urine. The patient was reassured that we would talk to nursing today. Affirms ongoing slight hemoptysis. 11/22/2020:    Patient is more short of breath in the morning with oxygen saturation down to the mid 80s on 4 L by nasal cannula. Patient was placed on high flow BiPAP. Dr. Maris Lee was consulted and chest x-ray was ordered. 11/23/2020:    Nursing report: Nursing states the patient was doing great, with no overnight desaturations and FiO2 of 50% and a flow rate of 25 L/min. The patient has been eating and did have a bowel movement.     On presentation, the patient affirmed that her breathing was doing better. The patient was on a high flow heated nasal cannula. The patient stated that this machine helped her breathe a lot better. Though the patient states she would like to take this machine at home, she verbalizes understanding that she cannot do so. The patient denies chest pain and abdominal pain. Affirms dyspnea with activity. Patient reiterated that she wanted Dr. Cheryl Saunders to see her. Also verbalized frustration with the nursing staff. Otherwise, no new specific complaints or concerns. 11/24/2020:    Stable dyspnea. Denies chest pain, fever, and abdominal pain. Patient asked about consult with her outpatient pulmonologist.  The patient is counseled and attempt was made to contact him,  but that he was out of the office. Otherwise, no new specific complaints or concerns. 11/25/2020:    Nursing report: \"Still on high flow. \"  \"Not much change. \"  Peripheral access was successfully obtained. States that daughter does not want patient to go to [place]. Has been eating but no bowel movement yesterday. The patient states that her breathing is okay at rest but that she gets very dyspneic on exertion. Denies chest pain abdominal pain. States that she has not had any hemoptysis for several days. The patient asked about reaching her outpatient pulmonologist and cardiologist.    Also asked about Thanksgiving, as she wanted to have family in her room. The patient was reassured that I would discuss this with Spiritual Care. Otherwise, no new specific complaints or concerns. 11/26/2020: On high flow nasal cannula, no specific interval change, no new specific complaints. Straight catheter bladder scan to 152 cc. Subjective (past 24 hours):     Nursing report: Reports that the patient is still on high flow. Mandie placement pending. Weaned down to 40% on high flow. Refused PICC line. Has been eating \"guzzling fluids. \"  No bowel movement. Patient reports dissatisfaction with the nursing staff, as they did not prepare her for the PICC line. The patient was counseled that while she does not need the PICC line immediately, she might needed down the road due to failure of the peripheral IV. The patient confirms refusal of the PICC line. Patient also states that nursing staff and IV staff \"yelled at her. \"  She states that the nursing staff attend her yesterday during the day was fine. She stated that her breathing was stable. Denied recent hemoptysis. Otherwise, no new specific complaints or concerns. Past medical history, family history, social history and allergies reviewed again and is unchanged since admission. ROS (12 point review of systems completed. Pertinent positives noted.  Otherwise ROS is negative)     Medications:  Reviewed    Infusion Medications    bumetanide 0.1 mg/mL infusion 1 mg/hr (11/27/20 1334)    DOBUTamine 5 mcg/kg/min (11/27/20 2917)     Scheduled Medications    warfarin  0.5 mg Oral Once    potassium chloride  20 mEq Oral BID WC    lidocaine 1 % injection  5 mL Intradermal Once    sodium chloride flush  10 mL Intravenous 2 times per day    lidocaine 1 % injection  5 mL Intradermal Once    famotidine  20 mg Oral Daily    levocetirizine  5 mg Oral Nightly    albuterol  2.5 mg Nebulization Q4H While awake    warfarin (COUMADIN) daily dosing (placeholder)   Other RX Placeholder    Arformoterol Tartrate  15 mcg Nebulization BID    [Held by provider] aspirin  81 mg Oral Daily    budesonide  500 mcg Nebulization BID    digoxin  125 mcg Oral Every Other Day    docusate sodium  100 mg Oral Daily    Evolocumab  140 mg Subcutaneous Q14 Days    gabapentin  100 mg Oral TID    hydrocortisone  25 mg Rectal BID    metoprolol succinate  25 mg Oral Daily    nicotine  1 patch Transdermal Q24H    omalizumab  225 mg Subcutaneous Q14 Days    pantoprazole  40 mg Oral BID AC    [Held by provider] sacubitril-valsartan  1 tablet Oral BID    senna  1 tablet Oral Nightly    tiotropium  2 puff Inhalation Daily    traZODone  50 mg Oral Nightly     PRN Meds: sodium chloride flush, ondansetron, promethazine, polyethylene glycol, bisacodyl, acetaminophen, cyclobenzaprine, HYDROcodone 5 mg - acetaminophen, HYDROcodone 5 mg - acetaminophen, levalbuterol, magnesium hydroxide      Intake/Output Summary (Last 24 hours) at 11/27/2020 1710  Last data filed at 11/27/2020 1410  Gross per 24 hour   Intake 1578.44 ml   Output 2150 ml   Net -571.56 ml       Diet:  DIET CARDIAC; Low Sodium (2 GM); Daily Fluid Restriction: 1500 ml  Dietary Nutrition Supplements: Standard High Calorie Oral Supplement  Dietary Nutrition Supplements: Low Volume Supplement    Exam:  BP (!) 110/53   Pulse 78   Temp 99.2 °F (37.3 °C) (Oral)   Resp 16   Ht 5' 2\" (1.575 m)   Wt 141 lb 11.2 oz (64.3 kg)   SpO2 (!) 88%   BMI 25.92 kg/m²   General appearance: Patient is awake, alert, and in no acute distress. The patient is chronically ill-appearing. HEENT: Conjunctivae/corneas clear. Respiratory: Clear to auscultation but reduced respiratory effort. Cardiovascular: Regular rate and rhythm with normal S1/S2 without murmurs, rubs or gallops. Abdomen: Soft, non-tender, non-distended with normal bowel sounds. Capillary Refill: Brisk,< 3 seconds   Peripheral Pulses: +2 palpable pitting pedal edema  Extremities: Bilateral pitting pedal edema. Tenderness noted over left leg. Both legs wrapped in bandages. Labs:   Recent Labs     11/25/20 0441 11/26/20 0256 11/27/20 0417   WBC 4.7* 6.2 7.0   HGB 8.0* 8.8* 8.4*   HCT 27.4* 29.7* 28.6*    232 212     Recent Labs     11/25/20 0441 11/26/20 0256 11/27/20 0417    138 142   K 3.5 4.0 3.6    101 98   CO2 33 29 35*   BUN 23* 22 21   CREATININE 1.0 0.9 0.9   CALCIUM 8.7 7.5* 8.6     No results for input(s): AST, ALT, BILIDIR, BILITOT, ALKPHOS in the last 72 hours.   Recent Labs 11/25/20  0441 11/26/20  0256 11/27/20  0417   INR 2.46* 2.28* 2.96*     No results for input(s): CKTOTAL, TROPONINI in the last 72 hours. Microbiology:    Blood culture #1:   Lab Results   Component Value Date    BC No growth-preliminary No growth  10/25/2020       Blood culture #2:No results found for: Benson Peña    Organism:  Lab Results   Component Value Date    ORG Mixed Growth     02/21/2020         Lab Results   Component Value Date    LABGRAM  11/26/2019     No segmented neutrophils observed. Few epithelial cells observed. Many large gram positive bacilli. Few small gram positive bacilli. Few gram negative bacilli. Prepubescent and postmenopausal female samples (<15and >47ears of age) are not typically suitable for bacterialvaginosis screening. MRSA culture only:No results found for: [facility]    Urine culture:   Lab Results   Component Value Date    LABURIN  10/24/2019     Growth of Contaminants. The mixture of organisms present are not a common cause of urinary tract infections and probably represent distal urethral juventino. Respiratory culture: No results found for: CULTRESP    Aerobic and Anaerobic :  Lab Results   Component Value Date    LABAERO No growth-preliminary No growth   11/11/2019     Lab Results   Component Value Date    LABANAE No growth-preliminary No growth   11/11/2019       Urinalysis:      Lab Results   Component Value Date    NITRU NEGATIVE 11/08/2020    WBCUA 0-2 11/08/2020    BACTERIA NONE SEEN 11/08/2020    RBCUA 5-10 11/08/2020    BLOODU TRACE 11/08/2020    GLUCOSEU NEGATIVE 11/08/2020       Radiology:  XR CHEST 1 VIEW   Final Result   1. Moderate cardiomegaly. Permanent pacemaker/fibrillator. Small bilateral pleural effusions. 2. Moderate pneumonia/pulmonary edema both mid and lower lung fields. Prominent vascular markings, suggesting heart failure. 3. No significant change from prior.       Final report electronically signed by Dr. Lio Espinal on 11/25/2020 1:14 PM      XR CHEST PORTABLE   Final Result   1. Interval deterioration since previous study dated November 16, 2020 with increasing infiltrate and effusion at the right lung base. 2. Mild cardiomegaly status post pacemaker placement. .               **This report has been created using voice recognition software. It may contain minor errors which are inherent in voice recognition technology. **      Final report electronically signed by DR Luis Hinson on 11/22/2020 2:26 PM      XR CHEST (2 VW)   Final Result   Stable chest given technical differences. Left lower lobe infiltrate and effusion. Generalized interstitial edema and cardiomegaly. **This report has been created using voice recognition software. It may contain minor errors which are inherent in voice recognition technology. **      Final report electronically signed by Dr. Lauro Hernandez on 11/16/2020 11:22 AM      XR CHEST PORTABLE   Final Result   Moderately worsened aeration of the left lung base. **This report has been created using voice recognition software. It may contain minor errors which are inherent in voice recognition technology. **      Final report electronically signed by Dr. Emperatriz Lainez on 11/15/2020 6:00 PM      XR CHEST PORTABLE   Final Result   No pneumothorax post right thoracentesis. **This report has been created using voice recognition software. It may contain minor errors which are inherent in voice recognition technology. **      Final report electronically signed by Dr. Emperatriz Lainez on 11/14/2020 2:46 PM      XR CHEST PORTABLE   Final Result   Impression:   Moderate CHF, increased. Bilateral lower lobe pneumonia versus subsegmental atelectasis      This document has been electronically signed by: Kindra Simpson MD on    11/13/2020 10:07 PM           Xr Chest Portable    Result Date: 11/14/2020  PROCEDURE: XR CHEST PORTABLE CLINICAL INFORMATION: post thoracentesis right side.  COMPARISON: 11/13/2020 TECHNIQUE: AP upright view of the chest. FINDINGS: Right pleural fluid has been evacuated. No pneumothorax. Small left pleural effusion persists. Mild congestion. No definite infiltrate. AICD is unchanged. No pneumothorax post right thoracentesis. **This report has been created using voice recognition software. It may contain minor errors which are inherent in voice recognition technology. ** Final report electronically signed by Dr. Vi Valdivia on 11/14/2020 2:46 PM    Xr Chest Portable    Result Date: 11/13/2020  Chest xray 1 view Comparison:  CR,SR  - XR CHEST PORTABLE  - 10/25/2020 03:23 PM EDT Findings: Cardiac conduction device. Moderate cardiomegaly. Hypoinflated. Moderately increased interstitial lung markings. There are space opacities notably left lower lobe. Small bilateral pleural effusions. No pneumothorax No acute fracture. Impression: Moderate CHF, increased. Bilateral lower lobe pneumonia versus subsegmental atelectasis This document has been electronically signed by: Marquis Sheets MD on 11/13/2020 10:07 PM     Us Thoracentesis    Result Date: 11/15/2020  THORACENTESIS WITH ULTRASOUND GUIDANCE: PERFORMED BY: Mary Correa M.D. CLINICAL INFORMATION: Pleural effusion APPROACH: Right side, posterior, inferior FLUID WITHDRAWN: 700 mL herrera serous fluid ESTIMATED BLOOD LOSS: Minimal PROCEDURE: Signed informed consent was obtained prior to performing this procedure. The thorax was initially evaluated sonographically to determine appropriate puncture site. The skin was marked, prepped, and draped in a sterile fashion. Following local anesthesia and utilizing aseptic technique, a 5 Urdu one-step catheter was successfully inserted into the pleural effusion at the position indicated above. Pleural fluid in the amount was above was then aspirated and the needle was removed. The patient tolerated the procedure well. A post procedure chest radiograph will be obtained.      Status post thoracentesis **This report has been created using voice recognition software. It may contain minor errors which are inherent in voice recognition technology. ** Final report electronically signed by Dr. Jazmin Pruett on 11/15/2020 7:00 AM      Support Devices (date placed):  [] ETT []Oral / [] Nasal  [] Gastric Tube [] OG / [] NG    [] Central Venous Line (Specify Site):  [] Urinary Catheter  [] Arterial Line (Specify Site)  [x] Peripheral IV access  [x] Other: High flow nasal cannula.     DVT prophylaxis: [] Lovenox                                 [] SCDs                                 [] SQ Heparin                                 [] Encourage ambulation           [x] Already on Anticoagulation     Code Status: Limited    Tele:   [x] yes             [] no    Electronically signed by Mary Castillo MD, MPH, [facility] on 11/27/2020 at 5:10 PM   Supervised by Dr. Isabelle Finn

## 2023-06-15 NOTE — FLOWSHEET NOTE
Pt was wearing street clothes and sitting on the edge of the bed. Pt was very confused. Pt initially was insistent that  was transport and proceeded to removed oxygen, put on coat and walk to the other side of the bed.  got the pt to sit back down and to replace the oxygen. Pt then understood that I was from spiritual care, but kept insisting that if she knew there was a [employer] service she would have arranged transport for later. At this point both a nurse and transport arrived.  offered a blessing and left. Spiritual care to continue to offer support and encouragement.      12/23/20 2605   Encounter Summary   Services provided to: Patient   Referral/Consult From: [facility]   Support System Spouse   Continue Visiting Yes  (12/23)   Complexity of Encounter Low   Length of Encounter 15 minutes   Routine   Type Initial   Assessment Approachable   Intervention Active listening   Outcome Comfort;Engaged in conversation No

## 2024-04-15 NOTE — PROGRESS NOTES
[address] [facility]  Occupational Therapy  Daily Note  Time:   Time In:   Time Out: 902  Timed Code Treatment Minutes: 25 Minutes  Minutes: 25          Date: 2020  Patient Name: Tarsha Silva,   Gender: female      Room: [facility]008-A  MRN: [id number]  : 1945  (67 y.o.)  Referring Practitioner: PETR Vasquez CNP  Diagnosis: Necrotizing Fascitis  Additional Pertinent Hx: Per EMR, \"Sharona Baker is a 67 y.o. female with an extensive medical history including, congestive heart failure, COPD, A. Fib., Pacemaker placement post MI, hypertension, anemia, and IBS who presents to the ED for an evaluation of severe left lower extremity pain, redness, and warmth that started yesterday evening. The patient states that yesterday morning she started feeling some discomfort to her lower extremity, but believed it was due to her compression stockings she wears for CHF. Yesterday evening she removed the stockings and saw that her left lower leg was extremely red, which has continued to spread, becoming more red, and even more painful. Denies any recent trauma, falls, accidents, or wounds to the lower extremity but states that she occasionally scratches her legs with her nails when taking on and off the compression stockings. The patient has attempted tylenol, percocet, biofreeze, and icyhot yesterday with no relief. 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 Endorses history of blood clots, but is on coumadin which was last checked approximately 1 week ago and was in theraputic range.  Further endorses feeling feverish and chills, increased shortness of breath, worsening left lower extremity pain, the inability to bear weight at this time, and denies numbness or tingling to the lower extremities, diabetes mellitus, previous lower leg infections, chest pain, abdominal pain, changes in her bowel or bladder function or habits. Patient still smokes 1/2 a pack of cigarettes daily and is on home oxygen. No alcohol or illicit drug use. \"    Restrictions/Precautions:  Restrictions/Precautions: Weight Bearing, General Precautions, Fall Risk  Left Lower Extremity Weight Bearing: Weight Bearing As Tolerated     SUBJECTIVE: Pt seated upright in bed upon arrival, agreeable to OT session. BP monitored throughout session d/t hypotensive episodes over the weekend:   Seated EOB- 126/39  Standing at RW- 132/73    PAIN: Did not rate: LLE- pt tender to touch. COGNITION: Slow Processing and Decreased Recall    ADL:   Lower Extremity Dressing: Dependent. Donning sock onto L foot. aWrren Gary BALANCE:  Sitting Balance:  Supervision. Standing Balance: Contact Guard Assistance. x4 minutes standing at RW for BP reading and dynamic standing activity. BED MOBILITY:  Supine to Sit: Stand By Assistance    Sit to Supine: Stand By Assistance    Scooting: Supervision EOB    TRANSFERS:  Sit to Stand:  Contact Guard Assistance. Stand to Sit: Contact Guard Assistance. FUNCTIONAL MOBILITY:  Assistive Device: Rolling Walker  Assist Level:  Contact Guard Assistance. Distance: Completed functional mobility taking side steps towards HOB within pt room at slow pace, no LOB noted. Pt requires seated rest break after trial of mobility, min fatigue noted. ADDITIONAL ACTIVITIES:  Patient identified one of their personal goals is to be able to sustain functional standing positions in order to complete various ADL and IADL skills in standing position, such as sinkside grooming or washing dishes. Pt participated in dynamic standing activity to challenge balance and facilitate functional reach into various planes for rings and toss onto target with BUE and 1 UE release from RW. Pt stood for x4 minutes with CGA before requiring seated rest break, mod fatigue noted.  Completed to increase safety with dynamic standing required for showering tasks. ASSESSMENT:     Activity Tolerance:  Patient tolerance of  treatment: fair. Pt limited by pain and dizziness. Discharge Recommendations: Continue to assess pending progress, 24 hour supervision or assist, Home with Home health OT   Equipment Recommendations: Equipment Needed: No  Other: Pt owns BSC and shower chair. Plan: Times per week: 5x  Current Treatment Recommendations: Strengthening, Endurance Training, Balance Training, Functional Mobility Training, Patient/Caregiver Education & Training, Equipment Evaluation, Education, & procurement, Self-Care / ADL, Safety Education & Training    Patient Education  Patient Education: ADL's, Importance of Increasing Activity and Safety with transfers and mobility. Goals  Short term goals  Time Frame for Short term goals: Until d/c  Short term goal 1: Pt will complete BUE light resistive exercises with min vcs for technique to increase indep and safety with all self cares and transfers. Short term goal 2: Pt will complete standing tolerance x 4 minutes with 1-2 UE release and S to increase indep and safety with all grooming. Short term goal 3: Pt will complete LB dressing with LHAE PRN and min A to increase indep and safety within home environment. Short term goal 4: Pt will complete functional mobility to/from BR and HH distances with S and 0 vcs for safety to increase indep and safety with toileting. Following session, patient left in safe position with all fall risk precautions in place. None known

## (undated) DEVICE — SOLUTION IV 1000ML 0.45% SOD CHL PH 5 INJ USP VIAFLX PLAS

## (undated) DEVICE — SYRINGE MED 10ML LUERLOCK TIP W/O SFTY DISP

## (undated) DEVICE — GLOVE ORANGE PI 7   MSG9070

## (undated) DEVICE — IV START KIT: Brand: MEDLINE INDUSTRIES, INC.

## (undated) DEVICE — KIT INF CTRL 2OZ LUB TBNG L12FT DBL END BRSH SYR OP4

## (undated) DEVICE — GLOVE ORANGE PI 8   MSG9080

## (undated) DEVICE — SNARE POLYP SM W13MMXL240CM SHTH DIA2.4MM OVL FLX DISP

## (undated) DEVICE — GLOVE ORANGE PI 8 1/2   MSG9085

## (undated) DEVICE — BLADE LARYNSCP SZ 3 ENH DIR INTUB GLIDESCOPE MCGRATH MAC

## (undated) DEVICE — 4-PORT MANIFOLD: Brand: NEPTUNE 2

## (undated) DEVICE — VORTEX VACUUM MIXING SYSTEM: Brand: VORTEX

## (undated) DEVICE — TUBING, SUCTION, 1/4" X 20', STRAIGHT: Brand: MEDLINE INDUSTRIES, INC.

## (undated) DEVICE — COVER ARMBRD W13XL28.5IN IMPERV BLU FOR OP RM

## (undated) DEVICE — YANKAUER,BULB TIP,W/O VENT,RIGID,STERILE: Brand: MEDLINE

## (undated) DEVICE — PREP IM ENCHANCED TOTAL HIP BONE                                    PREPARATION KIT: Brand: PREP-IM

## (undated) DEVICE — SET ADMIN 25ML L117IN PMP MOD CK VLV RLER CLMP 2 SMRTSITE

## (undated) DEVICE — TUBING IV STOPCOCK 48 CM 3 W

## (undated) DEVICE — SET LNR RED GRN W/ BASE CLEANASCOPE

## (undated) DEVICE — LINER SUCT CANSTR 1500CC SEMI RIG W/ POR HYDROPHOBIC SHUT

## (undated) DEVICE — GOWN,SIRUS,NON REINFRCD,LARGE,SET IN SL: Brand: MEDLINE

## (undated) DEVICE — ENDO KIT: Brand: MEDLINE INDUSTRIES, INC.

## (undated) DEVICE — TRAP POLYP ETRAP

## (undated) DEVICE — E-Z CLEAN, NON-STICK, PTFE COATED, ELECTROSURGICAL BLADE ELECTRODE, 6.5 INCH (16.5 CM): Brand: MEGADYNE

## (undated) DEVICE — SOLUTION IV IRRIG WATER 1000ML POUR BRL 2F7114

## (undated) DEVICE — FORCEPS BX L240CM JAW DIA3.2MM L CAP W/ NDL MIC MESH TOOTH

## (undated) DEVICE — DUAL CUT SAGITTAL BLADE

## (undated) DEVICE — Device: Brand: DEFENDO VALVE AND CONNECTOR KIT

## (undated) DEVICE — CATHETER ETER IV 22GA L1IN POLYUR STR RADPQ INTROCAN SFTY